# Patient Record
Sex: MALE | Race: WHITE | NOT HISPANIC OR LATINO | Employment: OTHER | URBAN - METROPOLITAN AREA
[De-identification: names, ages, dates, MRNs, and addresses within clinical notes are randomized per-mention and may not be internally consistent; named-entity substitution may affect disease eponyms.]

---

## 2017-01-19 ENCOUNTER — HOSPITAL ENCOUNTER (OUTPATIENT)
Dept: FAMILY MEDICINE CLINIC | Facility: CLINIC | Age: 52
Setting detail: SPECIMEN
Discharge: HOME | End: 2017-01-19
Attending: NURSE PRACTITIONER | Admitting: NURSE PRACTITIONER

## 2017-01-19 LAB
ALBUMIN SERPL-MCNC: 4.1 G/DL (ref 3.5–4.8)
ALBUMIN/GLOB SERPL: 1.5 {RATIO} (ref 1–1.7)
ALP SERPL-CCNC: 77 IU/L (ref 32–91)
ALT SERPL-CCNC: 40 IU/L (ref 17–63)
ANION GAP SERPL CALC-SCNC: 16 MMOL/L (ref 10–20)
AST SERPL-CCNC: 27 IU/L (ref 15–41)
BILIRUB SERPL-MCNC: 1.4 MG/DL (ref 0.3–1.2)
BUN SERPL-MCNC: 15 MG/DL (ref 8–20)
BUN/CREAT SERPL: 13.6 (ref 6.2–20.3)
CALCIUM SERPL-MCNC: 9.9 MG/DL (ref 8.9–10.3)
CHLORIDE SERPL-SCNC: 102 MMOL/L (ref 101–111)
CHOLEST SERPL-MCNC: 244 MG/DL
CHOLEST/HDLC SERPL: 6.2 {RATIO}
CONV CO2: 27 MMOL/L (ref 22–32)
CONV LDL CHOLESTEROL DIRECT: 145 MG/DL (ref 0–100)
CONV MICROALBUM.,U,RANDOM: 9 MG/L
CONV TOTAL PROTEIN: 6.8 G/DL (ref 6.1–7.9)
CREAT 24H UR-MCNC: 165.3 MG/DL
CREAT UR-MCNC: 1.1 MG/DL (ref 0.7–1.2)
GLOBULIN UR ELPH-MCNC: 2.7 G/DL (ref 2.5–3.8)
GLUCOSE SERPL-MCNC: 285 MG/DL (ref 65–99)
HDLC SERPL-MCNC: 39 MG/DL
LDLC/HDLC SERPL: 3.7 {RATIO}
LIPID INTERPRETATION: ABNORMAL
MICROALBUMIN/CREAT UR: 5.4 UG/MG
POTASSIUM SERPL-SCNC: 5 MMOL/L (ref 3.6–5.1)
PSA SERPL-MCNC: 0.54 NG/ML (ref 0–4)
SODIUM SERPL-SCNC: 140 MMOL/L (ref 136–144)
T4 FREE SERPL-MCNC: 1 NG/DL (ref 0.58–1.64)
TRIGL SERPL-MCNC: 281 MG/DL
TSH SERPL-ACNC: 10.55 UIU/ML (ref 0.34–5.6)
VLDLC SERPL CALC-MCNC: 59.9 MG/DL

## 2017-03-03 ENCOUNTER — ON CAMPUS - OUTPATIENT (AMBULATORY)
Dept: URBAN - METROPOLITAN AREA HOSPITAL 85 | Facility: HOSPITAL | Age: 52
End: 2017-03-03
Payer: COMMERCIAL

## 2017-03-03 ENCOUNTER — HOSPITAL ENCOUNTER (OUTPATIENT)
Dept: PREOP | Facility: HOSPITAL | Age: 52
Setting detail: HOSPITAL OUTPATIENT SURGERY
Discharge: HOME | End: 2017-03-03
Attending: INTERNAL MEDICINE | Admitting: INTERNAL MEDICINE

## 2017-03-03 DIAGNOSIS — Z12.11 ENCOUNTER FOR SCREENING FOR MALIGNANT NEOPLASM OF COLON: ICD-10-CM

## 2017-03-03 DIAGNOSIS — R13.10 DYSPHAGIA, UNSPECIFIED: ICD-10-CM

## 2017-03-03 DIAGNOSIS — K64.8 OTHER HEMORRHOIDS: ICD-10-CM

## 2017-03-03 DIAGNOSIS — K20.0 EOSINOPHILIC ESOPHAGITIS: ICD-10-CM

## 2017-03-03 DIAGNOSIS — K22.2 ESOPHAGEAL OBSTRUCTION: ICD-10-CM

## 2017-03-03 LAB — GLUCOSE BLD-MCNC: 128 MG/DL (ref 70–105)

## 2017-03-03 PROCEDURE — 43235 EGD DIAGNOSTIC BRUSH WASH: CPT | Performed by: INTERNAL MEDICINE

## 2017-03-03 PROCEDURE — 43450 DILATE ESOPHAGUS 1/MULT PASS: CPT | Performed by: INTERNAL MEDICINE

## 2017-03-03 PROCEDURE — 45378 DIAGNOSTIC COLONOSCOPY: CPT | Mod: 33 | Performed by: INTERNAL MEDICINE

## 2017-08-28 ENCOUNTER — HOSPITAL ENCOUNTER (OUTPATIENT)
Dept: FAMILY MEDICINE CLINIC | Facility: CLINIC | Age: 52
Setting detail: SPECIMEN
Discharge: HOME | End: 2017-08-28

## 2017-08-28 LAB
ALBUMIN SERPL-MCNC: 3.8 G/DL (ref 3.5–4.8)
ALBUMIN/GLOB SERPL: 1.5 {RATIO} (ref 1–1.7)
ALP SERPL-CCNC: 69 IU/L (ref 32–91)
ALT SERPL-CCNC: 27 IU/L (ref 17–63)
ANION GAP SERPL CALC-SCNC: 14.1 MMOL/L (ref 10–20)
AST SERPL-CCNC: 22 IU/L (ref 15–41)
BILIRUB SERPL-MCNC: 1 MG/DL (ref 0.3–1.2)
BUN SERPL-MCNC: 18 MG/DL (ref 8–20)
BUN/CREAT SERPL: 15 (ref 6.2–20.3)
CALCIUM SERPL-MCNC: 9.3 MG/DL (ref 8.9–10.3)
CHLORIDE SERPL-SCNC: 107 MMOL/L (ref 101–111)
CHOLEST SERPL-MCNC: 129 MG/DL
CHOLEST/HDLC SERPL: 3.7 {RATIO}
CONV CO2: 25 MMOL/L (ref 22–32)
CONV LDL CHOLESTEROL DIRECT: 73 MG/DL (ref 0–100)
CONV TOTAL PROTEIN: 6.4 G/DL (ref 6.1–7.9)
CREAT UR-MCNC: 1.2 MG/DL (ref 0.7–1.2)
GLOBULIN UR ELPH-MCNC: 2.6 G/DL (ref 2.5–3.8)
GLUCOSE SERPL-MCNC: 120 MG/DL (ref 65–99)
HDLC SERPL-MCNC: 35 MG/DL
LDLC/HDLC SERPL: 2.1 {RATIO}
LIPID INTERPRETATION: ABNORMAL
POTASSIUM SERPL-SCNC: 5.1 MMOL/L (ref 3.6–5.1)
SODIUM SERPL-SCNC: 141 MMOL/L (ref 136–144)
TRIGL SERPL-MCNC: 105 MG/DL
TSH SERPL-ACNC: 4.39 UIU/ML (ref 0.34–5.6)
VLDLC SERPL CALC-MCNC: 20.5 MG/DL

## 2018-03-02 ENCOUNTER — ON CAMPUS - OUTPATIENT (AMBULATORY)
Dept: URBAN - METROPOLITAN AREA HOSPITAL 85 | Facility: HOSPITAL | Age: 53
End: 2018-03-02
Payer: MEDICARE

## 2018-03-02 ENCOUNTER — HOSPITAL ENCOUNTER (OUTPATIENT)
Dept: PREOP | Facility: HOSPITAL | Age: 53
Setting detail: HOSPITAL OUTPATIENT SURGERY
Discharge: HOME | End: 2018-03-02
Attending: INTERNAL MEDICINE | Admitting: INTERNAL MEDICINE

## 2018-03-02 DIAGNOSIS — R13.10 DYSPHAGIA, UNSPECIFIED: ICD-10-CM

## 2018-03-02 DIAGNOSIS — K22.2 ESOPHAGEAL OBSTRUCTION: ICD-10-CM

## 2018-03-02 PROCEDURE — 43235 EGD DIAGNOSTIC BRUSH WASH: CPT | Performed by: INTERNAL MEDICINE

## 2018-03-02 PROCEDURE — 43450 DILATE ESOPHAGUS 1/MULT PASS: CPT | Performed by: INTERNAL MEDICINE

## 2018-03-06 ENCOUNTER — HOSPITAL ENCOUNTER (OUTPATIENT)
Dept: SLEEP MEDICINE | Facility: HOSPITAL | Age: 53
Discharge: HOME | End: 2018-03-06
Attending: PSYCHIATRY & NEUROLOGY | Admitting: PSYCHIATRY & NEUROLOGY

## 2018-03-30 ENCOUNTER — HOSPITAL ENCOUNTER (OUTPATIENT)
Dept: CARDIOLOGY | Facility: HOSPITAL | Age: 53
Discharge: HOME | End: 2018-03-30
Attending: INTERNAL MEDICINE | Admitting: INTERNAL MEDICINE

## 2018-04-17 ENCOUNTER — HOSPITAL ENCOUNTER (OUTPATIENT)
Dept: OTHER | Facility: HOSPITAL | Age: 53
Discharge: HOME | End: 2018-04-17
Attending: INTERNAL MEDICINE | Admitting: INTERNAL MEDICINE

## 2018-04-17 LAB
ANION GAP SERPL CALC-SCNC: 10.7 MMOL/L (ref 10–20)
BASOPHILS # BLD AUTO: 0.1 10*3/UL (ref 0–0.2)
BASOPHILS NFR BLD AUTO: 1 % (ref 0–2)
BUN SERPL-MCNC: 15 MG/DL (ref 8–20)
BUN/CREAT SERPL: 13.6 (ref 6.2–20.3)
CALCIUM SERPL-MCNC: 9.5 MG/DL (ref 8.9–10.3)
CHLORIDE SERPL-SCNC: 103 MMOL/L (ref 101–111)
CONV CO2: 28 MMOL/L (ref 22–32)
CREAT UR-MCNC: 1.1 MG/DL (ref 0.7–1.2)
DIFFERENTIAL METHOD BLD: (no result)
EOSINOPHIL # BLD AUTO: 0.3 10*3/UL (ref 0–0.3)
EOSINOPHIL # BLD AUTO: 4 % (ref 0–3)
ERYTHROCYTE [DISTWIDTH] IN BLOOD BY AUTOMATED COUNT: 13.9 % (ref 11.5–14.5)
GLUCOSE SERPL-MCNC: 289 MG/DL (ref 65–99)
HCT VFR BLD AUTO: 42.3 % (ref 40–54)
HGB BLD-MCNC: 14.2 G/DL (ref 14–18)
INR PPP: 1
LYMPHOCYTES # BLD AUTO: 1.3 10*3/UL (ref 0.8–4.8)
LYMPHOCYTES NFR BLD AUTO: 20 % (ref 18–42)
MCH RBC QN AUTO: 28.5 PG (ref 26–32)
MCHC RBC AUTO-ENTMCNC: 33.6 G/DL (ref 32–36)
MCV RBC AUTO: 84.8 FL (ref 80–94)
MONOCYTES # BLD AUTO: 0.5 10*3/UL (ref 0.1–1.3)
MONOCYTES NFR BLD AUTO: 7 % (ref 2–11)
NEUTROPHILS # BLD AUTO: 4.5 10*3/UL (ref 2.3–8.6)
NEUTROPHILS NFR BLD AUTO: 68 % (ref 50–75)
NRBC BLD AUTO-RTO: 0 /100{WBCS}
NRBC/RBC NFR BLD MANUAL: 0 10*3/UL
PLATELET # BLD AUTO: 223 10*3/UL (ref 150–450)
PMV BLD AUTO: 7.5 FL (ref 7.4–10.4)
POTASSIUM SERPL-SCNC: 4.7 MMOL/L (ref 3.6–5.1)
PROTHROMBIN TIME: 10.9 SEC (ref 9.6–11.7)
RBC # BLD AUTO: 4.98 10*6/UL (ref 4.6–6)
SODIUM SERPL-SCNC: 137 MMOL/L (ref 136–144)
WBC # BLD AUTO: 6.6 10*3/UL (ref 4.5–11.5)

## 2018-04-27 ENCOUNTER — HOSPITAL ENCOUNTER (OUTPATIENT)
Dept: LAB | Facility: HOSPITAL | Age: 53
Setting detail: SPECIMEN
Discharge: HOME | End: 2018-04-27
Attending: INTERNAL MEDICINE | Admitting: INTERNAL MEDICINE

## 2018-04-27 LAB
ALBUMIN SERPL-MCNC: 4.1 G/DL (ref 3.5–4.8)
ALBUMIN/GLOB SERPL: 1.4 {RATIO} (ref 1–1.7)
ALP SERPL-CCNC: 102 IU/L (ref 32–91)
ALT SERPL-CCNC: 29 IU/L (ref 17–63)
ANION GAP SERPL CALC-SCNC: 11.5 MMOL/L (ref 10–20)
AST SERPL-CCNC: 21 IU/L (ref 15–41)
BASOPHILS # BLD AUTO: 0.1 10*3/UL (ref 0–0.2)
BASOPHILS NFR BLD AUTO: 1 % (ref 0–2)
BILIRUB SERPL-MCNC: 1.9 MG/DL (ref 0.3–1.2)
BUN SERPL-MCNC: 18 MG/DL (ref 8–20)
BUN/CREAT SERPL: 13.8 (ref 6.2–20.3)
CALCIUM SERPL-MCNC: 9.6 MG/DL (ref 8.9–10.3)
CHLORIDE SERPL-SCNC: 101 MMOL/L (ref 101–111)
CHOLEST SERPL-MCNC: 148 MG/DL
CHOLEST/HDLC SERPL: 3.7 {RATIO}
CONV CO2: 28 MMOL/L (ref 22–32)
CONV LDL CHOLESTEROL DIRECT: 89 MG/DL (ref 0–100)
CONV TOTAL PROTEIN: 7 G/DL (ref 6.1–7.9)
CREAT UR-MCNC: 1.3 MG/DL (ref 0.7–1.2)
DIFFERENTIAL METHOD BLD: (no result)
EOSINOPHIL # BLD AUTO: 0.5 10*3/UL (ref 0–0.3)
EOSINOPHIL # BLD AUTO: 5 % (ref 0–3)
ERYTHROCYTE [DISTWIDTH] IN BLOOD BY AUTOMATED COUNT: 13.9 % (ref 11.5–14.5)
GLOBULIN UR ELPH-MCNC: 2.9 G/DL (ref 2.5–3.8)
GLUCOSE SERPL-MCNC: 150 MG/DL (ref 65–99)
HCT VFR BLD AUTO: 46.1 % (ref 40–54)
HDLC SERPL-MCNC: 40 MG/DL
HGB BLD-MCNC: 15.3 G/DL (ref 14–18)
LDLC/HDLC SERPL: 2.2 {RATIO}
LIPID INTERPRETATION: NORMAL
LYMPHOCYTES # BLD AUTO: 1.3 10*3/UL (ref 0.8–4.8)
LYMPHOCYTES NFR BLD AUTO: 14 % (ref 18–42)
MCH RBC QN AUTO: 28.2 PG (ref 26–32)
MCHC RBC AUTO-ENTMCNC: 33.2 G/DL (ref 32–36)
MCV RBC AUTO: 84.8 FL (ref 80–94)
MONOCYTES # BLD AUTO: 0.7 10*3/UL (ref 0.1–1.3)
MONOCYTES NFR BLD AUTO: 8 % (ref 2–11)
NEUTROPHILS # BLD AUTO: 7 10*3/UL (ref 2.3–8.6)
NEUTROPHILS NFR BLD AUTO: 72 % (ref 50–75)
NRBC BLD AUTO-RTO: 0 /100{WBCS}
NRBC/RBC NFR BLD MANUAL: 0 10*3/UL
PLATELET # BLD AUTO: 264 10*3/UL (ref 150–450)
PMV BLD AUTO: 7.9 FL (ref 7.4–10.4)
POTASSIUM SERPL-SCNC: 4.5 MMOL/L (ref 3.6–5.1)
RBC # BLD AUTO: 5.43 10*6/UL (ref 4.6–6)
SODIUM SERPL-SCNC: 136 MMOL/L (ref 136–144)
TRIGL SERPL-MCNC: 82 MG/DL
VLDLC SERPL CALC-MCNC: 19.5 MG/DL
WBC # BLD AUTO: 9.6 10*3/UL (ref 4.5–11.5)

## 2018-06-19 ENCOUNTER — HOSPITAL ENCOUNTER (OUTPATIENT)
Dept: LAB | Facility: HOSPITAL | Age: 53
Setting detail: SPECIMEN
Discharge: HOME | End: 2018-06-19
Attending: NURSE PRACTITIONER | Admitting: NURSE PRACTITIONER

## 2018-07-27 ENCOUNTER — HOSPITAL ENCOUNTER (OUTPATIENT)
Dept: INFUSION THERAPY | Facility: HOSPITAL | Age: 53
Setting detail: RECURRING SERIES
Discharge: HOME | End: 2018-08-05
Attending: INTERNAL MEDICINE | Admitting: INTERNAL MEDICINE

## 2018-07-27 LAB
ALBUMIN SERPL-MCNC: 3.1 G/DL (ref 3.5–4.8)
ALBUMIN/GLOB SERPL: 1 {RATIO} (ref 1–1.7)
ALP SERPL-CCNC: 92 IU/L (ref 32–91)
ALT SERPL-CCNC: 36 IU/L (ref 17–63)
ANION GAP SERPL CALC-SCNC: 12.8 MMOL/L (ref 10–20)
AST SERPL-CCNC: 33 IU/L (ref 15–41)
BASOPHILS # BLD AUTO: 0.1 10*3/UL (ref 0–0.2)
BASOPHILS NFR BLD AUTO: 1 % (ref 0–2)
BILIRUB SERPL-MCNC: 1.2 MG/DL (ref 0.3–1.2)
BUN SERPL-MCNC: 17 MG/DL (ref 8–20)
BUN/CREAT SERPL: 13.1 (ref 6.2–20.3)
CALCIUM SERPL-MCNC: 9 MG/DL (ref 8.9–10.3)
CHLORIDE SERPL-SCNC: 104 MMOL/L (ref 101–111)
CK SERPL-CCNC: 152 IU/L (ref 49–397)
CONV CO2: 25 MMOL/L (ref 22–32)
CONV TOTAL PROTEIN: 6.3 G/DL (ref 6.1–7.9)
CREAT UR-MCNC: 1.3 MG/DL (ref 0.7–1.2)
DIFFERENTIAL METHOD BLD: (no result)
EOSINOPHIL # BLD AUTO: 0.5 10*3/UL (ref 0–0.3)
EOSINOPHIL # BLD AUTO: 6 % (ref 0–3)
ERYTHROCYTE [DISTWIDTH] IN BLOOD BY AUTOMATED COUNT: 13.9 % (ref 11.5–14.5)
GLOBULIN UR ELPH-MCNC: 3.2 G/DL (ref 2.5–3.8)
GLUCOSE SERPL-MCNC: 182 MG/DL (ref 65–99)
HCT VFR BLD AUTO: 34.2 % (ref 40–54)
HGB BLD-MCNC: 11.3 G/DL (ref 14–18)
LYMPHOCYTES # BLD AUTO: 1.3 10*3/UL (ref 0.8–4.8)
LYMPHOCYTES NFR BLD AUTO: 17 % (ref 18–42)
MCH RBC QN AUTO: 28 PG (ref 26–32)
MCHC RBC AUTO-ENTMCNC: 33.1 G/DL (ref 32–36)
MCV RBC AUTO: 84.6 FL (ref 80–94)
MONOCYTES # BLD AUTO: 0.6 10*3/UL (ref 0.1–1.3)
MONOCYTES NFR BLD AUTO: 8 % (ref 2–11)
NEUTROPHILS # BLD AUTO: 5.1 10*3/UL (ref 2.3–8.6)
NEUTROPHILS NFR BLD AUTO: 68 % (ref 50–75)
NRBC BLD AUTO-RTO: 0 /100{WBCS}
NRBC/RBC NFR BLD MANUAL: 0 10*3/UL
PLATELET # BLD AUTO: 293 10*3/UL (ref 150–450)
PMV BLD AUTO: 7.5 FL (ref 7.4–10.4)
POTASSIUM SERPL-SCNC: 3.8 MMOL/L (ref 3.6–5.1)
RBC # BLD AUTO: 4.04 10*6/UL (ref 4.6–6)
SODIUM SERPL-SCNC: 138 MMOL/L (ref 136–144)
WBC # BLD AUTO: 7.6 10*3/UL (ref 4.5–11.5)

## 2018-08-01 ENCOUNTER — HOSPITAL ENCOUNTER (OUTPATIENT)
Dept: LAB | Facility: HOSPITAL | Age: 53
Setting detail: SPECIMEN
Discharge: HOME | End: 2018-08-01
Attending: NURSE PRACTITIONER | Admitting: NURSE PRACTITIONER

## 2018-08-01 ENCOUNTER — HOSPITAL ENCOUNTER (OUTPATIENT)
Dept: OTHER | Facility: HOSPITAL | Age: 53
Setting detail: RECURRING SERIES
Discharge: HOME | End: 2018-09-16
Attending: INTERNAL MEDICINE | Admitting: INTERNAL MEDICINE

## 2018-08-03 LAB
ALBUMIN SERPL-MCNC: 3.3 G/DL (ref 3.5–4.8)
ALBUMIN/GLOB SERPL: 1.1 {RATIO} (ref 1–1.7)
ALP SERPL-CCNC: 85 IU/L (ref 32–91)
ALT SERPL-CCNC: 29 IU/L (ref 17–63)
ANION GAP SERPL CALC-SCNC: 11 MMOL/L (ref 10–20)
AST SERPL-CCNC: 21 IU/L (ref 15–41)
BASOPHILS # BLD AUTO: 0.1 10*3/UL (ref 0–0.2)
BASOPHILS NFR BLD AUTO: 1 % (ref 0–2)
BILIRUB SERPL-MCNC: 1.3 MG/DL (ref 0.3–1.2)
BUN SERPL-MCNC: 13 MG/DL (ref 8–20)
BUN/CREAT SERPL: 10.8 (ref 6.2–20.3)
CALCIUM SERPL-MCNC: 9.1 MG/DL (ref 8.9–10.3)
CHLORIDE SERPL-SCNC: 104 MMOL/L (ref 101–111)
CK SERPL-CCNC: 40 IU/L (ref 49–397)
CONV CO2: 28 MMOL/L (ref 22–32)
CONV TOTAL PROTEIN: 6.3 G/DL (ref 6.1–7.9)
CREAT UR-MCNC: 1.2 MG/DL (ref 0.7–1.2)
DIFFERENTIAL METHOD BLD: (no result)
EOSINOPHIL # BLD AUTO: 0.4 10*3/UL (ref 0–0.3)
EOSINOPHIL # BLD AUTO: 6 % (ref 0–3)
ERYTHROCYTE [DISTWIDTH] IN BLOOD BY AUTOMATED COUNT: 13.6 % (ref 11.5–14.5)
GLOBULIN UR ELPH-MCNC: 3 G/DL (ref 2.5–3.8)
GLUCOSE SERPL-MCNC: 191 MG/DL (ref 65–99)
HCT VFR BLD AUTO: 35.3 % (ref 40–54)
HGB BLD-MCNC: 11.7 G/DL (ref 14–18)
LYMPHOCYTES # BLD AUTO: 1.3 10*3/UL (ref 0.8–4.8)
LYMPHOCYTES NFR BLD AUTO: 22 % (ref 18–42)
MCH RBC QN AUTO: 27.8 PG (ref 26–32)
MCHC RBC AUTO-ENTMCNC: 33.1 G/DL (ref 32–36)
MCV RBC AUTO: 84 FL (ref 80–94)
MONOCYTES # BLD AUTO: 0.5 10*3/UL (ref 0.1–1.3)
MONOCYTES NFR BLD AUTO: 8 % (ref 2–11)
NEUTROPHILS # BLD AUTO: 3.7 10*3/UL (ref 2.3–8.6)
NEUTROPHILS NFR BLD AUTO: 63 % (ref 50–75)
NRBC BLD AUTO-RTO: 0 /100{WBCS}
NRBC/RBC NFR BLD MANUAL: 0 10*3/UL
PLATELET # BLD AUTO: 308 10*3/UL (ref 150–450)
PMV BLD AUTO: 7.7 FL (ref 7.4–10.4)
POTASSIUM SERPL-SCNC: 4 MMOL/L (ref 3.6–5.1)
RBC # BLD AUTO: 4.21 10*6/UL (ref 4.6–6)
SODIUM SERPL-SCNC: 139 MMOL/L (ref 136–144)
WBC # BLD AUTO: 5.9 10*3/UL (ref 4.5–11.5)

## 2018-08-06 ENCOUNTER — HOSPITAL ENCOUNTER (OUTPATIENT)
Dept: OTHER | Facility: HOSPITAL | Age: 53
Setting detail: RECURRING SERIES
Discharge: HOME | End: 2018-08-12
Attending: INTERNAL MEDICINE | Admitting: INTERNAL MEDICINE

## 2018-08-10 LAB
ALBUMIN SERPL-MCNC: 3.7 G/DL (ref 3.5–4.8)
ALBUMIN/GLOB SERPL: 1.3 {RATIO} (ref 1–1.7)
ALP SERPL-CCNC: 99 IU/L (ref 32–91)
ALT SERPL-CCNC: 30 IU/L (ref 17–63)
ANION GAP SERPL CALC-SCNC: 12.3 MMOL/L (ref 10–20)
AST SERPL-CCNC: 23 IU/L (ref 15–41)
BASOPHILS # BLD AUTO: 0.1 10*3/UL (ref 0–0.2)
BASOPHILS NFR BLD AUTO: 1 % (ref 0–2)
BILIRUB SERPL-MCNC: 1.6 MG/DL (ref 0.3–1.2)
BUN SERPL-MCNC: 14 MG/DL (ref 8–20)
BUN/CREAT SERPL: 10.8 (ref 6.2–20.3)
CALCIUM SERPL-MCNC: 9.2 MG/DL (ref 8.9–10.3)
CHLORIDE SERPL-SCNC: 99 MMOL/L (ref 101–111)
CK SERPL-CCNC: 43 IU/L (ref 49–397)
CONV CO2: 29 MMOL/L (ref 22–32)
CONV TOTAL PROTEIN: 6.6 G/DL (ref 6.1–7.9)
CREAT UR-MCNC: 1.3 MG/DL (ref 0.7–1.2)
DIFFERENTIAL METHOD BLD: (no result)
EOSINOPHIL # BLD AUTO: 0.5 10*3/UL (ref 0–0.3)
EOSINOPHIL # BLD AUTO: 6 % (ref 0–3)
ERYTHROCYTE [DISTWIDTH] IN BLOOD BY AUTOMATED COUNT: 14.2 % (ref 11.5–14.5)
GLOBULIN UR ELPH-MCNC: 2.9 G/DL (ref 2.5–3.8)
GLUCOSE SERPL-MCNC: 187 MG/DL (ref 65–99)
HCT VFR BLD AUTO: 41.1 % (ref 40–54)
HGB BLD-MCNC: 13.5 G/DL (ref 14–18)
LYMPHOCYTES # BLD AUTO: 1.7 10*3/UL (ref 0.8–4.8)
LYMPHOCYTES NFR BLD AUTO: 24 % (ref 18–42)
MCH RBC QN AUTO: 27.4 PG (ref 26–32)
MCHC RBC AUTO-ENTMCNC: 32.8 G/DL (ref 32–36)
MCV RBC AUTO: 83.8 FL (ref 80–94)
MONOCYTES # BLD AUTO: 0.7 10*3/UL (ref 0.1–1.3)
MONOCYTES NFR BLD AUTO: 9 % (ref 2–11)
NEUTROPHILS # BLD AUTO: 4.2 10*3/UL (ref 2.3–8.6)
NEUTROPHILS NFR BLD AUTO: 60 % (ref 50–75)
NRBC BLD AUTO-RTO: 0 /100{WBCS}
NRBC/RBC NFR BLD MANUAL: 0 10*3/UL
PLATELET # BLD AUTO: 272 10*3/UL (ref 150–450)
PMV BLD AUTO: 7.8 FL (ref 7.4–10.4)
POTASSIUM SERPL-SCNC: 4.3 MMOL/L (ref 3.6–5.1)
RBC # BLD AUTO: 4.9 10*6/UL (ref 4.6–6)
SODIUM SERPL-SCNC: 136 MMOL/L (ref 136–144)
WBC # BLD AUTO: 7.1 10*3/UL (ref 4.5–11.5)

## 2018-08-13 ENCOUNTER — HOSPITAL ENCOUNTER (OUTPATIENT)
Dept: OTHER | Facility: HOSPITAL | Age: 53
Setting detail: RECURRING SERIES
Discharge: HOME | End: 2018-08-19
Attending: INTERNAL MEDICINE | Admitting: INTERNAL MEDICINE

## 2018-08-17 LAB
ALBUMIN SERPL-MCNC: 3.9 G/DL (ref 3.5–4.8)
ALBUMIN/GLOB SERPL: 1.4 {RATIO} (ref 1–1.7)
ALP SERPL-CCNC: 93 IU/L (ref 32–91)
ALT SERPL-CCNC: 40 IU/L (ref 17–63)
ANION GAP SERPL CALC-SCNC: 15.5 MMOL/L (ref 10–20)
AST SERPL-CCNC: 29 IU/L (ref 15–41)
BASOPHILS # BLD AUTO: 0.1 10*3/UL (ref 0–0.2)
BASOPHILS NFR BLD AUTO: 1 % (ref 0–2)
BILIRUB SERPL-MCNC: 1.6 MG/DL (ref 0.3–1.2)
BUN SERPL-MCNC: 20 MG/DL (ref 8–20)
BUN/CREAT SERPL: 12.5 (ref 6.2–20.3)
CALCIUM SERPL-MCNC: 9.1 MG/DL (ref 8.9–10.3)
CHLORIDE SERPL-SCNC: 98 MMOL/L (ref 101–111)
CK SERPL-CCNC: 46 IU/L (ref 49–397)
CONV CO2: 27 MMOL/L (ref 22–32)
CONV TOTAL PROTEIN: 6.7 G/DL (ref 6.1–7.9)
CREAT UR-MCNC: 1.6 MG/DL (ref 0.7–1.2)
DIFFERENTIAL METHOD BLD: (no result)
EOSINOPHIL # BLD AUTO: 0.6 10*3/UL (ref 0–0.3)
EOSINOPHIL # BLD AUTO: 8 % (ref 0–3)
ERYTHROCYTE [DISTWIDTH] IN BLOOD BY AUTOMATED COUNT: 14.1 % (ref 11.5–14.5)
GLOBULIN UR ELPH-MCNC: 2.8 G/DL (ref 2.5–3.8)
GLUCOSE SERPL-MCNC: 158 MG/DL (ref 65–99)
HCT VFR BLD AUTO: 43.7 % (ref 40–54)
HGB BLD-MCNC: 14.3 G/DL (ref 14–18)
LYMPHOCYTES # BLD AUTO: 1.4 10*3/UL (ref 0.8–4.8)
LYMPHOCYTES NFR BLD AUTO: 19 % (ref 18–42)
MCH RBC QN AUTO: 27.5 PG (ref 26–32)
MCHC RBC AUTO-ENTMCNC: 32.8 G/DL (ref 32–36)
MCV RBC AUTO: 83.6 FL (ref 80–94)
MONOCYTES # BLD AUTO: 0.6 10*3/UL (ref 0.1–1.3)
MONOCYTES NFR BLD AUTO: 8 % (ref 2–11)
NEUTROPHILS # BLD AUTO: 4.8 10*3/UL (ref 2.3–8.6)
NEUTROPHILS NFR BLD AUTO: 64 % (ref 50–75)
NRBC BLD AUTO-RTO: 0 /100{WBCS}
NRBC/RBC NFR BLD MANUAL: 0 10*3/UL
PLATELET # BLD AUTO: 207 10*3/UL (ref 150–450)
PMV BLD AUTO: 8 FL (ref 7.4–10.4)
POTASSIUM SERPL-SCNC: 4.5 MMOL/L (ref 3.6–5.1)
RBC # BLD AUTO: 5.22 10*6/UL (ref 4.6–6)
SODIUM SERPL-SCNC: 136 MMOL/L (ref 136–144)
WBC # BLD AUTO: 7.5 10*3/UL (ref 4.5–11.5)

## 2018-08-20 ENCOUNTER — HOSPITAL ENCOUNTER (OUTPATIENT)
Dept: OTHER | Facility: HOSPITAL | Age: 53
Setting detail: RECURRING SERIES
Discharge: HOME | End: 2018-08-21
Attending: INTERNAL MEDICINE | Admitting: INTERNAL MEDICINE

## 2018-12-13 ENCOUNTER — HOSPITAL ENCOUNTER (OUTPATIENT)
Dept: GASTROENTEROLOGY | Facility: HOSPITAL | Age: 53
Setting detail: HOSPITAL OUTPATIENT SURGERY
Discharge: HOME | End: 2018-12-13
Attending: INTERNAL MEDICINE | Admitting: INTERNAL MEDICINE

## 2018-12-13 ENCOUNTER — ON CAMPUS - OUTPATIENT (AMBULATORY)
Dept: URBAN - METROPOLITAN AREA HOSPITAL 85 | Facility: HOSPITAL | Age: 53
End: 2018-12-13
Payer: MEDICAID

## 2018-12-13 DIAGNOSIS — K22.2 ESOPHAGEAL OBSTRUCTION: ICD-10-CM

## 2018-12-13 DIAGNOSIS — R13.10 DYSPHAGIA, UNSPECIFIED: ICD-10-CM

## 2018-12-13 LAB — GLUCOSE BLD-MCNC: 202 MG/DL (ref 70–105)

## 2018-12-13 PROCEDURE — 43235 EGD DIAGNOSTIC BRUSH WASH: CPT | Performed by: INTERNAL MEDICINE

## 2018-12-13 PROCEDURE — 43450 DILATE ESOPHAGUS 1/MULT PASS: CPT | Performed by: INTERNAL MEDICINE

## 2019-04-22 ENCOUNTER — HOSPITAL ENCOUNTER (OUTPATIENT)
Dept: FAMILY MEDICINE CLINIC | Facility: CLINIC | Age: 54
Setting detail: SPECIMEN
Discharge: HOME | End: 2019-04-22
Attending: FAMILY MEDICINE | Admitting: FAMILY MEDICINE

## 2019-04-22 ENCOUNTER — HOSPITAL ENCOUNTER (OUTPATIENT)
Dept: LAB | Facility: HOSPITAL | Age: 54
Discharge: HOME | End: 2019-04-22
Attending: FAMILY MEDICINE | Admitting: FAMILY MEDICINE

## 2019-04-22 LAB
ALBUMIN SERPL-MCNC: 4.2 G/DL (ref 3.5–4.8)
ALBUMIN/GLOB SERPL: 1.3 {RATIO} (ref 1–1.7)
ALP SERPL-CCNC: 105 IU/L (ref 32–91)
ALT SERPL-CCNC: 24 IU/L (ref 17–63)
ANION GAP SERPL CALC-SCNC: 16.2 MMOL/L (ref 10–20)
AST SERPL-CCNC: 18 IU/L (ref 15–41)
BASOPHILS # BLD AUTO: 0.1 10*3/UL (ref 0–0.2)
BASOPHILS NFR BLD AUTO: 1 % (ref 0–2)
BILIRUB SERPL-MCNC: 1.9 MG/DL (ref 0.3–1.2)
BUN SERPL-MCNC: 18 MG/DL (ref 8–20)
BUN/CREAT SERPL: 15 (ref 6.2–20.3)
CALCIUM SERPL-MCNC: 9.8 MG/DL (ref 8.9–10.3)
CHLORIDE SERPL-SCNC: 100 MMOL/L (ref 101–111)
CHOLEST SERPL-MCNC: 158 MG/DL
CHOLEST/HDLC SERPL: 4 {RATIO}
CONV CO2: 26 MMOL/L (ref 22–32)
CONV LDL CHOLESTEROL DIRECT: 94 MG/DL (ref 0–100)
CONV TOTAL PROTEIN: 7.5 G/DL (ref 6.1–7.9)
CREAT UR-MCNC: 1.2 MG/DL (ref 0.7–1.2)
DIFFERENTIAL METHOD BLD: (no result)
EOSINOPHIL # BLD AUTO: 0.5 10*3/UL (ref 0–0.3)
EOSINOPHIL # BLD AUTO: 6 % (ref 0–3)
ERYTHROCYTE [DISTWIDTH] IN BLOOD BY AUTOMATED COUNT: 14.4 % (ref 11.5–14.5)
GLOBULIN UR ELPH-MCNC: 3.3 G/DL (ref 2.5–3.8)
GLUCOSE SERPL-MCNC: 199 MG/DL (ref 65–99)
HCT VFR BLD AUTO: 47 % (ref 40–54)
HDLC SERPL-MCNC: 40 MG/DL
HGB BLD-MCNC: 15.4 G/DL (ref 14–18)
LDLC/HDLC SERPL: 2.4 {RATIO}
LIPID INTERPRETATION: ABNORMAL
LYMPHOCYTES # BLD AUTO: 1.4 10*3/UL (ref 0.8–4.8)
LYMPHOCYTES NFR BLD AUTO: 17 % (ref 18–42)
MCH RBC QN AUTO: 27.8 PG (ref 26–32)
MCHC RBC AUTO-ENTMCNC: 32.7 G/DL (ref 32–36)
MCV RBC AUTO: 85.1 FL (ref 80–94)
MONOCYTES # BLD AUTO: 0.6 10*3/UL (ref 0.1–1.3)
MONOCYTES NFR BLD AUTO: 7 % (ref 2–11)
NEUTROPHILS # BLD AUTO: 5.5 10*3/UL (ref 2.3–8.6)
NEUTROPHILS NFR BLD AUTO: 69 % (ref 50–75)
NRBC BLD AUTO-RTO: 0 /100{WBCS}
NRBC/RBC NFR BLD MANUAL: 0 10*3/UL
PLATELET # BLD AUTO: 255 10*3/UL (ref 150–450)
PMV BLD AUTO: 8.1 FL (ref 7.4–10.4)
POTASSIUM SERPL-SCNC: 5.2 MMOL/L (ref 3.6–5.1)
RBC # BLD AUTO: 5.52 10*6/UL (ref 4.6–6)
SODIUM SERPL-SCNC: 137 MMOL/L (ref 136–144)
TRIGL SERPL-MCNC: 200 MG/DL
VLDLC SERPL CALC-MCNC: 25 MG/DL
WBC # BLD AUTO: 8 10*3/UL (ref 4.5–11.5)

## 2019-05-16 ENCOUNTER — HOSPITAL ENCOUNTER (OUTPATIENT)
Dept: LAB | Facility: HOSPITAL | Age: 54
Discharge: HOME | End: 2019-05-16
Attending: FAMILY MEDICINE | Admitting: FAMILY MEDICINE

## 2019-05-16 LAB
AMPHETAMINES UR QL SCN: NEGATIVE
BARBITURATES UR QL SCN: NEGATIVE
BENZODIAZ UR QL SCN: NEGATIVE
BZE UR QL SCN: NEGATIVE
CREAT 24H UR-MCNC: 261.1 MG/DL
Lab: NORMAL
METHADONE UR QL SCN: NEGATIVE
OPIATES TESTED UR SCN: NEGATIVE
OXYCODONE UR QL SCN: NEGATIVE
PCP UR QL: NEGATIVE
THC SERPLBLD CFM-MCNC: NEGATIVE NG/ML

## 2019-07-10 ENCOUNTER — OFFICE VISIT (OUTPATIENT)
Dept: FAMILY MEDICINE CLINIC | Facility: CLINIC | Age: 54
End: 2019-07-10

## 2019-07-10 ENCOUNTER — LAB (OUTPATIENT)
Dept: FAMILY MEDICINE CLINIC | Facility: CLINIC | Age: 54
End: 2019-07-10

## 2019-07-10 VITALS
BODY MASS INDEX: 30.49 KG/M2 | HEIGHT: 65 IN | SYSTOLIC BLOOD PRESSURE: 150 MMHG | WEIGHT: 183 LBS | OXYGEN SATURATION: 96 % | HEART RATE: 73 BPM | DIASTOLIC BLOOD PRESSURE: 88 MMHG

## 2019-07-10 DIAGNOSIS — E11.9 TYPE 2 DIABETES MELLITUS WITHOUT COMPLICATION, UNSPECIFIED WHETHER LONG TERM INSULIN USE (HCC): ICD-10-CM

## 2019-07-10 DIAGNOSIS — E03.9 HYPOTHYROIDISM, UNSPECIFIED TYPE: ICD-10-CM

## 2019-07-10 DIAGNOSIS — E11.9 TYPE 2 DIABETES MELLITUS WITHOUT COMPLICATION, UNSPECIFIED WHETHER LONG TERM INSULIN USE (HCC): Primary | ICD-10-CM

## 2019-07-10 PROBLEM — E11.8 DISORDER ASSOCIATED WITH TYPE 2 DIABETES MELLITUS (HCC): Status: ACTIVE | Noted: 2017-01-19

## 2019-07-10 PROBLEM — M19.90 DEGENERATIVE JOINT DISEASE: Status: ACTIVE | Noted: 2017-01-19

## 2019-07-10 PROBLEM — Z86.711 HISTORY OF PULMONARY EMBOLISM: Status: ACTIVE | Noted: 2017-01-19

## 2019-07-10 PROBLEM — E78.5 HYPERLIPIDEMIA: Status: ACTIVE | Noted: 2017-01-19

## 2019-07-10 PROBLEM — E11.42 DIABETIC PERIPHERAL NEUROPATHY: Status: ACTIVE | Noted: 2017-08-25

## 2019-07-10 PROBLEM — G47.33 OBSTRUCTIVE SLEEP APNEA SYNDROME: Status: ACTIVE | Noted: 2018-02-21

## 2019-07-10 PROBLEM — R00.2 PALPITATIONS: Status: ACTIVE | Noted: 2018-03-09

## 2019-07-10 PROBLEM — E55.9 VITAMIN D DEFICIENCY: Status: ACTIVE | Noted: 2017-01-19

## 2019-07-10 PROBLEM — M79.671 FOOT PAIN, RIGHT: Status: ACTIVE | Noted: 2019-04-22

## 2019-07-10 PROBLEM — L03.90 CELLULITIS: Status: ACTIVE | Noted: 2019-04-22

## 2019-07-10 PROBLEM — N18.30 CHRONIC RENAL INSUFFICIENCY, STAGE III (MODERATE) (HCC): Status: ACTIVE | Noted: 2017-01-19

## 2019-07-10 PROBLEM — I10 HYPERTENSION: Status: ACTIVE | Noted: 2017-01-19

## 2019-07-10 PROBLEM — I25.10 CHRONIC CORONARY ARTERY DISEASE: Status: ACTIVE | Noted: 2018-05-31

## 2019-07-10 PROBLEM — M75.51 SUBACROMIAL BURSITIS OF RIGHT SHOULDER JOINT: Status: ACTIVE | Noted: 2018-06-29

## 2019-07-10 PROBLEM — K21.9 GASTROESOPHAGEAL REFLUX DISEASE: Status: ACTIVE | Noted: 2017-01-19

## 2019-07-10 PROBLEM — I73.9 PERIPHERAL VASCULAR DISEASE: Status: ACTIVE | Noted: 2017-01-19

## 2019-07-10 PROBLEM — F32.A DEPRESSION: Status: ACTIVE | Noted: 2017-01-19

## 2019-07-10 PROBLEM — Z87.39 HX OF OSTEOMYELITIS: Status: ACTIVE | Noted: 2017-01-19

## 2019-07-10 PROBLEM — Z12.11 ENCOUNTER FOR SCREENING FOR MALIGNANT NEOPLASM OF COLON: Status: ACTIVE | Noted: 2017-01-19

## 2019-07-10 PROBLEM — R07.9 CHEST PAIN: Status: ACTIVE | Noted: 2018-03-09

## 2019-07-10 PROBLEM — I10 BENIGN ESSENTIAL HYPERTENSION: Status: ACTIVE | Noted: 2018-02-20

## 2019-07-10 PROBLEM — Z89.419 HISTORY OF AMPUTATION OF HALLUX: Status: ACTIVE | Noted: 2017-01-19

## 2019-07-10 PROBLEM — Z87.19 HISTORY OF GASTROINTESTINAL DISEASE: Status: ACTIVE | Noted: 2017-01-19

## 2019-07-10 PROBLEM — R53.83 FATIGUE: Status: ACTIVE | Noted: 2017-12-08

## 2019-07-10 PROBLEM — M67.40 GANGLION CYST: Status: ACTIVE | Noted: 2017-12-08

## 2019-07-10 LAB
ALBUMIN SERPL-MCNC: 4.1 G/DL (ref 3.5–4.8)
ALBUMIN/GLOB SERPL: 1.4 G/DL (ref 1–1.7)
ALP SERPL-CCNC: 93 U/L (ref 32–91)
ALT SERPL W P-5'-P-CCNC: 31 U/L (ref 17–63)
ANION GAP SERPL CALCULATED.3IONS-SCNC: 15.9 MMOL/L (ref 5–15)
ARTICHOKE IGE QN: 107 MG/DL (ref 0–100)
AST SERPL-CCNC: 23 U/L (ref 15–41)
BASOPHILS # BLD AUTO: 0.1 10*3/MM3 (ref 0–0.2)
BASOPHILS NFR BLD AUTO: 0.9 % (ref 0–1.5)
BILIRUB SERPL-MCNC: 1.8 MG/DL (ref 0.3–1.2)
BUN BLD-MCNC: 25 MG/DL (ref 8–20)
BUN/CREAT SERPL: 17.9 (ref 6.2–20.3)
CALCIUM SPEC-SCNC: 9.7 MG/DL (ref 8.9–10.3)
CHLORIDE SERPL-SCNC: 101 MMOL/L (ref 101–111)
CHOLEST SERPL-MCNC: 188 MG/DL
CO2 SERPL-SCNC: 25 MMOL/L (ref 22–32)
CREAT BLD-MCNC: 1.4 MG/DL (ref 0.7–1.2)
DEPRECATED RDW RBC AUTO: 41.6 FL (ref 37–54)
EOSINOPHIL # BLD AUTO: 0.5 10*3/MM3 (ref 0–0.4)
EOSINOPHIL NFR BLD AUTO: 7 % (ref 0.3–6.2)
ERYTHROCYTE [DISTWIDTH] IN BLOOD BY AUTOMATED COUNT: 13.8 % (ref 12.3–15.4)
GFR SERPL CREATININE-BSD FRML MDRD: 53 ML/MIN/1.73
GLOBULIN UR ELPH-MCNC: 2.9 GM/DL (ref 2.5–3.8)
GLUCOSE BLD-MCNC: 262 MG/DL (ref 65–99)
HBA1C MFR BLD: 8.8 % (ref 3.5–5.6)
HCT VFR BLD AUTO: 45.7 % (ref 37.5–51)
HDLC SERPL QL: 4.7
HDLC SERPL-MCNC: 40 MG/DL
HGB BLD-MCNC: 15.4 G/DL (ref 13–17.7)
LDLC/HDLC SERPL: 2.55 {RATIO}
LYMPHOCYTES # BLD AUTO: 1.4 10*3/MM3 (ref 0.7–3.1)
LYMPHOCYTES NFR BLD AUTO: 19.6 % (ref 19.6–45.3)
MCH RBC QN AUTO: 29.2 PG (ref 26.6–33)
MCHC RBC AUTO-ENTMCNC: 33.7 G/DL (ref 31.5–35.7)
MCV RBC AUTO: 86.6 FL (ref 79–97)
MONOCYTES # BLD AUTO: 0.6 10*3/MM3 (ref 0.1–0.9)
MONOCYTES NFR BLD AUTO: 8.2 % (ref 5–12)
NEUTROPHILS # BLD AUTO: 4.6 10*3/MM3 (ref 1.7–7)
NEUTROPHILS NFR BLD AUTO: 64.3 % (ref 42.7–76)
NRBC BLD AUTO-RTO: 0.3 /100 WBC (ref 0–0.2)
PLATELET # BLD AUTO: 227 10*3/MM3 (ref 140–450)
PMV BLD AUTO: 8.2 FL (ref 6–12)
POTASSIUM BLD-SCNC: 5.9 MMOL/L (ref 3.6–5.1)
PROT SERPL-MCNC: 7 G/DL (ref 6.1–7.9)
RBC # BLD AUTO: 5.28 10*6/MM3 (ref 4.14–5.8)
SODIUM BLD-SCNC: 136 MMOL/L (ref 136–144)
TRIGL SERPL-MCNC: 230 MG/DL
TSH SERPL DL<=0.05 MIU/L-ACNC: 5.01 MIU/ML (ref 0.34–5.6)
VLDLC SERPL-MCNC: 46 MG/DL
WBC NRBC COR # BLD: 7.2 10*3/MM3 (ref 3.4–10.8)

## 2019-07-10 PROCEDURE — 85025 COMPLETE CBC W/AUTO DIFF WBC: CPT | Performed by: FAMILY MEDICINE

## 2019-07-10 PROCEDURE — 99213 OFFICE O/P EST LOW 20 MIN: CPT | Performed by: FAMILY MEDICINE

## 2019-07-10 PROCEDURE — 80053 COMPREHEN METABOLIC PANEL: CPT | Performed by: FAMILY MEDICINE

## 2019-07-10 PROCEDURE — 83036 HEMOGLOBIN GLYCOSYLATED A1C: CPT | Performed by: FAMILY MEDICINE

## 2019-07-10 PROCEDURE — 36415 COLL VENOUS BLD VENIPUNCTURE: CPT

## 2019-07-10 PROCEDURE — 84443 ASSAY THYROID STIM HORMONE: CPT | Performed by: FAMILY MEDICINE

## 2019-07-10 PROCEDURE — 80061 LIPID PANEL: CPT | Performed by: FAMILY MEDICINE

## 2019-07-10 RX ORDER — GLIPIZIDE 10 MG/1
TABLET, FILM COATED, EXTENDED RELEASE ORAL
Qty: 60 TABLET | Refills: 3 | Status: SHIPPED | OUTPATIENT
Start: 2019-07-10 | End: 2019-08-10 | Stop reason: SDUPTHER

## 2019-07-10 RX ORDER — NITROGLYCERIN 0.4 MG/1
0.4 TABLET SUBLINGUAL
COMMUNITY
Start: 2019-02-01 | End: 2021-08-02

## 2019-07-10 RX ORDER — ETODOLAC 400 MG/1
400 TABLET, FILM COATED ORAL 2 TIMES DAILY
COMMUNITY
Start: 2018-06-29 | End: 2020-03-15

## 2019-07-10 RX ORDER — BLOOD-GLUCOSE METER
EACH MISCELLANEOUS
COMMUNITY
Start: 2017-01-19 | End: 2019-10-23 | Stop reason: ALTCHOICE

## 2019-07-10 RX ORDER — NICOTINE POLACRILEX 4 MG/1
GUM, CHEWING ORAL EVERY 24 HOURS
Status: ON HOLD | COMMUNITY
Start: 2017-01-19 | End: 2019-10-04 | Stop reason: SDUPTHER

## 2019-07-10 RX ORDER — FLUOXETINE HYDROCHLORIDE 40 MG/1
1 CAPSULE ORAL EVERY 24 HOURS
COMMUNITY
Start: 2018-03-23 | End: 2019-08-27 | Stop reason: SDUPTHER

## 2019-07-10 RX ORDER — LEVOTHYROXINE SODIUM 88 UG/1
TABLET ORAL
COMMUNITY
Start: 2018-10-26 | End: 2019-07-11

## 2019-07-10 RX ORDER — PEN NEEDLE, DIABETIC 30 GX5/16"
NEEDLE, DISPOSABLE MISCELLANEOUS
COMMUNITY
Start: 2019-04-22 | End: 2019-07-10 | Stop reason: SDUPTHER

## 2019-07-10 RX ORDER — PEN NEEDLE, DIABETIC 30 GX5/16"
NEEDLE, DISPOSABLE MISCELLANEOUS
Qty: 30 EACH | Refills: 3 | Status: SHIPPED | OUTPATIENT
Start: 2019-07-10 | End: 2019-08-10 | Stop reason: SDUPTHER

## 2019-07-10 RX ORDER — CETIRIZINE HYDROCHLORIDE 10 MG/1
10 TABLET ORAL DAILY
COMMUNITY
Start: 2019-04-22 | End: 2020-03-15

## 2019-07-10 RX ORDER — ASPIRIN 81 MG/1
81 TABLET ORAL DAILY
COMMUNITY
Start: 2018-05-31 | End: 2021-06-29

## 2019-07-10 RX ORDER — APIXABAN 5 MG/1
5 TABLET, FILM COATED ORAL 2 TIMES DAILY
Refills: 0 | COMMUNITY
Start: 2019-05-28 | End: 2019-08-27 | Stop reason: SDUPTHER

## 2019-07-10 RX ORDER — METOPROLOL SUCCINATE 50 MG/1
TABLET, EXTENDED RELEASE ORAL
COMMUNITY
Start: 2018-03-09 | End: 2019-08-27 | Stop reason: SDUPTHER

## 2019-07-10 RX ORDER — LOSARTAN POTASSIUM 50 MG/1
50 TABLET ORAL DAILY
COMMUNITY
Start: 2018-03-23 | End: 2020-08-07 | Stop reason: SDUPTHER

## 2019-07-10 RX ORDER — GABAPENTIN 300 MG/1
CAPSULE ORAL
COMMUNITY
Start: 2018-05-22 | End: 2019-10-02

## 2019-07-10 RX ORDER — GLIPIZIDE 10 MG/1
TABLET, FILM COATED, EXTENDED RELEASE ORAL EVERY 24 HOURS
COMMUNITY
Start: 2019-04-22 | End: 2019-07-10 | Stop reason: SDUPTHER

## 2019-07-10 RX ORDER — ATORVASTATIN CALCIUM 40 MG/1
40 TABLET, FILM COATED ORAL DAILY
COMMUNITY
Start: 2019-02-01 | End: 2020-08-07 | Stop reason: SDUPTHER

## 2019-07-10 NOTE — PROGRESS NOTES
"Subjective   Kuldeep Adhikari is a 54 y.o. male.     HPI     Here for 3mo f/u-dm/htn  Needs glipizide. He never got script from last visit. Said pharmacy didn't receive it or insulin    Thinks he is taking thyroid med    Going to [employer] for anger mnmgmt      Past Medical History:   Diagnosis Date   • CKD (chronic kidney disease), stage III (CMS/HCC)    • Depression    • DJD (degenerative joint disease)    • GERD (gastroesophageal reflux disease)    • History of echocardiogram 04/2016    2D ECHO   • History of esophageal stricture     s/p dialation 40-50 times last EGD 04/2016   • History of pulmonary embolus (PE)     on long term anticoagulation   • Hyperlipidemia    • Hypertension    • Hypothyroidism    • Type 2 diabetes mellitus with peripheral vascular disease (CMS/HCC)    • Vitamin D deficiency      Past Surgical History:   Procedure Laterality Date   • CARDIAC CATHETERIZATION  04/2018   • ENDOSCOPY     • HERNIA REPAIR     • TOTAL HIP ARTHROPLASTY Left    • TOTAL HIP ARTHROPLASTY  2018     Family History   Problem Relation Age of Onset   • Diabetes Mother    • Heart disease Mother    • Leukemia Father    • Sleep apnea Maternal Aunt         GENO   • Stroke Maternal Grandmother    • Hypertension Other    • Hyperlipidemia Other    • Cancer Other    • Colon cancer Other         uncle     Social History     Tobacco Use   • Smoking status: Never Smoker   Substance Use Topics   • Alcohol use: No     Frequency: Never       No current outpatient medications on file prior to visit.     No current facility-administered medications on file prior to visit.            Review of Systems   Respiratory: Negative for shortness of breath.    Cardiovascular: Negative for chest pain.   Gastrointestinal: Negative for constipation and diarrhea.   Genitourinary: Negative for dysuria.       Visit Vitals  /88 (BP Location: Left arm, Patient Position: Sitting, Cuff Size: Adult)   Pulse 73   Ht 165.1 cm (65\")   Wt 83 kg (183 lb) " "  SpO2 96%   BMI 30.45 kg/m²       Objective   Physical Exam   Constitutional: He is oriented to person, place, and time. He appears well-developed and well-nourished.   HENT:   Head: Normocephalic and atraumatic.   Cardiovascular: Normal rate, regular rhythm and normal heart sounds.   Pulmonary/Chest: Effort normal and breath sounds normal.   Neurological: He is alert and oriented to person, place, and time.   Psychiatric: He has a normal mood and affect. His behavior is normal. Judgment and thought content normal.   Vitals reviewed.        Diagnoses and all orders for this visit:    1. Type 2 diabetes mellitus without complication, unspecified whether long term insulin use (CMS/[facility]) (Primary)  Comments:  Resend prescription  Orders:  -     CBC & Differential  -     Comprehensive Metabolic Panel; Future  -     Hemoglobin A1c; Future  -     Lipid Panel; Future  -     TSH; Future  -     Cancel: Ambulatory Referral for Diabetic Eye Exam-Ophthalmology  -     CBC Auto Differential    2. Hypothyroidism, unspecified type  -     CBC & Differential  -     Comprehensive Metabolic Panel; Future  -     Hemoglobin A1c; Future  -     Lipid Panel; Future  -     TSH; Future  -     CBC Auto Differential    Other orders  -     glipiZIDE (GLIPIZIDE XL) 10 MG 24 hr tablet; 2 tabs daily  Dispense: 60 tablet; Refill: 3  -     Insulin Glargine (LANTUS SOLOSTAR) 100 UNIT/ML injection pen; Inject 10 Units under the skin into the appropriate area as directed Daily.  Dispense: 5 pen; Refill: 1  -     Insulin Pen Needle (PEN NEEDLES 3/16\") 31G X 5 MM misc; Use 1 needle daily  Dispense: 30 each; Refill: 3      "

## 2019-07-11 ENCOUNTER — TELEPHONE (OUTPATIENT)
Dept: FAMILY MEDICINE CLINIC | Facility: CLINIC | Age: 54
End: 2019-07-11

## 2019-07-11 ENCOUNTER — OFFICE VISIT (OUTPATIENT)
Dept: PODIATRY | Facility: CLINIC | Age: 54
End: 2019-07-11

## 2019-07-11 VITALS
BODY MASS INDEX: 30.82 KG/M2 | WEIGHT: 185 LBS | SYSTOLIC BLOOD PRESSURE: 135 MMHG | HEART RATE: 72 BPM | HEIGHT: 65 IN | DIASTOLIC BLOOD PRESSURE: 77 MMHG

## 2019-07-11 DIAGNOSIS — B35.1 ONYCHOMYCOSIS: ICD-10-CM

## 2019-07-11 DIAGNOSIS — E11.42 DM TYPE 2 WITH DIABETIC PERIPHERAL NEUROPATHY (HCC): Primary | ICD-10-CM

## 2019-07-11 PROCEDURE — 11721 DEBRIDE NAIL 6 OR MORE: CPT | Performed by: PODIATRIST

## 2019-07-11 PROCEDURE — 99213 OFFICE O/P EST LOW 20 MIN: CPT | Performed by: PODIATRIST

## 2019-07-11 RX ORDER — LEVOTHYROXINE SODIUM 0.1 MG/1
100 TABLET ORAL DAILY
Qty: 60 TABLET | Refills: 0 | Status: SHIPPED | OUTPATIENT
Start: 2019-07-11 | End: 2019-08-10 | Stop reason: SDUPTHER

## 2019-07-11 NOTE — PROGRESS NOTES
"07/11/2019  Foot and Ankle Surgery - Established Patient/Follow-up  Provider: Dr. Bong Baker DPM  Location: [facility]    Subjective:  Kuldeep Adhikari is a 54 y.o. male.     Chief Complaint   Patient presents with   • Annual Exam     DM check       HPI: Patient returns for routine diabetic foot check.  He denies any new issues since last exam.  He continues to have numbness affecting both lower extremities.  He states that his blood sugars have been quite high recently.  He is working with his primary care physician for improved control.  He has not had any open wounds or infections since last exam.  He would like to obtain new diabetic shoes and inserts    Allergies   Allergen Reactions   • Lisinopril Cough       Current Outpatient Medications on File Prior to Visit   Medication Sig Dispense Refill   • aspirin (ASPIR-LOW) 81 MG EC tablet Daily.     • atorvastatin (LIPITOR) 40 MG tablet Daily.     • Blood Glucose Monitoring Suppl (ACCU-CHEK ANTONIO PLUS) w/Device kit ACCU-CHEK ANTONIO PLUS w/Device KIT     • cetirizine (ZYRTEC ALLERGY) 10 MG tablet Daily.     • ELIQUIS 5 MG tablet tablet Take 5 mg by mouth 2 (Two) Times a Day.  0   • etodolac (LODINE) 400 MG tablet Every 12 (Twelve) Hours.     • FLUoxetine (PROzac) 40 MG capsule 1 capsule Daily.     • gabapentin (NEURONTIN) 300 MG capsule GABAPENTIN 300 MG CAPS     • glipiZIDE (GLIPIZIDE XL) 10 MG 24 hr tablet 2 tabs daily 60 tablet 3   • glucose blood (ACCU-CHEK ANTONIO PLUS) test strip ACCU-CHEK ANTONIO PLUS STRP     • Insulin Glargine (LANTUS SOLOSTAR) 100 UNIT/ML injection pen Inject 10 Units under the skin into the appropriate area as directed Daily. 5 pen 1   • Insulin Pen Needle (PEN NEEDLES 3/16\") 31G X 5 MM misc Use 1 needle daily 30 each 3   • losartan (COZAAR) 50 MG tablet Daily.     • metoprolol succinate XL (TOPROL-XL) 50 MG 24 hr tablet METOPROLOL SUCCINATE ER 50 MG XR24H-TAB     • nitroglycerin (NITROSTAT) 0.4 MG SL tablet NITROGLYCERIN 0.4 MG " "SUBL     • Omeprazole 20 MG tablet delayed-release Daily.     • SITagliptin-metFORMIN HCl ER (JANUMET XR)  MG tablet Every 12 (Twelve) Hours.     • [DISCONTINUED] levothyroxine (SYNTHROID, LEVOTHROID) 88 MCG tablet LEVOTHYROXINE SODIUM 88 MCG TABS       No current facility-administered medications on file prior to visit.        Objective   /77   Pulse 72   Ht 165.1 cm (65\")   Wt 83.9 kg (185 lb)   BMI 30.79 kg/m²     Podiatry Exam       General Appearance:  well nourished; well hydrated; no acute distress  Mental Status Exam        Judgement and Insight:  Intact       Orientation:  Oriented to time, place, and person       Memory:  Intact for recent and remote events       Mood and Affect:  No depression, anxiety, or agitation  Cardiovascular       Dorsalis Pedis Pulse:   2/4       Posterior Tibialis Pulse:   2/4       Capillary Filling Time:  1-3 Seconds       Edema :  No Edema  Dermatological Exam       Temperature:  warm to warm       Skin Elasticity:  normal skin elasticity  Skin:   No open wounds or signs of infection.  Neurological Exam        Paresthesia :  negative       Patella DTRs:  symmetric       Achilles DTRs :  symmetric       Tinel over Tarsal Tunnel:  negative  Monofilament Test :   not intact       Diabetic Risk:  Loss of protective sensation with no weakness deformity callus or pre-ulcer  Additional Neurological Findings   sensation is diminished with monofilament testing.  Motor function is intact  Nails are excessively thickened, elongated, and dystrophic x9     MusculoSkeletal Exam          Gait and Stance :   nonantalgic       ROM :   ankle and pedal joint range of motion is nontender crepitus free.       Muscle Strength :  symmetrical 5/5       Subluxed Digits:  no subluxation or laxity of joints  Additional Musculoskelatal Findings  Mild hammer digit deformities affecting the right second and third digits.  Stable hallux amputation to the right foot.  Assessment/Plan "     Kuldeep was seen today for annual exam.    Diagnoses and all orders for this visit:    DM type 2 with diabetic peripheral neuropathy (CMS/[facility])    Onychomycosis    Patient returns for routine diabetic foot check with no significant increase in symptoms.  He continues to have numbness which is secondary to his diabetes.  We did discuss the importance of glycemic control to improve symptoms.  He denies any significant pain.  He has been taking gabapentin without any known improvement.  Nails were debrided x9 today without complication.  We did discuss proper routine care.  Continue to feel that he is at moderate risk for pedal complications.  I have referred him for new diabetic shoes and inserts.Explained importance of diabetic foot care, daily foot checks, and glycemic control. Patient should check both feet on a daily basis, monitor and control blood sugars, make sure that both feet and in between toes are towel dried after baths or showers. Avoid barefoot walking at all times. Check shoes before putting them on.   Patient was given information on proper foot care. Call the office at the first signs of a wound or with signs of infection.  I would like to see him in 6 months for reevaluation.    No orders of the defined types were placed in this encounter.           Note is dictated utilizing voice recognition software. Unfortunately this leads to occasional typographical errors. I apologize in advance if the situation occurs. If questions occur please do not hesitate to call our office.

## 2019-07-11 NOTE — PATIENT INSTRUCTIONS
Diabetes Mellitus and Foot Care  Foot care is an important part of your health, especially when you have diabetes. Diabetes may cause you to have problems because of poor blood flow (circulation) to your feet and legs, which can cause your skin to:  · Become thinner and drier.  · Break more easily.  · Heal more slowly.  · Peel and crack.    You may also have nerve damage (neuropathy) in your legs and feet, causing decreased feeling in them. This means that you may not notice minor injuries to your feet that could lead to more serious problems. Noticing and addressing any potential problems early is the best way to prevent future foot problems.  How to care for your feet  Foot hygiene  · Wash your feet daily with warm water and mild soap. Do not use hot water. Then, pat your feet and the areas between your toes until they are completely dry. Do not soak your feet as this can dry your skin.  · Trim your toenails straight across. Do not dig under them or around the cuticle. File the edges of your nails with an emery board or nail file.  · Apply a moisturizing lotion or petroleum jelly to the skin on your feet and to dry, brittle toenails. Use lotion that does not contain alcohol and is unscented. Do not apply lotion between your toes.  Shoes and socks  · Wear clean socks or stockings every day. Make sure they are not too tight. Do not wear knee-high stockings since they may decrease blood flow to your legs.  · Wear shoes that fit properly and have enough cushioning. Always look in your shoes before you put them on to be sure there are no objects inside.  · To break in new shoes, wear them for just a few hours a day. This prevents injuries on your feet.  Wounds, scrapes, corns, and calluses  · Check your feet daily for blisters, cuts, bruises, sores, and redness. If you cannot see the bottom of your feet, use a mirror or ask someone for help.  · Do not cut corns or calluses or try to remove them with medicine.  · If you  find a minor scrape, cut, or break in the skin on your feet, keep it and the skin around it clean and dry. You may clean these areas with mild soap and water. Do not clean the area with peroxide, alcohol, or iodine.  · If you have a wound, scrape, corn, or callus on your foot, look at it several times a day to make sure it is healing and not infected. Check for:  ? Redness, swelling, or pain.  ? Fluid or blood.  ? Warmth.  ? Pus or a bad smell.  General instructions  · Do not cross your legs. This may decrease blood flow to your feet.  · Do not use heating pads or hot water bottles on your feet. They may burn your skin. If you have lost feeling in your feet or legs, you may not know this is happening until it is too late.  · Protect your feet from hot and cold by wearing shoes, such as at the beach or on hot pavement.  · Schedule a complete foot exam at least once a year (annually) or more often if you have foot problems. If you have foot problems, report any cuts, sores, or bruises to your health care provider immediately.  Contact a health care provider if:  · You have a medical condition that increases your risk of infection and you have any cuts, sores, or bruises on your feet.  · You have an injury that is not healing.  · You have redness on your legs or feet.  · You feel burning or tingling in your legs or feet.  · You have pain or cramps in your legs and feet.  · Your legs or feet are numb.  · Your feet always feel cold.  · You have pain around a toenail.  Get help right away if:  · You have a wound, scrape, corn, or callus on your foot and:  ? You have pain, swelling, or redness that gets worse.  ? You have fluid or blood coming from the wound, scrape, corn, or callus.  ? Your wound, scrape, corn, or callus feels warm to the touch.  ? You have pus or a bad smell coming from the wound, scrape, corn, or callus.  ? You have a fever.  ? You have a red line going up your leg.  Summary  · Check your feet every day  for cuts, sores, red spots, swelling, and blisters.  · Moisturize feet and legs daily.  · Wear shoes that fit properly and have enough cushioning.  · If you have foot problems, report any cuts, sores, or bruises to your health care provider immediately.  · Schedule a complete foot exam at least once a year (annually) or more often if you have foot problems.  This information is not intended to replace advice given to you by your health care provider. Make sure you discuss any questions you have with your health care provider.  Document Released: 12/15/2001 Document Revised: 01/19/2018 Document Reviewed: 01/19/2018  [employer] Interactive Patient Education © 2019 [employer]

## 2019-07-11 NOTE — TELEPHONE ENCOUNTER
1.  TSH improved but not yet to goal--increase Synthroid to 100 mcg daily--repeat labs 6 weeks    2.  Hemoglobin A1c 8.8, goal is under 7, call in 1 to 2 weeks with fasting blood sugars after starting daily insulin

## 2019-08-12 RX ORDER — GLIPIZIDE 10 MG/1
TABLET, FILM COATED, EXTENDED RELEASE ORAL
Qty: 60 TABLET | Refills: 3 | Status: SHIPPED | OUTPATIENT
Start: 2019-08-12 | End: 2019-10-02

## 2019-08-12 RX ORDER — PEN NEEDLE, DIABETIC 30 GX5/16"
NEEDLE, DISPOSABLE MISCELLANEOUS
Qty: 30 EACH | Refills: 3 | Status: SHIPPED | OUTPATIENT
Start: 2019-08-12 | End: 2020-03-15 | Stop reason: SDUPTHER

## 2019-08-12 RX ORDER — PEN NEEDLE, DIABETIC 30 GX5/16"
NEEDLE, DISPOSABLE MISCELLANEOUS
Qty: 30 EACH | Refills: 3 | Status: SHIPPED | OUTPATIENT
Start: 2019-08-12 | End: 2019-10-02

## 2019-08-12 RX ORDER — GLIPIZIDE 10 MG/1
TABLET, FILM COATED, EXTENDED RELEASE ORAL
Qty: 60 TABLET | Refills: 3 | Status: SHIPPED | OUTPATIENT
Start: 2019-08-12 | End: 2019-10-23

## 2019-08-12 RX ORDER — LEVOTHYROXINE SODIUM 0.1 MG/1
100 TABLET ORAL DAILY
Qty: 60 TABLET | Refills: 0 | Status: SHIPPED | OUTPATIENT
Start: 2019-08-12 | End: 2020-08-07 | Stop reason: SDUPTHER

## 2019-08-12 RX ORDER — LEVOTHYROXINE SODIUM 0.1 MG/1
100 TABLET ORAL DAILY
Qty: 60 TABLET | Refills: 0 | Status: SHIPPED | OUTPATIENT
Start: 2019-08-12 | End: 2019-10-02

## 2019-08-12 RX ORDER — LEVOTHYROXINE SODIUM 0.1 MG/1
100 TABLET ORAL DAILY
Qty: 60 TABLET | Refills: 0 | Status: SHIPPED | OUTPATIENT
Start: 2019-08-12 | End: 2019-08-13 | Stop reason: SDUPTHER

## 2019-08-12 RX ORDER — PEN NEEDLE, DIABETIC 30 GX5/16"
NEEDLE, DISPOSABLE MISCELLANEOUS
Qty: 30 EACH | Refills: 3 | Status: SHIPPED | OUTPATIENT
Start: 2019-08-12 | End: 2019-08-13 | Stop reason: SDUPTHER

## 2019-08-15 RX ORDER — LEVOTHYROXINE SODIUM 0.1 MG/1
100 TABLET ORAL DAILY
Qty: 60 TABLET | Refills: 0 | Status: SHIPPED | OUTPATIENT
Start: 2019-08-15 | End: 2019-10-02

## 2019-08-15 RX ORDER — PEN NEEDLE, DIABETIC 30 GX5/16"
NEEDLE, DISPOSABLE MISCELLANEOUS
Qty: 30 EACH | Refills: 3 | Status: SHIPPED | OUTPATIENT
Start: 2019-08-15 | End: 2019-10-02

## 2019-08-28 RX ORDER — FLUOXETINE HYDROCHLORIDE 40 MG/1
CAPSULE ORAL
Qty: 90 CAPSULE | Refills: 1 | Status: SHIPPED | OUTPATIENT
Start: 2019-08-28 | End: 2020-06-24

## 2019-08-28 RX ORDER — APIXABAN 5 MG/1
TABLET, FILM COATED ORAL
Qty: 180 TABLET | Refills: 0 | Status: SHIPPED | OUTPATIENT
Start: 2019-08-28 | End: 2019-10-01 | Stop reason: SDUPTHER

## 2019-08-28 RX ORDER — METOPROLOL SUCCINATE 50 MG/1
TABLET, EXTENDED RELEASE ORAL
Qty: 90 TABLET | Refills: 1 | Status: SHIPPED | OUTPATIENT
Start: 2019-08-28 | End: 2019-12-16 | Stop reason: SDUPTHER

## 2019-10-01 RX ORDER — SITAGLIPTIN AND METFORMIN HYDROCHLORIDE 1000; 50 MG/1; MG/1
TABLET, FILM COATED, EXTENDED RELEASE ORAL
Qty: 60 TABLET | Refills: 1 | OUTPATIENT
Start: 2019-10-01

## 2019-10-02 RX ORDER — APIXABAN 5 MG/1
TABLET, FILM COATED ORAL
Qty: 180 TABLET | Refills: 0 | Status: SHIPPED | OUTPATIENT
Start: 2019-10-02 | End: 2019-12-16 | Stop reason: SDUPTHER

## 2019-10-03 ENCOUNTER — ANESTHESIA EVENT (OUTPATIENT)
Dept: GASTROENTEROLOGY | Facility: HOSPITAL | Age: 54
End: 2019-10-03

## 2019-10-04 ENCOUNTER — ON CAMPUS - OUTPATIENT (AMBULATORY)
Dept: URBAN - METROPOLITAN AREA HOSPITAL 85 | Facility: HOSPITAL | Age: 54
End: 2019-10-04

## 2019-10-04 ENCOUNTER — ANESTHESIA (OUTPATIENT)
Dept: GASTROENTEROLOGY | Facility: HOSPITAL | Age: 54
End: 2019-10-04

## 2019-10-04 ENCOUNTER — HOSPITAL ENCOUNTER (OUTPATIENT)
Facility: HOSPITAL | Age: 54
Setting detail: HOSPITAL OUTPATIENT SURGERY
Discharge: HOME | End: 2019-10-04
Attending: INTERNAL MEDICINE | Admitting: INTERNAL MEDICINE

## 2019-10-04 VITALS
DIASTOLIC BLOOD PRESSURE: 78 MMHG | TEMPERATURE: 98.4 F | HEIGHT: 68 IN | HEART RATE: 64 BPM | BODY MASS INDEX: 24.25 KG/M2 | OXYGEN SATURATION: 96 % | RESPIRATION RATE: 14 BRPM | WEIGHT: 160 LBS | SYSTOLIC BLOOD PRESSURE: 122 MMHG

## 2019-10-04 VITALS — HEART RATE: 66 BPM | DIASTOLIC BLOOD PRESSURE: 71 MMHG | SYSTOLIC BLOOD PRESSURE: 117 MMHG | OXYGEN SATURATION: 99 %

## 2019-10-04 DIAGNOSIS — K20.0 EOSINOPHILIC ESOPHAGITIS: ICD-10-CM

## 2019-10-04 DIAGNOSIS — R47.02 DYSPHASIA: Primary | ICD-10-CM

## 2019-10-04 DIAGNOSIS — R13.10 DYSPHAGIA, UNSPECIFIED: ICD-10-CM

## 2019-10-04 DIAGNOSIS — K22.2 ESOPHAGEAL OBSTRUCTION: ICD-10-CM

## 2019-10-04 LAB — GLUCOSE BLDC GLUCOMTR-MCNC: 122 MG/DL (ref 70–105)

## 2019-10-04 PROCEDURE — 25010000002 PROPOFOL 10 MG/ML EMULSION: Performed by: ANESTHESIOLOGIST ASSISTANT

## 2019-10-04 PROCEDURE — 82962 GLUCOSE BLOOD TEST: CPT

## 2019-10-04 PROCEDURE — 88305 TISSUE EXAM BY PATHOLOGIST: CPT | Performed by: INTERNAL MEDICINE

## 2019-10-04 PROCEDURE — 43450 DILATE ESOPHAGUS 1/MULT PASS: CPT | Performed by: INTERNAL MEDICINE

## 2019-10-04 PROCEDURE — 43239 EGD BIOPSY SINGLE/MULTIPLE: CPT | Performed by: INTERNAL MEDICINE

## 2019-10-04 RX ORDER — SODIUM CHLORIDE 9 MG/ML
9 INJECTION, SOLUTION INTRAVENOUS CONTINUOUS PRN
Status: DISCONTINUED | OUTPATIENT
Start: 2019-10-04 | End: 2019-10-04 | Stop reason: HOSPADM

## 2019-10-04 RX ORDER — NICOTINE POLACRILEX 4 MG/1
40 GUM, CHEWING ORAL EVERY 24 HOURS
Qty: 180 EACH | Refills: 0 | Status: SHIPPED | OUTPATIENT
Start: 2019-10-04 | End: 2020-03-15

## 2019-10-04 RX ORDER — SODIUM CHLORIDE 0.9 % (FLUSH) 0.9 %
3 SYRINGE (ML) INJECTION EVERY 12 HOURS SCHEDULED
Status: DISCONTINUED | OUTPATIENT
Start: 2019-10-04 | End: 2019-10-04 | Stop reason: HOSPADM

## 2019-10-04 RX ORDER — SODIUM CHLORIDE 0.9 % (FLUSH) 0.9 %
10 SYRINGE (ML) INJECTION AS NEEDED
Status: DISCONTINUED | OUTPATIENT
Start: 2019-10-04 | End: 2019-10-04 | Stop reason: HOSPADM

## 2019-10-04 RX ORDER — PROPOFOL 10 MG/ML
VIAL (ML) INTRAVENOUS AS NEEDED
Status: DISCONTINUED | OUTPATIENT
Start: 2019-10-04 | End: 2019-10-04 | Stop reason: SURG

## 2019-10-04 RX ORDER — LIDOCAINE HYDROCHLORIDE 10 MG/ML
0.5 INJECTION, SOLUTION EPIDURAL; INFILTRATION; INTRACAUDAL; PERINEURAL ONCE AS NEEDED
Status: DISCONTINUED | OUTPATIENT
Start: 2019-10-04 | End: 2019-10-04 | Stop reason: HOSPADM

## 2019-10-04 RX ORDER — SODIUM CHLORIDE 0.9 % (FLUSH) 0.9 %
3-10 SYRINGE (ML) INJECTION AS NEEDED
Status: DISCONTINUED | OUTPATIENT
Start: 2019-10-04 | End: 2019-10-04 | Stop reason: HOSPADM

## 2019-10-04 RX ORDER — SODIUM CHLORIDE 9 MG/ML
30 INJECTION, SOLUTION INTRAVENOUS CONTINUOUS PRN
Status: DISCONTINUED | OUTPATIENT
Start: 2019-10-04 | End: 2019-10-04 | Stop reason: HOSPADM

## 2019-10-04 RX ORDER — LIDOCAINE HYDROCHLORIDE 10 MG/ML
INJECTION, SOLUTION EPIDURAL; INFILTRATION; INTRACAUDAL; PERINEURAL AS NEEDED
Status: DISCONTINUED | OUTPATIENT
Start: 2019-10-04 | End: 2019-10-04 | Stop reason: SURG

## 2019-10-04 RX ADMIN — SODIUM CHLORIDE 9 ML/HR: 900 INJECTION, SOLUTION INTRAVENOUS at 08:45

## 2019-10-04 RX ADMIN — LIDOCAINE HYDROCHLORIDE 80 MG: 10 INJECTION, SOLUTION EPIDURAL; INFILTRATION; INTRACAUDAL; PERINEURAL at 10:05

## 2019-10-04 RX ADMIN — SODIUM CHLORIDE 9 ML/HR: 900 INJECTION, SOLUTION INTRAVENOUS at 10:44

## 2019-10-04 RX ADMIN — PROPOFOL 300 MG: 10 INJECTION, EMULSION INTRAVENOUS at 10:05

## 2019-10-04 NOTE — H&P
" LOS: 0 days   Patient Care Team:  Laura Whitaker MD as PCP - General  [place], KEYLA Aldana as PCP - Claims Attributed  TatiStephanie crespo MD as PCP - Family Medicine      Subjective     Interval History:     Subjective: Dysphagia with history of distal esophageal stricture status post Enteryx injection 13 years ago      ROS:   No chest pain, shortness of breath, or cough.  No nausea, vomiting, or hematemesis.  No change since scanned H&P       Medication Review:   No current facility-administered medications for this encounter.     Current Outpatient Medications:   •  aspirin (ASPIR-LOW) 81 MG EC tablet, Daily., Disp: , Rfl:   •  atorvastatin (LIPITOR) 40 MG tablet, Daily., Disp: , Rfl:   •  Blood Glucose Monitoring Suppl (ACCU-CHEK ANTONIO PLUS) w/Device kit, ACCU-CHEK ANTONIO PLUS w/Device KIT, Disp: , Rfl:   •  cetirizine (ZYRTEC ALLERGY) 10 MG tablet, Daily., Disp: , Rfl:   •  etodolac (LODINE) 400 MG tablet, Every 12 (Twelve) Hours., Disp: , Rfl:   •  FLUoxetine (PROzac) 40 MG capsule, TAKE 1 CAPSULE BY MOUTH ONCE DAILY, Disp: 90 capsule, Rfl: 1  •  glipiZIDE (GLIPIZIDE XL) 10 MG 24 hr tablet, 2 tabs daily, Disp: 60 tablet, Rfl: 3  •  glucose blood (ACCU-CHEK ANTONIO PLUS) test strip, ACCU-CHEK ANTONIO PLUS STRP, Disp: , Rfl:   •  Insulin Glargine (LANTUS SOLOSTAR) 100 UNIT/ML injection pen, Inject 10 Units under the skin into the appropriate area as directed Daily., Disp: 5 pen, Rfl: 1  •  Insulin Pen Needle (PEN NEEDLES 3/16\") 31G X 5 MM misc, Use 1 needle daily, Disp: 30 each, Rfl: 3  •  levothyroxine (SYNTHROID) 100 MCG tablet, Take 1 tablet by mouth Daily., Disp: 60 tablet, Rfl: 0  •  losartan (COZAAR) 50 MG tablet, Daily., Disp: , Rfl:   •  metoprolol succinate XL (TOPROL-XL) 50 MG 24 hr tablet, TAKE 1 TABLET BY MOUTH ONCE DAILY, Disp: 90 tablet, Rfl: 1  •  nitroglycerin (NITROSTAT) 0.4 MG SL tablet, NITROGLYCERIN 0.4 MG SUBL, Disp: , Rfl:   •  Omeprazole 20 MG tablet delayed-release, " "Daily., Disp: , Rfl:   •  SITagliptin-metFORMIN HCl ER (JANUMET XR)  MG tablet, Every 12 (Twelve) Hours., Disp: , Rfl:   •  ELIQUIS 5 MG tablet tablet, TAKE 1 TABLET BY MOUTH TWICE DAILY, Disp: 180 tablet, Rfl: 0      Objective     Vital Signs  Vitals:    10/02/19 1457   Weight: 72.6 kg (160 lb)   Height: 172.7 cm (68\")       Physical Exam:    General Appearance:    Awake and alert, in no acute distress   Head:    Normocephalic, without obvious abnormality   Eyes:          Conjunctivae normal, anicteric sclera   Throat:   No oral lesions, no thrush, oral mucosa moist   Neck:   No adenopathy, supple, no JVD   CV/Lungs:     RRR, respirations regular, even and unlabored   Abdomen:     Soft, non-tender, no rebound or guarding, non-distended, no hepatosplenomegaly   Rectal:     Deferred   Extremities:   No edema, no cyanosis   Skin:   No bruising or rash, no jaundice        Results Review:    Lab Results (last 24 hours)     ** No results found for the last 24 hours. **          Imaging Results (last 24 hours)     ** No results found for the last 24 hours. **            Assessment/Plan   Proceed with scope and MAC anesthesia    Proceed with EGD and dilation    Declan Iqbal MD  10/04/19  7:04 AM  "

## 2019-10-04 NOTE — OP NOTE
ESOPHAGOGASTRODUODENOSCOPY  Procedure Report    Patient Name:  Kuldeep Adhikari  YOB: 1965    Date of Surgery:  10/4/2019     Indications: Dysphagia with history of esophageal stricture    Pre-op Diagnosis:   DYSPHAGIA W HX DILATION         Post-op Diagnosis:  1 distal esophageal stricture dilated to 50 [place] 2 suspect eosinophilic esophagitis with circumferential rings and linear referrals status post biopsy of the midesophagus      Procedure(s):  ESOPHAGOGASTRODUODENOSCOPY    Staff:  Surgeon(s):  Declan Iqbal MD         Anesthesia: Monitored Anesthesia Care    Estimated Blood Loss: Minimal  Implants:    Nothing was implanted during the procedure    Specimen:          Esophageal biopsy, middle third    Complications:  None immediate    Description of Procedure:  Informed consent was obtained from the patient.  They were placed in the left lateral decubitus position and IV conscious sedation was administered by anesthesia while being monitored continuously throughout the procedure.  The video Olympus endoscope was introduced into the esophagus and advanced under direct vision the second portion the duodenum which is normal as was the bulb.  The pylorus is widely patent and there is no ulcer disease the antrum body fundus and cardia were normal including retroflexion views without gastric mucosal lesions.  No hiatal hernia seen today the squamocolumnar line is at the GE junction there does appear to be a stricture of about 15 to 16 mm diameter.  There is no active erosive esophagitis.  In the middle esophagus are clear linear referrals and circumferential rings suggestive of eosinophilic esophagitis.  Photodocumentation was obtained.  The remaining esophagus was normal the scope was removed and he was sounded with a 50 [place] Blair dilator which entered the stomach with minor resistance.  Second look endoscopy showed effective mucosal dilation at the GE junction next multiple biopsies of  the middle one third of the esophagus were obtained to rule out eosinophilic esophagitis.  The scope was removed he tolerated the procedure well returned to recovery in good condition.    Impression:  1.  Esophageal stricture dilated 50 Nepali 2.  Possible eosinophilic esophagitis, biopsies pending    Recommendations:  1.  Call immediately for any post dilation chest pain fever shortness of air    2.  Repeat EGD and dilation in 8 weeks   3.  Call in 3 days for biopsies and if positive for eosinophilic esophagitis, we will add Flovent  4.  We appreciate the referral thank you.      Declan Iqbal MD     Date: 10/4/2019  Time: 10:18 AM

## 2019-10-04 NOTE — ANESTHESIA PREPROCEDURE EVALUATION
Anesthesia Evaluation     Patient summary reviewed and Nursing notes reviewed   NPO Solid Status: > 8 hours  NPO Liquid Status: > 8 hours           Airway   Mallampati: II  TM distance: >3 FB  Neck ROM: full  No difficulty expected  Dental          Pulmonary - normal exam   (+) sleep apnea,   Cardiovascular - normal exam    (+) hypertension, CAD, PVD, hyperlipidemia,       Neuro/Psych  (+) numbness, psychiatric history,     GI/Hepatic/Renal/Endo    (+)  GERD,  renal disease, diabetes mellitus, hypothyroidism,     Musculoskeletal     Abdominal    Substance History      OB/GYN          Other   (+) arthritis                     Anesthesia Plan    ASA 3     MAC     intravenous induction   Anesthetic plan, all risks, benefits, and alternatives have been provided, discussed and informed consent has been obtained with: patient.    Plan discussed with [facility].

## 2019-10-07 LAB
LAB AP CASE REPORT: NORMAL
PATH REPORT.FINAL DX SPEC: NORMAL
PATH REPORT.GROSS SPEC: NORMAL

## 2019-10-21 ENCOUNTER — HOSPITAL ENCOUNTER (EMERGENCY)
Facility: HOSPITAL | Age: 54
Discharge: HOME | End: 2019-10-21
Attending: EMERGENCY MEDICINE | Admitting: EMERGENCY MEDICINE

## 2019-10-21 ENCOUNTER — APPOINTMENT (OUTPATIENT)
Dept: GENERAL RADIOLOGY | Facility: HOSPITAL | Age: 54
End: 2019-10-21

## 2019-10-21 VITALS
HEIGHT: 68 IN | SYSTOLIC BLOOD PRESSURE: 117 MMHG | HEART RATE: 66 BPM | RESPIRATION RATE: 18 BRPM | DIASTOLIC BLOOD PRESSURE: 63 MMHG | TEMPERATURE: 98.6 F | WEIGHT: 185.85 LBS | BODY MASS INDEX: 28.17 KG/M2 | OXYGEN SATURATION: 97 %

## 2019-10-21 DIAGNOSIS — E11.65 UNCONTROLLED TYPE 2 DIABETES MELLITUS WITH HYPERGLYCEMIA (HCC): ICD-10-CM

## 2019-10-21 DIAGNOSIS — R53.1 WEAKNESS: Primary | ICD-10-CM

## 2019-10-21 DIAGNOSIS — R73.9 HYPERGLYCEMIA: ICD-10-CM

## 2019-10-21 LAB
ALBUMIN SERPL-MCNC: 4.3 G/DL (ref 3.5–5.2)
ALBUMIN/GLOB SERPL: 1.6 G/DL
ALP SERPL-CCNC: 106 U/L (ref 39–117)
ALT SERPL W P-5'-P-CCNC: 33 U/L (ref 1–41)
ANION GAP SERPL CALCULATED.3IONS-SCNC: 13 MMOL/L (ref 5–15)
AST SERPL-CCNC: 24 U/L (ref 1–40)
BASOPHILS # BLD AUTO: 0.1 10*3/MM3 (ref 0–0.2)
BASOPHILS NFR BLD AUTO: 1.2 % (ref 0–1.5)
BILIRUB SERPL-MCNC: 2 MG/DL (ref 0.2–1.2)
BILIRUB UR QL STRIP: NEGATIVE
BUN BLD-MCNC: 25 MG/DL (ref 6–20)
BUN/CREAT SERPL: 19.8 (ref 7–25)
CALCIUM SPEC-SCNC: 9.4 MG/DL (ref 8.6–10.5)
CHLORIDE SERPL-SCNC: 99 MMOL/L (ref 98–107)
CLARITY UR: CLEAR
CO2 SERPL-SCNC: 25 MMOL/L (ref 22–29)
COLOR UR: YELLOW
CREAT BLD-MCNC: 1.26 MG/DL (ref 0.76–1.27)
DEPRECATED RDW RBC AUTO: 42 FL (ref 37–54)
EOSINOPHIL # BLD AUTO: 0.3 10*3/MM3 (ref 0–0.4)
EOSINOPHIL NFR BLD AUTO: 4.9 % (ref 0.3–6.2)
ERYTHROCYTE [DISTWIDTH] IN BLOOD BY AUTOMATED COUNT: 13.9 % (ref 12.3–15.4)
GFR SERPL CREATININE-BSD FRML MDRD: 59 ML/MIN/1.73
GLOBULIN UR ELPH-MCNC: 2.7 GM/DL
GLUCOSE BLD-MCNC: 267 MG/DL (ref 65–99)
GLUCOSE BLDC GLUCOMTR-MCNC: 101 MG/DL (ref 70–105)
GLUCOSE BLDC GLUCOMTR-MCNC: 274 MG/DL (ref 70–105)
GLUCOSE UR STRIP-MCNC: ABNORMAL MG/DL
HCT VFR BLD AUTO: 46.1 % (ref 37.5–51)
HGB BLD-MCNC: 15.9 G/DL (ref 13–17.7)
HGB UR QL STRIP.AUTO: NEGATIVE
KETONES UR QL STRIP: NEGATIVE
LEUKOCYTE ESTERASE UR QL STRIP.AUTO: NEGATIVE
LYMPHOCYTES # BLD AUTO: 1 10*3/MM3 (ref 0.7–3.1)
LYMPHOCYTES NFR BLD AUTO: 14.4 % (ref 19.6–45.3)
MCH RBC QN AUTO: 29.6 PG (ref 26.6–33)
MCHC RBC AUTO-ENTMCNC: 34.6 G/DL (ref 31.5–35.7)
MCV RBC AUTO: 85.4 FL (ref 79–97)
MONOCYTES # BLD AUTO: 0.5 10*3/MM3 (ref 0.1–0.9)
MONOCYTES NFR BLD AUTO: 7.3 % (ref 5–12)
NEUTROPHILS # BLD AUTO: 5 10*3/MM3 (ref 1.7–7)
NEUTROPHILS NFR BLD AUTO: 72.2 % (ref 42.7–76)
NITRITE UR QL STRIP: NEGATIVE
NRBC BLD AUTO-RTO: 0.1 /100 WBC (ref 0–0.2)
PH UR STRIP.AUTO: 5.5 [PH] (ref 5–8)
PLATELET # BLD AUTO: 207 10*3/MM3 (ref 140–450)
PMV BLD AUTO: 8.3 FL (ref 6–12)
POTASSIUM BLD-SCNC: 4.6 MMOL/L (ref 3.5–5.2)
PROT SERPL-MCNC: 7 G/DL (ref 6–8.5)
PROT UR QL STRIP: NEGATIVE
RBC # BLD AUTO: 5.39 10*6/MM3 (ref 4.14–5.8)
SODIUM BLD-SCNC: 137 MMOL/L (ref 136–145)
SP GR UR STRIP: 1.02 (ref 1–1.03)
TROPONIN T SERPL-MCNC: <0.01 NG/ML (ref 0–0.03)
TSH SERPL DL<=0.05 MIU/L-ACNC: 1.8 UIU/ML (ref 0.27–4.2)
UROBILINOGEN UR QL STRIP: ABNORMAL
WBC NRBC COR # BLD: 6.9 10*3/MM3 (ref 3.4–10.8)

## 2019-10-21 PROCEDURE — 25010000002 KETOROLAC TROMETHAMINE PER 15 MG: Performed by: NURSE PRACTITIONER

## 2019-10-21 PROCEDURE — 25010000002 ONDANSETRON PER 1 MG: Performed by: NURSE PRACTITIONER

## 2019-10-21 PROCEDURE — 93005 ELECTROCARDIOGRAM TRACING: CPT | Performed by: EMERGENCY MEDICINE

## 2019-10-21 PROCEDURE — 63710000001 INSULIN REGULAR HUMAN PER 5 UNITS: Performed by: EMERGENCY MEDICINE

## 2019-10-21 PROCEDURE — 99284 EMERGENCY DEPT VISIT MOD MDM: CPT

## 2019-10-21 PROCEDURE — 84484 ASSAY OF TROPONIN QUANT: CPT | Performed by: EMERGENCY MEDICINE

## 2019-10-21 PROCEDURE — 96374 THER/PROPH/DIAG INJ IV PUSH: CPT

## 2019-10-21 PROCEDURE — 84443 ASSAY THYROID STIM HORMONE: CPT | Performed by: EMERGENCY MEDICINE

## 2019-10-21 PROCEDURE — 71045 X-RAY EXAM CHEST 1 VIEW: CPT

## 2019-10-21 PROCEDURE — 82962 GLUCOSE BLOOD TEST: CPT

## 2019-10-21 PROCEDURE — 80053 COMPREHEN METABOLIC PANEL: CPT | Performed by: NURSE PRACTITIONER

## 2019-10-21 PROCEDURE — 96375 TX/PRO/DX INJ NEW DRUG ADDON: CPT

## 2019-10-21 PROCEDURE — 85025 COMPLETE CBC W/AUTO DIFF WBC: CPT | Performed by: NURSE PRACTITIONER

## 2019-10-21 PROCEDURE — 81003 URINALYSIS AUTO W/O SCOPE: CPT | Performed by: NURSE PRACTITIONER

## 2019-10-21 RX ORDER — KETOROLAC TROMETHAMINE 30 MG/ML
30 INJECTION, SOLUTION INTRAMUSCULAR; INTRAVENOUS ONCE
Status: COMPLETED | OUTPATIENT
Start: 2019-10-21 | End: 2019-10-21

## 2019-10-21 RX ORDER — ONDANSETRON 2 MG/ML
4 INJECTION INTRAMUSCULAR; INTRAVENOUS ONCE
Status: COMPLETED | OUTPATIENT
Start: 2019-10-21 | End: 2019-10-21

## 2019-10-21 RX ADMIN — SODIUM CHLORIDE 1000 ML: 900 INJECTION, SOLUTION INTRAVENOUS at 13:29

## 2019-10-21 RX ADMIN — INSULIN HUMAN 2 UNITS: 100 INJECTION, SOLUTION PARENTERAL at 16:15

## 2019-10-21 RX ADMIN — KETOROLAC TROMETHAMINE 30 MG: 30 INJECTION, SOLUTION INTRAMUSCULAR at 13:29

## 2019-10-21 RX ADMIN — ONDANSETRON 4 MG: 2 INJECTION INTRAMUSCULAR; INTRAVENOUS at 13:28

## 2019-10-21 NOTE — ED NOTES
High blood sugar for the past few days. Fatigue, off balance, burning with urination and urinary frequency. Headache x 2 weeks.      Yoana Felder, RN  10/21/19 3626

## 2019-10-21 NOTE — DISCHARGE INSTRUCTIONS
Follow-up with your primary care doctor Dr. Watts [phone] this week for recheck.  May increase your morning insulin long-acting from 10 units to 12 units but monitor blood sugar closely checked before doing  Return for fever rash redness swelling chest pain shortness of breath visual changes speech difficulty paralysis unable to walk black bloody stool uncontrolled sugar passing out or any other new or worsening problems or concerns  Watch diet and follow it closely  Follow-up with your CPAP machine and try to find a mask which you can tolerate.  And use her machine as prescribed.

## 2019-10-21 NOTE — ED PROVIDER NOTES
Subjective   Chief complaint elevated blood sugar weakness tiredness    History of present illness 54-year-old male with at least a 2-week history of blood sugars have been elevated feeling weak and fatigue no energy and tired.  Is no pain no chest pain neck arm jaw pain or shortness of breath no fever chills.  No urinary or bowel problems.  No recent change in medications.  Patient states is been unable to get into his doctor.  States his sugars have been running over 500 recently.  Nothing really seems to make it better worse and again continuous for at least 2 weeks.  No headache visual changes speech difficulty or paralysis.  No visual disturbance other than some generalized blurring of his vision occasionally.  No syncope but some lightheadedness.  No tick bites or rashes.  Symptoms are moderate.            Review of Systems   Constitutional: Negative for chills and fever.   HENT: Negative for congestion and sinus pressure.    Eyes: Negative for photophobia and visual disturbance.   Respiratory: Negative for chest tightness and shortness of breath.    Cardiovascular: Negative for chest pain and leg swelling.   Gastrointestinal: Negative for abdominal pain and vomiting.   Endocrine: Negative for cold intolerance and heat intolerance.   Genitourinary: Negative for difficulty urinating and dysuria.   Musculoskeletal: Negative for arthralgias and back pain.   Skin: Negative for color change and pallor.   Neurological: Positive for weakness and light-headedness. Negative for dizziness, speech difficulty and numbness.   Psychiatric/Behavioral: Negative for agitation and behavioral problems.       Past Medical History:   Diagnosis Date   • CKD (chronic kidney disease), stage III (CMS/[facility])    • Depression    • DJD (degenerative joint disease)    • GERD (gastroesophageal reflux disease)    • History of echocardiogram 04/2016    2D ECHO   • History of esophageal stricture     s/p dialation 40-50 times last EGD 04/2016    • History of pulmonary embolus (PE)     on long term anticoagulation   • Hyperlipidemia    • Hypertension    • Hypothyroidism    • Type 2 diabetes mellitus with peripheral vascular disease (CMS/HCC)    • Vitamin D deficiency      Patient reports a history of sleep apnea but cannot tolerate his CPAP machine.  Patient reports a history of heart cath within the last year which showed some small vessel disease but no other abnormalities.  Allergies   Allergen Reactions   • Lisinopril Cough       Past Surgical History:   Procedure Laterality Date   • CARDIAC CATHETERIZATION  04/2018   • ENDOSCOPY     • ENDOSCOPY N/A 10/4/2019    Procedure: ESOPHAGOGASTRODUODENOSCOPY with dilitation and biopsy x 1 area;  Surgeon: Declan Iqbal MD;  Location: [facility] ENDOSCOPY;  Service: Gastroenterology   • HERNIA REPAIR     • TOTAL HIP ARTHROPLASTY Left    • TOTAL HIP ARTHROPLASTY  2018       Family History   Problem Relation Age of Onset   • Diabetes Mother    • Heart disease Mother    • Leukemia Father    • Sleep apnea Maternal Aunt         GENO   • Stroke Maternal Grandmother    • Hypertension Other    • Hyperlipidemia Other    • Cancer Other    • Colon cancer Other         uncle       Social History     Socioeconomic History   • Marital status:      Spouse name: Not on file   • Number of children: Not on file   • Years of education: Not on file   • Highest education level: Not on file   Tobacco Use   • Smoking status: Never Smoker   Substance and Sexual Activity   • Alcohol use: No     Frequency: Never   • Drug use: No   • Sexual activity: Defer     Prior to Admission medications    Medication Sig Start Date End Date Taking? Authorizing Provider   aspirin (ASPIR-LOW) 81 MG EC tablet Daily. 5/31/18   Padilla Henderson MD   atorvastatin (LIPITOR) 40 MG tablet Daily. 2/1/19   Padilla Henderson MD   Blood Glucose Monitoring Suppl (ACCU-CHEK ANTONIO PLUS) w/Device kit ACCU-CHEK ANTONIO PLUS w/Device KIT 1/19/17    "Padilla Henderson MD   cetirizine (ZYRTEC ALLERGY) 10 MG tablet Daily. 4/22/19   Padilla Henderson MD   ELIQUIS 5 MG tablet tablet TAKE 1 TABLET BY MOUTH TWICE DAILY 10/2/19   Laura Whitaker MD   etodolac (LODINE) 400 MG tablet Every 12 (Twelve) Hours. 6/29/18   Padilla Henderson MD   FLUoxetine (PROzac) 40 MG capsule TAKE 1 CAPSULE BY MOUTH ONCE DAILY 8/28/19   Stephanie Duffy MD   glipiZIDE (GLIPIZIDE XL) 10 MG 24 hr tablet 2 tabs daily 8/12/19   Stephanie Duffy MD   glucose blood (ACCU-CHEK ANTONIO PLUS) test strip ACCU-CHEK ANTONIO PLUS STRP 6/19/18   Padilla Henderson MD   Insulin Glargine (LANTUS SOLOSTAR) 100 UNIT/ML injection pen Inject 10 Units under the skin into the appropriate area as directed Daily. 8/12/19   Stephanie Duffy MD   Insulin Pen Needle (PEN NEEDLES 3/16\") 31G X 5 MM misc Use 1 needle daily 8/12/19   Stephanie uDffy MD   levothyroxine (SYNTHROID) 100 MCG tablet Take 1 tablet by mouth Daily. 8/12/19   Stephanie Duffy MD   losartan (COZAAR) 50 MG tablet Daily. 3/23/18   Padilla Henderson MD   metoprolol succinate XL (TOPROL-XL) 50 MG 24 hr tablet TAKE 1 TABLET BY MOUTH ONCE DAILY 8/28/19   Finesse Noel MD   nitroglycerin (NITROSTAT) 0.4 MG SL tablet NITROGLYCERIN 0.4 MG SUBL 2/1/19   Padilla Henderson MD   Omeprazole 20 MG tablet delayed-release Take 40 mg by mouth Daily for 90 days. 10/4/19 1/2/20  Declan Iqbal MD   SITagliptin-metFORMIN HCl ER (JANUMET XR)  MG tablet Every 12 (Twelve) Hours. 9/30/18   Padilla Henderson MD           Objective   Physical Exam  54-year-old awake alert no acute distress HEENT extraocular muscles intact pupils equal and reactive no nystagmus and disc are sharp mouth is clear neck is supple no adenopathy no meningeal signs no JVD no bruits lungs clear no retractions heart regular without murmur and was soft without " tenderness no masses extremities pulses are equal therapy lower extremities no edema cords or Homans sign ribs DVT.  Toes great toe amputated patient's awake alert orientated x4 no facial asymmetry normal speech no drift the arms or legs  without ataxia  Procedures           ED Course      Results for orders placed or performed during the hospital encounter of 10/21/19   Comprehensive Metabolic Panel   Result Value Ref Range    Glucose 267 (H) 65 - 99 mg/dL    BUN 25 (H) 6 - 20 mg/dL    Creatinine 1.26 0.76 - 1.27 mg/dL    Sodium 137 136 - 145 mmol/L    Potassium 4.6 3.5 - 5.2 mmol/L    Chloride 99 98 - 107 mmol/L    CO2 25.0 22.0 - 29.0 mmol/L    Calcium 9.4 8.6 - 10.5 mg/dL    Total Protein 7.0 6.0 - 8.5 g/dL    Albumin 4.30 3.50 - 5.20 g/dL    ALT (SGPT) 33 1 - 41 U/L    AST (SGOT) 24 1 - 40 U/L    Alkaline Phosphatase 106 39 - 117 U/L    Total Bilirubin 2.0 (H) 0.2 - 1.2 mg/dL    eGFR Non African Amer 59 (L) >60 mL/min/1.73    Globulin 2.7 gm/dL    A/G Ratio 1.6 g/dL    BUN/Creatinine Ratio 19.8 7.0 - 25.0    Anion Gap 13.0 5.0 - 15.0 mmol/L   Urinalysis With Microscopic If Indicated (No Culture) - Urine, Clean Catch   Result Value Ref Range    Color, UA Yellow Yellow, Straw    Appearance, UA Clear Clear    pH, UA 5.5 5.0 - 8.0    Specific Gravity, UA 1.020 1.005 - 1.030    Glucose,  mg/dL (2+) (A) Negative    Ketones, UA Negative Negative    Bilirubin, UA Negative Negative    Blood, UA Negative Negative    Protein, UA Negative Negative    Leuk Esterase, UA Negative Negative    Nitrite, UA Negative Negative    Urobilinogen, UA 0.2 E.U./dL 0.2 - 1.0 E.U./dL   CBC Auto Differential   Result Value Ref Range    WBC 6.90 3.40 - 10.80 10*3/mm3    RBC 5.39 4.14 - 5.80 10*6/mm3    Hemoglobin 15.9 13.0 - 17.7 g/dL    Hematocrit 46.1 37.5 - 51.0 %    MCV 85.4 79.0 - 97.0 fL    MCH 29.6 26.6 - 33.0 pg    MCHC 34.6 31.5 - 35.7 g/dL    RDW 13.9 12.3 - 15.4 %    RDW-SD 42.0 37.0 - 54.0 fl    MPV 8.3 6.0 - 12.0 fL     Platelets 207 140 - 450 10*3/mm3    Neutrophil % 72.2 42.7 - 76.0 %    Lymphocyte % 14.4 (L) 19.6 - 45.3 %    Monocyte % 7.3 5.0 - 12.0 %    Eosinophil % 4.9 0.3 - 6.2 %    Basophil % 1.2 0.0 - 1.5 %    Neutrophils, Absolute 5.00 1.70 - 7.00 10*3/mm3    Lymphocytes, Absolute 1.00 0.70 - 3.10 10*3/mm3    Monocytes, Absolute 0.50 0.10 - 0.90 10*3/mm3    Eosinophils, Absolute 0.30 0.00 - 0.40 10*3/mm3    Basophils, Absolute 0.10 0.00 - 0.20 10*3/mm3    nRBC 0.1 0.0 - 0.2 /100 WBC   Troponin   Result Value Ref Range    Troponin T <0.010 0.000 - 0.030 ng/mL   POC Glucose Once   Result Value Ref Range    Glucose 274 (H) 70 - 105 mg/dL     Xr Chest 1 View    Result Date: 10/21/2019  Mild atelectasis is suspected in right lung base. No acute infiltrate is appreciated.  Electronically Signed By-Dr. Dwight Vila MD On:10/21/2019 1:59 PM This report was finalized on [id number] by Dr. Dwight Vila MD.    Medications   sodium chloride 0.9 % bolus 1,000 mL (0 mL Intravenous Stopped 10/21/19 1456)   ondansetron (ZOFRAN) injection 4 mg (4 mg Intravenous Given 10/21/19 1328)   ketorolac (TORADOL) injection 30 mg (30 mg Intravenous Given 10/21/19 1329)   insulin regular (humuLIN R,novoLIN R) injection 2 Units (2 Units Intravenous Given 10/21/19 1615)     EKG my interpretation normal sinus rhythm rate of 66 normal axis no hypertrophy QTC of 4 and 17 normal EKG              MDM  Number of Diagnoses or Management Options  Hyperglycemia:   Uncontrolled type 2 diabetes mellitus with hyperglycemia (CMS/HCC):   Weakness:   Diagnosis management comments: Medical decision making.  Patient had IV normal saline x1 L he was given Zofran IV for nausea.  And he had the above exam and evaluation.  Patient's chest x-ray negative EKG normal CBC unremarkable troponin negative.  Electrolytes unremarkable other than a blood sugar of 267 urine was negative other than some glucose no ketones troponin was negative.  Repeat exam the patient was  resting comfortably no distress he and his dysrhythmia on the monitor.  Had been given regular insulin 2 units IV.  The patient's been having troubles with elevated blood sugars for 2 weeks.  Complains of fatigue and weakness.  He has no specific pain anywhere.  He said no fever chills no rashes I do not see any obvious source of infection there is no evidence of acute cardiac ischemia or acute stroke based on clinical exam no obvious reason for his blood sugar to be out-of-control based on his clinical findings.  We talked about the findings.  The patient has sleep apnea he does not use his CPAP machine because he is claustrophobic.  We stressed the importance of that that may have something to do his wife feels tired and fatigued all the time.  We explained how this can affect every aspect of his being.  He voices understanding he looked well see any evidence of sepsis there is no cellulitic changes.  We talked about follow-up with his primary care doctor and management of his medications he can increase his insulin by 2 units if he wishes it to Lantus long-acting insulin he can go from 10 to 12 units in the morning but he should adjust that according to what his blood sugars running.  His blood sugar was nowhere near 500 it was 267.  So it is better here than it was at home he will be discharged home for outpatient follow-up stable otherwise unremarkable ER course.      Final diagnoses:   Weakness   Hyperglycemia   Uncontrolled type 2 diabetes mellitus with hyperglycemia (CMS/[facility])              Eduardo Tam MD  10/21/19 2888

## 2019-10-23 ENCOUNTER — OFFICE VISIT (OUTPATIENT)
Dept: FAMILY MEDICINE CLINIC | Facility: CLINIC | Age: 54
End: 2019-10-23

## 2019-10-23 ENCOUNTER — TELEPHONE (OUTPATIENT)
Dept: FAMILY MEDICINE CLINIC | Facility: CLINIC | Age: 54
End: 2019-10-23

## 2019-10-23 VITALS
OXYGEN SATURATION: 97 % | HEART RATE: 70 BPM | BODY MASS INDEX: 28.34 KG/M2 | SYSTOLIC BLOOD PRESSURE: 144 MMHG | TEMPERATURE: 98.3 F | DIASTOLIC BLOOD PRESSURE: 84 MMHG | WEIGHT: 187 LBS | HEIGHT: 68 IN

## 2019-10-23 DIAGNOSIS — G47.33 OBSTRUCTIVE SLEEP APNEA SYNDROME: ICD-10-CM

## 2019-10-23 DIAGNOSIS — E11.51 TYPE 2 DIABETES MELLITUS WITH PERIPHERAL VASCULAR DISEASE (HCC): Primary | ICD-10-CM

## 2019-10-23 DIAGNOSIS — R53.83 FATIGUE, UNSPECIFIED TYPE: ICD-10-CM

## 2019-10-23 DIAGNOSIS — I10 BENIGN ESSENTIAL HYPERTENSION: ICD-10-CM

## 2019-10-23 PROCEDURE — 99214 OFFICE O/P EST MOD 30 MIN: CPT | Performed by: FAMILY MEDICINE

## 2019-10-23 RX ORDER — BLOOD-GLUCOSE METER
1 EACH MISCELLANEOUS 2 TIMES DAILY
Qty: 1 KIT | Refills: 0 | Status: SHIPPED | OUTPATIENT
Start: 2019-10-23 | End: 2019-10-25 | Stop reason: SDUPTHER

## 2019-10-23 NOTE — PATIENT INSTRUCTIONS
Stop the glipizide  Increase the insulin to 15 units daily  Call with the  Blood sugars in a week or send them through [employer]  Keep working to lose weight through healthy eating and exercise.  No fried foods and limit pasta, bread, and sweets.  Work  To get a new mask for the cpap

## 2019-10-23 NOTE — PROGRESS NOTES
Subjective   Kuldeep Adhikari is a 54 y.o. male.     Comes in today for follow-up after he was seen in the emergency room 2 days ago with elevated blood sugar  At that time he told the ER doctor that his blood sugars been running over 500  The time that he was seen his blood sugar was in the 260s  occ headache  Has been out of  Glipizide  bp running high at times  Not using his cpap as he is claustrophobic  Wife is with him  C/o being very tired all the time         The following portions of the patient's history were reviewed and updated as appropriate: allergies, current medications, past family history, past medical history, past social history, past surgical history and problem list.  Past Medical History:   Diagnosis Date   • CKD (chronic kidney disease), stage III (CMS/HCC)    • Depression    • DJD (degenerative joint disease)    • GERD (gastroesophageal reflux disease)    • History of echocardiogram 04/2016    2D ECHO   • History of esophageal stricture     s/p dialation 40-50 times last EGD 04/2016   • History of pulmonary embolus (PE)     on long term anticoagulation   • Hyperlipidemia    • Hypertension    • Hypothyroidism    • Type 2 diabetes mellitus with peripheral vascular disease (CMS/HCC)    • Vitamin D deficiency      Past Surgical History:   Procedure Laterality Date   • CARDIAC CATHETERIZATION  04/2018   • ENDOSCOPY     • ENDOSCOPY N/A 10/4/2019    Procedure: ESOPHAGOGASTRODUODENOSCOPY with dilitation and biopsy x 1 area;  Surgeon: Declan Iqbal MD;  Location: [facility] ENDOSCOPY;  Service: Gastroenterology   • HERNIA REPAIR     • TOTAL HIP ARTHROPLASTY Left    • TOTAL HIP ARTHROPLASTY  2018     Family History   Problem Relation Age of Onset   • Diabetes Mother    • Heart disease Mother    • Leukemia Father    • Sleep apnea Maternal Aunt         GENO   • Stroke Maternal Grandmother    • Hypertension Other    • Hyperlipidemia Other    • Cancer Other    • Colon cancer Other         uncle  "    Social History     Socioeconomic History   • Marital status:      Spouse name: Not on file   • Number of children: Not on file   • Years of education: Not on file   • Highest education level: Not on file   Tobacco Use   • Smoking status: Never Smoker   Substance and Sexual Activity   • Alcohol use: No     Frequency: Never   • Drug use: No   • Sexual activity: Defer         Current Outpatient Medications:   •  aspirin (ASPIR-LOW) 81 MG EC tablet, Daily., Disp: , Rfl:   •  atorvastatin (LIPITOR) 40 MG tablet, Daily., Disp: , Rfl:   •  Blood Glucose Monitoring Suppl (ACCU-CHEK ANTONIO PLUS) w/Device kit, ACCU-CHEK ANTONIO PLUS w/Device KIT, Disp: , Rfl:   •  cetirizine (ZYRTEC ALLERGY) 10 MG tablet, Daily., Disp: , Rfl:   •  ELIQUIS 5 MG tablet tablet, TAKE 1 TABLET BY MOUTH TWICE DAILY, Disp: 180 tablet, Rfl: 0  •  etodolac (LODINE) 400 MG tablet, Every 12 (Twelve) Hours., Disp: , Rfl:   •  FLUoxetine (PROzac) 40 MG capsule, TAKE 1 CAPSULE BY MOUTH ONCE DAILY, Disp: 90 capsule, Rfl: 1  •  glucose blood (ACCU-CHEK ANTONIO PLUS) test strip, ACCU-CHEK ANTONIO PLUS STRP, Disp: , Rfl:   •  Insulin Glargine (LANTUS SOLOSTAR) 100 UNIT/ML injection pen, Inject 15 Units under the skin into the appropriate area as directed Daily., Disp: 15 pen, Rfl: 3  •  Insulin Pen Needle (PEN NEEDLES 3/16\") 31G X 5 MM misc, Use 1 needle daily, Disp: 30 each, Rfl: 3  •  levothyroxine (SYNTHROID) 100 MCG tablet, Take 1 tablet by mouth Daily., Disp: 60 tablet, Rfl: 0  •  losartan (COZAAR) 50 MG tablet, Daily., Disp: , Rfl:   •  metoprolol succinate XL (TOPROL-XL) 50 MG 24 hr tablet, TAKE 1 TABLET BY MOUTH ONCE DAILY, Disp: 90 tablet, Rfl: 1  •  nitroglycerin (NITROSTAT) 0.4 MG SL tablet, NITROGLYCERIN 0.4 MG SUBL, Disp: , Rfl:   •  Omeprazole 20 MG tablet delayed-release, Take 40 mg by mouth Daily for 90 days., Disp: 180 each, Rfl: 0  •  SITagliptin-metFORMIN HCl ER (JANUMET XR)  MG tablet, Every 12 (Twelve) Hours., Disp: , Rfl: " "    Review of Systems   Constitutional: Positive for fatigue. Negative for diaphoresis, fever, unexpected weight gain and unexpected weight loss.   Respiratory: Negative for cough, chest tightness and shortness of breath.    Cardiovascular: Negative for chest pain, palpitations and leg swelling.   Gastrointestinal: Negative for nausea and vomiting.   Endocrine: Negative.    Neurological: Positive for headache. Negative for dizziness and syncope.   Hematological: Negative.      /84   Pulse 70   Temp 98.3 °F (36.8 °C) (Oral)   Ht 172.7 cm (68\")   Wt 84.8 kg (187 lb)   SpO2 97%   BMI 28.43 kg/m²       Objective   Physical Exam   Constitutional: He is oriented to person, place, and time. He appears well-developed and well-nourished. No distress. He appears overweight.   HENT:   Head: Normocephalic and atraumatic.   Right Ear: External ear normal.   Left Ear: External ear normal.   Nose: Nose normal.   Mouth/Throat: No oropharyngeal exudate.   Eyes: Conjunctivae and EOM are normal. Pupils are equal, round, and reactive to light.   Neck: Neck supple. No JVD present. No thyromegaly present.   Cardiovascular: Normal rate, regular rhythm, normal heart sounds and intact distal pulses. Exam reveals no gallop and no friction rub.   No murmur heard.  Pulmonary/Chest: Effort normal and breath sounds normal. No respiratory distress. He has no wheezes. He has no rales.   Musculoskeletal: He exhibits no edema.   Lymphadenopathy:     He has no cervical adenopathy.   Neurological: He is alert and oriented to person, place, and time. No cranial nerve deficit. Coordination normal.   Skin: Skin is warm and dry.   Psychiatric: His mood appears anxious.   Nursing note and vitals reviewed.        Assessment/Plan   Problems Addressed this Visit        Cardiovascular and Mediastinum    Benign essential hypertension    Type 2 diabetes mellitus with peripheral vascular disease (CMS/HCC) - Primary    Relevant Medications    Insulin " Glargine (LANTUS SOLOSTAR) 100 UNIT/ML injection pen       Respiratory    Obstructive sleep apnea syndrome       Other    Fatigue        Blood pressure is doing better and seems to be related more to his anxiety than true poor control; leave him on his current medications at this time  Diabetes-still poorly controlled; his insulin was increased to 12 units in the emergency room 2 days ago but we will go ahead and increase it to 15 units now; send a new prescription to his pharmacy; he will call with his blood sugars in a week and will adjust insulin accordingly; he was told to stop the glipizide completely; he was counseled on the importance of dietary changes and weight loss  Obstructive sleep apnea-I explained to him the importance of compliance with his CPAP; he will talk with his sleep doctor about a possible different mask-perhaps even one that is just nasal  Fatigue-suspect this is related to the obstructive sleep apnea; his CBC and TSH were normal on recent labs  Headaches-exam is normal; no other associated symptoms; suspect this is also related to his untreated sleep apnea

## 2019-10-25 ENCOUNTER — TELEPHONE (OUTPATIENT)
Dept: FAMILY MEDICINE CLINIC | Facility: CLINIC | Age: 54
End: 2019-10-25

## 2019-10-25 RX ORDER — BLOOD-GLUCOSE METER
1 EACH MISCELLANEOUS 2 TIMES DAILY
Qty: 1 KIT | Refills: 0 | Status: SHIPPED | OUTPATIENT
Start: 2019-10-25 | End: 2020-03-15 | Stop reason: SDUPTHER

## 2019-11-01 RX ORDER — SITAGLIPTIN AND METFORMIN HYDROCHLORIDE 1000; 50 MG/1; MG/1
TABLET, FILM COATED, EXTENDED RELEASE ORAL
Qty: 60 TABLET | Refills: 1 | OUTPATIENT
Start: 2019-11-01

## 2019-12-12 ENCOUNTER — ANESTHESIA EVENT (OUTPATIENT)
Dept: GASTROENTEROLOGY | Facility: HOSPITAL | Age: 54
End: 2019-12-12

## 2019-12-13 ENCOUNTER — ON CAMPUS - OUTPATIENT (AMBULATORY)
Dept: URBAN - METROPOLITAN AREA HOSPITAL 85 | Facility: HOSPITAL | Age: 54
End: 2019-12-13

## 2019-12-13 ENCOUNTER — ANESTHESIA (OUTPATIENT)
Dept: GASTROENTEROLOGY | Facility: HOSPITAL | Age: 54
End: 2019-12-13

## 2019-12-13 ENCOUNTER — HOSPITAL ENCOUNTER (OUTPATIENT)
Facility: HOSPITAL | Age: 54
Setting detail: HOSPITAL OUTPATIENT SURGERY
Discharge: HOME | End: 2019-12-13
Attending: INTERNAL MEDICINE | Admitting: INTERNAL MEDICINE

## 2019-12-13 VITALS
TEMPERATURE: 98.1 F | HEIGHT: 68 IN | OXYGEN SATURATION: 95 % | HEART RATE: 93 BPM | SYSTOLIC BLOOD PRESSURE: 125 MMHG | DIASTOLIC BLOOD PRESSURE: 75 MMHG | WEIGHT: 187.83 LBS | BODY MASS INDEX: 28.47 KG/M2 | RESPIRATION RATE: 16 BRPM

## 2019-12-13 DIAGNOSIS — K20.0 EOSINOPHILIC ESOPHAGITIS: ICD-10-CM

## 2019-12-13 DIAGNOSIS — R13.10 DYSPHAGIA, UNSPECIFIED: ICD-10-CM

## 2019-12-13 DIAGNOSIS — K21.9 GASTRO-ESOPHAGEAL REFLUX DISEASE WITHOUT ESOPHAGITIS: ICD-10-CM

## 2019-12-13 DIAGNOSIS — K22.2 ESOPHAGEAL OBSTRUCTION: ICD-10-CM

## 2019-12-13 LAB — GLUCOSE BLDC GLUCOMTR-MCNC: 167 MG/DL (ref 70–105)

## 2019-12-13 PROCEDURE — 43235 EGD DIAGNOSTIC BRUSH WASH: CPT | Performed by: INTERNAL MEDICINE

## 2019-12-13 PROCEDURE — 43450 DILATE ESOPHAGUS 1/MULT PASS: CPT | Performed by: INTERNAL MEDICINE

## 2019-12-13 PROCEDURE — 82962 GLUCOSE BLOOD TEST: CPT

## 2019-12-13 PROCEDURE — 25010000002 PROPOFOL 10 MG/ML EMULSION: Performed by: ANESTHESIOLOGY

## 2019-12-13 RX ORDER — SODIUM CHLORIDE 9 MG/ML
9 INJECTION, SOLUTION INTRAVENOUS CONTINUOUS PRN
Status: DISCONTINUED | OUTPATIENT
Start: 2019-12-13 | End: 2019-12-13 | Stop reason: HOSPADM

## 2019-12-13 RX ORDER — LIDOCAINE HYDROCHLORIDE 10 MG/ML
INJECTION, SOLUTION EPIDURAL; INFILTRATION; INTRACAUDAL; PERINEURAL AS NEEDED
Status: DISCONTINUED | OUTPATIENT
Start: 2019-12-13 | End: 2019-12-13 | Stop reason: SURG

## 2019-12-13 RX ORDER — SODIUM CHLORIDE 0.9 % (FLUSH) 0.9 %
10 SYRINGE (ML) INJECTION AS NEEDED
Status: DISCONTINUED | OUTPATIENT
Start: 2019-12-13 | End: 2019-12-13 | Stop reason: HOSPADM

## 2019-12-13 RX ORDER — SODIUM CHLORIDE 0.9 % (FLUSH) 0.9 %
3 SYRINGE (ML) INJECTION EVERY 12 HOURS SCHEDULED
Status: DISCONTINUED | OUTPATIENT
Start: 2019-12-13 | End: 2019-12-13 | Stop reason: HOSPADM

## 2019-12-13 RX ORDER — PROPOFOL 10 MG/ML
VIAL (ML) INTRAVENOUS AS NEEDED
Status: DISCONTINUED | OUTPATIENT
Start: 2019-12-13 | End: 2019-12-13 | Stop reason: SURG

## 2019-12-13 RX ORDER — SODIUM CHLORIDE 0.9 % (FLUSH) 0.9 %
10 SYRINGE (ML) INJECTION EVERY 12 HOURS SCHEDULED
Status: DISCONTINUED | OUTPATIENT
Start: 2019-12-13 | End: 2019-12-13 | Stop reason: HOSPADM

## 2019-12-13 RX ORDER — SODIUM CHLORIDE 9 MG/ML
30 INJECTION, SOLUTION INTRAVENOUS CONTINUOUS PRN
Status: DISCONTINUED | OUTPATIENT
Start: 2019-12-13 | End: 2019-12-13 | Stop reason: HOSPADM

## 2019-12-13 RX ADMIN — LIDOCAINE HYDROCHLORIDE 50 MG: 10 INJECTION, SOLUTION EPIDURAL; INFILTRATION; INTRACAUDAL; PERINEURAL at 08:58

## 2019-12-13 RX ADMIN — PROPOFOL 50 MG: 10 INJECTION, EMULSION INTRAVENOUS at 09:04

## 2019-12-13 RX ADMIN — PROPOFOL 150 MG: 10 INJECTION, EMULSION INTRAVENOUS at 08:58

## 2019-12-13 RX ADMIN — SODIUM CHLORIDE 9 ML/HR: 0.9 INJECTION, SOLUTION INTRAVENOUS at 08:07

## 2019-12-13 NOTE — DISCHARGE INSTRUCTIONS
A responsible adult should stay with you and you should rest quietly for the rest of the day.    Do not drink alcohol, drive, operate any heavy machinery or power tools or make any legal/important decisions for the next 24 hours.     Progress your diet as tolerated.  If you begin to experience severe pain, increased shortness of breath, racing heartbeat or a fever above 101 F, seek immediate medical attention.     Follow up with MD as instructed. Call office for results in 3 to 5 days if needed.    [phone]

## 2019-12-13 NOTE — OP NOTE
ESOPHAGOGASTRODUODENOSCOPY  Procedure Report    Patient Name:  Kuldeep Adhikari  YOB: 1965    Date of Surgery:  12/13/2019     Indications: Eosinophilic esophagitis  Persistent dysphasia  History of GERD    Pre-op Diagnosis:   EOE         Post-op Diagnosis:  Significant resolution of EOE  No erosive esophagitis  Distal esophageal stricture dilated to 52 Indonesian  Small amount of retained food consistent with diabetic gastroparesis      Procedure(s):  ESOPHAGOGASTRODUODENOSCOPY    Staff:  Surgeon(s):  Declan Iqbal MD         Anesthesia: Monitored Anesthesia Care    Estimated Blood Loss: None  Implants:    Nothing was implanted during the procedure    Specimen:          None    Complications:  None    Description of Procedure:  Informed consent was obtained from the patient.  They were placed in the left lateral decubitus position and IV conscious sedation was administered by anesthesia while being monitored continuously throughout the procedure.  The video Olympus endoscope was introduced into the esophagus advanced under direct vision to the second portion of the duodenum which is normal as was the bulb.  The pylorus is widely patent.  Antrum body fundus and cardia were inspected thoroughly including retroflexion views and revealed some retained food consistent with gastroparesis but there are no gastric ulcers or erosions varices or hiatal hernia on retroflexion.  The squamocolumnar line is at the GE junction there is no evidence of gross erosive esophagitis.  There is still some mild distal stricturing with linear furrows and concentric rings seen at the time of his last visit have a markedly decreased with therapy.  The remaining esophagus was normal.  The scope was removed and he was dilated with 50 and 52 Indonesian Blair dilators.  They entered the stomach with minimal resistance.  Second look endoscopy showed effective dilation after 52 Indonesian.    Impression:  EOE with distal esophageal  stricture dilated to 52 Frisian  Gastroparesis    Recommendations:  Continue PPI.  Complete 12 weeks of Flovent therapy and then discontinue  If dysphagia persists, repeat endoscopy with further dilation is warranted  Call for any postop chest pain fever shortness of air  We appreciate the referral thank you      Declan Iqbal MD     Date: 12/13/2019  Time: 9:07 AM

## 2019-12-13 NOTE — H&P
" LOS: 0 days   Patient Care Team:  Laura Whitaker MD as PCP - General  Linh Tineo APRN as PCP - Claims Attributed      Subjective     Interval History:     Subjective: Eosinophilic esophagitis with dysphagia dilated in October to 50 Egyptian, recurrent dysphasia    ROS:   No chest pain, shortness of breath, or cough.  No vomiting or hematemesis         Medication Review:   No current facility-administered medications for this encounter.       Objective     Vital Signs  Vitals:    12/09/19 1323   Weight: 72.6 kg (160 lb)   Height: 172.7 cm (68\")       Physical Exam:    General Appearance:    Awake and alert, in no acute distress   Head:    Normocephalic, without obvious abnormality   Eyes:          Conjunctivae normal, anicteric sclera   Throat:   No oral lesions, no thrush, oral mucosa moist   Neck:   No adenopathy, supple, no JVD   CV/Lungs:     RRR, respirations regular, even and unlabored   Abdomen:     Soft, non-tender, no rebound or guarding, non-distended, no hepatosplenomegaly   Rectal:     Deferred   Extremities:   No edema, no cyanosis   Skin:   No bruising or rash, no jaundice        Results Review:    Lab Results (last 24 hours)     ** No results found for the last 24 hours. **          Imaging Results (Last 24 Hours)     ** No results found for the last 24 hours. **            Assessment/Plan   Proceed with scope and MAC anesthesia    Proceed with EGD and dilation    Declan Iqabl MD  12/13/19  7:48 AM  "

## 2019-12-13 NOTE — ANESTHESIA POSTPROCEDURE EVALUATION
Patient: Kuldeep Adhikari    Procedure Summary     Date:  12/13/19 Room / Location:  [facility] ENDOSCOPY 4 / [facility] ENDOSCOPY    Anesthesia Start:  0853 Anesthesia Stop:  0909    Procedure:  ESOPHAGOGASTRODUODENOSCOPY WITH DILATATION (50, 52 BOUGIE) (N/A ) Diagnosis:  (EOE)    Surgeon:  Declan Iqbal MD Provider:  Jacoby Berg DO    Anesthesia Type:  MAC ASA Status:  3          Anesthesia Type: MAC    Vitals  No vitals data found for the desired time range.          Post Anesthesia Care and Evaluation    Patient location during evaluation: PHASE II  Patient participation: complete - patient participated  Level of consciousness: awake  Pain scale: See nurse's notes for pain score.  Pain management: adequate  Airway patency: patent  Anesthetic complications: No anesthetic complications  PONV Status: none  Cardiovascular status: acceptable  Respiratory status: acceptable  Hydration status: acceptable    Comments: Patient seen and examined postoperatively; vital signs stable; SpO2 greater than or equal to 90%; cardiopulmonary status stable; nausea/vomiting adequately controlled; pain adequately controlled; no apparent anesthesia complications; patient discharged from anesthesia care when discharge criteria were met

## 2019-12-13 NOTE — ANESTHESIA PREPROCEDURE EVALUATION
Anesthesia Evaluation     Patient summary reviewed and Nursing notes reviewed   NPO Solid Status: > 2 hours             Airway   Mallampati: I  TM distance: >3 FB  Neck ROM: full  No difficulty expected  Dental - normal exam     Pulmonary - normal exam   (+) sleep apnea,   Cardiovascular - normal exam    (+) hypertension well controlled less than 2 medications, CAD, PVD, DVT resolved, hyperlipidemia,       Neuro/Psych  (+) numbness, psychiatric history Anxiety and Depression,     GI/Hepatic/Renal/Endo    (+)  hiatal hernia, GERD,  renal disease CRI, diabetes mellitus type 2 poorly controlled, thyroid problem hypothyroidism    Musculoskeletal     Abdominal  - normal exam    Bowel sounds: normal.   Substance History - negative use     OB/GYN negative ob/gyn ROS         Other   arthritis,                      Anesthesia Plan    ASA 3     MAC     intravenous induction     Anesthetic plan, all risks, benefits, and alternatives have been provided, discussed and informed consent has been obtained with: patient.

## 2019-12-17 RX ORDER — METOPROLOL SUCCINATE 50 MG/1
50 TABLET, EXTENDED RELEASE ORAL DAILY
Qty: 30 TABLET | Refills: 0 | Status: SHIPPED | OUTPATIENT
Start: 2019-12-17 | End: 2020-05-05

## 2019-12-24 ENCOUNTER — TELEPHONE (OUTPATIENT)
Dept: FAMILY MEDICINE CLINIC | Facility: CLINIC | Age: 54
End: 2019-12-24

## 2020-02-14 RX ORDER — SITAGLIPTIN AND METFORMIN HYDROCHLORIDE 1000; 50 MG/1; MG/1
TABLET, FILM COATED, EXTENDED RELEASE ORAL
Qty: 180 TABLET | Refills: 1 | Status: SHIPPED | OUTPATIENT
Start: 2020-02-14 | End: 2020-07-10

## 2020-03-05 ENCOUNTER — HOSPITAL ENCOUNTER (EMERGENCY)
Facility: HOSPITAL | Age: 55
Discharge: HOME | End: 2020-03-05
Attending: EMERGENCY MEDICINE | Admitting: EMERGENCY MEDICINE

## 2020-03-05 VITALS
RESPIRATION RATE: 18 BRPM | BODY MASS INDEX: 28.33 KG/M2 | DIASTOLIC BLOOD PRESSURE: 63 MMHG | OXYGEN SATURATION: 96 % | HEART RATE: 74 BPM | TEMPERATURE: 97.9 F | HEIGHT: 68 IN | WEIGHT: 186.95 LBS | SYSTOLIC BLOOD PRESSURE: 141 MMHG

## 2020-03-05 DIAGNOSIS — IMO0001 UNCONTROLLED INSULIN DEPENDENT DIABETES MELLITUS: Primary | ICD-10-CM

## 2020-03-05 DIAGNOSIS — E86.0 DEHYDRATION: ICD-10-CM

## 2020-03-05 LAB
ALBUMIN SERPL-MCNC: 4.3 G/DL (ref 3.5–5.2)
ALBUMIN/GLOB SERPL: 1.7 G/DL
ALP SERPL-CCNC: 150 U/L (ref 39–117)
ALT SERPL W P-5'-P-CCNC: 18 U/L (ref 1–41)
ANION GAP SERPL CALCULATED.3IONS-SCNC: 12 MMOL/L (ref 5–15)
AST SERPL-CCNC: 12 U/L (ref 1–40)
BASOPHILS # BLD AUTO: 0.1 10*3/MM3 (ref 0–0.2)
BASOPHILS NFR BLD AUTO: 1.3 % (ref 0–1.5)
BILIRUB SERPL-MCNC: 0.8 MG/DL (ref 0.2–1.2)
BILIRUB UR QL STRIP: NEGATIVE
BUN BLD-MCNC: 17 MG/DL (ref 6–20)
BUN/CREAT SERPL: 12.4 (ref 7–25)
CALCIUM SPEC-SCNC: 9.8 MG/DL (ref 8.6–10.5)
CHLORIDE SERPL-SCNC: 96 MMOL/L (ref 98–107)
CLARITY UR: CLEAR
CO2 SERPL-SCNC: 28 MMOL/L (ref 22–29)
COLOR UR: YELLOW
CREAT BLD-MCNC: 1.37 MG/DL (ref 0.76–1.27)
DEPRECATED RDW RBC AUTO: 40.7 FL (ref 37–54)
EOSINOPHIL # BLD AUTO: 0.4 10*3/MM3 (ref 0–0.4)
EOSINOPHIL NFR BLD AUTO: 4.5 % (ref 0.3–6.2)
ERYTHROCYTE [DISTWIDTH] IN BLOOD BY AUTOMATED COUNT: 13.8 % (ref 12.3–15.4)
GFR SERPL CREATININE-BSD FRML MDRD: 54 ML/MIN/1.73
GLOBULIN UR ELPH-MCNC: 2.6 GM/DL
GLUCOSE BLD-MCNC: 429 MG/DL (ref 65–99)
GLUCOSE BLDC GLUCOMTR-MCNC: 302 MG/DL (ref 70–105)
GLUCOSE BLDC GLUCOMTR-MCNC: 463 MG/DL (ref 70–105)
GLUCOSE UR STRIP-MCNC: ABNORMAL MG/DL
HCT VFR BLD AUTO: 46 % (ref 37.5–51)
HGB BLD-MCNC: 15.9 G/DL (ref 13–17.7)
HGB UR QL STRIP.AUTO: NEGATIVE
KETONES UR QL STRIP: NEGATIVE
LEUKOCYTE ESTERASE UR QL STRIP.AUTO: NEGATIVE
LIPASE SERPL-CCNC: 45 U/L (ref 13–60)
LYMPHOCYTES # BLD AUTO: 1.8 10*3/MM3 (ref 0.7–3.1)
LYMPHOCYTES NFR BLD AUTO: 22.6 % (ref 19.6–45.3)
MCH RBC QN AUTO: 28.7 PG (ref 26.6–33)
MCHC RBC AUTO-ENTMCNC: 34.4 G/DL (ref 31.5–35.7)
MCV RBC AUTO: 83.3 FL (ref 79–97)
MONOCYTES # BLD AUTO: 0.6 10*3/MM3 (ref 0.1–0.9)
MONOCYTES NFR BLD AUTO: 7.9 % (ref 5–12)
NEUTROPHILS # BLD AUTO: 5 10*3/MM3 (ref 1.7–7)
NEUTROPHILS NFR BLD AUTO: 63.7 % (ref 42.7–76)
NITRITE UR QL STRIP: NEGATIVE
NRBC BLD AUTO-RTO: 0.1 /100 WBC (ref 0–0.2)
PH UR STRIP.AUTO: 5.5 [PH] (ref 5–8)
PLATELET # BLD AUTO: 240 10*3/MM3 (ref 140–450)
PMV BLD AUTO: 8 FL (ref 6–12)
POTASSIUM BLD-SCNC: 5 MMOL/L (ref 3.5–5.2)
PROT SERPL-MCNC: 6.9 G/DL (ref 6–8.5)
PROT UR QL STRIP: NEGATIVE
RBC # BLD AUTO: 5.53 10*6/MM3 (ref 4.14–5.8)
SODIUM BLD-SCNC: 136 MMOL/L (ref 136–145)
SP GR UR STRIP: 1.03 (ref 1–1.03)
UROBILINOGEN UR QL STRIP: ABNORMAL
WBC NRBC COR # BLD: 7.9 10*3/MM3 (ref 3.4–10.8)

## 2020-03-05 PROCEDURE — 80053 COMPREHEN METABOLIC PANEL: CPT | Performed by: EMERGENCY MEDICINE

## 2020-03-05 PROCEDURE — 82962 GLUCOSE BLOOD TEST: CPT

## 2020-03-05 PROCEDURE — 85025 COMPLETE CBC W/AUTO DIFF WBC: CPT | Performed by: EMERGENCY MEDICINE

## 2020-03-05 PROCEDURE — 99284 EMERGENCY DEPT VISIT MOD MDM: CPT

## 2020-03-05 PROCEDURE — 81003 URINALYSIS AUTO W/O SCOPE: CPT | Performed by: EMERGENCY MEDICINE

## 2020-03-05 PROCEDURE — 87086 URINE CULTURE/COLONY COUNT: CPT | Performed by: EMERGENCY MEDICINE

## 2020-03-05 PROCEDURE — 83690 ASSAY OF LIPASE: CPT | Performed by: EMERGENCY MEDICINE

## 2020-03-05 PROCEDURE — 63710000001 INSULIN LISPRO (HUMAN) PER 5 UNITS: Performed by: EMERGENCY MEDICINE

## 2020-03-05 RX ADMIN — INSULIN LISPRO 10 UNITS: 100 INJECTION, SOLUTION INTRAVENOUS; SUBCUTANEOUS at 21:17

## 2020-03-05 RX ADMIN — SODIUM CHLORIDE 1000 ML: 900 INJECTION, SOLUTION INTRAVENOUS at 21:22

## 2020-03-06 ENCOUNTER — EPISODE CHANGES (OUTPATIENT)
Dept: CASE MANAGEMENT | Facility: OTHER | Age: 55
End: 2020-03-06

## 2020-03-06 LAB — BACTERIA SPEC AEROBE CULT: NO GROWTH

## 2020-03-06 NOTE — ED NOTES
Pt reports high blood sugar for 3 days, pt reports he has been taking his insulin, pt also reports cough x 1 month with yellow mucus production, pt denies any fever or N/V/D     Shelli Kiran RN  03/05/20 2124

## 2020-03-06 NOTE — ED PROVIDER NOTES
Subjective   History of Present Illness  Elevated blood sugar  54-year-old male states his blood sugar has been elevated today.  He states he has been thirsty.  He denies fevers or any recent illness he has had no vomiting or diarrhea.  He states his blood sugar was 570 at home.  He states he usually is 250-270.  Review of Systems   Constitutional: Negative.    HENT: Negative.    Eyes: Negative.    Respiratory: Negative.    Cardiovascular: Negative.    Gastrointestinal: Negative.    Endocrine: Positive for polydipsia.   Genitourinary: Negative.    Musculoskeletal: Negative.    Skin: Negative.    Neurological: Negative.    Psychiatric/Behavioral: Negative.        Past Medical History:   Diagnosis Date   • CKD (chronic kidney disease), stage III (CMS/[facility])    • Depression    • DJD (degenerative joint disease)    • DVT (deep venous thrombosis) (CMS/[facility])    • Ganglion     rt wrist   • GERD (gastroesophageal reflux disease)    • Hiatal hernia    • History of echocardiogram 04/2016    2D ECHO   • History of esophageal stricture     s/p dialation 40-50 times last EGD 04/2016   • History of pulmonary embolus (PE)     on long term anticoagulation   • Hyperlipidemia    • Hypertension    • Hypothyroidism    • IBS (irritable bowel syndrome)    • Neuropathy    • Rash     rt lower hip   • Retinopathy    • Seasonal allergies    • Sleep apnea     cpap  bring dos   • Type 2 diabetes mellitus with peripheral vascular disease (CMS/[facility])    • Vitamin D deficiency        Allergies   Allergen Reactions   • Lisinopril Cough       Past Surgical History:   Procedure Laterality Date   • AMPUTATION FOOT / TOE Right     great toe   • CARDIAC CATHETERIZATION  04/2018    [facility]   • ENDOSCOPY     • ENDOSCOPY N/A 10/4/2019    Procedure: ESOPHAGOGASTRODUODENOSCOPY with dilitation and biopsy x 1 area;  Surgeon: Declan Iqbal MD;  Location: [facility] ENDOSCOPY;  Service: Gastroenterology   • ENDOSCOPY N/A 12/13/2019    Procedure:  ESOPHAGOGASTRODUODENOSCOPY WITH DILATATION (50, 52 BOUGIE);  Surgeon: Declan Iqbal MD;  Location: [facility] ENDOSCOPY;  Service: Gastroenterology   • GANGLION CYST EXCISION Left    • HERNIA REPAIR Bilateral    • RETINOPATHY SURGERY      laser   • TOTAL HIP ARTHROPLASTY Left    • TOTAL HIP ARTHROPLASTY Left 2018       Family History   Problem Relation Age of Onset   • Diabetes Mother    • Heart disease Mother    • Leukemia Father    • Sleep apnea Maternal Aunt         GENO   • Stroke Maternal Grandmother    • Hypertension Other    • Hyperlipidemia Other    • Cancer Other    • Colon cancer Other         uncle       Social History     Socioeconomic History   • Marital status:      Spouse name: Not on file   • Number of children: Not on file   • Years of education: Not on file   • Highest education level: Not on file   Tobacco Use   • Smoking status: Never Smoker   • Smokeless tobacco: Never Used   Substance and Sexual Activity   • Alcohol use: No     Frequency: Never   • Drug use: No   • Sexual activity: Defer     Prior to Admission medications    Medication Sig Start Date End Date Taking? Authorizing Provider   apixaban (ELIQUIS) 5 MG tablet tablet Take 1 tablet by mouth 2 (Two) Times a Day. 12/17/19   Laura Whitaker MD   aspirin (ASPIR-LOW) 81 MG EC tablet Take 81 mg by mouth Daily. dont take dos 5/31/18   Padilla Henderson MD   atorvastatin (LIPITOR) 40 MG tablet Take 40 mg by mouth Daily. Dont take preop 2/1/19   Padilla Henderson MD   Blood Glucose Monitoring Suppl (ONETOUCH VERIO) w/Device kit 1 strip 2 (Two) Times a Day. Use kit to test bs bid  E11.51 10/25/19   Laura Whitaker MD   cetirizine (ZYRTEC ALLERGY) 10 MG tablet Take 10 mg by mouth Daily. Ok to take preop 4/22/19   Padilla Henderson MD   etodolac (LODINE) 400 MG tablet Take 400 mg by mouth 2 (Two) Times a Day. dont take preop 6/29/18   Padlila Henderson MD   FLUoxetine (PROzac) 40 MG capsule TAKE 1  "CAPSULE BY MOUTH ONCE DAILY  Patient taking differently: Take 40 mg by mouth Daily. Ok to take dos 8/28/19   Stephanie Duffy MD   glucose blood (ONETOUCH VERIO) test strip Test bs twice a day E11.51 12/17/19   Laura Whitaker MD   Insulin Glargine (LANTUS SOLOSTAR) 100 UNIT/ML injection pen Inject 15 Units under the skin into the appropriate area as directed Daily. 12/17/19   Laura Whitaker MD   Insulin Pen Needle (PEN NEEDLES 3/16\") 31G X 5 MM misc Use 1 needle daily 8/12/19   Stephanie Duffy MD   JANUMET XR  MG tablet TAKE 1 TABLET BY MOUTH EVERY 12 HOURS 2/14/20   Laura Whitaker MD   levothyroxine (SYNTHROID) 100 MCG tablet Take 1 tablet by mouth Daily.  Patient taking differently: Take 100 mcg by mouth Daily. Ok to take preop 8/12/19   Stephanie Duffy MD   losartan (COZAAR) 50 MG tablet Take 50 mg by mouth Daily. Take preop 3/23/18   Padilla Henderson MD   metoprolol succinate XL (TOPROL-XL) 50 MG 24 hr tablet Take 1 tablet by mouth Daily. 12/17/19   Finesse Noel MD   nitroglycerin (NITROSTAT) 0.4 MG SL tablet Place 0.4 mg under the tongue Every 5 (Five) Minutes As Needed. 2/1/19   Padilla Henderson MD   Omeprazole 20 MG tablet delayed-release Take 40 mg by mouth Daily for 90 days.  Patient taking differently: Take 40 mg by mouth Daily. Ok to take preop 10/4/19 1/2/20  Declan Iqbal MD     BP (!) 188/96 (BP Location: Right arm, Patient Position: Sitting)   Pulse 78   Temp 98 °F (36.7 °C) (Oral)   Resp 17   Ht 172.7 cm (68\")   Wt 84.8 kg (186 lb 15.2 oz)   SpO2 98%   BMI 28.43 kg/m²         Objective   Physical Exam  General: Well-developed well-appearing, no acute distress, alert and appropriate  Eyes: Pupils round, sclera nonicteric  HEENT: Mucous membranes somewhat dry, no mucosal swelling  Neck: Supple, no nuchal rigidity, no lymphadenopathy  Respirations: Respirations nonlabored, " equal breath sounds bilaterally, clear lungs  Heart regular rate and rhythm, no murmurs rubs or gallops,   Abdomen soft nontender nondistended, no hepatosplenomegaly, no hernia, no mass, normal bowel sounds, no CVA tenderness  Extremities no clubbing cyanosis or edema, calves are symmetric and nontender  Neuro cranial nerves grossly intact, no focal limb deficits  Psych oriented, pleasant affect  Skin no rash, brisk cap refill  Procedures           ED Course      Results for orders placed or performed during the hospital encounter of 03/05/20   Comprehensive Metabolic Panel   Result Value Ref Range    Glucose 429 (C) 65 - 99 mg/dL    BUN 17 6 - 20 mg/dL    Creatinine 1.37 (H) 0.76 - 1.27 mg/dL    Sodium 136 136 - 145 mmol/L    Potassium 5.0 3.5 - 5.2 mmol/L    Chloride 96 (L) 98 - 107 mmol/L    CO2 28.0 22.0 - 29.0 mmol/L    Calcium 9.8 8.6 - 10.5 mg/dL    Total Protein 6.9 6.0 - 8.5 g/dL    Albumin 4.30 3.50 - 5.20 g/dL    ALT (SGPT) 18 1 - 41 U/L    AST (SGOT) 12 1 - 40 U/L    Alkaline Phosphatase 150 (H) 39 - 117 U/L    Total Bilirubin 0.8 0.2 - 1.2 mg/dL    eGFR Non African Amer 54 (L) >60 mL/min/1.73    Globulin 2.6 gm/dL    A/G Ratio 1.7 g/dL    BUN/Creatinine Ratio 12.4 7.0 - 25.0    Anion Gap 12.0 5.0 - 15.0 mmol/L   Lipase   Result Value Ref Range    Lipase 45 13 - 60 U/L   Urinalysis With Culture If Indicated - Urine, Clean Catch   Result Value Ref Range    Color, UA Yellow Yellow, Straw    Appearance, UA Clear Clear    pH, UA 5.5 5.0 - 8.0    Specific Gravity, UA 1.034 (H) 1.005 - 1.030    Glucose, UA >=1000 mg/dL (3+) (A) Negative    Ketones, UA Negative Negative    Bilirubin, UA Negative Negative    Blood, UA Negative Negative    Protein, UA Negative Negative    Leuk Esterase, UA Negative Negative    Nitrite, UA Negative Negative    Urobilinogen, UA 0.2 E.U./dL 0.2 - 1.0 E.U./dL   CBC Auto Differential   Result Value Ref Range    WBC 7.90 3.40 - 10.80 10*3/mm3    RBC 5.53 4.14 - 5.80 10*6/mm3     Hemoglobin 15.9 13.0 - 17.7 g/dL    Hematocrit 46.0 37.5 - 51.0 %    MCV 83.3 79.0 - 97.0 fL    MCH 28.7 26.6 - 33.0 pg    MCHC 34.4 31.5 - 35.7 g/dL    RDW 13.8 12.3 - 15.4 %    RDW-SD 40.7 37.0 - 54.0 fl    MPV 8.0 6.0 - 12.0 fL    Platelets 240 140 - 450 10*3/mm3    Neutrophil % 63.7 42.7 - 76.0 %    Lymphocyte % 22.6 19.6 - 45.3 %    Monocyte % 7.9 5.0 - 12.0 %    Eosinophil % 4.5 0.3 - 6.2 %    Basophil % 1.3 0.0 - 1.5 %    Neutrophils, Absolute 5.00 1.70 - 7.00 10*3/mm3    Lymphocytes, Absolute 1.80 0.70 - 3.10 10*3/mm3    Monocytes, Absolute 0.60 0.10 - 0.90 10*3/mm3    Eosinophils, Absolute 0.40 0.00 - 0.40 10*3/mm3    Basophils, Absolute 0.10 0.00 - 0.20 10*3/mm3    nRBC 0.1 0.0 - 0.2 /100 WBC   POC Glucose Once   Result Value Ref Range    Glucose 463 (C) 70 - 105 mg/dL   POC Glucose Once   Result Value Ref Range    Glucose 302 (H) 70 - 105 mg/dL                                          MDM  Patient describes chronically uncontrolled diabetes with increased hyperglycemia today.  He is not in DKA.  He was given insulin and IV fluids and his blood sugar was improved to around 300 which he states is not far from his baseline.  He has no other symptoms of illness.  He denies chest pain or shortness of breath.  He was given warning signs for return and discharged in good condition  Final diagnoses:   Uncontrolled insulin dependent diabetes mellitus (CMS/[facility])   Dehydration            Wolfgang Damon MD  03/05/20 7913

## 2020-03-06 NOTE — DISCHARGE INSTRUCTIONS
Follow-up with the [facility] tomorrow.  Rest, drink plenty of fluids, check your blood sugar regularly and adjust insulin accordingly.  Return for fever, shortness of breath, increased pain or any other concerns.

## 2020-03-09 ENCOUNTER — EPISODE CHANGES (OUTPATIENT)
Dept: CASE MANAGEMENT | Facility: OTHER | Age: 55
End: 2020-03-09

## 2020-03-10 ENCOUNTER — EPISODE CHANGES (OUTPATIENT)
Dept: CASE MANAGEMENT | Facility: OTHER | Age: 55
End: 2020-03-10

## 2020-03-15 ENCOUNTER — APPOINTMENT (OUTPATIENT)
Dept: MRI IMAGING | Facility: HOSPITAL | Age: 55
End: 2020-03-15

## 2020-03-15 ENCOUNTER — HOSPITAL ENCOUNTER (INPATIENT)
Facility: HOSPITAL | Age: 55
LOS: 4 days | Discharge: HOME HEALTH | End: 2020-03-19
Attending: EMERGENCY MEDICINE | Admitting: PODIATRIST

## 2020-03-15 DIAGNOSIS — Z89.419 HISTORY OF AMPUTATION OF HALLUX (HCC): ICD-10-CM

## 2020-03-15 DIAGNOSIS — L03.119 CELLULITIS IN DIABETIC FOOT (HCC): Primary | ICD-10-CM

## 2020-03-15 DIAGNOSIS — I96 GANGRENE OF FOOT (HCC): ICD-10-CM

## 2020-03-15 DIAGNOSIS — E11.42 DIABETIC PERIPHERAL NEUROPATHY (HCC): Chronic | ICD-10-CM

## 2020-03-15 DIAGNOSIS — E11.628 CELLULITIS IN DIABETIC FOOT (HCC): Primary | ICD-10-CM

## 2020-03-15 DIAGNOSIS — Z87.39 HX OF OSTEOMYELITIS: ICD-10-CM

## 2020-03-15 PROBLEM — E66.9 OBESITY (BMI 30-39.9): Chronic | Status: ACTIVE | Noted: 2020-03-15

## 2020-03-15 PROBLEM — N18.30 CHRONIC RENAL INSUFFICIENCY, STAGE III (MODERATE): Chronic | Status: ACTIVE | Noted: 2017-01-19

## 2020-03-15 PROBLEM — E03.9 HYPOTHYROIDISM: Chronic | Status: ACTIVE | Noted: 2017-01-19

## 2020-03-15 PROBLEM — F32.A DEPRESSION: Chronic | Status: ACTIVE | Noted: 2017-01-19

## 2020-03-15 PROBLEM — E78.5 HYPERLIPIDEMIA: Chronic | Status: ACTIVE | Noted: 2017-01-19

## 2020-03-15 PROBLEM — G47.33 OBSTRUCTIVE SLEEP APNEA SYNDROME: Chronic | Status: ACTIVE | Noted: 2018-02-21

## 2020-03-15 PROBLEM — I25.10 CHRONIC CORONARY ARTERY DISEASE: Chronic | Status: ACTIVE | Noted: 2018-05-31

## 2020-03-15 PROBLEM — K21.9 GASTROESOPHAGEAL REFLUX DISEASE: Chronic | Status: ACTIVE | Noted: 2017-01-19

## 2020-03-15 PROBLEM — I10 BENIGN ESSENTIAL HYPERTENSION: Chronic | Status: ACTIVE | Noted: 2018-02-20

## 2020-03-15 LAB
ALBUMIN SERPL-MCNC: 4 G/DL (ref 3.5–5.2)
ALBUMIN/GLOB SERPL: 1.1 G/DL
ALP SERPL-CCNC: 120 U/L (ref 39–117)
ALT SERPL W P-5'-P-CCNC: 12 U/L (ref 1–41)
ANION GAP SERPL CALCULATED.3IONS-SCNC: 15 MMOL/L (ref 5–15)
AST SERPL-CCNC: 11 U/L (ref 1–40)
BASOPHILS # BLD AUTO: 0.1 10*3/MM3 (ref 0–0.2)
BASOPHILS NFR BLD AUTO: 0.8 % (ref 0–1.5)
BILIRUB SERPL-MCNC: 2.7 MG/DL (ref 0.2–1.2)
BUN BLD-MCNC: 16 MG/DL (ref 6–20)
BUN/CREAT SERPL: 10.3 (ref 7–25)
CALCIUM SPEC-SCNC: 9 MG/DL (ref 8.6–10.5)
CHLORIDE SERPL-SCNC: 96 MMOL/L (ref 98–107)
CO2 SERPL-SCNC: 27 MMOL/L (ref 22–29)
CREAT BLD-MCNC: 1.56 MG/DL (ref 0.76–1.27)
D-LACTATE SERPL-SCNC: 1.5 MMOL/L (ref 0.5–2)
DEPRECATED RDW RBC AUTO: 41.1 FL (ref 37–54)
EOSINOPHIL # BLD AUTO: 0.1 10*3/MM3 (ref 0–0.4)
EOSINOPHIL NFR BLD AUTO: 0.9 % (ref 0.3–6.2)
ERYTHROCYTE [DISTWIDTH] IN BLOOD BY AUTOMATED COUNT: 14 % (ref 12.3–15.4)
ERYTHROCYTE [SEDIMENTATION RATE] IN BLOOD: 84 MM/HR (ref 0–20)
GFR SERPL CREATININE-BSD FRML MDRD: 46 ML/MIN/1.73
GLOBULIN UR ELPH-MCNC: 3.8 GM/DL
GLUCOSE BLD-MCNC: 236 MG/DL (ref 65–99)
GLUCOSE BLDC GLUCOMTR-MCNC: 137 MG/DL (ref 70–105)
GLUCOSE BLDC GLUCOMTR-MCNC: 195 MG/DL (ref 70–105)
HBA1C MFR BLD: 9.9 % (ref 3.5–5.6)
HCT VFR BLD AUTO: 44.5 % (ref 37.5–51)
HGB BLD-MCNC: 15.2 G/DL (ref 13–17.7)
HOLD SPECIMEN: NORMAL
HOLD SPECIMEN: NORMAL
LYMPHOCYTES # BLD AUTO: 0.9 10*3/MM3 (ref 0.7–3.1)
LYMPHOCYTES NFR BLD AUTO: 7.7 % (ref 19.6–45.3)
MCH RBC QN AUTO: 28.3 PG (ref 26.6–33)
MCHC RBC AUTO-ENTMCNC: 34.3 G/DL (ref 31.5–35.7)
MCV RBC AUTO: 82.5 FL (ref 79–97)
MONOCYTES # BLD AUTO: 1 10*3/MM3 (ref 0.1–0.9)
MONOCYTES NFR BLD AUTO: 8.2 % (ref 5–12)
NEUTROPHILS # BLD AUTO: 9.9 10*3/MM3 (ref 1.7–7)
NEUTROPHILS NFR BLD AUTO: 82.4 % (ref 42.7–76)
NRBC BLD AUTO-RTO: 0.3 /100 WBC (ref 0–0.2)
PLATELET # BLD AUTO: 261 10*3/MM3 (ref 140–450)
PMV BLD AUTO: 7.4 FL (ref 6–12)
POTASSIUM BLD-SCNC: 4.1 MMOL/L (ref 3.5–5.2)
PROT SERPL-MCNC: 7.8 G/DL (ref 6–8.5)
RBC # BLD AUTO: 5.39 10*6/MM3 (ref 4.14–5.8)
SODIUM BLD-SCNC: 138 MMOL/L (ref 136–145)
WBC NRBC COR # BLD: 12.1 10*3/MM3 (ref 3.4–10.8)
WHOLE BLOOD HOLD SPECIMEN: NORMAL
WHOLE BLOOD HOLD SPECIMEN: NORMAL

## 2020-03-15 PROCEDURE — 25010000003 AMPICILLIN-SULBACTAM PER 1.5 G: Performed by: EMERGENCY MEDICINE

## 2020-03-15 PROCEDURE — 82962 GLUCOSE BLOOD TEST: CPT

## 2020-03-15 PROCEDURE — 83605 ASSAY OF LACTIC ACID: CPT

## 2020-03-15 PROCEDURE — 63710000001 INSULIN GLARGINE PER 5 UNITS: Performed by: PODIATRIST

## 2020-03-15 PROCEDURE — 99284 EMERGENCY DEPT VISIT MOD MDM: CPT

## 2020-03-15 PROCEDURE — 25010000002 HEPARIN (PORCINE) PER 1000 UNITS: Performed by: NURSE PRACTITIONER

## 2020-03-15 PROCEDURE — 87040 BLOOD CULTURE FOR BACTERIA: CPT | Performed by: EMERGENCY MEDICINE

## 2020-03-15 PROCEDURE — 25010000002 ONDANSETRON PER 1 MG: Performed by: EMERGENCY MEDICINE

## 2020-03-15 PROCEDURE — 36415 COLL VENOUS BLD VENIPUNCTURE: CPT

## 2020-03-15 PROCEDURE — G0378 HOSPITAL OBSERVATION PER HR: HCPCS

## 2020-03-15 PROCEDURE — 73718 MRI LOWER EXTREMITY W/O DYE: CPT

## 2020-03-15 PROCEDURE — 63710000001 INSULIN GLARGINE PER 5 UNITS: Performed by: NURSE PRACTITIONER

## 2020-03-15 PROCEDURE — 25010000003 AMPICILLIN-SULBACTAM PER 1.5 G: Performed by: PODIATRIST

## 2020-03-15 PROCEDURE — 87147 CULTURE TYPE IMMUNOLOGIC: CPT | Performed by: EMERGENCY MEDICINE

## 2020-03-15 PROCEDURE — 83036 HEMOGLOBIN GLYCOSYLATED A1C: CPT | Performed by: NURSE PRACTITIONER

## 2020-03-15 PROCEDURE — 25010000002 MORPHINE PER 10 MG: Performed by: EMERGENCY MEDICINE

## 2020-03-15 PROCEDURE — 85652 RBC SED RATE AUTOMATED: CPT | Performed by: EMERGENCY MEDICINE

## 2020-03-15 PROCEDURE — 25010000002 HEPARIN (PORCINE) PER 1000 UNITS: Performed by: PODIATRIST

## 2020-03-15 PROCEDURE — 25010000002 VANCOMYCIN 10 G RECONSTITUTED SOLUTION: Performed by: EMERGENCY MEDICINE

## 2020-03-15 PROCEDURE — 85025 COMPLETE CBC W/AUTO DIFF WBC: CPT | Performed by: EMERGENCY MEDICINE

## 2020-03-15 PROCEDURE — 80053 COMPREHEN METABOLIC PANEL: CPT | Performed by: EMERGENCY MEDICINE

## 2020-03-15 PROCEDURE — 99223 1ST HOSP IP/OBS HIGH 75: CPT | Performed by: INTERNAL MEDICINE

## 2020-03-15 PROCEDURE — 63710000001 INSULIN LISPRO (HUMAN) PER 5 UNITS: Performed by: NURSE PRACTITIONER

## 2020-03-15 PROCEDURE — 87150 DNA/RNA AMPLIFIED PROBE: CPT | Performed by: EMERGENCY MEDICINE

## 2020-03-15 PROCEDURE — 87186 SC STD MICRODIL/AGAR DIL: CPT | Performed by: EMERGENCY MEDICINE

## 2020-03-15 RX ORDER — NICOTINE POLACRILEX 4 MG
15 LOZENGE BUCCAL
Status: DISCONTINUED | OUTPATIENT
Start: 2020-03-15 | End: 2020-03-19 | Stop reason: HOSPADM

## 2020-03-15 RX ORDER — ACETAMINOPHEN 650 MG/1
650 SUPPOSITORY RECTAL EVERY 4 HOURS PRN
Status: DISCONTINUED | OUTPATIENT
Start: 2020-03-15 | End: 2020-03-19 | Stop reason: HOSPADM

## 2020-03-15 RX ORDER — NITROGLYCERIN 0.4 MG/1
0.4 TABLET SUBLINGUAL
Status: DISCONTINUED | OUTPATIENT
Start: 2020-03-15 | End: 2020-03-19 | Stop reason: HOSPADM

## 2020-03-15 RX ORDER — SODIUM CHLORIDE 0.9 % (FLUSH) 0.9 %
10 SYRINGE (ML) INJECTION AS NEEDED
Status: DISCONTINUED | OUTPATIENT
Start: 2020-03-15 | End: 2020-03-19 | Stop reason: HOSPADM

## 2020-03-15 RX ORDER — LOSARTAN POTASSIUM 50 MG/1
50 TABLET ORAL DAILY
Status: DISCONTINUED | OUTPATIENT
Start: 2020-03-15 | End: 2020-03-19 | Stop reason: HOSPADM

## 2020-03-15 RX ORDER — ACETAMINOPHEN 160 MG/5ML
650 SOLUTION ORAL EVERY 4 HOURS PRN
Status: DISCONTINUED | OUTPATIENT
Start: 2020-03-15 | End: 2020-03-19 | Stop reason: HOSPADM

## 2020-03-15 RX ORDER — MORPHINE SULFATE 4 MG/ML
4 INJECTION, SOLUTION INTRAMUSCULAR; INTRAVENOUS ONCE
Status: COMPLETED | OUTPATIENT
Start: 2020-03-15 | End: 2020-03-15

## 2020-03-15 RX ORDER — METOPROLOL SUCCINATE 50 MG/1
50 TABLET, EXTENDED RELEASE ORAL DAILY
Status: DISCONTINUED | OUTPATIENT
Start: 2020-03-15 | End: 2020-03-19 | Stop reason: HOSPADM

## 2020-03-15 RX ORDER — MAGNESIUM SULFATE HEPTAHYDRATE 40 MG/ML
2 INJECTION, SOLUTION INTRAVENOUS AS NEEDED
Status: DISCONTINUED | OUTPATIENT
Start: 2020-03-15 | End: 2020-03-19 | Stop reason: HOSPADM

## 2020-03-15 RX ORDER — HYDROCODONE BITARTRATE AND ACETAMINOPHEN 5; 325 MG/1; MG/1
1 TABLET ORAL EVERY 4 HOURS PRN
Status: DISCONTINUED | OUTPATIENT
Start: 2020-03-15 | End: 2020-03-19 | Stop reason: HOSPADM

## 2020-03-15 RX ORDER — POTASSIUM CHLORIDE 20 MEQ/1
40 TABLET, EXTENDED RELEASE ORAL AS NEEDED
Status: DISCONTINUED | OUTPATIENT
Start: 2020-03-15 | End: 2020-03-19 | Stop reason: HOSPADM

## 2020-03-15 RX ORDER — CLINDAMYCIN PHOSPHATE 600 MG/50ML
600 INJECTION, SOLUTION INTRAVENOUS EVERY 8 HOURS
Status: DISCONTINUED | OUTPATIENT
Start: 2020-03-15 | End: 2020-03-18

## 2020-03-15 RX ORDER — HEPARIN SODIUM 5000 [USP'U]/ML
5000 INJECTION, SOLUTION INTRAVENOUS; SUBCUTANEOUS EVERY 12 HOURS SCHEDULED
Status: DISCONTINUED | OUTPATIENT
Start: 2020-03-15 | End: 2020-03-19

## 2020-03-15 RX ORDER — FLUOXETINE HYDROCHLORIDE 20 MG/1
40 CAPSULE ORAL DAILY
Status: DISCONTINUED | OUTPATIENT
Start: 2020-03-15 | End: 2020-03-19 | Stop reason: HOSPADM

## 2020-03-15 RX ORDER — VANCOMYCIN 1.75 GRAM/500 ML IN 0.9 % SODIUM CHLORIDE INTRAVENOUS
20 ONCE
Status: COMPLETED | OUTPATIENT
Start: 2020-03-15 | End: 2020-03-15

## 2020-03-15 RX ORDER — SODIUM CHLORIDE 0.9 % (FLUSH) 0.9 %
10 SYRINGE (ML) INJECTION EVERY 12 HOURS SCHEDULED
Status: DISCONTINUED | OUTPATIENT
Start: 2020-03-15 | End: 2020-03-19 | Stop reason: HOSPADM

## 2020-03-15 RX ORDER — INSULIN GLARGINE 100 [IU]/ML
15 INJECTION, SOLUTION SUBCUTANEOUS NIGHTLY
Status: DISCONTINUED | OUTPATIENT
Start: 2020-03-15 | End: 2020-03-19 | Stop reason: HOSPADM

## 2020-03-15 RX ORDER — HYDROCODONE BITARTRATE AND ACETAMINOPHEN 10; 325 MG/1; MG/1
1 TABLET ORAL EVERY 4 HOURS PRN
Status: DISCONTINUED | OUTPATIENT
Start: 2020-03-15 | End: 2020-03-19 | Stop reason: HOSPADM

## 2020-03-15 RX ORDER — DEXTROSE MONOHYDRATE 25 G/50ML
25 INJECTION, SOLUTION INTRAVENOUS
Status: DISCONTINUED | OUTPATIENT
Start: 2020-03-15 | End: 2020-03-19 | Stop reason: HOSPADM

## 2020-03-15 RX ORDER — ONDANSETRON 4 MG/1
4 TABLET, FILM COATED ORAL EVERY 6 HOURS PRN
Status: DISCONTINUED | OUTPATIENT
Start: 2020-03-15 | End: 2020-03-19 | Stop reason: HOSPADM

## 2020-03-15 RX ORDER — GLIPIZIDE 10 MG/1
20 TABLET, FILM COATED, EXTENDED RELEASE ORAL DAILY
COMMUNITY
Start: 2020-01-02 | End: 2020-07-10

## 2020-03-15 RX ORDER — ATORVASTATIN CALCIUM 40 MG/1
40 TABLET, FILM COATED ORAL NIGHTLY
Status: DISCONTINUED | OUTPATIENT
Start: 2020-03-15 | End: 2020-03-19 | Stop reason: HOSPADM

## 2020-03-15 RX ORDER — OMEPRAZOLE 40 MG/1
40 CAPSULE, DELAYED RELEASE ORAL DAILY
COMMUNITY
End: 2020-07-10

## 2020-03-15 RX ORDER — ONDANSETRON 2 MG/ML
4 INJECTION INTRAMUSCULAR; INTRAVENOUS EVERY 6 HOURS PRN
Status: DISCONTINUED | OUTPATIENT
Start: 2020-03-15 | End: 2020-03-19 | Stop reason: HOSPADM

## 2020-03-15 RX ORDER — LEVOTHYROXINE SODIUM 0.1 MG/1
100 TABLET ORAL
Status: DISCONTINUED | OUTPATIENT
Start: 2020-03-16 | End: 2020-03-19 | Stop reason: HOSPADM

## 2020-03-15 RX ORDER — ACETAMINOPHEN 325 MG/1
650 TABLET ORAL EVERY 4 HOURS PRN
Status: DISCONTINUED | OUTPATIENT
Start: 2020-03-15 | End: 2020-03-19 | Stop reason: HOSPADM

## 2020-03-15 RX ORDER — ONDANSETRON 2 MG/ML
4 INJECTION INTRAMUSCULAR; INTRAVENOUS ONCE
Status: COMPLETED | OUTPATIENT
Start: 2020-03-15 | End: 2020-03-15

## 2020-03-15 RX ORDER — PANTOPRAZOLE SODIUM 40 MG/1
40 TABLET, DELAYED RELEASE ORAL EVERY MORNING
Status: DISCONTINUED | OUTPATIENT
Start: 2020-03-16 | End: 2020-03-19 | Stop reason: HOSPADM

## 2020-03-15 RX ORDER — MAGNESIUM SULFATE 1 G/100ML
1 INJECTION INTRAVENOUS AS NEEDED
Status: DISCONTINUED | OUTPATIENT
Start: 2020-03-15 | End: 2020-03-19 | Stop reason: HOSPADM

## 2020-03-15 RX ADMIN — MORPHINE SULFATE 4 MG: 4 INJECTION INTRAVENOUS at 12:54

## 2020-03-15 RX ADMIN — CLINDAMYCIN PHOSPHATE 600 MG: 600 INJECTION, SOLUTION INTRAVENOUS at 20:50

## 2020-03-15 RX ADMIN — Medication 10 ML: at 12:08

## 2020-03-15 RX ADMIN — METOPROLOL SUCCINATE 50 MG: 50 TABLET, EXTENDED RELEASE ORAL at 20:49

## 2020-03-15 RX ADMIN — AMPICILLIN AND SULBACTAM 3 G: 1; 2 INJECTION, POWDER, FOR SOLUTION INTRAMUSCULAR; INTRAVENOUS at 20:48

## 2020-03-15 RX ADMIN — ONDANSETRON 4 MG: 2 INJECTION INTRAMUSCULAR; INTRAVENOUS at 12:54

## 2020-03-15 RX ADMIN — HYDROCODONE BITARTRATE AND ACETAMINOPHEN 1 TABLET: 10; 325 TABLET ORAL at 21:25

## 2020-03-15 RX ADMIN — SODIUM CHLORIDE 1000 ML: 900 INJECTION, SOLUTION INTRAVENOUS at 13:34

## 2020-03-15 RX ADMIN — HEPARIN SODIUM 5000 UNITS: 5000 INJECTION INTRAVENOUS; SUBCUTANEOUS at 20:49

## 2020-03-15 RX ADMIN — INSULIN LISPRO 3 UNITS: 100 INJECTION, SOLUTION INTRAVENOUS; SUBCUTANEOUS at 21:25

## 2020-03-15 RX ADMIN — Medication 10 ML: at 20:50

## 2020-03-15 RX ADMIN — VANCOMYCIN HYDROCHLORIDE 1750 MG: 10 INJECTION, POWDER, LYOPHILIZED, FOR SOLUTION INTRAVENOUS at 14:30

## 2020-03-15 RX ADMIN — INSULIN GLARGINE 15 UNITS: 100 INJECTION, SOLUTION SUBCUTANEOUS at 21:24

## 2020-03-15 RX ADMIN — LOSARTAN POTASSIUM 50 MG: 50 TABLET, FILM COATED ORAL at 20:50

## 2020-03-15 RX ADMIN — AMPICILLIN AND SULBACTAM 3 G: 1; 2 INJECTION, POWDER, FOR SOLUTION INTRAMUSCULAR; INTRAVENOUS at 12:55

## 2020-03-15 RX ADMIN — FLUOXETINE 40 MG: 20 CAPSULE ORAL at 20:49

## 2020-03-15 RX ADMIN — ATORVASTATIN CALCIUM 40 MG: 40 TABLET, FILM COATED ORAL at 20:57

## 2020-03-15 NOTE — ED NOTES
Patient is resting abed, call light within reach, no distress noted, VWNL, will continue to monitor     Clare Sanchez, RN  03/15/20 1414

## 2020-03-15 NOTE — H&P
[facility]      Patient Name: Kuldeep Adhikari  : 1965  MRN: [id number]  Primary Care Physician: Laura Whitaker MD  Date of admission: 3/15/2020    Patient Care Team:  Laura Whitaker MD as PCP - Clari Barton RN as Ambulatory  ([facility])          Subjective   History Present Illness     Chief Complaint:   Chief Complaint   Patient presents with   • Daibetic Ulcer       Mr. Adhikari is a 54 y.o.  male with a past medical history of CKD, depression, hypertension, hypothyroidism, diabetes mellitus type 2, and sleep apnea who presented [facility] on 3/15/2020 with complaints of infected right toe.  Patient states he was here at [facility] last Saturday due to hyperglycemia.  He reported then he had a small blister on his right lower extremity and within a week it has progressively gotten worse.  He states it is been draining some blood and has been foul-smelling.  He said his right lower extremity pain is a 5 out of 10 at this time.  Pain medication has helped.  He denies any aggravating factors.  He mentioned he has had osteomyelitis before in his right foot and that is why his big toe is amputated.  He denies any recent nausea, vomiting, diarrhea, or fever.    In the ED, all labs unremarkable except creatinine 1.5, white blood cells 12, and sed rate 84.  Urinalysis unremarkable.  Blood cultures pending.  All vital signs stable upon admission.  Patient received Unasyn, morphine, Zofran, normal saline 1 L bolus, and vancomycin in the ED.    Review of Systems   Constitution: Positive for chills.   HENT: Negative.    Cardiovascular: Negative.    Respiratory: Negative.    Skin: Positive for color change.        Right foot wound- foul smelling, red, blister, bloody drainage   Musculoskeletal: Negative.    Gastrointestinal: Negative.    Genitourinary: Negative.    Neurological: Negative.       Psychiatric/Behavioral: Negative.            Personal History     Past Medical History:   Past Medical History:   Diagnosis Date   • CKD (chronic kidney disease), stage III (CMS/HCC)    • Depression    • DJD (degenerative joint disease)    • DVT (deep venous thrombosis) (CMS/HCC)    • Ganglion     rt wrist   • GERD (gastroesophageal reflux disease)    • Hiatal hernia    • History of echocardiogram 04/2016    2D ECHO   • History of esophageal stricture     s/p dialation 40-50 times last EGD 04/2016   • History of pulmonary embolus (PE)     on long term anticoagulation   • Hyperlipidemia    • Hypertension    • Hypothyroidism    • IBS (irritable bowel syndrome)    • Neuropathy    • Rash     rt lower hip   • Retinopathy    • Seasonal allergies    • Sleep apnea     cpap  bring dos   • Type 2 diabetes mellitus with peripheral vascular disease (CMS/HCC)    • Vitamin D deficiency        Surgical History:      Past Surgical History:   Procedure Laterality Date   • AMPUTATION FOOT / TOE Right     great toe   • CARDIAC CATHETERIZATION  04/2018    [facility]   • ENDOSCOPY     • ENDOSCOPY N/A 10/4/2019    Procedure: ESOPHAGOGASTRODUODENOSCOPY with dilitation and biopsy x 1 area;  Surgeon: Declan Iqbal MD;  Location: [facility] ENDOSCOPY;  Service: Gastroenterology   • ENDOSCOPY N/A 12/13/2019    Procedure: ESOPHAGOGASTRODUODENOSCOPY WITH DILATATION (50, 52 BOUGIE);  Surgeon: Declan Iqbal MD;  Location: [facility] ENDOSCOPY;  Service: Gastroenterology   • GANGLION CYST EXCISION Left    • HERNIA REPAIR Bilateral    • RETINOPATHY SURGERY      laser   • TOTAL HIP ARTHROPLASTY Left    • TOTAL HIP ARTHROPLASTY Left 2018           Family History: family history includes Cancer in an other family member; Colon cancer in an other family member; Diabetes in his mother; Heart disease in his mother; Hyperlipidemia in an other family member; Hypertension in an other family member; Leukemia in his father; Sleep apnea in his maternal  aunt; Stroke in his maternal grandmother. Otherwise pertinent FHx was reviewed and unremarkable.     Social History:  reports that he has never smoked. He has never used smokeless tobacco. He reports that he does not drink alcohol or use drugs.      Medications:  Prior to Admission medications    Medication Sig Start Date End Date Taking? Authorizing Provider   apixaban (ELIQUIS) 5 MG tablet tablet Take 1 tablet by mouth 2 (Two) Times a Day. 12/17/19  Yes Laura Whitaker MD   aspirin (ASPIR-LOW) 81 MG EC tablet Take 81 mg by mouth Daily. 5/31/18  Yes Padilla Henderson MD   atorvastatin (LIPITOR) 40 MG tablet Take 40 mg by mouth Daily. 2/1/19  Yes Padilla Henderson MD   FLUoxetine (PROzac) 40 MG capsule TAKE 1 CAPSULE BY MOUTH ONCE DAILY 8/28/19  Yes Stephanie Duffy MD   glipizide (GLUCOTROL XL) 10 MG 24 hr tablet Take 20 mg by mouth Daily. 1/2/20  Yes Padilla Henderson MD   Insulin Glargine (LANTUS SOLOSTAR) 100 UNIT/ML injection pen Inject 15 Units under the skin into the appropriate area as directed Daily. 12/17/19  Yes Laura Whitaker MD   JANUMET XR  MG tablet TAKE 1 TABLET BY MOUTH EVERY 12 HOURS 2/14/20  Yes Laura Whitaker MD   levothyroxine (SYNTHROID) 100 MCG tablet Take 1 tablet by mouth Daily. 8/12/19  Yes Stephanie Duffy MD   losartan (COZAAR) 50 MG tablet Take 50 mg by mouth Daily. 3/23/18  Yes Padilla Henderson MD   metoprolol succinate XL (TOPROL-XL) 50 MG 24 hr tablet Take 1 tablet by mouth Daily. 12/17/19  Yes Finesse Noel MD   omeprazole (priLOSEC) 40 MG capsule Take 40 mg by mouth Daily.   Yes Padilla Henderson MD   cetirizine (ZYRTEC ALLERGY) 10 MG tablet Take 10 mg by mouth Daily. 4/22/19 3/15/20 Yes Padilla Henderson MD   nitroglycerin (NITROSTAT) 0.4 MG SL tablet Place 0.4 mg under the tongue Every 5 (Five) Minutes As Needed for Chest Pain. 2/1/19   Provider, MD Padilla   Blood  "Glucose Monitoring Suppl (ONETOUCH VERIO) w/Device kit 1 strip 2 (Two) Times a Day. Use kit to test bs bid  E11.51 10/25/19 3/15/20  Laura Whitaker MD   etodolac (LODINE) 400 MG tablet Take 400 mg by mouth 2 (Two) Times a Day. 6/29/18 3/15/20  Provider, MD Padilla   glucose blood (ONETOUCH VERIO) test strip Test bs twice a day E11.51 12/17/19 3/15/20  Laura Whitaker MD   Insulin Pen Needle (PEN NEEDLES 3/16\") 31G X 5 MM misc Use 1 needle daily 8/12/19 3/15/20  Stephanie Duffy MD   Omeprazole 20 MG tablet delayed-release Take 40 mg by mouth Daily for 90 days. 10/4/19 3/15/20  Declan Iqbal MD       Allergies:    Allergies   Allergen Reactions   • Lisinopril Cough       Objective   Objective     Vital Signs  Temp:  [98.9 °F (37.2 °C)] 98.9 °F (37.2 °C)  Heart Rate:  [80-91] 86  Resp:  [20] 20  BP: (124-167)/(48-80) 124/49  SpO2:  [97 %-100 %] 98 %  on   ;      Body mass index is 30.82 kg/m².    Physical Exam   Constitutional: He is oriented to person, place, and time. He appears well-developed and well-nourished.   HENT:   Head: Normocephalic and atraumatic.   Right Ear: External ear normal.   Left Ear: External ear normal.   Nose: Nose normal.   Mouth/Throat: Oropharynx is clear and moist.   Eyes: Pupils are equal, round, and reactive to light. Conjunctivae and EOM are normal.   Neck: Normal range of motion.   Cardiovascular: Normal rate, regular rhythm and normal heart sounds.   S1, S2 audible   Pulmonary/Chest: Effort normal and breath sounds normal.   On room air    Abdominal: Soft. Bowel sounds are normal.   Musculoskeletal: Normal range of motion.   Neurological: He is alert and oriented to person, place, and time.   Skin: Skin is warm and dry. There is erythema (RLE, right big toe amputation noted, blister noted by 4th and 5th digits of RLE).   Foul smelling wound noted RLE   Psychiatric: He has a normal mood and affect. His behavior is normal. Judgment and " thought content normal.   Vitals reviewed.      Results Review:  I have personally reviewed most recent cardiac tracings, lab results and radiology images and interpretations and agree with findings.    Results from last 7 days   Lab Units 03/15/20  1208   WBC 10*3/mm3 12.10*   HEMOGLOBIN g/dL 15.2   HEMATOCRIT % 44.5   PLATELETS 10*3/mm3 261     Results from last 7 days   Lab Units 03/15/20  1218 03/15/20  1208   SODIUM mmol/L  --  138   POTASSIUM mmol/L  --  4.1   CHLORIDE mmol/L  --  96*   CO2 mmol/L  --  27.0   BUN mg/dL  --  16   CREATININE mg/dL  --  1.56*   GLUCOSE mg/dL  --  236*   CALCIUM mg/dL  --  9.0   ALT (SGPT) U/L  --  12   AST (SGOT) U/L  --  11   LACTATE mmol/L 1.5  --      Estimated Creatinine Clearance: 53.4 mL/min (A) (by C-G formula based on SCr of 1.56 mg/dL (H)).  Brief Urine Lab Results  (Last result in the past 365 days)      Color   Clarity   Blood   Leuk Est   Nitrite   Protein   CREAT   Urine HCG        03/05/20 2119 Yellow Clear Negative Negative Negative Negative               Microbiology Results (last 10 days)     Procedure Component Value - Date/Time    Urine Culture - Urine, Urine, Clean Catch [[id number]]  (Normal) Collected:  03/05/20 2119    Lab Status:  Final result Specimen:  Urine, Clean Catch Updated:  03/06/20 2325     Urine Culture No growth          ECG/EMG Results (most recent)     None                    No radiology results for the last 7 days      Estimated Creatinine Clearance: 53.4 mL/min (A) (by C-G formula based on SCr of 1.56 mg/dL (H)).    Assessment/Plan   Assessment/Plan       Active Hospital Problems    Diagnosis  POA   • **Cellulitis in diabetic foot (CMS/HCC) [E11.628, L03.119]  Yes     Priority: High   • Obesity (BMI 30-39.9) [E66.9]  Yes   • Chronic coronary artery disease [I25.10]  Yes   • Obstructive sleep apnea syndrome [G47.33]  Yes   • Benign essential hypertension [I10]  Yes   • Diabetic peripheral neuropathy (CMS/HCC) [E11.42]  Yes   • Depression  [F32.9]  Yes   • Gastroesophageal reflux disease [K21.9]  Yes   • Hyperlipidemia [E78.5]  Yes   • Hypothyroidism [E03.9]  Yes   • Chronic renal insufficiency, stage III (moderate) (CMS/HCC) [N18.3]  Yes      Resolved Hospital Problems   No resolved problems to display.     Left diabetic foot cellulitis  -White blood cells 12, monitor  -Sed rate 84  -Blood cultures pending  -Patient received Unasyn and vancomycin in the ED  -Continue Unasyn and vancomycin  -MRI right foot ordered  -Podiatry consulted- Dr. Baker     Diabetes mellitus type 2 with hyperglycemia and peripheral neuropathy  -Start sliding scale, Accu-Cheks AC at bedtime  -Check hemoglobin A1c  -Holding home Janumet and glipizide  -Continue home Lantus    Essential hypertension  -Moderately controlled  -Monitor blood pressure  -Continue losartan and metoprolol    Hyperlipidemia  -Continue atorvastatin    Chronic coronary artery disease  -On aspirin at home, continue Nitrostat  - Managed by Dr. Noel outpatient     Chronic kidney disease stage III  -Creatinine 1.5, monitor  -Baseline creatinine 1.2-1.3  -Avoid nephrotoxic medications    Hypothyroidism  -Continue levothyroxine    Depression  -Stable  -Continue Prozac    History of pulmonary embolism  -Eliquis on hold due to possible surgery, Lovenox ordered    GERD  -Continue omeprazole    Obstructive sleep apnea  -Compliant on CPAP    Obesity  - BMI 30.8  - Encourage lifestyles modifications     VTE Prophylaxis -Lovenox         CODE STATUS:    Code Status and Medical Interventions:   Ordered at: 03/15/20 1608     Level Of Support Discussed With:    Patient     Code Status:    CPR     Medical Interventions (Level of Support Prior to Arrest):    Full         I discussed the patient's findings and my recommendations with patient.        Electronically signed by KEYLA Martinez, 03/15/20, 3:28 PM.  [facility] Hospitalist Team

## 2020-03-15 NOTE — PLAN OF CARE
Independent ADLs. Patient is expecting an amputation. Patient will see Dr. Fraser tomorrow. MRI to be completed before then. Patient to be NPO at midnight.

## 2020-03-15 NOTE — PROGRESS NOTES
"Pharmacy Antimicrobial Dosing Service    Subjective:  Kuldeep Adhikari is a 54 y.o.male admitted with right toe pain. Pharmacy has been consulted to dose Vancomycin for possible diabetic foot wound.        Assessment/Plan    1. Day #1 Vancomycin: Goal -600 mcg*h/mL. Start vancomycin 1750 mg (~25 mg/kg DBW) IV once, followed by 1250 mg (~17 mg/kg DBW) IV q24h. Obtain peak 3/18 at 1800 and trough 3/19 at 1300, or earlier if clinically warranted.    2. Day #1 Ampicillin/sulbactam: 3gm IV q6h for estCrCl > 30 mL/min.    Will continue to monitor drug levels, renal function, culture and sensitivities, and patient clinical status.       Objective:  Relevant clinical data and objective history reviewed:  165.1 cm (65\")   84 kg (185 lb 3 oz)   Ideal body weight: 61.5 kg (135 lb 9.3 oz)  Adjusted ideal body weight: 70.5 kg (155 lb 6.8 oz)  Body mass index is 30.82 kg/m².        Results from last 7 days   Lab Units 03/15/20  1208   CREATININE mg/dL 1.56*     Estimated Creatinine Clearance: 53.4 mL/min (A) (by C-G formula based on SCr of 1.56 mg/dL (H)).  No intake/output data recorded.    Results from last 7 days   Lab Units 03/15/20  1208   WBC 10*3/mm3 12.10*     Temperature    03/15/20 1133   Temp: 98.9 °F (37.2 °C)     Baseline culture/source/susceptibility:  Microbiology Results (last 10 days)       Procedure Component Value - Date/Time    Urine Culture - Urine, Urine, Clean Catch [[id number]]  (Normal) Collected:  03/05/20 2119    Lab Status:  Final result Specimen:  Urine, Clean Catch Updated:  03/06/20 2325     Urine Culture No growth             Anti-Infectives (From admission, onward)      Ordered     Dose/Rate Route Frequency Start Stop    03/15/20 1257  !Vancomycin Level Draw Needed     Ordering Provider:  Jose Farooq MD     Does not apply Once 03/19/20 1300      03/15/20 1257  !Vancomycin Level Draw Needed     Ordering Provider:  Jose Farooq MD     Does not apply Once 03/18/20 1800      03/15/20 " 1257  vancomycin 1250 mg/250 mL 0.9% NS IVPB ([facility])     Ordering Provider:  Jose Farooq MD    1,250 mg  over 90 Minutes Intravenous Every 24 Hours 03/16/20 1400 03/23/20 1359    03/15/20 1230  ampicillin-sulbactam (UNASYN) 3 g in sodium chloride 0.9 % 100 mL IVPB-MBP     Ordering Provider:  Jose Farooq MD    3 g  200 mL/hr over 30 Minutes Intravenous Every 6 Hours 03/15/20 1300 03/27/20 1259    03/15/20 1233  vancomycin IVPB 1750 mg in 0.9% Sodium Chloride (premix) 500 mL     Ordering Provider:  Jose Farooq MD    20 mg/kg × 84 kg Intravenous Once 03/15/20 1235      03/15/20 1230  Pharmacy to dose vancomycin     Ordering Provider:  Jose Farooq MD     Does not apply Continuous PRN 03/15/20 1222 03/20/20 1221            Evie Gold, PharmD  03/15/20 12:59

## 2020-03-15 NOTE — ED PROVIDER NOTES
Subjective   54-year-old male complaining of redness and swelling to his right foot increasing in the last 5 days.  The patient states he has had previous amputation of the great toe.  He states this episode started with a small area of erythema on his toe and spread proximally.  Reports pain extending up to his calf today.  He states he has had subjective fever as well as chills today.  He reports that he has increased pain when he bears weight.  He states that he has diabetic neuropathy.  He states that he occasionally runs a blood sugar over 200 but most have been below that          Review of Systems    Past Medical History:   Diagnosis Date   • CKD (chronic kidney disease), stage III (CMS/[facility])    • Depression    • DJD (degenerative joint disease)    • DVT (deep venous thrombosis) (CMS/[facility])    • Ganglion     rt wrist   • GERD (gastroesophageal reflux disease)    • Hiatal hernia    • History of echocardiogram 04/2016    2D ECHO   • History of esophageal stricture     s/p dialation 40-50 times last EGD 04/2016   • History of pulmonary embolus (PE)     on long term anticoagulation   • Hyperlipidemia    • Hypertension    • Hypothyroidism    • IBS (irritable bowel syndrome)    • Neuropathy    • Rash     rt lower hip   • Retinopathy    • Seasonal allergies    • Sleep apnea     cpap  bring dos   • Type 2 diabetes mellitus with peripheral vascular disease (CMS/[facility])    • Vitamin D deficiency        Allergies   Allergen Reactions   • Lisinopril Cough       Past Surgical History:   Procedure Laterality Date   • AMPUTATION FOOT / TOE Right     great toe   • CARDIAC CATHETERIZATION  04/2018    [facility]   • ENDOSCOPY     • ENDOSCOPY N/A 10/4/2019    Procedure: ESOPHAGOGASTRODUODENOSCOPY with dilitation and biopsy x 1 area;  Surgeon: Declan Iqbal MD;  Location: [facility] ENDOSCOPY;  Service: Gastroenterology   • ENDOSCOPY N/A 12/13/2019    Procedure: ESOPHAGOGASTRODUODENOSCOPY WITH DILATATION (50, 52 BOUGIE);  Surgeon:  Declan Iqbal MD;  Location: [facility] ENDOSCOPY;  Service: Gastroenterology   • GANGLION CYST EXCISION Left    • HERNIA REPAIR Bilateral    • RETINOPATHY SURGERY      laser   • TOTAL HIP ARTHROPLASTY Left    • TOTAL HIP ARTHROPLASTY Left 2018       Family History   Problem Relation Age of Onset   • Diabetes Mother    • Heart disease Mother    • Leukemia Father    • Sleep apnea Maternal Aunt         GENO   • Stroke Maternal Grandmother    • Hypertension Other    • Hyperlipidemia Other    • Cancer Other    • Colon cancer Other         uncle       Social History     Socioeconomic History   • Marital status:      Spouse name: Not on file   • Number of children: Not on file   • Years of education: Not on file   • Highest education level: Not on file   Tobacco Use   • Smoking status: Never Smoker   • Smokeless tobacco: Never Used   Substance and Sexual Activity   • Alcohol use: No     Frequency: Never   • Drug use: No   • Sexual activity: Defer           Objective   Physical Exam  Alert [place] Coma Scale 15   HEENT: Pupils equal and reactive to light. Conjunctivae are not injected. normal tympanic membranes. Oropharynx and nares are normal.   Neck: Supple. Midline trachea. No JVD. No goiter.   Chest: Clear and equal breath sounds bilaterally regular rate and rhythm without murmur or rub.   Abdomen: Positive bowel sounds nontender nondistended. No rebound or peritoneal signs. No CVA tenderness.   Extremities no clubbing cyanosis or edema motor sensory exam is normal the full range of motion is intact there is cellulitic change noted to the lateral aspect of the right foot with swelling noted to the fifth toe with desquamation.  There is a foul smell in the area.  The patient has lymphangitic changes extending onto the dorsum of the foot where there is also some swelling skin: Warm and dry, no rashes or petechia.   Lymphatic: No regional lymphadenopathy. No calf pain, swelling or Jaleesa's  sign    Procedures           ED Course  ED Course as of Mar 15 1506   Sun Mar 15, 2020   1445 Patient was not x-ray due to the onset of symptoms less than 7 days, he may need MR to fully evaluate for osteomyelitis    [TH]      ED Course User Index  [TH] Jose Farooq MD                                   .EDLABS  Medications   sodium chloride 0.9 % flush 10 mL (10 mL Intravenous Given 3/15/20 1208)   Pharmacy to dose vancomycin (has no administration in time range)   ampicillin-sulbactam (UNASYN) 3 g in sodium chloride 0.9 % 100 mL IVPB-MBP (0 g Intravenous Stopped 3/15/20 1325)   vancomycin 1250 mg/250 mL 0.9% NS IVPB ([facility]) (has no administration in time range)   !Vancomycin Level Draw Needed (has no administration in time range)   !Vancomycin Level Draw Needed (has no administration in time range)   Morphine sulfate (PF) injection 4 mg (4 mg Intravenous Given 3/15/20 1254)   ondansetron (ZOFRAN) injection 4 mg (4 mg Intravenous Given 3/15/20 1254)   sodium chloride 0.9 % bolus 1,000 mL (0 mL Intravenous Stopped 3/15/20 1404)   vancomycin IVPB 1750 mg in 0.9% Sodium Chloride (premix) 500 mL (1,750 mg Intravenous New Bag 3/15/20 1430)               MDM  Number of Diagnoses or Management Options     Amount and/or Complexity of Data Reviewed  Clinical lab tests: reviewed    Risk of Complications, Morbidity, and/or Mortality  Presenting problems: high  Diagnostic procedures: high  Management options: high  General comments: Stable ER course.  The patient was started on IV vancomycin and Unasyn.  The patient will be admitted he will need wound care evaluation he was agreeable to this plan of treatment        Final diagnoses:   Cellulitis in diabetic foot (CMS/[facility])            Jose Farooq MD  03/15/20 3203

## 2020-03-15 NOTE — ED NOTES
C/o right pinky toes pain and black, patient reports a small sore on it on last Saturday now is black with open sores     Clare Sanchez, RN  03/15/20 6504

## 2020-03-16 ENCOUNTER — APPOINTMENT (OUTPATIENT)
Dept: GENERAL RADIOLOGY | Facility: HOSPITAL | Age: 55
End: 2020-03-16

## 2020-03-16 ENCOUNTER — EPISODE CHANGES (OUTPATIENT)
Dept: CASE MANAGEMENT | Facility: OTHER | Age: 55
End: 2020-03-16

## 2020-03-16 LAB
ABO GROUP BLD: NORMAL
ANION GAP SERPL CALCULATED.3IONS-SCNC: 11 MMOL/L (ref 5–15)
APTT PPP: 28.4 SECONDS (ref 61–76.5)
BACTERIA BLD CULT: ABNORMAL
BASOPHILS # BLD AUTO: 0.1 10*3/MM3 (ref 0–0.2)
BASOPHILS NFR BLD AUTO: 0.8 % (ref 0–1.5)
BLD GP AB SCN SERPL QL: NEGATIVE
BOTTLE TYPE: ABNORMAL
BUN BLD-MCNC: 19 MG/DL (ref 6–20)
BUN/CREAT SERPL: 11.3 (ref 7–25)
CALCIUM SPEC-SCNC: 8.1 MG/DL (ref 8.6–10.5)
CHLORIDE SERPL-SCNC: 103 MMOL/L (ref 98–107)
CO2 SERPL-SCNC: 26 MMOL/L (ref 22–29)
CREAT BLD-MCNC: 1.68 MG/DL (ref 0.76–1.27)
DEPRECATED RDW RBC AUTO: 40.3 FL (ref 37–54)
EOSINOPHIL # BLD AUTO: 0.2 10*3/MM3 (ref 0–0.4)
EOSINOPHIL NFR BLD AUTO: 1.6 % (ref 0.3–6.2)
ERYTHROCYTE [DISTWIDTH] IN BLOOD BY AUTOMATED COUNT: 13.7 % (ref 12.3–15.4)
GFR SERPL CREATININE-BSD FRML MDRD: 42 ML/MIN/1.73
GLUCOSE BLD-MCNC: 107 MG/DL (ref 65–99)
GLUCOSE BLDC GLUCOMTR-MCNC: 132 MG/DL (ref 70–105)
GLUCOSE BLDC GLUCOMTR-MCNC: 141 MG/DL (ref 70–105)
GLUCOSE BLDC GLUCOMTR-MCNC: 189 MG/DL (ref 70–105)
GLUCOSE BLDC GLUCOMTR-MCNC: 81 MG/DL (ref 70–105)
HCT VFR BLD AUTO: 36.1 % (ref 37.5–51)
HGB BLD-MCNC: 12.8 G/DL (ref 13–17.7)
INR PPP: 1.24 (ref 0.9–1.1)
LYMPHOCYTES # BLD AUTO: 1 10*3/MM3 (ref 0.7–3.1)
LYMPHOCYTES NFR BLD AUTO: 9.4 % (ref 19.6–45.3)
MAGNESIUM SERPL-MCNC: 1.6 MG/DL (ref 1.6–2.6)
MCH RBC QN AUTO: 29.4 PG (ref 26.6–33)
MCHC RBC AUTO-ENTMCNC: 35.5 G/DL (ref 31.5–35.7)
MCV RBC AUTO: 82.9 FL (ref 79–97)
MONOCYTES # BLD AUTO: 1 10*3/MM3 (ref 0.1–0.9)
MONOCYTES NFR BLD AUTO: 8.7 % (ref 5–12)
MRSA DNA SPEC QL NAA+PROBE: NORMAL
NEUTROPHILS # BLD AUTO: 8.7 10*3/MM3 (ref 1.7–7)
NEUTROPHILS NFR BLD AUTO: 79.5 % (ref 42.7–76)
NRBC BLD AUTO-RTO: 0 /100 WBC (ref 0–0.2)
PLATELET # BLD AUTO: 229 10*3/MM3 (ref 140–450)
PMV BLD AUTO: 7.3 FL (ref 6–12)
POTASSIUM BLD-SCNC: 4.1 MMOL/L (ref 3.5–5.2)
PROTHROMBIN TIME: 12.6 SECONDS (ref 9.6–11.7)
RBC # BLD AUTO: 4.35 10*6/MM3 (ref 4.14–5.8)
RH BLD: POSITIVE
SODIUM BLD-SCNC: 140 MMOL/L (ref 136–145)
T&S EXPIRATION DATE: NORMAL
WBC NRBC COR # BLD: 11 10*3/MM3 (ref 3.4–10.8)

## 2020-03-16 PROCEDURE — 93010 ELECTROCARDIOGRAM REPORT: CPT | Performed by: INTERNAL MEDICINE

## 2020-03-16 PROCEDURE — 86850 RBC ANTIBODY SCREEN: CPT | Performed by: PODIATRIST

## 2020-03-16 PROCEDURE — 25010000002 HEPARIN (PORCINE) PER 1000 UNITS: Performed by: NURSE PRACTITIONER

## 2020-03-16 PROCEDURE — 80048 BASIC METABOLIC PNL TOTAL CA: CPT | Performed by: INTERNAL MEDICINE

## 2020-03-16 PROCEDURE — 86900 BLOOD TYPING SEROLOGIC ABO: CPT | Performed by: PODIATRIST

## 2020-03-16 PROCEDURE — 86901 BLOOD TYPING SEROLOGIC RH(D): CPT

## 2020-03-16 PROCEDURE — 99232 SBSQ HOSP IP/OBS MODERATE 35: CPT | Performed by: HOSPITALIST

## 2020-03-16 PROCEDURE — 82962 GLUCOSE BLOOD TEST: CPT

## 2020-03-16 PROCEDURE — 86900 BLOOD TYPING SEROLOGIC ABO: CPT

## 2020-03-16 PROCEDURE — 63710000001 INSULIN GLARGINE PER 5 UNITS: Performed by: NURSE PRACTITIONER

## 2020-03-16 PROCEDURE — 85610 PROTHROMBIN TIME: CPT | Performed by: PODIATRIST

## 2020-03-16 PROCEDURE — 99221 1ST HOSP IP/OBS SF/LOW 40: CPT | Performed by: PODIATRIST

## 2020-03-16 PROCEDURE — 85730 THROMBOPLASTIN TIME PARTIAL: CPT | Performed by: PODIATRIST

## 2020-03-16 PROCEDURE — 99222 1ST HOSP IP/OBS MODERATE 55: CPT | Performed by: INTERNAL MEDICINE

## 2020-03-16 PROCEDURE — 86901 BLOOD TYPING SEROLOGIC RH(D): CPT | Performed by: PODIATRIST

## 2020-03-16 PROCEDURE — 85025 COMPLETE CBC W/AUTO DIFF WBC: CPT | Performed by: INTERNAL MEDICINE

## 2020-03-16 PROCEDURE — 25010000003 AMPICILLIN-SULBACTAM PER 1.5 G: Performed by: EMERGENCY MEDICINE

## 2020-03-16 PROCEDURE — 71045 X-RAY EXAM CHEST 1 VIEW: CPT

## 2020-03-16 PROCEDURE — 63710000001 INSULIN LISPRO (HUMAN) PER 5 UNITS: Performed by: INTERNAL MEDICINE

## 2020-03-16 PROCEDURE — 83735 ASSAY OF MAGNESIUM: CPT | Performed by: NURSE PRACTITIONER

## 2020-03-16 PROCEDURE — 93005 ELECTROCARDIOGRAM TRACING: CPT | Performed by: PODIATRIST

## 2020-03-16 PROCEDURE — 25010000003 AMPICILLIN-SULBACTAM PER 1.5 G: Performed by: PODIATRIST

## 2020-03-16 PROCEDURE — 63710000001 INSULIN LISPRO (HUMAN) PER 5 UNITS: Performed by: NURSE PRACTITIONER

## 2020-03-16 PROCEDURE — 87641 MR-STAPH DNA AMP PROBE: CPT | Performed by: INTERNAL MEDICINE

## 2020-03-16 RX ADMIN — FLUOXETINE 40 MG: 20 CAPSULE ORAL at 08:48

## 2020-03-16 RX ADMIN — CLINDAMYCIN PHOSPHATE 600 MG: 600 INJECTION, SOLUTION INTRAVENOUS at 03:30

## 2020-03-16 RX ADMIN — HEPARIN SODIUM 5000 UNITS: 5000 INJECTION INTRAVENOUS; SUBCUTANEOUS at 20:37

## 2020-03-16 RX ADMIN — HEPARIN SODIUM 5000 UNITS: 5000 INJECTION INTRAVENOUS; SUBCUTANEOUS at 08:47

## 2020-03-16 RX ADMIN — INSULIN LISPRO 5 UNITS: 100 INJECTION, SOLUTION INTRAVENOUS; SUBCUTANEOUS at 18:29

## 2020-03-16 RX ADMIN — AMPICILLIN AND SULBACTAM 3 G: 1; 2 INJECTION, POWDER, FOR SOLUTION INTRAMUSCULAR; INTRAVENOUS at 14:57

## 2020-03-16 RX ADMIN — CLINDAMYCIN PHOSPHATE 600 MG: 600 INJECTION, SOLUTION INTRAVENOUS at 20:37

## 2020-03-16 RX ADMIN — ACETAMINOPHEN 650 MG: 325 TABLET, FILM COATED ORAL at 20:37

## 2020-03-16 RX ADMIN — INSULIN GLARGINE 15 UNITS: 100 INJECTION, SOLUTION SUBCUTANEOUS at 20:45

## 2020-03-16 RX ADMIN — PANTOPRAZOLE SODIUM 40 MG: 40 TABLET, DELAYED RELEASE ORAL at 08:48

## 2020-03-16 RX ADMIN — AMPICILLIN AND SULBACTAM 3 G: 1; 2 INJECTION, POWDER, FOR SOLUTION INTRAMUSCULAR; INTRAVENOUS at 08:48

## 2020-03-16 RX ADMIN — METOPROLOL SUCCINATE 50 MG: 50 TABLET, EXTENDED RELEASE ORAL at 08:48

## 2020-03-16 RX ADMIN — LOSARTAN POTASSIUM 50 MG: 50 TABLET, FILM COATED ORAL at 08:48

## 2020-03-16 RX ADMIN — ATORVASTATIN CALCIUM 40 MG: 40 TABLET, FILM COATED ORAL at 20:37

## 2020-03-16 RX ADMIN — AMPICILLIN AND SULBACTAM 3 G: 1; 2 INJECTION, POWDER, FOR SOLUTION INTRAMUSCULAR; INTRAVENOUS at 02:03

## 2020-03-16 RX ADMIN — HYDROCODONE BITARTRATE AND ACETAMINOPHEN 1 TABLET: 10; 325 TABLET ORAL at 19:52

## 2020-03-16 RX ADMIN — Medication 10 ML: at 20:49

## 2020-03-16 RX ADMIN — AMPICILLIN AND SULBACTAM 3 G: 1; 2 INJECTION, POWDER, FOR SOLUTION INTRAMUSCULAR; INTRAVENOUS at 19:36

## 2020-03-16 RX ADMIN — INSULIN LISPRO 3 UNITS: 100 INJECTION, SOLUTION INTRAVENOUS; SUBCUTANEOUS at 13:46

## 2020-03-16 RX ADMIN — HYDROCODONE BITARTRATE AND ACETAMINOPHEN 1 TABLET: 10; 325 TABLET ORAL at 08:57

## 2020-03-16 RX ADMIN — Medication 10 ML: at 08:48

## 2020-03-16 RX ADMIN — CLINDAMYCIN PHOSPHATE 600 MG: 600 INJECTION, SOLUTION INTRAVENOUS at 13:46

## 2020-03-16 RX ADMIN — LEVOTHYROXINE SODIUM 100 MCG: 100 TABLET ORAL at 05:02

## 2020-03-16 NOTE — PROGRESS NOTES
Discharge Planning Assessment   [place]     Patient Name: Kuldeep Adhikari  MRN: [id number]  Today's Date: 3/16/2020    Admit Date: 3/15/2020    Discharge Needs Assessment     Row Name 03/16/20 1453       Living Environment    Lives With  spouse    Current Living Arrangements  home/apartment/condo    Primary Care Provided by  self    Provides Primary Care For  no one    Family Caregiver if Needed  none    Able to Return to Prior Arrangements  yes       Resource/Environmental Concerns    Resource/Environmental Concerns  none    Transportation Concerns  car, none       Transition Planning    Patient/Family Anticipates Transition to  home    Patient/Family Anticipated Services at Transition  none       Discharge Needs Assessment    Readmission Within the Last 30 Days  no previous admission in last 30 days    Concerns to be Addressed  no discharge needs identified    Equipment Currently Used at Home  glucometer;bipap/cpap    Anticipated Changes Related to Illness  none    Equipment Needed After Discharge  none    Provided Post Acute Provider List?  N/A    Discharge Coordination/Progress  Routine discharge home with spouse. Patient denies any needs at this time.         Discharge Plan     Row Name 03/16/20 1454       Plan    Plan  Routine discharge home with spouse. Patient denies any needs at this time.     Patient/Family in Agreement with Plan  yes    Plan Comments  Barriers to discharge: Patient having surgery 3/15/20.        Demographic Summary     Row Name 03/16/20 1452       General Information    Admission Type  inpatient    Arrived From  emergency department;home    Required Notices Provided  Important Message from Medicare    Referral Source  other (see comments)    Reason for Consult  discharge planning    Preferred Language  English     Used During This Interaction  no       Contact Information    Permission Granted to Share Info With          Functional Status     Row Name 03/16/20 1456        Functional Status    Usual Activity Tolerance  good    Current Activity Tolerance  good       Functional Status, IADL    Medications  independent    Meal Preparation  independent    Housekeeping  independent    Laundry  independent    Shopping  independent       Mental Status    General Appearance WDL  WDL       Mental Status Summary    Recent Changes in Mental Status/Cognitive Functioning  no changes            Anna Naegele RN Case Manager  [address]  [address] [phone]  office  [phone]  fax  [email or web address]

## 2020-03-16 NOTE — CONSULTS
Inpatient Endocrine Consult  Consultation requested by Dr. Nieves for uncontrolled type 2 diabetes   Patient Care Team:  Laura Whitaker MD as PCP - General    Chief Complaint: Uncontrolled type 2 diabetes    HPI: 55-year-old male with history of type 2 diabetes since about 1999, hypertension, hyperlipidemia, hypothyroidism and diabetic neuropathy with amputation of the right big toe in the past is admitted with ulcer on the right foot.  Endocrine consultation is now requested for diabetes management.  He tells me at home he is on Janumet with Lantus 15 units daily and Amaryl.  At home his blood sugar runs about 200.  His A1c was more than 9% here.  He is eating well.  Denies any new complaints at this time.    Past Medical History:   Diagnosis Date   • CKD (chronic kidney disease), stage III (CMS/[facility])    • Depression    • DJD (degenerative joint disease)    • DVT (deep venous thrombosis) (CMS/[facility])    • Ganglion     rt wrist   • GERD (gastroesophageal reflux disease)    • Hiatal hernia    • History of echocardiogram 04/2016    2D ECHO   • History of esophageal stricture     s/p dialation 40-50 times last EGD 04/2016   • History of pulmonary embolus (PE)     on long term anticoagulation   • Hyperlipidemia    • Hypertension    • Hypothyroidism    • IBS (irritable bowel syndrome)    • Neuropathy    • Rash     rt lower hip   • Retinopathy    • Seasonal allergies    • Sleep apnea     cpap  bring dos   • Type 2 diabetes mellitus with peripheral vascular disease (CMS/[facility])    • Vitamin D deficiency        Social History     Socioeconomic History   • Marital status:      Spouse name: Not on file   • Number of children: Not on file   • Years of education: Not on file   • Highest education level: Not on file   Tobacco Use   • Smoking status: Never Smoker   • Smokeless tobacco: Never Used   Substance and Sexual Activity   • Alcohol use: No     Frequency: Never   • Drug use: No   • Sexual activity: Defer        Family History   Problem Relation Age of Onset   • Diabetes Mother    • Heart disease Mother    • Leukemia Father    • Sleep apnea Maternal Aunt         GENO   • Stroke Maternal Grandmother    • Hypertension Other    • Hyperlipidemia Other    • Cancer Other    • Colon cancer Other         uncle       Allergies   Allergen Reactions   • Lisinopril Cough       ROS:   Constitutional:  Denies fatigue, tiredness.    Eyes:  Denies change in visual acuity   HENT:  Denies nasal congestion or sore throat   Respiratory: denies cough, shortness of breath.   Cardiovascular:  denies chest pain, edema   GI:  Denies abdominal pain, nausea, vomiting.   :  Denies Polyuria and Polydipsia  Musculoskeletal:  Denies back pain or joint pain   Integument:  Denies dry skin, rash   Neurologic:  Denies headache, focal weakness or sensory changes   Endocrine:  Denies polyuria or polydipsia   Psychiatric:  Denies depression or anxiety      Vitals:    03/16/20 1229   BP: 111/67   Pulse: 73   Resp: 17   Temp: 98.4 °F (36.9 °C)   SpO2: 95%        General Appearance:    Alert, cooperative, in no acute distress   Head:    Normocephalic, without obvious abnormality, atraumatic           Eyes:    Lids and lashes normal, conjunctivae and sclerae normal,       Throat:   No oral lesions,  oral mucosa moist. Edentulous   Neck:   No adenopathy, supple,  no thyromegaly, no   carotid bruit   Lungs:    Clear     Heart:    Regular rhythm and normal rate   Chest Wall:    No abnormalities observed   Abdomen:     Normal bowel sounds                Extremities:   Has ulcer on right foot.               Pulses:   Pulses present.    Skin:   Dry   Neurologic:  Psych:    No focal deficit.     AAOx3, appropriate mood, affect normal         Results Review:     I reviewed the patient's new clinical results.    Lab Results   Component Value Date    GLUCOSE 107 (H) 03/16/2020    BUN 19 03/16/2020    CREATININE 1.68 (H) 03/16/2020    EGFRIFNONA 42 (L) 03/16/2020     BCR 11.3 03/16/2020    K 4.1 03/16/2020    CO2 26.0 03/16/2020    CALCIUM 8.1 (L) 03/16/2020    ALBUMIN 4.00 03/15/2020    LABIL2 1.3 04/22/2019    AST 11 03/15/2020    ALT 12 03/15/2020       Lab Results   Component Value Date    HGBA1C 9.9 (H) 03/15/2020    HGBA1C 8.8 (H) 07/10/2019     Lab Results   Component Value Date    CREATININE 1.68 (H) 03/16/2020     Results from last 7 days   Lab Units 03/16/20  1630 03/16/20  1120 03/16/20  0708 03/15/20  2112 03/15/20  1646   GLUCOSE mg/dL 141* 189* 81 195* 137*       Medication Review: Reviewed.       Current Facility-Administered Medications:   •  acetaminophen (TYLENOL) tablet 650 mg, 650 mg, Oral, Q4H PRN **OR** acetaminophen (TYLENOL) 160 MG/5ML solution 650 mg, 650 mg, Oral, Q4H PRN **OR** acetaminophen (TYLENOL) suppository 650 mg, 650 mg, Rectal, Q4H PRN, Jazzy Dill APRN  •  ampicillin-sulbactam (UNASYN) 3 g in sodium chloride 0.9 % 100 mL IVPB-MBP, 3 g, Intravenous, Q6H, Jose Farooq MD, Last Rate: 200 mL/hr at 03/16/20 1457, 3 g at 03/16/20 1457  •  atorvastatin (LIPITOR) tablet 40 mg, 40 mg, Oral, Nightly, Jazzy Dill APRN, 40 mg at 03/15/20 2057  •  clindamycin (CLEOCIN) 600 mg in sodium chloride 0.9% 50 mL IVPB (premix), 600 mg, Intravenous, Q8H, Adela Gotti MD, Last Rate: 0 mL/hr at 03/15/20 2030, 600 mg at 03/16/20 1346  •  dextrose (D50W) 25 g/ 50mL Intravenous Solution 25 g, 25 g, Intravenous, Q15 Min PRN, Jazzy Dill APRN  •  dextrose (GLUTOSE) oral gel 15 g, 15 g, Oral, Q15 Min PRN, Nuha Dillsea Z, APRN  •  FLUoxetine (PROzac) capsule 40 mg, 40 mg, Oral, Daily, Michell Dilla INDIGO, APRN, 40 mg at 03/16/20 0848  •  glucagon (human recombinant) (GLUCAGEN DIAGNOSTIC) injection 1 mg, 1 mg, Subcutaneous, PRN, Nuha Dillsea Z, APRN  •  heparin (porcine) 5000 UNIT/ML injection 5,000 Units, 5,000 Units, Subcutaneous, Q12H, Jazzy Dill, APRN, 5,000 Units at 03/16/20 0847  •  HYDROcodone-acetaminophen (NORCO)   MG per tablet 1 tablet, 1 tablet, Oral, Q4H PRN, Adela Gotti MD, 1 tablet at 03/16/20 0857  •  HYDROcodone-acetaminophen (NORCO) 5-325 MG per tablet 1 tablet, 1 tablet, Oral, Q4H PRN, Adela Gotti MD  •  insulin glargine (LANTUS) injection 15 Units, 15 Units, Subcutaneous, Nightly, Jazzy Dill APRN, 15 Units at 03/15/20 2124  •  insulin lispro (humaLOG) injection 0-14 Units, 0-14 Units, Subcutaneous, 4x Daily With Meals & Nightly, 3 Units at 03/16/20 1346 **AND** insulin lispro (humaLOG) injection 0-14 Units, 0-14 Units, Subcutaneous, PRN, Jazzy Dill APRN  •  levothyroxine (SYNTHROID, LEVOTHROID) tablet 100 mcg, 100 mcg, Oral, Q AM, Jazzy Dill APRN, 100 mcg at 03/16/20 0502  •  losartan (COZAAR) tablet 50 mg, 50 mg, Oral, Daily, Jazzy Dill APRN, 50 mg at 03/16/20 0848  •  Magnesium Sulfate 2 gram infusion - Mg less than or equal to 1.5 mg/dL, 2 g, Intravenous, PRN **OR** Magnesium Sulfate 1 gram infusion - Mg 1.6-1.9 mg/dL, 1 g, Intravenous, PRN, Jazzy Dill APRN  •  metoprolol succinate XL (TOPROL-XL) 24 hr tablet 50 mg, 50 mg, Oral, Daily, Jazzy Dill APRN, 50 mg at 03/16/20 0848  •  nitroglycerin (NITROSTAT) SL tablet 0.4 mg, 0.4 mg, Sublingual, Q5 Min PRN, Jazzy Dill APRN  •  ondansetron (ZOFRAN) tablet 4 mg, 4 mg, Oral, Q6H PRN **OR** ondansetron (ZOFRAN) injection 4 mg, 4 mg, Intravenous, Q6H PRN, Kellams, Jazzy Z, APRN  •  pantoprazole (PROTONIX) EC tablet 40 mg, 40 mg, Oral, QAM, Jazzy Dill, APRN, 40 mg at 03/16/20 0848  •  potassium chloride (K-DUR,KLOR-CON) CR tablet 40 mEq, 40 mEq, Oral, PRN, Jazzy Dill, APRN  •  sodium chloride 0.9 % flush 10 mL, 10 mL, Intravenous, PRN, Jose Farooq MD, 10 mL at 03/15/20 1208  •  sodium chloride 0.9 % flush 10 mL, 10 mL, Intravenous, Q12H, Jazzy Dill, APRN, 10 mL at 03/16/20 0848  •  sodium chloride 0.9 % flush 10 mL, 10 mL, Intravenous, PRN, Jazzy Dill,  APRN    Assessment/Plan   1.  Diabetes mellitus type 2: Uncontrolled  2.  Diabetic foot on the right side with ulcer  3.  Diabetic neuropathy  4.  Hypertension   5.  Hyperlipidemia  6.  Hypothyroidism    Plan:   At this time will continue Lantus 15 units subcu daily, add Humalog 5 units with each meal and Humalog sliding scale with meals.  We will avoid any oral agents for now.  Will follow blood sugars and make further adjustments as needed.    Thank you very much for the consultation.            Tamara Michaels MD FACE.

## 2020-03-16 NOTE — CONSULTS
"Diabetes Education  Assessment/Teaching    Patient Name:  Kuldeep Adhikari  YOB: 1965  MRN: [id number]  Admit Date:  3/15/2020      Assessment Date:  3/16/2020    Most Recent Value   General Information    Referral From:  A1c [Pt seen due to A1c result of 9.9% on 3/15/2020. Adm bs 236 mg/dl]   Height  172.7 cm (68\")   Height Method  Stated   Weight  56.5 kg (124 lb 9.6 oz) [nurse was notified of 32 wt difference]   Weight Method  Standing scale   Pregnancy Assessment   Diabetes History   What type of diabetes do you have?  Type 2   Length of Diabetes Diagnosis  10 + years   Current DM knowledge  fair   Do you test your blood sugar at home?  yes   Frequency of checks  Pt states he is checking bs bid daily but he is needing new bs meter.   Meter type  Pt not sure of name of current meter   Who performs the test?  self   Have you had high blood sugar? (>140mg/dl)  yes   How often do you have high blood sugar?  frequently   When was your last high blood sugar?  adm bs 236 mg/dl   How would you rate your diabetes control?  poor   Education Preferences   What areas of diabetes would you like to learn about?  avoiding high blood sugar, diabetes complications, medications for diabetes, testing my blood sugar at home   Nutrition Information   Assessment Topics   Taking Medication - Assessment  Needs education   Problem Solving - Assessment  Needs education   Reducing Risk - Assessment  Needs education   Monitoring - Assessment  Needs education   DM Goals   Taking Medication - Goal  Today   Problem Solving - Goal  Today   Reducing Risk - Goal  Today   Monitoring - Goal  Today            Most Recent Value   DM Education Needs   Meter  Meter provided [Gave pt the Accuchek Guide Me and instructed pt in use of meter. He performed bs check correctly after the instruction. Pt plans to check bs 2-3 times/day]   Meter Type  Accuchek   Frequency of Testing  3 times a day   Blood Glucose Target Range  Discussed pt's " current A1c result of 9.9%. Reviewed how this relates to bs levels. Discussed healthy bs range and healthy A1c target. Discussed importance of bs control.    Medication  Oral, Insulin, Pen [Pt takes glipizide 10 mg, 2 pills in am,  Janumet XR 50/500 mg, 1 pill bid,  and Lantus 15 units in am. ]   Problem Solving  Hyperglycemia, Signs, Symptoms, Treatment   Reducing Risks  A1C testing   Motivation  Strong   Teaching Method  Explanation, Discussion, Handouts, Teach back, Demonstration   Patient Response  Verbalized understanding, Demonstrates adequately            Other Comments:  Pt has seen MD at the [facility] in the past and has appt scheduled to see Dr. ARLEY Michaels at the [facility] on 7/10/2020. Pt states he is wanting to see endocrinologist again due to his diabetes not being in good control. He states his A1c is elevated due to not taking his glipizide and lantus for about 1 month. He states he ran out of refills on these medications. He started taking both medications again at the beginning of March 2020. Pt asking about what he should do when bs is running high. Explained that endocrinologist could give pt sliding scale to use and this would require pt having a rapid-acting insulin at home. Encouraged pt to discuss this with MD. Rxs for Accuchek Guide test strips and Accuchek Fastclix lancets started in Epic. Gave pt the A1c info sheet. Pt with no additional questions at this time.        Electronically signed by:  Karena Hagan RN  03/16/20 16:17

## 2020-03-16 NOTE — CONSULTS
03/15/2020  Foot and Ankle Surgery - Consult  Provider: Dr. Bong Baker DPM  Location: [facility]    Subjective:  Kuldeep Adhikari is a 55 y.o. male.     Chief Complaint   Patient presents with   • Daibetic Ulcer       Consulting Physician: Primary Service     Reason for Consult: right foot infection    HPI: Patient is a 56 y/o diabetic male that presented to the ED with progressive redness and skin changes to the right foot. Symptoms have been noticed approximately 2 weeks ago. I have seen him in the past for routine foot care and previous partial first ray resection. He complains of progressive toe deformities to lesser digits. Denies any constitutional signs of infection.     Allergies   Allergen Reactions   • Lisinopril Cough       Past Medical History:   Diagnosis Date   • CKD (chronic kidney disease), stage III (CMS/HCC)    • Depression    • DJD (degenerative joint disease)    • DVT (deep venous thrombosis) (CMS/HCC)    • Ganglion     rt wrist   • GERD (gastroesophageal reflux disease)    • Hiatal hernia    • History of echocardiogram 04/2016    2D ECHO   • History of esophageal stricture     s/p dialation 40-50 times last EGD 04/2016   • History of pulmonary embolus (PE)     on long term anticoagulation   • Hyperlipidemia    • Hypertension    • Hypothyroidism    • IBS (irritable bowel syndrome)    • Neuropathy    • Rash     rt lower hip   • Retinopathy    • Seasonal allergies    • Sleep apnea     cpap  bring dos   • Type 2 diabetes mellitus with peripheral vascular disease (CMS/HCC)    • Vitamin D deficiency        Past Surgical History:   Procedure Laterality Date   • AMPUTATION FOOT / TOE Right     great toe   • CARDIAC CATHETERIZATION  04/2018    [facility]   • ENDOSCOPY     • ENDOSCOPY N/A 10/4/2019    Procedure: ESOPHAGOGASTRODUODENOSCOPY with dilitation and biopsy x 1 area;  Surgeon: Declan Iqbal MD;  Location: [facility] ENDOSCOPY;  Service: Gastroenterology   • ENDOSCOPY N/A 12/13/2019     Procedure: ESOPHAGOGASTRODUODENOSCOPY WITH DILATATION (50, 52 BOUGIE);  Surgeon: Declan Iqbal MD;  Location: [facility] ENDOSCOPY;  Service: Gastroenterology   • GANGLION CYST EXCISION Left    • HERNIA REPAIR Bilateral    • RETINOPATHY SURGERY      laser   • TOTAL HIP ARTHROPLASTY Left    • TOTAL HIP ARTHROPLASTY Left 2018       Family History   Problem Relation Age of Onset   • Diabetes Mother    • Heart disease Mother    • Leukemia Father    • Sleep apnea Maternal Aunt         GENO   • Stroke Maternal Grandmother    • Hypertension Other    • Hyperlipidemia Other    • Cancer Other    • Colon cancer Other         uncle       Social History     Socioeconomic History   • Marital status:      Spouse name: Not on file   • Number of children: Not on file   • Years of education: Not on file   • Highest education level: Not on file   Tobacco Use   • Smoking status: Never Smoker   • Smokeless tobacco: Never Used   Substance and Sexual Activity   • Alcohol use: No     Frequency: Never   • Drug use: No   • Sexual activity: Defer          Current Facility-Administered Medications:   •  acetaminophen (TYLENOL) tablet 650 mg, 650 mg, Oral, Q4H PRN **OR** acetaminophen (TYLENOL) 160 MG/5ML solution 650 mg, 650 mg, Oral, Q4H PRN **OR** acetaminophen (TYLENOL) suppository 650 mg, 650 mg, Rectal, Q4H PRN, Jazzy Dill APRN  •  ampicillin-sulbactam (UNASYN) 3 g in sodium chloride 0.9 % 100 mL IVPB-MBP, 3 g, Intravenous, Q6H, Jose Farooq MD, Last Rate: 200 mL/hr at 03/16/20 0203, 3 g at 03/16/20 0203  •  atorvastatin (LIPITOR) tablet 40 mg, 40 mg, Oral, Nightly, Jazzy Dill APRN, 40 mg at 03/15/20 2057  •  clindamycin (CLEOCIN) 600 mg in sodium chloride 0.9% 50 mL IVPB (premix), 600 mg, Intravenous, Q8H, Adela Gotti MD, Last Rate: 0 mL/hr at 03/15/20 2030, 600 mg at 03/16/20 0330  •  dextrose (D50W) 25 g/ 50mL Intravenous Solution 25 g, 25 g, Intravenous, Q15 Min PRN, Jazzy Dill APRKIANA  •   dextrose (GLUTOSE) oral gel 15 g, 15 g, Oral, Q15 Min PRN, Jazzy Dill, APRN  •  FLUoxetine (PROzac) capsule 40 mg, 40 mg, Oral, Daily, Jazzy Dill, APRN, 40 mg at 03/15/20 2049  •  glucagon (human recombinant) (GLUCAGEN DIAGNOSTIC) injection 1 mg, 1 mg, Subcutaneous, PRN, Nuha Dillsea INDIGO, APRN  •  heparin (porcine) 5000 UNIT/ML injection 5,000 Units, 5,000 Units, Subcutaneous, Q12H, Jazzy Dill, APRN, 5,000 Units at 03/15/20 2049  •  HYDROcodone-acetaminophen (NORCO)  MG per tablet 1 tablet, 1 tablet, Oral, Q4H PRN, Adela Gotti MD, 1 tablet at 03/15/20 2125  •  HYDROcodone-acetaminophen (NORCO) 5-325 MG per tablet 1 tablet, 1 tablet, Oral, Q4H PRN, Adela Gotti MD  •  insulin glargine (LANTUS) injection 15 Units, 15 Units, Subcutaneous, Nightly, Michell Dilla INDIGO, APRN, 15 Units at 03/15/20 2124  •  insulin lispro (humaLOG) injection 0-14 Units, 0-14 Units, Subcutaneous, 4x Daily With Meals & Nightly, 3 Units at 03/15/20 2125 **AND** insulin lispro (humaLOG) injection 0-14 Units, 0-14 Units, Subcutaneous, PRN, Jazzy Dill, APRN  •  levothyroxine (SYNTHROID, LEVOTHROID) tablet 100 mcg, 100 mcg, Oral, Q AM, Nuha Dillsea INDIGO, APRN, 100 mcg at 03/16/20 0502  •  losartan (COZAAR) tablet 50 mg, 50 mg, Oral, Daily, Michell Dilla INDIGO, APRN, 50 mg at 03/15/20 2050  •  Magnesium Sulfate 2 gram infusion - Mg less than or equal to 1.5 mg/dL, 2 g, Intravenous, PRN **OR** Magnesium Sulfate 1 gram infusion - Mg 1.6-1.9 mg/dL, 1 g, Intravenous, PRN, Jazzy Dill APRN  •  metoprolol succinate XL (TOPROL-XL) 24 hr tablet 50 mg, 50 mg, Oral, Daily, Jazzy Dill APRN, 50 mg at 03/15/20 2049  •  nitroglycerin (NITROSTAT) SL tablet 0.4 mg, 0.4 mg, Sublingual, Q5 Min PRN, Jazzy Dill APRN  •  ondansetron (ZOFRAN) tablet 4 mg, 4 mg, Oral, Q6H PRN **OR** ondansetron (ZOFRAN) injection 4 mg, 4 mg, Intravenous, Q6H PRN, Jazzy Dill APRN  •  pantoprazole (PROTONIX) EC  "tablet 40 mg, 40 mg, Oral, QAM, Ramonelams, Jazzy Z, APRN  •  potassium chloride (K-DUR,KLOR-CON) CR tablet 40 mEq, 40 mEq, Oral, PRN, Kellams, Jazzy Z, APRN  •  sodium chloride 0.9 % flush 10 mL, 10 mL, Intravenous, PRN, Jose Farooq MD, 10 mL at 03/15/20 1208  •  sodium chloride 0.9 % flush 10 mL, 10 mL, Intravenous, Q12H, Kellams, Jazzy Z, APRN, 10 mL at 03/15/20 2050  •  sodium chloride 0.9 % flush 10 mL, 10 mL, Intravenous, PRN, Kellams, Jazzy Z, APRN    Review of Systems:  General: Denies fever, chills, fatigue, and weakness.  Eyes: Denies vision loss, blurry vision, and excessive redness.  ENT: Denies hearing issues and difficulty swallowing.  Cardiovascular: Denies palpitations, chest pain, or syncopal episodes.  Respiratory: Denies shortness of breath, wheezing, and coughing.  GI: Denies abdominal pain, nausea, and vomiting.   : Denies frequency, hematuria, and urgency.  Musculoskeletal: Denies muscle cramps, joint pains, and stiffness.  Derm: +right foot infection  Neuro: Denies headaches, numbness, loss of coordination, and tremors.  Psych: Denies anxiety and depression.  Endocrine: Denies temperature intolerance and changes in appetite.  Heme: Denies bleeding disorders or abnormal bruising.     Objective   /65 (BP Location: Right arm, Patient Position: Lying)   Pulse 82   Temp 100 °F (37.8 °C) (Oral)   Resp 16   Ht 172.7 cm (68\")   Wt 56.5 kg (124 lb 9.6 oz) Comment: nurse was notified of 32 wt difference  SpO2 97%   BMI 18.95 kg/m²     Foot/Ankle Exam:       General:   Appearance: appears stated age and healthy    Orientation: AAOx3    Affect: appropriate      VASCULAR      Right Foot Vascularity   Dorsalis pedis:  2+  Posterior tibial:  2+  Skin Temperature: warm    Edema Gradin+  CFT:  < 3 seconds  Pedal Hair Growth:  Absent      NEUROLOGIC     Right Foot Neurologic   Light touch sensation:  Diminished  Hot/Cold sensation: diminished    Protective Sensation using " [place] Monofilament:  Diminished  Achilles reflex:  1+     MUSCULOSKELETAL      Right Foot Musculoskeletal    Amputation   Toes amputated: first toe  Ecchymosis:  None  Tenderness: none    Hammertoe:  Second toe, third toe, fourth toe and fifth toe     MUSCLE STRENGTH     Right Foot Muscle Strength   Normal strength    Foot dorsiflexion:  5  Foot plantar flexion:  5  Foot inversion:  5  Foot eversion:  5     DERMATOLOGIC     Right Foot Dermatologic   Skin: cellulitis, color abnormal, drainage, maceration, skin changes and ulcer    Skin: right foot skin not intact       Image:             Results from last 7 days   Lab Units 03/16/20  0223   WBC 10*3/mm3 11.00*   HEMOGLOBIN g/dL 12.8*   HEMATOCRIT % 36.1*   PLATELETS 10*3/mm3 229       Assessment/Plan   Patient Active Problem List   Diagnosis   • Benign essential hypertension   • Cellulitis   • Chest pain   • Chronic coronary artery disease   • Chronic renal insufficiency, stage III (moderate) (CMS/[facility])   • Degenerative joint disease   • Depression   • Diabetic peripheral neuropathy (CMS/[facility])   • Diabetic ketoacidosis (CMS/[facility])   • Disorder associated with type 2 diabetes mellitus (CMS/[facility])   • Encounter for screening for malignant neoplasm of colon   • Fatigue   • Foot pain, right   • Ganglion cyst   • Gangrene of foot (CMS/HCC)   • Gastroesophageal reflux disease   • History of amputation of hallux (CMS/HCC)   • History of gastrointestinal disease   • History of pulmonary embolism   • Hx of osteomyelitis   • Hyperlipidemia   • Hypothyroidism   • Obstructive sleep apnea syndrome   • Palpitations   • Peripheral vascular disease (CMS/HCC)   • Subacromial bursitis of right shoulder joint   • Vitamin D deficiency   • Type 2 diabetes mellitus with peripheral vascular disease (CMS/HCC)   • Cellulitis in diabetic foot (CMS/[facility])   • Obesity (BMI 30-39.9)     Patient has fairly significant infection involving right foot. Antibiotics have stabilized the infection at  this time. MRI was reviewed showing significant soft tissue infection to the 5th digit region and marrow edema consistent with osteomyelitis. After clinical review and discussion with patient, I feel the best option is to proceed with TMA. He has had progressive toe deformities and previous partial first ray resection. This option should hopefully decrease chance of further complications. We reviewed risks and benefits. He understands he will likely require IV antibiotics despite surgery. Will plan for surgery tomorrow.     Thank you for the consultation and allowing me to participate in this patient's care. Please call with any additional questions or concerns.     Note is dictated utilizing voice recognition software. Unfortunately this leads to occasional typographical errors. I apologize in advance if the situation occurs. If questions occur please do not hesitate to call our office.

## 2020-03-16 NOTE — PROGRESS NOTES
"      [facility] Daily Progress Note      Hospitalist Team  LOS 0 days      Patient Care Team:  Laura Whitaker MD as PCP - General    Patient Location: [facility]      Subjective   Subjective     Chief Complaint / Subjective  Chief Complaint   Patient presents with   • Daibetic Ulcer         Brief Synopsis of Hospital Course/HPI  The patient is a 54 y.o. male with  history of CKDIII, depression, hypertension, hypothyroidism, diabetes mellitus type 2, and sleep apnea who presented [facility] on 3/15/2020 with complaints of infected right toe.        Date::    3/16/20: Afebrile.  Planned TMA 3/17/2020.  Endocrinologically consulted per patient request.      Review of Systems   All other systems reviewed and are negative.        Objective   Objective      Vital Signs  Temp:  [98.4 °F (36.9 °C)-100.3 °F (37.9 °C)] 99.9 °F (37.7 °C)  Heart Rate:  [73-93] 89  Resp:  [14-19] 14  BP: (105-138)/(65-74) 117/73  Oxygen Therapy  SpO2: 94 %  Pulse Oximetry Type: Intermittent  Device (Oxygen Therapy): room air  Flowsheet Rows      First Filed Value   Admission Height  165.1 cm (65\") Documented at 03/15/2020 1132   Admission Weight  84 kg (185 lb 3 oz) Documented at 03/15/2020 1132        Intake & Output (last 3 days)       03/14 0701 - 03/15 0700 03/15 0701 - 03/16 0700 03/16 0701 - 03/17 0700    P.O.   660    I.V. (mL/kg)  80 (1.4)     IV Piggyback  150 100    Total Intake(mL/kg)  230 (4.1) 760 (13.5)    Urine (mL/kg/hr)  250 150 (0.2)    Total Output  250 150    Net  -20 +610               Lines, Drains & Airways    Active LDAs     Name:   Placement date:   Placement time:   Site:   Days:    Peripheral IV 03/15/20 1207 Right Antecubital   03/15/20    1207    Antecubital   1                  Physical Exam:    Physical Exam   Constitutional: He is oriented to person, place, and time. He appears well-developed and well-nourished.   HENT:   Head: Normocephalic and atraumatic.   "   Eyes: Pupils are equal, round, and reactive to light. EOM are normal.   Neck: Normal range of motion. Neck supple.   Cardiovascular: Normal rate and normal heart sounds.   Pulmonary/Chest: Effort normal and breath sounds normal.   Abdominal: Bowel sounds are normal.   Musculoskeletal:   Moves all extremities.        Lymphadenopathy:     He has no cervical adenopathy.   Neurological: He is alert and oriented to person, place, and time.   Skin: Skin is warm and dry.   Psychiatric: He has a normal mood and affect. His speech is normal and behavior is normal.            Wounds (last 24 hours)      LDA Wound     Row Name 03/16/20 0715 03/15/20 2329          Wound Right anterior toe Diabetic Ulcer    Wound - Properties Group Side: Right  -JE Orientation: anterior  -JE Location: toe  -JE Primary Wound Type: Diabetic ulc  -JE    Dressing Appearance  --  --  -CM     Closure  --  --  -CM     Base  --  --  -CM     Drainage Characteristics/Odor  malodorous;serous  -LB  --     Drainage Amount  small  -LB  --     Care, Wound  cleansed with Betadine   -LB  --     Dressing Care, Wound  dressing changed;gauze  -LB  --       User Key  (r) = Recorded By, (t) = Taken By, (c) = Cosigned By    Initials Name Provider Type    LB Estelle Venegas RN Registered Nurse    Bong Lo RN Registered Nurse    Romy Escamilla LPN Licensed Nurse          Procedures:              Results Review:     I reviewed the patient's new clinical results.      Lab Results (last 24 hours)     Procedure Component Value Units Date/Time    POC Glucose Once [[id number]]  (Abnormal) Collected:  03/16/20 1630    Specimen:  Blood Updated:  03/16/20 1633     Glucose 141 mg/dL      Comment: Serial Number: [id number]:  [id number]       POC Glucose Once [[id number]]  (Abnormal) Collected:  03/16/20 1120    Specimen:  Blood Updated:  03/16/20 1125     Glucose 189 mg/dL      Comment: Serial Number: [id number]:  [id number]       POC Glucose  Once [[id number]]  (Normal) Collected:  03/16/20 0708    Specimen:  Blood Updated:  03/16/20 0712     Glucose 81 mg/dL      Comment: Serial Number: [id number]:  [id number]       MRSA Screen, PCR - Swab, Nares [[id number]]  (Normal) Collected:  03/16/20 0219    Specimen:  Swab from Nares Updated:  03/16/20 0602     MRSA PCR No MRSA Detected    Blood Culture - Blood, Arm, Left [[id number]]  (Abnormal) Collected:  03/15/20 1217    Specimen:  Blood from Arm, Left Updated:  03/16/20 0553     Gram Stain Aerobic Bottle Gram positive cocci in chains    Blood Culture - Blood, Arm, Right [[id number]]  (Abnormal) Collected:  03/15/20 1208    Specimen:  Blood from Arm, Right Updated:  03/16/20 0512     Gram Stain Aerobic Bottle Gram positive cocci in chains    Blood Culture ID, PCR - Blood, Arm, Right [[id number]]  (Abnormal) Collected:  03/15/20 1208    Specimen:  Blood from Arm, Right Updated:  03/16/20 0511     BCID, PCR Streptococcus agalactiae (Group B). Identification by BCID PCR.     BOTTLE TYPE Aerobic Bottle    Magnesium [[id number]]  (Normal) Collected:  03/16/20 0223    Specimen:  Blood Updated:  03/16/20 0258     Magnesium 1.6 mg/dL     Basic Metabolic Panel [[id number]]  (Abnormal) Collected:  03/16/20 0223    Specimen:  Blood Updated:  03/16/20 0258     Glucose 107 mg/dL      BUN 19 mg/dL      Creatinine 1.68 mg/dL      Sodium 140 mmol/L      Potassium 4.1 mmol/L      Chloride 103 mmol/L      CO2 26.0 mmol/L      Calcium 8.1 mg/dL      eGFR Non African Amer 42 mL/min/1.73      BUN/Creatinine Ratio 11.3     Anion Gap 11.0 mmol/L     Narrative:       GFR Normal >60  Chronic Kidney Disease <60  Kidney Failure <15      aPTT [[id number]]  (Abnormal) Collected:  03/16/20 0223    Specimen:  Blood Updated:  03/16/20 0246     PTT 28.4 seconds     Protime-INR [[id number]]  (Abnormal) Collected:  03/16/20 0223    Specimen:  Blood Updated:  03/16/20 0246     Protime 12.6 Seconds      INR 1.24    CBC & Differential [[id number]]  Collected:  03/16/20 0223    Specimen:  Blood Updated:  03/16/20 0240    Narrative:       The following orders were created for panel order CBC & Differential.  Procedure                               Abnormality         Status                     ---------                               -----------         ------                     CBC Auto Differential[[id number]]        Abnormal            Final result                 Please view results for these tests on the individual orders.    CBC Auto Differential [[id number]]  (Abnormal) Collected:  03/16/20 0223    Specimen:  Blood Updated:  03/16/20 0240     WBC 11.00 10*3/mm3      RBC 4.35 10*6/mm3      Hemoglobin 12.8 g/dL      Hematocrit 36.1 %      MCV 82.9 fL      MCH 29.4 pg      MCHC 35.5 g/dL      RDW 13.7 %      RDW-SD 40.3 fl      MPV 7.3 fL      Platelets 229 10*3/mm3      Neutrophil % 79.5 %      Lymphocyte % 9.4 %      Monocyte % 8.7 %      Eosinophil % 1.6 %      Basophil % 0.8 %      Neutrophils, Absolute 8.70 10*3/mm3      Lymphocytes, Absolute 1.00 10*3/mm3      Monocytes, Absolute 1.00 10*3/mm3      Eosinophils, Absolute 0.20 10*3/mm3      Basophils, Absolute 0.10 10*3/mm3      nRBC 0.0 /100 WBC     POC Glucose Once [[id number]]  (Abnormal) Collected:  03/15/20 2112    Specimen:  Blood Updated:  03/15/20 2113     Glucose 195 mg/dL      Comment: Serial Number: [id number]:  [id number]           Hemoglobin A1C   Date Value Ref Range Status   03/15/2020 9.9 (H) 3.5 - 5.6 % Final     Results from last 7 days   Lab Units 03/16/20 0223   INR  1.24*           Lab Results   Component Value Date    LIPASE 45 03/05/2020     Lab Results   Component Value Date    CHOL 188 07/10/2019    TRIG 230 (H) 07/10/2019    HDL 40 07/10/2019     (H) 07/10/2019       Lab Results   Lab Value Date/Time    FINALDX  10/04/2019 1012     Esophagus, mid, biopsy:    Eosinophilic esophagitis (up to 45 eosinophils per high power field)    Negative for intestinal metaplasia but  no glandular mucosa present in biopsy     JPR/tkd          Microbiology Results (last 10 days)     Procedure Component Value - Date/Time    MRSA Screen, PCR - Swab, Nares [[id number]]  (Normal) Collected:  03/16/20 0219    Lab Status:  Final result Specimen:  Swab from Nares Updated:  03/16/20 0602     MRSA PCR No MRSA Detected    Blood Culture - Blood, Arm, Left [[id number]]  (Abnormal) Collected:  03/15/20 1217    Lab Status:  Preliminary result Specimen:  Blood from Arm, Left Updated:  03/16/20 0553     Gram Stain Aerobic Bottle Gram positive cocci in chains    Blood Culture - Blood, Arm, Right [[id number]]  (Abnormal) Collected:  03/15/20 1208    Lab Status:  Preliminary result Specimen:  Blood from Arm, Right Updated:  03/16/20 0512     Gram Stain Aerobic Bottle Gram positive cocci in chains    Blood Culture ID, PCR - Blood, Arm, Right [[id number]]  (Abnormal) Collected:  03/15/20 1208    Lab Status:  Final result Specimen:  Blood from Arm, Right Updated:  03/16/20 0511     BCID, PCR Streptococcus agalactiae (Group B). Identification by BCID PCR.     BOTTLE TYPE Aerobic Bottle          ECG/EMG Results (most recent)     Procedure Component Value Units Date/Time    ECG 12 Lead [[id number]] Collected:  03/16/20 0601     Updated:  03/16/20 0602    Narrative:       HEART RATE= 86  bpm  RR Interval= 700  ms  ID Interval= 164  ms  P Horizontal Axis= -10  deg  P Front Axis= 59  deg  QRSD Interval= 77  ms  QT Interval=   ms  QRS Axis= 22  deg  T Wave Axis=   deg  - ABNORMAL ECG -  Sinus rhythm  Probable left atrial enlargement  Anteroseptal infarct, old  Nonspecific T abnrm, anterolateral leads  When compared with ECG of 21-Oct-2019 14:32:36,  Significant repolarization change  Significant axis, voltage or hypertrophy change  Electronically Signed By:   Date and Time of Study: 2020-03-16 06:01:11                    Xr Chest 1 View    Result Date: 3/16/2020   1. No acute cardiopulmonary disease.   Electronically Signed  By-Bear Astudillo On:3/16/2020 7:51 AM This report was finalized on [id number] by  Bear Astudillo, .    Mri Foot Right Without Contrast    Result Date: 3/15/2020  1. Study is limited given the lack of a comparison radiograph. 2. Wound/ulceration along the lateral aspect of the fifth proximal phalanx with geographic marrow signal alteration in the fifth proximal phalanx indicative of osteomyelitis. 3. Subtle soft tissue irregularities over the tips of the second through fifth digits with subtle geographic marrow signal alteration within the tips of the second through fifth digit phalanges that is likely osteomyelitis. 4. Postsurgical findings from prior partial amputation of the first digit as above. 5. Susceptibility artifact in the soft tissues around the fifth digit that is likely some soft tissue gas. No abscess. Electronically signed by:  Geovanny Pulido M.D.  3/15/2020 10:05 PM          Xrays, labs reviewed personally by physician.    Medication Review:   I have reviewed the patient's current medication list      Scheduled Meds    ampicillin-sulbactam 3 g Intravenous Q6H   atorvastatin 40 mg Oral Nightly   clindamycin 600 mg Intravenous Q8H   FLUoxetine 40 mg Oral Daily   heparin (porcine) 5,000 Units Subcutaneous Q12H   insulin glargine 15 Units Subcutaneous Nightly   insulin lispro 0-14 Units Subcutaneous TID With Meals   insulin lispro 5 Units Subcutaneous TID With Meals   levothyroxine 100 mcg Oral Q AM   losartan 50 mg Oral Daily   metoprolol succinate XL 50 mg Oral Daily   pantoprazole 40 mg Oral QAM   sodium chloride 10 mL Intravenous Q12H       Meds Infusions       Meds PRN  •  acetaminophen **OR** acetaminophen **OR** acetaminophen  •  dextrose  •  dextrose  •  glucagon (human recombinant)  •  HYDROcodone-acetaminophen  •  HYDROcodone-acetaminophen  •  magnesium sulfate **OR** magnesium sulfate in D5W 1g/100mL (PREMIX)  •  nitroglycerin  •  ondansetron **OR** ondansetron  •  potassium chloride  •   sodium chloride  •  sodium chloride    Assessment/Plan   Assessment/Plan     Active Hospital Problems    Diagnosis  POA   • **Cellulitis in diabetic foot (CMS/[facility]) [E11.628, L03.119]  Yes   • Obesity (BMI 30-39.9) [E66.9]  Yes   • Chronic coronary artery disease [I25.10]  Yes   • Obstructive sleep apnea syndrome [G47.33]  Yes   • Benign essential hypertension [I10]  Yes   • Diabetic peripheral neuropathy (CMS/[facility]) [E11.42]  Yes   • Depression [F32.9]  Yes   • Gastroesophageal reflux disease [K21.9]  Yes   • Hyperlipidemia [E78.5]  Yes   • Hypothyroidism [E03.9]  Yes   • Chronic renal insufficiency, stage III (moderate) (CMS/[facility]) [N18.3]  Yes   • Gangrene of foot (CMS/[facility]) [I96]  Unknown      Resolved Hospital Problems   No resolved problems to display.       MEDICAL DECISION MAKING COMPLEXITY BY PROBLEM:     Left diabetic foot cellulitis:  -Continue broad-spectrum antibiotics  -MRI right foot 3/15/2020--> osteomyelitis  -Planned TMA by podiatry 3/17/2020    Diabetes mellitus complicated with peripheral neuropathy  - hemoglobin A1c-->9  -Holding home Janumet and glipizide  -Continue ISS,  Lantus and Humalog  -Endocrinology consulted     Essential hypertension  -Continue losartan and metoprolol     Hyperlipidemia  -Continue atorvastatin     Coronary artery disease  -Denies chest pain  - Managed by Dr. Noel outpatient      Chronic kidney disease stage III  -Avoid nephrotoxic medications     Hypothyroidism  -Continue levothyroxine     Depression  -Stable  -Continue Prozac     History of pulmonary embolism  -Eliquis on hold due to possible surgery     GERD  -Continue PPI     Obstructive sleep apnea  -Compliant on CPAP       VTE Prophylaxis -Lovenox      VTE Prophylaxis -   Mechanical Order History:     None      Pharmalogical Order History:     Ordered     Dose Route Frequency Stop    03/15/20 5487  heparin (porcine) 5000 UNIT/ML injection 5,000 Units      5,000 Units SC Every 12 Hours Scheduled --            Code  Status -   Code Status and Medical Interventions:   Ordered at: 03/15/20 1608     Level Of Support Discussed With:    Patient     Code Status:    CPR     Medical Interventions (Level of Support Prior to Arrest):    Full       Discharge Planning          Destination      Coordination has not been started for this encounter.      Durable Medical Equipment      Coordination has not been started for this encounter.      Dialysis/Infusion      Coordination has not been started for this encounter.      Home Medical Care      Coordination has not been started for this encounter.      Therapy      Coordination has not been started for this encounter.      Community Resources      Coordination has not been started for this encounter.            Electronically signed by Nick Leblanc DO, 03/16/20, 19:37.  [facility] Hospitalist Team

## 2020-03-16 NOTE — PAT
Reviewing chart for in pt add on surgery with Dr. Baker on 3/17/2020.  BMP, T&S, CBC, MRSA, CXR are WNL.  EKG shows old infarct.  Spoke with nurse Estelle, advised that Dr. Baker be aware of abn PT/INR< PTT.

## 2020-03-16 NOTE — PLAN OF CARE
The patient is awaiting a decision for surgery and anticipating it will be tomorrow.  He is pleasant and cooperative and his pain is managed with medication.

## 2020-03-16 NOTE — PLAN OF CARE
Problem: Patient Care Overview  Goal: Plan of Care Review  Outcome: Ongoing (interventions implemented as appropriate)  Note:   Pt set to have surgery tomorrow

## 2020-03-17 ENCOUNTER — ANESTHESIA EVENT (OUTPATIENT)
Dept: PERIOP | Facility: HOSPITAL | Age: 55
End: 2020-03-17

## 2020-03-17 ENCOUNTER — ANESTHESIA (OUTPATIENT)
Dept: PERIOP | Facility: HOSPITAL | Age: 55
End: 2020-03-17

## 2020-03-17 LAB
ANION GAP SERPL CALCULATED.3IONS-SCNC: 10 MMOL/L (ref 5–15)
APTT PPP: 28 SECONDS (ref 61–76.5)
BACTERIA BLD CULT: ABNORMAL
BASOPHILS # BLD AUTO: 0.1 10*3/MM3 (ref 0–0.2)
BASOPHILS NFR BLD AUTO: 1.3 % (ref 0–1.5)
BOTTLE TYPE: ABNORMAL
BUN BLD-MCNC: 21 MG/DL (ref 6–20)
BUN/CREAT SERPL: 13.9 (ref 7–25)
CALCIUM SPEC-SCNC: 8.2 MG/DL (ref 8.6–10.5)
CHLORIDE SERPL-SCNC: 101 MMOL/L (ref 98–107)
CO2 SERPL-SCNC: 28 MMOL/L (ref 22–29)
CREAT BLD-MCNC: 1.51 MG/DL (ref 0.76–1.27)
CRP SERPL-MCNC: 17.2 MG/DL (ref 0–0.5)
DEPRECATED RDW RBC AUTO: 39.8 FL (ref 37–54)
EOSINOPHIL # BLD AUTO: 0.3 10*3/MM3 (ref 0–0.4)
EOSINOPHIL NFR BLD AUTO: 4.1 % (ref 0.3–6.2)
ERYTHROCYTE [DISTWIDTH] IN BLOOD BY AUTOMATED COUNT: 13.7 % (ref 12.3–15.4)
GFR SERPL CREATININE-BSD FRML MDRD: 48 ML/MIN/1.73
GLUCOSE BLD-MCNC: 137 MG/DL (ref 65–99)
GLUCOSE BLDC GLUCOMTR-MCNC: 106 MG/DL (ref 70–105)
GLUCOSE BLDC GLUCOMTR-MCNC: 111 MG/DL (ref 70–105)
GLUCOSE BLDC GLUCOMTR-MCNC: 120 MG/DL (ref 70–105)
GLUCOSE BLDC GLUCOMTR-MCNC: 195 MG/DL (ref 70–105)
GLUCOSE BLDC GLUCOMTR-MCNC: 239 MG/DL (ref 70–105)
HCT VFR BLD AUTO: 37.4 % (ref 37.5–51)
HGB BLD-MCNC: 12.8 G/DL (ref 13–17.7)
INR PPP: 1.13 (ref 0.9–1.1)
LYMPHOCYTES # BLD AUTO: 1.1 10*3/MM3 (ref 0.7–3.1)
LYMPHOCYTES NFR BLD AUTO: 16.1 % (ref 19.6–45.3)
MAGNESIUM SERPL-MCNC: 2 MG/DL (ref 1.6–2.6)
MCH RBC QN AUTO: 28.2 PG (ref 26.6–33)
MCHC RBC AUTO-ENTMCNC: 34.2 G/DL (ref 31.5–35.7)
MCV RBC AUTO: 82.4 FL (ref 79–97)
MONOCYTES # BLD AUTO: 0.7 10*3/MM3 (ref 0.1–0.9)
MONOCYTES NFR BLD AUTO: 10.6 % (ref 5–12)
NEUTROPHILS # BLD AUTO: 4.7 10*3/MM3 (ref 1.7–7)
NEUTROPHILS NFR BLD AUTO: 67.9 % (ref 42.7–76)
NRBC BLD AUTO-RTO: 0.1 /100 WBC (ref 0–0.2)
PLATELET # BLD AUTO: 222 10*3/MM3 (ref 140–450)
PMV BLD AUTO: 7.2 FL (ref 6–12)
POTASSIUM BLD-SCNC: 4.3 MMOL/L (ref 3.5–5.2)
PROTHROMBIN TIME: 11.6 SECONDS (ref 9.6–11.7)
RBC # BLD AUTO: 4.53 10*6/MM3 (ref 4.14–5.8)
SODIUM BLD-SCNC: 139 MMOL/L (ref 136–145)
WBC NRBC COR # BLD: 7 10*3/MM3 (ref 3.4–10.8)

## 2020-03-17 PROCEDURE — 86140 C-REACTIVE PROTEIN: CPT | Performed by: HOSPITALIST

## 2020-03-17 PROCEDURE — 25010000002 MIDAZOLAM PER 1 MG: Performed by: ANESTHESIOLOGY

## 2020-03-17 PROCEDURE — 82962 GLUCOSE BLOOD TEST: CPT

## 2020-03-17 PROCEDURE — 85610 PROTHROMBIN TIME: CPT | Performed by: INTERNAL MEDICINE

## 2020-03-17 PROCEDURE — 25010000003 AMPICILLIN-SULBACTAM PER 1.5 G: Performed by: EMERGENCY MEDICINE

## 2020-03-17 PROCEDURE — 25010000002 PROPOFOL 10 MG/ML EMULSION: Performed by: ANESTHESIOLOGY

## 2020-03-17 PROCEDURE — 88311 DECALCIFY TISSUE: CPT | Performed by: PODIATRIST

## 2020-03-17 PROCEDURE — 25010000002 FENTANYL CITRATE (PF) 100 MCG/2ML SOLUTION: Performed by: ANESTHESIOLOGY

## 2020-03-17 PROCEDURE — 88307 TISSUE EXAM BY PATHOLOGIST: CPT | Performed by: PODIATRIST

## 2020-03-17 PROCEDURE — 25010000003 AMPICILLIN-SULBACTAM PER 1.5 G: Performed by: PODIATRIST

## 2020-03-17 PROCEDURE — 83735 ASSAY OF MAGNESIUM: CPT | Performed by: NURSE PRACTITIONER

## 2020-03-17 PROCEDURE — 87176 TISSUE HOMOGENIZATION CULTR: CPT | Performed by: PODIATRIST

## 2020-03-17 PROCEDURE — 63710000001 INSULIN LISPRO (HUMAN) PER 5 UNITS: Performed by: PODIATRIST

## 2020-03-17 PROCEDURE — 80048 BASIC METABOLIC PNL TOTAL CA: CPT | Performed by: INTERNAL MEDICINE

## 2020-03-17 PROCEDURE — 25010000002 ONDANSETRON PER 1 MG: Performed by: ANESTHESIOLOGY

## 2020-03-17 PROCEDURE — 87070 CULTURE OTHR SPECIMN AEROBIC: CPT | Performed by: PODIATRIST

## 2020-03-17 PROCEDURE — 63710000001 INSULIN GLARGINE PER 5 UNITS: Performed by: PODIATRIST

## 2020-03-17 PROCEDURE — 25010000002 HEPARIN (PORCINE) PER 1000 UNITS: Performed by: PODIATRIST

## 2020-03-17 PROCEDURE — 28805 AMPUTATION THRU METATARSAL: CPT | Performed by: PODIATRIST

## 2020-03-17 PROCEDURE — 99232 SBSQ HOSP IP/OBS MODERATE 35: CPT | Performed by: HOSPITALIST

## 2020-03-17 PROCEDURE — 85025 COMPLETE CBC W/AUTO DIFF WBC: CPT | Performed by: INTERNAL MEDICINE

## 2020-03-17 PROCEDURE — 87075 CULTR BACTERIA EXCEPT BLOOD: CPT | Performed by: PODIATRIST

## 2020-03-17 PROCEDURE — 85730 THROMBOPLASTIN TIME PARTIAL: CPT | Performed by: INTERNAL MEDICINE

## 2020-03-17 PROCEDURE — 87205 SMEAR GRAM STAIN: CPT | Performed by: PODIATRIST

## 2020-03-17 RX ORDER — PHENYLEPHRINE HCL IN 0.9% NACL 0.5 MG/5ML
SYRINGE (ML) INTRAVENOUS AS NEEDED
Status: DISCONTINUED | OUTPATIENT
Start: 2020-03-17 | End: 2020-03-17 | Stop reason: SURG

## 2020-03-17 RX ORDER — HYDROMORPHONE HCL 110MG/55ML
0.5 PATIENT CONTROLLED ANALGESIA SYRINGE INTRAVENOUS
Status: DISCONTINUED | OUTPATIENT
Start: 2020-03-17 | End: 2020-03-17 | Stop reason: HOSPADM

## 2020-03-17 RX ORDER — OXYCODONE HYDROCHLORIDE 5 MG/1
10 TABLET ORAL ONCE AS NEEDED
Status: DISCONTINUED | OUTPATIENT
Start: 2020-03-17 | End: 2020-03-17 | Stop reason: HOSPADM

## 2020-03-17 RX ORDER — PROMETHAZINE HYDROCHLORIDE 25 MG/ML
12.5 INJECTION, SOLUTION INTRAMUSCULAR; INTRAVENOUS ONCE AS NEEDED
Status: DISCONTINUED | OUTPATIENT
Start: 2020-03-17 | End: 2020-03-17 | Stop reason: HOSPADM

## 2020-03-17 RX ORDER — FLUMAZENIL 0.1 MG/ML
0.1 INJECTION INTRAVENOUS AS NEEDED
Status: DISCONTINUED | OUTPATIENT
Start: 2020-03-17 | End: 2020-03-17 | Stop reason: HOSPADM

## 2020-03-17 RX ORDER — LIDOCAINE HYDROCHLORIDE 20 MG/ML
INJECTION, SOLUTION EPIDURAL; INFILTRATION; INTRACAUDAL; PERINEURAL AS NEEDED
Status: DISCONTINUED | OUTPATIENT
Start: 2020-03-17 | End: 2020-03-17 | Stop reason: SURG

## 2020-03-17 RX ORDER — PROMETHAZINE HYDROCHLORIDE 25 MG/1
25 TABLET ORAL ONCE AS NEEDED
Status: DISCONTINUED | OUTPATIENT
Start: 2020-03-17 | End: 2020-03-17 | Stop reason: HOSPADM

## 2020-03-17 RX ORDER — LABETALOL HYDROCHLORIDE 5 MG/ML
5 INJECTION, SOLUTION INTRAVENOUS
Status: DISCONTINUED | OUTPATIENT
Start: 2020-03-17 | End: 2020-03-17 | Stop reason: HOSPADM

## 2020-03-17 RX ORDER — HYDRALAZINE HYDROCHLORIDE 20 MG/ML
5 INJECTION INTRAMUSCULAR; INTRAVENOUS
Status: DISCONTINUED | OUTPATIENT
Start: 2020-03-17 | End: 2020-03-17 | Stop reason: HOSPADM

## 2020-03-17 RX ORDER — IPRATROPIUM BROMIDE AND ALBUTEROL SULFATE 2.5; .5 MG/3ML; MG/3ML
3 SOLUTION RESPIRATORY (INHALATION) ONCE AS NEEDED
Status: DISCONTINUED | OUTPATIENT
Start: 2020-03-17 | End: 2020-03-17 | Stop reason: HOSPADM

## 2020-03-17 RX ORDER — MIDAZOLAM HYDROCHLORIDE 1 MG/ML
INJECTION INTRAMUSCULAR; INTRAVENOUS AS NEEDED
Status: DISCONTINUED | OUTPATIENT
Start: 2020-03-17 | End: 2020-03-17 | Stop reason: SURG

## 2020-03-17 RX ORDER — ONDANSETRON 2 MG/ML
INJECTION INTRAMUSCULAR; INTRAVENOUS AS NEEDED
Status: DISCONTINUED | OUTPATIENT
Start: 2020-03-17 | End: 2020-03-17 | Stop reason: SURG

## 2020-03-17 RX ORDER — SODIUM CHLORIDE, SODIUM LACTATE, POTASSIUM CHLORIDE, CALCIUM CHLORIDE 600; 310; 30; 20 MG/100ML; MG/100ML; MG/100ML; MG/100ML
1000 INJECTION, SOLUTION INTRAVENOUS CONTINUOUS
Status: DISPENSED | OUTPATIENT
Start: 2020-03-17 | End: 2020-03-19

## 2020-03-17 RX ORDER — ACETAMINOPHEN 325 MG/1
650 TABLET ORAL ONCE AS NEEDED
Status: DISCONTINUED | OUTPATIENT
Start: 2020-03-17 | End: 2020-03-17 | Stop reason: HOSPADM

## 2020-03-17 RX ORDER — PROPOFOL 10 MG/ML
VIAL (ML) INTRAVENOUS AS NEEDED
Status: DISCONTINUED | OUTPATIENT
Start: 2020-03-17 | End: 2020-03-17 | Stop reason: SURG

## 2020-03-17 RX ORDER — ONDANSETRON 2 MG/ML
4 INJECTION INTRAMUSCULAR; INTRAVENOUS ONCE AS NEEDED
Status: DISCONTINUED | OUTPATIENT
Start: 2020-03-17 | End: 2020-03-17 | Stop reason: HOSPADM

## 2020-03-17 RX ORDER — HYDROMORPHONE HCL 110MG/55ML
1 PATIENT CONTROLLED ANALGESIA SYRINGE INTRAVENOUS
Status: DISCONTINUED | OUTPATIENT
Start: 2020-03-17 | End: 2020-03-17 | Stop reason: HOSPADM

## 2020-03-17 RX ORDER — NALOXONE HCL 0.4 MG/ML
0.4 VIAL (ML) INJECTION AS NEEDED
Status: DISCONTINUED | OUTPATIENT
Start: 2020-03-17 | End: 2020-03-17 | Stop reason: HOSPADM

## 2020-03-17 RX ORDER — MEPERIDINE HYDROCHLORIDE 25 MG/ML
12.5 INJECTION INTRAMUSCULAR; INTRAVENOUS; SUBCUTANEOUS
Status: DISCONTINUED | OUTPATIENT
Start: 2020-03-17 | End: 2020-03-17 | Stop reason: HOSPADM

## 2020-03-17 RX ORDER — FENTANYL CITRATE 50 UG/ML
INJECTION, SOLUTION INTRAMUSCULAR; INTRAVENOUS AS NEEDED
Status: DISCONTINUED | OUTPATIENT
Start: 2020-03-17 | End: 2020-03-17 | Stop reason: SURG

## 2020-03-17 RX ORDER — ACETAMINOPHEN 650 MG/1
650 SUPPOSITORY RECTAL ONCE AS NEEDED
Status: DISCONTINUED | OUTPATIENT
Start: 2020-03-17 | End: 2020-03-17 | Stop reason: HOSPADM

## 2020-03-17 RX ORDER — PROMETHAZINE HYDROCHLORIDE 25 MG/1
25 SUPPOSITORY RECTAL ONCE AS NEEDED
Status: DISCONTINUED | OUTPATIENT
Start: 2020-03-17 | End: 2020-03-17 | Stop reason: HOSPADM

## 2020-03-17 RX ADMIN — AMPICILLIN AND SULBACTAM 3 G: 1; 2 INJECTION, POWDER, FOR SOLUTION INTRAMUSCULAR; INTRAVENOUS at 08:53

## 2020-03-17 RX ADMIN — FENTANYL CITRATE 50 MCG: 50 INJECTION, SOLUTION INTRAMUSCULAR; INTRAVENOUS at 11:45

## 2020-03-17 RX ADMIN — AMPICILLIN AND SULBACTAM 3 G: 1; 2 INJECTION, POWDER, FOR SOLUTION INTRAMUSCULAR; INTRAVENOUS at 00:26

## 2020-03-17 RX ADMIN — Medication 10 ML: at 08:55

## 2020-03-17 RX ADMIN — INSULIN GLARGINE 15 UNITS: 100 INJECTION, SOLUTION SUBCUTANEOUS at 21:09

## 2020-03-17 RX ADMIN — PHENYLEPHRINE HYDROCHLORIDE 200 MCG: 10 INJECTION INTRAVENOUS at 12:21

## 2020-03-17 RX ADMIN — AMPICILLIN AND SULBACTAM 3 G: 1; 2 INJECTION, POWDER, FOR SOLUTION INTRAMUSCULAR; INTRAVENOUS at 14:45

## 2020-03-17 RX ADMIN — PHENYLEPHRINE HYDROCHLORIDE 200 MCG: 10 INJECTION INTRAVENOUS at 12:32

## 2020-03-17 RX ADMIN — PROPOFOL 100 MG: 10 INJECTION, EMULSION INTRAVENOUS at 11:47

## 2020-03-17 RX ADMIN — PHENYLEPHRINE HYDROCHLORIDE 200 MCG: 10 INJECTION INTRAVENOUS at 12:09

## 2020-03-17 RX ADMIN — LIDOCAINE HYDROCHLORIDE 100 MG: 20 INJECTION, SOLUTION EPIDURAL; INFILTRATION; INTRACAUDAL; PERINEURAL at 11:47

## 2020-03-17 RX ADMIN — ATORVASTATIN CALCIUM 40 MG: 40 TABLET, FILM COATED ORAL at 21:05

## 2020-03-17 RX ADMIN — Medication 10 ML: at 21:06

## 2020-03-17 RX ADMIN — ONDANSETRON 4 MG: 2 INJECTION INTRAMUSCULAR; INTRAVENOUS at 12:21

## 2020-03-17 RX ADMIN — CLINDAMYCIN PHOSPHATE 600 MG: 600 INJECTION, SOLUTION INTRAVENOUS at 21:04

## 2020-03-17 RX ADMIN — CLINDAMYCIN PHOSPHATE 600 MG: 600 INJECTION, SOLUTION INTRAVENOUS at 03:59

## 2020-03-17 RX ADMIN — SODIUM CHLORIDE, SODIUM LACTATE, POTASSIUM CHLORIDE, AND CALCIUM CHLORIDE: .6; .31; .03; .02 INJECTION, SOLUTION INTRAVENOUS at 11:38

## 2020-03-17 RX ADMIN — ACETAMINOPHEN 650 MG: 325 TABLET, FILM COATED ORAL at 08:52

## 2020-03-17 RX ADMIN — INSULIN LISPRO 5 UNITS: 100 INJECTION, SOLUTION INTRAVENOUS; SUBCUTANEOUS at 17:33

## 2020-03-17 RX ADMIN — HYDROCODONE BITARTRATE AND ACETAMINOPHEN 1 TABLET: 10; 325 TABLET ORAL at 17:36

## 2020-03-17 RX ADMIN — AMPICILLIN AND SULBACTAM 3 G: 1; 2 INJECTION, POWDER, FOR SOLUTION INTRAMUSCULAR; INTRAVENOUS at 22:21

## 2020-03-17 RX ADMIN — MIDAZOLAM 2 MG: 1 INJECTION INTRAMUSCULAR; INTRAVENOUS at 11:45

## 2020-03-17 RX ADMIN — INSULIN LISPRO 3 UNITS: 100 INJECTION, SOLUTION INTRAVENOUS; SUBCUTANEOUS at 17:32

## 2020-03-17 RX ADMIN — CLINDAMYCIN PHOSPHATE 600 MG: 600 INJECTION, SOLUTION INTRAVENOUS at 11:51

## 2020-03-17 RX ADMIN — HEPARIN SODIUM 5000 UNITS: 5000 INJECTION INTRAVENOUS; SUBCUTANEOUS at 21:04

## 2020-03-17 RX ADMIN — FENTANYL CITRATE 50 MCG: 50 INJECTION, SOLUTION INTRAMUSCULAR; INTRAVENOUS at 12:01

## 2020-03-17 NOTE — ANESTHESIA POSTPROCEDURE EVALUATION
Patient: Kuldeep Adhikari    Procedure Summary     Date:  03/17/20 Room / Location:  [facility] OR  / [facility] MAIN OR    Anesthesia Start:  1138 Anesthesia Stop:  1251    Procedure:  AMPUTATION TRANS METATARSAL right (Right Foot) Diagnosis:       Gangrene of foot (CMS/HCC)      (Gangrene of foot (CMS/HCC) [I96])    Surgeon:  ERWIN Baker DPM Provider:  Jose Tejada MD    Anesthesia Type:  general ASA Status:  4          Anesthesia Type: general    Vitals  Vitals Value Taken Time   /61 3/17/2020  1:23 PM   Temp 98.5 °F (36.9 °C) 3/17/2020 12:46 PM   Pulse 65 3/17/2020  1:25 PM   Resp 12 3/17/2020  1:16 PM   SpO2 96 % 3/17/2020  1:25 PM   Vitals shown include unvalidated device data.        Post Anesthesia Care and Evaluation    Patient location during evaluation: PACU  Patient participation: complete - patient participated  Level of consciousness: awake  Pain scale: See nurse's notes for pain score.  Pain management: adequate  Airway patency: patent  Anesthetic complications: No anesthetic complications  PONV Status: none  Cardiovascular status: acceptable  Respiratory status: acceptable  Hydration status: acceptable    Comments: Patient seen and examined postoperatively; vital signs stable; SpO2 greater than or equal to 90%; cardiopulmonary status stable; nausea/vomiting adequately controlled; pain adequately controlled; no apparent anesthesia complications; patient discharged from anesthesia care when discharge criteria were met

## 2020-03-17 NOTE — PROGRESS NOTES
Daily Progress Note    Patient Care Team:  Laura Whitaker MD as PCP - General    Chief Complaint: Follow-up type 2 diabetes    HPI: Patient off floor for procedure.  Blood sugars doing well.        Vitals:    03/17/20 1403   BP: 105/60   Pulse: 71   Resp: 12   Temp: 97.6 °F (36.4 °C)   SpO2: 96%       Physical Exam:  Patient off floor    Results Review:     I reviewed the patient's new clinical results.    Glucose   Date Value Ref Range Status   03/17/2020 137 (H) 65 - 99 mg/dL Final     Sodium   Date Value Ref Range Status   03/17/2020 139 136 - 145 mmol/L Final     Potassium   Date Value Ref Range Status   03/17/2020 4.3 3.5 - 5.2 mmol/L Final     CO2   Date Value Ref Range Status   03/17/2020 28.0 22.0 - 29.0 mmol/L Final     Chloride   Date Value Ref Range Status   03/17/2020 101 98 - 107 mmol/L Final     Anion Gap   Date Value Ref Range Status   03/17/2020 10.0 5.0 - 15.0 mmol/L Final     Creatinine   Date Value Ref Range Status   03/17/2020 1.51 (H) 0.76 - 1.27 mg/dL Final     BUN   Date Value Ref Range Status   03/17/2020 21 (H) 6 - 20 mg/dL Final     BUN/Creatinine Ratio   Date Value Ref Range Status   03/17/2020 13.9 7.0 - 25.0 Final     Calcium   Date Value Ref Range Status   03/17/2020 8.2 (L) 8.6 - 10.5 mg/dL Final     eGFR Non  Amer   Date Value Ref Range Status   03/17/2020 48 (L) >60 mL/min/1.73 Final     Alkaline Phosphatase   Date Value Ref Range Status   03/15/2020 120 (H) 39 - 117 U/L Final     Total Protein   Date Value Ref Range Status   03/15/2020 7.8 6.0 - 8.5 g/dL Final     ALT (SGPT)   Date Value Ref Range Status   03/15/2020 12 1 - 41 U/L Final     AST (SGOT)   Date Value Ref Range Status   03/15/2020 11 1 - 40 U/L Final     Total Bilirubin   Date Value Ref Range Status   03/15/2020 2.7 (H) 0.2 - 1.2 mg/dL Final     Albumin   Date Value Ref Range Status   03/15/2020 4.00 3.50 - 5.20 g/dL Final     Globulin   Date Value Ref Range Status   03/15/2020 3.8 gm/dL Final      Magnesium   Date Value Ref Range Status   03/17/2020 2.0 1.6 - 2.6 mg/dL Final     Lab Results   Component Value Date    HGBA1C 9.9 (H) 03/15/2020    HGBA1C 8.8 (H) 07/10/2019     No results found for: GLUF, MICROALBUR  Results from last 7 days   Lab Units 03/17/20  1250 03/17/20  1037 03/17/20  0713 03/16/20  2037 03/16/20  1630 03/16/20  1120   GLUCOSE mg/dL 106* 111* 120* 132* 141* 189*             Medication Review: Reviewed.       ampicillin-sulbactam 3 g Intravenous Q6H   atorvastatin 40 mg Oral Nightly   clindamycin 600 mg Intravenous Q8H   FLUoxetine 40 mg Oral Daily   heparin (porcine) 5,000 Units Subcutaneous Q12H   insulin glargine 15 Units Subcutaneous Nightly   insulin lispro 0-14 Units Subcutaneous TID With Meals   insulin lispro 5 Units Subcutaneous TID With Meals   levothyroxine 100 mcg Oral Q AM   losartan 50 mg Oral Daily   metoprolol succinate XL 50 mg Oral Daily   pantoprazole 40 mg Oral QAM   sodium chloride 10 mL Intravenous Q12H         Assessment/Plan   1.  Diabetes mellitus type 2: Uncontrolled  2.  Diabetic foot on the right side with ulcer  3.  Diabetic neuropathy  4.  Hypertension   5.  Hyperlipidemia  6.  Hypothyroidism    Plan:   Patient off floor, blood sugars doing well.  Continue current medications.             Tamara Michaels MD. FACE

## 2020-03-17 NOTE — PROGRESS NOTES
"      [facility] Medicine Services Daily Progress Note      Hospitalist Team  LOS 1 days      Patient Care Team:  Laura Whitaker MD as PCP - General    Patient Location: [facility]      Subjective   Subjective     Chief Complaint / Subjective  Chief Complaint   Patient presents with   • Daibetic Ulcer         Brief Synopsis of Hospital Course/HPI    The patient is a 54 y.o. male with  history of CKDIII, depression, hypertension, hypothyroidism, diabetes mellitus type 2, and sleep apnea who presented [facility] on 3/15/2020 with complaints of infected right toe.          Date::    3/16/20: Afebrile.  Planned TMA 3/17/2020.  Endocrinologically consulted per patient request.    3/17/20: afebrile. S/p TMA this am. Pain controlled.      Review of Systems   All other systems reviewed and are negative.        Objective   Objective      Vital Signs  Temp:  [97 °F (36.1 °C)-100.8 °F (38.2 °C)] 97.6 °F (36.4 °C)  Heart Rate:  [65-91] 71  Resp:  [8-19] 12  BP: (105-123)/(54-73) 105/60  Oxygen Therapy  SpO2: 96 %  Pulse Oximetry Type: Intermittent  Device (Oxygen Therapy): nasal cannula  Flow (L/min): 2  Flowsheet Rows      First Filed Value   Admission Height  165.1 cm (65\") Documented at 03/15/2020 1132   Admission Weight  84 kg (185 lb 3 oz) Documented at 03/15/2020 1132        Intake & Output (last 3 days)       03/14 0701 - 03/15 0700 03/15 0701 - 03/16 0700 03/16 0701 - 03/17 0700 03/17 0701 - 03/18 0700    P.O.   660 600    I.V. (mL/kg)  80 (1.4)  600 (7.2)    IV Piggyback  150 300 50    Total Intake(mL/kg)  230 (4.1) 960 (11.5) 1250 (15)    Urine (mL/kg/hr)  250 475 (0.2) 400 (0.5)    Total Output  250 475 400    Net  -20 +485 +850                Lines, Drains & Airways    Active LDAs     Name:   Placement date:   Placement time:   Site:   Days:    Peripheral IV 03/15/20 1207 Right Antecubital   03/15/20    1207    Antecubital   2                  Physical Exam:    Physical Exam   "   Constitutional: He appears well-developed.   HENT:   Head: Normocephalic and atraumatic.   Eyes: Pupils are equal, round, and reactive to light. EOM are normal.   Neck: Normal range of motion.   Cardiovascular: Normal rate and normal heart sounds.   Pulmonary/Chest: Effort normal and breath sounds normal.   Abdominal: Soft. Bowel sounds are normal.        Lymphadenopathy:     He has no cervical adenopathy.   Neurological: He is alert. No cranial nerve deficit or sensory deficit.   Skin: Skin is warm and dry. No rash noted.   Psychiatric: He has a normal mood and affect. His speech is normal and behavior is normal.            Wounds (last 24 hours)      LDA Wound     Row Name 03/17/20 1400 03/17/20 1335 03/17/20 1316       Wound Right anterior toe Diabetic Ulcer    Wound - Properties Group Side: Right  -JE Orientation: anterior  -JE Location: toe  -JE Primary Wound Type: Diabetic ulc  -JE       Wound 03/17/20 1211 Right anterior foot Incision    Wound - Properties Group Date first assessed: 03/17/20  - Time first assessed: 1211  -LH Present on Hospital Admission: N  -LH Side: Right  -LH Orientation: anterior  -LH Location: foot  -LH Primary Wound Type: Incision  -LH    Dressing Appearance  dry;intact;no drainage  -LB  dry;intact;no drainage  -AV  dry;intact;no drainage  -AV    Closure  WESLEY  -LB  WESLEY  -AV  WESLEY  -AV    Drainage Amount  none  -LB  --  --    Dressing Care, Wound  elastic bandage  -LB  --  --    Row Name 03/17/20 1301 03/17/20 1246 03/17/20 1240       Wound Right anterior toe Diabetic Ulcer    Wound - Properties Group Side: Right  -JE Orientation: anterior  -JE Location: toe  -JE Primary Wound Type: Diabetic ulc  -JE       Wound 03/17/20 1211 Right anterior foot Incision    Wound - Properties Group Date first assessed: 03/17/20  - Time first assessed: 1211  -LH Present on Hospital Admission: N  -LH Side: Right  -LH Orientation: anterior  -LH Location: foot  -LH Primary Wound Type: Incision  -LH     Dressing Appearance  dry;intact;no drainage  -AV  dry;intact;no drainage  -AV  dry;intact  -CM    Closure  WESLEY  -AV  WESLEY  -AV  -- STAPLES, XEROFORM, KERLIX, FLUFFS, ACE  -CM    Base  --  --  clean;dry  -CM    Row Name 03/17/20 0716 03/16/20 1930          Wound Right anterior toe Diabetic Ulcer    Wound - Properties Group Side: Right  -JE Orientation: anterior  -JE Location: toe  -JE Primary Wound Type: Diabetic ulc  -JE    Dressing Appearance  intact  -LB  no drainage  -CMA     Closure  WESLEY  -LB  None  -CMA     Base  --  red  -CMA        Wound 03/17/20 1211 Right anterior foot Incision    Wound - Properties Group Date first assessed: 03/17/20  - Time first assessed: 1211  - Present on Hospital Admission: N  -LH Side: Right  - Orientation: anterior  -LH Location: foot  -LH Primary Wound Type: Incision  -LH      User Key  (r) = Recorded By, (t) = Taken By, (c) = Cosigned By    Initials Name Provider Type    Kristine Lazo RN Registered Nurse    Zena Cortez, RN Registered Nurse    Estelle Perez RN Registered Nurse    Bong Lo RN Registered Nurse    Romy Lopes LPN Licensed Nurse    Stephanie Cee, RN Registered Nurse          Procedures:    Procedure(s):  AMPUTATION TRANS METATARSAL right          Results Review:     I reviewed the patient's new clinical results.      Lab Results (last 24 hours)     Procedure Component Value Units Date/Time    Blood Culture - Blood, Arm, Left [[id number]]  (Abnormal) Collected:  03/15/20 1217    Specimen:  Blood from Arm, Left Updated:  03/17/20 1417     Blood Culture Streptococcus agalactiae (Group B)     Gram Stain Aerobic Bottle Gram positive cocci in chains      Anaerobic Bottle Gram positive cocci in chains      Anaerobic Bottle Gram negative bacilli    Blood Culture ID, PCR - Blood, Arm, Left [[id number]]  (Abnormal) Collected:  03/15/20 1217    Specimen:  Blood from Arm, Left Updated:  03/17/20 1417     BCID, PCR  Streptococcus agalactiae (Group B). Identification by BCID PCR.     BOTTLE TYPE Anaerobic Bottle    Tissue Pathology Exam [[id number]] Collected:  03/17/20 1208    Specimen:  Tissue from Foot, Right Updated:  03/17/20 1350    Tissue / Bone Culture - Tissue, Foot, Right [[id number]] Collected:  03/17/20 1206    Specimen:  Tissue from Foot, Right Updated:  03/17/20 1312     Gram Stain Few (2+) WBCs per low power field      Occasional Gram positive cocci    POC Glucose Once [[id number]]  (Abnormal) Collected:  03/17/20 1250    Specimen:  Blood Updated:  03/17/20 1251     Glucose 106 mg/dL      Comment: Serial Number: [id number]:  [id number]       Anaerobic Culture - Tissue, Foot, Right [[id number]] Collected:  03/17/20 1206    Specimen:  Tissue from Foot, Right Updated:  03/17/20 1214    POC Glucose Once [[id number]]  (Abnormal) Collected:  03/17/20 1037    Specimen:  Blood Updated:  03/17/20 1038     Glucose 111 mg/dL      Comment: Serial Number: [id number]:  [id number]       POC Glucose Once [[id number]]  (Abnormal) Collected:  03/17/20 0713    Specimen:  Blood Updated:  03/17/20 0715     Glucose 120 mg/dL      Comment: Serial Number: [id number]:  [id number]       Blood Culture - Blood, Arm, Right [[id number]]  (Abnormal) Collected:  03/15/20 1208    Specimen:  Blood from Arm, Right Updated:  03/17/20 0619     Blood Culture Streptococcus agalactiae (Group B)     Gram Stain Aerobic Bottle Gram positive cocci in chains      Anaerobic Bottle Gram positive cocci in chains    Magnesium [[id number]]  (Normal) Collected:  03/17/20 0443    Specimen:  Blood Updated:  03/17/20 0550     Magnesium 2.0 mg/dL     Basic Metabolic Panel [[id number]]  (Abnormal) Collected:  03/17/20 0443    Specimen:  Blood Updated:  03/17/20 0536     Glucose 137 mg/dL      BUN 21 mg/dL      Creatinine 1.51 mg/dL      Sodium 139 mmol/L      Potassium 4.3 mmol/L      Chloride 101 mmol/L      CO2 28.0 mmol/L      Calcium 8.2 mg/dL      eGFR  Non  Amer 48 mL/min/1.73      BUN/Creatinine Ratio 13.9     Anion Gap 10.0 mmol/L     Narrative:       GFR Normal >60  Chronic Kidney Disease <60  Kidney Failure <15      C-reactive Protein [[id number]]  (Abnormal) Collected:  03/17/20 0443    Specimen:  Blood Updated:  03/17/20 0536     C-Reactive Protein 17.20 mg/dL     aPTT [[id number]]  (Abnormal) Collected:  03/17/20 0443    Specimen:  Blood Updated:  03/17/20 0505     PTT 28.0 seconds     Protime-INR [[id number]]  (Abnormal) Collected:  03/17/20 0443    Specimen:  Blood Updated:  03/17/20 0505     Protime 11.6 Seconds      INR 1.13    CBC & Differential [[id number]] Collected:  03/17/20 0443    Specimen:  Blood Updated:  03/17/20 0457    Narrative:       The following orders were created for panel order CBC & Differential.  Procedure                               Abnormality         Status                     ---------                               -----------         ------                     CBC Auto Differential[[id number]]        Abnormal            Final result                 Please view results for these tests on the individual orders.    CBC Auto Differential [[id number]]  (Abnormal) Collected:  03/17/20 0443    Specimen:  Blood Updated:  03/17/20 0457     WBC 7.00 10*3/mm3      RBC 4.53 10*6/mm3      Hemoglobin 12.8 g/dL      Hematocrit 37.4 %      MCV 82.4 fL      MCH 28.2 pg      MCHC 34.2 g/dL      RDW 13.7 %      RDW-SD 39.8 fl      MPV 7.2 fL      Platelets 222 10*3/mm3      Neutrophil % 67.9 %      Lymphocyte % 16.1 %      Monocyte % 10.6 %      Eosinophil % 4.1 %      Basophil % 1.3 %      Neutrophils, Absolute 4.70 10*3/mm3      Lymphocytes, Absolute 1.10 10*3/mm3      Monocytes, Absolute 0.70 10*3/mm3      Eosinophils, Absolute 0.30 10*3/mm3      Basophils, Absolute 0.10 10*3/mm3      nRBC 0.1 /100 WBC     POC Glucose Once [[id number]]  (Abnormal) Collected:  03/16/20 2037    Specimen:  Blood Updated:  03/16/20 2039     Glucose 132 mg/dL       Comment: Serial Number: [id number]:  [id number]       POC Glucose Once [[id number]]  (Abnormal) Collected:  03/16/20 1630    Specimen:  Blood Updated:  03/16/20 1633     Glucose 141 mg/dL      Comment: Serial Number: [id number]:  [id number]           Hemoglobin A1C   Date Value Ref Range Status   03/15/2020 9.9 (H) 3.5 - 5.6 % Final     Results from last 7 days   Lab Units 03/17/20  0443 03/16/20  0223   INR  1.13* 1.24*           Lab Results   Component Value Date    LIPASE 45 03/05/2020     Lab Results   Component Value Date    CHOL 188 07/10/2019    TRIG 230 (H) 07/10/2019    HDL 40 07/10/2019     (H) 07/10/2019       Lab Results   Lab Value Date/Time    FINALDX  10/04/2019 1012     Esophagus, mid, biopsy:    Eosinophilic esophagitis (up to 45 eosinophils per high power field)    Negative for intestinal metaplasia but no glandular mucosa present in biopsy     JPR/tkd          Microbiology Results (last 10 days)     Procedure Component Value - Date/Time    Tissue / Bone Culture - Tissue, Foot, Right [[id number]] Collected:  03/17/20 1206    Lab Status:  Preliminary result Specimen:  Tissue from Foot, Right Updated:  03/17/20 1312     Gram Stain Few (2+) WBCs per low power field      Occasional Gram positive cocci    MRSA Screen, PCR - Swab, Nares [[id number]]  (Normal) Collected:  03/16/20 0219    Lab Status:  Final result Specimen:  Swab from Nares Updated:  03/16/20 0602     MRSA PCR No MRSA Detected    Blood Culture - Blood, Arm, Left [[id number]]  (Abnormal) Collected:  03/15/20 1217    Lab Status:  Preliminary result Specimen:  Blood from Arm, Left Updated:  03/17/20 1417     Blood Culture Streptococcus agalactiae (Group B)     Gram Stain Aerobic Bottle Gram positive cocci in chains      Anaerobic Bottle Gram positive cocci in chains      Anaerobic Bottle Gram negative bacilli    Blood Culture ID, PCR - Blood, Arm, Left [[id number]]  (Abnormal) Collected:  03/15/20 1217    Lab Status:  Final  result Specimen:  Blood from Arm, Left Updated:  03/17/20 1417     BCID, PCR Streptococcus agalactiae (Group B). Identification by BCID PCR.     BOTTLE TYPE Anaerobic Bottle    Blood Culture - Blood, Arm, Right [[id number]]  (Abnormal) Collected:  03/15/20 1208    Lab Status:  Preliminary result Specimen:  Blood from Arm, Right Updated:  03/17/20 0619     Blood Culture Streptococcus agalactiae (Group B)     Gram Stain Aerobic Bottle Gram positive cocci in chains      Anaerobic Bottle Gram positive cocci in chains    Blood Culture ID, PCR - Blood, Arm, Right [[id number]]  (Abnormal) Collected:  03/15/20 1208    Lab Status:  Final result Specimen:  Blood from Arm, Right Updated:  03/16/20 0511     BCID, PCR Streptococcus agalactiae (Group B). Identification by BCID PCR.     BOTTLE TYPE Aerobic Bottle          ECG/EMG Results (most recent)     Procedure Component Value Units Date/Time    ECG 12 Lead [[id number]] Collected:  03/16/20 0601     Updated:  03/16/20 0602    Narrative:       HEART RATE= 86  bpm  RR Interval= 700  ms  UT Interval= 164  ms  P Horizontal Axis= -10  deg  P Front Axis= 59  deg  QRSD Interval= 77  ms  QT Interval=   ms  QRS Axis= 22  deg  T Wave Axis=   deg  - ABNORMAL ECG -  Sinus rhythm  Probable left atrial enlargement  Anteroseptal infarct, old  Nonspecific T abnrm, anterolateral leads  When compared with ECG of 21-Oct-2019 14:32:36,  Significant repolarization change  Significant axis, voltage or hypertrophy change  Electronically Signed By:   Date and Time of Study: 2020-03-16 06:01:11                    Xr Chest 1 View    Result Date: 3/16/2020   1. No acute cardiopulmonary disease.   Electronically Signed By-Bear Astudillo On:3/16/2020 7:51 AM This report was finalized on [id number] by  Bear Astudillo, .    Mri Foot Right Without Contrast    Result Date: 3/15/2020  1. Study is limited given the lack of a comparison radiograph. 2. Wound/ulceration along the lateral aspect of the fifth  proximal phalanx with geographic marrow signal alteration in the fifth proximal phalanx indicative of osteomyelitis. 3. Subtle soft tissue irregularities over the tips of the second through fifth digits with subtle geographic marrow signal alteration within the tips of the second through fifth digit phalanges that is likely osteomyelitis. 4. Postsurgical findings from prior partial amputation of the first digit as above. 5. Susceptibility artifact in the soft tissues around the fifth digit that is likely some soft tissue gas. No abscess. Electronically signed by:  Geovanny Pulido M.D.  3/15/2020 10:05 PM          Xrays, labs reviewed personally by physician.    Medication Review:   I have reviewed the patient's current medication list      Scheduled Meds    ampicillin-sulbactam 3 g Intravenous Q6H   atorvastatin 40 mg Oral Nightly   clindamycin 600 mg Intravenous Q8H   FLUoxetine 40 mg Oral Daily   heparin (porcine) 5,000 Units Subcutaneous Q12H   insulin glargine 15 Units Subcutaneous Nightly   insulin lispro 0-14 Units Subcutaneous TID With Meals   insulin lispro 5 Units Subcutaneous TID With Meals   levothyroxine 100 mcg Oral Q AM   losartan 50 mg Oral Daily   metoprolol succinate XL 50 mg Oral Daily   pantoprazole 40 mg Oral QAM   sodium chloride 10 mL Intravenous Q12H       Meds Infusions    lactated ringers 1,000 mL       Meds PRN  •  acetaminophen **OR** acetaminophen **OR** acetaminophen  •  dextrose  •  dextrose  •  glucagon (human recombinant)  •  HYDROcodone-acetaminophen  •  HYDROcodone-acetaminophen  •  magnesium sulfate **OR** magnesium sulfate in D5W 1g/100mL (PREMIX)  •  nitroglycerin  •  ondansetron **OR** ondansetron  •  potassium chloride  •  sodium chloride  •  sodium chloride      Assessment/Plan   Assessment/Plan     Active Hospital Problems    Diagnosis  POA   • **Cellulitis in diabetic foot (CMS/[facility]) [E11.628, L03.119]  Yes   • Obesity (BMI 30-39.9) [E66.9]  Yes   • Chronic coronary  artery disease [I25.10]  Yes   • Obstructive sleep apnea syndrome [G47.33]  Yes   • Benign essential hypertension [I10]  Yes   • Diabetic peripheral neuropathy (CMS/HCC) [E11.42]  Yes   • Depression [F32.9]  Yes   • Gastroesophageal reflux disease [K21.9]  Yes   • Hyperlipidemia [E78.5]  Yes   • Hypothyroidism [E03.9]  Yes   • Chronic renal insufficiency, stage III (moderate) (CMS/HCC) [N18.3]  Yes   • Gangrene of foot (CMS/HCC) [I96]  Unknown      Resolved Hospital Problems   No resolved problems to display.       MEDICAL DECISION MAKING COMPLEXITY BY PROBLEM:     Group B Streptococcus bacteremia:  -Right foot cellulitis/osteomyelitis as source  -Continue Unasyn and clindamycin  -ID consulted    Left diabetic foot cellulitis:  -Continue broad-spectrum antibiotics  -MRI right foot 3/15/2020--> osteomyelitis  -s/p TMA by podiatry 3/17/2020  - ID consulted      Diabetes mellitus complicated with peripheral neuropathy  - hemoglobin A1c-->9  -Holding home Janumet and glipizide  -Continue ISS,  Lantus and Humalog  -Endocrinology consulted     Essential hypertension  -Continue losartan and metoprolol     Hyperlipidemia  -Continue atorvastatin     Coronary artery disease  -Denies chest pain  - Managed by Dr. oNel outpatient      Chronic kidney disease stage III  -Avoid nephrotoxic medications     Hypothyroidism  -Continue levothyroxine     Depression  -Stable  -Continue Prozac     History of pulmonary embolism  -Eliquis on hold due to possible surgery     GERD  -Continue PPI     Obstructive sleep apnea  -Compliant on CPAP       VTE Prophylaxis -   Mechanical Order History:     None      Pharmalogical Order History:     Ordered     Dose Route Frequency Stop    03/15/20 1608  heparin (porcine) 5000 UNIT/ML injection 5,000 Units      5,000 Units SC Every 12 Hours Scheduled --            Code Status -   Code Status and Medical Interventions:   Ordered at: 03/15/20 1608     Level Of Support Discussed With:    Patient      Code Status:    CPR     Medical Interventions (Level of Support Prior to Arrest):    Full       Discharge Planning          Destination      Coordination has not been started for this encounter.      Durable Medical Equipment      Coordination has not been started for this encounter.      Dialysis/Infusion      Coordination has not been started for this encounter.      Home Medical Care      Coordination has not been started for this encounter.      Therapy      Coordination has not been started for this encounter.      Community Resources      Coordination has not been started for this encounter.            Electronically signed by Nick Leblanc DO, 03/17/20, 16:32.  [facility] Hospitalist Team

## 2020-03-17 NOTE — CONSULTS
Infectious Diseases Consult Note    Referring Provider: Dr. Baker    Reason for Consultation: Right foot infection    Patient Care Team:  Laura Whitaker MD as PCP - General    Chief complaint right foot wound    Subjective     The patient fever as high as 100.8 degrees in the 24 hours.  The patient is on room air, hemodynamically stable, and is tolerating antimicrobial therapy.    History of present illness:      This is a 55-year-old male presents to the hospital on 3/15/2020 progressively worsening redness and wound changes to the right foot.  Patient states that the symptoms started approximately 2 weeks ago.  The patient had a previous right great toe amputation in the past and states that he is also had progressive deformities of his other toes.  Patient is just back from having a right transmetatarsal amputation.  Patient denies fever, chills, diaphoresis, shortness of breath, cough, GI symptoms, or urinary symptoms.    Patient is currently on room air and does not appear to be in any acute distress.  Patient did had a fever as high as 100.8 degrees since admission.  Patient has a creatinine of 1.51, CRP of 17.2, white blood cell count of 7, hemoglobin 12.8, platelets are 222.  Blood cultures are growing group B streptococcus and 2 out of 2 cultures and wound culture and it is growing gram-negative bacilli.  Intraoperative cultures are pending.  Urine culture was negative.  Patient had an MRI that showed likely osteomyelitis of the fifth toe, and possible osteomyelitis within the tips of the second through fifth digit phalanges.  Chest x-ray showed no acute findings.  The patient is currently on IV Unasyn and IV clindamycin and has no known antibiotic allergies.    Our service last saw the patient in July 2018 for a left foot wound after he punctured his fifth toe with a mick nail.  Patient had an I&D during that admission and was discharged on 4 weeks of IV daptomycin and p.o. Cipro.  The  patient has a past medical history significant for type 2 diabetes with neuropathy.  Previous right great toe amputation.  Patient also has chronic kidney disease, depression, degenerative disc disease, history of DVT history of GERD, hiatal hernia, esophageal stricture, history of PE, hyperlipidemia, hypertension, hyperthyroidism, irritable bowel syndrome, sleep apnea  , Cardiac catheterizations, EGD with dilation, hernia repair, left hip arthroplasty in 2018.  Patient denies tobacco, alcohol, or illicit drug abuse.      Review of Systems   Review of Systems   Constitutional: Negative.    HENT: Negative.    Respiratory: Negative.    Cardiovascular: Negative.    Gastrointestinal: Negative.    Genitourinary: Negative.    Musculoskeletal: Negative.    Skin: Positive for wound.   Neurological: Negative.    Psychiatric/Behavioral: Negative.    All other systems reviewed and are negative.      Medications  Medications Prior to Admission   Medication Sig Dispense Refill Last Dose   • apixaban (ELIQUIS) 5 MG tablet tablet Take 1 tablet by mouth 2 (Two) Times a Day. 180 tablet 3 3/14/2020 at Unknown time   • aspirin (ASPIR-LOW) 81 MG EC tablet Take 81 mg by mouth Daily.   3/14/2020 at Unknown time   • atorvastatin (LIPITOR) 40 MG tablet Take 40 mg by mouth Daily.   3/14/2020 at Unknown time   • FLUoxetine (PROzac) 40 MG capsule TAKE 1 CAPSULE BY MOUTH ONCE DAILY 90 capsule 1 3/14/2020 at Unknown time   • glipizide (GLUCOTROL XL) 10 MG 24 hr tablet Take 20 mg by mouth Daily.   3/14/2020 at Unknown time   • Insulin Glargine (LANTUS SOLOSTAR) 100 UNIT/ML injection pen Inject 15 Units under the skin into the appropriate area as directed Daily. 15 pen 3 3/14/2020 at Unknown time   • JANUMET XR  MG tablet TAKE 1 TABLET BY MOUTH EVERY 12 HOURS 180 tablet 1 3/14/2020 at Unknown time   • levothyroxine (SYNTHROID) 100 MCG tablet Take 1 tablet by mouth Daily. 60 tablet 0 3/14/2020 at Unknown time   • losartan (COZAAR) 50 MG  tablet Take 50 mg by mouth Daily.   3/14/2020 at Unknown time   • metoprolol succinate XL (TOPROL-XL) 50 MG 24 hr tablet Take 1 tablet by mouth Daily. 30 tablet 0 3/14/2020 at Unknown time   • omeprazole (priLOSEC) 40 MG capsule Take 40 mg by mouth Daily.   3/14/2020 at Unknown time   • nitroglycerin (NITROSTAT) 0.4 MG SL tablet Place 0.4 mg under the tongue Every 5 (Five) Minutes As Needed for Chest Pain.   Unknown at Unknown time       History  Past Medical History:   Diagnosis Date   • CKD (chronic kidney disease), stage III (CMS/HCC)    • Depression    • DJD (degenerative joint disease)    • DVT (deep venous thrombosis) (CMS/HCC)    • Ganglion     rt wrist   • GERD (gastroesophageal reflux disease)    • Hiatal hernia    • History of echocardiogram 04/2016    2D ECHO   • History of esophageal stricture     s/p dialation 40-50 times last EGD 04/2016   • History of pulmonary embolus (PE)     on long term anticoagulation   • Hyperlipidemia    • Hypertension    • Hypothyroidism    • IBS (irritable bowel syndrome)    • Neuropathy    • Rash     rt lower hip   • Retinopathy    • Seasonal allergies    • Sleep apnea     cpap  bring dos   • Type 2 diabetes mellitus with peripheral vascular disease (CMS/HCC)    • Vitamin D deficiency      Past Surgical History:   Procedure Laterality Date   • AMPUTATION FOOT / TOE Right     great toe   • CARDIAC CATHETERIZATION  04/2018    [facility]   • ENDOSCOPY     • ENDOSCOPY N/A 10/4/2019    Procedure: ESOPHAGOGASTRODUODENOSCOPY with dilitation and biopsy x 1 area;  Surgeon: Declan Iqbal MD;  Location: [facility] ENDOSCOPY;  Service: Gastroenterology   • ENDOSCOPY N/A 12/13/2019    Procedure: ESOPHAGOGASTRODUODENOSCOPY WITH DILATATION (50, 52 BOUGIE);  Surgeon: Declan Iqbal MD;  Location: [facility] ENDOSCOPY;  Service: Gastroenterology   • GANGLION CYST EXCISION Left    • HERNIA REPAIR Bilateral    • RETINOPATHY SURGERY      laser   • TOTAL HIP ARTHROPLASTY Left    • TOTAL HIP  ARTHROPLASTY Left 2018       Family History  Family History   Problem Relation Age of Onset   • Diabetes Mother    • Heart disease Mother    • Leukemia Father    • Sleep apnea Maternal Aunt         GENO   • Stroke Maternal Grandmother    • Hypertension Other    • Hyperlipidemia Other    • Cancer Other    • Colon cancer Other         uncle       Social History   reports that he has never smoked. He has never used smokeless tobacco. He reports that he does not drink alcohol or use drugs.    Allergies  Lisinopril    Objective     Vital Signs   Vital Signs (last 24 hours)       03/16 0700  -  03/17 0659 03/17 0700  -  03/17 1601   Most Recent    Temp (°F) 97.7 -  100.8    97 -  100.7     97.6 (36.4)    Heart Rate 69 -  91    65 -  81     71    Resp 14 -  19    8 -  19     12    /64 -  119/72    105/60 -  123/67     105/60    SpO2 (%) 94 -  99    93 -  97     96          Physical Exam:  Physical Exam   Constitutional: He is oriented to person, place, and time. He appears well-developed and well-nourished.   HENT:   Head: Normocephalic and atraumatic.   Eyes: Pupils are equal, round, and reactive to light. Conjunctivae and EOM are normal.   Neck: Neck supple.   Cardiovascular: Normal rate, regular rhythm and normal heart sounds.   Pulmonary/Chest: Effort normal and breath sounds normal.   Abdominal: Soft. Bowel sounds are normal.   Musculoskeletal: Normal range of motion.   Surgical dressing on right foot   Neurological: He is alert and oriented to person, place, and time.   Skin: Skin is warm and dry.   Psychiatric: He has a normal mood and affect.   Vitals reviewed.      Microbiology  Microbiology Results (last 10 days)     Procedure Component Value - Date/Time    Tissue / Bone Culture - Tissue, Foot, Right [[id number]] Collected:  03/17/20 1206    Lab Status:  Preliminary result Specimen:  Tissue from Foot, Right Updated:  03/17/20 1312     Gram Stain Few (2+) WBCs per low power field      Occasional Gram positive  cocci    MRSA Screen, PCR - Swab, Nares [[id number]]  (Normal) Collected:  03/16/20 0219    Lab Status:  Final result Specimen:  Swab from Nares Updated:  03/16/20 0602     MRSA PCR No MRSA Detected    Blood Culture - Blood, Arm, Left [[id number]]  (Abnormal) Collected:  03/15/20 1217    Lab Status:  Preliminary result Specimen:  Blood from Arm, Left Updated:  03/17/20 1417     Blood Culture Streptococcus agalactiae (Group B)     Gram Stain Aerobic Bottle Gram positive cocci in chains      Anaerobic Bottle Gram positive cocci in chains      Anaerobic Bottle Gram negative bacilli    Blood Culture ID, PCR - Blood, Arm, Left [[id number]]  (Abnormal) Collected:  03/15/20 1217    Lab Status:  Final result Specimen:  Blood from Arm, Left Updated:  03/17/20 1417     BCID, PCR Streptococcus agalactiae (Group B). Identification by BCID PCR.     BOTTLE TYPE Anaerobic Bottle    Blood Culture - Blood, Arm, Right [[id number]]  (Abnormal) Collected:  03/15/20 1208    Lab Status:  Preliminary result Specimen:  Blood from Arm, Right Updated:  03/17/20 0619     Blood Culture Streptococcus agalactiae (Group B)     Gram Stain Aerobic Bottle Gram positive cocci in chains      Anaerobic Bottle Gram positive cocci in chains    Blood Culture ID, PCR - Blood, Arm, Right [[id number]]  (Abnormal) Collected:  03/15/20 1208    Lab Status:  Final result Specimen:  Blood from Arm, Right Updated:  03/16/20 0511     BCID, PCR Streptococcus agalactiae (Group B). Identification by BCID PCR.     BOTTLE TYPE Aerobic Bottle          Laboratory  Results from last 7 days   Lab Units 03/17/20  0443   WBC 10*3/mm3 7.00   HEMOGLOBIN g/dL 12.8*   HEMATOCRIT % 37.4*   PLATELETS 10*3/mm3 222     Results from last 7 days   Lab Units 03/17/20 0443   SODIUM mmol/L 139   POTASSIUM mmol/L 4.3   CHLORIDE mmol/L 101   CO2 mmol/L 28.0   BUN mg/dL 21*   CREATININE mg/dL 1.51*   GLUCOSE mg/dL 137*   CALCIUM mg/dL 8.2*     Results from last 7 days   Lab Units 03/17/20 0443     SODIUM mmol/L 139   POTASSIUM mmol/L 4.3   CHLORIDE mmol/L 101   CO2 mmol/L 28.0   BUN mg/dL 21*   CREATININE mg/dL 1.51*   GLUCOSE mg/dL 137*   CALCIUM mg/dL 8.2*         Results from last 7 days   Lab Units 03/15/20  1208   SED RATE mm/hr 84*           Radiology  Imaging Results (Last 72 Hours)     Procedure Component Value Units Date/Time    XR Chest 1 View [[id number]] Collected:  03/16/20 0750     Updated:  03/16/20 0753    Narrative:       XR CHEST 1 VW-     Date of Exam: 3/16/2020 4:55 AM     Indication: pre-op; E11.628-Type 2 diabetes mellitus with other skin  complications; L03.119-Cellulitis of unspecified part of limb.     Comparison: 10/21/2019     Technique: A single view of the chest was obtained.     FINDINGS:      Heart size and pulmonary vessels are within normal limits.  Lungs  are clear bilaterally.  There are no pleural effusions area bony thorax  appears within normal limits.             Impression:          1. No acute cardiopulmonary disease.        Electronically Signed By-Bear Astudillo On:3/16/2020 7:51 AM  This report was finalized on [id number] by  Bear Astudillo, .    MRI Foot Right Without Contrast [[id number]] Collected:  03/15/20 2200     Updated:  03/16/20 0006    Narrative:       MRI OF THE RIGHT FOOT WITHOUT INTRAVENOUS CONTRAST    HISTORY: Right forefoot pain.    TECHNIQUE: Multiplanar and multisequence MR imaging of the right forefoot was performed without the administration of intravenous gadolinium. Imaging sequences include axial and sagittal T1, axial and sagittal proton density, coronal and sagittal   fat-suppressed proton density, and axial fat-suppressed T2-weighted images.    COMPARISON: Radiographs from 10/15/2015    FINDINGS: The examination is limited by the lack of a recent comparison radiograph. There are postsurgical findings from prior partial amputation of the first digit through the level of the distal first metatarsal without complication. There is a wound    along the dorsal aspect and lateral aspect of the fifth digit with osteomyelitis of the fifth proximal phalanx. There is some edema within the middle and distal phalanges as well as the fifth digit that could be osteomyelitis but there is no T1-weighted   change to say for certain. Additionally, there are small wounds over tips of the second through fifth digits with subtle geographic marrow signal alteration within the tip of the phalanges of the second through fifth digits that is likely osteomyelitis.    There is diffuse soft tissue swelling with subcutaneous edema and skin thickening of the foot indicative of a cellulitis. There is no discrete abscess but there is some susceptibility artifact around the soft tissues of the fifth digit that is likely   some soft tissue gas. There is diffuse fatty muscular atrophy.      Impression:       1. Study is limited given the lack of a comparison radiograph.  2. Wound/ulceration along the lateral aspect of the fifth proximal phalanx with geographic marrow signal alteration in the fifth proximal phalanx indicative of osteomyelitis.  3. Subtle soft tissue irregularities over the tips of the second through fifth digits with subtle geographic marrow signal alteration within the tips of the second through fifth digit phalanges that is likely osteomyelitis.  4. Postsurgical findings from prior partial amputation of the first digit as above.  5. Susceptibility artifact in the soft tissues around the fifth digit that is likely some soft tissue gas. No abscess.    Electronically signed by:  Geovanny Pulido M.D.    3/15/2020 10:05 PM          Cardiology      Results Review:  I have reviewed all clinical data, test, lab, and imaging results.       Schedule Meds    ampicillin-sulbactam 3 g Intravenous Q6H   atorvastatin 40 mg Oral Nightly   clindamycin 600 mg Intravenous Q8H   FLUoxetine 40 mg Oral Daily   heparin (porcine) 5,000 Units Subcutaneous Q12H   insulin glargine 15  Units Subcutaneous Nightly   insulin lispro 0-14 Units Subcutaneous TID With Meals   insulin lispro 5 Units Subcutaneous TID With Meals   levothyroxine 100 mcg Oral Q AM   losartan 50 mg Oral Daily   metoprolol succinate XL 50 mg Oral Daily   pantoprazole 40 mg Oral QAM   sodium chloride 10 mL Intravenous Q12H       Infusion Meds    lactated ringers 1,000 mL       PRN Meds  •  acetaminophen **OR** acetaminophen **OR** acetaminophen  •  dextrose  •  dextrose  •  glucagon (human recombinant)  •  HYDROcodone-acetaminophen  •  HYDROcodone-acetaminophen  •  magnesium sulfate **OR** magnesium sulfate in D5W 1g/100mL (PREMIX)  •  nitroglycerin  •  ondansetron **OR** ondansetron  •  potassium chloride  •  sodium chloride  •  sodium chloride      Assessment/Plan       Assessment    Bacteremia-2 out of 2 cultures are growing group B streptococcus, one culture is also growing gram-negative bacilli  -Likely source is the right diabetic foot wound    Right diabetic foot wound been worsening over the last 2 weeks  -MRI showed osteomyelitis in the fifth toe along with osteomyelitis in the second through fifth distal phalanges  -Patient had a transmetatarsal amputation on 3/17/2020  -Wound cultures pending    Type 2 diabetes with neuropathy    History of left fourth toe puncture wound that was treated 2017    History of right great toe amputation    Chronic kidney disease    History of DVT/PE    Plan    Continue IV Unasyn  Continue IV clindamycin for now  Patient will need 6 weeks of IV antimicrobial therapy  Waiting on blood cultures to finalize  Waiting on wound culture  Continue supportive care  Toy Nielsen, APRN  03/17/20  16:01

## 2020-03-17 NOTE — ANESTHESIA PROCEDURE NOTES
Airway  Urgency: elective    Date/Time: 3/17/2020 11:48 AM  Airway not difficult    General Information and Staff    Patient location during procedure: OR  Anesthesiologist: Jose Tejada MD    Indications and Patient Condition  Indications for airway management: airway protection    Preoxygenated: yes  MILS not maintained throughout  Mask difficulty assessment: 0 - not attempted    Final Airway Details  Final airway type: supraglottic airway      Successful airway: LMA and unique  Size 4    Number of attempts at approach: 1  Assessment: lips, teeth, and gum same as pre-op and atraumatic intubation    Additional Comments  ASA monitors applied; preoxygenated with 100% FiO2 via anesthesia face mask; induction of general anesthesia; patient's position optimized; LMA lubricated and placed; atraumatic/dentition in preoperative condition; LMA secured in place; correct placement confirmed by bilateral chest rise, tube condensation, and return of EtCO2 > 30 mmHg x3

## 2020-03-17 NOTE — PROGRESS NOTES
Continued Stay Note  [facility]     Patient Name: Kuldeep Adhikari  MRN: [id number]  Today's Date: 3/17/2020    Admit Date: 3/15/2020    Discharge Plan     Row Name 03/17/20 1028       Plan    Plan   Routine discharge home with spouse. Patient denies any needs at this time. But will continue to follow, patient is having surgery for Osteomyelitis 3/17.    Plan Comments  Barriers to discharge: Surgery for osteomyelitisi 3/17.            Anna [place] [address]   [phone]  office  [phone]  fax  [email or web address]

## 2020-03-17 NOTE — ANESTHESIA PREPROCEDURE EVALUATION
Anesthesia Evaluation     Patient summary reviewed and Nursing notes reviewed   no history of anesthetic complications:  NPO Solid Status: > 8 hours  NPO Liquid Status: > 8 hours           Airway   Dental      Pulmonary    (+) pulmonary embolism, sleep apnea,   Cardiovascular     ECG reviewed  PT is on anticoagulation therapy  Patient on routine beta blocker    (+) hypertension, valvular problems/murmurs TI and MR, CAD, angina, PVD, DVT, hyperlipidemia,       Neuro/Psych  (+) numbness, psychiatric history Depression,     GI/Hepatic/Renal/Endo    (+)  hiatal hernia, GERD,  renal disease CRI, diabetes mellitus, thyroid problem hypothyroidism    Musculoskeletal     Abdominal    Substance History      OB/GYN          Other   arthritis,      ROS/Med Hx Other: Gangrene of foot, cellulitis, chest pain, h/o DKA, fatigue, neuropathy, foot pain, h/o osteomyelitis, low vit D, palpitations, allergies, retinopathy, IBS, esophageal stricture    Echo  Comments  Technically adequate study.  Mitral valve leaflets are calcified with mild mitral regurgitation.  Tricuspid valve is normal with mild to moderate tricuspid regurgitation.  Aortic valve is sclerosed with adequate opening motion.  Left atrium, aortic root are normal in size. Right ventricle is mildly  dilated.  LV size and contractility appears normal. EF is about 60-65%.  No pericardial effusion noted.        Interpretation  Technically adequate study.  Mitral valve leaflets are calcified with mild mitral regurgitation.  Tricuspid valve is normal with mild to moderate tricuspid regurgitation.  Aortic valve is sclerosed with adequate opening motion.  Left atrium, aortic root are normal in size. Right ventricle is mildly  dilated.  LV size and contractility appears normal. EF is about 60-65%.  No pericardial effusion noted                Anesthesia Plan    ASA 4     general   (Patient identified; pre-operative vital signs, all relevant labs/studies, complete  medical/surgical/anesthetic history, full medication list, full allergy list, and NPO status obtained/reviewed; physical assessment performed; anesthetic options, side effects, potential complications, risks, and benefits discussed; questions answered; written anesthesia consent obtained; patient cleared for procedure; anesthesia machine and equipment checked and functioning)  intravenous induction     Anesthetic plan, all risks, benefits, and alternatives have been provided, discussed and informed consent has been obtained with: patient.

## 2020-03-17 NOTE — PLAN OF CARE
Problem: Patient Care Overview  Goal: Plan of Care Review  Outcome: Ongoing (interventions implemented as appropriate)  Flowsheets  Taken 3/17/2020 1727 by Grace Nolen LPN  Progress: no change  Outcome Summary: Pt arrived to unit around 1700. No complaints of pain in the R foot. Will continue to monitor.  Taken 3/17/2020 1209 by Stephanie Benjamin, RN  Plan of Care Reviewed With: patient

## 2020-03-17 NOTE — OP NOTE
Operative Note   Foot and Ankle Surgery   Provider: Dr. Bong Baker   Location: [facility]      Procedure:  1.  Transmetatarsal amputation, right foot    Pre-operative Diagnosis:   1.  Cellulitis and abscess, right foot  2.  Wet gangrene, right fifth toe  3.  Diabetes mellitus with peripheral neuropathy    Post-operative Diagnosis: Same    Surgeon: Bong Baker    Assistant: None    Anesthesia: General    Implants: None    Findings: Significant purulence involving the fifth toe and the fifth metatarsal phalangeal joint with extension along the flexor tendon.  No proximal extension past amputation site.  No other abnormal findings    Specimen: Subcutaneous tissue from the fifth metatarsal phalangeal joint region sent to microbiology for culture and sensitivity.  Forefoot sent to pathology as permanent    Blood Loss: Less than 5cc    Complications: None    Post Op Plan: Return to floor.  No further operative plans.  Case was reviewed with infectious disease.  Anticipate 6 weeks of IV antibiotics at discharge.  Recommend consideration for rehab placement.  Nonweightbearing activity to the right lower extremity.  He will require follow-up with me in 1 to 2 weeks after discharge    Summary:    Patient is a 55-year-old diabetic male that has been admitted for right foot infection.  On evaluation, he has gangrenous changes involving the fifth digit fairly substantial erythema and edema affecting the forefoot.  He has had previous partial first ray resection and complains of progressive digital deformities.  MRI shows significant purulence to the lateral aspect of the foot at the fifth MTPJ along with marrow edema changes consistent with osteomyelitis.  Given the findings, we did discuss treatment options including transmetatarsal amputation for definitive management.  I do feel this is a good option given his prior amputation and risk of increased pedal complications with lesser procedures.  Patient  understands and agrees.    Procedure, risks, complications, and goals were discussed with the patient at bedside.  Risks include but are not limited to infection, complications from anesthesia (including death), chronic pain or numbness, hematoma/seroma, deep vein thrombosis, wound complications, and potential for additional surgical procedures.  Patient understands and elects to proceed with surgery at this time. Informed consent was obtained before proceeding to the operating suite.  All questions were answered to the patient's satisfaction. No guarantees or assurances were given or implied.    Procedure:    Patient brought to the operating room placed on the operative table in supine position.  Once adequate general anesthesia was administered, a pneumatic tourniquet was placed about the patient's operative calf.  The limb was scrubbed prepped and draped in usual sterile fashion.  The lower extremity was elevated and exsanguinated and the pneumatic tourniquet was inflated to 275 mm of mercury.  A formal time-out was conducted prior skin incision.    Attention was then directed to the forefoot.  An expansile fishmouth type incision was performed with an apex at the midfoot region.  Full-thickness incision was performed to the dorsal and plantar aspects of the foot.  Purulence was noted to the fifth metatarsal phalangeal joint region.  Subcutaneous tissue was obtained and sent to for biology.  The dorsal aspect of the metatarsal bones was identified.  Blunt dissection was performed with a Key elevator.  Osteotomies were performed from a dorsal distal to plantar proximal orientation across the metatarsals ensuring to leave an appropriate parabola.  The first and fifth osteotomies were performed in a modified fashion allowing for offloading.  The forefoot was removed in its entirety and placed on the back table to be sent to pathology as permanent.  The flexor and extensor tendons were retracted distally and cut  proximally.  The plantar flap was adequately debulked.  The wound was irrigated with copious amounts of normal saline and bacitracin.  Final evaluation was performed showing no purulence or other concerning features at the level of the amputation site.  The deep structures were closed with a 2-0 Vicryl in a simple interrupted manner.  The subcutaneous tissues were closed in a similar fashion.  The skin was reapproximated with skin staples.  The tourniquet was released and a prompt hyperemic response was noted to the plantar flap.  The incision was dressed with Xeroform and sterile compressive dressings. The limb was placed in a well padded posterior splint.  Patient tolerated the procedure and anesthesia well. Patient was transferred from the operating room to the recovery room with vital signs stable and neurovascular status unchanged to the operative extremity.      Dr. Bong Baker DPM  [facility]  [phone]    Note is dictated utilizing voice recognition software. Unfortunately this leads to occasional typographical errors. I apologize in advance if the situation occurs. If questions occur please do not hesitate to call our office.

## 2020-03-18 LAB
ANION GAP SERPL CALCULATED.3IONS-SCNC: 13 MMOL/L (ref 5–15)
BASOPHILS # BLD AUTO: 0.1 10*3/MM3 (ref 0–0.2)
BASOPHILS NFR BLD AUTO: 0.9 % (ref 0–1.5)
BUN BLD-MCNC: 14 MG/DL (ref 6–20)
BUN/CREAT SERPL: 11.2 (ref 7–25)
CALCIUM SPEC-SCNC: 8 MG/DL (ref 8.6–10.5)
CHLORIDE SERPL-SCNC: 101 MMOL/L (ref 98–107)
CO2 SERPL-SCNC: 22 MMOL/L (ref 22–29)
CREAT BLD-MCNC: 1.25 MG/DL (ref 0.76–1.27)
DEPRECATED RDW RBC AUTO: 40.7 FL (ref 37–54)
EOSINOPHIL # BLD AUTO: 0.2 10*3/MM3 (ref 0–0.4)
EOSINOPHIL NFR BLD AUTO: 2.5 % (ref 0.3–6.2)
ERYTHROCYTE [DISTWIDTH] IN BLOOD BY AUTOMATED COUNT: 13.6 % (ref 12.3–15.4)
GFR SERPL CREATININE-BSD FRML MDRD: 60 ML/MIN/1.73
GLUCOSE BLD-MCNC: 208 MG/DL (ref 65–99)
GLUCOSE BLDC GLUCOMTR-MCNC: 161 MG/DL (ref 70–105)
GLUCOSE BLDC GLUCOMTR-MCNC: 182 MG/DL (ref 70–105)
GLUCOSE BLDC GLUCOMTR-MCNC: 203 MG/DL (ref 70–105)
GLUCOSE BLDC GLUCOMTR-MCNC: 234 MG/DL (ref 70–105)
HCT VFR BLD AUTO: 33.9 % (ref 37.5–51)
HGB BLD-MCNC: 11.5 G/DL (ref 13–17.7)
LAB AP CASE REPORT: NORMAL
LYMPHOCYTES # BLD AUTO: 0.9 10*3/MM3 (ref 0.7–3.1)
LYMPHOCYTES NFR BLD AUTO: 12.9 % (ref 19.6–45.3)
MAGNESIUM SERPL-MCNC: 1.7 MG/DL (ref 1.6–2.6)
MCH RBC QN AUTO: 28.4 PG (ref 26.6–33)
MCHC RBC AUTO-ENTMCNC: 34 G/DL (ref 31.5–35.7)
MCV RBC AUTO: 83.5 FL (ref 79–97)
MONOCYTES # BLD AUTO: 0.8 10*3/MM3 (ref 0.1–0.9)
MONOCYTES NFR BLD AUTO: 10.5 % (ref 5–12)
NEUTROPHILS # BLD AUTO: 5.3 10*3/MM3 (ref 1.7–7)
NEUTROPHILS NFR BLD AUTO: 73.2 % (ref 42.7–76)
NRBC BLD AUTO-RTO: 0.1 /100 WBC (ref 0–0.2)
PATH REPORT.FINAL DX SPEC: NORMAL
PATH REPORT.GROSS SPEC: NORMAL
PLATELET # BLD AUTO: 202 10*3/MM3 (ref 140–450)
PMV BLD AUTO: 7.4 FL (ref 6–12)
POTASSIUM BLD-SCNC: 3.7 MMOL/L (ref 3.5–5.2)
RBC # BLD AUTO: 4.06 10*6/MM3 (ref 4.14–5.8)
SODIUM BLD-SCNC: 136 MMOL/L (ref 136–145)
VANCOMYCIN PEAK SERPL-MCNC: <4 MCG/ML (ref 20–40)
WBC NRBC COR # BLD: 7.2 10*3/MM3 (ref 3.4–10.8)

## 2020-03-18 PROCEDURE — 0Y6M0ZD DETACHMENT AT RIGHT FOOT, PARTIAL 4TH RAY, OPEN APPROACH: ICD-10-PCS | Performed by: PODIATRIST

## 2020-03-18 PROCEDURE — 97116 GAIT TRAINING THERAPY: CPT

## 2020-03-18 PROCEDURE — 0Y6M0Z9 DETACHMENT AT RIGHT FOOT, PARTIAL 1ST RAY, OPEN APPROACH: ICD-10-PCS | Performed by: PODIATRIST

## 2020-03-18 PROCEDURE — 25010000003 AMPICILLIN-SULBACTAM PER 1.5 G: Performed by: PODIATRIST

## 2020-03-18 PROCEDURE — 0Y6M0ZB DETACHMENT AT RIGHT FOOT, PARTIAL 2ND RAY, OPEN APPROACH: ICD-10-PCS | Performed by: PODIATRIST

## 2020-03-18 PROCEDURE — 99232 SBSQ HOSP IP/OBS MODERATE 35: CPT | Performed by: HOSPITALIST

## 2020-03-18 PROCEDURE — 83735 ASSAY OF MAGNESIUM: CPT | Performed by: PODIATRIST

## 2020-03-18 PROCEDURE — 25010000002 HEPARIN (PORCINE) PER 1000 UNITS: Performed by: PODIATRIST

## 2020-03-18 PROCEDURE — 99024 POSTOP FOLLOW-UP VISIT: CPT | Performed by: PODIATRIST

## 2020-03-18 PROCEDURE — 99232 SBSQ HOSP IP/OBS MODERATE 35: CPT | Performed by: INTERNAL MEDICINE

## 2020-03-18 PROCEDURE — 85025 COMPLETE CBC W/AUTO DIFF WBC: CPT | Performed by: PODIATRIST

## 2020-03-18 PROCEDURE — 0Y6M0ZC DETACHMENT AT RIGHT FOOT, PARTIAL 3RD RAY, OPEN APPROACH: ICD-10-PCS | Performed by: PODIATRIST

## 2020-03-18 PROCEDURE — 63710000001 INSULIN LISPRO (HUMAN) PER 5 UNITS: Performed by: PODIATRIST

## 2020-03-18 PROCEDURE — 82962 GLUCOSE BLOOD TEST: CPT

## 2020-03-18 PROCEDURE — 80048 BASIC METABOLIC PNL TOTAL CA: CPT | Performed by: PODIATRIST

## 2020-03-18 PROCEDURE — 97161 PT EVAL LOW COMPLEX 20 MIN: CPT

## 2020-03-18 PROCEDURE — 63710000001 INSULIN GLARGINE PER 5 UNITS: Performed by: PODIATRIST

## 2020-03-18 PROCEDURE — 80202 ASSAY OF VANCOMYCIN: CPT | Performed by: PODIATRIST

## 2020-03-18 PROCEDURE — 0Y6M0ZF DETACHMENT AT RIGHT FOOT, PARTIAL 5TH RAY, OPEN APPROACH: ICD-10-PCS | Performed by: PODIATRIST

## 2020-03-18 RX ADMIN — CLINDAMYCIN PHOSPHATE 600 MG: 600 INJECTION, SOLUTION INTRAVENOUS at 04:36

## 2020-03-18 RX ADMIN — HYDROCODONE BITARTRATE AND ACETAMINOPHEN 1 TABLET: 10; 325 TABLET ORAL at 22:25

## 2020-03-18 RX ADMIN — INSULIN LISPRO 5 UNITS: 100 INJECTION, SOLUTION INTRAVENOUS; SUBCUTANEOUS at 13:42

## 2020-03-18 RX ADMIN — INSULIN LISPRO 5 UNITS: 100 INJECTION, SOLUTION INTRAVENOUS; SUBCUTANEOUS at 10:32

## 2020-03-18 RX ADMIN — INSULIN LISPRO 5 UNITS: 100 INJECTION, SOLUTION INTRAVENOUS; SUBCUTANEOUS at 17:16

## 2020-03-18 RX ADMIN — HEPARIN SODIUM 5000 UNITS: 5000 INJECTION INTRAVENOUS; SUBCUTANEOUS at 22:08

## 2020-03-18 RX ADMIN — AMPICILLIN AND SULBACTAM 3 G: 1; 2 INJECTION, POWDER, FOR SOLUTION INTRAMUSCULAR; INTRAVENOUS at 01:17

## 2020-03-18 RX ADMIN — Medication 10 ML: at 22:09

## 2020-03-18 RX ADMIN — AMPICILLIN AND SULBACTAM 3 G: 1; 2 INJECTION, POWDER, FOR SOLUTION INTRAMUSCULAR; INTRAVENOUS at 13:39

## 2020-03-18 RX ADMIN — METOPROLOL SUCCINATE 50 MG: 50 TABLET, EXTENDED RELEASE ORAL at 10:33

## 2020-03-18 RX ADMIN — PANTOPRAZOLE SODIUM 40 MG: 40 TABLET, DELAYED RELEASE ORAL at 10:33

## 2020-03-18 RX ADMIN — LOSARTAN POTASSIUM 50 MG: 50 TABLET, FILM COATED ORAL at 10:33

## 2020-03-18 RX ADMIN — HYDROCODONE BITARTRATE AND ACETAMINOPHEN 1 TABLET: 10; 325 TABLET ORAL at 01:56

## 2020-03-18 RX ADMIN — INSULIN GLARGINE 15 UNITS: 100 INJECTION, SOLUTION SUBCUTANEOUS at 22:29

## 2020-03-18 RX ADMIN — ATORVASTATIN CALCIUM 40 MG: 40 TABLET, FILM COATED ORAL at 22:08

## 2020-03-18 RX ADMIN — INSULIN LISPRO 5 UNITS: 100 INJECTION, SOLUTION INTRAVENOUS; SUBCUTANEOUS at 13:40

## 2020-03-18 RX ADMIN — POLYETHYLENE GLYCOL 3350 17 G: 17 POWDER, FOR SOLUTION ORAL at 10:32

## 2020-03-18 RX ADMIN — FLUOXETINE 40 MG: 20 CAPSULE ORAL at 10:32

## 2020-03-18 RX ADMIN — HEPARIN SODIUM 5000 UNITS: 5000 INJECTION INTRAVENOUS; SUBCUTANEOUS at 10:33

## 2020-03-18 RX ADMIN — LEVOTHYROXINE SODIUM 100 MCG: 100 TABLET ORAL at 05:52

## 2020-03-18 RX ADMIN — INSULIN LISPRO 3 UNITS: 100 INJECTION, SOLUTION INTRAVENOUS; SUBCUTANEOUS at 10:37

## 2020-03-18 RX ADMIN — Medication 10 ML: at 10:33

## 2020-03-18 RX ADMIN — AMPICILLIN AND SULBACTAM 3 G: 1; 2 INJECTION, POWDER, FOR SOLUTION INTRAMUSCULAR; INTRAVENOUS at 10:58

## 2020-03-18 NOTE — THERAPY EVALUATION
Patient Name: Kuldeep Adhikari  : 1965    MRN: [id number]                              Today's Date: 3/18/2020       Admit Date: 3/15/2020    Visit Dx:     ICD-10-CM ICD-9-CM   1. Cellulitis in diabetic foot (CMS/Formerly [facility]) E11.628 250.80    L03.119 682.7   2. Gangrene of foot (CMS/Formerly [facility]) I96 785.4   3. Diabetic peripheral neuropathy (CMS/Formerly [facility]) E11.42 250.60     357.2   4. History of amputation of hallux (CMS/Formerly [facility]) Z89.419 V49.71   5. Hx of osteomyelitis Z87.39 V13.59     Patient Active Problem List   Diagnosis   • Benign essential hypertension   • Cellulitis   • Chest pain   • Chronic coronary artery disease   • Chronic renal insufficiency, stage III (moderate) (CMS/[facility]ly [facility])   • Degenerative joint disease   • Depression   • Diabetic peripheral neuropathy (CMS/[facility]ly [facility])   • Diabetic ketoacidosis (CMS/[facility]ly [facility])   • Disorder associated with type 2 diabetes mellitus (CMS/[facility]ly [facility])   • Encounter for screening for malignant neoplasm of colon   • Fatigue   • Foot pain, right   • Ganglion cyst   • Gangrene of foot (CMS/[facility]ly [facility])   • Gastroesophageal reflux disease   • History of amputation of hallux (CMS/[facility]ly [facility])   • History of gastrointestinal disease   • History of pulmonary embolism   • Hx of osteomyelitis   • Hyperlipidemia   • Hypothyroidism   • Obstructive sleep apnea syndrome   • Palpitations   • Peripheral vascular disease (CMS/[facility])   • Subacromial bursitis of right shoulder joint   • Vitamin D deficiency   • Type 2 diabetes mellitus with peripheral vascular disease (CMS/[facility])   • Cellulitis in diabetic foot (CMS/[facility]ly [facility])   • Obesity (BMI 30-39.9)     Past Medical History:   Diagnosis Date   • CKD (chronic kidney disease), stage III (CMS/[facility])    • Depression    • DJD (degenerative joint disease)    • DVT (deep venous thrombosis) (CMS/[facility])    • Ganglion     rt wrist   • GERD (gastroesophageal reflux disease)    • Hiatal hernia    • History of echocardiogram 2016    2D ECHO   • History of esophageal stricture     s/p dialation 40-50 times last EGD  04/2016   • History of pulmonary embolus (PE)     on long term anticoagulation   • Hyperlipidemia    • Hypertension    • Hypothyroidism    • IBS (irritable bowel syndrome)    • Neuropathy    • Rash     rt lower hip   • Retinopathy    • Seasonal allergies    • Sleep apnea     cpap  bring dos   • Type 2 diabetes mellitus with peripheral vascular disease (CMS/HCC)    • Vitamin D deficiency      Past Surgical History:   Procedure Laterality Date   • AMPUTATION FOOT / TOE Right     great toe   • CARDIAC CATHETERIZATION  04/2018    [facility]   • ENDOSCOPY     • ENDOSCOPY N/A 10/4/2019    Procedure: ESOPHAGOGASTRODUODENOSCOPY with dilitation and biopsy x 1 area;  Surgeon: Declan Iqbal MD;  Location: [facility] ENDOSCOPY;  Service: Gastroenterology   • ENDOSCOPY N/A 12/13/2019    Procedure: ESOPHAGOGASTRODUODENOSCOPY WITH DILATATION (50, 52 BOUGIE);  Surgeon: Declan Iqbal MD;  Location: [facility] ENDOSCOPY;  Service: Gastroenterology   • GANGLION CYST EXCISION Left    • HERNIA REPAIR Bilateral    • RETINOPATHY SURGERY      laser   • TOTAL HIP ARTHROPLASTY Left    • TOTAL HIP ARTHROPLASTY Left 2018   • TRANS METATARSAL AMPUTATION Right 3/17/2020    Procedure: AMPUTATION TRANS METATARSAL right;  Surgeon: ERWIN Baker DPM;  Location: [facility] MAIN OR;  Service: Podiatry;  Laterality: Right;  GANGRENOUS RIGHT FOOT     General Information     Row Name 03/18/20 1015          PT Evaluation Time/Intention    Document Type  evaluation  -CM     Mode of Treatment  physical therapy  -CM     Row Name 03/18/20 1015          General Information    Patient Profile Reviewed?  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  -CM     Row Name 03/18/20 1015          Resource/Environmental Concerns    Current Living Arrangements  home/apartment/condo  -CM     Row Name 03/18/20 1015          Home Main Entrance    Number of Stairs, Main Entrance  two  -CM     Stair Railings, Main Entrance  none  -CM     Row Name 03/18/20 1015          Stairs Within Home, Primary    Number of Stairs, Within Home, Primary  none  -CM     Row Name 03/18/20 1015          Cognitive Assessment/Intervention- PT/OT    Orientation Status (Cognition)  oriented x 4  -CM     Cognitive Assessment/Intervention Comment  pleasant, cooperative  -CM     Row Name 03/18/20 1015          Safety Issues, Functional Mobility    Impairments Affecting Function (Mobility)  endurance/activity tolerance  -CM     Comment, Safety Issues/Impairments (Mobility)  hx of lobectomy per pt, which causes him some soa at baseline  -CM       User Key  (r) = Recorded By, (t) = Taken By, (c) = Cosigned By    Initials Name Provider Type    CM Emmy Salas, PT Physical Therapist        Mobility     Row Name 03/18/20 1016          Bed Mobility Assessment/Treatment    Bed Mobility Assessment/Treatment  bed mobility (all) activities  -CM     [place] Level (Bed Mobility)  independent  -CM     Row Name 03/18/20 1016          Bed-Chair Transfer    Bed-Chair [place] (Transfers)  conditional independence  -CM     Assistive Device (Bed-Chair Transfers)  -- can do pivot transfer in/out of w/c independentlyl  -CM     Row Name 03/18/20 1016          Sit-Stand Transfer    Sit-Stand [place] (Transfers)  conditional independence  -CM     Assistive Device (Sit-Stand Transfers)  walker, front-wheeled  -CM     Row Name 03/18/20 1016          Gait/Stairs Assessment/Training    Gait/Stairs Assessment/Training  gait/ambulation independence  -CM     [place] Level (Gait)  supervision  -CM     Assistive Device (Gait)  walker,  front-wheeled  -CM     Distance in Feet (Gait)  80  -CM     Pattern (Gait)  swing-to  -CM     [place] Level (Stairs)  minimum assist (75% patient effort);2 person assist  -CM     Assistive Device (Stairs)  other (see comments) 2 hands held  -CM     Handrail Location (Stairs)  other (see comments) no handrail at home to enter home, so used 2 hands held  -CM     Number of Steps (Stairs)  2  -CM     Ascending Technique (Stairs)  step-to-step  -CM     Descending Technique (Stairs)  step-to-step  -CM     Negotiation (Ramp)  maintains weight-bearing status  -CM       User Key  (r) = Recorded By, (t) = Taken By, (c) = Cosigned By    Initials Name Provider Type    Emmy Cunha, PT Physical Therapist        Obj/Interventions     Row Name 03/18/20 1022          General ROM    GENERAL ROM COMMENTS  wnl  -CM     Row Name 03/18/20 1022          MMT (Manual Muscle Testing)    General MMT Comments  5/5 all mm groups  -CM     Row Name 03/18/20 1022          Static Sitting Balance    Level of [place] (Unsupported Sitting, Static Balance)  independent  -CM     Sitting Position (Unsupported Sitting, Static Balance)  sitting on edge of bed  -CM     Time Able to Maintain Position (Unsupported Sitting, Static Balance)  more than 5 minutes  -CM     Row Name 03/18/20 1022          Dynamic Sitting Balance    Level of [place], Reaches Outside Midline (Sitting, Dynamic Balance)  independent  -CM     Sitting Position, Reaches Outside Midline (Sitting, Dynamic Balance)  sitting on edge of bed  -CM     Row Name 03/18/20 1022          Static Standing Balance    Level of [place] (Supported Standing, Static Balance)  supervision  -CM     Assistive Device Utilized (Supported Standing, Static Balance)  walker, rolling  -CM     Row Name 03/18/20 1022          Dynamic Standing Balance    Level of [place], Reaches Outside Midline (Standing, Dynamic Balance)  supervision  -CM     Assistive Device Utilized (Supported  Standing, Dynamic Balance)  walker, rolling  -     Row Name 03/18/20 1022          Sensory Assessment/Intervention    Sensory General Assessment  -- has very poor sensation in B feet; has decreased sensation in B fingers  -       User Key  (r) = Recorded By, (t) = Taken By, (c) = Cosigned By    Initials Name Provider Type    CM Emmy Salas, PT Physical Therapist        Goals/Plan    No documentation.       Clinical Impression     Row Name 03/18/20 1023          Pain Assessment    Additional Documentation  Pain Scale: Numbers Pre/Post-Treatment (Group)  -     Row Name 03/18/20 1023          Pain Scale: Numbers Pre/Post-Treatment    Pain Scale: Numbers, Pretreatment  1/10  -CM     Pain Scale: Numbers, Post-Treatment  4/10  -CM     Pain Location - Side  Right  -CM     Pain Location - Orientation  lower  -CM     Pain Location  extremity  -CM     Pre/Post Treatment Pain Comment  initially c/o headache 1/10, but at end of rx c/o RLE pain from edema  -CM     Pain Intervention(s)  Medication (See MAR);Repositioned;Emotional support  -     Row Name 03/18/20 1023          Plan of Care Review    Plan of Care Reviewed With  patient  -CM     Progress  improving  -CM     Outcome Summary  54 yo male seen POD 1 s/p R TMA. Pt presents w/ good strength & needs only supervision w/ use of rw for amb. Was able to amb 80 ft today w/ rw. Also was able to ascend and descend 2 stairs w/ 2 hands held. No need for skilled PT at this time. Will need rw for home use. Recommend home w/ family when medically stable. Will sign off.   -     Row Name 03/18/20 1023          Physical Therapy Clinical Impression    Criteria for Skilled Interventions Met (PT Clinical Impression)  no;no problems identified which require skilled intervention  -     Row Name 03/18/20 1023          Positioning and Restraints    Pre-Treatment Position  in bed  -CM     Post Treatment Position  chair  -CM     In Chair  notified nsg;sitting;call light within  reach;encouraged to call for assist preparing to eat breakfast  -CM       User Key  (r) = Recorded By, (t) = Taken By, (c) = Cosigned By    Initials Name Provider Type    Emmy Cunha, PT Physical Therapist        Outcome Measures    No documentation.         PT Recommendation and Plan     Outcome Summary/Treatment Plan (PT)  Anticipated Equipment Needs at Discharge (PT): front wheeled walker  Anticipated Discharge Disposition (PT): home with assist  Plan of Care Reviewed With: patient  Progress: improving  Outcome Summary: 56 yo male seen POD 1 s/p R TMA. Pt presents w/ good strength & needs only supervision w/ use of rw for amb. Was able to amb 80 ft today w/ rw. Also was able to ascend and descend 2 stairs w/ 2 hands held. No need for skilled PT at this time. Will need rw for home use. Recommend home w/ family when medically stable. Will sign off.      Time Calculation:   PT Charges     Row Name 03/18/20 1026             Time Calculation    Start Time  0849  -CM      Stop Time  0920  -CM      Time Calculation (min)  31 min  -CM      PT Received On  03/18/20  -CM         Time Calculation- PT    Total Timed Code Minutes- PT  10 minute(s)  -CM        User Key  (r) = Recorded By, (t) = Taken By, (c) = Cosigned By    Initials Name Provider Type    Emmy Cunha, PT Physical Therapist        Therapy Charges for Today     Code Description Service Date Service Provider Modifiers Qty    44699491102 HC PT EVAL LOW COMPLEXITY 3 3/18/2020 Emmy Salas, PT GP 1    22823071277 HC GAIT TRAINING EA 15 MIN 3/18/2020 Emmy Salas, PT GP 1               Emmy Salas PT  3/18/2020

## 2020-03-18 NOTE — PLAN OF CARE
Problem: Patient Care Overview  Goal: Plan of Care Review  Outcome: Ongoing (interventions implemented as appropriate)  Flowsheets (Taken 3/18/2020 1023)  Plan of Care Reviewed With: patient  Outcome Summary: 54 yo male seen POD 1 s/p R TMA. Pt presents w/ good strength & needs only supervision w/ use of rw for amb. Was able to amb 80 ft today w/ rw. Also was able to ascend and descend 2 stairs w/ 2 hands held. No need for skilled PT at this time. Will need rw for home use. Recommend home w/ family when medically stable. Will sign off.

## 2020-03-18 NOTE — PLAN OF CARE
Problem: Patient Care Overview  Goal: Plan of Care Review  Outcome: Ongoing (interventions implemented as appropriate)  Flowsheets (Taken 3/18/2020 2725)  Plan of Care Reviewed With: patient  Note:   Pt to discharge home with home health upon discharge. IV antibiotic tx cont. Pt has no c/o at this time. Will cont to monitor.

## 2020-03-18 NOTE — PROGRESS NOTES
"03/15/2020  Foot and Ankle Surgery - Inpatient Follow-up  Provider: Dr. Bong Baker DPM  Location: [facility]    Chief Complaint: s/p TMA POD#1    Subjective:  Kuldeep Adhikari is a 55 y.o. male.     Patient states that he is doing well.  He denies any significant pain to the right lower extremity.  No other issues today.    Allergies   Allergen Reactions   • Lisinopril Cough       No current facility-administered medications on file prior to encounter.      Current Outpatient Medications on File Prior to Encounter   Medication Sig Dispense Refill   • apixaban (ELIQUIS) 5 MG tablet tablet Take 1 tablet by mouth 2 (Two) Times a Day. 180 tablet 3   • aspirin (ASPIR-LOW) 81 MG EC tablet Take 81 mg by mouth Daily.     • atorvastatin (LIPITOR) 40 MG tablet Take 40 mg by mouth Daily.     • FLUoxetine (PROzac) 40 MG capsule TAKE 1 CAPSULE BY MOUTH ONCE DAILY 90 capsule 1   • glipizide (GLUCOTROL XL) 10 MG 24 hr tablet Take 20 mg by mouth Daily.     • Insulin Glargine (LANTUS SOLOSTAR) 100 UNIT/ML injection pen Inject 15 Units under the skin into the appropriate area as directed Daily. 15 pen 3   • JANUMET XR  MG tablet TAKE 1 TABLET BY MOUTH EVERY 12 HOURS 180 tablet 1   • levothyroxine (SYNTHROID) 100 MCG tablet Take 1 tablet by mouth Daily. 60 tablet 0   • losartan (COZAAR) 50 MG tablet Take 50 mg by mouth Daily.     • metoprolol succinate XL (TOPROL-XL) 50 MG 24 hr tablet Take 1 tablet by mouth Daily. 30 tablet 0   • omeprazole (priLOSEC) 40 MG capsule Take 40 mg by mouth Daily.     • nitroglycerin (NITROSTAT) 0.4 MG SL tablet Place 0.4 mg under the tongue Every 5 (Five) Minutes As Needed for Chest Pain.         Objective   /68 (BP Location: Left arm, Patient Position: Lying)   Pulse 95   Temp 99.9 °F (37.7 °C)   Resp 17   Ht 172.7 cm (68\")   Wt 84.1 kg (185 lb 6.5 oz)   SpO2 95%   BMI 28.19 kg/m²       Results from last 7 days   Lab Units 03/18/20  0554   WBC 10*3/mm3 7.20   HEMOGLOBIN " g/dL 11.5*   HEMATOCRIT % 33.9*   PLATELETS 10*3/mm3 202       Assessment/Plan     Patient Active Problem List   Diagnosis   • Benign essential hypertension   • Cellulitis   • Chest pain   • Chronic coronary artery disease   • Chronic renal insufficiency, stage III (moderate) (CMS/HCC)   • Degenerative joint disease   • Depression   • Diabetic peripheral neuropathy (CMS/HCC)   • Diabetic ketoacidosis (CMS/HCC)   • Disorder associated with type 2 diabetes mellitus (CMS/HCC)   • Encounter for screening for malignant neoplasm of colon   • Fatigue   • Foot pain, right   • Ganglion cyst   • Gangrene of foot (CMS/HCC)   • Gastroesophageal reflux disease   • History of amputation of hallux (CMS/HCC)   • History of gastrointestinal disease   • History of pulmonary embolism   • Hx of osteomyelitis   • Hyperlipidemia   • Hypothyroidism   • Obstructive sleep apnea syndrome   • Palpitations   • Peripheral vascular disease (CMS/HCC)   • Subacromial bursitis of right shoulder joint   • Vitamin D deficiency   • Type 2 diabetes mellitus with peripheral vascular disease (CMS/HCC)   • Cellulitis in diabetic foot (CMS/HCC)   • Obesity (BMI 30-39.9)       Patient doing well.  No significant pain to the right foot.  Dressing is to remain clean, dry, intact until follow-up with me in 2 weeks.  Nonweightbearing activity to the right lower extremity.  Case has been reviewed with infectious disease.  Okay with discharge when home IV antibiotics have been determined.  Will sign off at this time.    Note is dictated utilizing voice recognition software. Unfortunately this leads to occasional typographical errors. I apologize in advance if the situation occurs. If questions occur please do not hesitate to call our office.

## 2020-03-18 NOTE — PLAN OF CARE
Patient is alert and oriented.  Patient has surgery yesterday.  Patient is NWB on Rt foot at this time.  Will continue to monitor

## 2020-03-18 NOTE — PROGRESS NOTES
Discharge Planning Assessment  [facility]     Patient Name: Kuldeep Adhikari  MRN: [id number]  Today's Date: 3/18/2020    Admit Date: 3/15/2020        Plan    Plan  home with referral to [facility] and infusion company for possible iv antibiotics at discharge    Patient/Family in Agreement with Plan  other (see comments) talked to patient regarding possible home with iv antibiotics at discharge; choices given and [facility] was chosen;  referral made; rolling walker ordered from [facility] as recommended per phy ther          Carol naegele rn  Case management  Office number [phone]  Cell phone [phone]

## 2020-03-18 NOTE — PROGRESS NOTES
"      [facility] Services Daily Progress Note      Hospitalist Team  LOS 3 days      Patient Care Team:  Laura Whitaker MD as PCP - General    Patient Location: 358/1      Subjective   Subjective     Chief Complaint / Subjective  Chief Complaint   Patient presents with   • Daibetic Ulcer         Brief Synopsis of Hospital Course/HPI    The patient is a 54 y.o. male with  history of CKDIII, depression, hypertension, hypothyroidism, diabetes mellitus type 2, and sleep apnea who presented [facility]2020 with complaints of infected right toe.          Date::    3/16/20: Afebrile.  Planned TMA 3/17/2020.  Endocrinologically consulted per patient request.    3/17/20: afebrile. S/p TMA this am. Pain controlled.  3/18/20:  C/O chills      Review of Systems   All other systems reviewed and are negative.        Objective   Objective      Vital Signs  Temp:  [97.2 °F (36.2 °C)-100.2 °F (37.9 °C)] 99.9 °F (37.7 °C)  Heart Rate:  [] 80  Resp:  [14-18] 14  BP: (116-174)/(68-82) 116/71  Oxygen Therapy  SpO2: 94 %  Pulse Oximetry Type: Intermittent  Device (Oxygen Therapy): room air  Flow (L/min): 2  Flowsheet Rows      First Filed Value   Admission Height  165.1 cm (65\") Documented at 03/15/2020 1132   Admission Weight  84 kg (185 lb 3 oz) Documented at 03/15/2020 1132        Intake & Output (last 3 days)       03/15 0701 - 03/16 0700 03/16 0701 - 03/17 0700 03/17 0701 - 03/18 0700 03/18 0701 - 03/19 0700    P.O.  660 840 360    I.V. (mL/kg) 80 (1.4)  970.2 (11.5)     IV Piggyback 150 300 50     Total Intake(mL/kg) 230 (4.1) 960 (11.5) 1860.2 (22.1) 360 (4.3)    Urine (mL/kg/hr) 250 475 (0.2) 400 (0.2) 650 (0.7)    Stool    0    Total Output 250 475 400 650    Net -20 +485 +1460.2 -290            Stool Unmeasured Occurrence    1 x        Lines, Drains & Airways    Active LDAs     Name:   Placement date:   Placement time:   Site:   Days:    Peripheral IV 03/15/20 1207 " Right Antecubital   03/15/20    1207    Antecubital   2                  Physical Exam:    Physical Exam   Constitutional: He appears well-developed.   HENT:   Head: Normocephalic and atraumatic.   Eyes: Pupils are equal, round, and reactive to light. EOM are normal.   Neck: Normal range of motion.   Cardiovascular: Normal rate and normal heart sounds.   Pulmonary/Chest: Effort normal and breath sounds normal.   Abdominal: Soft. Bowel sounds are normal.        Lymphadenopathy:     He has no cervical adenopathy.   Neurological: He is alert. No cranial nerve deficit or sensory deficit.   Skin: Skin is warm and dry. No rash noted.   Psychiatric: He has a normal mood and affect. His speech is normal and behavior is normal.            Wounds (last 24 hours)      LDA Wound     Row Name 03/18/20 0740 03/17/20 1901          Wound Right anterior toe Diabetic Ulcer    Wound - Properties Group Side: Right  - Orientation: anterior  - Location: toe  -JE Primary Wound Type: Diabetic ulc  -JE    Dressing Appearance  dry;intact  -JM  dry;intact  -LM     Closure  WESLEY  -JM  WESLEY  -LM     Base  red  -JM  --     Drainage Characteristics/Odor  malodorous;serous  -JM  --     Drainage Amount  small  -JM  --        Wound 03/17/20 1211 Right anterior foot Incision    Wound - Properties Group Date first assessed: 03/17/20  - Time first assessed: 1211  - Present on Hospital Admission: N  -LH Side: Right  - Orientation: anterior  - Location: foot  -LH Primary Wound Type: Incision  -LH    Dressing Appearance  dry;intact  -JM  dry;intact  -LM     Closure  WESLEY  -JM  WESLEY  -LM     Base  clean;dry  -JM  --     Drainage Amount  none  -JM  --       User Key  (r) = Recorded By, (t) = Taken By, (c) = Cosigned By    Initials Name Provider Type    Argentina Zhu RN Registered Nurse    Bong Lo RN Registered Nurse    Stephanie Cee, RN Registered Nurse    Padmini Mancera RN Registered Nurse     "      Procedures:    Procedure(s):  AMPUTATION TRANS METATARSAL right          Results Review:     I reviewed the patient's new clinical results.      Lab Results (last 24 hours)     Procedure Component Value Units Date/Time    POC Glucose Once [[id number]]  (Abnormal) Collected:  03/18/20 1638    Specimen:  Blood Updated:  03/18/20 1640     Glucose 182 mg/dL      Comment: Serial Number: [id number]:  [id number]       Tissue Pathology Exam [[id number]] Collected:  03/17/20 1208    Specimen:  Tissue from Foot, Right Updated:  03/18/20 1326     Case Report --     Surgical Pathology Report                         Case: [id number]                                  Authorizing Provider:  ERWIN Baker DPM      Collected:           03/17/2020 12:08 PM          [facility]  Received:            03/17/2020 01:50 PM                                 OR                                                                           Pathologist:           Declan Baltazar MD                                                            Specimen:    Foot, Right, right forefoot                                                                 Final Diagnosis --     Right forefoot, amputation:    Skin ulceration and soft tissue gangrenous necrosis    Acute osteomyelitis of nonmarginal bone    Skin and soft tissue resection margins viable    GIO/sms        Gross Description --     Received in formalin designated \"Right forefoot\" is an amputation specimen measuring 10.5 x 9.0 x 3.0 cm. The distal portion is covered with tan skin. Digits 2 through 5 are present. The great toe is missing and the fifth toe is visibly necrotic. The skin, soft tissue and bony resection margins appear viable. No softening of the bony tissue deep to the area of ulceration is identified. Sections are submitted as follows:  A       Ulceration  B       Skin and soft tissue at resection margin  C       Representative section of " nonmarginal bony tissue after brief decalcification    [facility]        POC Glucose Once [[id number]]  (Abnormal) Collected:  03/18/20 1203    Specimen:  Blood Updated:  03/18/20 1206     Glucose 203 mg/dL      Comment: Serial Number: [id number]:  [id number]       Magnesium [[id number]]  (Normal) Collected:  03/18/20 0554    Specimen:  Blood Updated:  03/18/20 0734     Magnesium 1.7 mg/dL     Basic Metabolic Panel [[id number]]  (Abnormal) Collected:  03/18/20 0554    Specimen:  Blood Updated:  03/18/20 0734     Glucose 208 mg/dL      BUN 14 mg/dL      Creatinine 1.25 mg/dL      Sodium 136 mmol/L      Potassium 3.7 mmol/L      Chloride 101 mmol/L      CO2 22.0 mmol/L      Calcium 8.0 mg/dL      eGFR Non African Amer 60 mL/min/1.73      BUN/Creatinine Ratio 11.2     Anion Gap 13.0 mmol/L     Narrative:       GFR Normal >60  Chronic Kidney Disease <60  Kidney Failure <15      POC Glucose Once [[id number]]  (Abnormal) Collected:  03/18/20 0726    Specimen:  Blood Updated:  03/18/20 0728     Glucose 161 mg/dL      Comment: Serial Number: [id number]:  [id number]       Tissue / Bone Culture - Tissue, Foot, Right [[id number]] Collected:  03/17/20 1206    Specimen:  Tissue from Foot, Right Updated:  03/18/20 0723     Tissue Culture No growth     Gram Stain Few (2+) WBCs per low power field      Occasional Gram positive cocci    CBC & Differential [[id number]] Collected:  03/18/20 0554    Specimen:  Blood Updated:  03/18/20 0709    Narrative:       The following orders were created for panel order CBC & Differential.  Procedure                               Abnormality         Status                     ---------                               -----------         ------                     CBC Auto Differential[[id number]]        Abnormal            Final result                 Please view results for these tests on the individual orders.    CBC Auto Differential [[id number]]  (Abnormal) Collected:  03/18/20 0554    Specimen:   Blood Updated:  03/18/20 0709     WBC 7.20 10*3/mm3      RBC 4.06 10*6/mm3      Hemoglobin 11.5 g/dL      Hematocrit 33.9 %      MCV 83.5 fL      MCH 28.4 pg      MCHC 34.0 g/dL      RDW 13.6 %      RDW-SD 40.7 fl      MPV 7.4 fL      Platelets 202 10*3/mm3      Neutrophil % 73.2 %      Lymphocyte % 12.9 %      Monocyte % 10.5 %      Eosinophil % 2.5 %      Basophil % 0.9 %      Neutrophils, Absolute 5.30 10*3/mm3      Lymphocytes, Absolute 0.90 10*3/mm3      Monocytes, Absolute 0.80 10*3/mm3      Eosinophils, Absolute 0.20 10*3/mm3      Basophils, Absolute 0.10 10*3/mm3      nRBC 0.1 /100 WBC     Blood Culture - Blood, Arm, Left [[id number]]  (Abnormal) Collected:  03/15/20 1217    Specimen:  Blood from Arm, Left Updated:  03/18/20 0629     Blood Culture Streptococcus agalactiae (Group B)     Comment: Refer to previous blood culture collected on 3/15/2020 at 1208 for MINDY's.          Gram Stain Aerobic Bottle Gram positive cocci in chains      Anaerobic Bottle Gram positive cocci in chains      Anaerobic Bottle Gram negative bacilli    Narrative:             Blood Culture - Blood, Arm, Right [[id number]]  (Abnormal)  (Susceptibility) Collected:  03/15/20 1208    Specimen:  Blood from Arm, Right Updated:  03/18/20 0628     Blood Culture Streptococcus agalactiae (Group B)     Gram Stain Aerobic Bottle Gram positive cocci in chains      Anaerobic Bottle Gram positive cocci in chains    Narrative:       Organism under investigation.      Susceptibility      Streptococcus agalactiae (Group B)     MINDY     Ceftriaxone Susceptible     Clindamycin Resistant     Levofloxacin Susceptible     Penicillin G Susceptible     Vancomycin Susceptible                    POC Glucose Once [[id number]]  (Abnormal) Collected:  03/17/20 1957    Specimen:  Blood Updated:  03/17/20 2000     Glucose 239 mg/dL      Comment: Serial Number: [id number]:  [id number]           No results found for: HGBA1C  Results from last 7 days   Lab Units  03/17/20  0443 03/16/20  0223   INR  1.13* 1.24*           Lab Results   Component Value Date    LIPASE 45 03/05/2020     Lab Results   Component Value Date    CHOL 188 07/10/2019    TRIG 230 (H) 07/10/2019    HDL 40 07/10/2019     (H) 07/10/2019       Lab Results   Lab Value Date/Time    FINALDX  03/17/2020 1208     Right forefoot, amputation:    Skin ulceration and soft tissue gangrenous necrosis    Acute osteomyelitis of nonmarginal bone    Skin and soft tissue resection margins viable    GIO/sms       FINALDX  10/04/2019 1012     Esophagus, mid, biopsy:    Eosinophilic esophagitis (up to 45 eosinophils per high power field)    Negative for intestinal metaplasia but no glandular mucosa present in biopsy     JPR/tkd          Microbiology Results (last 10 days)     Procedure Component Value - Date/Time    Tissue / Bone Culture - Tissue, Foot, Right [[id number]] Collected:  03/17/20 1206    Lab Status:  Preliminary result Specimen:  Tissue from Foot, Right Updated:  03/18/20 0723     Tissue Culture No growth     Gram Stain Few (2+) WBCs per low power field      Occasional Gram positive cocci    MRSA Screen, PCR - Swab, Nares [[id number]]  (Normal) Collected:  03/16/20 0219    Lab Status:  Final result Specimen:  Swab from Nares Updated:  03/16/20 0602     MRSA PCR No MRSA Detected    Blood Culture - Blood, Arm, Left [[id number]]  (Abnormal) Collected:  03/15/20 1217    Lab Status:  Preliminary result Specimen:  Blood from Arm, Left Updated:  03/18/20 0629     Blood Culture Streptococcus agalactiae (Group B)     Comment: Refer to previous blood culture collected on 3/15/2020 at 1208 for MINDY's.          Gram Stain Aerobic Bottle Gram positive cocci in chains      Anaerobic Bottle Gram positive cocci in chains      Anaerobic Bottle Gram negative bacilli    Narrative:             Blood Culture ID, PCR - Blood, Arm, Left [[id number]]  (Abnormal) Collected:  03/15/20 1217    Lab Status:  Final result Specimen:  Blood  from Arm, Left Updated:  03/17/20 1417     BCID, PCR Streptococcus agalactiae (Group B). Identification by BCID PCR.     BOTTLE TYPE Anaerobic Bottle    Blood Culture - Blood, Arm, Right [[id number]]  (Abnormal)  (Susceptibility) Collected:  03/15/20 1208    Lab Status:  Preliminary result Specimen:  Blood from Arm, Right Updated:  03/18/20 0628     Blood Culture Streptococcus agalactiae (Group B)     Gram Stain Aerobic Bottle Gram positive cocci in chains      Anaerobic Bottle Gram positive cocci in chains    Narrative:       Organism under investigation.      Susceptibility      Streptococcus agalactiae (Group B)     MINDY     Ceftriaxone Susceptible     Clindamycin Resistant     Levofloxacin Susceptible     Penicillin G Susceptible     Vancomycin Susceptible                    Blood Culture ID, PCR - Blood, Arm, Right [[id number]]  (Abnormal) Collected:  03/15/20 1208    Lab Status:  Final result Specimen:  Blood from Arm, Right Updated:  03/16/20 0511     BCID, PCR Streptococcus agalactiae (Group B). Identification by BCID PCR.     BOTTLE TYPE Aerobic Bottle          ECG/EMG Results (most recent)     Procedure Component Value Units Date/Time    ECG 12 Lead [[id number]] Collected:  03/16/20 0601     Updated:  03/18/20 1351    Narrative:       HEART RATE= 86  bpm  RR Interval= 700  ms  DC Interval= 164  ms  P Horizontal Axis= -10  deg  P Front Axis= 59  deg  QRSD Interval= 77  ms  QT Interval=   ms  QRS Axis= 22  deg  T Wave Axis=   deg  - ABNORMAL ECG -  Sinus rhythm  Probable left atrial enlargement  Anteroseptal infarct, old  Nonspecific T abnrm, anterolateral leads  When compared with ECG of 21-Oct-2019 14:32:36,  Significant repolarization change  Significant axis, voltage or hypertrophy change  Electronically Signed By: Tom London (FINA) 18-Mar-2020 13:49:40  Date and Time of Study: 2020-03-16 06:01:11                    Xr Chest 1 View    Result Date: 3/16/2020   1. No acute cardiopulmonary disease.    Electronically Signed By-Bear Astudillo On:3/16/2020 7:51 AM This report was finalized on [id number] by  Bear Astudillo, .    Mri Foot Right Without Contrast    Result Date: 3/15/2020  1. Study is limited given the lack of a comparison radiograph. 2. Wound/ulceration along the lateral aspect of the fifth proximal phalanx with geographic marrow signal alteration in the fifth proximal phalanx indicative of osteomyelitis. 3. Subtle soft tissue irregularities over the tips of the second through fifth digits with subtle geographic marrow signal alteration within the tips of the second through fifth digit phalanges that is likely osteomyelitis. 4. Postsurgical findings from prior partial amputation of the first digit as above. 5. Susceptibility artifact in the soft tissues around the fifth digit that is likely some soft tissue gas. No abscess. Electronically signed by:  Geovanny Pulido M.D.  3/15/2020 10:05 PM          Xrays, labs reviewed personally by physician.    Medication Review:   I have reviewed the patient's current medication list      Scheduled Meds    ampicillin-sulbactam 3 g Intravenous Q6H   atorvastatin 40 mg Oral Nightly   FLUoxetine 40 mg Oral Daily   heparin (porcine) 5,000 Units Subcutaneous Q12H   insulin glargine 15 Units Subcutaneous Nightly   insulin lispro 0-14 Units Subcutaneous TID With Meals   insulin lispro 5 Units Subcutaneous TID With Meals   levothyroxine 100 mcg Oral Q AM   losartan 50 mg Oral Daily   metoprolol succinate XL 50 mg Oral Daily   pantoprazole 40 mg Oral QAM   polyethylene glycol 17 g Oral Daily   sodium chloride 10 mL Intravenous Q12H       Meds Infusions    lactated ringers 1,000 mL       Meds PRN  •  acetaminophen **OR** acetaminophen **OR** acetaminophen  •  dextrose  •  dextrose  •  glucagon (human recombinant)  •  HYDROcodone-acetaminophen  •  HYDROcodone-acetaminophen  •  magnesium sulfate **OR** magnesium sulfate in D5W 1g/100mL (PREMIX)  •  nitroglycerin  •   ondansetron **OR** ondansetron  •  potassium chloride  •  sodium chloride  •  sodium chloride      Assessment/Plan   Assessment/Plan     Active Hospital Problems    Diagnosis  POA   • **Cellulitis in diabetic foot (CMS/[facility]) [E11.628, L03.119]  Yes   • Obesity (BMI 30-39.9) [E66.9]  Yes   • Chronic coronary artery disease [I25.10]  Yes   • Obstructive sleep apnea syndrome [G47.33]  Yes   • Benign essential hypertension [I10]  Yes   • Diabetic peripheral neuropathy (CMS/[facility]) [E11.42]  Yes   • Depression [F32.9]  Yes   • Gastroesophageal reflux disease [K21.9]  Yes   • Hyperlipidemia [E78.5]  Yes   • Hypothyroidism [E03.9]  Yes   • Chronic renal insufficiency, stage III (moderate) (CMS/[facility]) [N18.3]  Yes   • Gangrene of foot (CMS/[facility]) [I96]  Unknown      Resolved Hospital Problems   No resolved problems to display.       MEDICAL DECISION MAKING COMPLEXITY BY PROBLEM:     Group B Streptococcus bacteremia:  -Right foot cellulitis/osteomyelitis as source  -Continue Unasyn and clindamycin  -ID consulted    Left diabetic foot cellulitis:  -Continue broad-spectrum antibiotics  -MRI right foot 3/15/2020--> osteomyelitis  -s/p TMA by podiatry 3/17/2020  - ID consulted      Diabetes mellitus complicated with peripheral neuropathy  - hemoglobin A1c-->9  -Holding home Janumet and glipizide  -Continue ISS,  Lantus and Humalog  -Endocrinology consulted     Essential hypertension  -Continue losartan and metoprolol     Hyperlipidemia  -Continue atorvastatin     Coronary artery disease  -Denies chest pain  - Managed by Dr. Noel outpatient      Chronic kidney disease stage III  -Avoid nephrotoxic medications     Hypothyroidism  -Continue levothyroxine     Depression  -Stable  -Continue Prozac     History of pulmonary embolism  -Eliquis on hold due to possible surgery     GERD  -Continue PPI     Obstructive sleep apnea  -Compliant on CPAP       VTE Prophylaxis -   Mechanical Order History:     None      Pharmalogical Order History:      Ordered     Dose Route Frequency Stop    03/15/20 1608  heparin (porcine) 5000 UNIT/ML injection 5,000 Units      5,000 Units SC Every 12 Hours Scheduled --            Code Status -   Code Status and Medical Interventions:   Ordered at: 03/15/20 1608     Level Of Support Discussed With:    Patient     Code Status:    CPR     Medical Interventions (Level of Support Prior to Arrest):    Full       Discharge Planning          Destination      Coordination has not been started for this encounter.      Durable Medical Equipment      Coordination has not been started for this encounter.      Dialysis/Infusion      Coordination has not been started for this encounter.      Home Medical Care      Coordination has not been started for this encounter.      Therapy      Coordination has not been started for this encounter.      Community Resources      Coordination has not been started for this encounter.            Electronically signed by Nick Leblanc DO, 03/18/20, 17:34.  [facility] Hospitalist Team

## 2020-03-18 NOTE — NURSING NOTE
Pt to cont NPO through tomorrow morning. Per speech therapy, video swallow to be done tomorrow. Will cont to monitor.

## 2020-03-18 NOTE — PROGRESS NOTES
Daily Progress Note    Patient Care Team:  Laura Whitaker MD as PCP - General    Chief Complaint: Follow-up type 2 diabetes    HPI: Patient seen and examined today.  55-year-old male with history of type 2 diabetes, hypertension, hypothyroidism, CKD stage III disease initially admitted with infected right toe.  He is status post transmetatarsal amputation of the right foot on March 17, 2020.  He is eating well.  Currently on Lantus and Humalog.  Blood sugars are little bit high post meals.    ROS:   Constitutional:  Denies fatigue, tiredness.    Eyes:  Denies change in visual acuity   HENT:  Denies nasal congestion or sore throat   Respiratory: denies cough, shortness of breath.   Cardiovascular:  denies chest pain, edema   GI:  Denies abdominal pain, nausea, vomiting.   :  Denies polyuria and polydipsia  Musculoskeletal:  Denies back pain or joint pain   Integument:  Denies rash   Neurologic:  Denies headache, focal weakness or sensory changes   Endocrine:  Denies polyuria or polydipsia   Psychiatric:  Denies depression or anxiety       Vitals:    03/18/20 1453   BP: 116/71   Pulse: 80   Resp: 14   Temp:    SpO2: 94%       Physical Exam:  GEN: NAD, conversant  EYES: EOMI, PERRL, no conjunctival erythema  NECK: no thyromegaly, full ROM   CV: RRR, no murmurs/rubs/gallops, no peripheral edema  LUNG: CTAB, no wheezes/rales/ronchi  SKIN: no rashes, no acanthosis  MSK: Status post right transmetatarsal amputation with bandage  NEURO: no tremors, DTR normal  PSYCH: AOX3, appropriate mood, affect normal      Results Review:     I reviewed the patient's new clinical results.    Glucose   Date Value Ref Range Status   03/18/2020 208 (H) 65 - 99 mg/dL Final     Sodium   Date Value Ref Range Status   03/18/2020 136 136 - 145 mmol/L Final     Potassium   Date Value Ref Range Status   03/18/2020 3.7 3.5 - 5.2 mmol/L Final     CO2   Date Value Ref Range Status   03/18/2020 22.0 22.0 - 29.0 mmol/L Final      Chloride   Date Value Ref Range Status   03/18/2020 101 98 - 107 mmol/L Final     Anion Gap   Date Value Ref Range Status   03/18/2020 13.0 5.0 - 15.0 mmol/L Final     Creatinine   Date Value Ref Range Status   03/18/2020 1.25 0.76 - 1.27 mg/dL Final     BUN   Date Value Ref Range Status   03/18/2020 14 6 - 20 mg/dL Final     BUN/Creatinine Ratio   Date Value Ref Range Status   03/18/2020 11.2 7.0 - 25.0 Final     Calcium   Date Value Ref Range Status   03/18/2020 8.0 (L) 8.6 - 10.5 mg/dL Final     eGFR Non  Amer   Date Value Ref Range Status   03/18/2020 60 (L) >60 mL/min/1.73 Final     Magnesium   Date Value Ref Range Status   03/18/2020 1.7 1.6 - 2.6 mg/dL Final     Lab Results   Component Value Date    HGBA1C 9.9 (H) 03/15/2020    HGBA1C 8.8 (H) 07/10/2019     No results found for: GLUF, MICROALBUR  Results from last 7 days   Lab Units 03/18/20  1638 03/18/20  1203 03/18/20  0726 03/17/20  1957 03/17/20  1717 03/17/20  1250   GLUCOSE mg/dL 182* 203* 161* 239* 195* 106*             Medication Review: Reviewed.       ampicillin-sulbactam 3 g Intravenous Q6H   atorvastatin 40 mg Oral Nightly   FLUoxetine 40 mg Oral Daily   heparin (porcine) 5,000 Units Subcutaneous Q12H   insulin glargine 15 Units Subcutaneous Nightly   insulin lispro 0-14 Units Subcutaneous TID With Meals   insulin lispro 5 Units Subcutaneous TID With Meals   levothyroxine 100 mcg Oral Q AM   losartan 50 mg Oral Daily   metoprolol succinate XL 50 mg Oral Daily   pantoprazole 40 mg Oral QAM   polyethylene glycol 17 g Oral Daily   sodium chloride 10 mL Intravenous Q12H         Assessment/Plan   1.  Diabetes mellitus type 2: Uncontrolled, will increase Humalog to 6 units with each meal and continue glargine 15 at night and Humalog sliding scale with meals.  We will continue to follow blood sugars and make further adjustments as needed.  2.  Status post right transmetatarsal amputation on March 17, 2020  3.  Hyperlipidemia: On  atorvastatin  4.  Hypothyroidism: On levothyroxine supplementation.             Tamara Michaels MD. FACE

## 2020-03-18 NOTE — PROGRESS NOTES
Infectious Diseases Progress Note      LOS: 3 days   Patient Care Team:  Laura Whitaker MD as PCP - General    Chief Complaint: Fatigue, right foot pain    Subjective       The patient has had a temperature as high as 100.2 degrees in the last 24 hours.  The patient is on room air, hemodynamically stable, and is tolerating antimicrobial therapy.      Review of Systems:   Review of Systems   Constitutional: Positive for fatigue.   HENT: Negative.    Respiratory: Negative.    Cardiovascular: Negative.    Gastrointestinal: Negative.    Genitourinary: Negative.    Musculoskeletal:        Right foot pain   Skin: Positive for wound.   Neurological: Negative.    Psychiatric/Behavioral: Negative.         Objective     Vital Signs  Temp:  [97.2 °F (36.2 °C)-100.2 °F (37.9 °C)] 99.9 °F (37.7 °C)  Heart Rate:  [] 95  Resp:  [12-18] 17  BP: (105-174)/(60-82) 120/68    Physical Exam:  Physical Exam   Constitutional: He is oriented to person, place, and time. He appears well-developed and well-nourished.   HENT:   Head: Normocephalic and atraumatic.   Eyes: Pupils are equal, round, and reactive to light. Conjunctivae and EOM are normal.   Neck: Neck supple.   Cardiovascular: Normal rate, regular rhythm and normal heart sounds.   Pulmonary/Chest: Effort normal and breath sounds normal.   Abdominal: Soft. Bowel sounds are normal.   Musculoskeletal: Normal range of motion.   Neurological: He is alert and oriented to person, place, and time.   Skin: Skin is warm and dry.   dressing on right foot is clean and dry   Psychiatric: He has a normal mood and affect.   Vitals reviewed.       Results Review:    I have reviewed all clinical data, test, lab, and imaging results.     Radiology  No Radiology Exams Resulted Within Past 24 Hours    Cardiology    Laboratory    Results from last 7 days   Lab Units 03/18/20  0554 03/17/20  0443 03/16/20  0223 03/15/20  1208   WBC 10*3/mm3 7.20 7.00 11.00* 12.10*   HEMOGLOBIN g/dL  11.5* 12.8* 12.8* 15.2   HEMATOCRIT % 33.9* 37.4* 36.1* 44.5   PLATELETS 10*3/mm3 202 222 229 261     Results from last 7 days   Lab Units 03/18/20  0554 03/17/20  0443 03/16/20  0223 03/15/20  1208   SODIUM mmol/L 136 139 140 138   POTASSIUM mmol/L 3.7 4.3 4.1 4.1   CHLORIDE mmol/L 101 101 103 96*   CO2 mmol/L 22.0 28.0 26.0 27.0   BUN mg/dL 14 21* 19 16   CREATININE mg/dL 1.25 1.51* 1.68* 1.56*   GLUCOSE mg/dL 208* 137* 107* 236*   ALBUMIN g/dL  --   --   --  4.00   BILIRUBIN mg/dL  --   --   --  2.7*   ALK PHOS U/L  --   --   --  120*   AST (SGOT) U/L  --   --   --  11   ALT (SGPT) U/L  --   --   --  12   CALCIUM mg/dL 8.0* 8.2* 8.1* 9.0         Results from last 7 days   Lab Units 03/15/20  1208   SED RATE mm/hr 84*         Microbiology   Microbiology Results (last 10 days)     Procedure Component Value - Date/Time    Tissue / Bone Culture - Tissue, Foot, Right [[id number]] Collected:  03/17/20 1206    Lab Status:  Preliminary result Specimen:  Tissue from Foot, Right Updated:  03/18/20 0723     Tissue Culture No growth     Gram Stain Few (2+) WBCs per low power field      Occasional Gram positive cocci    MRSA Screen, PCR - Swab, Nares [[id number]]  (Normal) Collected:  03/16/20 0219    Lab Status:  Final result Specimen:  Swab from Nares Updated:  03/16/20 0602     MRSA PCR No MRSA Detected    Blood Culture - Blood, Arm, Left [[id number]]  (Abnormal) Collected:  03/15/20 1217    Lab Status:  Preliminary result Specimen:  Blood from Arm, Left Updated:  03/18/20 0629     Blood Culture Streptococcus agalactiae (Group B)     Comment: Refer to previous blood culture collected on 3/15/2020 at 1208 for MINDY's.          Gram Stain Aerobic Bottle Gram positive cocci in chains      Anaerobic Bottle Gram positive cocci in chains      Anaerobic Bottle Gram negative bacilli    Narrative:             Blood Culture ID, PCR - Blood, Arm, Left [[id number]]  (Abnormal) Collected:  03/15/20 1217    Lab Status:  Final result Specimen:   Blood from Arm, Left Updated:  03/17/20 1417     BCID, PCR Streptococcus agalactiae (Group B). Identification by BCID PCR.     BOTTLE TYPE Anaerobic Bottle    Blood Culture - Blood, Arm, Right [[id number]]  (Abnormal)  (Susceptibility) Collected:  03/15/20 1208    Lab Status:  Preliminary result Specimen:  Blood from Arm, Right Updated:  03/18/20 0628     Blood Culture Streptococcus agalactiae (Group B)     Gram Stain Aerobic Bottle Gram positive cocci in chains      Anaerobic Bottle Gram positive cocci in chains    Narrative:       Organism under investigation.      Susceptibility      Streptococcus agalactiae (Group B)     MINDY     Ceftriaxone Susceptible     Clindamycin Resistant     Levofloxacin Susceptible     Penicillin G Susceptible     Vancomycin Susceptible                    Blood Culture ID, PCR - Blood, Arm, Right [[id number]]  (Abnormal) Collected:  03/15/20 1208    Lab Status:  Final result Specimen:  Blood from Arm, Right Updated:  03/16/20 0511     BCID, PCR Streptococcus agalactiae (Group B). Identification by BCID PCR.     BOTTLE TYPE Aerobic Bottle          Medication Review:       Schedule Meds    ampicillin-sulbactam 3 g Intravenous Q6H   atorvastatin 40 mg Oral Nightly   FLUoxetine 40 mg Oral Daily   heparin (porcine) 5,000 Units Subcutaneous Q12H   insulin glargine 15 Units Subcutaneous Nightly   insulin lispro 0-14 Units Subcutaneous TID With Meals   insulin lispro 5 Units Subcutaneous TID With Meals   levothyroxine 100 mcg Oral Q AM   losartan 50 mg Oral Daily   metoprolol succinate XL 50 mg Oral Daily   pantoprazole 40 mg Oral QAM   polyethylene glycol 17 g Oral Daily   sodium chloride 10 mL Intravenous Q12H       Infusion Meds    lactated ringers 1,000 mL       PRN Meds  •  acetaminophen **OR** acetaminophen **OR** acetaminophen  •  dextrose  •  dextrose  •  glucagon (human recombinant)  •  HYDROcodone-acetaminophen  •  HYDROcodone-acetaminophen  •  magnesium sulfate **OR** magnesium sulfate  in D5W 1g/100mL (PREMIX)  •  nitroglycerin  •  ondansetron **OR** ondansetron  •  potassium chloride  •  sodium chloride  •  sodium chloride        Assessment/Plan       Antimicrobial Therapy   1.  IV Unasyn     Day 4  2.  IV clindamycin    Day 4  3.      Day  4.      Day  5.      Day      Assessment     Bacteremia-2 out of 2 cultures are growing group B streptococcus, one culture is also growing gram-negative bacilli  -Likely source is the right diabetic foot wound     Right diabetic foot wound been worsening over the last 2 weeks  -MRI showed osteomyelitis in the fifth toe along with osteomyelitis in the second through fifth distal phalanges  -Patient had a transmetatarsal amputation on 3/17/2020  -Wound cultures are negative so far     Type 2 diabetes with neuropathy     History of left fourth toe puncture wound that was treated 2017     History of right great toe amputation     Chronic kidney disease     History of DVT/PE     Plan     Continue IV Unasyn  Discontinue IV clindamycin  Patient will need 6 weeks of IV antimicrobial therapy  Waiting on blood cultures to finalize  Waiting on wound culture  Continue supportive care  Toy Nielsen, APRN  03/18/20  13:59

## 2020-03-18 NOTE — DISCHARGE PLACEMENT REQUEST
"Kuldeep Rangel (55 y.o. Male)     Date of Birth Social Security Number Address Home Phone MRN    [id number]  [id number] [facility] [address] SANCHEZ [place] [facility] [phone] [id number]    [employer] Marital Status          Yarsanism        Admission Date Admission Type Admitting Provider Attending Provider Department, Room/Bed    3/15/20 Emergency Nick Leblanc, Nick Arias,  [facility] 3C MEDICAL INPATIENT, 358/1    Discharge Date Discharge Disposition Discharge Destination                       Attending Provider:  Nick Leblanc DO    Allergies:  Lisinopril    Isolation:  None   Infection:  None   Code Status:  CPR    Ht:  172.7 cm (68\")   Wt:  84.1 kg (185 lb 6.5 oz)    Admission Cmt:  None   Principal Problem:  Cellulitis in diabetic foot (CMS/[facility]) [E11.628,L03.119]                 Active Insurance as of 3/15/2020     Primary Coverage     Payor Plan Insurance Group Employer/Plan Group    HUMANA MEDICARE REPLACEMENT HUMANA MEDICARE REPLACEMENT [id number]     Payor Plan Address Payor Plan Phone Number Payor Plan Fax Number Effective Dates    [address] [address] [phone]  1/1/2019 - None Entered    [facility] [id number]       Subscriber Name Subscriber Birth Date Member ID       KULDEEP RANGEL 1965 [id number]                 Emergency Contacts      (Rel.) Home Phone Work Phone Mobile Phone    DIGNA RANGEL (Spouse) [phone] -- --            Emergency Contact Information     Name Relation Home Work Mobile    DIGNA RANGEL Spouse [phone]            Insurance Information                HUMANA MEDICARE REPLACEMENT/HUMANA MEDICARE REPLACEMENT Phone: [phone]    Subscriber: Kuldeep Rangel Subscriber#: [id number]    Group#: [id number] Precert#:              History & Physical      Jazzy Dill, APRN at 03/15/20 1527     Attestation signed by Adela Gotti MD at 03/15/20 0376    Hospitalist Physician Assessment/Plan    History:  Patient is a type " II diabetic with a history of diabetic foot infection and great toe amputation who presents with a recurrence.  The patient states that it is been like this for about a week and that he was unable to get to the doctor because of other family members being unable to help him with transportation.  He is mostly limited at this time by pain.    Exam:  General: well-developed and well-nourished, NAD  Head: Normocephalic and atraumatic.   Eyes: EOM are normal. Pupils are equal, round, and reactive to light.   Heart: Normal rate and regular rhythm. No murmur   Chest: Effort normal and breath sounds normal. No wheezing, rales or rhonchi  Abdominal: Soft. NT/ND. Bowel sounds present  Musculoskeletal: Normal ROM.  No edema. No calf tenderness.  Neurological: AAOx3, no focal deficits  Skin: Skin is warm and dry. No rash.  The patient does have on his right foot he is missing the great toe.  The patient has a sore between his toes.  The patient's skin on his right leg is erythematous and edematous without any drainage.  Psychiatric: Normal mood and affect.    Medical Decision Making:  The patient has likely a polymicrobial diabetic foot infection.  The patient is concerned about the use of vancomycin as he has a brother with the same issue who is now on dialysis secondary to vancomycin.  We will alter his treatment plan to Unasyn and clindamycin.  This may need to be changed to Rocephin and Clinda.  Would restart the vancomycin or use Zyvox should patient's MRSA screen be positive.  The patient did not know of a positive screen in the past and was not in isolation.  The patient also has issues with pain which are currently not controlled.  We will give him 5 mg of hydrocodone for pain rated 3-6 and 10 mg for pain rated 7-10.  The patient has an podiatry consult in the morning.    I have reviewed and agree with the management of the patient's acute and chronic illnesses as outlined in the documentation above per the NP's  assessment and plan.    Attending Physician Attestation    For this patient encounter, I have reviewed the mid-level provider documentation, medical decision making and treatment plan, and I have personally spent time with the patient.  All procedures were done by me, and/or all procedures were performed by the mid-level under my supervision.                         [facility] Medicine Services      Patient Name: Kuldeep Adhikari  : 1965  MRN: [id number]  Primary Care Physician: Laura Whitaker MD  Date of admission: 3/15/2020    Patient Care Team:  Laura Whitaker MD as PCP - General  Clari Jaeger RN as Ambulatory  ([facility])          Subjective   History Present Illness     Chief Complaint:   Chief Complaint   Patient presents with   • Daibetic Ulcer       Mr. Adhikari is a 54 y.o.  male with a past medical history of CKD, depression, hypertension, hypothyroidism, diabetes mellitus type 2, and sleep apnea who presented [facility] on 3/15/2020 with complaints of infected right toe.  Patient states he was here at [facility] last Saturday due to hyperglycemia.  He reported then he had a small blister on his right lower extremity and within a week it has progressively gotten worse.  He states it is been draining some blood and has been foul-smelling.  He said his right lower extremity pain is a 5 out of 10 at this time.  Pain medication has helped.  He denies any aggravating factors.  He mentioned he has had osteomyelitis before in his right foot and that is why his big toe is amputated.  He denies any recent nausea, vomiting, diarrhea, or fever.    In the ED, all labs unremarkable except creatinine 1.5, white blood cells 12, and sed rate 84.  Urinalysis unremarkable.  Blood cultures pending.  All vital signs stable upon admission.  Patient received Unasyn, morphine, Zofran, normal saline 1 L bolus, and vancomycin in the  ED.    Review of Systems   Constitution: Positive for chills.   HENT: Negative.    Cardiovascular: Negative.    Respiratory: Negative.    Skin: Positive for color change.        Right foot wound- foul smelling, red, blister, bloody drainage   Musculoskeletal: Negative.    Gastrointestinal: Negative.    Genitourinary: Negative.    Neurological: Negative.    Psychiatric/Behavioral: Negative.            Personal History     Past Medical History:   Past Medical History:   Diagnosis Date   • CKD (chronic kidney disease), stage III (CMS/[facility])    • Depression    • DJD (degenerative joint disease)    • DVT (deep venous thrombosis) (CMS/HCC)    • Ganglion     rt wrist   • GERD (gastroesophageal reflux disease)    • Hiatal hernia    • History of echocardiogram 04/2016    2D ECHO   • History of esophageal stricture     s/p dialation 40-50 times last EGD 04/2016   • History of pulmonary embolus (PE)     on long term anticoagulation   • Hyperlipidemia    • Hypertension    • Hypothyroidism    • IBS (irritable bowel syndrome)    • Neuropathy    • Rash     rt lower hip   • Retinopathy    • Seasonal allergies    • Sleep apnea     cpap  bring dos   • Type 2 diabetes mellitus with peripheral vascular disease (CMS/[facility])    • Vitamin D deficiency        Surgical History:      Past Surgical History:   Procedure Laterality Date   • AMPUTATION FOOT / TOE Right     great toe   • CARDIAC CATHETERIZATION  04/2018    [facility]   • ENDOSCOPY     • ENDOSCOPY N/A 10/4/2019    Procedure: ESOPHAGOGASTRODUODENOSCOPY with dilitation and biopsy x 1 area;  Surgeon: Declan Iqbal MD;  Location: [facility] ENDOSCOPY;  Service: Gastroenterology   • ENDOSCOPY N/A 12/13/2019    Procedure: ESOPHAGOGASTRODUODENOSCOPY WITH DILATATION (50, 52 BOUGIE);  Surgeon: Declan Iqbal MD;  Location: [facility] ENDOSCOPY;  Service: Gastroenterology   • GANGLION CYST EXCISION Left    • HERNIA REPAIR Bilateral    • RETINOPATHY SURGERY      laser   • TOTAL HIP  ARTHROPLASTY Left    • TOTAL HIP ARTHROPLASTY Left 2018           Family History: family history includes Cancer in an other family member; Colon cancer in an other family member; Diabetes in his mother; Heart disease in his mother; Hyperlipidemia in an other family member; Hypertension in an other family member; Leukemia in his father; Sleep apnea in his maternal aunt; Stroke in his maternal grandmother. Otherwise pertinent FHx was reviewed and unremarkable.     Social History:  reports that he has never smoked. He has never used smokeless tobacco. He reports that he does not drink alcohol or use drugs.      Medications:  Prior to Admission medications    Medication Sig Start Date End Date Taking? Authorizing Provider   apixaban (ELIQUIS) 5 MG tablet tablet Take 1 tablet by mouth 2 (Two) Times a Day. 12/17/19  Yes Laura Whitaker MD   aspirin (ASPIR-LOW) 81 MG EC tablet Take 81 mg by mouth Daily. 5/31/18  Yes Padilla Henderson MD   atorvastatin (LIPITOR) 40 MG tablet Take 40 mg by mouth Daily. 2/1/19  Yes Padilla Henderson MD   FLUoxetine (PROzac) 40 MG capsule TAKE 1 CAPSULE BY MOUTH ONCE DAILY 8/28/19  Yes Stephanie Duffy MD   glipizide (GLUCOTROL XL) 10 MG 24 hr tablet Take 20 mg by mouth Daily. 1/2/20  Yes Padilla Henderson MD   Insulin Glargine (LANTUS SOLOSTAR) 100 UNIT/ML injection pen Inject 15 Units under the skin into the appropriate area as directed Daily. 12/17/19  Yes Laura Whitaker MD   JANUMET XR  MG tablet TAKE 1 TABLET BY MOUTH EVERY 12 HOURS 2/14/20  Yes Laura Whitaker MD   levothyroxine (SYNTHROID) 100 MCG tablet Take 1 tablet by mouth Daily. 8/12/19  Yes Stephanie Duffy MD   losartan (COZAAR) 50 MG tablet Take 50 mg by mouth Daily. 3/23/18  Yes Padilla Henderson MD   metoprolol succinate XL (TOPROL-XL) 50 MG 24 hr tablet Take 1 tablet by mouth Daily. 12/17/19  Yes Finesse Noel MD   omeprazole  "(priLOSEC) 40 MG capsule Take 40 mg by mouth Daily.   Yes Padilla Henderson MD   cetirizine (ZYRTEC ALLERGY) 10 MG tablet Take 10 mg by mouth Daily. 4/22/19 3/15/20 Yes Padilla Henderson MD   nitroglycerin (NITROSTAT) 0.4 MG SL tablet Place 0.4 mg under the tongue Every 5 (Five) Minutes As Needed for Chest Pain. 2/1/19   Padilla Henderson MD   Blood Glucose Monitoring Suppl (ONETOUCH VERIO) w/Device kit 1 strip 2 (Two) Times a Day. Use kit to test bs bid  E11.51 10/25/19 3/15/20  Laura Whitaker MD   etodolac (LODINE) 400 MG tablet Take 400 mg by mouth 2 (Two) Times a Day. 6/29/18 3/15/20  Padilla Henderson MD   glucose blood (ONETOUCH VERIO) test strip Test bs twice a day E11.51 12/17/19 3/15/20  Laura Whitaker MD   Insulin Pen Needle (PEN NEEDLES 3/16\") 31G X 5 MM misc Use 1 needle daily 8/12/19 3/15/20  Stephanie Duffy MD   Omeprazole 20 MG tablet delayed-release Take 40 mg by mouth Daily for 90 days. 10/4/19 3/15/20  Declan Iqbal MD       Allergies:    Allergies   Allergen Reactions   • Lisinopril Cough       Objective   Objective     Vital Signs  Temp:  [98.9 °F (37.2 °C)] 98.9 °F (37.2 °C)  Heart Rate:  [80-91] 86  Resp:  [20] 20  BP: (124-167)/(48-80) 124/49  SpO2:  [97 %-100 %] 98 %  on   ;      Body mass index is 30.82 kg/m².    Physical Exam   Constitutional: He is oriented to person, place, and time. He appears well-developed and well-nourished.   HENT:   Head: Normocephalic and atraumatic.   Right Ear: External ear normal.   Left Ear: External ear normal.   Nose: Nose normal.   Mouth/Throat: Oropharynx is clear and moist.   Eyes: Pupils are equal, round, and reactive to light. Conjunctivae and EOM are normal.   Neck: Normal range of motion.   Cardiovascular: Normal rate, regular rhythm and normal heart sounds.   S1, S2 audible   Pulmonary/Chest: Effort normal and breath sounds normal.   On room air    Abdominal: Soft. Bowel sounds are " normal.   Musculoskeletal: Normal range of motion.   Neurological: He is alert and oriented to person, place, and time.   Skin: Skin is warm and dry. There is erythema (RLE, right big toe amputation noted, blister noted by 4th and 5th digits of RLE).   Foul smelling wound noted RLE   Psychiatric: He has a normal mood and affect. His behavior is normal. Judgment and thought content normal.   Vitals reviewed.      Results Review:  I have personally reviewed most recent cardiac tracings, lab results and radiology images and interpretations and agree with findings.    Results from last 7 days   Lab Units 03/15/20  1208   WBC 10*3/mm3 12.10*   HEMOGLOBIN g/dL 15.2   HEMATOCRIT % 44.5   PLATELETS 10*3/mm3 261     Results from last 7 days   Lab Units 03/15/20  1218 03/15/20  1208   SODIUM mmol/L  --  138   POTASSIUM mmol/L  --  4.1   CHLORIDE mmol/L  --  96*   CO2 mmol/L  --  27.0   BUN mg/dL  --  16   CREATININE mg/dL  --  1.56*   GLUCOSE mg/dL  --  236*   CALCIUM mg/dL  --  9.0   ALT (SGPT) U/L  --  12   AST (SGOT) U/L  --  11   LACTATE mmol/L 1.5  --      Estimated Creatinine Clearance: 53.4 mL/min (A) (by C-G formula based on SCr of 1.56 mg/dL (H)).  Brief Urine Lab Results  (Last result in the past 365 days)      Color   Clarity   Blood   Leuk Est   Nitrite   Protein   CREAT   Urine HCG        03/05/20 2119 Yellow Clear Negative Negative Negative Negative               Microbiology Results (last 10 days)     Procedure Component Value - Date/Time    Urine Culture - Urine, Urine, Clean Catch [[id number]]  (Normal) Collected:  03/05/20 2119    Lab Status:  Final result Specimen:  Urine, Clean Catch Updated:  03/06/20 2325     Urine Culture No growth          ECG/EMG Results (most recent)     None                    No radiology results for the last 7 days      Estimated Creatinine Clearance: 53.4 mL/min (A) (by C-G formula based on SCr of 1.56 mg/dL (H)).    Assessment/Plan   Assessment/Plan       Active Hospital Problems     Diagnosis  POA   • **Cellulitis in diabetic foot (CMS/[facility]) [E11.628, L03.119]  Yes     Priority: High   • Obesity (BMI 30-39.9) [E66.9]  Yes   • Chronic coronary artery disease [I25.10]  Yes   • Obstructive sleep apnea syndrome [G47.33]  Yes   • Benign essential hypertension [I10]  Yes   • Diabetic peripheral neuropathy (CMS/[facility]) [E11.42]  Yes   • Depression [F32.9]  Yes   • Gastroesophageal reflux disease [K21.9]  Yes   • Hyperlipidemia [E78.5]  Yes   • Hypothyroidism [E03.9]  Yes   • Chronic renal insufficiency, stage III (moderate) (CMS/[facility]) [N18.3]  Yes      Resolved Hospital Problems   No resolved problems to display.     Left diabetic foot cellulitis  -White blood cells 12, monitor  -Sed rate 84  -Blood cultures pending  -Patient received Unasyn and vancomycin in the ED  -Continue Unasyn and vancomycin  -MRI right foot ordered  -Podiatry consulted- Dr. Baker     Diabetes mellitus type 2 with hyperglycemia and peripheral neuropathy  -Start sliding scale, Accu-Cheks AC at bedtime  -Check hemoglobin A1c  -Holding home Janumet and glipizide  -Continue home Lantus    Essential hypertension  -Moderately controlled  -Monitor blood pressure  -Continue losartan and metoprolol    Hyperlipidemia  -Continue atorvastatin    Chronic coronary artery disease  -On aspirin at home, continue Nitrostat  - Managed by Dr. Noel outpatient     Chronic kidney disease stage III  -Creatinine 1.5, monitor  -Baseline creatinine 1.2-1.3  -Avoid nephrotoxic medications    Hypothyroidism  -Continue levothyroxine    Depression  -Stable  -Continue Prozac    History of pulmonary embolism  -Eliquis on hold due to possible surgery, Lovenox ordered    GERD  -Continue omeprazole    Obstructive sleep apnea  -Compliant on CPAP    Obesity  - BMI 30.8  - Encourage lifestyles modifications     VTE Prophylaxis -Lovenox         CODE STATUS:    Code Status and Medical Interventions:   Ordered at: 03/15/20 1609     Level Of Support Discussed With:     Patient     Code Status:    CPR     Medical Interventions (Level of Support Prior to Arrest):    Full         I discussed the patient's findings and my recommendations with patient.        Electronically signed by KEYLA Martinez, 03/15/20, 3:28 PM.  [facility] Hospitalist Team          Electronically signed by Adela Gotti MD at 03/15/20 1856         Current Facility-Administered Medications   Medication Dose Route Frequency Provider Last Rate Last Dose   • acetaminophen (TYLENOL) tablet 650 mg  650 mg Oral Q4H PRN ERWIN Baker DPM   650 mg at 03/17/20 0852    Or   • acetaminophen (TYLENOL) 160 MG/5ML solution 650 mg  650 mg Oral Q4H PRN ERWIN Baker DPM        Or   • acetaminophen (TYLENOL) suppository 650 mg  650 mg Rectal Q4H PRN ERWIN Baker DPM       • ampicillin-sulbactam (UNASYN) 3 g in sodium chloride 0.9 % 100 mL IVPB-MBP  3 g Intravenous Q6H ERWIN Baker DPM   Stopped at 03/18/20 0200   • atorvastatin (LIPITOR) tablet 40 mg  40 mg Oral Nightly ERWIN Baker DPM   40 mg at 03/17/20 2105   • dextrose (D50W) 25 g/ 50mL Intravenous Solution 25 g  25 g Intravenous Q15 Min PRN ERWIN Baker DPM       • dextrose (GLUTOSE) oral gel 15 g  15 g Oral Q15 Min PRN ERWIN Baker DPM       • FLUoxetine (PROzac) capsule 40 mg  40 mg Oral Daily ERWIN Baker DPM   40 mg at 03/16/20 0848   • glucagon (human recombinant) (GLUCAGEN DIAGNOSTIC) injection 1 mg  1 mg Subcutaneous PRN ERWIN Baker DPM       • heparin (porcine) 5000 UNIT/ML injection 5,000 Units  5,000 Units Subcutaneous Q12H ERWIN Baker DPM   5,000 Units at 03/17/20 2104   • HYDROcodone-acetaminophen (NORCO)  MG per tablet 1 tablet  1 tablet Oral Q4H PRN ERWIN Baker DPM   1 tablet at 03/18/20 0156   • HYDROcodone-acetaminophen (NORCO) 5-325 MG per tablet 1 tablet  1 tablet Oral Q4H PRN ERWIN Baker DPM       • insulin glargine (LANTUS) injection 15 Units  15 Units Subcutaneous  Nightly ERWIN Baker DPM   15 Units at 03/17/20 2109   • insulin lispro (humaLOG) injection 0-14 Units  0-14 Units Subcutaneous TID With Meals ERWIN Baker DPM   3 Units at 03/17/20 1732   • insulin lispro (humaLOG) injection 5 Units  5 Units Subcutaneous TID With Meals ERWIN Baker DPM   5 Units at 03/17/20 1733   • lactated ringers infusion 1,000 mL  1,000 mL Intravenous Continuous ERWIN Baker DPM       • levothyroxine (SYNTHROID, LEVOTHROID) tablet 100 mcg  100 mcg Oral Q AM ERWIN Baker DPM   100 mcg at 03/18/20 0552   • losartan (COZAAR) tablet 50 mg  50 mg Oral Daily ERWIN Baker DPM   50 mg at 03/16/20 0848   • Magnesium Sulfate 2 gram infusion - Mg less than or equal to 1.5 mg/dL  2 g Intravenous PRN ERWIN Baker DPM        Or   • Magnesium Sulfate 1 gram infusion - Mg 1.6-1.9 mg/dL  1 g Intravenous PRN ERWIN Baker DPM       • metoprolol succinate XL (TOPROL-XL) 24 hr tablet 50 mg  50 mg Oral Daily ERWIN Baker DPM   50 mg at 03/16/20 0848   • nitroglycerin (NITROSTAT) SL tablet 0.4 mg  0.4 mg Sublingual Q5 Min PRN ERWIN Baker DPM       • ondansetron (ZOFRAN) tablet 4 mg  4 mg Oral Q6H PRN ERWIN Baker DPM        Or   • ondansetron (ZOFRAN) injection 4 mg  4 mg Intravenous Q6H PRN ERWIN Baker DPM       • pantoprazole (PROTONIX) EC tablet 40 mg  40 mg Oral QAM ERWIN Baker DPM   40 mg at 03/16/20 0848   • polyethylene glycol 3350 powder (packet)  17 g Oral Daily Lizbeth-Nick Kate I, DO       • potassium chloride (K-DUR,KLOR-CON) CR tablet 40 mEq  40 mEq Oral PRN ERWIN Baker DPM       • sodium chloride 0.9 % flush 10 mL  10 mL Intravenous PRN ERWIN Baker DPM   10 mL at 03/15/20 1208   • sodium chloride 0.9 % flush 10 mL  10 mL Intravenous Q12H ERWIN Baker DPM   10 mL at 03/17/20 2106   • sodium chloride 0.9 % flush 10 mL  10 mL Intravenous PRN ERWIN Baker DPM

## 2020-03-19 VITALS
TEMPERATURE: 97.7 F | WEIGHT: 185.19 LBS | BODY MASS INDEX: 28.07 KG/M2 | SYSTOLIC BLOOD PRESSURE: 120 MMHG | DIASTOLIC BLOOD PRESSURE: 69 MMHG | RESPIRATION RATE: 18 BRPM | HEIGHT: 68 IN | OXYGEN SATURATION: 94 % | HEART RATE: 64 BPM

## 2020-03-19 PROBLEM — M86.9 OSTEOMYELITIS OF RIGHT FOOT (HCC): Status: ACTIVE | Noted: 2020-03-19

## 2020-03-19 PROBLEM — A40.1 SEPSIS DUE TO GROUP B STREPTOCOCCUS: Status: ACTIVE | Noted: 2020-03-19

## 2020-03-19 LAB
ANION GAP SERPL CALCULATED.3IONS-SCNC: 9 MMOL/L (ref 5–15)
BASOPHILS # BLD AUTO: 0.1 10*3/MM3 (ref 0–0.2)
BASOPHILS NFR BLD AUTO: 0.8 % (ref 0–1.5)
BUN BLD-MCNC: 13 MG/DL (ref 6–20)
BUN/CREAT SERPL: 11 (ref 7–25)
CALCIUM SPEC-SCNC: 8.4 MG/DL (ref 8.6–10.5)
CHLORIDE SERPL-SCNC: 101 MMOL/L (ref 98–107)
CO2 SERPL-SCNC: 25 MMOL/L (ref 22–29)
CREAT BLD-MCNC: 1.18 MG/DL (ref 0.76–1.27)
DEPRECATED RDW RBC AUTO: 39.4 FL (ref 37–54)
EOSINOPHIL # BLD AUTO: 0.2 10*3/MM3 (ref 0–0.4)
EOSINOPHIL NFR BLD AUTO: 3.5 % (ref 0.3–6.2)
ERYTHROCYTE [DISTWIDTH] IN BLOOD BY AUTOMATED COUNT: 13.7 % (ref 12.3–15.4)
GFR SERPL CREATININE-BSD FRML MDRD: 64 ML/MIN/1.73
GLUCOSE BLD-MCNC: 262 MG/DL (ref 65–99)
GLUCOSE BLDC GLUCOMTR-MCNC: 132 MG/DL (ref 70–105)
GLUCOSE BLDC GLUCOMTR-MCNC: 145 MG/DL (ref 70–105)
HCT VFR BLD AUTO: 32.9 % (ref 37.5–51)
HGB BLD-MCNC: 11.2 G/DL (ref 13–17.7)
INR PPP: 1.14 (ref 2–3)
LYMPHOCYTES # BLD AUTO: 1.4 10*3/MM3 (ref 0.7–3.1)
LYMPHOCYTES NFR BLD AUTO: 21.1 % (ref 19.6–45.3)
MAGNESIUM SERPL-MCNC: 1.8 MG/DL (ref 1.6–2.6)
MCH RBC QN AUTO: 27.8 PG (ref 26.6–33)
MCHC RBC AUTO-ENTMCNC: 34.2 G/DL (ref 31.5–35.7)
MCV RBC AUTO: 81.1 FL (ref 79–97)
MONOCYTES # BLD AUTO: 0.7 10*3/MM3 (ref 0.1–0.9)
MONOCYTES NFR BLD AUTO: 10.2 % (ref 5–12)
NEUTROPHILS # BLD AUTO: 4.1 10*3/MM3 (ref 1.7–7)
NEUTROPHILS NFR BLD AUTO: 64.4 % (ref 42.7–76)
NRBC BLD AUTO-RTO: 0.1 /100 WBC (ref 0–0.2)
PLATELET # BLD AUTO: 210 10*3/MM3 (ref 140–450)
PMV BLD AUTO: 7.6 FL (ref 6–12)
POTASSIUM BLD-SCNC: 3.5 MMOL/L (ref 3.5–5.2)
PROTHROMBIN TIME: 11.7 SECONDS (ref 19.4–28.5)
RBC # BLD AUTO: 4.05 10*6/MM3 (ref 4.14–5.8)
SODIUM BLD-SCNC: 135 MMOL/L (ref 136–145)
VANCOMYCIN TROUGH SERPL-MCNC: <4 MCG/ML (ref 5–20)
WBC NRBC COR # BLD: 6.4 10*3/MM3 (ref 3.4–10.8)

## 2020-03-19 PROCEDURE — 25010000003 AMPICILLIN-SULBACTAM PER 1.5 G: Performed by: PODIATRIST

## 2020-03-19 PROCEDURE — 63710000001 INSULIN LISPRO (HUMAN) PER 5 UNITS: Performed by: INTERNAL MEDICINE

## 2020-03-19 PROCEDURE — C1751 CATH, INF, PER/CENT/MIDLINE: HCPCS

## 2020-03-19 PROCEDURE — 99238 HOSP IP/OBS DSCHRG MGMT 30/<: CPT | Performed by: HOSPITALIST

## 2020-03-19 PROCEDURE — 85025 COMPLETE CBC W/AUTO DIFF WBC: CPT | Performed by: PODIATRIST

## 2020-03-19 PROCEDURE — 85610 PROTHROMBIN TIME: CPT | Performed by: HOSPITALIST

## 2020-03-19 PROCEDURE — 99232 SBSQ HOSP IP/OBS MODERATE 35: CPT | Performed by: INTERNAL MEDICINE

## 2020-03-19 PROCEDURE — 82962 GLUCOSE BLOOD TEST: CPT

## 2020-03-19 PROCEDURE — 83735 ASSAY OF MAGNESIUM: CPT | Performed by: PODIATRIST

## 2020-03-19 PROCEDURE — 25010000002 HEPARIN (PORCINE) PER 1000 UNITS: Performed by: PODIATRIST

## 2020-03-19 PROCEDURE — 25010000002 CEFTRIAXONE PER 250 MG: Performed by: NURSE PRACTITIONER

## 2020-03-19 PROCEDURE — 80048 BASIC METABOLIC PNL TOTAL CA: CPT | Performed by: PODIATRIST

## 2020-03-19 PROCEDURE — 80202 ASSAY OF VANCOMYCIN: CPT | Performed by: PODIATRIST

## 2020-03-19 RX ORDER — LANCETS
1 EACH MISCELLANEOUS
Qty: 204 EACH | Refills: 2 | Status: SHIPPED | OUTPATIENT
Start: 2020-03-19 | End: 2020-08-26 | Stop reason: SDUPTHER

## 2020-03-19 RX ORDER — METRONIDAZOLE 500 MG/1
500 TABLET ORAL EVERY 8 HOURS SCHEDULED
Qty: 42 TABLET | Refills: 0 | Status: SHIPPED | OUTPATIENT
Start: 2020-03-19 | End: 2020-04-02

## 2020-03-19 RX ORDER — METRONIDAZOLE 500 MG/1
500 TABLET ORAL EVERY 8 HOURS SCHEDULED
Status: DISCONTINUED | OUTPATIENT
Start: 2020-03-19 | End: 2020-03-19 | Stop reason: HOSPADM

## 2020-03-19 RX ADMIN — INSULIN LISPRO 6 UNITS: 100 INJECTION, SOLUTION INTRAVENOUS; SUBCUTANEOUS at 12:50

## 2020-03-19 RX ADMIN — HEPARIN SODIUM 5000 UNITS: 5000 INJECTION INTRAVENOUS; SUBCUTANEOUS at 08:01

## 2020-03-19 RX ADMIN — FLUOXETINE 40 MG: 20 CAPSULE ORAL at 08:02

## 2020-03-19 RX ADMIN — LOSARTAN POTASSIUM 50 MG: 50 TABLET, FILM COATED ORAL at 08:01

## 2020-03-19 RX ADMIN — CEFTRIAXONE SODIUM 2 G: 2 INJECTION, POWDER, FOR SOLUTION INTRAMUSCULAR; INTRAVENOUS at 16:11

## 2020-03-19 RX ADMIN — AMPICILLIN AND SULBACTAM 3 G: 1; 2 INJECTION, POWDER, FOR SOLUTION INTRAMUSCULAR; INTRAVENOUS at 12:51

## 2020-03-19 RX ADMIN — LEVOTHYROXINE SODIUM 100 MCG: 100 TABLET ORAL at 06:31

## 2020-03-19 RX ADMIN — PANTOPRAZOLE SODIUM 40 MG: 40 TABLET, DELAYED RELEASE ORAL at 06:31

## 2020-03-19 RX ADMIN — INSULIN LISPRO 6 UNITS: 100 INJECTION, SOLUTION INTRAVENOUS; SUBCUTANEOUS at 08:02

## 2020-03-19 RX ADMIN — AMPICILLIN AND SULBACTAM 3 G: 1; 2 INJECTION, POWDER, FOR SOLUTION INTRAMUSCULAR; INTRAVENOUS at 06:33

## 2020-03-19 RX ADMIN — METRONIDAZOLE 500 MG: 500 TABLET ORAL at 15:51

## 2020-03-19 RX ADMIN — AMPICILLIN AND SULBACTAM 3 G: 1; 2 INJECTION, POWDER, FOR SOLUTION INTRAMUSCULAR; INTRAVENOUS at 01:29

## 2020-03-19 RX ADMIN — Medication 10 ML: at 08:03

## 2020-03-19 RX ADMIN — METOPROLOL SUCCINATE 50 MG: 50 TABLET, EXTENDED RELEASE ORAL at 08:01

## 2020-03-19 NOTE — PROGRESS NOTES
Discharge Planning Assessment  [facility]     Patient Name: Kuldeep Adhikari  MRN: [id number]  Today's Date: 3/19/2020    Admit Date: 3/15/2020          Plan    Plan  home with [facility]    Patient/Family in Agreement with Plan  other (see comments) per wolf with [facility] patient is accepted  to their care; she states that co-pay is verified with patient and he is agreeable with this; primary nurse notified that iv antibiotics will be delievered to room            Patient Forms    Important Message from Medicare (IMM)  Delivered    Delivered to  Patient    Method of delivery  In person            Carol naegele rn  Case management  Office number [phone]  Cell phone [phone]

## 2020-03-19 NOTE — PLAN OF CARE
Patient to have PICC placed for home antibiotic. Patient vitals stable. Will continue to monitor. Patient might discharge today.    Problem: Patient Care Overview  Goal: Plan of Care Review  Outcome: Ongoing (interventions implemented as appropriate)  Goal: Individualization and Mutuality  Outcome: Ongoing (interventions implemented as appropriate)  Goal: Discharge Needs Assessment  Outcome: Ongoing (interventions implemented as appropriate)  Goal: Interprofessional Rounds/Family Conf  Outcome: Ongoing (interventions implemented as appropriate)     Problem: Skin and Soft Tissue Infection (Adult)  Goal: Signs and Symptoms of Listed Potential Problems Will be Absent, Minimized or Managed (Skin and Soft Tissue Infection)  Outcome: Ongoing (interventions implemented as appropriate)     Problem: Skin Injury Risk (Adult)  Goal: Identify Related Risk Factors and Signs and Symptoms  Outcome: Ongoing (interventions implemented as appropriate)  Goal: Skin Health and Integrity  Outcome: Ongoing (interventions implemented as appropriate)

## 2020-03-19 NOTE — PROGRESS NOTES
Daily Progress Note    Patient Care Team:  Laura Whitaker MD as PCP - General    Chief Complaint: Follow-up type 2 diabetes    HPI: Patient seen and examined today.  55-year-old male with history of type 2 diabetes, hypertension, hypothyroidism, CKD stage III disease initially admitted with infected right toe.  He is status post transmetatarsal amputation of the right foot on March 17, 2020.  He is eating well.  Currently on Lantus and Humalog.  Blood sugars doing better.    ROS:   Constitutional:  Denies fatigue, tiredness.    Eyes:  Denies change in visual acuity   HENT:  Denies nasal congestion or sore throat   Respiratory: denies cough, shortness of breath.   Cardiovascular:  denies chest pain, edema   GI:  Denies abdominal pain, nausea, vomiting.   :  Denies polyuria and polydipsia  Musculoskeletal:  Denies back pain or joint pain   Integument:  Denies rash   Neurologic:  Denies headache, focal weakness or sensory changes   Endocrine:  Denies polyuria or polydipsia   Psychiatric:  Denies depression or anxiety       Vitals:    03/19/20 1115   BP: 120/69   Pulse: 64   Resp: 18   Temp: 97.7 °F (36.5 °C)   SpO2: 94%       Physical Exam:  GEN: NAD, conversant  EYES: EOMI, PERRL, no conjunctival erythema  NECK: no thyromegaly, full ROM   CV: RRR, no murmurs/rubs/gallops, no peripheral edema  LUNG: CTAB, no wheezes/rales/ronchi  SKIN: no rashes, no acanthosis  MSK: no deformities, full ROM of all extremities  NEURO: no tremors, DTR normal  PSYCH: AOX3, appropriate mood, affect normal      Results Review:     I reviewed the patient's new clinical results.    Glucose   Date Value Ref Range Status   03/19/2020 262 (H) 65 - 99 mg/dL Final     Sodium   Date Value Ref Range Status   03/19/2020 135 (L) 136 - 145 mmol/L Final     Potassium   Date Value Ref Range Status   03/19/2020 3.5 3.5 - 5.2 mmol/L Final     CO2   Date Value Ref Range Status   03/19/2020 25.0 22.0 - 29.0 mmol/L Final     Chloride    Date Value Ref Range Status   03/19/2020 101 98 - 107 mmol/L Final     Anion Gap   Date Value Ref Range Status   03/19/2020 9.0 5.0 - 15.0 mmol/L Final     Creatinine   Date Value Ref Range Status   03/19/2020 1.18 0.76 - 1.27 mg/dL Final     BUN   Date Value Ref Range Status   03/19/2020 13 6 - 20 mg/dL Final     BUN/Creatinine Ratio   Date Value Ref Range Status   03/19/2020 11.0 7.0 - 25.0 Final     Calcium   Date Value Ref Range Status   03/19/2020 8.4 (L) 8.6 - 10.5 mg/dL Final     eGFR Non  Amer   Date Value Ref Range Status   03/19/2020 64 >60 mL/min/1.73 Final     Magnesium   Date Value Ref Range Status   03/19/2020 1.8 1.6 - 2.6 mg/dL Final     Lab Results   Component Value Date    HGBA1C 9.9 (H) 03/15/2020    HGBA1C 8.8 (H) 07/10/2019     No results found for: GLUF, MICROALBUR  Results from last 7 days   Lab Units 03/19/20  1118 03/19/20  0700 03/18/20  1949 03/18/20  1638 03/18/20  1203 03/18/20  0726   GLUCOSE mg/dL 132* 145* 234* 182* 203* 161*             Medication Review: Reviewed.       apixaban 5 mg Oral Q12H   atorvastatin 40 mg Oral Nightly   cefTRIAXone 2 g Intravenous Q24H   FLUoxetine 40 mg Oral Daily   insulin glargine 15 Units Subcutaneous Nightly   insulin lispro 0-14 Units Subcutaneous TID With Meals   insulin lispro 6 Units Subcutaneous TID With Meals   levothyroxine 100 mcg Oral Q AM   losartan 50 mg Oral Daily   metoprolol succinate XL 50 mg Oral Daily   metroNIDAZOLE 500 mg Oral Q8H   pantoprazole 40 mg Oral QAM   polyethylene glycol 17 g Oral Daily   sodium chloride 10 mL Intravenous Q12H         Assessment/Plan   1.  Diabetes mellitus type 2: Uncontrolled, blood sugars better, will continue glargine 15 units at night and Humalog 6 units with each meal along with Humalog sliding scale.  We will continue to follow blood sugars and make further adjustments as needed.   2.  Status post right transmetatarsal amputation on March 17, 2020  3.  Hyperlipidemia: On atorvastatin  4.   Hypothyroidism: On levothyroxine supplementation.             Tamara Michaels MD. FACE

## 2020-03-19 NOTE — DISCHARGE SUMMARY
[facility] Medicine Services  DISCHARGE SUMMARY        Prepared For PCP:  Laura Whitaker MD    Patient Name: Kuldeep Adhikari  : 1965  MRN: [id number]      Date of Admission:   3/15/2020    Date of Discharge:  3/19/2020    Length of stay:  LOS: 4 days     Hospital Course     Presenting Problem:   Cellulitis in diabetic foot (CMS/HCC) [E11.628, L03.119]  Cellulitis in diabetic foot (CMS/HCC) [E11.628, L03.119]      Active Hospital Problems    Diagnosis  POA   • **Cellulitis in diabetic foot (CMS/HCC) [E11.628, L03.119]  Yes     Priority: High   • Sepsis due to group B Streptococcus (CMS/[facility]) [A40.1]  Yes     Priority: High   • Osteomyelitis of right foot (CMS/HCC) [M86.9]  Yes     Priority: High   • Diabetic peripheral neuropathy (CMS/[facility]) [E11.42]  Yes     Priority: High   • Obesity (BMI 30-39.9) [E66.9]  Yes     Priority: Medium   • Chronic coronary artery disease [I25.10]  Yes     Priority: Medium   • Obstructive sleep apnea syndrome [G47.33]  Yes     Priority: Medium   • Benign essential hypertension [I10]  Yes     Priority: Medium   • Depression [F32.9]  Yes     Priority: Medium   • Gastroesophageal reflux disease [K21.9]  Yes     Priority: Medium   • Hyperlipidemia [E78.5]  Yes     Priority: Medium   • Hypothyroidism [E03.9]  Yes     Priority: Medium   • Chronic renal insufficiency, stage III (moderate) (CMS/[facility]) [N18.3]  Yes     Priority: Medium   • Gangrene of foot (CMS/[facility]) [I96]  Unknown      Resolved Hospital Problems   No resolved problems to display.           Hospital Course:    The patient was admitted to the medical floor and was put on antibiotics. MRI of the right foot on 3/15/20 was significant for osteomyelitis. Blood cultures grew group B streptococcus and ID was consulted. The patient underwent TMA on 3/17/20. Endocrinologist was consulted for uncontrolled diabetes with hgA1c 9. The patient will need to be on Rocephin IV for 6 weeks and Flagy oral for 6  weeks.      List of problems during hospitalization:      Sepsis with Group B Streptococcus bacteremia:  -Right foot cellulitis/osteomyelitis as source  -s/p Unasyn and clindamycin  -ID consulted  -DC on Rocephin 6 weeks, Flagyl poral 6 weeks     Left diabetic foot cellulitis:  -Continue broad-spectrum antibiotics  -MRI right foot 3/15/2020--> osteomyelitis  -s/p TMA by podiatry 3/17/2020  - ID consulted      Diabetes mellitus complicated with peripheral neuropathy  - hemoglobin A1c-->9  -continue Janumet and glipizide  -Continue ISS,  Lantus and Humalog  -Endocrinology consulted     Essential hypertension  -Continue losartan and metoprolol     Hyperlipidemia  -Continue atorvastatin     Coronary artery disease  -Denies chest pain  - Managed by Dr. Noel outpatient      Chronic kidney disease stage III  -Avoid nephrotoxic medications     Hypothyroidism  -Continue levothyroxine     Depression  -Stable  -Continue Prozac     History of pulmonary embolism  -restarted Eliquis on 3/19/20     GERD  -Continue PPI     Obstructive sleep apnea  -Compliant on CPAP          Recommendation for Outpatient Providers:             Reasons For Change In Medications and Indications for New Medications:        Day of Discharge     HPI:     The patient is a 54 y.o. male with  history of CKDIII, depression, hypertension, hypothyroidism, diabetes mellitus type 2, and sleep apnea who presented [facility] on 3/15/2020 with complaints of infected right toe.         Vital Signs:   Temp:  [97.7 °F (36.5 °C)-99.1 °F (37.3 °C)] 97.7 °F (36.5 °C)  Heart Rate:  [62-73] 64  Resp:  [16-18] 18  BP: (120-131)/(65-71) 120/69     Physical Exam:  Physical Exam   Constitutional: He is oriented to person, place, and time. He appears well-developed and well-nourished.   HENT:   Head: Normocephalic and atraumatic.   Eyes: Pupils are equal, round, and reactive to light. EOM are normal.   Neck: Normal range of motion. Neck supple.   Cardiovascular:  Normal rate and normal heart sounds.   Pulmonary/Chest: Effort normal and breath sounds normal.   Abdominal: Soft. Bowel sounds are normal.   Musculoskeletal:   Moves all extremities        Lymphadenopathy:     He has no cervical adenopathy.   Neurological: He is alert and oriented to person, place, and time. No cranial nerve deficit or sensory deficit.   Skin: Skin is warm and dry. No rash noted.   Psychiatric: He has a normal mood and affect. His speech is normal and behavior is normal.       Pertinent  and/or Most Recent Results     Results from last 7 days   Lab Units 03/19/20  0238 03/18/20  0554 03/17/20  0443 03/16/20  0223 03/15/20  1208   WBC 10*3/mm3 6.40 7.20 7.00 11.00* 12.10*   HEMOGLOBIN g/dL 11.2* 11.5* 12.8* 12.8* 15.2   HEMATOCRIT % 32.9* 33.9* 37.4* 36.1* 44.5   PLATELETS 10*3/mm3 210 202 222 229 261   SODIUM mmol/L 135* 136 139 140 138   POTASSIUM mmol/L 3.5 3.7 4.3 4.1 4.1   CHLORIDE mmol/L 101 101 101 103 96*   CO2 mmol/L 25.0 22.0 28.0 26.0 27.0   BUN mg/dL 13 14 21* 19 16   CREATININE mg/dL 1.18 1.25 1.51* 1.68* 1.56*   GLUCOSE mg/dL 262* 208* 137* 107* 236*   CALCIUM mg/dL 8.4* 8.0* 8.2* 8.1* 9.0     Results from last 7 days   Lab Units 03/19/20  1037 03/17/20  0443 03/16/20  0223 03/15/20  1208   BILIRUBIN mg/dL  --   --   --  2.7*   ALK PHOS U/L  --   --   --  120*   ALT (SGPT) U/L  --   --   --  12   AST (SGOT) U/L  --   --   --  11   PROTIME Seconds 11.7* 11.6 12.6*  --    INR  1.14* 1.13* 1.24*  --    APTT seconds  --  28.0* 28.4*  --            Invalid input(s): TG, LDLCALC, LDLREALC  Results from last 7 days   Lab Units 03/15/20  1218 03/15/20  1208   HEMOGLOBIN A1C %  --  9.9*   LACTATE mmol/L 1.5  --        Brief Urine Lab Results  (Last result in the past 365 days)      Color   Clarity   Blood   Leuk Est   Nitrite   Protein   CREAT   Urine HCG        03/05/20 2119 Yellow Clear Negative Negative Negative Negative               Microbiology Results Abnormal     Procedure Component  Value - Date/Time    Blood Culture - Blood, Arm, Right [[id number]]  (Abnormal)  (Susceptibility) Collected:  03/15/20 1208    Lab Status:  Preliminary result Specimen:  Blood from Arm, Right Updated:  03/19/20 1300     Blood Culture Streptococcus agalactiae (Group B)     Gram Stain Aerobic Bottle Gram positive cocci in chains      Anaerobic Bottle Gram positive cocci in chains    Narrative:       Organism under investigation.      Susceptibility      Streptococcus agalactiae (Group B)     MINDY     Ceftriaxone Susceptible     Clindamycin Resistant     Levofloxacin Susceptible     Penicillin G Susceptible     Vancomycin Susceptible                    Blood Culture - Blood, Arm, Left [[id number]]  (Abnormal) Collected:  03/15/20 1217    Lab Status:  Preliminary result Specimen:  Blood from Arm, Left Updated:  03/19/20 1300     Blood Culture Streptococcus agalactiae (Group B)     Comment: Refer to previous blood culture collected on 3/15/2020 at 1208 for MINDY's.          Gram Stain Aerobic Bottle Gram positive cocci in chains      Anaerobic Bottle Gram positive cocci in chains      Anaerobic Bottle Gram negative bacilli    Narrative:             Tissue / Bone Culture - Tissue, Foot, Right [[id number]] Collected:  03/17/20 1206    Lab Status:  Preliminary result Specimen:  Tissue from Foot, Right Updated:  03/19/20 1024     Tissue Culture No growth at 2 days     Gram Stain Few (2+) WBCs per low power field      Occasional Gram positive cocci    Blood Culture ID, PCR - Blood, Arm, Left [[id number]]  (Abnormal) Collected:  03/15/20 1217    Lab Status:  Final result Specimen:  Blood from Arm, Left Updated:  03/17/20 1417     BCID, PCR Streptococcus agalactiae (Group B). Identification by BCID PCR.     BOTTLE TYPE Anaerobic Bottle    MRSA Screen, PCR - Swab, Nares [[id number]]  (Normal) Collected:  03/16/20 0219    Lab Status:  Final result Specimen:  Swab from Nares Updated:  03/16/20 0602     MRSA PCR No MRSA Detected    Blood  Culture ID, PCR - Blood, Arm, Right [[id number]]  (Abnormal) Collected:  03/15/20 1208    Lab Status:  Final result Specimen:  Blood from Arm, Right Updated:  03/16/20 0511     BCID, PCR Streptococcus agalactiae (Group B). Identification by BCID PCR.     BOTTLE TYPE Aerobic Bottle          Xr Chest 1 View    Result Date: 3/16/2020  Impression:  1. No acute cardiopulmonary disease.   Electronically Signed By-Bear Astudillo On:3/16/2020 7:51 AM This report was finalized on [id number] by  Bear Astudillo, .    Mri Foot Right Without Contrast    Result Date: 3/15/2020  Impression: 1. Study is limited given the lack of a comparison radiograph. 2. Wound/ulceration along the lateral aspect of the fifth proximal phalanx with geographic marrow signal alteration in the fifth proximal phalanx indicative of osteomyelitis. 3. Subtle soft tissue irregularities over the tips of the second through fifth digits with subtle geographic marrow signal alteration within the tips of the second through fifth digit phalanges that is likely osteomyelitis. 4. Postsurgical findings from prior partial amputation of the first digit as above. 5. Susceptibility artifact in the soft tissues around the fifth digit that is likely some soft tissue gas. No abscess. Electronically signed by:  Geovanny Pulido M.D.  3/15/2020 10:05 PM                             Test Results Pending at Discharge   Order Current Status    Anaerobic Culture - Tissue, Foot, Right In process    Blood Culture - Blood, Arm, Left Preliminary result    Blood Culture - Blood, Arm, Right Preliminary result    Tissue / Bone Culture - Tissue, Foot, Right Preliminary result            Procedures Performed  Procedure(s):  AMPUTATION TRANS METATARSAL right         Consults:   Consults     Date and Time Order Name Status Description    3/16/2020 2024 Inpatient Infectious Diseases Consult Completed     3/16/2020 1128 Inpatient Endocrinology Consult      3/15/2020 1608 Inpatient  Podiatry Consult Completed     3/15/2020 1507 Hospitalist (on-call MD unless specified)              Discharge Details        Discharge Medications      New Medications      Instructions Start Date   Accu-Chek FastClix Lancets misc   1 each, Does not apply, 4 Times Daily Before Meals & Nightly, Dx: E11.65      cefTRIAXone 2 g in sodium chloride 0.9 % 100 mL IVPB   2 g, Intravenous, Every 24 Hours      glucose blood test strip  Commonly known as:  Accu-Chek Guide   1 each, Other, 4 Times Daily Before Meals & Nightly, Dx: E11.65. Use as instructed      insulin lispro 100 UNIT/ML injection  Commonly known as:  humaLOG   6 Units, Subcutaneous, 3 Times Daily With Meals      metroNIDAZOLE 500 MG tablet  Commonly known as:  FLAGYL   500 mg, Oral, Every 8 Hours Scheduled         Continue These Medications      Instructions Start Date   apixaban 5 MG tablet tablet  Commonly known as:  Eliquis   5 mg, Oral, 2 Times Daily      Aspir-Low 81 MG EC tablet  Generic drug:  aspirin   81 mg, Oral, Daily      atorvastatin 40 MG tablet  Commonly known as:  LIPITOR   40 mg, Oral, Daily      FLUoxetine 40 MG capsule  Commonly known as:  PROzac   TAKE 1 CAPSULE BY MOUTH ONCE DAILY      glipizide 10 MG 24 hr tablet  Commonly known as:  GLUCOTROL XL   20 mg, Oral, Daily      Insulin Glargine 100 UNIT/ML injection pen  Commonly known as:  Lantus SoloStar   15 Units, Subcutaneous, Daily      Janumet XR  MG tablet  Generic drug:  SITagliptin-metFORMIN HCl ER   TAKE 1 TABLET BY MOUTH EVERY 12 HOURS      levothyroxine 100 MCG tablet  Commonly known as:  Synthroid   100 mcg, Oral, Daily      losartan 50 MG tablet  Commonly known as:  COZAAR   50 mg, Oral, Daily      metoprolol succinate XL 50 MG 24 hr tablet  Commonly known as:  TOPROL-XL   50 mg, Oral, Daily      nitroglycerin 0.4 MG SL tablet  Commonly known as:  NITROSTAT   0.4 mg, Sublingual, Every 5 Minutes PRN      omeprazole 40 MG capsule  Commonly known as:  priLOSEC   40 mg, Oral,  Daily             Allergies   Allergen Reactions   • Lisinopril Cough         Discharge Disposition:  Home or Self Care    Diet:  Hospital:  Diet Order   Procedures   • Diet Diabetic/Consistent Carbs; Diabetic - Consistent Carb         Discharge Activity: as tolerated        CODE STATUS:    Code Status and Medical Interventions:   Ordered at: 03/15/20 1608     Level Of Support Discussed With:    Patient     Code Status:    CPR     Medical Interventions (Level of Support Prior to Arrest):    Full         Follow-up Appointments  Future Appointments   Date Time Provider Department Center   3/31/2020 11:00 AM Livia Nicolas RD [facility] None   7/10/2020  2:10 PM Tamara Michaels MD [facility] None       Additional Instructions for the Follow-ups that You Need to Schedule     Ambulatory Referral to Home Health   As directed      Face to Face Visit Date:  3/18/2020    Follow-up provider for Plan of Care?:  I treated the patient in an acute care facility and will not continue treatment after discharge.    Follow-up provider:  HERNAN BEDOLLA [[id number]]    Reason/Clinical Findings:  iv antibiotics    Describe mobility limitations that make leaving home difficult:  surgery on right foot    Individual has a medical condition for which leaving home is medically contraindicated:  Yes    Describe condition:  amputation of digits on right foot    Patient uses assistive devices:  Yes    Devices used:  walker    Nursing/Therapeutic Services Requested:  Skilled Nursing Physical Therapy    Skilled nursing orders:  Medication education PICC line care/instruction Infusion therapy    PT orders:  Gait Training    Weight Bearing Status:  Non-Weight Bearing    Frequency:  1 Week 1             Condition on Discharge:      Stable    Electronically signed by Nick Leblanc DO, 03/19/20, 3:18 PM.    Time: I spent  20 minutes on this discharge activity which included face-to-face encounter with the patient/reviewing the data in  the system/coordination of the care with the nursing staff as well as consultants/documentation/entering orders.

## 2020-03-19 NOTE — PROGRESS NOTES
Infectious Diseases Progress Note      LOS: 4 days   Patient Care Team:  Laura Whitaker MD as PCP - General    Chief Complaint: Fatigue, right foot pain    Subjective       The patient has been afebrile in the last 24 hours.  The patient is on room air, hemodynamically stable, and is tolerating antimicrobial therapy.      Review of Systems:   Review of Systems   Constitutional: Positive for fatigue.   HENT: Negative.    Respiratory: Negative.    Cardiovascular: Negative.    Gastrointestinal: Negative.    Genitourinary: Negative.    Musculoskeletal:        Right foot pain   Skin: Positive for wound.   Neurological: Negative.    Psychiatric/Behavioral: Negative.         Objective     Vital Signs  Temp:  [97.7 °F (36.5 °C)-99.1 °F (37.3 °C)] 97.7 °F (36.5 °C)  Heart Rate:  [62-80] 64  Resp:  [14-18] 18  BP: (116-131)/(65-71) 120/69    Physical Exam:  Physical Exam   Constitutional: He is oriented to person, place, and time. He appears well-developed and well-nourished.   HENT:   Head: Normocephalic and atraumatic.   Eyes: Pupils are equal, round, and reactive to light. Conjunctivae and EOM are normal.   Neck: Neck supple.   Cardiovascular: Normal rate, regular rhythm and normal heart sounds.   Pulmonary/Chest: Effort normal and breath sounds normal.   Abdominal: Soft. Bowel sounds are normal.   Musculoskeletal: Normal range of motion.   Neurological: He is alert and oriented to person, place, and time.   Skin: Skin is warm and dry.   dressing on right foot is clean and dry   Psychiatric: He has a normal mood and affect.   Vitals reviewed.       Results Review:    I have reviewed all clinical data, test, lab, and imaging results.     Radiology  No Radiology Exams Resulted Within Past 24 Hours    Cardiology    Laboratory    Results from last 7 days   Lab Units 03/19/20  0238 03/18/20  0554 03/17/20  0443 03/16/20  0223 03/15/20  1208   WBC 10*3/mm3 6.40 7.20 7.00 11.00* 12.10*   HEMOGLOBIN g/dL 11.2* 11.5*  12.8* 12.8* 15.2   HEMATOCRIT % 32.9* 33.9* 37.4* 36.1* 44.5   PLATELETS 10*3/mm3 210 202 222 229 261     Results from last 7 days   Lab Units 03/19/20  0238 03/18/20  0554 03/17/20  0443 03/16/20  0223 03/15/20  1208   SODIUM mmol/L 135* 136 139 140 138   POTASSIUM mmol/L 3.5 3.7 4.3 4.1 4.1   CHLORIDE mmol/L 101 101 101 103 96*   CO2 mmol/L 25.0 22.0 28.0 26.0 27.0   BUN mg/dL 13 14 21* 19 16   CREATININE mg/dL 1.18 1.25 1.51* 1.68* 1.56*   GLUCOSE mg/dL 262* 208* 137* 107* 236*   ALBUMIN g/dL  --   --   --   --  4.00   BILIRUBIN mg/dL  --   --   --   --  2.7*   ALK PHOS U/L  --   --   --   --  120*   AST (SGOT) U/L  --   --   --   --  11   ALT (SGPT) U/L  --   --   --   --  12   CALCIUM mg/dL 8.4* 8.0* 8.2* 8.1* 9.0         Results from last 7 days   Lab Units 03/15/20  1208   SED RATE mm/hr 84*         Microbiology   Microbiology Results (last 10 days)     Procedure Component Value - Date/Time    Tissue / Bone Culture - Tissue, Foot, Right [[id number]] Collected:  03/17/20 1206    Lab Status:  Preliminary result Specimen:  Tissue from Foot, Right Updated:  03/19/20 1024     Tissue Culture No growth at 2 days     Gram Stain Few (2+) WBCs per low power field      Occasional Gram positive cocci    MRSA Screen, PCR - Swab, Nares [[id number]]  (Normal) Collected:  03/16/20 0219    Lab Status:  Final result Specimen:  Swab from Nares Updated:  03/16/20 0602     MRSA PCR No MRSA Detected    Blood Culture - Blood, Arm, Left [[id number]]  (Abnormal) Collected:  03/15/20 1217    Lab Status:  Preliminary result Specimen:  Blood from Arm, Left Updated:  03/19/20 1300     Blood Culture Streptococcus agalactiae (Group B)     Comment: Refer to previous blood culture collected on 3/15/2020 at 1208 for MINDY's.          Gram Stain Aerobic Bottle Gram positive cocci in chains      Anaerobic Bottle Gram positive cocci in chains      Anaerobic Bottle Gram negative bacilli    Narrative:             Blood Culture ID, PCR - Blood, Arm,  Left [[id number]]  (Abnormal) Collected:  03/15/20 1217    Lab Status:  Final result Specimen:  Blood from Arm, Left Updated:  03/17/20 1417     BCID, PCR Streptococcus agalactiae (Group B). Identification by BCID PCR.     BOTTLE TYPE Anaerobic Bottle    Blood Culture - Blood, Arm, Right [[id number]]  (Abnormal)  (Susceptibility) Collected:  03/15/20 1208    Lab Status:  Preliminary result Specimen:  Blood from Arm, Right Updated:  03/19/20 1300     Blood Culture Streptococcus agalactiae (Group B)     Gram Stain Aerobic Bottle Gram positive cocci in chains      Anaerobic Bottle Gram positive cocci in chains    Narrative:       Organism under investigation.      Susceptibility      Streptococcus agalactiae (Group B)     MINDY     Ceftriaxone Susceptible     Clindamycin Resistant     Levofloxacin Susceptible     Penicillin G Susceptible     Vancomycin Susceptible                    Blood Culture ID, PCR - Blood, Arm, Right [[id number]]  (Abnormal) Collected:  03/15/20 1208    Lab Status:  Final result Specimen:  Blood from Arm, Right Updated:  03/16/20 0511     BCID, PCR Streptococcus agalactiae (Group B). Identification by BCID PCR.     BOTTLE TYPE Aerobic Bottle          Medication Review:       Schedule Meds    ampicillin-sulbactam 3 g Intravenous Q6H   apixaban 5 mg Oral Q12H   atorvastatin 40 mg Oral Nightly   FLUoxetine 40 mg Oral Daily   insulin glargine 15 Units Subcutaneous Nightly   insulin lispro 0-14 Units Subcutaneous TID With Meals   insulin lispro 6 Units Subcutaneous TID With Meals   levothyroxine 100 mcg Oral Q AM   losartan 50 mg Oral Daily   metoprolol succinate XL 50 mg Oral Daily   pantoprazole 40 mg Oral QAM   polyethylene glycol 17 g Oral Daily   sodium chloride 10 mL Intravenous Q12H       Infusion Meds       PRN Meds  •  acetaminophen **OR** acetaminophen **OR** acetaminophen  •  dextrose  •  dextrose  •  glucagon (human recombinant)  •  HYDROcodone-acetaminophen  •  HYDROcodone-acetaminophen  •   magnesium sulfate **OR** magnesium sulfate in D5W 1g/100mL (PREMIX)  •  nitroglycerin  •  ondansetron **OR** ondansetron  •  potassium chloride  •  sodium chloride  •  sodium chloride        Assessment/Plan       Antimicrobial Therapy   1.  IV Unasyn     Day 5  2.  IV Rocpehin    Day 1  3. PO Flagyl     Day 1  4.      Day  5.      Day      Assessment     Bacteremia-2 out of 2 cultures are growing group B streptococcus, one culture is also growing gram-negative bacilli (spoke with micro and the cultures are anaerobic-likely bacterial nares are Prevotella)  -Likely source is the right diabetic foot wound     Right diabetic foot wound been worsening over the last 2 weeks  -MRI showed osteomyelitis in the fifth toe along with osteomyelitis in the second through fifth distal phalanges  -Patient had a transmetatarsal amputation on 3/17/2020  -Wound cultures are negative so far pathology shows osteomyelitis     Type 2 diabetes with neuropathy     History of left fourth toe puncture wound that was treated 2017     History of right great toe amputation     Chronic kidney disease     History of DVT/PE     Plan     Discontinue IV Unasyn  Start IV Rocephin 2 g every 24 hours for 6 weeks total treatment-last day on 4/26/20  Start p.o. Flagyl 500 mg 3 times daily for 6 weeks total treatment-last day on 4/26/2020  Patient will need a PICC line before discharge-please remove when IV antibiotics are completed.  Weekly labs CBC/creatinine/sed rate  Continue supportive care  Okay to discharge from Infectious Disease standpoint when antibiotics are arranged      Please fax all post discharge lab results, imaging studies and correspondence to this fax number [phone]  For any question or concern please contact our service number [phone]      Mary Nielsen, APRN  03/19/20  13:34

## 2020-03-19 NOTE — NURSING NOTE
Dr. Nieves asked if podiatry okay for patient to re-start coumadin. Dr. Baker stated that is okay. Dr. Nieves notified and placed orders.

## 2020-03-19 NOTE — PROGRESS NOTES
"      [facility] Medicine Services Daily Progress Note      Hospitalist Team  LOS 4 days      Patient Care Team:  Laura Whitaker MD as PCP - General    Patient Location: [facility]/1      Subjective   Subjective     Chief Complaint / Subjective  Chief Complaint   Patient presents with   • Daibetic Ulcer         Brief Synopsis of Hospital Course/HPI    The patient is a 54 y.o. male with  history of CKDIII, depression, hypertension, hypothyroidism, diabetes mellitus type 2, and sleep apnea who presented [facility] on 3/15/2020 with complaints of infected right toe.          Date::    3/16/20: Afebrile.  Planned TMA 3/17/2020.  Endocrinologically consulted per patient request.    3/17/20: afebrile. S/p TMA this am. Pain controlled.  3/18/20:  C/O chills  3/19/20: Still has chills.  Restarted Eliquis.      Review of Systems   All other systems reviewed and are negative.        Objective   Objective      Vital Signs  Temp:  [97.7 °F (36.5 °C)-99.1 °F (37.3 °C)] 97.7 °F (36.5 °C)  Heart Rate:  [62-80] 64  Resp:  [14-18] 18  BP: (116-131)/(65-71) 120/69  Oxygen Therapy  SpO2: 94 %  Pulse Oximetry Type: Intermittent  Device (Oxygen Therapy): room air  Flow (L/min): 2  Flowsheet Rows      First Filed Value   Admission Height  165.1 cm (65\") Documented at 03/15/2020 1132   Admission Weight  84 kg (185 lb 3 oz) Documented at 03/15/2020 1132        Intake & Output (last 3 days)       03/16 0701 - 03/17 0700 03/17 0701 - 03/18 0700 03/18 0701 - 03/19 0700 03/19 0701 - 03/20 0700    P.O. 660 840 360 360    I.V. (mL/kg)  970.2 (11.5)      IV Piggyback 300 50      Total Intake(mL/kg) 960 (11.5) 1860.2 (22.1) 360 (4.3) 360 (4.3)    Urine (mL/kg/hr) 475 (0.2) 400 (0.2) 650 (0.3)     Stool   0     Total Output 475 400 650     Net +485 +1460.2 -290 +360            Stool Unmeasured Occurrence   1 x         Lines, Drains & Airways    Active LDAs     Name:   Placement date:   Placement time:   Site:   " Days:    Peripheral IV 03/15/20 1207 Right Antecubital   03/15/20    1207    Antecubital   2                  Physical Exam:    Physical Exam   Constitutional: He appears well-developed.   HENT:   Head: Normocephalic and atraumatic.   Eyes: Pupils are equal, round, and reactive to light. EOM are normal.   Neck: Normal range of motion.   Cardiovascular: Normal rate and normal heart sounds.   Pulmonary/Chest: Effort normal and breath sounds normal.   Abdominal: Soft. Bowel sounds are normal.        Lymphadenopathy:     He has no cervical adenopathy.   Neurological: He is alert. No cranial nerve deficit or sensory deficit.   Skin: Skin is warm and dry. No rash noted.   Psychiatric: He has a normal mood and affect. His speech is normal and behavior is normal.            Wounds (last 24 hours)      LDA Wound     Row Name 03/19/20 0705 03/18/20 1901          Wound Right anterior toe Diabetic Ulcer    Wound - Properties Group Side: Right  -JE Orientation: anterior  -JE Location: toe  -JE Primary Wound Type: Diabetic ulc  -JE    Dressing Appearance  --  dry;intact;dried drainage  -LD     Closure  --  WESLEY  -LD     Drainage Amount  --  small  -LD        Wound 03/17/20 1211 Right anterior foot Incision    Wound - Properties Group Date first assessed: 03/17/20  - Time first assessed: 1211  -LH Present on Hospital Admission: N  -LH Side: Right  - Orientation: anterior  - Location: foot  -LH Primary Wound Type: Incision  -LH    Dressing Appearance  dry;intact  -BS  dry;intact  -LD     Closure  WESLEY  -BS  WESLEY  -LD     Base  dressing in place, unable to visualize  -BS  --     Dressing Care, Wound  --  elastic bandage  -LD       User Key  (r) = Recorded By, (t) = Taken By, (c) = Cosigned By    Initials Name Provider Type    BS Nolvia Copeland, RN Registered Nurse    Bong Lo, RN Registered Nurse    Estelle Sainz, RN Registered Nurse    Stephanie Cee, RN Registered Nurse           Procedures:    Procedure(s):  AMPUTATION TRANS METATARSAL right          Results Review:     I reviewed the patient's new clinical results.      Lab Results (last 24 hours)     Procedure Component Value Units Date/Time    POC Glucose Once [[id number]]  (Abnormal) Collected:  03/19/20 1118    Specimen:  Blood Updated:  03/19/20 1119     Glucose 132 mg/dL      Comment: Serial Number: [id number]:  [id number]       Protime-INR [[id number]]  (Abnormal) Collected:  03/19/20 1037    Specimen:  Blood Updated:  03/19/20 1055     Protime 11.7 Seconds      INR 1.14    Tissue / Bone Culture - Tissue, Foot, Right [[id number]] Collected:  03/17/20 1206    Specimen:  Tissue from Foot, Right Updated:  03/19/20 1024     Tissue Culture No growth at 2 days     Gram Stain Few (2+) WBCs per low power field      Occasional Gram positive cocci    POC Glucose Once [[id number]]  (Abnormal) Collected:  03/19/20 0700    Specimen:  Blood Updated:  03/19/20 0700     Glucose 145 mg/dL      Comment: Serial Number: [id number]:  [id number]       Magnesium [[id number]]  (Normal) Collected:  03/19/20 0238    Specimen:  Blood Updated:  03/19/20 0420     Magnesium 1.8 mg/dL     Basic Metabolic Panel [[id number]]  (Abnormal) Collected:  03/19/20 0238    Specimen:  Blood Updated:  03/19/20 0420     Glucose 262 mg/dL      BUN 13 mg/dL      Creatinine 1.18 mg/dL      Sodium 135 mmol/L      Potassium 3.5 mmol/L      Chloride 101 mmol/L      CO2 25.0 mmol/L      Calcium 8.4 mg/dL      eGFR Non African Amer 64 mL/min/1.73      BUN/Creatinine Ratio 11.0     Anion Gap 9.0 mmol/L     Narrative:       GFR Normal >60  Chronic Kidney Disease <60  Kidney Failure <15      CBC & Differential [[id number]] Collected:  03/19/20 0238    Specimen:  Blood Updated:  03/19/20 0401    Narrative:       The following orders were created for panel order CBC & Differential.  Procedure                               Abnormality         Status                     ---------                                -----------         ------                     CBC Auto Differential[[id number]]        Abnormal            Final result                 Please view results for these tests on the individual orders.    CBC Auto Differential [[id number]]  (Abnormal) Collected:  03/19/20 0238    Specimen:  Blood Updated:  03/19/20 0401     WBC 6.40 10*3/mm3      RBC 4.05 10*6/mm3      Hemoglobin 11.2 g/dL      Hematocrit 32.9 %      MCV 81.1 fL      MCH 27.8 pg      MCHC 34.2 g/dL      RDW 13.7 %      RDW-SD 39.4 fl      MPV 7.6 fL      Platelets 210 10*3/mm3      Neutrophil % 64.4 %      Lymphocyte % 21.1 %      Monocyte % 10.2 %      Eosinophil % 3.5 %      Basophil % 0.8 %      Neutrophils, Absolute 4.10 10*3/mm3      Lymphocytes, Absolute 1.40 10*3/mm3      Monocytes, Absolute 0.70 10*3/mm3      Eosinophils, Absolute 0.20 10*3/mm3      Basophils, Absolute 0.10 10*3/mm3      nRBC 0.1 /100 WBC     POC Glucose Once [[id number]]  (Abnormal) Collected:  03/18/20 1949    Specimen:  Blood Updated:  03/18/20 1950     Glucose 234 mg/dL      Comment: Serial Number: [id number]:  [id number]       Vancomycin, Peak [[id number]]  (Abnormal) Collected:  03/18/20 1801    Specimen:  Blood Updated:  03/18/20 1928     Vancomycin Peak <4.00 mcg/mL     POC Glucose Once [[id number]]  (Abnormal) Collected:  03/18/20 1638    Specimen:  Blood Updated:  03/18/20 1640     Glucose 182 mg/dL      Comment: Serial Number: [id number]:  [id number]       Tissue Pathology Exam [[id number]] Collected:  03/17/20 1208    Specimen:  Tissue from Foot, Right Updated:  03/18/20 1326     Case Report --     Surgical Pathology Report                         Case: [id number]                                  Authorizing Provider:  ERWIN Baker DPM      Collected:           03/17/2020 12:08 PM          Ordering Location:     [facility] MAIN  Received:            03/17/2020 01:50 PM                                 OR                "                                                            Pathologist:           Declan Baltazar MD                                                            Specimen:    Foot, Right, right forefoot                                                                 Final Diagnosis --     Right forefoot, amputation:    Skin ulceration and soft tissue gangrenous necrosis    Acute osteomyelitis of nonmarginal bone    Skin and soft tissue resection margins viable    GIO/sms        Gross Description --     Received in formalin designated \"Right forefoot\" is an amputation specimen measuring 10.5 x 9.0 x 3.0 cm. The distal portion is covered with tan skin. Digits 2 through 5 are present. The great toe is missing and the fifth toe is visibly necrotic. The skin, soft tissue and bony resection margins appear viable. No softening of the bony tissue deep to the area of ulceration is identified. Sections are submitted as follows:  A       Ulceration  B       Skin and soft tissue at resection margin  C       Representative section of nonmarginal bony tissue after brief decalcification    GIO/sms        POC Glucose Once [[id number]]  (Abnormal) Collected:  03/18/20 1203    Specimen:  Blood Updated:  03/18/20 1206     Glucose 203 mg/dL      Comment: Serial Number: [id number]:  [id number]           No results found for: HGBA1C  Results from last 7 days   Lab Units 03/19/20  1037 03/17/20  0443 03/16/20  0223   INR  1.14* 1.13* 1.24*           Lab Results   Component Value Date    LIPASE 45 03/05/2020     Lab Results   Component Value Date    CHOL 188 07/10/2019    TRIG 230 (H) 07/10/2019    HDL 40 07/10/2019     (H) 07/10/2019       Lab Results   Lab Value Date/Time    FINALDX  03/17/2020 1208     Right forefoot, amputation:    Skin ulceration and soft tissue gangrenous necrosis    Acute osteomyelitis of nonmarginal bone    Skin and soft tissue resection margins viable    GIO/sms       FINALDX  10/04/2019 1012     " Esophagus, mid, biopsy:    Eosinophilic esophagitis (up to 45 eosinophils per high power field)    Negative for intestinal metaplasia but no glandular mucosa present in biopsy     JPR/tkd          Microbiology Results (last 10 days)     Procedure Component Value - Date/Time    Tissue / Bone Culture - Tissue, Foot, Right [[id number]] Collected:  03/17/20 1206    Lab Status:  Preliminary result Specimen:  Tissue from Foot, Right Updated:  03/19/20 1024     Tissue Culture No growth at 2 days     Gram Stain Few (2+) WBCs per low power field      Occasional Gram positive cocci    MRSA Screen, PCR - Swab, Nares [[id number]]  (Normal) Collected:  03/16/20 0219    Lab Status:  Final result Specimen:  Swab from Nares Updated:  03/16/20 0602     MRSA PCR No MRSA Detected    Blood Culture - Blood, Arm, Left [[id number]]  (Abnormal) Collected:  03/15/20 1217    Lab Status:  Preliminary result Specimen:  Blood from Arm, Left Updated:  03/18/20 0629     Blood Culture Streptococcus agalactiae (Group B)     Comment: Refer to previous blood culture collected on 3/15/2020 at 1208 for MINDY's.          Gram Stain Aerobic Bottle Gram positive cocci in chains      Anaerobic Bottle Gram positive cocci in chains      Anaerobic Bottle Gram negative bacilli    Narrative:             Blood Culture ID, PCR - Blood, Arm, Left [[id number]]  (Abnormal) Collected:  03/15/20 1217    Lab Status:  Final result Specimen:  Blood from Arm, Left Updated:  03/17/20 1417     BCID, PCR Streptococcus agalactiae (Group B). Identification by BCID PCR.     BOTTLE TYPE Anaerobic Bottle    Blood Culture - Blood, Arm, Right [[id number]]  (Abnormal)  (Susceptibility) Collected:  03/15/20 1208    Lab Status:  Preliminary result Specimen:  Blood from Arm, Right Updated:  03/18/20 0628     Blood Culture Streptococcus agalactiae (Group B)     Gram Stain Aerobic Bottle Gram positive cocci in chains      Anaerobic Bottle Gram positive cocci in chains    Narrative:        Organism under investigation.      Susceptibility      Streptococcus agalactiae (Group B)     MINDY     Ceftriaxone Susceptible     Clindamycin Resistant     Levofloxacin Susceptible     Penicillin G Susceptible     Vancomycin Susceptible                    Blood Culture ID, PCR - Blood, Arm, Right [[id number]]  (Abnormal) Collected:  03/15/20 1208    Lab Status:  Final result Specimen:  Blood from Arm, Right Updated:  03/16/20 0511     BCID, PCR Streptococcus agalactiae (Group B). Identification by BCID PCR.     BOTTLE TYPE Aerobic Bottle          ECG/EMG Results (most recent)     Procedure Component Value Units Date/Time    ECG 12 Lead [[id number]] Collected:  03/16/20 0601     Updated:  03/18/20 1351    Narrative:       HEART RATE= 86  bpm  RR Interval= 700  ms  NC Interval= 164  ms  P Horizontal Axis= -10  deg  P Front Axis= 59  deg  QRSD Interval= 77  ms  QT Interval=   ms  QRS Axis= 22  deg  T Wave Axis=   deg  - ABNORMAL ECG -  Sinus rhythm  Probable left atrial enlargement  Anteroseptal infarct, old  Nonspecific T abnrm, anterolateral leads  When compared with ECG of 21-Oct-2019 14:32:36,  Significant repolarization change  Significant axis, voltage or hypertrophy change  Electronically Signed By: Tom London (FINA) 18-Mar-2020 13:49:40  Date and Time of Study: 2020-03-16 06:01:11                    Xr Chest 1 View    Result Date: 3/16/2020   1. No acute cardiopulmonary disease.   Electronically Signed By-Bear Astudillo On:3/16/2020 7:51 AM This report was finalized on [id number] by  Bear Astudillo, .    Mri Foot Right Without Contrast    Result Date: 3/15/2020  1. Study is limited given the lack of a comparison radiograph. 2. Wound/ulceration along the lateral aspect of the fifth proximal phalanx with geographic marrow signal alteration in the fifth proximal phalanx indicative of osteomyelitis. 3. Subtle soft tissue irregularities over the tips of the second through fifth digits with subtle geographic marrow  signal alteration within the tips of the second through fifth digit phalanges that is likely osteomyelitis. 4. Postsurgical findings from prior partial amputation of the first digit as above. 5. Susceptibility artifact in the soft tissues around the fifth digit that is likely some soft tissue gas. No abscess. Electronically signed by:  Geovanny Pulido M.D.  3/15/2020 10:05 PM          Xrays, labs reviewed personally by physician.    Medication Review:   I have reviewed the patient's current medication list      Scheduled Meds    ampicillin-sulbactam 3 g Intravenous Q6H   atorvastatin 40 mg Oral Nightly   FLUoxetine 40 mg Oral Daily   heparin (porcine) 5,000 Units Subcutaneous Q12H   insulin glargine 15 Units Subcutaneous Nightly   insulin lispro 0-14 Units Subcutaneous TID With Meals   insulin lispro 6 Units Subcutaneous TID With Meals   levothyroxine 100 mcg Oral Q AM   losartan 50 mg Oral Daily   metoprolol succinate XL 50 mg Oral Daily   pantoprazole 40 mg Oral QAM   polyethylene glycol 17 g Oral Daily   sodium chloride 10 mL Intravenous Q12H       Meds Infusions    Pharmacy to dose warfarin    Pharmacy to dose warfarin        Meds PRN  •  acetaminophen **OR** acetaminophen **OR** acetaminophen  •  dextrose  •  dextrose  •  glucagon (human recombinant)  •  HYDROcodone-acetaminophen  •  HYDROcodone-acetaminophen  •  magnesium sulfate **OR** magnesium sulfate in D5W 1g/100mL (PREMIX)  •  nitroglycerin  •  ondansetron **OR** ondansetron  •  Pharmacy to dose warfarin  •  Pharmacy to dose warfarin  •  potassium chloride  •  sodium chloride  •  sodium chloride      Assessment/Plan   Assessment/Plan     Active Hospital Problems    Diagnosis  POA   • **Cellulitis in diabetic foot (CMS/[facility]) [E11.628, L03.119]  Yes   • Obesity (BMI 30-39.9) [E66.9]  Yes   • Chronic coronary artery disease [I25.10]  Yes   • Obstructive sleep apnea syndrome [G47.33]  Yes   • Benign essential hypertension [I10]  Yes   • Diabetic  peripheral neuropathy (CMS/[facility]) [E11.42]  Yes   • Depression [F32.9]  Yes   • Gastroesophageal reflux disease [K21.9]  Yes   • Hyperlipidemia [E78.5]  Yes   • Hypothyroidism [E03.9]  Yes   • Chronic renal insufficiency, stage III (moderate) (CMS/HCC) [N18.3]  Yes   • Gangrene of foot (CMS/[facility]) [I96]  Unknown      Resolved Hospital Problems   No resolved problems to display.       MEDICAL DECISION MAKING COMPLEXITY BY PROBLEM:     Group B Streptococcus bacteremia:  -Right foot cellulitis/osteomyelitis as source  -Continue Unasyn and clindamycin  -ID consulted    Left diabetic foot cellulitis:  -Continue broad-spectrum antibiotics  -MRI right foot 3/15/2020--> osteomyelitis  -s/p TMA by podiatry 3/17/2020  - ID consulted      Diabetes mellitus complicated with peripheral neuropathy  - hemoglobin A1c-->9  -Holding home Janumet and glipizide  -Continue ISS,  Lantus and Humalog  -Endocrinology consulted     Essential hypertension  -Continue losartan and metoprolol     Hyperlipidemia  -Continue atorvastatin     Coronary artery disease  -Denies chest pain  - Managed by Dr. Noel outpatient      Chronic kidney disease stage III  -Avoid nephrotoxic medications     Hypothyroidism  -Continue levothyroxine     Depression  -Stable  -Continue Prozac     History of pulmonary embolism  -Eliquis on hold due to possible surgery     GERD  -Continue PPI     Obstructive sleep apnea  -Compliant on CPAP       VTE Prophylaxis -   Mechanical Order History:     None      Pharmalogical Order History:     Ordered     Dose Route Frequency Stop    03/15/20 1608  heparin (porcine) 5000 UNIT/ML injection 5,000 Units      5,000 Units SC Every 12 Hours Scheduled --            Code Status -   Code Status and Medical Interventions:   Ordered at: 03/15/20 1608     Level Of Support Discussed With:    Patient     Code Status:    CPR     Medical Interventions (Level of Support Prior to Arrest):    Full       Discharge Planning          Destination       Coordination has not been started for this encounter.      Durable Medical Equipment      Coordination has not been started for this encounter.      Dialysis/Infusion      Coordination has not been started for this encounter.      Home Medical Care      Coordination has not been started for this encounter.      Therapy      Coordination has not been started for this encounter.      Community Resources      Coordination has not been started for this encounter.            Electronically signed by Nick Leblanc DO, 03/19/20, 11:38.  [facility] Hospitalist Team

## 2020-03-20 ENCOUNTER — READMISSION MANAGEMENT (OUTPATIENT)
Dept: CALL CENTER | Facility: HOSPITAL | Age: 55
End: 2020-03-20

## 2020-03-20 LAB
BACTERIA SPEC AEROBE CULT: ABNORMAL
BACTERIA SPEC AEROBE CULT: NORMAL
GRAM STN SPEC: ABNORMAL
GRAM STN SPEC: NORMAL
GRAM STN SPEC: NORMAL

## 2020-03-20 NOTE — OUTREACH NOTE
Prep Survey      Responses   [facility] facility patient discharged from?  [place]   Is LACE score < 7 ?  No   Eligibility  Readm Mgmt   Discharge diagnosis  Cellulitis in diabetic foot TRANS METATARSAL AMPUTATION  Sepsis with Group B Streptococcus bacteremia:   Does the patient have one of the following disease processes/diagnoses(primary or secondary)?  General Surgery   Does the patient have Home health ordered?  Yes   What is the Home health agency?    [facility]   Is there a DME ordered?  No   Prep survey completed?  Yes          Ann Temple RN

## 2020-03-20 NOTE — PROGRESS NOTES
Discharge Planning Assessment   [facility]     Patient Name: Kuldeep Adhikari  MRN: [id number]  Today's Date: 3/20/2020    Admit Date: 3/15/2020          Plan    Final Discharge Disposition Code  06 - home with home health care    Final Note  home with [facility] home health          Carol naegele rn  Case management  Office number [phone]  Cell phone [phone]

## 2020-03-22 LAB — BACTERIA SPEC ANAEROBE CULT: NORMAL

## 2020-03-23 ENCOUNTER — READMISSION MANAGEMENT (OUTPATIENT)
Dept: CALL CENTER | Facility: HOSPITAL | Age: 55
End: 2020-03-23

## 2020-03-23 ENCOUNTER — LAB REQUISITION (OUTPATIENT)
Dept: LAB | Facility: HOSPITAL | Age: 55
End: 2020-03-23

## 2020-03-23 DIAGNOSIS — Z00.00 ENCOUNTER FOR GENERAL ADULT MEDICAL EXAMINATION WITHOUT ABNORMAL FINDINGS: ICD-10-CM

## 2020-03-23 LAB
CREAT BLD-MCNC: 1.09 MG/DL (ref 0.76–1.27)
DEPRECATED RDW RBC AUTO: 41.1 FL (ref 37–54)
EOSINOPHIL # BLD MANUAL: 0.18 10*3/MM3 (ref 0–0.4)
EOSINOPHIL NFR BLD MANUAL: 2 % (ref 0.3–6.2)
ERYTHROCYTE [DISTWIDTH] IN BLOOD BY AUTOMATED COUNT: 14.2 % (ref 12.3–15.4)
ERYTHROCYTE [SEDIMENTATION RATE] IN BLOOD: 73 MM/HR (ref 0–20)
GFR SERPL CREATININE-BSD FRML MDRD: 70 ML/MIN/1.73
HCT VFR BLD AUTO: 39.2 % (ref 37.5–51)
HGB BLD-MCNC: 13 G/DL (ref 13–17.7)
LARGE PLATELETS: ABNORMAL
LYMPHOCYTES # BLD MANUAL: 1.01 10*3/MM3 (ref 0.7–3.1)
LYMPHOCYTES NFR BLD MANUAL: 11 % (ref 19.6–45.3)
LYMPHOCYTES NFR BLD MANUAL: 3 % (ref 5–12)
MCH RBC QN AUTO: 27.3 PG (ref 26.6–33)
MCHC RBC AUTO-ENTMCNC: 33.2 G/DL (ref 31.5–35.7)
MCV RBC AUTO: 82.4 FL (ref 79–97)
MONOCYTES # BLD AUTO: 0.28 10*3/MM3 (ref 0.1–0.9)
MYELOCYTES NFR BLD MANUAL: 1 % (ref 0–0)
NEUTROPHILS # BLD AUTO: 7.54 10*3/MM3 (ref 1.7–7)
NEUTROPHILS NFR BLD MANUAL: 80 % (ref 42.7–76)
NEUTS BAND NFR BLD MANUAL: 2 % (ref 0–5)
PLATELET # BLD AUTO: 372 10*3/MM3 (ref 140–450)
PMV BLD AUTO: 7.2 FL (ref 6–12)
RBC # BLD AUTO: 4.75 10*6/MM3 (ref 4.14–5.8)
RBC MORPH BLD: NORMAL
SCAN SLIDE: NORMAL
VARIANT LYMPHS NFR BLD MANUAL: 1 % (ref 0–5)
WBC MORPH BLD: NORMAL
WBC NRBC COR # BLD: 9.2 10*3/MM3 (ref 3.4–10.8)

## 2020-03-23 PROCEDURE — 85025 COMPLETE CBC W/AUTO DIFF WBC: CPT | Performed by: INTERNAL MEDICINE

## 2020-03-23 PROCEDURE — 82565 ASSAY OF CREATININE: CPT | Performed by: INTERNAL MEDICINE

## 2020-03-23 PROCEDURE — 85652 RBC SED RATE AUTOMATED: CPT | Performed by: INTERNAL MEDICINE

## 2020-03-23 NOTE — OUTREACH NOTE
General Surgery Week 1 Survey      Responses   [facility] patient discharged from?  [facility]   Does the patient have one of the following disease processes/diagnoses(primary or secondary)?  General Surgery   Is there a successful TCM telephone encounter documented?  No   Week 1 attempt successful?  No   Unsuccessful attempts  Attempt 3 [NO ANSWER, NO VM AVAILABLE]          Saige Laureano LPN

## 2020-03-30 ENCOUNTER — LAB REQUISITION (OUTPATIENT)
Dept: LAB | Facility: HOSPITAL | Age: 55
End: 2020-03-30

## 2020-03-30 DIAGNOSIS — A40.1: ICD-10-CM

## 2020-03-30 LAB
BASOPHILS # BLD AUTO: 0.1 10*3/MM3 (ref 0–0.2)
BASOPHILS NFR BLD AUTO: 0.6 % (ref 0–1.5)
CREAT BLD-MCNC: 1.06 MG/DL (ref 0.76–1.27)
DEPRECATED RDW RBC AUTO: 42.4 FL (ref 37–54)
EOSINOPHIL # BLD AUTO: 0.3 10*3/MM3 (ref 0–0.4)
EOSINOPHIL NFR BLD AUTO: 3.4 % (ref 0.3–6.2)
ERYTHROCYTE [DISTWIDTH] IN BLOOD BY AUTOMATED COUNT: 14.5 % (ref 12.3–15.4)
ERYTHROCYTE [SEDIMENTATION RATE] IN BLOOD: 29 MM/HR (ref 0–20)
GFR SERPL CREATININE-BSD FRML MDRD: 72 ML/MIN/1.73
HCT VFR BLD AUTO: 39.9 % (ref 37.5–51)
HGB BLD-MCNC: 13.7 G/DL (ref 13–17.7)
LYMPHOCYTES # BLD AUTO: 1.1 10*3/MM3 (ref 0.7–3.1)
LYMPHOCYTES NFR BLD AUTO: 11.5 % (ref 19.6–45.3)
MCH RBC QN AUTO: 28.6 PG (ref 26.6–33)
MCHC RBC AUTO-ENTMCNC: 34.4 G/DL (ref 31.5–35.7)
MCV RBC AUTO: 83.1 FL (ref 79–97)
MONOCYTES # BLD AUTO: 0.6 10*3/MM3 (ref 0.1–0.9)
MONOCYTES NFR BLD AUTO: 6.3 % (ref 5–12)
NEUTROPHILS # BLD AUTO: 7.5 10*3/MM3 (ref 1.7–7)
NEUTROPHILS NFR BLD AUTO: 78.2 % (ref 42.7–76)
NRBC BLD AUTO-RTO: 0.1 /100 WBC (ref 0–0.2)
PLATELET # BLD AUTO: 352 10*3/MM3 (ref 140–450)
PMV BLD AUTO: 7.5 FL (ref 6–12)
RBC # BLD AUTO: 4.8 10*6/MM3 (ref 4.14–5.8)
WBC NRBC COR # BLD: 9.6 10*3/MM3 (ref 3.4–10.8)

## 2020-03-30 PROCEDURE — 85025 COMPLETE CBC W/AUTO DIFF WBC: CPT | Performed by: INTERNAL MEDICINE

## 2020-03-30 PROCEDURE — 82565 ASSAY OF CREATININE: CPT | Performed by: INTERNAL MEDICINE

## 2020-03-30 PROCEDURE — 85652 RBC SED RATE AUTOMATED: CPT | Performed by: INTERNAL MEDICINE

## 2020-03-31 ENCOUNTER — READMISSION MANAGEMENT (OUTPATIENT)
Dept: CALL CENTER | Facility: HOSPITAL | Age: 55
End: 2020-03-31

## 2020-03-31 NOTE — OUTREACH NOTE
General Surgery Week 2 Survey      Responses   [facility] patient discharged from?  [place]   Does the patient have one of the following disease processes/diagnoses(primary or secondary)?  General Surgery   Week 2 attempt successful?  Yes   Call start time  1809   Call end time  1811   Discharge diagnosis  Cellulitis in diabetic foot TRANS METATARSAL AMPUTATION  Sepsis with Group B Streptococcus bacteremia:   Meds reviewed with patient/caregiver?  Yes   Is the patient having any side effects they believe may be caused by any medication additions or changes?  No   Does the patient have all medications related to this admission filled (includes all antibiotics, pain medications, etc.)  Yes   Is the patient taking all medications as directed (includes completed medication regime)?  Yes   Does the patient have a follow up appointment scheduled with their surgeon?  Yes [4/8/20]   Has the patient kept scheduled appointments due by today?  N/A   What is the Home health agency?    [facility]   Has home health visited the patient within 72 hours of discharge?  Yes   Psychosocial issues?  No   Did the patient receive a copy of their discharge instructions?  Yes   Nursing interventions  Reviewed instructions with patient   What is the patient's perception of their health status since discharge?  Improving   Nursing interventions  Nurse provided patient education   Is the patient /caregiver able to teach back basic post-op care?  Take showers only when approved by MD-sponge bathe until then, Keep incision areas clean,dry and protected   Is the patient/caregiver able to teach back signs and symptoms of incisional infection?  Increased drainage or bleeding, Increased redness, swelling or pain at the incisonal site, Fever   Is the patient/caregiver able to teach back steps to recovery at home?  Rest and rebuild strength, gradually increase activity, Eat a well-balance diet   Is the patient/caregiver able  to teach back the hierarchy of who to call/visit for symptoms/problems? PCP, Specialist, Home health nurse, Urgent Care, ED, 911  Yes   Week 2 call completed?  Yes          Emmy Garduno RN

## 2020-04-06 ENCOUNTER — TELEPHONE (OUTPATIENT)
Dept: PODIATRY | Facility: CLINIC | Age: 55
End: 2020-04-06

## 2020-04-06 NOTE — TELEPHONE ENCOUNTER
Patient called back and spoke with Vernon and he said his daughter could bring him this Thursday at 8:45.

## 2020-04-06 NOTE — TELEPHONE ENCOUNTER
Just an FYI on patients appts. Patient had surgery on 3/17/2020. Patient was scheduled for a f/u on 3/31/2020 but we had to move patient because at that time you only wanted to see patients on Wednesdays and Thursdays. Patient then got moved to Wednesday 4/8/2020. Now due to Covid-19 you can only see patients on Thursdays. I called and spoke with patient and tried to get him to come in on Thursday 4/9/2020 but patient said he couldn't get a ride. He is now scheduled on the following Thursday 4/16/2020.

## 2020-04-07 ENCOUNTER — READMISSION MANAGEMENT (OUTPATIENT)
Dept: CALL CENTER | Facility: HOSPITAL | Age: 55
End: 2020-04-07

## 2020-04-07 ENCOUNTER — LAB REQUISITION (OUTPATIENT)
Dept: LAB | Facility: HOSPITAL | Age: 55
End: 2020-04-07

## 2020-04-07 DIAGNOSIS — A40.1: ICD-10-CM

## 2020-04-07 DIAGNOSIS — Z45.2 ENCOUNTER FOR ADJUSTMENT AND MANAGEMENT OF VASCULAR ACCESS DEVICE: ICD-10-CM

## 2020-04-07 LAB
ANION GAP SERPL CALCULATED.3IONS-SCNC: 13 MMOL/L (ref 5–15)
BASOPHILS # BLD AUTO: 0.1 10*3/MM3 (ref 0–0.2)
BASOPHILS NFR BLD AUTO: 1.3 % (ref 0–1.5)
BUN BLD-MCNC: 20 MG/DL (ref 6–20)
BUN/CREAT SERPL: 15.9 (ref 7–25)
CALCIUM SPEC-SCNC: 9.3 MG/DL (ref 8.6–10.5)
CHLORIDE SERPL-SCNC: 98 MMOL/L (ref 98–107)
CO2 SERPL-SCNC: 25 MMOL/L (ref 22–29)
CREAT BLD-MCNC: 1.26 MG/DL (ref 0.76–1.27)
CREAT BLD-MCNC: 1.26 MG/DL (ref 0.76–1.27)
DEPRECATED RDW RBC AUTO: 42.4 FL (ref 37–54)
EOSINOPHIL # BLD AUTO: 0.5 10*3/MM3 (ref 0–0.4)
EOSINOPHIL NFR BLD AUTO: 7.9 % (ref 0.3–6.2)
ERYTHROCYTE [DISTWIDTH] IN BLOOD BY AUTOMATED COUNT: 14.5 % (ref 12.3–15.4)
ERYTHROCYTE [SEDIMENTATION RATE] IN BLOOD: 21 MM/HR (ref 0–20)
GFR SERPL CREATININE-BSD FRML MDRD: 59 ML/MIN/1.73
GFR SERPL CREATININE-BSD FRML MDRD: 59 ML/MIN/1.73
GLUCOSE BLD-MCNC: 429 MG/DL (ref 65–99)
HCT VFR BLD AUTO: 41.8 % (ref 37.5–51)
HGB BLD-MCNC: 14.1 G/DL (ref 13–17.7)
LYMPHOCYTES # BLD AUTO: 1.1 10*3/MM3 (ref 0.7–3.1)
LYMPHOCYTES NFR BLD AUTO: 16.9 % (ref 19.6–45.3)
MCH RBC QN AUTO: 28 PG (ref 26.6–33)
MCHC RBC AUTO-ENTMCNC: 33.7 G/DL (ref 31.5–35.7)
MCV RBC AUTO: 83.2 FL (ref 79–97)
MONOCYTES # BLD AUTO: 0.5 10*3/MM3 (ref 0.1–0.9)
MONOCYTES NFR BLD AUTO: 8 % (ref 5–12)
NEUTROPHILS # BLD AUTO: 4.2 10*3/MM3 (ref 1.7–7)
NEUTROPHILS NFR BLD AUTO: 65.9 % (ref 42.7–76)
NRBC BLD AUTO-RTO: 0.1 /100 WBC (ref 0–0.2)
PLATELET # BLD AUTO: 208 10*3/MM3 (ref 140–450)
PMV BLD AUTO: 8.7 FL (ref 6–12)
POTASSIUM BLD-SCNC: 4.7 MMOL/L (ref 3.5–5.2)
RBC # BLD AUTO: 5.02 10*6/MM3 (ref 4.14–5.8)
SODIUM BLD-SCNC: 136 MMOL/L (ref 136–145)
WBC NRBC COR # BLD: 6.4 10*3/MM3 (ref 3.4–10.8)

## 2020-04-07 PROCEDURE — 85652 RBC SED RATE AUTOMATED: CPT | Performed by: INTERNAL MEDICINE

## 2020-04-07 PROCEDURE — 80048 BASIC METABOLIC PNL TOTAL CA: CPT | Performed by: INTERNAL MEDICINE

## 2020-04-07 PROCEDURE — 85025 COMPLETE CBC W/AUTO DIFF WBC: CPT | Performed by: INTERNAL MEDICINE

## 2020-04-07 PROCEDURE — 82565 ASSAY OF CREATININE: CPT | Performed by: INTERNAL MEDICINE

## 2020-04-07 NOTE — OUTREACH NOTE
General Surgery Week 3 Survey      Responses   [facility] patient discharged from?  [facility]   Does the patient have one of the following disease processes/diagnoses(primary or secondary)?  General Surgery   Week 3 attempt successful?  Yes   Call start time  1131   Call end time  1135   Meds reviewed with patient/caregiver?  Yes   Is the patient taking all medications as directed (includes completed medication regime)?  Yes   Has the patient kept scheduled appointments due by today?  No   Comments  HIs surgeon keeps cancelling his appointments.  Will leave him in for another week.   What is the patient's perception of their health status since discharge?  Improving   Week 3 call completed?  Yes   Wrap up additional comments  His surgeon will give him an appointment then cancel it and reschedule.  WIll leave him in one more week.          Tory Rolle RN

## 2020-04-09 ENCOUNTER — OFFICE VISIT (OUTPATIENT)
Dept: PODIATRY | Facility: CLINIC | Age: 55
End: 2020-04-09

## 2020-04-09 VITALS
SYSTOLIC BLOOD PRESSURE: 158 MMHG | HEIGHT: 68 IN | HEART RATE: 71 BPM | WEIGHT: 185 LBS | DIASTOLIC BLOOD PRESSURE: 75 MMHG | TEMPERATURE: 97 F | BODY MASS INDEX: 28.04 KG/M2

## 2020-04-09 DIAGNOSIS — E11.42 DM TYPE 2 WITH DIABETIC PERIPHERAL NEUROPATHY (HCC): ICD-10-CM

## 2020-04-09 DIAGNOSIS — Z89.431 STATUS POST AMPUTATION OF RIGHT FOOT THROUGH METATARSAL BONE (HCC): Primary | ICD-10-CM

## 2020-04-09 PROCEDURE — 99024 POSTOP FOLLOW-UP VISIT: CPT | Performed by: PODIATRIST

## 2020-04-09 RX ORDER — SODIUM CHLORIDE 9 MG/ML
INJECTION, SOLUTION INTRAVENOUS
COMMUNITY
Start: 2020-04-06 | End: 2020-06-24

## 2020-04-09 RX ORDER — EPINEPHRINE 0.15 MG/.3ML
INJECTION INTRAMUSCULAR
COMMUNITY
Start: 2020-03-19 | End: 2021-08-02

## 2020-04-09 NOTE — PROGRESS NOTES
04/09/2020  Foot and Ankle Surgery - Established Patient/Follow-up  Provider: Dr. Bong Baker DPM  Location: [facility]    Subjective:  Kuldeep Adhikari is a 55 y.o. male.     Chief Complaint   Patient presents with   • Right Foot - Post-op     Transmetatarsal amputation, right foot       HPI: Patient returns approximately 3 weeks after transmetatarsal amputation of the right foot.  He states that he has been quite active and walking in the cam boot.  He continues to have pain and intermittent throbbing particularly at night.  Dressing has remained intact.  He is receiving the IV antibiotics.    Allergies   Allergen Reactions   • Lisinopril Cough       Current Outpatient Medications on File Prior to Visit   Medication Sig Dispense Refill   • Accu-Chek FastClix Lancets misc 1 each 4 (Four) Times a Day Before Meals & at Bedtime. Dx: E11.65 204 each 2   • apixaban (ELIQUIS) 5 MG tablet tablet Take 1 tablet by mouth 2 (Two) Times a Day. 180 tablet 3   • aspirin (ASPIR-LOW) 81 MG EC tablet Take 81 mg by mouth Daily.     • atorvastatin (LIPITOR) 40 MG tablet Take 40 mg by mouth Daily.     • cefTRIAXone 2 g in sodium chloride 0.9 % 100 mL IVPB Infuse 2 g into a venous catheter Daily for 42 days. Indications: Bacteria in the Blood, Bone and/or Joint Infection     • EPINEPHrine (EPIPEN JR) 0.15 MG/0.3ML solution auto-injector injection      • FLUoxetine (PROzac) 40 MG capsule TAKE 1 CAPSULE BY MOUTH ONCE DAILY 90 capsule 1   • glipizide (GLUCOTROL XL) 10 MG 24 hr tablet Take 20 mg by mouth Daily.     • glucose blood (Accu-Chek Guide) test strip 1 each by Other route 4 (Four) Times a Day Before Meals & at Bedtime. Dx: E11.65. Use as instructed 150 each 2   • Insulin Glargine (LANTUS SOLOSTAR) 100 UNIT/ML injection pen Inject 15 Units under the skin into the appropriate area as directed Daily. 15 pen 3   • JANUMET XR  MG tablet TAKE 1 TABLET BY MOUTH EVERY 12 HOURS 180 tablet 1   • levothyroxine (SYNTHROID)  "100 MCG tablet Take 1 tablet by mouth Daily. 60 tablet 0   • losartan (COZAAR) 50 MG tablet Take 50 mg by mouth Daily.     • metoprolol succinate XL (TOPROL-XL) 50 MG 24 hr tablet Take 1 tablet by mouth Daily. 30 tablet 0   • nitroglycerin (NITROSTAT) 0.4 MG SL tablet Place 0.4 mg under the tongue Every 5 (Five) Minutes As Needed for Chest Pain.     • omeprazole (priLOSEC) 40 MG capsule Take 40 mg by mouth Daily.     • sodium chloride 0.9 % solution      • [DISCONTINUED] insulin lispro (humaLOG) 100 UNIT/ML injection Inject 6 Units under the skin into the appropriate area as directed 3 (Three) Times a Day With Meals. 10 mL 0     No current facility-administered medications on file prior to visit.        Objective   /75 (BP Location: Left arm, Patient Position: Sitting, Cuff Size: Adult)   Pulse 71   Temp 97 °F (36.1 °C) (Oral)   Ht 172.7 cm (68\")   Wt 83.9 kg (185 lb)   BMI 28.13 kg/m²     Foot/Ankle Exam:       General:   Appearance: appears stated age and healthy    Orientation: AAOx3    Affect: appropriate       VASCULAR       Right Foot Vascularity   Dorsalis pedis:  2+  Posterior tibial:  2+  Skin Temperature: warm    Edema Gradin+  CFT:  < 3 seconds  Pedal Hair Growth:  Absent      NEUROLOGIC      Right Foot Neurologic   Light touch sensation:  Diminished  Hot/Cold sensation: diminished    Protective Sensation using [place]-Debi Monofilament:  Diminished  Achilles reflex:  1+      MUSCULOSKELETAL       Right Foot Musculoskeletal    Amputation   Toes amputated: first toe  Ecchymosis:  None  Tenderness: none    Hammertoe:  Second toe, third toe, fourth toe and fifth toe      MUSCLE STRENGTH      Right Foot Muscle Strength   Normal strength    Foot dorsiflexion:  5  Foot plantar flexion:  5  Foot inversion:  5  Foot eversion:  5      DERMATOLOGIC      Right Foot Dermatologic   Skin:  Erythema noted to the amputation site.  Mild dehiscence noted to the lateral aspect of the incision with " staples pulling.  No cas drainage, fluctuance, or purulence.    Assessment/Plan   Kuldeep was seen today for post-op.    Diagnoses and all orders for this visit:    Status post amputation of right foot through metatarsal bone (CMS/HCC)    DM type 2 with diabetic peripheral neuropathy (CMS/[facility])      Patient returns approximately 3 weeks after hospitalization and transmetatarsal amputation to the right foot.  Staples were removed today.  He does have a small area of wound dehiscence noted to the lateral incision site.  He does have erythema but states that he has been quite active.  He is receiving the IV antibiotics and there is no gross signs of cas infection.  I have recommended that he start Betadine applications on a daily basis to the dehiscence site.  He is to remain strictly nonweightbearing to the right lower extremity.  He should continue to wear the cam boot for added protection.  I would like to see him in 1 to 2 weeks for reevaluation.  He is to call with any concerning features.    No orders of the defined types were placed in this encounter.         Note is dictated utilizing voice recognition software. Unfortunately this leads to occasional typographical errors. I apologize in advance if the situation occurs. If questions occur please do not hesitate to call our office.

## 2020-04-13 ENCOUNTER — LAB REQUISITION (OUTPATIENT)
Dept: LAB | Facility: HOSPITAL | Age: 55
End: 2020-04-13

## 2020-04-13 DIAGNOSIS — A40.1: ICD-10-CM

## 2020-04-13 DIAGNOSIS — Z45.2 ENCOUNTER FOR ADJUSTMENT AND MANAGEMENT OF VASCULAR ACCESS DEVICE: ICD-10-CM

## 2020-04-13 LAB
BASOPHILS # BLD AUTO: 0.1 10*3/MM3 (ref 0–0.2)
BASOPHILS NFR BLD AUTO: 1 % (ref 0–1.5)
CREAT BLD-MCNC: 1.06 MG/DL (ref 0.76–1.27)
DEPRECATED RDW RBC AUTO: 41.6 FL (ref 37–54)
EOSINOPHIL # BLD AUTO: 0.7 10*3/MM3 (ref 0–0.4)
EOSINOPHIL NFR BLD AUTO: 11.3 % (ref 0.3–6.2)
ERYTHROCYTE [DISTWIDTH] IN BLOOD BY AUTOMATED COUNT: 14.4 % (ref 12.3–15.4)
ERYTHROCYTE [SEDIMENTATION RATE] IN BLOOD: 19 MM/HR (ref 0–20)
GFR SERPL CREATININE-BSD FRML MDRD: 72 ML/MIN/1.73
HCT VFR BLD AUTO: 44.3 % (ref 37.5–51)
HGB BLD-MCNC: 14.8 G/DL (ref 13–17.7)
LYMPHOCYTES # BLD AUTO: 1.3 10*3/MM3 (ref 0.7–3.1)
LYMPHOCYTES NFR BLD AUTO: 20.5 % (ref 19.6–45.3)
MCH RBC QN AUTO: 27.3 PG (ref 26.6–33)
MCHC RBC AUTO-ENTMCNC: 33.4 G/DL (ref 31.5–35.7)
MCV RBC AUTO: 81.7 FL (ref 79–97)
MONOCYTES # BLD AUTO: 0.5 10*3/MM3 (ref 0.1–0.9)
MONOCYTES NFR BLD AUTO: 8.5 % (ref 5–12)
NEUTROPHILS # BLD AUTO: 3.6 10*3/MM3 (ref 1.7–7)
NEUTROPHILS NFR BLD AUTO: 58.7 % (ref 42.7–76)
NRBC BLD AUTO-RTO: 0.1 /100 WBC (ref 0–0.2)
PLATELET # BLD AUTO: 170 10*3/MM3 (ref 140–450)
PMV BLD AUTO: 8.4 FL (ref 6–12)
RBC # BLD AUTO: 5.42 10*6/MM3 (ref 4.14–5.8)
WBC NRBC COR # BLD: 6.2 10*3/MM3 (ref 3.4–10.8)

## 2020-04-13 PROCEDURE — 82565 ASSAY OF CREATININE: CPT | Performed by: INTERNAL MEDICINE

## 2020-04-13 PROCEDURE — 85025 COMPLETE CBC W/AUTO DIFF WBC: CPT | Performed by: INTERNAL MEDICINE

## 2020-04-13 PROCEDURE — 85652 RBC SED RATE AUTOMATED: CPT | Performed by: INTERNAL MEDICINE

## 2020-04-14 ENCOUNTER — READMISSION MANAGEMENT (OUTPATIENT)
Dept: CALL CENTER | Facility: HOSPITAL | Age: 55
End: 2020-04-14

## 2020-04-14 NOTE — OUTREACH NOTE
General Surgery Week 4 Survey      Responses   [facility] patient discharged from?  [facility]   Does the patient have one of the following disease processes/diagnoses(primary or secondary)?  General Surgery   Week 4 attempt successful?  Yes   Call start time  1613   Call end time  1616   Discharge diagnosis  Cellulitis in diabetic foot TRANS METATARSAL AMPUTATION  Sepsis with Group B Streptococcus bacteremia:   Is the patient taking all medications as directed (includes completed medication regime)?  Yes   Medication comments  Still on IV antibiotics til 4/26   Has the patient kept scheduled appointments due by today?  Yes   Comments  States he followed up with surgeon and has another appt set up   Is the patient still receiving Home Health Services?  Yes   Home health comments  They are coming every Monday   Psychosocial issues?  No   What is the patient's perception of their health status since discharge?  Improving   Is the patient/caregiver able to teach back the hierarchy of who to call/visit for symptoms/problems? PCP, Specialist, Home health nurse, Urgent Care, ED, 911  Yes   Additional teach back comments  Patient states he is doing well with no questions or needs at this time.   Week 4 call completed?  Yes   Would the patient like one additional call?  No   Graduated  Yes   Did the patient feel the follow up calls were helpful during their recovery period?  Yes   Was the number of calls appropriate?  Yes          Chanelle Adams LPN

## 2020-04-16 ENCOUNTER — OFFICE VISIT (OUTPATIENT)
Dept: PODIATRY | Facility: CLINIC | Age: 55
End: 2020-04-16

## 2020-04-16 VITALS
TEMPERATURE: 97 F | WEIGHT: 193 LBS | BODY MASS INDEX: 29.25 KG/M2 | HEIGHT: 68 IN | HEART RATE: 77 BPM | DIASTOLIC BLOOD PRESSURE: 83 MMHG | SYSTOLIC BLOOD PRESSURE: 140 MMHG

## 2020-04-16 DIAGNOSIS — E11.42 DM TYPE 2 WITH DIABETIC PERIPHERAL NEUROPATHY (HCC): ICD-10-CM

## 2020-04-16 DIAGNOSIS — Z89.431 STATUS POST AMPUTATION OF RIGHT FOOT THROUGH METATARSAL BONE (HCC): Primary | ICD-10-CM

## 2020-04-16 PROCEDURE — 99024 POSTOP FOLLOW-UP VISIT: CPT | Performed by: PODIATRIST

## 2020-04-17 NOTE — PROGRESS NOTES
04/16/2020  Foot and Ankle Surgery - Established Patient/Follow-up  Provider: Dr. Bong Baker DPM  Location: [facility]    Subjective:  Kuldeep Adhikari is a 55 y.o. male.     Chief Complaint   Patient presents with   • Right Foot - Follow-up, Post-op       HPI: Patient returns approximately 4 weeks after transmetatarsal amputation.  The foot does look much better at this time.  He has decreased his activity.  He denies any new issues.  He continues to receive the IV antibiotics.    Allergies   Allergen Reactions   • Lisinopril Cough       Current Outpatient Medications on File Prior to Visit   Medication Sig Dispense Refill   • Accu-Chek FastClix Lancets misc 1 each 4 (Four) Times a Day Before Meals & at Bedtime. Dx: E11.65 204 each 2   • apixaban (ELIQUIS) 5 MG tablet tablet Take 1 tablet by mouth 2 (Two) Times a Day. 180 tablet 3   • aspirin (ASPIR-LOW) 81 MG EC tablet Take 81 mg by mouth Daily.     • atorvastatin (LIPITOR) 40 MG tablet Take 40 mg by mouth Daily.     • cefTRIAXone 2 g in sodium chloride 0.9 % 100 mL IVPB Infuse 2 g into a venous catheter Daily for 42 days. Indications: Bacteria in the Blood, Bone and/or Joint Infection     • EPINEPHrine (EPIPEN JR) 0.15 MG/0.3ML solution auto-injector injection      • FLUoxetine (PROzac) 40 MG capsule TAKE 1 CAPSULE BY MOUTH ONCE DAILY 90 capsule 1   • glipizide (GLUCOTROL XL) 10 MG 24 hr tablet Take 20 mg by mouth Daily.     • glucose blood (Accu-Chek Guide) test strip 1 each by Other route 4 (Four) Times a Day Before Meals & at Bedtime. Dx: E11.65. Use as instructed 150 each 2   • Insulin Glargine (LANTUS SOLOSTAR) 100 UNIT/ML injection pen Inject 15 Units under the skin into the appropriate area as directed Daily. 15 pen 3   • JANUMET XR  MG tablet TAKE 1 TABLET BY MOUTH EVERY 12 HOURS 180 tablet 1   • levothyroxine (SYNTHROID) 100 MCG tablet Take 1 tablet by mouth Daily. 60 tablet 0   • losartan (COZAAR) 50 MG tablet Take 50 mg by mouth  "Daily.     • metoprolol succinate XL (TOPROL-XL) 50 MG 24 hr tablet Take 1 tablet by mouth Daily. 30 tablet 0   • nitroglycerin (NITROSTAT) 0.4 MG SL tablet Place 0.4 mg under the tongue Every 5 (Five) Minutes As Needed for Chest Pain.     • omeprazole (priLOSEC) 40 MG capsule Take 40 mg by mouth Daily.     • sodium chloride 0.9 % solution        No current facility-administered medications on file prior to visit.        Objective   /83   Pulse 77   Temp 97 °F (36.1 °C) (Oral)   Ht 172.7 cm (68\")   Wt 87.5 kg (193 lb)   BMI 29.35 kg/m²     General:   Appearance: appears stated age and healthy    Orientation: AAOx3    Affect: appropriate       VASCULAR       Right Foot Vascularity   Dorsalis pedis:  2+  Posterior tibial:  2+  Skin Temperature: warm    Edema Gradin+  CFT:  < 3 seconds  Pedal Hair Growth:  Absent      NEUROLOGIC      Right Foot Neurologic   Light touch sensation:  Diminished  Hot/Cold sensation: diminished    Protective Sensation using [place]-Debi Monofilament:  Diminished  Achilles reflex:  1+      MUSCULOSKELETAL       Right Foot Musculoskeletal    Amputation   Toes amputated: first toe  Ecchymosis:  None  Tenderness: none    Hammertoe:  Second toe, third toe, fourth toe and fifth toe      MUSCLE STRENGTH      Right Foot Muscle Strength   Normal strength    Foot dorsiflexion:  5  Foot plantar flexion:  5  Foot inversion:  5  Foot eversion:  5      DERMATOLOGIC      Right Foot Dermatologic   Skin: Erythema and edema have significantly improved.  No significant maceration or signs of infection.  No progressive deformity or instability.    Assessment/Plan   Kuldeep was seen today for follow-up and post-op.    Diagnoses and all orders for this visit:    Status post amputation of right foot through metatarsal bone (CMS/HCC)    DM type 2 with diabetic peripheral neuropathy (CMS/HCC)      Patient is doing substantially better with offloading.  The incision site appears to be dry with " overlying eschar.  No maceration or signs of infection today.  I would like him to remain in the cam boot with decreased activity.  He may discontinue the Betadine application at this time.  He may shower as needed.  He is to monitor closely and call with any additional issues or concerns.  I will see him in 2 weeks for reevaluation.    No orders of the defined types were placed in this encounter.         Note is dictated utilizing voice recognition software. Unfortunately this leads to occasional typographical errors. I apologize in advance if the situation occurs. If questions occur please do not hesitate to call our office.

## 2020-04-20 ENCOUNTER — LAB REQUISITION (OUTPATIENT)
Dept: LAB | Facility: HOSPITAL | Age: 55
End: 2020-04-20

## 2020-04-20 DIAGNOSIS — Z79.2 LONG TERM (CURRENT) USE OF ANTIBIOTICS: ICD-10-CM

## 2020-04-20 DIAGNOSIS — M86.171 OTHER ACUTE OSTEOMYELITIS, RIGHT ANKLE AND FOOT (HCC): ICD-10-CM

## 2020-04-20 DIAGNOSIS — A40.1: ICD-10-CM

## 2020-04-20 LAB
BASOPHILS # BLD AUTO: 0.1 10*3/MM3 (ref 0–0.2)
BASOPHILS NFR BLD AUTO: 1.4 % (ref 0–1.5)
CREAT BLD-MCNC: 1.21 MG/DL (ref 0.76–1.27)
DEPRECATED RDW RBC AUTO: 44.6 FL (ref 37–54)
EOSINOPHIL # BLD AUTO: 0.6 10*3/MM3 (ref 0–0.4)
EOSINOPHIL NFR BLD AUTO: 9.5 % (ref 0.3–6.2)
ERYTHROCYTE [DISTWIDTH] IN BLOOD BY AUTOMATED COUNT: 15.3 % (ref 12.3–15.4)
ERYTHROCYTE [SEDIMENTATION RATE] IN BLOOD: 18 MM/HR (ref 0–20)
GFR SERPL CREATININE-BSD FRML MDRD: 62 ML/MIN/1.73
HCT VFR BLD AUTO: 43.5 % (ref 37.5–51)
HGB BLD-MCNC: 15.1 G/DL (ref 13–17.7)
LYMPHOCYTES # BLD AUTO: 1.3 10*3/MM3 (ref 0.7–3.1)
LYMPHOCYTES NFR BLD AUTO: 19.6 % (ref 19.6–45.3)
MCH RBC QN AUTO: 28.7 PG (ref 26.6–33)
MCHC RBC AUTO-ENTMCNC: 34.6 G/DL (ref 31.5–35.7)
MCV RBC AUTO: 83 FL (ref 79–97)
MONOCYTES # BLD AUTO: 0.6 10*3/MM3 (ref 0.1–0.9)
MONOCYTES NFR BLD AUTO: 9.1 % (ref 5–12)
NEUTROPHILS # BLD AUTO: 4.1 10*3/MM3 (ref 1.7–7)
NEUTROPHILS NFR BLD AUTO: 60.4 % (ref 42.7–76)
NRBC BLD AUTO-RTO: 0 /100 WBC (ref 0–0.2)
PLATELET # BLD AUTO: 218 10*3/MM3 (ref 140–450)
PMV BLD AUTO: 7.9 FL (ref 6–12)
RBC # BLD AUTO: 5.24 10*6/MM3 (ref 4.14–5.8)
WBC NRBC COR # BLD: 6.7 10*3/MM3 (ref 3.4–10.8)

## 2020-04-20 PROCEDURE — 85652 RBC SED RATE AUTOMATED: CPT | Performed by: INTERNAL MEDICINE

## 2020-04-20 PROCEDURE — 82565 ASSAY OF CREATININE: CPT | Performed by: INTERNAL MEDICINE

## 2020-04-20 PROCEDURE — 85025 COMPLETE CBC W/AUTO DIFF WBC: CPT | Performed by: INTERNAL MEDICINE

## 2020-04-30 DIAGNOSIS — E11.65 UNCONTROLLED TYPE 2 DIABETES MELLITUS WITH HYPERGLYCEMIA (HCC): Primary | ICD-10-CM

## 2020-05-05 RX ORDER — METOPROLOL SUCCINATE 50 MG/1
TABLET, EXTENDED RELEASE ORAL
Qty: 30 TABLET | Refills: 0 | Status: SHIPPED | OUTPATIENT
Start: 2020-05-05 | End: 2020-06-02

## 2020-05-12 ENCOUNTER — OFFICE VISIT (OUTPATIENT)
Dept: PODIATRY | Facility: CLINIC | Age: 55
End: 2020-05-12

## 2020-05-12 VITALS
DIASTOLIC BLOOD PRESSURE: 87 MMHG | WEIGHT: 188 LBS | SYSTOLIC BLOOD PRESSURE: 157 MMHG | HEIGHT: 68 IN | TEMPERATURE: 98 F | HEART RATE: 83 BPM | BODY MASS INDEX: 28.49 KG/M2

## 2020-05-12 DIAGNOSIS — L97.512 CHRONIC ULCER OF RIGHT FOOT WITH FAT LAYER EXPOSED (HCC): Primary | ICD-10-CM

## 2020-05-12 DIAGNOSIS — Z89.431 STATUS POST AMPUTATION OF RIGHT FOOT THROUGH METATARSAL BONE (HCC): ICD-10-CM

## 2020-05-12 DIAGNOSIS — E11.42 DM TYPE 2 WITH DIABETIC PERIPHERAL NEUROPATHY (HCC): ICD-10-CM

## 2020-05-12 PROCEDURE — 99213 OFFICE O/P EST LOW 20 MIN: CPT | Performed by: PODIATRIST

## 2020-05-12 RX ORDER — FLUTICASONE PROPIONATE 220 UG/1
1 AEROSOL, METERED RESPIRATORY (INHALATION) AS NEEDED
COMMUNITY
Start: 2020-05-04 | End: 2021-06-29

## 2020-05-12 RX ORDER — DOXYCYCLINE HYCLATE 100 MG
100 TABLET ORAL 2 TIMES DAILY
Qty: 20 TABLET | Refills: 0 | Status: SHIPPED | OUTPATIENT
Start: 2020-05-12 | End: 2020-05-22

## 2020-05-12 RX ORDER — FLURBIPROFEN SODIUM 0.3 MG/ML
SOLUTION/ DROPS OPHTHALMIC
COMMUNITY
Start: 2020-05-04 | End: 2020-08-26 | Stop reason: SDUPTHER

## 2020-05-12 NOTE — PROGRESS NOTES
05/12/2020  Foot and Ankle Surgery - Established Patient/Follow-up  Provider: Dr. Bong Baker DPM  Location: [facility]    Subjective:  Kuldeep Adhikari is a 55 y.o. male.     Chief Complaint   Patient presents with   • Right Foot - Follow-up, Wound Check       HPI: Patient returns approximately 2 months after transmetatarsal amputation.  He did miss most recent appointment and states that he has been quite active in a regular shoe.  He has recently noticed a new issue involving the plantar aspect of the right foot.  He states that symptoms started as a blister which avulsed and had bloody appearing drainage.  He now has mild redness to the forefoot region.  Scab continues to the TMA site.  No constitutional signs of infection.    Allergies   Allergen Reactions   • Lisinopril Cough       Current Outpatient Medications on File Prior to Visit   Medication Sig Dispense Refill   • Accu-Chek FastClix Lancets misc 1 each 4 (Four) Times a Day Before Meals & at Bedtime. Dx: E11.65 204 each 2   • apixaban (ELIQUIS) 5 MG tablet tablet Take 1 tablet by mouth 2 (Two) Times a Day. 180 tablet 3   • aspirin (ASPIR-LOW) 81 MG EC tablet Take 81 mg by mouth Daily.     • atorvastatin (LIPITOR) 40 MG tablet Take 40 mg by mouth Daily.     • B-D UF III MINI PEN NEEDLES 31G X 5 MM misc      • EPINEPHrine (EPIPEN JR) 0.15 MG/0.3ML solution auto-injector injection      • FLOVENT  MCG/ACT inhaler      • FLUoxetine (PROzac) 40 MG capsule TAKE 1 CAPSULE BY MOUTH ONCE DAILY 90 capsule 1   • glipizide (GLUCOTROL XL) 10 MG 24 hr tablet Take 20 mg by mouth Daily.     • glucose blood (Accu-Chek Guide) test strip 1 each by Other route 4 (Four) Times a Day Before Meals & at Bedtime. Dx: E11.65. Use as instructed 150 each 2   • Insulin Glargine (LANTUS SOLOSTAR) 100 UNIT/ML injection pen Inject 15 Units under the skin into the appropriate area as directed Daily. 15 pen 3   • JANUMET XR  MG tablet TAKE 1 TABLET BY MOUTH EVERY  "12 HOURS 180 tablet 1   • levothyroxine (SYNTHROID) 100 MCG tablet Take 1 tablet by mouth Daily. 60 tablet 0   • losartan (COZAAR) 50 MG tablet Take 50 mg by mouth Daily.     • metoprolol succinate XL (TOPROL-XL) 50 MG 24 hr tablet Take 1 tablet by mouth once daily 30 tablet 0   • nitroglycerin (NITROSTAT) 0.4 MG SL tablet Place 0.4 mg under the tongue Every 5 (Five) Minutes As Needed for Chest Pain.     • omeprazole (priLOSEC) 40 MG capsule Take 40 mg by mouth Daily.     • sodium chloride 0.9 % solution        No current facility-administered medications on file prior to visit.        Objective   /87   Pulse 83   Temp 98 °F (36.7 °C) (Oral)   Ht 172.7 cm (68\")   Wt 85.3 kg (188 lb)   BMI 28.59 kg/m²     General:   Appearance: appears stated age and healthy    Orientation: AAOx3    Affect: appropriate       VASCULAR       Right Foot Vascularity   Dorsalis pedis:  2+  Posterior tibial:  2+  Skin Temperature: warm    Edema Gradin+  CFT:  < 3 seconds  Pedal Hair Growth:  Absent      NEUROLOGIC      Right Foot Neurologic   Light touch sensation:  Diminished  Hot/Cold sensation: diminished    Protective Sensation using [place]-Debi Monofilament:  Diminished  Achilles reflex:  1+      MUSCULOSKELETAL       Right Foot Musculoskeletal    Amputation   Toes amputated: first toe  Ecchymosis:  None  Tenderness: none    Hammertoe:  Second toe, third toe, fourth toe and fifth toe      MUSCLE STRENGTH      Right Foot Muscle Strength   Normal strength    Foot dorsiflexion:  5  Foot plantar flexion:  5  Foot inversion:  5  Foot eversion:  5      DERMATOLOGIC      Right Foot Dermatologic   Skin: Increased erythema, edema, and mild maceration to the amputation site.  The eschar does show subtle underlying wound dehiscence.  No cas signs of purulence or deep extension.    New ulceration to the plantar medial aspect of the TMA site with extension to subcutaneous tissue measuring approximately 1.5 cm in diameter.  " Granular base without any concerning features.    Assessment/Plan   Kuldeep was seen today for follow-up and wound check.    Diagnoses and all orders for this visit:    Chronic ulcer of right foot with fat layer exposed (CMS/HCC)    Status post amputation of right foot through metatarsal bone (CMS/HCC)    DM type 2 with diabetic peripheral neuropathy (CMS/HCC)    Other orders  -     doxycycline (VIBRAMYICN) 100 MG tablet; Take 1 tablet by mouth 2 (Two) Times a Day for 10 days.      Patient presents with new issue involving the plantar aspect of the right TMA site.  He does continue to have eschar formation and mild maceration to the amputation level.  I explained that findings could be concerning with mild cellulitis at this time, but I do feel that issues are likely due to his increased activity.  I have suggested that we start suppressive course of doxycycline.  I would like him to return to the cam boot and limit activity.  I do feel that he would do best with nonweightbearing to the right lower extremity.  He is to perform Betadine wet-to-dry dressing changes daily.  I discussed the importance of mild compression therapy with Ace bandage.  He is to monitor closely and call with any additional issues or concerns.  I will see him in 2 weeks for reevaluation.    No orders of the defined types were placed in this encounter.         Note is dictated utilizing voice recognition software. Unfortunately this leads to occasional typographical errors. I apologize in advance if the situation occurs. If questions occur please do not hesitate to call our office.

## 2020-05-28 ENCOUNTER — OFFICE VISIT (OUTPATIENT)
Dept: PODIATRY | Facility: CLINIC | Age: 55
End: 2020-05-28

## 2020-05-28 VITALS
SYSTOLIC BLOOD PRESSURE: 168 MMHG | TEMPERATURE: 98 F | HEART RATE: 84 BPM | HEIGHT: 68 IN | DIASTOLIC BLOOD PRESSURE: 80 MMHG | BODY MASS INDEX: 28.49 KG/M2 | WEIGHT: 188 LBS

## 2020-05-28 DIAGNOSIS — L97.512 CHRONIC ULCER OF RIGHT FOOT WITH FAT LAYER EXPOSED (HCC): Primary | ICD-10-CM

## 2020-05-28 DIAGNOSIS — E11.42 DM TYPE 2 WITH DIABETIC PERIPHERAL NEUROPATHY (HCC): ICD-10-CM

## 2020-05-28 PROCEDURE — 11042 DBRDMT SUBQ TIS 1ST 20SQCM/<: CPT | Performed by: PODIATRIST

## 2020-05-28 PROCEDURE — 99024 POSTOP FOLLOW-UP VISIT: CPT | Performed by: PODIATRIST

## 2020-05-28 RX ORDER — DOXYCYCLINE HYCLATE 100 MG
100 TABLET ORAL 2 TIMES DAILY
Qty: 20 TABLET | Refills: 0 | Status: SHIPPED | OUTPATIENT
Start: 2020-05-28 | End: 2020-06-07

## 2020-05-28 NOTE — PROGRESS NOTES
05/28/2020  Foot and Ankle Surgery - Established Patient/Follow-up  Provider: Dr. Bong Baker DPM  Location: [facility]    Subjective:  Kuldeep Adhikari is a 55 y.o. male.     Chief Complaint   Patient presents with   • Right Foot - Follow-up       HPI: Patient returns with issues to the right foot.  TMA was performed several weeks ago but he continues to return with issues of redness, discomfort, and new ulcerations.  Today, he does have new wounds to the plantar aspect of the foot.  He has been ambulating without the cam boot and in the cam boot AGAINST MEDICAL ADVICE.  He has not noticed any significant drainage.  He does state that sometimes he does not wear a dressing to let it air out.    Allergies   Allergen Reactions   • Lisinopril Cough       Current Outpatient Medications on File Prior to Visit   Medication Sig Dispense Refill   • Accu-Chek FastClix Lancets misc 1 each 4 (Four) Times a Day Before Meals & at Bedtime. Dx: E11.65 204 each 2   • apixaban (ELIQUIS) 5 MG tablet tablet Take 1 tablet by mouth 2 (Two) Times a Day. 180 tablet 3   • aspirin (ASPIR-LOW) 81 MG EC tablet Take 81 mg by mouth Daily.     • atorvastatin (LIPITOR) 40 MG tablet Take 40 mg by mouth Daily.     • B-D UF III MINI PEN NEEDLES 31G X 5 MM misc      • EPINEPHrine (EPIPEN JR) 0.15 MG/0.3ML solution auto-injector injection      • FLOVENT  MCG/ACT inhaler      • FLUoxetine (PROzac) 40 MG capsule TAKE 1 CAPSULE BY MOUTH ONCE DAILY 90 capsule 1   • glipizide (GLUCOTROL XL) 10 MG 24 hr tablet Take 20 mg by mouth Daily.     • glucose blood (Accu-Chek Guide) test strip 1 each by Other route 4 (Four) Times a Day Before Meals & at Bedtime. Dx: E11.65. Use as instructed 150 each 2   • Insulin Glargine (LANTUS SOLOSTAR) 100 UNIT/ML injection pen Inject 15 Units under the skin into the appropriate area as directed Daily. 15 pen 3   • JANUMET XR  MG tablet TAKE 1 TABLET BY MOUTH EVERY 12 HOURS 180 tablet 1   •  "levothyroxine (SYNTHROID) 100 MCG tablet Take 1 tablet by mouth Daily. 60 tablet 0   • losartan (COZAAR) 50 MG tablet Take 50 mg by mouth Daily.     • metoprolol succinate XL (TOPROL-XL) 50 MG 24 hr tablet Take 1 tablet by mouth once daily 30 tablet 0   • nitroglycerin (NITROSTAT) 0.4 MG SL tablet Place 0.4 mg under the tongue Every 5 (Five) Minutes As Needed for Chest Pain.     • omeprazole (priLOSEC) 40 MG capsule Take 40 mg by mouth Daily.     • sodium chloride 0.9 % solution        No current facility-administered medications on file prior to visit.        Objective   /80   Pulse 84   Temp 98 °F (36.7 °C) (Oral)   Ht 172.7 cm (68\")   Wt 85.3 kg (188 lb)   BMI 28.59 kg/m²     General:   Appearance: appears stated age and healthy    Orientation: AAOx3    Affect: appropriate       VASCULAR       Right Foot Vascularity   Dorsalis pedis:  2+  Posterior tibial:  2+  Skin Temperature: warm    Edema Gradin+  CFT:  < 3 seconds  Pedal Hair Growth:  Absent      NEUROLOGIC      Right Foot Neurologic   Light touch sensation:  Diminished  Hot/Cold sensation: diminished    Protective Sensation using [place]-Debi Monofilament:  Diminished  Achilles reflex:  1+      MUSCULOSKELETAL       Right Foot Musculoskeletal    Amputation   Toes amputated: first toe  Ecchymosis:  None  Tenderness: none    Hammertoe:  Second toe, third toe, fourth toe and fifth toe      MUSCLE STRENGTH      Right Foot Muscle Strength   Normal strength    Foot dorsiflexion:  5  Foot plantar flexion:  5  Foot inversion:  5  Foot eversion:  5      DERMATOLOGIC      Right Foot Dermatologic   Skin: Mild erythema and edema to the forefoot midfoot.  No fluctuance or soft tissue crepitus.  TMA site: Eschars noted to the medial and lateral aspects of the incision.  No significant drainage, maceration, or concerns of deep ulceration.  Plantar medial ulcer: Patient does have circular ulceration measuring 1.5 cm in diameter to the first metatarsal " region extending to subcutaneous tissue with moderate fibrotic tissue to the base.  No obvious deep extension.  Plantar lateral midfoot ulceration: New ulceration measuring 0.5 cm in diameter.  Extends to the subcutaneous tissue.  No grossly concerning features.    Assessment/Plan   Kuldeep was seen today for follow-up.    Diagnoses and all orders for this visit:    Chronic ulcer of right foot with fat layer exposed (CMS/HCC)    DM type 2 with diabetic peripheral neuropathy (CMS/HCC)    Other orders  -     doxycycline (VIBRAMYICN) 100 MG tablet; Take 1 tablet by mouth 2 (Two) Times a Day for 10 days.      Patient presents with new issues to the plantar aspect of the right foot.  He continues to have redness and discomfort.  It remains difficult to discern whether redness is secondary to inflammation or residual infection.  He does not have any overt signs of local or systemic infection.  Debridement was performed today to the plantar wounds.  I have discussed the situation with him at length.  We reviewed the importance of strict nonweightbearing activity with the use of the knee scooter.  He does have the knee scooter with him today but states that he has not been using it.  He is to proceed with daily Betadine wet-to-dry dressing changes and I will give him an additional course of doxycycline for suppression.  I want to see him in 2 weeks for reevaluation.  He is to call with any progressive issues or concerns.    Excisional Debridement to subcutaneous tissue: Right foot    Pre ulcer measurement:   1. 1.5 x 1.5 x 0.2cm  2. 0.5 x 0.5 x 0.2cm    Post ulcer measurement:   1. 1.8 x 2.0 x 0.4cm  2. 0.8 x 0.7 x 0.3cm    Anesthesia: None, as patient is neuropathic    Bleeding: <5cc    Pre-op pain: 0    Post-op pain: 0    Complications: None    Informed consent was obtained prior to procedure. Excisional debridement to the level of was performed with a 15 blade and curette without complication.  Viable and nonviable skin,  subcutaneous tissue was excised to a healthy bleeding base.  No purulence or proximal extension was identified. Hemostasis was achieved with pressure.  Dry sterile dressings were applied.  Patient tolerated the procedure and anesthesia well.      No orders of the defined types were placed in this encounter.         Note is dictated utilizing voice recognition software. Unfortunately this leads to occasional typographical errors. I apologize in advance if the situation occurs. If questions occur please do not hesitate to call our office.

## 2020-06-02 RX ORDER — METOPROLOL SUCCINATE 50 MG/1
TABLET, EXTENDED RELEASE ORAL
Qty: 30 TABLET | Refills: 0 | Status: SHIPPED | OUTPATIENT
Start: 2020-06-02 | End: 2020-08-03

## 2020-06-11 ENCOUNTER — OFFICE VISIT (OUTPATIENT)
Dept: PODIATRY | Facility: CLINIC | Age: 55
End: 2020-06-11

## 2020-06-11 VITALS
HEART RATE: 80 BPM | BODY MASS INDEX: 28.49 KG/M2 | WEIGHT: 188 LBS | TEMPERATURE: 97.7 F | SYSTOLIC BLOOD PRESSURE: 159 MMHG | HEIGHT: 68 IN | DIASTOLIC BLOOD PRESSURE: 89 MMHG

## 2020-06-11 DIAGNOSIS — E11.42 DM TYPE 2 WITH DIABETIC PERIPHERAL NEUROPATHY (HCC): ICD-10-CM

## 2020-06-11 DIAGNOSIS — L97.512 CHRONIC ULCER OF RIGHT FOOT WITH FAT LAYER EXPOSED (HCC): Primary | ICD-10-CM

## 2020-06-11 PROCEDURE — 11042 DBRDMT SUBQ TIS 1ST 20SQCM/<: CPT | Performed by: PODIATRIST

## 2020-06-11 PROCEDURE — 99024 POSTOP FOLLOW-UP VISIT: CPT | Performed by: PODIATRIST

## 2020-06-11 NOTE — PROGRESS NOTES
06/11/2020  Foot and Ankle Surgery - Established Patient/Follow-up  Provider: Dr. Bong Baker DPM  Location: [facility]    Subjective:  Kuldeep Adhikari is a 55 y.o. male.     Chief Complaint   Patient presents with   • Right Foot - Follow-up       HPI: Patient returns for wound check involving the right foot.  The amputation site is well-healed, but he continues to have wounds involving the plantar aspect of the foot from increased activity.  He states that he has been ambulating without the boot despite our previous discussions.  Denies any constitutional signs of infection.  He states that he has been cleaning the wound with peroxide    Allergies   Allergen Reactions   • Lisinopril Cough       Current Outpatient Medications on File Prior to Visit   Medication Sig Dispense Refill   • Accu-Chek FastClix Lancets misc 1 each 4 (Four) Times a Day Before Meals & at Bedtime. Dx: E11.65 204 each 2   • apixaban (ELIQUIS) 5 MG tablet tablet Take 1 tablet by mouth 2 (Two) Times a Day. 180 tablet 3   • aspirin (ASPIR-LOW) 81 MG EC tablet Take 81 mg by mouth Daily.     • atorvastatin (LIPITOR) 40 MG tablet Take 40 mg by mouth Daily.     • B-D UF III MINI PEN NEEDLES 31G X 5 MM misc      • EPINEPHrine (EPIPEN JR) 0.15 MG/0.3ML solution auto-injector injection      • FLOVENT  MCG/ACT inhaler      • FLUoxetine (PROzac) 40 MG capsule TAKE 1 CAPSULE BY MOUTH ONCE DAILY 90 capsule 1   • glipizide (GLUCOTROL XL) 10 MG 24 hr tablet Take 20 mg by mouth Daily.     • glucose blood (Accu-Chek Guide) test strip 1 each by Other route 4 (Four) Times a Day Before Meals & at Bedtime. Dx: E11.65. Use as instructed 150 each 2   • Insulin Glargine (LANTUS SOLOSTAR) 100 UNIT/ML injection pen Inject 15 Units under the skin into the appropriate area as directed Daily. 15 pen 3   • JANUMET XR  MG tablet TAKE 1 TABLET BY MOUTH EVERY 12 HOURS 180 tablet 1   • levothyroxine (SYNTHROID) 100 MCG tablet Take 1 tablet by mouth  "Daily. 60 tablet 0   • losartan (COZAAR) 50 MG tablet Take 50 mg by mouth Daily.     • metoprolol succinate XL (TOPROL-XL) 50 MG 24 hr tablet Take 1 tablet by mouth once daily 30 tablet 0   • nitroglycerin (NITROSTAT) 0.4 MG SL tablet Place 0.4 mg under the tongue Every 5 (Five) Minutes As Needed for Chest Pain.     • omeprazole (priLOSEC) 40 MG capsule Take 40 mg by mouth Daily.     • sodium chloride 0.9 % solution        No current facility-administered medications on file prior to visit.        Objective   /89   Pulse 80   Temp 97.7 °F (36.5 °C) (Oral)   Ht 172.7 cm (68\")   Wt 85.3 kg (188 lb)   BMI 28.59 kg/m²     General:   Appearance: appears stated age and healthy    Orientation: AAOx3    Affect: appropriate       VASCULAR       Right Foot Vascularity   Dorsalis pedis:  2+  Posterior tibial:  2+  Skin Temperature: warm    Edema Gradin+  CFT:  < 3 seconds  Pedal Hair Growth:  Absent      NEUROLOGIC      Right Foot Neurologic   Light touch sensation:  Diminished  Hot/Cold sensation: diminished    Protective Sensation using Seymour-Debi Monofilament:  Diminished  Achilles reflex:  1+      MUSCULOSKELETAL       Right Foot Musculoskeletal    Amputation   Toes amputated: first toe  Ecchymosis:  None  Tenderness: none    Hammertoe:  Second toe, third toe, fourth toe and fifth toe      MUSCLE STRENGTH      Right Foot Muscle Strength   Normal strength    Foot dorsiflexion:  5  Foot plantar flexion:  5  Foot inversion:  5  Foot eversion:  5      DERMATOLOGIC      Right Foot Dermatologic   Skin: Mild erythema and edema to the forefoot midfoot.  No fluctuance or soft tissue crepitus.  TMA site:  Incision site is well-healed.  Small lateral dehiscence with granular base and no deep extension.    Plantar medial ulcer:  Ulceration has increased in size with undermining and skin slough likely secondary to increased friction.  Base is mostly fibrotic with organic debris.  No evidence of deep extension or " cas infection  Plantar lateral midfoot ulceration:  Ulceration appears to be healed with subtle overlying eschar.    Assessment/Plan   Kuldeep was seen today for follow-up.    Diagnoses and all orders for this visit:    Chronic ulcer of right foot with fat layer exposed (CMS/HCC)    DM type 2 with diabetic peripheral neuropathy (CMS/HCC)      Patient returns for wound check involving the right foot.  He has continued to ambulate in and out of the cam boot.  The plantar medial wound has progressed.  Debridement was performed today.  I did discuss the situation with him at length.  He understands that he remains at high risk of major amputation if he does not comply with medical advice.  We again discussed the importance of strict nonweightbearing activity to the right lower extremity.  I have asked that he obtain a knee scooter.  I will also order collagen dressings for him to start and apply every other day.  We did discuss appropriate dressing care and management.  He understands to call me with any additional questions or concerns.  I will see him in 3 weeks for reevaluation.    Excisional Debridement to subcutaneous tissue: Right foot     Pre ulcer measurement:   1. 2.0 x 2.0 x 0.2cm     Post ulcer measurement:   1. 3.5 x 2.5 x 0.4cm     Anesthesia: None, as patient is neuropathic     Bleeding: <5cc     Pre-op pain: 0     Post-op pain: 0     Complications: None     Informed consent was obtained prior to procedure. Excisional debridement to the level of was performed with a 15 blade and curette without complication.  Viable and nonviable skin, subcutaneous tissue was excised to a healthy bleeding base.  No purulence or proximal extension was identified. Hemostasis was achieved with pressure.  Dry sterile dressings were applied.  Patient tolerated the procedure and anesthesia well.      No orders of the defined types were placed in this encounter.         Note is dictated utilizing voice recognition software.  Unfortunately this leads to occasional typographical errors. I apologize in advance if the situation occurs. If questions occur please do not hesitate to call our office.

## 2020-06-18 ENCOUNTER — TELEPHONE (OUTPATIENT)
Dept: PODIATRY | Facility: CLINIC | Age: 55
End: 2020-06-18

## 2020-06-24 ENCOUNTER — LAB (OUTPATIENT)
Dept: FAMILY MEDICINE CLINIC | Facility: CLINIC | Age: 55
End: 2020-06-24

## 2020-06-24 ENCOUNTER — OFFICE VISIT (OUTPATIENT)
Dept: FAMILY MEDICINE CLINIC | Facility: CLINIC | Age: 55
End: 2020-06-24

## 2020-06-24 VITALS
TEMPERATURE: 98.7 F | DIASTOLIC BLOOD PRESSURE: 79 MMHG | SYSTOLIC BLOOD PRESSURE: 129 MMHG | HEART RATE: 79 BPM | OXYGEN SATURATION: 99 % | HEIGHT: 68 IN | BODY MASS INDEX: 28.04 KG/M2 | WEIGHT: 185 LBS

## 2020-06-24 DIAGNOSIS — I10 ESSENTIAL HYPERTENSION: ICD-10-CM

## 2020-06-24 DIAGNOSIS — I10 ESSENTIAL HYPERTENSION: Primary | ICD-10-CM

## 2020-06-24 DIAGNOSIS — E78.2 MIXED HYPERLIPIDEMIA: ICD-10-CM

## 2020-06-24 DIAGNOSIS — E11.65 TYPE 2 DIABETES MELLITUS WITH HYPERGLYCEMIA, WITH LONG-TERM CURRENT USE OF INSULIN (HCC): ICD-10-CM

## 2020-06-24 DIAGNOSIS — Z79.4 TYPE 2 DIABETES MELLITUS WITH HYPERGLYCEMIA, WITH LONG-TERM CURRENT USE OF INSULIN (HCC): ICD-10-CM

## 2020-06-24 DIAGNOSIS — K21.9 GASTROESOPHAGEAL REFLUX DISEASE WITHOUT ESOPHAGITIS: ICD-10-CM

## 2020-06-24 DIAGNOSIS — F32.0 CURRENT MILD EPISODE OF MAJOR DEPRESSIVE DISORDER WITHOUT PRIOR EPISODE (HCC): ICD-10-CM

## 2020-06-24 DIAGNOSIS — E03.9 HYPOTHYROIDISM, UNSPECIFIED TYPE: ICD-10-CM

## 2020-06-24 LAB
ALBUMIN SERPL-MCNC: 4.5 G/DL (ref 3.5–5.2)
ALBUMIN/GLOB SERPL: 1.6 G/DL
ALP SERPL-CCNC: 138 U/L (ref 39–117)
ALT SERPL W P-5'-P-CCNC: 32 U/L (ref 1–41)
ANION GAP SERPL CALCULATED.3IONS-SCNC: 11.6 MMOL/L (ref 5–15)
AST SERPL-CCNC: 11 U/L (ref 1–40)
BILIRUB SERPL-MCNC: 0.4 MG/DL (ref 0.2–1.2)
BUN BLD-MCNC: 24 MG/DL (ref 6–20)
BUN/CREAT SERPL: 16.1 (ref 7–25)
CALCIUM SPEC-SCNC: 9.7 MG/DL (ref 8.6–10.5)
CHLORIDE SERPL-SCNC: 99 MMOL/L (ref 98–107)
CHOLEST SERPL-MCNC: 149 MG/DL (ref 0–200)
CO2 SERPL-SCNC: 25.4 MMOL/L (ref 22–29)
CREAT BLD-MCNC: 1.49 MG/DL (ref 0.76–1.27)
GFR SERPL CREATININE-BSD FRML MDRD: 49 ML/MIN/1.73
GLOBULIN UR ELPH-MCNC: 2.9 GM/DL
GLUCOSE BLD-MCNC: 292 MG/DL (ref 65–99)
HBA1C MFR BLD: 11 % (ref 3.5–5.6)
HDLC SERPL-MCNC: 35 MG/DL (ref 40–60)
LDLC SERPL CALC-MCNC: 78 MG/DL (ref 0–100)
LDLC/HDLC SERPL: 2.24 {RATIO}
POTASSIUM BLD-SCNC: 5.9 MMOL/L (ref 3.5–5.2)
PROT SERPL-MCNC: 7.4 G/DL (ref 6–8.5)
SODIUM BLD-SCNC: 136 MMOL/L (ref 136–145)
TRIGL SERPL-MCNC: 178 MG/DL (ref 0–150)
TSH SERPL DL<=0.05 MIU/L-ACNC: 2.96 UIU/ML (ref 0.27–4.2)
VLDLC SERPL-MCNC: 35.6 MG/DL (ref 5–40)

## 2020-06-24 PROCEDURE — 83036 HEMOGLOBIN GLYCOSYLATED A1C: CPT | Performed by: NURSE PRACTITIONER

## 2020-06-24 PROCEDURE — 80053 COMPREHEN METABOLIC PANEL: CPT | Performed by: NURSE PRACTITIONER

## 2020-06-24 PROCEDURE — 36415 COLL VENOUS BLD VENIPUNCTURE: CPT

## 2020-06-24 PROCEDURE — 80061 LIPID PANEL: CPT | Performed by: NURSE PRACTITIONER

## 2020-06-24 PROCEDURE — 84443 ASSAY THYROID STIM HORMONE: CPT | Performed by: NURSE PRACTITIONER

## 2020-06-24 PROCEDURE — 99214 OFFICE O/P EST MOD 30 MIN: CPT | Performed by: NURSE PRACTITIONER

## 2020-06-24 RX ORDER — DULOXETIN HYDROCHLORIDE 60 MG/1
60 CAPSULE, DELAYED RELEASE ORAL EVERY MORNING
COMMUNITY
Start: 2020-06-19 | End: 2020-07-16 | Stop reason: SDUPTHER

## 2020-06-24 NOTE — PROGRESS NOTES
Subjective   Kuldeep Adhikari is a 55 y.o. male.       HPI   Pt. Is here today for follow up.   1) HTN- currently on metoprolol XL 50 mg daily;  losartan 50 mg daily.  Denies any CP; SOA; dizziness; headache; trouble with vision.  Has had some palpitations.  He reports he was admitted to [facility] - (mental health concern) last week; he had mentioned the palpitations and reports an EKG and echo were done and were both normal.  Records have been requested.    2) T2DM-  currently on Janumet XR  mg bid; Lantus 15 units daily. Dr. Michaels is managing his diabetes. Has appt. with him in a couple of weeks. Sugars running very high; he has noted 300-500 at home; was 300's per pt. this morning.    3) Hyperlipidemia - currently on atorvastatin 40 mg daily. Needs lab updated.   4) Hypothyroidism - currently on levothyroxine 100 mcg daily.    5) GERD- currently on omeprazole 40 mg daily. Symptoms have been stable.   6) Depression - starts counseling tomorrow.  Currently on Cymbalta 60 mg daily. Admits to stress in life right now but denies any SI or HI.   FYI- PICC last Friday for abx regimen for infected foot.     The following portions of the patient's history were reviewed and updated as appropriate: allergies, current medications, past family history, past medical history, past social history, past surgical history and problem list.    Review of Systems   Constitutional: Positive for fatigue. Negative for activity change, appetite change, chills, diaphoresis, fever, unexpected weight gain and unexpected weight loss.   Eyes: Negative for blurred vision, double vision and visual disturbance.   Respiratory: Negative for cough, chest tightness, shortness of breath and wheezing.    Cardiovascular: Negative for chest pain, palpitations and leg swelling.   Gastrointestinal: Negative for abdominal distention, abdominal pain, blood in stool, constipation, diarrhea, nausea, vomiting and indigestion.   Endocrine: Negative for cold  intolerance, heat intolerance, polydipsia, polyphagia and polyuria.   Genitourinary: Negative for dysuria, flank pain, frequency, hematuria, nocturia and urgency.   Musculoskeletal: Positive for arthralgias (right foot pain). Negative for back pain and myalgias.   Skin: Negative for rash and skin lesions.   Neurological: Negative for dizziness, weakness, light-headedness, numbness, headache and confusion.   Hematological: Negative for adenopathy.   Psychiatric/Behavioral: Positive for sleep disturbance, depressed mood and stress. Negative for self-injury and suicidal ideas. The patient is not nervous/anxious.        Objective   Physical Exam   Constitutional: He is oriented to person, place, and time. He appears well-developed and well-nourished. No distress.   HENT:   Head: Normocephalic and atraumatic.   Right Ear: External ear normal.   Left Ear: External ear normal.   Nose: Nose normal.   Mouth/Throat: Oropharynx is clear and moist.   Eyes: Pupils are equal, round, and reactive to light. Conjunctivae are normal.   Neck: Normal range of motion. Neck supple. No JVD present. No thyromegaly present.   Cardiovascular: Normal rate, regular rhythm, normal heart sounds and intact distal pulses.   No murmur heard.  Pulmonary/Chest: Effort normal and breath sounds normal. No respiratory distress. He has no wheezes. He exhibits no tenderness.   Abdominal: Soft. Bowel sounds are normal. He exhibits no distension and no mass. There is no tenderness. There is no rebound and no guarding.   Musculoskeletal: He exhibits no edema.   Lymphadenopathy:     He has no cervical adenopathy.   Neurological: He is alert and oriented to person, place, and time.   Skin: Skin is warm and dry. Capillary refill takes less than 2 seconds. No rash noted. No erythema.   Psychiatric: His speech is normal and behavior is normal. Judgment and thought content normal. His mood appears not anxious. Cognition and memory are normal. He exhibits a  depressed mood.   Vitals reviewed.        Assessment/Plan   Kuldeep was seen today for annual exam.    Diagnoses and all orders for this visit:    Essential hypertension  Comments:  Stable.    Cont. current medications.   Labs today.   Orders:  -     Comprehensive metabolic panel; Future  -     Hemoglobin A1c; Future  -     Lipid panel; Future    Type 2 diabetes mellitus with hyperglycemia, with long-term current use of insulin (CMS/[facility])  Comments:  Labs today.   Increase Lantus to 18 units daily.    Monitor sugars and call in readings at end of week.   Keep follow up with Dr. Michaels.    Orders:  -     Comprehensive metabolic panel; Future  -     Hemoglobin A1c; Future  -     Lipid panel; Future    Mixed hyperlipidemia  Comments:  Cont. current medication.    Labs today.   Orders:  -     Comprehensive metabolic panel; Future  -     Lipid panel; Future    Hypothyroidism, unspecified type  Comments:  Labs today.   Orders:  -     TSH; Future    Gastroesophageal reflux disease without esophagitis  Comments:  Stable.   Cont. current medication.       Current mild episode of major depressive disorder without prior episode (CMS/[facility])  Comments:  Stable.    Seeing counselor today.   Cont. Cymbalta 60 mg daily.

## 2020-06-24 NOTE — PATIENT INSTRUCTIONS
Diabetes Mellitus and Standards of Medical Care  Managing diabetes (diabetes mellitus) can be complicated. Your diabetes treatment may be managed by a team of health care providers, including:  · A physician who specializes in diabetes (endocrinologist).  · A nurse practitioner or physician assistant.  · Nurses.  · A diet and nutrition specialist (registered dietitian).  · A certified diabetes educator (CDE).  · An exercise specialist.  · A pharmacist.  · An eye doctor.  · A foot specialist (podiatrist).  · A dentist.  · A primary care provider.  · A mental health provider.  Your health care providers follow guidelines to help you get the best quality of care. The following schedule is a general guideline for your diabetes management plan. Your health care providers may give you more specific instructions.  Physical exams  Upon being diagnosed with diabetes mellitus, and each year after that, your health care provider will ask about your medical and family history. He or she will also do a physical exam. Your exam may include:  · Measuring your height, weight, and body mass index (BMI).  · Checking your blood pressure. This will be done at every routine medical visit. Your target blood pressure may vary depending on your medical conditions, your age, and other factors.  · Thyroid gland exam.  · Skin exam.  · Screening for damage to your nerves (peripheral neuropathy). This may include checking the pulse in your legs and feet and checking the level of sensation in your hands and feet.  · A complete foot exam to inspect the structure and skin of your feet, including checking for cuts, bruises, redness, blisters, sores, or other problems.  · Screening for blood vessel (vascular) problems, which may include checking the pulse in your legs and feet and checking your temperature.  Blood tests  Depending on your treatment plan and your personal needs, you may have the following tests done:  · HbA1c (hemoglobin A1c). This  test provides information about blood sugar (glucose) control over the previous 2-3 months. It is used to adjust your treatment plan, if needed. This test will be done:  ? At least 2 times a year, if you are meeting your treatment goals.  ? 4 times a year, if you are not meeting your treatment goals or if treatment goals have changed.  · Lipid testing, including total, LDL, and HDL cholesterol and triglyceride levels.  ? The goal for LDL is less than 100 mg/dL (5.5 mmol/L). If you are at high risk for complications, the goal is less than 70 mg/dL (3.9 mmol/L).  ? The goal for HDL is 40 mg/dL (2.2 mmol/L) or higher for men and 50 mg/dL (2.8 mmol/L) or higher for women. An HDL cholesterol of 60 mg/dL (3.3 mmol/L) or higher gives some protection against heart disease.  ? The goal for triglycerides is less than 150 mg/dL (8.3 mmol/L).  · Liver function tests.  · Kidney function tests.  · Thyroid function tests.  Dental and eye exams  · Visit your dentist two times a year.  · If you have type 1 diabetes, your health care provider may recommend an eye exam 3-5 years after you are diagnosed, and then once a year after your first exam.  ? For children with type 1 diabetes, a health care provider may recommend an eye exam when your child is age 10 or older and has had diabetes for 3-5 years. After the first exam, your child should get an eye exam once a year.  · If you have type 2 diabetes, your health care provider may recommend an eye exam as soon as you are diagnosed, and then once a year after your first exam.  Immunizations    · The yearly flu (influenza) vaccine is recommended for everyone 6 months or older who has diabetes.  · The pneumonia (pneumococcal) vaccine is recommended for everyone 2 years or older who has diabetes. If you are 65 or older, you may get the pneumonia vaccine as a series of two separate shots.  · The hepatitis B vaccine is recommended for adults shortly after being diagnosed with  diabetes.  · Adults and children with diabetes should receive all other vaccines according to age-specific recommendations from the Centers for Disease Control and Prevention (CDC).  Mental and emotional health  Screening for symptoms of eating disorders, anxiety, and depression is recommended at the time of diagnosis and afterward as needed. If your screening shows that you have symptoms (positive screening result), you may need more evaluation and you may work with a mental health care provider.  Treatment plan  Your treatment plan will be reviewed at every medical visit. You and your health care provider will discuss:  · How you are taking your medicines, including insulin.  · Any side effects you are experiencing.  · Your blood glucose target goals.  · The frequency of your blood glucose monitoring.  · Lifestyle habits, such as activity level as well as tobacco, alcohol, and substance use.  Diabetes self-management education  Your health care provider will assess how well you are monitoring your blood glucose levels and whether you are taking your insulin correctly. He or she may refer you to:  · A certified diabetes educator to manage your diabetes throughout your life, starting at diagnosis.  · A registered dietitian who can create or review your personal nutrition plan.  · An exercise specialist who can discuss your activity level and exercise plan.  Summary  · Managing diabetes (diabetes mellitus) can be complicated. Your diabetes treatment may be managed by a team of health care providers.  · Your health care providers follow guidelines in order to help you get the best quality of care.  · Standards of care including having regular physical exams, blood tests, blood pressure monitoring, immunizations, screening tests, and education about how to manage your diabetes.  · Your health care providers may also give you more specific instructions based on your individual health.  This information is not intended  "to replace advice given to you by your health care provider. Make sure you discuss any questions you have with your health care provider.  Document Released: 10/15/2010 Document Revised: 09/06/2019 Document Reviewed: 09/15/2017  [employer] Patient Education © 2020 [employer]  DASH Eating Plan  DASH stands for \"Dietary Approaches to Stop Hypertension.\" The DASH eating plan is a healthy eating plan that has been shown to reduce high blood pressure (hypertension). It may also reduce your risk for type 2 diabetes, heart disease, and stroke. The DASH eating plan may also help with weight loss.  What are tips for following this plan?    General guidelines  · Avoid eating more than 2,300 mg (milligrams) of salt (sodium) a day. If you have hypertension, you may need to reduce your sodium intake to 1,500 mg a day.  · Limit alcohol intake to no more than 1 drink a day for nonpregnant women and 2 drinks a day for men. One drink equals 12 oz of beer, 5 oz of wine, or 1½ oz of hard liquor.  · Work with your health care provider to maintain a healthy body weight or to lose weight. Ask what an ideal weight is for you.  · Get at least 30 minutes of exercise that causes your heart to beat faster (aerobic exercise) most days of the week. Activities may include walking, swimming, or biking.  · Work with your health care provider or diet and nutrition specialist (dietitian) to adjust your eating plan to your individual calorie needs.  Reading food labels    · Check food labels for the amount of sodium per serving. Choose foods with less than 5 percent of the Daily Value of sodium. Generally, foods with less than 300 mg of sodium per serving fit into this eating plan.  · To find whole grains, look for the word \"whole\" as the first word in the ingredient list.  Shopping  · Buy products labeled as \"low-sodium\" or \"no salt added.\"  · Buy fresh foods. Avoid canned foods and premade or frozen meals.  Cooking  · Avoid adding salt when " cooking. Use salt-free seasonings or herbs instead of table salt or sea salt. Check with your health care provider or pharmacist before using salt substitutes.  · Do not cleaning foods. Cook foods using healthy methods such as baking, boiling, grilling, and broiling instead.  · Cook with heart-healthy oils, such as olive, canola, soybean, or sunflower oil.  Meal planning  · Eat a balanced diet that includes:  ? 5 or more servings of fruits and vegetables each day. At each meal, try to fill half of your plate with fruits and vegetables.  ? Up to 6-8 servings of whole grains each day.  ? Less than 6 oz of lean meat, poultry, or fish each day. A 3-oz serving of meat is about the same size as a deck of cards. One egg equals 1 oz.  ? 2 servings of low-fat dairy each day.  ? A serving of nuts, seeds, or beans 5 times each week.  ? Heart-healthy fats. Healthy fats called Omega-3 fatty acids are found in foods such as flaxseeds and coldwater fish, like sardines, salmon, and mackerel.  · Limit how much you eat of the following:  ? Canned or prepackaged foods.  ? Food that is high in trans fat, such as fried foods.  ? Food that is high in saturated fat, such as fatty meat.  ? Sweets, desserts, sugary drinks, and other foods with added sugar.  ? Full-fat dairy products.  · Do not salt foods before eating.  · Try to eat at least 2 vegetarian meals each week.  · Eat more home-cooked food and less restaurant, buffet, and fast food.  · When eating at a restaurant, ask that your food be prepared with less salt or no salt, if possible.  What foods are recommended?  The items listed may not be a complete list. Talk with your dietitian about what dietary choices are best for you.  Grains  Whole-grain or whole-wheat bread. Whole-grain or whole-wheat pasta. Brown rice. Oatmeal. Quinoa. Bulgur. Whole-grain and low-sodium cereals. Basia bread. Low-fat, low-sodium crackers. Whole-wheat flour tortillas.  Vegetables  Fresh or frozen vegetables  (raw, steamed, roasted, or grilled). Low-sodium or reduced-sodium tomato and vegetable juice. Low-sodium or reduced-sodium tomato sauce and tomato paste. Low-sodium or reduced-sodium canned vegetables.  Fruits  All fresh, dried, or frozen fruit. Canned fruit in natural juice (without added sugar).  Meat and other protein foods  Skinless chicken or turkey. Ground chicken or turkey. Pork with fat trimmed off. Fish and seafood. Egg whites. Dried beans, peas, or lentils. Unsalted nuts, nut butters, and seeds. Unsalted canned beans. Lean cuts of beef with fat trimmed off. Low-sodium, lean deli meat.  Dairy  Low-fat (1%) or fat-free (skim) milk. Fat-free, low-fat, or reduced-fat cheeses. Nonfat, low-sodium ricotta or cottage cheese. Low-fat or nonfat yogurt. Low-fat, low-sodium cheese.  Fats and oils  Soft margarine without trans fats. Vegetable oil. Low-fat, reduced-fat, or light mayonnaise and salad dressings (reduced-sodium). Canola, safflower, olive, soybean, and sunflower oils. Avocado.  Seasoning and other foods  Herbs. Spices. Seasoning mixes without salt. Unsalted popcorn and pretzels. Fat-free sweets.  What foods are not recommended?  The items listed may not be a complete list. Talk with your dietitian about what dietary choices are best for you.  Grains  Baked goods made with fat, such as croissants, muffins, or some breads. Dry pasta or rice meal packs.  Vegetables  Creamed or fried vegetables. Vegetables in a cheese sauce. Regular canned vegetables (not low-sodium or reduced-sodium). Regular canned tomato sauce and paste (not low-sodium or reduced-sodium). Regular tomato and vegetable juice (not low-sodium or reduced-sodium). Pickles. Olives.  Fruits  Canned fruit in a light or heavy syrup. Fried fruit. Fruit in cream or butter sauce.  Meat and other protein foods  Fatty cuts of meat. Ribs. Fried meat. Lester. Sausage. Bologna and other processed lunch meats. Salami. Fatback. Hotdogs. Bratwurst. Salted nuts  and seeds. Canned beans with added salt. Canned or smoked fish. Whole eggs or egg yolks. Chicken or turkey with skin.  Dairy  Whole or 2% milk, cream, and half-and-half. Whole or full-fat cream cheese. Whole-fat or sweetened yogurt. Full-fat cheese. Nondairy creamers. Whipped toppings. Processed cheese and cheese spreads.  Fats and oils  Butter. Stick margarine. Lard. Shortening. Ghee. Lester fat. Tropical oils, such as coconut, palm kernel, or palm oil.  Seasoning and other foods  Salted popcorn and pretzels. Onion salt, garlic salt, seasoned salt, table salt, and sea salt. Worcestershire sauce. Tartar sauce. Barbecue sauce. Teriyaki sauce. Soy sauce, including reduced-sodium. Steak sauce. Canned and packaged gravies. Fish sauce. Oyster sauce. Cocktail sauce. Horseradish that you find on the shelf. Ketchup. Mustard. Meat flavorings and tenderizers. Bouillon cubes. Hot sauce and Tabasco sauce. Premade or packaged marinades. Premade or packaged taco seasonings. Relishes. Regular salad dressings.  Where to find more information:  · National Heart, Lung, and Blood [place]: www.nhlbi.nih.gov  · American Heart Association: www.heart.org  Summary  · The DASH eating plan is a healthy eating plan that has been shown to reduce high blood pressure (hypertension). It may also reduce your risk for type 2 diabetes, heart disease, and stroke.  · With the DASH eating plan, you should limit salt (sodium) intake to 2,300 mg a day. If you have hypertension, you may need to reduce your sodium intake to 1,500 mg a day.  · When on the DASH eating plan, aim to eat more fresh fruits and vegetables, whole grains, lean proteins, low-fat dairy, and heart-healthy fats.  · Work with your health care provider or diet and nutrition specialist (dietitian) to adjust your eating plan to your individual calorie needs.  This information is not intended to replace advice given to you by your health care provider. Make sure you discuss any questions  you have with your health care provider.  Document Released: 12/06/2012 Document Revised: 11/30/2018 Document Reviewed: 12/11/2017  [employer] Patient Education © 2020 [employer]

## 2020-06-26 DIAGNOSIS — R79.89 ELEVATED SERUM CREATININE: ICD-10-CM

## 2020-06-26 DIAGNOSIS — E87.5 HYPERKALEMIA: Primary | ICD-10-CM

## 2020-06-29 ENCOUNTER — LAB REQUISITION (OUTPATIENT)
Dept: LAB | Facility: HOSPITAL | Age: 55
End: 2020-06-29

## 2020-06-29 DIAGNOSIS — M86.671 OTHER CHRONIC OSTEOMYELITIS, RIGHT ANKLE AND FOOT (HCC): ICD-10-CM

## 2020-06-29 PROCEDURE — 85025 COMPLETE CBC W/AUTO DIFF WBC: CPT | Performed by: INTERNAL MEDICINE

## 2020-06-29 PROCEDURE — 80048 BASIC METABOLIC PNL TOTAL CA: CPT | Performed by: INTERNAL MEDICINE

## 2020-06-29 PROCEDURE — 86140 C-REACTIVE PROTEIN: CPT | Performed by: INTERNAL MEDICINE

## 2020-06-29 PROCEDURE — 85652 RBC SED RATE AUTOMATED: CPT | Performed by: INTERNAL MEDICINE

## 2020-06-30 LAB
ANION GAP SERPL CALCULATED.3IONS-SCNC: 11 MMOL/L (ref 5–15)
BASOPHILS # BLD AUTO: 0.05 10*3/MM3 (ref 0–0.2)
BASOPHILS NFR BLD AUTO: 0.7 % (ref 0–1.5)
BUN SERPL-MCNC: 24 MG/DL (ref 6–20)
BUN/CREAT SERPL: 15.4 (ref 7–25)
CALCIUM SPEC-SCNC: 9 MG/DL (ref 8.6–10.5)
CHLORIDE SERPL-SCNC: 101 MMOL/L (ref 98–107)
CO2 SERPL-SCNC: 25 MMOL/L (ref 22–29)
CREAT SERPL-MCNC: 1.56 MG/DL (ref 0.76–1.27)
CRP SERPL-MCNC: 0.42 MG/DL (ref 0–0.5)
DEPRECATED RDW RBC AUTO: 39.9 FL (ref 37–54)
EOSINOPHIL # BLD AUTO: 0.44 10*3/MM3 (ref 0–0.4)
EOSINOPHIL NFR BLD AUTO: 6.1 % (ref 0.3–6.2)
ERYTHROCYTE [DISTWIDTH] IN BLOOD BY AUTOMATED COUNT: 13.3 % (ref 12.3–15.4)
ERYTHROCYTE [SEDIMENTATION RATE] IN BLOOD: 1 MM/HR (ref 0–20)
GFR SERPL CREATININE-BSD FRML MDRD: 46 ML/MIN/1.73
GLUCOSE SERPL-MCNC: 276 MG/DL (ref 65–99)
HCT VFR BLD AUTO: 39.3 % (ref 37.5–51)
HGB BLD-MCNC: 12.8 G/DL (ref 13–17.7)
IMM GRANULOCYTES # BLD AUTO: 0.02 10*3/MM3 (ref 0–0.05)
IMM GRANULOCYTES NFR BLD AUTO: 0.3 % (ref 0–0.5)
LYMPHOCYTES # BLD AUTO: 0.92 10*3/MM3 (ref 0.7–3.1)
LYMPHOCYTES NFR BLD AUTO: 12.8 % (ref 19.6–45.3)
MCH RBC QN AUTO: 26.9 PG (ref 26.6–33)
MCHC RBC AUTO-ENTMCNC: 32.6 G/DL (ref 31.5–35.7)
MCV RBC AUTO: 82.6 FL (ref 79–97)
MONOCYTES # BLD AUTO: 0.57 10*3/MM3 (ref 0.1–0.9)
MONOCYTES NFR BLD AUTO: 7.9 % (ref 5–12)
NEUTROPHILS NFR BLD AUTO: 5.19 10*3/MM3 (ref 1.7–7)
NEUTROPHILS NFR BLD AUTO: 72.2 % (ref 42.7–76)
NRBC BLD AUTO-RTO: 0 /100 WBC (ref 0–0.2)
PLATELET # BLD AUTO: 227 10*3/MM3 (ref 140–450)
PMV BLD AUTO: 10.2 FL (ref 6–12)
POTASSIUM SERPL-SCNC: 4.9 MMOL/L (ref 3.5–5.2)
RBC # BLD AUTO: 4.76 10*6/MM3 (ref 4.14–5.8)
SODIUM SERPL-SCNC: 137 MMOL/L (ref 136–145)
WBC # BLD AUTO: 7.19 10*3/MM3 (ref 3.4–10.8)

## 2020-06-30 RX ORDER — INSULIN GLARGINE 100 [IU]/ML
21 INJECTION, SOLUTION SUBCUTANEOUS DAILY
COMMUNITY
End: 2020-07-10

## 2020-07-02 ENCOUNTER — OFFICE VISIT (OUTPATIENT)
Dept: PODIATRY | Facility: CLINIC | Age: 55
End: 2020-07-02

## 2020-07-02 VITALS
HEIGHT: 68 IN | HEART RATE: 76 BPM | SYSTOLIC BLOOD PRESSURE: 137 MMHG | WEIGHT: 190 LBS | DIASTOLIC BLOOD PRESSURE: 82 MMHG | BODY MASS INDEX: 28.79 KG/M2

## 2020-07-02 DIAGNOSIS — Z89.431 STATUS POST AMPUTATION OF RIGHT FOOT THROUGH METATARSAL BONE (HCC): ICD-10-CM

## 2020-07-02 DIAGNOSIS — L97.512 CHRONIC ULCER OF RIGHT FOOT WITH FAT LAYER EXPOSED (HCC): Primary | ICD-10-CM

## 2020-07-02 DIAGNOSIS — E11.42 DM TYPE 2 WITH DIABETIC PERIPHERAL NEUROPATHY (HCC): ICD-10-CM

## 2020-07-02 PROCEDURE — 11042 DBRDMT SUBQ TIS 1ST 20SQCM/<: CPT | Performed by: PODIATRIST

## 2020-07-02 PROCEDURE — 99213 OFFICE O/P EST LOW 20 MIN: CPT | Performed by: PODIATRIST

## 2020-07-02 NOTE — PROGRESS NOTES
07/02/2020  Foot and Ankle Surgery - Established Patient/Follow-up  Provider: Dr. Bong Baker DPM  Location: [facility]    Subjective:  Kuldeep Adhikari is a 55 y.o. male.     Chief Complaint   Patient presents with   • Right Foot - Follow-up, Wound Check       HPI: Patient returns for evaluation of his right foot.  He states that he was recently admitted to [facility] in the psych unit regarding some issues he was having.  He did have an MRI which was concerning for active osteomyelitis.  He has been subsequently treated with 6 weeks of IV antibiotics and currently has PICC line placed.  He has decreased his activity and feels that the foot is doing somewhat better.  He continues to have an open wound to the plantar medial aspect of the foot.  He has been performing collagen and aqua cell alginate dressing.  No other issues today    Allergies   Allergen Reactions   • Lisinopril Cough       Current Outpatient Medications on File Prior to Visit   Medication Sig Dispense Refill   • Accu-Chek FastClix Lancets misc 1 each 4 (Four) Times a Day Before Meals & at Bedtime. Dx: E11.65 204 each 2   • apixaban (ELIQUIS) 5 MG tablet tablet Take 1 tablet by mouth 2 (Two) Times a Day. 180 tablet 3   • aspirin (ASPIR-LOW) 81 MG EC tablet Take 81 mg by mouth Daily.     • atorvastatin (LIPITOR) 40 MG tablet Take 40 mg by mouth Daily.     • B-D UF III MINI PEN NEEDLES 31G X 5 MM misc      • cefTRIAXone Sodium (ROCEPHIN IV) Infuse  into a venous catheter.     • DULoxetine (CYMBALTA) 60 MG capsule Take 60 mg by mouth Every Morning.     • EPINEPHrine (EPIPEN JR) 0.15 MG/0.3ML solution auto-injector injection      • FLOVENT  MCG/ACT inhaler      • glipizide (GLUCOTROL XL) 10 MG 24 hr tablet Take 20 mg by mouth Daily.     • glucose blood (Accu-Chek Guide) test strip 1 each by Other route 4 (Four) Times a Day Before Meals & at Bedtime. Dx: E11.65. Use as instructed 150 each 2   • insulin glargine (LANTUS) 100  "UNIT/ML injection Inject 21 Units under the skin into the appropriate area as directed Daily.     • JANUMET XR  MG tablet TAKE 1 TABLET BY MOUTH EVERY 12 HOURS 180 tablet 1   • levothyroxine (SYNTHROID) 100 MCG tablet Take 1 tablet by mouth Daily. 60 tablet 0   • losartan (COZAAR) 50 MG tablet Take 50 mg by mouth Daily.     • metoprolol succinate XL (TOPROL-XL) 50 MG 24 hr tablet Take 1 tablet by mouth once daily 30 tablet 0   • nitroglycerin (NITROSTAT) 0.4 MG SL tablet Place 0.4 mg under the tongue Every 5 (Five) Minutes As Needed for Chest Pain.     • omeprazole (priLOSEC) 40 MG capsule Take 40 mg by mouth Daily.       No current facility-administered medications on file prior to visit.        Objective   /82   Pulse 76   Ht 172.7 cm (68\")   Wt 86.2 kg (190 lb)   BMI 28.89 kg/m²     General:   Appearance: appears stated age and healthy    Orientation: AAOx3    Affect: appropriate       VASCULAR       Right Foot Vascularity   Dorsalis pedis:  2+  Posterior tibial:  2+  Skin Temperature: warm    Edema Gradin+  CFT:  < 3 seconds  Pedal Hair Growth:  Absent      NEUROLOGIC      Right Foot Neurologic   Light touch sensation:  Diminished  Hot/Cold sensation: diminished    Protective Sensation using [place] Monofilament:  Diminished  Achilles reflex:  1+      MUSCULOSKELETAL       Right Foot Musculoskeletal    Amputation   Toes amputated: first toe  Ecchymosis:  None  Tenderness: none    Hammertoe:  Second toe, third toe, fourth toe and fifth toe      MUSCLE STRENGTH      Right Foot Muscle Strength   Normal strength    Foot dorsiflexion:  5  Foot plantar flexion:  5  Foot inversion:  5  Foot eversion:  5      DERMATOLOGIC      Right Foot Dermatologic   Skin: Erythema has mostly resolved.  He has less swelling today as well.  The dorsal lateral wound is mostly healed.  The plantar medial ulceration remains and is circular at 1.5 cm in diameter with fibrotic base.  Wound extends to " subcutaneous tissue with no undermining or tunneling.  No gross signs of infection are present.  No other open wounds    Assessment/Plan   Kuldeep was seen today for follow-up and wound check.    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  He now has 1 primary ulceration which was debrided today.  I explained that it is difficult to discern if he truly does have active osteomyelitis given the proximity of his recent transmetatarsal amputation.  Regardless, I would like him to continue the course of the IV antibiotics.  He is to remain off weightbearing to the right lower extremity and allow the plantar wound to heal.  He may continue with the collagen and alginate dressings every other day.  We did review proper dressing care and I would like him to call with any additional issues or concerns.  I will see him in 2 weeks for reevaluation.    Excisional Debridement to subcutaneous tissue: Right foot    Pre ulcer measurement: 1.5 x 1.5 x 0.2cm    Post ulcer measurement: 1.8 x 1.7 x 0.4cm    Anesthesia: None, as patient is neuropathic    Bleeding: <5cc    Pre-op pain: 0    Post-op pain: 0    Complications: None    Informed consent was obtained prior to procedure. Excisional debridement to the level of was performed with a 15 blade and curette without complication.  Viable and nonviable skin, subcutaneous tissue was excised to a healthy bleeding base.  No purulence or proximal extension was identified. Hemostasis was achieved with pressure.  Dry sterile dressings were applied.  Patient tolerated the procedure and anesthesia well.      No orders of the defined types were placed in this encounter.         Note is dictated utilizing voice recognition software.  Unfortunately this leads to occasional typographical errors. I apologize in advance if the situation occurs. If questions occur please do not hesitate to call our office.

## 2020-07-06 ENCOUNTER — LAB REQUISITION (OUTPATIENT)
Dept: LAB | Facility: HOSPITAL | Age: 55
End: 2020-07-06

## 2020-07-06 DIAGNOSIS — M86.079 ACUTE HEMATOGENOUS OSTEOMYELITIS, UNSPECIFIED ANKLE AND FOOT (HCC): ICD-10-CM

## 2020-07-06 PROCEDURE — 80048 BASIC METABOLIC PNL TOTAL CA: CPT | Performed by: INTERNAL MEDICINE

## 2020-07-06 PROCEDURE — 85652 RBC SED RATE AUTOMATED: CPT | Performed by: INTERNAL MEDICINE

## 2020-07-06 PROCEDURE — 86140 C-REACTIVE PROTEIN: CPT | Performed by: INTERNAL MEDICINE

## 2020-07-06 PROCEDURE — 85025 COMPLETE CBC W/AUTO DIFF WBC: CPT | Performed by: INTERNAL MEDICINE

## 2020-07-07 LAB
ANION GAP SERPL CALCULATED.3IONS-SCNC: 14.3 MMOL/L (ref 5–15)
BASOPHILS # BLD AUTO: 0.08 10*3/MM3 (ref 0–0.2)
BASOPHILS NFR BLD AUTO: 0.9 % (ref 0–1.5)
BUN SERPL-MCNC: 22 MG/DL (ref 6–20)
BUN/CREAT SERPL: 15.5 (ref 7–25)
CALCIUM SPEC-SCNC: 9.7 MG/DL (ref 8.6–10.5)
CHLORIDE SERPL-SCNC: 100 MMOL/L (ref 98–107)
CO2 SERPL-SCNC: 22.7 MMOL/L (ref 22–29)
CREAT SERPL-MCNC: 1.42 MG/DL (ref 0.76–1.27)
CRP SERPL-MCNC: 0.31 MG/DL (ref 0–0.5)
DEPRECATED RDW RBC AUTO: 40.4 FL (ref 37–54)
EOSINOPHIL # BLD AUTO: 0.5 10*3/MM3 (ref 0–0.4)
EOSINOPHIL NFR BLD AUTO: 5.7 % (ref 0.3–6.2)
ERYTHROCYTE [DISTWIDTH] IN BLOOD BY AUTOMATED COUNT: 13.2 % (ref 12.3–15.4)
ERYTHROCYTE [SEDIMENTATION RATE] IN BLOOD: 2 MM/HR (ref 0–20)
GFR SERPL CREATININE-BSD FRML MDRD: 52 ML/MIN/1.73
GLUCOSE SERPL-MCNC: 385 MG/DL (ref 65–99)
HCT VFR BLD AUTO: 42.9 % (ref 37.5–51)
HGB BLD-MCNC: 13.9 G/DL (ref 13–17.7)
IMM GRANULOCYTES # BLD AUTO: 0.04 10*3/MM3 (ref 0–0.05)
IMM GRANULOCYTES NFR BLD AUTO: 0.5 % (ref 0–0.5)
LYMPHOCYTES # BLD AUTO: 1.21 10*3/MM3 (ref 0.7–3.1)
LYMPHOCYTES NFR BLD AUTO: 13.9 % (ref 19.6–45.3)
MCH RBC QN AUTO: 27.1 PG (ref 26.6–33)
MCHC RBC AUTO-ENTMCNC: 32.4 G/DL (ref 31.5–35.7)
MCV RBC AUTO: 83.6 FL (ref 79–97)
MONOCYTES # BLD AUTO: 0.67 10*3/MM3 (ref 0.1–0.9)
MONOCYTES NFR BLD AUTO: 7.7 % (ref 5–12)
NEUTROPHILS NFR BLD AUTO: 6.2 10*3/MM3 (ref 1.7–7)
NEUTROPHILS NFR BLD AUTO: 71.3 % (ref 42.7–76)
NRBC BLD AUTO-RTO: 0 /100 WBC (ref 0–0.2)
PLATELET # BLD AUTO: 265 10*3/MM3 (ref 140–450)
PMV BLD AUTO: 10.5 FL (ref 6–12)
POTASSIUM SERPL-SCNC: 5.2 MMOL/L (ref 3.5–5.2)
RBC # BLD AUTO: 5.13 10*6/MM3 (ref 4.14–5.8)
SODIUM SERPL-SCNC: 137 MMOL/L (ref 136–145)
WBC # BLD AUTO: 8.7 10*3/MM3 (ref 3.4–10.8)

## 2020-07-10 ENCOUNTER — TELEMEDICINE (OUTPATIENT)
Dept: ENDOCRINOLOGY | Facility: CLINIC | Age: 55
End: 2020-07-10

## 2020-07-10 VITALS — WEIGHT: 168 LBS | BODY MASS INDEX: 25.46 KG/M2 | HEIGHT: 68 IN

## 2020-07-10 DIAGNOSIS — E11.65 UNCONTROLLED TYPE 2 DIABETES MELLITUS WITH HYPERGLYCEMIA (HCC): Primary | ICD-10-CM

## 2020-07-10 DIAGNOSIS — I10 BENIGN ESSENTIAL HYPERTENSION: Chronic | ICD-10-CM

## 2020-07-10 DIAGNOSIS — E78.2 MIXED HYPERLIPIDEMIA: ICD-10-CM

## 2020-07-10 DIAGNOSIS — E03.9 ACQUIRED HYPOTHYROIDISM: ICD-10-CM

## 2020-07-10 PROCEDURE — 99214 OFFICE O/P EST MOD 30 MIN: CPT | Performed by: INTERNAL MEDICINE

## 2020-07-10 RX ORDER — INSULIN LISPRO 100 [IU]/ML
10 INJECTION, SOLUTION INTRAVENOUS; SUBCUTANEOUS 3 TIMES DAILY
Qty: 5 PEN | Refills: 5 | Status: SHIPPED | OUTPATIENT
Start: 2020-07-10 | End: 2020-07-13 | Stop reason: CLARIF

## 2020-07-10 RX ORDER — CEFTRIAXONE SODIUM 10 G/100ML
INJECTION, POWDER, FOR SOLUTION INTRAVENOUS
COMMUNITY
Start: 2020-07-06 | End: 2020-08-02 | Stop reason: HOSPADM

## 2020-07-10 RX ORDER — METOPROLOL SUCCINATE 50 MG/1
TABLET, EXTENDED RELEASE ORAL
Qty: 30 TABLET | Refills: 0 | OUTPATIENT
Start: 2020-07-10

## 2020-07-10 NOTE — PROGRESS NOTES
Endocrine Progress Note Outpatient     Patient Care Team:  Laura Whitaker MD as PCP - General  You have chosen to receive care through a telehealth visit.  Do you consent to use a video/audio connection for your medical care today? Yes.    Chief Complaint: Follow-up type 2 diabetes    HPI: 55-year-old male with history of type 2 diabetes, hypertension, hyperlipidemia, obesity and CKD stage III disease is followed through telehealth.  For type 2 diabetes: Is currently on Janumet XR 50/thousand 2 tablets daily, Lantus 21 units daily and glipizide 20 mg p.o. daily.  He tells me his blood sugars are running more than 200 pretty much all the time.  He does have ulcer on the foot and follows with a podiatrist.  Hypertension: No blood pressure readings are over this time.  Hyperlipidemia: On atorvastatin.    Past Medical History:   Diagnosis Date   • CKD (chronic kidney disease), stage III (CMS/Formerly [facility])    • Depression    • DJD (degenerative joint disease)    • DVT (deep venous thrombosis) (CMS/Formerly [facility])    • Ganglion     rt wrist   • GERD (gastroesophageal reflux disease)    • Hiatal hernia    • History of echocardiogram 04/2016    2D ECHO   • History of esophageal stricture     s/p dialation 40-50 times last EGD 04/2016   • History of pulmonary embolus (PE)     on long term anticoagulation   • Hyperlipidemia    • Hypertension    • Hypothyroidism    • IBS (irritable bowel syndrome)    • Neuropathy    • Rash     rt lower hip   • Retinopathy    • Seasonal allergies    • Sleep apnea     cpap  bring dos   • Type 2 diabetes mellitus with peripheral vascular disease (CMS/HCC)    • Vitamin D deficiency        Social History     Socioeconomic History   • Marital status:      Spouse name: Not on file   • Number of children: Not on file   • Years of education: Not on file   • Highest education level: Not on file   Tobacco Use   • Smoking status: Never Smoker   • Smokeless tobacco: Never Used   Substance and Sexual  Activity   • Alcohol use: No     Frequency: Never   • Drug use: No   • Sexual activity: Defer       Family History   Problem Relation Age of Onset   • Diabetes Mother    • Heart disease Mother    • Leukemia Father    • Sleep apnea Maternal Aunt         GENO   • Stroke Maternal Grandmother    • Hypertension Other    • Hyperlipidemia Other    • Cancer Other    • Colon cancer Other         uncle       Allergies   Allergen Reactions   • Lisinopril Cough       ROS:   Constitutional:  Denies fatigue, tiredness.    Eyes:  Denies change in visual acuity   HENT:  Denies nasal congestion or sore throat   Respiratory: denies cough, shortness of breath.   Cardiovascular:  denies chest pain, edema   GI:  Denies abdominal pain, nausea, vomiting.   Musculoskeletal:  Denies back pain or joint pain   Integument:  Denies dry skin and rash   Neurologic:  Denies headache, focal weakness or sensory changes   Endocrine:  Denies polyuria or polydipsia   Psychiatric:  Denies depression or anxiety      There were no vitals filed for this visit.    Physical Exam:  GEN: NAD, looks healthy on video.  Alert and oriented and able to carry on conversation.  Breathing effort was normal.  Mood and affect was normal.      Results Review:     I reviewed the patient's new clinical results.    Lab Results   Component Value Date    HGBA1C 11.0 (H) 06/24/2020    HGBA1C 9.9 (H) 03/15/2020    HGBA1C 8.8 (H) 07/10/2019      Lab Results   Component Value Date    GLUCOSE 385 (H) 07/06/2020    BUN 22 (H) 07/06/2020    CREATININE 1.42 (H) 07/06/2020    EGFRIFNONA 52 (L) 07/06/2020    BCR 15.5 07/06/2020    K 5.2 07/06/2020    CO2 22.7 07/06/2020    CALCIUM 9.7 07/06/2020    ALBUMIN 4.50 06/24/2020    LABIL2 1.3 04/22/2019    AST 11 06/24/2020    ALT 32 06/24/2020    CHOL 149 06/24/2020    TRIG 178 (H) 06/24/2020    LDL 78 06/24/2020    HDL 35 (L) 06/24/2020     Lab Results   Component Value Date    TSH 2.960 06/24/2020    FREET4 1.00 01/19/2017          Medication Review: Reviewed.       Current Outpatient Medications:   •  Accu-Chek FastClix Lancets misc, 1 each 4 (Four) Times a Day Before Meals & at Bedtime. Dx: E11.65, Disp: 204 each, Rfl: 2  •  apixaban (ELIQUIS) 5 MG tablet tablet, Take 1 tablet by mouth 2 (Two) Times a Day., Disp: 180 tablet, Rfl: 3  •  aspirin (ASPIR-LOW) 81 MG EC tablet, Take 81 mg by mouth Daily., Disp: , Rfl:   •  atorvastatin (LIPITOR) 40 MG tablet, Take 40 mg by mouth Daily., Disp: , Rfl:   •  B-D UF III MINI PEN NEEDLES 31G X 5 MM misc, , Disp: , Rfl:   •  cefTRIAXone (ROCEPHIN) 10 g injection, , Disp: , Rfl:   •  cefTRIAXone Sodium (ROCEPHIN IV), Infuse  into a venous catheter., Disp: , Rfl:   •  DULoxetine (CYMBALTA) 60 MG capsule, Take 60 mg by mouth Every Morning., Disp: , Rfl:   •  EPINEPHrine (EPIPEN JR) 0.15 MG/0.3ML solution auto-injector injection, , Disp: , Rfl:   •  FLOVENT  MCG/ACT inhaler, , Disp: , Rfl:   •  glucose blood (Accu-Chek Guide) test strip, 1 each by Other route 4 (Four) Times a Day Before Meals & at Bedtime. Dx: E11.65. Use as instructed, Disp: 150 each, Rfl: 2  •  JANUMET XR  MG tablet, TAKE 1 TABLET BY MOUTH EVERY 12 HOURS (Patient taking differently: Take 1 tablet by mouth 2 (two) times a day.), Disp: 180 tablet, Rfl: 1  •  levothyroxine (SYNTHROID) 100 MCG tablet, Take 1 tablet by mouth Daily., Disp: 60 tablet, Rfl: 0  •  losartan (COZAAR) 50 MG tablet, Take 50 mg by mouth Daily., Disp: , Rfl:   •  metoprolol succinate XL (TOPROL-XL) 50 MG 24 hr tablet, Take 1 tablet by mouth once daily, Disp: 30 tablet, Rfl: 0  •  nitroglycerin (NITROSTAT) 0.4 MG SL tablet, Place 0.4 mg under the tongue Every 5 (Five) Minutes As Needed for Chest Pain., Disp: , Rfl:   •  Insulin Glargine (Lantus SoloStar) 100 UNIT/ML injection pen, Inject 30 Units under the skin into the appropriate area as directed Every Night., Disp: 5 pen, Rfl: 5  •  Insulin Lispro, 1 Unit Dial, (HumaLOG KwikPen) 100 UNIT/ML  solution pen-injector, Inject 10 Units under the skin into the appropriate area as directed 3 (Three) Times a Day., Disp: 5 pen, Rfl: 5      Assessment/Plan   1.  Diabetes mellitus type 2: Uncontrolled with significant hyperglycemia.  At this time I have asked him to DC glipizide, increase Lantus to 30 units subcu nightly and add Humalog 10 with each meal.  We will decrease Janumet to X are 50/500, 1 tablet p.o. daily.  Will refer him for comprehensive dives education.  Advised to always give glucose source with him in case of low blood sugar and send blood sugar readings for review next few weeks for further adjustment on insulin dose.  He verbalized understanding.  Also advised to get regular eye exam.  2.  Hypertension: Check blood pressure at home and send readings for review  3.  Hyperlipidemia: On atorvastatin, LDL was 78 with triglycerides 178.  Will continue atorvastatin.            Tamara Michaels MD FACE.

## 2020-07-10 NOTE — PATIENT INSTRUCTIONS
Stop Glipizide  Increase Lantus to 30 units subcu nightly  Start Humalog 10 units before each meal  Check blood sugars at least 3 times a day before each meal and send me records for review in 1 to 2 weeks  Always give glucose dose in case of low blood sugar  Arrange for comprehensive dives education  Get regular eye exam and flu vaccine  Follow-up in 3 to 4 months with labs.  Decrease Janumet XR to 50/500, 2 tablets p.o. daily  Check blood pressure at home and send readings for review.

## 2020-07-16 RX ORDER — DULOXETIN HYDROCHLORIDE 60 MG/1
60 CAPSULE, DELAYED RELEASE ORAL EVERY MORNING
Qty: 90 CAPSULE | Refills: 3 | Status: SHIPPED | OUTPATIENT
Start: 2020-07-16 | End: 2020-08-26 | Stop reason: SDUPTHER

## 2020-07-20 ENCOUNTER — TELEPHONE (OUTPATIENT)
Dept: PODIATRY | Facility: CLINIC | Age: 55
End: 2020-07-20

## 2020-07-20 NOTE — TELEPHONE ENCOUNTER
[facility] has [facility] to let me know that the pt has been extremely non compliant last week the pt was walking around barefoot and he was having to pick dog hear out of the wound. This week he stated the wound has some sloth in it and does not look well at all he states he continues to tell the pt how to do the dressings but he only uses the betadine when he does the care himself.      7/21/2020  I have called the pt today an asked him to come in and be seen he told me it would probably be sometime next month before he came in. I told him he should be sooner and he needs to call and get an apt made. He stated he would try.

## 2020-07-23 ENCOUNTER — OFFICE VISIT (OUTPATIENT)
Dept: PODIATRY | Facility: CLINIC | Age: 55
End: 2020-07-23

## 2020-07-23 VITALS
DIASTOLIC BLOOD PRESSURE: 79 MMHG | WEIGHT: 186 LBS | HEIGHT: 68 IN | HEART RATE: 83 BPM | SYSTOLIC BLOOD PRESSURE: 139 MMHG | BODY MASS INDEX: 28.19 KG/M2

## 2020-07-23 DIAGNOSIS — E11.42 DM TYPE 2 WITH DIABETIC PERIPHERAL NEUROPATHY (HCC): ICD-10-CM

## 2020-07-23 DIAGNOSIS — L97.513 CHRONIC ULCER OF RIGHT FOOT WITH NECROSIS OF MUSCLE (HCC): Primary | ICD-10-CM

## 2020-07-23 PROCEDURE — 11043 DBRDMT MUSC&/FSCA 1ST 20/<: CPT | Performed by: PODIATRIST

## 2020-07-23 PROCEDURE — 99213 OFFICE O/P EST LOW 20 MIN: CPT | Performed by: PODIATRIST

## 2020-07-27 ENCOUNTER — HOSPITAL ENCOUNTER (INPATIENT)
Facility: HOSPITAL | Age: 55
LOS: 6 days | Discharge: HOME HEALTH | End: 2020-08-02
Attending: EMERGENCY MEDICINE | Admitting: INTERNAL MEDICINE

## 2020-07-27 ENCOUNTER — APPOINTMENT (OUTPATIENT)
Dept: GENERAL RADIOLOGY | Facility: HOSPITAL | Age: 55
End: 2020-07-27

## 2020-07-27 ENCOUNTER — APPOINTMENT (OUTPATIENT)
Dept: CARDIOLOGY | Facility: HOSPITAL | Age: 55
End: 2020-07-27

## 2020-07-27 ENCOUNTER — TELEPHONE (OUTPATIENT)
Dept: FAMILY MEDICINE CLINIC | Facility: CLINIC | Age: 55
End: 2020-07-27

## 2020-07-27 DIAGNOSIS — Z89.511 STATUS POST BELOW-KNEE AMPUTATION OF RIGHT LOWER EXTREMITY (HCC): ICD-10-CM

## 2020-07-27 DIAGNOSIS — A41.9 SEPSIS, DUE TO UNSPECIFIED ORGANISM, UNSPECIFIED WHETHER ACUTE ORGAN DYSFUNCTION PRESENT (HCC): ICD-10-CM

## 2020-07-27 DIAGNOSIS — L03.115 CELLULITIS OF RIGHT FOOT: Primary | ICD-10-CM

## 2020-07-27 DIAGNOSIS — M79.604 RIGHT LEG PAIN: ICD-10-CM

## 2020-07-27 LAB
ANION GAP SERPL CALCULATED.3IONS-SCNC: 17 MMOL/L (ref 5–15)
B PARAPERT DNA SPEC QL NAA+PROBE: NOT DETECTED
B PERT DNA SPEC QL NAA+PROBE: NOT DETECTED
BASOPHILS # BLD AUTO: 0.1 10*3/MM3 (ref 0–0.2)
BASOPHILS NFR BLD AUTO: 0.8 % (ref 0–1.5)
BH CV LOWER VASCULAR LEFT COMMON FEMORAL AUGMENT: NORMAL
BH CV LOWER VASCULAR LEFT COMMON FEMORAL COMPETENT: NORMAL
BH CV LOWER VASCULAR LEFT COMMON FEMORAL COMPRESS: NORMAL
BH CV LOWER VASCULAR LEFT COMMON FEMORAL PHASIC: NORMAL
BH CV LOWER VASCULAR LEFT COMMON FEMORAL SPONT: NORMAL
BH CV LOWER VASCULAR RIGHT COMMON FEMORAL AUGMENT: NORMAL
BH CV LOWER VASCULAR RIGHT COMMON FEMORAL COMPETENT: NORMAL
BH CV LOWER VASCULAR RIGHT COMMON FEMORAL COMPRESS: NORMAL
BH CV LOWER VASCULAR RIGHT COMMON FEMORAL PHASIC: NORMAL
BH CV LOWER VASCULAR RIGHT COMMON FEMORAL SPONT: NORMAL
BH CV LOWER VASCULAR RIGHT DISTAL FEMORAL COMPRESS: NORMAL
BH CV LOWER VASCULAR RIGHT GASTRONEMIUS COMPRESS: NORMAL
BH CV LOWER VASCULAR RIGHT GREATER SAPH AK COMPRESS: NORMAL
BH CV LOWER VASCULAR RIGHT GREATER SAPH BK COMPRESS: NORMAL
BH CV LOWER VASCULAR RIGHT LESSER SAPH COMPRESS: NORMAL
BH CV LOWER VASCULAR RIGHT MID FEMORAL AUGMENT: NORMAL
BH CV LOWER VASCULAR RIGHT MID FEMORAL COMPETENT: NORMAL
BH CV LOWER VASCULAR RIGHT MID FEMORAL COMPRESS: NORMAL
BH CV LOWER VASCULAR RIGHT MID FEMORAL PHASIC: NORMAL
BH CV LOWER VASCULAR RIGHT MID FEMORAL SPONT: NORMAL
BH CV LOWER VASCULAR RIGHT PERONEAL COMPRESS: NORMAL
BH CV LOWER VASCULAR RIGHT POPLITEAL AUGMENT: NORMAL
BH CV LOWER VASCULAR RIGHT POPLITEAL COMPETENT: NORMAL
BH CV LOWER VASCULAR RIGHT POPLITEAL COMPRESS: NORMAL
BH CV LOWER VASCULAR RIGHT POPLITEAL PHASIC: NORMAL
BH CV LOWER VASCULAR RIGHT POPLITEAL SPONT: NORMAL
BH CV LOWER VASCULAR RIGHT POSTERIOR TIBIAL COMPRESS: NORMAL
BH CV LOWER VASCULAR RIGHT PROXIMAL FEMORAL COMPRESS: NORMAL
BH CV LOWER VASCULAR RIGHT SAPHENOFEMORAL JUNCTION AUGMENT: NORMAL
BH CV LOWER VASCULAR RIGHT SAPHENOFEMORAL JUNCTION COMPETENT: NORMAL
BH CV LOWER VASCULAR RIGHT SAPHENOFEMORAL JUNCTION COMPRESS: NORMAL
BH CV LOWER VASCULAR RIGHT SAPHENOFEMORAL JUNCTION PHASIC: NORMAL
BH CV LOWER VASCULAR RIGHT SAPHENOFEMORAL JUNCTION SPONT: NORMAL
BUN SERPL-MCNC: 24 MG/DL (ref 6–20)
BUN SERPL-MCNC: ABNORMAL MG/DL
BUN/CREAT SERPL: ABNORMAL
C PNEUM DNA NPH QL NAA+NON-PROBE: NOT DETECTED
CALCIUM SPEC-SCNC: 9.1 MG/DL (ref 8.6–10.5)
CHLORIDE SERPL-SCNC: 95 MMOL/L (ref 98–107)
CO2 SERPL-SCNC: 23 MMOL/L (ref 22–29)
CREAT SERPL-MCNC: 1.54 MG/DL (ref 0.76–1.27)
D-LACTATE SERPL-SCNC: 1.1 MMOL/L (ref 0.5–2)
DEPRECATED RDW RBC AUTO: 40.7 FL (ref 37–54)
EOSINOPHIL # BLD AUTO: 0.2 10*3/MM3 (ref 0–0.4)
EOSINOPHIL NFR BLD AUTO: 1.3 % (ref 0.3–6.2)
ERYTHROCYTE [DISTWIDTH] IN BLOOD BY AUTOMATED COUNT: 14.2 % (ref 12.3–15.4)
FLUAV H1 2009 PAND RNA NPH QL NAA+PROBE: NOT DETECTED
FLUAV H1 HA GENE NPH QL NAA+PROBE: NOT DETECTED
FLUAV H3 RNA NPH QL NAA+PROBE: NOT DETECTED
FLUAV SUBTYP SPEC NAA+PROBE: NOT DETECTED
FLUBV RNA ISLT QL NAA+PROBE: NOT DETECTED
GFR SERPL CREATININE-BSD FRML MDRD: 47 ML/MIN/1.73
GLUCOSE SERPL-MCNC: 348 MG/DL (ref 65–99)
HADV DNA SPEC NAA+PROBE: NOT DETECTED
HCOV 229E RNA SPEC QL NAA+PROBE: NOT DETECTED
HCOV HKU1 RNA SPEC QL NAA+PROBE: NOT DETECTED
HCOV NL63 RNA SPEC QL NAA+PROBE: NOT DETECTED
HCOV OC43 RNA SPEC QL NAA+PROBE: NOT DETECTED
HCT VFR BLD AUTO: 38.4 % (ref 37.5–51)
HGB BLD-MCNC: 12.4 G/DL (ref 13–17.7)
HMPV RNA NPH QL NAA+NON-PROBE: NOT DETECTED
HPIV1 RNA SPEC QL NAA+PROBE: NOT DETECTED
HPIV2 RNA SPEC QL NAA+PROBE: NOT DETECTED
HPIV3 RNA NPH QL NAA+PROBE: NOT DETECTED
HPIV4 P GENE NPH QL NAA+PROBE: NOT DETECTED
LYMPHOCYTES # BLD AUTO: 1.2 10*3/MM3 (ref 0.7–3.1)
LYMPHOCYTES NFR BLD AUTO: 9 % (ref 19.6–45.3)
M PNEUMO IGG SER IA-ACNC: NOT DETECTED
MCH RBC QN AUTO: 26.2 PG (ref 26.6–33)
MCHC RBC AUTO-ENTMCNC: 32.3 G/DL (ref 31.5–35.7)
MCV RBC AUTO: 81 FL (ref 79–97)
MONOCYTES # BLD AUTO: 1.3 10*3/MM3 (ref 0.1–0.9)
MONOCYTES NFR BLD AUTO: 9.9 % (ref 5–12)
NEUTROPHILS NFR BLD AUTO: 10.8 10*3/MM3 (ref 1.7–7)
NEUTROPHILS NFR BLD AUTO: 79 % (ref 42.7–76)
NRBC BLD AUTO-RTO: 0.1 /100 WBC (ref 0–0.2)
PLATELET # BLD AUTO: 279 10*3/MM3 (ref 140–450)
PMV BLD AUTO: 7.3 FL (ref 6–12)
POTASSIUM SERPL-SCNC: 4.5 MMOL/L (ref 3.5–5.2)
PROCALCITONIN SERPL-MCNC: 0.43 NG/ML (ref 0–0.25)
RBC # BLD AUTO: 4.74 10*6/MM3 (ref 4.14–5.8)
RHINOVIRUS RNA SPEC NAA+PROBE: NOT DETECTED
RSV RNA NPH QL NAA+NON-PROBE: NOT DETECTED
SARS-COV-2 RNA PNL SPEC NAA+PROBE: NOT DETECTED
SODIUM SERPL-SCNC: 135 MMOL/L (ref 136–145)
WBC # BLD AUTO: 13.6 10*3/MM3 (ref 3.4–10.8)

## 2020-07-27 PROCEDURE — 93971 EXTREMITY STUDY: CPT

## 2020-07-27 PROCEDURE — 84145 PROCALCITONIN (PCT): CPT | Performed by: NURSE PRACTITIONER

## 2020-07-27 PROCEDURE — 99222 1ST HOSP IP/OBS MODERATE 55: CPT | Performed by: NURSE PRACTITIONER

## 2020-07-27 PROCEDURE — 25010000002 VANCOMYCIN 10 G RECONSTITUTED SOLUTION: Performed by: EMERGENCY MEDICINE

## 2020-07-27 PROCEDURE — 80048 BASIC METABOLIC PNL TOTAL CA: CPT | Performed by: EMERGENCY MEDICINE

## 2020-07-27 PROCEDURE — 25010000002 ENOXAPARIN PER 10 MG: Performed by: NURSE PRACTITIONER

## 2020-07-27 PROCEDURE — 83605 ASSAY OF LACTIC ACID: CPT

## 2020-07-27 PROCEDURE — 87635 SARS-COV-2 COVID-19 AMP PRB: CPT | Performed by: EMERGENCY MEDICINE

## 2020-07-27 PROCEDURE — 99284 EMERGENCY DEPT VISIT MOD MDM: CPT

## 2020-07-27 PROCEDURE — 71045 X-RAY EXAM CHEST 1 VIEW: CPT

## 2020-07-27 PROCEDURE — 85025 COMPLETE CBC W/AUTO DIFF WBC: CPT | Performed by: EMERGENCY MEDICINE

## 2020-07-27 PROCEDURE — 0100U HC BIOFIRE FILMARRAY RESP PANEL 2: CPT | Performed by: NURSE PRACTITIONER

## 2020-07-27 PROCEDURE — 25010000002 PIPERACILLIN SOD-TAZOBACTAM PER 1 G: Performed by: EMERGENCY MEDICINE

## 2020-07-27 RX ORDER — SODIUM CHLORIDE 9 MG/ML
125 INJECTION, SOLUTION INTRAVENOUS CONTINUOUS
Status: DISCONTINUED | OUTPATIENT
Start: 2020-07-28 | End: 2020-07-30 | Stop reason: HOSPADM

## 2020-07-27 RX ORDER — OMEPRAZOLE 40 MG/1
40 CAPSULE, DELAYED RELEASE ORAL DAILY
COMMUNITY
End: 2022-01-21 | Stop reason: SDUPTHER

## 2020-07-27 RX ORDER — ONDANSETRON 4 MG/1
4 TABLET, FILM COATED ORAL EVERY 6 HOURS PRN
Status: DISCONTINUED | OUTPATIENT
Start: 2020-07-27 | End: 2020-07-30 | Stop reason: HOSPADM

## 2020-07-27 RX ORDER — ONDANSETRON 2 MG/ML
4 INJECTION INTRAMUSCULAR; INTRAVENOUS EVERY 6 HOURS PRN
Status: DISCONTINUED | OUTPATIENT
Start: 2020-07-27 | End: 2020-07-30 | Stop reason: HOSPADM

## 2020-07-27 RX ORDER — ACETAMINOPHEN 650 MG/1
650 SUPPOSITORY RECTAL EVERY 4 HOURS PRN
Status: DISCONTINUED | OUTPATIENT
Start: 2020-07-27 | End: 2020-07-30 | Stop reason: HOSPADM

## 2020-07-27 RX ORDER — SODIUM CHLORIDE 0.9 % (FLUSH) 0.9 %
10 SYRINGE (ML) INJECTION AS NEEDED
Status: DISCONTINUED | OUTPATIENT
Start: 2020-07-27 | End: 2020-07-30 | Stop reason: HOSPADM

## 2020-07-27 RX ORDER — ACETAMINOPHEN 325 MG/1
650 TABLET ORAL EVERY 4 HOURS PRN
Status: DISCONTINUED | OUTPATIENT
Start: 2020-07-27 | End: 2020-07-30 | Stop reason: HOSPADM

## 2020-07-27 RX ORDER — ACETAMINOPHEN 160 MG/5ML
650 SOLUTION ORAL EVERY 4 HOURS PRN
Status: DISCONTINUED | OUTPATIENT
Start: 2020-07-27 | End: 2020-07-30 | Stop reason: HOSPADM

## 2020-07-27 RX ORDER — VANCOMYCIN 1.75 GRAM/500 ML IN 0.9 % SODIUM CHLORIDE INTRAVENOUS
20 ONCE
Status: COMPLETED | OUTPATIENT
Start: 2020-07-27 | End: 2020-07-27

## 2020-07-27 RX ORDER — SODIUM CHLORIDE 9 MG/ML
100 INJECTION, SOLUTION INTRAVENOUS CONTINUOUS
Status: DISCONTINUED | OUTPATIENT
Start: 2020-07-27 | End: 2020-07-28

## 2020-07-27 RX ORDER — SODIUM CHLORIDE 0.9 % (FLUSH) 0.9 %
10 SYRINGE (ML) INJECTION EVERY 12 HOURS SCHEDULED
Status: DISCONTINUED | OUTPATIENT
Start: 2020-07-27 | End: 2020-07-30 | Stop reason: HOSPADM

## 2020-07-27 RX ADMIN — PIPERACILLIN AND TAZOBACTAM 3.38 G: 3; .375 INJECTION, POWDER, FOR SOLUTION INTRAVENOUS at 19:41

## 2020-07-27 RX ADMIN — Medication 10 ML: at 23:27

## 2020-07-27 RX ADMIN — SODIUM CHLORIDE 1750 MG: 9 INJECTION, SOLUTION INTRAVENOUS at 20:17

## 2020-07-27 RX ADMIN — ENOXAPARIN SODIUM 40 MG: 40 INJECTION SUBCUTANEOUS at 23:27

## 2020-07-27 RX ADMIN — SODIUM CHLORIDE 100 ML/HR: 900 INJECTION, SOLUTION INTRAVENOUS at 23:27

## 2020-07-27 NOTE — TELEPHONE ENCOUNTER
Jayesh roca/ [facility] called ([phone])  Pt called him today with complaints of temp, nausea, chills, body aches, and dry cough.  Requesting verbal order for covid19 test and to take covid19 precautions until resulted.

## 2020-07-27 NOTE — TELEPHONE ENCOUNTER
Complaining of leg pain. He had surgery on his leg by Dr. Baker and he at [facility] ER due to possible blood clot so he will be tested there.

## 2020-07-28 ENCOUNTER — APPOINTMENT (OUTPATIENT)
Dept: MRI IMAGING | Facility: HOSPITAL | Age: 55
End: 2020-07-28

## 2020-07-28 ENCOUNTER — APPOINTMENT (OUTPATIENT)
Dept: CT IMAGING | Facility: HOSPITAL | Age: 55
End: 2020-07-28

## 2020-07-28 LAB
ABO GROUP BLD: NORMAL
ANION GAP SERPL CALCULATED.3IONS-SCNC: 12 MMOL/L (ref 5–15)
BASOPHILS # BLD AUTO: 0.1 10*3/MM3 (ref 0–0.2)
BASOPHILS NFR BLD AUTO: 0.6 % (ref 0–1.5)
BLD GP AB SCN SERPL QL: NEGATIVE
BUN SERPL-MCNC: 23 MG/DL (ref 6–20)
BUN SERPL-MCNC: ABNORMAL MG/DL
BUN/CREAT SERPL: ABNORMAL
CALCIUM SPEC-SCNC: 8.9 MG/DL (ref 8.6–10.5)
CHLORIDE SERPL-SCNC: 100 MMOL/L (ref 98–107)
CO2 SERPL-SCNC: 25 MMOL/L (ref 22–29)
CREAT SERPL-MCNC: 1.75 MG/DL (ref 0.76–1.27)
DEPRECATED RDW RBC AUTO: 40.7 FL (ref 37–54)
EOSINOPHIL # BLD AUTO: 0.1 10*3/MM3 (ref 0–0.4)
EOSINOPHIL NFR BLD AUTO: 0.9 % (ref 0.3–6.2)
ERYTHROCYTE [DISTWIDTH] IN BLOOD BY AUTOMATED COUNT: 14.2 % (ref 12.3–15.4)
GFR SERPL CREATININE-BSD FRML MDRD: 41 ML/MIN/1.73
GLUCOSE BLDC GLUCOMTR-MCNC: 140 MG/DL (ref 70–105)
GLUCOSE BLDC GLUCOMTR-MCNC: 152 MG/DL (ref 70–105)
GLUCOSE BLDC GLUCOMTR-MCNC: 185 MG/DL (ref 70–105)
GLUCOSE BLDC GLUCOMTR-MCNC: 253 MG/DL (ref 70–105)
GLUCOSE SERPL-MCNC: 245 MG/DL (ref 65–99)
HBA1C MFR BLD: 11.8 % (ref 3.5–5.6)
HCT VFR BLD AUTO: 37.6 % (ref 37.5–51)
HGB BLD-MCNC: 12 G/DL (ref 13–17.7)
LYMPHOCYTES # BLD AUTO: 0.9 10*3/MM3 (ref 0.7–3.1)
LYMPHOCYTES NFR BLD AUTO: 7.5 % (ref 19.6–45.3)
MCH RBC QN AUTO: 26.3 PG (ref 26.6–33)
MCHC RBC AUTO-ENTMCNC: 32 G/DL (ref 31.5–35.7)
MCV RBC AUTO: 82 FL (ref 79–97)
MONOCYTES # BLD AUTO: 0.9 10*3/MM3 (ref 0.1–0.9)
MONOCYTES NFR BLD AUTO: 6.8 % (ref 5–12)
NEUTROPHILS NFR BLD AUTO: 10.5 10*3/MM3 (ref 1.7–7)
NEUTROPHILS NFR BLD AUTO: 84.2 % (ref 42.7–76)
NRBC BLD AUTO-RTO: 0 /100 WBC (ref 0–0.2)
PLATELET # BLD AUTO: 239 10*3/MM3 (ref 140–450)
PMV BLD AUTO: 7.4 FL (ref 6–12)
POTASSIUM SERPL-SCNC: 4.4 MMOL/L (ref 3.5–5.2)
RBC # BLD AUTO: 4.58 10*6/MM3 (ref 4.14–5.8)
RH BLD: POSITIVE
SODIUM SERPL-SCNC: 137 MMOL/L (ref 136–145)
T&S EXPIRATION DATE: NORMAL
TSH SERPL DL<=0.05 MIU/L-ACNC: 2.56 UIU/ML (ref 0.27–4.2)
WBC # BLD AUTO: 12.5 10*3/MM3 (ref 3.4–10.8)

## 2020-07-28 PROCEDURE — 25010000002 GADOTERATE MEGLUMINE 10 MMOL/20ML SOLUTION: Performed by: INTERNAL MEDICINE

## 2020-07-28 PROCEDURE — 97161 PT EVAL LOW COMPLEX 20 MIN: CPT

## 2020-07-28 PROCEDURE — 63710000001 PROMETHAZINE PER 12.5 MG: Performed by: INTERNAL MEDICINE

## 2020-07-28 PROCEDURE — 86900 BLOOD TYPING SEROLOGIC ABO: CPT | Performed by: ORTHOPAEDIC SURGERY

## 2020-07-28 PROCEDURE — 63710000001 INSULIN GLARGINE PER 5 UNITS: Performed by: NURSE PRACTITIONER

## 2020-07-28 PROCEDURE — 94640 AIRWAY INHALATION TREATMENT: CPT

## 2020-07-28 PROCEDURE — 87147 CULTURE TYPE IMMUNOLOGIC: CPT | Performed by: NURSE PRACTITIONER

## 2020-07-28 PROCEDURE — 25010000002 ONDANSETRON PER 1 MG: Performed by: NURSE PRACTITIONER

## 2020-07-28 PROCEDURE — 87070 CULTURE OTHR SPECIMN AEROBIC: CPT | Performed by: NURSE PRACTITIONER

## 2020-07-28 PROCEDURE — 80048 BASIC METABOLIC PNL TOTAL CA: CPT | Performed by: NURSE PRACTITIONER

## 2020-07-28 PROCEDURE — 25010000002 VANCOMYCIN 10 G RECONSTITUTED SOLUTION: Performed by: NURSE PRACTITIONER

## 2020-07-28 PROCEDURE — 25010000002 PIPERACILLIN SOD-TAZOBACTAM PER 1 G: Performed by: NURSE PRACTITIONER

## 2020-07-28 PROCEDURE — 94799 UNLISTED PULMONARY SVC/PX: CPT

## 2020-07-28 PROCEDURE — 87205 SMEAR GRAM STAIN: CPT | Performed by: NURSE PRACTITIONER

## 2020-07-28 PROCEDURE — 86901 BLOOD TYPING SEROLOGIC RH(D): CPT | Performed by: ORTHOPAEDIC SURGERY

## 2020-07-28 PROCEDURE — 73700 CT LOWER EXTREMITY W/O DYE: CPT

## 2020-07-28 PROCEDURE — 85025 COMPLETE CBC W/AUTO DIFF WBC: CPT | Performed by: NURSE PRACTITIONER

## 2020-07-28 PROCEDURE — 25010000002 CEFEPIME PER 500 MG: Performed by: INTERNAL MEDICINE

## 2020-07-28 PROCEDURE — 86850 RBC ANTIBODY SCREEN: CPT | Performed by: ORTHOPAEDIC SURGERY

## 2020-07-28 PROCEDURE — 99221 1ST HOSP IP/OBS SF/LOW 40: CPT | Performed by: PODIATRIST

## 2020-07-28 PROCEDURE — 83036 HEMOGLOBIN GLYCOSYLATED A1C: CPT | Performed by: NURSE PRACTITIONER

## 2020-07-28 PROCEDURE — 82962 GLUCOSE BLOOD TEST: CPT

## 2020-07-28 PROCEDURE — 97116 GAIT TRAINING THERAPY: CPT

## 2020-07-28 PROCEDURE — 99232 SBSQ HOSP IP/OBS MODERATE 35: CPT | Performed by: INTERNAL MEDICINE

## 2020-07-28 PROCEDURE — 63710000001 INSULIN LISPRO (HUMAN) PER 5 UNITS: Performed by: NURSE PRACTITIONER

## 2020-07-28 PROCEDURE — 87077 CULTURE AEROBIC IDENTIFY: CPT | Performed by: NURSE PRACTITIONER

## 2020-07-28 PROCEDURE — 73720 MRI LWR EXTREMITY W/O&W/DYE: CPT

## 2020-07-28 PROCEDURE — 87186 SC STD MICRODIL/AGAR DIL: CPT | Performed by: NURSE PRACTITIONER

## 2020-07-28 PROCEDURE — 84443 ASSAY THYROID STIM HORMONE: CPT | Performed by: NURSE PRACTITIONER

## 2020-07-28 PROCEDURE — 87040 BLOOD CULTURE FOR BACTERIA: CPT | Performed by: EMERGENCY MEDICINE

## 2020-07-28 PROCEDURE — A9575 INJ GADOTERATE MEGLUMI 0.1ML: HCPCS | Performed by: INTERNAL MEDICINE

## 2020-07-28 RX ORDER — ASPIRIN 81 MG/1
81 TABLET ORAL DAILY
Status: DISCONTINUED | OUTPATIENT
Start: 2020-07-28 | End: 2020-07-30 | Stop reason: HOSPADM

## 2020-07-28 RX ORDER — NICOTINE POLACRILEX 4 MG
15 LOZENGE BUCCAL
Status: DISCONTINUED | OUTPATIENT
Start: 2020-07-28 | End: 2020-08-02 | Stop reason: HOSPADM

## 2020-07-28 RX ORDER — BUDESONIDE 0.5 MG/2ML
0.5 INHALANT ORAL
Status: DISCONTINUED | OUTPATIENT
Start: 2020-07-28 | End: 2020-07-30 | Stop reason: HOSPADM

## 2020-07-28 RX ORDER — PANTOPRAZOLE SODIUM 40 MG/1
40 TABLET, DELAYED RELEASE ORAL EVERY MORNING
Status: DISCONTINUED | OUTPATIENT
Start: 2020-07-28 | End: 2020-07-30 | Stop reason: HOSPADM

## 2020-07-28 RX ORDER — PROMETHAZINE HYDROCHLORIDE 12.5 MG/1
12.5 TABLET ORAL EVERY 6 HOURS PRN
Status: DISCONTINUED | OUTPATIENT
Start: 2020-07-28 | End: 2020-07-30 | Stop reason: HOSPADM

## 2020-07-28 RX ORDER — BENZONATATE 100 MG/1
200 CAPSULE ORAL 3 TIMES DAILY PRN
Status: DISCONTINUED | OUTPATIENT
Start: 2020-07-28 | End: 2020-07-30 | Stop reason: HOSPADM

## 2020-07-28 RX ORDER — GADOTERATE MEGLUMINE 376.9 MG/ML
20 INJECTION INTRAVENOUS
Status: COMPLETED | OUTPATIENT
Start: 2020-07-28 | End: 2020-07-28

## 2020-07-28 RX ORDER — LEVOTHYROXINE SODIUM 0.1 MG/1
100 TABLET ORAL DAILY
Status: DISCONTINUED | OUTPATIENT
Start: 2020-07-28 | End: 2020-08-02 | Stop reason: HOSPADM

## 2020-07-28 RX ORDER — LOSARTAN POTASSIUM 50 MG/1
50 TABLET ORAL DAILY
Status: DISCONTINUED | OUTPATIENT
Start: 2020-07-28 | End: 2020-08-02 | Stop reason: HOSPADM

## 2020-07-28 RX ORDER — HYDROCODONE BITARTRATE AND ACETAMINOPHEN 5; 325 MG/1; MG/1
1 TABLET ORAL EVERY 6 HOURS PRN
Status: DISCONTINUED | OUTPATIENT
Start: 2020-07-28 | End: 2020-07-30

## 2020-07-28 RX ORDER — DEXTROSE MONOHYDRATE 25 G/50ML
25 INJECTION, SOLUTION INTRAVENOUS
Status: DISCONTINUED | OUTPATIENT
Start: 2020-07-28 | End: 2020-08-02 | Stop reason: HOSPADM

## 2020-07-28 RX ORDER — METOPROLOL SUCCINATE 50 MG/1
50 TABLET, EXTENDED RELEASE ORAL DAILY
Status: DISCONTINUED | OUTPATIENT
Start: 2020-07-28 | End: 2020-08-02 | Stop reason: HOSPADM

## 2020-07-28 RX ORDER — NITROGLYCERIN 0.4 MG/1
0.4 TABLET SUBLINGUAL
Status: DISCONTINUED | OUTPATIENT
Start: 2020-07-28 | End: 2020-08-02 | Stop reason: HOSPADM

## 2020-07-28 RX ORDER — INSULIN GLARGINE 100 [IU]/ML
30 INJECTION, SOLUTION SUBCUTANEOUS NIGHTLY
Status: DISCONTINUED | OUTPATIENT
Start: 2020-07-28 | End: 2020-08-02 | Stop reason: HOSPADM

## 2020-07-28 RX ORDER — DULOXETIN HYDROCHLORIDE 30 MG/1
60 CAPSULE, DELAYED RELEASE ORAL EVERY MORNING
Status: DISCONTINUED | OUTPATIENT
Start: 2020-07-28 | End: 2020-08-02 | Stop reason: HOSPADM

## 2020-07-28 RX ADMIN — INSULIN LISPRO 10 UNITS: 100 INJECTION, SOLUTION INTRAVENOUS; SUBCUTANEOUS at 08:50

## 2020-07-28 RX ADMIN — GADOTERATE MEGLUMINE 20 ML: 376.9 INJECTION, SOLUTION INTRAVENOUS at 12:40

## 2020-07-28 RX ADMIN — CEFEPIME HYDROCHLORIDE 2 G: 2 INJECTION, POWDER, FOR SOLUTION INTRAVENOUS at 09:16

## 2020-07-28 RX ADMIN — HYDROCODONE BITARTRATE AND ACETAMINOPHEN 1 TABLET: 5; 325 TABLET ORAL at 13:35

## 2020-07-28 RX ADMIN — METOPROLOL SUCCINATE 50 MG: 50 TABLET, EXTENDED RELEASE ORAL at 08:50

## 2020-07-28 RX ADMIN — BUDESONIDE 0.5 MG: 0.5 INHALANT RESPIRATORY (INHALATION) at 19:02

## 2020-07-28 RX ADMIN — HYDROCODONE BITARTRATE AND ACETAMINOPHEN 1 TABLET: 5; 325 TABLET ORAL at 00:28

## 2020-07-28 RX ADMIN — LOSARTAN POTASSIUM 50 MG: 50 TABLET, FILM COATED ORAL at 08:50

## 2020-07-28 RX ADMIN — PANTOPRAZOLE SODIUM 40 MG: 40 TABLET, DELAYED RELEASE ORAL at 08:50

## 2020-07-28 RX ADMIN — BUDESONIDE 0.5 MG: 0.5 INHALANT RESPIRATORY (INHALATION) at 07:11

## 2020-07-28 RX ADMIN — ASPIRIN 81 MG: 81 TABLET, COATED ORAL at 08:50

## 2020-07-28 RX ADMIN — INSULIN GLARGINE 30 UNITS: 100 INJECTION, SOLUTION SUBCUTANEOUS at 20:37

## 2020-07-28 RX ADMIN — HYDROCODONE BITARTRATE AND ACETAMINOPHEN 1 TABLET: 5; 325 TABLET ORAL at 20:37

## 2020-07-28 RX ADMIN — PROMETHAZINE HYDROCHLORIDE 12.5 MG: 12.5 TABLET ORAL at 13:35

## 2020-07-28 RX ADMIN — DULOXETINE HYDROCHLORIDE 60 MG: 30 CAPSULE, DELAYED RELEASE ORAL at 08:50

## 2020-07-28 RX ADMIN — BENZONATATE 200 MG: 100 CAPSULE ORAL at 00:28

## 2020-07-28 RX ADMIN — LEVOTHYROXINE SODIUM 100 MCG: 100 TABLET ORAL at 08:50

## 2020-07-28 RX ADMIN — Medication 10 ML: at 08:50

## 2020-07-28 RX ADMIN — VANCOMYCIN HYDROCHLORIDE 1500 MG: 10 INJECTION, POWDER, LYOPHILIZED, FOR SOLUTION INTRAVENOUS at 20:37

## 2020-07-28 RX ADMIN — INSULIN LISPRO 6 UNITS: 100 INJECTION, SOLUTION INTRAVENOUS; SUBCUTANEOUS at 08:49

## 2020-07-28 RX ADMIN — ONDANSETRON 4 MG: 2 INJECTION INTRAMUSCULAR; INTRAVENOUS at 10:52

## 2020-07-28 RX ADMIN — ONDANSETRON 4 MG: 2 INJECTION INTRAMUSCULAR; INTRAVENOUS at 00:35

## 2020-07-28 RX ADMIN — PIPERACILLIN AND TAZOBACTAM 3.38 G: 3; .375 INJECTION, POWDER, FOR SOLUTION INTRAVENOUS at 00:29

## 2020-07-28 RX ADMIN — APIXABAN 5 MG: 5 TABLET, FILM COATED ORAL at 08:50

## 2020-07-29 ENCOUNTER — ANESTHESIA EVENT (OUTPATIENT)
Dept: PERIOP | Facility: HOSPITAL | Age: 55
End: 2020-07-29

## 2020-07-29 LAB
ANION GAP SERPL CALCULATED.3IONS-SCNC: 14 MMOL/L (ref 5–15)
APTT PPP: 26.5 SECONDS (ref 61–76.5)
APTT PPP: 27.4 SECONDS (ref 61–76.5)
BASOPHILS # BLD AUTO: 0 10*3/MM3 (ref 0–0.2)
BASOPHILS # BLD AUTO: 0.1 10*3/MM3 (ref 0–0.2)
BASOPHILS NFR BLD AUTO: 0.4 % (ref 0–1.5)
BASOPHILS NFR BLD AUTO: 0.6 % (ref 0–1.5)
BUN SERPL-MCNC: 27 MG/DL (ref 6–20)
BUN SERPL-MCNC: ABNORMAL MG/DL
BUN/CREAT SERPL: ABNORMAL
CALCIUM SPEC-SCNC: 8 MG/DL (ref 8.6–10.5)
CHLORIDE SERPL-SCNC: 107 MMOL/L (ref 98–107)
CO2 SERPL-SCNC: 21 MMOL/L (ref 22–29)
CREAT SERPL-MCNC: 1.82 MG/DL (ref 0.76–1.27)
DEPRECATED RDW RBC AUTO: 40.3 FL (ref 37–54)
DEPRECATED RDW RBC AUTO: 40.3 FL (ref 37–54)
EOSINOPHIL # BLD AUTO: 0.3 10*3/MM3 (ref 0–0.4)
EOSINOPHIL # BLD AUTO: 0.3 10*3/MM3 (ref 0–0.4)
EOSINOPHIL NFR BLD AUTO: 3.2 % (ref 0.3–6.2)
EOSINOPHIL NFR BLD AUTO: 3.2 % (ref 0.3–6.2)
ERYTHROCYTE [DISTWIDTH] IN BLOOD BY AUTOMATED COUNT: 14.1 % (ref 12.3–15.4)
ERYTHROCYTE [DISTWIDTH] IN BLOOD BY AUTOMATED COUNT: 14.1 % (ref 12.3–15.4)
GFR SERPL CREATININE-BSD FRML MDRD: 39 ML/MIN/1.73
GLUCOSE BLDC GLUCOMTR-MCNC: 120 MG/DL (ref 70–105)
GLUCOSE BLDC GLUCOMTR-MCNC: 186 MG/DL (ref 70–105)
GLUCOSE BLDC GLUCOMTR-MCNC: 85 MG/DL (ref 70–105)
GLUCOSE BLDC GLUCOMTR-MCNC: 93 MG/DL (ref 70–105)
GLUCOSE SERPL-MCNC: 99 MG/DL (ref 65–99)
HCT VFR BLD AUTO: 33.1 % (ref 37.5–51)
HCT VFR BLD AUTO: 33.6 % (ref 37.5–51)
HGB BLD-MCNC: 10.8 G/DL (ref 13–17.7)
HGB BLD-MCNC: 11 G/DL (ref 13–17.7)
INR PPP: 1.1 (ref 0.9–1.1)
LYMPHOCYTES # BLD AUTO: 0.3 10*3/MM3 (ref 0.7–3.1)
LYMPHOCYTES # BLD AUTO: 0.6 10*3/MM3 (ref 0.7–3.1)
LYMPHOCYTES NFR BLD AUTO: 3.3 % (ref 19.6–45.3)
LYMPHOCYTES NFR BLD AUTO: 5.6 % (ref 19.6–45.3)
MCH RBC QN AUTO: 26.3 PG (ref 26.6–33)
MCH RBC QN AUTO: 26.6 PG (ref 26.6–33)
MCHC RBC AUTO-ENTMCNC: 32.6 G/DL (ref 31.5–35.7)
MCHC RBC AUTO-ENTMCNC: 32.6 G/DL (ref 31.5–35.7)
MCV RBC AUTO: 80.7 FL (ref 79–97)
MCV RBC AUTO: 81.5 FL (ref 79–97)
MONOCYTES # BLD AUTO: 0.6 10*3/MM3 (ref 0.1–0.9)
MONOCYTES # BLD AUTO: 0.6 10*3/MM3 (ref 0.1–0.9)
MONOCYTES NFR BLD AUTO: 5.6 % (ref 5–12)
MONOCYTES NFR BLD AUTO: 6 % (ref 5–12)
MRSA DNA SPEC QL NAA+PROBE: NORMAL
NEUTROPHILS NFR BLD AUTO: 8.5 10*3/MM3 (ref 1.7–7)
NEUTROPHILS NFR BLD AUTO: 8.9 10*3/MM3 (ref 1.7–7)
NEUTROPHILS NFR BLD AUTO: 84.6 % (ref 42.7–76)
NEUTROPHILS NFR BLD AUTO: 87.5 % (ref 42.7–76)
NRBC BLD AUTO-RTO: 0 /100 WBC (ref 0–0.2)
NRBC BLD AUTO-RTO: 0 /100 WBC (ref 0–0.2)
PLATELET # BLD AUTO: 245 10*3/MM3 (ref 140–450)
PLATELET # BLD AUTO: 261 10*3/MM3 (ref 140–450)
PMV BLD AUTO: 7.1 FL (ref 6–12)
PMV BLD AUTO: 7.2 FL (ref 6–12)
POTASSIUM SERPL-SCNC: 4.2 MMOL/L (ref 3.5–5.2)
PROTHROMBIN TIME: 11.4 SECONDS (ref 9.6–11.7)
RBC # BLD AUTO: 4.06 10*6/MM3 (ref 4.14–5.8)
RBC # BLD AUTO: 4.17 10*6/MM3 (ref 4.14–5.8)
SODIUM SERPL-SCNC: 142 MMOL/L (ref 136–145)
WBC # BLD AUTO: 10 10*3/MM3 (ref 3.4–10.8)
WBC # BLD AUTO: 10.2 10*3/MM3 (ref 3.4–10.8)

## 2020-07-29 PROCEDURE — 94799 UNLISTED PULMONARY SVC/PX: CPT

## 2020-07-29 PROCEDURE — 25010000002 VANCOMYCIN 10 G RECONSTITUTED SOLUTION: Performed by: NURSE PRACTITIONER

## 2020-07-29 PROCEDURE — 25010000002 HEPARIN (PORCINE) PER 1000 UNITS: Performed by: ORTHOPAEDIC SURGERY

## 2020-07-29 PROCEDURE — 99232 SBSQ HOSP IP/OBS MODERATE 35: CPT | Performed by: PODIATRIST

## 2020-07-29 PROCEDURE — 85025 COMPLETE CBC W/AUTO DIFF WBC: CPT | Performed by: INTERNAL MEDICINE

## 2020-07-29 PROCEDURE — 85025 COMPLETE CBC W/AUTO DIFF WBC: CPT | Performed by: ORTHOPAEDIC SURGERY

## 2020-07-29 PROCEDURE — 85610 PROTHROMBIN TIME: CPT | Performed by: ORTHOPAEDIC SURGERY

## 2020-07-29 PROCEDURE — 85730 THROMBOPLASTIN TIME PARTIAL: CPT | Performed by: ORTHOPAEDIC SURGERY

## 2020-07-29 PROCEDURE — 63710000001 INSULIN GLARGINE PER 5 UNITS: Performed by: NURSE PRACTITIONER

## 2020-07-29 PROCEDURE — 80048 BASIC METABOLIC PNL TOTAL CA: CPT | Performed by: INTERNAL MEDICINE

## 2020-07-29 PROCEDURE — 93005 ELECTROCARDIOGRAM TRACING: CPT | Performed by: ORTHOPAEDIC SURGERY

## 2020-07-29 PROCEDURE — 87641 MR-STAPH DNA AMP PROBE: CPT | Performed by: ORTHOPAEDIC SURGERY

## 2020-07-29 PROCEDURE — 99232 SBSQ HOSP IP/OBS MODERATE 35: CPT | Performed by: INTERNAL MEDICINE

## 2020-07-29 PROCEDURE — 82962 GLUCOSE BLOOD TEST: CPT

## 2020-07-29 PROCEDURE — 25010000002 CEFEPIME PER 500 MG: Performed by: INTERNAL MEDICINE

## 2020-07-29 PROCEDURE — 63710000001 INSULIN LISPRO (HUMAN) PER 5 UNITS: Performed by: NURSE PRACTITIONER

## 2020-07-29 RX ORDER — SODIUM CHLORIDE 0.9 % (FLUSH) 0.9 %
10 SYRINGE (ML) INJECTION AS NEEDED
Status: CANCELLED | OUTPATIENT
Start: 2020-07-29

## 2020-07-29 RX ORDER — SODIUM CHLORIDE 9 MG/ML
9 INJECTION, SOLUTION INTRAVENOUS CONTINUOUS PRN
Status: CANCELLED | OUTPATIENT
Start: 2020-07-29

## 2020-07-29 RX ORDER — HEPARIN SODIUM 10000 [USP'U]/100ML
12 INJECTION, SOLUTION INTRAVENOUS
Status: DISCONTINUED | OUTPATIENT
Start: 2020-07-29 | End: 2020-07-29

## 2020-07-29 RX ORDER — HEPARIN SODIUM 10000 [USP'U]/100ML
12 INJECTION, SOLUTION INTRAVENOUS
Status: DISPENSED | OUTPATIENT
Start: 2020-07-29 | End: 2020-07-29

## 2020-07-29 RX ORDER — SODIUM CHLORIDE 0.9 % (FLUSH) 0.9 %
10 SYRINGE (ML) INJECTION EVERY 12 HOURS SCHEDULED
Status: CANCELLED | OUTPATIENT
Start: 2020-07-29

## 2020-07-29 RX ADMIN — INSULIN LISPRO 10 UNITS: 100 INJECTION, SOLUTION INTRAVENOUS; SUBCUTANEOUS at 12:46

## 2020-07-29 RX ADMIN — BUDESONIDE 0.5 MG: 0.5 INHALANT RESPIRATORY (INHALATION) at 07:52

## 2020-07-29 RX ADMIN — HEPARIN SODIUM AND DEXTROSE 12 UNITS/KG/HR: 10000; 5 INJECTION INTRAVENOUS at 14:54

## 2020-07-29 RX ADMIN — DULOXETINE HYDROCHLORIDE 60 MG: 30 CAPSULE, DELAYED RELEASE ORAL at 08:09

## 2020-07-29 RX ADMIN — HYDROCODONE BITARTRATE AND ACETAMINOPHEN 1 TABLET: 5; 325 TABLET ORAL at 22:24

## 2020-07-29 RX ADMIN — Medication 10 ML: at 08:12

## 2020-07-29 RX ADMIN — LOSARTAN POTASSIUM 50 MG: 50 TABLET, FILM COATED ORAL at 08:09

## 2020-07-29 RX ADMIN — PANTOPRAZOLE SODIUM 40 MG: 40 TABLET, DELAYED RELEASE ORAL at 08:10

## 2020-07-29 RX ADMIN — VANCOMYCIN HYDROCHLORIDE 1500 MG: 10 INJECTION, POWDER, LYOPHILIZED, FOR SOLUTION INTRAVENOUS at 22:21

## 2020-07-29 RX ADMIN — BUDESONIDE 0.5 MG: 0.5 INHALANT RESPIRATORY (INHALATION) at 19:02

## 2020-07-29 RX ADMIN — Medication 10 ML: at 22:20

## 2020-07-29 RX ADMIN — LEVOTHYROXINE SODIUM 100 MCG: 100 TABLET ORAL at 08:09

## 2020-07-29 RX ADMIN — METOPROLOL SUCCINATE 50 MG: 50 TABLET, EXTENDED RELEASE ORAL at 08:10

## 2020-07-29 RX ADMIN — INSULIN GLARGINE 30 UNITS: 100 INJECTION, SOLUTION SUBCUTANEOUS at 22:10

## 2020-07-29 RX ADMIN — CEFEPIME HYDROCHLORIDE 2 G: 2 INJECTION, POWDER, FOR SOLUTION INTRAVENOUS at 09:03

## 2020-07-29 RX ADMIN — INSULIN LISPRO 10 UNITS: 100 INJECTION, SOLUTION INTRAVENOUS; SUBCUTANEOUS at 17:53

## 2020-07-29 RX ADMIN — CEFEPIME HYDROCHLORIDE 2 G: 2 INJECTION, POWDER, FOR SOLUTION INTRAVENOUS at 00:19

## 2020-07-29 RX ADMIN — SODIUM CHLORIDE 125 ML/HR: 900 INJECTION, SOLUTION INTRAVENOUS at 04:10

## 2020-07-29 RX ADMIN — HYDROCODONE BITARTRATE AND ACETAMINOPHEN 1 TABLET: 5; 325 TABLET ORAL at 08:09

## 2020-07-29 RX ADMIN — ASPIRIN 81 MG: 81 TABLET, COATED ORAL at 08:09

## 2020-07-30 ENCOUNTER — ANESTHESIA (OUTPATIENT)
Dept: PERIOP | Facility: HOSPITAL | Age: 55
End: 2020-07-30

## 2020-07-30 ENCOUNTER — APPOINTMENT (OUTPATIENT)
Dept: GENERAL RADIOLOGY | Facility: HOSPITAL | Age: 55
End: 2020-07-30

## 2020-07-30 LAB
ALBUMIN SERPL-MCNC: 2.6 G/DL (ref 3.5–5.2)
ALBUMIN/GLOB SERPL: 0.9 G/DL
ALP SERPL-CCNC: 115 U/L (ref 39–117)
ALT SERPL W P-5'-P-CCNC: 28 U/L (ref 1–41)
ANION GAP SERPL CALCULATED.3IONS-SCNC: 12 MMOL/L (ref 5–15)
ANION GAP SERPL CALCULATED.3IONS-SCNC: 13 MMOL/L (ref 5–15)
APTT PPP: 26 SECONDS (ref 24–31)
AST SERPL-CCNC: 37 U/L (ref 1–40)
BASOPHILS # BLD AUTO: 0 10*3/MM3 (ref 0–0.2)
BASOPHILS NFR BLD AUTO: 0.7 % (ref 0–1.5)
BILIRUB SERPL-MCNC: 0.4 MG/DL (ref 0–1.2)
BUN SERPL-MCNC: 19 MG/DL (ref 6–20)
BUN SERPL-MCNC: 22 MG/DL (ref 6–20)
BUN SERPL-MCNC: ABNORMAL MG/DL
BUN SERPL-MCNC: ABNORMAL MG/DL
BUN/CREAT SERPL: ABNORMAL
BUN/CREAT SERPL: ABNORMAL
CALCIUM SPEC-SCNC: 7.9 MG/DL (ref 8.6–10.5)
CALCIUM SPEC-SCNC: 7.9 MG/DL (ref 8.6–10.5)
CHLORIDE SERPL-SCNC: 104 MMOL/L (ref 98–107)
CHLORIDE SERPL-SCNC: 108 MMOL/L (ref 98–107)
CO2 SERPL-SCNC: 20 MMOL/L (ref 22–29)
CO2 SERPL-SCNC: 21 MMOL/L (ref 22–29)
CREAT SERPL-MCNC: 1.31 MG/DL (ref 0.76–1.27)
CREAT SERPL-MCNC: 1.57 MG/DL (ref 0.76–1.27)
DEPRECATED RDW RBC AUTO: 40.7 FL (ref 37–54)
EOSINOPHIL # BLD AUTO: 0.4 10*3/MM3 (ref 0–0.4)
EOSINOPHIL NFR BLD AUTO: 5.3 % (ref 0.3–6.2)
ERYTHROCYTE [DISTWIDTH] IN BLOOD BY AUTOMATED COUNT: 14.1 % (ref 12.3–15.4)
GFR SERPL CREATININE-BSD FRML MDRD: 46 ML/MIN/1.73
GFR SERPL CREATININE-BSD FRML MDRD: 57 ML/MIN/1.73
GLOBULIN UR ELPH-MCNC: 3 GM/DL
GLUCOSE BLDC GLUCOMTR-MCNC: 119 MG/DL (ref 70–105)
GLUCOSE BLDC GLUCOMTR-MCNC: 129 MG/DL (ref 70–105)
GLUCOSE BLDC GLUCOMTR-MCNC: 152 MG/DL (ref 70–105)
GLUCOSE BLDC GLUCOMTR-MCNC: 297 MG/DL (ref 70–105)
GLUCOSE BLDC GLUCOMTR-MCNC: 71 MG/DL (ref 70–105)
GLUCOSE BLDC GLUCOMTR-MCNC: 79 MG/DL (ref 70–105)
GLUCOSE BLDC GLUCOMTR-MCNC: 80 MG/DL (ref 70–105)
GLUCOSE SERPL-MCNC: 113 MG/DL (ref 65–99)
GLUCOSE SERPL-MCNC: 166 MG/DL (ref 65–99)
HCT VFR BLD AUTO: 31.4 % (ref 37.5–51)
HGB BLD-MCNC: 10.2 G/DL (ref 13–17.7)
INR PPP: 1.06 (ref 0.9–1.1)
LYMPHOCYTES # BLD AUTO: 0.7 10*3/MM3 (ref 0.7–3.1)
LYMPHOCYTES NFR BLD AUTO: 9.6 % (ref 19.6–45.3)
MCH RBC QN AUTO: 26.1 PG (ref 26.6–33)
MCHC RBC AUTO-ENTMCNC: 32.5 G/DL (ref 31.5–35.7)
MCV RBC AUTO: 80.5 FL (ref 79–97)
MONOCYTES # BLD AUTO: 0.5 10*3/MM3 (ref 0.1–0.9)
MONOCYTES NFR BLD AUTO: 7 % (ref 5–12)
NEUTROPHILS NFR BLD AUTO: 5.7 10*3/MM3 (ref 1.7–7)
NEUTROPHILS NFR BLD AUTO: 77.4 % (ref 42.7–76)
NRBC BLD AUTO-RTO: 0.1 /100 WBC (ref 0–0.2)
PLATELET # BLD AUTO: 243 10*3/MM3 (ref 140–450)
PMV BLD AUTO: 7 FL (ref 6–12)
POTASSIUM SERPL-SCNC: 4 MMOL/L (ref 3.5–5.2)
POTASSIUM SERPL-SCNC: 4.4 MMOL/L (ref 3.5–5.2)
PROT SERPL-MCNC: 5.6 G/DL (ref 6–8.5)
PROTHROMBIN TIME: 11 SECONDS (ref 9.6–11.7)
RBC # BLD AUTO: 3.9 10*6/MM3 (ref 4.14–5.8)
SODIUM SERPL-SCNC: 137 MMOL/L (ref 136–145)
SODIUM SERPL-SCNC: 141 MMOL/L (ref 136–145)
VANCOMYCIN PEAK SERPL-MCNC: 22.6 MCG/ML (ref 20–40)
WBC # BLD AUTO: 7.4 10*3/MM3 (ref 3.4–10.8)

## 2020-07-30 PROCEDURE — 82962 GLUCOSE BLOOD TEST: CPT

## 2020-07-30 PROCEDURE — 85610 PROTHROMBIN TIME: CPT | Performed by: ORTHOPAEDIC SURGERY

## 2020-07-30 PROCEDURE — 99232 SBSQ HOSP IP/OBS MODERATE 35: CPT | Performed by: INTERNAL MEDICINE

## 2020-07-30 PROCEDURE — 25010000002 MIDAZOLAM PER 1 MG: Performed by: ANESTHESIOLOGY

## 2020-07-30 PROCEDURE — 25010000002 CEFEPIME PER 500 MG: Performed by: ORTHOPAEDIC SURGERY

## 2020-07-30 PROCEDURE — 25010000002 DEXAMETHASONE PER 1 MG: Performed by: NURSE ANESTHETIST, CERTIFIED REGISTERED

## 2020-07-30 PROCEDURE — 25010000002 ROPIVACAINE PER 1 MG: Performed by: ANESTHESIOLOGY

## 2020-07-30 PROCEDURE — 94799 UNLISTED PULMONARY SVC/PX: CPT

## 2020-07-30 PROCEDURE — 25010000002 DEXAMETHASONE PER 1 MG: Performed by: ANESTHESIOLOGY

## 2020-07-30 PROCEDURE — 25010000002 FENTANYL CITRATE (PF) 100 MCG/2ML SOLUTION: Performed by: ANESTHESIOLOGY

## 2020-07-30 PROCEDURE — 63710000001 INSULIN GLARGINE PER 5 UNITS: Performed by: ORTHOPAEDIC SURGERY

## 2020-07-30 PROCEDURE — 85025 COMPLETE CBC W/AUTO DIFF WBC: CPT | Performed by: INTERNAL MEDICINE

## 2020-07-30 PROCEDURE — 84520 ASSAY OF UREA NITROGEN: CPT | Performed by: ORTHOPAEDIC SURGERY

## 2020-07-30 PROCEDURE — 27880 AMPUTATION OF LOWER LEG: CPT | Performed by: NURSE PRACTITIONER

## 2020-07-30 PROCEDURE — 80202 ASSAY OF VANCOMYCIN: CPT | Performed by: NURSE PRACTITIONER

## 2020-07-30 PROCEDURE — 25010000002 ONDANSETRON PER 1 MG: Performed by: NURSE ANESTHETIST, CERTIFIED REGISTERED

## 2020-07-30 PROCEDURE — 73590 X-RAY EXAM OF LOWER LEG: CPT

## 2020-07-30 PROCEDURE — 0Y6H0Z1 DETACHMENT AT RIGHT LOWER LEG, HIGH, OPEN APPROACH: ICD-10-PCS | Performed by: ORTHOPAEDIC SURGERY

## 2020-07-30 PROCEDURE — 88307 TISSUE EXAM BY PATHOLOGIST: CPT | Performed by: ORTHOPAEDIC SURGERY

## 2020-07-30 PROCEDURE — 25010000002 PROPOFOL 10 MG/ML EMULSION: Performed by: NURSE ANESTHETIST, CERTIFIED REGISTERED

## 2020-07-30 PROCEDURE — 80053 COMPREHEN METABOLIC PANEL: CPT | Performed by: INTERNAL MEDICINE

## 2020-07-30 PROCEDURE — 25010000002 DEXTROSE 5 % SOLUTION: Performed by: ANESTHESIOLOGY

## 2020-07-30 PROCEDURE — 25010000002 VANCOMYCIN 10 G RECONSTITUTED SOLUTION: Performed by: ORTHOPAEDIC SURGERY

## 2020-07-30 PROCEDURE — 85730 THROMBOPLASTIN TIME PARTIAL: CPT | Performed by: ORTHOPAEDIC SURGERY

## 2020-07-30 PROCEDURE — 25010000002 CEFEPIME PER 500 MG: Performed by: INTERNAL MEDICINE

## 2020-07-30 PROCEDURE — 88311 DECALCIFY TISSUE: CPT | Performed by: ORTHOPAEDIC SURGERY

## 2020-07-30 PROCEDURE — 25010000002 HYDROMORPHONE PER 4 MG: Performed by: NURSE ANESTHETIST, CERTIFIED REGISTERED

## 2020-07-30 RX ORDER — DEXAMETHASONE SODIUM PHOSPHATE 4 MG/ML
INJECTION, SOLUTION INTRA-ARTICULAR; INTRALESIONAL; INTRAMUSCULAR; INTRAVENOUS; SOFT TISSUE AS NEEDED
Status: DISCONTINUED | OUTPATIENT
Start: 2020-07-30 | End: 2020-07-30 | Stop reason: SURG

## 2020-07-30 RX ORDER — ONDANSETRON 2 MG/ML
INJECTION INTRAMUSCULAR; INTRAVENOUS AS NEEDED
Status: DISCONTINUED | OUTPATIENT
Start: 2020-07-30 | End: 2020-07-30 | Stop reason: SURG

## 2020-07-30 RX ORDER — NEOSTIGMINE METHYLSULFATE 5 MG/5 ML
SYRINGE (ML) INTRAVENOUS AS NEEDED
Status: DISCONTINUED | OUTPATIENT
Start: 2020-07-30 | End: 2020-07-30 | Stop reason: SURG

## 2020-07-30 RX ORDER — ASPIRIN 325 MG
325 TABLET ORAL DAILY
Status: DISCONTINUED | OUTPATIENT
Start: 2020-07-31 | End: 2020-08-02 | Stop reason: HOSPADM

## 2020-07-30 RX ORDER — ATORVASTATIN CALCIUM 40 MG/1
40 TABLET, FILM COATED ORAL DAILY
Status: DISCONTINUED | OUTPATIENT
Start: 2020-07-31 | End: 2020-08-02 | Stop reason: HOSPADM

## 2020-07-30 RX ORDER — GLYCOPYRROLATE 1 MG/5 ML
SYRINGE (ML) INTRAVENOUS AS NEEDED
Status: DISCONTINUED | OUTPATIENT
Start: 2020-07-30 | End: 2020-07-30 | Stop reason: SURG

## 2020-07-30 RX ORDER — SODIUM CHLORIDE 0.9 % (FLUSH) 0.9 %
1-10 SYRINGE (ML) INJECTION AS NEEDED
Status: DISCONTINUED | OUTPATIENT
Start: 2020-07-30 | End: 2020-08-02 | Stop reason: HOSPADM

## 2020-07-30 RX ORDER — LIDOCAINE HYDROCHLORIDE 10 MG/ML
INJECTION, SOLUTION EPIDURAL; INFILTRATION; INTRACAUDAL; PERINEURAL AS NEEDED
Status: DISCONTINUED | OUTPATIENT
Start: 2020-07-30 | End: 2020-07-30 | Stop reason: SURG

## 2020-07-30 RX ORDER — ROPIVACAINE HYDROCHLORIDE 5 MG/ML
INJECTION, SOLUTION EPIDURAL; INFILTRATION; PERINEURAL
Status: COMPLETED | OUTPATIENT
Start: 2020-07-30 | End: 2020-07-30

## 2020-07-30 RX ORDER — OXYCODONE HYDROCHLORIDE 5 MG/1
10 TABLET ORAL EVERY 4 HOURS PRN
Status: DISCONTINUED | OUTPATIENT
Start: 2020-07-30 | End: 2020-08-02 | Stop reason: HOSPADM

## 2020-07-30 RX ORDER — HYDROMORPHONE HCL 110MG/55ML
PATIENT CONTROLLED ANALGESIA SYRINGE INTRAVENOUS AS NEEDED
Status: DISCONTINUED | OUTPATIENT
Start: 2020-07-30 | End: 2020-07-30 | Stop reason: SURG

## 2020-07-30 RX ORDER — ACETAMINOPHEN 325 MG/1
325 TABLET ORAL EVERY 4 HOURS PRN
Status: DISCONTINUED | OUTPATIENT
Start: 2020-07-30 | End: 2020-08-02 | Stop reason: HOSPADM

## 2020-07-30 RX ORDER — FENTANYL CITRATE 50 UG/ML
50 INJECTION, SOLUTION INTRAMUSCULAR; INTRAVENOUS
Status: DISCONTINUED | OUTPATIENT
Start: 2020-07-30 | End: 2020-07-30 | Stop reason: HOSPADM

## 2020-07-30 RX ORDER — MORPHINE SULFATE 4 MG/ML
4 INJECTION, SOLUTION INTRAMUSCULAR; INTRAVENOUS
Status: DISCONTINUED | OUTPATIENT
Start: 2020-07-30 | End: 2020-08-02

## 2020-07-30 RX ORDER — ONDANSETRON 2 MG/ML
4 INJECTION INTRAMUSCULAR; INTRAVENOUS EVERY 6 HOURS PRN
Status: DISCONTINUED | OUTPATIENT
Start: 2020-07-30 | End: 2020-08-02 | Stop reason: HOSPADM

## 2020-07-30 RX ORDER — ALBUTEROL SULFATE 2.5 MG/3ML
SOLUTION RESPIRATORY (INHALATION) AS NEEDED
Status: DISCONTINUED | OUTPATIENT
Start: 2020-07-30 | End: 2020-07-30 | Stop reason: SURG

## 2020-07-30 RX ORDER — PROPOFOL 10 MG/ML
VIAL (ML) INTRAVENOUS AS NEEDED
Status: DISCONTINUED | OUTPATIENT
Start: 2020-07-30 | End: 2020-07-30 | Stop reason: SURG

## 2020-07-30 RX ORDER — PROMETHAZINE HYDROCHLORIDE 25 MG/1
25 SUPPOSITORY RECTAL ONCE AS NEEDED
Status: DISCONTINUED | OUTPATIENT
Start: 2020-07-30 | End: 2020-07-30 | Stop reason: HOSPADM

## 2020-07-30 RX ORDER — ONDANSETRON 4 MG/1
4 TABLET, FILM COATED ORAL EVERY 6 HOURS PRN
Status: DISCONTINUED | OUTPATIENT
Start: 2020-07-30 | End: 2020-08-02 | Stop reason: HOSPADM

## 2020-07-30 RX ORDER — SODIUM CHLORIDE 9 MG/ML
125 INJECTION, SOLUTION INTRAVENOUS CONTINUOUS
Status: DISPENSED | OUTPATIENT
Start: 2020-07-30 | End: 2020-07-31

## 2020-07-30 RX ORDER — DEXAMETHASONE SODIUM PHOSPHATE 4 MG/ML
INJECTION, SOLUTION INTRA-ARTICULAR; INTRALESIONAL; INTRAMUSCULAR; INTRAVENOUS; SOFT TISSUE
Status: COMPLETED | OUTPATIENT
Start: 2020-07-30 | End: 2020-07-30

## 2020-07-30 RX ORDER — HYDROCODONE BITARTRATE AND ACETAMINOPHEN 7.5; 325 MG/1; MG/1
1 TABLET ORAL EVERY 4 HOURS PRN
Status: DISCONTINUED | OUTPATIENT
Start: 2020-07-30 | End: 2020-08-02 | Stop reason: HOSPADM

## 2020-07-30 RX ORDER — PROMETHAZINE HYDROCHLORIDE 25 MG/ML
6.25 INJECTION, SOLUTION INTRAMUSCULAR; INTRAVENOUS ONCE AS NEEDED
Status: DISCONTINUED | OUTPATIENT
Start: 2020-07-30 | End: 2020-07-30 | Stop reason: HOSPADM

## 2020-07-30 RX ORDER — PROMETHAZINE HYDROCHLORIDE 25 MG/1
25 TABLET ORAL ONCE AS NEEDED
Status: DISCONTINUED | OUTPATIENT
Start: 2020-07-30 | End: 2020-07-30 | Stop reason: HOSPADM

## 2020-07-30 RX ORDER — ROCURONIUM BROMIDE 10 MG/ML
INJECTION, SOLUTION INTRAVENOUS AS NEEDED
Status: DISCONTINUED | OUTPATIENT
Start: 2020-07-30 | End: 2020-07-30 | Stop reason: SURG

## 2020-07-30 RX ORDER — KETAMINE HYDROCHLORIDE 10 MG/ML
INJECTION INTRAMUSCULAR; INTRAVENOUS AS NEEDED
Status: DISCONTINUED | OUTPATIENT
Start: 2020-07-30 | End: 2020-07-30 | Stop reason: SURG

## 2020-07-30 RX ORDER — FENTANYL CITRATE 50 UG/ML
INJECTION, SOLUTION INTRAMUSCULAR; INTRAVENOUS
Status: COMPLETED | OUTPATIENT
Start: 2020-07-30 | End: 2020-07-30

## 2020-07-30 RX ORDER — HYDROMORPHONE HCL 110MG/55ML
0.25 PATIENT CONTROLLED ANALGESIA SYRINGE INTRAVENOUS
Status: DISCONTINUED | OUTPATIENT
Start: 2020-07-30 | End: 2020-07-30 | Stop reason: HOSPADM

## 2020-07-30 RX ORDER — ONDANSETRON 2 MG/ML
4 INJECTION INTRAMUSCULAR; INTRAVENOUS ONCE AS NEEDED
Status: DISCONTINUED | OUTPATIENT
Start: 2020-07-30 | End: 2020-07-30 | Stop reason: HOSPADM

## 2020-07-30 RX ORDER — NALOXONE HCL 0.4 MG/ML
0.4 VIAL (ML) INJECTION
Status: DISCONTINUED | OUTPATIENT
Start: 2020-07-30 | End: 2020-08-02

## 2020-07-30 RX ORDER — DEXTROSE 5 G/100ML
200 INJECTION, SOLUTION INTRAVENOUS CONTINUOUS
Status: DISCONTINUED | OUTPATIENT
Start: 2020-07-30 | End: 2020-07-30 | Stop reason: HOSPADM

## 2020-07-30 RX ORDER — MIDAZOLAM HYDROCHLORIDE 1 MG/ML
INJECTION INTRAMUSCULAR; INTRAVENOUS
Status: COMPLETED | OUTPATIENT
Start: 2020-07-30 | End: 2020-07-30

## 2020-07-30 RX ADMIN — KETAMINE HYDROCHLORIDE 25 MG: 10 INJECTION INTRAMUSCULAR; INTRAVENOUS at 13:56

## 2020-07-30 RX ADMIN — CEFEPIME HYDROCHLORIDE 2 G: 2 INJECTION, POWDER, FOR SOLUTION INTRAVENOUS at 11:20

## 2020-07-30 RX ADMIN — ROCURONIUM BROMIDE 50 MG: 10 INJECTION, SOLUTION INTRAVENOUS at 13:09

## 2020-07-30 RX ADMIN — DEXTROSE MONOHYDRATE 200 ML: 5 INJECTION, SOLUTION INTRAVENOUS at 12:32

## 2020-07-30 RX ADMIN — KETAMINE HYDROCHLORIDE 25 MG: 10 INJECTION INTRAMUSCULAR; INTRAVENOUS at 13:25

## 2020-07-30 RX ADMIN — DEXAMETHASONE SODIUM PHOSPHATE 4 MG: 4 INJECTION, SOLUTION INTRAMUSCULAR; INTRAVENOUS at 13:44

## 2020-07-30 RX ADMIN — Medication 0.4 MG: at 14:10

## 2020-07-30 RX ADMIN — SODIUM CHLORIDE 125 ML/HR: 900 INJECTION, SOLUTION INTRAVENOUS at 05:51

## 2020-07-30 RX ADMIN — Medication: at 19:00

## 2020-07-30 RX ADMIN — HYDROMORPHONE HYDROCHLORIDE 0.5 MG: 2 INJECTION INTRAMUSCULAR; INTRAVENOUS; SUBCUTANEOUS at 14:05

## 2020-07-30 RX ADMIN — PROPOFOL 150 MG: 10 INJECTION, EMULSION INTRAVENOUS at 13:09

## 2020-07-30 RX ADMIN — ROPIVACAINE HYDROCHLORIDE 60 ML: 5 INJECTION, SOLUTION EPIDURAL; INFILTRATION; PERINEURAL at 12:10

## 2020-07-30 RX ADMIN — CEFEPIME HYDROCHLORIDE 2 G: 2 INJECTION, POWDER, FOR SOLUTION INTRAVENOUS at 00:22

## 2020-07-30 RX ADMIN — LIDOCAINE HYDROCHLORIDE 100 MG: 10 INJECTION, SOLUTION EPIDURAL; INFILTRATION; INTRACAUDAL; PERINEURAL at 13:09

## 2020-07-30 RX ADMIN — SODIUM CHLORIDE 125 ML/HR: 900 INJECTION, SOLUTION INTRAVENOUS at 16:16

## 2020-07-30 RX ADMIN — Medication: at 02:32

## 2020-07-30 RX ADMIN — ALBUTEROL SULFATE 2.5 MG: 2.5 SOLUTION RESPIRATORY (INHALATION) at 14:17

## 2020-07-30 RX ADMIN — VANCOMYCIN HYDROCHLORIDE 1500 MG: 10 INJECTION, POWDER, LYOPHILIZED, FOR SOLUTION INTRAVENOUS at 21:19

## 2020-07-30 RX ADMIN — DEXAMETHASONE SODIUM PHOSPHATE 8 MG: 4 INJECTION, SOLUTION INTRAMUSCULAR; INTRAVENOUS at 12:10

## 2020-07-30 RX ADMIN — HYDROMORPHONE HYDROCHLORIDE 0.5 MG: 2 INJECTION INTRAMUSCULAR; INTRAVENOUS; SUBCUTANEOUS at 14:10

## 2020-07-30 RX ADMIN — BUDESONIDE 0.5 MG: 0.5 INHALANT RESPIRATORY (INHALATION) at 08:24

## 2020-07-30 RX ADMIN — Medication 4 MG: at 14:10

## 2020-07-30 RX ADMIN — Medication 10 ML: at 08:02

## 2020-07-30 RX ADMIN — MIDAZOLAM 2 MG: 1 INJECTION INTRAMUSCULAR; INTRAVENOUS at 12:10

## 2020-07-30 RX ADMIN — ONDANSETRON 4 MG: 2 INJECTION INTRAMUSCULAR; INTRAVENOUS at 14:10

## 2020-07-30 RX ADMIN — FENTANYL CITRATE 100 MCG: 50 INJECTION, SOLUTION INTRAMUSCULAR; INTRAVENOUS at 12:10

## 2020-07-30 RX ADMIN — INSULIN GLARGINE 30 UNITS: 100 INJECTION, SOLUTION SUBCUTANEOUS at 21:16

## 2020-07-30 RX ADMIN — CEFEPIME HYDROCHLORIDE 2 G: 2 INJECTION, POWDER, FOR SOLUTION INTRAVENOUS at 21:16

## 2020-07-30 NOTE — ANESTHESIA POSTPROCEDURE EVALUATION
Patient: Kuldeep Adhikari    Procedure Summary     Date:  07/30/20 Room / Location:  [facility] OR 11 / [facility] MAIN OR    Anesthesia Start:  1306 Anesthesia Stop:  1432    Procedure:  AMPUTATION BELOW KNEE (Right Thigh) Diagnosis:       Cellulitis of right foot      (Cellulitis of right foot [L03.115])    Surgeon:  Eduardo Reynolds MD Provider:  Barney Johnson MD    Anesthesia Type:  general with block ASA Status:  3          Anesthesia Type: general with block    Vitals  Vitals Value Taken Time   /57 7/30/2020  3:04 PM   Temp 97.8 °F (36.6 °C) 7/30/2020  2:28 PM   Pulse 95 7/30/2020  3:06 PM   Resp 22 7/30/2020  2:58 PM   SpO2 96 % 7/30/2020  3:06 PM   Vitals shown include unvalidated device data.        Post Anesthesia Care and Evaluation    Patient location during evaluation: PACU  Patient participation: complete - patient participated  Level of consciousness: awake  Pain scale: See nurse's notes for pain score.  Pain management: adequate  Airway patency: patent  Anesthetic complications: No anesthetic complications  PONV Status: none  Cardiovascular status: acceptable  Respiratory status: acceptable  Hydration status: acceptable    Comments: Patient seen and examined postoperatively; vital signs stable; SpO2 greater than or equal to 90%; cardiopulmonary status stable; nausea/vomiting adequately controlled; pain adequately controlled; no apparent anesthesia complications; patient discharged from anesthesia care when discharge criteria were met

## 2020-07-30 NOTE — ANESTHESIA PROCEDURE NOTES
Peripheral Block    Pre-sedation assessment completed: 7/30/2020 12:00 PM    Patient reassessed immediately prior to procedure    Patient location during procedure: pre-op  Start time: 7/30/2020 12:00 PM  Stop time: 7/30/2020 12:10 PM  Reason for block: at surgeon's request and post-op pain management  Performed by  Anesthesiologist: Barney Johnson MD  Preanesthetic Checklist  Completed: patient identified, site marked, surgical consent, pre-op evaluation, timeout performed, IV checked, risks and benefits discussed and monitors and equipment checked  Prep:  Pt Position: supine  Sterile barriers:cap, gloves and mask  Prep: ChloraPrep  Patient monitoring: blood pressure monitoring, continuous pulse oximetry and EKG  Procedure  Sedation:yes    Guidance:ultrasound guided  Images:still images obtained, printed/placed on chart    Laterality:right  Block Type:femoral and popliteal  Injection Technique:single-shot  Needle Type:echogenic  Needle Gauge:20 G    Sedation medications used: midazolam (VERSED) injection, 2 mg  fentaNYL citrate (PF) (SUBLIMAZE) injection, 100 mcg  Medications Used: dexamethasone (DECADRON) injection, 8 mg  ropivacaine (NAROPIN) 0.5 % injection, 60 mL  Med admintered at 7/30/2020 12:10 PM      Medications  Comment:Right Femoral 25 ml 0.5% ropi with decadron  Right Popliteal 35 ml 0.5% ropi with decadron    Post Assessment  Injection Assessment: negative aspiration for heme, no paresthesia on injection and incremental injection  Patient Tolerance:comfortable throughout block  Complications:no

## 2020-07-30 NOTE — ANESTHESIA PROCEDURE NOTES
Airway  Urgency: elective    Date/Time: 7/30/2020 1:12 PM  Airway not difficult    General Information and Staff    Patient location during procedure: OR    Indications and Patient Condition  Indications for airway management: airway protection    Preoxygenated: yes  MILS maintained throughout  Mask difficulty assessment: 1 - vent by mask    Final Airway Details  Final airway type: endotracheal airway      Successful airway: ETT  Cuffed: yes   Successful intubation technique: direct laryngoscopy  Facilitating devices/methods: intubating stylet and cricoid pressure  Endotracheal tube insertion site: oral  Blade: Brent  Blade size: 3  ETT size (mm): 7.0  Cormack-Lehane Classification: grade III - view of epiglottis only  Placement verified by: chest auscultation and capnometry   Cuff volume (mL): 8  Measured from: teeth  ETT/EBT  to teeth (cm): 20  Number of attempts at approach: 1  Assessment: lips, teeth, and gum same as pre-op and atraumatic intubation

## 2020-07-30 NOTE — ANESTHESIA PREPROCEDURE EVALUATION
Anesthesia Evaluation     Patient summary reviewed and Nursing notes reviewed   NPO Solid Status: > 8 hours  NPO Liquid Status: > 8 hours           Airway   Mallampati: II  TM distance: >3 FB  Neck ROM: full  No difficulty expected  Dental - normal exam     Pulmonary    (+) sleep apnea,   Cardiovascular     (+) hypertension, CAD, hyperlipidemia,       Neuro/Psych  GI/Hepatic/Renal/Endo    (+) obesity,  hiatal hernia, GERD,  renal disease, diabetes mellitus,     Musculoskeletal     Abdominal     Bowel sounds: normal.   Substance History - negative use     OB/GYN negative ob/gyn ROS         Other   arthritis,                      Anesthesia Plan    ASA 3     general with block     intravenous induction     Anesthetic plan, all risks, benefits, and alternatives have been provided, discussed and informed consent has been obtained with: patient.    Plan discussed with CRNA.

## 2020-07-31 LAB
ANION GAP SERPL CALCULATED.3IONS-SCNC: 13 MMOL/L (ref 5–15)
BUN SERPL-MCNC: 27 MG/DL (ref 6–20)
BUN SERPL-MCNC: ABNORMAL MG/DL
BUN/CREAT SERPL: ABNORMAL
CALCIUM SPEC-SCNC: 8.1 MG/DL (ref 8.6–10.5)
CHLORIDE SERPL-SCNC: 105 MMOL/L (ref 98–107)
CO2 SERPL-SCNC: 18 MMOL/L (ref 22–29)
CREAT SERPL-MCNC: 1.39 MG/DL (ref 0.76–1.27)
GFR SERPL CREATININE-BSD FRML MDRD: 53 ML/MIN/1.73
GLUCOSE BLDC GLUCOMTR-MCNC: 200 MG/DL (ref 70–105)
GLUCOSE BLDC GLUCOMTR-MCNC: 315 MG/DL (ref 70–105)
GLUCOSE BLDC GLUCOMTR-MCNC: 318 MG/DL (ref 70–105)
GLUCOSE BLDC GLUCOMTR-MCNC: 337 MG/DL (ref 70–105)
GLUCOSE SERPL-MCNC: 350 MG/DL (ref 65–99)
HCT VFR BLD AUTO: 32.7 % (ref 37.5–51)
HGB BLD-MCNC: 10.8 G/DL (ref 13–17.7)
LAB AP CASE REPORT: NORMAL
PATH REPORT.FINAL DX SPEC: NORMAL
PATH REPORT.GROSS SPEC: NORMAL
POTASSIUM SERPL-SCNC: 4.9 MMOL/L (ref 3.5–5.2)
SODIUM SERPL-SCNC: 136 MMOL/L (ref 136–145)
VANCOMYCIN TROUGH SERPL-MCNC: 12 MCG/ML (ref 5–20)
VANCOMYCIN TROUGH SERPL-MCNC: 21 MCG/ML (ref 5–20)

## 2020-07-31 PROCEDURE — 97116 GAIT TRAINING THERAPY: CPT

## 2020-07-31 PROCEDURE — 85014 HEMATOCRIT: CPT | Performed by: ORTHOPAEDIC SURGERY

## 2020-07-31 PROCEDURE — 99232 SBSQ HOSP IP/OBS MODERATE 35: CPT | Performed by: INTERNAL MEDICINE

## 2020-07-31 PROCEDURE — 85018 HEMOGLOBIN: CPT | Performed by: ORTHOPAEDIC SURGERY

## 2020-07-31 PROCEDURE — 25010000002 VANCOMYCIN 10 G RECONSTITUTED SOLUTION: Performed by: ORTHOPAEDIC SURGERY

## 2020-07-31 PROCEDURE — 97165 OT EVAL LOW COMPLEX 30 MIN: CPT

## 2020-07-31 PROCEDURE — 25010000002 MORPHINE PER 10 MG: Performed by: ORTHOPAEDIC SURGERY

## 2020-07-31 PROCEDURE — 97530 THERAPEUTIC ACTIVITIES: CPT

## 2020-07-31 PROCEDURE — 97164 PT RE-EVAL EST PLAN CARE: CPT

## 2020-07-31 PROCEDURE — 63710000001 INSULIN LISPRO (HUMAN) PER 5 UNITS: Performed by: ORTHOPAEDIC SURGERY

## 2020-07-31 PROCEDURE — 82962 GLUCOSE BLOOD TEST: CPT

## 2020-07-31 PROCEDURE — 97535 SELF CARE MNGMENT TRAINING: CPT

## 2020-07-31 PROCEDURE — 63710000001 INSULIN GLARGINE PER 5 UNITS: Performed by: ORTHOPAEDIC SURGERY

## 2020-07-31 PROCEDURE — 80202 ASSAY OF VANCOMYCIN: CPT | Performed by: ORTHOPAEDIC SURGERY

## 2020-07-31 PROCEDURE — 80202 ASSAY OF VANCOMYCIN: CPT | Performed by: INTERNAL MEDICINE

## 2020-07-31 PROCEDURE — 25010000002 CEFEPIME PER 500 MG: Performed by: ORTHOPAEDIC SURGERY

## 2020-07-31 PROCEDURE — 97110 THERAPEUTIC EXERCISES: CPT

## 2020-07-31 PROCEDURE — 80048 BASIC METABOLIC PNL TOTAL CA: CPT | Performed by: ORTHOPAEDIC SURGERY

## 2020-07-31 RX ADMIN — MORPHINE SULFATE 4 MG: 4 INJECTION INTRAVENOUS at 01:42

## 2020-07-31 RX ADMIN — APIXABAN 5 MG: 5 TABLET, FILM COATED ORAL at 09:21

## 2020-07-31 RX ADMIN — INSULIN LISPRO 7 UNITS: 100 INJECTION, SOLUTION INTRAVENOUS; SUBCUTANEOUS at 17:31

## 2020-07-31 RX ADMIN — METOPROLOL SUCCINATE 50 MG: 50 TABLET, EXTENDED RELEASE ORAL at 09:21

## 2020-07-31 RX ADMIN — POLYETHYLENE GLYCOL 3350 17 G: 17 POWDER, FOR SOLUTION ORAL at 09:20

## 2020-07-31 RX ADMIN — APIXABAN 5 MG: 5 TABLET, FILM COATED ORAL at 21:28

## 2020-07-31 RX ADMIN — DULOXETINE HYDROCHLORIDE 60 MG: 30 CAPSULE, DELAYED RELEASE ORAL at 09:20

## 2020-07-31 RX ADMIN — INSULIN LISPRO 10 UNITS: 100 INJECTION, SOLUTION INTRAVENOUS; SUBCUTANEOUS at 17:30

## 2020-07-31 RX ADMIN — ATORVASTATIN CALCIUM 40 MG: 40 TABLET, FILM COATED ORAL at 09:21

## 2020-07-31 RX ADMIN — MORPHINE SULFATE 4 MG: 4 INJECTION INTRAVENOUS at 23:38

## 2020-07-31 RX ADMIN — MORPHINE SULFATE 4 MG: 4 INJECTION INTRAVENOUS at 06:58

## 2020-07-31 RX ADMIN — MORPHINE SULFATE 4 MG: 4 INJECTION INTRAVENOUS at 09:29

## 2020-07-31 RX ADMIN — INSULIN LISPRO 10 UNITS: 100 INJECTION, SOLUTION INTRAVENOUS; SUBCUTANEOUS at 11:44

## 2020-07-31 RX ADMIN — INSULIN LISPRO 10 UNITS: 100 INJECTION, SOLUTION INTRAVENOUS; SUBCUTANEOUS at 09:20

## 2020-07-31 RX ADMIN — LEVOTHYROXINE SODIUM 100 MCG: 100 TABLET ORAL at 09:21

## 2020-07-31 RX ADMIN — Medication: at 20:45

## 2020-07-31 RX ADMIN — CEFEPIME HYDROCHLORIDE 2 G: 2 INJECTION, POWDER, FOR SOLUTION INTRAVENOUS at 09:20

## 2020-07-31 RX ADMIN — ASPIRIN 325 MG ORAL TABLET 325 MG: 325 PILL ORAL at 09:20

## 2020-07-31 RX ADMIN — INSULIN LISPRO 7 UNITS: 100 INJECTION, SOLUTION INTRAVENOUS; SUBCUTANEOUS at 11:44

## 2020-07-31 RX ADMIN — MORPHINE SULFATE 4 MG: 4 INJECTION INTRAVENOUS at 14:58

## 2020-07-31 RX ADMIN — LOSARTAN POTASSIUM 50 MG: 50 TABLET, FILM COATED ORAL at 09:21

## 2020-07-31 RX ADMIN — CEFEPIME HYDROCHLORIDE 2 G: 2 INJECTION, POWDER, FOR SOLUTION INTRAVENOUS at 23:27

## 2020-07-31 RX ADMIN — OXYCODONE HYDROCHLORIDE 10 MG: 5 TABLET ORAL at 21:31

## 2020-07-31 RX ADMIN — VANCOMYCIN HYDROCHLORIDE 1500 MG: 10 INJECTION, POWDER, LYOPHILIZED, FOR SOLUTION INTRAVENOUS at 21:33

## 2020-07-31 RX ADMIN — INSULIN GLARGINE 30 UNITS: 100 INJECTION, SOLUTION SUBCUTANEOUS at 21:28

## 2020-08-01 LAB
ANION GAP SERPL CALCULATED.3IONS-SCNC: 9 MMOL/L (ref 5–15)
BUN SERPL-MCNC: 24 MG/DL (ref 6–20)
BUN SERPL-MCNC: ABNORMAL MG/DL
BUN/CREAT SERPL: ABNORMAL
BURR CELLS BLD QL SMEAR: ABNORMAL
CALCIUM SPEC-SCNC: 8.4 MG/DL (ref 8.6–10.5)
CHLORIDE SERPL-SCNC: 111 MMOL/L (ref 98–107)
CO2 SERPL-SCNC: 23 MMOL/L (ref 22–29)
CREAT SERPL-MCNC: 1.21 MG/DL (ref 0.76–1.27)
DEPRECATED RDW RBC AUTO: 41.1 FL (ref 37–54)
EOSINOPHIL # BLD MANUAL: 0.09 10*3/MM3 (ref 0–0.4)
EOSINOPHIL NFR BLD MANUAL: 1 % (ref 0.3–6.2)
ERYTHROCYTE [DISTWIDTH] IN BLOOD BY AUTOMATED COUNT: 14.5 % (ref 12.3–15.4)
GFR SERPL CREATININE-BSD FRML MDRD: 62 ML/MIN/1.73
GLUCOSE BLDC GLUCOMTR-MCNC: 110 MG/DL (ref 70–105)
GLUCOSE BLDC GLUCOMTR-MCNC: 128 MG/DL (ref 70–105)
GLUCOSE BLDC GLUCOMTR-MCNC: 154 MG/DL (ref 70–105)
GLUCOSE BLDC GLUCOMTR-MCNC: 79 MG/DL (ref 70–105)
GLUCOSE SERPL-MCNC: 179 MG/DL (ref 65–99)
HCT VFR BLD AUTO: 31.1 % (ref 37.5–51)
HGB BLD-MCNC: 10.2 G/DL (ref 13–17.7)
LYMPHOCYTES # BLD MANUAL: 1.03 10*3/MM3 (ref 0.7–3.1)
LYMPHOCYTES NFR BLD MANUAL: 11 % (ref 19.6–45.3)
LYMPHOCYTES NFR BLD MANUAL: 4 % (ref 5–12)
MCH RBC QN AUTO: 26.4 PG (ref 26.6–33)
MCHC RBC AUTO-ENTMCNC: 32.8 G/DL (ref 31.5–35.7)
MCV RBC AUTO: 80.3 FL (ref 79–97)
MONOCYTES # BLD AUTO: 0.38 10*3/MM3 (ref 0.1–0.9)
NEUTROPHILS # BLD AUTO: 7.9 10*3/MM3 (ref 1.7–7)
NEUTROPHILS NFR BLD MANUAL: 81 % (ref 42.7–76)
NEUTS BAND NFR BLD MANUAL: 3 % (ref 0–5)
PLAT MORPH BLD: NORMAL
PLATELET # BLD AUTO: 337 10*3/MM3 (ref 140–450)
PMV BLD AUTO: 6.8 FL (ref 6–12)
POTASSIUM SERPL-SCNC: 4 MMOL/L (ref 3.5–5.2)
RBC # BLD AUTO: 3.88 10*6/MM3 (ref 4.14–5.8)
SCAN SLIDE: NORMAL
SODIUM SERPL-SCNC: 143 MMOL/L (ref 136–145)
WBC # BLD AUTO: 9.4 10*3/MM3 (ref 3.4–10.8)
WBC MORPH BLD: NORMAL

## 2020-08-01 PROCEDURE — 85007 BL SMEAR W/DIFF WBC COUNT: CPT | Performed by: NURSE PRACTITIONER

## 2020-08-01 PROCEDURE — 25010000002 MORPHINE PER 10 MG: Performed by: ORTHOPAEDIC SURGERY

## 2020-08-01 PROCEDURE — 63710000001 INSULIN GLARGINE PER 5 UNITS: Performed by: ORTHOPAEDIC SURGERY

## 2020-08-01 PROCEDURE — 36592 COLLECT BLOOD FROM PICC: CPT

## 2020-08-01 PROCEDURE — 82962 GLUCOSE BLOOD TEST: CPT

## 2020-08-01 PROCEDURE — 85025 COMPLETE CBC W/AUTO DIFF WBC: CPT | Performed by: NURSE PRACTITIONER

## 2020-08-01 PROCEDURE — 80048 BASIC METABOLIC PNL TOTAL CA: CPT | Performed by: ORTHOPAEDIC SURGERY

## 2020-08-01 PROCEDURE — 97110 THERAPEUTIC EXERCISES: CPT

## 2020-08-01 PROCEDURE — 99232 SBSQ HOSP IP/OBS MODERATE 35: CPT | Performed by: INTERNAL MEDICINE

## 2020-08-01 RX ORDER — DOXYCYCLINE 100 MG/1
100 TABLET ORAL EVERY 12 HOURS SCHEDULED
Status: DISCONTINUED | OUTPATIENT
Start: 2020-08-01 | End: 2020-08-02 | Stop reason: HOSPADM

## 2020-08-01 RX ADMIN — MORPHINE SULFATE 4 MG: 4 INJECTION INTRAVENOUS at 22:02

## 2020-08-01 RX ADMIN — DULOXETINE HYDROCHLORIDE 60 MG: 30 CAPSULE, DELAYED RELEASE ORAL at 07:45

## 2020-08-01 RX ADMIN — ASPIRIN 325 MG ORAL TABLET 325 MG: 325 PILL ORAL at 07:48

## 2020-08-01 RX ADMIN — MORPHINE SULFATE 4 MG: 4 INJECTION INTRAVENOUS at 19:22

## 2020-08-01 RX ADMIN — MORPHINE SULFATE 4 MG: 4 INJECTION INTRAVENOUS at 07:38

## 2020-08-01 RX ADMIN — Medication 10 ML: at 21:52

## 2020-08-01 RX ADMIN — METOPROLOL SUCCINATE 50 MG: 50 TABLET, EXTENDED RELEASE ORAL at 07:48

## 2020-08-01 RX ADMIN — MORPHINE SULFATE 4 MG: 4 INJECTION INTRAVENOUS at 10:36

## 2020-08-01 RX ADMIN — INSULIN GLARGINE 30 UNITS: 100 INJECTION, SOLUTION SUBCUTANEOUS at 21:52

## 2020-08-01 RX ADMIN — LEVOTHYROXINE SODIUM 100 MCG: 100 TABLET ORAL at 07:48

## 2020-08-01 RX ADMIN — DOXYCYCLINE 100 MG: 100 TABLET, FILM COATED ORAL at 21:52

## 2020-08-01 RX ADMIN — APIXABAN 5 MG: 5 TABLET, FILM COATED ORAL at 07:48

## 2020-08-01 RX ADMIN — DOXYCYCLINE 100 MG: 100 TABLET, FILM COATED ORAL at 11:00

## 2020-08-01 RX ADMIN — OXYCODONE HYDROCHLORIDE 10 MG: 5 TABLET ORAL at 05:33

## 2020-08-01 RX ADMIN — OXYCODONE HYDROCHLORIDE 10 MG: 5 TABLET ORAL at 15:15

## 2020-08-01 RX ADMIN — OXYCODONE HYDROCHLORIDE 10 MG: 5 TABLET ORAL at 18:52

## 2020-08-01 RX ADMIN — HYDROCODONE BITARTRATE AND ACETAMINOPHEN 1 TABLET: 7.5; 325 TABLET ORAL at 10:36

## 2020-08-01 RX ADMIN — ATORVASTATIN CALCIUM 40 MG: 40 TABLET, FILM COATED ORAL at 07:48

## 2020-08-01 RX ADMIN — APIXABAN 5 MG: 5 TABLET, FILM COATED ORAL at 21:52

## 2020-08-01 RX ADMIN — LOSARTAN POTASSIUM 50 MG: 50 TABLET, FILM COATED ORAL at 07:48

## 2020-08-01 RX ADMIN — POLYETHYLENE GLYCOL 3350 17 G: 17 POWDER, FOR SOLUTION ORAL at 07:49

## 2020-08-01 NOTE — PROGRESS NOTES
LOS: 5 days   Patient Care Team:  Laura Whitaker MD as PCP - General        Subjective     High amount of pain postop day 2 from right BKA      Objective     Vital Signs  Vitals:    07/31/20 1215 07/31/20 2104 08/01/20 0056 08/01/20 0529   BP: 145/75 131/75 116/68 138/76   BP Location:  Right arm Right arm Right arm   Patient Position:  Lying Lying Lying   Pulse: 79 74 76 79   Resp: 12 14 16 15   Temp: 98.1 °F (36.7 °C) 97.8 °F (36.6 °C) 97.7 °F (36.5 °C) 98.2 °F (36.8 °C)   TempSrc: Oral Oral Oral Oral   SpO2: 98% 95% 92% 94%   Weight:       Height:           Physical Exam:   Dressing clean dry intact.  Moving stump well.     Results Review:     I reviewed the patient's new clinical results.  I reviewed the patient's new imaging results    Lab Results (last 24 hours)     Procedure Component Value Units Date/Time    Wound Culture - Wound, Foot, Right [[id number]]  (Abnormal)  (Susceptibility) Collected:  07/28/20 0013    Specimen:  Wound from Foot, Right Updated:  08/01/20 0746     Wound Culture Moderate growth (3+) Mixed Gram Negative Georgette      Light growth (2+) Enterococcus faecalis      Light growth (2+) Staphylococcus aureus     Gram Stain No WBCs seen      Few (2+) Gram positive cocci      Rare (1+) Gram negative bacilli    Susceptibility      Enterococcus faecalis     MINDY     Ampicillin Susceptible     Vancomycin Susceptible                    POC Glucose Once [[id number]]  (Normal) Collected:  08/01/20 0736    Specimen:  Blood Updated:  08/01/20 0737     Glucose 79 mg/dL      Comment: Serial Number: [id number]:  [id number]       BUN [[id number]]  (Abnormal) Collected:  08/01/20 0306    Specimen:  Blood Updated:  08/01/20 0732     BUN 24 mg/dL     Scan Slide [[id number]] Collected:  08/01/20 0306    Specimen:  Blood Updated:  08/01/20 0344     Scan Slide --     Comment: See Manual Differential Results       Manual Differential [[id number]]  (Abnormal) Collected:  08/01/20 0306    Specimen:   Blood Updated:  08/01/20 0344     Neutrophil % 81.0 %      Lymphocyte % 11.0 %      Monocyte % 4.0 %      Eosinophil % 1.0 %      Bands %  3.0 %      Neutrophils Absolute 7.90 10*3/mm3      Lymphocytes Absolute 1.03 10*3/mm3      Monocytes Absolute 0.38 10*3/mm3      Eosinophils Absolute 0.09 10*3/mm3      [place] Cells Slight/1+     WBC Morphology Normal     Platelet Morphology Normal    CBC & Differential [[id number]] Collected:  08/01/20 0306    Specimen:  Blood Updated:  08/01/20 0344    Narrative:       The following orders were created for panel order CBC & Differential.  Procedure                               Abnormality         Status                     ---------                               -----------         ------                     CBC Auto Differential[[id number]]        Abnormal            Final result                 Please view results for these tests on the individual orders.    CBC Auto Differential [[id number]]  (Abnormal) Collected:  08/01/20 0306    Specimen:  Blood Updated:  08/01/20 0344     WBC 9.40 10*3/mm3      RBC 3.88 10*6/mm3      Hemoglobin 10.2 g/dL      Hematocrit 31.1 %      MCV 80.3 fL      MCH 26.4 pg      MCHC 32.8 g/dL      RDW 14.5 %      RDW-SD 41.1 fl      MPV 6.8 fL      Platelets 337 10*3/mm3     Basic Metabolic Panel [[id number]]  (Abnormal) Collected:  08/01/20 0306    Specimen:  Blood Updated:  08/01/20 0338     Glucose 179 mg/dL      BUN --     Comment: Testing performed by alternate method        Creatinine 1.21 mg/dL      Sodium 143 mmol/L      Potassium 4.0 mmol/L      Chloride 111 mmol/L      CO2 23.0 mmol/L      Calcium 8.4 mg/dL      eGFR Non African Amer 62 mL/min/1.73      BUN/Creatinine Ratio --     Comment: Testing not performed        Anion Gap 9.0 mmol/L     Narrative:       GFR Normal >60  Chronic Kidney Disease <60  Kidney Failure <15      Blood Culture - Blood, Arm, Right [[id number]] Collected:  07/28/20 0027    Specimen:  Blood from Arm, Right Updated:   "08/01/20 0045     Blood Culture No growth at 4 days    Blood Culture - Blood, Hand, Left [[id number]] Collected:  07/28/20 0027    Specimen:  Blood from Hand, Left Updated:  08/01/20 0045     Blood Culture No growth at 4 days    Vancomycin, Trough [[id number]]  (Normal) Collected:  07/31/20 2028    Specimen:  Blood Updated:  07/31/20 2114     Vancomycin Trough 12.00 mcg/mL     POC Glucose Once [[id number]]  (Abnormal) Collected:  07/31/20 2103    Specimen:  Blood Updated:  07/31/20 2104     Glucose 200 mg/dL      Comment: Serial Number: [id number]:  [id number]       POC Glucose Once [[id number]]  (Abnormal) Collected:  07/31/20 1657    Specimen:  Blood Updated:  07/31/20 1658     Glucose 315 mg/dL      Comment: Serial Number: [id number]:  [id number]       Tissue Pathology Exam [[id number]] Collected:  07/30/20 1337    Specimen:  Tissue from Leg, Right Updated:  07/31/20 1300     Case Report --     Surgical Pathology Report                         Case: [id number]                                  Authorizing Provider:  Eduardo Reynolds MD         Collected:           07/30/2020 01:37 PM          Ordering Location:     [facility]  Received:            07/30/2020 01:55 PM                                 OR                                                                           Pathologist:           Declan Minaya MD                                                           Specimen:    Leg, Right, right below knee amputation                                                     Final Diagnosis --     Right lower extremity, below the knee amputation:    Acute suppurative (gangrenous) necrosis    Acute osteomyelitis    Margins of resection grossly viable      SANTANA/cec       Gross Description --     Received in a red biohazard bag designated \"Right below knee amputation\" is a below knee amputation specimen measuring 33 cm from proximal bony resection margin to heel and 17 cm from heel to distal " margin. The specimen shows evidence of prior transmetatarsal amputation. The specimen is covered with tan unremarkable skin. An ulcer is present on the sole on the medial distal aspect. The skin, soft tissue, and bony resection margins appear viable. Some softening of the bony tissue deep to the area of ulcer are identified. Sections submitted as follows:    A   Skin and soft tissue at ulcer  B   Possibly softened bone at ulcer after decalcification  C   Proximal vessels     GIO/cec         POC Glucose Once [[id number]]  (Abnormal) Collected:  07/31/20 1123    Specimen:  Blood Updated:  07/31/20 1131     Glucose 337 mg/dL      Comment: Serial Number: [id number]:  [id number]       BUN [[id number]]  (Abnormal) Collected:  07/31/20 0617    Specimen:  Blood Updated:  07/31/20 1100     BUN 27 mg/dL         Imaging Results (Last 24 Hours)     ** No results found for the last 24 hours. **            Medication Review:   Scheduled Meds:  apixaban 5 mg Oral Q12H   aspirin 325 mg Oral Daily   atorvastatin 40 mg Oral Daily   cefepime 2 g Intravenous Q12H   DULoxetine 60 mg Oral QAM   insulin glargine 30 Units Subcutaneous Nightly   insulin lispro 0-9 Units Subcutaneous TID AC   insulin lispro 10 Units Subcutaneous TID With Meals   levothyroxine 100 mcg Oral Daily   losartan 50 mg Oral Daily   metoprolol succinate XL 50 mg Oral Daily   polyethylene glycol 17 g Oral Daily   vancomycin 17.9 mg/kg Intravenous Q24H     Continuous Infusions:  Pharmacy to Dose Cefepime    Pharmacy to dose vancomycin      PRN Meds:.•  acetaminophen  •  dextrose  •  dextrose  •  glucagon (human recombinant)  •  HYDROcodone-acetaminophen  •  Morphine **AND** naloxone  •  nitroglycerin  •  ondansetron **OR** ondansetron  •  oxyCODONE  •  Pharmacy to Dose Cefepime  •  Pharmacy to dose vancomycin  •  sodium chloride      Assessment/Plan     Stable from right BKA    Plan for disposition: Home tomorrow on p.o. antibiotics    Eduardo Reynolds  MD  08/01/20  08:40

## 2020-08-01 NOTE — PLAN OF CARE
Problem: Patient Care Overview  Goal: Plan of Care Review  Outcome: Ongoing (interventions implemented as appropriate)  Flowsheets (Taken 8/1/2020 2865)  Progress: no change  Plan of Care Reviewed With: patient  Outcome Summary: pt very motivated to get stronger and RLE flexible. Was limited today by pain. Is supposed to dc home with family and HH to f/u/ Gloves,mask/goggles worn for tx

## 2020-08-01 NOTE — PLAN OF CARE
Patient complains frequently about acuteness of pain. Patient refused PT today because of pain. Was better able to get it under control in the afternoon. Will continue to monitor.

## 2020-08-01 NOTE — PROGRESS NOTES
"      [facility] Daily Progress Note      Hospitalist Team  LOS 5 days      Patient Care Team:  Laura Whitaker MD as PCP - General    Patient Location: 4112/1      Subjective   Subjective     Chief Complaint / Subjective  Chief Complaint   Patient presents with   • Leg Pain     Reports some more neuropathic type pain on the inside of hid leg around BKA site. No fever.     Brief Synopsis of Hospital Course/HPI  55-year-old male presents the ER with a chief complaint of right lower extremity pain which is worsened over the last 24 hours; he also reports increased cough over the last 2 weeks which is dry and without sputum production; severe subjective fever and chills starting today.  The patient reports he has been told not to bear any weight on the foot he had a transmetatarsal amputation, however, he has to walk on his heel to get to the bathroom at home.     Review of records with summary: Transmetatarsal amputation 3/17/2020 on right sedocnary to osteomylitis with discharge on IV Rocephin x6 weeks and Flagyl oral for 6 weeks secondary to group B streptococcus agalactia.  However, with review of records the patient is still receiving IV Rocephin.  He has a PICC line which looks to have been inserted on last hospitalization.  The patient has been following up with podiatrist and infectious disease.          Review of Systems   Constitution: Negative for fever and malaise/fatigue.   Musculoskeletal: Positive for joint pain.         Objective   Objective      Vital Signs  Temp:  [97.7 °F (36.5 °C)-98.2 °F (36.8 °C)] 98.2 °F (36.8 °C)  Heart Rate:  [74-79] 79  Resp:  [12-16] 15  BP: (116-145)/(68-76) 138/76  Oxygen Therapy  SpO2: 94 %  Pulse Oximetry Type: Intermittent  Device (Oxygen Therapy): room air  Device (Oxygen Therapy): room air  Flow (L/min): 2  Flowsheet Rows      First Filed Value   Admission Height  172.7 cm (68\") Documented at 07/27/2020 1820   Admission " Weight  83.7 kg (184 lb 8.4 oz) Documented at 07/27/2020 1820        Intake & Output (last 3 days)       07/29 0701 - 07/30 0700 07/30 0701 - 07/31 0700 07/31 0701 - 08/01 0700 08/01 0701 - 08/02 0700    P.O. 4001 302 4685 360    I.V. (mL/kg) 2089 (25.1) 4656 (55.9) 618 (7.4)     Total Intake(mL/kg) 3329 (40) 4896 (58.8) 1998 (24) 360 (4.3)    Urine (mL/kg/hr) 1400 (0.7) 2100 (1.1) 1350 (0.7)     Total Output 1400 2100 1350     Net +1929 +2796 +648 +360            Urine Unmeasured Occurrence 1 x  1 x         Lines, Drains & Airways    Active LDAs     Name:   Placement date:   Placement time:   Site:   Days:    PICC Single Lumen 03/19/20 Right Brachial   03/19/20    1535    Brachial   130                  Physical Exam:    Physical Exam   Constitutional: He is oriented to person, place, and time. No distress.   HENT:   Head: Normocephalic.   Eyes: Pupils are equal, round, and reactive to light.   Cardiovascular: Normal rate and regular rhythm.   Pulmonary/Chest: Effort normal. No respiratory distress.   Abdominal: Soft.   Musculoskeletal:   Right BKA stump with dessing c/d/i   Neurological: He is alert and oriented to person, place, and time.   Skin: Skin is warm.   Psychiatric: He has a normal mood and affect.   Vitals reviewed.           Wounds (last 24 hours)      LDA Wound     Row Name 08/01/20 0300 07/31/20 2300 07/31/20 1905       Wound 07/30/20 1343 Right lower leg Incision    Wound - Properties Group Date first assessed: 07/30/20  -SE Time first assessed: 1343  -SE Side: Right  -SE Orientation: lower  -SE Location: leg  -SE Primary Wound Type: Incision  -SE    Dressing Appearance  intact;dry  -PB  intact;dry  -PB  dry;intact  -PB    Closure  WESLEY  -PB  WESLEY  -PB  WESLEY  -PB    Base  dressing in place, unable to visualize  -PB  dressing in place, unable to visualize  -PB  dressing in place, unable to visualize  -PB    Drainage Amount  none  -PB  none  -PB  none  -PB      User Key  (r) = Recorded By, (t) = Taken  By, (c) = Cosigned By    Initials Name Provider Type    Ruthy Jimenes, RN Registered Nurse    Tejal Franklin, RN Registered Nurse          Procedures:              Results Review:     I reviewed the patient's new clinical results.      Lab Results (last 24 hours)     Procedure Component Value Units Date/Time    POC Glucose Once [[id number]]  (Abnormal) Collected:  08/01/20 1145    Specimen:  Blood Updated:  08/01/20 1146     Glucose 128 mg/dL      Comment: Serial Number: [id number]:  [id number]       Wound Culture - Wound, Foot, Right [[id number]]  (Abnormal)  (Susceptibility) Collected:  07/28/20 0013    Specimen:  Wound from Foot, Right Updated:  08/01/20 0746     Wound Culture Moderate growth (3+) Mixed Gram Negative Georgette      Light growth (2+) Enterococcus faecalis      Light growth (2+) Staphylococcus aureus     Gram Stain No WBCs seen      Few (2+) Gram positive cocci      Rare (1+) Gram negative bacilli    Susceptibility      Enterococcus faecalis     MINDY     Ampicillin Susceptible     Vancomycin Susceptible                    POC Glucose Once [[id number]]  (Normal) Collected:  08/01/20 0736    Specimen:  Blood Updated:  08/01/20 0737     Glucose 79 mg/dL      Comment: Serial Number: [id number]:  [id number]       BUN [[id number]]  (Abnormal) Collected:  08/01/20 0306    Specimen:  Blood Updated:  08/01/20 0732     BUN 24 mg/dL     Scan Slide [[id number]] Collected:  08/01/20 0306    Specimen:  Blood Updated:  08/01/20 0344     Scan Slide --     Comment: See Manual Differential Results       Manual Differential [[id number]]  (Abnormal) Collected:  08/01/20 0306    Specimen:  Blood Updated:  08/01/20 0344     Neutrophil % 81.0 %      Lymphocyte % 11.0 %      Monocyte % 4.0 %      Eosinophil % 1.0 %      Bands %  3.0 %      Neutrophils Absolute 7.90 10*3/mm3      Lymphocytes Absolute 1.03 10*3/mm3      Monocytes Absolute 0.38 10*3/mm3      Eosinophils Absolute 0.09 10*3/mm3      [place] Cells Slight/1+      WBC Morphology Normal     Platelet Morphology Normal    CBC & Differential [[id number]] Collected:  08/01/20 0306    Specimen:  Blood Updated:  08/01/20 0344    Narrative:       The following orders were created for panel order CBC & Differential.  Procedure                               Abnormality         Status                     ---------                               -----------         ------                     CBC Auto Differential[[id number]]        Abnormal            Final result                 Please view results for these tests on the individual orders.    CBC Auto Differential [[id number]]  (Abnormal) Collected:  08/01/20 0306    Specimen:  Blood Updated:  08/01/20 0344     WBC 9.40 10*3/mm3      RBC 3.88 10*6/mm3      Hemoglobin 10.2 g/dL      Hematocrit 31.1 %      MCV 80.3 fL      MCH 26.4 pg      MCHC 32.8 g/dL      RDW 14.5 %      RDW-SD 41.1 fl      MPV 6.8 fL      Platelets 337 10*3/mm3     Basic Metabolic Panel [[id number]]  (Abnormal) Collected:  08/01/20 0306    Specimen:  Blood Updated:  08/01/20 0338     Glucose 179 mg/dL      BUN --     Comment: Testing performed by alternate method        Creatinine 1.21 mg/dL      Sodium 143 mmol/L      Potassium 4.0 mmol/L      Chloride 111 mmol/L      CO2 23.0 mmol/L      Calcium 8.4 mg/dL      eGFR Non African Amer 62 mL/min/1.73      BUN/Creatinine Ratio --     Comment: Testing not performed        Anion Gap 9.0 mmol/L     Narrative:       GFR Normal >60  Chronic Kidney Disease <60  Kidney Failure <15      Blood Culture - Blood, Arm, Right [[id number]] Collected:  07/28/20 0027    Specimen:  Blood from Arm, Right Updated:  08/01/20 0045     Blood Culture No growth at 4 days    Blood Culture - Blood, Hand, Left [[id number]] Collected:  07/28/20 0027    Specimen:  Blood from Hand, Left Updated:  08/01/20 0045     Blood Culture No growth at 4 days    Vancomycin, Trough [[id number]]  (Normal) Collected:  07/31/20 2028    Specimen:  Blood Updated:   "07/31/20 2114     Vancomycin Trough 12.00 mcg/mL     POC Glucose Once [[id number]]  (Abnormal) Collected:  07/31/20 2103    Specimen:  Blood Updated:  07/31/20 2104     Glucose 200 mg/dL      Comment: Serial Number: [id number]:  [id number]       POC Glucose Once [[id number]]  (Abnormal) Collected:  07/31/20 1657    Specimen:  Blood Updated:  07/31/20 1658     Glucose 315 mg/dL      Comment: Serial Number: [id number]:  [id number]       Tissue Pathology Exam [[id number]] Collected:  07/30/20 1337    Specimen:  Tissue from Leg, Right Updated:  07/31/20 1300     Case Report --     Surgical Pathology Report                         Case: [id number]                                  Authorizing Provider:  Eduardo Reynolds MD         Collected:           07/30/2020 01:37 PM          Ordering Location:     [facility]  Received:            07/30/2020 01:55 PM                                 OR                                                                           Pathologist:           Declan Minaya MD                                                           Specimen:    Leg, Right, right below knee amputation                                                     Final Diagnosis --     Right lower extremity, below the knee amputation:    Acute suppurative (gangrenous) necrosis    Acute osteomyelitis    Margins of resection grossly viable      SANTANA/cec       Gross Description --     Received in a red biohazard bag designated \"Right below knee amputation\" is a below knee amputation specimen measuring 33 cm from proximal bony resection margin to heel and 17 cm from heel to distal margin. The specimen shows evidence of prior transmetatarsal amputation. The specimen is covered with tan unremarkable skin. An ulcer is present on the sole on the medial distal aspect. The skin, soft tissue, and bony resection margins appear viable. Some softening of the bony tissue deep to the area of ulcer are identified. " Sections submitted as follows:    A   Skin and soft tissue at ulcer  B   Possibly softened bone at ulcer after decalcification  C   Proximal vessels     GIO/cec             No results found for: HGBA1C  Results from last 7 days   Lab Units 07/30/20  0232 07/29/20  1342   INR  1.06 1.10           Lab Results   Component Value Date    LIPASE 45 03/05/2020     Lab Results   Component Value Date    CHOL 149 06/24/2020    TRIG 178 (H) 06/24/2020    HDL 35 (L) 06/24/2020    LDL 78 06/24/2020       Lab Results   Lab Value Date/Time    FINALDX  07/30/2020 1337     Right lower extremity, below the knee amputation:    Acute suppurative (gangrenous) necrosis    Acute osteomyelitis    Margins of resection grossly viable      SANTANA/cec      FINALDX  03/17/2020 1208     Right forefoot, amputation:    Skin ulceration and soft tissue gangrenous necrosis    Acute osteomyelitis of nonmarginal bone    Skin and soft tissue resection margins viable    GIO/sms          Microbiology Results (last 10 days)     Procedure Component Value - Date/Time    MRSA Screen, PCR (Inpatient) - Swab, Nares [[id number]]  (Normal) Collected:  07/29/20 1347    Lab Status:  Final result Specimen:  Swab from Nares Updated:  07/29/20 1522     MRSA PCR No MRSA Detected    Blood Culture - Blood, Arm, Right [[id number]] Collected:  07/28/20 0027    Lab Status:  Preliminary result Specimen:  Blood from Arm, Right Updated:  08/01/20 0045     Blood Culture No growth at 4 days    Blood Culture - Blood, Hand, Left [[id number]] Collected:  07/28/20 0027    Lab Status:  Preliminary result Specimen:  Blood from Hand, Left Updated:  08/01/20 0045     Blood Culture No growth at 4 days    Wound Culture - Wound, Foot, Right [[id number]]  (Abnormal)  (Susceptibility) Collected:  07/28/20 0013    Lab Status:  Preliminary result Specimen:  Wound from Foot, Right Updated:  08/01/20 0746     Wound Culture Moderate growth (3+) Mixed Gram Negative Georgette      Light growth (2+)  Enterococcus faecalis      Light growth (2+) Staphylococcus aureus     Gram Stain No WBCs seen      Few (2+) Gram positive cocci      Rare (1+) Gram negative bacilli    Susceptibility      Enterococcus faecalis     MINDY     Ampicillin Susceptible     Vancomycin Susceptible                    Respiratory Panel, PCR - Swab, Nasopharynx [[id number]]  (Normal) Collected:  07/27/20 2144    Lab Status:  Final result Specimen:  Swab from Nasopharynx Updated:  07/27/20 2238     ADENOVIRUS, PCR Not Detected     Coronavirus 229E Not Detected     Coronavirus HKU1 Not Detected     Coronavirus NL63 Not Detected     Coronavirus OC43 Not Detected     Human Metapneumovirus Not Detected     Human Rhinovirus/Enterovirus Not Detected     Influenza B PCR Not Detected     Parainfluenza Virus 1 Not Detected     Parainfluenza Virus 2 Not Detected     Parainfluenza Virus 3 Not Detected     Parainfluenza Virus 4 Not Detected     Bordetella pertussis pcr Not Detected     Influenza A H1 2009 PCR Not Detected     Chlamydophila pneumoniae PCR Not Detected     Mycoplasma pneumo by PCR Not Detected     Influenza A PCR Not Detected     Influenza A H3 Not Detected     Influenza A H1 Not Detected     RSV, PCR Not Detected     Bordetella parapertussis PCR Not Detected    Narrative:       The coronavirus on the RVP is NOT COVID-19 and is NOT indicative of infection with COVID-19.     COVID-19 [facility] [employer] IN-HOUSE, Nasal Swab No Transport Media - Swab, Nasal Cavity [[id number]]  (Normal) Collected:  07/27/20 1946    Lab Status:  Final result Specimen:  Swab from Nasal Cavity Updated:  07/27/20 2023     COVID19 Not Detected    Narrative:       Fact sheet for providers: https://www.fda.gov/media/815695/download     Fact sheet for patients: https://www.fda.gov/media/418673/download          ECG/EMG Results (most recent)     Procedure Component Value Units Date/Time    ECG 12 Lead [[id number]] Collected:  07/29/20 1422     Updated:  07/29/20 1425    Narrative:        HEART RATE= 83  bpm  RR Interval= 720  ms  DC Interval= 184  ms  P Horizontal Axis= 7  deg  P Front Axis= 61  deg  QRSD Interval= 71  ms  QT Interval= 361  ms  QRS Axis= 19  deg  T Wave Axis=   deg  - ABNORMAL ECG -  Sinus rhythm  Nonspecific T abnrm, anterolateral leads  When compared with ECG of 16-Mar-2020 6:01:11,  Significant axis, voltage or hypertrophy change  Electronically Signed By:   Date and Time of Study: 2020-07-29 14:22:46          Results for orders placed during the hospital encounter of 07/27/20   Duplex Venous Lower Extremity - Right    Narrative · Normal right lower extremity venous duplex scan.               Xr Tibia Fibula 2 View Right    Result Date: 7/30/2020  Status post a below the knee amputation procedure with no radiographic complications.  Electronically Signed By-Bridger Cazares On:7/30/2020 4:30 PM This report was finalized on [id number] by  Bridger Cazares, .    Xr Chest 1 View    Result Date: 7/27/2020   1. No evidence of active cardiopulmonary disease. 2. PICC line tip is in the upper SVC.  Electronically Signed By-Samantha Diaz On:7/27/2020 8:02 PM This report was finalized on [id number] by  Samantha Diaz, .    Mri Foot Right With & Without Contrast    Result Date: 7/28/2020  1.Postoperative changes status post interval distal amputation at the level of the proximal metatarsals. 2.There is a wound which overlies the plantar aspect of the residual first metatarsal bone. There is extensive edema and enhancement throughout the soft tissues underlying this region and extending through the soft tissues of the surgical stump of the foot. The findings suggest soft tissue cellulitis. 3.Evidence for developing abscess within the plantar soft tissues at the lateral aspect of the residual midfoot overlying the region of the osseous stump of the fifth metatarsal bone. This finding measures up to 1.9 cm. 4.There is evidence for a second area of developing abscess within the central aspect of  the plantar soft tissues of the residual midfoot overlying the flexor tendons measuring up to 4.5 cm along the plantar aspect of the foot. 5.Evidence for changes of osteomyelitis superimposed on postoperative changes involving the osseous stump of the fourth and fifth metatarsal bones as well as the distal osseous stump of the first metatarsal bone. Electronically Signed: Gordo Pantoja MD 7/28/2020 15:09 EDT    Ct Lower Extremity Right Without Contrast    Result Date: 7/28/2020  1.  Very extensive edema consistent with cellulitis or venous insufficiency. 2.  Diffuse degenerative changes throughout the hind and midfoot with numerous degenerative cysts and diffuse degenerative joint disease. 3.  No sign of active osteomyelitis.  No air to suggest a necrotizing process. Electronically signed by:  Ricki Tong M.D.  7/28/2020 1:53 AM          Xrays, labs reviewed personally by physician.    Medication Review:   I have reviewed the patient's current medication list      Scheduled Meds    apixaban 5 mg Oral Q12H   aspirin 325 mg Oral Daily   atorvastatin 40 mg Oral Daily   doxycycline 100 mg Oral Q12H   DULoxetine 60 mg Oral QAM   insulin glargine 30 Units Subcutaneous Nightly   insulin lispro 0-9 Units Subcutaneous TID AC   insulin lispro 10 Units Subcutaneous TID With Meals   levothyroxine 100 mcg Oral Daily   losartan 50 mg Oral Daily   metoprolol succinate XL 50 mg Oral Daily   polyethylene glycol 17 g Oral Daily       Meds Infusions       Meds PRN  •  acetaminophen  •  dextrose  •  dextrose  •  glucagon (human recombinant)  •  HYDROcodone-acetaminophen  •  Morphine **AND** naloxone  •  nitroglycerin  •  ondansetron **OR** ondansetron  •  oxyCODONE  •  sodium chloride        Assessment/Plan   Assessment/Plan     Active Hospital Problems    Diagnosis  POA   • Cellulitis of right foot [L03.115]  Yes   • Sepsis due to group B Streptococcus (CMS/[facility]) [A40.1]  Yes   • Chronic coronary artery disease [I25.10]  Yes   •  Benign essential hypertension [I10]  Yes   • Diabetic peripheral neuropathy (CMS/HCC) [E11.42]  Yes   • Depression [F32.9]  Yes   • 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       MEDICAL DECISION MAKING COMPLEXITY BY PROBLEM:     Right lower extremity osteomyelitis/cellulitis realted to type II DM  -s/p BKA RLE 7/30/2020  -MRI foot 7/29/2020: abscesses and osteomyelitis  -podiatry signed off   -Ortho following  -now on doxycycline to finish 7 day course   -ID following  -BCx NGTF     Acute respiratory insufficiency    -negative COVID 19  -Tessalon Perles as needed  -CXR did not show infiltrate     DAISY  -resolved Cr normal   -c/w IVF  -BMP in am       Diabetes type 2, chronic with pseudohyponatremia  -sliding scale; continue mealtime insulin  -BG overall stable  -continue long acting insulin; hold metformin with Januvia     Anticoagulation therapy secondary to history of PE:   -Now back on Eliquis      CAD, chronic:   -Continue Aspirin, metformin     HLD, chronic:   -Continue Atorvastatin     Anxiety, chronic:   -Continue Cymbalta     Hypertension, chronic:   -Continue metoprolol, Cozaar     Hypothyroidism, chronic:   -Continue levothyroxine; check TSH     GERD, chronic:   -Continue Prilosec     Asthma, chronic intermittent:   -Continue Flovent as needed    VTE Prophylaxis -   Mechanical Order History:     None      Pharmalogical Order History:     Ordered     Dose Route Frequency Stop    07/28/20 0328  apixaban (ELIQUIS) tablet 5 mg      5 mg PO 2 Times Daily --    07/27/20 2148  enoxaparin (LOVENOX) syringe 40 mg  Status:  Discontinued      40 mg SC Every 24 Hours Scheduled 07/28/20 0322            Code Status -   Code Status and Medical Interventions:   Ordered at: 07/30/20 1538     Level Of Support Discussed With:     Patient     Code Status:    CPR     Medical Interventions (Level of Support Prior to Arrest):    Full       This patient has been examined wearing appropriate Personal Protective Equipment. 08/01/20        Discharge Planning    Home in 1 days per ortho   SLP OP Goals     Row Name 07/31/20 1612          Time Calculation    PT Goal Re-Cert Due Date  08/14/20  -HC       User Key  (r) = Recorded By, (t) = Taken By, (c) = Cosigned By    Initials Name Provider Type    HC Wendy Jauregui, PT Physical Therapist          Destination      Coordination has not been started for this encounter.      Durable Medical Equipment      Coordination has not been started for this encounter.      Dialysis/Infusion      Service Provider Request Status Selected Services Address Phone Number Fax Number    [facility] Pending - Request Sent N/A [address], [facility] [address] [phone]    [facility] Declined  Patient was actually current with [facility] N/A [address] , [facility] [place] [address] [phone]      Home Medical Care      Service Provider Request Status Selected Services Address Phone Number Fax Number    [facility] Pending - Request Sent N/A [address], [facility] [address] [phone]    [facility] Declined  Patient is actually current with [facility] N/A [address] [facility] [place] [address] [phone]       Anna L Naegele, RN 7/28/2020 0939    Patient is current with [facility]                  Therapy      Coordination has not been started for this encounter.      Community Resources      Coordination has not been started for this encounter.            Electronically signed by Alex Robles DO, 08/01/20, 12:00.  [facility] Hospitalist Team

## 2020-08-01 NOTE — PROGRESS NOTES
Infectious Diseases Progress Note      LOS: 5 days   Patient Care Team:  Laura Whitaker MD as PCP - General    Chief Complaint: Right leg stump pain    Subjective     The patient had no fever during the last 24 hours.  The patient remained hemodynamically stable.  The patient denied having any new complaints today.  He is having minimal pain with his right stump    Review of Systems:   Review of Systems   Constitutional: Negative.    HENT: Negative.    Eyes: Negative.    Respiratory: Negative.    Cardiovascular: Negative.    Gastrointestinal: Negative.    Genitourinary: Negative.    Musculoskeletal: Negative.    Skin: Negative.    Neurological: Negative.    Hematological: Negative.    Psychiatric/Behavioral: Negative.         Objective     Vital Signs  Temp:  [97.7 °F (36.5 °C)-98.2 °F (36.8 °C)] 98.2 °F (36.8 °C)  Heart Rate:  [74-79] 79  Resp:  [14-16] 15  BP: (116-138)/(68-76) 138/76    Physical Exam:  Physical Exam   Constitutional: He is oriented to person, place, and time. He appears well-developed and well-nourished.   HENT:   Head: Normocephalic and atraumatic.   Eyes: Pupils are equal, round, and reactive to light. EOM are normal.   Neck: Normal range of motion. Neck supple.   Cardiovascular: Normal rate, regular rhythm and normal heart sounds.   Pulmonary/Chest: Effort normal and breath sounds normal. No respiratory distress. He has no wheezes. He has no rales.   Abdominal: Soft. Bowel sounds are normal. He exhibits no distension and no mass. There is no tenderness. There is no rebound and no guarding.   Musculoskeletal: Normal range of motion. He exhibits edema. He exhibits no deformity.   Right stump with dressings in place   Neurological: He is alert and oriented to person, place, and time. No cranial nerve deficit.   Skin: Skin is warm. No rash noted. No erythema.   Psychiatric: He has a normal mood and affect.   Nursing note and vitals reviewed.       Results Review:    I have reviewed all  clinical data, test, lab, and imaging results.     Radiology  No Radiology Exams Resulted Within Past 24 Hours    Cardiology    Laboratory    Results from last 7 days   Lab Units 08/01/20  0306 07/31/20  0617 07/30/20  0232 07/29/20  1342 07/29/20  0404 07/28/20  0027 07/27/20  1903   WBC 10*3/mm3 9.40  --  7.40 10.00 10.20 12.50* 13.60*   HEMOGLOBIN g/dL 10.2* 10.8* 10.2* 10.8* 11.0* 12.0* 12.4*   HEMATOCRIT % 31.1* 32.7* 31.4* 33.1* 33.6* 37.6 38.4   PLATELETS 10*3/mm3 337  --  243 261 245 239 279     Results from last 7 days   Lab Units 08/01/20  0306 07/31/20  0617 07/30/20  1607 07/30/20  0232 07/29/20  0404 07/28/20  0027 07/27/20  1902   SODIUM mmol/L 143 136 137 141 142 137 135*   POTASSIUM mmol/L 4.0 4.9 4.4 4.0 4.2 4.4 4.5   CHLORIDE mmol/L 111* 105 104 108* 107 100 95*   CO2 mmol/L 23.0 18.0* 20.0* 21.0* 21.0* 25.0 23.0   BUN  24* 27* 19 22* 27* 23* 24*   CREATININE mg/dL 1.21 1.39* 1.31* 1.57* 1.82* 1.75* 1.54*   GLUCOSE mg/dL 179* 350* 166* 113* 99 245* 348*   ALBUMIN g/dL  --   --   --  2.60*  --   --   --    BILIRUBIN mg/dL  --   --   --  0.4  --   --   --    ALK PHOS U/L  --   --   --  115  --   --   --    AST (SGOT) U/L  --   --   --  37  --   --   --    ALT (SGPT) U/L  --   --   --  28  --   --   --    CALCIUM mg/dL 8.4* 8.1* 7.9* 7.9* 8.0* 8.9 9.1                 Microbiology   Microbiology Results (last 10 days)     Procedure Component Value - Date/Time    MRSA Screen, PCR (Inpatient) - Swab, Nares [[id number]]  (Normal) Collected:  07/29/20 1347    Lab Status:  Final result Specimen:  Swab from Nares Updated:  07/29/20 1522     MRSA PCR No MRSA Detected    Blood Culture - Blood, Arm, Right [[id number]] Collected:  07/28/20 0027    Lab Status:  Preliminary result Specimen:  Blood from Arm, Right Updated:  08/01/20 0045     Blood Culture No growth at 4 days    Blood Culture - Blood, Hand, Left [[id number]] Collected:  07/28/20 0027    Lab Status:  Preliminary result Specimen:  Blood from Hand, Left  Updated:  08/01/20 0045     Blood Culture No growth at 4 days    Wound Culture - Wound, Foot, Right [[id number]]  (Abnormal)  (Susceptibility) Collected:  07/28/20 0013    Lab Status:  Preliminary result Specimen:  Wound from Foot, Right Updated:  08/01/20 0746     Wound Culture Moderate growth (3+) Mixed Gram Negative Georgette      Light growth (2+) Enterococcus faecalis      Light growth (2+) Staphylococcus aureus     Gram Stain No WBCs seen      Few (2+) Gram positive cocci      Rare (1+) Gram negative bacilli    Susceptibility      Enterococcus faecalis     MINDY     Ampicillin Susceptible     Vancomycin Susceptible                    Respiratory Panel, PCR - Swab, Nasopharynx [[id number]]  (Normal) Collected:  07/27/20 2144    Lab Status:  Final result Specimen:  Swab from Nasopharynx Updated:  07/27/20 2238     ADENOVIRUS, PCR Not Detected     Coronavirus 229E Not Detected     Coronavirus HKU1 Not Detected     Coronavirus NL63 Not Detected     Coronavirus OC43 Not Detected     Human Metapneumovirus Not Detected     Human Rhinovirus/Enterovirus Not Detected     Influenza B PCR Not Detected     Parainfluenza Virus 1 Not Detected     Parainfluenza Virus 2 Not Detected     Parainfluenza Virus 3 Not Detected     Parainfluenza Virus 4 Not Detected     Bordetella pertussis pcr Not Detected     Influenza A H1 2009 PCR Not Detected     Chlamydophila pneumoniae PCR Not Detected     Mycoplasma pneumo by PCR Not Detected     Influenza A PCR Not Detected     Influenza A H3 Not Detected     Influenza A H1 Not Detected     RSV, PCR Not Detected     Bordetella parapertussis PCR Not Detected    Narrative:       The coronavirus on the RVP is NOT COVID-19 and is NOT indicative of infection with COVID-19.     COVID-19 [facility] IN-HOUSE, Nasal Swab No Transport Media - Swab, Nasal Cavity [[id number]]  (Normal) Collected:  07/27/20 1946    Lab Status:  Final result Specimen:  Swab from Nasal Cavity Updated:  07/27/20 2023     COVID19 Not  Detected    Narrative:       Fact sheet for providers: https://www.fda.gov/media/128629/download     Fact sheet for patients: https://www.fda.gov/media/680265/download          Medication Review:       Schedule Meds    apixaban 5 mg Oral Q12H   aspirin 325 mg Oral Daily   atorvastatin 40 mg Oral Daily   doxycycline 100 mg Oral Q12H   DULoxetine 60 mg Oral QAM   insulin glargine 30 Units Subcutaneous Nightly   insulin lispro 0-9 Units Subcutaneous TID AC   insulin lispro 10 Units Subcutaneous TID With Meals   levothyroxine 100 mcg Oral Daily   losartan 50 mg Oral Daily   metoprolol succinate XL 50 mg Oral Daily   polyethylene glycol 17 g Oral Daily       Infusion Meds       PRN Meds  •  acetaminophen  •  dextrose  •  dextrose  •  glucagon (human recombinant)  •  HYDROcodone-acetaminophen  •  Morphine **AND** naloxone  •  nitroglycerin  •  ondansetron **OR** ondansetron  •  oxyCODONE  •  sodium chloride        Assessment/Plan       Antimicrobial Therapy   1.       Day  2.      Day  3.      Day  4.      Day  5.      Day      Assessment     Right transmetatarsal amputation wound on the plantar aspect that is draining with if the erythema extending to his lower leg  -Cultures are growing gram-positive cocci and gram-negative bacilli in the Gram stain  -MRI shows 2 developing abscesses and osteomyelitis  -Doppler was negative  -History of right diabetic foot wound with trans-metatarsal amputation on 3/17/2020  I agree that the right foot is not salvageable  Wound cultures are growing mixed gram-negative grace and enterococcus.  -7/30/2020-right BKA    Fever T-max of 102.3 degrees-likely related to foot infection  -Covid 19 screen was negative  -Respiratory viral DNA panel negative  -Fever has resolved     History of bacteremia with group B streptococcus and bacteria voids species from March 2020  -Patient was treated with 6 weeks of IV Rocephin and p.o. Flagyl  -Apparently somebody at [facility] started him  on another 6 weeks of IV Rocephin which just finished  -Patient still has the same PICC line in place since March 2020     Type 2 diabetes with neuropathy     History of left fourth toe puncture wound that was treated 2017     History of right great toe amputation     Chronic kidney disease     History of DVT/PE     Plan     Discontinue IV vancomycin and IV cefepime  Doxycycline 100 mg p.o. twice daily for 7 days           Juan Carlos Macedo MD  08/01/20  14:34      Note is dictated utilizing voice recognition software/Dragon

## 2020-08-01 NOTE — THERAPY TREATMENT NOTE
Patient Name: Kuldeep Adhikari  : 1965    MRN: [id number]                              Today's Date: 2020       Admit Date: 2020    Visit Dx:     ICD-10-CM ICD-9-CM   1. Cellulitis of right foot L03.115 682.7   2. Right leg pain M79.604 729.5   3. Sepsis, due to unspecified organism, unspecified whether acute organ dysfunction present (CMS/[facility]) A41.9 038.9     995.91     Patient Active Problem List   Diagnosis   • Benign essential hypertension   • Cellulitis   • Chest pain   • Chronic coronary artery disease   • Chronic renal insufficiency, stage III (moderate) (CMS/[facility])   • Degenerative joint disease   • Depression   • Diabetic peripheral neuropathy (CMS/[facility])   • Diabetic ketoacidosis (CMS/[facility])   • Disorder associated with type 2 diabetes mellitus (CMS/[facility])   • Encounter for screening for malignant neoplasm of colon   • Fatigue   • Foot pain, right   • Ganglion cyst   • Gangrene of foot (CMS/[facility])   • Gastroesophageal reflux disease   • History of amputation of hallux (CMS/[facility])   • History of gastrointestinal disease   • History of pulmonary embolism   • Hx of osteomyelitis   • Mixed hyperlipidemia   • Acquired hypothyroidism   • Obstructive sleep apnea syndrome   • Palpitations   • Peripheral vascular disease (CMS/[facility])   • Subacromial bursitis of right shoulder joint   • Vitamin D deficiency   • Uncontrolled type 2 diabetes mellitus with hyperglycemia (CMS/[facility])   • Cellulitis in diabetic foot (CMS/[facility])   • Obesity (BMI 30-39.9)   • Sepsis due to group B Streptococcus (CMS/[facility])   • Osteomyelitis of right foot (CMS/[facility])   • Cellulitis of right foot     Past Medical History:   Diagnosis Date   • CKD (chronic kidney disease), stage III (CMS/[facility])    • Depression    • DJD (degenerative joint disease)    • DVT (deep venous thrombosis) (CMS/[facility])    • Ganglion     rt wrist   • GERD (gastroesophageal reflux disease)    • Hiatal hernia    • History of echocardiogram 2016    2D ECHO   • History of esophageal  stricture     s/p dialation 40-50 times last EGD 04/2016   • History of pulmonary embolus (PE)     on long term anticoagulation   • Hyperlipidemia    • Hypertension    • Hypothyroidism    • IBS (irritable bowel syndrome)    • Neuropathy    • Rash     rt lower hip   • Retinopathy    • Seasonal allergies    • Sleep apnea     cpap  bring dos   • Type 2 diabetes mellitus with peripheral vascular disease (CMS/HCC)    • Vitamin D deficiency      Past Surgical History:   Procedure Laterality Date   • AMPUTATION FOOT / TOE Right     great toe   • BELOW KNEE AMPUTATION Right 7/30/2020    Procedure: AMPUTATION BELOW KNEE;  Surgeon: Eduardo Reynolds MD;  Location: [facility] MAIN OR;  Service: Orthopedics;  Laterality: Right;   • CARDIAC CATHETERIZATION  04/2018    [facility]   • ENDOSCOPY     • ENDOSCOPY N/A 10/4/2019    Procedure: ESOPHAGOGASTRODUODENOSCOPY with dilitation and biopsy x 1 area;  Surgeon: Declan Iqbal MD;  Location: [facility] ENDOSCOPY;  Service: Gastroenterology   • ENDOSCOPY N/A 12/13/2019    Procedure: ESOPHAGOGASTRODUODENOSCOPY WITH DILATATION (50, 52 BOUGIE);  Surgeon: Declan Iqbal MD;  Location: [facility] ENDOSCOPY;  Service: Gastroenterology   • GANGLION CYST EXCISION Left    • HERNIA REPAIR Bilateral    • RETINOPATHY SURGERY      laser   • TOTAL HIP ARTHROPLASTY Left    • TOTAL HIP ARTHROPLASTY Left 2018   • TRANS METATARSAL AMPUTATION Right 3/17/2020    Procedure: AMPUTATION TRANS METATARSAL right;  Surgeon: ERWIN Baker DPM;  Location: [facility] MAIN OR;  Service: Podiatry;  Laterality: Right;  GANGRENOUS RIGHT FOOT     General Information     Row Name 08/01/20 1449          PT Evaluation Time/Intention    Document Type  therapy note (daily note)  -     Mode of Treatment  physical therapy  -     Row Name 08/01/20 1449          General Information    Patient Profile Reviewed?  yes  -     Existing Precautions/Restrictions  -- R BKA  -     Row Name 08/01/20 1449          Safety Issues,  Functional Mobility    Impairments Affecting Function (Mobility)  pain;balance;postural/trunk control;endurance/activity tolerance  -       User Key  (r) = Recorded By, (t) = Taken By, (c) = Cosigned By    Initials Name Provider Type    Natalya Marquez PTA Physical Therapy Assistant        Mobility     Row Name 08/01/20 1450          Bed Mobility Assessment/Treatment    Bed Mobility Assessment/Treatment  rolling left;rolling right  -     Rolling Left Robertson (Bed Mobility)  conditional independence  -     Rolling Right Robertson (Bed Mobility)  conditional independence  -     Assistive Device (Bed Mobility)  bed rails  -     Comment (Bed Mobility)  pt didn't wan to get oob due to severe RLE pain  -     Row Name 08/01/20 1450          Mobility Assessment/Intervention    Right Lower Extremity (Weight-bearing Status)  non weight-bearing (NWB)  -       User Key  (r) = Recorded By, (t) = Taken By, (c) = Cosigned By    Initials Name Provider Type    Natalya Marquez PTA Physical Therapy Assistant        Obj/Interventions     Row Name 08/01/20 1451          Therapeutic Exercise    Comment (Therapeutic Exercise)  pt agreed to bed exercises, supine and L sidelying to incr ROM and strength RLE  -       User Key  (r) = Recorded By, (t) = Taken By, (c) = Cosigned By    Initials Name Provider Type    Natalya Marquez PTA Physical Therapy Assistant        Goals/Plan     Row Name 08/01/20 1454          Transfer Goal 1 (PT)    Progress/Outcome (Transfer Goal 1, PT)  goal ongoing  -     Row Name 08/01/20 1454          Gait Training Goal 1 (PT)    Progress/Outcome (Gait Training Goal 1, PT)  goal ongoing  -     Row Name 08/01/20 1454          ROM Goal 1 (PT)    Progress/Outcome (ROM Goal 1, PT)  goal ongoing  -     Row Name 08/01/20 1454          Stairs Goal 1 (PT)    Progress/Outcome (Stairs Goal 1, PT)  goal ongoing  -       User Key  (r) = Recorded By, (t) = Taken By, (c) = Cosigned  By    Initials Name Provider Type    Natalya Marquez PTA Physical Therapy Assistant        Clinical Impression     Row Name 08/01/20 1452          Pain Scale: Numbers Pre/Post-Treatment    Pre/Post Treatment Pain Comment  10/10 RLE distally, had just recently got pain meds  -     Row Name 08/01/20 1452          Plan of Care Review    Plan of Care Reviewed With  patient  -     Progress  no change  -     Outcome Summary  pt very motivated to get stronger and RLE flexible. Was limited today by pain. Is supposed to dc home with family and HH to f/u/ Gloves,mask/goggles worn for tx  -     Row Name 08/01/20 1452          Physical Therapy Clinical Impression    Criteria for Skilled Interventions Met (PT Clinical Impression)  treatment indicated  -     Rehab Potential (PT Clinical Summary)  good, to achieve stated therapy goals  -     Row Name 08/01/20 1452          Vital Signs    O2 Delivery Pre Treatment  room air  -     Row Name 08/01/20 1452          Positioning and Restraints    Pre-Treatment Position  in bed  -     Post Treatment Position  bed  -     In Bed  supine;call light within reach;exit alarm on;RLE elevated  -       User Key  (r) = Recorded By, (t) = Taken By, (c) = Cosigned By    Initials Name Provider Type    Natalya Marquez PTA Physical Therapy Assistant        Outcome Measures    No documentation.       Physical Therapy Education                 Title: PT OT SLP Therapies (Done)     Topic: Physical Therapy (Done)     Point: Mobility training (Done)     Description:   Instruct learner(s) on safety and technique for assisting patient out of bed, chair or wheelchair.  Instruct in the proper use of assistive devices, such as walker, crutches, cane or brace.              Patient Friendly Description:   It's important to get you on your feet again, but we need to do so in a way that is safe for you. Falling has serious consequences, and your personal safety is the most important  thing of all.        When it's time to get out of bed, one of us or a family member will sit next to you on the bed to give you support.     If your doctor or nurse tells you to use a walker, crutches, a cane, or a brace, be sure you use it every time you get out of bed, even if you think you don't need it.    Learning Progress Summary           Patient Acceptance, E,TB, VU by  at 8/1/2020 1455    Acceptance, E,TB, VU by PB at 8/1/2020 0052    Acceptance, E, NR by  at 7/31/2020 1610    Acceptance, E, NR by  at 7/28/2020 1149                   Point: Precautions (Done)     Description:   Instruct learner(s) on prescribed precautions during mobility and gait tasks              Learning Progress Summary           Patient Acceptance, E,TB, VU by  at 8/1/2020 1455    Acceptance, E,TB, VU by PB at 8/1/2020 0052    Acceptance, E, NR by  at 7/31/2020 1610    Acceptance, E, NR by  at 7/28/2020 1149                               User Key     Initials Effective Dates Name Provider Type Discipline     04/17/20 -  Wendy Jauregui, PT Physical Therapist PT     03/01/19 -  Natalya Cormier PTA Physical Therapy Assistant PT     03/01/19 -  Tejal Gracia RN Registered Nurse Nurse              PT Recommendation and Plan  Planned Therapy Interventions (PT Eval): ROM (range of motion), strengthening, stretching, transfer training, gait training, patient/family education  Plan of Care Reviewed With: patient  Progress: no change  Outcome Summary: pt very motivated to get stronger and RLE flexible. Was limited today by pain. Is supposed to dc home with family and HH to f/u/ Gloves,mask/goggles worn for tx     Time Calculation:   PT Charges     Row Name 08/01/20 1456             Time Calculation    Start Time  0801  -      Stop Time  0825  -      Time Calculation (min)  24 min  -      PT Received On  08/01/20  -      PT - Next Appointment  08/03/20  -         Time Calculation- PT    Total Timed Code  Minutes- PT  24 minute(s)  -SHAILESH        User Key  (r) = Recorded By, (t) = Taken By, (c) = Cosigned By    Initials Name Provider Type    Natalya Marquez PTA Physical Therapy Assistant        Therapy Charges for Today     Code Description Service Date Service Provider Modifiers Qty    [id number]  PT THER PROC EA 15 MIN 8/1/2020 Natalya Cormier PTA GP 2          PT G-Codes  Outcome Measure Options: AM-PAC 6 Clicks Basic Mobility (PT)  AM-PAC 6 Clicks Score (PT): 19    Natalya Cormier PTA  8/1/2020

## 2020-08-01 NOTE — PLAN OF CARE
C/O mod amt of pain this shift. Stated that the numbing med has worn off. Dsg intact with no bleeding noted

## 2020-08-02 ENCOUNTER — READMISSION MANAGEMENT (OUTPATIENT)
Dept: CALL CENTER | Facility: HOSPITAL | Age: 55
End: 2020-08-02

## 2020-08-02 VITALS
HEART RATE: 73 BPM | DIASTOLIC BLOOD PRESSURE: 81 MMHG | WEIGHT: 183.64 LBS | HEIGHT: 68 IN | OXYGEN SATURATION: 97 % | RESPIRATION RATE: 17 BRPM | TEMPERATURE: 98.4 F | SYSTOLIC BLOOD PRESSURE: 148 MMHG | BODY MASS INDEX: 27.83 KG/M2

## 2020-08-02 PROBLEM — L03.115 CELLULITIS OF RIGHT FOOT: Status: RESOLVED | Noted: 2020-07-27 | Resolved: 2020-08-02

## 2020-08-02 LAB
BACTERIA SPEC AEROBE CULT: ABNORMAL
BACTERIA SPEC AEROBE CULT: NORMAL
BACTERIA SPEC AEROBE CULT: NORMAL
GLUCOSE BLDC GLUCOMTR-MCNC: 173 MG/DL (ref 70–105)
GLUCOSE BLDC GLUCOMTR-MCNC: 88 MG/DL (ref 70–105)
GRAM STN SPEC: ABNORMAL

## 2020-08-02 PROCEDURE — 82962 GLUCOSE BLOOD TEST: CPT

## 2020-08-02 PROCEDURE — 99239 HOSP IP/OBS DSCHRG MGMT >30: CPT | Performed by: INTERNAL MEDICINE

## 2020-08-02 PROCEDURE — 25010000002 MORPHINE PER 10 MG: Performed by: ORTHOPAEDIC SURGERY

## 2020-08-02 RX ORDER — GABAPENTIN 100 MG/1
200 CAPSULE ORAL 3 TIMES DAILY
Status: DISCONTINUED | OUTPATIENT
Start: 2020-08-02 | End: 2020-08-02

## 2020-08-02 RX ORDER — METHOCARBAMOL 750 MG/1
750 TABLET, FILM COATED ORAL EVERY 8 HOURS PRN
Status: DISCONTINUED | OUTPATIENT
Start: 2020-08-02 | End: 2020-08-02 | Stop reason: HOSPADM

## 2020-08-02 RX ORDER — BISACODYL 5 MG/1
5 TABLET, DELAYED RELEASE ORAL DAILY
Status: DISCONTINUED | OUTPATIENT
Start: 2020-08-02 | End: 2020-08-02 | Stop reason: HOSPADM

## 2020-08-02 RX ORDER — BISACODYL 10 MG
10 SUPPOSITORY, RECTAL RECTAL DAILY PRN
Status: DISCONTINUED | OUTPATIENT
Start: 2020-08-02 | End: 2020-08-02 | Stop reason: HOSPADM

## 2020-08-02 RX ORDER — ONDANSETRON 4 MG/1
4 TABLET, FILM COATED ORAL EVERY 6 HOURS PRN
Qty: 30 TABLET | Refills: 0 | Status: SHIPPED | OUTPATIENT
Start: 2020-08-02 | End: 2020-10-28 | Stop reason: SDUPTHER

## 2020-08-02 RX ORDER — METHOCARBAMOL 750 MG/1
750 TABLET, FILM COATED ORAL EVERY 8 HOURS PRN
Qty: 60 TABLET | Refills: 0 | Status: SHIPPED | OUTPATIENT
Start: 2020-08-02 | End: 2021-06-29

## 2020-08-02 RX ORDER — HYDROCODONE BITARTRATE AND ACETAMINOPHEN 7.5; 325 MG/1; MG/1
1 TABLET ORAL EVERY 4 HOURS PRN
Qty: 20 TABLET | Refills: 0 | Status: SHIPPED | OUTPATIENT
Start: 2020-08-02 | End: 2020-08-02 | Stop reason: HOSPADM

## 2020-08-02 RX ORDER — OXYCODONE HYDROCHLORIDE 10 MG/1
5 TABLET ORAL EVERY 4 HOURS PRN
Qty: 20 TABLET | Refills: 0 | Status: SHIPPED | OUTPATIENT
Start: 2020-08-02 | End: 2020-08-09

## 2020-08-02 RX ORDER — DOXYCYCLINE 100 MG/1
100 CAPSULE ORAL 2 TIMES DAILY
Qty: 10 CAPSULE | Refills: 0 | Status: SHIPPED | OUTPATIENT
Start: 2020-08-02 | End: 2020-08-07

## 2020-08-02 RX ORDER — POLYETHYLENE GLYCOL 3350 17 G/17G
17 POWDER, FOR SOLUTION ORAL DAILY
Qty: 7 EACH | Refills: 0 | Status: SHIPPED | OUTPATIENT
Start: 2020-08-03 | End: 2021-08-02

## 2020-08-02 RX ADMIN — METHOCARBAMOL TABLETS 750 MG: 750 TABLET, COATED ORAL at 12:10

## 2020-08-02 RX ADMIN — OXYCODONE HYDROCHLORIDE 10 MG: 5 TABLET ORAL at 08:54

## 2020-08-02 RX ADMIN — BISACODYL 5 MG: 5 TABLET, COATED ORAL at 12:09

## 2020-08-02 RX ADMIN — DOXYCYCLINE 100 MG: 100 TABLET, FILM COATED ORAL at 08:54

## 2020-08-02 RX ADMIN — MORPHINE SULFATE 4 MG: 4 INJECTION INTRAVENOUS at 03:43

## 2020-08-02 RX ADMIN — ATORVASTATIN CALCIUM 40 MG: 40 TABLET, FILM COATED ORAL at 08:55

## 2020-08-02 RX ADMIN — HYDROCODONE BITARTRATE AND ACETAMINOPHEN 1 TABLET: 7.5; 325 TABLET ORAL at 04:00

## 2020-08-02 RX ADMIN — POLYETHYLENE GLYCOL 3350 17 G: 17 POWDER, FOR SOLUTION ORAL at 08:54

## 2020-08-02 RX ADMIN — LEVOTHYROXINE SODIUM 100 MCG: 100 TABLET ORAL at 08:55

## 2020-08-02 RX ADMIN — DULOXETINE HYDROCHLORIDE 60 MG: 30 CAPSULE, DELAYED RELEASE ORAL at 08:54

## 2020-08-02 RX ADMIN — LOSARTAN POTASSIUM 50 MG: 50 TABLET, FILM COATED ORAL at 08:55

## 2020-08-02 RX ADMIN — Medication 10 ML: at 08:53

## 2020-08-02 RX ADMIN — ASPIRIN 325 MG ORAL TABLET 325 MG: 325 PILL ORAL at 08:54

## 2020-08-02 RX ADMIN — APIXABAN 5 MG: 5 TABLET, FILM COATED ORAL at 08:55

## 2020-08-02 RX ADMIN — METOPROLOL SUCCINATE 50 MG: 50 TABLET, EXTENDED RELEASE ORAL at 08:55

## 2020-08-02 NOTE — DISCHARGE SUMMARY
Date of Admission: 7/27/2020    Date of Discharge:  8/2/2020    Length of stay:  LOS: 6 days     Presenting Problem:   Right leg pain [M79.604]  Cellulitis of right foot [L03.115]  Sepsis, due to unspecified organism, unspecified whether acute organ dysfunction present (CMS/[facility]) [A41.9]      Principal and Active Diagnosis During Hospital Stay:     Active Hospital Problems    Diagnosis  POA   • Sepsis due to group B Streptococcus (CMS/[facility]) [A40.1]  Yes   • Chronic coronary artery disease [I25.10]  Yes   • Benign essential hypertension [I10]  Yes   • Diabetic peripheral neuropathy (CMS/[facility]) [E11.42]  Yes   • Depression [F32.9]  Yes   • Disorder associated with type 2 diabetes mellitus (CMS/[facility]) [E11.8]  Yes   • History of pulmonary embolism [Z86.711]  Yes   • Peripheral vascular disease (CMS/[facility]) [I73.9]  Yes      Resolved Hospital Problems    Diagnosis Date Resolved POA   • Cellulitis of right foot [L03.115] 08/02/2020 Yes     Right lower extremity osteomyelitis/cellulitis realted to type II DM  -s/p BKA RLE 7/30/2020  -MRI foot 7/29/2020: abscesses and osteomyelitis  -podiatry signed off   -Ortho cleared for d/c f/u outpt  -now on doxycycline to finish 7 day course   -ID followed  -BCx NGTF     Acute respiratory insufficiency    -resolved   -negative COVID 19  -Tessalon Perles as needed  -CXR did not show infiltrate     DAISY  -resolved Cr normal      Diabetes type 2, chronic with pseudohyponatremia  -continue mealtime insulin  -BG overall stable  -continue long acting insulin with metformin with Januvia     Anticoagulation therapy secondary to history of PE:   -Now back on Eliquis      CAD, chronic:   -Continue Aspirin, metformin     HLD, chronic:   -Continue Atorvastatin     Anxiety, chronic:   -Continue Cymbalta     Hypertension, chronic:   -Continue metoprolol, Cozaar     Hypothyroidism, chronic:   -Continue levothyroxine; check TSH     GERD, chronic:   -Continue Prilosec     Asthma, chronic intermittent:      -Continue Flovent as needed      Hospital Course  Patient is a 55 y.o. male presented with right foot cellulitis and ultimately the right foot was felt unsalvageable by podiatry and the patient underwent right-sided BKA.  He did relatively well post procedure except was complaining of pain.  He will be discharged home on pain control.  He was cleared by orthopedic surgery for discharge.  He will be placed on p.o. antibiotics for 7 days per infectious disease.  He already has home health at home and he will follow-up with Ortho as an outpatient.        Procedures Performed:none    Procedure(s):  AMPUTATION BELOW KNEE  -------------------       Consults:   Consults     Date and Time Order Name Status Description    7/28/2020 1217 Inpatient Orthopedic Surgery Consult Completed     7/28/2020 0829 Inpatient Podiatry Consult Completed     7/28/2020 0320 Inpatient Infectious Diseases Consult Completed     7/27/2020 2046 Hospitalist (on-call MD unless specified) Completed           Pertinent Test Results:     Lab Results (last 72 hours)     Procedure Component Value Units Date/Time    POC Glucose Once [[id number]]  (Abnormal) Collected:  08/02/20 1133    Specimen:  Blood Updated:  08/02/20 1136     Glucose 173 mg/dL      Comment: Serial Number: [id number]:  [id number]       POC Glucose Once [[id number]]  (Normal) Collected:  08/02/20 0737    Specimen:  Blood Updated:  08/02/20 0738     Glucose 88 mg/dL      Comment: Serial Number: [id number]:  [id number]       Wound Culture - Wound, Foot, Right [[id number]]  (Abnormal)  (Susceptibility) Collected:  07/28/20 0013    Specimen:  Wound from Foot, Right Updated:  08/02/20 0657     Wound Culture Moderate growth (3+) Mixed Gram Negative Georgette      Light growth (2+) Enterococcus faecalis      Light growth (2+) Staphylococcus aureus     Gram Stain No WBCs seen      Few (2+) Gram positive cocci      Rare (1+) Gram negative bacilli    Susceptibility      Enterococcus  faecalis     MINDY     Ampicillin Susceptible     Vancomycin Susceptible                Susceptibility      Staphylococcus aureus     MINDY     Clindamycin Susceptible     Erythromycin Susceptible     Inducible Clindamycin Resistance Negative     Oxacillin Susceptible     Penicillin G Resistant     Rifampin Susceptible     Tetracycline Susceptible     Trimethoprim + Sulfamethoxazole Susceptible     Vancomycin Susceptible                Susceptibility Comments     Staphylococcus aureus    This isolate does not demonstrate inducible clindamycin resistance in vitro.               Blood Culture - Blood, Arm, Right [[id number]] Collected:  07/28/20 0027    Specimen:  Blood from Arm, Right Updated:  08/02/20 0045     Blood Culture No growth at 5 days    Blood Culture - Blood, Hand, Left [[id number]] Collected:  07/28/20 0027    Specimen:  Blood from Hand, Left Updated:  08/02/20 0045     Blood Culture No growth at 5 days    POC Glucose Once [[id number]]  (Abnormal) Collected:  08/01/20 2129    Specimen:  Blood Updated:  08/01/20 2131     Glucose 154 mg/dL      Comment: Serial Number: [id number]:  [id number]       POC Glucose Once [[id number]]  (Abnormal) Collected:  08/01/20 1618    Specimen:  Blood Updated:  08/01/20 1619     Glucose 110 mg/dL      Comment: Serial Number: [id number]:  [id number]       POC Glucose Once [[id number]]  (Abnormal) Collected:  08/01/20 1145    Specimen:  Blood Updated:  08/01/20 1146     Glucose 128 mg/dL      Comment: Serial Number: [id number]:  [id number]       POC Glucose Once [[id number]]  (Normal) Collected:  08/01/20 0736    Specimen:  Blood Updated:  08/01/20 0737     Glucose 79 mg/dL      Comment: Serial Number: [id number]:  [id number]       BUN [[id number]]  (Abnormal) Collected:  08/01/20 0306    Specimen:  Blood Updated:  08/01/20 0732     BUN 24 mg/dL     Scan Slide [[id number]] Collected:  08/01/20 0306    Specimen:  Blood Updated:  08/01/20 0344     Scan Slide --      Comment: See Manual Differential Results       Manual Differential [[id number]]  (Abnormal) Collected:  08/01/20 0306    Specimen:  Blood Updated:  08/01/20 0344     Neutrophil % 81.0 %      Lymphocyte % 11.0 %      Monocyte % 4.0 %      Eosinophil % 1.0 %      Bands %  3.0 %      Neutrophils Absolute 7.90 10*3/mm3      Lymphocytes Absolute 1.03 10*3/mm3      Monocytes Absolute 0.38 10*3/mm3      Eosinophils Absolute 0.09 10*3/mm3      War Cells Slight/1+     WBC Morphology Normal     Platelet Morphology Normal    CBC & Differential [[id number]] Collected:  08/01/20 0306    Specimen:  Blood Updated:  08/01/20 0344    Narrative:       The following orders were created for panel order CBC & Differential.  Procedure                               Abnormality         Status                     ---------                               -----------         ------                     CBC Auto Differential[[id number]]        Abnormal            Final result                 Please view results for these tests on the individual orders.    CBC Auto Differential [[id number]]  (Abnormal) Collected:  08/01/20 0306    Specimen:  Blood Updated:  08/01/20 0344     WBC 9.40 10*3/mm3      RBC 3.88 10*6/mm3      Hemoglobin 10.2 g/dL      Hematocrit 31.1 %      MCV 80.3 fL      MCH 26.4 pg      MCHC 32.8 g/dL      RDW 14.5 %      RDW-SD 41.1 fl      MPV 6.8 fL      Platelets 337 10*3/mm3     Basic Metabolic Panel [[id number]]  (Abnormal) Collected:  08/01/20 0306    Specimen:  Blood Updated:  08/01/20 0338     Glucose 179 mg/dL      BUN --     Comment: Testing performed by alternate method        Creatinine 1.21 mg/dL      Sodium 143 mmol/L      Potassium 4.0 mmol/L      Chloride 111 mmol/L      CO2 23.0 mmol/L      Calcium 8.4 mg/dL      eGFR Non African Amer 62 mL/min/1.73      BUN/Creatinine Ratio --     Comment: Testing not performed        Anion Gap 9.0 mmol/L     Narrative:       GFR Normal >60  Chronic Kidney Disease <60  Kidney Failure  "<15      Vancomycin, Trough [[id number]]  (Normal) Collected:  07/31/20 2028    Specimen:  Blood Updated:  07/31/20 2114     Vancomycin Trough 12.00 mcg/mL     POC Glucose Once [[id number]]  (Abnormal) Collected:  07/31/20 2103    Specimen:  Blood Updated:  07/31/20 2104     Glucose 200 mg/dL      Comment: Serial Number: [id number]:  [id number]       POC Glucose Once [[id number]]  (Abnormal) Collected:  07/31/20 1657    Specimen:  Blood Updated:  07/31/20 1658     Glucose 315 mg/dL      Comment: Serial Number: [id number]:  [id number]       Tissue Pathology Exam [[id number]] Collected:  07/30/20 1337    Specimen:  Tissue from Leg, Right Updated:  07/31/20 1300     Case Report --     Surgical Pathology Report                         Case: [id number]                                  Authorizing Provider:  Eduardo Reynolds MD         Collected:           07/30/2020 01:37 PM          Ordering Location:     [facility] MAIN  Received:            07/30/2020 01:55 PM                                 OR                                                                           Pathologist:           Declan Minaya MD                                                           Specimen:    Leg, Right, right below knee amputation                                                     Final Diagnosis --     Right lower extremity, below the knee amputation:    Acute suppurative (gangrenous) necrosis    Acute osteomyelitis    Margins of resection grossly viable      SANTANA/cec       Gross Description --     Received in a red biohazard bag designated \"Right below knee amputation\" is a below knee amputation specimen measuring 33 cm from proximal bony resection margin to heel and 17 cm from heel to distal margin. The specimen shows evidence of prior transmetatarsal amputation. The specimen is covered with tan unremarkable skin. An ulcer is present on the sole on the medial distal aspect. The skin, soft tissue, and bony " resection margins appear viable. Some softening of the bony tissue deep to the area of ulcer are identified. Sections submitted as follows:    A   Skin and soft tissue at ulcer  B   Possibly softened bone at ulcer after decalcification  C   Proximal vessels     GIO/cec         POC Glucose Once [[id number]]  (Abnormal) Collected:  07/31/20 1123    Specimen:  Blood Updated:  07/31/20 1131     Glucose 337 mg/dL      Comment: Serial Number: [id number]:  [id number]       BUN [[id number]]  (Abnormal) Collected:  07/31/20 0617    Specimen:  Blood Updated:  07/31/20 1100     BUN 27 mg/dL     Vancomycin, Trough [[id number]]  (Abnormal) Collected:  07/31/20 0617    Specimen:  Blood Updated:  07/31/20 0643     Vancomycin Trough 21.00 mcg/mL     Basic Metabolic Panel [[id number]]  (Abnormal) Collected:  07/31/20 0617    Specimen:  Blood Updated:  07/31/20 0643     Glucose 350 mg/dL      BUN --     Comment: Testing performed by alternate method        Creatinine 1.39 mg/dL      Sodium 136 mmol/L      Potassium 4.9 mmol/L      Chloride 105 mmol/L      CO2 18.0 mmol/L      Calcium 8.1 mg/dL      eGFR Non African Amer 53 mL/min/1.73      BUN/Creatinine Ratio --     Comment: Testing not performed        Anion Gap 13.0 mmol/L     Narrative:       GFR Normal >60  Chronic Kidney Disease <60  Kidney Failure <15      POC Glucose Once [[id number]]  (Abnormal) Collected:  07/31/20 0640    Specimen:  Blood Updated:  07/31/20 0642     Glucose 318 mg/dL      Comment: Serial Number: [id number]:  [id number]       Hemoglobin & Hematocrit, Blood [[id number]]  (Abnormal) Collected:  07/31/20 0617    Specimen:  Blood Updated:  07/31/20 0624     Hemoglobin 10.8 g/dL      Hematocrit 32.7 %     POC Glucose Once [[id number]]  (Abnormal) Collected:  07/30/20 2028    Specimen:  Blood Updated:  07/30/20 2030     Glucose 297 mg/dL      Comment: Serial Number: [id number]:  [id number]       BUN [[id number]]  (Normal) Collected:  07/30/20 1607     Specimen:  Blood Updated:  07/30/20 2014     BUN 19 mg/dL     Basic Metabolic Panel [[id number]]  (Abnormal) Collected:  07/30/20 1607    Specimen:  Blood Updated:  07/30/20 1645     Glucose 166 mg/dL      BUN --     Comment: Testing performed by alternate method        Creatinine 1.31 mg/dL      Sodium 137 mmol/L      Potassium 4.4 mmol/L      Chloride 104 mmol/L      CO2 20.0 mmol/L      Calcium 7.9 mg/dL      eGFR Non African Amer 57 mL/min/1.73      BUN/Creatinine Ratio --     Comment: Testing not performed        Anion Gap 13.0 mmol/L     Narrative:       GFR Normal >60  Chronic Kidney Disease <60  Kidney Failure <15      POC Glucose Once [[id number]]  (Abnormal) Collected:  07/30/20 1612    Specimen:  Blood Updated:  07/30/20 1613     Glucose 152 mg/dL      Comment: Serial Number: [id number]:  [id number]       POC Glucose Once [[id number]]  (Abnormal) Collected:  07/30/20 1429    Specimen:  Blood Updated:  07/30/20 1431     Glucose 129 mg/dL      Comment: Serial Number: [id number]:  [id number]                  Microbiology Results (last 10 days)     Procedure Component Value - Date/Time    MRSA Screen, PCR (Inpatient) - Swab, Nares [[id number]]  (Normal) Collected:  07/29/20 1347    Lab Status:  Final result Specimen:  Swab from Nares Updated:  07/29/20 1522     MRSA PCR No MRSA Detected    Blood Culture - Blood, Arm, Right [[id number]] Collected:  07/28/20 0027    Lab Status:  Final result Specimen:  Blood from Arm, Right Updated:  08/02/20 0045     Blood Culture No growth at 5 days    Blood Culture - Blood, Hand, Left [[id number]] Collected:  07/28/20 0027    Lab Status:  Final result Specimen:  Blood from Hand, Left Updated:  08/02/20 0045     Blood Culture No growth at 5 days    Wound Culture - Wound, Foot, Right [[id number]]  (Abnormal)  (Susceptibility) Collected:  07/28/20 0013    Lab Status:  Final result Specimen:  Wound from Foot, Right Updated:  08/02/20 0657     Wound Culture Moderate growth  (3+) Mixed Gram Negative Georgette      Light growth (2+) Enterococcus faecalis      Light growth (2+) Staphylococcus aureus     Gram Stain No WBCs seen      Few (2+) Gram positive cocci      Rare (1+) Gram negative bacilli    Susceptibility      Enterococcus faecalis     MINDY     Ampicillin Susceptible     Vancomycin Susceptible                Susceptibility      Staphylococcus aureus     MINDY     Clindamycin Susceptible     Erythromycin Susceptible     Inducible Clindamycin Resistance Negative     Oxacillin Susceptible     Penicillin G Resistant     Rifampin Susceptible     Tetracycline Susceptible     Trimethoprim + Sulfamethoxazole Susceptible     Vancomycin Susceptible                Susceptibility Comments     Staphylococcus aureus    This isolate does not demonstrate inducible clindamycin resistance in vitro.               Respiratory Panel, PCR - Swab, Nasopharynx [[id number]]  (Normal) Collected:  07/27/20 2144    Lab Status:  Final result Specimen:  Swab from Nasopharynx Updated:  07/27/20 2238     ADENOVIRUS, PCR Not Detected     Coronavirus 229E Not Detected     Coronavirus HKU1 Not Detected     Coronavirus NL63 Not Detected     Coronavirus OC43 Not Detected     Human Metapneumovirus Not Detected     Human Rhinovirus/Enterovirus Not Detected     Influenza B PCR Not Detected     Parainfluenza Virus 1 Not Detected     Parainfluenza Virus 2 Not Detected     Parainfluenza Virus 3 Not Detected     Parainfluenza Virus 4 Not Detected     Bordetella pertussis pcr Not Detected     Influenza A H1 2009 PCR Not Detected     Chlamydophila pneumoniae PCR Not Detected     Mycoplasma pneumo by PCR Not Detected     Influenza A PCR Not Detected     Influenza A H3 Not Detected     Influenza A H1 Not Detected     RSV, PCR Not Detected     Bordetella parapertussis PCR Not Detected    Narrative:       The coronavirus on the RVP is NOT COVID-19 and is NOT indicative of infection with COVID-19.     COVID-19 [facility] IN-HOUSE, Nasal  Swab No Transport Media - Swab, Nasal Cavity [[id number]]  (Normal) Collected:  07/27/20 1946    Lab Status:  Final result Specimen:  Swab from Nasal Cavity Updated:  07/27/20 2023     COVID19 Not Detected    Narrative:       Fact sheet for providers: https://www.fda.gov/media/857966/download     Fact sheet for patients: https://www.fda.gov/media/956545/download                 Imaging Results (All)     Procedure Component Value Units Date/Time    XR Tibia Fibula 2 View Right [[id number]] Collected:  07/30/20 1628     Updated:  07/30/20 1632    Narrative:       DATE OF EXAM:  7/30/2020 3:35 PM     PROCEDURE:  XR TIBIA FIBULA 2 VW RIGHT-     INDICATIONS:  History of osteomyelitis and abscess within the right foot. The patient  is status post a below the knee amputation procedure.     COMPARISON:  No Comparisons Available     TECHNIQUE:   Two radiographic views of the right tibia and fibula were obtained.        FINDINGS:  The patient is status post a below-the-knee amputation procedure. There  is soft tissue gas and subcutaneous staples seen at the site of the  stump consistent with expected postsurgical changes. There is minor  spurring of the medial compartment and patellofemoral compartment  indicating arthritis. There is no knee joint effusion.        Impression:       Status post a below the knee amputation procedure with no radiographic  complications.     Electronically Signed By-Bridger Cazares On:7/30/2020 4:30 PM  This report was finalized on [id number] by  Bridger Cazares, .    MRI Foot Right With & Without Contrast [[id number]] Collected:  07/28/20 1433     Updated:  07/28/20 1511    Narrative:       MRI FOOT RIGHT W WO CONTRAST    Date of Exam: 7/28/2020 12:16 EDT    Indication: Osteomyelitis suspected, foot swelling, diabetic.  Status post transmetatarsal amputation. Redness and swelling. Drainage a distal foot.     Comparison Exams: 3/15/2020    Technique: Multi signal multi sequential MR images of the right  foot were obtained prior to and following the intravenous administration of 20 mL of clear scan gadolinium contrast.    FINDINGS:  The patient is status post prior amputation at the level of the proximal metatarsals. Postoperative changes are noted involving the overlying soft tissues. There is evidence for a wound at the plantar distal aspect of the residual stub of the right   midfoot. This finding overlies the residual osseous stump of the first metatarsal bone. There is associated edema and enhancement involving the underlying soft tissues in this region. This edema and enhancement extends into the soft tissues of the   surgical stump. There is also extension along the plantar aspect towards the lateral residual midfoot. Corresponding enhancement is seen throughout the soft tissues in these regions on the postcontrast images. The findings suggest changes of superimposed   soft tissue cellulitis.    There is evidence for complex fluid collection within the soft tissues at the lateral plantar aspect of the residual midfoot overlying osseous stump of of the fifth metatarsal bone. This fluid collection measures approximately 1.7 x 3.7 x 1.9 cm. There   is peripheral surrounding enhancement. The findings suggest developing abscess formation.    There is also fluid which tracks along the deep subcutaneous soft tissues at the mid aspect of the plantar aspect of the residual midfoot towards the forefoot. Again there is peripheral surrounding enhancement. This finding suggests an additional area of   developing abscess measuring approximately 1.0 x 0.6 x 4.5 cm in transverse by craniocaudal by AP dimensions. This additional fluid collection tracks along the plantar aspect of the flexor tendon compartment.    There is evidence for abnormal edema and enhancement involving the residual fourth and fifth metatarsal bones. The findings suggest changes of developing osteomyelitis superimposed on postoperative change. There are  also subtle changes seen at the distal   plantar aspect of the osseous stump of the first metatarsal bone suggesting superimposed osteomyelitis.      Impression:       1.Postoperative changes status post interval distal amputation at the level of the proximal metatarsals.  2.There is a wound which overlies the plantar aspect of the residual first metatarsal bone. There is extensive edema and enhancement throughout the soft tissues underlying this region and extending through the soft tissues of the surgical stump of the   foot. The findings suggest soft tissue cellulitis.  3.Evidence for developing abscess within the plantar soft tissues at the lateral aspect of the residual midfoot overlying the region of the osseous stump of the fifth metatarsal bone. This finding measures up to 1.9 cm.  4.There is evidence for a second area of developing abscess within the central aspect of the plantar soft tissues of the residual midfoot overlying the flexor tendons measuring up to 4.5 cm along the plantar aspect of the foot.  5.Evidence for changes of osteomyelitis superimposed on postoperative changes involving the osseous stump of the fourth and fifth metatarsal bones as well as the distal osseous stump of the first metatarsal bone.    Electronically Signed: Gordo Pantoja MD 7/28/2020 15:09 EDT    CT Lower Extremity Right Without Contrast [[id number]] Collected:  07/28/20 0145     Updated:  07/28/20 0354    Narrative:       Exam: CT right foot and ankle without contrast    INDICATION: Lower leg erythema and swelling.  Suspected cellulitis.    TECHNIQUE: Axial images with coronal and sagittal reconstructions.  CT dose lowering techniques were used, to include: automated exposure control, adjustment for patient size, and or use of iterative reconstruction.      COMPARISON: X-ray of the foot July 23, 2020    FINDINGS:  Skeletal: There are numerous degenerative cysts in the lower tibia and navicular bone.  Additional cysts are  present in the anterior calcaneus and cuboid bone.  There are degenerative changes throughout the tarsal joints.  There is no acute bone   destruction or periosteal reaction.  Reactive bone has formed around the amputation sites at the proximal metatarsals.    Soft tissues: Very extensive soft tissue edema.  No drainable collection.  No air within the soft tissues.      Impression:       1.  Very extensive edema consistent with cellulitis or venous insufficiency.  2.  Diffuse degenerative changes throughout the hind and midfoot with numerous degenerative cysts and diffuse degenerative joint disease.  3.  No sign of active osteomyelitis.  No air to suggest a necrotizing process.    Electronically signed by:  Ricki Tong M.D.    7/28/2020 1:53 AM    XR Chest 1 View [[id number]] Collected:  07/27/20 2002     Updated:  07/27/20 2005    Narrative:       Examination: XR CHEST 1 VW-     Date of Exam: 7/27/2020 7:00 PM     Indication: fever.       Comparison: 03/16/2020     Technique: 1 view of the chest      FINDINGS:  Lung inflation is suboptimal. The heart size is within normal.  Mediastinal and hilar contours are unremarkable. A PICC line tip is in  the upper SVC. There is no airspace opacity or pulmonary nodule. No  pleural effusion or pneumothorax. Bony structures are unremarkable.       Impression:          1. No evidence of active cardiopulmonary disease.  2. PICC line tip is in the upper SVC.     Electronically Signed By-Samantha Diaz On:7/27/2020 8:02 PM  This report was finalized on [id number] by  Samantha Diaz, .            Condition on Discharge:  Stable     Vital Signs  Temp:  [98.1 °F (36.7 °C)-98.4 °F (36.9 °C)] 98.4 °F (36.9 °C)  Heart Rate:  [68-73] 73  Resp:  [15-17] 17  BP: (148-161)/(81-84) 148/81    Physical Exam:  Physical Exam     Constitutional: He is oriented to person, place, and time. No distress.   HENT:   Head: Normocephalic.   Eyes: Pupils are equal, round, and reactive to light.      Cardiovascular: Normal rate and regular rhythm.   Pulmonary/Chest: Effort normal. No respiratory distress.   Abdominal: Soft.   Musculoskeletal:   Right BKA stump with dessing c/d/i   Neurological: He is alert and oriented to person, place, and time.   Skin: Skin is warm.   Psychiatric: He has a normal mood and affect.   Vitals reviewed.    [place] [facility] [place] Medications     Discharge Medications      New Medications      Instructions Start Date   doxycycline 100 MG capsule  Commonly known as:  MONODOX   100 mg, Oral, 2 Times Daily      methocarbamol 750 MG tablet  Commonly known as:  ROBAXIN   750 mg, Oral, Every 8 Hours PRN      ondansetron 4 MG tablet  Commonly known as:  ZOFRAN   4 mg, Oral, Every 6 Hours PRN      oxyCODONE 10 MG tablet  Commonly known as:  ROXICODONE   5 mg, Oral, Every 4 Hours PRN, Can take a whole tablet if pain is severe      polyethylene glycol 17 g packet  Commonly known as:  MIRALAX   17 g, Oral, Daily   Start Date:  August 3, 2020        Changes to Medications      Instructions Start Date   Insulin Glargine 100 UNIT/ML injection pen  Commonly known as:  Lansharrius SoloStar  What changed:  when to take this   30 Units, Subcutaneous, Nightly         Continue These Medications      Instructions Start Date   Accu-Chek FastClix Lancets misc   1 each, Does not apply, 4 Times Daily Before Meals & Nightly, Dx: E11.65      apixaban 5 MG tablet tablet  Commonly known as:  Eliquis   5 mg, Oral, 2 Times Daily      Aspir-Low 81 MG EC tablet  Generic drug:  aspirin   81 mg, Oral, Daily      atorvastatin 40 MG tablet  Commonly known as:  LIPITOR   40 mg, Oral, Daily      B-D UF III MINI PEN NEEDLES 31G X 5 MM misc  Generic drug:  Insulin Pen Needle   No dose, route, or frequency recorded.      DULoxetine 60 MG capsule  Commonly known as:  CYMBALTA   60 mg, Oral, Every Morning      EPINEPHrine 0.15 MG/0.3ML solution auto-injector injection  Commonly known as:  EPIPEN  JR   No dose, route, or frequency recorded.      Flovent  MCG/ACT inhaler  Generic drug:  fluticasone   As Needed      glucose blood test strip  Commonly known as:  Accu-Chek Guide   1 each, Other, 4 Times Daily Before Meals & Nightly, Dx: E11.65. Use as instructed      insulin aspart 100 UNIT/ML solution pen-injector sc pen  Commonly known as:  NovoLOG FlexPen   10 Units, Subcutaneous, 3 Times Daily With Meals      levothyroxine 100 MCG tablet  Commonly known as:  Synthroid   100 mcg, Oral, Daily      losartan 50 MG tablet  Commonly known as:  COZAAR   50 mg, Oral, Daily      metoprolol succinate XL 50 MG 24 hr tablet  Commonly known as:  TOPROL-XL   Take 1 tablet by mouth once daily      nitroglycerin 0.4 MG SL tablet  Commonly known as:  NITROSTAT   0.4 mg, Sublingual, Every 5 Minutes PRN      omeprazole 40 MG capsule  Commonly known as:  priLOSEC   40 mg, Oral, Daily      SITagliptin-metFORMIN HCl ER  MG tablet  Commonly known as:  JANUMET XR   1 tablet, Oral, 2 times daily         Stop These Medications    cefTRIAXone 10 g injection  Commonly known as:  ROCEPHIN     ROCEPHIN IV            Discharge Diet:   Diet Instructions     Diet: Consistent Carbohydrate      Discharge Diet:  Consistent Carbohydrate          Activity at Discharge:   Activity Instructions     Gradually Increase Activity Until at Pre-Hospitalization Level            Follow-up Appointments  Future Appointments   Date Time Provider Department Center   10/28/2020  3:00 PM Laura Whitaker MD [facility] None   11/3/2020  9:10 AM LAB  [facility] None   11/10/2020  2:40 PM Tamara Michaels MD [facility] None     Additional Instructions for the Follow-ups that You Need to Schedule     Discharge Follow-up with PCP   As directed       Currently Documented PCP:    Laura Whitaker MD    PCP Phone Number:    [phone]     Follow Up Details:  one week         Discharge Follow-up with Specified  Provider: ortho; 2 Weeks   As directed      To:  ortho    Follow Up:  2 Weeks               Test Results Pending at Discharge       Risk for Readmission (LACE) Score: 15 (8/2/2020  6:00 AM)      This patient has been examined wearing appropriate Personal Protective Equipment . 08/02/20      Time: Discharge 36 min with face-to-face history exam, writing of prescriptions, and documenting discharge data including care coordination with the nursing staff.      Alex Robles DO  08/02/20  14:23

## 2020-08-02 NOTE — OUTREACH NOTE
Prep Survey      Responses   [facility] patient discharged from?  [facility]   Is LACE score < 7 ?  No   Eligibility  [facility]   Hospital  [facility]   Date of Admission  07/27/20   Date of Discharge  08/02/20   Discharge Disposition  Home-Health Care Svc   Discharge diagnosis  sepsis d/t right foot cellulitis,  BKA   COVID-19 Test Status  Negative   Does the patient have one of the following disease processes/diagnoses(primary or secondary)?  Sepsis   Does the patient have Home health ordered?  Yes   What is the Home health agency?   [facility]   Is there a DME ordered?  No   Prep survey completed?  Yes          Gwen Nolen RN

## 2020-08-02 NOTE — PLAN OF CARE
Pt continues with persistent pain s/p bka, complaints of muscle spasms and tingling in RLE, utilizing IV pain medicine still with little relief reported from po pain meds

## 2020-08-02 NOTE — PROGRESS NOTES
Infectious Diseases Progress Note      LOS: 6 days   Patient Care Team:  Laura Whitaker MD as PCP - General    Chief Complaint: Right leg stump pain    Subjective     The patient had no fever during the last 24 hours.  The patient remained hemodynamically stable.  The patient denied having any new complaints today.      Review of Systems:   Review of Systems   Constitutional: Negative.    HENT: Negative.    Eyes: Negative.    Respiratory: Negative.    Cardiovascular: Negative.    Gastrointestinal: Negative.    Genitourinary: Negative.    Musculoskeletal: Negative.    Skin: Negative.    Neurological: Negative.    Hematological: Negative.    Psychiatric/Behavioral: Negative.         Objective     Vital Signs  Temp:  [98.1 °F (36.7 °C)-98.4 °F (36.9 °C)] 98.4 °F (36.9 °C)  Heart Rate:  [68-73] 73  Resp:  [15-17] 17  BP: (148-161)/(81-84) 148/81    Physical Exam:  Physical Exam   Constitutional: He is oriented to person, place, and time. He appears well-developed and well-nourished.   HENT:   Head: Normocephalic and atraumatic.   Eyes: Pupils are equal, round, and reactive to light. EOM are normal.   Neck: Normal range of motion. Neck supple.   Cardiovascular: Normal rate, regular rhythm and normal heart sounds.   Pulmonary/Chest: Effort normal and breath sounds normal. No respiratory distress. He has no wheezes. He has no rales.   Abdominal: Soft. Bowel sounds are normal. He exhibits no distension and no mass. There is no tenderness. There is no rebound and no guarding.   Musculoskeletal: Normal range of motion. He exhibits edema. He exhibits no deformity.   Right stump with dressings in place   Neurological: He is alert and oriented to person, place, and time. No cranial nerve deficit.   Skin: Skin is warm. No rash noted. No erythema.   Psychiatric: He has a normal mood and affect.   Nursing note and vitals reviewed.       Results Review:    I have reviewed all clinical data, test, lab, and imaging  results.     Radiology  No Radiology Exams Resulted Within Past 24 Hours    Cardiology    Laboratory    Results from last 7 days   Lab Units 08/01/20  0306 07/31/20  0617 07/30/20  0232 07/29/20  1342 07/29/20  0404 07/28/20  0027 07/27/20  1903   WBC 10*3/mm3 9.40  --  7.40 10.00 10.20 12.50* 13.60*   HEMOGLOBIN g/dL 10.2* 10.8* 10.2* 10.8* 11.0* 12.0* 12.4*   HEMATOCRIT % 31.1* 32.7* 31.4* 33.1* 33.6* 37.6 38.4   PLATELETS 10*3/mm3 337  --  243 261 245 239 279     Results from last 7 days   Lab Units 08/01/20 0306 07/31/20 0617 07/30/20  1607 07/30/20  0232 07/29/20  0404 07/28/20  0027 07/27/20  1902   SODIUM mmol/L 143 136 137 141 142 137 135*   POTASSIUM mmol/L 4.0 4.9 4.4 4.0 4.2 4.4 4.5   CHLORIDE mmol/L 111* 105 104 108* 107 100 95*   CO2 mmol/L 23.0 18.0* 20.0* 21.0* 21.0* 25.0 23.0   BUN  24* 27* 19 22* 27* 23* 24*   CREATININE mg/dL 1.21 1.39* 1.31* 1.57* 1.82* 1.75* 1.54*   GLUCOSE mg/dL 179* 350* 166* 113* 99 245* 348*   ALBUMIN g/dL  --   --   --  2.60*  --   --   --    BILIRUBIN mg/dL  --   --   --  0.4  --   --   --    ALK PHOS U/L  --   --   --  115  --   --   --    AST (SGOT) U/L  --   --   --  37  --   --   --    ALT (SGPT) U/L  --   --   --  28  --   --   --    CALCIUM mg/dL 8.4* 8.1* 7.9* 7.9* 8.0* 8.9 9.1                 Microbiology   Microbiology Results (last 10 days)     Procedure Component Value - Date/Time    MRSA Screen, PCR (Inpatient) - Swab, Nares [[id number]]  (Normal) Collected:  07/29/20 1347    Lab Status:  Final result Specimen:  Swab from Nares Updated:  07/29/20 1522     MRSA PCR No MRSA Detected    Blood Culture - Blood, Arm, Right [[id number]] Collected:  07/28/20 0027    Lab Status:  Final result Specimen:  Blood from Arm, Right Updated:  08/02/20 0045     Blood Culture No growth at 5 days    Blood Culture - Blood, Hand, Left [[id number]] Collected:  07/28/20 0027    Lab Status:  Final result Specimen:  Blood from Hand, Left Updated:  08/02/20 0045     Blood Culture No growth  at 5 days    Wound Culture - Wound, Foot, Right [[id number]]  (Abnormal)  (Susceptibility) Collected:  07/28/20 0013    Lab Status:  Final result Specimen:  Wound from Foot, Right Updated:  08/02/20 0657     Wound Culture Moderate growth (3+) Mixed Gram Negative Georgette      Light growth (2+) Enterococcus faecalis      Light growth (2+) Staphylococcus aureus     Gram Stain No WBCs seen      Few (2+) Gram positive cocci      Rare (1+) Gram negative bacilli    Susceptibility      Enterococcus faecalis     MINDY     Ampicillin Susceptible     Vancomycin Susceptible                Susceptibility      Staphylococcus aureus     MINDY     Clindamycin Susceptible     Erythromycin Susceptible     Inducible Clindamycin Resistance Negative     Oxacillin Susceptible     Penicillin G Resistant     Rifampin Susceptible     Tetracycline Susceptible     Trimethoprim + Sulfamethoxazole Susceptible     Vancomycin Susceptible                Susceptibility Comments     Staphylococcus aureus    This isolate does not demonstrate inducible clindamycin resistance in vitro.               Respiratory Panel, PCR - Swab, Nasopharynx [[id number]]  (Normal) Collected:  07/27/20 2144    Lab Status:  Final result Specimen:  Swab from Nasopharynx Updated:  07/27/20 2238     ADENOVIRUS, PCR Not Detected     Coronavirus 229E Not Detected     Coronavirus HKU1 Not Detected     Coronavirus NL63 Not Detected     Coronavirus OC43 Not Detected     Human Metapneumovirus Not Detected     Human Rhinovirus/Enterovirus Not Detected     Influenza B PCR Not Detected     Parainfluenza Virus 1 Not Detected     Parainfluenza Virus 2 Not Detected     Parainfluenza Virus 3 Not Detected     Parainfluenza Virus 4 Not Detected     Bordetella pertussis pcr Not Detected     Influenza A H1 2009 PCR Not Detected     Chlamydophila pneumoniae PCR Not Detected     Mycoplasma pneumo by PCR Not Detected     Influenza A PCR Not Detected     Influenza A H3 Not Detected     Influenza A H1  Not Detected     RSV, PCR Not Detected     Bordetella parapertussis PCR Not Detected    Narrative:       The coronavirus on the RVP is NOT COVID-19 and is NOT indicative of infection with COVID-19.     COVID-19 [employer] IN-HOUSE, Nasal Swab No Transport Media - Swab, Nasal Cavity [[id number]]  (Normal) Collected:  07/27/20 1946    Lab Status:  Final result Specimen:  Swab from Nasal Cavity Updated:  07/27/20 2023     COVID19 Not Detected    Narrative:       Fact sheet for providers: https://www.fda.gov/media/118589/download     Fact sheet for patients: https://www.fda.gov/media/061041/download          Medication Review:       Schedule Meds    apixaban 5 mg Oral Q12H   aspirin 325 mg Oral Daily   atorvastatin 40 mg Oral Daily   bisacodyl 5 mg Oral Daily   doxycycline 100 mg Oral Q12H   DULoxetine 60 mg Oral QAM   insulin glargine 30 Units Subcutaneous Nightly   insulin lispro 0-9 Units Subcutaneous TID AC   insulin lispro 10 Units Subcutaneous TID With Meals   levothyroxine 100 mcg Oral Daily   losartan 50 mg Oral Daily   metoprolol succinate XL 50 mg Oral Daily   polyethylene glycol 17 g Oral Daily       Infusion Meds       PRN Meds  •  acetaminophen  •  bisacodyl  •  dextrose  •  dextrose  •  glucagon (human recombinant)  •  HYDROcodone-acetaminophen  •  methocarbamol  •  nitroglycerin  •  ondansetron **OR** ondansetron  •  oxyCODONE  •  sodium chloride        Assessment/Plan       Antimicrobial Therapy   1.       Day  2.      Day  3.      Day  4.      Day  5.      Day      Assessment     Right transmetatarsal amputation wound on the plantar aspect that is draining with if the erythema extending to his lower leg  -Cultures are growing gram-positive cocci and gram-negative bacilli in the Gram stain  -MRI shows 2 developing abscesses and osteomyelitis  -Doppler was negative  -History of right diabetic foot wound with trans-metatarsal amputation on 3/17/2020  I agree that the right foot is not salvageable  Wound  cultures are growing mixed gram-negative grace and enterococcus.  -7/30/2020-right BKA    Fever T-max of 102.3 degrees-likely related to foot infection  -Covid 19 screen was negative  -Respiratory viral DNA panel negative  -Fever has resolved     History of bacteremia with group B streptococcus and bacteria voids species from March 2020  -Patient was treated with 6 weeks of IV Rocephin and p.o. Flagyl  -Apparently somebody at [facility] started him on another 6 weeks of IV Rocephin which just finished  -Patient still has the same PICC line in place since March 2020     Type 2 diabetes with neuropathy     History of left fourth toe puncture wound that was treated 2017     History of right great toe amputation     Chronic kidney disease     History of DVT/PE     Plan       Continue doxycycline 100 mg p.o. twice daily for 7 days           Juan Carlos Macedo MD  08/02/20  15:02      Note is dictated utilizing voice recognition software/Dragon

## 2020-08-03 ENCOUNTER — TRANSITIONAL CARE MANAGEMENT TELEPHONE ENCOUNTER (OUTPATIENT)
Dept: CALL CENTER | Facility: HOSPITAL | Age: 55
End: 2020-08-03

## 2020-08-03 ENCOUNTER — TELEPHONE (OUTPATIENT)
Dept: CARDIOLOGY | Facility: CLINIC | Age: 55
End: 2020-08-03

## 2020-08-03 RX ORDER — METOPROLOL SUCCINATE 50 MG/1
TABLET, EXTENDED RELEASE ORAL
Qty: 30 TABLET | Refills: 0 | Status: SHIPPED | OUTPATIENT
Start: 2020-08-03 | End: 2020-10-23 | Stop reason: SDUPTHER

## 2020-08-03 NOTE — TELEPHONE ENCOUNTER
Pt requesting refill for Metoprolol. Last OV 5/2018. Pt contacted Dec, 2019 and asked to make an appointment. Are you willing to refill medication?

## 2020-08-03 NOTE — OUTREACH NOTE
Call Center TCM Note      Responses   [facility] patient discharged from?  [facility]   COVID-19 Test Status  Negative   Does the patient have one of the following disease processes/diagnoses(primary or secondary)?  Sepsis   TCM attempt successful?  Yes   Call start time  1528   Call end time  1532   Discharge diagnosis  sepsis d/t right foot cellulitis,  BKA   Meds reviewed with patient/caregiver?  Yes   Is the patient having any side effects they believe may be caused by any medication additions or changes?  No   Does the patient have all medications related to this admission filled (includes all antibiotics, inhalers, nebulizers,steroids,etc.)  No   Prescription comments  Pt states pharmacy was out of several of the meds he needed and he is waiting til tomorrow to make one pharmacy trip. I urged him to call ortho surgeon to make him aware pt does not have his antibiotics. Pt states he will   Is the patient taking all medications as directed (includes completed medication regime)?  No   Does the patient have a primary care provider?   Yes   Comments regarding PCP  Kuldeep Whitaker   Does the patient have an appointment with their PCP within 7 days of discharge?  No   Comments  PCP has no openings, I will request PCP ofc review notes and reach out to pt. Pt is calling surgeon to sched fwp.    What is the Home health agency?   [facility]   Has home health visited the patient within 72 hours of discharge?  Yes   Did the patient receive a copy of their discharge instructions?  Yes   Nursing interventions  Reviewed instructions with patient   What is the patient's perception of their health status since discharge?  Improving   Is the patient/caregiver able to teach back Sepsis?  S - Shivering,fever or very cold, E - Extreme pain or generalized discomfort (worst ever,especially abdomen), P - Pale or discolored skin, S - Sleepy, difficult to arouse,confused, I -   I feel like I might die-a feeling of  hopelessness, S - Short of breath   Nursing interventions  Nurse provided reassurance to patient   Is patient/caregiver able to teach back steps to recovery at home?  Set small, achievable goals for return to baseline health, Learn about sepsis(sepsis.org), Eat a balanced diet, Rest and regain strength, Talk about feelings with family/friends, Exercise as tolerated, Make a list of questions for PCP appoinment, Record milestones and struggles in a journal   Is the patient/caregiver able to teach back signs and symptoms of worsening condition:  Fever, Edema, High blood glucose without diabetes, Hyperthermia, Rapid heart rate (>90), Shortness of breath/rapid respiratory rate, Altered mental status(confusion/coma)   Is the patient/caregiver able to teach back the hierarchy of who to call/visit for symptoms/problems? PCP, Specialist, Home health nurse, Urgent Care, ED, 911  Yes   TCM call completed?  Yes   Wrap up additional comments  Pt states he is doing ok s/p Rt BKA due to persistent cellulitis. Pt does not have home meds yet ( see above) Pt is calling ortho surgeon re: this issue. Pt states he is letting HH know he is back home to resume care. Will have PCP review for TCM APPT           Ann Riley MA    8/3/2020, 15:43

## 2020-08-03 NOTE — PROGRESS NOTES
Case Management Discharge Note      Final Note: [facility]     Provided Post Acute Provider List?: N/A          Home Medical Care      Service Provider Request Status Selected Services Address Phone Number Fax Number    [facility] Services [address] [place], [place] [id number] [phone]                Final Discharge Disposition Code: 06 - home with home health care

## 2020-08-07 ENCOUNTER — OFFICE VISIT (OUTPATIENT)
Dept: FAMILY MEDICINE CLINIC | Facility: CLINIC | Age: 55
End: 2020-08-07

## 2020-08-07 VITALS
WEIGHT: 185 LBS | DIASTOLIC BLOOD PRESSURE: 75 MMHG | BODY MASS INDEX: 28.04 KG/M2 | HEART RATE: 80 BPM | TEMPERATURE: 98 F | SYSTOLIC BLOOD PRESSURE: 128 MMHG | OXYGEN SATURATION: 99 % | HEIGHT: 68 IN

## 2020-08-07 DIAGNOSIS — E78.2 MIXED HYPERLIPIDEMIA: ICD-10-CM

## 2020-08-07 DIAGNOSIS — E11.65 TYPE 2 DIABETES MELLITUS WITH HYPERGLYCEMIA, WITH LONG-TERM CURRENT USE OF INSULIN (HCC): ICD-10-CM

## 2020-08-07 DIAGNOSIS — E03.9 HYPOTHYROIDISM, UNSPECIFIED TYPE: ICD-10-CM

## 2020-08-07 DIAGNOSIS — I10 ESSENTIAL HYPERTENSION: ICD-10-CM

## 2020-08-07 DIAGNOSIS — Z89.511 S/P BKA (BELOW KNEE AMPUTATION) UNILATERAL, RIGHT (HCC): Primary | ICD-10-CM

## 2020-08-07 DIAGNOSIS — Z79.4 TYPE 2 DIABETES MELLITUS WITH HYPERGLYCEMIA, WITH LONG-TERM CURRENT USE OF INSULIN (HCC): ICD-10-CM

## 2020-08-07 PROCEDURE — 99495 TRANSJ CARE MGMT MOD F2F 14D: CPT | Performed by: NURSE PRACTITIONER

## 2020-08-07 RX ORDER — LOSARTAN POTASSIUM 50 MG/1
50 TABLET ORAL DAILY
Qty: 90 TABLET | Refills: 3 | Status: SHIPPED | OUTPATIENT
Start: 2020-08-07 | End: 2020-08-26 | Stop reason: SDUPTHER

## 2020-08-07 RX ORDER — ATORVASTATIN CALCIUM 40 MG/1
40 TABLET, FILM COATED ORAL DAILY
Qty: 90 TABLET | Refills: 3 | Status: SHIPPED | OUTPATIENT
Start: 2020-08-07 | End: 2020-08-26 | Stop reason: SDUPTHER

## 2020-08-07 RX ORDER — LEVOTHYROXINE SODIUM 0.1 MG/1
100 TABLET ORAL DAILY
Qty: 90 TABLET | Refills: 3 | Status: SHIPPED | OUTPATIENT
Start: 2020-08-07 | End: 2021-03-15

## 2020-08-07 NOTE — PROGRESS NOTES
"Transitional Care Follow Up Visit  Subjective     Kuldeep Adhikari is a 55 y.o. male who presents for a transitional care management visit.    Within 48 business hours after discharge our office contacted him via telephone to coordinate his care and needs.      I reviewed and discussed the details of that call along with the discharge summary, hospital problems, inpatient lab results, inpatient diagnostic studies, and consultation reports with Kuldeep.     Current outpatient and discharge medications have been reconciled for the patient.  Reviewed by: KEYLA Calloway      Date of TCM Phone Call 8/2/2020   Hospital [facility]   Date of Admission 7/27/2020   Date of Discharge 8/2/2020   [place] Disposition [facility]     Risk for Readmission (LACE) Score: 15 (8/2/2020  6:00 AM)      History of Present Illness   Course During Hospital Stay:   Per hospital record: \"Patient is a 55 y.o. male presented with right foot cellulitis and ultimately the right foot was felt unsalvageable by podiatry and the patient underwent right-sided BKA.  He did relatively well post procedure except was complaining of pain.  He will be discharged home on pain control.  He was cleared by orthopedic surgery for discharge.  He will be placed on p.o. antibiotics for 7 days per infectious disease.  He already has home health at home and he will follow-up with Ortho as an outpatient.\"    Ortho appointment  is Aug. 12th.    Home health is coming out twice a week and they have been changing the dressing.     Sugars have been in the 200's but he wasn't able to get his Novolog until yesterday.  Dr. Michaels increased his Lantus to 30 units daily. He is putting a call into Dr. Michaels about this.     BP is stable.  Needs refill on losartan, levothyroxine and atorvastatin.  Denies any CP; palpitations; SOA; dizziness; headache; trouble with vision.  Appetite is normal.  Denies any N/V/D.      Needs order for wheelchair.     The following " portions of the patient's history were reviewed and updated as appropriate: allergies, current medications, past family history, past medical history, past social history, past surgical history and problem list.    Review of Systems   Constitutional: Negative for activity change, appetite change, chills, diaphoresis, fatigue, fever and unexpected weight change.   HENT: Negative for congestion, ear discharge, ear pain, postnasal drip, rhinorrhea, sinus pressure, sinus pain, sneezing, sore throat and trouble swallowing.    Eyes: Negative for visual disturbance.   Respiratory: Negative for cough, chest tightness, shortness of breath and wheezing.    Cardiovascular: Negative for chest pain, palpitations and leg swelling.   Gastrointestinal: Negative for abdominal distention, abdominal pain, blood in stool, constipation, diarrhea, nausea and vomiting.   Endocrine: Negative for polydipsia, polyphagia and polyuria.   Genitourinary: Negative for dysuria, flank pain, frequency, hematuria and urgency.   Musculoskeletal: Negative for arthralgias, back pain and myalgias.   Skin: Negative for rash.   Neurological: Negative for dizziness, weakness, light-headedness, numbness and headaches.   Hematological: Negative for adenopathy.   Psychiatric/Behavioral: Negative for dysphoric mood. The patient is not nervous/anxious.        Objective   Physical Exam   Constitutional: He is oriented to person, place, and time. He appears well-developed and well-nourished. No distress.   HENT:   Head: Normocephalic and atraumatic.   Right Ear: External ear normal.   Left Ear: External ear normal.   Nose: Nose normal.   Mouth/Throat: Oropharynx is clear and moist.   Eyes: Pupils are equal, round, and reactive to light. Conjunctivae are normal.   Neck: Normal range of motion. Neck supple. No JVD present. No thyromegaly present.   Cardiovascular: Normal rate, regular rhythm, normal heart sounds and intact distal pulses.   No murmur  heard.  Pulmonary/Chest: Effort normal and breath sounds normal. No respiratory distress. He has no wheezes. He exhibits no tenderness.   Abdominal: Soft. Bowel sounds are normal. He exhibits no distension and no mass. There is no tenderness. There is no rebound and no guarding.   Musculoskeletal:   Right BKA: stump shows stable intact; incision is clean and dry; edges well approximated.  Staples in place.  No drainage.    Lymphadenopathy:     He has no cervical adenopathy.   Neurological: He is alert and oriented to person, place, and time.   Skin: Skin is warm and dry. Capillary refill takes less than 2 seconds. No erythema.   Psychiatric: He has a normal mood and affect.   Vitals reviewed.      Assessment/Plan   Kuldeep was seen today for transitional care management.    Diagnoses and all orders for this visit:    S/P BKA (below knee amputation) unilateral, right (CMS/[facility])  Comments:  Stable.   Keep follow up with Ortho on 8/12  Order sent in for crutches and standard wheelchair to be used in the home.     Essential hypertension  Comments:  Stable.   Cont. current medication.   Refill sent.   Orders:  -     losartan (COZAAR) 50 MG tablet; Take 1 tablet by mouth Daily.    Type 2 diabetes mellitus with hyperglycemia, with long-term current use of insulin (CMS/[facility])  Comments:  Sugars running high.  Pt. just able to get insulin yesterday.    Advised to contact his endocrinologist today to report sugars.     Mixed hyperlipidemia  Comments:  Stable.   Cont. current medications.   Refills sent.   Orders:  -     atorvastatin (LIPITOR) 40 MG tablet; Take 1 tablet by mouth Daily.    Hypothyroidism, unspecified type  Comments:  Stable.   Cont. current medications.   Refills sent.   Orders:  -     levothyroxine (Synthroid) 100 MCG tablet; Take 1 tablet by mouth Daily.

## 2020-08-10 PROCEDURE — 93010 ELECTROCARDIOGRAM REPORT: CPT | Performed by: INTERNAL MEDICINE

## 2020-08-11 ENCOUNTER — READMISSION MANAGEMENT (OUTPATIENT)
Dept: CALL CENTER | Facility: HOSPITAL | Age: 55
End: 2020-08-11

## 2020-08-11 NOTE — OUTREACH NOTE
Sepsis Week 2 Survey      Responses   [facility] patient discharged from?  [facility]   COVID-19 Test Status  Negative   Does the patient have one of the following disease processes/diagnoses(primary or secondary)?  Sepsis   Week 2 attempt successful?  No   Unsuccessful attempts  Attempt 1          Cherry Edgar RN

## 2020-08-14 ENCOUNTER — READMISSION MANAGEMENT (OUTPATIENT)
Dept: CALL CENTER | Facility: HOSPITAL | Age: 55
End: 2020-08-14

## 2020-08-14 NOTE — OUTREACH NOTE
Sepsis Week 2 Survey      Responses   [facility] patient discharged from?  [facility]   COVID-19 Test Status  Negative   Does the patient have one of the following disease processes/diagnoses(primary or secondary)?  Sepsis   Week 2 attempt successful?  Yes   Call start time  1444   Call end time  1446   Discharge diagnosis  sepsis + BKA   Meds reviewed with patient/caregiver?  Yes   Is the patient having any side effects they believe may be caused by any medication additions or changes?  No   Does the patient have all medications related to this admission filled (includes all antibiotics, inhalers, nebulizers,steroids,etc.)  Yes   Is the patient taking all medications as directed (includes completed medication regime)?  Yes   Does the patient have a primary care provider?   Yes   Does the patient have an appointment with their PCP within 7 days of discharge?  Yes   Has the patient kept scheduled appointments due by today?  Yes   What is the Home health agency?   [facility]   Has home health visited the patient within 72 hours of discharge?  Yes   Pulse Ox monitoring  None   Psychosocial issues?  No   Did the patient receive a copy of their discharge instructions?  Yes   Nursing interventions  Reviewed instructions with patient   What is the patient's perception of their health status since discharge?  Improving   Nursing interventions  Nurse provided patient education   Is the patient/caregiver able to teach back Sepsis?  S - Shivering,fever or very cold, E - Extreme pain or generalized discomfort (worst ever,especially abdomen), P - Pale or discolored skin, S - Sleepy, difficult to arouse,confused, I -   I feel like I might die-a feeling of hopelessness, S - Short of breath   Nursing interventions  Nurse provided reassurance to patient   Is patient/caregiver able to teach back steps to recovery at home?  Set small, achievable goals for return to baseline health, Learn about sepsis(sepsis.org), Eat a  balanced diet, Rest and regain strength, Talk about feelings with family/friends, Exercise as tolerated, Make a list of questions for PCP appoinment, Record milestones and struggles in a journal   Is the patient/caregiver able to teach back signs and symptoms of worsening condition:  Fever, Edema, High blood glucose without diabetes, Hyperthermia, Rapid heart rate (>90), Shortness of breath/rapid respiratory rate, Altered mental status(confusion/coma)   Is the patient/caregiver able to teach back the hierarchy of who to call/visit for symptoms/problems? PCP, Specialist, Home health nurse, Urgent Care, ED, 911  Yes   Week 2 call completed?  Yes          Declan Pool RN

## 2020-08-17 ENCOUNTER — TELEPHONE (OUTPATIENT)
Dept: FAMILY MEDICINE CLINIC | Facility: CLINIC | Age: 55
End: 2020-08-17

## 2020-08-17 NOTE — TELEPHONE ENCOUNTER
Patient states he got a call from [facility] and they cannot do a wheelchair and crutches. He would like to just do the wheelchair for now but he said they need you to addend your previous note to remove crutches?

## 2020-08-17 NOTE — TELEPHONE ENCOUNTER
Can you check with [facility] to see exactly what they need.  It is ok to just do the wheelchair for now, if he's not allowed to have both.

## 2020-08-17 NOTE — TELEPHONE ENCOUNTER
Ok. I've addended his office note from 8/7 to reflect his need for a wheelchair.  We can print and fax to [facility].  Thanks.

## 2020-08-24 ENCOUNTER — READMISSION MANAGEMENT (OUTPATIENT)
Dept: CALL CENTER | Facility: HOSPITAL | Age: 55
End: 2020-08-24

## 2020-08-24 NOTE — OUTREACH NOTE
Sepsis Week 3 Survey      Responses   [facility] patient discharged from?  [facility]   COVID-19 Test Status  Negative   Does the patient have one of the following disease processes/diagnoses(primary or secondary)?  Sepsis   Week 3 attempt successful?  Yes   Call start time  1157   Call end time  1214   Discharge diagnosis  sepsis + BKA   Is patient permission given to speak with other caregiver?  Yes   List who call center can speak with  wife   What is the Home health agency?   [facility]   Pulse Ox monitoring  None   Psychosocial issues?  No   Comments  having bleeding from stump from blisters. He is concerned about adhesive allergy, skin is irritated and oozing per his report. He plans to call MD. We discussed cleaning off well with adehesive remover or alcohol so we can clear up the blisters to get the sleeve ordered. He is not resting well but appetite is picking up. He is having some phantom pains where he feels his foot. He is having some issues with spacial issues. No s/sx of inf per pt.    What is the patient's perception of their health status since discharge?  Improving   Is the patient/caregiver able to teach back Sepsis?  E - Extreme pain or generalized discomfort (worst ever,especially abdomen), S - Shivering,fever or very cold   Is patient/caregiver able to teach back steps to recovery at home?  Rest and regain strength, Set small, achievable goals for return to baseline health   Week 3 call completed?  Yes   Revoked  No further contact(revokes)-requires comment          Shiela Min RN

## 2020-08-26 DIAGNOSIS — I10 ESSENTIAL HYPERTENSION: ICD-10-CM

## 2020-08-26 DIAGNOSIS — E78.2 MIXED HYPERLIPIDEMIA: ICD-10-CM

## 2020-08-26 RX ORDER — FLURBIPROFEN SODIUM 0.3 MG/ML
SOLUTION/ DROPS OPHTHALMIC
Qty: 400 EACH | Refills: 3 | Status: SHIPPED | OUTPATIENT
Start: 2020-08-26 | End: 2021-08-02

## 2020-08-26 RX ORDER — LANCETS
1 EACH MISCELLANEOUS
Qty: 408 EACH | Refills: 3 | Status: SHIPPED | OUTPATIENT
Start: 2020-08-26 | End: 2021-08-02

## 2020-08-26 RX ORDER — DULOXETIN HYDROCHLORIDE 60 MG/1
60 CAPSULE, DELAYED RELEASE ORAL EVERY MORNING
Qty: 90 CAPSULE | Refills: 3 | Status: SHIPPED | OUTPATIENT
Start: 2020-08-26 | End: 2021-05-30

## 2020-08-26 RX ORDER — ATORVASTATIN CALCIUM 40 MG/1
40 TABLET, FILM COATED ORAL DAILY
Qty: 90 TABLET | Refills: 3 | Status: SHIPPED | OUTPATIENT
Start: 2020-08-26 | End: 2020-09-30 | Stop reason: SDUPTHER

## 2020-08-26 RX ORDER — LOSARTAN POTASSIUM 50 MG/1
50 TABLET ORAL DAILY
Qty: 90 TABLET | Refills: 3 | Status: SHIPPED | OUTPATIENT
Start: 2020-08-26 | End: 2021-05-30

## 2020-09-23 ENCOUNTER — EPISODE CHANGES (OUTPATIENT)
Dept: CASE MANAGEMENT | Facility: OTHER | Age: 55
End: 2020-09-23

## 2020-09-30 DIAGNOSIS — E78.2 MIXED HYPERLIPIDEMIA: ICD-10-CM

## 2020-09-30 RX ORDER — ATORVASTATIN CALCIUM 40 MG/1
40 TABLET, FILM COATED ORAL DAILY
Qty: 90 TABLET | Refills: 2 | Status: SHIPPED | OUTPATIENT
Start: 2020-09-30 | End: 2021-06-01 | Stop reason: SDUPTHER

## 2020-10-21 NOTE — TELEPHONE ENCOUNTER
Refill request from [facility] for Metoprolol Succ. 50mg.    Pt LOV 2018.   Pt has been told three times since then to make an appt for more refills.      Called pt LM to call us for appt for more refills.       Send in refill or deny ?

## 2020-10-23 RX ORDER — METOPROLOL SUCCINATE 50 MG/1
50 TABLET, EXTENDED RELEASE ORAL DAILY
Qty: 90 TABLET | Refills: 0 | Status: SHIPPED | OUTPATIENT
Start: 2020-10-23 | End: 2020-10-26 | Stop reason: SDUPTHER

## 2020-10-26 RX ORDER — METOPROLOL SUCCINATE 50 MG/1
50 TABLET, EXTENDED RELEASE ORAL DAILY
Qty: 90 TABLET | Refills: 0 | Status: SHIPPED | OUTPATIENT
Start: 2020-10-26 | End: 2021-02-26

## 2020-10-28 ENCOUNTER — OFFICE VISIT (OUTPATIENT)
Dept: FAMILY MEDICINE CLINIC | Facility: CLINIC | Age: 55
End: 2020-10-28

## 2020-10-28 ENCOUNTER — LAB (OUTPATIENT)
Dept: FAMILY MEDICINE CLINIC | Facility: CLINIC | Age: 55
End: 2020-10-28

## 2020-10-28 VITALS
HEIGHT: 68 IN | DIASTOLIC BLOOD PRESSURE: 86 MMHG | HEART RATE: 102 BPM | BODY MASS INDEX: 27.28 KG/M2 | SYSTOLIC BLOOD PRESSURE: 158 MMHG | OXYGEN SATURATION: 99 % | WEIGHT: 180 LBS | TEMPERATURE: 97 F

## 2020-10-28 DIAGNOSIS — Z79.4 TYPE 2 DIABETES MELLITUS WITH HYPERGLYCEMIA, WITH LONG-TERM CURRENT USE OF INSULIN (HCC): ICD-10-CM

## 2020-10-28 DIAGNOSIS — K21.9 GASTROESOPHAGEAL REFLUX DISEASE WITHOUT ESOPHAGITIS: Primary | ICD-10-CM

## 2020-10-28 DIAGNOSIS — E55.9 VITAMIN D DEFICIENCY: ICD-10-CM

## 2020-10-28 DIAGNOSIS — R53.83 FATIGUE, UNSPECIFIED TYPE: ICD-10-CM

## 2020-10-28 DIAGNOSIS — Z12.11 SCREENING FOR COLON CANCER: ICD-10-CM

## 2020-10-28 DIAGNOSIS — Z23 IMMUNIZATION DUE: ICD-10-CM

## 2020-10-28 DIAGNOSIS — E11.65 TYPE 2 DIABETES MELLITUS WITH HYPERGLYCEMIA, WITH LONG-TERM CURRENT USE OF INSULIN (HCC): ICD-10-CM

## 2020-10-28 DIAGNOSIS — Z00.00 MEDICARE ANNUAL WELLNESS VISIT, SUBSEQUENT: Primary | ICD-10-CM

## 2020-10-28 DIAGNOSIS — H91.92 DECREASED HEARING OF LEFT EAR: ICD-10-CM

## 2020-10-28 LAB
ALBUMIN SERPL-MCNC: 4.6 G/DL (ref 3.5–5.2)
ALBUMIN/GLOB SERPL: 1.7 G/DL
ALP SERPL-CCNC: 127 U/L (ref 39–117)
ALT SERPL W P-5'-P-CCNC: 20 U/L (ref 1–41)
ANION GAP SERPL CALCULATED.3IONS-SCNC: 8.1 MMOL/L (ref 5–15)
AST SERPL-CCNC: 18 U/L (ref 1–40)
BASOPHILS # BLD AUTO: 0.1 10*3/MM3 (ref 0–0.2)
BASOPHILS NFR BLD AUTO: 1.1 % (ref 0–1.5)
BILIRUB SERPL-MCNC: 0.8 MG/DL (ref 0–1.2)
BUN SERPL-MCNC: 16 MG/DL (ref 6–20)
BUN/CREAT SERPL: 12.8 (ref 7–25)
CALCIUM SPEC-SCNC: 10.1 MG/DL (ref 8.6–10.5)
CHLORIDE SERPL-SCNC: 101 MMOL/L (ref 98–107)
CO2 SERPL-SCNC: 30.9 MMOL/L (ref 22–29)
CREAT SERPL-MCNC: 1.25 MG/DL (ref 0.76–1.27)
DEPRECATED RDW RBC AUTO: 37.6 FL (ref 37–54)
EOSINOPHIL # BLD AUTO: 0.37 10*3/MM3 (ref 0–0.4)
EOSINOPHIL NFR BLD AUTO: 4.1 % (ref 0.3–6.2)
ERYTHROCYTE [DISTWIDTH] IN BLOOD BY AUTOMATED COUNT: 13.1 % (ref 12.3–15.4)
GFR SERPL CREATININE-BSD FRML MDRD: 60 ML/MIN/1.73
GLOBULIN UR ELPH-MCNC: 2.7 GM/DL
GLUCOSE SERPL-MCNC: 207 MG/DL (ref 65–99)
HBA1C MFR BLD: 10.9 % (ref 3.5–5.6)
HCT VFR BLD AUTO: 45.1 % (ref 37.5–51)
HGB BLD-MCNC: 14.8 G/DL (ref 13–17.7)
IMM GRANULOCYTES # BLD AUTO: 0.04 10*3/MM3 (ref 0–0.05)
IMM GRANULOCYTES NFR BLD AUTO: 0.4 % (ref 0–0.5)
LYMPHOCYTES # BLD AUTO: 2.28 10*3/MM3 (ref 0.7–3.1)
LYMPHOCYTES NFR BLD AUTO: 25.2 % (ref 19.6–45.3)
MCH RBC QN AUTO: 26.2 PG (ref 26.6–33)
MCHC RBC AUTO-ENTMCNC: 32.8 G/DL (ref 31.5–35.7)
MCV RBC AUTO: 79.8 FL (ref 79–97)
MONOCYTES # BLD AUTO: 0.79 10*3/MM3 (ref 0.1–0.9)
MONOCYTES NFR BLD AUTO: 8.7 % (ref 5–12)
NEUTROPHILS NFR BLD AUTO: 5.47 10*3/MM3 (ref 1.7–7)
NEUTROPHILS NFR BLD AUTO: 60.5 % (ref 42.7–76)
NRBC BLD AUTO-RTO: 0 /100 WBC (ref 0–0.2)
PLATELET # BLD AUTO: 281 10*3/MM3 (ref 140–450)
PMV BLD AUTO: 10.8 FL (ref 6–12)
POTASSIUM SERPL-SCNC: 4.7 MMOL/L (ref 3.5–5.2)
PROT SERPL-MCNC: 7.3 G/DL (ref 6–8.5)
RBC # BLD AUTO: 5.65 10*6/MM3 (ref 4.14–5.8)
SODIUM SERPL-SCNC: 140 MMOL/L (ref 136–145)
TSH SERPL DL<=0.05 MIU/L-ACNC: 3.43 UIU/ML (ref 0.27–4.2)
WBC # BLD AUTO: 9.05 10*3/MM3 (ref 3.4–10.8)

## 2020-10-28 PROCEDURE — G0439 PPPS, SUBSEQ VISIT: HCPCS | Performed by: NURSE PRACTITIONER

## 2020-10-28 PROCEDURE — G0008 ADMIN INFLUENZA VIRUS VAC: HCPCS | Performed by: NURSE PRACTITIONER

## 2020-10-28 PROCEDURE — 84443 ASSAY THYROID STIM HORMONE: CPT | Performed by: NURSE PRACTITIONER

## 2020-10-28 PROCEDURE — 83036 HEMOGLOBIN GLYCOSYLATED A1C: CPT | Performed by: NURSE PRACTITIONER

## 2020-10-28 PROCEDURE — 82652 VIT D 1 25-DIHYDROXY: CPT | Performed by: NURSE PRACTITIONER

## 2020-10-28 PROCEDURE — 90686 IIV4 VACC NO PRSV 0.5 ML IM: CPT | Performed by: NURSE PRACTITIONER

## 2020-10-28 PROCEDURE — 96160 PT-FOCUSED HLTH RISK ASSMT: CPT | Performed by: NURSE PRACTITIONER

## 2020-10-28 PROCEDURE — 80053 COMPREHEN METABOLIC PANEL: CPT | Performed by: NURSE PRACTITIONER

## 2020-10-28 PROCEDURE — 36415 COLL VENOUS BLD VENIPUNCTURE: CPT | Performed by: NURSE PRACTITIONER

## 2020-10-28 PROCEDURE — 99214 OFFICE O/P EST MOD 30 MIN: CPT | Performed by: NURSE PRACTITIONER

## 2020-10-28 PROCEDURE — 85025 COMPLETE CBC W/AUTO DIFF WBC: CPT | Performed by: NURSE PRACTITIONER

## 2020-10-28 RX ORDER — POLYETHYLENE GLYCOL 3350 17 G/17G
POWDER, FOR SOLUTION ORAL
COMMUNITY
Start: 2020-08-02 | End: 2021-04-06

## 2020-10-28 RX ORDER — ONDANSETRON 4 MG/1
4 TABLET, FILM COATED ORAL EVERY 8 HOURS PRN
Qty: 45 TABLET | Refills: 0 | Status: SHIPPED | OUTPATIENT
Start: 2020-10-28 | End: 2020-11-12

## 2020-10-28 NOTE — PATIENT INSTRUCTIONS
Medicare Wellness  Personal Prevention Plan of Service     Date of Office Visit:  10/28/2020  Encounter Provider:  KEYLA Calloway  Place of Service:  [facility]  Patient Name: Kuldeep Adhikari YOB: 1965    As part of the Medicare Wellness portion of your visit today, we are providing you with this personalized preventive plan of services (PPPS). This plan is based upon recommendations of the [place] Preventive Services Task Force (USPSTF) and the Advisory Committee on Immunization Practices (ACIP).    This lists the preventive care services that should be considered, and provides dates of when you are due. Items listed as completed are up-to-date and do not require any further intervention.    Health Maintenance   Topic Date Due   • COLONOSCOPY  1965   • Hepatitis B (1 of 3 - Risk 3-dose series) 1983   • TDAP/TD VACCINES (1 - Tdap) 1983   • URINE MICROALBUMIN  2018   • HEPATITIS C SCREENING  2019   • ANNUAL WELLNESS VISIT  2019   • DIABETIC FOOT EXAM  2019   • INFLUENZA VACCINE  2020   • DIABETIC EYE EXAM  2021   • HEMOGLOBIN A1C  2021   • LIPID PANEL  2021   • Pneumococcal Vaccine 0-64  Completed   • ZOSTER VACCINE  Completed       No orders of the defined types were placed in this encounter.      Return for Next scheduled follow up.

## 2020-10-28 NOTE — PROGRESS NOTES
The ABCs of the Annual Wellness Visit  Subsequent Medicare Wellness Visit    Chief Complaint   Patient presents with   • Medicare Wellness-subsequent       Subjective   History of Present Illness:  Kuldeep Adhikari is a 55 y.o. male who presents for a Subsequent Medicare Wellness Visit.  MMSE score is 30/30.   Would like flu shot today.   Hasn't had screening colonoscopy.   Follows up with Jon[facility] on 11/10 for T2DM.      Has several concerns.   1) Feels left ear is full and cannot hear as well for the past year.  No drainage.  No head congestion; sore throat; fevers.  No pain.    2) Fatigue - since July has been feeling run down and tired all the time.  Says BS running in 200's at home; due for follow up with Endo in 2 weeks.  Reports he does take his medication daily.  Denies any increase in thirst or urination.    3) Vitamin D deficient - has been low in the past; not on supplement now.  Would like to know if he needs to resume taking supplement or not.        HEALTH RISK ASSESSMENT    Recent Hospitalizations:  Recently treated at the following:  [facility]     Current Medical Providers:  Patient Care Team:  Laura Whitaker MD as PCP - Ioana Montero RN as Ambulatory  (Population Health)    Smoking Status:  Social History     Tobacco Use   Smoking Status Never Smoker   Smokeless Tobacco Never Used       Alcohol Consumption:  Social History     Substance and Sexual Activity   Alcohol Use No   • Frequency: Never       Depression Screen:   PHQ-2/PHQ-9 Depression Screening 10/28/2020   Little interest or pleasure in doing things 0   Feeling down, depressed, or hopeless 2   Trouble falling or staying asleep, or sleeping too much 3   Feeling tired or having little energy 3   Poor appetite or overeating 0   Feeling bad about yourself - or that you are a failure or have let yourself or your family down 3   Trouble concentrating on things, such as reading the newspaper or watching  television 0   Moving or speaking so slowly that other people could have noticed. Or the opposite - being so fidgety or restless that you have been moving around a lot more than usual 0   Thoughts that you would be better off dead, or of hurting yourself in some way 1   Total Score 12   If you checked off any problems, how difficult have these problems made it for you to do your work, take care of things at home, or get along with other people? Somewhat difficult       Fall Risk Screen:  JEN Fall Risk Assessment has not been completed.    Health Habits and Functional and Cognitive Screening:  Functional & Cognitive Status 10/28/2020   Do you have difficulty preparing food and eating? No   Do you have difficulty bathing yourself, getting dressed or grooming yourself? No   Do you have difficulty using the toilet? No   Do you have difficulty moving around from place to place? No   Do you have trouble with steps or getting out of a bed or a chair? Yes   Current Diet Unhealthy Diet   Dental Exam Not up to date   Eye Exam Up to date   Exercise (times per week) 0 times per week   Current Exercise Activities Include None   Do you need help using the phone?  No   Are you deaf or do you have serious difficulty hearing?  No   Do you need help with transportation? Yes   Do you need help shopping? No   Do you need help preparing meals?  No   Do you need help with housework?  No   Do you need help with laundry? No   Do you need help taking your medications? No   Do you need help managing money? Yes   Do you ever drive or ride in a car without wearing a seat belt? No   Have you felt unusual stress, anger or loneliness in the last month? No   Who do you live with? Spouse   If you need help, do you have trouble finding someone available to you? No   Have you been bothered in the last four weeks by sexual problems? No         Does the patient have evidence of cognitive impairment? No    Asprin use counseling:Taking ASA  appropriately as indicated    Age-appropriate Screening Schedule:  Refer to the list below for future screening recommendations based on patient's age, sex and/or medical conditions. Orders for these recommended tests are listed in the plan section. The patient has been provided with a written plan.    Health Maintenance   Topic Date Due   • COLONOSCOPY  1965   • TDAP/TD VACCINES (1 - Tdap) 03/16/1983   • URINE MICROALBUMIN  01/19/2018   • DIABETIC FOOT EXAM  06/25/2019   • INFLUENZA VACCINE  08/01/2020   • DIABETIC EYE EXAM  01/01/2021   • HEMOGLOBIN A1C  01/28/2021   • LIPID PANEL  06/24/2021   • ZOSTER VACCINE  Completed          The following portions of the patient's history were reviewed and updated as appropriate: allergies, current medications, past family history, past medical history, past social history, past surgical history and problem list.    Outpatient Medications Prior to Visit   Medication Sig Dispense Refill   • Accu-Chek FastClix Lancets misc 1 each 4 (Four) Times a Day Before Meals & at Bedtime. Dx: E11.65 408 each 3   • apixaban (ELIQUIS) 5 MG tablet tablet Take 1 tablet by mouth 2 (Two) Times a Day. 180 tablet 3   • aspirin (ASPIR-LOW) 81 MG EC tablet Take 81 mg by mouth Daily.     • atorvastatin (LIPITOR) 40 MG tablet Take 1 tablet by mouth Daily. 90 tablet 2   • B-D UF III MINI PEN NEEDLES 31G X 5 MM misc Use with insulin 4 times a day 400 each 3   • DULoxetine (CYMBALTA) 60 MG capsule Take 1 capsule by mouth Every Morning. 90 capsule 3   • EPINEPHrine (EPIPEN JR) 0.15 MG/0.3ML solution auto-injector injection      • FLOVENT  MCG/ACT inhaler As Needed.     • glucose blood (Accu-Chek Guide) test strip 1 each by Other route 4 (Four) Times a Day Before Meals & at Bedtime. Dx: E11.65. Use as instructed 400 each 3   • insulin aspart (NovoLOG FlexPen) 100 UNIT/ML solution pen-injector sc pen Inject 10 Units under the skin into the appropriate area as directed 3 (Three) Times a Day  With Meals. 15 mL 5   • Insulin Glargine (Lantus SoloStar) 100 UNIT/ML injection pen Inject 30 Units under the skin into the appropriate area as directed Every Night. 5 pen 5   • levothyroxine (Synthroid) 100 MCG tablet Take 1 tablet by mouth Daily. 90 tablet 3   • losartan (COZAAR) 50 MG tablet Take 1 tablet by mouth Daily. 90 tablet 3   • methocarbamol (ROBAXIN) 750 MG tablet Take 1 tablet by mouth Every 8 (Eight) Hours As Needed for Muscle Spasms. 60 tablet 0   • metoprolol succinate XL (TOPROL-XL) 50 MG 24 hr tablet Take 1 tablet by mouth Daily. 90 tablet 0   • nitroglycerin (NITROSTAT) 0.4 MG SL tablet Place 0.4 mg under the tongue Every 5 (Five) Minutes As Needed for Chest Pain.     • omeprazole (priLOSEC) 40 MG capsule Take 40 mg by mouth Daily.     • polyethylene glycol (MIRALAX) 17 g packet Take 17 g by mouth Daily. 7 each 0   • polyethylene glycol (MIRALAX) 17 GM/SCOOP powder MIX 17 GRAMS IN LIQUID AND DRINK ONCE DAILY     • SITagliptin-metFORMIN HCl ER (JANUMET XR)  MG tablet Take 1 tablet by mouth 2 (two) times a day. 180 tablet 3   • ondansetron (ZOFRAN) 4 MG tablet Take 1 tablet by mouth Every 6 (Six) Hours As Needed for Nausea or Vomiting. 30 tablet 0     No facility-administered medications prior to visit.        Patient Active Problem List   Diagnosis   • Benign essential hypertension   • Cellulitis   • Chest pain   • Chronic coronary artery disease   • Chronic renal insufficiency, stage III (moderate)   • Degenerative joint disease   • Depression   • Diabetic peripheral neuropathy (CMS/HCC)   • Diabetic ketoacidosis (CMS/HCC)   • Disorder associated with type 2 diabetes mellitus (CMS/HCC)   • Encounter for screening for malignant neoplasm of colon   • Fatigue   • Foot pain, right   • Ganglion cyst   • Gangrene of foot (CMS/HCC)   • Gastroesophageal reflux disease   • History of amputation of hallux (CMS/HCC)   • History of gastrointestinal disease   • History of pulmonary embolism   • Hx of  osteomyelitis   • Mixed hyperlipidemia   • Acquired hypothyroidism   • Obstructive sleep apnea syndrome   • Palpitations   • Peripheral vascular disease (CMS/[facility])   • Subacromial bursitis of right shoulder joint   • Vitamin D deficiency   • Uncontrolled type 2 diabetes mellitus with hyperglycemia (CMS/[facility])   • Cellulitis in diabetic foot (CMS/[facility])   • Obesity (BMI 30-39.9)   • Sepsis due to group B Streptococcus (CMS/[facility])   • Osteomyelitis of right foot (CMS/[facility])       Advanced Care Planning:  ACP discussion was held with the patient during this visit. Patient does not have an advance directive, declines further assistance.    Review of Systems   Constitutional: Positive for fatigue. Negative for activity change, appetite change, chills, diaphoresis, fever and unexpected weight change.   HENT: Positive for hearing loss. Negative for congestion, dental problem, ear discharge, ear pain, postnasal drip, rhinorrhea, sinus pressure, sinus pain, sneezing, sore throat, tinnitus and trouble swallowing.    Eyes: Negative for visual disturbance.   Respiratory: Negative for cough, chest tightness, shortness of breath and wheezing.    Cardiovascular: Negative for chest pain, palpitations and leg swelling.   Gastrointestinal: Negative for abdominal distention, abdominal pain, blood in stool, constipation, diarrhea, nausea and vomiting.   Endocrine: Negative for cold intolerance, heat intolerance, polydipsia, polyphagia and polyuria.   Genitourinary: Negative for dysuria, flank pain, frequency, hematuria and urgency.   Musculoskeletal: Negative for arthralgias, back pain and myalgias.   Skin: Negative for rash.   Neurological: Negative for dizziness, tremors, weakness, light-headedness, numbness and headaches.   Psychiatric/Behavioral: Negative for dysphoric mood, self-injury and suicidal ideas. The patient is not nervous/anxious.        Compared to one year ago, the patient feels his physical health is the same.  Compared to one  "year ago, the patient feels his mental health is the same.    Reviewed chart for potential of high risk medication in the elderly: yes  Reviewed chart for potential of harmful drug interactions in the elderly:yes    Objective         Vitals:    10/28/20 1329   BP: 158/86   BP Location: Left arm   Patient Position: Sitting   Cuff Size: Adult   Pulse: 102   Temp: 97 °F (36.1 °C)   TempSrc: Temporal   SpO2: 99%   Weight: 81.6 kg (180 lb)   Height: 172.7 cm (68\")       Body mass index is 27.37 kg/m².  Discussed the patient's BMI with him. The BMI is in the acceptable range.    Physical Exam  Vitals signs reviewed.   Constitutional:       General: He is not in acute distress.     Appearance: Normal appearance.   HENT:      Head: Normocephalic and atraumatic.      Right Ear: Tympanic membrane, ear canal and external ear normal. There is no impacted cerumen.      Left Ear: Tympanic membrane, ear canal and external ear normal. There is no impacted cerumen.      Nose: Nose normal.      Mouth/Throat:      Mouth: Mucous membranes are moist.      Pharynx: Oropharynx is clear.   Eyes:      Conjunctiva/sclera: Conjunctivae normal.   Neck:      Musculoskeletal: Normal range of motion and neck supple. No muscular tenderness.   Cardiovascular:      Rate and Rhythm: Normal rate and regular rhythm.      Pulses: Normal pulses.      Heart sounds: Normal heart sounds. No murmur.   Pulmonary:      Effort: Pulmonary effort is normal. No respiratory distress.      Breath sounds: Normal breath sounds. No wheezing.   Chest:      Chest wall: No tenderness.   Abdominal:      General: Abdomen is flat. Bowel sounds are normal. There is no distension.      Palpations: Abdomen is soft.      Tenderness: There is no abdominal tenderness. There is no right CVA tenderness or left CVA tenderness.   Lymphadenopathy:      Cervical: No cervical adenopathy.   Skin:     General: Skin is warm and dry.      Capillary Refill: Capillary refill takes less than 2 " seconds.      Findings: No erythema or rash.   Neurological:      General: No focal deficit present.      Mental Status: He is alert and oriented to person, place, and time.   Psychiatric:         Mood and Affect: Mood normal.               Assessment/Plan   Medicare Risks and Personalized Health Plan  CMS Preventative Services Quick Reference  Advance Directive Discussion  Colon Cancer Screening  Dementia/Memory   Depression/Dysphoria  Fall Risk  Hearing Problem  Immunizations Discussed/Encouraged (specific immunizations; Td, Influenza, Pneumococcal 23, Prevnar and Shingrix )    The above risks/problems have been discussed with the patient.  Pertinent information has been shared with the patient in the After Visit Summary.  Follow up plans and orders are seen below in the Assessment/Plan Section.    Diagnoses and all orders for this visit:    1. Medicare annual wellness visit, subsequent (Primary)  Comments:  Medical/social/family hx reviewed.   Order given for screening colonoscopy.   Flu shot today  MMSE score 30/30    2. Screening for colon cancer  Comments:  Order given for screening colonoscopy.  Orders:  -     Ambulatory Referral For Screening Colonoscopy    3. Fatigue, unspecified type  Comments:  Labs ordered.   Keep follow up with Endo on 11/10 to discuss DM and blood sugars.  Orders:  -     CBC w AUTO Differential; Future  -     TSH; Future    4. Type 2 diabetes mellitus with hyperglycemia, with long-term current use of insulin (CMS/[facility])  Comments:  Labs ordered.   Keep follow up with Endo on 11/10  Orders:  -     Hemoglobin A1c; Future  -     Comprehensive metabolic panel; Future    5. Vitamin D deficiency  Comments:  Labs ordered.   Orders:  -     Vitamin D 1,25 Dihydroxy    6. Decreased hearing of left ear  Comments:  Referral given for hearing screening  Orders:  -     Ambulatory Referral to ENT (Otolaryngology)    7. Immunization due  Comments:  Flu shot today.   Orders:  -      Fluarix/Fluzone/Afluria Quad>6 Months    Other orders  -     ondansetron (ZOFRAN) 4 MG tablet; Take 1 tablet by mouth Every 8 (Eight) Hours As Needed for Nausea or Vomiting.  Dispense: 45 tablet; Refill: 0      Follow Up:  Return for Next scheduled follow up.     An After Visit Summary and PPPS were given to the patient.     Overall well-being, sleep habits and weight goals, can be improved by maintaining healthy diet and regular physical activity. Recommended regular dental care, vision care, consistent seatbelt use, and skin protection.'

## 2020-10-30 LAB — 1,25(OH)2D3 SERPL-MCNC: 39.5 PG/ML (ref 19.9–79.3)

## 2020-11-12 RX ORDER — ONDANSETRON 4 MG/1
TABLET, FILM COATED ORAL
Qty: 45 TABLET | Refills: 0 | Status: SHIPPED | OUTPATIENT
Start: 2020-11-12 | End: 2021-11-03 | Stop reason: SDUPTHER

## 2020-11-13 ENCOUNTER — HOSPITAL ENCOUNTER (OUTPATIENT)
Facility: HOSPITAL | Age: 55
Setting detail: HOSPITAL OUTPATIENT SURGERY
End: 2020-11-13
Attending: INTERNAL MEDICINE | Admitting: INTERNAL MEDICINE

## 2020-11-23 RX ORDER — ONDANSETRON 4 MG/1
TABLET, FILM COATED ORAL
Qty: 45 TABLET | Refills: 0 | OUTPATIENT
Start: 2020-11-23

## 2020-12-01 ENCOUNTER — TELEPHONE (OUTPATIENT)
Dept: ENDOCRINOLOGY | Facility: CLINIC | Age: 55
End: 2020-12-01

## 2020-12-01 RX ORDER — INSULIN GLARGINE 100 [IU]/ML
INJECTION, SOLUTION SUBCUTANEOUS
Qty: 8 PEN | Refills: 1 | Status: SHIPPED | OUTPATIENT
Start: 2020-12-01 | End: 2021-01-29

## 2020-12-01 NOTE — TELEPHONE ENCOUNTER
Pt called c/o having low BG's in the middle of the night.  Early this morning his BG was 56.  Pt checks his BG's 3-4 times daily but does not write them down.  Pt c/o only having 2 Lantus pens left. Per MD s/o, instructed pt to lower his HS Lantus to 27 units and write down his BG's for the next 2-3 days and call them into [facility] for more possible adjustments. Lantus rx ready for signature

## 2020-12-10 ENCOUNTER — PATIENT OUTREACH (OUTPATIENT)
Dept: CASE MANAGEMENT | Facility: OTHER | Age: 55
End: 2020-12-10

## 2020-12-10 ENCOUNTER — TELEPHONE (OUTPATIENT)
Dept: FAMILY MEDICINE CLINIC | Facility: CLINIC | Age: 55
End: 2020-12-10

## 2020-12-10 NOTE — TELEPHONE ENCOUNTER
Ioana- nurse  with [facility] called and said Kuldeep is due for a microalbumin and that the patient is wanting to have that done here. Can you please put the order in for him?

## 2020-12-10 NOTE — OUTREACH NOTE
Care Coordination Note    Called pt's PCP office, left voicemail message on NP's MA's voicemail requesting order for urine microalbumin, pt requesting to come into the PCP office for outpatient lab. RN-NANCY requested call back to confirm pt was able to come in the office for lab collection or if he needs to go to [facility] outpatient lab.     Ioana Ellison RN  Ambulatory     12/10/2020, 15:53 EST

## 2020-12-10 NOTE — OUTREACH NOTE
Care Coordination Assessment    Documented/Reviewed By: Ioana Ellison RN Date/time: 12/10/2020  3:07 PM   Assessment completed with: patient  Enrolled in care management program: Yes  Living arrangement: spouse, children  Support system: children, spouse  Type of residence: private residence  Home care services: No  Equipment used at home:  (Comment: glucometer)  Communication device: Yes  Bed or wheelchair confined: No  Inadequate nutrition: No  Medication adherence problem: No (Comment: but needs BP medication refilled)  History of fall(s) in last 6 months: No  Difficulty keeping appointments: No  Family aware of the patient's advance care planning wishes: No  Chronic pain: No

## 2020-12-10 NOTE — OUTREACH NOTE
Care Plan Note      Responses   Lifestyle Goals  Routine follow-up with doctor(s), Self monitor blood pressure, Self monitor blood sugar, Medication management, Eat a healthy diet, Routine eye exam, Routine foot care, Other (See Comment) [lower A1C]   Barriers  Other (See Comment) [limited food choices per pt]   Self Management  Home BP Monitoring, Home Glucose Monitoring, Medication Adherence, Other (See Comment) [follow diabetic diet, exercise- walking]   Suggested Appointments  Other (See Comment), Make an Appointment with Nutrition Services [make next routine PCP appt]   Annual Wellness Visit:   Patient Has Completed   Care Gaps Addressed  Colon Cancer Screening, Diabetic A1C, Diabetic Eye Exam, Flu Shot, Pneumonia Vaccine, Other (See Comment) [urine microalbumin]   Colon Cancer Screening Type  Colonoscopy   Colonoscopy Status  Patient Will Complete [scheduled for 12/16/20 for pt]   HbA1c Status  Up to Date-outside defined limits   Diabetic Eye Exam Status  Up to Date [UTD per pt]   Diabetic Eye Exam Com[facility] at [facility] [place] or Other  Other   Other Location  [facility]   Flu Shot Status  Up to Date   Pneumonia Vaccine Status  Up to Date   Care Gap Comments  urine microalbumin- RN-ACM will coordinate   Specific Disease Process Teaching  Diabetes   Other Patient Education/Resources   24/7 [facility] [place] [facility] Nurse Call Line   24/7 Nurse Call Line Education Method  -- [pt has]   Does patient have depression diagnosis?  Yes   Advanced Directives:  Send Materials   Ed Visits past 12 months:  1   Hospitalizations past 12 months  2 or 3   Medication Adherence  Medications understood   Goal Progress  Making Progress Toward Goal(s)   Readiness Scale  8   Confidence Scale  8   Health Literacy  Good        The main concerns and/or symptoms the patient would like to address are: RN-ACM outreach call made to pt to discuss diabetes management, able to reach pt on 2nd attempt. Explained role of RN-ACM. Pt reports c/o acid  reflux, denies other symptoms or concerns.     Education/instruction provided by Care Coordinator: reviewed with pt: disease education regarding DM; BS monitoring- pt reports to be compliant, states readings running around ; diet recommendations; exercise; DM resources available through [facility]; medication adherence including lantus, novolog, janumet- pt reports to be compliant with meds except for BP med, pt states he has not been taking his BP medication because he's out and needs to contact his cardiologist about getting a refill, RN-ACM emphasized importance of taking BP medication, pt states he will call his cardiologist to obtain refill; BP monitoring- pt states he does not monitor his BP but he just bought a machine from [employer] and plans to start checking it when he receives it; statin use- pt on atorvastatin; acid reflux management; medical appointments and recommendations; RN-ACM contact information; [facility] [place] 24 hour nurse line number; [facility] Care Coordination Services number; health maintenance gaps; AD- materials provided; MyChart- active; SDOH- pt denies medication or transportation insecurities. Pt reports food insecurities, resources provided. Pt has good family support. Pt states he has utilized [employer] nurse and  services. Pt UTD on AWV, diabetic eye exam, flu, and PNA vaccines. Discussed need for urine microalbumin testing, RN-ACM will assist with coordinating. Pt asks RN-ACM to send him a [employer] message when ordered. Discussed need for scheduling next routine PCP appt, pt states he will schedule. No questions or concerns per pt. Pt agrees to follow up outreach in 3-4 weeks, scheduled. Advised pt to call with any needs in the meantime.     Follow Up Outreach Due: 3-4 weeks    Ioana Ellison RN  Ambulatory     12/10/2020, 15:35 EST

## 2020-12-11 DIAGNOSIS — E11.65 TYPE 2 DIABETES MELLITUS WITH HYPERGLYCEMIA, WITH LONG-TERM CURRENT USE OF INSULIN (HCC): Primary | ICD-10-CM

## 2020-12-11 DIAGNOSIS — Z79.4 TYPE 2 DIABETES MELLITUS WITH HYPERGLYCEMIA, WITH LONG-TERM CURRENT USE OF INSULIN (HCC): Primary | ICD-10-CM

## 2020-12-16 ENCOUNTER — OFFICE (AMBULATORY)
Dept: URBAN - METROPOLITAN AREA PATHOLOGY 4 | Facility: PATHOLOGY | Age: 55
End: 2020-12-16

## 2020-12-16 ENCOUNTER — OFFICE (AMBULATORY)
Dept: URBAN - METROPOLITAN AREA PATHOLOGY 4 | Facility: PATHOLOGY | Age: 55
End: 2020-12-16
Payer: COMMERCIAL

## 2020-12-16 ENCOUNTER — ON CAMPUS - OUTPATIENT (AMBULATORY)
Dept: URBAN - METROPOLITAN AREA HOSPITAL 2 | Facility: HOSPITAL | Age: 55
End: 2020-12-16
Payer: MEDICARE

## 2020-12-16 ENCOUNTER — HOSPITAL ENCOUNTER (OUTPATIENT)
Facility: HOSPITAL | Age: 55
Setting detail: HOSPITAL OUTPATIENT SURGERY
End: 2020-12-16
Attending: INTERNAL MEDICINE | Admitting: INTERNAL MEDICINE

## 2020-12-16 VITALS
SYSTOLIC BLOOD PRESSURE: 170 MMHG | HEART RATE: 84 BPM | DIASTOLIC BLOOD PRESSURE: 91 MMHG | DIASTOLIC BLOOD PRESSURE: 72 MMHG | OXYGEN SATURATION: 95 % | SYSTOLIC BLOOD PRESSURE: 95 MMHG | DIASTOLIC BLOOD PRESSURE: 89 MMHG | DIASTOLIC BLOOD PRESSURE: 70 MMHG | HEIGHT: 69 IN | SYSTOLIC BLOOD PRESSURE: 128 MMHG | RESPIRATION RATE: 17 BRPM | OXYGEN SATURATION: 98 % | SYSTOLIC BLOOD PRESSURE: 102 MMHG | SYSTOLIC BLOOD PRESSURE: 139 MMHG | DIASTOLIC BLOOD PRESSURE: 98 MMHG | SYSTOLIC BLOOD PRESSURE: 121 MMHG | OXYGEN SATURATION: 99 % | TEMPERATURE: 97.5 F | SYSTOLIC BLOOD PRESSURE: 101 MMHG | DIASTOLIC BLOOD PRESSURE: 62 MMHG | SYSTOLIC BLOOD PRESSURE: 138 MMHG | OXYGEN SATURATION: 100 % | HEART RATE: 102 BPM | SYSTOLIC BLOOD PRESSURE: 164 MMHG | SYSTOLIC BLOOD PRESSURE: 115 MMHG | WEIGHT: 158 LBS | HEART RATE: 94 BPM | HEART RATE: 89 BPM | HEART RATE: 90 BPM | RESPIRATION RATE: 18 BRPM | HEART RATE: 101 BPM | DIASTOLIC BLOOD PRESSURE: 30 MMHG | RESPIRATION RATE: 16 BRPM | HEART RATE: 92 BPM | DIASTOLIC BLOOD PRESSURE: 84 MMHG | HEART RATE: 88 BPM | OXYGEN SATURATION: 97 % | HEART RATE: 86 BPM

## 2020-12-16 DIAGNOSIS — K22.2 ESOPHAGEAL OBSTRUCTION: ICD-10-CM

## 2020-12-16 DIAGNOSIS — K22.8 OTHER SPECIFIED DISEASES OF ESOPHAGUS: ICD-10-CM

## 2020-12-16 DIAGNOSIS — D12.3 BENIGN NEOPLASM OF TRANSVERSE COLON: ICD-10-CM

## 2020-12-16 DIAGNOSIS — R13.10 DYSPHAGIA, UNSPECIFIED: ICD-10-CM

## 2020-12-16 DIAGNOSIS — Z12.11 ENCOUNTER FOR SCREENING FOR MALIGNANT NEOPLASM OF COLON: ICD-10-CM

## 2020-12-16 DIAGNOSIS — K44.9 DIAPHRAGMATIC HERNIA WITHOUT OBSTRUCTION OR GANGRENE: ICD-10-CM

## 2020-12-16 PROBLEM — K63.5 POLYP OF COLON: Status: ACTIVE | Noted: 2020-12-16

## 2020-12-16 LAB
GI HISTOLOGY: A. UNSPECIFIED: (no result)
GI HISTOLOGY: PDF REPORT: (no result)

## 2020-12-16 PROCEDURE — 43450 DILATE ESOPHAGUS 1/MULT PASS: CPT | Performed by: INTERNAL MEDICINE

## 2020-12-16 PROCEDURE — 88305 TISSUE EXAM BY PATHOLOGIST: CPT | Mod: 26 | Performed by: INTERNAL MEDICINE

## 2020-12-16 PROCEDURE — 43235 EGD DIAGNOSTIC BRUSH WASH: CPT | Performed by: INTERNAL MEDICINE

## 2020-12-16 PROCEDURE — 45385 COLONOSCOPY W/LESION REMOVAL: CPT | Mod: PT | Performed by: INTERNAL MEDICINE

## 2021-01-14 ENCOUNTER — PATIENT OUTREACH (OUTPATIENT)
Dept: CASE MANAGEMENT | Facility: OTHER | Age: 56
End: 2021-01-14

## 2021-01-14 NOTE — OUTREACH NOTE
Care Plan Note      Responses   Annual Wellness Visit:   Patient Has Completed   Care Gaps Addressed  Diabetic Eye Exam, Diabetic A1C, Flu Shot, Pneumonia Vaccine, Other (See Comment), Colon Cancer Screening [urine microalbumin]   Colon Cancer Screening Type  Colonoscopy   Colonoscopy Status  Up to Date (< 10 yrs) [done 12/16/20 per pt]   Colon Cancer Screening Completion at [facility] or Other  Other   Other Location  [facility]   HbA1c Status  Up to Date-outside defined limits   Diabetic Eye Exam Status  Up to Date [UTD per pt]   Diabetic Eye Exam Completion at [facility] or Other  Other   Other Location  [facility]   Flu Shot Status  Up to Date   Pneumonia Vaccine Status  Up to Date   Care Gap Comments  urine microalbumin- pt will complete   Specific Disease Process Teaching  Diabetes   Other Patient Education/Resources   24/7 [facility] Nurse Call Line   24/7 Nurse Call Line Education Method  -- [pt has]   Does patient have depression diagnosis?  Yes   Advanced Directives:  -- [pt has materials]        The main concerns and/or symptoms the patient would like to address are: Follow up RN-ACM outreach call made to pt regarding DM management. Pt states he's doing well. He denies any symptoms or concerns at this time.     Education/instruction provided by Care Coordinator: BS monitoring- pt reports compliant, states last BS was 120; disease education regarding DM; diet; exercise; med adherence- pt reports compliant; medical appointments; RN-ACM contact information; [facility] 24 hour nurse line number; health maintenance gaps; AD- pt has materials; MyChart- active; SDOH- pt denies food, medication, or transportation insecurities. Pt UTD on AWV, diabetic eye exam. Pt did not complete urine microalbumin testing, states he plans to complete. Discussed need to schedule next PCP follow up appt, pt reports he plans on calling to schedule.  No questions or concerns per pt. Advised pt to call with any needs. Follow up outreach  scheduled.     Follow Up Outreach Due: 4-6 weeks    Ioana Ellison RN  Ambulatory     1/14/2021, 15:36 EST

## 2021-01-26 DIAGNOSIS — E11.65 UNCONTROLLED TYPE 2 DIABETES MELLITUS WITH HYPERGLYCEMIA (HCC): Primary | ICD-10-CM

## 2021-01-26 RX ORDER — BLOOD-GLUCOSE METER
1 EACH MISCELLANEOUS DAILY
Qty: 1 KIT | Refills: 1 | Status: SHIPPED | OUTPATIENT
Start: 2021-01-26 | End: 2021-08-02

## 2021-01-29 RX ORDER — INSULIN GLARGINE 100 [IU]/ML
INJECTION, SOLUTION SUBCUTANEOUS
Qty: 30 ML | Refills: 6 | Status: ON HOLD | OUTPATIENT
Start: 2021-01-29 | End: 2021-08-08 | Stop reason: SDUPTHER

## 2021-02-11 ENCOUNTER — TELEPHONE (OUTPATIENT)
Dept: FAMILY MEDICINE CLINIC | Facility: CLINIC | Age: 56
End: 2021-02-11

## 2021-02-11 NOTE — TELEPHONE ENCOUNTER
Caller: Sangeetha    Relationship:disability    Best call back number: [phone]    What form or medical record are you requesting: Says that she had faxed over a questionaire in regards to patient for student loan discharge/disability.     Who is requesting this form or medical record from you: Sangeetha, paperwork has been faxed, she was calling to see if paperwork has been received and completed    How would you like to receive the form or medical records (pick-up, mail, fax):responding to fax    Timeframe paperwork needed: asap    Additional notes: n/a    bren sure the fax has been received and would like a call back at [phone]

## 2021-02-22 ENCOUNTER — PATIENT OUTREACH (OUTPATIENT)
Dept: CASE MANAGEMENT | Facility: OTHER | Age: 56
End: 2021-02-22

## 2021-02-22 NOTE — OUTREACH NOTE
Care Plan Note      Responses   Suggested Appointments  Other (See Comment) [make appt with PCP]   Annual Wellness Visit:   Patient Has Completed   Care Gaps Addressed  Colon Cancer Screening, Diabetic A1C, Diabetic Eye Exam, Flu Shot, Pneumonia Vaccine, Other (See Comment) [urine microalbumin]   Colon Cancer Screening Type  Colonoscopy   Colonoscopy Status  Up to Date (< 10 yrs)   HbA1c Status  Up to Date-outside defined limits   Diabetic Eye Exam Status  Patient will Complete   Diabetic Eye Exam Completion at [facility] or Other  Other   Other Location  [facility]   Flu Shot Status  Up to Date   Pneumonia Vaccine Status  Up to Date   Care Gap Comments  urine microalbumin- pt will schedule   Specific Disease Process Teaching  Diabetes   Other Patient Education/Resources   24/7 [facility] Nurse Call Line   24/7 Nurse Call Line Education Method  -- [pt has]   Does patient have depression diagnosis?  Yes   Advanced Directives:  -- [pt has materials]        The main concerns and/or symptoms the patient would like to address are: Follow up RN-ACM outreach call made to pt. Pt c/o soreness to right stump. He denies redness, swelling, warmth, drainage, fever, or other symptoms or concerns. Pt states he plans to call his ortho doctor, MD Reynolds, to schedule an appt.     Education/instruction provided by Care Coordinator: reviewed with pt: signs and symptoms of infection to monitor; safety/fall precautions, he denies falls; BS monitoring- pt reports compliant, states readings have been fluctuating; disease education regarding DM; diet; exercise; med adherence- pt reports complaint; medical appointments and recommendations; RN-ACM contact information; [facility] 24 hour nurse line; health maintenance gaps; AD- pt has materials; MyChart- active; SDOH- pt denies food, medication, or transportation insecurities. UTD on AWV. Discussed importance of routine PCP follow up, pt reports he will schedule next appt. Advised pt to  call with any needs. Follow up outreach scheduled.     Follow Up Outreach Due: 4-6 weeks    Ioana Ellison RN  Ambulatory     2/22/2021, 10:01 EST

## 2021-02-23 ENCOUNTER — TELEPHONE (OUTPATIENT)
Dept: FAMILY MEDICINE CLINIC | Facility: CLINIC | Age: 56
End: 2021-02-23

## 2021-02-23 NOTE — TELEPHONE ENCOUNTER
FORM WAS FILLED OUT BY DR BEDOLLA AND FAXED BUT WAS INCOMPLETE. THEY ARE REFAXING THE FORM TO HAVE THE FOLLOWING INFORMATION FILLED OUT.    PHYSICIAN CERTIFICATION WAS FAXED OVER AND QUESTIONS 9 THROUGH 11 NEED TO COMPLETED AND NEED THE PROFESSIONAL LICENSE NUMBER    WHEN COMPLETED PLEASE .[phone] ATTN: GI

## 2021-02-26 RX ORDER — METOPROLOL SUCCINATE 50 MG/1
TABLET, EXTENDED RELEASE ORAL
Qty: 30 TABLET | Refills: 0 | Status: SHIPPED | OUTPATIENT
Start: 2021-02-26 | End: 2022-01-21 | Stop reason: SDUPTHER

## 2021-03-13 DIAGNOSIS — E03.9 HYPOTHYROIDISM, UNSPECIFIED TYPE: ICD-10-CM

## 2021-03-15 ENCOUNTER — TELEPHONE (OUTPATIENT)
Dept: CARDIOLOGY | Facility: CLINIC | Age: 56
End: 2021-03-15

## 2021-03-15 RX ORDER — LEVOTHYROXINE SODIUM 0.1 MG/1
TABLET ORAL
Qty: 90 TABLET | Refills: 3 | Status: SHIPPED | OUTPATIENT
Start: 2021-03-15 | End: 2022-01-21 | Stop reason: SDUPTHER

## 2021-03-15 RX ORDER — METOPROLOL SUCCINATE 50 MG/1
TABLET, EXTENDED RELEASE ORAL
Qty: 30 TABLET | Refills: 0 | OUTPATIENT
Start: 2021-03-15

## 2021-03-15 NOTE — TELEPHONE ENCOUNTER
PT HAS NOT BEEN SEEN SINCE 2018   PT HAS BEEN TOLD TO MAKE APPT TO GET MORE REFILLS.     DENIED REFILL

## 2021-03-24 ENCOUNTER — PATIENT OUTREACH (OUTPATIENT)
Dept: CASE MANAGEMENT | Facility: OTHER | Age: 56
End: 2021-03-24

## 2021-03-24 NOTE — OUTREACH NOTE
Care Plan Note      Responses   Annual Wellness Visit:   Patient Has Completed   Care Gaps Addressed  Colon Cancer Screening, Diabetic A1C, Diabetic Eye Exam, Flu Shot, Pneumonia Vaccine, Other (See Comment) [urine microalbumin, covid vaccine]   Colon Cancer Screening Type  Colonoscopy   Colonoscopy Status  Up to Date (< 10 yrs)   HbA1c Status  Up to Date-outside defined limits   Diabetic Eye Exam Status  Patient will Complete   Diabetic Eye Exam Completion at [facility] or Other  Other   Other Location  [facility]   Flu Shot Status  Up to Date   Pneumonia Vaccine Status  Up to Date   Care Gap Comments  urine microalbumin- pt will complete, covid vaccine-declines   Specific Disease Process Teaching  Diabetes   Other Patient Education/Resources   24/7 [facility] Nurse Call Line   24/7 Nurse Call Line Education Method  -- [pt has]   Does patient have depression diagnosis?  Yes   Advanced Directives:  -- [pt has materials]        The main concerns and/or symptoms the patient would like to address are: Follow up RN-ACM outreach call made to pt. Pt c/o soreness to right stump. He denies redness, swelling, warmth, drainage, fever, or other symptoms or concerns. Pt states he still plans to call his ortho doctor, MD Reynolds, to schedule an appt.      Education/instruction provided by Care Coordinator: reviewed with pt: BS monitoring- pt reports compliant, states readings have been around 100, 200 in the am; disease education regarding DM; diet; exercise; med adherence- pt reports complaint; medical appointments and recommendations; RN-ACM contact information; [facility] 24 hour nurse line; health maintenance gaps; AD- pt has materials; MyChart- active; SDOH- pt denies food, medication, or transportation insecurities. UTD on AWV. Pt reports he will schedule next routine PCP appt, states he sees PCP once a year. He has endocrinology appt scheduled for 4/15/21. Advised pt to call with any needs. Follow up outreach  scheduled.      Follow Up Outreach Due: 4-6 weeks    Ioana Ellison RN  Ambulatory     3/24/2021, 15:50 EDT

## 2021-04-06 ENCOUNTER — LAB (OUTPATIENT)
Dept: LAB | Facility: HOSPITAL | Age: 56
End: 2021-04-06

## 2021-04-06 ENCOUNTER — HOSPITAL ENCOUNTER (OUTPATIENT)
Dept: CARDIOLOGY | Facility: HOSPITAL | Age: 56
Discharge: HOME | End: 2021-04-06

## 2021-04-06 DIAGNOSIS — E11.65 TYPE 2 DIABETES MELLITUS WITH HYPERGLYCEMIA, WITH LONG-TERM CURRENT USE OF INSULIN (HCC): ICD-10-CM

## 2021-04-06 DIAGNOSIS — Z79.4 TYPE 2 DIABETES MELLITUS WITH HYPERGLYCEMIA, WITH LONG-TERM CURRENT USE OF INSULIN (HCC): ICD-10-CM

## 2021-04-06 LAB
ANION GAP SERPL CALCULATED.3IONS-SCNC: 12 MMOL/L (ref 5–15)
BUN SERPL-MCNC: 22 MG/DL (ref 6–20)
BUN/CREAT SERPL: 13.9 (ref 7–25)
CALCIUM SPEC-SCNC: 9.3 MG/DL (ref 8.6–10.5)
CHLORIDE SERPL-SCNC: 97 MMOL/L (ref 98–107)
CO2 SERPL-SCNC: 27 MMOL/L (ref 22–29)
CREAT SERPL-MCNC: 1.58 MG/DL (ref 0.76–1.27)
DEPRECATED RDW RBC AUTO: 38.4 FL (ref 37–54)
ERYTHROCYTE [DISTWIDTH] IN BLOOD BY AUTOMATED COUNT: 13.4 % (ref 12.3–15.4)
GFR SERPL CREATININE-BSD FRML MDRD: 45 ML/MIN/1.73
GLUCOSE SERPL-MCNC: 351 MG/DL (ref 65–99)
HCT VFR BLD AUTO: 41.7 % (ref 37.5–51)
HGB BLD-MCNC: 13.6 G/DL (ref 13–17.7)
MCH RBC QN AUTO: 25.9 PG (ref 26.6–33)
MCHC RBC AUTO-ENTMCNC: 32.6 G/DL (ref 31.5–35.7)
MCV RBC AUTO: 79.4 FL (ref 79–97)
PLATELET # BLD AUTO: 297 10*3/MM3 (ref 140–450)
PMV BLD AUTO: 10.3 FL (ref 6–12)
POTASSIUM SERPL-SCNC: 5.1 MMOL/L (ref 3.5–5.2)
QT INTERVAL: 343 MS
RBC # BLD AUTO: 5.25 10*6/MM3 (ref 4.14–5.8)
SARS-COV-2 ORF1AB RESP QL NAA+PROBE: NOT DETECTED
SODIUM SERPL-SCNC: 136 MMOL/L (ref 136–145)
WBC # BLD AUTO: 9.35 10*3/MM3 (ref 3.4–10.8)

## 2021-04-06 PROCEDURE — 85027 COMPLETE CBC AUTOMATED: CPT

## 2021-04-06 PROCEDURE — U0004 COV-19 TEST NON-CDC HGH THRU: HCPCS

## 2021-04-06 PROCEDURE — 93005 ELECTROCARDIOGRAM TRACING: CPT | Performed by: ORTHOPAEDIC SURGERY

## 2021-04-06 PROCEDURE — 80048 BASIC METABOLIC PNL TOTAL CA: CPT

## 2021-04-06 PROCEDURE — C9803 HOPD COVID-19 SPEC COLLECT: HCPCS

## 2021-04-06 PROCEDURE — 82043 UR ALBUMIN QUANTITATIVE: CPT

## 2021-04-07 ENCOUNTER — ANESTHESIA EVENT (OUTPATIENT)
Dept: PERIOP | Facility: HOSPITAL | Age: 56
End: 2021-04-07

## 2021-04-07 LAB — ALBUMIN UR-MCNC: <1.2 MG/DL

## 2021-04-08 ENCOUNTER — TELEPHONE (OUTPATIENT)
Dept: ENDOCRINOLOGY | Facility: CLINIC | Age: 56
End: 2021-04-08

## 2021-04-08 ENCOUNTER — ANESTHESIA (OUTPATIENT)
Dept: PERIOP | Facility: HOSPITAL | Age: 56
End: 2021-04-08

## 2021-04-08 ENCOUNTER — HOSPITAL ENCOUNTER (OUTPATIENT)
Facility: HOSPITAL | Age: 56
Setting detail: HOSPITAL OUTPATIENT SURGERY
Discharge: HOME | End: 2021-04-08
Attending: ORTHOPAEDIC SURGERY | Admitting: ORTHOPAEDIC SURGERY

## 2021-04-08 VITALS
DIASTOLIC BLOOD PRESSURE: 78 MMHG | HEIGHT: 68 IN | HEART RATE: 78 BPM | SYSTOLIC BLOOD PRESSURE: 130 MMHG | RESPIRATION RATE: 16 BRPM | OXYGEN SATURATION: 94 % | TEMPERATURE: 97.2 F | BODY MASS INDEX: 28.79 KG/M2 | WEIGHT: 190 LBS

## 2021-04-08 DIAGNOSIS — M61.00 POSTOPERATIVE HETEROTOPIC OSSIFICATION OF MUSCLE: Primary | ICD-10-CM

## 2021-04-08 DIAGNOSIS — M61.50 MYOSITIS OSSIFICANS: ICD-10-CM

## 2021-04-08 LAB
GLUCOSE BLDC GLUCOMTR-MCNC: 257 MG/DL (ref 70–105)
GLUCOSE BLDC GLUCOMTR-MCNC: 300 MG/DL (ref 70–105)

## 2021-04-08 PROCEDURE — 25010000002 MIDAZOLAM PER 1 MG: Performed by: ANESTHESIOLOGY

## 2021-04-08 PROCEDURE — 25010000002 DEXAMETHASONE PER 1 MG: Performed by: ANESTHESIOLOGY

## 2021-04-08 PROCEDURE — 25010000002 FENTANYL CITRATE (PF) 100 MCG/2ML SOLUTION: Performed by: ANESTHESIOLOGY

## 2021-04-08 PROCEDURE — 76942 ECHO GUIDE FOR BIOPSY: CPT | Performed by: ORTHOPAEDIC SURGERY

## 2021-04-08 PROCEDURE — 25010000002 ROPIVACAINE PER 1 MG: Performed by: ANESTHESIOLOGY

## 2021-04-08 PROCEDURE — 88305 TISSUE EXAM BY PATHOLOGIST: CPT | Performed by: ORTHOPAEDIC SURGERY

## 2021-04-08 PROCEDURE — 63710000001 INSULIN REGULAR HUMAN PER 5 UNITS: Performed by: ANESTHESIOLOGY

## 2021-04-08 PROCEDURE — 88311 DECALCIFY TISSUE: CPT | Performed by: ORTHOPAEDIC SURGERY

## 2021-04-08 PROCEDURE — 82962 GLUCOSE BLOOD TEST: CPT

## 2021-04-08 PROCEDURE — 25010000002 PROPOFOL 10 MG/ML EMULSION: Performed by: ANESTHESIOLOGY

## 2021-04-08 PROCEDURE — 25010000002 ONDANSETRON PER 1 MG: Performed by: ANESTHESIOLOGY

## 2021-04-08 RX ORDER — HYDROMORPHONE HCL 110MG/55ML
0.25 PATIENT CONTROLLED ANALGESIA SYRINGE INTRAVENOUS
Status: DISCONTINUED | OUTPATIENT
Start: 2021-04-08 | End: 2021-04-08 | Stop reason: HOSPADM

## 2021-04-08 RX ORDER — DIPHENHYDRAMINE HYDROCHLORIDE 50 MG/ML
12.5 INJECTION INTRAMUSCULAR; INTRAVENOUS
Status: DISCONTINUED | OUTPATIENT
Start: 2021-04-08 | End: 2021-04-08 | Stop reason: HOSPADM

## 2021-04-08 RX ORDER — SODIUM CHLORIDE 9 MG/ML
INJECTION, SOLUTION INTRAVENOUS CONTINUOUS PRN
Status: DISCONTINUED | OUTPATIENT
Start: 2021-04-08 | End: 2021-04-08 | Stop reason: SURG

## 2021-04-08 RX ORDER — SODIUM CHLORIDE 0.9 % (FLUSH) 0.9 %
10 SYRINGE (ML) INJECTION AS NEEDED
Status: DISCONTINUED | OUTPATIENT
Start: 2021-04-08 | End: 2021-04-08 | Stop reason: HOSPADM

## 2021-04-08 RX ORDER — SODIUM CHLORIDE 9 MG/ML
9 INJECTION, SOLUTION INTRAVENOUS CONTINUOUS PRN
Status: DISCONTINUED | OUTPATIENT
Start: 2021-04-08 | End: 2021-04-08 | Stop reason: HOSPADM

## 2021-04-08 RX ORDER — ACETAMINOPHEN 500 MG
TABLET ORAL AS NEEDED
Status: DISCONTINUED | OUTPATIENT
Start: 2021-04-08 | End: 2021-04-08 | Stop reason: SURG

## 2021-04-08 RX ORDER — PROPOFOL 10 MG/ML
VIAL (ML) INTRAVENOUS AS NEEDED
Status: DISCONTINUED | OUTPATIENT
Start: 2021-04-08 | End: 2021-04-08 | Stop reason: SURG

## 2021-04-08 RX ORDER — DEXAMETHASONE SODIUM PHOSPHATE 4 MG/ML
INJECTION, SOLUTION INTRA-ARTICULAR; INTRALESIONAL; INTRAMUSCULAR; INTRAVENOUS; SOFT TISSUE
Status: COMPLETED | OUTPATIENT
Start: 2021-04-08 | End: 2021-04-08

## 2021-04-08 RX ORDER — ROPIVACAINE HYDROCHLORIDE 5 MG/ML
INJECTION, SOLUTION EPIDURAL; INFILTRATION; PERINEURAL
Status: COMPLETED | OUTPATIENT
Start: 2021-04-08 | End: 2021-04-08

## 2021-04-08 RX ORDER — ONDANSETRON 2 MG/ML
4 INJECTION INTRAMUSCULAR; INTRAVENOUS ONCE AS NEEDED
Status: DISCONTINUED | OUTPATIENT
Start: 2021-04-08 | End: 2021-04-08 | Stop reason: HOSPADM

## 2021-04-08 RX ORDER — FENTANYL CITRATE 50 UG/ML
INJECTION, SOLUTION INTRAMUSCULAR; INTRAVENOUS AS NEEDED
Status: DISCONTINUED | OUTPATIENT
Start: 2021-04-08 | End: 2021-04-08 | Stop reason: SURG

## 2021-04-08 RX ORDER — ONDANSETRON 2 MG/ML
INJECTION INTRAMUSCULAR; INTRAVENOUS AS NEEDED
Status: DISCONTINUED | OUTPATIENT
Start: 2021-04-08 | End: 2021-04-08 | Stop reason: SURG

## 2021-04-08 RX ORDER — OXYCODONE HYDROCHLORIDE AND ACETAMINOPHEN 5; 325 MG/1; MG/1
TABLET ORAL
Qty: 30 TABLET | Refills: 0 | Status: ON HOLD | OUTPATIENT
Start: 2021-04-08 | End: 2021-04-29 | Stop reason: SDUPTHER

## 2021-04-08 RX ORDER — MEPERIDINE HYDROCHLORIDE 25 MG/ML
12.5 INJECTION INTRAMUSCULAR; INTRAVENOUS; SUBCUTANEOUS
Status: DISCONTINUED | OUTPATIENT
Start: 2021-04-08 | End: 2021-04-08 | Stop reason: HOSPADM

## 2021-04-08 RX ORDER — PROMETHAZINE HYDROCHLORIDE 25 MG/1
25 SUPPOSITORY RECTAL ONCE AS NEEDED
Status: DISCONTINUED | OUTPATIENT
Start: 2021-04-08 | End: 2021-04-08 | Stop reason: HOSPADM

## 2021-04-08 RX ORDER — LIDOCAINE HYDROCHLORIDE 10 MG/ML
INJECTION, SOLUTION EPIDURAL; INFILTRATION; INTRACAUDAL; PERINEURAL AS NEEDED
Status: DISCONTINUED | OUTPATIENT
Start: 2021-04-08 | End: 2021-04-08 | Stop reason: SURG

## 2021-04-08 RX ORDER — SODIUM CHLORIDE 0.9 % (FLUSH) 0.9 %
10 SYRINGE (ML) INJECTION EVERY 12 HOURS SCHEDULED
Status: DISCONTINUED | OUTPATIENT
Start: 2021-04-08 | End: 2021-04-08 | Stop reason: HOSPADM

## 2021-04-08 RX ORDER — OXYCODONE HYDROCHLORIDE 5 MG/1
5 TABLET ORAL ONCE AS NEEDED
Status: DISCONTINUED | OUTPATIENT
Start: 2021-04-08 | End: 2021-04-08 | Stop reason: HOSPADM

## 2021-04-08 RX ORDER — PROMETHAZINE HYDROCHLORIDE 25 MG/1
25 TABLET ORAL ONCE AS NEEDED
Status: DISCONTINUED | OUTPATIENT
Start: 2021-04-08 | End: 2021-04-08 | Stop reason: HOSPADM

## 2021-04-08 RX ORDER — LORAZEPAM 2 MG/ML
1 INJECTION INTRAMUSCULAR
Status: DISCONTINUED | OUTPATIENT
Start: 2021-04-08 | End: 2021-04-08 | Stop reason: HOSPADM

## 2021-04-08 RX ORDER — MIDAZOLAM HYDROCHLORIDE 1 MG/ML
INJECTION INTRAMUSCULAR; INTRAVENOUS AS NEEDED
Status: DISCONTINUED | OUTPATIENT
Start: 2021-04-08 | End: 2021-04-08 | Stop reason: SURG

## 2021-04-08 RX ORDER — HYDROMORPHONE HCL 110MG/55ML
0.5 PATIENT CONTROLLED ANALGESIA SYRINGE INTRAVENOUS
Status: DISCONTINUED | OUTPATIENT
Start: 2021-04-08 | End: 2021-04-08 | Stop reason: HOSPADM

## 2021-04-08 RX ADMIN — ROPIVACAINE HYDROCHLORIDE 30 ML: 5 INJECTION, SOLUTION EPIDURAL; INFILTRATION; PERINEURAL at 07:25

## 2021-04-08 RX ADMIN — FENTANYL CITRATE 50 MCG: 50 INJECTION, SOLUTION INTRAMUSCULAR; INTRAVENOUS at 07:31

## 2021-04-08 RX ADMIN — MIDAZOLAM 2 MG: 1 INJECTION INTRAMUSCULAR; INTRAVENOUS at 07:15

## 2021-04-08 RX ADMIN — PROPOFOL 180 MG: 10 INJECTION, EMULSION INTRAVENOUS at 07:31

## 2021-04-08 RX ADMIN — GLYCOPYRROLATE 0.2 MG: 0.2 INJECTION, SOLUTION INTRAMUSCULAR; INTRAVITREAL at 07:53

## 2021-04-08 RX ADMIN — LIDOCAINE HYDROCHLORIDE 50 MG: 10 INJECTION, SOLUTION EPIDURAL; INFILTRATION; INTRACAUDAL; PERINEURAL at 07:31

## 2021-04-08 RX ADMIN — SODIUM CHLORIDE 9 ML/HR: 900 INJECTION, SOLUTION INTRAVENOUS at 06:07

## 2021-04-08 RX ADMIN — DEXAMETHASONE SODIUM PHOSPHATE 2 MG: 4 INJECTION, SOLUTION INTRAMUSCULAR; INTRAVENOUS at 07:25

## 2021-04-08 RX ADMIN — ONDANSETRON 4 MG: 2 INJECTION INTRAMUSCULAR; INTRAVENOUS at 07:58

## 2021-04-08 RX ADMIN — INSULIN HUMAN 25 UNITS: 100 INJECTION, SOLUTION PARENTERAL at 07:45

## 2021-04-08 RX ADMIN — FENTANYL CITRATE 50 MCG: 50 INJECTION, SOLUTION INTRAMUSCULAR; INTRAVENOUS at 07:59

## 2021-04-08 RX ADMIN — DEXAMETHASONE SODIUM PHOSPHATE 2 MG: 4 INJECTION, SOLUTION INTRAMUSCULAR; INTRAVENOUS at 07:20

## 2021-04-08 RX ADMIN — ACETAMINOPHEN 1000 MG: 500 TABLET, FILM COATED ORAL at 07:15

## 2021-04-08 RX ADMIN — ROPIVACAINE HYDROCHLORIDE 30 ML: 5 INJECTION, SOLUTION EPIDURAL; INFILTRATION; PERINEURAL at 07:20

## 2021-04-08 RX ADMIN — CEFAZOLIN SODIUM 2 G: 1 INJECTION, POWDER, FOR SOLUTION INTRAMUSCULAR; INTRAVENOUS at 07:38

## 2021-04-08 RX ADMIN — SODIUM CHLORIDE: 0.9 INJECTION, SOLUTION INTRAVENOUS at 07:31

## 2021-04-08 NOTE — ANESTHESIA PROCEDURE NOTES
Peripheral Block    Pre-sedation assessment completed: 4/8/2021 6:50 AM    Patient reassessed immediately prior to procedure    Patient location during procedure: pre-op  Reason for block: procedure for pain, at surgeon's request, post-op pain management and secondary anesthetic  Performed by  Anesthesiologist: Jose Tejada MD  Preanesthetic Checklist  Completed: patient identified, IV checked, site marked, risks and benefits discussed, surgical consent, monitors and equipment checked, pre-op evaluation and timeout performed  Prep:  Pt Position: sitting  Sterile barriers:cap, gloves, mask and washed/disinfected hands  Prep: ChloraPrep  Patient monitoring: blood pressure monitoring, continuous pulse oximetry and EKG  Procedure  Sedation:yes  Performed under: local infiltration  Guidance:ultrasound guided, nerve stimulator and landmark technique  ULTRASOUND INTERPRETATION.  Using ultrasound guidance a 20 G gauge needle was placed in close proximity to the femoral nerve, at which point, under ultrasound guidance anesthetic was injected in the area of the nerve and spread of the anesthesia was seen on ultrasound in close proximity thereto.  There were no abnormalities seen on ultrasound; a digital image was taken; and the patient tolerated the procedure with no complications. Images:still images obtained, printed/placed on chart  Loss of twitch: 0.5 mA  Laterality:right  Block Type:femoral  Injection Technique:single-shot  Needle Type:echogenic  Needle Gauge:20 G  Resistance on Injection: less than 15 psi    Medications Used: dexamethasone (DECADRON) injection, 2 mg  ropivacaine (NAROPIN) 0.5 % injection, 30 mL  Med admintered at 4/8/2021 7:20 AM      Post Assessment  Injection Assessment: negative aspiration for heme, no paresthesia on injection and incremental injection  Patient Tolerance:comfortable throughout block  Complications:no  Additional Notes  Pre-procedure:  Peripheral nerve block performed  preoperatively prior to the start of anesthesia time at the request of the patient and the surgeon for the management of postoperative acute surgical pain as well as for a secondary intraoperative anesthetic (general anesthesia is the primary intraoperative anesthetic); patient identified; pre-procedure vital signs, all relevant labs/studies, complete medical/surgical/anesthetic history, full medication list, full allergy list, and NPO status obtained/reviewed; physical assessment performed; anesthetic options, side effects, potential complications, risks, and benefits discussed; questions answered; patient wishes to proceed with the procedure; written anesthesia procedure consent obtained; patient cleared for procedure; time out performed; IV access in situ    Procedure:  ASA monitor placed; supplemental oxygen provided; patient positioned; hand hygiene performed; sterile technique maintained throughout the procedure; sterile prep applied; insertion site determined by anatomical landmarks, palpation, and ultrasound imaging; live ultrasound guidance throughout the procedure; target nerves/landmarks identified on live ultrasound; skin and subcutaneous tissues numbed by injection of 1% lidocaine; 4 inch 20G [employer] Ultra 360 Insulated Echogenic Needle used; realtime needle advancement and placement near the target nerves witnessed on live ultrasound; positive nerve stimulation resulting in motor response of the appropriate muscles at a current of less than or equal to 0.5 mA on the nerve stimulator; negative aspiration prior to injection; correct needle placement confirmed on live ultrasound by local anesthetic spread around the target nerves, as well as by nerve stimulation; local anesthetic mixture injected with negative aspiration prior to each injection and after each 1-5 mL injected; needle withdrawn; dressing applied; ultrasound image printed and placed in the patient's permanent medical  record    Post-procedure:  Peripheral nerve block placed successfully; good block; no apparent complications; minimal estimated blood loss; vital signs stable throughout; see nurse's notes for vitals; transported to the OR, general anesthesia induced, and surgery started

## 2021-04-08 NOTE — ANESTHESIA PREPROCEDURE EVALUATION
Anesthesia Evaluation     Patient summary reviewed and Nursing notes reviewed   no history of anesthetic complications:  NPO Solid Status: > 8 hours  NPO Liquid Status: > 8 hours           Airway   Dental      Pulmonary    (+) pulmonary embolism, sleep apnea on CPAP,   Cardiovascular     ECG reviewed  PT is on anticoagulation therapy  Patient on routine beta blocker    (+) hypertension, valvular problems/murmurs MR and TI, CAD, PVD, DVT, hyperlipidemia,       Neuro/Psych  (+) numbness, psychiatric history Depression,     GI/Hepatic/Renal/Endo    (+)  hiatal hernia, GERD,  renal disease CRI, diabetes mellitus, thyroid problem hypothyroidism    Musculoskeletal     Abdominal    Substance History      OB/GYN          Other   arthritis,      ROS/Med Hx Other: Gangrene of foot, cellulitis/osteomyelitis, chest pain, h/o DKA, fatigue, neuropathy, foot pain, low vit D, palpitations, allergies, retinopathy, IBS, esophageal stricture, h/o sepsis, bursitis, rash    Echo  Interpretation  Technically adequate study.  Mitral valve leaflets are calcified with mild mitral regurgitation.  Tricuspid valve is normal with mild to moderate tricuspid regurgitation.  Aortic valve is sclerosed with adequate opening motion.  Left atrium, aortic root are normal in size. Right ventricle is mildly  dilated.  LV size and contractility appears normal. EF is about 60-65%.  No pericardial effusion noted    PSH  TOTAL HIP ARTHROPLASTY TOTAL HIP ARTHROPLASTY  ENDOSCOPY ENDOSCOPY  HERNIA REPAIR CARDIAC CATHETERIZATION  AMPUTATION FOOT / TOE GANGLION CYST EXCISION  RETINOPATHY SURGERY ENDOSCOPY  TRANS METATARSAL AMPUTATION BELOW KNEE AMPUTATION                  Anesthesia Plan    ASA 4     general with block   (Patient identified; pre-operative vital signs, all relevant labs/studies, complete medical/surgical/anesthetic history, full medication list, full allergy list, and NPO status obtained/reviewed; physical assessment performed; anesthetic  options, side effects, potential complications, risks, and benefits discussed; questions answered; written anesthesia consent obtained; patient cleared for procedure; anesthesia machine and equipment checked and functioning)  intravenous induction     Anesthetic plan, all risks, benefits, and alternatives have been provided, discussed and informed consent has been obtained with: patient.    Plan discussed with CRNA and CAA.

## 2021-04-08 NOTE — OP NOTE
AMPUTATION REVISION KNEE STUMP  Procedure Report    Patient Name:  Kuldeep Adhikari  YOB: 1965    Date of Surgery:  4/8/2021         Pre-op Diagnosis: Right below-knee amputation bony overgrowth    Postop diagnosis: Same    Indication: 56-year-old white male with below-knee amputation last year.  Developed a bony overgrowth over the distal medial tibia which was irritating his stump.  Wish of this revised to help prosthesis wear      Procedure/CPT® Codes:      Procedure(s):  BELOW KNEE AMPUTATION REVISION, right    Staff:  Surgeon(s):  Eduardo Reynolds MD         Anesthesia: General plus distal block    Estimated Blood Loss: None    Implants:    Nothing was implanted during the procedure    Specimen:          Bone from distal tibia        Findings: 1 x 1 x 1 cm bony overgrowth fibrous union to the distal medial tibial stump    Complications: None    Description of Procedure: Patient seen holding room.  Identified the right leg is correct.  This initialed by me is taken the OR where he had 2 g Kefzol.  Had already had a nerve block.  Had anesthesia had timeout performed.  Had sterile prep and draping was right extremity in supine position.  Tourniquet was applied around the upper thigh to 250 mils mercury.  Total tourniquet time was less than 10 minutes.  [facility] is [facility] incision was opened on its medial half.  Skin flaps were developed and then the muscle layer was incised along with anterior edge.  Dissection was done down to bone and freeing was done of the fibrous tissue.  The bony mass was palpated and was not fused to the tibia but was within the muscle/fibrous layer.  This was dissected out.  The soft tissue around this was  resected and the end of the tibia was contoured with a rongeur.  The soft tissue was palpated with no further bony masses felt.  The wound was then thoroughly irrigated with saline.  Tourniquet released only mild bleeding.  The muscle layer was closed back with #1 Vicryl  interrupted suture line being anterior.  The subcu tissue was closed with 2-0 Vicryl interrupted and 3-0 nylon interrupted suture.  Sterile dressings were applied.    Plan: Patient with a suture removal in 2 weeks.        Eduardo Reynolds MD     Date: 4/8/2021  Time: 08:13 EDT

## 2021-04-08 NOTE — ANESTHESIA PROCEDURE NOTES
Peripheral Block    Pre-sedation assessment completed: 4/8/2021 6:50 AM    Patient reassessed immediately prior to procedure    Patient location during procedure: pre-op  Reason for block: procedure for pain, at surgeon's request, post-op pain management and secondary anesthetic  Performed by  Anesthesiologist: Jose Tejada MD  Preanesthetic Checklist  Completed: patient identified, IV checked, site marked, risks and benefits discussed, surgical consent, monitors and equipment checked, pre-op evaluation and timeout performed  Prep:  Pt Position: sitting  Sterile barriers:cap, gloves, mask and washed/disinfected hands  Prep: ChloraPrep  Patient monitoring: blood pressure monitoring, continuous pulse oximetry and EKG  Procedure  Sedation:yes  Performed under: local infiltration  Guidance:ultrasound guided and landmark technique  ULTRASOUND INTERPRETATION.  Using ultrasound guidance a 20 G gauge needle was placed in close proximity to the sciatic nerve, at which point, under ultrasound guidance anesthetic was injected in the area of the nerve and spread of the anesthesia was seen on ultrasound in close proximity thereto.  There were no abnormalities seen on ultrasound; a digital image was taken; and the patient tolerated the procedure with no complications. Images:still images obtained, printed/placed on chart    Laterality:right  Block Type:popliteal  Injection Technique:single-shot  Needle Type:echogenic  Needle Gauge:20 G  Resistance on Injection: less than 15 psi    Medications Used: dexamethasone (DECADRON) injection, 2 mg  ropivacaine (NAROPIN) 0.5 % injection, 30 mL  Med admintered at 4/8/2021 7:25 AM      Post Assessment  Injection Assessment: negative aspiration for heme, no paresthesia on injection and incremental injection  Patient Tolerance:comfortable throughout block  Complications:no  Additional Notes  Pre-procedure:  Peripheral nerve block performed preoperatively prior to the start of  anesthesia time at the request of the patient and the surgeon for the management of postoperative acute surgical pain as well as for a secondary intraoperative anesthetic (general anesthesia is the primary intraoperative anesthetic); patient identified; pre-procedure vital signs, all relevant labs/studies, complete medical/surgical/anesthetic history, full medication list, full allergy list, and NPO status obtained/reviewed; physical assessment performed; anesthetic options, side effects, potential complications, risks, and benefits discussed; questions answered; patient wishes to proceed with the procedure; written anesthesia procedure consent obtained; patient cleared for procedure; time out performed; IV access in situ    Procedure:  ASA monitor placed; supplemental oxygen provided; patient positioned; hand hygiene performed; sterile technique maintained throughout the procedure; sterile prep applied; insertion site determined by anatomical landmarks, palpation, and ultrasound imaging; live ultrasound guidance throughout the procedure; target nerves/landmarks identified on live ultrasound; skin and subcutaneous tissues numbed by injection of 1% lidocaine; 4 inch 20G StimupArt Circle Ultra 360 Insulated Echogenic Needle used; realtime needle advancement and placement near the target nerves witnessed on live ultrasound; negative aspiration prior to injection; correct needle placement confirmed on live ultrasound by local anesthetic spread around the target nerves; local anesthetic mixture injected with negative aspiration prior to each injection and after each 1-5 mL injected; needle withdrawn; dressing applied; ultrasound image printed and placed in the patient's permanent medical record    Post-procedure:  Peripheral nerve block placed successfully; good block; no apparent complications; minimal estimated blood loss; vital signs stable throughout; see nurse's notes for vitals; transported to the OR, general anesthesia  induced, and surgery started

## 2021-04-08 NOTE — ANESTHESIA PROCEDURE NOTES
Airway  Urgency: elective    Date/Time: 4/8/2021 7:38 AM  Airway not difficult    General Information and Staff    Patient location during procedure: OR  Anesthesiologist: Jose Tejada MD  CRNA: Dillan Cooley AA    Indications and Patient Condition  Indications for airway management: airway protection    Preoxygenated: yes  MILS maintained throughout  Mask difficulty assessment: 0 - not attempted    Final Airway Details  Final airway type: supraglottic airway      Successful airway: LMA  Size 4    Assessment: lips, teeth, and gum same as pre-op and atraumatic intubation

## 2021-04-08 NOTE — ANESTHESIA POSTPROCEDURE EVALUATION
Patient: Kuldeep Adhikari    Procedure Summary     Date: 04/08/21 Room / Location: [facility] OR 11 / [facility] MAIN OR    Anesthesia Start: 0730 Anesthesia Stop: 0817    Procedure: BELOW KNEE AMPUTATION REVISAION (Right Knee) Diagnosis:       Myositis ossificans      (Myositis ossificans [M61.50])    Surgeons: Eduardo Reynolds MD Provider: Jose Tejada MD    Anesthesia Type: general ASA Status: 4          Anesthesia Type: general    Vitals  Vitals Value Taken Time   /73 04/08/21 0851   Temp 97.8 °F (36.6 °C) 04/08/21 0815   Pulse 80 04/08/21 0852   Resp 12 04/08/21 0845   SpO2 94 % 04/08/21 0852   Vitals shown include unvalidated device data.        Post Anesthesia Care and Evaluation    Patient location during evaluation: PACU  Patient participation: complete - patient participated  Level of consciousness: awake  Pain scale: See nurse's notes for pain score.  Pain management: adequate  Airway patency: patent  Anesthetic complications: No anesthetic complications  PONV Status: none  Cardiovascular status: acceptable  Respiratory status: acceptable  Hydration status: acceptable    Comments: Patient seen and examined postoperatively; vital signs stable; SpO2 greater than or equal to 90%; cardiopulmonary status stable; nausea/vomiting adequately controlled; pain adequately controlled; no apparent anesthesia complications; patient discharged from anesthesia care when discharge criteria were met

## 2021-04-08 NOTE — DISCHARGE INSTRUCTIONS
Follow up 2 wks  Keep dressing on for 5 days then may shower.   Not wear prosthesis.   Keep wound covered until seen by me

## 2021-04-09 LAB
LAB AP CASE REPORT: NORMAL
PATH REPORT.FINAL DX SPEC: NORMAL
PATH REPORT.GROSS SPEC: NORMAL

## 2021-04-29 ENCOUNTER — HOSPITAL ENCOUNTER (OUTPATIENT)
Facility: HOSPITAL | Age: 56
Setting detail: HOSPITAL OUTPATIENT SURGERY
Discharge: HOME | End: 2021-04-29
Attending: ORTHOPAEDIC SURGERY | Admitting: ORTHOPAEDIC SURGERY

## 2021-04-29 ENCOUNTER — ANESTHESIA (OUTPATIENT)
Dept: PERIOP | Facility: HOSPITAL | Age: 56
End: 2021-04-29

## 2021-04-29 ENCOUNTER — ANESTHESIA EVENT (OUTPATIENT)
Dept: PERIOP | Facility: HOSPITAL | Age: 56
End: 2021-04-29

## 2021-04-29 ENCOUNTER — APPOINTMENT (OUTPATIENT)
Dept: GENERAL RADIOLOGY | Facility: HOSPITAL | Age: 56
End: 2021-04-29

## 2021-04-29 VITALS
DIASTOLIC BLOOD PRESSURE: 67 MMHG | TEMPERATURE: 97.6 F | HEART RATE: 100 BPM | WEIGHT: 187.17 LBS | HEIGHT: 68 IN | BODY MASS INDEX: 28.37 KG/M2 | SYSTOLIC BLOOD PRESSURE: 149 MMHG | OXYGEN SATURATION: 91 % | RESPIRATION RATE: 16 BRPM

## 2021-04-29 DIAGNOSIS — M61.061: Primary | ICD-10-CM

## 2021-04-29 LAB
ANION GAP SERPL CALCULATED.3IONS-SCNC: 9 MMOL/L (ref 5–15)
BASOPHILS # BLD AUTO: 0.1 10*3/MM3 (ref 0–0.2)
BASOPHILS NFR BLD AUTO: 1.7 % (ref 0–1.5)
BUN SERPL-MCNC: 16 MG/DL (ref 6–20)
BUN/CREAT SERPL: 13.7 (ref 7–25)
CALCIUM SPEC-SCNC: 9.1 MG/DL (ref 8.6–10.5)
CHLORIDE SERPL-SCNC: 104 MMOL/L (ref 98–107)
CO2 SERPL-SCNC: 25 MMOL/L (ref 22–29)
CREAT SERPL-MCNC: 1.17 MG/DL (ref 0.76–1.27)
DEPRECATED RDW RBC AUTO: 42.4 FL (ref 37–54)
EOSINOPHIL # BLD AUTO: 0.3 10*3/MM3 (ref 0–0.4)
EOSINOPHIL NFR BLD AUTO: 4.7 % (ref 0.3–6.2)
ERYTHROCYTE [DISTWIDTH] IN BLOOD BY AUTOMATED COUNT: 14.9 % (ref 12.3–15.4)
GFR SERPL CREATININE-BSD FRML MDRD: 64 ML/MIN/1.73
GLUCOSE SERPL-MCNC: 164 MG/DL (ref 65–99)
HCT VFR BLD AUTO: 41.4 % (ref 37.5–51)
HGB BLD-MCNC: 14 G/DL (ref 13–17.7)
LYMPHOCYTES # BLD AUTO: 1.5 10*3/MM3 (ref 0.7–3.1)
LYMPHOCYTES NFR BLD AUTO: 23.6 % (ref 19.6–45.3)
MCH RBC QN AUTO: 27 PG (ref 26.6–33)
MCHC RBC AUTO-ENTMCNC: 33.8 G/DL (ref 31.5–35.7)
MCV RBC AUTO: 79.9 FL (ref 79–97)
MONOCYTES # BLD AUTO: 0.5 10*3/MM3 (ref 0.1–0.9)
MONOCYTES NFR BLD AUTO: 8.6 % (ref 5–12)
NEUTROPHILS NFR BLD AUTO: 3.9 10*3/MM3 (ref 1.7–7)
NEUTROPHILS NFR BLD AUTO: 61.4 % (ref 42.7–76)
NRBC BLD AUTO-RTO: 0.2 /100 WBC (ref 0–0.2)
PLATELET # BLD AUTO: 297 10*3/MM3 (ref 140–450)
PMV BLD AUTO: 8.4 FL (ref 6–12)
POTASSIUM SERPL-SCNC: 4.6 MMOL/L (ref 3.5–5.2)
RBC # BLD AUTO: 5.17 10*6/MM3 (ref 4.14–5.8)
SARS-COV-2 RNA PNL SPEC NAA+PROBE: NOT DETECTED
SODIUM SERPL-SCNC: 138 MMOL/L (ref 136–145)
WBC # BLD AUTO: 6.3 10*3/MM3 (ref 3.4–10.8)

## 2021-04-29 PROCEDURE — 25010000002 ONDANSETRON PER 1 MG: Performed by: ANESTHESIOLOGY

## 2021-04-29 PROCEDURE — 25010000002 PROPOFOL 10 MG/ML EMULSION: Performed by: ANESTHESIOLOGY

## 2021-04-29 PROCEDURE — 76000 FLUOROSCOPY <1 HR PHYS/QHP: CPT

## 2021-04-29 PROCEDURE — 87176 TISSUE HOMOGENIZATION CULTR: CPT | Performed by: ORTHOPAEDIC SURGERY

## 2021-04-29 PROCEDURE — 25010000002 FENTANYL CITRATE (PF) 100 MCG/2ML SOLUTION: Performed by: ANESTHESIOLOGY

## 2021-04-29 PROCEDURE — 85025 COMPLETE CBC W/AUTO DIFF WBC: CPT | Performed by: ORTHOPAEDIC SURGERY

## 2021-04-29 PROCEDURE — 80048 BASIC METABOLIC PNL TOTAL CA: CPT | Performed by: ORTHOPAEDIC SURGERY

## 2021-04-29 PROCEDURE — 25010000002 MIDAZOLAM PER 1 MG: Performed by: ANESTHESIOLOGY

## 2021-04-29 PROCEDURE — 25010000002 HYDROMORPHONE PER 4 MG: Performed by: ANESTHESIOLOGY

## 2021-04-29 PROCEDURE — 76942 ECHO GUIDE FOR BIOPSY: CPT | Performed by: ORTHOPAEDIC SURGERY

## 2021-04-29 PROCEDURE — 87205 SMEAR GRAM STAIN: CPT | Performed by: ORTHOPAEDIC SURGERY

## 2021-04-29 PROCEDURE — 25010000002 DEXAMETHASONE PER 1 MG: Performed by: ANESTHESIOLOGY

## 2021-04-29 PROCEDURE — 87070 CULTURE OTHR SPECIMN AEROBIC: CPT | Performed by: ORTHOPAEDIC SURGERY

## 2021-04-29 PROCEDURE — 25010000002 ROPIVACAINE PER 1 MG: Performed by: ANESTHESIOLOGY

## 2021-04-29 PROCEDURE — C9803 HOPD COVID-19 SPEC COLLECT: HCPCS

## 2021-04-29 PROCEDURE — U0003 INFECTIOUS AGENT DETECTION BY NUCLEIC ACID (DNA OR RNA); SEVERE ACUTE RESPIRATORY SYNDROME CORONAVIRUS 2 (SARS-COV-2) (CORONAVIRUS DISEASE [COVID-19]), AMPLIFIED PROBE TECHNIQUE, MAKING USE OF HIGH THROUGHPUT TECHNOLOGIES AS DESCRIBED BY CMS-2020-01-R: HCPCS | Performed by: ORTHOPAEDIC SURGERY

## 2021-04-29 RX ORDER — LIDOCAINE HYDROCHLORIDE AND EPINEPHRINE 10; 10 MG/ML; UG/ML
INJECTION, SOLUTION INFILTRATION; PERINEURAL AS NEEDED
Status: DISCONTINUED | OUTPATIENT
Start: 2021-04-29 | End: 2021-04-29 | Stop reason: HOSPADM

## 2021-04-29 RX ORDER — FENTANYL CITRATE 50 UG/ML
50 INJECTION, SOLUTION INTRAMUSCULAR; INTRAVENOUS
Status: DISCONTINUED | OUTPATIENT
Start: 2021-04-29 | End: 2021-04-29 | Stop reason: HOSPADM

## 2021-04-29 RX ORDER — ONDANSETRON 2 MG/ML
INJECTION INTRAMUSCULAR; INTRAVENOUS AS NEEDED
Status: DISCONTINUED | OUTPATIENT
Start: 2021-04-29 | End: 2021-04-29 | Stop reason: SURG

## 2021-04-29 RX ORDER — LIDOCAINE HYDROCHLORIDE 10 MG/ML
0.5 INJECTION, SOLUTION INFILTRATION; PERINEURAL ONCE AS NEEDED
Status: DISCONTINUED | OUTPATIENT
Start: 2021-04-29 | End: 2021-04-29 | Stop reason: HOSPADM

## 2021-04-29 RX ORDER — SODIUM CHLORIDE 9 MG/ML
500 INJECTION, SOLUTION INTRAVENOUS CONTINUOUS
Status: DISCONTINUED | OUTPATIENT
Start: 2021-04-29 | End: 2021-04-29 | Stop reason: HOSPADM

## 2021-04-29 RX ORDER — SODIUM CHLORIDE, SODIUM LACTATE, POTASSIUM CHLORIDE, CALCIUM CHLORIDE 600; 310; 30; 20 MG/100ML; MG/100ML; MG/100ML; MG/100ML
INJECTION, SOLUTION INTRAVENOUS CONTINUOUS PRN
Status: DISCONTINUED | OUTPATIENT
Start: 2021-04-29 | End: 2021-04-29 | Stop reason: SURG

## 2021-04-29 RX ORDER — OXYCODONE HYDROCHLORIDE AND ACETAMINOPHEN 5; 325 MG/1; MG/1
TABLET ORAL
Qty: 30 TABLET | Refills: 0 | Status: SHIPPED | OUTPATIENT
Start: 2021-04-29 | End: 2021-06-29

## 2021-04-29 RX ORDER — ROPIVACAINE HYDROCHLORIDE 5 MG/ML
INJECTION, SOLUTION EPIDURAL; INFILTRATION; PERINEURAL
Status: COMPLETED | OUTPATIENT
Start: 2021-04-29 | End: 2021-04-29

## 2021-04-29 RX ORDER — PROPOFOL 10 MG/ML
VIAL (ML) INTRAVENOUS AS NEEDED
Status: DISCONTINUED | OUTPATIENT
Start: 2021-04-29 | End: 2021-04-29 | Stop reason: SURG

## 2021-04-29 RX ORDER — LIDOCAINE HYDROCHLORIDE 10 MG/ML
INJECTION, SOLUTION EPIDURAL; INFILTRATION; INTRACAUDAL; PERINEURAL AS NEEDED
Status: DISCONTINUED | OUTPATIENT
Start: 2021-04-29 | End: 2021-04-29 | Stop reason: SURG

## 2021-04-29 RX ORDER — FENTANYL CITRATE 50 UG/ML
INJECTION, SOLUTION INTRAMUSCULAR; INTRAVENOUS
Status: COMPLETED | OUTPATIENT
Start: 2021-04-29 | End: 2021-04-29

## 2021-04-29 RX ORDER — FENTANYL CITRATE 50 UG/ML
INJECTION, SOLUTION INTRAMUSCULAR; INTRAVENOUS AS NEEDED
Status: DISCONTINUED | OUTPATIENT
Start: 2021-04-29 | End: 2021-04-29 | Stop reason: SURG

## 2021-04-29 RX ORDER — MIDAZOLAM HYDROCHLORIDE 1 MG/ML
INJECTION INTRAMUSCULAR; INTRAVENOUS
Status: COMPLETED | OUTPATIENT
Start: 2021-04-29 | End: 2021-04-29

## 2021-04-29 RX ORDER — HYDROMORPHONE HCL 110MG/55ML
0.5 PATIENT CONTROLLED ANALGESIA SYRINGE INTRAVENOUS
Status: DISCONTINUED | OUTPATIENT
Start: 2021-04-29 | End: 2021-04-29 | Stop reason: HOSPADM

## 2021-04-29 RX ORDER — SODIUM CHLORIDE 0.9 % (FLUSH) 0.9 %
10 SYRINGE (ML) INJECTION AS NEEDED
Status: DISCONTINUED | OUTPATIENT
Start: 2021-04-29 | End: 2021-04-29 | Stop reason: HOSPADM

## 2021-04-29 RX ORDER — DEXAMETHASONE SODIUM PHOSPHATE 4 MG/ML
INJECTION, SOLUTION INTRA-ARTICULAR; INTRALESIONAL; INTRAMUSCULAR; INTRAVENOUS; SOFT TISSUE
Status: COMPLETED | OUTPATIENT
Start: 2021-04-29 | End: 2021-04-29

## 2021-04-29 RX ADMIN — MIDAZOLAM 2 MG: 1 INJECTION INTRAMUSCULAR; INTRAVENOUS at 11:50

## 2021-04-29 RX ADMIN — SODIUM CHLORIDE 1000 ML: 9 INJECTION, SOLUTION INTRAVENOUS at 09:57

## 2021-04-29 RX ADMIN — FENTANYL CITRATE 50 MCG: 50 INJECTION, SOLUTION INTRAMUSCULAR; INTRAVENOUS at 15:11

## 2021-04-29 RX ADMIN — LIDOCAINE HYDROCHLORIDE 100 MG: 10 INJECTION, SOLUTION EPIDURAL; INFILTRATION; INTRACAUDAL; PERINEURAL at 12:48

## 2021-04-29 RX ADMIN — HYDROMORPHONE HYDROCHLORIDE 0.5 MG: 2 INJECTION, SOLUTION INTRAMUSCULAR; INTRAVENOUS; SUBCUTANEOUS at 14:29

## 2021-04-29 RX ADMIN — SODIUM CHLORIDE, SODIUM LACTATE, POTASSIUM CHLORIDE, AND CALCIUM CHLORIDE: .6; .31; .03; .02 INJECTION, SOLUTION INTRAVENOUS at 12:47

## 2021-04-29 RX ADMIN — DEXAMETHASONE SODIUM PHOSPHATE 4 MG: 4 INJECTION, SOLUTION INTRAMUSCULAR; INTRAVENOUS at 11:50

## 2021-04-29 RX ADMIN — CEFAZOLIN SODIUM 2 G: 1 INJECTION, POWDER, FOR SOLUTION INTRAMUSCULAR; INTRAVENOUS at 12:48

## 2021-04-29 RX ADMIN — ONDANSETRON 4 MG: 2 INJECTION INTRAMUSCULAR; INTRAVENOUS at 12:48

## 2021-04-29 RX ADMIN — FENTANYL CITRATE 100 MCG: 50 INJECTION, SOLUTION INTRAMUSCULAR; INTRAVENOUS at 11:50

## 2021-04-29 RX ADMIN — FENTANYL CITRATE 50 MCG: 50 INJECTION, SOLUTION INTRAMUSCULAR; INTRAVENOUS at 15:00

## 2021-04-29 RX ADMIN — PROPOFOL 200 MG: 10 INJECTION, EMULSION INTRAVENOUS at 12:48

## 2021-04-29 RX ADMIN — HYDROMORPHONE HYDROCHLORIDE 0.5 MG: 2 INJECTION, SOLUTION INTRAMUSCULAR; INTRAVENOUS; SUBCUTANEOUS at 14:48

## 2021-04-29 RX ADMIN — HYDROMORPHONE HYDROCHLORIDE 0.5 MG: 2 INJECTION, SOLUTION INTRAMUSCULAR; INTRAVENOUS; SUBCUTANEOUS at 14:38

## 2021-04-29 RX ADMIN — ROPIVACAINE HYDROCHLORIDE 30 ML: 5 INJECTION, SOLUTION EPIDURAL; INFILTRATION; PERINEURAL at 11:50

## 2021-04-29 RX ADMIN — FENTANYL CITRATE 100 MCG: 50 INJECTION, SOLUTION INTRAMUSCULAR; INTRAVENOUS at 12:48

## 2021-04-29 NOTE — ANESTHESIA PREPROCEDURE EVALUATION
Anesthesia Evaluation                  Airway   Mallampati: II  TM distance: >3 FB  Neck ROM: full  No difficulty expected  Dental - normal exam     Pulmonary - normal exam    breath sounds clear to auscultation  (+) sleep apnea,   Cardiovascular - normal exam    ECG reviewed  Rhythm: regular  Rate: normal    (+) hypertension, CAD, PVD, DVT, hyperlipidemia,     ROS comment: Interpretation  Technically adequate study.  Mitral valve leaflets are calcified with mild mitral regurgitation.  Tricuspid valve is normal with mild to moderate tricuspid regurgitation.  Aortic valve is sclerosed with adequate opening motion.  Left atrium, aortic root are normal in size. Right ventricle is mildly  dilated.  LV size and contractility appears normal. EF is about 60-65%.  No pericardial effusion noted    Neuro/Psych  GI/Hepatic/Renal/Endo    (+) obesity,  hiatal hernia, GERD,  renal disease CRI, diabetes mellitus,     Musculoskeletal     Abdominal  - normal exam   Substance History      OB/GYN          Other                        Anesthesia Plan    ASA 4     general with block   (Fem N Block)

## 2021-04-29 NOTE — ANESTHESIA PROCEDURE NOTES
Airway  Urgency: elective    Date/Time: 4/29/2021 12:47 PM    General Information and Staff    Patient location during procedure: OR    Indications and Patient Condition  Indications for airway management: airway protection    Preoxygenated: yes  Mask difficulty assessment: 1 - vent by mask    Final Airway Details  Final airway type: supraglottic airway      Successful airway: LMA  Size 5    Number of attempts at approach: 1

## 2021-04-29 NOTE — ADDENDUM NOTE
Addendum  created 04/29/21 1429 by Teja Biswas MD    Clinical Note Signed, Diagnosis association updated, Intraprocedure Blocks edited

## 2021-04-29 NOTE — ANESTHESIA PROCEDURE NOTES
Peripheral Block      Patient reassessed immediately prior to procedure    Patient location during procedure: pre-op  Start time: 4/29/2021 11:40 AM  Stop time: 4/29/2021 11:45 AM  Reason for block: procedure for pain, at surgeon's request and post-op pain management  Performed by  Anesthesiologist: Teja Biswas MD  Preanesthetic Checklist  Completed: patient identified, IV checked, site marked, risks and benefits discussed, surgical consent, monitors and equipment checked, pre-op evaluation and timeout performed  Prep:  Pt Position: supine  Sterile barriers:gloves and sterile barriers  Prep: ChloraPrep  Patient monitoring: blood pressure monitoring, continuous pulse oximetry and EKG  Procedure  Sedation:yes  Performed under: PNB  Guidance:ultrasound guided  ULTRASOUND INTERPRETATION. Using ultrasound guidance a 22 G and 20 G gauge needle was placed in close proximity to the nerve, at which point, under ultrasound guidance anesthetic was injected in the area of the nerve and spread of the anesthesia was seen on ultrasound in close proximity thereto.  There were no abnormalities seen on ultrasound; a digital image was taken; and the patient tolerated the procedure with no complications. Images:still images obtained, printed/placed on chart    Laterality:right  Block Type:femoral  Injection Technique:single-shot  Needle Type:echogenic  Needle Gauge:20 G  Resistance on Injection: less than 15 psi  Sedation medications used: midazolam (VERSED) injection, 2 mg  fentaNYL citrate (PF) (SUBLIMAZE) injection, 100 mcg  Medications Used: ropivacaine (NAROPIN) 0.5 % injection, 30 mL  dexamethasone (DECADRON) injection, 4 mg      Post Assessment  Injection Assessment: negative aspiration for heme, no paresthesia on injection and incremental injection  Patient Tolerance:comfortable throughout block  Complications:no  Additional Notes  25 ml of 0.5% Ropivicaine plus Decadron 4 ml. No problem with block

## 2021-04-29 NOTE — ANESTHESIA POSTPROCEDURE EVALUATION
Patient: Kuldeep Adhikari    Procedure Summary     Date: 04/29/21 Room / Location: [facility] OR 12 / [facility] MAIN OR    Anesthesia Start: 1247 Anesthesia Stop: 1338    Procedure: AMPUTATION REVISION KNEE STUMP (Right Knee) Diagnosis:       Myositis ossificans traumatica of right lower leg      (Myositis ossificans traumatica of right lower leg [M61.061])    Surgeons: Eduardo Reynolds MD Provider: Teja Biswas MD    Anesthesia Type: general with block ASA Status: 4          Anesthesia Type: general with block    Vitals  Vitals Value Taken Time   /90 04/29/21 1403   Temp 96.9 °F (36.1 °C) 04/29/21 1336   Pulse 78 04/29/21 1405   Resp 16 04/29/21 1351   SpO2 96 % 04/29/21 1405   Vitals shown include unvalidated device data.        Post Anesthesia Care and Evaluation    Patient location during evaluation: PACU  Patient participation: complete - patient participated  Level of consciousness: awake  Pain scale: See nurse's notes for pain score.  Pain management: adequate  Airway patency: patent  Anesthetic complications: No anesthetic complications  PONV Status: none  Cardiovascular status: acceptable  Respiratory status: acceptable  Hydration status: acceptable    Comments: Patient seen and examined postoperatively; vital signs stable; SpO2 greater than or equal to 90%; cardiopulmonary status stable; nausea/vomiting adequately controlled; pain adequately controlled; no apparent anesthesia complications; patient discharged from anesthesia care when discharge criteria were met

## 2021-05-02 LAB
BACTERIA SPEC AEROBE CULT: NORMAL
GRAM STN SPEC: NORMAL
GRAM STN SPEC: NORMAL

## 2021-05-03 ENCOUNTER — PATIENT OUTREACH (OUTPATIENT)
Dept: CASE MANAGEMENT | Facility: OTHER | Age: 56
End: 2021-05-03

## 2021-05-03 NOTE — OUTREACH NOTE
Care Plan Note      Responses   Annual Wellness Visit:   Patient Has Completed   Care Gaps Addressed  Colon Cancer Screening, Diabetic A1C, Diabetic Eye Exam, Flu Shot, Pneumonia Vaccine, Other (See Comment) [covid vaccine, urine microalbumin]   Colon Cancer Screening Type  Colonoscopy   Colonoscopy Status  Up to Date (< 10 yrs)   HbA1c Status  Up to Date-outside defined limits   Diabetic Eye Exam Status  Patient will Complete   Diabetic Eye Exam Completion at [facility] or Other  Other   Other Location  [facility]    Flu Shot Status  Up to Date   Pneumonia Vaccine Status  Up to Date   Care Gap Comments  covid vaccine- declines, urine microalbumin- UTD   Specific Disease Process Teaching  Diabetes   Other Patient Education/Resources   24/7 [facility] Nurse Call Line   24/7 Nurse Call Line Education Method  -- [pt has]   Does patient have depression diagnosis?  Yes   Advanced Directives:  Patient Has [pt has materials]        The main concerns and/or symptoms the patient would like to address are: Follow up RN-ACM outreach call made to pt. Pt c/o pain to right stump, reports mild swelling. He denies redness, warmth, drainage, fever, or other symptoms or concerns. Pt states he has call into call his ortho doctor, MD Reynolds, to discuss pain medication.      Education/instruction provided by Care Coordinator: reviewed with pt: pain tx; signs and symptoms of infection to monitor for; BS monitoring- pt reports compliant, states readings have been up and down; disease education regarding DM; diet; med adherence- pt reports complaint; medical appointments; RN-ACM contact information; [facility] 24 hour nurse line; health maintenance gaps; AD- pt has materials; MyChart- active; SDOH- pt denies food, medication, or transportation insecurities. UTD on AWV. Pt reports he will schedule next routine PCP appt. He has endocrinology appt scheduled for 6/29/21. Reports he is seeing ortho doctor for follow up. Advised pt to call  with any needs. Follow up outreach scheduled.      Follow Up Outreach Due: 4-6 weeks    Ioana Ellison RN  Ambulatory     5/3/2021, 15:43 EDT

## 2021-05-12 RX ORDER — INSULIN ASPART 100 [IU]/ML
10 INJECTION, SOLUTION INTRAVENOUS; SUBCUTANEOUS
Qty: 5 PEN | Refills: 6 | Status: SHIPPED | OUTPATIENT
Start: 2021-05-12 | End: 2021-05-18 | Stop reason: SDUPTHER

## 2021-05-18 DIAGNOSIS — E11.65 UNCONTROLLED TYPE 2 DIABETES MELLITUS WITH HYPERGLYCEMIA (HCC): Primary | ICD-10-CM

## 2021-05-18 RX ORDER — INSULIN ASPART 100 [IU]/ML
10 INJECTION, SOLUTION INTRAVENOUS; SUBCUTANEOUS
Qty: 15 ML | Refills: 6 | Status: SHIPPED | OUTPATIENT
Start: 2021-05-18 | End: 2022-01-18 | Stop reason: SDUPTHER

## 2021-05-18 NOTE — TELEPHONE ENCOUNTER
[facility] pharmacy requesting new rx for pt novolog to say 15ml instead of 5 pens.

## 2021-05-28 DIAGNOSIS — I10 ESSENTIAL HYPERTENSION: ICD-10-CM

## 2021-05-30 RX ORDER — LOSARTAN POTASSIUM 50 MG/1
TABLET ORAL
Qty: 90 TABLET | Refills: 0 | Status: SHIPPED | OUTPATIENT
Start: 2021-05-30 | End: 2021-10-04

## 2021-05-30 RX ORDER — DULOXETIN HYDROCHLORIDE 60 MG/1
CAPSULE, DELAYED RELEASE ORAL
Qty: 90 CAPSULE | Refills: 0 | Status: SHIPPED | OUTPATIENT
Start: 2021-05-30 | End: 2022-01-21 | Stop reason: SDUPTHER

## 2021-05-30 RX ORDER — APIXABAN 5 MG/1
TABLET, FILM COATED ORAL
Qty: 180 TABLET | Refills: 0 | Status: SHIPPED | OUTPATIENT
Start: 2021-05-30 | End: 2021-07-02

## 2021-06-01 DIAGNOSIS — E78.2 MIXED HYPERLIPIDEMIA: ICD-10-CM

## 2021-06-01 RX ORDER — BLOOD SUGAR DIAGNOSTIC
1 STRIP MISCELLANEOUS
Qty: 400 EACH | Refills: 0 | Status: SHIPPED | OUTPATIENT
Start: 2021-06-01 | End: 2021-08-02

## 2021-06-01 RX ORDER — ATORVASTATIN CALCIUM 40 MG/1
40 TABLET, FILM COATED ORAL DAILY
Qty: 90 TABLET | Refills: 0 | Status: SHIPPED | OUTPATIENT
Start: 2021-06-01 | End: 2021-07-02

## 2021-06-04 ENCOUNTER — PATIENT OUTREACH (OUTPATIENT)
Dept: CASE MANAGEMENT | Facility: OTHER | Age: 56
End: 2021-06-04

## 2021-06-04 NOTE — OUTREACH NOTE
Care Plan Note      Responses   Annual Wellness Visit:   Patient Has Completed   Care Gaps Addressed  Colon Cancer Screening, Diabetic A1C, Diabetic Eye Exam, Flu Shot, Pneumonia Vaccine, Other (See Comment) [urine microalbumin, covid vaccine]   Colon Cancer Screening Type  Colonoscopy   Colonoscopy Status  Up to Date (< 10 yrs)   HbA1c Status  Patient Will Complete   HbA1c Completion at [facility] or Other  [facility]   Diabetic Eye Exam Status  Patient will Complete   Diabetic Eye Exam Completion at [facility] or Other  Other   Other Location  [facility] in [place]   Flu Shot Status  Up to Date   Pneumonia Vaccine Status  Up to Date   Care Gap Comments  urine microalbumin- UTD, covid vaccine- refused   Specific Disease Process Teaching  Diabetes   Other Patient Education/Resources   24/7 [facility] Nurse Call Line   24/7 Nurse Call Line Education Method  Verbal   Does patient have depression diagnosis?  Yes   Advanced Directives:  Patient Has [pt has materials]        The main concerns and/or symptoms the patient would like to address are: Follow up RN-ACM outreach call made to pt. He denies any symptoms or concerns at this time.    Education/instruction provided by Care Coordinator: reviewed with pt: BS monitoring- pt reports compliant; disease education regarding DM; diet; med adherence- pt reports complaint; medical appointments; RN-ACM contact information; [facility] 24 hour nurse line; health maintenance gaps; AD- pt has materials; MyChart- active; SDOH- pt denies food, medication, or transportation insecurities. UTD on AWV. Pt reports he will schedule next routine PCP appt. He has endocrinology appt scheduled for 6/29/21. Reports he is seeing ortho doctor for follow up. Patient exhibits strong sense of health self-management and adequate support system. Advised pt to call with any needs. Follow up outreach as needed.     Follow Up Outreach Due: as needed    Ioana Ellison RN  Ambulatory     6/4/2021,  15:26 EDT

## 2021-06-29 ENCOUNTER — TELEMEDICINE (OUTPATIENT)
Dept: ENDOCRINOLOGY | Facility: CLINIC | Age: 56
End: 2021-06-29

## 2021-06-29 VITALS — HEIGHT: 68 IN | WEIGHT: 185 LBS | BODY MASS INDEX: 28.04 KG/M2

## 2021-06-29 DIAGNOSIS — E11.65 UNCONTROLLED TYPE 2 DIABETES MELLITUS WITH HYPERGLYCEMIA (HCC): Primary | ICD-10-CM

## 2021-06-29 DIAGNOSIS — I10 BENIGN ESSENTIAL HYPERTENSION: Chronic | ICD-10-CM

## 2021-06-29 DIAGNOSIS — E78.2 MIXED HYPERLIPIDEMIA: ICD-10-CM

## 2021-06-29 DIAGNOSIS — E03.9 ACQUIRED HYPOTHYROIDISM: ICD-10-CM

## 2021-06-29 PROBLEM — Z47.89 ENCOUNTER FOR OTHER ORTHOPEDIC AFTERCARE: Status: ACTIVE | Noted: 2021-06-29

## 2021-06-29 PROBLEM — M61.50: Status: ACTIVE | Noted: 2021-06-29

## 2021-06-29 PROCEDURE — 99214 OFFICE O/P EST MOD 30 MIN: CPT | Performed by: INTERNAL MEDICINE

## 2021-06-29 RX ORDER — GABAPENTIN 400 MG/1
400 CAPSULE ORAL 3 TIMES DAILY
COMMUNITY
Start: 2021-06-08 | End: 2022-01-21 | Stop reason: SDUPTHER

## 2021-06-29 RX ORDER — HYDROCODONE BITARTRATE AND ACETAMINOPHEN 7.5; 325 MG/1; MG/1
TABLET ORAL
COMMUNITY
Start: 2021-05-03 | End: 2021-06-29

## 2021-06-29 NOTE — PATIENT INSTRUCTIONS
DC Janumet  Start Jardiance 10 mg p.o. daily  Drink plenty of fluids and if you develop any itching or rash in the genital region then call me  Continue rest of the medication  Labs in the next few days  Always keep glucose source in case of low blood sugar  Annual eye exam and flu vaccine.

## 2021-06-29 NOTE — PROGRESS NOTES
Endocrine Progress Note Outpatient     Patient Care Team:  Laura Whitaker MD as PCP - General  You have chosen to receive care through a telehealth visit.  Do you consent to use a video/audio connection for your medical care today? Yes.    Chief Complaint: Follow-up type 2 diabetes: Visit conducted through [facility]    HPI: 55-year-old male with history of type 2 diabetes, hypertension, hyperlipidemia, obesity and CKD stage III disease is followed through telehealth.    For type 2 diabetes: Is currently on Janumet XR 50/500, 1 tablet p.o. daily, Lantus 30 units subcu daily with Humalog 10 units with each meal.  He tells me blood sugars have improved running about 140 or below most of the time.  He denies any low blood sugars.  Complaining of diarrhea with Janumet.    Hypertension: No blood pressure readings are over this time.    Hyperlipidemia: On atorvastatin.    Hypothyroidism: On levothyroxine supplementation.    Past Medical History:   Diagnosis Date   • CKD (chronic kidney disease), stage III (CMS/HCC)    • Depression    • DJD (degenerative joint disease)    • DVT (deep venous thrombosis) (CMS/HCC)    • Ganglion     rt wrist   • GERD (gastroesophageal reflux disease)    • Hiatal hernia    • History of echocardiogram 04/2016    2D ECHO   • History of esophageal stricture     s/p dialation 40-50 times last EGD 04/2016   • History of pulmonary embolus (PE)     on long term anticoagulation   • Hyperlipidemia    • Hypertension    • Hypothyroidism    • IBS (irritable bowel syndrome)    • Neuropathy    • Rash     rt lower hip   • Retinopathy    • Seasonal allergies    • Sleep apnea     cpap  bring dos   • Type 2 diabetes mellitus with peripheral vascular disease (CMS/HCC)    • Vitamin D deficiency        Social History     Socioeconomic History   • Marital status:      Spouse name: Not on file   • Number of children: Not on file   • Years of education: Not on file   • Highest education level: Not on  file   Tobacco Use   • Smoking status: Never Smoker   • Smokeless tobacco: Never Used   Vaping Use   • Vaping Use: Never used   Substance and Sexual Activity   • Alcohol use: No   • Drug use: No   • Sexual activity: Defer       Family History   Problem Relation Age of Onset   • Diabetes Mother    • Heart disease Mother    • Leukemia Father    • Sleep apnea Maternal Aunt         GENO   • Stroke Maternal Grandmother    • Hypertension Other    • Hyperlipidemia Other    • Cancer Other    • Colon cancer Other         uncle       Allergies   Allergen Reactions   • Lisinopril Cough       ROS:   Constitutional:  Denies fatigue, tiredness.    Eyes:  Denies change in visual acuity   HENT:  Denies nasal congestion or sore throat   Respiratory: denies cough, shortness of breath.   Cardiovascular:  denies chest pain, edema   GI:  Denies abdominal pain, nausea, vomiting.   Musculoskeletal:  Denies back pain or joint pain   Integument:  Denies dry skin and rash   Neurologic:  Denies headache, focal weakness or sensory changes   Endocrine:  Denies polyuria or polydipsia   Psychiatric:  Denies depression or anxiety      There were no vitals filed for this visit.    Physical Exam:  GEN: NAD, looks healthy on video.  Alert and oriented and able to carry on conversation.  Breathing effort was normal.  Mood and affect was normal.      Results Review:     I reviewed the patient's new clinical results.    Lab Results   Component Value Date    HGBA1C 10.9 (H) 10/28/2020    HGBA1C 11.8 (H) 07/28/2020    HGBA1C 11.0 (H) 06/24/2020      Lab Results   Component Value Date    GLUCOSE 164 (H) 04/29/2021    BUN 16 04/29/2021    CREATININE 1.17 04/29/2021    EGFRIFNONA 64 04/29/2021    BCR 13.7 04/29/2021    K 4.6 04/29/2021    CO2 25.0 04/29/2021    CALCIUM 9.1 04/29/2021    ALBUMIN 4.60 10/28/2020    LABIL2 1.3 04/22/2019    AST 18 10/28/2020    ALT 20 10/28/2020    CHOL 149 06/24/2020    TRIG 178 (H) 06/24/2020    LDL 78 06/24/2020    HDL 35 (L)  06/24/2020     Lab Results   Component Value Date    TSH 3.430 10/28/2020    FREET4 1.00 01/19/2017         Medication Review: Reviewed.       Current Outpatient Medications:   •  Accu-Chek FastClix Lancets misc, 1 each 4 (Four) Times a Day Before Meals & at Bedtime. Dx: E11.65, Disp: 408 each, Rfl: 3  •  atorvastatin (LIPITOR) 40 MG tablet, Take 1 tablet by mouth Daily., Disp: 90 tablet, Rfl: 0  •  B-D UF III MINI PEN NEEDLES 31G X 5 MM misc, Use with insulin 4 times a day, Disp: 400 each, Rfl: 3  •  Blood Glucose Monitoring Suppl (Accu-Chek Guide Me) w/Device kit, 1 each Daily. Use to test blood sugars daily. E11.65, Disp: 1 kit, Rfl: 1  •  DULoxetine (CYMBALTA) 60 MG capsule, TAKE 1 CAPSULE EVERY MORNING, Disp: 90 capsule, Rfl: 0  •  Eliquis 5 MG tablet tablet, TAKE 1 TABLET TWICE DAILY, Disp: 180 tablet, Rfl: 0  •  EPINEPHrine (EPIPEN JR) 0.15 MG/0.3ML solution auto-injector injection, No longer taking, Disp: , Rfl:   •  gabapentin (NEURONTIN) 400 MG capsule, , Disp: , Rfl:   •  glucose blood (Accu-Chek Guide) test strip, 1 each by Other route 4 (Four) Times a Day Before Meals & at Bedtime. Dx: E11.65. Use as instructed, Disp: 400 each, Rfl: 0  •  insulin aspart (NovoLOG FlexPen) 100 UNIT/ML solution pen-injector sc pen, Inject 10 Units under the skin into the appropriate area as directed 3 (Three) Times a Day With Meals., Disp: 15 mL, Rfl: 6  •  Insulin Glargine (Lantus SoloStar) 100 UNIT/ML injection pen, INJECT SUBCUTANEOUSLY 27 UNITS EVERY BEDTIME (NEEDS APPOINTMENT) (Patient taking differently: Inject 30 Units under the skin into the appropriate area as directed Every Night. INJECT SUBCUTANEOUSLY 27 UNITS EVERY BEDTIME (NEEDS APPOINTMENT)), Disp: 30 mL, Rfl: 6  •  levothyroxine (SYNTHROID, LEVOTHROID) 100 MCG tablet, TAKE 1 TABLET EVERY DAY (Patient taking differently: Take 100 mcg by mouth Daily. Take DOS), Disp: 90 tablet, Rfl: 3  •  losartan (COZAAR) 50 MG tablet, TAKE 1 TABLET EVERY DAY, Disp: 90  tablet, Rfl: 0  •  metoprolol succinate XL (TOPROL-XL) 50 MG 24 hr tablet, TAKE 1 TABLET EVERY DAY (Patient taking differently: Take 50 mg by mouth Daily. Take DOS), Disp: 30 tablet, Rfl: 0  •  nitroglycerin (NITROSTAT) 0.4 MG SL tablet, Place 0.4 mg under the tongue Every 5 (Five) Minutes As Needed for Chest Pain., Disp: , Rfl:   •  omeprazole (priLOSEC) 40 MG capsule, Take 40 mg by mouth Daily. Take DOS, Disp: , Rfl:   •  ondansetron (ZOFRAN) 4 MG tablet, TAKE 1 TABLET EVERY 8 HOURS AS NEEDED FOR NAUSEA OR VOMITING. (Patient taking differently: Take 8 mg by mouth Every 8 (Eight) Hours As Needed for Nausea or Vomiting.), Disp: 45 tablet, Rfl: 0  •  polyethylene glycol (MIRALAX) 17 g packet, Take 17 g by mouth Daily. (Patient taking differently: Take 17 g by mouth Daily As Needed.), Disp: 7 each, Rfl: 0  •  SITagliptin-metFORMIN HCl ER (JANUMET XR)  MG tablet, Take 1 tablet by mouth 2 (two) times a day. (Patient taking differently: Take 1 tablet by mouth 2 (two) times a day. Stop now for surgery), Disp: 180 tablet, Rfl: 3      Assessment/Plan   1.  Diabetes mellitus type 2: Uncontrolled, will DC Janumet due to diarrhea and add Jardiance 10 mg p.o. daily.  We will continue Lantus and Humalog.  Advised to continue to work on diet and activity and always keep glucose source in case of low blood sugar.  Will check A1c.  He verbalized understanding.  Also advised to get regular eye exam.    2.  Hypertension: Check blood pressure at home and send readings for review    3.  Hyperlipidemia: On atorvastatin, will follow lipid panel.    4.  Hypothyroidism: On levothyroxine supplementation.  Will follow TSH and free T4.            Tamara Michaels MD FACE.

## 2021-07-02 DIAGNOSIS — E78.2 MIXED HYPERLIPIDEMIA: ICD-10-CM

## 2021-07-02 RX ORDER — ATORVASTATIN CALCIUM 40 MG/1
TABLET, FILM COATED ORAL
Qty: 90 TABLET | Refills: 0 | Status: SHIPPED | OUTPATIENT
Start: 2021-07-02 | End: 2021-08-10 | Stop reason: HOSPADM

## 2021-07-02 RX ORDER — BLOOD SUGAR DIAGNOSTIC
STRIP MISCELLANEOUS
OUTPATIENT
Start: 2021-07-02

## 2021-07-02 RX ORDER — APIXABAN 5 MG/1
TABLET, FILM COATED ORAL
Qty: 180 TABLET | Refills: 0 | Status: SHIPPED | OUTPATIENT
Start: 2021-07-02 | End: 2021-10-20

## 2021-07-15 ENCOUNTER — TELEPHONE (OUTPATIENT)
Dept: ENDOCRINOLOGY | Facility: CLINIC | Age: 56
End: 2021-07-15

## 2021-07-15 NOTE — TELEPHONE ENCOUNTER
DC Jardiance, increase Lantus to 35 units subcu nightly and Humalog to 12 with each meal and send blood sugar records for review.

## 2021-07-15 NOTE — TELEPHONE ENCOUNTER
Patient states he does not feel the Jardiance is working. He states his blood sugars are running in the 200 range and today it is 300. He states he is getting up a lot at night to urinate. I requested patient to send in blood sugars. He states he will do that.

## 2021-07-19 ENCOUNTER — TELEPHONE (OUTPATIENT)
Dept: ENDOCRINOLOGY | Facility: CLINIC | Age: 56
End: 2021-07-19

## 2021-07-23 ENCOUNTER — TELEPHONE (OUTPATIENT)
Dept: ENDOCRINOLOGY | Facility: CLINIC | Age: 56
End: 2021-07-23

## 2021-08-02 ENCOUNTER — APPOINTMENT (OUTPATIENT)
Dept: GENERAL RADIOLOGY | Facility: HOSPITAL | Age: 56
End: 2021-08-02

## 2021-08-02 ENCOUNTER — HOSPITAL ENCOUNTER (INPATIENT)
Facility: HOSPITAL | Age: 56
LOS: 8 days | Discharge: HOME | End: 2021-08-10
Attending: EMERGENCY MEDICINE | Admitting: PSYCHIATRY & NEUROLOGY

## 2021-08-02 ENCOUNTER — APPOINTMENT (OUTPATIENT)
Dept: MRI IMAGING | Facility: HOSPITAL | Age: 56
End: 2021-08-02

## 2021-08-02 ENCOUNTER — APPOINTMENT (OUTPATIENT)
Dept: CT IMAGING | Facility: HOSPITAL | Age: 56
End: 2021-08-02

## 2021-08-02 ENCOUNTER — APPOINTMENT (OUTPATIENT)
Dept: CARDIOLOGY | Facility: HOSPITAL | Age: 56
End: 2021-08-02

## 2021-08-02 DIAGNOSIS — E78.2 MIXED HYPERLIPIDEMIA: ICD-10-CM

## 2021-08-02 DIAGNOSIS — E13.9 DIABETES 1.5, MANAGED AS TYPE 1 (HCC): ICD-10-CM

## 2021-08-02 DIAGNOSIS — I63.9 CEREBROVASCULAR ACCIDENT (CVA), UNSPECIFIED MECHANISM (HCC): Primary | ICD-10-CM

## 2021-08-02 DIAGNOSIS — R13.10 DYSPHAGIA, UNSPECIFIED TYPE: ICD-10-CM

## 2021-08-02 LAB
AMPHET+METHAMPHET UR QL: NEGATIVE
ANION GAP SERPL CALCULATED.3IONS-SCNC: 12 MMOL/L (ref 5–15)
APTT PPP: 25.3 SECONDS (ref 24–31)
BARBITURATES UR QL SCN: NEGATIVE
BASOPHILS # BLD AUTO: 0.1 10*3/MM3 (ref 0–0.2)
BASOPHILS NFR BLD AUTO: 1 % (ref 0–1.5)
BENZODIAZ UR QL SCN: NEGATIVE
BH CV ECHO MEAS - ACS: 1.6 CM
BH CV ECHO MEAS - AO MAX PG (FULL): 0.59 MMHG
BH CV ECHO MEAS - AO MAX PG: 6.7 MMHG
BH CV ECHO MEAS - AO MEAN PG (FULL): 0.43 MMHG
BH CV ECHO MEAS - AO MEAN PG: 3.4 MMHG
BH CV ECHO MEAS - AO ROOT AREA (BSA CORRECTED): 1.6
BH CV ECHO MEAS - AO ROOT AREA: 7.8 CM^2
BH CV ECHO MEAS - AO ROOT DIAM: 3.2 CM
BH CV ECHO MEAS - AO V2 MAX: 129.4 CM/SEC
BH CV ECHO MEAS - AO V2 MEAN: 86.9 CM/SEC
BH CV ECHO MEAS - AO V2 VTI: 21.8 CM
BH CV ECHO MEAS - AORTIC HR: 77.3 BPM
BH CV ECHO MEAS - AORTIC R-R: 0.78 SEC
BH CV ECHO MEAS - ASC AORTA: 2.7 CM
BH CV ECHO MEAS - AVA(I,A): 3.6 CM^2
BH CV ECHO MEAS - AVA(I,D): 3.6 CM^2
BH CV ECHO MEAS - AVA(V,A): 2.9 CM^2
BH CV ECHO MEAS - AVA(V,D): 2.9 CM^2
BH CV ECHO MEAS - BSA(HAYCOCK): 2 M^2
BH CV ECHO MEAS - BSA: 2 M^2
BH CV ECHO MEAS - BZI_BMI: 28.1 KILOGRAMS/M^2
BH CV ECHO MEAS - BZI_METRIC_HEIGHT: 172.7 CM
BH CV ECHO MEAS - BZI_METRIC_WEIGHT: 83.9 KG
BH CV ECHO MEAS - CI(AO): 6.7 L/MIN/M^2
BH CV ECHO MEAS - CI(LVOT): 3.1 L/MIN/M^2
BH CV ECHO MEAS - CO(AO): 13.2 L/MIN
BH CV ECHO MEAS - CO(LVOT): 6.1 L/MIN
BH CV ECHO MEAS - EDV(CUBED): 88.6 ML
BH CV ECHO MEAS - EDV(TEICH): 90.4 ML
BH CV ECHO MEAS - EF(CUBED): 80.9 %
BH CV ECHO MEAS - EF(MOD-BP): 74 %
BH CV ECHO MEAS - EF(TEICH): 73.7 %
BH CV ECHO MEAS - ESV(CUBED): 16.9 ML
BH CV ECHO MEAS - ESV(TEICH): 23.8 ML
BH CV ECHO MEAS - FS: 42.5 %
BH CV ECHO MEAS - IVS/LVPW: 0.92
BH CV ECHO MEAS - IVSD: 1 CM
BH CV ECHO MEAS - LV MASS(C)D: 167.9 GRAMS
BH CV ECHO MEAS - LV MASS(C)DI: 84.9 GRAMS/M^2
BH CV ECHO MEAS - LV MAX PG: 6.1 MMHG
BH CV ECHO MEAS - LV MEAN PG: 3 MMHG
BH CV ECHO MEAS - LV V1 MAX: 123.5 CM/SEC
BH CV ECHO MEAS - LV V1 MEAN: 80.7 CM/SEC
BH CV ECHO MEAS - LV V1 VTI: 25.8 CM
BH CV ECHO MEAS - LVIDD: 4.5 CM
BH CV ECHO MEAS - LVIDS: 2.6 CM
BH CV ECHO MEAS - LVOT AREA: 3 CM^2
BH CV ECHO MEAS - LVOT DIAM: 2 CM
BH CV ECHO MEAS - LVPWD: 1.1 CM
BH CV ECHO MEAS - MV A MAX VEL: 105.6 CM/SEC
BH CV ECHO MEAS - MV DEC SLOPE: 678.9 CM/SEC^2
BH CV ECHO MEAS - MV DEC TIME: 0.16 SEC
BH CV ECHO MEAS - MV E MAX VEL: 106 CM/SEC
BH CV ECHO MEAS - MV E/A: 1
BH CV ECHO MEAS - MV MAX PG: 5 MMHG
BH CV ECHO MEAS - MV MEAN PG: 1.8 MMHG
BH CV ECHO MEAS - MV V2 MAX: 111.9 CM/SEC
BH CV ECHO MEAS - MV V2 MEAN: 62.3 CM/SEC
BH CV ECHO MEAS - MV V2 VTI: 26.5 CM
BH CV ECHO MEAS - MVA(VTI): 3 CM^2
BH CV ECHO MEAS - PA ACC TIME: 0.06 SEC
BH CV ECHO MEAS - PA MAX PG (FULL): 2.6 MMHG
BH CV ECHO MEAS - PA MAX PG: 4.1 MMHG
BH CV ECHO MEAS - PA MEAN PG (FULL): 1.5 MMHG
BH CV ECHO MEAS - PA MEAN PG: 2.4 MMHG
BH CV ECHO MEAS - PA PR(ACCEL): 49.9 MMHG
BH CV ECHO MEAS - PA V2 MAX: 101 CM/SEC
BH CV ECHO MEAS - PA V2 MEAN: 73.3 CM/SEC
BH CV ECHO MEAS - PA V2 VTI: 20.7 CM
BH CV ECHO MEAS - PVA(I,A): 3.3 CM^2
BH CV ECHO MEAS - PVA(I,D): 3.3 CM^2
BH CV ECHO MEAS - PVA(V,A): 3.1 CM^2
BH CV ECHO MEAS - PVA(V,D): 3.1 CM^2
BH CV ECHO MEAS - QP/QS: 0.88
BH CV ECHO MEAS - RAP SYSTOLE: 3 MMHG
BH CV ECHO MEAS - RV MAX PG: 1.5 MMHG
BH CV ECHO MEAS - RV MEAN PG: 0.88 MMHG
BH CV ECHO MEAS - RV V1 MAX: 61.3 CM/SEC
BH CV ECHO MEAS - RV V1 MEAN: 44.9 CM/SEC
BH CV ECHO MEAS - RV V1 VTI: 13.7 CM
BH CV ECHO MEAS - RVDD: 2 CM
BH CV ECHO MEAS - RVOT AREA: 5 CM^2
BH CV ECHO MEAS - RVOT DIAM: 2.5 CM
BH CV ECHO MEAS - RVSP: 20.1 MMHG
BH CV ECHO MEAS - SI(AO): 86.2 ML/M^2
BH CV ECHO MEAS - SI(CUBED): 36.3 ML/M^2
BH CV ECHO MEAS - SI(LVOT): 39.7 ML/M^2
BH CV ECHO MEAS - SI(TEICH): 33.7 ML/M^2
BH CV ECHO MEAS - SV(AO): 170.4 ML
BH CV ECHO MEAS - SV(CUBED): 71.7 ML
BH CV ECHO MEAS - SV(LVOT): 78.6 ML
BH CV ECHO MEAS - SV(RVOT): 69.1 ML
BH CV ECHO MEAS - SV(TEICH): 66.6 ML
BH CV ECHO MEAS - TR MAX VEL: 206.6 CM/SEC
BILIRUB UR QL STRIP: NEGATIVE
BUN SERPL-MCNC: 16 MG/DL (ref 6–20)
BUN/CREAT SERPL: 13.2 (ref 7–25)
CALCIUM SPEC-SCNC: 9.3 MG/DL (ref 8.6–10.5)
CANNABINOIDS SERPL QL: NEGATIVE
CHLORIDE SERPL-SCNC: 101 MMOL/L (ref 98–107)
CHOLEST SERPL-MCNC: 147 MG/DL (ref 0–200)
CLARITY UR: CLEAR
CO2 SERPL-SCNC: 28 MMOL/L (ref 22–29)
COCAINE UR QL: NEGATIVE
COLOR UR: YELLOW
CREAT SERPL-MCNC: 1.21 MG/DL (ref 0.76–1.27)
DEPRECATED RDW RBC AUTO: 40.3 FL (ref 37–54)
EOSINOPHIL # BLD AUTO: 0.3 10*3/MM3 (ref 0–0.4)
EOSINOPHIL NFR BLD AUTO: 3.4 % (ref 0.3–6.2)
ERYTHROCYTE [DISTWIDTH] IN BLOOD BY AUTOMATED COUNT: 14.3 % (ref 12.3–15.4)
ETHANOL UR QL: <0.01 %
GFR SERPL CREATININE-BSD FRML MDRD: 62 ML/MIN/1.73
GLUCOSE BLDC GLUCOMTR-MCNC: 113 MG/DL (ref 70–105)
GLUCOSE BLDC GLUCOMTR-MCNC: 145 MG/DL (ref 70–105)
GLUCOSE BLDC GLUCOMTR-MCNC: 155 MG/DL (ref 70–105)
GLUCOSE BLDC GLUCOMTR-MCNC: 67 MG/DL (ref 70–105)
GLUCOSE SERPL-MCNC: 152 MG/DL (ref 65–99)
GLUCOSE UR STRIP-MCNC: ABNORMAL MG/DL
HBA1C MFR BLD: 11.2 % (ref 3.5–5.6)
HCT VFR BLD AUTO: 42.1 % (ref 37.5–51)
HDLC SERPL-MCNC: 46 MG/DL (ref 40–60)
HGB BLD-MCNC: 13.9 G/DL (ref 13–17.7)
HGB UR QL STRIP.AUTO: NEGATIVE
HOLD SPECIMEN: NORMAL
INR PPP: 0.99 (ref 0.93–1.1)
KETONES UR QL STRIP: NEGATIVE
LDLC SERPL CALC-MCNC: 78 MG/DL (ref 0–100)
LDLC/HDLC SERPL: 1.62 {RATIO}
LEUKOCYTE ESTERASE UR QL STRIP.AUTO: NEGATIVE
LV EF 2D ECHO EST: 70 %
LYMPHOCYTES # BLD AUTO: 1.7 10*3/MM3 (ref 0.7–3.1)
LYMPHOCYTES NFR BLD AUTO: 21.5 % (ref 19.6–45.3)
MCH RBC QN AUTO: 26.6 PG (ref 26.6–33)
MCHC RBC AUTO-ENTMCNC: 33.1 G/DL (ref 31.5–35.7)
MCV RBC AUTO: 80.4 FL (ref 79–97)
METHADONE UR QL SCN: NEGATIVE
MONOCYTES # BLD AUTO: 0.7 10*3/MM3 (ref 0.1–0.9)
MONOCYTES NFR BLD AUTO: 8.6 % (ref 5–12)
NEUTROPHILS NFR BLD AUTO: 5.1 10*3/MM3 (ref 1.7–7)
NEUTROPHILS NFR BLD AUTO: 65.5 % (ref 42.7–76)
NITRITE UR QL STRIP: NEGATIVE
NRBC BLD AUTO-RTO: 0.1 /100 WBC (ref 0–0.2)
OPIATES UR QL: NEGATIVE
OXYCODONE UR QL SCN: NEGATIVE
PH UR STRIP.AUTO: 6 [PH] (ref 5–8)
PLATELET # BLD AUTO: 244 10*3/MM3 (ref 140–450)
PMV BLD AUTO: 8.2 FL (ref 6–12)
POTASSIUM SERPL-SCNC: 4.2 MMOL/L (ref 3.5–5.2)
PROT UR QL STRIP: NEGATIVE
PROTHROMBIN TIME: 11 SECONDS (ref 9.6–11.7)
RBC # BLD AUTO: 5.24 10*6/MM3 (ref 4.14–5.8)
SARS-COV-2 RNA PNL SPEC NAA+PROBE: NOT DETECTED
SODIUM SERPL-SCNC: 141 MMOL/L (ref 136–145)
SP GR UR STRIP: 1.04 (ref 1–1.03)
T4 FREE SERPL-MCNC: 1.59 NG/DL (ref 0.93–1.7)
TRIGL SERPL-MCNC: 133 MG/DL (ref 0–150)
TSH SERPL DL<=0.05 MIU/L-ACNC: 4.81 UIU/ML (ref 0.27–4.2)
UROBILINOGEN UR QL STRIP: ABNORMAL
VIT B12 BLD-MCNC: 315 PG/ML (ref 211–946)
VLDLC SERPL-MCNC: 23 MG/DL (ref 5–40)
WBC # BLD AUTO: 7.8 10*3/MM3 (ref 3.4–10.8)

## 2021-08-02 PROCEDURE — 84443 ASSAY THYROID STIM HORMONE: CPT | Performed by: NURSE PRACTITIONER

## 2021-08-02 PROCEDURE — 99223 1ST HOSP IP/OBS HIGH 75: CPT | Performed by: FAMILY MEDICINE

## 2021-08-02 PROCEDURE — 85610 PROTHROMBIN TIME: CPT | Performed by: EMERGENCY MEDICINE

## 2021-08-02 PROCEDURE — 25010000002 HYDROMORPHONE PER 4 MG: Performed by: EMERGENCY MEDICINE

## 2021-08-02 PROCEDURE — 70450 CT HEAD/BRAIN W/O DYE: CPT

## 2021-08-02 PROCEDURE — 80061 LIPID PANEL: CPT | Performed by: NURSE PRACTITIONER

## 2021-08-02 PROCEDURE — 83036 HEMOGLOBIN GLYCOSYLATED A1C: CPT | Performed by: NURSE PRACTITIONER

## 2021-08-02 PROCEDURE — 82077 ASSAY SPEC XCP UR&BREATH IA: CPT | Performed by: FAMILY MEDICINE

## 2021-08-02 PROCEDURE — 81003 URINALYSIS AUTO W/O SCOPE: CPT | Performed by: FAMILY MEDICINE

## 2021-08-02 PROCEDURE — 80307 DRUG TEST PRSMV CHEM ANLYZR: CPT | Performed by: FAMILY MEDICINE

## 2021-08-02 PROCEDURE — 70496 CT ANGIOGRAPHY HEAD: CPT

## 2021-08-02 PROCEDURE — 93005 ELECTROCARDIOGRAM TRACING: CPT | Performed by: EMERGENCY MEDICINE

## 2021-08-02 PROCEDURE — 0 IOPAMIDOL PER 1 ML: Performed by: EMERGENCY MEDICINE

## 2021-08-02 PROCEDURE — 63710000001 INSULIN GLARGINE PER 5 UNITS: Performed by: NURSE PRACTITIONER

## 2021-08-02 PROCEDURE — 63710000001 INSULIN LISPRO (HUMAN) PER 5 UNITS: Performed by: NURSE PRACTITIONER

## 2021-08-02 PROCEDURE — 82962 GLUCOSE BLOOD TEST: CPT

## 2021-08-02 PROCEDURE — 87635 SARS-COV-2 COVID-19 AMP PRB: CPT | Performed by: EMERGENCY MEDICINE

## 2021-08-02 PROCEDURE — 70498 CT ANGIOGRAPHY NECK: CPT

## 2021-08-02 PROCEDURE — 93306 TTE W/DOPPLER COMPLETE: CPT

## 2021-08-02 PROCEDURE — 93306 TTE W/DOPPLER COMPLETE: CPT | Performed by: INTERNAL MEDICINE

## 2021-08-02 PROCEDURE — 85025 COMPLETE CBC W/AUTO DIFF WBC: CPT | Performed by: EMERGENCY MEDICINE

## 2021-08-02 PROCEDURE — 82607 VITAMIN B-12: CPT | Performed by: NURSE PRACTITIONER

## 2021-08-02 PROCEDURE — 84439 ASSAY OF FREE THYROXINE: CPT | Performed by: FAMILY MEDICINE

## 2021-08-02 PROCEDURE — 71045 X-RAY EXAM CHEST 1 VIEW: CPT

## 2021-08-02 PROCEDURE — 25010000002 ONDANSETRON PER 1 MG: Performed by: EMERGENCY MEDICINE

## 2021-08-02 PROCEDURE — 99222 1ST HOSP IP/OBS MODERATE 55: CPT | Performed by: NURSE PRACTITIONER

## 2021-08-02 PROCEDURE — 99285 EMERGENCY DEPT VISIT HI MDM: CPT

## 2021-08-02 PROCEDURE — 80048 BASIC METABOLIC PNL TOTAL CA: CPT | Performed by: EMERGENCY MEDICINE

## 2021-08-02 PROCEDURE — 85730 THROMBOPLASTIN TIME PARTIAL: CPT | Performed by: EMERGENCY MEDICINE

## 2021-08-02 PROCEDURE — 70551 MRI BRAIN STEM W/O DYE: CPT

## 2021-08-02 RX ORDER — SODIUM CHLORIDE 0.9 % (FLUSH) 0.9 %
10 SYRINGE (ML) INJECTION AS NEEDED
Status: DISCONTINUED | OUTPATIENT
Start: 2021-08-02 | End: 2021-08-10 | Stop reason: HOSPADM

## 2021-08-02 RX ORDER — HYDROCODONE BITARTRATE AND ACETAMINOPHEN 5; 325 MG/1; MG/1
1 TABLET ORAL EVERY 4 HOURS PRN
Status: DISPENSED | OUTPATIENT
Start: 2021-08-02 | End: 2021-08-09

## 2021-08-02 RX ORDER — ONDANSETRON 2 MG/ML
4 INJECTION INTRAMUSCULAR; INTRAVENOUS EVERY 6 HOURS PRN
Status: DISCONTINUED | OUTPATIENT
Start: 2021-08-02 | End: 2021-08-06

## 2021-08-02 RX ORDER — ATORVASTATIN CALCIUM 40 MG/1
80 TABLET, FILM COATED ORAL NIGHTLY
Status: DISCONTINUED | OUTPATIENT
Start: 2021-08-02 | End: 2021-08-10 | Stop reason: HOSPADM

## 2021-08-02 RX ORDER — ACETAMINOPHEN 650 MG/1
650 SUPPOSITORY RECTAL EVERY 4 HOURS PRN
Status: DISCONTINUED | OUTPATIENT
Start: 2021-08-02 | End: 2021-08-10 | Stop reason: HOSPADM

## 2021-08-02 RX ORDER — METOPROLOL SUCCINATE 50 MG/1
50 TABLET, EXTENDED RELEASE ORAL DAILY
Status: DISCONTINUED | OUTPATIENT
Start: 2021-08-02 | End: 2021-08-10 | Stop reason: HOSPADM

## 2021-08-02 RX ORDER — LEVOTHYROXINE SODIUM 0.1 MG/1
100 TABLET ORAL
Status: DISCONTINUED | OUTPATIENT
Start: 2021-08-03 | End: 2021-08-10 | Stop reason: HOSPADM

## 2021-08-02 RX ORDER — BISACODYL 10 MG
10 SUPPOSITORY, RECTAL RECTAL DAILY PRN
Status: DISCONTINUED | OUTPATIENT
Start: 2021-08-02 | End: 2021-08-10 | Stop reason: HOSPADM

## 2021-08-02 RX ORDER — ONDANSETRON 2 MG/ML
8 INJECTION INTRAMUSCULAR; INTRAVENOUS ONCE
Status: COMPLETED | OUTPATIENT
Start: 2021-08-02 | End: 2021-08-02

## 2021-08-02 RX ORDER — ATORVASTATIN CALCIUM 40 MG/1
40 TABLET, FILM COATED ORAL NIGHTLY
Status: DISCONTINUED | OUTPATIENT
Start: 2021-08-02 | End: 2021-08-02

## 2021-08-02 RX ORDER — SODIUM CHLORIDE 0.9 % (FLUSH) 0.9 %
3 SYRINGE (ML) INJECTION EVERY 12 HOURS SCHEDULED
Status: DISCONTINUED | OUTPATIENT
Start: 2021-08-02 | End: 2021-08-10 | Stop reason: HOSPADM

## 2021-08-02 RX ORDER — HYDROMORPHONE HCL 110MG/55ML
1 PATIENT CONTROLLED ANALGESIA SYRINGE INTRAVENOUS ONCE
Status: COMPLETED | OUTPATIENT
Start: 2021-08-02 | End: 2021-08-02

## 2021-08-02 RX ORDER — IPRATROPIUM BROMIDE AND ALBUTEROL SULFATE 2.5; .5 MG/3ML; MG/3ML
3 SOLUTION RESPIRATORY (INHALATION) EVERY 6 HOURS PRN
Status: DISCONTINUED | OUTPATIENT
Start: 2021-08-02 | End: 2021-08-10 | Stop reason: HOSPADM

## 2021-08-02 RX ORDER — ACETAMINOPHEN 325 MG/1
650 TABLET ORAL EVERY 4 HOURS PRN
Status: DISCONTINUED | OUTPATIENT
Start: 2021-08-02 | End: 2021-08-10 | Stop reason: HOSPADM

## 2021-08-02 RX ORDER — DULOXETIN HYDROCHLORIDE 30 MG/1
60 CAPSULE, DELAYED RELEASE ORAL EVERY MORNING
Status: DISCONTINUED | OUTPATIENT
Start: 2021-08-02 | End: 2021-08-08

## 2021-08-02 RX ORDER — LOSARTAN POTASSIUM 50 MG/1
50 TABLET ORAL DAILY
Status: DISCONTINUED | OUTPATIENT
Start: 2021-08-02 | End: 2021-08-05

## 2021-08-02 RX ORDER — PANTOPRAZOLE SODIUM 40 MG/1
40 TABLET, DELAYED RELEASE ORAL EVERY MORNING
Status: DISCONTINUED | OUTPATIENT
Start: 2021-08-02 | End: 2021-08-10 | Stop reason: HOSPADM

## 2021-08-02 RX ORDER — INSULIN GLARGINE 100 [IU]/ML
34 INJECTION, SOLUTION SUBCUTANEOUS NIGHTLY
Status: DISCONTINUED | OUTPATIENT
Start: 2021-08-02 | End: 2021-08-05

## 2021-08-02 RX ORDER — SODIUM CHLORIDE 0.9 % (FLUSH) 0.9 %
3-10 SYRINGE (ML) INJECTION AS NEEDED
Status: DISCONTINUED | OUTPATIENT
Start: 2021-08-02 | End: 2021-08-10 | Stop reason: HOSPADM

## 2021-08-02 RX ORDER — ASPIRIN 81 MG/1
81 TABLET, CHEWABLE ORAL DAILY
Status: DISCONTINUED | OUTPATIENT
Start: 2021-08-02 | End: 2021-08-06

## 2021-08-02 RX ORDER — INSULIN LISPRO 100 [IU]/ML
14 INJECTION, SOLUTION INTRAVENOUS; SUBCUTANEOUS
Status: DISCONTINUED | OUTPATIENT
Start: 2021-08-02 | End: 2021-08-10 | Stop reason: HOSPADM

## 2021-08-02 RX ORDER — GABAPENTIN 400 MG/1
400 CAPSULE ORAL 3 TIMES DAILY
Status: DISCONTINUED | OUTPATIENT
Start: 2021-08-02 | End: 2021-08-10 | Stop reason: HOSPADM

## 2021-08-02 RX ADMIN — ASPIRIN 81 MG: 81 TABLET, CHEWABLE ORAL at 13:13

## 2021-08-02 RX ADMIN — METOPROLOL SUCCINATE 50 MG: 50 TABLET, EXTENDED RELEASE ORAL at 13:13

## 2021-08-02 RX ADMIN — GABAPENTIN 400 MG: 400 CAPSULE ORAL at 20:20

## 2021-08-02 RX ADMIN — INSULIN LISPRO 14 UNITS: 100 INJECTION, SOLUTION INTRAVENOUS; SUBCUTANEOUS at 17:07

## 2021-08-02 RX ADMIN — IOPAMIDOL 100 ML: 755 INJECTION, SOLUTION INTRAVENOUS at 08:02

## 2021-08-02 RX ADMIN — GABAPENTIN 400 MG: 400 CAPSULE ORAL at 16:33

## 2021-08-02 RX ADMIN — Medication 3 ML: at 20:31

## 2021-08-02 RX ADMIN — Medication 3 ML: at 13:13

## 2021-08-02 RX ADMIN — INSULIN GLARGINE 34 UNITS: 100 INJECTION, SOLUTION SUBCUTANEOUS at 21:03

## 2021-08-02 RX ADMIN — ATORVASTATIN CALCIUM 80 MG: 40 TABLET, FILM COATED ORAL at 20:20

## 2021-08-02 RX ADMIN — ACETAMINOPHEN 650 MG: 325 TABLET, FILM COATED ORAL at 16:33

## 2021-08-02 RX ADMIN — HYDROCODONE BITARTRATE AND ACETAMINOPHEN 1 TABLET: 5; 325 TABLET ORAL at 20:20

## 2021-08-02 RX ADMIN — LOSARTAN POTASSIUM 50 MG: 50 TABLET, FILM COATED ORAL at 13:13

## 2021-08-02 RX ADMIN — PANTOPRAZOLE SODIUM 40 MG: 40 TABLET, DELAYED RELEASE ORAL at 13:13

## 2021-08-02 RX ADMIN — APIXABAN 5 MG: 5 TABLET, FILM COATED ORAL at 13:13

## 2021-08-02 RX ADMIN — DULOXETINE HYDROCHLORIDE 60 MG: 30 CAPSULE, DELAYED RELEASE ORAL at 13:13

## 2021-08-02 RX ADMIN — ONDANSETRON 8 MG: 2 INJECTION INTRAMUSCULAR; INTRAVENOUS at 06:27

## 2021-08-02 RX ADMIN — HYDROMORPHONE HYDROCHLORIDE 1 MG: 2 INJECTION, SOLUTION INTRAMUSCULAR; INTRAVENOUS; SUBCUTANEOUS at 06:27

## 2021-08-02 RX ADMIN — APIXABAN 5 MG: 5 TABLET, FILM COATED ORAL at 20:20

## 2021-08-02 NOTE — PLAN OF CARE
Goal Outcome Evaluation:   Patient arrived from ER with no complications. Patient is alert and oriented, able to make needs known to staff. Patient denies any pain or discomfort at this time. Patient admission and assessment completed. Will continue to monitor.   Problem: Adult Inpatient Plan of Care  Goal: Absence of Hospital-Acquired Illness or Injury  Intervention: Identify and Manage Fall Risk  Recent Flowsheet Documentation  Taken 8/2/2021 1151 by Jess Gannon RN  Safety Promotion/Fall Prevention:   activity supervised   assistive device/personal items within reach   clutter free environment maintained   fall prevention program maintained   lighting adjusted   nonskid shoes/slippers when out of bed   room organization consistent   safety round/check completed  Intervention: Prevent Skin Injury  Recent Flowsheet Documentation  Taken 8/2/2021 1151 by Jess Gannon RN  Body Position: position changed independently  Skin Protection:   adhesive use limited   incontinence pads utilized   tubing/devices free from skin contact  Intervention: Prevent and Manage VTE (venous thromboembolism) Risk  Recent Flowsheet Documentation  Taken 8/2/2021 1151 by Jess Gannon RN  VTE Prevention/Management:   left   sequential compression devices on  Intervention: Prevent Infection  Recent Flowsheet Documentation  Taken 8/2/2021 1151 by Jess Gannon RN  Infection Prevention:   environmental surveillance performed   equipment surfaces disinfected   hand hygiene promoted   personal protective equipment utilized   rest/sleep promoted   single patient room provided   visitors restricted/screened

## 2021-08-02 NOTE — CONSULTS
"    Primary Care Provider: Laura Whitaker*     Consult requested by: Dr. Marie    Reason for Consultation: Neurological evaluation    History taken from: patient chart RN    Chief complaint: right sided numbness       SUBJECTIVE:    History of present illness: Background per H&P: Kuldeep Adhikari is a 56 y.o. male with PMH of PVD s/p R BKA, DM Type II, PE on eliquis, CAD, HTN, HLD, hypothyroidism, asthma, anxiety, and GERD who presented to [facility] on 8/2/2021 right sided numbness and weakness. He stated he went to bed around 11pm in his normal state of health. He woke up around 4:30am and fell. He stated, \"It was like my right leg and arm were gone.\" He was using his right leg prosthesis and fell landing on his right side. He had numbness from the right side of his face all the way down his right arm and right leg. He denied any headache or vision changes. He is concerned that a tick bite might have caused his symptoms. He denied any prior stroke history. He has been taking his home medications including his Eliquis. CT head showed no acute findings. CTA head/neck showed no large vessel occlusion.  He was determined not to be a tPA candidate since he was outside the treatment window and patient is on eliquis.     - Portions of the above HPI were copied from previous encounters and edited as appropriate.    Pt reports non-compliance with his BP medication because he needs to see his MD for a refill. He is compliant with his Eliquis. Pt also reports non-compliance with his CPAP and says he needs new tubing. Pt reports his weakness has resolved and the numbness is improving.     Review of Systems   Constitutional: Negative for chills, diaphoresis and fatigue.   Eyes: Negative for photophobia and visual disturbance.   Neurological: Positive for weakness and numbness. Negative for dizziness, tremors, seizures, syncope, facial asymmetry, speech difficulty, light-headedness and headaches.    "         PATIENT HISTORY:  Past Medical History:   Diagnosis Date   • CKD (chronic kidney disease), stage III (CMS/[facility])    • Depression    • DJD (degenerative joint disease)    • DVT (deep venous thrombosis) (CMS/HCC)    • Ganglion     rt wrist   • GERD (gastroesophageal reflux disease)    • Hiatal hernia    • History of echocardiogram 04/2016    2D ECHO   • History of esophageal stricture     s/p dialation 40-50 times last EGD 04/2016   • History of pulmonary embolus (PE)     on long term anticoagulation   • Hyperlipidemia    • Hypertension    • Hypothyroidism    • IBS (irritable bowel syndrome)    • Neuropathy    • Rash     rt lower hip   • Retinopathy    • Seasonal allergies    • Sleep apnea     cpap  bring dos   • Type 2 diabetes mellitus with peripheral vascular disease (CMS/[facility])    • Vitamin D deficiency    ,   Past Surgical History:   Procedure Laterality Date   • AMPUTATION FOOT / TOE Right     great toe   • AMPUTATION REVISION Right 4/8/2021    Procedure: BELOW KNEE AMPUTATION REVISAION;  Surgeon: Eduardo Reynolds MD;  Location: [facility] MAIN OR;  Service: Orthopedics;  Laterality: Right;   • AMPUTATION REVISION Right 4/29/2021    Procedure: AMPUTATION REVISION KNEE STUMP;  Surgeon: Eduardo Reynolds MD;  Location: [facility] MAIN OR;  Service: Orthopedics;  Laterality: Right;   • BELOW KNEE AMPUTATION Right 7/30/2020    Procedure: AMPUTATION BELOW KNEE;  Surgeon: Eduardo Reynolds MD;  Location: [facility] MAIN OR;  Service: Orthopedics;  Laterality: Right;   • CARDIAC CATHETERIZATION  04/2018    [facility]   • ENDOSCOPY     • ENDOSCOPY N/A 10/4/2019    Procedure: ESOPHAGOGASTRODUODENOSCOPY with dilitation and biopsy x 1 area;  Surgeon: Declan Iqbal MD;  Location: [facility] ENDOSCOPY;  Service: Gastroenterology   • ENDOSCOPY N/A 12/13/2019    Procedure: ESOPHAGOGASTRODUODENOSCOPY WITH DILATATION (50, 52 BOUGIE);  Surgeon: Declan Iqbal MD;  Location: [facility] ENDOSCOPY;  Service: Gastroenterology   • GANGLION  CYST EXCISION Left    • HERNIA REPAIR Bilateral    • RETINOPATHY SURGERY      laser   • TOTAL HIP ARTHROPLASTY Left    • TOTAL HIP ARTHROPLASTY Left 2018   • TRANS METATARSAL AMPUTATION Right 3/17/2020    Procedure: AMPUTATION TRANS METATARSAL right;  Surgeon: ERWIN Baker DPM;  Location: [facility] MAIN OR;  Service: Podiatry;  Laterality: Right;  GANGRENOUS RIGHT FOOT   ,   Family History   Problem Relation Age of Onset   • Diabetes Mother    • Heart disease Mother    • Leukemia Father    • Sleep apnea Maternal Aunt         GENO   • Stroke Maternal Grandmother    • Hypertension Other    • Hyperlipidemia Other    • Cancer Other    • Colon cancer Other         uncle   ,   Social History     Tobacco Use   • Smoking status: Never Smoker   • Smokeless tobacco: Never Used   Vaping Use   • Vaping Use: Never used   Substance Use Topics   • Alcohol use: No   • Drug use: No   ,   Medications Prior to Admission   Medication Sig Dispense Refill Last Dose   • atorvastatin (LIPITOR) 40 MG tablet TAKE 1 TABLET EVERY DAY 90 tablet 0 8/1/2021 at Unknown time   • DULoxetine (CYMBALTA) 60 MG capsule TAKE 1 CAPSULE EVERY MORNING 90 capsule 0 8/1/2021 at Unknown time   • Eliquis 5 MG tablet tablet TAKE 1 TABLET TWICE DAILY 180 tablet 0 8/1/2021 at Unknown time   • empagliflozin (Jardiance) 10 MG tablet tablet Take 1 tablet by mouth Daily. 90 tablet 4 8/1/2021 at Unknown time   • gabapentin (NEURONTIN) 400 MG capsule Take 400 mg by mouth 3 (Three) Times a Day.   8/1/2021 at Unknown time   • insulin aspart (NovoLOG FlexPen) 100 UNIT/ML solution pen-injector sc pen Inject 10 Units under the skin into the appropriate area as directed 3 (Three) Times a Day With Meals. (Patient taking differently: Inject 14 Units under the skin into the appropriate area as directed 3 (Three) Times a Day With Meals.) 15 mL 6 8/1/2021 at Unknown time   • Insulin Glargine (Lantus SoloStar) 100 UNIT/ML injection pen INJECT SUBCUTANEOUSLY 27 UNITS EVERY BEDTIME  (NEEDS APPOINTMENT) (Patient taking differently: Inject 34 Units under the skin into the appropriate area as directed Every Night.) 30 mL 6 8/1/2021 at Unknown time   • levothyroxine (SYNTHROID, LEVOTHROID) 100 MCG tablet TAKE 1 TABLET EVERY DAY (Patient taking differently: Take 100 mcg by mouth Daily.) 90 tablet 3 8/1/2021 at Unknown time   • losartan (COZAAR) 50 MG tablet TAKE 1 TABLET EVERY DAY 90 tablet 0 8/1/2021 at Unknown time   • omeprazole (priLOSEC) 40 MG capsule Take 40 mg by mouth Daily. Take DOS   8/1/2021 at Unknown time   • metoprolol succinate XL (TOPROL-XL) 50 MG 24 hr tablet TAKE 1 TABLET EVERY DAY (Patient taking differently: Take 50 mg by mouth Daily.) 30 tablet 0    • ondansetron (ZOFRAN) 4 MG tablet TAKE 1 TABLET EVERY 8 HOURS AS NEEDED FOR NAUSEA OR VOMITING. (Patient taking differently: Take 8 mg by mouth Every 8 (Eight) Hours As Needed for Nausea or Vomiting.) 45 tablet 0    , Scheduled Meds:  apixaban, 5 mg, Oral, BID  atorvastatin, 40 mg, Oral, Nightly  DULoxetine, 60 mg, Oral, QAM  gabapentin, 400 mg, Oral, TID  insulin glargine, 34 Units, Subcutaneous, Nightly  insulin lispro, 14 Units, Subcutaneous, TID With Meals  [START ON 8/3/2021] levothyroxine, 100 mcg, Oral, Q AM  losartan, 50 mg, Oral, Daily  metoprolol succinate XL, 50 mg, Oral, Daily  pantoprazole, 40 mg, Oral, QAM  sodium chloride, 3 mL, Intravenous, Q12H    , Continuous Infusions:   , PRN Meds:  •  acetaminophen **OR** acetaminophen  •  bisacodyl  •  ipratropium-albuterol  •  ondansetron  •  [COMPLETED] Insert peripheral IV **AND** sodium chloride  •  sodium chloride, Allergies:  Lisinopril    ________________________________________________________        OBJECTIVE:  Upon today's exam, pt is awake and alert.       Neurologic Exam  PHYSICAL EXAM:    CONSTITUTIONAL: The patient is in no apparent distress, bright awake and alert. There is no shortness of breath.     HEENT: There is no tenderness over the temporal arteries  bilaterally. Normocephalic, atraumatic. TMJ open symmetrically without tenderness.    NECK: ROM normal, supple    RESPIRATORY: Breathing unlabored, Breath sounds are normal    CARDIAC: Regular rate and rhythm.     EXTREMITIES:  No clubbing, cyanosis or edema. Right BKA    PSYCHIATRIC: Mood/affect normal, judgement normal, appropriate    NEUROLOGICAL:    Cognition:   Fully oriented.  Fund of knowledge excellent.  Concentration and attention normal.   Language normal with normal comprehension, fluent speech, intact repetition and naming.   Short and long term memory appears intact    Cranial nerves;    II - pupils bilaterally equal reacting to light,  No new Visual field deficits;  Fundoscopic exam- Not able to be done, non-dilated exam  III,IV,VI: EOMI with no diplopia  V: Slightly decreased sensation on the right  VII: No facial asymmetry,  VIII: No New hearing abnormality  IX, X, XI: normal gag and shoulder shrug;  XII: tongue is in the midline.    Sensory: Decreased on the right     Motor: Strength 5/5 bilaterally upper and lower extremities. Right pronator drift.  No involuntary movements present. Normal tone and bulk.  Deep tendon reflexes: 2/4 and symmetrical in biceps, brachioradialis, triceps. Left-- knee 0/4 knees and ankle. Left toes mute.     Cerebellar: Finger to nose and mirror movements normal bilaterally.    Gait and balance: deferred    ________________________________________________________   RESULTS REVIEW:    VITAL SIGNS:   Temp:  [98.1 °F (36.7 °C)-99.2 °F (37.3 °C)] 98.1 °F (36.7 °C)  Heart Rate:  [] 85  Resp:  [15-18] 18  BP: (133-202)/(67-96) 156/78     LABS:  WBC   Date Value Ref Range Status   08/02/2021 7.80 3.40 - 10.80 10*3/mm3 Final     RBC   Date Value Ref Range Status   08/02/2021 5.24 4.14 - 5.80 10*6/mm3 Final     Hemoglobin   Date Value Ref Range Status   08/02/2021 13.9 13.0 - 17.7 g/dL Final     Hematocrit   Date Value Ref Range Status   08/02/2021 42.1 37.5 - 51.0 % Final      MCV   Date Value Ref Range Status   08/02/2021 80.4 79.0 - 97.0 fL Final     MCH   Date Value Ref Range Status   08/02/2021 26.6 26.6 - 33.0 pg Final     MCHC   Date Value Ref Range Status   08/02/2021 33.1 31.5 - 35.7 g/dL Final     RDW   Date Value Ref Range Status   08/02/2021 14.3 12.3 - 15.4 % Final     RDW-SD   Date Value Ref Range Status   08/02/2021 40.3 37.0 - 54.0 fl Final     MPV   Date Value Ref Range Status   08/02/2021 8.2 6.0 - 12.0 fL Final     Platelets   Date Value Ref Range Status   08/02/2021 244 140 - 450 10*3/mm3 Final     Neutrophil %   Date Value Ref Range Status   08/02/2021 65.5 42.7 - 76.0 % Final     Lymphocyte %   Date Value Ref Range Status   08/02/2021 21.5 19.6 - 45.3 % Final     Monocyte %   Date Value Ref Range Status   08/02/2021 8.6 5.0 - 12.0 % Final     Eosinophil %   Date Value Ref Range Status   08/02/2021 3.4 0.3 - 6.2 % Final     Basophil %   Date Value Ref Range Status   08/02/2021 1.0 0.0 - 1.5 % Final     Neutrophils, Absolute   Date Value Ref Range Status   08/02/2021 5.10 1.70 - 7.00 10*3/mm3 Final     Lymphocytes, Absolute   Date Value Ref Range Status   08/02/2021 1.70 0.70 - 3.10 10*3/mm3 Final     Monocytes, Absolute   Date Value Ref Range Status   08/02/2021 0.70 0.10 - 0.90 10*3/mm3 Final     Eosinophils, Absolute   Date Value Ref Range Status   08/02/2021 0.30 0.00 - 0.40 10*3/mm3 Final     Basophils, Absolute   Date Value Ref Range Status   08/02/2021 0.10 0.00 - 0.20 10*3/mm3 Final     nRBC   Date Value Ref Range Status   08/02/2021 0.1 0.0 - 0.2 /100 WBC Final     Glucose   Date Value Ref Range Status   08/02/2021 152 (H) 65 - 99 mg/dL Final     BUN   Date Value Ref Range Status   08/02/2021 16 6 - 20 mg/dL Final     Creatinine   Date Value Ref Range Status   08/02/2021 1.21 0.76 - 1.27 mg/dL Final     Sodium   Date Value Ref Range Status   08/02/2021 141 136 - 145 mmol/L Final     Potassium   Date Value Ref Range Status   08/02/2021 4.2 3.5 - 5.2 mmol/L  Final     Chloride   Date Value Ref Range Status   08/02/2021 101 98 - 107 mmol/L Final     CO2   Date Value Ref Range Status   08/02/2021 28.0 22.0 - 29.0 mmol/L Final     Calcium   Date Value Ref Range Status   08/02/2021 9.3 8.6 - 10.5 mg/dL Final     eGFR Non  Amer   Date Value Ref Range Status   08/02/2021 62 >60 mL/min/1.73 Final     BUN/Creatinine Ratio   Date Value Ref Range Status   08/02/2021 13.2 7.0 - 25.0 Final     Anion Gap   Date Value Ref Range Status   08/02/2021 12.0 5.0 - 15.0 mmol/L Final       Lab Results   Component Value Date    TSH 4.810 (H) 08/02/2021    LDL 78 08/02/2021    HGBA1C 10.9 (H) 10/28/2020         IMAGING STUDIES:  CT Head Without Contrast    Result Date: 8/2/2021  No acute findings.  Electronically Signed By-Bridger Cazares MD On:8/2/2021 8:04 AM This report was finalized on [id number] by  Bridger Cazares MD.    CT Angiogram Neck    Result Date: 8/2/2021   1. No significant carotid stenosis identified on either side. Widely patent dominant right vertebral artery. 2. The left vertebral artery is diminutive, and appears to terminate after the origin of the PICA branch. This may be due to normal variant anatomy. However, there are no prior studies available for comparison to confirm this is chronic/congenital finding. Correlation with patient exam and/or MRI brain may be helpful to determine significance of this finding. Additional vascular and nonvascular findings as given above.  Electronically Signed By-Ian Mauricio MD On:8/2/2021 9:25 AM This report was finalized on [id number] by  Ian Mauricio MD.    MRI Brain Without Contrast    Result Date: 8/2/2021  12 mm acute lacunar infarct within the posterior limb of the left internal capsule with possible involvement of the posterior left lentiform nucleus.   Electronically Signed By-Bridger Caazres MD On:8/2/2021 10:48 AM This report was finalized on [id number] by  Bridger Cazares MD.    XR Chest 1 View    Result Date: 8/2/2021  No  active disease.  Electronically Signed By-Bridger Cazares MD On:8/2/2021 7:22 AM This report was finalized on [id number] by  Bridger Cazares MD.    CT Angiogram Head    Result Date: 8/2/2021   1. No significant carotid stenosis identified on either side. Widely patent dominant right vertebral artery. 2. The left vertebral artery is diminutive, and appears to terminate after the origin of the PICA branch. This may be due to normal variant anatomy. However, there are no prior studies available for comparison to confirm this is chronic/congenital finding. Correlation with patient exam and/or MRI brain may be helpful to determine significance of this finding. Additional vascular and nonvascular findings as given above.  Electronically Signed By-Ian Mauricio MD On:8/2/2021 9:25 AM This report was finalized on [id number] by  Ian Mauricio MD.      I reviewed the patient's new clinical results.      ________________________________________________________     PROBLEM LIST:    Cerebrovascular accident (CVA) (CMS/HCC)    Benign essential hypertension    Chronic coronary artery disease    Chronic renal insufficiency, stage III (moderate) (CMS/HCC)    Depression    Diabetic peripheral neuropathy (CMS/HCC)    Gastroesophageal reflux disease    History of pulmonary embolism    Mixed hyperlipidemia    Acquired hypothyroidism    Obstructive sleep apnea syndrome    Peripheral vascular disease (CMS/HCC)    Type 2 diabetes mellitus with peripheral vascular disease (CMS/HCC)          Assessment/Plan   ASSESSMENT/PLAN:  1. Acute small vessel ischemic stroke involving the left internal capsule.  Pt has numerous stroke risk factors including uncontrolled hypertension, hyperlipidemia, hypothyroidism, obstructive sleep apnea, and type 2 diabetes.  - CT head: No acute findings.  - CTA head and neck: No significant carotid stenosis identified on either side. Widely  patent dominant right vertebral artery. Left vertebral artery is diminutive,  and appears to terminate after the origin of the PICA branch, possible anatomic variant.   - MRI brain: 12 mm acute lacunar infarct within the posterior limb of the left internal capsule with possible involvement of the posterior left lentiform nucleus. Images reviewed: subacute ischemic stroke in the left basal ganglia.   - EKG: Sinus rhythm. Nonspecific T abnormalities, lateral leads  - Labs: A1C: P, B12: P, LDL: 78, TSH: 4.81  - Echo: EF = 70% Calcified nodular density that is attached to the anterior leaflet of the mitral valve that was reported even on the prior echocardiogram unlikely to be a vegetation most likely just a calcified nodule.   - Antithrombotics: Continue Eliquis 5mg BID. Start ASA 81mg daily. Discussed risks/benefits of anticoagulation and antiplatelet therapy including the increased risk of bleeding. Benefits are felt to outweigh the risks. Pt reports understanding and agrees.  - Statin: Lipitor 80mg qhs  - PT/OT/ST as appropriate, Neuro checks per protocol, DVT prophylaxis, Stroke education    2. Calcified nodular density attached to the anterior leaflet of the mitral valve  - Per echo, if there is a clinical concern for embolic or infectious pathology consider further evaluation work-up including a BARON  - From neurology standpoint, this stroke is small vessel with no evidence of embolic disease. No signs of infection. Will defer further workup to cardiology/primary.   - Pt currently on Eliquis for hx of DVT    3. Hypertension, hypertensive crisis on admission (202/96)  - Strict BP control, monitor per protocol    4. HLD  - Statin, recommend diet and lifestyle modifications    5. Hx PE  - On chronic anticoagulation with Eliquis    6. Hypothyroidism   - TSH 4.81, continue home meds    7. GENO  - Encourage compliance with CPAP, okay to use home device or contact RT to use hospital device while inpatient.     8. Type 2 diabetes mellitus  - Strict glycemic control, SSI per primary    9. CKD stage  III  - Monitor closely, per primary    10. Modification of stroke risk factors:   - Blood pressure should be less than 130/80 outpatient, HbA1c less than 6.5, LDL less than 70; b12>500 and smoking cessation if applicable. We would be grateful if the primary team / primary care physician would keep a close watch on the above targets.  - Stroke education  - Follow up with neurologist of choice    Will sign off. Please call with questions or concerns.       I discussed the patient's findings and my recommendations with patient, nursing staff and consulting provider    KEYLA Mercado  08/02/21  12:55 EDT

## 2021-08-02 NOTE — ED PROVIDER NOTES
Subjective   History of Present Illness  56-year-old male awoke this morning feeling numb on the right side of his body and had weakness compared to the left.  He is right side dominant.  The patient did not have any type of headache or change in vision.  He denies any previous history of stroke.  The patient has type 2 diabetes with severe peripheral vascular disease.  The patient has neuropathy as well as hypertension and has had a prior previous DVT.  The patient is on Eliquis.  Patient has had a previous right BKA  Review of Systems   Constitutional: Positive for activity change.   HENT: Negative.    Eyes: Negative.    Respiratory: Negative.    Cardiovascular: Negative.    Gastrointestinal: Negative.    Endocrine: Negative.    Genitourinary: Negative.    Musculoskeletal: Positive for arthralgias and myalgias.   Skin: Negative.    Allergic/Immunologic: Negative.    Neurological: Positive for weakness and numbness.   Hematological: Negative.    Psychiatric/Behavioral: Negative.        Past Medical History:   Diagnosis Date   • CKD (chronic kidney disease), stage III (CMS/[facility])    • Depression    • DJD (degenerative joint disease)    • DVT (deep venous thrombosis) (CMS/[facility])    • Ganglion     rt wrist   • GERD (gastroesophageal reflux disease)    • Hiatal hernia    • History of echocardiogram 04/2016    2D ECHO   • History of esophageal stricture     s/p dialation 40-50 times last EGD 04/2016   • History of pulmonary embolus (PE)     on long term anticoagulation   • Hyperlipidemia    • Hypertension    • Hypothyroidism    • IBS (irritable bowel syndrome)    • Neuropathy    • Rash     rt lower hip   • Retinopathy    • Seasonal allergies    • Sleep apnea     cpap  bring dos   • Type 2 diabetes mellitus with peripheral vascular disease (CMS/[facility])    • Vitamin D deficiency        Allergies   Allergen Reactions   • Lisinopril Cough       Past Surgical History:   Procedure Laterality Date   • AMPUTATION FOOT / TOE Right      great toe   • AMPUTATION REVISION Right 4/8/2021    Procedure: BELOW KNEE AMPUTATION REVISAION;  Surgeon: Eduardo Reynolds MD;  Location: [facility] MAIN OR;  Service: Orthopedics;  Laterality: Right;   • AMPUTATION REVISION Right 4/29/2021    Procedure: AMPUTATION REVISION KNEE STUMP;  Surgeon: Eduardo Reynolds MD;  Location: [facility] MAIN OR;  Service: Orthopedics;  Laterality: Right;   • BELOW KNEE AMPUTATION Right 7/30/2020    Procedure: AMPUTATION BELOW KNEE;  Surgeon: Eduardo Reynolds MD;  Location: [facility] MAIN OR;  Service: Orthopedics;  Laterality: Right;   • CARDIAC CATHETERIZATION  04/2018    [facility]   • ENDOSCOPY     • ENDOSCOPY N/A 10/4/2019    Procedure: ESOPHAGOGASTRODUODENOSCOPY with dilitation and biopsy x 1 area;  Surgeon: Declan Iqbal MD;  Location: [facility] ENDOSCOPY;  Service: Gastroenterology   • ENDOSCOPY N/A 12/13/2019    Procedure: ESOPHAGOGASTRODUODENOSCOPY WITH DILATATION (50, 52 BOUGIE);  Surgeon: Declan Iqbal MD;  Location: [facility] ENDOSCOPY;  Service: Gastroenterology   • GANGLION CYST EXCISION Left    • HERNIA REPAIR Bilateral    • RETINOPATHY SURGERY      laser   • TOTAL HIP ARTHROPLASTY Left    • TOTAL HIP ARTHROPLASTY Left 2018   • TRANS METATARSAL AMPUTATION Right 3/17/2020    Procedure: AMPUTATION TRANS METATARSAL right;  Surgeon: ERWIN Baker DPM;  Location: [facility] MAIN OR;  Service: Podiatry;  Laterality: Right;  GANGRENOUS RIGHT FOOT       Family History   Problem Relation Age of Onset   • Diabetes Mother    • Heart disease Mother    • Leukemia Father    • Sleep apnea Maternal Aunt         GENO   • Stroke Maternal Grandmother    • Hypertension Other    • Hyperlipidemia Other    • Cancer Other    • Colon cancer Other         uncle       Social History     Socioeconomic History   • Marital status:      Spouse name: Not on file   • Number of children: Not on file   • Years of education: Not on file   • Highest education level: Not on file   Tobacco Use   • Smoking  status: Never Smoker   • Smokeless tobacco: Never Used   Vaping Use   • Vaping Use: Never used   Substance and Sexual Activity   • Alcohol use: No   • Drug use: No   • Sexual activity: Defer           Objective   Physical Exam  Patient is awake and alert he is afebrile his vital signs are stable with blood pressure 155/69.  The HEENT exam pupils equal round reactive to light EOMs are full ENT is unremarkable his neck is supple no bruits are heard his chest is clear cardiovascular exam reveals a regular rhythm his abdomen was soft nontender the patient has a right lower leg prosthesis in place.  The patient has some weakness on the right compared to the left.  He does not have any difficulty with his speech or with his vision.  Procedures           ED Course      Results for orders placed or performed during the hospital encounter of 08/02/21   COVID-19,CEPHEID,COR/FINA/PAD/KULDIP IN-HOUSE(OR EMERGENT/ADD-ON),NP SWAB IN TRANSPORT MEDIA 3-4 HR TAT, RT-PCR - Swab, Nasopharynx    Specimen: Nasopharynx; Swab   Result Value Ref Range    COVID19 Not Detected Not Detected - Ref. Range   Basic Metabolic Panel    Specimen: Blood   Result Value Ref Range    Glucose 152 (H) 65 - 99 mg/dL    BUN 16 6 - 20 mg/dL    Creatinine 1.21 0.76 - 1.27 mg/dL    Sodium 141 136 - 145 mmol/L    Potassium 4.2 3.5 - 5.2 mmol/L    Chloride 101 98 - 107 mmol/L    CO2 28.0 22.0 - 29.0 mmol/L    Calcium 9.3 8.6 - 10.5 mg/dL    eGFR Non African Amer 62 >60 mL/min/1.73    BUN/Creatinine Ratio 13.2 7.0 - 25.0    Anion Gap 12.0 5.0 - 15.0 mmol/L   Protime-INR    Specimen: Blood   Result Value Ref Range    Protime 11.0 9.6 - 11.7 Seconds    INR 0.99 0.93 - 1.10   aPTT    Specimen: Blood   Result Value Ref Range    PTT 25.3 24.0 - 31.0 seconds   CBC Auto Differential    Specimen: Blood   Result Value Ref Range    WBC 7.80 3.40 - 10.80 10*3/mm3    RBC 5.24 4.14 - 5.80 10*6/mm3    Hemoglobin 13.9 13.0 - 17.7 g/dL    Hematocrit 42.1 37.5 - 51.0 %    MCV 80.4  79.0 - 97.0 fL    MCH 26.6 26.6 - 33.0 pg    MCHC 33.1 31.5 - 35.7 g/dL    RDW 14.3 12.3 - 15.4 %    RDW-SD 40.3 37.0 - 54.0 fl    MPV 8.2 6.0 - 12.0 fL    Platelets 244 140 - 450 10*3/mm3    Neutrophil % 65.5 42.7 - 76.0 %    Lymphocyte % 21.5 19.6 - 45.3 %    Monocyte % 8.6 5.0 - 12.0 %    Eosinophil % 3.4 0.3 - 6.2 %    Basophil % 1.0 0.0 - 1.5 %    Neutrophils, Absolute 5.10 1.70 - 7.00 10*3/mm3    Lymphocytes, Absolute 1.70 0.70 - 3.10 10*3/mm3    Monocytes, Absolute 0.70 0.10 - 0.90 10*3/mm3    Eosinophils, Absolute 0.30 0.00 - 0.40 10*3/mm3    Basophils, Absolute 0.10 0.00 - 0.20 10*3/mm3    nRBC 0.1 0.0 - 0.2 /100 WBC   POC Glucose Once    Specimen: Blood   Result Value Ref Range    Glucose 145 (H) 70 - 105 mg/dL   ECG 12 Lead   Result Value Ref Range    QT Interval 315 ms   Gold Top - SST   Result Value Ref Range    Extra Tube Hold for add-ons.      Medications   sodium chloride 0.9 % flush 10 mL (has no administration in time range)   HYDROmorphone (DILAUDID) injection 1 mg (1 mg Intravenous Given 8/2/21 0627)   ondansetron (ZOFRAN) injection 8 mg (8 mg Intravenous Given 8/2/21 0627)   iopamidol (ISOVUE-370) 76 % injection 100 mL (100 mL Intravenous Given 8/2/21 0802)     CT Head Without Contrast    Result Date: 8/2/2021  No acute findings.  Electronically Signed By-Bridger Cazares MD On:8/2/2021 8:04 AM This report was finalized on [id number] by  Bridger Cazares MD.    XR Chest 1 View    Result Date: 8/2/2021  No active disease.  Electronically Signed By-Bridger Cazares MD On:8/2/2021 7:22 AM This report was finalized on [id number] by  Bridger Cazares MD.            His EKG was normal at a rate of 91                             MDM  Patient status did not change in the emergency department.  The patient continued to complain of paresthesias in the right side of the body as well as some weakness with his right arm and leg.  The CT scan was unremarkable.  The patient is on Eliquis and is not a candidate for TPA.   The patient will be admitted for further work-up and an MRI will be obtained  Final diagnoses:   Cerebrovascular accident (CVA), unspecified mechanism (CMS/HCC)   Diabetes 1.5, managed as type 1 (CMS/[facility])       ED Disposition  ED Disposition     None          No follow-up provider specified.       Medication List      No changes were made to your prescriptions during this visit.          Killian Marie MD  08/02/21 0862

## 2021-08-02 NOTE — CONSULTS
"Diabetes Education  Assessment/Teaching    Patient Name:  Kuldeep Adhikari  YOB: 1965  MRN: [id number]  Admit Date:  8/2/2021      Assessment Date:  8/2/2021    Most Recent Value   General Information    Referral From:  A1c [Pt seen due to A1c of 11.2%.]   Height  172.7 cm (68\")   Weight  83.9 kg (185 lb)   Pregnancy Assessment   Diabetes History   What type of diabetes do you have?  Type 2   Have you had diabetes education/teaching in the past?  yes   When and where was your diabetes education?  Pt was seen by inpatient diabetes educator at [facility] on 7/29/2020.   Do you test your blood sugar at home?  yes   Frequency of checks  6-7 times/day   Meter type  CGMS (Freestyle Mark)   Who performs the test?  self   Education Preferences   What areas of diabetes would you like to learn about?  avoiding high blood sugar, avoiding low blood sugar, diabetes complications   Nutrition Information   Assessment Topics   Problem Solving - Assessment  Needs education   Reducing Risk - Assessment  Needs education   DM Goals   Problem Solving - Goal  Today   Reducing Risk - Goal  Today            Most Recent Value   DM Education Needs   Blood Glucose Target Range  Discussed A1c result of 11.2%. Reviewed healthy bs range and healthy A1c target. Discussed importance of bs control   Medication  Insulin, Oral [Pt taking Lantus 34 units hs and Novolog 14 units ac. He states he restarted Janumet and it is helping with bs control. Discussed importance of notifying MD office that he has restarted this medication]   Problem Solving  Hypoglycemia, Hyperglycemia, Signs, Symptoms, Treatment [Discussed always carrying low bs tx]   Reducing Risks  A1C testing [Gave A1c info sheet]   Healthy Eating  -- [Pt eating 2 meals/day and hs snack. He states has been giving novolog before meals and hs snack]   Motivation  Engaged   Teaching Method  Explanation, Discussion, Handouts   Patient Response  Verbalized understanding            Other " Comments:  Pt sees Dr. ARLEY Michaels at the [facility]. He had telehealth appt on 6/29/2021 and next visit scheduled for 1/6/2022. Encouraged pt to contact [facility] with bs readings. Discussed he needs to let MD know he is taking Novolog for hs. Explained this is putting him at risk for nighttime hypoglycemia. Reviewed mealtime insulin should be given before his main meals, not snacks. Pt states has the necessary insulin and bs monitoring supplies. He agrees to contacting [facility] regarding his elevated bs. Pt with no additional questions.       Electronically signed by:  Karena Hagan RN  08/02/21 19:53 EDT

## 2021-08-02 NOTE — H&P
"    [facility]      Patient Name: Kuldeep Adhikari  : 1965  MRN: [id number]  Primary Care Physician:  Laura Whitaker MD  Date of admission: 2021      Subjective      Chief Complaint: right sided numbness and weakness     History of Present Illness: Kuldeep Adhikari is a 56 y.o. male with PMH of PVD s/p R BKA, DM Type II, PE on eliquis, CAD, HTN, HLD, hypothyroidism, asthma, anxiety, and GERD who presented to [facility] on 2021 right sided numbness and weakness. He stated he went to bed around 11pm in his normal state of health. He woke up around 4:30am and fell. He stated, \"It was like my right leg and arm were gone.\" He was using his right leg prosthesis and fell landing on his right side. He had numbness from the right side of his face all the way down his right arm and right leg. He denied any headache or vision changes. He is concerned that a tick bite might have caused his symptoms. He denied any prior stroke history. He has been taking his home medications including his eliquis     In the ED the patient had right sided weakness and paresthesia.  CT head showed no acute findings.  CTA head/neck showed no significant carotid stenosis, left vertebral artery is diminutive and appears to terminate after the origin of the PICA branch.  This may be due to normal variant anatomy however no prior studies to confirm this is chronic/congenital finding.  Neurology was consulted and patient was admitted to the [facility] for suspected CVA. He was determined not to be a tPA candidate due to unknown onset of symptoms and patient is on eliquis. MRI brain ordered.         Review of Systems   Constitutional: Negative.   HENT: Negative.    Eyes: Negative.    Cardiovascular: Negative.    Respiratory: Negative.    Endocrine: Negative.    Hematologic/Lymphatic: Negative.    Skin: Negative.    Musculoskeletal: Negative.    Gastrointestinal: Negative.    Genitourinary: " Negative.    Neurological: Positive for focal weakness, numbness and paresthesias.        Right side of body numbness and weakness   Psychiatric/Behavioral: Negative.    Allergic/Immunologic: Negative.    All other systems reviewed and are negative.      Personal History     Past Medical History:   Diagnosis Date   • CKD (chronic kidney disease), stage III (CMS/HCC)    • Depression    • DJD (degenerative joint disease)    • DVT (deep venous thrombosis) (CMS/HCC)    • Ganglion     rt wrist   • GERD (gastroesophageal reflux disease)    • Hiatal hernia    • History of echocardiogram 04/2016    2D ECHO   • History of esophageal stricture     s/p dialation 40-50 times last EGD 04/2016   • History of pulmonary embolus (PE)     on long term anticoagulation   • Hyperlipidemia    • Hypertension    • Hypothyroidism    • IBS (irritable bowel syndrome)    • Neuropathy    • Rash     rt lower hip   • Retinopathy    • Seasonal allergies    • Sleep apnea     cpap  bring dos   • Type 2 diabetes mellitus with peripheral vascular disease (CMS/[facility])    • Vitamin D deficiency        Past Surgical History:   Procedure Laterality Date   • AMPUTATION FOOT / TOE Right     great toe   • AMPUTATION REVISION Right 4/8/2021    Procedure: BELOW KNEE AMPUTATION REVISAION;  Surgeon: Eduardo Reynolds MD;  Location: [facility] MAIN OR;  Service: Orthopedics;  Laterality: Right;   • AMPUTATION REVISION Right 4/29/2021    Procedure: AMPUTATION REVISION KNEE STUMP;  Surgeon: Eduardo Reynolds MD;  Location: [facility] MAIN OR;  Service: Orthopedics;  Laterality: Right;   • BELOW KNEE AMPUTATION Right 7/30/2020    Procedure: AMPUTATION BELOW KNEE;  Surgeon: Eduardo Reynolds MD;  Location: [facility] MAIN OR;  Service: Orthopedics;  Laterality: Right;   • CARDIAC CATHETERIZATION  04/2018    [facility]   • ENDOSCOPY     • ENDOSCOPY N/A 10/4/2019    Procedure: ESOPHAGOGASTRODUODENOSCOPY with dilitation and biopsy x 1 area;  Surgeon: Declan Iqbal MD;  Location: [facility]  ENDOSCOPY;  Service: Gastroenterology   • ENDOSCOPY N/A 12/13/2019    Procedure: ESOPHAGOGASTRODUODENOSCOPY WITH DILATATION (50, 52 BOUGIE);  Surgeon: Declan Iqbal MD;  Location: [facility] ENDOSCOPY;  Service: Gastroenterology   • GANGLION CYST EXCISION Left    • HERNIA REPAIR Bilateral    • RETINOPATHY SURGERY      laser   • TOTAL HIP ARTHROPLASTY Left    • TOTAL HIP ARTHROPLASTY Left 2018   • TRANS METATARSAL AMPUTATION Right 3/17/2020    Procedure: AMPUTATION TRANS METATARSAL right;  Surgeon: ERWIN Baker DPM;  Location: [facility] MAIN OR;  Service: Podiatry;  Laterality: Right;  GANGRENOUS RIGHT FOOT       Family History: family history includes Cancer in an other family member; Colon cancer in an other family member; Diabetes in his mother; Heart disease in his mother; Hyperlipidemia in an other family member; Hypertension in an other family member; Leukemia in his father; Sleep apnea in his maternal aunt; Stroke in his maternal grandmother. Otherwise pertinent FHx was reviewed and not pertinent to current issue.    Social History:  reports that he has never smoked. He has never used smokeless tobacco. He reports that he does not drink alcohol and does not use drugs.    Home Medications:  Prior to Admission Medications     Prescriptions Last Dose Informant Patient Reported? Taking?    atorvastatin (LIPITOR) 40 MG tablet 8/1/2021 Self No Yes    TAKE 1 TABLET EVERY DAY    DULoxetine (CYMBALTA) 60 MG capsule 8/1/2021 Self No Yes    TAKE 1 CAPSULE EVERY MORNING    Eliquis 5 MG tablet tablet 8/1/2021 Self No Yes    TAKE 1 TABLET TWICE DAILY    empagliflozin (Jardiance) 10 MG tablet tablet 8/1/2021 Self No Yes    Take 1 tablet by mouth Daily.    gabapentin (NEURONTIN) 400 MG capsule 8/1/2021 ZOLTAN Yes Yes    Take 400 mg by mouth 3 (Three) Times a Day.    insulin aspart (NovoLOG FlexPen) 100 UNIT/ML solution pen-injector sc pen 8/1/2021 Self No Yes    Inject 10 Units under the skin into the appropriate area  as directed 3 (Three) Times a Day With Meals.    Patient taking differently:  Inject 14 Units under the skin into the appropriate area as directed 3 (Three) Times a Day With Meals.    Insulin Glargine (Lantus SoloStar) 100 UNIT/ML injection pen 8/1/2021 Self No Yes    INJECT SUBCUTANEOUSLY 27 UNITS EVERY BEDTIME (NEEDS APPOINTMENT)    Patient taking differently:  Inject 34 Units under the skin into the appropriate area as directed Every Night.    levothyroxine (SYNTHROID, LEVOTHROID) 100 MCG tablet 8/1/2021 Self No Yes    TAKE 1 TABLET EVERY DAY    Patient taking differently:  Take 100 mcg by mouth Daily.    losartan (COZAAR) 50 MG tablet 8/1/2021 Self No Yes    TAKE 1 TABLET EVERY DAY    omeprazole (priLOSEC) 40 MG capsule 8/1/2021 Self Yes Yes    Take 40 mg by mouth Daily. Take DOS    metoprolol succinate XL (TOPROL-XL) 50 MG 24 hr tablet  Self No No    TAKE 1 TABLET EVERY DAY    Patient taking differently:  Take 50 mg by mouth Daily.    ondansetron (ZOFRAN) 4 MG tablet  Self No No    TAKE 1 TABLET EVERY 8 HOURS AS NEEDED FOR NAUSEA OR VOMITING.    Patient taking differently:  Take 8 mg by mouth Every 8 (Eight) Hours As Needed for Nausea or Vomiting.            Allergies:  Allergies   Allergen Reactions   • Lisinopril Cough       Objective      Vitals:   Temp:  [98.1 °F (36.7 °C)-99.2 °F (37.3 °C)] 98.1 °F (36.7 °C)  Heart Rate:  [] 85  Resp:  [15-18] 18  BP: (133-202)/(67-96) 156/78    Physical Exam  Vitals and nursing note reviewed.   HENT:      Head: Normocephalic and atraumatic.   Eyes:      Extraocular Movements: Extraocular movements intact.      Pupils: Pupils are equal, round, and reactive to light.   Cardiovascular:      Rate and Rhythm: Normal rate and regular rhythm.      Pulses: Normal pulses.      Heart sounds: Normal heart sounds.   Pulmonary:      Effort: Pulmonary effort is normal.      Breath sounds: Normal breath sounds.   Abdominal:      General: Bowel sounds are normal.      Palpations:  Abdomen is soft.      Tenderness: There is no abdominal tenderness.   Musculoskeletal:      Cervical back: Normal range of motion.      Comments: Right arm and right stump with weakness compared to left    Skin:     General: Skin is warm and dry.   Neurological:      Mental Status: He is alert.      Sensory: Sensory deficit present.      Motor: Weakness present.      Comments: Right arm and right leg drift present, right hand weak   Paresthesia of right face, right arm, and right leg   No facial droop or slurred speech   Psychiatric:         Mood and Affect: Mood normal.         Behavior: Behavior normal.         Result Review    Result Review:  I have personally reviewed the results from the time of this admission to 8/2/2021 12:18 EDT and agree with these findings:  [x]  Laboratory  [x]  Microbiology  [x]  Radiology  [x]  EKG/Telemetry   []  Cardiology/Vascular   []  Pathology  [x]  Old records  []  Other:        Assessment/Plan        Active Hospital Problems:  Active Hospital Problems    Diagnosis    • **Cerebrovascular accident (CVA) (CMS/HCC)    • Type 2 diabetes mellitus with peripheral vascular disease (CMS/HCC)    • Chronic coronary artery disease    • Obstructive sleep apnea syndrome    • Benign essential hypertension    • Diabetic peripheral neuropathy (CMS/HCC)    • Chronic renal insufficiency, stage III (moderate) (CMS/HCC)    • Depression    • Gastroesophageal reflux disease    • History of pulmonary embolism    • Mixed hyperlipidemia    • Acquired hypothyroidism    • Peripheral vascular disease (CMS/HCC)      Plan:     CVA  -MRI brain: 12 mm acute lacunar infarct within the posterior limb of the left internal capsule with possible involvement of the posterior left lentiform nucleus  -CT head in ED showed no acute findings  -CTA head/neck: no significant carotid stenosis, left vertebral artery is diminutive and appears to terminate after the origin of the PICA branch.  This may be due to normal  variant anatomy however no prior studies to confirm this is chronic/congenital finding.  -pt with right sided weakness and paresthesia on exam   -neurology consulted  -check stroke labs, 2D echo, PT/OT consulted, neuro checks   -cont home eliquis, statin    PVD s/p R BKA  -has right leg prosthesis     Anticoagulation therapy secondary to history of PE:   -on Eliquis     Diabetes type 2, chronic with peripheral neuropathy  -continue mealtime insulin and lantus, Neurontin  -glucose 150s     CAD  -Continue beta-blocker, statin    Hypertension  -Continue metoprolol, Cozaar     HLD  -Continue Atorvastatin     Anxiety  -Continue Cymbalta     Hypothyroidism  -Continue levothyroxine     GERD  -Continue PPI     Asthma  -Not in exacerbation  -As needed duo nebs    CKD stage III  -creatinine stable at 1.21    GENO  -cpap qhs         DVT prophylaxis:  Medical and mechanical DVT prophylaxis orders are present.   On  eliquis     CODE STATUS:       Admission Status:  I believe this patient meets inpatient status.    I discussed the patient's findings and my recommendations with patient.    This patient has been examined wearing appropriate Personal Protective Equipment  08/02/21      Signature: Electronically signed by KEYLA Lopez, 08/02/21, 12:18 PM EDT.    Attending attestation:    I performed a history and physical examination of the patient. I reviewed the nurse practitioner's note and agree with the documented findings and plan of care.    S:    Patient is a 56-year-old male with history of type 2 diabetes, hypertension and peripheral vascular disease with right BKA presenting for evaluation of right-sided weakness.  Patient had stroke work-up in the emergency department ultimately had MRI showing signs of left-sided ischemic infarct.  Admitted for further work-up of ischemic stroke.    O:    Vital signs reviewed    General: Middle-age male lying in bed breathing comfortably on room air no acute distress  HEENT:  NC/AT, EOMI, mucosa moist  Heart: Regular, rate controlled  Chest: Normal work of breathing, moving air well no wheezing  Abdominal: Soft. NT/ND.   Musculoskeletal: Right BKA, normal ROM.  No edema. No calf tenderness.  Neurological: AAOx3, cranial nerves II through XII appear grossly intact, right upper extremity and right lower extremity weakness noted  Skin: Skin is warm and dry. No rash  Psychiatric: Normal mood and affect.    A/P    Right-sided weakness-secondary to underlying left-sided ischemic stroke.  Patient with multiple significant comorbidities including type 2 diabetes, hypertension and peripheral vascular disease likely contributing, patient with hypertensive crisis on admission now controlled.  Patient reports he has been off of his blood pressure medication  -Blood pressure control  -Neurology consulted  -MRI showing 12 mm acute lacunar infarct within the posterior limb of the left internal capsule  -Statin and antiplatelets  -Patient far beyond window for TPA  -Speech/PT/OT  -Risk stratification  -CTA of head and neck  -Echo with bubble  -CT head showed no bleed    Hypertensive crisis-likely uncontrolled chronic hypertension patient also has GENO which he reports his CPAP is not been working  -Case management consult for assistance with getting working CPAP  -Blood pressure more controlled at this time  -Patient will be on new medications  -Monitor renal function  -Urine drug screen/UA    Elevated TSH-very mildly so  -Check free T4    Pulmonary embolism-patient anticoagulated  -Find out if this first event or any other risk factors of how long he needs to be anticoagulated  -Patient is high risk    Coronary artery disease-patient denies any chest pain at this time  -Cardiac monitoring  -EKG normal sinus rhythm normal axis  -Continue antiplatelets    Type 2 diabetes-patient reports history of relatively poor control  -A1c  -Sliding scale    Peripheral vascular disease-likely due to uncontrolled  hypertension and diabetes  -Patient with right BKA  -Continue antiplatelets    Hyperlipidemia/anxiety/GERD/asthma-chronic in nature  -Resume home medication as clinically appropriate    GENO-patient reports he is not using his CPAP due to malfunction  -Patient needs assistance last sleep study 1 year prior    Electronically signed by Jacob Bentley MD, 08/02/21, 3:26 PM EDT.

## 2021-08-03 ENCOUNTER — APPOINTMENT (OUTPATIENT)
Dept: CARDIOLOGY | Facility: HOSPITAL | Age: 56
End: 2021-08-03

## 2021-08-03 LAB
ANION GAP SERPL CALCULATED.3IONS-SCNC: 10 MMOL/L (ref 5–15)
BH CV XLRA MEAS LEFT BULB PSV: -90.4 CM/SEC
BH CV XLRA MEAS LEFT CCA RATIO VEL: 119 CM/SEC
BH CV XLRA MEAS LEFT DIST CCA EDV: 27.3 CM/SEC
BH CV XLRA MEAS LEFT DIST CCA PSV: 113 CM/SEC
BH CV XLRA MEAS LEFT DIST ICA EDV: -23.6 CM/SEC
BH CV XLRA MEAS LEFT DIST ICA PSV: -66.3 CM/SEC
BH CV XLRA MEAS LEFT ICA RATIO VEL: -91.9 CM/SEC
BH CV XLRA MEAS LEFT ICA/CCA RATIO: -0.77
BH CV XLRA MEAS LEFT PROX CCA EDV: 27.7 CM/SEC
BH CV XLRA MEAS LEFT PROX CCA PSV: 119 CM/SEC
BH CV XLRA MEAS LEFT PROX ECA PSV: -204 CM/SEC
BH CV XLRA MEAS LEFT PROX ICA EDV: -26.7 CM/SEC
BH CV XLRA MEAS LEFT PROX ICA PSV: -91.9 CM/SEC
BH CV XLRA MEAS LEFT PROX SCLA PSV: 176 CM/SEC
BH CV XLRA MEAS LEFT VERTEBRAL A PSV: -41.7 CM/SEC
BH CV XLRA MEAS RIGHT BULB PSV: -68.3 CM/SEC
BH CV XLRA MEAS RIGHT CCA RATIO VEL: 99.4 CM/SEC
BH CV XLRA MEAS RIGHT DIST CCA EDV: 19.9 CM/SEC
BH CV XLRA MEAS RIGHT DIST CCA PSV: 81.4 CM/SEC
BH CV XLRA MEAS RIGHT DIST ICA EDV: -32.9 CM/SEC
BH CV XLRA MEAS RIGHT DIST ICA PSV: -91.9 CM/SEC
BH CV XLRA MEAS RIGHT ICA RATIO VEL: -91.9 CM/SEC
BH CV XLRA MEAS RIGHT ICA/CCA RATIO: -0.92
BH CV XLRA MEAS RIGHT PROX CCA EDV: 21.1 CM/SEC
BH CV XLRA MEAS RIGHT PROX CCA PSV: 99.4 CM/SEC
BH CV XLRA MEAS RIGHT PROX ECA PSV: -291 CM/SEC
BH CV XLRA MEAS RIGHT PROX ICA EDV: -21.7 CM/SEC
BH CV XLRA MEAS RIGHT PROX ICA PSV: -83.2 CM/SEC
BH CV XLRA MEAS RIGHT PROX SCLA PSV: 165 CM/SEC
BH CV XLRA MEAS RIGHT VERTEBRAL A PSV: 60.4 CM/SEC
BUN SERPL-MCNC: 18 MG/DL (ref 6–20)
BUN/CREAT SERPL: 13.1 (ref 7–25)
CALCIUM SPEC-SCNC: 8.9 MG/DL (ref 8.6–10.5)
CHLORIDE SERPL-SCNC: 100 MMOL/L (ref 98–107)
CO2 SERPL-SCNC: 27 MMOL/L (ref 22–29)
CREAT SERPL-MCNC: 1.37 MG/DL (ref 0.76–1.27)
GFR SERPL CREATININE-BSD FRML MDRD: 54 ML/MIN/1.73
GLUCOSE BLDC GLUCOMTR-MCNC: 134 MG/DL (ref 70–105)
GLUCOSE BLDC GLUCOMTR-MCNC: 144 MG/DL (ref 70–105)
GLUCOSE BLDC GLUCOMTR-MCNC: 196 MG/DL (ref 70–105)
GLUCOSE BLDC GLUCOMTR-MCNC: 196 MG/DL (ref 70–105)
GLUCOSE BLDC GLUCOMTR-MCNC: 210 MG/DL (ref 70–105)
GLUCOSE SERPL-MCNC: 227 MG/DL (ref 65–99)
MAGNESIUM SERPL-MCNC: 1.8 MG/DL (ref 1.6–2.6)
MAXIMAL PREDICTED HEART RATE: 163 BPM
POTASSIUM SERPL-SCNC: 4.8 MMOL/L (ref 3.5–5.2)
QT INTERVAL: 315 MS
RIGHT ARM BP: NORMAL MMHG
SODIUM SERPL-SCNC: 137 MMOL/L (ref 136–145)
STRESS TARGET HR: 139 BPM
WHOLE BLOOD HOLD SPECIMEN: NORMAL

## 2021-08-03 PROCEDURE — 93880 EXTRACRANIAL BILAT STUDY: CPT

## 2021-08-03 PROCEDURE — 63710000001 INSULIN GLARGINE PER 5 UNITS: Performed by: NURSE PRACTITIONER

## 2021-08-03 PROCEDURE — 97162 PT EVAL MOD COMPLEX 30 MIN: CPT

## 2021-08-03 PROCEDURE — 83735 ASSAY OF MAGNESIUM: CPT | Performed by: FAMILY MEDICINE

## 2021-08-03 PROCEDURE — 97167 OT EVAL HIGH COMPLEX 60 MIN: CPT

## 2021-08-03 PROCEDURE — 82962 GLUCOSE BLOOD TEST: CPT

## 2021-08-03 PROCEDURE — 80048 BASIC METABOLIC PNL TOTAL CA: CPT | Performed by: FAMILY MEDICINE

## 2021-08-03 PROCEDURE — 97535 SELF CARE MNGMENT TRAINING: CPT

## 2021-08-03 PROCEDURE — 99223 1ST HOSP IP/OBS HIGH 75: CPT | Performed by: INTERNAL MEDICINE

## 2021-08-03 PROCEDURE — 92523 SPEECH SOUND LANG COMPREHEN: CPT

## 2021-08-03 PROCEDURE — 63710000001 INSULIN LISPRO (HUMAN) PER 5 UNITS: Performed by: NURSE PRACTITIONER

## 2021-08-03 PROCEDURE — 25010000002 CYANOCOBALAMIN PER 1000 MCG: Performed by: FAMILY MEDICINE

## 2021-08-03 PROCEDURE — 99233 SBSQ HOSP IP/OBS HIGH 50: CPT | Performed by: FAMILY MEDICINE

## 2021-08-03 RX ORDER — SODIUM CHLORIDE, SODIUM LACTATE, POTASSIUM CHLORIDE, CALCIUM CHLORIDE 600; 310; 30; 20 MG/100ML; MG/100ML; MG/100ML; MG/100ML
75 INJECTION, SOLUTION INTRAVENOUS CONTINUOUS
Status: DISPENSED | OUTPATIENT
Start: 2021-08-03 | End: 2021-08-04

## 2021-08-03 RX ORDER — CYANOCOBALAMIN 1000 UG/ML
1000 INJECTION, SOLUTION INTRAMUSCULAR; SUBCUTANEOUS
Status: DISCONTINUED | OUTPATIENT
Start: 2021-08-03 | End: 2021-08-10 | Stop reason: HOSPADM

## 2021-08-03 RX ADMIN — MICONAZOLE NITRATE: 20 POWDER TOPICAL at 12:14

## 2021-08-03 RX ADMIN — MICONAZOLE NITRATE: 20 POWDER TOPICAL at 20:57

## 2021-08-03 RX ADMIN — PANTOPRAZOLE SODIUM 40 MG: 40 TABLET, DELAYED RELEASE ORAL at 06:17

## 2021-08-03 RX ADMIN — SODIUM CHLORIDE, POTASSIUM CHLORIDE, SODIUM LACTATE AND CALCIUM CHLORIDE 75 ML/HR: 600; 310; 30; 20 INJECTION, SOLUTION INTRAVENOUS at 13:02

## 2021-08-03 RX ADMIN — HYDROCODONE BITARTRATE AND ACETAMINOPHEN 1 TABLET: 5; 325 TABLET ORAL at 09:43

## 2021-08-03 RX ADMIN — INSULIN LISPRO 14 UNITS: 100 INJECTION, SOLUTION INTRAVENOUS; SUBCUTANEOUS at 17:00

## 2021-08-03 RX ADMIN — Medication 3 ML: at 20:56

## 2021-08-03 RX ADMIN — GABAPENTIN 400 MG: 400 CAPSULE ORAL at 16:46

## 2021-08-03 RX ADMIN — GABAPENTIN 400 MG: 400 CAPSULE ORAL at 20:56

## 2021-08-03 RX ADMIN — INSULIN LISPRO 14 UNITS: 100 INJECTION, SOLUTION INTRAVENOUS; SUBCUTANEOUS at 12:14

## 2021-08-03 RX ADMIN — INSULIN LISPRO 14 UNITS: 100 INJECTION, SOLUTION INTRAVENOUS; SUBCUTANEOUS at 09:44

## 2021-08-03 RX ADMIN — APIXABAN 5 MG: 5 TABLET, FILM COATED ORAL at 09:43

## 2021-08-03 RX ADMIN — ATORVASTATIN CALCIUM 80 MG: 40 TABLET, FILM COATED ORAL at 20:56

## 2021-08-03 RX ADMIN — INSULIN GLARGINE 34 UNITS: 100 INJECTION, SOLUTION SUBCUTANEOUS at 20:56

## 2021-08-03 RX ADMIN — ASPIRIN 81 MG: 81 TABLET, CHEWABLE ORAL at 09:43

## 2021-08-03 RX ADMIN — GABAPENTIN 400 MG: 400 CAPSULE ORAL at 09:43

## 2021-08-03 RX ADMIN — Medication 3 ML: at 09:44

## 2021-08-03 RX ADMIN — CYANOCOBALAMIN 1000 MCG: 1000 INJECTION, SOLUTION INTRAMUSCULAR at 12:14

## 2021-08-03 RX ADMIN — LOSARTAN POTASSIUM 50 MG: 50 TABLET, FILM COATED ORAL at 09:43

## 2021-08-03 RX ADMIN — DULOXETINE HYDROCHLORIDE 60 MG: 30 CAPSULE, DELAYED RELEASE ORAL at 06:17

## 2021-08-03 RX ADMIN — LEVOTHYROXINE SODIUM 100 MCG: 0.1 TABLET ORAL at 06:17

## 2021-08-03 RX ADMIN — INSULIN LISPRO 14 UNITS: 100 INJECTION, SOLUTION INTRAVENOUS; SUBCUTANEOUS at 16:46

## 2021-08-03 RX ADMIN — METOPROLOL SUCCINATE 50 MG: 50 TABLET, EXTENDED RELEASE ORAL at 09:43

## 2021-08-03 NOTE — PLAN OF CARE
Goal Outcome Evaluation:  Plan of Care Reviewed With: patient      Pt had an NIH of 0 this shift, vitals stable.     Problem: Adult Inpatient Plan of Care  Goal: Plan of Care Review  Outcome: Ongoing, Progressing  Flowsheets (Taken 8/3/2021 0430)  Plan of Care Reviewed With: patient  Goal: Patient-Specific Goal (Individualized)  Outcome: Ongoing, Progressing  Goal: Absence of Hospital-Acquired Illness or Injury  Outcome: Ongoing, Progressing  Intervention: Identify and Manage Fall Risk  Recent Flowsheet Documentation  Taken 8/3/2021 0415 by Radha Sanchez RN  Safety Promotion/Fall Prevention:   activity supervised   assistive device/personal items within reach   clutter free environment maintained   fall prevention program maintained   gait belt   lighting adjusted   nonskid shoes/slippers when out of bed   room organization consistent   safety round/check completed  Taken 8/3/2021 0204 by Radha Sanchez RN  Safety Promotion/Fall Prevention:   activity supervised   assistive device/personal items within reach   clutter free environment maintained   fall prevention program maintained   gait belt   lighting adjusted   nonskid shoes/slippers when out of bed   room organization consistent   safety round/check completed  Taken 8/3/2021 0042 by Radha Sanchez RN  Safety Promotion/Fall Prevention:   activity supervised   assistive device/personal items within reach   clutter free environment maintained   fall prevention program maintained   gait belt   lighting adjusted   nonskid shoes/slippers when out of bed   room organization consistent   safety round/check completed   mobility aid in reach  Taken 8/2/2021 2200 by Radha Sanchez RN  Safety Promotion/Fall Prevention:   activity supervised   assistive device/personal items within reach   clutter free environment maintained   fall prevention program maintained   gait belt   lighting adjusted   nonskid shoes/slippers when out of bed   room organization consistent    safety round/check completed  Taken 8/2/2021 2028 by Radha Sanchez RN  Safety Promotion/Fall Prevention:   activity supervised   assistive device/personal items within reach   clutter free environment maintained   fall prevention program maintained   gait belt   lighting adjusted   mobility aid in reach   nonskid shoes/slippers when out of bed   room organization consistent   safety round/check completed  Intervention: Prevent Skin Injury  Recent Flowsheet Documentation  Taken 8/3/2021 0415 by Radha Sanchez RN  Skin Protection:   adhesive use limited   tubing/devices free from skin contact  Taken 8/3/2021 0042 by Radha Sanchez RN  Body Position: position changed independently  Skin Protection:   adhesive use limited   tubing/devices free from skin contact  Taken 8/2/2021 2028 by Radha Sanchez RN  Body Position: position changed independently  Skin Protection:   adhesive use limited   tubing/devices free from skin contact  Intervention: Prevent and Manage VTE (venous thromboembolism) Risk  Recent Flowsheet Documentation  Taken 8/2/2021 2028 by Radha Sanchez RN  VTE Prevention/Management: (RBKA)   left   sequential compression devices on   other (see comments)  Intervention: Prevent Infection  Recent Flowsheet Documentation  Taken 8/3/2021 0415 by Radha Sanchez RN  Infection Prevention:   visitors restricted/screened   single patient room provided   rest/sleep promoted   personal protective equipment utilized   hand hygiene promoted   equipment surfaces disinfected  Taken 8/3/2021 0042 by Radha Sanchez RN  Infection Prevention:   visitors restricted/screened   single patient room provided   rest/sleep promoted   personal protective equipment utilized   hand hygiene promoted   equipment surfaces disinfected  Taken 8/2/2021 2028 by Radha Sanchez RN  Infection Prevention:   visitors restricted/screened   single patient room provided   rest/sleep promoted   personal protective equipment utilized    hand hygiene promoted   equipment surfaces disinfected  Goal: Optimal Comfort and Wellbeing  Outcome: Ongoing, Progressing  Intervention: Provide Person-Centered Care  Recent Flowsheet Documentation  Taken 8/2/2021 2028 by Radha Sanchez RN  Trust Relationship/Rapport:   care explained   choices provided   emotional support provided   empathic listening provided   questions answered   questions encouraged   reassurance provided   thoughts/feelings acknowledged  Goal: Readiness for Transition of Care  Outcome: Ongoing, Progressing

## 2021-08-03 NOTE — CASE MANAGEMENT/SOCIAL WORK
Continued Stay Note  [facility]     Patient Name: Kuldeep Adhikari  MRN: [id number]  Today's Date: 8/3/2021    Admit Date: 8/2/2021    Discharge Plan     Row Name 08/03/21 1524       Plan    Plan  Declined by [employer]-insurance not in network. Needs other rehab choices    Row Name 08/03/21 1196       Plan    Plan  Referral to [facility] pending acceptance and will need precert. No pasrr needed.    Plan Comments  see assessment notes          Zena Saldana RN   Phone communication or documentation only - no physical contact with patient or family.

## 2021-08-03 NOTE — CASE MANAGEMENT/SOCIAL WORK
Discharge Planning Assessment   [facility]     Patient Name: Kuldeep Adhikari  MRN: [id number]  Today's Date: 8/3/2021    Admit Date: 8/2/2021    Discharge Needs Assessment     Row Name 08/03/21 5371       Living Environment    Lives With  spouse;child(bryant), dependent    Current Living Arrangements  home/apartment/condo    Primary Care Provided by  self    Provides Primary Care For  no one    Family Caregiver if Needed  spouse    Quality of Family Relationships  supportive    Able to Return to Prior Arrangements  yes       Resource/Environmental Concerns    Resource/Environmental Concerns  none    Transportation Concerns  car, none       Transition Planning    Patient/Family Anticipates Transition to  home    Patient/Family Anticipated Services at Transition  none    Transportation Anticipated  family or friend will provide       Discharge Needs Assessment    Readmission Within the Last 30 Days  no previous admission in last 30 days    Equipment Currently Used at Home  cane, straight;prosthesis;shower chair;walker, rolling;wheelchair;cpap;glucometer    Concerns to be Addressed  discharge planning    Anticipated Changes Related to Illness  inability to care for self    Equipment Needed After Discharge  none    Outpatient/Agency/Support Group Needs  inpatient rehabilitation facility    Discharge Facility/Level of Care Needs  rehabilitation facility    Provided Post Acute Provider List?  Yes    Post Acute Provider List  Nursing Home;Inpatient Rehab    Provided Post Acute Provider Quality & Resource List?  Yes    Post Acute Provider Quality and Resource List  Inpatient Rehab;Nursing Home    Delivered To  Patient    Method of Delivery  In person    Patient's Choice of Community Agency(s)  [facility]    Discharge Coordination/Progress  Spoke to patient at bedside. Asks for referral to [facility]. Referral made to [facility] and nas sent.        Discharge Plan     Row Name 08/03/21 1451       Plan    Plan  Referral to [facility] pending  acceptance and will need precert. No pasrr needed.    Plan Comments  see assessment notes        Continued Care and Services - Admitted Since 8/2/2021     Destination     Service Provider Request Status Selected Services Address Phone Fax Patient Preferred    [facility]  Pending - Request Sent N/A [address] [facility] [place] [address] [phone] --                Demographic Summary     Row Name 08/03/21 1450       General Information    Admission Type  inpatient    Arrived From  emergency department    Required Notices Provided  Important Message from Medicare    Referral Source  admission list    Preferred Language  English        Functional Status     Row Name 08/03/21 1450       Functional Status    Usual Activity Tolerance  good    Current Activity Tolerance  moderate       Functional Status, IADL    Medications  independent    Meal Preparation  independent    Housekeeping  independent    Laundry  independent    Shopping  independent    IADL Comments  Patient has right bka with prosthesis       Mental Status    General Appearance WDL  WDL       Mental Status Summary    Recent Changes in Mental Status/Cognitive Functioning  no changes        Met with patient in room wearing PPE: mask, face shield/goggles, gloves, gown.      Maintained distance greater than six feet and spent less than 15 minutes in the room.      Sanjuana Saldana RN  Complex Case Manager  [facility] Care Coordination  [phone]-cell  [phone]-office  [phone]-fax  [email or web address]    Zena Saldana RN

## 2021-08-03 NOTE — NURSING NOTE
Patient consult received to assess rash to right stump.  Per patient he is complaining of chronic itching and does have a fine papular rash to the skin he does use a silicone sleeve over it and likely getting some moisture related to that causing the rash.  Will recommend treating with a 2% miconazole

## 2021-08-03 NOTE — THERAPY EVALUATION
Patient Name: Kuldeep Adhikari  : 1965    MRN: [id number]                              Today's Date: 8/3/2021       Admit Date: 2021    Visit Dx:     ICD-10-CM ICD-9-CM   1. Cerebrovascular accident (CVA), unspecified mechanism (CMS/[facility])  I63.9 434.91   2. Diabetes 1.5, managed as type 1 (CMS/[facility])  E13.9 250.00     Patient Active Problem List   Diagnosis   • Benign essential hypertension   • Cellulitis   • Chest pain   • Chronic coronary artery disease   • Chronic renal insufficiency, stage III (moderate) (CMS/HCC)   • Degenerative joint disease   • Depression   • Diabetic peripheral neuropathy (CMS/HCC)   • Diabetic ketoacidosis (CMS/[facility])   • Disorder associated with type 2 diabetes mellitus (CMS/[facility])   • Encounter for screening for malignant neoplasm of colon   • Fatigue   • Foot pain, right   • Ganglion cyst   • Gangrene of foot (CMS/[facility])   • Gastroesophageal reflux disease   • History of amputation of hallux (CMS/[facility])   • History of gastrointestinal disease   • History of pulmonary embolism   • Hx of osteomyelitis   • Mixed hyperlipidemia   • Acquired hypothyroidism   • Obstructive sleep apnea syndrome   • Palpitations   • Peripheral vascular disease (CMS/[facility])   • Subacromial bursitis of right shoulder joint   • Vitamin D deficiency   • Cellulitis in diabetic foot (CMS/[facility])   • Obesity (BMI 30-39.9)   • Sepsis due to group B Streptococcus (CMS/[facility])   • Osteomyelitis of right foot (CMS/[facility])   • Encounter for other orthopedic aftercare   • Other ossification of muscle, unspecified site   • Cerebrovascular accident (CVA) (CMS/[facility])   • Type 2 diabetes mellitus with peripheral vascular disease (CMS/[facility])     Past Medical History:   Diagnosis Date   • CKD (chronic kidney disease), stage III (CMS/HCC)    • Depression    • DJD (degenerative joint disease)    • DVT (deep venous thrombosis) (CMS/HCC)    • Ganglion     rt wrist   • GERD (gastroesophageal reflux disease)    • Hiatal hernia    • History of  echocardiogram 04/2016    2D ECHO   • History of esophageal stricture     s/p dialation 40-50 times last EGD 04/2016   • History of pulmonary embolus (PE)     on long term anticoagulation   • Hyperlipidemia    • Hypertension    • Hypothyroidism    • IBS (irritable bowel syndrome)    • Neuropathy    • Rash     rt lower hip   • Retinopathy    • Seasonal allergies    • Sleep apnea     cpap  bring dos   • Type 2 diabetes mellitus with peripheral vascular disease (CMS/HCC)    • Vitamin D deficiency      Past Surgical History:   Procedure Laterality Date   • AMPUTATION FOOT / TOE Right     great toe   • AMPUTATION REVISION Right 4/8/2021    Procedure: BELOW KNEE AMPUTATION REVISAION;  Surgeon: Eduardo Reynolds MD;  Location: [facility] MAIN OR;  Service: Orthopedics;  Laterality: Right;   • AMPUTATION REVISION Right 4/29/2021    Procedure: AMPUTATION REVISION KNEE STUMP;  Surgeon: Eduardo Reynolds MD;  Location: [facility] MAIN OR;  Service: Orthopedics;  Laterality: Right;   • BELOW KNEE AMPUTATION Right 7/30/2020    Procedure: AMPUTATION BELOW KNEE;  Surgeon: Eduardo Reynolds MD;  Location: [facility] MAIN OR;  Service: Orthopedics;  Laterality: Right;   • CARDIAC CATHETERIZATION  04/2018    [facility]   • ENDOSCOPY     • ENDOSCOPY N/A 10/4/2019    Procedure: ESOPHAGOGASTRODUODENOSCOPY with dilitation and biopsy x 1 area;  Surgeon: Declan Iqbal MD;  Location: [facility] ENDOSCOPY;  Service: Gastroenterology   • ENDOSCOPY N/A 12/13/2019    Procedure: ESOPHAGOGASTRODUODENOSCOPY WITH DILATATION (50, 52 BOUGIE);  Surgeon: Declan Iqbal MD;  Location: [facility] ENDOSCOPY;  Service: Gastroenterology   • GANGLION CYST EXCISION Left    • HERNIA REPAIR Bilateral    • RETINOPATHY SURGERY      laser   • TOTAL HIP ARTHROPLASTY Left    • TOTAL HIP ARTHROPLASTY Left 2018   • TRANS METATARSAL AMPUTATION Right 3/17/2020    Procedure: AMPUTATION TRANS METATARSAL right;  Surgeon: ERWIN Baker DPM;  Location: [facility] MAIN OR;  Service:  Podiatry;  Laterality: Right;  GANGRENOUS RIGHT FOOT     General Information     Row Name 08/03/21 1458          Physical Therapy Time and Intention    Document Type  evaluation  -CR     Mode of Treatment  physical therapy  -CR     Row Name 08/03/21 1458          General Information    Patient Profile Reviewed  yes  -CR     Prior Level of Function  independent:;all household mobility;ADL's  -CR     Existing Precautions/Restrictions  -- R BKA  -CR     Barriers to Rehab  medically complex  -CR     Row Name 08/03/21 1458          Home Main Entrance    Number of Stairs, Main Entrance  four  -CR     Stair Railings, Main Entrance  railings safe and in good condition  -CR     Row Name 08/03/21 1458          Stairs Within Home, Primary    Number of Stairs, Within Home, Primary  none  -CR     Row Name 08/03/21 1458          Cognition    Orientation Status (Cognition)  oriented x 3  -CR     Row Name 08/03/21 1458          Safety Issues, Functional Mobility    Safety Issues Affecting Function (Mobility)  impulsivity;insight into deficits/self-awareness;judgment  -CR     Impairments Affecting Function (Mobility)  sensation/sensory awareness;balance;strength  -CR       User Key  (r) = Recorded By, (t) = Taken By, (c) = Cosigned By    Initials Name Provider Type    CR Reyes, Carmela, PT Physical Therapist        Mobility     Row Name 08/03/21 1459          Bed Mobility    Bed Mobility  supine-sit  -CR     Supine-Sit [place] (Bed Mobility)  modified independence  -CR     Assistive Device (Bed Mobility)  bed rails;head of bed elevated  -CR     Row Name 08/03/21 1453          Sit-Stand Transfer    Sit-Stand [place] (Transfers)  contact guard prosthesis  -CR     Row Name 08/03/21 1454          Gait/Stairs (Locomotion)    [place] Level (Gait)  minimum assist (75% patient effort);moderate assist (50% patient effort)  -CR     Assistive Device (Gait)  -- HHA,rails  -CR     Distance in Feet (Gait)  50 x 2  -CR      Deviations/Abnormal Patterns (Gait)  gait speed decreased;base of support, wide;ataxic  -CR       User Key  (r) = Recorded By, (t) = Taken By, (c) = Cosigned By    Initials Name Provider Type    CR Reyes, Carmela, PT Physical Therapist        Obj/Interventions     Row Name 08/03/21 1500          Range of Motion Comprehensive    Comment, General Range of Motion  AROM LLE min limit; AROm Rhip wfl  -CR     Row Name 08/03/21 1500          Strength Comprehensive (MMT)    Comment, General Manual Muscle Testing (MMT) Assessment  Rresidual limb wfl; LLE grossly 3/5  -CR     Row Name 08/03/21 1500          Balance    Balance Assessment  sitting static balance;sitting dynamic balance;standing static balance;standing dynamic balance  -CR     Static Sitting Balance  WFL;sitting, edge of bed  -CR     Dynamic Sitting Balance  WFL;sitting, edge of bed  -CR     Static Standing Balance  moderate impairment;supported  -CR     Dynamic Standing Balance  moderate impairment;severe impairment;supported  -CR     Row Name 08/03/21 1500          Sensory Assessment (Somatosensory)    Sensory Assessment (Somatosensory)  -- dec sensation LLE  -CR       User Key  (r) = Recorded By, (t) = Taken By, (c) = Cosigned By    Initials Name Provider Type    CR Reyes, Carmela, PT Physical Therapist        Goals/Plan     Row Name 08/03/21 1507          Transfer Goal 1 (PT)    Activity/Assistive Device (Transfer Goal 1, PT)  transfers, all;walker, rolling  -CR     [place] Level/Cues Needed (Transfer Goal 1, PT)  modified independence  -CR     Time Frame (Transfer Goal 1, PT)  1 week  -CR     Row Name 08/03/21 1507          Gait Training Goal 1 (PT)    Activity/Assistive Device (Gait Training Goal 1, PT)  gait (walking locomotion);assistive device use;improve balance and speed;increase endurance/gait distance;walker, rolling;decrease fall risk  -CR     [place] Level (Gait Training Goal 1, PT)  contact guard assist  -CR     Distance (Gait Training  Goal 1, PT)  70 x 2  -CR     Time Frame (Gait Training Goal 1, PT)  1 week  -CR     Row Name 08/03/21 1509          Stairs Goal 1 (PT)    Activity/Assistive Device (Stairs Goal 1, PT)  stairs, all skills  -CR     [place] Level/Cues Needed (Stairs Goal 1, PT)  contact guard assist  -CR     Number of Stairs (Stairs Goal 1, PT)  5  -CR     Time Frame (Stairs Goal 1, PT)  1 week  -CR       User Key  (r) = Recorded By, (t) = Taken By, (c) = Cosigned By    Initials Name Provider Type    CR Reyes, Carmela, PT Physical Therapist        Clinical Impression     Row Name 08/03/21 1506          Pain    Additional Documentation  Pain Scale: Numbers Pre/Post-Treatment (Group)  -CR     Row Name 08/03/21 1505          Pain Scale: Numbers Pre/Post-Treatment    Pretreatment Pain Rating  0/10 - no pain  -CR     Posttreatment Pain Rating  0/10 - no pain  -CR     Row Name 08/03/21 1504          Plan of Care Review    Plan of Care Reviewed With  patient  -CR     Outcome Summary  55 y/o male admitted on 8/2 due to sudden onset of R side numbness, balance deficits. MRI reveal infarct to L internal capsule. PMH Includes R BKA , DM with PVD, p. neuropathy, HTN. At time of eval, pt reports improvement of R side numbness. Pt able to don/doff prosthesis without assistance. Noted staggering upon standing and required min/mod A to ambulate 50 ft x 2 with marked gait deficits. Pt normally ambulates wtihout a.d. but now needing rw for stability. Pt will benefit from short IP rehab for balance and gait training.  -CR     Row Name 08/03/21 1505          Therapy Assessment/Plan (PT)    Patient/Family Therapy Goals Statement (PT)  home  -CR     Rehab Potential (PT)  good, to achieve stated therapy goals  -CR     Criteria for Skilled Interventions Met (PT)  skilled treatment is necessary;yes  -CR     Predicted Duration of Therapy Intervention (PT)  dc  -CR     Row Name 08/03/21 1508          Positioning and Restraints    Pre-Treatment Position  in  bed  -CR     Post Treatment Position  bed  -CR     In Bed  with OT;notified nsg;sitting EOB;call light within reach  -CR       User Key  (r) = Recorded By, (t) = Taken By, (c) = Cosigned By    Initials Name Provider Type    CR Reyes, Carmela, PT Physical Therapist        Outcome Measures     Row Name 08/03/21 1508          Modified [place] Scale    Modified [place] Scale  4 - Moderately severe disability.  Unable to walk without assistance, and unable to attend to own bodily needs without assistance.  -CR     Row Name 08/03/21 1508          Functional Assessment    Outcome Measure Options  Modified Gi[place]anny  -CR       User Key  (r) = Recorded By, (t) = Taken By, (c) = Cosigned By    Initials Name Provider Type    CR Reyes, Carmela, PT Physical Therapist                       Physical Therapy Education                 Title: PT OT SLP Therapies (In Progress)     Topic: Physical Therapy (In Progress)     Point: Mobility training (In Progress)     Learning Progress Summary           Patient Acceptance, E, NR by CR at 8/3/2021 1508                   Point: Home exercise program (Not Started)     Learner Progress:  Not documented in this visit.          Point: Precautions (Not Started)     Learner Progress:  Not documented in this visit.                      User Key     Initials Effective Dates Name Provider Type Discipline    CR 06/16/21 -  Reyes, Carmela, PT Physical Therapist PT              PT Recommendation and Plan  Planned Therapy Interventions (PT): balance training, gait training, neuromuscular re-education, patient/family education, strengthening, transfer training, stair training  Plan of Care Reviewed With: patient  Outcome Summary: 57 y/o male admitted on 8/2 due to sudden onset of R side numbness, balance deficits. MRI reveal infarct to L internal capsule. PMH Includes R BKA , DM with PVD, p. neuropathy, HTN. At time of eval, pt reports improvement of R side numbness. Pt able to don/doff prosthesis without  assistance. Noted staggering upon standing and required min/mod A to ambulate 50 ft x 2 with marked gait deficits. Pt normally ambulates wtihout a.d. but now needing rw for stability. Pt will benefit from short IP rehab for balance and gait training.     Time Calculation:   PT Charges     Row Name 08/03/21 1508             Time Calculation    Start Time  0840  -CR      Stop Time  0915  -CR      Time Calculation (min)  35 min  -CR      PT Received On  08/03/21  -CR      PT - Next Appointment  08/04/21  -CR      PT Goal Re-Cert Due Date  08/17/21  -CR         Time Calculation- PT    Total Timed Code Minutes- PT  0 minute(s)  -CR        User Key  (r) = Recorded By, (t) = Taken By, (c) = Cosigned By    Initials Name Provider Type    CR Reyes, Carmela, PT Physical Therapist        Therapy Charges for Today     Code Description Service Date Service Provider Modifiers Qty    21031333518 HC PT EVAL MOD COMPLEXITY 4 8/3/2021 Reyes, Carmela, YENY GP 1          PT G-Codes  Outcome Measure Options: Modified [place]  Modified [place] Scale: 4 - Moderately severe disability.  Unable to walk without assistance, and unable to attend to own bodily needs without assistance.    Carmela Reyes, PT  8/3/2021

## 2021-08-03 NOTE — DISCHARGE PLACEMENT REQUEST
"Kuldeep Adhikari (56 y.o. Male)     Date of Birth Social Security Number Address Home Phone MRN    1965  [id number] [place] [address] [phone] [id number]    Jehovah's witness Marital Status          Restoration        Admission Date Admission Type Admitting Provider Attending Provider Department, Room/Bed    8/2/21 Emergency Jacob Bentley MD Farley, Timothy Michael, MD [facility] NEURO HEART, 263/1    Discharge Date Discharge Disposition Discharge Destination                       Attending Provider: Jacob Bentley MD    Allergies: Lisinopril    Isolation: None   Infection: None   Code Status: CPR    Ht: 172.7 cm (68\")   Wt: 87.4 kg (192 lb 10.9 oz)    Admission Cmt: None   Principal Problem: Cerebrovascular accident (CVA) (CMS/[facility]) [I63.9]                 Active Insurance as of 8/2/2021     Primary Coverage     Payor Plan Insurance Group Employer/Plan Group    HUMANA MEDICARE REPLACEMENT HUMANA MEDICARE REPLACEMENT [id number]     Payor Plan Address Payor Plan Phone Number Payor Plan Fax Number Effective Dates    [address] [address] [phone]  1/1/2019 - None Entered    [facility] [address]       Subscriber Name Subscriber Birth Date Member ID       KULDEEP ADHIKARI 1965 [id number]           Secondary Coverage     Payor Plan Insurance Group Employer/Plan Group    [place]      Payor Plan Address Payor Plan Phone Number Payor Plan Fax Number Effective Dates    [address]   1/1/2019 - None Entered    [place] [address]       Subscriber Name Subscriber Birth Date Member ID       KULDEEP ADHIKARI 1965 [id number]                 Emergency Contacts      (Rel.) Home Phone Work Phone Mobile Phone    DIGNA ADHIKARI (Spouse) [phone] -- --    POOJA ADHIKARI (Brother) -- -- [phone]    SHANE ADHIKARI (Brother) -- [phone]              "

## 2021-08-03 NOTE — PLAN OF CARE
Goal Outcome Evaluation:     Problem: Adult Inpatient Plan of Care  Goal: Absence of Hospital-Acquired Illness or Injury  Intervention: Identify and Manage Fall Risk  Recent Flowsheet Documentation  Taken 8/3/2021 1400 by [place], Nika, RN  Safety Promotion/Fall Prevention:   safety round/check completed   room organization consistent   nonskid shoes/slippers when out of bed   fall prevention program maintained   clutter free environment maintained   activity supervised   assistive device/personal items within reach  Taken 8/3/2021 1200 by [place]iss[place], Nika, RN  Safety Promotion/Fall Prevention:   safety round/check completed   room organization consistent   nonskid shoes/slippers when out of bed   fall prevention program maintained   clutter free environment maintained   assistive device/personal items within reach   activity supervised  Taken 8/3/2021 1000 by Nika Rey, RN  Safety Promotion/Fall Prevention:   room organization consistent   safety round/check completed   nonskid shoes/slippers when out of bed   fall prevention program maintained   clutter free environment maintained   activity supervised   assistive device/personal items within reach  Taken 8/3/2021 0800 by Nika Rey, RN  Safety Promotion/Fall Prevention:   safety round/check completed   room organization consistent   nonskid shoes/slippers when out of bed   fall prevention program maintained   clutter free environment maintained   assistive device/personal items within reach   activity supervised     Problem: Adult Inpatient Plan of Care  Goal: Absence of Hospital-Acquired Illness or Injury  Intervention: Prevent Skin Injury  Recent Flowsheet Documentation  Taken 8/3/2021 0800 by Nika Rey, RN  Skin Protection: adhesive use limited

## 2021-08-03 NOTE — THERAPY EVALUATION
Patient Name: Kuldeep Adhikari  : 1965    MRN: [id number]                              Today's Date: 8/3/2021       Admit Date: 2021    Visit Dx:     ICD-10-CM ICD-9-CM   1. Cerebrovascular accident (CVA), unspecified mechanism (CMS/[facility])  I63.9 434.91   2. Diabetes 1.5, managed as type 1 (CMS/[facility])  E13.9 250.00     Patient Active Problem List   Diagnosis   • Benign essential hypertension   • Cellulitis   • Chest pain   • Chronic coronary artery disease   • Chronic renal insufficiency, stage III (moderate) (CMS/HCC)   • Degenerative joint disease   • Depression   • Diabetic peripheral neuropathy (CMS/HCC)   • Diabetic ketoacidosis (CMS/[facility])   • Disorder associated with type 2 diabetes mellitus (CMS/[facility])   • Encounter for screening for malignant neoplasm of colon   • Fatigue   • Foot pain, right   • Ganglion cyst   • Gangrene of foot (CMS/[facility])   • Gastroesophageal reflux disease   • History of amputation of hallux (CMS/[facility])   • History of gastrointestinal disease   • History of pulmonary embolism   • Hx of osteomyelitis   • Mixed hyperlipidemia   • Acquired hypothyroidism   • Obstructive sleep apnea syndrome   • Palpitations   • Peripheral vascular disease (CMS/[facility])   • Subacromial bursitis of right shoulder joint   • Vitamin D deficiency   • Cellulitis in diabetic foot (CMS/[facility])   • Obesity (BMI 30-39.9)   • Sepsis due to group B Streptococcus (CMS/[facility])   • Osteomyelitis of right foot (CMS/[facility])   • Encounter for other orthopedic aftercare   • Other ossification of muscle, unspecified site   • Cerebrovascular accident (CVA) (CMS/[facility])   • Type 2 diabetes mellitus with peripheral vascular disease (CMS/[facility])     Past Medical History:   Diagnosis Date   • CKD (chronic kidney disease), stage III (CMS/HCC)    • Depression    • DJD (degenerative joint disease)    • DVT (deep venous thrombosis) (CMS/HCC)    • Ganglion     rt wrist   • GERD (gastroesophageal reflux disease)    • Hiatal hernia    • History of  echocardiogram 04/2016    2D ECHO   • History of esophageal stricture     s/p dialation 40-50 times last EGD 04/2016   • History of pulmonary embolus (PE)     on long term anticoagulation   • Hyperlipidemia    • Hypertension    • Hypothyroidism    • IBS (irritable bowel syndrome)    • Neuropathy    • Rash     rt lower hip   • Retinopathy    • Seasonal allergies    • Sleep apnea     cpap  bring dos   • Type 2 diabetes mellitus with peripheral vascular disease (CMS/HCC)    • Vitamin D deficiency      Past Surgical History:   Procedure Laterality Date   • AMPUTATION FOOT / TOE Right     great toe   • AMPUTATION REVISION Right 4/8/2021    Procedure: BELOW KNEE AMPUTATION REVISAION;  Surgeon: Eduardo Reynolds MD;  Location: [facility] MAIN OR;  Service: Orthopedics;  Laterality: Right;   • AMPUTATION REVISION Right 4/29/2021    Procedure: AMPUTATION REVISION KNEE STUMP;  Surgeon: Eduardo Reynolds MD;  Location: [facility] MAIN OR;  Service: Orthopedics;  Laterality: Right;   • BELOW KNEE AMPUTATION Right 7/30/2020    Procedure: AMPUTATION BELOW KNEE;  Surgeon: Eduardo Reynolds MD;  Location: [facility] MAIN OR;  Service: Orthopedics;  Laterality: Right;   • CARDIAC CATHETERIZATION  04/2018    [facility]   • ENDOSCOPY     • ENDOSCOPY N/A 10/4/2019    Procedure: ESOPHAGOGASTRODUODENOSCOPY with dilitation and biopsy x 1 area;  Surgeon: Declan Iqbal MD;  Location: [facility] ENDOSCOPY;  Service: Gastroenterology   • ENDOSCOPY N/A 12/13/2019    Procedure: ESOPHAGOGASTRODUODENOSCOPY WITH DILATATION (50, 52 BOUGIE);  Surgeon: Declan Iqbal MD;  Location: [facility] ENDOSCOPY;  Service: Gastroenterology   • GANGLION CYST EXCISION Left    • HERNIA REPAIR Bilateral    • RETINOPATHY SURGERY      laser   • TOTAL HIP ARTHROPLASTY Left    • TOTAL HIP ARTHROPLASTY Left 2018   • TRANS METATARSAL AMPUTATION Right 3/17/2020    Procedure: AMPUTATION TRANS METATARSAL right;  Surgeon: ERWIN Baker DPM;  Location: [facility] MAIN OR;  Service:  Podiatry;  Laterality: Right;  GANGRENOUS RIGHT FOOT     General Information     Row Name 08/03/21 1600          OT Time and Intention    Document Type  evaluation  -ES     Mode of Treatment  occupational therapy  -ES     Row Name 08/03/21 1600          General Information    Patient Profile Reviewed  yes  -ES     Prior Level of Function  independent:;ADL's;driving  -ES     Existing Precautions/Restrictions  fall R BKA  -ES     Barriers to Rehab  medically complex;previous functional deficit  -ES     Row Name 08/03/21 1600          Occupational Profile    Reason for Services/Referral (Occupational Profile)  55 y/o male admitted on 8/2 due to sudden onset of R side numbness, balance deficits. MRI reveal infarct to L internal capsule. PMH Includes R BKA , DM with PVD, significant peripheral neuropathy, HTN. At baseline, pt lives with spouse and teenage children, with four steps to enter.  -ES     Row Name 08/03/21 1600          Living Environment    Lives With  spouse;child(bryant), dependent  -ES     Row Name 08/03/21 1600          Home Main Entrance    Number of Stairs, Main Entrance  four  -ES     Row Name 08/03/21 1600          Stairs Within Home, Primary    Number of Stairs, Within Home, Primary  none  -ES     Row Name 08/03/21 1600          Cognition    Orientation Status (Cognition)  oriented x 3 States it is October  -ES     Row Name 08/03/21 1600          Safety Issues, Functional Mobility    Safety Issues Affecting Function (Mobility)  awareness of need for assistance;impulsivity;insight into deficits/self-awareness;safety precautions follow-through/compliance;safety precaution awareness;problem-solving  -ES     Impairments Affecting Function (Mobility)  sensation/sensory awareness;balance;strength  -ES       User Key  (r) = Recorded By, (t) = Taken By, (c) = Cosigned By    Initials Name Provider Type    ES Licha Hester OT Occupational Therapist          Mobility/ADL's     Row Name 08/03/21 1606           Bed Mobility    Bed Mobility  supine-sit  -ES     Supine-Sit Clune (Bed Mobility)  modified independence  -ES     Assistive Device (Bed Mobility)  bed rails;head of bed elevated  -ES     Row Name 08/03/21 1601          Transfers    Sit-Stand Clune (Transfers)  contact guard prosthesis  -ES     Row Name 08/03/21 1601          Functional Mobility    Functional Mobility- Ind. Level  minimum assist (75% patient effort)  -ES     Functional Mobility- Device  rolling walker  -ES     Row Name 08/03/21 1601          Activities of Daily Living    BADL Assessment/Intervention  upper body dressing;lower body dressing;toileting  -ES     Row Name 08/03/21 1601          Upper Body Dressing Assessment/Training    Clune Level (Upper Body Dressing)  set up  -     Row Name 08/03/21 1601          Lower Body Dressing Assessment/Training    Clune Level (Lower Body Dressing)  moderate assist (50% patient effort)  -     Row Name 08/03/21 1601          Toileting Assessment/Training    Comment (Toileting)  Min A for bowel hygiene.  -ES       User Key  (r) = Recorded By, (t) = Taken By, (c) = Cosigned By    Initials Name Provider Type     Licha Hester, OT Occupational Therapist        Obj/Interventions     Row Name 08/03/21 1602          Sensory Assessment (Somatosensory)    Sensory Assessment (Somatosensory)  bilateral UE;bilateral LE  -ES     Bilateral UE Sensory Assessment  other (see comments) neuropathy  -ES     Bilateral LE Sensory Assessment  -- neuropathy, severe. Reports shooting nail into LLE with nail gun and not noticing until he was unable to remove shoe  -     Row Name 08/03/21 1602          Vision Assessment/Intervention    Visual Impairment/Limitations  corrective lenses full-time;blurry vision  -     Row Name 08/03/21 1602          Range of Motion Comprehensive    General Range of Motion  bilateral upper extremity ROM WNL  -ES     Row Name 08/03/21 1602          Balance    Balance  Assessment  sitting static balance;sitting dynamic balance;standing static balance;standing dynamic balance  -ES     Static Sitting Balance  WNL  -ES     Dynamic Sitting Balance  WNL  -ES     Static Standing Balance  moderate impairment  -ES     Dynamic Standing Balance  moderate impairment  -ES       User Key  (r) = Recorded By, (t) = Taken By, (c) = Cosigned By    Initials Name Provider Type    Licha Cruz OT Occupational Therapist        Goals/Plan     Row Name 08/03/21 1610          Bathing Goal 1 (OT)    Activity/Device (Bathing Goal 1, OT)  bathing skills, all  -ES     [place]astings Level/Cues Needed (Bathing Goal 1, OT)  supervision required  -ES     Time Frame (Bathing Goal 1, OT)  2 weeks  -ES     Row Name 08/03/21 1610          Dressing Goal 1 (OT)    Activity/Device (Dressing Goal 1, OT)  dressing skills, all  -ES     [place]/Cues Needed (Dressing Goal 1, OT)  modified independence  -ES     Time Frame (Dressing Goal 1, OT)  2 weeks  -ES     Row Name 08/03/21 1610          Toileting Goal 1 (OT)    Activity/Device (Toileting Goal 1, OT)  toileting skills, all  -ES     [place] Level/Cues Needed (Toileting Goal 1, OT)  modified independence  -ES     Time Frame (Toileting Goal 1, OT)  2 weeks  -ES     Row Name 08/03/21 1610          Therapy Assessment/Plan (OT)    Planned Therapy Interventions (OT)  activity tolerance training;BADL retraining;cognitive/visual perception retraining;functional balance retraining;IADL retraining;occupation/activity based interventions;passive ROM/stretching;ROM/therapeutic exercise;strengthening exercise  -ES       User Key  (r) = Recorded By, (t) = Taken By, (c) = Cosigned By    Initials Name Provider Type    Licha Cruz OT Occupational Therapist        Clinical Impression     Row Name 08/03/21 1603          Pain Scale: Numbers Pre/Post-Treatment    Pretreatment Pain Rating  0/10 - no pain  -ES     Posttreatment Pain Rating  0/10 - no pain  -ES      Row Name 08/03/21 1603          Plan of Care Review    Plan of Care Reviewed With  patient  -ES     Outcome Summary  57 y/o male admitted on 8/2 due to sudden onset of R side numbness, balance deficits. MRI reveal infarct to L internal capsule. PMH Includes R BKA , DM with PVD, significant peripheral neuropathy, HTN. At baseline, pt lives with spouse and teenage children, with four steps to enter. Patient EOB s/p PT session, and declines putting RLE prosthetic on due to skin irritant from sleeve. Patient c/o dizziness with all transfers, but is able to complete Min A and RW. Pt demos poor safety awareness, is impulsive with all movement, and demos poor understanding of importance of protecting LE's secondary to diabetes. Patient states questionable care at home, and is unsafe to return home at this time. Will require IP rehab at discharge.  -ES     Row Name 08/03/21 1603          Therapy Assessment/Plan (OT)    Rehab Potential (OT)  good, to achieve stated therapy goals  -ES     Criteria for Skilled Therapeutic Interventions Met (OT)  yes  -ES     Therapy Frequency (OT)  5 times/wk  -ES     Row Name 08/03/21 1603          Therapy Plan Review/Discharge Plan (OT)    Anticipated Discharge Disposition (OT)  inpatient rehabilitation facility  -ES     Row Name 08/03/21 1603          Vital Signs    O2 Delivery Pre Treatment  supplemental O2  -ES     O2 Delivery Intra Treatment  supplemental O2  -ES     O2 Delivery Post Treatment  supplemental O2  -ES     Pre Patient Position  Sitting  -ES     Intra Patient Position  Standing  -ES     Row Name 08/03/21 1603          Positioning and Restraints    Pre-Treatment Position  in bed  -ES     Post Treatment Position  chair  -ES     In Chair  notified nsg;call light within reach;encouraged to call for assist;exit alarm on  -ES       User Key  (r) = Recorded By, (t) = Taken By, (c) = Cosigned By    Initials Name Provider Type    Licha Cruz OT Occupational Therapist         Outcome Measures     Row Name 08/03/21 1611          How much help from another is currently needed...    Putting on and taking off regular lower body clothing?  2  -ES     Bathing (including washing, rinsing, and drying)  2  -ES     Toileting (which includes using toilet bed pan or urinal)  2  -ES     Putting on and taking off regular upper body clothing  2  -ES     Taking care of personal grooming (such as brushing teeth)  3  -ES     Eating meals  4  -ES     AM-PAC 6 Clicks Score (OT)  15  -ES     Row Name 08/03/21 1508          Modified Giovanny Scale    Modified Giovanny Scale  4 - Moderately severe disability.  Unable to walk without assistance, and unable to attend to own bodily needs without assistance.  -CR     Row Name 08/03/21 1611 08/03/21 1508       Functional Assessment    Outcome Measure Options  AM-PAC 6 Clicks Daily Activity (OT)  -ES  Modified Howard  -CR      User Key  (r) = Recorded By, (t) = Taken By, (c) = Cosigned By    Initials Name Provider Type    Licha Cruz OT Occupational Therapist    CR Reyes, Carmela, PT Physical Therapist          Occupational Therapy Education                 Title: PT OT SLP Therapies (In Progress)     Topic: Occupational Therapy (In Progress)     Point: ADL training (Done)     Description:   Instruct learner(s) on proper safety adaptation and remediation techniques during self care or transfers.   Instruct in proper use of assistive devices.              Learning Progress Summary           Patient Acceptance, E,TB, VU by  at 8/3/2021 1612                   Point: Home exercise program (Not Started)     Description:   Instruct learner(s) on appropriate technique for monitoring, assisting and/or progressing therapeutic exercises/activities.              Learner Progress:  Not documented in this visit.          Point: Precautions (Done)     Description:   Instruct learner(s) on prescribed precautions during self-care and functional transfers.               Learning Progress Summary           Patient Acceptance, E,TB, VU by  at 8/3/2021 1612                   Point: Body mechanics (Done)     Description:   Instruct learner(s) on proper positioning and spine alignment during self-care, functional mobility activities and/or exercises.              Learning Progress Summary           Patient Acceptance, E,TB, VU by  at 8/3/2021 1612                               User Key     Initials Effective Dates Name Provider Type Discipline     06/16/21 -  Licha Hester OT Occupational Therapist OT              OT Recommendation and Plan  Planned Therapy Interventions (OT): activity tolerance training, BADL retraining, cognitive/visual perception retraining, functional balance retraining, IADL retraining, occupation/activity based interventions, passive ROM/stretching, ROM/therapeutic exercise, strengthening exercise  Therapy Frequency (OT): 5 times/wk  Plan of Care Review  Plan of Care Reviewed With: patient  Outcome Summary: 55 y/o male admitted on 8/2 due to sudden onset of R side numbness, balance deficits. MRI reveal infarct to L internal capsule. PMH Includes R BKA , DM with PVD, significant peripheral neuropathy, HTN. At baseline, pt lives with spouse and teenage children, with four steps to enter. Patient EOB s/p PT session, and declines putting RLE prosthetic on due to skin irritant from sleeve. Patient c/o dizziness with all transfers, but is able to complete Min A and RW. Pt demos poor safety awareness, is impulsive with all movement, and demos poor understanding of importance of protecting LE's secondary to diabetes. Patient states questionable care at home, and is unsafe to return home at this time. Will require IP rehab at discharge.     Time Calculation:   Time Calculation- OT     Row Name 08/03/21 1612             Time Calculation- OT    OT Start Time  0900  -ES      OT Stop Time  0930  -ES      OT Time Calculation (min)  30 min  -ES      Total Timed Code  Minutes- OT  15 minute(s)  -ES      OT Received On  08/03/21  -ES      OT - Next Appointment  08/04/21  -ES      OT Goal Re-Cert Due Date  08/17/21  -ES        User Key  (r) = Recorded By, (t) = Taken By, (c) = Cosigned By    Initials Name Provider Type    Licha Cruz OT Occupational Therapist        Therapy Charges for Today     Code Description Service Date Service Provider Modifiers Qty    [id number]  OT SELF CARE/MGMT/TRAIN EA 15 MIN 8/3/2021 Licha Hester OT GO 1    [id number]  OT EVAL HIGH COMPLEXITY 3 8/3/2021 Licha Hester OT GO 1               Licha Hester OT  8/3/2021

## 2021-08-03 NOTE — CONSULTS
Referring Provider: Jacob Bentley,*  Reason for Consultation:  Recent stroke.  Hypertension  Dyslipidemia  Right carotid bruit    Patient Care Team:  Laura Whitaker MD as PCP - General    Chief complaint right-sided numbness and weakness    Subjective .     History of present illness:  Kuldeep Adhikari is a 56 y.o. male who presents with multiple cardiac and noncardiac problems was admitted to the hospital with history of right-sided weakness and numbness.  Patient apparently was healthy and went to bed around 11 PM and woke up around 430 and fell down.  Patient has a right leg prosthesis and apparently fell on his right side.  Patient had numbness of his face on the right side right leg and right arm.  Patient did not have any headache vision changes.  No fever cough chills.  Denies having any chest discomfort heaviness or tightness in the chest.  Patient has been on Eliquis.  CT scan of the head showed no acute changes.  CTA showed no large vessel occlusion.  Patient was not given TPA.  Patient has a history of hypertension and diabetes.  No other associated aggravating or elevating factors.  Patient was admitted to the hospital with following history and cardiology consultation was requested for possible BARON.    ROS      Since I have last seen, the patient has been without any chest discomfort ,shortness of breath, palpitations, dizziness or syncope.  Denies having any headache ,abdominal pain ,nausea, vomiting , diarrhea constipation, loss of weight or loss of appetite.  Denies having any excessive bruising ,hematuria or blood in the stool.    Review of all systems negative except as indicated      History  Past Medical History:   Diagnosis Date   • CKD (chronic kidney disease), stage III (CMS/[facility])    • Depression    • DJD (degenerative joint disease)    • DVT (deep venous thrombosis) (CMS/[facility])    • Ganglion     rt wrist   • GERD (gastroesophageal reflux disease)    • Hiatal hernia    •  History of echocardiogram 04/2016    2D ECHO   • History of esophageal stricture     s/p dialation 40-50 times last EGD 04/2016   • History of pulmonary embolus (PE)     on long term anticoagulation   • Hyperlipidemia    • Hypertension    • Hypothyroidism    • IBS (irritable bowel syndrome)    • Neuropathy    • Rash     rt lower hip   • Retinopathy    • Seasonal allergies    • Sleep apnea     cpap  bring dos   • Type 2 diabetes mellitus with peripheral vascular disease (CMS/HCC)    • Vitamin D deficiency        Past Surgical History:   Procedure Laterality Date   • AMPUTATION FOOT / TOE Right     great toe   • AMPUTATION REVISION Right 4/8/2021    Procedure: BELOW KNEE AMPUTATION REVISAION;  Surgeon: Eduardo Reynolds MD;  Location: [facility] MAIN OR;  Service: Orthopedics;  Laterality: Right;   • AMPUTATION REVISION Right 4/29/2021    Procedure: AMPUTATION REVISION KNEE STUMP;  Surgeon: Eduardo Reynolds MD;  Location: [facility] MAIN OR;  Service: Orthopedics;  Laterality: Right;   • BELOW KNEE AMPUTATION Right 7/30/2020    Procedure: AMPUTATION BELOW KNEE;  Surgeon: Eduardo Reynolds MD;  Location: [facility] MAIN OR;  Service: Orthopedics;  Laterality: Right;   • CARDIAC CATHETERIZATION  04/2018    [facility]   • ENDOSCOPY     • ENDOSCOPY N/A 10/4/2019    Procedure: ESOPHAGOGASTRODUODENOSCOPY with dilitation and biopsy x 1 area;  Surgeon: Declan Iqbal MD;  Location: [facility] ENDOSCOPY;  Service: Gastroenterology   • ENDOSCOPY N/A 12/13/2019    Procedure: ESOPHAGOGASTRODUODENOSCOPY WITH DILATATION (50, 52 BOUGIE);  Surgeon: Declan Iqbal MD;  Location: [facility] ENDOSCOPY;  Service: Gastroenterology   • GANGLION CYST EXCISION Left    • HERNIA REPAIR Bilateral    • RETINOPATHY SURGERY      laser   • TOTAL HIP ARTHROPLASTY Left    • TOTAL HIP ARTHROPLASTY Left 2018   • TRANS METATARSAL AMPUTATION Right 3/17/2020    Procedure: AMPUTATION TRANS METATARSAL right;  Surgeon: ERWIN Baker DPM;  Location: [facility] MAIN OR;   Service: Podiatry;  Laterality: Right;  GANGRENOUS RIGHT FOOT       Family History   Problem Relation Age of Onset   • Diabetes Mother    • Heart disease Mother    • Leukemia Father    • Sleep apnea Maternal Aunt         GENO   • Stroke Maternal Grandmother    • Hypertension Other    • Hyperlipidemia Other    • Cancer Other    • Colon cancer Other         uncle       Social History     Tobacco Use   • Smoking status: Never Smoker   • Smokeless tobacco: Never Used   Vaping Use   • Vaping Use: Never used   Substance Use Topics   • Alcohol use: No   • Drug use: No        Medications Prior to Admission   Medication Sig Dispense Refill Last Dose   • atorvastatin (LIPITOR) 40 MG tablet TAKE 1 TABLET EVERY DAY 90 tablet 0 8/1/2021 at Unknown time   • DULoxetine (CYMBALTA) 60 MG capsule TAKE 1 CAPSULE EVERY MORNING 90 capsule 0 8/1/2021 at Unknown time   • Eliquis 5 MG tablet tablet TAKE 1 TABLET TWICE DAILY 180 tablet 0 8/1/2021 at Unknown time   • empagliflozin (Jardiance) 10 MG tablet tablet Take 1 tablet by mouth Daily. 90 tablet 4 8/1/2021 at Unknown time   • gabapentin (NEURONTIN) 400 MG capsule Take 400 mg by mouth 3 (Three) Times a Day.   8/1/2021 at Unknown time   • insulin aspart (NovoLOG FlexPen) 100 UNIT/ML solution pen-injector sc pen Inject 10 Units under the skin into the appropriate area as directed 3 (Three) Times a Day With Meals. (Patient taking differently: Inject 14 Units under the skin into the appropriate area as directed 3 (Three) Times a Day With Meals.) 15 mL 6 8/1/2021 at Unknown time   • Insulin Glargine (Lantus SoloStar) 100 UNIT/ML injection pen INJECT SUBCUTANEOUSLY 27 UNITS EVERY BEDTIME (NEEDS APPOINTMENT) (Patient taking differently: Inject 34 Units under the skin into the appropriate area as directed Every Night.) 30 mL 6 8/1/2021 at Unknown time   • levothyroxine (SYNTHROID, LEVOTHROID) 100 MCG tablet TAKE 1 TABLET EVERY DAY (Patient taking differently: Take 100 mcg by mouth Daily.) 90  tablet 3 8/1/2021 at Unknown time   • losartan (COZAAR) 50 MG tablet TAKE 1 TABLET EVERY DAY 90 tablet 0 8/1/2021 at Unknown time   • omeprazole (priLOSEC) 40 MG capsule Take 40 mg by mouth Daily. Take DOS   8/1/2021 at Unknown time   • metoprolol succinate XL (TOPROL-XL) 50 MG 24 hr tablet TAKE 1 TABLET EVERY DAY (Patient taking differently: Take 50 mg by mouth Daily.) 30 tablet 0    • ondansetron (ZOFRAN) 4 MG tablet TAKE 1 TABLET EVERY 8 HOURS AS NEEDED FOR NAUSEA OR VOMITING. (Patient taking differently: Take 8 mg by mouth Every 8 (Eight) Hours As Needed for Nausea or Vomiting.) 45 tablet 0          Lisinopril    Scheduled Meds:apixaban, 5 mg, Oral, BID  aspirin, 81 mg, Oral, Daily  atorvastatin, 80 mg, Oral, Nightly  cyanocobalamin, 1,000 mcg, Intramuscular, Q28 Days  DULoxetine, 60 mg, Oral, QAM  gabapentin, 400 mg, Oral, TID  insulin glargine, 34 Units, Subcutaneous, Nightly  insulin lispro, 14 Units, Subcutaneous, TID With Meals  levothyroxine, 100 mcg, Oral, Q AM  losartan, 50 mg, Oral, Daily  metoprolol succinate XL, 50 mg, Oral, Daily  miconazole, , Topical, Q12H  pantoprazole, 40 mg, Oral, QAM  sodium chloride, 3 mL, Intravenous, Q12H      Continuous Infusions:lactated ringers, 75 mL/hr, Last Rate: 75 mL/hr (08/03/21 1302)      PRN Meds:.•  acetaminophen **OR** acetaminophen  •  bisacodyl  •  HYDROcodone-acetaminophen  •  ipratropium-albuterol  •  ondansetron  •  [COMPLETED] Insert peripheral IV **AND** sodium chloride  •  sodium chloride    Objective     VITAL SIGNS  Vitals:    08/02/21 2233 08/03/21 0212 08/03/21 0534 08/03/21 1038   BP: 140/72 130/71 124/69 126/71   BP Location: Right arm Right arm Right arm Left arm   Patient Position: Lying Lying Lying Sitting   Pulse: 82 74 68 80   Resp: 20 19 20 12   Temp: 98.2 °F (36.8 °C) 97.9 °F (36.6 °C) 97.9 °F (36.6 °C) 98.5 °F (36.9 °C)   TempSrc: Oral Oral Oral Oral   SpO2: 93% 98% 99% 98%   Weight: 89.3 kg (196 lb 13.9 oz)  87.4 kg (192 lb 10.9 oz)   "  Height:           Flowsheet Rows      First Filed Value   Admission Height  172.7 cm (68\") Documented at 08/02/2021 0540   Admission Weight  83.9 kg (185 lb) Documented at 08/02/2021 0540            Intake/Output Summary (Last 24 hours) at 8/3/2021 1305  Last data filed at 8/3/2021 1038  Gross per 24 hour   Intake 662 ml   Output 1150 ml   Net -488 ml        TELEMETRY: Sinus rhythm    Physical Exam:  The patient is alert, oriented and in no distress.  Vital signs as noted above.  Head and neck revealed bilateral carotid bruits right more than left.  No jugular venous distention.  No thyromegaly or lymph adenopathy is present  Lungs clear.  No wheezing.  Breath sounds are normal bilaterally.  Heart normal first and second heart sounds.No murmur.  No precordial rub is present.  No gallop is present.  Abdomen soft and nontender.  No organomegaly is present.  Extremities with good peripheral pulses without any pedal edema.  Right BKA  Skin warm and dry.  Musculoskeletal system is grossly normal  CNS grossly normal      Results Review:   I reviewed the patient's new clinical results.  Lab Results (last 24 hours)     Procedure Component Value Units Date/Time    POC Glucose Once [[id number]]  (Abnormal) Collected: 08/03/21 1106    Specimen: Blood Updated: 08/03/21 1107     Glucose 196 mg/dL      Comment: Serial Number: [id number]:  [id number]       POC Glucose Once [[id number]]  (Abnormal) Collected: 08/03/21 0752    Specimen: Blood Updated: 08/03/21 0753     Glucose 134 mg/dL      Comment: Serial Number: [id number]:  [id number]       Basic Metabolic Panel [[id number]]  (Abnormal) Collected: 08/03/21 0434    Specimen: Blood Updated: 08/03/21 0608     Glucose 227 mg/dL      BUN 18 mg/dL      Creatinine 1.37 mg/dL      Sodium 137 mmol/L      Potassium 4.8 mmol/L      Comment: Slight hemolysis detected by analyzer. Results may be affected.        Chloride 100 mmol/L      CO2 27.0 mmol/L      Calcium 8.9 mg/dL      " eGFR Non African Amer 54 mL/min/1.73      BUN/Creatinine Ratio 13.1     Anion Gap 10.0 mmol/L     Narrative:      GFR Normal >60  Chronic Kidney Disease <60  Kidney Failure <15      Magnesium [[id number]]  (Normal) Collected: 08/03/21 0434    Specimen: Blood Updated: 08/03/21 0608     Magnesium 1.8 mg/dL     Extra Tubes [[id number]] Collected: 08/03/21 0434    Specimen: Blood, Venous Line Updated: 08/03/21 0547    Narrative:      The following orders were created for panel order Extra Tubes.  Procedure                               Abnormality         Status                     ---------                               -----------         ------                     Lavender Top[[id number]]                                     Final result                 Please view results for these tests on the individual orders.    Lavender Top [[id number]] Collected: 08/03/21 0434    Specimen: Blood Updated: 08/03/21 0547     Extra Tube hold for add-on     Comment: Auto resulted       POC Glucose Once [[id number]]  (Abnormal) Collected: 08/03/21 0037    Specimen: Blood Updated: 08/03/21 0038     Glucose 196 mg/dL      Comment: Serial Number: [id number]:  [id number]       Ethanol [[id number]] Collected: 08/02/21 1823    Specimen: Blood Updated: 08/02/21 1918     Ethanol % <0.010 %     Narrative:      Plasma Ethanol Clinical Symptoms:    ETOH (%)               Clinical Symptom  .01-.05              No apparent influence  .03-.12              Euphoria, Diminished judgment and attention   .09-.25              Impaired comprehension, Muscle incoordination  .18-.30              Confusion, Staggered gait, Slurred speech  .25-.40              Markedly decreased response to stimuli, unable to stand or                        walk, vomitting, sleep or stupor  .35-.50              Comatose, Anesthesia, Subnormal body temperature        Vitamin B12 [[id number]]  (Normal) Collected: 08/02/21 0620    Specimen: Blood Updated: 08/02/21 1999      Vitamin B-12 315 pg/mL     Narrative:      Results may be falsely increased if patient taking Biotin.            Imaging Results (Last 24 Hours)     ** No results found for the last 24 hours. **      LAB RESULTS (LAST 7 DAYS)    CBC  Results from last 7 days   Lab Units 08/02/21  0620   WBC 10*3/mm3 7.80   RBC 10*6/mm3 5.24   HEMOGLOBIN g/dL 13.9   HEMATOCRIT % 42.1   MCV fL 80.4   PLATELETS 10*3/mm3 244       BMP  Results from last 7 days   Lab Units 08/03/21  0434 08/02/21  0620   SODIUM mmol/L 137 141   POTASSIUM mmol/L 4.8 4.2   CHLORIDE mmol/L 100 101   CO2 mmol/L 27.0 28.0   BUN mg/dL 18 16   CREATININE mg/dL 1.37* 1.21   GLUCOSE mg/dL 227* 152*   MAGNESIUM mg/dL 1.8  --        CMP   Results from last 7 days   Lab Units 08/03/21  0434 08/02/21  0620   SODIUM mmol/L 137 141   POTASSIUM mmol/L 4.8 4.2   CHLORIDE mmol/L 100 101   CO2 mmol/L 27.0 28.0   BUN mg/dL 18 16   CREATININE mg/dL 1.37* 1.21   GLUCOSE mg/dL 227* 152*         BNP        TROPONIN        CoAg  Results from last 7 days   Lab Units 08/02/21  0620   INR  0.99   APTT seconds 25.3       Creatinine Clearance  Estimated Creatinine Clearance: 63.9 mL/min (A) (by C-G formula based on SCr of 1.37 mg/dL (H)).    ABG        Radiology  CT Head Without Contrast    Result Date: 8/2/2021  No acute findings.  Electronically Signed By-Bridger Cazares MD On:8/2/2021 8:04 AM This report was finalized on [id number] by  Bridger Cazares MD.    CT Angiogram Neck    Result Date: 8/2/2021   1. No significant carotid stenosis identified on either side. Widely patent dominant right vertebral artery. 2. The left vertebral artery is diminutive, and appears to terminate after the origin of the PICA branch. This may be due to normal variant anatomy. However, there are no prior studies available for comparison to confirm this is chronic/congenital finding. Correlation with patient exam and/or MRI brain may be helpful to determine significance of this finding. Additional vascular  and nonvascular findings as given above.  Electronically Signed By-Ian Mauricio MD On:8/2/2021 9:25 AM This report was finalized on [id number] by  Ian Mauricio MD.    MRI Brain Without Contrast    Result Date: 8/2/2021  12 mm acute lacunar infarct within the posterior limb of the left internal capsule with possible involvement of the posterior left lentiform nucleus.   Electronically Signed By-Bridger Cazares MD On:8/2/2021 10:48 AM This report was finalized on [id number] by  Bridger Cazares MD.    XR Chest 1 View    Result Date: 8/2/2021  No active disease.  Electronically Signed By-Bridger Cazares MD On:8/2/2021 7:22 AM This report was finalized on [id number] by  Bridger Cazares MD.    CT Angiogram Head    Result Date: 8/2/2021   1. No significant carotid stenosis identified on either side. Widely patent dominant right vertebral artery. 2. The left vertebral artery is diminutive, and appears to terminate after the origin of the PICA branch. This may be due to normal variant anatomy. However, there are no prior studies available for comparison to confirm this is chronic/congenital finding. Correlation with patient exam and/or MRI brain may be helpful to determine significance of this finding. Additional vascular and nonvascular findings as given above.  Electronically Signed By-Ian Mauricio MD On:8/2/2021 9:25 AM This report was finalized on [id number] by  Ina Mauricio MD.              EKG          I personally viewed and interpreted the patient's EKG/Telemetry data: Normal sinus rhythm nonspecific ST-T wave changes    ECHOCARDIOGRAM:    Results for orders placed during the hospital encounter of 08/02/21    Adult Transthoracic Echo Complete W/ Cont if Necessary Per Protocol (With Agitated Saline)    Interpretation Summary  · Left ventricular wall thickness is consistent with mild concentric hypertrophy.  · Estimated left ventricular EF = 70% Estimated left ventricular EF was in agreement with the calculated left  ventricular EF. Left ventricular systolic function is normal.  · Left ventricular diastolic function is consistent with (grade I) impaired relaxation.  · Estimated right ventricular systolic pressure from tricuspid regurgitation is normal (<35 mmHg).  · Calcified nodular density that is attached to the anterior leaflet of the mitral valve that was reported even on the prior echocardiogram unlikely to be a vegetation most likely just a calcified nodule. If there is a clinical concern for embolic or infectious pathology consider further evaluation work-up including a BARON  · There is mainly affecting the left coronary cusp(s).              Cardiolite (Tc-99m Sestamibi) stress test      OTHER:     Assessment/Plan     Principal Problem:    Cerebrovascular accident (CVA) (CMS/HCC)  Active Problems:    Benign essential hypertension    Chronic coronary artery disease    Chronic renal insufficiency, stage III (moderate) (CMS/HCC)    Depression    Diabetic peripheral neuropathy (CMS/HCC)    Gastroesophageal reflux disease    History of pulmonary embolism    Mixed hyperlipidemia    Acquired hypothyroidism    Obstructive sleep apnea syndrome    Peripheral vascular disease (CMS/HCC)    Type 2 diabetes mellitus with peripheral vascular disease (CMS/HCC)    ]]]]]]]]]]]]]]]]]]]]]]  Impression  ===========  -TIA/stroke.  Abnormal CTA with acute lacunar infarct within the posterior limb of the left internal capsule.  EKG showed sinus rhythm.  Echocardiogram showed anterior mitral leaflet nodular density.    -Does not have any cardiac symptoms.  Question cardiac cath 2018.  No records are available through [place].  Could not access [employer] system.    -Bilateral carotid bruits.  Right louder than left.    -Hypertension diabetes dyslipidemia sleep apnea CKD 3 (BUN 18 creatinine 1.37.)  Hypothyroidism vitamin D deficiency    -History of DVT.    -Long-term anticoagulation with Eliquis due to previous pulmonary embolus    -Peripheral  vascular disease    -Status post right BKA.  Total left arthroplasty hernia repair    -Non-smoker  ============  Plan  ============  Patient has carotid bruits right louder than left.  CTA did not show obstructive carotid artery disease.  We will obtain carotid duplex scan.  Abnormal echocardiogram.  Rule out embolic episodes.  Patient would benefit from transesophageal echocardiogram.  We will hold off on Eliquis for at least 1 day and BARON will be performed probably on Thursday, 8/5/2021.  Eliquis can be restarted soon after BARON.  Risks and benefits pros and cons of the procedure were discussed with patient including esophageal trauma anesthetic risk arrhythmias etc.  Further plan will depend on patient's progress.  ]]]]]]]]]]]]]]]]]]]]]      Nelli Vides MD  08/03/21  13:05 EDT

## 2021-08-03 NOTE — CONSULTS
Diabetes Education    Patient Name:  Kuldeep Adhikari  YOB: 1965  MRN: [id number]  Admit Date:  8/2/2021    Follow up note: Received consult to see pt due to having CVA and A1c >7%. Pt was seen by inpatient diabetes educator during this adm on 8/2/2021 for having CVA and A1c of 11.2%.  Education completed at that time. During that visit, pt did not have any additional questions. No further education necessary.       Electronically signed by:  Karena Hagan RN  08/03/21 10:49 EDT

## 2021-08-03 NOTE — PLAN OF CARE
Goal Outcome Evaluation:  Plan of Care Reviewed With: patient           Outcome Summary: 57 y/o male admitted on 8/2 due to sudden onset of R side numbness, balance deficits. MRI reveal infarct to L internal capsule. PMH Includes R BKA , DM with PVD, significant peripheral neuropathy, HTN. At baseline, pt lives with spouse and teenage children, with four steps to enter. Patient EOB s/p PT session, and declines putting RLE prosthetic on due to skin irritant from sleeve. Patient c/o dizziness with all transfers, but is able to complete Min A and RW. Pt demos poor safety awareness, is impulsive with all movement, and demos poor understanding of importance of protecting LE's secondary to diabetes. Patient states questionable care at home, and is unsafe to return home at this time. Will require IP rehab at discharge.

## 2021-08-03 NOTE — PROGRESS NOTES
[facility] Medicine Services Daily Progress Note    Patient Name: Kuldeep Adhikari  : 1965  MRN: [id number]  Primary Care Physician:  Laura Whitaker MD  Date of admission: 2021      Subjective      Chief Complaint: Right-sided weakness      Patient Reports he feels his right arm strength is mildly improving.  Still working on blood pressure control.  Patient's echocardiogram showing abnormal calcified nodular density on mitral valve, cardiology consulted for further evaluation.  Patient cleared by neurology for discharge.  Patient needs repeat sleep study on discharge.  Patient reports he had some episodes of dizziness while walking with physical therapy.  MRI brain confirming 12 mm acute lacunar infarct in posterior limb of the left internal capsule, likely secondary to patient's uncontrolled blood pressure.    Review of Systems   Constitutional: Positive for malaise/fatigue. Negative for chills.   HENT: Negative for hoarse voice and stridor.    Eyes: Negative for double vision and photophobia.   Respiratory: Negative for cough and shortness of breath.    Musculoskeletal: Negative for falls and stiffness.   Gastrointestinal: Negative for nausea and vomiting.   Genitourinary: Negative for dysuria and flank pain.   Neurological: Positive for dizziness and focal weakness.   Psychiatric/Behavioral: Negative for altered mental status. The patient is not nervous/anxious.           Objective      Vitals:   Temp:  [97.9 °F (36.6 °C)-98.5 °F (36.9 °C)] 98.5 °F (36.9 °C)  Heart Rate:  [68-82] 80  Resp:  [12-22] 12  BP: (124-156)/(69-78) 126/71  Flow (L/min):  [2] 2    Physical Exam      General: Middle-age male sitting up in a chair breathing comfortably on room air no acute distress  HEENT: NC/AT, EOMI, mucosa moist  Heart: Regular, rate controlled  Chest: Normal work of breathing, moving air well no wheezing  Abdominal: Soft. NT/ND.   Musculoskeletal: Right BKA, normal ROM.  No  "edema. No calf tenderness.  Neurological: AAOx3, cranial nerves II through XII appear grossly intact, minimal weakness in right upper extremity compared to left  Skin: Skin is warm and dry. No rash  Psychiatric: Normal mood and affect.    Result Review    Result Review:  I have personally reviewed the results from the time of this admission to 8/3/2021 12:38 EDT and agree with these findings:  [x]  Laboratory  [x]  Microbiology  [x]  Radiology  [x]  EKG/Telemetry   [x]  Cardiology/Vascular   []  Pathology  []  Old records  []  Other:  Most notable findings include: Abnormal leaflet of mitral valve on TTE, renal insufficiency, hyperglycemia, low vitamin B12          Assessment/Plan      Brief Patient Summary:    Kuldeep Adhikari is a 56 y.o. male with PMH of PVD s/p R BKA, DM Type II, PE on eliquis, CAD, HTN, HLD, hypothyroidism, asthma, anxiety, and GERD who presented to [facility] on 8/2/2021 right sided numbness and weakness. He stated he went to bed around 11pm in his normal state of health. He woke up around 4:30am and fell. He stated, \"It was like my right leg and arm were gone.\" He was using his right leg prosthesis and fell landing on his right side. He had numbness from the right side of his face all the way down his right arm and right leg. He denied any headache or vision changes. He is concerned that a tick bite might have caused his symptoms. He denied any prior stroke history. He has been taking his home medications including his eliquis      In the ED the patient had right sided weakness and paresthesia.  CT head showed no acute findings.  CTA head/neck showed no significant carotid stenosis, left vertebral artery is diminutive and appears to terminate after the origin of the PICA branch.  This may be due to normal variant anatomy however no prior studies to confirm this is chronic/congenital finding.  Neurology was consulted and patient was admitted to the [facility] for suspected CVA. He was " determined not to be a tPA candidate due to unknown onset of symptoms and patient is on eliquis. MRI brain ordered.        apixaban, 5 mg, Oral, BID  aspirin, 81 mg, Oral, Daily  atorvastatin, 80 mg, Oral, Nightly  cyanocobalamin, 1,000 mcg, Intramuscular, Q28 Days  DULoxetine, 60 mg, Oral, QAM  gabapentin, 400 mg, Oral, TID  insulin glargine, 34 Units, Subcutaneous, Nightly  insulin lispro, 14 Units, Subcutaneous, TID With Meals  levothyroxine, 100 mcg, Oral, Q AM  losartan, 50 mg, Oral, Daily  metoprolol succinate XL, 50 mg, Oral, Daily  miconazole, , Topical, Q12H  pantoprazole, 40 mg, Oral, QAM  sodium chloride, 3 mL, Intravenous, Q12H             Active Hospital Problems:  Active Hospital Problems    Diagnosis    • **Cerebrovascular accident (CVA) (CMS/[facility])    • Type 2 diabetes mellitus with peripheral vascular disease (CMS/[facility])    • Chronic coronary artery disease    • Obstructive sleep apnea syndrome    • Benign essential hypertension    • Diabetic peripheral neuropathy (CMS/[facility])    • Chronic renal insufficiency, stage III (moderate) (CMS/[facility])    • Depression    • Gastroesophageal reflux disease    • History of pulmonary embolism    • Mixed hyperlipidemia    • Acquired hypothyroidism    • Peripheral vascular disease (CMS/[facility])      Plan:   Right-sided weakness-secondary to underlying left-sided ischemic stroke.  Patient with multiple significant comorbidities including type 2 diabetes, hypertension and peripheral vascular disease likely contributing, patient with hypertensive crisis on admission now controlled.  Patient reports he has been off of his blood pressure medication  -Blood pressure control  -Neurology consulted  -MRI showing 12 mm acute lacunar infarct within the posterior limb of the left internal capsule  -Statin and antiplatelets  -Patient far beyond window for TPA  -Speech/PT/OT  -Risk stratification  -CTA of head and neck showing no significant vessel pathology  -Echo with bubble  -CT head  showed no bleed     Hypertensive crisis-likely uncontrolled chronic hypertension patient also has GENO which he reports his CPAP is not been working, blood pressure much more controlled  -Case management consult for assistance with getting working CPAP  -Blood pressure more controlled at this time  -Patient will be on new medications  -Monitor renal function  -Urine drug screen appropriate, UA with no proteinuria     Dizziness-patient reported more prominent with standing, patient has had better blood pressure control in the hospital than he likely has in some time which may be a component  -Check orthostatics  -Less likely correlated with stroke  -Gentle IV hydration x24 hours    Abnormal mitral valve-on TTE patient shown to have calcified nodule on mitral valve uncertain significance  -Cardiology consulted appreciate recommendations    Heart failure-patient's echo showing grade 1 diastolic dysfunction with essentially normal systolic function may be appearing mildly hypovolemic at this time  -Daily weights  -Monitor I's and O's    Renal insufficiency-patient reporting some dizziness today which may be secondary to mild intravascular depletion  -Avoid nephrotoxic medication  -IV hydration x24 hours  -Repeat tomorrow    Elevated TSH-very mildly so  -Free T4 appropriate     Pulmonary embolism-patient anticoagulated  -Find out if this first event or any other risk factors of how long he needs to be anticoagulated  -Patient is high risk     Coronary artery disease-patient denies any chest pain at this time  -Cardiac monitoring  -EKG normal sinus rhythm normal axis  -Continue antiplatelets     Type 2 diabetes-patient reports history of relatively poor control  -A1c of 11.2 very poorly controlled  -Sliding scale and short acting basal  -Long-acting insulin  -Given significant comorbidities may benefit from endocrinology evaluation on outpatient basis     Peripheral vascular disease-likely due to uncontrolled hypertension  and diabetes  -Patient with right BKA  -Continue antiplatelets     Hyperlipidemia/anxiety/GERD/asthma-chronic in nature  -Resume home medication as clinically appropriate, atorvastatin increased to 80 mg     GENO-patient reports he is not using his CPAP due to malfunction  -Repeat sleep study will be ordered on discharge    DVT prophylaxis:  Medical and mechanical DVT prophylaxis orders are present.    CODE STATUS:    Code Status: CPR  Medical Interventions (Level of Support Prior to Arrest): Full      Disposition:  I expect patient to be discharged in 24 to 48 hours.    This patient has been examined wearing appropriate Personal Protective Equipment and discussed with hospital infection control department. 08/03/21      Electronically signed by Jacob Bentley MD, 08/03/21, 12:38 EDT.  [facility] Hospitalist Team

## 2021-08-03 NOTE — THERAPY EVALUATION
Acute Care - Speech Language Pathology Initial Evaluation   [place]     Patient Name: Kuldeep Adhikari  : 1965  MRN: [id number]  Today's Date: 8/3/2021               Admit Date: 2021     Visit Dx:    ICD-10-CM ICD-9-CM   1. Cerebrovascular accident (CVA), unspecified mechanism (CMS/[facility])  I63.9 434.91   2. Diabetes 1.5, managed as type 1 (CMS/[facility])  E13.9 250.00     Patient Active Problem List   Diagnosis   • Benign essential hypertension   • Cellulitis   • Chest pain   • Chronic coronary artery disease   • Chronic renal insufficiency, stage III (moderate) (CMS/HCC)   • Degenerative joint disease   • Depression   • Diabetic peripheral neuropathy (CMS/HCC)   • Diabetic ketoacidosis (CMS/[facility])   • Disorder associated with type 2 diabetes mellitus (CMS/[facility])   • Encounter for screening for malignant neoplasm of colon   • Fatigue   • Foot pain, right   • Ganglion cyst   • Gangrene of foot (CMS/[facility])   • Gastroesophageal reflux disease   • History of amputation of hallux (CMS/[facility])   • History of gastrointestinal disease   • History of pulmonary embolism   • Hx of osteomyelitis   • Mixed hyperlipidemia   • Acquired hypothyroidism   • Obstructive sleep apnea syndrome   • Palpitations   • Peripheral vascular disease (CMS/[facility])   • Subacromial bursitis of right shoulder joint   • Vitamin D deficiency   • Cellulitis in diabetic foot (CMS/[facility])   • Obesity (BMI 30-39.9)   • Sepsis due to group B Streptococcus (CMS/[facility])   • Osteomyelitis of right foot (CMS/[facility])   • Encounter for other orthopedic aftercare   • Other ossification of muscle, unspecified site   • Cerebrovascular accident (CVA) (CMS/[facility])   • Type 2 diabetes mellitus with peripheral vascular disease (CMS/[facility])     Past Medical History:   Diagnosis Date   • CKD (chronic kidney disease), stage III (CMS/HCC)    • Depression    • DJD (degenerative joint disease)    • DVT (deep venous thrombosis) (CMS/HCC)    • Ganglion     rt wrist   • GERD (gastroesophageal reflux  disease)    • Hiatal hernia    • History of echocardiogram 04/2016    2D ECHO   • History of esophageal stricture     s/p dialation 40-50 times last EGD 04/2016   • History of pulmonary embolus (PE)     on long term anticoagulation   • Hyperlipidemia    • Hypertension    • Hypothyroidism    • IBS (irritable bowel syndrome)    • Neuropathy    • Rash     rt lower hip   • Retinopathy    • Seasonal allergies    • Sleep apnea     cpap  bring dos   • Type 2 diabetes mellitus with peripheral vascular disease (CMS/HCC)    • Vitamin D deficiency      Past Surgical History:   Procedure Laterality Date   • AMPUTATION FOOT / TOE Right     great toe   • AMPUTATION REVISION Right 4/8/2021    Procedure: BELOW KNEE AMPUTATION REVISAION;  Surgeon: Eduardo Reynolds MD;  Location: [facility] MAIN OR;  Service: Orthopedics;  Laterality: Right;   • AMPUTATION REVISION Right 4/29/2021    Procedure: AMPUTATION REVISION KNEE STUMP;  Surgeon: Eduardo Reynolds MD;  Location: [facility] MAIN OR;  Service: Orthopedics;  Laterality: Right;   • BELOW KNEE AMPUTATION Right 7/30/2020    Procedure: AMPUTATION BELOW KNEE;  Surgeon: Eduardo Reynolds MD;  Location: [facility] MAIN OR;  Service: Orthopedics;  Laterality: Right;   • CARDIAC CATHETERIZATION  04/2018    [facility]   • ENDOSCOPY     • ENDOSCOPY N/A 10/4/2019    Procedure: ESOPHAGOGASTRODUODENOSCOPY with dilitation and biopsy x 1 area;  Surgeon: Declan Iqbal MD;  Location: [facility] ENDOSCOPY;  Service: Gastroenterology   • ENDOSCOPY N/A 12/13/2019    Procedure: ESOPHAGOGASTRODUODENOSCOPY WITH DILATATION (50, 52 BOUGIE);  Surgeon: Declan Iqbal MD;  Location: [facility] ENDOSCOPY;  Service: Gastroenterology   • GANGLION CYST EXCISION Left    • HERNIA REPAIR Bilateral    • RETINOPATHY SURGERY      laser   • TOTAL HIP ARTHROPLASTY Left    • TOTAL HIP ARTHROPLASTY Left 2018   • TRANS METATARSAL AMPUTATION Right 3/17/2020    Procedure: AMPUTATION TRANS METATARSAL right;  Surgeon: ERWIN Baker DPM;   Location: [facility] MAIN OR;  Service: Podiatry;  Laterality: Right;  GANGRENOUS RIGHT FOOT       SLP Recommendation and Plan           [facility] Criteria for Skilled Therapy Interventions Met: baseline status                                   SLP EVALUATION (last 72 hours)      SLP [facility] Evaluation     Row Name 08/03/21 6682       Communication Assessment/Intervention    Document Type  evaluation  -EC    Subjective Information  no complaints  -EC    Patient Observations  alert;cooperative;agree to therapy  -EC    Care Plan Review  evaluation/treatment results reviewed;care plan/treatment goals reviewed;patient/other agree to care plan  -EC    Care Plan Review, Other Participant(s)  friend  -EC    Patient Effort  excellent  -EC    Symptoms Noted During/After Treatment  none  -EC       General Information    Patient Profile Reviewed  yes  -EC    Pertinent History Of Current Problem  55 y/o male admitted on 8/2 due to sudden onset of R side numbness, balance deficits. MRI reveal infarct to L internal capsule. PMH Includes R BKA , DM with PVD, p. neuropathy, HTN.   -EC    Precautions/Limitations, Vision  WFL with corrective lenses  -EC    Patient Level of Education  12th grade  -EC    Prior Level of Function-Communication  WFL  -EC    Plans/Goals Discussed with  patient  -EC    Standardized Assessment Used  SLUMS  -EC       Standardized Tests    Cognitive/Memory Tests  SLUMS: [facility] Mental Status Examination  -EC       SLUMS: [facility] Mental Status Examination    SLUMS Score  23  -EC    SLUMS Range  21-26: Mild Neurocognitive Disorder (High school education or higher)  -EC    SLUMS Comments  Pt seen for [facility] eval this date. No aphasia, apraxia or dysarthria demonstrated at evaluation. Pt is 100% intelligible at the conversational level. Pt completes confrontational and responsive naming tasks w/100% accuracy. Automatic speech intact. Repetition is intact at the sentence level. Pt able to provide  appropriate solutions to simple safety problems. Pt completed the SLUMS w/mild deficits noted. Pt oriented to person, place and time. Pt recalls 4/5 objects w/a delay. Categorical naming intact. Mental manipulation intact. Pt completes a clock drawing w/time depicted incorrect though he then self corrects later. Pt does not accurately solve math problem involving money though pt and friend state this is baseline for him as he typically uses a calculator or pencil and paper. Cog deficits noted to be mild w/pt aware, stating they are premorbid and stating strategies he uses at home to assist w/memory and problem solving. No ST is recommended at this time as pt appears to be at baseline. Discussed outpatient ST if pt gets home and feels he is having difficulty w/speech/lang/cog.  ST to sign off. Please re-consult if needed. Thank you.  -EC       SLP Clinical Impressions    [facility] Criteria for Skilled Therapy Interventions Met  baseline status  -EC    Functional Impact  no impact on function  -EC       Recommendations    Therapy Frequency (SLP SLC)  evaluation only  -EC      User Key  (r) = Recorded By, (t) = Taken By, (c) = Cosigned By    Initials Name Effective Dates    EC Licha Bo 06/16/21 -              EDUCATION  The patient has been educated in the following areas:     Cognitive Impairment.                      Time Calculation:                        Licha Bo  8/3/2021

## 2021-08-03 NOTE — PLAN OF CARE
Goal Outcome Evaluation:  Plan of Care Reviewed With: patient           Outcome Summary: 55 y/o male admitted on 8/2 due to sudden onset of R side numbness, balance deficits. MRI reveal infarct to L internal capsule. PMH Includes R BKA , DM with PVD, p. neuropathy, HTN. At time of eval, pt reports improvement of R side numbness. Pt able to don/doff prosthesis without assistance. Noted staggering upon standing and required min/mod A to ambulate 50 ft x 2 with marked gait deficits. Pt normally ambulates wtihout a.d. but now needing rw for stability. Pt will benefit from short IP rehab for balance and gait training.

## 2021-08-04 LAB
GLUCOSE BLDC GLUCOMTR-MCNC: 118 MG/DL (ref 70–105)
GLUCOSE BLDC GLUCOMTR-MCNC: 180 MG/DL (ref 70–105)
GLUCOSE BLDC GLUCOMTR-MCNC: 199 MG/DL (ref 70–105)
GLUCOSE BLDC GLUCOMTR-MCNC: 290 MG/DL (ref 70–105)
GLUCOSE BLDC GLUCOMTR-MCNC: 43 MG/DL (ref 70–105)
GLUCOSE BLDC GLUCOMTR-MCNC: 66 MG/DL (ref 70–105)
GLUCOSE BLDC GLUCOMTR-MCNC: 93 MG/DL (ref 70–105)

## 2021-08-04 PROCEDURE — 99232 SBSQ HOSP IP/OBS MODERATE 35: CPT | Performed by: FAMILY MEDICINE

## 2021-08-04 PROCEDURE — 25010000002 ENOXAPARIN PER 10 MG: Performed by: FAMILY MEDICINE

## 2021-08-04 PROCEDURE — 63710000001 INSULIN GLARGINE PER 5 UNITS: Performed by: NURSE PRACTITIONER

## 2021-08-04 PROCEDURE — 82962 GLUCOSE BLOOD TEST: CPT

## 2021-08-04 PROCEDURE — 63710000001 INSULIN LISPRO (HUMAN) PER 5 UNITS: Performed by: NURSE PRACTITIONER

## 2021-08-04 PROCEDURE — 99232 SBSQ HOSP IP/OBS MODERATE 35: CPT | Performed by: INTERNAL MEDICINE

## 2021-08-04 PROCEDURE — 97116 GAIT TRAINING THERAPY: CPT

## 2021-08-04 PROCEDURE — 97112 NEUROMUSCULAR REEDUCATION: CPT

## 2021-08-04 RX ADMIN — MICONAZOLE NITRATE: 20 POWDER TOPICAL at 09:15

## 2021-08-04 RX ADMIN — GABAPENTIN 400 MG: 400 CAPSULE ORAL at 16:39

## 2021-08-04 RX ADMIN — DULOXETINE HYDROCHLORIDE 60 MG: 30 CAPSULE, DELAYED RELEASE ORAL at 06:01

## 2021-08-04 RX ADMIN — INSULIN LISPRO 14 UNITS: 100 INJECTION, SOLUTION INTRAVENOUS; SUBCUTANEOUS at 17:50

## 2021-08-04 RX ADMIN — GABAPENTIN 400 MG: 400 CAPSULE ORAL at 09:14

## 2021-08-04 RX ADMIN — Medication 3 ML: at 21:00

## 2021-08-04 RX ADMIN — ASPIRIN 81 MG: 81 TABLET, CHEWABLE ORAL at 09:14

## 2021-08-04 RX ADMIN — Medication 3 ML: at 09:14

## 2021-08-04 RX ADMIN — LOSARTAN POTASSIUM 50 MG: 50 TABLET, FILM COATED ORAL at 09:14

## 2021-08-04 RX ADMIN — MICONAZOLE NITRATE 1 APPLICATION: 20 POWDER TOPICAL at 21:00

## 2021-08-04 RX ADMIN — SODIUM CHLORIDE, POTASSIUM CHLORIDE, SODIUM LACTATE AND CALCIUM CHLORIDE 75 ML/HR: 600; 310; 30; 20 INJECTION, SOLUTION INTRAVENOUS at 04:38

## 2021-08-04 RX ADMIN — METOPROLOL SUCCINATE 50 MG: 50 TABLET, EXTENDED RELEASE ORAL at 09:14

## 2021-08-04 RX ADMIN — INSULIN LISPRO 14 UNITS: 100 INJECTION, SOLUTION INTRAVENOUS; SUBCUTANEOUS at 11:47

## 2021-08-04 RX ADMIN — HYDROCODONE BITARTRATE AND ACETAMINOPHEN 1 TABLET: 5; 325 TABLET ORAL at 21:06

## 2021-08-04 RX ADMIN — LEVOTHYROXINE SODIUM 100 MCG: 0.1 TABLET ORAL at 06:01

## 2021-08-04 RX ADMIN — INSULIN GLARGINE 34 UNITS: 100 INJECTION, SOLUTION SUBCUTANEOUS at 20:58

## 2021-08-04 RX ADMIN — ATORVASTATIN CALCIUM 80 MG: 40 TABLET, FILM COATED ORAL at 21:00

## 2021-08-04 RX ADMIN — INSULIN LISPRO 14 UNITS: 100 INJECTION, SOLUTION INTRAVENOUS; SUBCUTANEOUS at 09:14

## 2021-08-04 RX ADMIN — ENOXAPARIN SODIUM 90 MG: 100 INJECTION SUBCUTANEOUS at 21:00

## 2021-08-04 RX ADMIN — PANTOPRAZOLE SODIUM 40 MG: 40 TABLET, DELAYED RELEASE ORAL at 06:01

## 2021-08-04 NOTE — CASE MANAGEMENT/SOCIAL WORK
Continued Stay Note  KATY Manrique     Patient Name: Kuldeep Adhikari  MRN: [id number]  Today's Date: 8/4/2021    Admit Date: 8/2/2021    Discharge Plan     Row Name 08/04/21 1213       Plan    Plan  Declined by [facility]. Not in network and no out of network benefits    Plan Comments  patient informed not in network for [facility]. He is looking at list and will give choice later today        Expected Discharge Date and Time     Expected Discharge Date Expected Discharge Time    Aug 5, 2021             Zena Saldana RN   Met with patient in room wearing PPE: mask, face shield/goggles, gloves, gown.      Maintained distance greater than six feet and spent less than 15 minutes in the room.

## 2021-08-04 NOTE — PLAN OF CARE
Goal Outcome Evaluation:              Problem: Adult Inpatient Plan of Care  Goal: Absence of Hospital-Acquired Illness or Injury  Intervention: Identify and Manage Fall Risk  Recent Flowsheet Documentation  Taken 8/4/2021 1604 by Nika Rey RN  Safety Promotion/Fall Prevention:   safety round/check completed   room organization consistent   nonskid shoes/slippers when out of bed   fall prevention program maintained   assistive device/personal items within reach   clutter free environment maintained   activity supervised  Taken 8/4/2021 1400 by Krissy, Nika, RN  Safety Promotion/Fall Prevention:   safety round/check completed   room organization consistent   nonskid shoes/slippers when out of bed   fall prevention program maintained   assistive device/personal items within reach   clutter free environment maintained   activity supervised  Taken 8/4/2021 1200 by Phil Campbell, Nika, RN  Safety Promotion/Fall Prevention:   room organization consistent   safety round/check completed   nonskid shoes/slippers when out of bed   fall prevention program maintained   clutter free environment maintained   assistive device/personal items within reach   activity supervised  Taken 8/4/2021 1000 by Nika Rey, RN  Safety Promotion/Fall Prevention:   safety round/check completed   room organization consistent   nonskid shoes/slippers when out of bed   fall prevention program maintained   clutter free environment maintained   assistive device/personal items within reach   activity supervised  Taken 8/4/2021 0800 by Nika Rey, RN  Safety Promotion/Fall Prevention:   safety round/check completed   room organization consistent   nonskid shoes/slippers when out of bed   fall prevention program maintained   clutter free environment maintained   assistive device/personal items within reach   activity supervised     Problem: Adult Inpatient Plan of Care  Goal: Absence of Hospital-Acquired Illness or  Injury  Intervention: Prevent Skin Injury  Recent Flowsheet Documentation  Taken 8/4/2021 0800 by Nika Rey, RN  Skin Protection: adhesive use limited

## 2021-08-04 NOTE — THERAPY TREATMENT NOTE
Subjective: Pt agreeable to therapeutic plan of care.    Objective:     Bed mobility - N/A or Not attempted.  Transfers - CGA, extra time  Ambulation - 60 ft x2, 40 ft x 1 feet CGA and with rolling walker    Pain: 0 VAS  Education: Provided education on importance of mobility and skilled verbal / tactile cueing throughout intervention.     Assessment: Kuldeep Adhikari presents with functional mobility impairments which indicate the need for skilled intervention. Tolerating session today without incident. Will continue to follow and progress as tolerated. Much improved gait this session with use of rw , no staggering noted and no loss of balance. Pt able to perform static standing for 1-2 minutes x 2 reps without sway noted. Gait remains slow with short step length.    Plan/Recommendations:   Pt would benefit from Inpatient Rehabilitation placement at discharge from facility and requires no DME at discharge.   Pt desires Inpatient Rehabilitation placement at discharge. Pt cooperative; agreeable to therapeutic recommendations and plan of care.     Basic Mobility 6-click:  Rollin = Total, A lot = 2, A little = 3; 4 = None  Supine>Sit:   1 = Total, A lot = 2, A little = 3; 4 = None   Sit>Stand with arms:  1 = Total, A lot = 2, A little = 3; 4 = None  Bed>Chair:   1 = Total, A lot = 2, A little = 3; 4 = None  Ambulate in room:  1 = Total, A lot = 2, A little = 3; 4 = None  3-5 Steps with railin = Total, A lot = 2, A little = 3; 4 = None  Score: 18    Modified [place]: 4 = Moderately severe disability (Unable to attend to own bodily needs without assistance, and unable to walk unassisted)     Post-Tx Position: Call light and personal items within reach. Sitting EOB.  PPE: gloves, surgical mask, eyewear protection

## 2021-08-04 NOTE — PROGRESS NOTES
[facility] Medicine Services Daily Progress Note    Patient Name: Kuldeep Adhikari  : 1965  MRN: [id number]  Primary Care Physician:  Laura Whitaker MD  Date of admission: 2021      Subjective      Chief Complaint: Right-sided weakness    Patient reports feeling generalized fatigue but dizziness has improved.  Orthostatics were negative.  Patient's anticoagulation being held for BARON on 2021.  No chest pain or shortness of breath.      Review of Systems   Constitutional: Positive for malaise/fatigue. Negative for chills.   HENT: Negative for hoarse voice and stridor.    Eyes: Negative for double vision and photophobia.   Respiratory: Negative for cough and shortness of breath.    Musculoskeletal: Negative for falls and stiffness.   Gastrointestinal: Negative for nausea and vomiting.   Genitourinary: Negative for dysuria and flank pain.   Neurological: Positive for focal weakness. Negative for seizures.   Psychiatric/Behavioral: Negative for altered mental status. The patient is not nervous/anxious.           Objective      Vitals:   Temp:  [97.7 °F (36.5 °C)-98.9 °F (37.2 °C)] 97.7 °F (36.5 °C)  Heart Rate:  [65-93] 93  Resp:  [14-25] 25  BP: (111-138)/(65-76) 111/69    Physical Exam      General: Middle-age male sitting up in a chair breathing comfortably on room air no acute distress  HEENT: NC/AT, EOMI, mucosa moist  Heart: Regular, rate controlled  Chest: Normal work of breathing, moving air well no wheezing  Abdominal: Soft. NT/ND.   Musculoskeletal: Right BKA, normal ROM.  No edema. No calf tenderness.  Neurological: AAOx3, cranial nerves II through XII appear grossly intact, minimal weakness in right upper extremity compared to left  Skin: Skin is warm and dry. No rash  Psychiatric: Normal mood and affect.    Result Review    Result Review:  I have personally reviewed the results from the time of this admission to 2021 12:32 EDT and agree with these  "findings:  [x]  Laboratory  [x]  Microbiology  [x]  Radiology  [x]  EKG/Telemetry   [x]  Cardiology/Vascular   []  Pathology  []  Old records  []  Other:  Most notable findings include: Abnormal leaflet of mitral valve on TTE, renal insufficiency          Assessment/Plan      Brief Patient Summary:    Kuldeep Adhikari is a 56 y.o. male with PMH of PVD s/p R BKA, DM Type II, PE on eliquis, CAD, HTN, HLD, hypothyroidism, asthma, anxiety, and GERD who presented to [facility] on 8/2/2021 right sided numbness and weakness. He stated he went to bed around 11pm in his normal state of health. He woke up around 4:30am and fell. He stated, \"It was like my right leg and arm were gone.\" He was using his right leg prosthesis and fell landing on his right side. He had numbness from the right side of his face all the way down his right arm and right leg. He denied any headache or vision changes. He is concerned that a tick bite might have caused his symptoms. He denied any prior stroke history. He has been taking his home medications including his eliquis      In the ED the patient had right sided weakness and paresthesia.  CT head showed no acute findings.  CTA head/neck showed no significant carotid stenosis, left vertebral artery is diminutive and appears to terminate after the origin of the PICA branch.  This may be due to normal variant anatomy however no prior studies to confirm this is chronic/congenital finding.  Neurology was consulted and patient was admitted to the [facility] for suspected CVA. He was determined not to be a tPA candidate due to unknown onset of symptoms and patient is on eliquis. MRI brain ordered.      8/3/2021: Patient Reports he feels his right arm strength is mildly improving.  Still working on blood pressure control.  Patient's echocardiogram showing abnormal calcified nodular density on mitral valve, cardiology consulted for further evaluation.  Patient cleared by neurology for discharge.  " Patient needs repeat sleep study on discharge.  Patient reports he had some episodes of dizziness while walking with physical therapy.  MRI brain confirming 12 mm acute lacunar infarct in posterior limb of the left internal capsule, likely secondary to patient's uncontrolled blood pressure.      aspirin, 81 mg, Oral, Daily  atorvastatin, 80 mg, Oral, Nightly  cyanocobalamin, 1,000 mcg, Intramuscular, Q28 Days  DULoxetine, 60 mg, Oral, QAM  gabapentin, 400 mg, Oral, TID  insulin glargine, 34 Units, Subcutaneous, Nightly  insulin lispro, 14 Units, Subcutaneous, TID With Meals  levothyroxine, 100 mcg, Oral, Q AM  losartan, 50 mg, Oral, Daily  metoprolol succinate XL, 50 mg, Oral, Daily  miconazole, , Topical, Q12H  pantoprazole, 40 mg, Oral, QAM  sodium chloride, 3 mL, Intravenous, Q12H       lactated ringers, 75 mL/hr, Last Rate: 75 mL/hr (08/04/21 1448)         Active Hospital Problems:  Active Hospital Problems    Diagnosis    • **Cerebrovascular accident (CVA) (CMS/HCC)    • Type 2 diabetes mellitus with peripheral vascular disease (CMS/HCC)    • Chronic coronary artery disease    • Obstructive sleep apnea syndrome    • Benign essential hypertension    • Diabetic peripheral neuropathy (CMS/HCC)    • Chronic renal insufficiency, stage III (moderate) (CMS/HCC)    • Depression    • Gastroesophageal reflux disease    • History of pulmonary embolism    • Mixed hyperlipidemia    • Acquired hypothyroidism    • Peripheral vascular disease (CMS/HCC)      Plan:   Right-sided weakness-secondary to underlying left-sided ischemic stroke.  Patient with multiple significant comorbidities including type 2 diabetes, hypertension and peripheral vascular disease likely contributing, patient with hypertensive crisis on admission now controlled.  Patient reports he has been off of his blood pressure medication  -Blood pressure control  -Neurology consulted, have cleared patient for discharge  -MRI showing 12 mm acute lacunar infarct  within the posterior limb of the left internal capsule  -Statin and antiplatelets  -Patient far beyond window for TPA  -Speech/PT/OT patient being recommended for inpatient rehab  -Risk stratification  -CTA of head and neck showing no significant vessel pathology  -Echo with bubble showing abnormal mitral leaflet  -CT head showed no bleed     Hypertensive crisis-likely uncontrolled chronic hypertension patient also has GENO which he reports his CPAP is not been working, blood pressure much more controlled  -Case management consult for assistance with getting working CPAP  -Blood pressure more controlled at this time  -Patient will be on new medications  -Monitor renal function  -Urine drug screen appropriate, UA with no proteinuria     Dizziness-patient reported more prominent with standing, patient has had better blood pressure control in the hospital than he likely has in some time which may be a component has resolved overnight  -Orthostatic hypotension negative  -Less likely correlated with stroke  -Finished 24 hours of IV hydration    Abnormal mitral valve-on TTE patient shown to have calcified nodule on mitral valve uncertain significance  -Cardiology consulted appreciate recommendations  -Plan for BARON on 8/5/2021    Heart failure-patient's echo showing grade 1 diastolic dysfunction with essentially normal systolic function may be appearing mildly hypovolemic initially now appearing more euvolemic  -Daily weights  -Monitor I's and O's    Renal insufficiency-patient reporting some dizziness today which may be secondary to mild intravascular depletion  -Avoid nephrotoxic medication  -IV hydration x24 hours, finished  -Repeat tomorrow    Elevated TSH-very mildly so  -Free T4 appropriate     Pulmonary embolism-patient anticoagulated  -Find out if this first event or any other risk factors of how long he needs to be anticoagulated  -Patient is high risk  -DOAC on hold for BARON  -Can continue Lovenox tonight and hold  a.m. dose     Coronary artery disease-patient denies any chest pain at this time  -Cardiac monitoring  -EKG normal sinus rhythm normal axis  -Continue antiplatelets     Type 2 diabetes-patient reports history of relatively poor control  -A1c of 11.2 very poorly controlled  -Sliding scale and short acting basal  -Long-acting insulin  -Given significant comorbidities may benefit from endocrinology evaluation on outpatient basis     Peripheral vascular disease-likely due to uncontrolled hypertension and diabetes  -Patient with right BKA  -Continue antiplatelets     Hyperlipidemia/anxiety/GERD/asthma-chronic in nature  -Resume home medication as clinically appropriate, atorvastatin increased to 80 mg     GENO-patient reports he is not using his CPAP due to malfunction  -Repeat sleep study will be ordered on discharge    DVT prophylaxis:  Mechanical DVT prophylaxis orders are present.    CODE STATUS:    Code Status: CPR  Medical Interventions (Level of Support Prior to Arrest): Full      Disposition:  I expect patient to be discharged in 24 to 48 hours.    This patient has been examined wearing appropriate Personal Protective Equipment and discussed with hospital infection control department. 08/04/21      Electronically signed by Jacob Bentley MD, 08/04/21, 12:32 EDT.  [facility] Hospitalist Team

## 2021-08-04 NOTE — PLAN OF CARE
Goal Outcome Evaluation:  Plan of Care Reviewed With: patient     Assessment: Kuldeep Adhikari presents with functional mobility impairments which indicate the need for skilled intervention. Tolerating session today without incident. Will continue to follow and progress as tolerated. Much improved gait this session with use of rw , no staggering noted and no loss of balance. Pt able to perform static standing for 1-2 minutes x 2 reps without sway noted. Gait remains slow with short step length.

## 2021-08-04 NOTE — PLAN OF CARE
Goal Outcome Evaluation:  Plan of Care Reviewed With: patient  Progress: no change   Pt has been a/o x4, no complaints of pain or discomfort, NIH-0, and pt remains free of falls with call light in reach. Nurse will continue to monitor.  Problem: Adult Inpatient Plan of Care  Goal: Plan of Care Review  Outcome: Ongoing, Progressing  Flowsheets (Taken 8/4/2021 0430)  Progress: no change  Plan of Care Reviewed With: patient  Goal: Patient-Specific Goal (Individualized)  Outcome: Ongoing, Progressing  Goal: Absence of Hospital-Acquired Illness or Injury  Outcome: Ongoing, Progressing  Intervention: Identify and Manage Fall Risk  Recent Flowsheet Documentation  Taken 8/4/2021 0332 by Billy Meyers LPN  Safety Promotion/Fall Prevention:   safety round/check completed   nonskid shoes/slippers when out of bed   fall prevention program maintained   clutter free environment maintained   assistive device/personal items within reach  Taken 8/4/2021 0200 by Billy Meyers LPN  Safety Promotion/Fall Prevention:   safety round/check completed   nonskid shoes/slippers when out of bed   fall prevention program maintained   clutter free environment maintained   assistive device/personal items within reach   activity supervised  Taken 8/4/2021 0017 by Billy Meyers LPN  Safety Promotion/Fall Prevention:   safety round/check completed   nonskid shoes/slippers when out of bed   fall prevention program maintained   clutter free environment maintained   assistive device/personal items within reach  Taken 8/3/2021 2200 by Billy Meyers LPN  Safety Promotion/Fall Prevention:   safety round/check completed   nonskid shoes/slippers when out of bed   fall prevention program maintained   assistive device/personal items within reach   clutter free environment maintained  Taken 8/3/2021 1952 by Billy Meyers LPN  Safety Promotion/Fall Prevention:   safety round/check completed   room organization consistent   nonskid  shoes/slippers when out of bed   fall prevention program maintained   clutter free environment maintained   assistive device/personal items within reach   activity supervised  Intervention: Prevent Skin Injury  Recent Flowsheet Documentation  Taken 8/3/2021 1952 by Billy Meyers LPN  Skin Protection:   adhesive use limited   transparent dressing maintained   tubing/devices free from skin contact  Intervention: Prevent and Manage VTE (venous thromboembolism) Risk  Recent Flowsheet Documentation  Taken 8/3/2021 1952 by Billy Meyers LPN  VTE Prevention/Management: (R BKA)   left   sequential compression devices on   other (see comments)  Intervention: Prevent Infection  Recent Flowsheet Documentation  Taken 8/3/2021 1952 by Billy Meyers LPN  Infection Prevention:   single patient room provided   rest/sleep promoted   personal protective equipment utilized   hand hygiene promoted   equipment surfaces disinfected  Goal: Optimal Comfort and Wellbeing  Outcome: Ongoing, Progressing  Intervention: Provide Person-Centered Care  Recent Flowsheet Documentation  Taken 8/4/2021 0332 by Billy Meyers LPN  Trust Relationship/Rapport: care explained  Taken 8/4/2021 0017 by Billy Meyers LPN  Trust Relationship/Rapport: care explained  Taken 8/3/2021 1952 by Billy Meyers LPN  Trust Relationship/Rapport:   care explained   choices provided   questions encouraged   empathic listening provided   reassurance provided   thoughts/feelings acknowledged  Goal: Readiness for Transition of Care  Outcome: Ongoing, Progressing     Problem: Fall Injury Risk  Goal: Absence of Fall and Fall-Related Injury  Outcome: Ongoing, Progressing  Intervention: Identify and Manage Contributors to Fall Injury Risk  Recent Flowsheet Documentation  Taken 8/3/2021 1952 by Billy Meyers LPN  Medication Review/Management: medications reviewed  Intervention: Promote Injury-Free Environment  Recent Flowsheet Documentation  Taken  8/4/2021 0332 by Billy Meyers LPN  Safety Promotion/Fall Prevention:   safety round/check completed   nonskid shoes/slippers when out of bed   fall prevention program maintained   clutter free environment maintained   assistive device/personal items within reach  Taken 8/4/2021 0200 by Billy Meyers LPN  Safety Promotion/Fall Prevention:   safety round/check completed   nonskid shoes/slippers when out of bed   fall prevention program maintained   clutter free environment maintained   assistive device/personal items within reach   activity supervised  Taken 8/4/2021 0017 by Billy Meyers LPN  Safety Promotion/Fall Prevention:   safety round/check completed   nonskid shoes/slippers when out of bed   fall prevention program maintained   clutter free environment maintained   assistive device/personal items within reach  Taken 8/3/2021 2200 by Billy Meyers LPN  Safety Promotion/Fall Prevention:   safety round/check completed   nonskid shoes/slippers when out of bed   fall prevention program maintained   assistive device/personal items within reach   clutter free environment maintained  Taken 8/3/2021 1952 by Billy Meyers LPN  Safety Promotion/Fall Prevention:   safety round/check completed   room organization consistent   nonskid shoes/slippers when out of bed   fall prevention program maintained   clutter free environment maintained   assistive device/personal items within reach   activity supervised     Problem: Adjustment to Illness (Stroke, Ischemic/Transient Ischemic Attack)  Goal: Optimal Coping  Outcome: Ongoing, Progressing  Intervention: Support Patient/Family Psychosocial Response to Stroke  Recent Flowsheet Documentation  Taken 8/3/2021 1952 by Billy Meyers LPN  Supportive Measures:   active listening utilized   relaxation techniques promoted   problem-solving facilitated   self-care encouraged     Problem: Bowel Elimination Impaired (Stroke, Ischemic/Transient Ischemic  Attack)  Goal: Effective Bowel Elimination  Outcome: Ongoing, Progressing     Problem: Cerebral Tissue Perfusion Risk (Stroke, Ischemic/Transient Ischemic Attack)  Goal: Optimal Cerebral Tissue Perfusion  Outcome: Ongoing, Progressing  Intervention: Protect and Optimize Cerebral Perfusion  Recent Flowsheet Documentation  Taken 8/3/2021 1952 by Billy Meyers LPN  Sensory Stimulation Regulation:   care clustered   lighting decreased   television on  Cerebral Perfusion Promotion: blood pressure monitored     Problem: Communication Impairment (Stroke, Ischemic/Transient Ischemic Attack)  Goal: Improved Communication Skills  Outcome: Ongoing, Progressing  Intervention: Optimize Cognitive and Communication Skills  Recent Flowsheet Documentation  Taken 8/3/2021 1952 by Billy Meyers LPN  Reorientation Measures: clock in view  Communication Enhancement Strategies:   call light answered in person   communication board used     Problem: Eating/Swallowing Impairment (Stroke, Ischemic/Transient Ischemic Attack)  Goal: Oral Intake without Aspiration  Outcome: Ongoing, Progressing  Intervention: Optimize Eating and Swallowing  Recent Flowsheet Documentation  Taken 8/3/2021 1952 by Billy Meyers LPN  Aspiration Precautions: awake/alert before oral intake     Problem: Functional Ability Impaired (Stroke, Ischemic/Transient Ischemic Attack)  Goal: Optimal Functional Ability  Outcome: Ongoing, Progressing     Problem: Hemodynamic Instability (Stroke, Ischemic/Transient Ischemic Attack)  Goal: Vital Signs Remain in Desired Range  Outcome: Ongoing, Progressing  Intervention: Optimize Blood Flow  Recent Flowsheet Documentation  Taken 8/3/2021 1952 by Billy Meyers LPN  Fluid/Electrolyte Management: fluids provided     Problem: Pain (Stroke, Ischemic/Transient Ischemic Attack)  Goal: Acceptable Pain Control  Outcome: Ongoing, Progressing     Problem: Sensorimotor Impairment (Stroke, Ischemic/Transient Ischemic  Attack)  Goal: Improved Sensorimotor Function  Outcome: Ongoing, Progressing  Intervention: Optimize Sensory and Perceptual Abilities  Recent Flowsheet Documentation  Taken 8/4/2021 0017 by Billy Meyers LPN  Pressure Reduction Techniques: frequent weight shift encouraged  Pressure Reduction Devices:   pressure-redistributing mattress utilized   positioning supports utilized  Taken 8/3/2021 1952 by Billy Meyers LPN  Pressure Reduction Techniques: frequent weight shift encouraged  Pressure Reduction Devices:   pressure-redistributing mattress utilized   positioning supports utilized     Problem: Urinary Elimination Impaired (Stroke, Ischemic/Transient Ischemic Attack)  Goal: Effective Urinary Elimination  Outcome: Ongoing, Progressing

## 2021-08-04 NOTE — PROGRESS NOTES
Referring Provider: Jacob Bentley,*    Reason for follow-up:  Recent stroke  Peripheral vascular disease  Rule out cardioembolic episodes     Patient Care Team:  Laura Whitaker MD as PCP - General    Subjective .  Feeling okay     ROS    Since I have last seen, the patient has been without any chest discomfort ,shortness of breath, palpitations, dizziness or syncope.  Denies having any headache ,abdominal pain ,nausea, vomiting , diarrhea constipation, loss of weight or loss of appetite.  Denies having any excessive bruising ,hematuria or blood in the stool.    Review of all systems negative except as indicated    History  Past Medical History:   Diagnosis Date   • CKD (chronic kidney disease), stage III (CMS/HCC)    • Depression    • DJD (degenerative joint disease)    • DVT (deep venous thrombosis) (CMS/HCC)    • Ganglion     rt wrist   • GERD (gastroesophageal reflux disease)    • Hiatal hernia    • History of echocardiogram 04/2016    2D ECHO   • History of esophageal stricture     s/p dialation 40-50 times last EGD 04/2016   • History of pulmonary embolus (PE)     on long term anticoagulation   • Hyperlipidemia    • Hypertension    • Hypothyroidism    • IBS (irritable bowel syndrome)    • Neuropathy    • Rash     rt lower hip   • Retinopathy    • Seasonal allergies    • Sleep apnea     cpap  bring dos   • Type 2 diabetes mellitus with peripheral vascular disease (CMS/[facility])    • Vitamin D deficiency        Past Surgical History:   Procedure Laterality Date   • AMPUTATION FOOT / TOE Right     great toe   • AMPUTATION REVISION Right 4/8/2021    Procedure: BELOW KNEE AMPUTATION REVISAION;  Surgeon: Eduardo Reynolds MD;  Location: [facility] MAIN OR;  Service: Orthopedics;  Laterality: Right;   • AMPUTATION REVISION Right 4/29/2021    Procedure: AMPUTATION REVISION KNEE STUMP;  Surgeon: Eduardo Reynolds MD;  Location: [facility] MAIN OR;  Service: Orthopedics;  Laterality: Right;   • BELOW KNEE AMPUTATION  Right 7/30/2020    Procedure: AMPUTATION BELOW KNEE;  Surgeon: Eduardo Reynolds MD;  Location: [facility] MAIN OR;  Service: Orthopedics;  Laterality: Right;   • CARDIAC CATHETERIZATION  04/2018    [facility]   • ENDOSCOPY     • ENDOSCOPY N/A 10/4/2019    Procedure: ESOPHAGOGASTRODUODENOSCOPY with dilitation and biopsy x 1 area;  Surgeon: Declan Iqbal MD;  Location: [facility] ENDOSCOPY;  Service: Gastroenterology   • ENDOSCOPY N/A 12/13/2019    Procedure: ESOPHAGOGASTRODUODENOSCOPY WITH DILATATION (50, 52 BOUGIE);  Surgeon: Declan Iqbal MD;  Location: [facility] ENDOSCOPY;  Service: Gastroenterology   • GANGLION CYST EXCISION Left    • HERNIA REPAIR Bilateral    • RETINOPATHY SURGERY      laser   • TOTAL HIP ARTHROPLASTY Left    • TOTAL HIP ARTHROPLASTY Left 2018   • TRANS METATARSAL AMPUTATION Right 3/17/2020    Procedure: AMPUTATION TRANS METATARSAL right;  Surgeon: ERWIN Baker DPM;  Location: [facility] MAIN OR;  Service: Podiatry;  Laterality: Right;  GANGRENOUS RIGHT FOOT       Family History   Problem Relation Age of Onset   • Diabetes Mother    • Heart disease Mother    • Leukemia Father    • Sleep apnea Maternal Aunt         GENO   • Stroke Maternal Grandmother    • Hypertension Other    • Hyperlipidemia Other    • Cancer Other    • Colon cancer Other         uncle       Social History     Tobacco Use   • Smoking status: Never Smoker   • Smokeless tobacco: Never Used   Vaping Use   • Vaping Use: Never used   Substance Use Topics   • Alcohol use: No   • Drug use: No        Medications Prior to Admission   Medication Sig Dispense Refill Last Dose   • atorvastatin (LIPITOR) 40 MG tablet TAKE 1 TABLET EVERY DAY 90 tablet 0 8/1/2021 at Unknown time   • DULoxetine (CYMBALTA) 60 MG capsule TAKE 1 CAPSULE EVERY MORNING 90 capsule 0 8/1/2021 at Unknown time   • Eliquis 5 MG tablet tablet TAKE 1 TABLET TWICE DAILY 180 tablet 0 8/1/2021 at Unknown time   • empagliflozin (Jardiance) 10 MG tablet tablet Take 1 tablet  by mouth Daily. 90 tablet 4 8/1/2021 at Unknown time   • gabapentin (NEURONTIN) 400 MG capsule Take 400 mg by mouth 3 (Three) Times a Day.   8/1/2021 at Unknown time   • insulin aspart (NovoLOG FlexPen) 100 UNIT/ML solution pen-injector sc pen Inject 10 Units under the skin into the appropriate area as directed 3 (Three) Times a Day With Meals. (Patient taking differently: Inject 14 Units under the skin into the appropriate area as directed 3 (Three) Times a Day With Meals.) 15 mL 6 8/1/2021 at Unknown time   • Insulin Glargine (Lantus SoloStar) 100 UNIT/ML injection pen INJECT SUBCUTANEOUSLY 27 UNITS EVERY BEDTIME (NEEDS APPOINTMENT) (Patient taking differently: Inject 34 Units under the skin into the appropriate area as directed Every Night.) 30 mL 6 8/1/2021 at Unknown time   • levothyroxine (SYNTHROID, LEVOTHROID) 100 MCG tablet TAKE 1 TABLET EVERY DAY (Patient taking differently: Take 100 mcg by mouth Daily.) 90 tablet 3 8/1/2021 at Unknown time   • losartan (COZAAR) 50 MG tablet TAKE 1 TABLET EVERY DAY 90 tablet 0 8/1/2021 at Unknown time   • omeprazole (priLOSEC) 40 MG capsule Take 40 mg by mouth Daily. Take DOS   8/1/2021 at Unknown time   • metoprolol succinate XL (TOPROL-XL) 50 MG 24 hr tablet TAKE 1 TABLET EVERY DAY (Patient taking differently: Take 50 mg by mouth Daily.) 30 tablet 0    • ondansetron (ZOFRAN) 4 MG tablet TAKE 1 TABLET EVERY 8 HOURS AS NEEDED FOR NAUSEA OR VOMITING. (Patient taking differently: Take 8 mg by mouth Every 8 (Eight) Hours As Needed for Nausea or Vomiting.) 45 tablet 0        Allergies  Lisinopril    Scheduled Meds:aspirin, 81 mg, Oral, Daily  atorvastatin, 80 mg, Oral, Nightly  cyanocobalamin, 1,000 mcg, Intramuscular, Q28 Days  DULoxetine, 60 mg, Oral, QAM  gabapentin, 400 mg, Oral, TID  insulin glargine, 34 Units, Subcutaneous, Nightly  insulin lispro, 14 Units, Subcutaneous, TID With Meals  levothyroxine, 100 mcg, Oral, Q AM  losartan, 50 mg, Oral, Daily  metoprolol  "succinate XL, 50 mg, Oral, Daily  miconazole, , Topical, Q12H  pantoprazole, 40 mg, Oral, QAM  sodium chloride, 3 mL, Intravenous, Q12H      Continuous Infusions:lactated ringers, 75 mL/hr, Last Rate: 75 mL/hr (08/04/21 0438)      PRN Meds:.•  acetaminophen **OR** acetaminophen  •  bisacodyl  •  HYDROcodone-acetaminophen  •  ipratropium-albuterol  •  ondansetron  •  [COMPLETED] Insert peripheral IV **AND** sodium chloride  •  sodium chloride    Objective     VITAL SIGNS  Vitals:    08/03/21 1429 08/03/21 1822 08/03/21 2259 08/04/21 0225   BP: 124/67 115/70 125/65 134/76   BP Location: Right arm Right arm Right arm Right arm   Patient Position: Standing Lying Lying Lying   Pulse: 82 79 80 78   Resp: 18 14 14 16   Temp:  98.4 °F (36.9 °C) 98.4 °F (36.9 °C) 98.9 °F (37.2 °C)   TempSrc:  Oral Oral Oral   SpO2:  96%     Weight:       Height:           Flowsheet Rows      First Filed Value   Admission Height  172.7 cm (68\") Documented at 08/02/2021 0540   Admission Weight  83.9 kg (185 lb) Documented at 08/02/2021 0540            Intake/Output Summary (Last 24 hours) at 8/4/2021 0626  Last data filed at 8/4/2021 0603  Gross per 24 hour   Intake 742 ml   Output 2100 ml   Net -1358 ml        TELEMETRY: Sinus rhythm    Physical Exam:  The patient is alert, oriented and in no distress.  Vital signs as noted above.  Head and neck revealed no carotid bruits or jugular venous distention.  No thyromegaly or lymphadenopathy is present  Lungs clear.  No wheezing.  Breath sounds are normal bilaterally.  Heart normal first and second heart sounds.  No murmur. No precordial rub is present.  No gallop is present.  Abdomen soft and nontender.  No organomegaly is present.  Extremities with good peripheral pulses without any pedal edema.  Right BKA  Skin warm and dry.  Musculoskeletal system is grossly normal  CNS grossly normal      Results Review:   I reviewed the patient's new clinical results.  Lab Results (last 24 hours)     " Procedure Component Value Units Date/Time    POC Glucose Once [[id number]]  (Abnormal) Collected: 08/03/21 2154    Specimen: Blood Updated: 08/03/21 2157     Glucose 210 mg/dL      Comment: Serial Number: [id number]:  [id number]       POC Glucose Once [[id number]]  (Abnormal) Collected: 08/03/21 1625    Specimen: Blood Updated: 08/03/21 1626     Glucose 144 mg/dL      Comment: Serial Number: [id number]:  [id number]       POC Glucose Once [[id number]]  (Abnormal) Collected: 08/03/21 1106    Specimen: Blood Updated: 08/03/21 1107     Glucose 196 mg/dL      Comment: Serial Number: [id number]:  [id number]             Imaging Results (Last 24 Hours)     ** No results found for the last 24 hours. **      LAB RESULTS (LAST 7 DAYS)    CBC  Results from last 7 days   Lab Units 08/02/21  0620   WBC 10*3/mm3 7.80   RBC 10*6/mm3 5.24   HEMOGLOBIN g/dL 13.9   HEMATOCRIT % 42.1   MCV fL 80.4   PLATELETS 10*3/mm3 244       BMP  Results from last 7 days   Lab Units 08/03/21  0434 08/02/21  0620   SODIUM mmol/L 137 141   POTASSIUM mmol/L 4.8 4.2   CHLORIDE mmol/L 100 101   CO2 mmol/L 27.0 28.0   BUN mg/dL 18 16   CREATININE mg/dL 1.37* 1.21   GLUCOSE mg/dL 227* 152*   MAGNESIUM mg/dL 1.8  --        CMP   Results from last 7 days   Lab Units 08/03/21  0434 08/02/21  0620   SODIUM mmol/L 137 141   POTASSIUM mmol/L 4.8 4.2   CHLORIDE mmol/L 100 101   CO2 mmol/L 27.0 28.0   BUN mg/dL 18 16   CREATININE mg/dL 1.37* 1.21   GLUCOSE mg/dL 227* 152*         BNP        TROPONIN        CoAg  Results from last 7 days   Lab Units 08/02/21  0620   INR  0.99   APTT seconds 25.3       Creatinine Clearance  Estimated Creatinine Clearance: 63.9 mL/min (A) (by C-G formula based on SCr of 1.37 mg/dL (H)).    ABG        Radiology  CT Head Without Contrast    Result Date: 8/2/2021  No acute findings.  Electronically Signed By-Bridger Worm, MD On:8/2/2021 8:04 AM This report was finalized on [id number] by  Bridger Cazares MD.    CT Angiogram  Neck    Result Date: 8/2/2021   1. No significant carotid stenosis identified on either side. Widely patent dominant right vertebral artery. 2. The left vertebral artery is diminutive, and appears to terminate after the origin of the PICA branch. This may be due to normal variant anatomy. However, there are no prior studies available for comparison to confirm this is chronic/congenital finding. Correlation with patient exam and/or MRI brain may be helpful to determine significance of this finding. Additional vascular and nonvascular findings as given above.  Electronically Signed By-Ian Mauricio MD On:8/2/2021 9:25 AM This report was finalized on [id number] by  Ian Mauricio MD.    MRI Brain Without Contrast    Result Date: 8/2/2021  12 mm acute lacunar infarct within the posterior limb of the left internal capsule with possible involvement of the posterior left lentiform nucleus.   Electronically Signed By-Bridger Cazares MD On:8/2/2021 10:48 AM This report was finalized on [id number] by  Bridger Cazares MD.    XR Chest 1 View    Result Date: 8/2/2021  No active disease.  Electronically Signed By-Bridger Cazares MD On:8/2/2021 7:22 AM This report was finalized on [id number] by  Bridger Cazares MD.    CT Angiogram Head    Result Date: 8/2/2021   1. No significant carotid stenosis identified on either side. Widely patent dominant right vertebral artery. 2. The left vertebral artery is diminutive, and appears to terminate after the origin of the PICA branch. This may be due to normal variant anatomy. However, there are no prior studies available for comparison to confirm this is chronic/congenital finding. Correlation with patient exam and/or MRI brain may be helpful to determine significance of this finding. Additional vascular and nonvascular findings as given above.  Electronically Signed By-Ian Mauricio MD On:8/2/2021 9:25 AM This report was finalized on [id number] by  Ian Mauricio MD.              EKG          I  personally viewed and interpreted the patient's EKG/Telemetry data:    ECHOCARDIOGRAM:    Results for orders placed during the hospital encounter of 08/02/21    Adult Transthoracic Echo Complete W/ Cont if Necessary Per Protocol (With Agitated Saline)    Interpretation Summary  · Left ventricular wall thickness is consistent with mild concentric hypertrophy.  · Estimated left ventricular EF = 70% Estimated left ventricular EF was in agreement with the calculated left ventricular EF. Left ventricular systolic function is normal.  · Left ventricular diastolic function is consistent with (grade I) impaired relaxation.  · Estimated right ventricular systolic pressure from tricuspid regurgitation is normal (<35 mmHg).  · Calcified nodular density that is attached to the anterior leaflet of the mitral valve that was reported even on the prior echocardiogram unlikely to be a vegetation most likely just a calcified nodule. If there is a clinical concern for embolic or infectious pathology consider further evaluation work-up including a BARON  · There is mainly affecting the left coronary cusp(s).          STRESS MYOVIEW:    Cardiolite (Tc-99m Sestamibi) stress test    CARDIAC CATHETERIZATION:            OTHER:         Assessment/Plan     Principal Problem:    Cerebrovascular accident (CVA) (CMS/HCC)  Active Problems:    Benign essential hypertension    Chronic coronary artery disease    Chronic renal insufficiency, stage III (moderate) (CMS/HCC)    Depression    Diabetic peripheral neuropathy (CMS/HCC)    Gastroesophageal reflux disease    History of pulmonary embolism    Mixed hyperlipidemia    Acquired hypothyroidism    Obstructive sleep apnea syndrome    Peripheral vascular disease (CMS/HCC)    Type 2 diabetes mellitus with peripheral vascular disease (CMS/HCC)        ]]]]]]]]]]]]]]]]]]]]]]  Impression  ===========  -TIA/stroke.  Abnormal CTA with acute lacunar infarct within the posterior limb of the left internal  capsule.  EKG showed sinus rhythm.  Echocardiogram showed anterior mitral leaflet nodular density.     -Does not have any cardiac symptoms.  Question cardiac cath 2018.  No records are available through [facility].  Could not access [employer] system.     -Bilateral carotid bruits.  Right louder than left.     -Hypertension diabetes dyslipidemia sleep apnea CKD 3 (BUN 18 creatinine 1.37.)  Hypothyroidism vitamin D deficiency     -History of DVT.     -Long-term anticoagulation with Eliquis due to previous pulmonary embolus     -Peripheral vascular disease     -Status post right BKA.  Total left arthroplasty hernia repair     -Non-smoker  ============  Plan  ============  Patient has carotid bruits right louder than left.  CTA did not show obstructive carotid artery disease.  Carotid duplex scan was normal.    Abnormal echocardiogram.  Rule out embolic episodes.  Patient would benefit from transesophageal echocardiogram.  Eliquis is on hold.  Patient to have BARON tomorrow.  Eliquis can be restarted soon after BARON.    Renal dysfunction  BUN/18/1.37  Observe closely.    Risks and benefits pros and cons of the procedure were discussed with patient including esophageal trauma anesthetic risk arrhythmias etc.  Further plan will depend on patient's progress.  ]]]]]]]]]]]]]]]]]]]]]         Nelli Vides MD  08/04/21  06:26 EDT

## 2021-08-05 ENCOUNTER — ANESTHESIA EVENT (OUTPATIENT)
Dept: CARDIOLOGY | Facility: HOSPITAL | Age: 56
End: 2021-08-05

## 2021-08-05 ENCOUNTER — ANESTHESIA (OUTPATIENT)
Dept: CARDIOLOGY | Facility: HOSPITAL | Age: 56
End: 2021-08-05

## 2021-08-05 ENCOUNTER — APPOINTMENT (OUTPATIENT)
Dept: CARDIOLOGY | Facility: HOSPITAL | Age: 56
End: 2021-08-05

## 2021-08-05 VITALS — DIASTOLIC BLOOD PRESSURE: 70 MMHG | SYSTOLIC BLOOD PRESSURE: 136 MMHG | OXYGEN SATURATION: 100 %

## 2021-08-05 LAB
ANION GAP SERPL CALCULATED.3IONS-SCNC: 7 MMOL/L (ref 5–15)
BASOPHILS # BLD AUTO: 0.1 10*3/MM3 (ref 0–0.2)
BASOPHILS NFR BLD AUTO: 1 % (ref 0–1.5)
BUN SERPL-MCNC: 18 MG/DL (ref 6–20)
BUN/CREAT SERPL: 12.3 (ref 7–25)
CALCIUM SPEC-SCNC: 9.2 MG/DL (ref 8.6–10.5)
CHLORIDE SERPL-SCNC: 105 MMOL/L (ref 98–107)
CO2 SERPL-SCNC: 29 MMOL/L (ref 22–29)
CREAT SERPL-MCNC: 1.46 MG/DL (ref 0.76–1.27)
DEPRECATED RDW RBC AUTO: 39.4 FL (ref 37–54)
EOSINOPHIL # BLD AUTO: 0.5 10*3/MM3 (ref 0–0.4)
EOSINOPHIL NFR BLD AUTO: 5 % (ref 0.3–6.2)
ERYTHROCYTE [DISTWIDTH] IN BLOOD BY AUTOMATED COUNT: 14.2 % (ref 12.3–15.4)
GFR SERPL CREATININE-BSD FRML MDRD: 50 ML/MIN/1.73
GLUCOSE BLDC GLUCOMTR-MCNC: 118 MG/DL (ref 70–105)
GLUCOSE BLDC GLUCOMTR-MCNC: 225 MG/DL (ref 70–105)
GLUCOSE BLDC GLUCOMTR-MCNC: 247 MG/DL (ref 70–105)
GLUCOSE BLDC GLUCOMTR-MCNC: 253 MG/DL (ref 70–105)
GLUCOSE SERPL-MCNC: 217 MG/DL (ref 65–99)
HCT VFR BLD AUTO: 39 % (ref 37.5–51)
HGB BLD-MCNC: 12.7 G/DL (ref 13–17.7)
LYMPHOCYTES # BLD AUTO: 2.8 10*3/MM3 (ref 0.7–3.1)
LYMPHOCYTES NFR BLD AUTO: 30.8 % (ref 19.6–45.3)
MAGNESIUM SERPL-MCNC: 1.7 MG/DL (ref 1.6–2.6)
MCH RBC QN AUTO: 25.9 PG (ref 26.6–33)
MCHC RBC AUTO-ENTMCNC: 32.5 G/DL (ref 31.5–35.7)
MCV RBC AUTO: 79.7 FL (ref 79–97)
MONOCYTES # BLD AUTO: 0.9 10*3/MM3 (ref 0.1–0.9)
MONOCYTES NFR BLD AUTO: 9.8 % (ref 5–12)
NEUTROPHILS NFR BLD AUTO: 4.8 10*3/MM3 (ref 1.7–7)
NEUTROPHILS NFR BLD AUTO: 53.4 % (ref 42.7–76)
NRBC BLD AUTO-RTO: 0.1 /100 WBC (ref 0–0.2)
PLATELET # BLD AUTO: 224 10*3/MM3 (ref 140–450)
PMV BLD AUTO: 8.3 FL (ref 6–12)
POTASSIUM SERPL-SCNC: 4.4 MMOL/L (ref 3.5–5.2)
RBC # BLD AUTO: 4.89 10*6/MM3 (ref 4.14–5.8)
SODIUM SERPL-SCNC: 141 MMOL/L (ref 136–145)
WBC # BLD AUTO: 9 10*3/MM3 (ref 3.4–10.8)

## 2021-08-05 PROCEDURE — 99232 SBSQ HOSP IP/OBS MODERATE 35: CPT | Performed by: INTERNAL MEDICINE

## 2021-08-05 PROCEDURE — 93312 ECHO TRANSESOPHAGEAL: CPT | Performed by: INTERNAL MEDICINE

## 2021-08-05 PROCEDURE — 63710000001 INSULIN LISPRO (HUMAN) PER 5 UNITS: Performed by: NURSE PRACTITIONER

## 2021-08-05 PROCEDURE — 80048 BASIC METABOLIC PNL TOTAL CA: CPT | Performed by: FAMILY MEDICINE

## 2021-08-05 PROCEDURE — 85025 COMPLETE CBC W/AUTO DIFF WBC: CPT | Performed by: FAMILY MEDICINE

## 2021-08-05 PROCEDURE — 93325 DOPPLER ECHO COLOR FLOW MAPG: CPT | Performed by: INTERNAL MEDICINE

## 2021-08-05 PROCEDURE — 93320 DOPPLER ECHO COMPLETE: CPT

## 2021-08-05 PROCEDURE — 82962 GLUCOSE BLOOD TEST: CPT

## 2021-08-05 PROCEDURE — 97530 THERAPEUTIC ACTIVITIES: CPT

## 2021-08-05 PROCEDURE — 63710000001 INSULIN GLARGINE PER 5 UNITS: Performed by: INTERNAL MEDICINE

## 2021-08-05 PROCEDURE — 97535 SELF CARE MNGMENT TRAINING: CPT

## 2021-08-05 PROCEDURE — 93312 ECHO TRANSESOPHAGEAL: CPT

## 2021-08-05 PROCEDURE — 83735 ASSAY OF MAGNESIUM: CPT | Performed by: FAMILY MEDICINE

## 2021-08-05 PROCEDURE — B246ZZ4 ULTRASONOGRAPHY OF RIGHT AND LEFT HEART, TRANSESOPHAGEAL: ICD-10-PCS | Performed by: INTERNAL MEDICINE

## 2021-08-05 PROCEDURE — 25010000002 PROPOFOL 500 MG/50ML EMULSION: Performed by: ANESTHESIOLOGY

## 2021-08-05 PROCEDURE — 93325 DOPPLER ECHO COLOR FLOW MAPG: CPT

## 2021-08-05 RX ORDER — SODIUM CHLORIDE 9 MG/ML
50 INJECTION, SOLUTION INTRAVENOUS CONTINUOUS
Status: DISCONTINUED | OUTPATIENT
Start: 2021-08-05 | End: 2021-08-07

## 2021-08-05 RX ORDER — INSULIN GLARGINE 100 [IU]/ML
40 INJECTION, SOLUTION SUBCUTANEOUS NIGHTLY
Status: DISCONTINUED | OUTPATIENT
Start: 2021-08-05 | End: 2021-08-10 | Stop reason: HOSPADM

## 2021-08-05 RX ORDER — PROPOFOL 10 MG/ML
INJECTION, EMULSION INTRAVENOUS AS NEEDED
Status: DISCONTINUED | OUTPATIENT
Start: 2021-08-05 | End: 2021-08-05 | Stop reason: SURG

## 2021-08-05 RX ADMIN — PROPOFOL 250 MG: 10 INJECTION, EMULSION INTRAVENOUS at 08:31

## 2021-08-05 RX ADMIN — MICONAZOLE NITRATE: 20 POWDER TOPICAL at 11:19

## 2021-08-05 RX ADMIN — ASPIRIN 81 MG: 81 TABLET, CHEWABLE ORAL at 11:16

## 2021-08-05 RX ADMIN — INSULIN LISPRO 14 UNITS: 100 INJECTION, SOLUTION INTRAVENOUS; SUBCUTANEOUS at 11:35

## 2021-08-05 RX ADMIN — MICONAZOLE NITRATE: 20 POWDER TOPICAL at 20:35

## 2021-08-05 RX ADMIN — GABAPENTIN 400 MG: 400 CAPSULE ORAL at 11:17

## 2021-08-05 RX ADMIN — PANTOPRAZOLE SODIUM 40 MG: 40 TABLET, DELAYED RELEASE ORAL at 11:18

## 2021-08-05 RX ADMIN — GABAPENTIN 400 MG: 400 CAPSULE ORAL at 20:30

## 2021-08-05 RX ADMIN — METOPROLOL SUCCINATE 50 MG: 50 TABLET, EXTENDED RELEASE ORAL at 11:17

## 2021-08-05 RX ADMIN — Medication 3 ML: at 11:19

## 2021-08-05 RX ADMIN — ATORVASTATIN CALCIUM 80 MG: 40 TABLET, FILM COATED ORAL at 20:31

## 2021-08-05 RX ADMIN — Medication 3 ML: at 20:34

## 2021-08-05 RX ADMIN — HYDROCODONE BITARTRATE AND ACETAMINOPHEN 1 TABLET: 5; 325 TABLET ORAL at 20:31

## 2021-08-05 RX ADMIN — INSULIN LISPRO 14 UNITS: 100 INJECTION, SOLUTION INTRAVENOUS; SUBCUTANEOUS at 17:25

## 2021-08-05 RX ADMIN — DULOXETINE HYDROCHLORIDE 60 MG: 30 CAPSULE, DELAYED RELEASE ORAL at 11:17

## 2021-08-05 RX ADMIN — INSULIN GLARGINE 40 UNITS: 100 INJECTION, SOLUTION SUBCUTANEOUS at 20:31

## 2021-08-05 RX ADMIN — SODIUM CHLORIDE 50 ML/HR: 9 INJECTION, SOLUTION INTRAVENOUS at 11:35

## 2021-08-05 NOTE — PLAN OF CARE
Goal Outcome Evaluation:      Kuldeep Adhikari presents with ADL impairments below baseline abilities which indicate the need for continued skilled intervention while inpatient. Tolerating session today without incident. Pt has ST memory & safety awareness deficits, limited activity tolerance, Rt hip weakness, & impaired functional mobility. Recommend ST IP rehab at d/c. Will continue to follow and progress as tolerated.

## 2021-08-05 NOTE — THERAPY TREATMENT NOTE
Subjective: Pt agreeable to therapeutic plan of care.  Cognition: memory: Impaired short term, safety/judgement: fair and awareness of deficits: good awareness of safety precautions and fair awareness of deficits    Objective: HR 77 BPM before & after Tx. /66 before Tx & 121/69 afterwards.    Bed Mobility: SBA sup>sit  Functional Transfers: CGA  Functional Ambulation: Min-A, with adaptive equipment and utilizing compensatory strategy. Pt stated he had not been using RW though PT note reports this. Pt requests to walk using IV pole & demonstrates adequate balance standing EOB for this. Pt is noted to have LOB & require standing rest breaks often w/o ue of RW. Education is provided & encouragement to utilize RW in the future for safety & increased functional endurance. Pt verbalizes agreement.     Lower Body Dressing: SBA, setup.   ADL Position: edge of bed sitting  ADL Comments: Dons prosthetic with setup.    Grooming: SBA  ADL Comments: pt states he has completed oral care this date though no evidence is visible of this. He reports he is able to groom himself w/ setup & appears capable of this.    Pain: 0 VAS  Education: Provided education on importance of mobility and skilled verbal / tactile cueing throughout intervention.     Assessment: Kuldeep Adhikari presents with ADL impairments below baseline abilities which indicate the need for continued skilled intervention while inpatient. Tolerating session today without incident. Pt has ST memory & safety awareness deficits, limited activity tolerance, Rt hip weakness, & impaired functional mobility. Recommend ST IP rehab at d/c. Will continue to follow and progress as tolerated.     Plan/Recommendations:   Pt would benefit from Inpatient Rehabilitation placement at discharge from facility.   Pt desires Inpatient Rehabilitation placement at discharge. Pt cooperative; agreeable to therapeutic recommendations and plan of care.     Modified Giovanny: 4 = Moderately  severe disability (Unable to attend to own bodily needs without assistance, and unable to walk unassisted)     Post-Tx Position: Up in Chair and Call light and personal items within reach. Pt reports compliance w/ fall precautions & RN agrees. No batteries available for chair annalee.  PPE: gloves, surgical mask, eyewear protection

## 2021-08-05 NOTE — CASE MANAGEMENT/SOCIAL WORK
Continued Stay Note  [facility] Man[facility]     Patient Name: Kuldeep Adhikari  MRN: [id number]  Today's Date: 8/5/2021    Admit Date: 8/2/2021    Discharge Plan     Row Name 08/05/21 1103       Plan    Plan  Declined by [facility]ha Becker. Not in network and no out of network benefits. Will require pre-cert for rehab. PASRR is queued for review.        Phone communication or documentation only - no physical contact with patient or family.    YAHAIRA Grijalva, W    Office: [phone]  Cell: [phone]  Fax: [phone]  E-mail: [email or web address]

## 2021-08-05 NOTE — CASE MANAGEMENT/SOCIAL WORK
Continued Stay Note  [facility]     Patient Name: Kuldeep Adhikari  MRN: [id number]  Today's Date: 8/5/2021    Admit Date: 8/2/2021    Discharge Plan     Row Name 08/05/21 1332       Plan    Plan  [facility] referral pending acceptance and needs precert.    Plan Comments  Referral to [facility] rep lanny and dcp sent. Pending acceptance and precert    Row Name 08/05/21 1103       Plan    Plan  Declined by Radha Becker. Not in network and no out of network benefits. Will require pre-cert for rehab. PASRR is queued for review.        Expected Discharge Date and Time     Expected Discharge Date Expected Discharge Time    Aug 6, 2021             Zena Saldana RN   Met with patient in room wearing PPE: mask, face shield/goggles, gloves, gown.      Maintained distance greater than six feet and spent less than 15 minutes in the room.      Sanjuana Saldana RN  Complex Case Manager  [facility] Care Coordination  [phone]  [phone]-office  [phone]-fax  [email or web address]

## 2021-08-05 NOTE — ANESTHESIA POSTPROCEDURE EVALUATION
Patient: Kuldeep Adhikari    Procedure Summary     Date: 08/05/21 Room / Location: [facility]    Anesthesia Start: 0830 Anesthesia Stop: 0842    Procedure: ADULT TRANSESOPHAGEAL ECHO (BARON) W/ CONT IF NECESSARY PER PROTOCOL Diagnosis: (Cardiac Source of Emboli)    Scheduled Providers: Nelli Vides MD Provider: Lucien Grewal MD    Anesthesia Type: MAC ASA Status: 3          Anesthesia Type: MAC    Vitals  Vitals Value Taken Time   /57 08/05/21 0850   Temp     Pulse 67 08/05/21 0854   Resp 30 08/05/21 0845   SpO2 100 % 08/05/21 0854   Vitals shown include unvalidated device data.        Post Anesthesia Care and Evaluation    Patient location during evaluation: bedside  Patient participation: complete - patient participated  Level of consciousness: sleepy but conscious and responsive to verbal stimuli  Pain score: 0  Pain management: adequate  Airway patency: patent  Anesthetic complications: No anesthetic complications  PONV Status: none  Cardiovascular status: acceptable  Respiratory status: acceptable  Hydration status: acceptable

## 2021-08-05 NOTE — PLAN OF CARE
Goal Outcome Evaluation:  Plan of Care Reviewed With: patient        Progress: improving     Patient resting abed, BARON completed today, awaiting rehab placement, ambulates with prosthetic and rolling walker, will continue to monitor.

## 2021-08-05 NOTE — PROGRESS NOTES
Referring Provider: Jacob Bentley,*    Reason for follow-up:  Recent stroke  Peripheral vascular disease  Rule out cardioembolic episodes     Patient Care Team:  Laura Whitaker MD as PCP - General    Subjective .  Feeling okay     ROS    Since I have last seen, the patient has been without any chest discomfort ,shortness of breath, palpitations, dizziness or syncope.  Denies having any headache ,abdominal pain ,nausea, vomiting , diarrhea constipation, loss of weight or loss of appetite.  Denies having any excessive bruising ,hematuria or blood in the stool.    Review of all systems negative except as indicated    History  Past Medical History:   Diagnosis Date   • CKD (chronic kidney disease), stage III (CMS/HCC)    • Depression    • DJD (degenerative joint disease)    • DVT (deep venous thrombosis) (CMS/HCC)    • Ganglion     rt wrist   • GERD (gastroesophageal reflux disease)    • Hiatal hernia    • History of echocardiogram 04/2016    2D ECHO   • History of esophageal stricture     s/p dialation 40-50 times last EGD 04/2016   • History of pulmonary embolus (PE)     on long term anticoagulation   • Hyperlipidemia    • Hypertension    • Hypothyroidism    • IBS (irritable bowel syndrome)    • Neuropathy    • Rash     rt lower hip   • Retinopathy    • Seasonal allergies    • Sleep apnea     cpap  bring dos   • Type 2 diabetes mellitus with peripheral vascular disease (CMS/[facility])    • Vitamin D deficiency        Past Surgical History:   Procedure Laterality Date   • AMPUTATION FOOT / TOE Right     great toe   • AMPUTATION REVISION Right 4/8/2021    Procedure: BELOW KNEE AMPUTATION REVISAION;  Surgeon: Eduardo Reynolds MD;  Location: [facility] MAIN OR;  Service: Orthopedics;  Laterality: Right;   • AMPUTATION REVISION Right 4/29/2021    Procedure: AMPUTATION REVISION KNEE STUMP;  Surgeon: Eduardo Reynolds MD;  Location: [facility] MAIN OR;  Service: Orthopedics;  Laterality: Right;   • BELOW KNEE AMPUTATION  Right 7/30/2020    Procedure: AMPUTATION BELOW KNEE;  Surgeon: Eduardo Reynolds MD;  Location: [facility] MAIN OR;  Service: Orthopedics;  Laterality: Right;   • CARDIAC CATHETERIZATION  04/2018    Merged with [facility]   • ENDOSCOPY     • ENDOSCOPY N/A 10/4/2019    Procedure: ESOPHAGOGASTRODUODENOSCOPY with dilitation and biopsy x 1 area;  Surgeon: Declan Iqbal MD;  Location: [facility] ENDOSCOPY;  Service: Gastroenterology   • ENDOSCOPY N/A 12/13/2019    Procedure: ESOPHAGOGASTRODUODENOSCOPY WITH DILATATION (50, 52 BOUGIE);  Surgeon: Declan Iqbal MD;  Location: [facility] ENDOSCOPY;  Service: Gastroenterology   • GANGLION CYST EXCISION Left    • HERNIA REPAIR Bilateral    • RETINOPATHY SURGERY      laser   • TOTAL HIP ARTHROPLASTY Left    • TOTAL HIP ARTHROPLASTY Left 2018   • TRANS METATARSAL AMPUTATION Right 3/17/2020    Procedure: AMPUTATION TRANS METATARSAL right;  Surgeon: ERWIN Baker DPM;  Location: [facility] MAIN OR;  Service: Podiatry;  Laterality: Right;  GANGRENOUS RIGHT FOOT       Family History   Problem Relation Age of Onset   • Diabetes Mother    • Heart disease Mother    • Leukemia Father    • Sleep apnea Maternal Aunt         GENO   • Stroke Maternal Grandmother    • Hypertension Other    • Hyperlipidemia Other    • Cancer Other    • Colon cancer Other         uncle       Social History     Tobacco Use   • Smoking status: Never Smoker   • Smokeless tobacco: Never Used   Vaping Use   • Vaping Use: Never used   Substance Use Topics   • Alcohol use: No   • Drug use: No        Medications Prior to Admission   Medication Sig Dispense Refill Last Dose   • atorvastatin (LIPITOR) 40 MG tablet TAKE 1 TABLET EVERY DAY 90 tablet 0 8/1/2021 at Unknown time   • DULoxetine (CYMBALTA) 60 MG capsule TAKE 1 CAPSULE EVERY MORNING 90 capsule 0 8/1/2021 at Unknown time   • Eliquis 5 MG tablet tablet TAKE 1 TABLET TWICE DAILY 180 tablet 0 8/1/2021 at Unknown time   • empagliflozin (Jardiance) 10 MG tablet tablet Take 1 tablet  by mouth Daily. 90 tablet 4 8/1/2021 at Unknown time   • gabapentin (NEURONTIN) 400 MG capsule Take 400 mg by mouth 3 (Three) Times a Day.   8/1/2021 at Unknown time   • insulin aspart (NovoLOG FlexPen) 100 UNIT/ML solution pen-injector sc pen Inject 10 Units under the skin into the appropriate area as directed 3 (Three) Times a Day With Meals. (Patient taking differently: Inject 14 Units under the skin into the appropriate area as directed 3 (Three) Times a Day With Meals.) 15 mL 6 8/1/2021 at Unknown time   • Insulin Glargine (Lantus SoloStar) 100 UNIT/ML injection pen INJECT SUBCUTANEOUSLY 27 UNITS EVERY BEDTIME (NEEDS APPOINTMENT) (Patient taking differently: Inject 34 Units under the skin into the appropriate area as directed Every Night.) 30 mL 6 8/1/2021 at Unknown time   • levothyroxine (SYNTHROID, LEVOTHROID) 100 MCG tablet TAKE 1 TABLET EVERY DAY (Patient taking differently: Take 100 mcg by mouth Daily.) 90 tablet 3 8/1/2021 at Unknown time   • losartan (COZAAR) 50 MG tablet TAKE 1 TABLET EVERY DAY 90 tablet 0 8/1/2021 at Unknown time   • omeprazole (priLOSEC) 40 MG capsule Take 40 mg by mouth Daily. Take DOS   8/1/2021 at Unknown time   • metoprolol succinate XL (TOPROL-XL) 50 MG 24 hr tablet TAKE 1 TABLET EVERY DAY (Patient taking differently: Take 50 mg by mouth Daily.) 30 tablet 0    • ondansetron (ZOFRAN) 4 MG tablet TAKE 1 TABLET EVERY 8 HOURS AS NEEDED FOR NAUSEA OR VOMITING. (Patient taking differently: Take 8 mg by mouth Every 8 (Eight) Hours As Needed for Nausea or Vomiting.) 45 tablet 0        Allergies  Lisinopril    Scheduled Meds:aspirin, 81 mg, Oral, Daily  atorvastatin, 80 mg, Oral, Nightly  cyanocobalamin, 1,000 mcg, Intramuscular, Q28 Days  DULoxetine, 60 mg, Oral, QAM  gabapentin, 400 mg, Oral, TID  insulin glargine, 34 Units, Subcutaneous, Nightly  insulin lispro, 14 Units, Subcutaneous, TID With Meals  levothyroxine, 100 mcg, Oral, Q AM  losartan, 50 mg, Oral, Daily  metoprolol  "succinate XL, 50 mg, Oral, Daily  miconazole, , Topical, Q12H  pantoprazole, 40 mg, Oral, QAM  sodium chloride, 3 mL, Intravenous, Q12H      Continuous Infusions:Pharmacy to Dose enoxaparin (LOVENOX),       PRN Meds:.•  acetaminophen **OR** acetaminophen  •  bisacodyl  •  HYDROcodone-acetaminophen  •  ipratropium-albuterol  •  ondansetron  •  Pharmacy to Dose enoxaparin (LOVENOX)  •  [COMPLETED] Insert peripheral IV **AND** sodium chloride  •  sodium chloride    Objective     VITAL SIGNS  Vitals:    08/04/21 1400 08/04/21 1803 08/04/21 2227 08/05/21 0258   BP: 141/70 149/68 143/82 131/63   BP Location: Right arm Right arm Right arm Right arm   Patient Position: Lying Lying Lying Lying   Pulse:  72 75 72   Resp: 12 17 16 16   Temp: 97.6 °F (36.4 °C) 97.9 °F (36.6 °C)  98.5 °F (36.9 °C)   TempSrc: Oral Axillary Oral Oral   SpO2: 96% 97% 96% 98%   Weight:       Height:           Flowsheet Rows      First Filed Value   Admission Height  172.7 cm (68\") Documented at 08/02/2021 0540   Admission Weight  83.9 kg (185 lb) Documented at 08/02/2021 0540            Intake/Output Summary (Last 24 hours) at 8/5/2021 0614  Last data filed at 8/5/2021 0258  Gross per 24 hour   Intake 720 ml   Output 1000 ml   Net -280 ml        TELEMETRY: Sinus rhythm    Physical Exam:  The patient is alert, oriented and in no distress.  Vital signs as noted above.  Head and neck revealed no carotid bruits or jugular venous distention.  No thyromegaly or lymphadenopathy is present  Lungs clear.  No wheezing.  Breath sounds are normal bilaterally.  Heart normal first and second heart sounds.  No murmur. No precordial rub is present.  No gallop is present.  Abdomen soft and nontender.  No organomegaly is present.  Extremities with good peripheral pulses without any pedal edema.  Right BKA  Skin warm and dry.  Musculoskeletal system is grossly normal  CNS grossly normal      Results Review:   I reviewed the patient's new clinical results.  Lab Results " (last 24 hours)     Procedure Component Value Units Date/Time    Basic Metabolic Panel [[id number]]  (Abnormal) Collected: 08/05/21 0414    Specimen: Blood Updated: 08/05/21 0520     Glucose 217 mg/dL      BUN 18 mg/dL      Creatinine 1.46 mg/dL      Sodium 141 mmol/L      Potassium 4.4 mmol/L      Comment: Slight hemolysis detected by analyzer. Results may be affected.        Chloride 105 mmol/L      CO2 29.0 mmol/L      Calcium 9.2 mg/dL      eGFR Non African Amer 50 mL/min/1.73      BUN/Creatinine Ratio 12.3     Anion Gap 7.0 mmol/L     Narrative:      GFR Normal >60  Chronic Kidney Disease <60  Kidney Failure <15      Magnesium [[id number]]  (Normal) Collected: 08/05/21 0414    Specimen: Blood Updated: 08/05/21 0520     Magnesium 1.7 mg/dL     CBC & Differential [[id number]]  (Abnormal) Collected: 08/05/21 0414    Specimen: Blood Updated: 08/05/21 0455    Narrative:      The following orders were created for panel order CBC & Differential.  Procedure                               Abnormality         Status                     ---------                               -----------         ------                     CBC Auto Differential[[id number]]        Abnormal            Final result                 Please view results for these tests on the individual orders.    CBC Auto Differential [[id number]]  (Abnormal) Collected: 08/05/21 0414    Specimen: Blood Updated: 08/05/21 0455     WBC 9.00 10*3/mm3      RBC 4.89 10*6/mm3      Hemoglobin 12.7 g/dL      Hematocrit 39.0 %      MCV 79.7 fL      MCH 25.9 pg      MCHC 32.5 g/dL      RDW 14.2 %      RDW-SD 39.4 fl      MPV 8.3 fL      Platelets 224 10*3/mm3      Neutrophil % 53.4 %      Lymphocyte % 30.8 %      Monocyte % 9.8 %      Eosinophil % 5.0 %      Basophil % 1.0 %      Neutrophils, Absolute 4.80 10*3/mm3      Lymphocytes, Absolute 2.80 10*3/mm3      Monocytes, Absolute 0.90 10*3/mm3      Eosinophils, Absolute 0.50 10*3/mm3      Basophils, Absolute 0.10 10*3/mm3       nRBC 0.1 /100 WBC     POC Glucose Once [[id number]]  (Abnormal) Collected: 08/04/21 1947    Specimen: Blood Updated: 08/04/21 1948     Glucose 290 mg/dL      Comment: Serial Number: [id number]:  [id number]       POC Glucose Once [[id number]]  (Abnormal) Collected: 08/04/21 1621    Specimen: Blood Updated: 08/04/21 1624     Glucose 180 mg/dL      Comment: Serial Number: [id number]:  [id number]       POC Glucose Once [[id number]]  (Abnormal) Collected: 08/04/21 1417    Specimen: Blood Updated: 08/04/21 1419     Glucose 199 mg/dL      Comment: Serial Number: [id number]:  [id number]       POC Glucose Once [[id number]]  (Abnormal) Collected: 08/04/21 1328    Specimen: Blood Updated: 08/04/21 1330     Glucose 66 mg/dL      Comment: Serial Number: [id number]:  [id number]       POC Glucose Once [[id number]]  (Abnormal) Collected: 08/04/21 1307    Specimen: Blood Updated: 08/04/21 1309     Glucose 43 mg/dL      Comment: Serial Number: [id number]:  [id number]       POC Glucose Once [[id number]]  (Normal) Collected: 08/04/21 1110    Specimen: Blood Updated: 08/04/21 1112     Glucose 93 mg/dL      Comment: Serial Number: [id number]:  [id number]             Imaging Results (Last 24 Hours)     ** No results found for the last 24 hours. **      LAB RESULTS (LAST 7 DAYS)    CBC  Results from last 7 days   Lab Units 08/05/21  0414 08/02/21  0620   WBC 10*3/mm3 9.00 7.80   RBC 10*6/mm3 4.89 5.24   HEMOGLOBIN g/dL 12.7* 13.9   HEMATOCRIT % 39.0 42.1   MCV fL 79.7 80.4   PLATELETS 10*3/mm3 224 244       BMP  Results from last 7 days   Lab Units 08/05/21  0414 08/03/21  0434 08/02/21  0620   SODIUM mmol/L 141 137 141   POTASSIUM mmol/L 4.4 4.8 4.2   CHLORIDE mmol/L 105 100 101   CO2 mmol/L 29.0 27.0 28.0   BUN mg/dL 18 18 16   CREATININE mg/dL 1.46* 1.37* 1.21   GLUCOSE mg/dL 217* 227* 152*   MAGNESIUM mg/dL 1.7 1.8  --        CMP   Results from last 7 days   Lab Units 08/05/21  0414 08/03/21  0430  08/02/21  0620   SODIUM mmol/L 141 137 141   POTASSIUM mmol/L 4.4 4.8 4.2   CHLORIDE mmol/L 105 100 101   CO2 mmol/L 29.0 27.0 28.0   BUN mg/dL 18 18 16   CREATININE mg/dL 1.46* 1.37* 1.21   GLUCOSE mg/dL 217* 227* 152*         BNP        TROPONIN        CoAg  Results from last 7 days   Lab Units 08/02/21  0620   INR  0.99   APTT seconds 25.3       Creatinine Clearance  Estimated Creatinine Clearance: 60 mL/min (A) (by C-G formula based on SCr of 1.46 mg/dL (H)).    ABG        Radiology  No radiology results for the last day            EKG              I personally viewed and interpreted the patient's EKG/Telemetry data:    ECHOCARDIOGRAM:    Results for orders placed during the hospital encounter of 08/02/21    Adult Transthoracic Echo Complete W/ Cont if Necessary Per Protocol (With Agitated Saline)    Interpretation Summary  · Left ventricular wall thickness is consistent with mild concentric hypertrophy.  · Estimated left ventricular EF = 70% Estimated left ventricular EF was in agreement with the calculated left ventricular EF. Left ventricular systolic function is normal.  · Left ventricular diastolic function is consistent with (grade I) impaired relaxation.  · Estimated right ventricular systolic pressure from tricuspid regurgitation is normal (<35 mmHg).  · Calcified nodular density that is attached to the anterior leaflet of the mitral valve that was reported even on the prior echocardiogram unlikely to be a vegetation most likely just a calcified nodule. If there is a clinical concern for embolic or infectious pathology consider further evaluation work-up including a BARON  · There is mainly affecting the left coronary cusp(s).          STRESS MYOVIEW:    Cardiolite (Tc-99m Sestamibi) stress test    CARDIAC CATHETERIZATION:            OTHER:         Assessment/Plan     Principal Problem:    Cerebrovascular accident (CVA) (CMS/[facility])  Active Problems:    Benign essential hypertension    Chronic coronary artery  disease    Chronic renal insufficiency, stage III (moderate) (CMS/HCC)    Depression    Diabetic peripheral neuropathy (CMS/HCC)    Gastroesophageal reflux disease    History of pulmonary embolism    Mixed hyperlipidemia    Acquired hypothyroidism    Obstructive sleep apnea syndrome    Peripheral vascular disease (CMS/HCC)    Type 2 diabetes mellitus with peripheral vascular disease (CMS/HCC)        ]]]]]]]]]]]]]]]]]]]]]]  Impression  ===========  -TIA/stroke.  Abnormal CTA with acute lacunar infarct within the posterior limb of the left internal capsule.  EKG showed sinus rhythm.  Echocardiogram showed anterior mitral leaflet nodular density.     -Does not have any cardiac symptoms.  Cardiac cath films from 4/18/2018 were reviewed and interpreted independently.  Normal left ventricular function.  50% diagonal branch disease.  RCA is a nondominant vessel that has mid segment 90% disease (small artery)     -Bilateral carotid bruits.  Right louder than left.     -Hypertension diabetes dyslipidemia sleep apnea CKD 3 (BUN 18 creatinine 1.37.)  Hypothyroidism vitamin D deficiency     -History of DVT.     -Long-term anticoagulation with Eliquis due to previous pulmonary embolus     -Peripheral vascular disease     -Status post right BKA.  Total left arthroplasty hernia repair     -Non-smoker  ============  Plan  ============  Patient has carotid bruits right louder than left.  CTA did not show obstructive carotid artery disease.  Carotid duplex scan was normal.    Abnormal echocardiogram.  Rule out embolic episodes.  Patient would benefit from transesophageal echocardiogram.  Eliquis is on hold.  Patient to have BARON today.  Eliquis can be restarted soon after BARON.    Renal dysfunction  BUN/18/1.37  18/1.46-8/5/2021  Observe closely.    Risks and benefits pros and cons of the procedure were discussed with patient including esophageal trauma anesthetic risk arrhythmias etc.  Further plan will depend on patient's  progress.  ]]]]]]]]]]]]]]]]]]]]]    Procedure performed  Transesophageal echocardiogram Doppler study (color Continuous Wave and pulse wave)    Date of study  8/5/2021    Indications  Rule out cardioembolic episodes  Recent stroke    Procedure  Anesthesia was provided by anesthesiologist with intravenous Diprivan  BARON probe could be passed without difficulty.  Patient tolerated the procedure well.  No complications were noted.    Results  Technically satisfactory study.  Mitral valve is thickened with adequate opening motion.  Moderate mitral regurgitation is present.  Tricuspid valve is normal.  Aortic valve is tricuspid and is normal.  Left atrium is normal in size.  Left atrial appendage is enlarged without any clot.  Left ventricle is normal in size and contractility with ejection fraction of 60%.  Right atrium is normal in size.  No pericardial effusion or intracardiac thrombus is seen.  Atrial septum is intact without PFO.  Aorta is normal.    Impression  Moderate mitral regurgitation  Normal left ventricle size and contractility with ejection fraction of 60%.  No evidence for intracardiac thrombus is present.  Nelli Vides MD  08/05/21  06:14 EDT

## 2021-08-05 NOTE — PROGRESS NOTES
[facility] Daily Progress Note    Patient Name: Kuldeep Adhikari  : 1965  MRN: [id number]  Primary Care Physician:  Laura Whitaker MD  Date of admission: 2021      Subjective      Chief Complaint: Right-sided weakness    Patient reports feeling generalized fatigue but dizziness has improved.  Orthostatics were negative.  Patient's anticoagulation being held for BARON on 2021.  No chest pain or shortness of breath.    2021: Patient seen and examined this morning.  Seen after the BARON, BARON negative for any PFO.  Right-sided weakness slightly improved, no numbness noted.  Denies any other complaints.  Awaiting rehab placement now.      Denies any associated fever, chills, chest pain, shortness of breath, abdominal pain, nausea, vomiting, diarrhea, dysuria, or dizziness.      Objective      Vitals:   Temp:  [97.6 °F (36.4 °C)-98.5 °F (36.9 °C)] 98.3 °F (36.8 °C)  Heart Rate:  [66-81] 74  Resp:  [12-30] 12  BP: (107-151)/(63-83) 129/66    Physical Exam      General: Awake, alert, NAD  Eyes: PERRL, EOMI, conjunctive are clear  Cardiovascular: Regular rate and rhythm, no murmurs  Respiratory: Clear to auscultation bilaterally, no wheezing or rales, unlabored breathing  Abdomen: Soft, nontender, positive bowel sounds, no guarding  Neurologic: A&O, CN grossly intact, moves all extremities spontaneously  Musculoskeletal: Right BKA noted, right lower extremity weakness greater than left noted, no other gross deformities  Skin: Warm, dry, intact      Result Review    Result Review:  I have personally reviewed the results from the time of this admission to 2021 12:51 EDT and agree with these findings:  [x]  Laboratory  [x]  Microbiology  [x]  Radiology  [x]  EKG/Telemetry   [x]  Cardiology/Vascular   []  Pathology  []  Old records  []  Other:            Assessment/Plan      Brief Patient Summary:    Kuldeep Adhikari is a 56 y.o. male with PMH of PVD s/p R BKA,  "DM Type II, PE on eliquis, CAD, HTN, HLD, hypothyroidism, asthma, anxiety, and GERD who presented to [facility] on 8/2/2021 right sided numbness and weakness. He stated he went to bed around 11pm in his normal state of health. He woke up around 4:30am and fell. He stated, \"It was like my right leg and arm were gone.\" He was using his right leg prosthesis and fell landing on his right side. He had numbness from the right side of his face all the way down his right arm and right leg. He denied any headache or vision changes. He is concerned that a tick bite might have caused his symptoms. He denied any prior stroke history. He has been taking his home medications including his eliquis      In the ED the patient had right sided weakness and paresthesia.  CT head showed no acute findings.  CTA head/neck showed no significant carotid stenosis, left vertebral artery is diminutive and appears to terminate after the origin of the PICA branch.  This may be due to normal variant anatomy however no prior studies to confirm this is chronic/congenital finding.  Neurology was consulted and patient was admitted to the [facility] for suspected CVA. He was determined not to be a tPA candidate due to unknown onset of symptoms and patient is on eliquis. MRI brain ordered.      8/3/2021: Patient Reports he feels his right arm strength is mildly improving.  Still working on blood pressure control.  Patient's echocardiogram showing abnormal calcified nodular density on mitral valve, cardiology consulted for further evaluation.  Patient cleared by neurology for discharge.  Patient needs repeat sleep study on discharge.  Patient reports he had some episodes of dizziness while walking with physical therapy.  MRI brain confirming 12 mm acute lacunar infarct in posterior limb of the left internal capsule, likely secondary to patient's uncontrolled blood pressure.      apixaban, 5 mg, Oral, Q12H  aspirin, 81 mg, Oral, Daily  atorvastatin, " 80 mg, Oral, Nightly  cyanocobalamin, 1,000 mcg, Intramuscular, Q28 Days  DULoxetine, 60 mg, Oral, QAM  gabapentin, 400 mg, Oral, TID  insulin glargine, 34 Units, Subcutaneous, Nightly  insulin lispro, 14 Units, Subcutaneous, TID With Meals  levothyroxine, 100 mcg, Oral, Q AM  metoprolol succinate XL, 50 mg, Oral, Daily  miconazole, , Topical, Q12H  pantoprazole, 40 mg, Oral, QAM  sodium chloride, 3 mL, Intravenous, Q12H       sodium chloride, 50 mL/hr, Last Rate: 50 mL/hr (08/05/21 1135)         Active Hospital Problems:  Active Hospital Problems    Diagnosis    • **Cerebrovascular accident (CVA) (CMS/[facility])    • Type 2 diabetes mellitus with peripheral vascular disease (CMS/[facility])    • Chronic coronary artery disease    • Obstructive sleep apnea syndrome    • Benign essential hypertension    • Diabetic peripheral neuropathy (CMS/[facility])    • Chronic renal insufficiency, stage III (moderate) (CMS/[facility])    • Depression    • Gastroesophageal reflux disease    • History of pulmonary embolism    • Mixed hyperlipidemia    • Acquired hypothyroidism    • Peripheral vascular disease (CMS/[facility])      Plan:   Acute CVA  -MRI showing 12 mm acute lacunar infarct within the posterior limb of the left internal capsule  -s/p BARON on 8/5 with no PFO noted  -Continue on aspirin, statin, Eliquis  -2D echo report reviewed  -Neurology signed off, okay with discharge  -PT/OT recommend rehab, awaiting placement    Uncontrolled hypertension  -Improved, blood pressure better now  -Case management consult for assistance with getting working CPAP  -Continue current BP regimen,  -Monitor renal function  -Adjust as needed     Dizziness  -Improved  -Orthostatic hypotension negative  -Less likely correlated with stroke  -Continue low-dose IVF    Abnormal mitral valve  -on TTE patient shown to have calcified nodule on mitral valve uncertain significance  -Cardiology following, status post BARON on 8/5 showing moderate MR and thickened leaflets  -Continue to  monitor    Chronic HFpEF  -patient's echo showing grade 1 diastolic dysfunction with essentially normal systolic function may be appearing mildly hypovolemic initially now appearing more euvolemic  -Daily weights  -Monitor I's and O's    DAISY versus CKD 3A  -Possible baseline creatinine around 1.2-1.5, monitor creatinine  -Avoid nephrotoxic medication  -Continue low-dose IV fluids for now    Elevated TSH-very mildly so  -Free T4 appropriate     Pulmonary embolism-patient anticoagulated  -Eliquis resumed after BARON     Coronary artery disease-patient denies any chest pain at this time  -Cardiac monitoring  -EKG normal sinus rhythm normal axis  -Continue antiplatelets     Type 2 diabetes-patient reports history of relatively poor control  -A1c of 11.2 very poorly controlled  -Sliding scale and short acting basal  -Long-acting insulin  -Given significant comorbidities may benefit from endocrinology evaluation on outpatient basis     Peripheral vascular disease-likely due to uncontrolled hypertension and diabetes  -Patient with right BKA  -Continue antiplatelets     Hyperlipidemia/anxiety/GERD/asthma-chronic in nature  -Resume home medication as clinically appropriate, atorvastatin increased to 80 mg     GENO-patient reports he is not using his CPAP due to malfunction  -Repeat sleep study on discharge    DVT prophylaxis:  Medical and mechanical DVT prophylaxis orders are present.    CODE STATUS:    Code Status: CPR  Medical Interventions (Level of Support Prior to Arrest): Full      Disposition:  I expect patient to be discharged to rehab    This patient has been examined wearing appropriate Personal Protective Equipment on 08/05/21      Electronically signed by Endy Lazaro DO, 08/05/21, 12:51 EDT.  [facility] [place] [facility]

## 2021-08-05 NOTE — ANESTHESIA PREPROCEDURE EVALUATION
Anesthesia Evaluation     Patient summary reviewed and Nursing notes reviewed   NPO Solid Status: > 8 hours  NPO Liquid Status: > 8 hours           Airway   Mallampati: II  TM distance: >3 FB  Neck ROM: full  No difficulty expected  Dental - normal exam   (+) poor dentition    Pulmonary - normal exam    breath sounds clear to auscultation  (+) sleep apnea,   Cardiovascular - normal exam    ECG reviewed  Rhythm: regular  Rate: normal    (+) hypertension, CAD, PVD, DVT resolved, hyperlipidemia,     ROS comment: TTE 8/2/21:  Interpretation Summary  Left ventricular wall thickness is consistent with mild concentric hypertrophy.  ·Estimated left ventricular EF = 70% Estimated left ventricular EF was in agreement with the calculated left ventricular EF. Left ventricular systolic function is normal.  ·Left ventricular diastolic function is consistent with (grade I) impaired relaxation.  ·Estimated right ventricular systolic pressure from tricuspid regurgitation is normal (<35 mmHg).  ·Calcified nodular density that is attached to the anterior leaflet of the mitral valve that was reported even on the prior echocardiogram unlikely to be a vegetation most likely just a calcified nodule. If there is a clinical concern for embolic or infectious pathology consider further evaluation work-up including a BARON  ·There is mainly affecting the left coronary cusp(s).        Neuro/Psych  (+) TIA, CVA,       ROS Comment: Admitted with possible R sided TIA. Normal head CT 8/2/21 but MRI showed:  12 mm acute lacunar infarct within the posterior limb of the left internal capsule with possible involvement of the posterior left lentiform nucleus.   GI/Hepatic/Renal/Endo    (+) obesity,  hiatal hernia, GERD well controlled,  renal disease CRI, diabetes mellitus well controlled using insulin, thyroid problem hypothyroidism    Musculoskeletal     Abdominal  - normal exam   Substance History      OB/GYN          Other   arthritis,                       Anesthesia Plan    ASA 3     MAC   (Fem N Block)  intravenous induction     Anesthetic plan, all risks, benefits, and alternatives have been provided, discussed and informed consent has been obtained with: patient.    Plan discussed with CRNA and CAA.

## 2021-08-05 NOTE — PLAN OF CARE
Goal Outcome Evaluation:  Plan of Care Reviewed With: patient      Pt scored an NIH of 0, scheduled to have a BARON today. Vitals stable, will continue to monitor.     Problem: Adult Inpatient Plan of Care  Goal: Plan of Care Review  Outcome: Ongoing, Progressing  Flowsheets (Taken 8/5/2021 0528)  Plan of Care Reviewed With: patient  Goal: Patient-Specific Goal (Individualized)  Outcome: Ongoing, Progressing  Goal: Absence of Hospital-Acquired Illness or Injury  Outcome: Ongoing, Progressing  Intervention: Identify and Manage Fall Risk  Recent Flowsheet Documentation  Taken 8/5/2021 0402 by Radha Sanchez RN  Safety Promotion/Fall Prevention:   activity supervised   assistive device/personal items within reach   clutter free environment maintained   fall prevention program maintained   gait belt   lighting adjusted   nonskid shoes/slippers when out of bed   room organization consistent   safety round/check completed   mobility aid in reach  Taken 8/5/2021 0200 by Radha Sanchez RN  Safety Promotion/Fall Prevention:   activity supervised   assistive device/personal items within reach   clutter free environment maintained   fall prevention program maintained   gait belt   lighting adjusted   nonskid shoes/slippers when out of bed   room organization consistent   safety round/check completed  Taken 8/5/2021 0019 by Radha Sanchez RN  Safety Promotion/Fall Prevention:   activity supervised   assistive device/personal items within reach   clutter free environment maintained   fall prevention program maintained   gait belt   lighting adjusted   nonskid shoes/slippers when out of bed   room organization consistent   safety round/check completed  Taken 8/4/2021 2200 by Radha Sanchez RN  Safety Promotion/Fall Prevention:   activity supervised   assistive device/personal items within reach   clutter free environment maintained   fall prevention program maintained   gait belt   lighting adjusted   mobility aid in  reach   nonskid shoes/slippers when out of bed   room organization consistent   safety round/check completed  Taken 8/4/2021 2045 by Radha Sanchez RN  Safety Promotion/Fall Prevention:   activity supervised   assistive device/personal items within reach   clutter free environment maintained   fall prevention program maintained   gait belt   lighting adjusted   mobility aid in reach   nonskid shoes/slippers when out of bed   room organization consistent   safety round/check completed  Intervention: Prevent Skin Injury  Recent Flowsheet Documentation  Taken 8/5/2021 0402 by Radha Sanchez RN  Skin Protection:   adhesive use limited   tubing/devices free from skin contact  Taken 8/5/2021 0019 by Radha Sanchez RN  Body Position: position changed independently  Skin Protection:   adhesive use limited   tubing/devices free from skin contact  Taken 8/4/2021 2045 by Radha Sanchez RN  Body Position: position changed independently  Skin Protection:   adhesive use limited   tubing/devices free from skin contact  Intervention: Prevent and Manage VTE (venous thromboembolism) Risk  Recent Flowsheet Documentation  Taken 8/4/2021 2045 by Radha Sanchez RN  VTE Prevention/Management: (RBKA)   left   sequential compression devices on   other (see comments)  Intervention: Prevent Infection  Recent Flowsheet Documentation  Taken 8/5/2021 0402 by Radha Sanchez RN  Infection Prevention:   rest/sleep promoted   personal protective equipment utilized   hand hygiene promoted   equipment surfaces disinfected   single patient room provided   visitors restricted/screened  Taken 8/5/2021 0019 by Radha Sanchez RN  Infection Prevention:   visitors restricted/screened   single patient room provided   rest/sleep promoted   personal protective equipment utilized   hand hygiene promoted   equipment surfaces disinfected  Taken 8/4/2021 2045 by Radha Sanchez RN  Infection Prevention:   visitors restricted/screened   single patient  room provided   rest/sleep promoted   personal protective equipment utilized   hand hygiene promoted   equipment surfaces disinfected  Goal: Optimal Comfort and Wellbeing  Outcome: Ongoing, Progressing  Intervention: Provide Person-Centered Care  Recent Flowsheet Documentation  Taken 8/5/2021 0019 by Radha Sanchez, RN  Trust Relationship/Rapport:   care explained   choices provided   emotional support provided   empathic listening provided   questions answered   questions encouraged   reassurance provided   thoughts/feelings acknowledged  Taken 8/4/2021 2045 by Radha Sanchez RN  Trust Relationship/Rapport:   care explained   choices provided   emotional support provided   empathic listening provided   questions answered   questions encouraged   reassurance provided   thoughts/feelings acknowledged  Goal: Readiness for Transition of Care  Outcome: Ongoing, Progressing

## 2021-08-06 ENCOUNTER — ON CAMPUS - OUTPATIENT (AMBULATORY)
Dept: URBAN - METROPOLITAN AREA HOSPITAL 85 | Facility: HOSPITAL | Age: 56
End: 2021-08-06

## 2021-08-06 ENCOUNTER — ANESTHESIA EVENT (OUTPATIENT)
Dept: GASTROENTEROLOGY | Facility: HOSPITAL | Age: 56
End: 2021-08-06

## 2021-08-06 ENCOUNTER — ANESTHESIA (OUTPATIENT)
Dept: GASTROENTEROLOGY | Facility: HOSPITAL | Age: 56
End: 2021-08-06

## 2021-08-06 DIAGNOSIS — K22.8 OTHER SPECIFIED DISEASES OF ESOPHAGUS: ICD-10-CM

## 2021-08-06 DIAGNOSIS — K44.9 DIAPHRAGMATIC HERNIA WITHOUT OBSTRUCTION OR GANGRENE: ICD-10-CM

## 2021-08-06 DIAGNOSIS — R13.10 DYSPHAGIA, UNSPECIFIED: ICD-10-CM

## 2021-08-06 DIAGNOSIS — K29.00 ACUTE GASTRITIS WITHOUT BLEEDING: ICD-10-CM

## 2021-08-06 LAB
ANION GAP SERPL CALCULATED.3IONS-SCNC: 6 MMOL/L (ref 5–15)
BUN SERPL-MCNC: 16 MG/DL (ref 6–20)
BUN/CREAT SERPL: 13 (ref 7–25)
CALCIUM SPEC-SCNC: 8.6 MG/DL (ref 8.6–10.5)
CHLORIDE SERPL-SCNC: 107 MMOL/L (ref 98–107)
CO2 SERPL-SCNC: 28 MMOL/L (ref 22–29)
CREAT SERPL-MCNC: 1.23 MG/DL (ref 0.76–1.27)
GFR SERPL CREATININE-BSD FRML MDRD: 61 ML/MIN/1.73
GLUCOSE BLDC GLUCOMTR-MCNC: 111 MG/DL (ref 70–105)
GLUCOSE BLDC GLUCOMTR-MCNC: 149 MG/DL (ref 70–105)
GLUCOSE BLDC GLUCOMTR-MCNC: 172 MG/DL (ref 70–105)
GLUCOSE BLDC GLUCOMTR-MCNC: 201 MG/DL (ref 70–105)
GLUCOSE BLDC GLUCOMTR-MCNC: 66 MG/DL (ref 70–105)
GLUCOSE BLDC GLUCOMTR-MCNC: 74 MG/DL (ref 70–105)
GLUCOSE SERPL-MCNC: 189 MG/DL (ref 65–99)
POTASSIUM SERPL-SCNC: 4.3 MMOL/L (ref 3.5–5.2)
SARS-COV-2 RNA PNL SPEC NAA+PROBE: NOT DETECTED
SODIUM SERPL-SCNC: 141 MMOL/L (ref 136–145)

## 2021-08-06 PROCEDURE — 99232 SBSQ HOSP IP/OBS MODERATE 35: CPT | Performed by: INTERNAL MEDICINE

## 2021-08-06 PROCEDURE — 82962 GLUCOSE BLOOD TEST: CPT

## 2021-08-06 PROCEDURE — 80048 BASIC METABOLIC PNL TOTAL CA: CPT | Performed by: INTERNAL MEDICINE

## 2021-08-06 PROCEDURE — 87635 SARS-COV-2 COVID-19 AMP PRB: CPT | Performed by: NURSE PRACTITIONER

## 2021-08-06 PROCEDURE — 88305 TISSUE EXAM BY PATHOLOGIST: CPT | Performed by: INTERNAL MEDICINE

## 2021-08-06 PROCEDURE — 0D758ZZ DILATION OF ESOPHAGUS, VIA NATURAL OR ARTIFICIAL OPENING ENDOSCOPIC: ICD-10-PCS | Performed by: INTERNAL MEDICINE

## 2021-08-06 PROCEDURE — 63710000001 INSULIN GLARGINE PER 5 UNITS: Performed by: INTERNAL MEDICINE

## 2021-08-06 PROCEDURE — 43450 DILATE ESOPHAGUS 1/MULT PASS: CPT | Performed by: INTERNAL MEDICINE

## 2021-08-06 PROCEDURE — 63710000001 INSULIN LISPRO (HUMAN) PER 5 UNITS: Performed by: INTERNAL MEDICINE

## 2021-08-06 PROCEDURE — 25010000002 PROPOFOL 10 MG/ML EMULSION: Performed by: NURSE ANESTHETIST, CERTIFIED REGISTERED

## 2021-08-06 PROCEDURE — 25010000002 ONDANSETRON PER 1 MG: Performed by: NURSE PRACTITIONER

## 2021-08-06 PROCEDURE — 43239 EGD BIOPSY SINGLE/MULTIPLE: CPT | Performed by: INTERNAL MEDICINE

## 2021-08-06 PROCEDURE — 0DB68ZX EXCISION OF STOMACH, VIA NATURAL OR ARTIFICIAL OPENING ENDOSCOPIC, DIAGNOSTIC: ICD-10-PCS | Performed by: INTERNAL MEDICINE

## 2021-08-06 RX ORDER — SODIUM CHLORIDE 0.9 % (FLUSH) 0.9 %
3-10 SYRINGE (ML) INJECTION AS NEEDED
Status: CANCELLED | OUTPATIENT
Start: 2021-08-06

## 2021-08-06 RX ORDER — SODIUM CHLORIDE 0.9 % (FLUSH) 0.9 %
3 SYRINGE (ML) INJECTION EVERY 12 HOURS SCHEDULED
Status: CANCELLED | OUTPATIENT
Start: 2021-08-06

## 2021-08-06 RX ORDER — LIDOCAINE HYDROCHLORIDE 10 MG/ML
INJECTION, SOLUTION EPIDURAL; INFILTRATION; INTRACAUDAL; PERINEURAL AS NEEDED
Status: DISCONTINUED | OUTPATIENT
Start: 2021-08-06 | End: 2021-08-06 | Stop reason: SURG

## 2021-08-06 RX ORDER — ONDANSETRON 2 MG/ML
4 INJECTION INTRAMUSCULAR; INTRAVENOUS ONCE AS NEEDED
Status: DISCONTINUED | OUTPATIENT
Start: 2021-08-06 | End: 2021-08-06 | Stop reason: HOSPADM

## 2021-08-06 RX ORDER — DEXTROSE MONOHYDRATE 25 G/50ML
25 INJECTION, SOLUTION INTRAVENOUS
Status: DISCONTINUED | OUTPATIENT
Start: 2021-08-06 | End: 2021-08-10 | Stop reason: HOSPADM

## 2021-08-06 RX ORDER — ONDANSETRON 4 MG/1
4 TABLET, FILM COATED ORAL EVERY 6 HOURS PRN
Status: DISCONTINUED | OUTPATIENT
Start: 2021-08-06 | End: 2021-08-10 | Stop reason: HOSPADM

## 2021-08-06 RX ORDER — NICOTINE POLACRILEX 4 MG
15 LOZENGE BUCCAL
Status: DISCONTINUED | OUTPATIENT
Start: 2021-08-06 | End: 2021-08-10 | Stop reason: HOSPADM

## 2021-08-06 RX ORDER — ASPIRIN 81 MG/1
81 TABLET ORAL DAILY
Status: DISCONTINUED | OUTPATIENT
Start: 2021-08-07 | End: 2021-08-10 | Stop reason: HOSPADM

## 2021-08-06 RX ORDER — PROPOFOL 10 MG/ML
VIAL (ML) INTRAVENOUS AS NEEDED
Status: DISCONTINUED | OUTPATIENT
Start: 2021-08-06 | End: 2021-08-06 | Stop reason: SURG

## 2021-08-06 RX ORDER — ONDANSETRON 2 MG/ML
4 INJECTION INTRAMUSCULAR; INTRAVENOUS EVERY 6 HOURS PRN
Status: DISCONTINUED | OUTPATIENT
Start: 2021-08-06 | End: 2021-08-10 | Stop reason: HOSPADM

## 2021-08-06 RX ADMIN — GABAPENTIN 400 MG: 400 CAPSULE ORAL at 18:28

## 2021-08-06 RX ADMIN — Medication 3 ML: at 20:53

## 2021-08-06 RX ADMIN — INSULIN GLARGINE 40 UNITS: 100 INJECTION, SOLUTION SUBCUTANEOUS at 20:52

## 2021-08-06 RX ADMIN — DULOXETINE HYDROCHLORIDE 60 MG: 30 CAPSULE, DELAYED RELEASE ORAL at 06:37

## 2021-08-06 RX ADMIN — PROPOFOL 20 MG: 10 INJECTION, EMULSION INTRAVENOUS at 13:33

## 2021-08-06 RX ADMIN — GABAPENTIN 400 MG: 400 CAPSULE ORAL at 20:52

## 2021-08-06 RX ADMIN — PANTOPRAZOLE SODIUM 40 MG: 40 TABLET, DELAYED RELEASE ORAL at 06:37

## 2021-08-06 RX ADMIN — MICONAZOLE NITRATE: 20 POWDER TOPICAL at 09:23

## 2021-08-06 RX ADMIN — INSULIN LISPRO 14 UNITS: 100 INJECTION, SOLUTION INTRAVENOUS; SUBCUTANEOUS at 18:28

## 2021-08-06 RX ADMIN — ATORVASTATIN CALCIUM 80 MG: 40 TABLET, FILM COATED ORAL at 20:52

## 2021-08-06 RX ADMIN — MICONAZOLE NITRATE: 20 POWDER TOPICAL at 20:53

## 2021-08-06 RX ADMIN — LEVOTHYROXINE SODIUM 100 MCG: 0.1 TABLET ORAL at 06:37

## 2021-08-06 RX ADMIN — PROPOFOL 30 MG: 10 INJECTION, EMULSION INTRAVENOUS at 13:35

## 2021-08-06 RX ADMIN — HYDROCODONE BITARTRATE AND ACETAMINOPHEN 1 TABLET: 5; 325 TABLET ORAL at 01:52

## 2021-08-06 RX ADMIN — ONDANSETRON 4 MG: 2 INJECTION INTRAMUSCULAR; INTRAVENOUS at 11:37

## 2021-08-06 RX ADMIN — DEXTROSE MONOHYDRATE 25 G: 500 INJECTION PARENTERAL at 11:28

## 2021-08-06 RX ADMIN — PROPOFOL 100 MG: 10 INJECTION, EMULSION INTRAVENOUS at 13:31

## 2021-08-06 RX ADMIN — SODIUM CHLORIDE 50 ML/HR: 9 INJECTION, SOLUTION INTRAVENOUS at 09:05

## 2021-08-06 RX ADMIN — GABAPENTIN 400 MG: 400 CAPSULE ORAL at 09:04

## 2021-08-06 RX ADMIN — HYDROCODONE BITARTRATE AND ACETAMINOPHEN 1 TABLET: 5; 325 TABLET ORAL at 09:51

## 2021-08-06 RX ADMIN — ASPIRIN 81 MG: 81 TABLET, CHEWABLE ORAL at 09:04

## 2021-08-06 RX ADMIN — SODIUM CHLORIDE: 9 INJECTION, SOLUTION INTRAVENOUS at 13:24

## 2021-08-06 RX ADMIN — APIXABAN 5 MG: 5 TABLET, FILM COATED ORAL at 20:52

## 2021-08-06 RX ADMIN — LIDOCAINE HYDROCHLORIDE 100 MG: 10 INJECTION, SOLUTION EPIDURAL; INFILTRATION; INTRACAUDAL; PERINEURAL at 13:31

## 2021-08-06 RX ADMIN — METOPROLOL SUCCINATE 50 MG: 50 TABLET, EXTENDED RELEASE ORAL at 09:05

## 2021-08-06 NOTE — PLAN OF CARE
Goal Outcome Evaluation:  Plan of Care Reviewed With: patient     Patient scheduled for EGD today; pt requesting to hold off treatment.

## 2021-08-06 NOTE — PROGRESS NOTES
Nutrition Services    Patient Name: Kuldeep Adhikari  YOB: 1965  MRN: [id number]  Admission date: 8/2/2021      PPE Documentation        PPE Worn By Provider mask and eye protection   PPE Worn By Patient  N/A, pt out of room     Nutrition Counseling & Education       Reason for Visit: 8/6: Elevated Hgb A1c     Session topic: Diet rationale and Key food habit change      Session comments: Pt out of room in Endo. Information packet including RD contact info left at bedside.     Provided print material via: [employer]-Nutrition Care Manual      Goals: Nutrition related lab values to trend towards desirable value     Monitoring/Follow Up: RD to follow up as able to for questions.  RD contact info provided.       Electronically signed by:  Yolanda Blum RD  08/06/21 14:14 EDT

## 2021-08-06 NOTE — CASE MANAGEMENT/SOCIAL WORK
Continued Stay Note  KATY Burton     Patient Name: Kuldeep Adhikari  MRN: [id number]  Today's Date: 8/6/2021    Admit Date: 8/2/2021    Discharge Plan     Row Name 08/06/21 1527       Plan    Plan  DC Plan: [facility] referral, pending acceptance.  Pt denied subacute, peer to peer arranged 8/6.  Pending approval - follow-up with [facility].    Plan Comments  Received message from danilo stating patient denied for subacute, requesting peer to peer with numbers provided.  Forwarded to Dr. Rodney to review.  [facility] requesting OT notes for approval to Dr. Orlando.  OT note faxed through [employer] to [phone] 8/6 @ 1528.  Danilo Blevins at [facility] text to please contact when approval goes through, and to please call today if possible to see if OT notes were received.  She said she is calling today, and that she is also working this weekend and will follow-up with approval.               Expected Discharge Date and Time     Expected Discharge Date Expected Discharge Time    Aug 5, 2021             Whitney Jaquez RN

## 2021-08-06 NOTE — PLAN OF CARE
Goal Outcome Evaluation:                 Pt is 55 y/o M admitted for CVA. Full code. NIH- 0 this shift. Pt has R BKA w/ prosthetic. Pt is A/O x 4 calm cooperative. Pt had BARON yesterday- results pend. Pt has C/C diabetic diet. CM working to place Pt IPR -referral @ [facility] pend. Pt looking to find a place his insurance will cover. VSS this shift. Pt has been c/o pain in back/head- Tx w/ Norco 5 mg Q 4 hrs. Will cont to monitor Pt throughout shift.         Problem: Adult Inpatient Plan of Care  Goal: Plan of Care Review  Outcome: Ongoing, Progressing  Goal: Patient-Specific Goal (Individualized)  Outcome: Ongoing, Progressing  Goal: Absence of Hospital-Acquired Illness or Injury  Outcome: Ongoing, Progressing  Intervention: Identify and Manage Fall Risk  Recent Flowsheet Documentation  Taken 8/6/2021 0208 by Kuldeep Meléndez, RN  Safety Promotion/Fall Prevention:  • safety round/check completed  • room organization consistent  • nonskid shoes/slippers when out of bed  • fall prevention program maintained  • clutter free environment maintained  • assistive device/personal items within reach  Taken 8/6/2021 0006 by Kuldeep Meléndez, RN  Safety Promotion/Fall Prevention:  • safety round/check completed  • room organization consistent  • nonskid shoes/slippers when out of bed  • lighting adjusted  • fall prevention program maintained  • clutter free environment maintained  • assistive device/personal items within reach  • activity supervised  Taken 8/5/2021 2211 by Kuldeep Meléndez, RN  Safety Promotion/Fall Prevention:  • safety round/check completed  • room organization consistent  • nonskid shoes/slippers when out of bed  • fall prevention program maintained  • clutter free environment maintained  • assistive device/personal items within reach  Taken 8/5/2021 2000 by Kuldeep Meléndez, RN  Safety Promotion/Fall Prevention:  • safety round/check completed  • room organization consistent  • nonskid shoes/slippers when out of  bed  • lighting adjusted  • fall prevention program maintained  • clutter free environment maintained  • assistive device/personal items within reach  • activity supervised  Intervention: Prevent Skin Injury  Recent Flowsheet Documentation  Taken 8/6/2021 0006 by Kuldeep Meléndez RN  Skin Protection:  • adhesive use limited  • incontinence pads utilized  • skin-to-device areas padded  • tubing/devices free from skin contact  Taken 8/5/2021 2000 by Kuldeep Meléndez RN  Skin Protection:  • adhesive use limited  • incontinence pads utilized  • skin-to-device areas padded  • tubing/devices free from skin contact  • pectin skin barriers applied  Intervention: Prevent and Manage VTE (venous thromboembolism) Risk  Recent Flowsheet Documentation  Taken 8/5/2021 2000 by Kuldeep Meléndez RN  VTE Prevention/Management: (Eliquis- Pt refused tonight- wants EGD) other (see comments)  Intervention: Prevent Infection  Recent Flowsheet Documentation  Taken 8/6/2021 0208 by Kuldeep Meléndez RN  Infection Prevention:  • hand hygiene promoted  • personal protective equipment utilized  • rest/sleep promoted  Taken 8/6/2021 0006 by Kuldeep Meléndez RN  Infection Prevention:  • hand hygiene promoted  • personal protective equipment utilized  • rest/sleep promoted  Taken 8/5/2021 2211 by Kuldeep Meléndez RN  Infection Prevention:  • hand hygiene promoted  • personal protective equipment utilized  • rest/sleep promoted  Taken 8/5/2021 2000 by Kuldeep Meléndez RN  Infection Prevention:  • hand hygiene promoted  • personal protective equipment utilized  • rest/sleep promoted  Goal: Optimal Comfort and Wellbeing  Outcome: Ongoing, Progressing  Intervention: Provide Person-Centered Care  Recent Flowsheet Documentation  Taken 8/5/2021 2000 by Kuldeep Meléndez RN  Trust Relationship/Rapport:  • care explained  • choices provided  • emotional support provided  • empathic listening provided  • questions answered  • questions encouraged  •  reassurance provided  • thoughts/feelings acknowledged  Goal: Readiness for Transition of Care  Outcome: Ongoing, Progressing     Problem: Fall Injury Risk  Goal: Absence of Fall and Fall-Related Injury  Outcome: Ongoing, Progressing  Intervention: Identify and Manage Contributors to Fall Injury Risk  Recent Flowsheet Documentation  Taken 8/6/2021 0006 by Kuldeep Meléndez RN  Medication Review/Management: medications reviewed  Taken 8/5/2021 2000 by Kuldeep Meléndez RN  Medication Review/Management: medications reviewed  Intervention: Promote Injury-Free Environment  Recent Flowsheet Documentation  Taken 8/6/2021 0208 by Kuldeep Meléndez RN  Safety Promotion/Fall Prevention:  • safety round/check completed  • room organization consistent  • nonskid shoes/slippers when out of bed  • fall prevention program maintained  • clutter free environment maintained  • assistive device/personal items within reach  Taken 8/6/2021 0006 by Kuldeep Meléndez RN  Safety Promotion/Fall Prevention:  • safety round/check completed  • room organization consistent  • nonskid shoes/slippers when out of bed  • lighting adjusted  • fall prevention program maintained  • clutter free environment maintained  • assistive device/personal items within reach  • activity supervised  Taken 8/5/2021 2211 by Kuldeep Meléndez RN  Safety Promotion/Fall Prevention:  • safety round/check completed  • room organization consistent  • nonskid shoes/slippers when out of bed  • fall prevention program maintained  • clutter free environment maintained  • assistive device/personal items within reach  Taken 8/5/2021 2000 by Kuldeep Meléndez RN  Safety Promotion/Fall Prevention:  • safety round/check completed  • room organization consistent  • nonskid shoes/slippers when out of bed  • lighting adjusted  • fall prevention program maintained  • clutter free environment maintained  • assistive device/personal items within reach  • activity supervised      Problem: Adjustment to Illness (Stroke, Ischemic/Transient Ischemic Attack)  Goal: Optimal Coping  Outcome: Ongoing, Progressing  Intervention: Support Patient/Family Psychosocial Response to Stroke  Recent Flowsheet Documentation  Taken 8/6/2021 0006 by Kuldeep Meléndez RN  Supportive Measures: active listening utilized  Taken 8/5/2021 2000 by Kuldeep Meléndez RN  Supportive Measures: active listening utilized  Family/Support System Care: support provided     Problem: Bowel Elimination Impaired (Stroke, Ischemic/Transient Ischemic Attack)  Goal: Effective Bowel Elimination  Outcome: Ongoing, Progressing     Problem: Cerebral Tissue Perfusion Risk (Stroke, Ischemic/Transient Ischemic Attack)  Goal: Optimal Cerebral Tissue Perfusion  Outcome: Ongoing, Progressing  Intervention: Protect and Optimize Cerebral Perfusion  Recent Flowsheet Documentation  Taken 8/6/2021 0006 by Kuldeep Meléndez RN  Sensory Stimulation Regulation:  • auditory stimulation minimized  • care clustered  • lighting decreased  Cerebral Perfusion Promotion: blood pressure monitored  Taken 8/5/2021 2000 by Kuldeep Meléndez RN  Sensory Stimulation Regulation:  • auditory stimulation minimized  • care clustered  • lighting decreased  Cerebral Perfusion Promotion: blood pressure monitored     Problem: Communication Impairment (Stroke, Ischemic/Transient Ischemic Attack)  Goal: Improved Communication Skills  Outcome: Ongoing, Progressing  Intervention: Optimize Cognitive and Communication Skills  Recent Flowsheet Documentation  Taken 8/6/2021 0006 by Kuldeep Meléndez RN  Reorientation Measures: clock in view  Environment Familiarity/Consistency: daily routine followed  Communication Enhancement Strategies: call light answered in person  Taken 8/5/2021 2000 by Kuldeep Meléndez RN  Reorientation Measures: clock in view  Environment Familiarity/Consistency: daily routine followed  Communication Enhancement Strategies:  • call light answered in  person  • device use encouraged     Problem: Eating/Swallowing Impairment (Stroke, Ischemic/Transient Ischemic Attack)  Goal: Oral Intake without Aspiration  Outcome: Ongoing, Progressing     Problem: Functional Ability Impaired (Stroke, Ischemic/Transient Ischemic Attack)  Goal: Optimal Functional Ability  Outcome: Ongoing, Progressing     Problem: Hemodynamic Instability (Stroke, Ischemic/Transient Ischemic Attack)  Goal: Vital Signs Remain in Desired Range  Outcome: Ongoing, Progressing  Intervention: Optimize Blood Flow  Recent Flowsheet Documentation  Taken 8/6/2021 0006 by Kuldeep Meléndez RN  Fluid/Electrolyte Management: fluids provided  Taken 8/5/2021 2000 by Kuldeep Meléndez RN  Fluid/Electrolyte Management: fluids provided     Problem: Pain (Stroke, Ischemic/Transient Ischemic Attack)  Goal: Acceptable Pain Control  Outcome: Ongoing, Progressing  Intervention: Monitor and Manage Pain  Recent Flowsheet Documentation  Taken 8/5/2021 2000 by Kuldeep Meléndez RN  Pain Management Interventions: see MAR     Problem: Sensorimotor Impairment (Stroke, Ischemic/Transient Ischemic Attack)  Goal: Improved Sensorimotor Function  Outcome: Ongoing, Progressing  Intervention: Optimize Sensory and Perceptual Abilities  Recent Flowsheet Documentation  Taken 8/6/2021 0006 by Kuldeep Meléndez RN  Pressure Reduction Techniques:  • frequent weight shift encouraged  • positioned off wounds  • pressure points protected  Pressure Reduction Devices:  • positioning supports utilized  • pressure-redistributing mattress utilized  Taken 8/5/2021 2000 by Kuldeep Meléndez RN  Pressure Reduction Techniques:  • frequent weight shift encouraged  • positioned off wounds  • pressure points protected  Pressure Reduction Devices:  • positioning supports utilized  • pressure-redistributing mattress utilized     Problem: Urinary Elimination Impaired (Stroke, Ischemic/Transient Ischemic Attack)  Goal: Effective Urinary Elimination  Outcome:  Ongoing, Progressing  Intervention: Promote Effective Bladder Elimination  Recent Flowsheet Documentation  Taken 8/6/2021 0006 by Kuldeep Meléndez, RN  Urinary Elimination Promotion:  • absorbent pad/diaper use encouraged  • toileting offered  • voiding relaxation promoted  Taken 8/5/2021 2000 by Kuldeep Meléndez, RN  Urinary Elimination Promotion:  • absorbent pad/diaper use encouraged  • toileting offered  • voiding relaxation promoted

## 2021-08-06 NOTE — CASE MANAGEMENT/SOCIAL WORK
Continued Stay Note   [place]     Patient Name: Kuldeep Adhikari  MRN: [id number]  Today's Date: 8/6/2021    Admit Date: 8/2/2021    Discharge Plan     Row Name 08/06/21 1527       Plan    Plan  DC Plan: [facility] referral, pending acceptance.  Pt denied subacute, peer to peer arranged 8/6.  Pending approval.    Plan Comments  Received message from liason stating patient denied for subacute, requesting peer to peer with numbers provided.  Forwarded to Dr. Rodney to review.  [employer] requesting OT notes for approval.  OT note faxed through [employer] to [phone] 8/6 @ 1528.            Expected Discharge Date and Time     Expected Discharge Date Expected Discharge Time    Aug 5, 2021             Whitney Jaquez RN

## 2021-08-06 NOTE — PROGRESS NOTES
Referring Provider: Endy Lazaro DO    Reason for follow-up:  Recent stroke  Peripheral vascular disease    Patient Care Team:  Laura Whitaker MD as PCP - General    Subjective .  Feeling okay     ROS    Since I have last seen, the patient has been without any chest discomfort ,shortness of breath, palpitations, dizziness or syncope.  Denies having any headache ,abdominal pain ,nausea, vomiting , diarrhea constipation, loss of weight or loss of appetite.  Denies having any excessive bruising ,hematuria or blood in the stool.    Review of all systems negative except as indicated    History  Past Medical History:   Diagnosis Date   • CKD (chronic kidney disease), stage III (CMS/HCC)    • Depression    • DJD (degenerative joint disease)    • DVT (deep venous thrombosis) (CMS/HCC)    • Ganglion     rt wrist   • GERD (gastroesophageal reflux disease)    • Hiatal hernia    • History of echocardiogram 04/2016    2D ECHO   • History of esophageal stricture     s/p dialation 40-50 times last EGD 04/2016   • History of pulmonary embolus (PE)     on long term anticoagulation   • Hyperlipidemia    • Hypertension    • Hypothyroidism    • IBS (irritable bowel syndrome)    • Neuropathy    • Rash     rt lower hip   • Retinopathy    • Seasonal allergies    • Sleep apnea     cpap  bring dos   • Type 2 diabetes mellitus with peripheral vascular disease (CMS/[facility])    • Vitamin D deficiency        Past Surgical History:   Procedure Laterality Date   • AMPUTATION FOOT / TOE Right     great toe   • AMPUTATION REVISION Right 4/8/2021    Procedure: BELOW KNEE AMPUTATION REVISAION;  Surgeon: Eduardo Reynolds MD;  Location: [facility] MAIN OR;  Service: Orthopedics;  Laterality: Right;   • AMPUTATION REVISION Right 4/29/2021    Procedure: AMPUTATION REVISION KNEE STUMP;  Surgeon: Eduardo Reynolds MD;  Location: [facility] MAIN OR;  Service: Orthopedics;  Laterality: Right;   • BELOW KNEE AMPUTATION Right 7/30/2020    Procedure: AMPUTATION  BELOW KNEE;  Surgeon: Eduardo Reynolds MD;  Location: [facility] MAIN OR;  Service: Orthopedics;  Laterality: Right;   • CARDIAC CATHETERIZATION  04/2018    bhf   • ENDOSCOPY     • ENDOSCOPY N/A 10/4/2019    Procedure: ESOPHAGOGASTRODUODENOSCOPY with dilitation and biopsy x 1 area;  Surgeon: Declan Iqbal MD;  Location: [facility] ENDOSCOPY;  Service: Gastroenterology   • ENDOSCOPY N/A 12/13/2019    Procedure: ESOPHAGOGASTRODUODENOSCOPY WITH DILATATION (50, 52 BOUGIE);  Surgeon: Declan Iqbal MD;  Location: [facility] ENDOSCOPY;  Service: Gastroenterology   • GANGLION CYST EXCISION Left    • HERNIA REPAIR Bilateral    • RETINOPATHY SURGERY      laser   • TOTAL HIP ARTHROPLASTY Left    • TOTAL HIP ARTHROPLASTY Left 2018   • TRANS METATARSAL AMPUTATION Right 3/17/2020    Procedure: AMPUTATION TRANS METATARSAL right;  Surgeon: ERWIN Baker DPM;  Location: [facility] MAIN OR;  Service: Podiatry;  Laterality: Right;  GANGRENOUS RIGHT FOOT       Family History   Problem Relation Age of Onset   • Diabetes Mother    • Heart disease Mother    • Leukemia Father    • Sleep apnea Maternal Aunt         GENO   • Stroke Maternal Grandmother    • Hypertension Other    • Hyperlipidemia Other    • Cancer Other    • Colon cancer Other         uncle       Social History     Tobacco Use   • Smoking status: Never Smoker   • Smokeless tobacco: Never Used   Vaping Use   • Vaping Use: Never used   Substance Use Topics   • Alcohol use: No   • Drug use: No        Medications Prior to Admission   Medication Sig Dispense Refill Last Dose   • atorvastatin (LIPITOR) 40 MG tablet TAKE 1 TABLET EVERY DAY 90 tablet 0 8/1/2021 at Unknown time   • DULoxetine (CYMBALTA) 60 MG capsule TAKE 1 CAPSULE EVERY MORNING 90 capsule 0 8/1/2021 at Unknown time   • Eliquis 5 MG tablet tablet TAKE 1 TABLET TWICE DAILY 180 tablet 0 8/1/2021 at Unknown time   • empagliflozin (Jardiance) 10 MG tablet tablet Take 1 tablet by mouth Daily. 90 tablet 4 8/1/2021 at  Unknown time   • gabapentin (NEURONTIN) 400 MG capsule Take 400 mg by mouth 3 (Three) Times a Day.   8/1/2021 at Unknown time   • insulin aspart (NovoLOG FlexPen) 100 UNIT/ML solution pen-injector sc pen Inject 10 Units under the skin into the appropriate area as directed 3 (Three) Times a Day With Meals. (Patient taking differently: Inject 14 Units under the skin into the appropriate area as directed 3 (Three) Times a Day With Meals.) 15 mL 6 8/1/2021 at Unknown time   • Insulin Glargine (Lantus SoloStar) 100 UNIT/ML injection pen INJECT SUBCUTANEOUSLY 27 UNITS EVERY BEDTIME (NEEDS APPOINTMENT) (Patient taking differently: Inject 34 Units under the skin into the appropriate area as directed Every Night.) 30 mL 6 8/1/2021 at Unknown time   • levothyroxine (SYNTHROID, LEVOTHROID) 100 MCG tablet TAKE 1 TABLET EVERY DAY (Patient taking differently: Take 100 mcg by mouth Daily.) 90 tablet 3 8/1/2021 at Unknown time   • losartan (COZAAR) 50 MG tablet TAKE 1 TABLET EVERY DAY 90 tablet 0 8/1/2021 at Unknown time   • omeprazole (priLOSEC) 40 MG capsule Take 40 mg by mouth Daily. Take DOS   8/1/2021 at Unknown time   • metoprolol succinate XL (TOPROL-XL) 50 MG 24 hr tablet TAKE 1 TABLET EVERY DAY (Patient taking differently: Take 50 mg by mouth Daily.) 30 tablet 0    • ondansetron (ZOFRAN) 4 MG tablet TAKE 1 TABLET EVERY 8 HOURS AS NEEDED FOR NAUSEA OR VOMITING. (Patient taking differently: Take 8 mg by mouth Every 8 (Eight) Hours As Needed for Nausea or Vomiting.) 45 tablet 0        Allergies  Lisinopril    Scheduled Meds:apixaban, 5 mg, Oral, Q12H  aspirin, 81 mg, Oral, Daily  atorvastatin, 80 mg, Oral, Nightly  cyanocobalamin, 1,000 mcg, Intramuscular, Q28 Days  DULoxetine, 60 mg, Oral, QAM  gabapentin, 400 mg, Oral, TID  insulin glargine, 40 Units, Subcutaneous, Nightly  insulin lispro, 14 Units, Subcutaneous, TID With Meals  levothyroxine, 100 mcg, Oral, Q AM  metoprolol succinate XL, 50 mg, Oral, Daily  miconazole, ,  "Topical, Q12H  pantoprazole, 40 mg, Oral, QAM  sodium chloride, 3 mL, Intravenous, Q12H      Continuous Infusions:sodium chloride, 50 mL/hr, Last Rate: 50 mL/hr (08/05/21 1135)      PRN Meds:.•  acetaminophen **OR** acetaminophen  •  bisacodyl  •  HYDROcodone-acetaminophen  •  ipratropium-albuterol  •  ondansetron  •  [COMPLETED] Insert peripheral IV **AND** sodium chloride  •  sodium chloride    Objective     VITAL SIGNS  Vitals:    08/05/21 1827 08/05/21 2211 08/06/21 0232 08/06/21 0623   BP: 136/73 150/79 140/69 154/73   BP Location: Right arm Right arm Right arm Right arm   Patient Position: Sitting Lying Lying Lying   Pulse: 88 72 66 63   Resp: 20 14 15 20   Temp: 98.6 °F (37 °C) 98.8 °F (37.1 °C) 98 °F (36.7 °C) 98 °F (36.7 °C)   TempSrc: Oral Oral Oral Oral   SpO2: 94% 99% 99% 100%   Weight:       Height:           Flowsheet Rows      First Filed Value   Admission Height  172.7 cm (68\") Documented at 08/02/2021 0540   Admission Weight  83.9 kg (185 lb) Documented at 08/02/2021 0540            Intake/Output Summary (Last 24 hours) at 8/6/2021 0643  Last data filed at 8/5/2021 2000  Gross per 24 hour   Intake 600 ml   Output 1425 ml   Net -825 ml        TELEMETRY: Sinus rhythm    Physical Exam:  The patient is alert, oriented and in no distress.  Vital signs as noted above.  Head and neck revealed no carotid bruits or jugular venous distention.  No thyromegaly or lymphadenopathy is present  Lungs clear.  No wheezing.  Breath sounds are normal bilaterally.  Heart normal first and second heart sounds.  No murmur. No precordial rub is present.  No gallop is present.  Abdomen soft and nontender.  No organomegaly is present.  Extremities with good peripheral pulses without any pedal edema.  Right BKA  Skin warm and dry.  Musculoskeletal system is grossly normal  CNS grossly normal      Results Review:   I reviewed the patient's new clinical results.  Lab Results (last 24 hours)     Procedure Component Value Units " Date/Time    Basic Metabolic Panel [[id number]]  (Abnormal) Collected: 08/06/21 0400    Specimen: Blood Updated: 08/06/21 0520     Glucose 189 mg/dL      BUN 16 mg/dL      Creatinine 1.23 mg/dL      Sodium 141 mmol/L      Potassium 4.3 mmol/L      Chloride 107 mmol/L      CO2 28.0 mmol/L      Calcium 8.6 mg/dL      eGFR Non African Amer 61 mL/min/1.73      BUN/Creatinine Ratio 13.0     Anion Gap 6.0 mmol/L     Narrative:      GFR Normal >60  Chronic Kidney Disease <60  Kidney Failure <15      POC Glucose Once [[id number]]  (Abnormal) Collected: 08/05/21 1957    Specimen: Blood Updated: 08/05/21 1958     Glucose 225 mg/dL      Comment: Serial Number: [id number]:  [id number]       POC Glucose Once [[id number]]  (Abnormal) Collected: 08/05/21 1601    Specimen: Blood Updated: 08/05/21 1602     Glucose 253 mg/dL      Comment: Serial Number: [id number]:  [id number]       POC Glucose Once [[id number]]  (Abnormal) Collected: 08/05/21 1115    Specimen: Blood Updated: 08/05/21 1118     Glucose 247 mg/dL      Comment: Serial Number: [id number]:  [id number]             Imaging Results (Last 24 Hours)     ** No results found for the last 24 hours. **      LAB RESULTS (LAST 7 DAYS)    CBC  Results from last 7 days   Lab Units 08/05/21  0414 08/02/21  0620   WBC 10*3/mm3 9.00 7.80   RBC 10*6/mm3 4.89 5.24   HEMOGLOBIN g/dL 12.7* 13.9   HEMATOCRIT % 39.0 42.1   MCV fL 79.7 80.4   PLATELETS 10*3/mm3 224 244       BMP  Results from last 7 days   Lab Units 08/06/21  0400 08/05/21  0414 08/03/21  0434 08/02/21  0620   SODIUM mmol/L 141 141 137 141   POTASSIUM mmol/L 4.3 4.4 4.8 4.2   CHLORIDE mmol/L 107 105 100 101   CO2 mmol/L 28.0 29.0 27.0 28.0   BUN mg/dL 16 18 18 16   CREATININE mg/dL 1.23 1.46* 1.37* 1.21   GLUCOSE mg/dL 189* 217* 227* 152*   MAGNESIUM mg/dL  --  1.7 1.8  --        CMP   Results from last 7 days   Lab Units 08/06/21  0400 08/05/21  0414 08/03/21  0434 08/02/21  0620   SODIUM mmol/L 141 141 137 141    POTASSIUM mmol/L 4.3 4.4 4.8 4.2   CHLORIDE mmol/L 107 105 100 101   CO2 mmol/L 28.0 29.0 27.0 28.0   BUN mg/dL 16 18 18 16   CREATININE mg/dL 1.23 1.46* 1.37* 1.21   GLUCOSE mg/dL 189* 217* 227* 152*         BNP        TROPONIN        CoAg  Results from last 7 days   Lab Units 08/02/21  0620   INR  0.99   APTT seconds 25.3       Creatinine Clearance  Estimated Creatinine Clearance: 70.5 mL/min (by C-G formula based on SCr of 1.23 mg/dL).    ABG        Radiology  No radiology results for the last day            EKG              I personally viewed and interpreted the patient's EKG/Telemetry data:    ECHOCARDIOGRAM:    Results for orders placed during the hospital encounter of 08/02/21    Adult Transesophageal Echo (BARON) W/ Cont if Necessary Per Protocol    Interpretation Summary  Procedure performed  Transesophageal echocardiogram Doppler study (color Continuous Wave and pulse wave)    Date of study  8/5/2021    Indications  Rule out cardioembolic episodes  Recent stroke    Procedure  Anesthesia was provided by anesthesiologist with intravenous Diprivan  BARON probe could be passed without difficulty.  Patient tolerated the procedure well.  No complications were noted.    Results  Technically satisfactory study.  Mitral valve is thickened with adequate opening motion.  Moderate mitral regurgitation is present.  Tricuspid valve is normal.  Aortic valve is tricuspid and is normal.  Left atrium is normal in size.  Left atrial appendage is enlarged without any clot.  Left ventricle is normal in size and contractility with ejection fraction of 60%.  Right atrium is normal in size.  No pericardial effusion or intracardiac thrombus is seen.  Atrial septum is intact without PFO.  Aorta is normal.    Impression  Moderate mitral regurgitation  Normal left ventricle size and contractility with ejection fraction of 60%.  No evidence for intracardiac thrombus is present.          STRESS MYOVIEW:    Cardiolite (Tc-99m Sestamibi)  stress test    CARDIAC CATHETERIZATION:            OTHER:         Assessment/Plan     Principal Problem:    Cerebrovascular accident (CVA) (CMS/HCC)  Active Problems:    Benign essential hypertension    Chronic coronary artery disease    Chronic renal insufficiency, stage III (moderate) (CMS/HCC)    Depression    Diabetic peripheral neuropathy (CMS/HCC)    Gastroesophageal reflux disease    History of pulmonary embolism    Mixed hyperlipidemia    Acquired hypothyroidism    Obstructive sleep apnea syndrome    Peripheral vascular disease (CMS/HCC)    Type 2 diabetes mellitus with peripheral vascular disease (CMS/HCC)        ]]]]]]]]]]]]]]]]]]]]]]  Impression  ===========  -TIA/stroke.  Abnormal CTA with acute lacunar infarct within the posterior limb of the left internal capsule.  EKG showed sinus rhythm.  Echocardiogram showed anterior mitral leaflet nodular density.     -History of coronary artery disease.  Patient is not having any angina pectoris or congestive heart failure.  Cardiac cath films from 4/18/2018 were reviewed and interpreted independently.  Normal left ventricular function.  50% diagonal branch disease.  RCA is a nondominant vessel that has mid segment 90% disease (small artery)     BARON 2021-08-05 revealed  Moderate mitral regurgitation  Normal left ventricle size and contractility with ejection fraction of 60%.  No evidence for intracardiac thrombus is present.    -Bilateral carotid bruits.  Right louder than left.  CT angiogram and carotid duplex scan did not reveal any obstructive carotid artery disease.     -Hypertension diabetes dyslipidemia sleep apnea CKD 3 (BUN 18 creatinine 1.37.)  Hypothyroidism vitamin D deficiency     -History of DVT.     -Long-term anticoagulation with Eliquis due to previous pulmonary embolus     -Peripheral vascular disease     -Status post right BKA.  Total left arthroplasty hernia repair     -Non-smoker  ============  Plan  ============  Recent TIA/stroke  BARON  2021-08-05-negative for intracardiac thrombus.    Anticoagulation  Patient was restarted on Eliquis    Renal dysfunction  BUN/18/1.37  18/1.46-8/5/2021  Observe closely.    No need for further cardiac work-up at this time.    Follow-up in the office in 2 weeks when discharged.    Further plan will depend on patient's progress.  ]]]]]]]]]]]]]]]]]]]]]    Nelli Vides MD  08/06/21  06:43 EDT

## 2021-08-06 NOTE — PROGRESS NOTES
[facility] Medicine Services Daily Progress Note    Patient Name: Kuldeep Adhikari  : 1965  MRN: [id number]  Primary Care Physician:  Laura Whitaker MD  Date of admission: 2021      Subjective      Chief Complaint: Right-sided weakness    Patient reports feeling generalized fatigue but dizziness has improved.  Orthostatics were negative.  Patient's anticoagulation being held for BARON on 2021.  No chest pain or shortness of breath.    2021: Patient seen and examined this morning.  Seen after the BARON, BARON negative for any PFO.  Right-sided weakness slightly improved, no numbness noted.  Denies any other complaints.  Awaiting rehab placement now.    2021: Patient seen and examined this morning.  Complaining of some food getting stuck just below his throat, has history of esophageal dilation by GI in the past.  Last one was more than a year ago.  Wants to see GI here, GI consulted.  Keep n.p.o. for now for possible EGD with dilation.  Discussed with nursing.    Denies any associated fever, chills, chest pain, cough, sore throat, shortness of breath, abdominal pain, nausea, vomiting, diarrhea, dysuria, or dizziness.      Objective      Vitals:   Temp:  [98 °F (36.7 °C)-98.9 °F (37.2 °C)] 98 °F (36.7 °C)  Heart Rate:  [63-88] 63  Resp:  [12-20] 20  BP: (114-154)/(66-79) 152/79    Physical Exam      General: Awake, alert, sitting up in bed, NAD  Eyes: PERRL, EOMI, conjunctive are clear  Cardiovascular: Regular rate and rhythm, no murmurs  Respiratory: Clear to auscultation bilaterally, no wheezing or rales, unlabored breathing  Abdomen: Soft, nontender, positive bowel sounds, no guarding  Neurologic: A&O, CN grossly intact, moves all extremities spontaneously  Musculoskeletal: Right BKA noted, right lower extremity weakness greater than left noted, no other gross deformities  Skin: Warm, dry, intact      Result Review    Result Review:  I have personally reviewed the  "results from the time of this admission to 8/6/2021 09:20 EDT and agree with these findings:  [x]  Laboratory  []  Microbiology  []  Radiology  []  EKG/Telemetry   []  Cardiology/Vascular   []  Pathology  []  Old records  []  Other:            Assessment/Plan      Brief Patient Summary:    Kuldeep Adhikari is a 56 y.o. male with PMH of PVD s/p R BKA, DM Type II, PE on eliquis, CAD, HTN, HLD, hypothyroidism, asthma, anxiety, and GERD who presented to [facility] on 8/2/2021 right sided numbness and weakness. He stated he went to bed around 11pm in his normal state of health. He woke up around 4:30am and fell. He stated, \"It was like my right leg and arm were gone.\" He was using his right leg prosthesis and fell landing on his right side. He had numbness from the right side of his face all the way down his right arm and right leg. He denied any headache or vision changes. He is concerned that a tick bite might have caused his symptoms. He denied any prior stroke history. He has been taking his home medications including his eliquis      In the ED the patient had right sided weakness and paresthesia.  CT head showed no acute findings.  CTA head/neck showed no significant carotid stenosis, left vertebral artery is diminutive and appears to terminate after the origin of the PICA branch.  This may be due to normal variant anatomy however no prior studies to confirm this is chronic/congenital finding.  Neurology was consulted and patient was admitted to the [facility] for suspected CVA. He was determined not to be a tPA candidate due to unknown onset of symptoms and patient is on eliquis. MRI brain ordered.      8/3/2021: Patient Reports he feels his right arm strength is mildly improving.  Still working on blood pressure control.  Patient's echocardiogram showing abnormal calcified nodular density on mitral valve, cardiology consulted for further evaluation.  Patient cleared by neurology for discharge.  Patient needs " repeat sleep study on discharge.  Patient reports he had some episodes of dizziness while walking with physical therapy.  MRI brain confirming 12 mm acute lacunar infarct in posterior limb of the left internal capsule, likely secondary to patient's uncontrolled blood pressure.      apixaban, 5 mg, Oral, Q12H  aspirin, 81 mg, Oral, Daily  atorvastatin, 80 mg, Oral, Nightly  cyanocobalamin, 1,000 mcg, Intramuscular, Q28 Days  DULoxetine, 60 mg, Oral, QAM  gabapentin, 400 mg, Oral, TID  insulin glargine, 40 Units, Subcutaneous, Nightly  insulin lispro, 14 Units, Subcutaneous, TID With Meals  levothyroxine, 100 mcg, Oral, Q AM  metoprolol succinate XL, 50 mg, Oral, Daily  miconazole, , Topical, Q12H  pantoprazole, 40 mg, Oral, QAM  sodium chloride, 3 mL, Intravenous, Q12H       sodium chloride, 50 mL/hr, Last Rate: 50 mL/hr (08/06/21 0905)         Active Hospital Problems:  Active Hospital Problems    Diagnosis    • **Cerebrovascular accident (CVA) (CMS/[facility])    • Type 2 diabetes mellitus with peripheral vascular disease (CMS/[facility])    • Chronic coronary artery disease    • Obstructive sleep apnea syndrome    • Benign essential hypertension    • Diabetic peripheral neuropathy (CMS/HCC)    • Chronic renal insufficiency, stage III (moderate) (CMS/HCC)    • Depression    • Gastroesophageal reflux disease    • History of pulmonary embolism    • Mixed hyperlipidemia    • Acquired hypothyroidism    • Peripheral vascular disease (CMS/HCC)      Plan:   Acute CVA  -MRI showing 12 mm acute lacunar infarct within the posterior limb of the left internal capsule  -s/p BARON on 8/5 with no PFO noted  -Continue on aspirin, statin, Eliquis  -2D echo report reviewed  -Neurology signed off, okay with discharge  -PT/OT recommend rehab, awaiting placement    Dysphagia  -History of dilation in the past multiple times, last one was greater than 1 year ago  -Keep n.p.o., GI consulted for possible EGD with dilation  -Monitor    Uncontrolled  hypertension  -Improved, blood pressure better now  -Case management consult for assistance with getting working CPAP  -Continue current BP regimen,  -Monitor renal function  -Adjust as needed     Dizziness  -Improved  -Orthostatic hypotension negative  -Less likely correlated with stroke  -Continue low-dose IVF    Abnormal mitral valve  -on TTE patient shown to have calcified nodule on mitral valve uncertain significance  -Cardiology following, status post BARON on 8/5 showing moderate MR and thickened leaflets  -Continue to monitor    Chronic HFpEF  -patient's echo showing grade 1 diastolic dysfunction with essentially normal systolic function may be appearing mildly hypovolemic initially now appearing more euvolemic  -Daily weights  -Monitor I's and O's    DAISY  -Creatinine improved, appears to be at baseline now  -Avoid nephrotoxic medication  -Continue low-dose IV fluids for now    Elevated TSH-very mildly so  -Free T4 appropriate     Pulmonary embolism-patient anticoagulated  -Eliquis resumed after BARON     Coronary artery disease-patient denies any chest pain at this time  -Cardiac monitoring  -EKG normal sinus rhythm normal axis  -Continue antiplatelets     Type 2 diabetes-patient reports history of relatively poor control  -A1c of 11.2 very poorly controlled  -Sliding scale and short acting basal  -Long-acting insulin  -Given significant comorbidities may benefit from endocrinology evaluation on outpatient basis     Peripheral vascular disease-likely due to uncontrolled hypertension and diabetes  -Patient with right BKA  -Continue antiplatelets     Hyperlipidemia/anxiety/GERD/asthma-chronic in nature  -Resume home medication as clinically appropriate, atorvastatin increased to 80 mg     GENO-patient reports he is not using his CPAP due to malfunction  -Repeat sleep study on discharge    DVT prophylaxis:  Medical and mechanical DVT prophylaxis orders are present.    CODE STATUS:    Code Status: [facility]  Medical  Interventions (Level of Support Prior to Arrest): Full      Disposition:  I expect patient to be discharged to rehab    This patient has been examined wearing appropriate Personal Protective Equipment on 08/06/21      Electronically signed by Endy Lazaro DO, 08/06/21, 09:20 EDT.  [facility] Hospitalist Team

## 2021-08-06 NOTE — ANESTHESIA POSTPROCEDURE EVALUATION
Patient: Kuldeep Adhikari    Procedure Summary     Date: 08/06/21 Room / Location: [facility] ENDOSCOPY 1 / [facility] ENDOSCOPY    Anesthesia Start: 1324 Anesthesia Stop: 1342    Procedure: ESOPHAGOGASTRODUODENOSCOPY with biopsy x1 area and esophageal dilation (56FR Bougie) (N/A ) Diagnosis:       Dysphagia, unspecified type      (Dysphagia, unspecified type [R13.10])    Surgeons: Hussein Talavera MD Provider: Jose Tejada MD    Anesthesia Type: MAC ASA Status: 4          Anesthesia Type: MAC    Vitals  Vitals Value Taken Time   /55 08/06/21 1358   Temp     Pulse 62 08/06/21 1403   Resp 14 08/06/21 1342   SpO2 93 % 08/06/21 1403   Vitals shown include unvalidated device data.        Post Anesthesia Care and Evaluation    Patient location during evaluation: PACU  Patient participation: complete - patient participated  Level of consciousness: awake  Pain scale: See nurse's notes for pain score.  Pain management: adequate  Airway patency: patent  Anesthetic complications: No anesthetic complications  PONV Status: none  Cardiovascular status: acceptable  Respiratory status: acceptable  Hydration status: acceptable    Comments: Patient seen and examined postoperatively; vital signs stable; SpO2 greater than or equal to 90%; cardiopulmonary status stable; nausea/vomiting adequately controlled; pain adequately controlled; no apparent anesthesia complications; patient discharged from anesthesia care when discharge criteria were met

## 2021-08-06 NOTE — PLAN OF CARE
Goal Outcome Evaluation:  Plan of Care Reviewed With: patient        Progress: no change     Patient resting abed, returned from Endo after EGD with dilation, diet restarted, eliquis to be restarted tonight, no pain noted at this time, will continue to monitor.

## 2021-08-06 NOTE — CONSULTS
GI CONSULT  NOTE:    Referring Provider:  Dr. Lazaro    Chief complaint: Dysphagia    Subjective .     History of present illness: Kuldeep Adhikari is a 56 y.o. male with history of EOE, PVD s/p right BKA, DMII, PE on Eliquis, CAD, hypertension, hyperlipidemia, hypothyroidism, asthma, and anxiety who presented on 8/2 with complaints of right sided weakness.  The patient has been diagnosed with an acute CVA on admission.  Neurology is following.  GI has been asked to consult due to dysphagia.  The patient does have a history of a GE junction stricture with last dilation in 12/2020.  He reports that in the past dysphagia has completely resolved following esophageal dilation.  He reports difficulty with both solids and liquids for the past month.  Also reports some mild nausea today secondary to headache, but denies any vomiting.  Heartburn is typically controlled on PPI daily.  He states that he has been having some epigastric pain for the past month as well.  The pain is stabbing in nature and is constant.  He reports that it will radiate throughout his abdomen.  He is unable to identify any exacerbating or alleviating factors.  He does report regular bowel movements with urgency after eating recently.  Denies any bright red blood per rectum or melena.  No recent fever.      Endo History:  12/2020 EGD/colonoscopy (Dr. Talavera) -EOE, GE junction stricture s/p dilation to 54 Korean, hiatal hernia, internal hemorrhoids, polyp (TA)  12/2019 EGD (Dr. Iqbal) -EOE, distal esophageal stricture s/p dilation to 52 Korean, gastroparesis    Past Medical History:  Past Medical History:   Diagnosis Date   • CKD (chronic kidney disease), stage III (CMS/[facility], located within [facility])    • Depression    • DJD (degenerative joint disease)    • DVT (deep venous thrombosis) (CMS/[facility]AC, located within [facility])    • Ganglion     rt wrist   • GERD (gastroesophageal reflux disease)    • Hiatal hernia    • History of echocardiogram 04/2016    2D ECHO   • History of esophageal stricture     s/p  dialation 40-50 times last EGD 04/2016   • History of pulmonary embolus (PE)     on long term anticoagulation   • Hyperlipidemia    • Hypertension    • Hypothyroidism    • IBS (irritable bowel syndrome)    • Neuropathy    • Rash     rt lower hip   • Retinopathy    • Seasonal allergies    • Sleep apnea     cpap  bring dos   • Type 2 diabetes mellitus with peripheral vascular disease (CMS/HCC)    • Vitamin D deficiency        Past Surgical History:  Past Surgical History:   Procedure Laterality Date   • AMPUTATION FOOT / TOE Right     great toe   • AMPUTATION REVISION Right 4/8/2021    Procedure: BELOW KNEE AMPUTATION REVISAION;  Surgeon: Eduardo Reynolds MD;  Location: [facility] MAIN OR;  Service: Orthopedics;  Laterality: Right;   • AMPUTATION REVISION Right 4/29/2021    Procedure: AMPUTATION REVISION KNEE STUMP;  Surgeon: Eduardo Reynolds MD;  Location: [facility] MAIN OR;  Service: Orthopedics;  Laterality: Right;   • BELOW KNEE AMPUTATION Right 7/30/2020    Procedure: AMPUTATION BELOW KNEE;  Surgeon: Eduardo Reynolds MD;  Location: [facility] MAIN OR;  Service: Orthopedics;  Laterality: Right;   • CARDIAC CATHETERIZATION  04/2018    [facility]   • ENDOSCOPY     • ENDOSCOPY N/A 10/4/2019    Procedure: ESOPHAGOGASTRODUODENOSCOPY with dilitation and biopsy x 1 area;  Surgeon: Declan Iqbal MD;  Location: [facility] ENDOSCOPY;  Service: Gastroenterology   • ENDOSCOPY N/A 12/13/2019    Procedure: ESOPHAGOGASTRODUODENOSCOPY WITH DILATATION (50, 52 BOUGIE);  Surgeon: Declan Iqbal MD;  Location: [facility] ENDOSCOPY;  Service: Gastroenterology   • GANGLION CYST EXCISION Left    • HERNIA REPAIR Bilateral    • RETINOPATHY SURGERY      laser   • TOTAL HIP ARTHROPLASTY Left    • TOTAL HIP ARTHROPLASTY Left 2018   • TRANS METATARSAL AMPUTATION Right 3/17/2020    Procedure: AMPUTATION TRANS METATARSAL right;  Surgeon: ERWIN Baker DPM;  Location: [facility] MAIN OR;  Service: Podiatry;  Laterality: Right;  GANGRENOUS RIGHT FOOT        Social History:  Social History     Tobacco Use   • Smoking status: Never Smoker   • Smokeless tobacco: Never Used   Vaping Use   • Vaping Use: Never used   Substance Use Topics   • Alcohol use: No   • Drug use: No       Family History:  Family History   Problem Relation Age of Onset   • Diabetes Mother    • Heart disease Mother    • Leukemia Father    • Sleep apnea Maternal Aunt         GENO   • Stroke Maternal Grandmother    • Hypertension Other    • Hyperlipidemia Other    • Cancer Other    • Colon cancer Other         uncle       Medications:  Medications Prior to Admission   Medication Sig Dispense Refill Last Dose   • atorvastatin (LIPITOR) 40 MG tablet TAKE 1 TABLET EVERY DAY 90 tablet 0 8/1/2021 at Unknown time   • DULoxetine (CYMBALTA) 60 MG capsule TAKE 1 CAPSULE EVERY MORNING 90 capsule 0 8/1/2021 at Unknown time   • Eliquis 5 MG tablet tablet TAKE 1 TABLET TWICE DAILY 180 tablet 0 8/1/2021 at Unknown time   • empagliflozin (Jardiance) 10 MG tablet tablet Take 1 tablet by mouth Daily. 90 tablet 4 8/1/2021 at Unknown time   • gabapentin (NEURONTIN) 400 MG capsule Take 400 mg by mouth 3 (Three) Times a Day.   8/1/2021 at Unknown time   • insulin aspart (NovoLOG FlexPen) 100 UNIT/ML solution pen-injector sc pen Inject 10 Units under the skin into the appropriate area as directed 3 (Three) Times a Day With Meals. (Patient taking differently: Inject 14 Units under the skin into the appropriate area as directed 3 (Three) Times a Day With Meals.) 15 mL 6 8/1/2021 at Unknown time   • Insulin Glargine (Lantus SoloStar) 100 UNIT/ML injection pen INJECT SUBCUTANEOUSLY 27 UNITS EVERY BEDTIME (NEEDS APPOINTMENT) (Patient taking differently: Inject 34 Units under the skin into the appropriate area as directed Every Night.) 30 mL 6 8/1/2021 at Unknown time   • levothyroxine (SYNTHROID, LEVOTHROID) 100 MCG tablet TAKE 1 TABLET EVERY DAY (Patient taking differently: Take 100 mcg by mouth Daily.) 90 tablet 3 8/1/2021  at Unknown time   • losartan (COZAAR) 50 MG tablet TAKE 1 TABLET EVERY DAY 90 tablet 0 8/1/2021 at Unknown time   • omeprazole (priLOSEC) 40 MG capsule Take 40 mg by mouth Daily. Take DOS   8/1/2021 at Unknown time   • metoprolol succinate XL (TOPROL-XL) 50 MG 24 hr tablet TAKE 1 TABLET EVERY DAY (Patient taking differently: Take 50 mg by mouth Daily.) 30 tablet 0    • ondansetron (ZOFRAN) 4 MG tablet TAKE 1 TABLET EVERY 8 HOURS AS NEEDED FOR NAUSEA OR VOMITING. (Patient taking differently: Take 8 mg by mouth Every 8 (Eight) Hours As Needed for Nausea or Vomiting.) 45 tablet 0        Scheduled Meds:aspirin, 81 mg, Oral, Daily  atorvastatin, 80 mg, Oral, Nightly  cyanocobalamin, 1,000 mcg, Intramuscular, Q28 Days  DULoxetine, 60 mg, Oral, QAM  gabapentin, 400 mg, Oral, TID  insulin glargine, 40 Units, Subcutaneous, Nightly  insulin lispro, 14 Units, Subcutaneous, TID With Meals  levothyroxine, 100 mcg, Oral, Q AM  metoprolol succinate XL, 50 mg, Oral, Daily  miconazole, , Topical, Q12H  pantoprazole, 40 mg, Oral, QAM  sodium chloride, 3 mL, Intravenous, Q12H      Continuous Infusions:sodium chloride, 50 mL/hr, Last Rate: 50 mL/hr (08/06/21 0905)      PRN Meds:.•  acetaminophen **OR** acetaminophen  •  bisacodyl  •  HYDROcodone-acetaminophen  •  ipratropium-albuterol  •  ondansetron  •  [COMPLETED] Insert peripheral IV **AND** sodium chloride  •  sodium chloride    ALLERGIES:  Lisinopril    ROS:  The following systems were reviewed and negative;   Constitution:  No fevers, chills, no unintentional weight loss  Skin: no rash, no jaundice  Eyes:  No blurry vision, no eye pain  HENT:  No change in hearing or smell  Resp:  No dyspnea or cough  CV:  No chest pain or palpitations  :  No dysuria, hematuria  Musculoskeletal:  No leg cramps or arthralgias  Neuro:  No tremor, no numbness  Psych:  No depression or confusion    Objective     Vital Signs:   Vitals:    08/06/21 0232 08/06/21 0623 08/06/21 0904 08/06/21 1003   BP:  140/69 154/73 152/79 131/77   BP Location: Right arm Right arm  Right arm   Patient Position: Lying Lying  Lying   Pulse: 66 63 63 61   Resp: 15 20  9   Temp: 98 °F (36.7 °C) 98 °F (36.7 °C)  98.7 °F (37.1 °C)   TempSrc: Oral Oral  Oral   SpO2: 99% 100%  99%   Weight:       Height:           Physical Exam:       General Appearance:    Awake and alert, in no acute distress   Head:    Normocephalic, without obvious abnormality, atraumatic   Throat:   No oral lesions, no thrush, oral mucosa moist   Lungs:     Respirations regular, even and unlabored   Chest Wall:    No abnormalities observed   Abdomen:     Soft, non-tender, no rebound or guarding, non-distended   Rectal:     Deferred   Extremities:   Moves all extremities, no edema, no cyanosis   Pulses:   Pulses palpable and equal bilaterally   Skin:   No rash, no jaundice, normal palpation   Lymph nodes:   No cervical, supraclavicular or submandibular palpable adenopathy   Neurologic:   Cranial nerves 2 - 12 grossly intact, no asterixis       Results Review:   I reviewed the patient's labs and imaging.  CBC    Results from last 7 days   Lab Units 08/05/21  0414 08/02/21  0620   WBC 10*3/mm3 9.00 7.80   HEMOGLOBIN g/dL 12.7* 13.9   PLATELETS 10*3/mm3 224 244     CMP   Results from last 7 days   Lab Units 08/06/21  0400 08/05/21  0414 08/03/21  0434 08/02/21  0620   SODIUM mmol/L 141 141 137 141   POTASSIUM mmol/L 4.3 4.4 4.8 4.2   CHLORIDE mmol/L 107 105 100 101   CO2 mmol/L 28.0 29.0 27.0 28.0   BUN mg/dL 16 18 18 16   CREATININE mg/dL 1.23 1.46* 1.37* 1.21   GLUCOSE mg/dL 189* 217* 227* 152*   MAGNESIUM mg/dL  --  1.7 1.8  --      Cr Clearance Estimated Creatinine Clearance: 70.5 mL/min (by C-G formula based on SCr of 1.23 mg/dL).  Coag   Results from last 7 days   Lab Units 08/02/21  0620   INR  0.99   APTT seconds 25.3     HbA1C   Lab Results   Component Value Date    HGBA1C 11.2 (H) 08/02/2021    HGBA1C 10.9 (H) 10/28/2020    HGBA1C 11.8 (H) 07/28/2020     Blood  Glucose   Glucose   Date/Time Value Ref Range Status   08/06/2021 1108 66 (L) 70 - 105 mg/dL Final     Comment:     Serial Number: [id number]:  [id number]   08/06/2021 0738 111 (H) 70 - 105 mg/dL Final     Comment:     Serial Number: [id number]:  [id number]   08/05/2021 1957 225 (H) 70 - 105 mg/dL Final     Comment:     Serial Number: [id number]:  [id number]   08/05/2021 1601 253 (H) 70 - 105 mg/dL Final     Comment:     Serial Number: [id number]:  [id number]   08/05/2021 1115 247 (H) 70 - 105 mg/dL Final     Comment:     Serial Number: [id number]:  [id number]   08/05/2021 0735 118 (H) 70 - 105 mg/dL Final     Comment:     Serial Number: [id number]:  [id number]   08/04/2021 1947 290 (H) 70 - 105 mg/dL Final     Comment:     Serial Number: [id number]:  [id number]   08/04/2021 1621 180 (H) 70 - 105 mg/dL Final     Comment:     Serial Number: [id number]:  [id number]     Infection     UA    Results from last 7 days   Lab Units 08/02/21  1611   NITRITE UA  Negative     Radiology(recent) No radiology results for the last day       ASSESSMENT:  -Dysphagia -consider due to esophageal stricture vs recent CVA  -Nausea  -Dyspepsia  -Epigastric pain  -Eosinophilic esophagitis  -History of esophageal stricture  -Acute CVA  -PVD s/p right BKA  -DMII  -History of PE on Eliquis  -CAD  -Hypertension  -Hyperlipidemia  -Hypothyroidism  -Asthma  -Anxiety      PLAN:  Patient presented on 8/2 with complaints of right-sided weakness.  He has been diagnosed with acute CVA.  Neurology following.  GI was asked to consult secondary to dysphagia x1 month.  The patient does have a history of esophageal stricture and eosinophilic esophagitis.  Reports that dysphagia has resolved following esophageal dilation in the past.  He reports that he has held his Eliquis for at least a couple days.  Continue to hold Eliquis.  We will also hold baby aspirin today.  We will proceed with a EGD with possible  dilation today.  Maintain n.p.o.  Continue pantoprazole daily.  Supportive care.       I discussed the patients findings and my recommendations with the patient.  I will discussed the case with Dr. Talavera and change the plan accordingly.    We appreciate the referral.    Electronically signed by KEYLA Denise, 08/06/21, 11:12 AM EDT.

## 2021-08-06 NOTE — OP NOTE
ESOPHAGOGASTRODUODENOSCOPY Procedure Report    Patient Name:  Kuldeep Adhikari  YOB: 1965    Date of Surgery:  8/6/2021     Pre-Op Diagnosis:  Dysphagia, unspecified type [R13.10]       Post-Op Diagnosis Codes:     * Dysphagia, unspecified type [R13.10]  Subtle concentric rings consistent with known eosinophilic esophagitis dilated with 56 [place] without resistance or trauma  Small hiatal hernia  Erosive gastritis biopsied    Procedure/CPT® Codes:      Procedure(s):  ESOPHAGOGASTRODUODENOSCOPY    Staff:  Surgeon(s):  Hussein Talavera MD      Anesthesia: Monitored Anesthesia Care    Description of Procedure:  A description of the procedure as well as risks, benefits and alternative methods were explained to the patient who voiced understanding and signed the corresponding consent form. A physical exam was performed and vital signs were monitored throughout the procedure.    An upper GI endoscope was placed into the mouth and proceeded through the esophagus, stomach and second portion of the duodenum without difficulty. The scope was then retroflexed and the fundus was visualized. The procedure was not difficult and there were no immediate complications.    Specimen:        See Below    Estimated blood loss: none    Complications:  None    Findings:  Subtle concentric rings consistent with known eosinophilic esophagitis dilated with 56 French Blair without resistance or trauma  Small hiatal hernia  Erosive gastritis biopsied    Impression:  Dysphagia status post dilation  Erosive gastritis biopsied    Recommendations:  Continue Protonix  We will see as needed in the hospital      Hussein Talavera MD     Date: 8/6/2021    Time: 13:41 EDT

## 2021-08-07 LAB
GLUCOSE BLDC GLUCOMTR-MCNC: 185 MG/DL (ref 70–105)
GLUCOSE BLDC GLUCOMTR-MCNC: 193 MG/DL (ref 70–105)
GLUCOSE BLDC GLUCOMTR-MCNC: 193 MG/DL (ref 70–105)

## 2021-08-07 PROCEDURE — 63710000001 INSULIN GLARGINE PER 5 UNITS: Performed by: INTERNAL MEDICINE

## 2021-08-07 PROCEDURE — 63710000001 INSULIN LISPRO (HUMAN) PER 5 UNITS: Performed by: INTERNAL MEDICINE

## 2021-08-07 PROCEDURE — 97116 GAIT TRAINING THERAPY: CPT

## 2021-08-07 PROCEDURE — 97112 NEUROMUSCULAR REEDUCATION: CPT

## 2021-08-07 PROCEDURE — 99232 SBSQ HOSP IP/OBS MODERATE 35: CPT | Performed by: INTERNAL MEDICINE

## 2021-08-07 PROCEDURE — 82962 GLUCOSE BLOOD TEST: CPT

## 2021-08-07 RX ORDER — CHOLECALCIFEROL (VITAMIN D3) 125 MCG
5 CAPSULE ORAL NIGHTLY PRN
Status: DISCONTINUED | OUTPATIENT
Start: 2021-08-07 | End: 2021-08-10 | Stop reason: HOSPADM

## 2021-08-07 RX ADMIN — METOPROLOL SUCCINATE 50 MG: 50 TABLET, EXTENDED RELEASE ORAL at 08:58

## 2021-08-07 RX ADMIN — MICONAZOLE NITRATE 1 APPLICATION: 20 POWDER TOPICAL at 08:59

## 2021-08-07 RX ADMIN — APIXABAN 5 MG: 5 TABLET, FILM COATED ORAL at 08:58

## 2021-08-07 RX ADMIN — APIXABAN 5 MG: 5 TABLET, FILM COATED ORAL at 20:15

## 2021-08-07 RX ADMIN — GABAPENTIN 400 MG: 400 CAPSULE ORAL at 20:15

## 2021-08-07 RX ADMIN — INSULIN LISPRO 14 UNITS: 100 INJECTION, SOLUTION INTRAVENOUS; SUBCUTANEOUS at 18:05

## 2021-08-07 RX ADMIN — INSULIN GLARGINE 40 UNITS: 100 INJECTION, SOLUTION SUBCUTANEOUS at 20:15

## 2021-08-07 RX ADMIN — GABAPENTIN 400 MG: 400 CAPSULE ORAL at 08:58

## 2021-08-07 RX ADMIN — GABAPENTIN 400 MG: 400 CAPSULE ORAL at 18:05

## 2021-08-07 RX ADMIN — Medication 3 ML: at 08:58

## 2021-08-07 RX ADMIN — LEVOTHYROXINE SODIUM 100 MCG: 0.1 TABLET ORAL at 06:50

## 2021-08-07 RX ADMIN — PANTOPRAZOLE SODIUM 40 MG: 40 TABLET, DELAYED RELEASE ORAL at 06:50

## 2021-08-07 RX ADMIN — ATORVASTATIN CALCIUM 80 MG: 40 TABLET, FILM COATED ORAL at 20:15

## 2021-08-07 RX ADMIN — DULOXETINE HYDROCHLORIDE 60 MG: 30 CAPSULE, DELAYED RELEASE ORAL at 06:50

## 2021-08-07 RX ADMIN — ASPIRIN 81 MG: 81 TABLET, COATED ORAL at 08:58

## 2021-08-07 RX ADMIN — MICONAZOLE NITRATE: 20 POWDER TOPICAL at 21:19

## 2021-08-07 RX ADMIN — INSULIN LISPRO 14 UNITS: 100 INJECTION, SOLUTION INTRAVENOUS; SUBCUTANEOUS at 08:58

## 2021-08-07 RX ADMIN — Medication 3 ML: at 20:15

## 2021-08-07 RX ADMIN — INSULIN LISPRO 14 UNITS: 100 INJECTION, SOLUTION INTRAVENOUS; SUBCUTANEOUS at 13:40

## 2021-08-07 RX ADMIN — Medication 5 MG: at 21:19

## 2021-08-07 NOTE — THERAPY TREATMENT NOTE
Subjective: Pt agreeable to therapeutic plan of care.Still c/o sl dizziness when at EOB and first stance but once up no c/o    Objective:     Bed mobility - CGA  Transfers - Supervision and with rolling walker  Ambulation - 75x3 feet CGA and with rolling walker    Pain: chronic neuropathy  Education: Provided education on importance of mobility and skilled verbal / tactile cueing throughout intervention.     Assessment: Kuldeep Adhikari presents with functional mobility impairments which indicate the need for skilled intervention. Tolerating session today without incident. Ind with donning prosthesis, no one every issued him any ply socks to go with gel insert. Req 1 standing rest and 1 seated rest with amb due to sl dizziness/fatigue. Nsg stated it was ok to leave him in bathroom and he promised to use call light when done.  Will continue to follow and progress as tolerated. Should do well at rehab when ready.    Plan/Recommendations:   Pt would benefit from Inpatient Rehabilitation placement at discharge from facility and requires no DME at discharge.   Pt desires Inpatient Rehabilitation placement at discharge. Pt cooperative; agreeable to therapeutic recommendations and plan of care.     Basic Mobility 6-click:  Rollin = Total, A lot = 2, A little = 3; 4 = None  Supine>Sit:   1 = Total, A lot = 2, A little = 3; 4 = None   Sit>Stand with arms:  1 = Total, A lot = 2, A little = 3; 4 = None  Bed>Chair:   1 = Total, A lot = 2, A little = 3; 4 = None  Ambulate in room:  1 = Total, A lot = 2, A little = 3; 4 = None  3-5 Steps with railin = Total, A lot = 2, A little = 3; 4 = None  Score: 18    Modified [place]: 4 = Moderately severe disability (Unable to attend to own bodily needs without assistance, and unable to walk unassisted)     Post-Tx Position: In bathroom and Call light and personal items within reach  PPE: gloves, surgical mask, eyewear protection

## 2021-08-07 NOTE — PLAN OF CARE
Assessment: Kuldeep Adhikari presents with functional mobility impairments which indicate the need for skilled intervention. Tolerating session today without incident. Ind with donning prosthesis, no one every issued him any ply socks to go with gel insert. Req 1 standing rest and 1 seated rest with amb due to sl dizziness/fatigue. Nsg stated it was ok to leave him in bathroom and he promised to use call light when done.  Will continue to follow and progress as tolerated. Should do well at rehab when ready.

## 2021-08-07 NOTE — PLAN OF CARE
Goal Outcome Evaluation:            Patient remains a falls risk. Patient encouraged to turn Q2. Patient vital stable, will continue to monitor.       Problem: Adult Inpatient Plan of Care  Goal: Plan of Care Review  Outcome: Ongoing, Progressing  Flowsheets (Taken 8/6/2021 1559 by Padmini Winter RN)  Plan of Care Reviewed With: patient  Goal: Patient-Specific Goal (Individualized)  Outcome: Ongoing, Progressing  Goal: Absence of Hospital-Acquired Illness or Injury  Outcome: Ongoing, Progressing  Intervention: Identify and Manage Fall Risk  Recent Flowsheet Documentation  Taken 8/7/2021 1500 by Venus Sandy, RN  Safety Promotion/Fall Prevention:   activity supervised   assistive device/personal items within reach   clutter free environment maintained   fall prevention program maintained   lighting adjusted   nonskid shoes/slippers when out of bed   room organization consistent   safety round/check completed  Taken 8/7/2021 1400 by Venus Sandy, RN  Safety Promotion/Fall Prevention:   activity supervised   assistive device/personal items within reach   clutter free environment maintained   fall prevention program maintained   lighting adjusted   nonskid shoes/slippers when out of bed   safety round/check completed   room organization consistent  Taken 8/7/2021 1200 by Venus Sandy, RN  Safety Promotion/Fall Prevention:   assistive device/personal items within reach   activity supervised   clutter free environment maintained   fall prevention program maintained   lighting adjusted   nonskid shoes/slippers when out of bed   room organization consistent   safety round/check completed  Taken 8/7/2021 1000 by Venus Sandy, RN  Safety Promotion/Fall Prevention:   activity supervised   assistive device/personal items within reach   clutter free environment maintained   fall prevention program maintained   lighting adjusted   nonskid shoes/slippers when out of bed   room organization consistent   safety  round/check completed  Taken 8/7/2021 0800 by Venus Sandy RN  Safety Promotion/Fall Prevention:   activity supervised   assistive device/personal items within reach   clutter free environment maintained   fall prevention program maintained   lighting adjusted   nonskid shoes/slippers when out of bed   room organization consistent   safety round/check completed  Intervention: Prevent Skin Injury  Recent Flowsheet Documentation  Taken 8/7/2021 1500 by Venus aSndy RN  Body Position: position changed independently  Skin Protection:   adhesive use limited   incontinence pads utilized   transparent dressing maintained   tubing/devices free from skin contact  Taken 8/7/2021 1100 by Venus Sandy RN  Body Position: position changed independently  Skin Protection:   adhesive use limited   incontinence pads utilized   transparent dressing maintained   tubing/devices free from skin contact  Taken 8/7/2021 0800 by Venus Sandy RN  Body Position: position changed independently  Skin Protection:   adhesive use limited   incontinence pads utilized   tubing/devices free from skin contact   transparent dressing maintained  Intervention: Prevent and Manage VTE (venous thromboembolism) Risk  Recent Flowsheet Documentation  Taken 8/7/2021 0800 by Venus Sandy RN  VTE Prevention/Management: (eloquis) other (see comments)  Intervention: Prevent Infection  Recent Flowsheet Documentation  Taken 8/7/2021 1500 by Venus Sandy RN  Infection Prevention:   personal protective equipment utilized   hand hygiene promoted  Taken 8/7/2021 1400 by Venus Sandy RN  Infection Prevention:   personal protective equipment utilized   hand hygiene promoted  Taken 8/7/2021 1200 by Venus Sandy RN  Infection Prevention:   personal protective equipment utilized   hand hygiene promoted  Taken 8/7/2021 1000 by Venus Sandy RN  Infection Prevention:   personal protective equipment utilized   hand hygiene promoted  Taken  8/7/2021 0800 by Venus Sandy RN  Infection Prevention:   personal protective equipment utilized   hand hygiene promoted  Goal: Optimal Comfort and Wellbeing  Outcome: Ongoing, Progressing  Intervention: Provide Person-Centered Care  Recent Flowsheet Documentation  Taken 8/7/2021 1500 by Venus Sandy RN  Trust Relationship/Rapport:   care explained   choices provided   emotional support provided   questions answered   questions encouraged   thoughts/feelings acknowledged  Taken 8/7/2021 1100 by Venus Sandy RN  Trust Relationship/Rapport:   care explained   choices provided   emotional support provided   questions answered   questions encouraged   thoughts/feelings acknowledged  Taken 8/7/2021 0800 by Venus Sandy RN  Trust Relationship/Rapport:   choices provided   care explained   emotional support provided   questions answered   questions encouraged   thoughts/feelings acknowledged  Goal: Readiness for Transition of Care  Outcome: Ongoing, Progressing     Problem: Fall Injury Risk  Goal: Absence of Fall and Fall-Related Injury  Outcome: Ongoing, Progressing  Intervention: Identify and Manage Contributors to Fall Injury Risk  Recent Flowsheet Documentation  Taken 8/7/2021 1500 by Venus Sandy RN  Medication Review/Management:   medications reviewed   high-risk medications identified  Self-Care Promotion: independence encouraged  Taken 8/7/2021 1400 by Venus Sandy RN  Medication Review/Management:   medications reviewed   high-risk medications identified  Taken 8/7/2021 1200 by Venus Sandy RN  Medication Review/Management:   high-risk medications identified   medications reviewed  Taken 8/7/2021 1100 by Venus Sandy RN  Medication Review/Management:   medications reviewed   high-risk medications identified  Self-Care Promotion: independence encouraged  Taken 8/7/2021 1000 by Venus Sandy RN  Medication Review/Management:   medications reviewed   high-risk medications  identified  Taken 8/7/2021 0800 by Venus Sandy RN  Medication Review/Management:   medications reviewed   high-risk medications identified  Self-Care Promotion: independence encouraged  Intervention: Promote Injury-Free Environment  Recent Flowsheet Documentation  Taken 8/7/2021 1500 by Venus Sandy, RN  Safety Promotion/Fall Prevention:   activity supervised   assistive device/personal items within reach   clutter free environment maintained   fall prevention program maintained   lighting adjusted   nonskid shoes/slippers when out of bed   room organization consistent   safety round/check completed  Taken 8/7/2021 1400 by Venus Sandy, RN  Safety Promotion/Fall Prevention:   activity supervised   assistive device/personal items within reach   clutter free environment maintained   fall prevention program maintained   lighting adjusted   nonskid shoes/slippers when out of bed   safety round/check completed   room organization consistent  Taken 8/7/2021 1200 by Venus Sandy RN  Safety Promotion/Fall Prevention:   assistive device/personal items within reach   activity supervised   clutter free environment maintained   fall prevention program maintained   lighting adjusted   nonskid shoes/slippers when out of bed   room organization consistent   safety round/check completed  Taken 8/7/2021 1000 by Venus Sandy RN  Safety Promotion/Fall Prevention:   activity supervised   assistive device/personal items within reach   clutter free environment maintained   fall prevention program maintained   lighting adjusted   nonskid shoes/slippers when out of bed   room organization consistent   safety round/check completed  Taken 8/7/2021 0800 by Venus Sandy, RN  Safety Promotion/Fall Prevention:   activity supervised   assistive device/personal items within reach   clutter free environment maintained   fall prevention program maintained   lighting adjusted   nonskid shoes/slippers when out of bed   room  organization consistent   safety round/check completed     Problem: Adjustment to Illness (Stroke, Ischemic/Transient Ischemic Attack)  Goal: Optimal Coping  Outcome: Ongoing, Progressing  Intervention: Support Patient/Family Psychosocial Response to Stroke  Recent Flowsheet Documentation  Taken 8/7/2021 1500 by Venus Sandy RN  Supportive Measures:   active listening utilized   self-care encouraged  Family/Support System Care:   self-care encouraged   support provided  Taken 8/7/2021 1100 by Venus Sandy RN  Supportive Measures:   active listening utilized   self-care encouraged  Family/Support System Care:   self-care encouraged   support provided  Taken 8/7/2021 0800 by Venus Sandy RN  Supportive Measures:   active listening utilized   self-care encouraged  Family/Support System Care:   self-care encouraged   support provided     Problem: Bowel Elimination Impaired (Stroke, Ischemic/Transient Ischemic Attack)  Goal: Effective Bowel Elimination  Outcome: Ongoing, Progressing     Problem: Cerebral Tissue Perfusion Risk (Stroke, Ischemic/Transient Ischemic Attack)  Goal: Optimal Cerebral Tissue Perfusion  Outcome: Ongoing, Progressing  Intervention: Protect and Optimize Cerebral Perfusion  Recent Flowsheet Documentation  Taken 8/7/2021 1500 by Venus Sandy RN  Sensory Stimulation Regulation: care clustered  Cerebral Perfusion Promotion: blood pressure monitored  Taken 8/7/2021 1100 by Venus Sandy RN  Sensory Stimulation Regulation: care clustered  Cerebral Perfusion Promotion: blood pressure monitored  Taken 8/7/2021 0800 by Venus Sandy RN  Cerebral Perfusion Promotion: blood pressure monitored  Intervention: Optimize Oxygenation and Ventilation  Recent Flowsheet Documentation  Taken 8/7/2021 1500 by Venus Sandy RN  Head of Bed (HOB): HOB elevated  Taken 8/7/2021 1100 by Venus Sandy RN  Head of Bed (HOB): HOB elevated  Taken 8/7/2021 0800 by Venus Sandy RN  Head of Bed  (HOB): HOB elevated     Problem: Communication Impairment (Stroke, Ischemic/Transient Ischemic Attack)  Goal: Improved Communication Skills  Outcome: Ongoing, Progressing  Intervention: Optimize Cognitive and Communication Skills  Recent Flowsheet Documentation  Taken 8/7/2021 1500 by Venus Sandy RN  Reorientation Measures: clock in view  Environment Familiarity/Consistency: daily routine followed  Communication Enhancement Strategies: call light answered in person  Taken 8/7/2021 1100 by Venus Sandy RN  Reorientation Measures: clock in view  Environment Familiarity/Consistency: daily routine followed  Communication Enhancement Strategies: call light answered in person  Taken 8/7/2021 0800 by Venus Sandy RN  Reorientation Measures: clock in view  Environment Familiarity/Consistency: daily routine followed  Communication Enhancement Strategies: call light answered in person     Problem: Eating/Swallowing Impairment (Stroke, Ischemic/Transient Ischemic Attack)  Goal: Oral Intake without Aspiration  Outcome: Ongoing, Progressing     Problem: Functional Ability Impaired (Stroke, Ischemic/Transient Ischemic Attack)  Goal: Optimal Functional Ability  Outcome: Ongoing, Progressing  Intervention: Optimize Functional Ability  Recent Flowsheet Documentation  Taken 8/7/2021 1500 by Venus Sandy RN  Activity Management:   activity adjusted per tolerance   activity encouraged   ambulated in room  Self-Care Promotion: independence encouraged  Taken 8/7/2021 1100 by Venus Sandy RN  Activity Management:   activity adjusted per tolerance   activity encouraged   ambulated in room  Self-Care Promotion: independence encouraged  Taken 8/7/2021 0800 by Venus Sandy RN  Activity Management:   activity adjusted per tolerance   activity encouraged   ambulated in room  Self-Care Promotion: independence encouraged     Problem: Hemodynamic Instability (Stroke, Ischemic/Transient Ischemic Attack)  Goal: Vital Signs  Remain in Desired Range  Outcome: Ongoing, Progressing  Intervention: Optimize Blood Flow  Recent Flowsheet Documentation  Taken 8/7/2021 1100 by Venus Sandy RN  Fluid/Electrolyte Management: fluids provided  Taken 8/7/2021 0800 by Venus Sandy RN  Fluid/Electrolyte Management: fluids provided     Problem: Pain (Stroke, Ischemic/Transient Ischemic Attack)  Goal: Acceptable Pain Control  Outcome: Ongoing, Progressing  Intervention: Monitor and Manage Pain  Recent Flowsheet Documentation  Taken 8/7/2021 0800 by Venus Sandy RN  Pain Management Interventions:   position adjusted   pillow support provided   see MAR     Problem: Sensorimotor Impairment (Stroke, Ischemic/Transient Ischemic Attack)  Goal: Improved Sensorimotor Function  Outcome: Ongoing, Progressing  Intervention: Optimize Range of Motion, Motor Control and Function  Recent Flowsheet Documentation  Taken 8/7/2021 1500 by Venus Sandy RN  Positioning/Transfer Devices:   pillows   in use  Taken 8/7/2021 1100 by Venus Sandy RN  Positioning/Transfer Devices:   pillows   in use  Taken 8/7/2021 0800 by Venus Sandy RN  Positioning/Transfer Devices:   pillows   in use  Intervention: Optimize Sensory and Perceptual Abilities  Recent Flowsheet Documentation  Taken 8/7/2021 1500 by Venus Sandy RN  Pressure Reduction Techniques:   frequent weight shift encouraged   weight shift assistance provided  Pressure Reduction Devices:   positioning supports utilized   pressure-redistributing mattress utilized  Taken 8/7/2021 1100 by Venus Sandy RN  Pressure Reduction Techniques:   frequent weight shift encouraged   weight shift assistance provided  Pressure Reduction Devices: positioning supports utilized  Taken 8/7/2021 0800 by Venus Sandy RN  Pressure Reduction Techniques:   frequent weight shift encouraged   weight shift assistance provided  Pressure Reduction Devices:   positioning supports utilized   pressure-redistributing  mattress utilized     Problem: Urinary Elimination Impaired (Stroke, Ischemic/Transient Ischemic Attack)  Goal: Effective Urinary Elimination  Outcome: Ongoing, Progressing  Intervention: Promote Effective Bladder Elimination  Recent Flowsheet Documentation  Taken 8/7/2021 0800 by Venus Sandy, RN  Urinary Elimination Promotion: absorbent pad/diaper use encouraged

## 2021-08-07 NOTE — PROGRESS NOTES
[facility] Medicine Services Daily Progress Note    Patient Name: Kuldeep Adhikari  : 1965  MRN: [id number]  Primary Care Physician:  Laura Whitaker MD  Date of admission: 2021      Subjective      Chief Complaint: Right-sided weakness    Patient reports feeling generalized fatigue but dizziness has improved.  Orthostatics were negative.  Patient's anticoagulation being held for BARON on 2021.  No chest pain or shortness of breath.    2021: Patient seen and examined this morning.  Seen after the BARON, BARON negative for any PFO.  Right-sided weakness slightly improved, no numbness noted.  Denies any other complaints.  Awaiting rehab placement now.    2021: Patient seen and examined this morning.  Complaining of some food getting stuck just below his throat, has history of esophageal dilation by GI in the past.  Last one was more than a year ago.  Wants to see GI here, GI consulted.  Keep n.p.o. for now for possible EGD with dilation.  Discussed with nursing.    2021: Patient seen and examined this morning.  Doing well, swallowing is better.  Still has some issue with throat but better than yesterday.  Awaiting placement.    Denies any associated headache, vision change, fever, chills, chest pain, cough, sore throat, shortness of breath, abdominal pain, nausea, vomiting, diarrhea, dysuria, or dizziness.      Objective      Vitals:   Temp:  [97.6 °F (36.4 °C)-98.7 °F (37.1 °C)] 97.8 °F (36.6 °C)  Heart Rate:  [61-74] 66  Resp:  [9-17] 16  BP: (117-147)/(55-78) 147/78  Flow (L/min):  [8] 8    Physical Exam      General: Awake, alert, sitting up in bed, NAD  Eyes: PERRL, EOMI, conjunctive are clear  Cardiovascular: Regular rate and rhythm, no murmurs  Respiratory: Clear to auscultation bilaterally, no wheezing or rales, unlabored breathing  Abdomen: Soft, nontender, positive bowel sounds, no guarding  Neurologic: A&O, CN grossly intact, moves all extremities  "spontaneously  Musculoskeletal: Right BKA noted, right lower extremity weakness greater than left noted, no other gross deformities  Skin: Warm, dry, intact  Essentially unchanged from 8/6.    Result Review    Result Review:  I have personally reviewed the results from the time of this admission to 8/7/2021 09:46 EDT and agree with these findings:  [x]  Laboratory  []  Microbiology  []  Radiology  []  EKG/Telemetry   []  Cardiology/Vascular   []  Pathology  []  Old records  []  Other:            Assessment/Plan      Brief Patient Summary:    Kuldeep Adhikari is a 56 y.o. male with PMH of PVD s/p R BKA, DM Type II, PE on eliquis, CAD, HTN, HLD, hypothyroidism, asthma, anxiety, and GERD who presented to [facility] on 8/2/2021 right sided numbness and weakness. He stated he went to bed around 11pm in his normal state of health. He woke up around 4:30am and fell. He stated, \"It was like my right leg and arm were gone.\" He was using his right leg prosthesis and fell landing on his right side. He had numbness from the right side of his face all the way down his right arm and right leg. He denied any headache or vision changes. He is concerned that a tick bite might have caused his symptoms. He denied any prior stroke history. He has been taking his home medications including his eliquis      In the ED the patient had right sided weakness and paresthesia.  CT head showed no acute findings.  CTA head/neck showed no significant carotid stenosis, left vertebral artery is diminutive and appears to terminate after the origin of the PICA branch.  This may be due to normal variant anatomy however no prior studies to confirm this is chronic/congenital finding.  Neurology was consulted and patient was admitted to the [facility] for suspected CVA. He was determined not to be a tPA candidate due to unknown onset of symptoms and patient is on eliquis. MRI brain ordered.      8/3/2021: Patient Reports he feels his right arm strength " is mildly improving.  Still working on blood pressure control.  Patient's echocardiogram showing abnormal calcified nodular density on mitral valve, cardiology consulted for further evaluation.  Patient cleared by neurology for discharge.  Patient needs repeat sleep study on discharge.  Patient reports he had some episodes of dizziness while walking with physical therapy.  MRI brain confirming 12 mm acute lacunar infarct in posterior limb of the left internal capsule, likely secondary to patient's uncontrolled blood pressure.      apixaban, 5 mg, Oral, Q12H  aspirin, 81 mg, Oral, Daily  atorvastatin, 80 mg, Oral, Nightly  cyanocobalamin, 1,000 mcg, Intramuscular, Q28 Days  DULoxetine, 60 mg, Oral, QAM  gabapentin, 400 mg, Oral, TID  insulin glargine, 40 Units, Subcutaneous, Nightly  insulin lispro, 14 Units, Subcutaneous, TID With Meals  levothyroxine, 100 mcg, Oral, Q AM  metoprolol succinate XL, 50 mg, Oral, Daily  miconazole, , Topical, Q12H  pantoprazole, 40 mg, Oral, QAM  sodium chloride, 3 mL, Intravenous, Q12H       sodium chloride, 50 mL/hr, Last Rate: Stopped (08/06/21 1340)         Active Hospital Problems:  Active Hospital Problems    Diagnosis    • **Cerebrovascular accident (CVA) (CMS/HCC)    • Dysphagia      Added automatically from request for surgery [id number]     • Type 2 diabetes mellitus with peripheral vascular disease (CMS/HCC)    • Chronic coronary artery disease    • Obstructive sleep apnea syndrome    • Benign essential hypertension    • Diabetic peripheral neuropathy (CMS/HCC)    • Chronic renal insufficiency, stage III (moderate) (CMS/HCC)    • Depression    • Gastroesophageal reflux disease    • History of pulmonary embolism    • Mixed hyperlipidemia    • Acquired hypothyroidism    • Peripheral vascular disease (CMS/HCC)      Plan:   Acute CVA  -MRI showing 12 mm acute lacunar infarct within the posterior limb of the left internal capsule  -s/p BARON on 8/5 with no PFO noted  -Continue on  aspirin, statin, Eliquis  -2D echo report reviewed  -Neurology signed off, okay with discharge  -PT/OT recommend rehab, awaiting placement, peer to peer pending    Dysphagia, improved  -History of dilation in the past multiple times, last one was greater than 1 year ago  -Status post EGD with dilation on 8/6 by GI  -Tolerating diet    Uncontrolled hypertension  -Improved, blood pressure better now  -Case management consult for assistance with getting working CPAP  -Continue current BP regimen,  -Monitor renal function  -Adjust as needed     Dizziness  -Improved  -Orthostatic hypotension negative  -Less likely correlated with stroke  -Continue low-dose IVF    Abnormal mitral valve  -on TTE patient shown to have calcified nodule on mitral valve uncertain significance  -Cardiology following, status post BARON on 8/5 showing moderate MR and thickened leaflets  -Continue to monitor    Chronic HFpEF  -patient's echo showing grade 1 diastolic dysfunction with essentially normal systolic function may be appearing mildly hypovolemic initially now appearing more euvolemic  -Daily weights  -Monitor I's and O's    DAISY, resolved  -appears to be at baseline now  -Avoid nephrotoxic medication  -Discontinued IV fluids    Elevated TSH-very mildly so  -Free T4 appropriate     Pulmonary embolism-patient anticoagulated  -Eliquis resumed after BARON and EGD     Coronary artery disease-patient denies any chest pain at this time  -Cardiac monitoring  -EKG normal sinus rhythm normal axis  -Continue antiplatelets     Type 2 diabetes-patient reports history of relatively poor control  -A1c of 11.2 very poorly controlled  -Sliding scale and short acting basal  -Long-acting insulin  -Given significant comorbidities may benefit from endocrinology evaluation on outpatient basis     Peripheral vascular disease-likely due to uncontrolled hypertension and diabetes  -Patient with right BKA  -Continue  antiplatelets     Hyperlipidemia/anxiety/GERD/asthma-chronic in nature  -Resume home medication as clinically appropriate, atorvastatin increased to 80 mg     GENO-patient reports he is not using his CPAP due to malfunction  -Repeat sleep study on discharge    DVT prophylaxis:  Medical and mechanical DVT prophylaxis orders are present.    CODE STATUS:    Code Status: CPR  Medical Interventions (Level of Support Prior to Arrest): Full      Disposition:  I expect patient to be discharged to rehab    This patient has been examined wearing appropriate Personal Protective Equipment on 08/07/21      Electronically signed by Endy Lazaro DO, 08/07/21, 09:46 EDT.  [facility] Hospitalist Team

## 2021-08-07 NOTE — PLAN OF CARE
Goal Outcome Evaluation:                 Pt is 56 marcelino M admitted for CVA. NIH-0 this shift. Pt seems to be doing well. Pt has R BKA. Pt is high falls risk d/t this. Pt does good w/ SBA. Pt had EGD w/ esophageal stretch this AM. Pt tolerated procedure well. Pt started back on PO Eliquis tonight. VSS, Pt is awaiting referral to [facility]- initially [facility] did not accept Pt but  is working to set up a peer to peer to help Pt get accepted. Pt was not accepted for subacute. Pt's weak/affected side is the R side. No deficits seen on NIH this shift. Pt's blood sugar dropped into the 70s this last am- given OJ- upon retake B/S magdi into the 200s. Pt is diabetic-2. VSS- will cont to monitor Pt throughout the shift.             Problem: Adult Inpatient Plan of Care  Goal: Plan of Care Review  Outcome: Ongoing, Progressing  Goal: Patient-Specific Goal (Individualized)  Outcome: Ongoing, Progressing  Goal: Absence of Hospital-Acquired Illness or Injury  Outcome: Ongoing, Progressing  Intervention: Identify and Manage Fall Risk  Recent Flowsheet Documentation  Taken 8/7/2021 0154 by Kuldeep Meléndez, RN  Safety Promotion/Fall Prevention:   safety round/check completed   room organization consistent   nonskid shoes/slippers when out of bed   fall prevention program maintained   clutter free environment maintained   assistive device/personal items within reach  Taken 8/7/2021 0000 by Kuldeep Meléndez, RN  Safety Promotion/Fall Prevention:   safety round/check completed   room organization consistent   nonskid shoes/slippers when out of bed   lighting adjusted   fall prevention program maintained   clutter free environment maintained   assistive device/personal items within reach   activity supervised  Taken 8/6/2021 2200 by Kuldeep Meléndez, RN  Safety Promotion/Fall Prevention:   safety round/check completed   room organization consistent   nonskid shoes/slippers when out of bed   fall prevention program maintained   clutter free  environment maintained   assistive device/personal items within reach  Intervention: Prevent Skin Injury  Recent Flowsheet Documentation  Taken 8/7/2021 0000 by Kuldeep Meléndez RN  Skin Protection:   adhesive use limited   hydrocolloids used   pectin skin barriers applied   skin-to-device areas padded   tubing/devices free from skin contact  Taken 8/6/2021 2000 by Kuldeep Meléndez RN  Skin Protection:   adhesive use limited   incontinence pads utilized   pectin skin barriers applied   skin-to-device areas padded   tubing/devices free from skin contact  Intervention: Prevent and Manage VTE (venous thromboembolism) Risk  Recent Flowsheet Documentation  Taken 8/6/2021 2000 by Kuldeep Meléndez RN  VTE Prevention/Management: (Eliquis) other (see comments)  Intervention: Prevent Infection  Recent Flowsheet Documentation  Taken 8/7/2021 0154 by Kuldeep Meléndez RN  Infection Prevention:   hand hygiene promoted   personal protective equipment utilized   rest/sleep promoted  Taken 8/7/2021 0000 by Kuldeep Meléndez RN  Infection Prevention:   hand hygiene promoted   personal protective equipment utilized   rest/sleep promoted  Taken 8/6/2021 2200 by Kuldeep Meléndez RN  Infection Prevention:   hand hygiene promoted   personal protective equipment utilized   rest/sleep promoted  Goal: Optimal Comfort and Wellbeing  Outcome: Ongoing, Progressing  Intervention: Provide Person-Centered Care  Recent Flowsheet Documentation  Taken 8/7/2021 0000 by Kuldeep Meléndez RN  Trust Relationship/Rapport: thoughts/feelings acknowledged  Taken 8/6/2021 2000 by Kuldeep Meléndez RN  Trust Relationship/Rapport:   reassurance provided   thoughts/feelings acknowledged  Goal: Readiness for Transition of Care  Outcome: Ongoing, Progressing     Problem: Fall Injury Risk  Goal: Absence of Fall and Fall-Related Injury  Outcome: Ongoing, Progressing  Intervention: Identify and Manage Contributors to Fall Injury Risk  Recent Flowsheet  Documentation  Taken 8/7/2021 0000 by Kuldeep Meléndez RN  Medication Review/Management: medications reviewed  Intervention: Promote Injury-Free Environment  Recent Flowsheet Documentation  Taken 8/7/2021 0154 by Kuldeep Meléndez RN  Safety Promotion/Fall Prevention:   safety round/check completed   room organization consistent   nonskid shoes/slippers when out of bed   fall prevention program maintained   clutter free environment maintained   assistive device/personal items within reach  Taken 8/7/2021 0000 by Kuldeep Meléndez RN  Safety Promotion/Fall Prevention:   safety round/check completed   room organization consistent   nonskid shoes/slippers when out of bed   lighting adjusted   fall prevention program maintained   clutter free environment maintained   assistive device/personal items within reach   activity supervised  Taken 8/6/2021 2200 by Kuldeep Meléndez RN  Safety Promotion/Fall Prevention:   safety round/check completed   room organization consistent   nonskid shoes/slippers when out of bed   fall prevention program maintained   clutter free environment maintained   assistive device/personal items within reach     Problem: Adjustment to Illness (Stroke, Ischemic/Transient Ischemic Attack)  Goal: Optimal Coping  Outcome: Ongoing, Progressing  Intervention: Support Patient/Family Psychosocial Response to Stroke  Recent Flowsheet Documentation  Taken 8/7/2021 0000 by Kuldeep Meléndez RN  Supportive Measures: active listening utilized  Family/Support System Care: support provided  Taken 8/6/2021 2000 by Kuldeep Meléndez RN  Supportive Measures: active listening utilized  Family/Support System Care: support provided     Problem: Bowel Elimination Impaired (Stroke, Ischemic/Transient Ischemic Attack)  Goal: Effective Bowel Elimination  Outcome: Ongoing, Progressing     Problem: Cerebral Tissue Perfusion Risk (Stroke, Ischemic/Transient Ischemic Attack)  Goal: Optimal Cerebral Tissue  Perfusion  Outcome: Ongoing, Progressing  Intervention: Protect and Optimize Cerebral Perfusion  Recent Flowsheet Documentation  Taken 8/7/2021 0000 by Kuldeep Meléndez RN  Sensory Stimulation Regulation:   auditory stimulation minimized   care clustered   lighting decreased  Cerebral Perfusion Promotion: blood pressure monitored  Taken 8/6/2021 2000 by Kuldeep Meléndez RN  Sensory Stimulation Regulation:   auditory stimulation minimized   care clustered   lighting decreased  Cerebral Perfusion Promotion: blood pressure monitored     Problem: Communication Impairment (Stroke, Ischemic/Transient Ischemic Attack)  Goal: Improved Communication Skills  Outcome: Ongoing, Progressing  Intervention: Optimize Cognitive and Communication Skills  Recent Flowsheet Documentation  Taken 8/7/2021 0000 by Kuldeep Meléndez RN  Reorientation Measures: clock in view  Environment Familiarity/Consistency: daily routine followed  Communication Enhancement Strategies: call light answered in person  Taken 8/6/2021 2000 by Kuldeep Meléndez RN  Reorientation Measures: clock in view  Environment Familiarity/Consistency: daily routine followed  Communication Enhancement Strategies: call light answered in person     Problem: Eating/Swallowing Impairment (Stroke, Ischemic/Transient Ischemic Attack)  Goal: Oral Intake without Aspiration  Outcome: Ongoing, Progressing     Problem: Functional Ability Impaired (Stroke, Ischemic/Transient Ischemic Attack)  Goal: Optimal Functional Ability  Outcome: Ongoing, Progressing     Problem: Hemodynamic Instability (Stroke, Ischemic/Transient Ischemic Attack)  Goal: Vital Signs Remain in Desired Range  Outcome: Ongoing, Progressing     Problem: Pain (Stroke, Ischemic/Transient Ischemic Attack)  Goal: Acceptable Pain Control  Outcome: Ongoing, Progressing     Problem: Sensorimotor Impairment (Stroke, Ischemic/Transient Ischemic Attack)  Goal: Improved Sensorimotor Function  Outcome: Ongoing,  Progressing  Intervention: Optimize Sensory and Perceptual Abilities  Recent Flowsheet Documentation  Taken 8/7/2021 0000 by Kuldeep Meléndez RN  Pressure Reduction Techniques:   frequent weight shift encouraged   positioned off wounds   pressure points protected  Pressure Reduction Devices:   positioning supports utilized   pressure-redistributing mattress utilized  Taken 8/6/2021 2000 by Kuldeep Meléndez RN  Pressure Reduction Techniques:   frequent weight shift encouraged   positioned off wounds   pressure points protected  Pressure Reduction Devices:   positioning supports utilized   pressure-redistributing mattress utilized     Problem: Urinary Elimination Impaired (Stroke, Ischemic/Transient Ischemic Attack)  Goal: Effective Urinary Elimination  Outcome: Ongoing, Progressing  Intervention: Promote Effective Bladder Elimination  Recent Flowsheet Documentation  Taken 8/7/2021 0000 by Kuldepe Meléndez RN  Urinary Elimination Promotion:   absorbent pad/diaper use encouraged   voiding relaxation promoted   positioned for ease of voiding  Taken 8/6/2021 2000 by Kuldeep Meléndez RN  Urinary Elimination Promotion:   absorbent pad/diaper use encouraged   positioned for ease of voiding   voiding relaxation promoted

## 2021-08-08 ENCOUNTER — HOME HEALTH ADMISSION (OUTPATIENT)
Dept: HOME HEALTH SERVICES | Facility: HOME HEALTHCARE | Age: 56
End: 2021-08-08

## 2021-08-08 PROBLEM — R45.851 DEPRESSION WITH SUICIDAL IDEATION: Status: ACTIVE | Noted: 2021-08-08

## 2021-08-08 PROBLEM — F32.A DEPRESSION WITH SUICIDAL IDEATION: Status: ACTIVE | Noted: 2021-08-08

## 2021-08-08 PROBLEM — F33.2 MAJOR DEPRESSIVE DISORDER, RECURRENT EPISODE, SEVERE: Chronic | Status: ACTIVE | Noted: 2017-01-19

## 2021-08-08 LAB
GLUCOSE BLDC GLUCOMTR-MCNC: 126 MG/DL (ref 70–105)
GLUCOSE BLDC GLUCOMTR-MCNC: 159 MG/DL (ref 70–105)
GLUCOSE BLDC GLUCOMTR-MCNC: 160 MG/DL (ref 70–105)

## 2021-08-08 PROCEDURE — 63710000001 INSULIN LISPRO (HUMAN) PER 5 UNITS: Performed by: INTERNAL MEDICINE

## 2021-08-08 PROCEDURE — 63710000001 INSULIN GLARGINE PER 5 UNITS: Performed by: INTERNAL MEDICINE

## 2021-08-08 PROCEDURE — 99222 1ST HOSP IP/OBS MODERATE 55: CPT

## 2021-08-08 PROCEDURE — 99232 SBSQ HOSP IP/OBS MODERATE 35: CPT | Performed by: INTERNAL MEDICINE

## 2021-08-08 PROCEDURE — 82962 GLUCOSE BLOOD TEST: CPT

## 2021-08-08 RX ORDER — INSULIN GLARGINE 100 [IU]/ML
40 INJECTION, SOLUTION SUBCUTANEOUS NIGHTLY
Start: 2021-08-08 | End: 2022-01-18 | Stop reason: SDUPTHER

## 2021-08-08 RX ORDER — ATORVASTATIN CALCIUM 80 MG/1
80 TABLET, FILM COATED ORAL NIGHTLY
Qty: 30 TABLET | Refills: 0 | Status: SHIPPED | OUTPATIENT
Start: 2021-08-08 | End: 2021-11-03 | Stop reason: SDUPTHER

## 2021-08-08 RX ORDER — ASPIRIN 81 MG/1
81 TABLET ORAL DAILY
Start: 2021-08-09

## 2021-08-08 RX ORDER — CYANOCOBALAMIN 1000 UG/ML
1000 INJECTION, SOLUTION INTRAMUSCULAR; SUBCUTANEOUS
Qty: 1 ML | Refills: 0 | Status: SHIPPED | OUTPATIENT
Start: 2021-08-31 | End: 2021-08-16

## 2021-08-08 RX ORDER — DULOXETIN HYDROCHLORIDE 20 MG/1
40 CAPSULE, DELAYED RELEASE ORAL EVERY MORNING
Status: DISCONTINUED | OUTPATIENT
Start: 2021-08-09 | End: 2021-08-10 | Stop reason: HOSPADM

## 2021-08-08 RX ADMIN — HYDROCODONE BITARTRATE AND ACETAMINOPHEN 1 TABLET: 5; 325 TABLET ORAL at 19:20

## 2021-08-08 RX ADMIN — GABAPENTIN 400 MG: 400 CAPSULE ORAL at 18:03

## 2021-08-08 RX ADMIN — PANTOPRAZOLE SODIUM 40 MG: 40 TABLET, DELAYED RELEASE ORAL at 08:33

## 2021-08-08 RX ADMIN — DULOXETINE HYDROCHLORIDE 60 MG: 30 CAPSULE, DELAYED RELEASE ORAL at 08:33

## 2021-08-08 RX ADMIN — Medication 3 ML: at 08:33

## 2021-08-08 RX ADMIN — HYDROCODONE BITARTRATE AND ACETAMINOPHEN 1 TABLET: 5; 325 TABLET ORAL at 23:38

## 2021-08-08 RX ADMIN — MICONAZOLE NITRATE 1 APPLICATION: 20 POWDER TOPICAL at 08:34

## 2021-08-08 RX ADMIN — GABAPENTIN 400 MG: 400 CAPSULE ORAL at 08:33

## 2021-08-08 RX ADMIN — METOPROLOL SUCCINATE 50 MG: 50 TABLET, EXTENDED RELEASE ORAL at 08:33

## 2021-08-08 RX ADMIN — HYDROCODONE BITARTRATE AND ACETAMINOPHEN 1 TABLET: 5; 325 TABLET ORAL at 13:50

## 2021-08-08 RX ADMIN — Medication 3 ML: at 22:07

## 2021-08-08 RX ADMIN — HYDROCODONE BITARTRATE AND ACETAMINOPHEN 1 TABLET: 5; 325 TABLET ORAL at 08:36

## 2021-08-08 RX ADMIN — ASPIRIN 81 MG: 81 TABLET, COATED ORAL at 08:33

## 2021-08-08 RX ADMIN — INSULIN LISPRO 14 UNITS: 100 INJECTION, SOLUTION INTRAVENOUS; SUBCUTANEOUS at 18:03

## 2021-08-08 RX ADMIN — ATORVASTATIN CALCIUM 80 MG: 40 TABLET, FILM COATED ORAL at 22:06

## 2021-08-08 RX ADMIN — Medication 5 MG: at 23:38

## 2021-08-08 RX ADMIN — INSULIN GLARGINE 40 UNITS: 100 INJECTION, SOLUTION SUBCUTANEOUS at 22:06

## 2021-08-08 RX ADMIN — LEVOTHYROXINE SODIUM 100 MCG: 0.1 TABLET ORAL at 08:33

## 2021-08-08 RX ADMIN — APIXABAN 5 MG: 5 TABLET, FILM COATED ORAL at 08:33

## 2021-08-08 RX ADMIN — MICONAZOLE NITRATE: 20 POWDER TOPICAL at 22:32

## 2021-08-08 RX ADMIN — APIXABAN 5 MG: 5 TABLET, FILM COATED ORAL at 22:06

## 2021-08-08 RX ADMIN — INSULIN LISPRO 14 UNITS: 100 INJECTION, SOLUTION INTRAVENOUS; SUBCUTANEOUS at 08:32

## 2021-08-08 RX ADMIN — GABAPENTIN 400 MG: 400 CAPSULE ORAL at 22:06

## 2021-08-08 NOTE — DISCHARGE SUMMARY
"        Addendum: Informed by nursing the patient having suicidal thoughts, discharged on hold.  Psych consulted and 24-hour hold placed.             [facility] Medicine Services  DISCHARGE SUMMARY    Patient Name: Kuldeep Adhikari  : 1965  MRN: [id number]    Date of Admission: 2021  Date of Discharge: 2021  Primary Care Physician: Laura Whitaker MD      Presenting Problem:   Diabetes 1.5, managed as type 1 (CMS/HCC) [E13.9]  Cerebrovascular accident (CVA), unspecified mechanism (CMS/HCC) [I63.9]    Active and Resolved Hospital Problems:  Active Hospital Problems    Diagnosis POA   • **Cerebrovascular accident (CVA) (CMS/HCC) [I63.9] Yes   • Dysphagia [R13.10] Yes   • Type 2 diabetes mellitus with peripheral vascular disease (CMS/HCC) [E11.51] Yes   • Chronic coronary artery disease [I25.10] Yes   • Obstructive sleep apnea syndrome [G47.33] Yes   • Benign essential hypertension [I10] Yes   • Diabetic peripheral neuropathy (CMS/HCC) [E11.42] Yes   • Chronic renal insufficiency, stage III (moderate) (CMS/HCC) [N18.30] Yes   • Depression [F32.9] Yes   • Gastroesophageal reflux disease [K21.9] Yes   • History of pulmonary embolism [Z86.711] Yes   • Mixed hyperlipidemia [E78.2] Yes   • Acquired hypothyroidism [E03.9] Yes   • Peripheral vascular disease (CMS/HCC) [I73.9] Yes      Resolved Hospital Problems   No resolved problems to display.         Hospital Course     Hospital Course:  Kuldeep Adhikari is a 56 y.o. male with PMH of PVD s/p R BKA, DM Type II, PE on eliquis, CAD, HTN, HLD, hypothyroidism, asthma, anxiety, and GERD who presented to [facility] on 2021 right sided numbness and weakness. He stated he went to bed around 11pm in his normal state of health. He woke up around 4:30am and fell. He stated, \"It was like my right leg and arm were gone.\" He was using his right leg prosthesis and fell landing on his right side. He had numbness from the right side of " his face all the way down his right arm and right leg. In the ED the patient had right sided weakness and paresthesia.  CT head showed no acute findings.  CTA head/neck showed no significant carotid stenosis, left vertebral artery is diminutive and appears to terminate after the origin of the PICA branch.  This may be due to normal variant anatomy however no prior studies to confirm this is chronic/congenital finding.  Neurology was consulted and patient was admitted to the [facility] for suspected CVA. He was determined not to be a tPA candidate due to unknown onset of symptoms and patient is on eliquis.  MRI brain was ordered and confirmed acute stroke.  Neurology was consulted.  Aspirin was added on top of Eliquis and his Lipitor was increased.  Echo was done which showed stable normalities, cardiology was consulted for BARON.  Patient underwent BARON on 8/5 which did not show any PFO.  He also complained of some dysphagia during this admission with his history of esophageal dilation in the past.  GI saw the patient and patient underwent EGD with dilation on 8/6.  Patient is doing well and tolerating diet.  He did have elevated blood pressure but improved after resuming home meds.  PT/OT recommended rehab, however insurance denied inpatient rehab.  P2P was also denied.  Patient now wants to return home with home health.  Patient is clinically improved and stable to discharge home today with follow-up with PCP, cardiology, and neurology as an outpatient.    A/P:    Acute CVA  -MRI showing 12 mm acute lacunar infarct within the posterior limb of the left internal capsule  -s/p BARON on 8/5 with no PFO noted  -Continue on aspirin, statin, Eliquis  -2D echo report reviewed  -Neurology signed off, okay with discharge  -PT/OT recommend rehab, P2P denied, patient wanting to go home with home health now     Dysphagia, improved  -History of dilation in the past multiple times, last one was greater than 1 year ago  -Status post EGD  with dilation on 8/6 by GI  -Tolerating diet     Uncontrolled hypertension  -Improved, blood pressure better now  -Case management consult for assistance with getting working CPAP  -Continue current BP regimen,  -Monitor renal function  -Adjust as needed     Dizziness, resolved  -Orthostatic hypotension negative  -Less likely correlated with stroke  -Off IVF     Abnormal mitral valve  -on TTE patient shown to have calcified nodule on mitral valve uncertain significance  -Cardiology following, status post BARON on 8/5 showing moderate MR and thickened leaflets  -Continue to monitor     Chronic HFpEF  -patient's echo showing grade 1 diastolic dysfunction with essentially normal systolic function may be appearing mildly hypovolemic initially now appearing more euvolemic  -Daily weights  -Monitor I's and O's     DAISY, resolved  -appears to be at baseline now  -Avoid nephrotoxic medication  -Discontinued IV fluids     Elevated TSH-very mildly so  -Free T4 appropriate     Pulmonary embolism-patient anticoagulated  -Eliquis resumed after BARON and EGD     Coronary artery disease-patient denies any chest pain at this time  -Cardiac monitoring  -EKG normal sinus rhythm normal axis  -Continue antiplatelets     Type 2 diabetes-patient reports history of relatively poor control  -A1c of 11.2 very poorly controlled  -Sliding scale and short acting basal  -Long-acting insulin  -Given significant comorbidities may benefit from endocrinology evaluation on outpatient basis     Peripheral vascular disease-likely due to uncontrolled hypertension and diabetes  -Patient with right BKA  -Continue antiplatelets     Hyperlipidemia/anxiety/GERD/asthma-chronic in nature  -Resume home medication as clinically appropriate, atorvastatin increased to 80 mg     GENO-patient reports he is not using his CPAP due to malfunction  -Repeat sleep study on discharge      DISCHARGE Follow Up Recommendations for labs and diagnostics: Follow-up with PCP and  cardiology      Reasons For Change In Medications and Indications for New Medications:  Lantus increased, Lipitor increased.    Day of Discharge     Vital Signs:  Temp:  [97.6 °F (36.4 °C)-98.8 °F (37.1 °C)] 97.6 °F (36.4 °C)  Heart Rate:  [66-70] 66  Resp:  [14-19] 16  BP: (132-146)/(47-70) 132/70    Physical Exam:                General: Awake, alert, sitting up in bed, NAD  Eyes: PERRL, EOMI, conjunctive are clear  Cardiovascular: Regular rate and rhythm, no murmurs  Respiratory: Clear to auscultation bilaterally, no wheezing or rales, unlabored breathing  Abdomen: Soft, nontender, positive bowel sounds, no guarding  Neurologic: A&O, CN grossly intact, moves all extremities spontaneously  Musculoskeletal: Right BKA noted, right lower extremity weakness greater than left noted, no other gross deformities  Skin: Warm, dry, intact      Pertinent  and/or Most Recent Results     LAB RESULTS:      Lab 08/05/21  0414 08/02/21  0620   WBC 9.00 7.80   HEMOGLOBIN 12.7* 13.9   HEMATOCRIT 39.0 42.1   PLATELETS 224 244   NEUTROS ABS 4.80 5.10   LYMPHS ABS 2.80 1.70   MONOS ABS 0.90 0.70   EOS ABS 0.50* 0.30   MCV 79.7 80.4   PROTIME  --  11.0   APTT  --  25.3         Lab 08/06/21  0400 08/05/21  0414 08/03/21  0434 08/02/21  0620   SODIUM 141 141 137 141   POTASSIUM 4.3 4.4 4.8 4.2   CHLORIDE 107 105 100 101   CO2 28.0 29.0 27.0 28.0   ANION GAP 6.0 7.0 10.0 12.0   BUN 16 18 18 16   CREATININE 1.23 1.46* 1.37* 1.21   GLUCOSE 189* 217* 227* 152*   CALCIUM 8.6 9.2 8.9 9.3   MAGNESIUM  --  1.7 1.8  --    HEMOGLOBIN A1C  --   --   --  11.2*   TSH  --   --   --  4.810*             Lab 08/02/21  0620   PROTIME 11.0   INR 0.99         Lab 08/02/21  0620   CHOLESTEROL 147   LDL CHOL 78   HDL CHOL 46   TRIGLYCERIDES 133         Lab 08/02/21  0620   VITAMIN B 12 315         Brief Urine Lab Results  (Last result in the past 365 days)      Color   Clarity   Blood   Leuk Est   Nitrite   Protein   CREAT   Urine HCG        08/02/21 1611  Yellow Clear Negative Negative Negative Negative             Microbiology Results (last 10 days)     Procedure Component Value - Date/Time    COVID PRE-OP / PRE-PROCEDURE SCREENING ORDER (NO ISOLATION) - Swab, Nasopharynx [[id number]]  (Normal) Collected: 08/06/21 1127    Lab Status: Final result Specimen: Swab from Nasopharynx Updated: 08/06/21 1207    Narrative:      The following orders were created for panel order COVID PRE-OP / PRE-PROCEDURE SCREENING ORDER (NO ISOLATION) - Swab, Nasopharynx.  Procedure                               Abnormality         Status                     ---------                               -----------         ------                     COVID-19,CEPHEID/MURALI/BD...[[id number]]  Normal              Final result                 Please view results for these tests on the individual orders.    COVID-19,CEPHEID/MURALI/BDMAX,COR/FINA/PAD/KULDIP IN-HOUSE(OR EMERGENT/ADD-ON),NP SWAB IN TRANSPORT MEDIA 3-4 HR TAT, RT-PCR - Swab, Nasopharynx [[id number]]  (Normal) Collected: 08/06/21 1127    Lab Status: Final result Specimen: Swab from Nasopharynx Updated: 08/06/21 1207     COVID19 Not Detected    Narrative:      Fact sheet for providers: https://www.fda.gov/media/689476/download     Fact sheet for patients: https://www.fda.gov/media/525792/download  Fact sheet for providers: https://www.fda.gov/media/740243/download    Fact sheet for patients: https://www.fda.gov/media/553469/download    Test performed by PCR.    COVID-19,CEPHEID,COR/FINA/PAD/KULDIP IN-HOUSE(OR EMERGENT/ADD-ON),NP SWAB IN TRANSPORT MEDIA 3-4 HR TAT, RT-PCR - Swab, Nasopharynx [[id number]]  (Normal) Collected: 08/02/21 0625    Lab Status: Final result Specimen: Swab from Nasopharynx Updated: 08/02/21 0733     COVID19 Not Detected    Narrative:      Fact sheet for providers: https://www.fda.gov/media/928799/download     Fact sheet for patients: https://www.fda.gov/media/043502/download  Fact sheet for providers:  https://www.fda.gov/media/442506/download    Fact sheet for patients: https://www.fda.gov/media/283613/download    Test performed by PCR.          CT Head Without Contrast    Result Date: 8/2/2021  Impression: No acute findings.  Electronically Signed By-Bridger Cazares MD On:8/2/2021 8:04 AM This report was finalized on [id number] by  Bridger Cazares MD.    CT Angiogram Neck    Result Date: 8/2/2021  Impression:  1. No significant carotid stenosis identified on either side. Widely patent dominant right vertebral artery. 2. The left vertebral artery is diminutive, and appears to terminate after the origin of the PICA branch. This may be due to normal variant anatomy. However, there are no prior studies available for comparison to confirm this is chronic/congenital finding. Correlation with patient exam and/or MRI brain may be helpful to determine significance of this finding. Additional vascular and nonvascular findings as given above.  Electronically Signed By-Ian Mauricio MD On:8/2/2021 9:25 AM This report was finalized on [id number] by  Ian Mauricio MD.    MRI Brain Without Contrast    Result Date: 8/2/2021  Impression: 12 mm acute lacunar infarct within the posterior limb of the left internal capsule with possible involvement of the posterior left lentiform nucleus.   Electronically Signed By-Bridger Cazares MD On:8/2/2021 10:48 AM This report was finalized on [id number] by  Bridger Cazares MD.    XR Chest 1 View    Result Date: 8/2/2021  Impression: No active disease.  Electronically Signed By-Bridger Cazares MD On:8/2/2021 7:22 AM This report was finalized on [id number] by  Bridger Cazares MD.    CT Angiogram Head    Result Date: 8/2/2021  Impression:  1. No significant carotid stenosis identified on either side. Widely patent dominant right vertebral artery. 2. The left vertebral artery is diminutive, and appears to terminate after the origin of the PICA branch. This may be due to normal variant anatomy. However,  there are no prior studies available for comparison to confirm this is chronic/congenital finding. Correlation with patient exam and/or MRI brain may be helpful to determine significance of this finding. Additional vascular and nonvascular findings as given above.  Electronically Signed By-Ian Mauricio MD On:8/2/2021 9:25 AM This report was finalized on [id number] by  Ian Mauricio MD.      Results for orders placed during the hospital encounter of 08/02/21    Duplex Carotid Ultrasound CAR    Interpretation Summary  · Right internal carotid artery is normal.  · Left internal carotid artery is normal.      Results for orders placed during the hospital encounter of 08/02/21    Duplex Carotid Ultrasound CAR    Interpretation Summary  · Right internal carotid artery is normal.  · Left internal carotid artery is normal.      Results for orders placed during the hospital encounter of 08/02/21    Adult Transesophageal Echo (BARON) W/ Cont if Necessary Per Protocol    Interpretation Summary  Procedure performed  Transesophageal echocardiogram Doppler study (color Continuous Wave and pulse wave)    Date of study  8/5/2021    Indications  Rule out cardioembolic episodes  Recent stroke    Procedure  Anesthesia was provided by anesthesiologist with intravenous Diprivan  BARON probe could be passed without difficulty.  Patient tolerated the procedure well.  No complications were noted.    Results  Technically satisfactory study.  Mitral valve is thickened with adequate opening motion.  Moderate mitral regurgitation is present.  Tricuspid valve is normal.  Aortic valve is tricuspid and is normal.  Left atrium is normal in size.  Left atrial appendage is enlarged without any clot.  Left ventricle is normal in size and contractility with ejection fraction of 60%.  Right atrium is normal in size.  No pericardial effusion or intracardiac thrombus is seen.  Atrial septum is intact without PFO.  Aorta is normal.    Impression  Moderate  mitral regurgitation  Normal left ventricle size and contractility with ejection fraction of 60%.  No evidence for intracardiac thrombus is present.      Labs Pending at Discharge:  Pending Labs     Order Current Status    Tissue Pathology Exam In process          Procedures Performed  Procedure(s):  ESOPHAGOGASTRODUODENOSCOPY with biopsy x1 area and esophageal dilation (56FR Bougie)     Findings:  Subtle concentric rings consistent with known eosinophilic esophagitis dilated with 56 French Blair without resistance or trauma  Small hiatal hernia  Erosive gastritis biopsied     Impression:  Dysphagia status post dilation  Erosive gastritis biopsied     Recommendations:  Continue Protonix    Consults:   Consults     Date and Time Order Name Status Description    8/6/2021  8:22 AM Inpatient Gastroenterology Consult Completed     8/3/2021 10:14 AM Inpatient Cardiology Consult Completed     8/2/2021  8:07 AM Hospitalist (on-call MD unless specified) Completed     8/2/2021  8:05 AM Inpatient Neurology Consult Stroke Completed             Discharge Details        Discharge Medications      New Medications      Instructions Start Date   aspirin 81 MG EC tablet   81 mg, Oral, Daily   Start Date: August 9, 2021     cyanocobalamin 1000 MCG/ML injection   1,000 mcg, Intramuscular, Every 28 Days   Start Date: August 31, 2021        Changes to Medications      Instructions Start Date   atorvastatin 80 MG tablet  Commonly known as: LIPITOR  What changed:   · medication strength  · how much to take  · when to take this   80 mg, Oral, Nightly      Lantus SoloStar 100 UNIT/ML injection pen  Generic drug: Insulin Glargine  What changed:   · how much to take  · how to take this  · when to take this  · additional instructions   40 Units, Subcutaneous, Nightly      NovoLOG FlexPen 100 UNIT/ML solution pen-injector sc pen  Generic drug: insulin aspart  What changed: how much to take   10 Units, Subcutaneous, 3 Times Daily With Meals       ondansetron 4 MG tablet  Commonly known as: ZOFRAN  What changed: how much to take   TAKE 1 TABLET EVERY 8 HOURS AS NEEDED FOR NAUSEA OR VOMITING.         Continue These Medications      Instructions Start Date   DULoxetine 60 MG capsule  Commonly known as: CYMBALTA   TAKE 1 CAPSULE EVERY MORNING      Eliquis 5 MG tablet tablet  Generic drug: apixaban   TAKE 1 TABLET TWICE DAILY      empagliflozin 10 MG tablet tablet  Commonly known as: Jardiance   10 mg, Oral, Daily      gabapentin 400 MG capsule  Commonly known as: NEURONTIN   400 mg, Oral, 3 Times Daily      levothyroxine 100 MCG tablet  Commonly known as: SYNTHROID, LEVOTHROID   TAKE 1 TABLET EVERY DAY      losartan 50 MG tablet  Commonly known as: COZAAR   TAKE 1 TABLET EVERY DAY      metoprolol succinate XL 50 MG 24 hr tablet  Commonly known as: TOPROL-XL   TAKE 1 TABLET EVERY DAY      omeprazole 40 MG capsule  Commonly known as: priLOSEC   40 mg, Oral, Daily, Take DOS             Allergies   Allergen Reactions   • Lisinopril Cough         Discharge Disposition:  [facility] [place] [facility]    Diet:  Hospital:  Diet Order   Procedures   • Diet Cardiac, Diabetic/Consistent Carbs; Healthy Heart; Diabetic - Consistent Carb         Discharge Activity:         CODE STATUS:  Code Status and Medical Interventions:   Ordered at: 08/02/21 1433     Code Status:    CPR     Medical Interventions (Level of Support Prior to Arrest):    Full         Future Appointments   Date Time Provider Department Center   1/6/2022  2:15 PM Tamara Michaels MD [facility]       Additional Instructions for the Follow-ups that You Need to Schedule     Ambulatory Referral to Home Health   As directed      Face to Face Visit Date: 8/8/2021    Follow-up provider for Plan of Care?: I treated the patient in an acute care facility and will not continue treatment after discharge.    Follow-up provider: HERNAN BEDOLLA [[id number]]    Reason/Clinical Findings: post hospital eval    Describe  mobility limitations that make leaving home difficult: impaired mobility    Nursing/Therapeutic Services Requested: Other (hhc to eval)    Frequency: 1 Week 1               Time spent on Discharge including face to face service:  35 minutes    This patient has been examined wearing appropriate Personal Protective Equipment on 08/08/21      Signature:   Electronically signed by Endy Lazaro DO, 08/08/21, 10:48 AM EDT.

## 2021-08-08 NOTE — PLAN OF CARE
Goal Outcome Evaluation:              Outcome Summary: Patient tearful and reports depression this shift. Tearful r/t current marital and family issues. Support and reassurance provided.

## 2021-08-08 NOTE — PROGRESS NOTES
[facility] Daily Progress Note    Patient Name: Kuldeep Adhikari  : 1965  MRN: [id number]  Primary Care Physician:  Laura Whitaker MD  Date of admission: 2021      Subjective      Chief Complaint: Right-sided weakness    Patient reports feeling generalized fatigue but dizziness has improved.  Orthostatics were negative.  Patient's anticoagulation being held for BARON on 2021.  No chest pain or shortness of breath.    2021: Patient seen and examined this morning.  Seen after the BARON, BARON negative for any PFO.  Right-sided weakness slightly improved, no numbness noted.  Denies any other complaints.  Awaiting rehab placement now.    2021: Patient seen and examined this morning.  Complaining of some food getting stuck just below his throat, has history of esophageal dilation by GI in the past.  Last one was more than a year ago.  Wants to see GI here, GI consulted.  Keep n.p.o. for now for possible EGD with dilation.  Discussed with nursing.    2021: Patient seen and examined this morning.  Doing well, swallowing is better.  Still has some issue with throat but better than yesterday.  Awaiting placement.    2021: Patient seen examined this morning.  Continues to do well, no complaints.  Tolerating diet well.  Awaiting placement.  P2P denied per case management.    Addendum: Plan was for discharge today after insurance denied inpatient rehab.  Patient wanted to go home with home health.  However informed by nursing that patient now having suicidal thoughts, psych consulted and 24-hour hold placed.    Essentially unchanged from : Denies any associated headache, vision change, fever, chills, chest pain, cough, sore throat, shortness of breath, abdominal pain, nausea, vomiting, diarrhea, dysuria, or dizziness.      Objective      Vitals:   Temp:  [97.6 °F (36.4 °C)-98.9 °F (37.2 °C)] 97.6 °F (36.4 °C)  Heart Rate:  [66-71] 66  Resp:  [14-19]  "16  BP: (132-146)/(47-70) 132/70    Physical Exam      General: Awake, alert, sitting up in bed, NAD  Eyes: PERRL, EOMI, conjunctive are clear  Cardiovascular: Regular rate and rhythm, no murmurs  Respiratory: Clear to auscultation bilaterally, no wheezing or rales, unlabored breathing  Abdomen: Soft, nontender, positive bowel sounds, no guarding  Neurologic: A&O, CN grossly intact, moves all extremities spontaneously  Musculoskeletal: Right BKA noted, right lower extremity weakness greater than left noted, no other gross deformities  Skin: Warm, dry, intact  Essentially unchanged from 8/7.    Result Review    Result Review:  I have personally reviewed the results from the time of this admission to 8/8/2021 09:58 EDT and agree with these findings:  [x]  Laboratory  []  Microbiology  []  Radiology  []  EKG/Telemetry   []  Cardiology/Vascular   []  Pathology  []  Old records  []  Other:            Assessment/Plan      Brief Patient Summary:    Kuldeep Adhikari is a 56 y.o. male with PMH of PVD s/p R BKA, DM Type II, PE on eliquis, CAD, HTN, HLD, hypothyroidism, asthma, anxiety, and GERD who [facility] to [facility] on 8/2/2021 right sided numbness and weakness. He stated he went to bed around 11pm in his normal state of health. He woke up around 4:30am and fell. He stated, \"It was like my right leg and arm were gone.\" He was using his right leg prosthesis and fell landing on his right side. He had numbness from the right side of his face all the way down his right arm and right leg. He denied any headache or vision changes. He is concerned that a tick bite might have caused his symptoms. He denied any prior stroke history. He has been taking his home medications including his eliquis      In the ED the patient had right sided weakness and paresthesia.  CT head showed no acute findings.  CTA head/neck showed no significant carotid stenosis, left vertebral artery is diminutive and appears to terminate after the " origin of the PICA branch.  This may be due to normal variant anatomy however no prior studies to confirm this is chronic/congenital finding.  Neurology was consulted and patient was admitted to the [facility] for suspected CVA. He was determined not to be a tPA candidate due to unknown onset of symptoms and patient is on eliquis. MRI brain ordered.      8/3/2021: Patient Reports he feels his right arm strength is mildly improving.  Still working on blood pressure control.  Patient's echocardiogram showing abnormal calcified nodular density on mitral valve, cardiology consulted for further evaluation.  Patient cleared by neurology for discharge.  Patient needs repeat sleep study on discharge.  Patient reports he had some episodes of dizziness while walking with physical therapy.  MRI brain confirming 12 mm acute lacunar infarct in posterior limb of the left internal capsule, likely secondary to patient's uncontrolled blood pressure.      apixaban, 5 mg, Oral, Q12H  aspirin, 81 mg, Oral, Daily  atorvastatin, 80 mg, Oral, Nightly  cyanocobalamin, 1,000 mcg, Intramuscular, Q28 Days  DULoxetine, 60 mg, Oral, QAM  gabapentin, 400 mg, Oral, TID  insulin glargine, 40 Units, Subcutaneous, Nightly  insulin lispro, 14 Units, Subcutaneous, TID With Meals  levothyroxine, 100 mcg, Oral, Q AM  metoprolol succinate XL, 50 mg, Oral, Daily  miconazole, , Topical, Q12H  pantoprazole, 40 mg, Oral, QAM  sodium chloride, 3 mL, Intravenous, Q12H             Active Hospital Problems:  Active Hospital Problems    Diagnosis    • **Cerebrovascular accident (CVA) (CMS/formerly [facility])    • Dysphagia      Added automatically from request for surgery [id number]     • Type 2 diabetes mellitus with peripheral vascular disease (CMS/formerly [facility])    • Chronic coronary artery disease    • Obstructive sleep apnea syndrome    • Benign essential hypertension    • Diabetic peripheral neuropathy (CMS/HCC)    • Chronic renal insufficiency, stage III (moderate) (CMS/HCC)    • Depression     • Gastroesophageal reflux disease    • History of pulmonary embolism    • Mixed hyperlipidemia    • Acquired hypothyroidism    • Peripheral vascular disease (CMS/HCC)      Plan:   Acute CVA  -MRI showing 12 mm acute lacunar infarct within the posterior limb of the left internal capsule  -s/p BARON on 8/5 with no PFO noted  -Continue on aspirin, statin, Eliquis  -2D echo report reviewed  -Neurology signed off, okay with discharge  -PT/OT recommend rehab, awaiting placement, P2P denied, appeal pending    Suicidal ideations  -Plan was for discharge to home with home health today however informed by nursing that patient having suicidal thoughts now  -Psych consulted and 24-hour hold placed    Dysphagia, improved  -History of dilation in the past multiple times, last one was greater than 1 year ago  -Status post EGD with dilation on 8/6 by GI  -Tolerating diet    Uncontrolled hypertension  -Improved, blood pressure better now  -Case management consult for assistance with getting working CPAP  -Continue current BP regimen,  -Monitor renal function  -Adjust as needed     Dizziness, resolved  -Orthostatic hypotension negative  -Less likely correlated with stroke  -Off IVF    Abnormal mitral valve  -on TTE patient shown to have calcified nodule on mitral valve uncertain significance  -Cardiology following, status post BARON on 8/5 showing moderate MR and thickened leaflets  -Continue to monitor    Chronic HFpEF  -patient's echo showing grade 1 diastolic dysfunction with essentially normal systolic function may be appearing mildly hypovolemic initially now appearing more euvolemic  -Daily weights  -Monitor I's and O's    DAISY, resolved  -appears to be at baseline now  -Avoid nephrotoxic medication  -Discontinued IV fluids    Elevated TSH-very mildly so  -Free T4 appropriate     Pulmonary embolism-patient anticoagulated  -Eliquis resumed after BARON and EGD     Coronary artery disease-patient denies any chest pain at this  time  -Cardiac monitoring  -EKG normal sinus rhythm normal axis  -Continue antiplatelets     Type 2 diabetes-patient reports history of relatively poor control  -A1c of 11.2 very poorly controlled  -Sliding scale and short acting basal  -Long-acting insulin  -Given significant comorbidities may benefit from endocrinology evaluation on outpatient basis     Peripheral vascular disease-likely due to uncontrolled hypertension and diabetes  -Patient with right BKA  -Continue antiplatelets     Hyperlipidemia/anxiety/GERD/asthma-chronic in nature  -Resume home medication as clinically appropriate, atorvastatin increased to 80 mg     GENO-patient reports he is not using his CPAP due to malfunction  -Repeat sleep study on discharge    DVT prophylaxis:  Medical and mechanical DVT prophylaxis orders are present.    CODE STATUS:    Code Status: CPR  Medical Interventions (Level of Support Prior to Arrest): Full      Disposition:  I expect patient to be discharged to rehab    This patient has been examined wearing appropriate Personal Protective Equipment on 08/08/21      Electronically signed by Endy Lazaro DO, 08/08/21, 09:58 EDT.  [facility] Hospitalist Team

## 2021-08-08 NOTE — DISCHARGE PLACEMENT REQUEST
"Kuldeep Rangel (56 y.o. Male)     Date of Birth Social Security Number Address Home Phone MRN    1965  [address] [place] [address] [phone] [id number]    Zoroastrianism Marital Status          Holiness        Admission Date Admission Type Admitting Provider Attending Provider Department, Room/Bed    8/2/21 Emergency Endy Lazaro DO Patel, Jalaram, DO Psychiatric [facility], 263/1    Discharge Date Discharge Disposition Discharge Destination                       Attending Provider: Endy Lazaro DO    Allergies: Lisinopril    Isolation: None   Infection: None   Code Status: CPR    Ht: 172.7 cm (68\")   Wt: 90 kg (198 lb 6.6 oz)    Admission Cmt: None   Principal Problem: Cerebrovascular accident (CVA) (CMS/Formerly [facility]) [I63.9]                 Active Insurance as of 8/2/2021     Primary Coverage     Payor Plan Insurance Group Employer/Plan Group    HUMANA MEDICARE REPLACEMENT HUMANA MEDICARE REPLACEMENT [id number]     Payor Plan Address Payor Plan Phone Number Payor Plan Fax Number Effective Dates    [address] [address] [phone]  1/1/2019 - None Entered    [facility] [phone]       Subscriber Name Subscriber Birth Date Member ID       KULDEEP RANGEL 1965 [id number]           Secondary Coverage     Payor Plan Insurance Group Employer/Plan Group    [place]      Payor Plan Address Payor Plan Phone Number Payor Plan Fax Number Effective Dates    [address]   1/1/2019 - None Entered    [facility] [id number]       Subscriber Name Subscriber Birth Date Member ID       KULDEEP RANGEL 1965 [id number]                 Emergency Contacts      (Rel.) Home Phone Work Phone Mobile Phone    DIGNA RANGEL (Spouse) [phone] -- --    POOJA RANGEL (Brother) -- -- [phone]    SHANE RANGEL (Brother) -- [phone]               History & Physical      Jacob Bentley MD at 08/02/21 0939              [facility] " "Hospital Medicine Services      Patient Name: Kuldeep Adhikari  : 1965  MRN: [id number]  Primary Care Physician:  Laura Whitaker MD  Date of admission: 2021      Subjective      Chief Complaint: right sided numbness and weakness     History of Present Illness: Kuldeep Adhikari is a 56 y.o. male with PMH of PVD s/p R BKA, DM Type II, PE on eliquis, CAD, HTN, HLD, hypothyroidism, asthma, anxiety, and GERD who presented to [facility] on 2021 right sided numbness and weakness. He stated he went to bed around 11pm in his normal state of health. He woke up around 4:30am and fell. He stated, \"It was like my right leg and arm were gone.\" He was using his right leg prosthesis and fell landing on his right side. He had numbness from the right side of his face all the way down his right arm and right leg. He denied any headache or vision changes. He is concerned that a tick bite might have caused his symptoms. He denied any prior stroke history. He has been taking his home medications including his eliquis     In the ED the patient had right sided weakness and paresthesia.  CT head showed no acute findings.  CTA head/neck showed no significant carotid stenosis, left vertebral artery is diminutive and appears to terminate after the origin of the PICA branch.  This may be due to normal variant anatomy however no prior studies to confirm this is chronic/congenital finding.  Neurology was consulted and patient was admitted to the [facility] for suspected CVA. He was determined not to be a tPA candidate due to unknown onset of symptoms and patient is on eliquis. MRI brain ordered.         Review of Systems   Constitutional: Negative.   HENT: Negative.    Eyes: Negative.    Cardiovascular: Negative.    Respiratory: Negative.    Endocrine: Negative.    Hematologic/Lymphatic: Negative.    Skin: Negative.    Musculoskeletal: Negative.    Gastrointestinal: Negative.    Genitourinary: Negative.    Neurological: " Positive for focal weakness, numbness and paresthesias.        Right side of body numbness and weakness   Psychiatric/Behavioral: Negative.    Allergic/Immunologic: Negative.    All other systems reviewed and are negative.      Personal History     Past Medical History:   Diagnosis Date   • CKD (chronic kidney disease), stage III (CMS/Formerly [facility])    • Depression    • DJD (degenerative joint disease)    • DVT (deep venous thrombosis) (CMS/Formerly [facility])    • Ganglion     rt wrist   • GERD (gastroesophageal reflux disease)    • Hiatal hernia    • History of echocardiogram 04/2016    2D ECHO   • History of esophageal stricture     s/p dialation 40-50 times last EGD 04/2016   • History of pulmonary embolus (PE)     on long term anticoagulation   • Hyperlipidemia    • Hypertension    • Hypothyroidism    • IBS (irritable bowel syndrome)    • Neuropathy    • Rash     rt lower hip   • Retinopathy    • Seasonal allergies    • Sleep apnea     cpap  bring dos   • Type 2 diabetes mellitus with peripheral vascular disease (CMS/Formerly [facility])    • Vitamin D deficiency        Past Surgical History:   Procedure Laterality Date   • AMPUTATION FOOT / TOE Right     great toe   • AMPUTATION REVISION Right 4/8/2021    Procedure: BELOW KNEE AMPUTATION REVISAION;  Surgeon: Eduardo Reynolds MD;  Location: [facility] MAIN OR;  Service: Orthopedics;  Laterality: Right;   • AMPUTATION REVISION Right 4/29/2021    Procedure: AMPUTATION REVISION KNEE STUMP;  Surgeon: Eduardo Reynolds MD;  Location: [facility] MAIN OR;  Service: Orthopedics;  Laterality: Right;   • BELOW KNEE AMPUTATION Right 7/30/2020    Procedure: AMPUTATION BELOW KNEE;  Surgeon: Eduardo Reynolds MD;  Location: [facility] MAIN OR;  Service: Orthopedics;  Laterality: Right;   • CARDIAC CATHETERIZATION  04/2018    [facility]   • ENDOSCOPY     • ENDOSCOPY N/A 10/4/2019    Procedure: ESOPHAGOGASTRODUODENOSCOPY with dilitation and biopsy x 1 area;  Surgeon: Declan Iqbal MD;  Location: [facility] ENDOSCOPY;  Service:  Gastroenterology   • ENDOSCOPY N/A 12/13/2019    Procedure: ESOPHAGOGASTRODUODENOSCOPY WITH DILATATION (50, 52 BOUGIE);  Surgeon: Declan Iqbal MD;  Location: [facility] ENDOSCOPY;  Service: Gastroenterology   • GANGLION CYST EXCISION Left    • HERNIA REPAIR Bilateral    • RETINOPATHY SURGERY      laser   • TOTAL HIP ARTHROPLASTY Left    • TOTAL HIP ARTHROPLASTY Left 2018   • TRANS METATARSAL AMPUTATION Right 3/17/2020    Procedure: AMPUTATION TRANS METATARSAL right;  Surgeon: ERWIN Baker DPM;  Location: [facility] MAIN OR;  Service: Podiatry;  Laterality: Right;  GANGRENOUS RIGHT FOOT       Family History: family history includes Cancer in an other family member; Colon cancer in an other family member; Diabetes in his mother; Heart disease in his mother; Hyperlipidemia in an other family member; Hypertension in an other family member; Leukemia in his father; Sleep apnea in his maternal aunt; Stroke in his maternal grandmother. Otherwise pertinent FHx was reviewed and not pertinent to current issue.    Social History:  reports that he has never smoked. He has never used smokeless tobacco. He reports that he does not drink alcohol and does not use drugs.    Home Medications:  Prior to Admission Medications     Prescriptions Last Dose Informant Patient Reported? Taking?    atorvastatin (LIPITOR) 40 MG tablet 8/1/2021 Self No Yes    TAKE 1 TABLET EVERY DAY    DULoxetine (CYMBALTA) 60 MG capsule 8/1/2021 Self No Yes    TAKE 1 CAPSULE EVERY MORNING    Eliquis 5 MG tablet tablet 8/1/2021 Self No Yes    TAKE 1 TABLET TWICE DAILY    empagliflozin (Jardiance) 10 MG tablet tablet 8/1/2021 Self No Yes    Take 1 tablet by mouth Daily.    gabapentin (NEURONTIN) 400 MG capsule 8/1/2021 ZOLTAN Yes Yes    Take 400 mg by mouth 3 (Three) Times a Day.    insulin aspart (NovoLOG FlexPen) 100 UNIT/ML solution pen-injector sc pen 8/1/2021 Self No Yes    Inject 10 Units under the skin into the appropriate area as directed 3  (Three) Times a Day With Meals.    Patient taking differently:  Inject 14 Units under the skin into the appropriate area as directed 3 (Three) Times a Day With Meals.    Insulin Glargine (Lantus SoloStar) 100 UNIT/ML injection pen 8/1/2021 Self No Yes    INJECT SUBCUTANEOUSLY 27 UNITS EVERY BEDTIME (NEEDS APPOINTMENT)    Patient taking differently:  Inject 34 Units under the skin into the appropriate area as directed Every Night.    levothyroxine (SYNTHROID, LEVOTHROID) 100 MCG tablet 8/1/2021 Self No Yes    TAKE 1 TABLET EVERY DAY    Patient taking differently:  Take 100 mcg by mouth Daily.    losartan (COZAAR) 50 MG tablet 8/1/2021 Self No Yes    TAKE 1 TABLET EVERY DAY    omeprazole (priLOSEC) 40 MG capsule 8/1/2021 Self Yes Yes    Take 40 mg by mouth Daily. Take DOS    metoprolol succinate XL (TOPROL-XL) 50 MG 24 hr tablet  Self No No    TAKE 1 TABLET EVERY DAY    Patient taking differently:  Take 50 mg by mouth Daily.    ondansetron (ZOFRAN) 4 MG tablet  Self No No    TAKE 1 TABLET EVERY 8 HOURS AS NEEDED FOR NAUSEA OR VOMITING.    Patient taking differently:  Take 8 mg by mouth Every 8 (Eight) Hours As Needed for Nausea or Vomiting.            Allergies:  Allergies   Allergen Reactions   • Lisinopril Cough       Objective      Vitals:   Temp:  [98.1 °F (36.7 °C)-99.2 °F (37.3 °C)] 98.1 °F (36.7 °C)  Heart Rate:  [] 85  Resp:  [15-18] 18  BP: (133-202)/(67-96) 156/78    Physical Exam  Vitals and nursing note reviewed.   HENT:      Head: Normocephalic and atraumatic.   Eyes:      Extraocular Movements: Extraocular movements intact.      Pupils: Pupils are equal, round, and reactive to light.   Cardiovascular:      Rate and Rhythm: Normal rate and regular rhythm.      Pulses: Normal pulses.      Heart sounds: Normal heart sounds.   Pulmonary:      Effort: Pulmonary effort is normal.      Breath sounds: Normal breath sounds.   Abdominal:      General: Bowel sounds are normal.      Palpations: Abdomen is  soft.      Tenderness: There is no abdominal tenderness.   Musculoskeletal:      Cervical back: Normal range of motion.      Comments: Right arm and right stump with weakness compared to left    Skin:     General: Skin is warm and dry.   Neurological:      Mental Status: He is alert.      Sensory: Sensory deficit present.      Motor: Weakness present.      Comments: Right arm and right leg drift present, right hand weak   Paresthesia of right face, right arm, and right leg   No facial droop or slurred speech   Psychiatric:         Mood and Affect: Mood normal.         Behavior: Behavior normal.         Result Review    Result Review:  I have personally reviewed the results from the time of this admission to 8/2/2021 12:18 EDT and agree with these findings:  [x]  Laboratory  [x]  Microbiology  [x]  Radiology  [x]  EKG/Telemetry   []  Cardiology/Vascular   []  Pathology  [x]  Old records  []  Other:        Assessment/Plan        Active Hospital Problems:  Active Hospital Problems    Diagnosis    • **Cerebrovascular accident (CVA) (CMS/HCC)    • Type 2 diabetes mellitus with peripheral vascular disease (CMS/HCC)    • Chronic coronary artery disease    • Obstructive sleep apnea syndrome    • Benign essential hypertension    • Diabetic peripheral neuropathy (CMS/HCC)    • Chronic renal insufficiency, stage III (moderate) (CMS/HCC)    • Depression    • Gastroesophageal reflux disease    • History of pulmonary embolism    • Mixed hyperlipidemia    • Acquired hypothyroidism    • Peripheral vascular disease (CMS/HCC)      Plan:     CVA  -MRI brain: 12 mm acute lacunar infarct within the posterior limb of the left internal capsule with possible involvement of the posterior left lentiform nucleus  -CT head in ED showed no acute findings  -CTA head/neck: no significant carotid stenosis, left vertebral artery is diminutive and appears to terminate after the origin of the PICA branch.  This may be due to normal variant  anatomy however no prior studies to confirm this is chronic/congenital finding.  -pt with right sided weakness and paresthesia on exam   -neurology consulted  -check stroke labs, 2D echo, PT/OT consulted, neuro checks   -cont home eliquis, statin    PVD s/p R BKA  -has right leg prosthesis     Anticoagulation therapy secondary to history of PE:   -on Eliquis     Diabetes type 2, chronic with peripheral neuropathy  -continue mealtime insulin and lantus, Neurontin  -glucose 150s     CAD  -Continue beta-blocker, statin    Hypertension  -Continue metoprolol, Cozaar     HLD  -Continue Atorvastatin     Anxiety  -Continue Cymbalta     Hypothyroidism  -Continue levothyroxine     GERD  -Continue PPI     Asthma  -Not in exacerbation  -As needed duo nebs    CKD stage III  -creatinine stable at 1.21    GENO  -cpap qhs         DVT prophylaxis:  Medical and mechanical DVT prophylaxis orders are present.   On  eliquis     CODE STATUS:       Admission Status:  I believe this patient meets inpatient status.    I discussed the patient's findings and my recommendations with patient.    This patient has been examined wearing appropriate Personal Protective Equipment  08/02/21      Signature: Electronically signed by KEYLA Lopez, 08/02/21, 12:18 PM EDT.    Attending attestation:    I performed a history and physical examination of the patient. I reviewed the nurse practitioner's note and agree with the documented findings and plan of care.    S:    Patient is a 56-year-old male with history of type 2 diabetes, hypertension and peripheral vascular disease with right BKA presenting for evaluation of right-sided weakness.  Patient had stroke work-up in the emergency department ultimately had MRI showing signs of left-sided ischemic infarct.  Admitted for further work-up of ischemic stroke.    O:    Vital signs reviewed    General: Middle-age male lying in bed breathing comfortably on room air no acute distress  HEENT: NC/AT,  EOMI, mucosa moist  Heart: Regular, rate controlled  Chest: Normal work of breathing, moving air well no wheezing  Abdominal: Soft. NT/ND.   Musculoskeletal: Right BKA, normal ROM.  No edema. No calf tenderness.  Neurological: AAOx3, cranial nerves II through XII appear grossly intact, right upper extremity and right lower extremity weakness noted  Skin: Skin is warm and dry. No rash  Psychiatric: Normal mood and affect.    A/P    Right-sided weakness-secondary to underlying left-sided ischemic stroke.  Patient with multiple significant comorbidities including type 2 diabetes, hypertension and peripheral vascular disease likely contributing, patient with hypertensive crisis on admission now controlled.  Patient reports he has been off of his blood pressure medication  -Blood pressure control  -Neurology consulted  -MRI showing 12 mm acute lacunar infarct within the posterior limb of the left internal capsule  -Statin and antiplatelets  -Patient far beyond window for TPA  -Speech/PT/OT  -Risk stratification  -CTA of head and neck  -Echo with bubble  -CT head showed no bleed    Hypertensive crisis-likely uncontrolled chronic hypertension patient also has GENO which he reports his CPAP is not been working  -Case management consult for assistance with getting working CPAP  -Blood pressure more controlled at this time  -Patient will be on new medications  -Monitor renal function  -Urine drug screen/UA    Elevated TSH-very mildly so  -Check free T4    Pulmonary embolism-patient anticoagulated  -Find out if this first event or any other risk factors of how long he needs to be anticoagulated  -Patient is high risk    Coronary artery disease-patient denies any chest pain at this time  -Cardiac monitoring  -EKG normal sinus rhythm normal axis  -Continue antiplatelets    Type 2 diabetes-patient reports history of relatively poor control  -A1c  -Sliding scale    Peripheral vascular disease-likely due to uncontrolled hypertension  and diabetes  -Patient with right BKA  -Continue antiplatelets    Hyperlipidemia/anxiety/GERD/asthma-chronic in nature  -Resume home medication as clinically appropriate    GENO-patient reports he is not using his CPAP due to malfunction  -Patient needs assistance last sleep study 1 year prior    Electronically signed by Jacob Bentley MD, 08/02/21, 3:26 PM EDT.      Electronically signed by Jacob Bentley MD at 08/02/21 1526          Physical Therapy Notes (last 48 hours) (Notes from 08/06/21 1024 through 08/08/21 1024)      Reyes, Carmela, PT at 08/06/21 1328  Version 1 of 1       Goal Outcome Evaluation:  Plan of Care Reviewed With: patient     Patient scheduled for EGD today; pt requesting to hold off treatment.     Electronically signed by Reyes, Carmela, PT at 08/06/21 1328     Natalya Cormier PTA at 08/07/21 0847  Version 1 of 1       Subjective: Pt agreeable to therapeutic plan of care.Still c/o sl dizziness when at EOB and first stance but once up no c/o    Objective:     Bed mobility - CGA  Transfers - Supervision and with rolling walker  Ambulation - 75x3 feet CGA and with rolling walker    Pain: chronic neuropathy  Education: Provided education on importance of mobility and skilled verbal / tactile cueing throughout intervention.     Assessment: Kuldeep Adhikari presents with functional mobility impairments which indicate the need for skilled intervention. Tolerating session today without incident. Ind with donning prosthesis, no one every issued him any ply socks to go with gel insert. Req 1 standing rest and 1 seated rest with amb due to sl dizziness/fatigue. Nsg stated it was ok to leave him in bathroom and he promised to use call light when done.  Will continue to follow and progress as tolerated. Should do well at rehab when ready.    Plan/Recommendations:   Pt would benefit from Inpatient Rehabilitation placement at discharge from facility and requires no DME at discharge.   Pt  desires Inpatient Rehabilitation placement at discharge. Pt cooperative; agreeable to therapeutic recommendations and plan of care.     Basic Mobility 6-click:  Rollin = Total, A lot = 2, A little = 3; 4 = None  Supine>Sit:   1 = Total, A lot = 2, A little = 3; 4 = None   Sit>Stand with arms:  1 = Total, A lot = 2, A little = 3; 4 = None  Bed>Chair:   1 = Total, A lot = 2, A little = 3; 4 = None  Ambulate in room:  1 = Total, A lot = 2, A little = 3; 4 = None  3-5 Steps with railin = Total, A lot = 2, A little = 3; 4 = None  Score: 18    Modified [place]: 4 = Moderately severe disability (Unable to attend to own bodily needs without assistance, and unable to walk unassisted)     Post-Tx Position: In bathroom and Call light and personal items within reach  PPE: gloves, surgical mask, eyewear protection    Electronically signed by Natalya Cormier PTA at 21 [phone]     Natalya Cormier PTA at 21 0888  Version 1 of 1        Assessment: Kuldeep Adhikari presents with functional mobility impairments which indicate the need for skilled intervention. Tolerating session today without incident. Ind with donning prosthesis, no one every issued him any ply socks to go with gel insert. Req 1 standing rest and 1 seated rest with amb due to sl dizziness/fatigue. Nsg stated it was ok to leave him in bathroom and he promised to use call light when done.  Will continue to follow and progress as tolerated. Should do well at rehab when ready.       Electronically signed by Natalya Cormier PTA at 21 [phone]          Occupational Therapy Notes (last 72 hours) (Notes from 21 1024 through 21 1024)      Naina Whittaker, OT at 21 1883        Goal Outcome Evaluation:      Kuldeep Adhikari presents with ADL impairments below baseline abilities which indicate the need for continued skilled intervention while inpatient. Tolerating session today without incident. Pt has ST memory & safety awareness  deficits, limited activity tolerance, Rt hip weakness, & impaired functional mobility. Recommend ST IP rehab at d/c. Will continue to follow and progress as tolerated.              Electronically signed by Naina Whittaker, OT at 08/05/21 0185     Naina Whittaker OT at 08/05/21 0332        Subjective: Pt agreeable to therapeutic plan of care.  Cognition: memory: Impaired short term, safety/judgement: fair and awareness of deficits: good awareness of safety precautions and fair awareness of deficits    Objective: HR 77 BPM before & after Tx. /66 before Tx & 121/69 afterwards.    Bed Mobility: SBA sup>sit  Functional Transfers: CGA  Functional Ambulation: Min-A, with adaptive equipment and utilizing compensatory strategy. Pt stated he had not been using RW though PT note reports this. Pt requests to walk using IV pole & demonstrates adequate balance standing EOB for this. Pt is noted to have LOB & require standing rest breaks often w/o ue of RW. Education is provided & encouragement to utilize RW in the future for safety & increased functional endurance. Pt verbalizes agreement.     Lower Body Dressing: SBA, setup.   ADL Position: edge of bed sitting  ADL Comments: Dons prosthetic with setup.    Grooming: SBA  ADL Comments: pt states he has completed oral care this date though no evidence is visible of this. He reports he is able to groom himself w/ setup & appears capable of this.    Pain: 0 VAS  Education: Provided education on importance of mobility and skilled verbal / tactile cueing throughout intervention.     Assessment: Kuldeep Adhikari presents with ADL impairments below baseline abilities which indicate the need for continued skilled intervention while inpatient. Tolerating session today without incident. Pt has ST memory & safety awareness deficits, limited activity tolerance, Rt hip weakness, & impaired functional mobility. Recommend ST IP rehab at d/c. Will continue to follow and progress as tolerated.      Plan/Recommendations:   Pt would benefit from Inpatient Rehabilitation placement at discharge from facility.   Pt desires Inpatient Rehabilitation placement at discharge. Pt cooperative; agreeable to therapeutic recommendations and plan of care.     Modified [place]: 4 = Moderately severe disability (Unable to attend to own bodily needs without assistance, and unable to walk unassisted)     Post-Tx Position: Up in Chair and Call light and personal items within reach. Pt reports compliance w/ fall precautions & RN agrees. No batteries available for chair annalee.  PPE: gloves, surgical mask, eyewear protection      Electronically signed by Naina Whittaker OT at 21 1543       [facility]  [address] [place] [address]  Phone:  [phone]  Fax:  [phone] Date: Aug 8, 2021      Ambulatory Referral to Home Health     Patient:  Kuldeep Adhikari MRN:  [id number]   [address]  [place] [address] :  1965  SSN:    Phone: [phone] Sex:  M      INSURANCE PAYOR PLAN GROUP # SUBSCRIBER ID   Primary:  Secondary:    HUMANA MEDICARE REPLACEMENT  [place] [id number]  [id number]    [id number]  [id number]      Referring Provider Information:  RICCARDO CORONA Phone: [phone] Fax: [phone]      Referral Information:   # Visits:  999 Referral Type: Home Health [42]   Urgency:  Routine Referral Reason: Specialty Services Required   Start Date: Aug 8, 2021 End Date:  To be determined by Insurer   Diagnosis: Cerebrovascular accident (CVA), unspecified mechanism (CMS/HCC) (I63.9 [ICD-10-CM] 434.91 [ICD-9-CM])      Refer to Dept:   Refer to Provider:   Refer to Facility:       Face to Face Visit Date: 2021  Follow-up provider for Plan of Care? I treated the patient in an acute care facility and will not continue treatment after discharge.  Follow-up provider: HERNAN BEDOLLA [[id number]]  Reason/Clinical Findings: post hospital eval  Describe mobility  limitations that make leaving home difficult: impaired mobility  Nursing/Therapeutic Services Requested: Other (hhc to eval)  Frequency: 1 Week 1     This document serves as a request of services and does not constitute Insurance authorization or approval of services.  To determine eligibility, please contact the members Insurance carrier to verify and review coverage.     If you have medical questions regarding this request for services. Please contact [facility] at [phone] during normal business hours.       Verbal Order Mode: Telephone with readback   Authorizing Provider: Endy Lazaro DO  Authorizing Provider's NPI: [id number]     Order Entered By: Cydney Hernandez RN 8/8/2021 10:23 AM

## 2021-08-08 NOTE — PROGRESS NOTES
Discharge Planning Assessment  [facility]     Patient Name: Kuldeep Adhikari  MRN: [id number]  Today's Date: 8/8/2021    Admit Date: 8/2/2021      UPDATE:   Accepted by [facility].    Discharge Plan     Row Name 08/08/21 1026       Plan    Plan  DC plan: P2P denied for [facility]. Referral sent to [facility]. Pending acceptance.    Plan Comments  Pt now desires [facility] d/t p2p denial. Preferred provider [facility]. Referral sent via [facility] and called to central intake for notification of referral. S/W [facility]. Pending acceptance. Orders verified.    Row Name 08/08/21 0951       Plan    Plan Comments  MSW informed Pt that insurance has denied coverage for inpatient rehab    Row Name 08/08/21 0854       Plan    Plan Comments  Received message from Felicity De Guzman, stating that P2P was denied. Expedited appeal number provided by pj to MSW 8/7/21.        Continued Care and Services - Admitted Since 8/2/2021     Destination     Service Provider Request Status Selected Services Address Phone Fax Patient Preferred    [facility]  Pending - Request Sent N/A [address] [facility] [place] [address] [phone] --    [facility]  Declined  Patient Insurance Not Accepted N/A [address] [facility] [place] [id number] [phone] --    [facility]  Declined  Patient Insurance Not Accepted N/A [address] [facility] [place] [address] [phone] --          Home Medical Care     Service Provider Request Status Selected Services Address Phone Fax Patient Preferred    [facility]  Pending - Request Sent N/A [address] [facility] [place] [address] [phone] --              Expected Discharge Date and Time     Expected Discharge Date Expected Discharge Time    Aug 5, 2021           Cydney Hernandez RN  Case Management/Social Work    Patient Name:  Kuldeep Adhikari  YOB: 1965  MRN: [id number]  Admit Date:   8/2/2021        Received message from Felicity De Guzman, stating that P2P was denied. Expedited appeal number provided by pj to MSW 8/7/21.      Electronically signed by:  Cydney Hernandez RN  08/08/21 08:57 EDT

## 2021-08-08 NOTE — CASE MANAGEMENT/SOCIAL WORK
Case Management/Social Work    Patient Name:  Kuldeep Adhikari  YOB: 1965  MRN: [id number]  Admit Date:  8/2/2021       Row Name 08/08/21 0951       Plan    Plan  Home    Plan Comments  MSW informed Pt that insurance has denied coverage for inpatient rehab. MSW offered to perform an expedited appeal. Pt stated he will go home with home health. Pt gave MSW permission to review chart and send referral to previous home health provider([facility]). Pt stated if needed he will go to outpatient rehab with transportation provided by Medicaid.  MSW informed CM of Pt's preference for [facility] services.         Electronically signed by:  Allyson Pike  08/08/21 12:23 EDT

## 2021-08-08 NOTE — CONSULTS
"  Referring Provider: Dr. Endy Lazaro MD  Reason for Consultation: Suicidal thoughts      Chief complaint: Wife not picking him up from the hospital.    Subjective     History of present illness:  The patient is a 56 y.o. male who was admitted secondary to a small CVA, for which he has not yet noticed any deficits. Psychiatry was consulted due to suicidal thoughts that occurred today, along with plans to use the syringe in this room to put an air embolus into his IV. The patient admits to suicidal thoughts prior to hospitalization, and states he knows the drill for removing everything that could be dangerous from his room because he has \"been through this before.\" His wife has not been coming to see him, would not bring him the candy he requested, and didn't want to pick him up from the hospital, which is what made his SI occur today. He reports marriage difficulty for at least 2 years, and is upset that she wants to get him his own place so he can move out of their house, and she frequently brings up divorce to him in arguments. 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 The patient is unsure of which psychiatric medications he has tried in the past, but believes they were antidepressants. He knows the duloxetine he has now been on for over 2 months without noticeable improvement. The patient denies HI or AVH now or in the past.    Review of Systems   Pertinent items are noted in HPI, all other systems reviewed and negative    History    Past psychiatric history: Patient denies, but he admits to seeing [facility] when he was there for a \"few hours\" before being admitted elsewhere in the hospital for complications arising from his amputated leg secondary to DM2. He admits to a " diagnosis of ADHD, for which he was treated with Ritalin in childhood, but has not used the medication in recent years. The patient also knows he has tried other medications in the past for his depression, but he cannot remember their names. The medical record shows Fluoxetine was most recently tried prior to duloxetine therapy initiation.  -Psychiatric Hospitalizations: None  -Suicide Attempts: None    Past Medical History:   Diagnosis Date   • CKD (chronic kidney disease), stage III (CMS/[facility])    • Depression    • DJD (degenerative joint disease)    • DVT (deep venous thrombosis) (CMS/[facility])    • Ganglion     rt wrist   • GERD (gastroesophageal reflux disease)    • Hiatal hernia    • History of echocardiogram 04/2016    2D ECHO   • History of esophageal stricture     s/p dialation 40-50 times last EGD 04/2016   • History of pulmonary embolus (PE)     on long term anticoagulation   • Hyperlipidemia    • Hypertension    • Hypothyroidism    • IBS (irritable bowel syndrome)    • Neuropathy    • Rash     rt lower hip   • Retinopathy    • Seasonal allergies    • Sleep apnea     cpap  bring dos   • Type 2 diabetes mellitus with peripheral vascular disease (CMS/[facility])    • Vitamin D deficiency           Family History   Problem Relation Age of Onset   • Diabetes Mother    • Heart disease Mother    • Leukemia Father    • Sleep apnea Maternal Aunt         GENO   • Stroke Maternal Grandmother    • Hypertension Other    • Hyperlipidemia Other    • Cancer Other    • Colon cancer Other         uncle        Social History     Tobacco Use   • Smoking status: Never Smoker   • Smokeless tobacco: Never Used   Vaping Use   • Vaping Use: Never used   Substance Use Topics   • Alcohol use: No   • Drug use: No          Medications Prior to Admission   Medication Sig Dispense Refill Last Dose   • atorvastatin (LIPITOR) 40 MG tablet TAKE 1 TABLET EVERY DAY 90 tablet 0 8/1/2021 at Unknown time   • DULoxetine (CYMBALTA) 60 MG capsule TAKE 1  CAPSULE EVERY MORNING 90 capsule 0 8/1/2021 at Unknown time   • Eliquis 5 MG tablet tablet TAKE 1 TABLET TWICE DAILY 180 tablet 0 8/1/2021 at Unknown time   • empagliflozin (Jardiance) 10 MG tablet tablet Take 1 tablet by mouth Daily. 90 tablet 4 8/1/2021 at Unknown time   • gabapentin (NEURONTIN) 400 MG capsule Take 400 mg by mouth 3 (Three) Times a Day.   8/1/2021 at Unknown time   • insulin aspart (NovoLOG FlexPen) 100 UNIT/ML solution pen-injector sc pen Inject 10 Units under the skin into the appropriate area as directed 3 (Three) Times a Day With Meals. (Patient taking differently: Inject 14 Units under the skin into the appropriate area as directed 3 (Three) Times a Day With Meals.) 15 mL 6 8/1/2021 at Unknown time   • levothyroxine (SYNTHROID, LEVOTHROID) 100 MCG tablet TAKE 1 TABLET EVERY DAY (Patient taking differently: Take 100 mcg by mouth Daily.) 90 tablet 3 8/1/2021 at Unknown time   • losartan (COZAAR) 50 MG tablet TAKE 1 TABLET EVERY DAY 90 tablet 0 8/1/2021 at Unknown time   • omeprazole (priLOSEC) 40 MG capsule Take 40 mg by mouth Daily. Take DOS   8/1/2021 at Unknown time   • [DISCONTINUED] Insulin Glargine (Lantus SoloStar) 100 UNIT/ML injection pen INJECT SUBCUTANEOUSLY 27 UNITS EVERY BEDTIME (NEEDS APPOINTMENT) (Patient taking differently: Inject 34 Units under the skin into the appropriate area as directed Every Night.) 30 mL 6 8/1/2021 at Unknown time   • metoprolol succinate XL (TOPROL-XL) 50 MG 24 hr tablet TAKE 1 TABLET EVERY DAY (Patient taking differently: Take 50 mg by mouth Daily.) 30 tablet 0    • ondansetron (ZOFRAN) 4 MG tablet TAKE 1 TABLET EVERY 8 HOURS AS NEEDED FOR NAUSEA OR VOMITING. (Patient taking differently: Take 8 mg by mouth Every 8 (Eight) Hours As Needed for Nausea or Vomiting.) 45 tablet 0         Scheduled Meds:  apixaban, 5 mg, Oral, Q12H  aspirin, 81 mg, Oral, Daily  atorvastatin, 80 mg, Oral, Nightly  cyanocobalamin, 1,000 mcg, Intramuscular, Q28 Days  DULoxetine,  "60 mg, Oral, QAM  gabapentin, 400 mg, Oral, TID  insulin glargine, 40 Units, Subcutaneous, Nightly  insulin lispro, 14 Units, Subcutaneous, TID With Meals  levothyroxine, 100 mcg, Oral, Q AM  metoprolol succinate XL, 50 mg, Oral, Daily  miconazole, , Topical, Q12H  pantoprazole, 40 mg, Oral, QAM  sodium chloride, 3 mL, Intravenous, Q12H         Continuous Infusions:       PRN Meds:  •  acetaminophen **OR** acetaminophen  •  bisacodyl  •  dextrose  •  dextrose  •  glucagon (human recombinant)  •  HYDROcodone-acetaminophen  •  ipratropium-albuterol  •  melatonin  •  ondansetron **OR** ondansetron  •  [COMPLETED] Insert peripheral IV **AND** sodium chloride  •  sodium chloride      Allergies:  Lisinopril    Objective       Physical Exam:    Vital Signs:   /86 (BP Location: Right arm, Patient Position: Lying)   Pulse 65   Temp 97.6 °F (36.4 °C) (Axillary)   Resp 20   Ht 172.7 cm (68\")   Wt 90 kg (198 lb 6.6 oz)   SpO2 99%   BMI 30.17 kg/m²     General Appearance:    Well-developed, well-nourished, and depressed.   Head:    Normocephalic, without obvious deformity, atraumatic.   Eyes:            Lids and lashes normal, conjunctivae and sclerae normal, no   icterus, no pallor, corneas clear.   Skin:  Neurologic:  Musculoskeletal:   No bleeding, bruising or rash.  Cranial nerves 2 - 12 grossly intact, sensation intact.  Muscle strength: grade 5  Muscle tone: Normal  Abnormal Movements: No tremors or abnormal involuntary movements  Gait: Deferred, in bed     Mental Status Exam:   Orientation:  To person, Place, Time and Situation  Memory: Recent and remote memory Intact but patient notes occasional difficulty.  Mood/Affect: Depressed, Labile and Agitated  Suicidal Ideations: Suicidal Ideation, Suicidal plan and Death wish  Homicidal Ideations:  None  Hallucinations: None  Delusions:  None  Obsessions: None  Behavior and Psychomotor Activity: Appropriate  Speech:  Rambling  Thought Process: Disorganized, Flight " of ideas and Rapid  Associations: Intact  Thought Content: Normal  Language: name objects and repeat phrases  Concentration and computation: Poor  Attention Span: Fair  Fund of Knowledge: Fair  Reliability: fair  Insight into mental health: Fair  Judgement: Impaired  Impulse Control:  Impaired  Hygiene: fair  Cooperation:  Cooperative  Eye Contact:  Good    Medications and allergies reviewed.    Lab Results   Component Value Date    GLUCOSE 189 (H) 08/06/2021    CALCIUM 8.6 08/06/2021     08/06/2021    K 4.3 08/06/2021    CO2 28.0 08/06/2021     08/06/2021    BUN 16 08/06/2021    CREATININE 1.23 08/06/2021    EGFRIFNONA 61 08/06/2021    BCR 13.0 08/06/2021    ANIONGAP 6.0 08/06/2021       Last Urine Toxicity     LAST URINE TOXICITY RESULTS Latest Ref Rng & Units 8/2/2021 5/16/2019    CREATININE UR mg/dL - 261.1    AMPHETAMINES SCREEN, URINE NEGATIVE - NEGATIVE    BARBITURATES SCREEN Negative Negative NEGATIVE    BENZODIAZEPINE SCREEN, URINE Negative Negative NEGATIVE    COCAINE SCREEN, URINE Negative Negative NEGATIVE    METHADONE SCREEN, URINE Negative Negative NEGATIVE          No results found for: PHENYTOIN, PHENOBARB, VALPROATE, CBMZ    Lab Results   Component Value Date     08/06/2021    BUN 16 08/06/2021    CREATININE 1.23 08/06/2021    TSH 4.810 (H) 08/02/2021    WBC 9.00 08/05/2021       Brief Urine Lab Results  (Last result in the past 365 days)      Color   Clarity   Blood   Leuk Est   Nitrite   Protein   CREAT   Urine HCG        08/02/21 1611 Yellow Clear Negative Negative Negative Negative               Assessment/Plan       Cerebrovascular accident (CVA) (CMS/[facility])    Benign essential hypertension    Chronic coronary artery disease    Chronic renal insufficiency, stage III (moderate) (CMS/[facility])    Depression    Diabetic peripheral neuropathy (CMS/HCC)    Gastroesophageal reflux disease    History of pulmonary embolism    Mixed hyperlipidemia    Acquired hypothyroidism    Obstructive  "sleep apnea syndrome    Peripheral vascular disease (CMS/HCC)    Type 2 diabetes mellitus with peripheral vascular disease (CMS/HCC)    Dysphagia       LABS: Reviewed.    Assessment:  Major Depression Disorder; Severe  ADHD    Medical Decision Making: The patient has a history of \"anger\" but does not meet criteria for bipolar disorder, and bipolar disorder was not indicated with use of the MDQ (Mood Disorder Questionnaire). PHQ-9=20 is indicative of severe major depression, and while the patient admits to being on medication in the past, he also admits to stopping the medication because he no longer thought he needed it. Due to multiple metabolic comorbidities, including severe uncontrolled diabetes, antipsychotics are too great of a risk until other therapy options have been eliminated as possibilities.    Treatment Plan:  -Initiate cross taper to switch antidepressants since the patient has been on Cymbalta for over 4 months without efficacy and there is remaining severe depression:  -Initiate 50mg Sertraline for treatment of severe MDD.  -Decrease Duloxetine to 40mg po daily, but PCP may possibly keep at an even lower dose for diabetic neuropathy if desired. Careful consideration of serotonin syndrome should be considered in this case. However, the current dosage of 60mg duloxetine is not providing benefit for his depression, and should no longer be used for depression.  -Placement in a psychiatric inpatient facility for management of his MDD and acute suicidal ideations with stated sporadic plans is recommended at this time.  -Social work was asked to help find him inpatient placement and outpatient therapy resources for after his inpatient management.  -Genesight testing in the outpatient setting was discussed with the patient as a next step if Sertraline therapy is found to be ineffective.    Treatment Plan discussed with: Patient    I discussed the patients findings and my recommendations with patient and " nursing staff    75 minutes was spent evaluating and counseling the patient.    I have reviewed and approved the behavioral health treatment plans and problem list. Yes  Thank you for the consult  Referring MD has access to consult report and progress notes in EMR  Patient was examined wearing appropriate PPE.    Porfirio Knowles PA-C  08/08/21  18:00 EDT    EMR Dragon transcription disclaimer:  Some of this encounter note is an electronic transcription translation of spoken language to printed text. The electronic translation of spoken language may permit erroneous, or at times, nonsensical words or phrases to be inadvertently transcribed; Although I have reviewed the note for such errors some may still exist.

## 2021-08-08 NOTE — PLAN OF CARE
Goal Outcome Evaluation:            Patient remains a falls risk. Patient encouraged to turn Q2. Patient verbalized suicidal thoughts to Charge CATA Singh during rounds, SI precautions were initiated. Dr. Lazaro was notified. Psych consult ordered. 24 hour hold initiated and sitter at bedside. Vital stable, will continue to monitor.     Problem: Adult Inpatient Plan of Care  Goal: Plan of Care Review  Outcome: Ongoing, Progressing  Flowsheets (Taken 8/6/2021 1559 by Padmini Winter, RN)  Plan of Care Reviewed With: patient  Goal: Patient-Specific Goal (Individualized)  Outcome: Ongoing, Progressing  Goal: Absence of Hospital-Acquired Illness or Injury  Outcome: Ongoing, Progressing  Intervention: Identify and Manage Fall Risk  Recent Flowsheet Documentation  Taken 8/8/2021 1400 by Venus Sandy, RN  Safety Promotion/Fall Prevention:   activity supervised   assistive device/personal items within reach   clutter free environment maintained   fall prevention program maintained   lighting adjusted   nonskid shoes/slippers when out of bed   room organization consistent   safety round/check completed  Taken 8/8/2021 1200 by Venus Sandy, RN  Safety Promotion/Fall Prevention:   activity supervised   assistive device/personal items within reach   clutter free environment maintained   fall prevention program maintained   lighting adjusted   nonskid shoes/slippers when out of bed   room organization consistent   safety round/check completed  Taken 8/8/2021 1100 by Venus Sandy, RN  Safety Promotion/Fall Prevention:   activity supervised   assistive device/personal items within reach   clutter free environment maintained   fall prevention program maintained   lighting adjusted   nonskid shoes/slippers when out of bed   room organization consistent   safety round/check completed  Taken 8/8/2021 1000 by Venus Sandy, RN  Safety Promotion/Fall Prevention:   activity supervised   assistive device/personal items within  reach   clutter free environment maintained   fall prevention program maintained   lighting adjusted   nonskid shoes/slippers when out of bed   room organization consistent   safety round/check completed  Taken 8/8/2021 0800 by Venus Sandy RN  Safety Promotion/Fall Prevention:   activity supervised   assistive device/personal items within reach   clutter free environment maintained   fall prevention program maintained   lighting adjusted   nonskid shoes/slippers when out of bed   room organization consistent   safety round/check completed  Intervention: Prevent Skin Injury  Recent Flowsheet Documentation  Taken 8/8/2021 1100 by Venus Sandy RN  Body Position: position changed independently  Skin Protection:   adhesive use limited   incontinence pads utilized   transparent dressing maintained   tubing/devices free from skin contact  Taken 8/8/2021 0800 by Venus Sandy RN  Body Position: position changed independently  Skin Protection:   adhesive use limited   incontinence pads utilized   transparent dressing maintained   tubing/devices free from skin contact  Intervention: Prevent and Manage VTE (venous thromboembolism) Risk  Recent Flowsheet Documentation  Taken 8/8/2021 0800 by Venus Sandy RN  VTE Prevention/Management: (eliquis) other (see comments)  Intervention: Prevent Infection  Recent Flowsheet Documentation  Taken 8/8/2021 1400 by Venus Sandy RN  Infection Prevention:   personal protective equipment utilized   hand hygiene promoted  Taken 8/8/2021 1200 by Venus Sandy RN  Infection Prevention:   hand hygiene promoted   personal protective equipment utilized  Taken 8/8/2021 1100 by Venus Sandy RN  Infection Prevention:   personal protective equipment utilized   hand hygiene promoted  Taken 8/8/2021 1000 by Venus Sandy RN  Infection Prevention:   personal protective equipment utilized   hand hygiene promoted  Taken 8/8/2021 0800 by Venus Sandy RN  Infection  Prevention:   personal protective equipment utilized   hand hygiene promoted  Goal: Optimal Comfort and Wellbeing  Outcome: Ongoing, Progressing  Intervention: Provide Person-Centered Care  Recent Flowsheet Documentation  Taken 8/8/2021 1100 by Venus Sandy RN  Trust Relationship/Rapport:   care explained   choices provided   emotional support provided   questions answered   questions encouraged   thoughts/feelings acknowledged  Taken 8/8/2021 0800 by Venus Sandy RN  Trust Relationship/Rapport:   care explained   choices provided   emotional support provided   empathic listening provided   questions answered   questions encouraged   thoughts/feelings acknowledged  Goal: Readiness for Transition of Care  Outcome: Ongoing, Progressing     Problem: Fall Injury Risk  Goal: Absence of Fall and Fall-Related Injury  Outcome: Ongoing, Progressing  Intervention: Identify and Manage Contributors to Fall Injury Risk  Recent Flowsheet Documentation  Taken 8/8/2021 1400 by Venus Sandy RN  Medication Review/Management:   medications reviewed   high-risk medications identified  Taken 8/8/2021 1200 by Venus Sandy RN  Medication Review/Management:   medications reviewed   high-risk medications identified  Taken 8/8/2021 1100 by Venus Sandy RN  Medication Review/Management:   medications reviewed   high-risk medications identified  Self-Care Promotion: independence encouraged  Taken 8/8/2021 1000 by Venus Sandy RN  Medication Review/Management:   medications reviewed   high-risk medications identified  Taken 8/8/2021 0800 by Venus Sandy RN  Medication Review/Management:   medications reviewed   high-risk medications identified  Self-Care Promotion: independence encouraged  Intervention: Promote Injury-Free Environment  Recent Flowsheet Documentation  Taken 8/8/2021 1400 by Venus Sandy RN  Safety Promotion/Fall Prevention:   activity supervised   assistive device/personal items within reach    clutter free environment maintained   fall prevention program maintained   lighting adjusted   nonskid shoes/slippers when out of bed   room organization consistent   safety round/check completed  Taken 8/8/2021 1200 by Venus Sandy RN  Safety Promotion/Fall Prevention:   activity supervised   assistive device/personal items within reach   clutter free environment maintained   fall prevention program maintained   lighting adjusted   nonskid shoes/slippers when out of bed   room organization consistent   safety round/check completed  Taken 8/8/2021 1100 by Venus Sandy RN  Safety Promotion/Fall Prevention:   activity supervised   assistive device/personal items within reach   clutter free environment maintained   fall prevention program maintained   lighting adjusted   nonskid shoes/slippers when out of bed   room organization consistent   safety round/check completed  Taken 8/8/2021 1000 by Venus Sandy RN  Safety Promotion/Fall Prevention:   activity supervised   assistive device/personal items within reach   clutter free environment maintained   fall prevention program maintained   lighting adjusted   nonskid shoes/slippers when out of bed   room organization consistent   safety round/check completed  Taken 8/8/2021 0800 by Venus Sandy RN  Safety Promotion/Fall Prevention:   activity supervised   assistive device/personal items within reach   clutter free environment maintained   fall prevention program maintained   lighting adjusted   nonskid shoes/slippers when out of bed   room organization consistent   safety round/check completed     Problem: Adjustment to Illness (Stroke, Ischemic/Transient Ischemic Attack)  Goal: Optimal Coping  Outcome: Ongoing, Progressing  Intervention: Support Patient/Family Psychosocial Response to Stroke  Recent Flowsheet Documentation  Taken 8/8/2021 1100 by Venus Sandy RN  Supportive Measures:   active listening utilized   self-care encouraged  Family/Support  System Care:   self-care encouraged   support provided  Taken 8/8/2021 0800 by Venus Sandy RN  Supportive Measures:   active listening utilized   self-care encouraged  Family/Support System Care:   self-care encouraged   support provided     Problem: Bowel Elimination Impaired (Stroke, Ischemic/Transient Ischemic Attack)  Goal: Effective Bowel Elimination  Outcome: Ongoing, Progressing     Problem: Cerebral Tissue Perfusion Risk (Stroke, Ischemic/Transient Ischemic Attack)  Goal: Optimal Cerebral Tissue Perfusion  Outcome: Ongoing, Progressing  Intervention: Protect and Optimize Cerebral Perfusion  Recent Flowsheet Documentation  Taken 8/8/2021 1100 by Venus Sandy RN  Sensory Stimulation Regulation: care clustered  Cerebral Perfusion Promotion: blood pressure monitored  Taken 8/8/2021 0800 by Venus Sandy RN  Sensory Stimulation Regulation: care clustered  Cerebral Perfusion Promotion: blood pressure monitored  Intervention: Optimize Oxygenation and Ventilation  Recent Flowsheet Documentation  Taken 8/8/2021 1100 by Venus Sandy RN  Head of Bed (HOB): HOB elevated  Taken 8/8/2021 0800 by Venus Sandy RN  Head of Bed (HOB): HOB elevated     Problem: Communication Impairment (Stroke, Ischemic/Transient Ischemic Attack)  Goal: Improved Communication Skills  Outcome: Ongoing, Progressing  Intervention: Optimize Cognitive and Communication Skills  Recent Flowsheet Documentation  Taken 8/8/2021 1100 by Venus Sandy RN  Reorientation Measures: clock in view  Communication Enhancement Strategies: call light answered in person  Taken 8/8/2021 0800 by Venus Sandy RN  Reorientation Measures: clock in view  Environment Familiarity/Consistency: daily routine followed  Communication Enhancement Strategies: call light answered in person     Problem: Eating/Swallowing Impairment (Stroke, Ischemic/Transient Ischemic Attack)  Goal: Oral Intake without Aspiration  Outcome: Ongoing,  Progressing  Intervention: Optimize Eating and Swallowing  Recent Flowsheet Documentation  Taken 8/8/2021 1100 by Venus Sandy RN  Aspiration Precautions: awake/alert before oral intake  Taken 8/8/2021 0800 by Venus Sandy RN  Aspiration Precautions: awake/alert before oral intake     Problem: Functional Ability Impaired (Stroke, Ischemic/Transient Ischemic Attack)  Goal: Optimal Functional Ability  Outcome: Ongoing, Progressing  Intervention: Optimize Functional Ability  Recent Flowsheet Documentation  Taken 8/8/2021 1100 by Venus Sandy RN  Activity Management:   activity adjusted per tolerance   activity encouraged   ambulated in room  Self-Care Promotion: independence encouraged  Taken 8/8/2021 0800 by Venus Sandy RN  Activity Management:   activity adjusted per tolerance   activity encouraged   ambulated in room  Self-Care Promotion: independence encouraged     Problem: Hemodynamic Instability (Stroke, Ischemic/Transient Ischemic Attack)  Goal: Vital Signs Remain in Desired Range  Outcome: Ongoing, Progressing  Intervention: Optimize Blood Flow  Recent Flowsheet Documentation  Taken 8/8/2021 1100 by Venus Sandy RN  Fluid/Electrolyte Management: fluids provided  Taken 8/8/2021 0800 by Venus Sandy RN  Fluid/Electrolyte Management: fluids provided     Problem: Pain (Stroke, Ischemic/Transient Ischemic Attack)  Goal: Acceptable Pain Control  Outcome: Ongoing, Progressing     Problem: Sensorimotor Impairment (Stroke, Ischemic/Transient Ischemic Attack)  Goal: Improved Sensorimotor Function  Outcome: Ongoing, Progressing  Intervention: Optimize Range of Motion, Motor Control and Function  Recent Flowsheet Documentation  Taken 8/8/2021 1100 by Venus Sandy RN  Positioning/Transfer Devices:   pillows   in use  Taken 8/8/2021 0800 by Venus Sandy RN  Positioning/Transfer Devices:   pillows   in use  Intervention: Optimize Sensory and Perceptual Abilities  Recent Flowsheet  Documentation  Taken 8/8/2021 1100 by Venus Sandy RN  Pressure Reduction Techniques:   frequent weight shift encouraged   weight shift assistance provided  Pressure Reduction Devices:   pressure-redistributing mattress utilized   positioning supports utilized  Taken 8/8/2021 0800 by Venus Sandy RN  Pressure Reduction Techniques:   frequent weight shift encouraged   weight shift assistance provided  Pressure Reduction Devices:   positioning supports utilized   pressure-redistributing mattress utilized     Problem: Urinary Elimination Impaired (Stroke, Ischemic/Transient Ischemic Attack)  Goal: Effective Urinary Elimination  Outcome: Ongoing, Progressing  Intervention: Promote Effective Bladder Elimination  Recent Flowsheet Documentation  Taken 8/8/2021 1100 by Venus Sandy RN  Urinary Elimination Promotion: absorbent pad/diaper use encouraged  Taken 8/8/2021 0800 by Venus Sandy, RN  Urinary Elimination Promotion: absorbent pad/diaper use encouraged

## 2021-08-09 ENCOUNTER — HOME CARE VISIT (OUTPATIENT)
Dept: HOME HEALTH SERVICES | Facility: HOME HEALTHCARE | Age: 56
End: 2021-08-09

## 2021-08-09 LAB
GLUCOSE BLDC GLUCOMTR-MCNC: 130 MG/DL (ref 70–105)
GLUCOSE BLDC GLUCOMTR-MCNC: 150 MG/DL (ref 70–105)
GLUCOSE BLDC GLUCOMTR-MCNC: 162 MG/DL (ref 70–105)
GLUCOSE BLDC GLUCOMTR-MCNC: 190 MG/DL (ref 70–105)
LAB AP CASE REPORT: NORMAL
PATH REPORT.FINAL DX SPEC: NORMAL
PATH REPORT.GROSS SPEC: NORMAL

## 2021-08-09 PROCEDURE — 63710000001 INSULIN LISPRO (HUMAN) PER 5 UNITS: Performed by: INTERNAL MEDICINE

## 2021-08-09 PROCEDURE — 97535 SELF CARE MNGMENT TRAINING: CPT

## 2021-08-09 PROCEDURE — 99232 SBSQ HOSP IP/OBS MODERATE 35: CPT | Performed by: PHYSICIAN ASSISTANT

## 2021-08-09 PROCEDURE — 63710000001 INSULIN GLARGINE PER 5 UNITS: Performed by: INTERNAL MEDICINE

## 2021-08-09 PROCEDURE — 82962 GLUCOSE BLOOD TEST: CPT

## 2021-08-09 PROCEDURE — 99233 SBSQ HOSP IP/OBS HIGH 50: CPT | Performed by: INTERNAL MEDICINE

## 2021-08-09 PROCEDURE — 99232 SBSQ HOSP IP/OBS MODERATE 35: CPT | Performed by: INTERNAL MEDICINE

## 2021-08-09 PROCEDURE — 97116 GAIT TRAINING THERAPY: CPT

## 2021-08-09 RX ADMIN — INSULIN LISPRO 14 UNITS: 100 INJECTION, SOLUTION INTRAVENOUS; SUBCUTANEOUS at 17:37

## 2021-08-09 RX ADMIN — MICONAZOLE NITRATE: 20 POWDER TOPICAL at 21:09

## 2021-08-09 RX ADMIN — DULOXETINE HYDROCHLORIDE 40 MG: 20 CAPSULE, DELAYED RELEASE ORAL at 06:22

## 2021-08-09 RX ADMIN — INSULIN GLARGINE 40 UNITS: 100 INJECTION, SOLUTION SUBCUTANEOUS at 21:03

## 2021-08-09 RX ADMIN — PANTOPRAZOLE SODIUM 40 MG: 40 TABLET, DELAYED RELEASE ORAL at 08:57

## 2021-08-09 RX ADMIN — APIXABAN 5 MG: 5 TABLET, FILM COATED ORAL at 21:03

## 2021-08-09 RX ADMIN — INSULIN LISPRO 14 UNITS: 100 INJECTION, SOLUTION INTRAVENOUS; SUBCUTANEOUS at 08:58

## 2021-08-09 RX ADMIN — INSULIN LISPRO 14 UNITS: 100 INJECTION, SOLUTION INTRAVENOUS; SUBCUTANEOUS at 13:28

## 2021-08-09 RX ADMIN — Medication 3 ML: at 08:58

## 2021-08-09 RX ADMIN — ATORVASTATIN CALCIUM 80 MG: 40 TABLET, FILM COATED ORAL at 21:03

## 2021-08-09 RX ADMIN — GABAPENTIN 400 MG: 400 CAPSULE ORAL at 15:50

## 2021-08-09 RX ADMIN — ASPIRIN 81 MG: 81 TABLET, COATED ORAL at 09:44

## 2021-08-09 RX ADMIN — PANTOPRAZOLE SODIUM 40 MG: 40 TABLET, DELAYED RELEASE ORAL at 06:22

## 2021-08-09 RX ADMIN — SERTRALINE 50 MG: 50 TABLET, FILM COATED ORAL at 08:58

## 2021-08-09 RX ADMIN — LEVOTHYROXINE SODIUM 100 MCG: 0.1 TABLET ORAL at 05:33

## 2021-08-09 RX ADMIN — METOPROLOL SUCCINATE 50 MG: 50 TABLET, EXTENDED RELEASE ORAL at 08:58

## 2021-08-09 RX ADMIN — APIXABAN 5 MG: 5 TABLET, FILM COATED ORAL at 08:58

## 2021-08-09 RX ADMIN — Medication 5 MG: at 21:09

## 2021-08-09 RX ADMIN — MICONAZOLE NITRATE: 20 POWDER TOPICAL at 09:44

## 2021-08-09 RX ADMIN — HYDROCODONE BITARTRATE AND ACETAMINOPHEN 1 TABLET: 5; 325 TABLET ORAL at 15:50

## 2021-08-09 RX ADMIN — GABAPENTIN 400 MG: 400 CAPSULE ORAL at 08:58

## 2021-08-09 RX ADMIN — GABAPENTIN 400 MG: 400 CAPSULE ORAL at 21:03

## 2021-08-09 RX ADMIN — Medication 3 ML: at 21:03

## 2021-08-09 RX ADMIN — HYDROCODONE BITARTRATE AND ACETAMINOPHEN 1 TABLET: 5; 325 TABLET ORAL at 09:04

## 2021-08-09 NOTE — THERAPY TREATMENT NOTE
Subjective: Pt agreeable to therapeutic plan of care.  Cognition: oriented to Person, Place, Time and Situation    Objective:     Bed Mobility: Modified-Independent  Functional Transfers: SBA  Functional Ambulation: SBA and with rolling walker    Grooming: SBA  ADL Position: supported standing  ADL Comments: oral hygiene       Pain: 0 VAS  Education: Provided education on importance of mobility and skilled verbal / tactile cueing throughout intervention.     Assessment: Kuldeep Adhikari presents with ADL impairments below baseline abilities which indicate the need for continued skilled intervention while inpatient. Pt required SBA for oral hygiene standing at sink. Pt very loquacious and required reminders to stay on task. Tolerating session today without incident. Will continue to follow and progress as tolerated.     Plan/Recommendations:   Pt would benefit from Home with family assist at discharge from facility.   Pt desires Home with family assist at discharge. Pt cooperative; agreeable to therapeutic recommendations and plan of care.     Modified [place]: 2 = Slight disability (Able to look after own affairs without assistance, but unable to carry out all previous activities )2    Post-Tx Position: Supine with HOB Elevated, Staff Present and Call light and personal items within reach  PPE: gloves, surgical mask, eyewear protection

## 2021-08-09 NOTE — SIGNIFICANT NOTE
08/09/21 1400   Coping/Psychosocial   Observed Emotional State anxious;calm;cooperative   Verbalized Emotional State anxiety;frustration;loneliness;powerlessness   Trust Relationship/Rapport emotional support provided;empathic listening provided;thoughts/feelings acknowledged   Family/Support Persons spouse   Involvement in Care not present at bedside   Diversional Activities television   Additional Documentation Spiritual Care (Group)   Spiritual Care   Spiritual Care Visit Type initial   Spiritual Care Source patient request (describe)  (consult)   Receptivity to Spiritual Care visit welcomed   Spiritual Care Request coping/stress of illness support;mood/anxiety support;prayer support;spiritual/moral support   Spiritual Care Interventions prayer support provided;supportive conversation provided;theological discussion provided;other (see comments)  (called family member to pass on room information)   Response to Spiritual Care emotion expressed;engaged in conversation;receptive of support;thanks expressed;visit helpful   Use of Spiritual Resources prayer;spiritual issues, indicated struggles;spiritual support, lacking support network   Spiritual Care Follow-Up follow-up, none required as presently assessed   Coping/Psychosocial Interventions   Supportive Measures active listening utilized;positive reinforcement provided;verbalization of feelings encouraged     Pt consulted  because he was feeling lonely and disconnected. He has no [place] community to offer support, yet he believes in God and prays. Pt moved from [facility] to [facility] after a sudden outburst of anger and his ability to do self harm (which he says he would not do). His outburst stemmed from his spouse's desire to not visit him in the hospital. He feels alone, rejected, and angry. He does not drive and is dependant on others to get around. Because of the situation, he will go to [facility] from here and hopes to find some healing.  asked if  believed that God is present with him, even in his struggles, and he says yes. He says that God is even using the  to listen to him and let him talk.  reiterated that God sees and hears him and so does the . After prayer, he fist-bumped the , thanking him for his time and presence. There were no other needs and no further plan of care.

## 2021-08-09 NOTE — DISCHARGE PLACEMENT REQUEST
"**Re: Kuldeep Rangel. Please review psych note dated 8/8.    Ching Townsend, [facility], LSW    Office: [phone]  Cell: [phone]  Fax: [phone]  E-mail: [email or web address]      Kuldeep Rangel (56 y.o. Male)     Date of Birth Social Security Number Address Home Phone MRN    [id number]  [id number]  [place] [address] [phone] [id number]    Confucianist Marital Status          Samaritan        Admission Date Admission Type Admitting Provider Attending Provider Department, Room/Bed    8/2/21 Emergency Kathy He MD Olisa, Charles O, MD [facility] 2B MEDICAL INPATIENT, 228/1    Discharge Date Discharge Disposition Discharge Destination                       Attending Provider: Behzad Plaza MD    Allergies: Lisinopril    Isolation: None   Infection: None   Code Status: CPR    Ht: 172.7 cm (68\")   Wt: 88 kg (194 lb 0.1 oz)    Admission Cmt: None   Principal Problem: Cerebrovascular accident (CVA) (CMS/[facility]) [I63.9]                 Active Insurance as of 8/2/2021     Primary Coverage     Payor Plan Insurance Group Employer/Plan Group    HUMANA MEDICARE REPLACEMENT HUMANA MEDICARE REPLACEMENT [id number]     Payor Plan Address Payor Plan Phone Number Payor Plan Fax Number Effective Dates    [address] [address] [phone]  1/1/2019 - None Entered    [facility] [address]       Subscriber Name Subscriber Birth Date Member ID       KULDEEP RANGEL 1965 [id number]           Secondary Coverage     Payor Plan Insurance Group Employer/Plan Group    [place]      Payor Plan Address Payor Plan Phone Number Payor Plan Fax Number Effective Dates    [address]   1/1/2019 - None Entered    [place] [address]       Subscriber Name Subscriber Birth Date Member ID       KULDEEP RANGEL 1965 [id number]                 Emergency Contacts      (Rel.) Home Phone Work Phone Mobile Phone    DIGNA RANGEL (Spouse) [phone] " "-- --    POOJA RANGEL (Brother) -- -- [phone]    SHANE RANGEL (Brother) -- [phone]               Consult Notes (last 24 hours) (Notes from 08/08/21 1010 through 08/09/21 1010)      Porfirio Knowles PA-C at 08/08/21 1800      Consult Orders    1. Inpatient Psychiatrist Consult [[id number]] ordered by Endy Lazaro DO at 08/08/21 1115                 Referring Provider: Dr. Endy Lazaro MD  Reason for Consultation: Suicidal thoughts      Chief complaint: Wife not picking him up from the hospital.    Subjective     History of present illness:  The patient is a 56 y.o. male who was admitted secondary to a small CVA, for which he has not yet noticed any deficits. Psychiatry was consulted due to suicidal thoughts that occurred today, along with plans to use the syringe in this room to put an air embolus into his IV. The patient admits to suicidal thoughts prior to hospitalization, and states he knows the drill for removing everything that could be dangerous from his room because he has \"been through this before.\" His wife has not been coming to see him, would not bring him the candy he requested, and didn't want to pick him up from the hospital, which is what made his SI occur today. He reports marriage difficulty for at least 2 years, and is upset that she wants to get him his own place so he can move out of their house, and she frequently brings up divorce to him in arguments. 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 The patient is unsure of which psychiatric medications he has tried in the past, but believes they were antidepressants. He knows the duloxetine he has now been on for over 2 months without noticeable improvement. The patient denies HI or AVH now or in the " "past.    Review of Systems   Pertinent items are noted in HPI, all other systems reviewed and negative    History    Past psychiatric history: Patient denies, but he admits to seeing [facility] when he was there for a \"few hours\" before being admitted elsewhere in the hospital for complications arising from his amputated leg secondary to DM2. He admits to a diagnosis of ADHD, for which he was treated with Ritalin in childhood, but has not used the medication in recent years. The patient also knows he has tried other medications in the past for his depression, but he cannot remember their names. The medical record shows Fluoxetine was most recently tried prior to duloxetine therapy initiation.  -Psychiatric Hospitalizations: None  -Suicide Attempts: None    Past Medical History:   Diagnosis Date   • CKD (chronic kidney disease), stage III (CMS/[facility])    • Depression    • DJD (degenerative joint disease)    • DVT (deep venous thrombosis) (CMS/[facility])    • Ganglion     rt wrist   • GERD (gastroesophageal reflux disease)    • Hiatal hernia    • History of echocardiogram 04/2016    2D ECHO   • History of esophageal stricture     s/p dialation 40-50 times last EGD 04/2016   • History of pulmonary embolus (PE)     on long term anticoagulation   • Hyperlipidemia    • Hypertension    • Hypothyroidism    • IBS (irritable bowel syndrome)    • Neuropathy    • Rash     rt lower hip   • Retinopathy    • Seasonal allergies    • Sleep apnea     cpap  bring dos   • Type 2 diabetes mellitus with peripheral vascular disease (CMS/[facility])    • Vitamin D deficiency           Family History   Problem Relation Age of Onset   • Diabetes Mother    • Heart disease Mother    • Leukemia Father    • Sleep apnea Maternal Aunt         GENO   • Stroke Maternal Grandmother    • Hypertension Other    • Hyperlipidemia Other    • Cancer Other    • Colon cancer Other         uncle        Social History     Tobacco Use   • Smoking status: Never " Smoker   • Smokeless tobacco: Never Used   Vaping Use   • Vaping Use: Never used   Substance Use Topics   • Alcohol use: No   • Drug use: No          Medications Prior to Admission   Medication Sig Dispense Refill Last Dose   • atorvastatin (LIPITOR) 40 MG tablet TAKE 1 TABLET EVERY DAY 90 tablet 0 8/1/2021 at Unknown time   • DULoxetine (CYMBALTA) 60 MG capsule TAKE 1 CAPSULE EVERY MORNING 90 capsule 0 8/1/2021 at Unknown time   • Eliquis 5 MG tablet tablet TAKE 1 TABLET TWICE DAILY 180 tablet 0 8/1/2021 at Unknown time   • empagliflozin (Jardiance) 10 MG tablet tablet Take 1 tablet by mouth Daily. 90 tablet 4 8/1/2021 at Unknown time   • gabapentin (NEURONTIN) 400 MG capsule Take 400 mg by mouth 3 (Three) Times a Day.   8/1/2021 at Unknown time   • insulin aspart (NovoLOG FlexPen) 100 UNIT/ML solution pen-injector sc pen Inject 10 Units under the skin into the appropriate area as directed 3 (Three) Times a Day With Meals. (Patient taking differently: Inject 14 Units under the skin into the appropriate area as directed 3 (Three) Times a Day With Meals.) 15 mL 6 8/1/2021 at Unknown time   • levothyroxine (SYNTHROID, LEVOTHROID) 100 MCG tablet TAKE 1 TABLET EVERY DAY (Patient taking differently: Take 100 mcg by mouth Daily.) 90 tablet 3 8/1/2021 at Unknown time   • losartan (COZAAR) 50 MG tablet TAKE 1 TABLET EVERY DAY 90 tablet 0 8/1/2021 at Unknown time   • omeprazole (priLOSEC) 40 MG capsule Take 40 mg by mouth Daily. Take DOS   8/1/2021 at Unknown time   • [DISCONTINUED] Insulin Glargine (Lantus SoloStar) 100 UNIT/ML injection pen INJECT SUBCUTANEOUSLY 27 UNITS EVERY BEDTIME (NEEDS APPOINTMENT) (Patient taking differently: Inject 34 Units under the skin into the appropriate area as directed Every Night.) 30 mL 6 8/1/2021 at Unknown time   • metoprolol succinate XL (TOPROL-XL) 50 MG 24 hr tablet TAKE 1 TABLET EVERY DAY (Patient taking differently: Take 50 mg by mouth Daily.) 30 tablet 0    • ondansetron (ZOFRAN) 4  "MG tablet TAKE 1 TABLET EVERY 8 HOURS AS NEEDED FOR NAUSEA OR VOMITING. (Patient taking differently: Take 8 mg by mouth Every 8 (Eight) Hours As Needed for Nausea or Vomiting.) 45 tablet 0         Scheduled Meds:  apixaban, 5 mg, Oral, Q12H  aspirin, 81 mg, Oral, Daily  atorvastatin, 80 mg, Oral, Nightly  cyanocobalamin, 1,000 mcg, Intramuscular, Q28 Days  DULoxetine, 60 mg, Oral, QAM  gabapentin, 400 mg, Oral, TID  insulin glargine, 40 Units, Subcutaneous, Nightly  insulin lispro, 14 Units, Subcutaneous, TID With Meals  levothyroxine, 100 mcg, Oral, Q AM  metoprolol succinate XL, 50 mg, Oral, Daily  miconazole, , Topical, Q12H  pantoprazole, 40 mg, Oral, QAM  sodium chloride, 3 mL, Intravenous, Q12H         Continuous Infusions:       PRN Meds:  •  acetaminophen **OR** acetaminophen  •  bisacodyl  •  dextrose  •  dextrose  •  glucagon (human recombinant)  •  HYDROcodone-acetaminophen  •  ipratropium-albuterol  •  melatonin  •  ondansetron **OR** ondansetron  •  [COMPLETED] Insert peripheral IV **AND** sodium chloride  •  sodium chloride      Allergies:  Lisinopril    Objective       Physical Exam:    Vital Signs:   /86 (BP Location: Right arm, Patient Position: Lying)   Pulse 65   Temp 97.6 °F (36.4 °C) (Axillary)   Resp 20   Ht 172.7 cm (68\")   Wt 90 kg (198 lb 6.6 oz)   SpO2 99%   BMI 30.17 kg/m²     General Appearance:    Well-developed, well-nourished, and depressed.   Head:    Normocephalic, without obvious deformity, atraumatic.   Eyes:            Lids and lashes normal, conjunctivae and sclerae normal, no   icterus, no pallor, corneas clear.   Skin:  Neurologic:  Musculoskeletal:   No bleeding, bruising or rash.  Cranial nerves 2 - 12 grossly intact, sensation intact.  Muscle strength: grade 5  Muscle tone: Normal  Abnormal Movements: No tremors or abnormal involuntary movements  Gait: Deferred, in bed     Mental Status Exam:   Orientation:  To person, Place, Time and Situation  Memory: Recent " and remote memory Intact but patient notes occasional difficulty.  Mood/Affect: Depressed, Labile and Agitated  Suicidal Ideations: Suicidal Ideation, Suicidal plan and Death wish  Homicidal Ideations:  None  Hallucinations: None  Delusions:  None  Obsessions: None  Behavior and Psychomotor Activity: Appropriate  Speech:  Rambling  Thought Process: Disorganized, Flight of ideas and Rapid  Associations: Intact  Thought Content: Normal  Language: name objects and repeat phrases  Concentration and computation: Poor  Attention Span: Fair  Fund of Knowledge: Fair  Reliability: fair  Insight into mental health: Fair  Judgement: Impaired  Impulse Control:  Impaired  Hygiene: fair  Cooperation:  Cooperative  Eye Contact:  Good    Medications and allergies reviewed.    Lab Results   Component Value Date    GLUCOSE 189 (H) 08/06/2021    CALCIUM 8.6 08/06/2021     08/06/2021    K 4.3 08/06/2021    CO2 28.0 08/06/2021     08/06/2021    BUN 16 08/06/2021    CREATININE 1.23 08/06/2021    EGFRIFNONA 61 08/06/2021    BCR 13.0 08/06/2021    ANIONGAP 6.0 08/06/2021       Last Urine Toxicity     LAST URINE TOXICITY RESULTS Latest Ref Rng & Units 8/2/2021 5/16/2019    CREATININE UR mg/dL - 261.1    AMPHETAMINES SCREEN, URINE NEGATIVE - NEGATIVE    BARBITURATES SCREEN Negative Negative NEGATIVE    BENZODIAZEPINE SCREEN, URINE Negative Negative NEGATIVE    COCAINE SCREEN, URINE Negative Negative NEGATIVE    METHADONE SCREEN, URINE Negative Negative NEGATIVE          No results found for: PHENYTOIN, PHENOBARB, VALPROATE, CBMZ    Lab Results   Component Value Date     08/06/2021    BUN 16 08/06/2021    CREATININE 1.23 08/06/2021    TSH 4.810 (H) 08/02/2021    WBC 9.00 08/05/2021       Brief Urine Lab Results  (Last result in the past 365 days)      Color   Clarity   Blood   Leuk Est   Nitrite   Protein   CREAT   Urine HCG        08/02/21 1611 Yellow Clear Negative Negative Negative Negative               Assessment/Plan  "      Cerebrovascular accident (CVA) (CMS/[facility])    Benign essential hypertension    Chronic coronary artery disease    Chronic renal insufficiency, stage III (moderate) (CMS/[facility])    Depression    Diabetic peripheral neuropathy (CMS/[facility])    Gastroesophageal reflux disease    History of pulmonary embolism    Mixed hyperlipidemia    Acquired hypothyroidism    Obstructive sleep apnea syndrome    Peripheral vascular disease (CMS/[facility])    Type 2 diabetes mellitus with peripheral vascular disease (CMS/[facility])    Dysphagia       LABS: Reviewed.    Assessment:  Major Depression Disorder; Severe  ADHD    Medical Decision Making: The patient has a history of \"anger\" but does not meet criteria for bipolar disorder, and bipolar disorder was not indicated with use of the MDQ (Mood Disorder Questionnaire). PHQ-9=20 is indicative of severe major depression, and while the patient admits to being on medication in the past, he also admits to stopping the medication because he no longer thought he needed it. Due to multiple metabolic comorbidities, including severe uncontrolled diabetes, antipsychotics are too great of a risk until other therapy options have been eliminated as possibilities.    Treatment Plan:  -Initiate cross taper to switch antidepressants since the patient has been on Cymbalta for over 4 months without efficacy and there is remaining severe depression:  -Initiate 50mg Sertraline for treatment of severe MDD.  -Decrease Duloxetine to 40mg po daily, but PCP may possibly keep at an even lower dose for diabetic neuropathy if desired. Careful consideration of serotonin syndrome should be considered in this case. However, the current dosage of 60mg duloxetine is not providing benefit for his depression, and should no longer be used for depression.  -Placement in a psychiatric inpatient facility for management of his MDD and acute suicidal ideations with stated sporadic plans is recommended at this time.  -Social work was asked " to help find him inpatient placement and outpatient therapy resources for after his inpatient management.  -Genesight testing in the outpatient setting was discussed with the patient as a next step if Sertraline therapy is found to be ineffective.    Treatment Plan discussed with: Patient    I discussed the patients findings and my recommendations with patient and nursing staff    75 minutes was spent evaluating and counseling the patient.    I have reviewed and approved the behavioral health treatment plans and problem list. Yes  Thank you for the consult  Referring MD has access to consult report and progress notes in EMR  Patient was examined wearing appropriate PPE.    Porfirio Knowles PA-C  08/08/21  18:00 EDT    EMR Dragon transcription disclaimer:  Some of this encounter note is an electronic transcription translation of spoken language to printed text. The electronic translation of spoken language may permit erroneous, or at times, nonsensical words or phrases to be inadvertently transcribed; Although I have reviewed the note for such errors some may still exist.       Electronically signed by Porfirio Knowles PA-C at 08/09/21 0883

## 2021-08-09 NOTE — PLAN OF CARE
Problem: Adult Inpatient Plan of Care  Goal: Absence of Hospital-Acquired Illness or Injury  Intervention: Identify and Manage Fall Risk  Recent Flowsheet Documentation  Taken 8/9/2021 0110 by Rosalinda Pearce RN  Safety Promotion/Fall Prevention:   assistive device/personal items within reach   clutter free environment maintained   fall prevention program maintained   lighting adjusted   nonskid shoes/slippers when out of bed   room organization consistent   safety round/check completed  Taken 8/8/2021 2338 by Rosalinda Pearce RN  Safety Promotion/Fall Prevention:   assistive device/personal items within reach   clutter free environment maintained   fall prevention program maintained   lighting adjusted   nonskid shoes/slippers when out of bed   room organization consistent   safety round/check completed  Intervention: Prevent Skin Injury  Recent Flowsheet Documentation  Taken 8/9/2021 0110 by Rosalinda Pearce RN  Body Position: position changed independently  Taken 8/8/2021 2338 by Rosalinda Pearce RN  Body Position: position changed independently  Intervention: Prevent Infection  Recent Flowsheet Documentation  Taken 8/9/2021 0110 by Rosalinda Pearce RN  Infection Prevention:   hand hygiene promoted   personal protective equipment utilized  Taken 8/8/2021 2338 by Rosalinda Pearce RN  Infection Prevention:   hand hygiene promoted   personal protective equipment utilized     Problem: Fall Injury Risk  Goal: Absence of Fall and Fall-Related Injury  Intervention: Identify and Manage Contributors to Fall Injury Risk  Recent Flowsheet Documentation  Taken 8/8/2021 2338 by Rosalinda Pearce RN  Medication Review/Management: medications reviewed  Intervention: Promote Injury-Free Environment  Recent Flowsheet Documentation  Taken 8/9/2021 0110 by Rosalinda Pearce RN  Safety Promotion/Fall Prevention:   assistive device/personal items within reach   clutter free environment maintained   fall prevention  program maintained   lighting adjusted   nonskid shoes/slippers when out of bed   room organization consistent   safety round/check completed  Taken 8/8/2021 2338 by Rosalinda Pearce RN  Safety Promotion/Fall Prevention:   assistive device/personal items within reach   clutter free environment maintained   fall prevention program maintained   lighting adjusted   nonskid shoes/slippers when out of bed   room organization consistent   safety round/check completed     Problem: Cerebral Tissue Perfusion Risk (Stroke, Ischemic/Transient Ischemic Attack)  Goal: Optimal Cerebral Tissue Perfusion  Intervention: Optimize Oxygenation and Ventilation  Recent Flowsheet Documentation  Taken 8/9/2021 0110 by Rosalinda Pearce RN  Head of Bed (HOB): HOB elevated     Problem: Functional Ability Impaired (Stroke, Ischemic/Transient Ischemic Attack)  Goal: Optimal Functional Ability  Intervention: Optimize Functional Ability  Recent Flowsheet Documentation  Taken 8/9/2021 0110 by Rosalinda Pearce RN  Activity Management:   activity adjusted per tolerance   activity encouraged  Taken 8/8/2021 2338 by Rosalinda Pearce RN  Activity Management:   activity adjusted per tolerance   activity encouraged     Problem: Sensorimotor Impairment (Stroke, Ischemic/Transient Ischemic Attack)  Goal: Improved Sensorimotor Function  Intervention: Optimize Range of Motion, Motor Control and Function  Recent Flowsheet Documentation  Taken 8/9/2021 0110 by Rosalinda Pearce RN  Positioning/Transfer Devices:   pillows   in use  Taken 8/8/2021 2338 by Rosalinda Pearce RN  Positioning/Transfer Devices:   pillows   in use   Goal Outcome Evaluation:      Pt is a transfer [facility]. Pt was to be Dc'd 8/8. All MD have S/O for pt to D/C. Pt on 24 SI hold at this time. Sitter at bedside. No complaints at this time. Pt is resting in bed with eyes closed. Will continue to monitor.

## 2021-08-09 NOTE — DISCHARGE PLACEMENT REQUEST
"**Please evaluate for inpatient psych stabilization.    DAMARI GrijalvaW, LSW    Office: [phone]  Cell: [phone]  Fax: [phone]  E-mail: [email or web address]**    Kuldeep Rangel (56 y.o. Male)     Date of Birth Social Security Number Address Home Phone MRN    1965  [id number]  [place] [address] [phone] [id number]    Samaritan Marital Status          Samaritan        Admission Date Admission Type Admitting Provider Attending Provider Department, Room/Bed    8/2/21 Emergency Kathy He MD Olisa, Charles O, MD [facility] 2B MEDICAL INPATIENT, 228/1    Discharge Date Discharge Disposition Discharge Destination                       Attending Provider: Behzad Plaza MD    Allergies: Lisinopril    Isolation: None   Infection: None   Code Status: CPR    Ht: 172.7 cm (68\")   Wt: 88 kg (194 lb 0.1 oz)    Admission Cmt: None   Principal Problem: Cerebrovascular accident (CVA) (CMS/[facility]) [I63.9]                 Active Insurance as of 8/2/2021     Primary Coverage     Payor Plan Insurance Group Employer/Plan Group    HUMANA MEDICARE REPLACEMENT HUMANA MEDICARE REPLACEMENT [id number]     Payor Plan Address Payor Plan Phone Number Payor Plan Fax Number Effective Dates    [address] [address] [phone]  1/1/2019 - None Entered    [facility] [phone]       Subscriber Name Subscriber Birth Date Member ID       KULDEEP RANGEL 1965 [id number]           Secondary Coverage     Payor Plan Insurance Group Employer/Plan Group    [place]      Payor Plan Address Payor Plan Phone Number Payor Plan Fax Number Effective Dates    [address]   1/1/2019 - None Entered    [place] [address]       Subscriber Name Subscriber Birth Date Member ID       KULDEEP RANGEL 1965 [id number]                 Emergency Contacts      (Rel.) Home Phone Work Phone Mobile Phone    DIGNA RANGEL (Spouse) [phone] -- -- " "   POOJA ADHIKARI (Brother) -- -- [phone]    SHANE ADHIKARI (Brother) -- [phone]               History & Physical      Jacob Bentley MD at 21 0939              [facility] Medicine Services      Patient Name: Kuldeep Adhikari  : 1965  MRN: [id number]  Primary Care Physician:  Laura Whitaker MD  Date of admission: 2021      Subjective      Chief Complaint: right sided numbness and weakness     History of Present Illness: Kuldeep Adhikari is a 56 y.o. male with PMH of PVD s/p R BKA, DM Type II, PE on eliquis, CAD, HTN, HLD, hypothyroidism, asthma, anxiety, and GERD who presented to [facility] on 2021 right sided numbness and weakness. He stated he went to bed around 11pm in his normal state of health. He woke up around 4:30am and fell. He stated, \"It was like my right leg and arm were gone.\" He was using his right leg prosthesis and fell landing on his right side. He had numbness from the right side of his face all the way down his right arm and right leg. He denied any headache or vision changes. He is concerned that a tick bite might have caused his symptoms. He denied any prior stroke history. He has been taking his home medications including his eliquis     In the ED the patient had right sided weakness and paresthesia.  CT head showed no acute findings.  CTA head/neck showed no significant carotid stenosis, left vertebral artery is diminutive and appears to terminate after the origin of the PICA branch.  This may be due to normal variant anatomy however no prior studies to confirm this is chronic/congenital finding.  Neurology was consulted and patient was admitted to the [facility] for suspected CVA. He was determined not to be a tPA candidate due to unknown onset of symptoms and patient is on eliquis. MRI brain ordered.         Review of Systems   Constitutional: Negative.   HENT: Negative.    Eyes: Negative.    Cardiovascular: " Negative.    Respiratory: Negative.    Endocrine: Negative.    Hematologic/Lymphatic: Negative.    Skin: Negative.    Musculoskeletal: Negative.    Gastrointestinal: Negative.    Genitourinary: Negative.    Neurological: Positive for focal weakness, numbness and paresthesias.        Right side of body numbness and weakness   Psychiatric/Behavioral: Negative.    Allergic/Immunologic: Negative.    All other systems reviewed and are negative.      Personal History     Past Medical History:   Diagnosis Date   • CKD (chronic kidney disease), stage III (CMS/[facility])    • Depression    • DJD (degenerative joint disease)    • DVT (deep venous thrombosis) (CMS/[facility])    • Ganglion     rt wrist   • GERD (gastroesophageal reflux disease)    • Hiatal hernia    • History of echocardiogram 04/2016    2D ECHO   • History of esophageal stricture     s/p dialation 40-50 times last EGD 04/2016   • History of pulmonary embolus (PE)     on long term anticoagulation   • Hyperlipidemia    • Hypertension    • Hypothyroidism    • IBS (irritable bowel syndrome)    • Neuropathy    • Rash     rt lower hip   • Retinopathy    • Seasonal allergies    • Sleep apnea     cpap  bring dos   • Type 2 diabetes mellitus with peripheral vascular disease (CMS/[facility])    • Vitamin D deficiency        Past Surgical History:   Procedure Laterality Date   • AMPUTATION FOOT / TOE Right     great toe   • AMPUTATION REVISION Right 4/8/2021    Procedure: BELOW KNEE AMPUTATION REVISAION;  Surgeon: Eduardo Reynolds MD;  Location: [facility] MAIN OR;  Service: Orthopedics;  Laterality: Right;   • AMPUTATION REVISION Right 4/29/2021    Procedure: AMPUTATION REVISION KNEE STUMP;  Surgeon: Eduardo eRynolds MD;  Location: [facility] MAIN OR;  Service: Orthopedics;  Laterality: Right;   • BELOW KNEE AMPUTATION Right 7/30/2020    Procedure: AMPUTATION BELOW KNEE;  Surgeon: Eduardo Reynolds MD;  Location: [facility] MAIN OR;  Service: Orthopedics;  Laterality: Right;   • CARDIAC CATHETERIZATION   04/2018    [facility]   • ENDOSCOPY     • ENDOSCOPY N/A 10/4/2019    Procedure: ESOPHAGOGASTRODUODENOSCOPY with dilitation and biopsy x 1 area;  Surgeon: Declan Iqbal MD;  Location: [facility] ENDOSCOPY;  Service: Gastroenterology   • ENDOSCOPY N/A 12/13/2019    Procedure: ESOPHAGOGASTRODUODENOSCOPY WITH DILATATION (50, 52 BOUGIE);  Surgeon: Declan Iqbal MD;  Location: [facility] ENDOSCOPY;  Service: Gastroenterology   • GANGLION CYST EXCISION Left    • HERNIA REPAIR Bilateral    • RETINOPATHY SURGERY      laser   • TOTAL HIP ARTHROPLASTY Left    • TOTAL HIP ARTHROPLASTY Left 2018   • TRANS METATARSAL AMPUTATION Right 3/17/2020    Procedure: AMPUTATION TRANS METATARSAL right;  Surgeon: ERWIN Baker DPM;  Location: [facility] MAIN OR;  Service: Podiatry;  Laterality: Right;  GANGRENOUS RIGHT FOOT       Family History: family history includes Cancer in an other family member; Colon cancer in an other family member; Diabetes in his mother; Heart disease in his mother; Hyperlipidemia in an other family member; Hypertension in an other family member; Leukemia in his father; Sleep apnea in his maternal aunt; Stroke in his maternal grandmother. Otherwise pertinent FHx was reviewed and not pertinent to current issue.    Social History:  reports that he has never smoked. He has never used smokeless tobacco. He reports that he does not drink alcohol and does not use drugs.    Home Medications:  Prior to Admission Medications     Prescriptions Last Dose Informant Patient Reported? Taking?    atorvastatin (LIPITOR) 40 MG tablet 8/1/2021 Self No Yes    TAKE 1 TABLET EVERY DAY    DULoxetine (CYMBALTA) 60 MG capsule 8/1/2021 Self No Yes    TAKE 1 CAPSULE EVERY MORNING    Eliquis 5 MG tablet tablet 8/1/2021 Self No Yes    TAKE 1 TABLET TWICE DAILY    empagliflozin (Jardiance) 10 MG tablet tablet 8/1/2021 Self No Yes    Take 1 tablet by mouth Daily.    gabapentin (NEURONTIN) 400 MG capsule 8/1/2021 ZOLTAN Yes Yes    Take 400  mg by mouth 3 (Three) Times a Day.    insulin aspart (NovoLOG FlexPen) 100 UNIT/ML solution pen-injector sc pen 8/1/2021 Self No Yes    Inject 10 Units under the skin into the appropriate area as directed 3 (Three) Times a Day With Meals.    Patient taking differently:  Inject 14 Units under the skin into the appropriate area as directed 3 (Three) Times a Day With Meals.    Insulin Glargine (Lantus SoloStar) 100 UNIT/ML injection pen 8/1/2021 Self No Yes    INJECT SUBCUTANEOUSLY 27 UNITS EVERY BEDTIME (NEEDS APPOINTMENT)    Patient taking differently:  Inject 34 Units under the skin into the appropriate area as directed Every Night.    levothyroxine (SYNTHROID, LEVOTHROID) 100 MCG tablet 8/1/2021 Self No Yes    TAKE 1 TABLET EVERY DAY    Patient taking differently:  Take 100 mcg by mouth Daily.    losartan (COZAAR) 50 MG tablet 8/1/2021 Self No Yes    TAKE 1 TABLET EVERY DAY    omeprazole (priLOSEC) 40 MG capsule 8/1/2021 Self Yes Yes    Take 40 mg by mouth Daily. Take DOS    metoprolol succinate XL (TOPROL-XL) 50 MG 24 hr tablet  Self No No    TAKE 1 TABLET EVERY DAY    Patient taking differently:  Take 50 mg by mouth Daily.    ondansetron (ZOFRAN) 4 MG tablet  Self No No    TAKE 1 TABLET EVERY 8 HOURS AS NEEDED FOR NAUSEA OR VOMITING.    Patient taking differently:  Take 8 mg by mouth Every 8 (Eight) Hours As Needed for Nausea or Vomiting.            Allergies:  Allergies   Allergen Reactions   • Lisinopril Cough       Objective      Vitals:   Temp:  [98.1 °F (36.7 °C)-99.2 °F (37.3 °C)] 98.1 °F (36.7 °C)  Heart Rate:  [] 85  Resp:  [15-18] 18  BP: (133-202)/(67-96) 156/78    Physical Exam  Vitals and nursing note reviewed.   HENT:      Head: Normocephalic and atraumatic.   Eyes:      Extraocular Movements: Extraocular movements intact.      Pupils: Pupils are equal, round, and reactive to light.   Cardiovascular:      Rate and Rhythm: Normal rate and regular rhythm.      Pulses: Normal pulses.      Heart  sounds: Normal heart sounds.   Pulmonary:      Effort: Pulmonary effort is normal.      Breath sounds: Normal breath sounds.   Abdominal:      General: Bowel sounds are normal.      Palpations: Abdomen is soft.      Tenderness: There is no abdominal tenderness.   Musculoskeletal:      Cervical back: Normal range of motion.      Comments: Right arm and right stump with weakness compared to left    Skin:     General: Skin is warm and dry.   Neurological:      Mental Status: He is alert.      Sensory: Sensory deficit present.      Motor: Weakness present.      Comments: Right arm and right leg drift present, right hand weak   Paresthesia of right face, right arm, and right leg   No facial droop or slurred speech   Psychiatric:         Mood and Affect: Mood normal.         Behavior: Behavior normal.         Result Review    Result Review:  I have personally reviewed the results from the time of this admission to 8/2/2021 12:18 EDT and agree with these findings:  [x]  Laboratory  [x]  Microbiology  [x]  Radiology  [x]  EKG/Telemetry   []  Cardiology/Vascular   []  Pathology  [x]  Old records  []  Other:        Assessment/Plan        Active Hospital Problems:  Active Hospital Problems    Diagnosis    • **Cerebrovascular accident (CVA) (CMS/HCC)    • Type 2 diabetes mellitus with peripheral vascular disease (CMS/HCC)    • Chronic coronary artery disease    • Obstructive sleep apnea syndrome    • Benign essential hypertension    • Diabetic peripheral neuropathy (CMS/HCC)    • Chronic renal insufficiency, stage III (moderate) (CMS/HCC)    • Depression    • Gastroesophageal reflux disease    • History of pulmonary embolism    • Mixed hyperlipidemia    • Acquired hypothyroidism    • Peripheral vascular disease (CMS/HCC)      Plan:     CVA  -MRI brain: 12 mm acute lacunar infarct within the posterior limb of the left internal capsule with possible involvement of the posterior left lentiform nucleus  -CT head in ED showed no  acute findings  -CTA head/neck: no significant carotid stenosis, left vertebral artery is diminutive and appears to terminate after the origin of the PICA branch.  This may be due to normal variant anatomy however no prior studies to confirm this is chronic/congenital finding.  -pt with right sided weakness and paresthesia on exam   -neurology consulted  -check stroke labs, 2D echo, PT/OT consulted, neuro checks   -cont home eliquis, statin    PVD s/p R BKA  -has right leg prosthesis     Anticoagulation therapy secondary to history of PE:   -on Eliquis     Diabetes type 2, chronic with peripheral neuropathy  -continue mealtime insulin and lantus, Neurontin  -glucose 150s     CAD  -Continue beta-blocker, statin    Hypertension  -Continue metoprolol, Cozaar     HLD  -Continue Atorvastatin     Anxiety  -Continue Cymbalta     Hypothyroidism  -Continue levothyroxine     GERD  -Continue PPI     Asthma  -Not in exacerbation  -As needed duo nebs    CKD stage III  -creatinine stable at 1.21    GENO  -cpap qhs         DVT prophylaxis:  Medical and mechanical DVT prophylaxis orders are present.   On  eliquis     CODE STATUS:       Admission Status:  I believe this patient meets inpatient status.    I discussed the patient's findings and my recommendations with patient.    This patient has been examined wearing appropriate Personal Protective Equipment  08/02/21      Signature: Electronically signed by KEYLA Lopez, 08/02/21, 12:18 PM EDT.    Attending attestation:    I performed a history and physical examination of the patient. I reviewed the nurse practitioner's note and agree with the documented findings and plan of care.    S:    Patient is a 56-year-old male with history of type 2 diabetes, hypertension and peripheral vascular disease with right BKA presenting for evaluation of right-sided weakness.  Patient had stroke work-up in the emergency department ultimately had MRI showing signs of left-sided ischemic  infarct.  Admitted for further work-up of ischemic stroke.    O:    Vital signs reviewed    General: Middle-age male lying in bed breathing comfortably on room air no acute distress  HEENT: NC/AT, EOMI, mucosa moist  Heart: Regular, rate controlled  Chest: Normal work of breathing, moving air well no wheezing  Abdominal: Soft. NT/ND.   Musculoskeletal: Right BKA, normal ROM.  No edema. No calf tenderness.  Neurological: AAOx3, cranial nerves II through XII appear grossly intact, right upper extremity and right lower extremity weakness noted  Skin: Skin is warm and dry. No rash  Psychiatric: Normal mood and affect.    A/P    Right-sided weakness-secondary to underlying left-sided ischemic stroke.  Patient with multiple significant comorbidities including type 2 diabetes, hypertension and peripheral vascular disease likely contributing, patient with hypertensive crisis on admission now controlled.  Patient reports he has been off of his blood pressure medication  -Blood pressure control  -Neurology consulted  -MRI showing 12 mm acute lacunar infarct within the posterior limb of the left internal capsule  -Statin and antiplatelets  -Patient far beyond window for TPA  -Speech/PT/OT  -Risk stratification  -CTA of head and neck  -Echo with bubble  -CT head showed no bleed    Hypertensive crisis-likely uncontrolled chronic hypertension patient also has GENO which he reports his CPAP is not been working  -Case management consult for assistance with getting working CPAP  -Blood pressure more controlled at this time  -Patient will be on new medications  -Monitor renal function  -Urine drug screen/UA    Elevated TSH-very mildly so  -Check free T4    Pulmonary embolism-patient anticoagulated  -Find out if this first event or any other risk factors of how long he needs to be anticoagulated  -Patient is high risk    Coronary artery disease-patient denies any chest pain at this time  -Cardiac monitoring  -EKG normal sinus rhythm  normal axis  -Continue antiplatelets    Type 2 diabetes-patient reports history of relatively poor control  -A1c  -Sliding scale    Peripheral vascular disease-likely due to uncontrolled hypertension and diabetes  -Patient with right BKA  -Continue antiplatelets    Hyperlipidemia/anxiety/GERD/asthma-chronic in nature  -Resume home medication as clinically appropriate    GENO-patient reports he is not using his CPAP due to malfunction  -Patient needs assistance last sleep study 1 year prior    Electronically signed by Jacob Bentley MD, 08/02/21, 3:26 PM EDT.      Electronically signed by Jacob Bentley MD at 08/02/21 1526       {Outbreak/Travel/Exposure Documentation......;  Question Available Choices Patient Response   COVID-19 Outbreak Screen:  Do you currently have a new onset of the following symptoms?        Fever/Chills, Cough, Shortness of air, Loss of taste or smell, No, Unknown  No (08/02/21 0625)   COVID-19 Outbreak Screen: In the last 14 days, have you had contact with anyone who is ill, has show any of the symptoms listed above and/or has been diagnosis with the 2019 Novel Coronavirus? This includes any immediate household members but excludes any patients with whom you have been in contact within your normal work duties wearing proper PPE, if you are a healthcare worker.  Yes, No, Unknown              No (08/02/21 0625)   COVID-19 Outbreak Screen: Who was notified? Free text (not recorded)   Ebola Screening Outbreak Screen: Have you traveled to the [place] or [place] within the past 21 days?  Yes, No, Unknown (not recorded)   Ebola Screening Outbreak Screen: Do you have ANY of the following symptoms: Fever/Chills, Vomiting, Diarrhea, Fatigue, Headache, Muscle pain, Unexplained bleeding, Abdominal (stomach) pain, No, Unknown (not recorded)   Ebola Screening Outbreak Screen: Name of Person notified Free text (not recorded)   Travel Screen: Have you traveled in the  last month? If so, to what country have you traveled? If [place] what state? Yes, No, Unknown  List of all countries  List of all States No (08/02/21 0540)  (not recorded)  (not recorded)   Infection Risk: Do you currently have the following symptoms?  (If cough is selected, the Tuberculosis Screen is performed.) Cough, Fever, Rash, No No (08/02/21 0540)   Tuberculosis Screen: Do you have any of the following Tuberculosis Risks?  · Have you lived or spent time with anyone who had or may have TB?  · Have you lived in or visited any of the following areas for more than one month: [place], [place], [place], [place] or [place], the [place] or [place]?  · Do you have HIV/AIDS?  · Have you lived in or worked in a nursing home, homeless shelter, correctional facility, or substance abuse treatment facility?   · No    If Yes do you have any of the following symptoms? Yes responses display to the right    If Yes, symptoms listed are:  Cough greater than or equal to 3 weeks, Loss of appetite, Unexplained weight loss, Night sweats, Bloody sputum or hemoptysis, Hoarseness, Fever, Fatigue, Chest pain, No (not recorded)  (not recorded)   Exposure Screen: Have you been exposed to any of these contagious diseases in the last month? Measles, Chickenpox, Meningitis, Pertussis, Whooping Cough, No No (08/02/21 0540)         Current Facility-Administered Medications   Medication Dose Route Frequency Provider Last Rate Last Admin   • acetaminophen (TYLENOL) tablet 650 mg  650 mg Oral Q4H PRN Hussein Talavera MD   650 mg at 08/02/21 1633    Or   • acetaminophen (TYLENOL) suppository 650 mg  650 mg Rectal Q4H PRN Hussein Talavera MD       • apixaban (ELIQUIS) tablet 5 mg  5 mg Oral Q12H Endy Lazaro DO   5 mg at 08/09/21 0858   • aspirin EC tablet 81 mg  81 mg Oral Daily Endy Lazaro DO   81 mg at 08/08/21 0833   • atorvastatin (LIPITOR) tablet 80 mg  80 mg Oral Nightly Hussein Talavera MD   80 mg at 08/08/21 2206   • bisacodyl  (DULCOLAX) suppository 10 mg  10 mg Rectal Daily PRN Hussein Talavera MD       • cyanocobalamin injection 1,000 mcg  1,000 mcg Intramuscular Q28 Days Hussein Talavera MD   1,000 mcg at 08/03/21 1214   • dextrose (D50W) 25 g/ 50mL Intravenous Solution 25 g  25 g Intravenous Q15 Min PRN Hussein Talavera MD   25 g at 08/06/21 1128   • dextrose (GLUTOSE) oral gel 15 g  15 g Oral Q15 Min PRN Hussein Talavera MD       • DULoxetine (CYMBALTA) DR capsule 40 mg  40 mg Oral Porfirio Roper PA-C   40 mg at 08/09/21 0622   • gabapentin (NEURONTIN) capsule 400 mg  400 mg Oral TID Hussein Talavera MD   400 mg at 08/09/21 0858   • glucagon (human recombinant) (GLUCAGEN DIAGNOSTIC) injection 1 mg  1 mg Subcutaneous Q15 Min PRN Hussein Talavera MD       • HYDROcodone-acetaminophen (NORCO) 5-325 MG per tablet 1 tablet  1 tablet Oral Q4H PRN Hussein Talavera MD   1 tablet at 08/08/21 2338   • insulin glargine (LANTUS, SEMGLEE) injection 40 Units  40 Units Subcutaneous Nightly Hussein Talavera MD   40 Units at 08/08/21 2206   • insulin lispro (ADMELOG) injection 14 Units  14 Units Subcutaneous TID With Meals Hussein Talavera MD   14 Units at 08/09/21 0858   • ipratropium-albuterol (DUO-NEB) nebulizer solution 3 mL  3 mL Nebulization Q6H PRN Hussein Talavera MD       • levothyroxine (SYNTHROID, LEVOTHROID) tablet 100 mcg  100 mcg Oral Q AM Hussein Talavera MD   100 mcg at 08/09/21 0533   • melatonin tablet 5 mg  5 mg Oral Nightly PRN Yazan Perez MD   5 mg at 08/08/21 2338   • metoprolol succinate XL (TOPROL-XL) 24 hr tablet 50 mg  50 mg Oral Daily Hussein Talavera MD   50 mg at 08/09/21 0858   • miconazole (MICOTIN) 2 % powder   Topical Q12H Hussein Talavera MD   Given at 08/08/21 2232   • ondansetron (ZOFRAN) tablet 4 mg  4 mg Oral Q6H PRN Hussein Talavera MD        Or   • ondansetron (ZOFRAN) injection 4 mg  4 mg Intravenous Q6H PRN Hussein Talavera MD       • pantoprazole (PROTONIX) EC tablet 40 mg  40 mg Oral QAM  Hussein Talavera MD   40 mg at 08/09/21 0857   • sertraline (ZOLOFT) tablet 50 mg  50 mg Oral Daily Porfirio Knowles PA-C   50 mg at 08/09/21 0858   • sodium chloride 0.9 % flush 10 mL  10 mL Intravenous PRHussein Amaral MD       • sodium chloride 0.9 % flush 3 mL  3 mL Intravenous Q12H Hussein Talavera MD   3 mL at 08/09/21 0858   • sodium chloride 0.9 % flush 3-10 mL  3-10 mL Intravenous PRN Hussein Talavera MD         Lab Results (all)     Procedure Component Value Units Date/Time    POC Glucose Once [[id number]]  (Abnormal) Collected: 08/09/21 0727    Specimen: Blood Updated: 08/09/21 0729     Glucose 130 mg/dL      Comment: Serial Number: [id number]:  [id number]       POC Glucose Once [[id number]]  (Abnormal) Collected: 08/08/21 2205    Specimen: Blood Updated: 08/08/21 2206     Glucose 160 mg/dL      Comment: Serial Number: [id number]:  [id number]       POC Glucose Once [[id number]]  (Abnormal) Collected: 08/08/21 1124    Specimen: Blood Updated: 08/08/21 1129     Glucose 159 mg/dL      Comment: Serial Number: [id number]:  [id number]       POC Glucose Once [[id number]]  (Abnormal) Collected: 08/08/21 0733    Specimen: Blood Updated: 08/08/21 0736     Glucose 126 mg/dL      Comment: Serial Number: [id number]:  [id number]       POC Glucose Once [[id number]]  (Abnormal) Collected: 08/07/21 1605    Specimen: Blood Updated: 08/07/21 1606     Glucose 185 mg/dL      Comment: Serial Number: [id number]:  [id number]       POC Glucose Once [[id number]]  (Abnormal) Collected: 08/07/21 1111    Specimen: Blood Updated: 08/07/21 1112     Glucose 193 mg/dL      Comment: Serial Number: [id number]:  [id number]       POC Glucose Once [[id number]]  (Abnormal) Collected: 08/07/21 0717    Specimen: Blood Updated: 08/07/21 0718     Glucose 193 mg/dL      Comment: Serial Number: [id number]:  [id number]       POC Glucose Once [[id number]]  (Abnormal) Collected: 08/06/21 2036    Specimen:  Blood Updated: 08/06/21 2037     Glucose 149 mg/dL      Comment: Serial Number: [id number]:  [id number]       POC Glucose Once [[id number]]  (Abnormal) Collected: 08/06/21 1814    Specimen: Blood Updated: 08/06/21 1816     Glucose 201 mg/dL      Comment: Serial Number: [id number]:  [id number]       POC Glucose Once [[id number]]  (Normal) Collected: 08/06/21 1606    Specimen: Blood Updated: 08/06/21 1607     Glucose 74 mg/dL      Comment: Serial Number: [id number]:  [id number]       Tissue Pathology Exam [[id number]] Collected: 08/06/21 1337    Specimen: Tissue from Stomach Updated: 08/06/21 1357    COVID PRE-OP / PRE-PROCEDURE SCREENING ORDER (NO ISOLATION) - Swab, Nasopharynx [[id number]]  (Normal) Collected: 08/06/21 1127    Specimen: Swab from Nasopharynx Updated: 08/06/21 1207    Narrative:      The following orders were created for panel order COVID PRE-OP / PRE-PROCEDURE SCREENING ORDER (NO ISOLATION) - Swab, Nasopharynx.  Procedure                               Abnormality         Status                     ---------                               -----------         ------                     COVID-19,CEPHEID/MURALI/BD...[[id number]]  Normal              Final result                 Please view results for these tests on the individual orders.    COVID-19,CEPHEID/MURALI/BDMAX,COR/FINA/PAD/KULDIP IN-HOUSE(OR EMERGENT/ADD-ON),NP SWAB IN TRANSPORT MEDIA 3-4 HR TAT, RT-PCR - Swab, Nasopharynx [[id number]]  (Normal) Collected: 08/06/21 1127    Specimen: Swab from Nasopharynx Updated: 08/06/21 1207     COVID19 Not Detected    Narrative:      Fact sheet for providers: https://www.fda.gov/media/339523/download     Fact sheet for patients: https://www.fda.gov/media/346138/download  Fact sheet for providers: https://www.fda.gov/media/714092/download    Fact sheet for patients: https://www.fda.gov/media/896917/download    Test performed by PCR.    POC Glucose Once [[id number]]  (Abnormal) Collected: 08/06/21 115     Specimen: Blood Updated: 08/06/21 1156     Glucose 172 mg/dL      Comment: Serial Number: [id number]:  [id number]       POC Glucose Once [[id number]]  (Abnormal) Collected: 08/06/21 1108    Specimen: Blood Updated: 08/06/21 1111     Glucose 66 mg/dL      Comment: Serial Number: [id number]:  [id number]       POC Glucose Once [[id number]]  (Abnormal) Collected: 08/06/21 0738    Specimen: Blood Updated: 08/06/21 0739     Glucose 111 mg/dL      Comment: Serial Number: [id number]:  [id number]       Basic Metabolic Panel [[id number]]  (Abnormal) Collected: 08/06/21 0400    Specimen: Blood Updated: 08/06/21 0520     Glucose 189 mg/dL      BUN 16 mg/dL      Creatinine 1.23 mg/dL      Sodium 141 mmol/L      Potassium 4.3 mmol/L      Chloride 107 mmol/L      CO2 28.0 mmol/L      Calcium 8.6 mg/dL      eGFR Non African Amer 61 mL/min/1.73      BUN/Creatinine Ratio 13.0     Anion Gap 6.0 mmol/L     Narrative:      GFR Normal >60  Chronic Kidney Disease <60  Kidney Failure <15      POC Glucose Once [[id number]]  (Abnormal) Collected: 08/05/21 1957    Specimen: Blood Updated: 08/05/21 1958     Glucose 225 mg/dL      Comment: Serial Number: [id number]:  [id number]       POC Glucose Once [[id number]]  (Abnormal) Collected: 08/05/21 1601    Specimen: Blood Updated: 08/05/21 1602     Glucose 253 mg/dL      Comment: Serial Number: [id number]:  [id number]       POC Glucose Once [[id number]]  (Abnormal) Collected: 08/05/21 1115    Specimen: Blood Updated: 08/05/21 1118     Glucose 247 mg/dL      Comment: Serial Number: [id number]:  [id number]       POC Glucose Once [[id number]]  (Abnormal) Collected: 08/05/21 0735    Specimen: Blood Updated: 08/05/21 0736     Glucose 118 mg/dL      Comment: Serial Number: [id number]:  [id number]       Basic Metabolic Panel [[id number]]  (Abnormal) Collected: 08/05/21 0414    Specimen: Blood Updated: 08/05/21 0520     Glucose 217 mg/dL      BUN 18 mg/dL      Creatinine  1.46 mg/dL      Sodium 141 mmol/L      Potassium 4.4 mmol/L      Comment: Slight hemolysis detected by analyzer. Results may be affected.        Chloride 105 mmol/L      CO2 29.0 mmol/L      Calcium 9.2 mg/dL      eGFR Non African Amer 50 mL/min/1.73      BUN/Creatinine Ratio 12.3     Anion Gap 7.0 mmol/L     Narrative:      GFR Normal >60  Chronic Kidney Disease <60  Kidney Failure <15      Magnesium [[id number]]  (Normal) Collected: 08/05/21 0414    Specimen: Blood Updated: 08/05/21 0520     Magnesium 1.7 mg/dL     CBC & Differential [[id number]]  (Abnormal) Collected: 08/05/21 0414    Specimen: Blood Updated: 08/05/21 0455    Narrative:      The following orders were created for panel order CBC & Differential.  Procedure                               Abnormality         Status                     ---------                               -----------         ------                     CBC Auto Differential[[id number]]        Abnormal            Final result                 Please view results for these tests on the individual orders.    CBC Auto Differential [[id number]]  (Abnormal) Collected: 08/05/21 0414    Specimen: Blood Updated: 08/05/21 0455     WBC 9.00 10*3/mm3      RBC 4.89 10*6/mm3      Hemoglobin 12.7 g/dL      Hematocrit 39.0 %      MCV 79.7 fL      MCH 25.9 pg      MCHC 32.5 g/dL      RDW 14.2 %      RDW-SD 39.4 fl      MPV 8.3 fL      Platelets 224 10*3/mm3      Neutrophil % 53.4 %      Lymphocyte % 30.8 %      Monocyte % 9.8 %      Eosinophil % 5.0 %      Basophil % 1.0 %      Neutrophils, Absolute 4.80 10*3/mm3      Lymphocytes, Absolute 2.80 10*3/mm3      Monocytes, Absolute 0.90 10*3/mm3      Eosinophils, Absolute 0.50 10*3/mm3      Basophils, Absolute 0.10 10*3/mm3      nRBC 0.1 /100 WBC     POC Glucose Once [[id number]]  (Abnormal) Collected: 08/04/21 1947    Specimen: Blood Updated: 08/04/21 1948     Glucose 290 mg/dL      Comment: Serial Number: [id number]:  [id number]       POC Glucose Once  [[id number]]  (Abnormal) Collected: 08/04/21 1621    Specimen: Blood Updated: 08/04/21 1624     Glucose 180 mg/dL      Comment: Serial Number: [id number]:  [id number]       POC Glucose Once [[id number]]  (Abnormal) Collected: 08/04/21 1417    Specimen: Blood Updated: 08/04/21 1419     Glucose 199 mg/dL      Comment: Serial Number: [id number]:  [id number]       POC Glucose Once [[id number]]  (Abnormal) Collected: 08/04/21 1328    Specimen: Blood Updated: 08/04/21 1330     Glucose 66 mg/dL      Comment: Serial Number: [id number]:  [id number]       POC Glucose Once [[id number]]  (Abnormal) Collected: 08/04/21 1307    Specimen: Blood Updated: 08/04/21 1309     Glucose 43 mg/dL      Comment: Serial Number: [id number]:  [id number]       POC Glucose Once [[id number]]  (Normal) Collected: 08/04/21 1110    Specimen: Blood Updated: 08/04/21 1112     Glucose 93 mg/dL      Comment: Serial Number: [id number]:  [id number]       POC Glucose Once [[id number]]  (Abnormal) Collected: 08/04/21 0734    Specimen: Blood Updated: 08/04/21 0736     Glucose 118 mg/dL      Comment: Serial Number: [id number]:  [id number]       POC Glucose Once [[id number]]  (Abnormal) Collected: 08/03/21 2154    Specimen: Blood Updated: 08/03/21 2157     Glucose 210 mg/dL      Comment: Serial Number: [id number]:  [id number]       POC Glucose Once [[id number]]  (Abnormal) Collected: 08/03/21 1625    Specimen: Blood Updated: 08/03/21 1626     Glucose 144 mg/dL      Comment: Serial Number: [id number]:  [id number]       POC Glucose Once [[id number]]  (Abnormal) Collected: 08/03/21 1106    Specimen: Blood Updated: 08/03/21 1107     Glucose 196 mg/dL      Comment: Serial Number: [id number]:  [id number]       POC Glucose Once [[id number]]  (Abnormal) Collected: 08/03/21 0752    Specimen: Blood Updated: 08/03/21 0753     Glucose 134 mg/dL      Comment: Serial Number: [id number]:  [id number]       Basic Metabolic Panel  [[id number]]  (Abnormal) Collected: 08/03/21 0434    Specimen: Blood Updated: 08/03/21 0608     Glucose 227 mg/dL      BUN 18 mg/dL      Creatinine 1.37 mg/dL      Sodium 137 mmol/L      Potassium 4.8 mmol/L      Comment: Slight hemolysis detected by analyzer. Results may be affected.        Chloride 100 mmol/L      CO2 27.0 mmol/L      Calcium 8.9 mg/dL      eGFR Non African Amer 54 mL/min/1.73      BUN/Creatinine Ratio 13.1     Anion Gap 10.0 mmol/L     Narrative:      GFR Normal >60  Chronic Kidney Disease <60  Kidney Failure <15      Magnesium [[id number]]  (Normal) Collected: 08/03/21 0434    Specimen: Blood Updated: 08/03/21 0608     Magnesium 1.8 mg/dL     Extra Tubes [[id number]] Collected: 08/03/21 0434    Specimen: Blood, Venous Line Updated: 08/03/21 0547    Narrative:      The following orders were created for panel order Extra Tubes.  Procedure                               Abnormality         Status                     ---------                               -----------         ------                     Lavender Top[[id number]]                                     Final result                 Please view results for these tests on the individual orders.    Lavender Top [[id number]] Collected: 08/03/21 0434    Specimen: Blood Updated: 08/03/21 0547     Extra Tube hold for add-on     Comment: Auto resulted       POC Glucose Once [[id number]]  (Abnormal) Collected: 08/03/21 0037    Specimen: Blood Updated: 08/03/21 0038     Glucose 196 mg/dL      Comment: Serial Number: [id number]:  [id number]       Ethanol [[id number]] Collected: 08/02/21 1823    Specimen: Blood Updated: 08/02/21 1918     Ethanol % <0.010 %     Narrative:      Plasma Ethanol Clinical Symptoms:    ETOH (%)               Clinical Symptom  .01-.05              No apparent influence  .03-.12              Euphoria, Diminished judgment and attention   .09-.25              Impaired comprehension, Muscle incoordination  .18-.30               Confusion, Staggered gait, Slurred speech  .25-.40              Markedly decreased response to stimuli, unable to stand or                        walk, vomitting, sleep or stupor  .35-.50              Comatose, Anesthesia, Subnormal body temperature        Vitamin B12 [[id number]]  (Normal) Collected: 08/02/21 0620    Specimen: Blood Updated: 08/02/21 1709     Vitamin B-12 315 pg/mL     Narrative:      Results may be falsely increased if patient taking Biotin.      Urine Drug Screen - Urine, Clean Catch [[id number]]  (Normal) Collected: 08/02/21 1611    Specimen: Urine, Clean Catch Updated: 08/02/21 1656     Amphet/Methamphet, Screen Negative     Barbiturates Screen, Urine Negative     Benzodiazepine Screen, Urine Negative     Cocaine Screen, Urine Negative     Opiate Screen Negative     THC, Screen, Urine Negative     Methadone Screen, Urine Negative     Oxycodone Screen, Urine Negative    Narrative:      Negative Thresholds Per Drugs Screened:    Amphetamines                 500 ng/ml  Barbiturates                 200 ng/ml  Benzodiazepines              100 ng/ml  Cocaine                      300 ng/ml  Methadone                    300 ng/ml  Opiates                      300 ng/ml  Oxycodone                    100 ng/ml  THC                           50 ng/ml    The Normal Value for all drugs tested is negative. This report includes final unconfirmed screening results to be used for medical treatment purposes only. Unconfirmed results must not be used for non-medical purposes such as employment or legal testing. Clinical consideration should be applied to any drug of abuse test, particularly when unconfirmed results are used.          All urine drugs of abuse requests without chain of custody are for medical screening purposes only.  False positives are possible.      T4, Free [[id number]]  (Normal) Collected: 08/02/21 0620    Specimen: Blood Updated: 08/02/21 1647     Free T4 1.59 ng/dL     Narrative:      Results  may be falsely increased if patient taking Biotin.      Hemoglobin A1c [[id number]]  (Abnormal) Collected: 08/02/21 0620    Specimen: Blood Updated: 08/02/21 1641     Hemoglobin A1C 11.2 %     Narrative:      Hemoglobin A1C Reference Range:    <5.7 %        Normal  5.7-6.4 %     Increased risk for diabetes  > 6.4 %        Diabetes       These guidelines have been recommended by the American Diabetic Association for Hgb A1c.      The following 2010 guidelines have been recommended by the American Diabetes Association for Hemoglobin A1c.    HBA1c 5.7-6.4% Increased risk for future diabetes (pre-diabetes)  HBA1c     >6.4% Diabetes      Urinalysis With Microscopic If Indicated (No Culture) - Urine, Clean Catch [[id number]]  (Abnormal) Collected: 08/02/21 1611    Specimen: Urine, Clean Catch Updated: 08/02/21 1636     Color, UA Yellow     Appearance, UA Clear     pH, UA 6.0     Specific Gravity, UA 1.044     Glucose, UA >=1000 mg/dL (3+)     Ketones, UA Negative     Bilirubin, UA Negative     Blood, UA Negative     Protein, UA Negative     Leuk Esterase, UA Negative     Nitrite, UA Negative     Urobilinogen, UA 0.2 E.U./dL    Narrative:      Urine microscopic not indicated.    POC Glucose Once [[id number]]  (Abnormal) Collected: 08/02/21 1603    Specimen: Blood Updated: 08/02/21 1604     Glucose 155 mg/dL      Comment: Serial Number: [id number]:  [id number]       POC Glucose Once [[id number]]  (Abnormal) Collected: 08/02/21 1359    Specimen: Blood Updated: 08/02/21 1400     Glucose 113 mg/dL      Comment: Serial Number: [id number]:  [id number]       POC Glucose Once [[id number]]  (Abnormal) Collected: 08/02/21 1310    Specimen: Blood Updated: 08/02/21 1311     Glucose 67 mg/dL      Comment: Serial Number: [id number]:  [id number]       Lipid Panel [[id number]] Collected: 08/02/21 0620    Specimen: Blood Updated: 08/02/21 1253     Total Cholesterol 147 mg/dL      Triglycerides 133 mg/dL      HDL Cholesterol 46  mg/dL      LDL Cholesterol  78 mg/dL      VLDL Cholesterol 23 mg/dL      LDL/HDL Ratio 1.62    Narrative:      Cholesterol Reference Ranges  (U.S. Department of Health and Human Services ATP III Classifications)    Desirable          <200 mg/dL  Borderline High    200-239 mg/dL  High Risk          >240 mg/dL      Triglyceride Reference Ranges  (U.S. Department of Health and Human Services ATP III Classifications)    Normal           <150 mg/dL  Borderline High  150-199 mg/dL  High             200-499 mg/dL  Very High        >500 mg/dL    HDL Reference Ranges  (U.S. Department of Health and Human Services ATP III Classifcations)    Low     <40 mg/dl (major risk factor for CHD)  High    >60 mg/dl ('negative' risk factor for CHD)        LDL Reference Ranges  (U.S. Department of Health and Human Services ATP III Classifcations)    Optimal          <100 mg/dL  Near Optimal     100-129 mg/dL  Borderline High  130-159 mg/dL  High             160-189 mg/dL  Very High        >189 mg/dL    TSH [[id number]]  (Abnormal) Collected: 08/02/21 0620    Specimen: Blood Updated: 08/02/21 1238     TSH 4.810 uIU/mL     COVID-19,CEPHEID,COR/FINA/PAD/KULDIP IN-HOUSE(OR EMERGENT/ADD-ON),NP SWAB IN TRANSPORT MEDIA 3-4 HR TAT, RT-PCR - Swab, Nasopharynx [[id number]]  (Normal) Collected: 08/02/21 0625    Specimen: Swab from Nasopharynx Updated: 08/02/21 0733     COVID19 Not Detected    Narrative:      Fact sheet for providers: https://www.fda.gov/media/687753/download     Fact sheet for patients: https://www.fda.gov/media/172667/download  Fact sheet for providers: https://www.fda.gov/media/093340/download    Fact sheet for patients: https://www.fda.gov/media/398934/download    Test performed by PCR.    Extra Tubes [[id number]] Collected: 08/02/21 0620    Specimen: Blood Updated: 08/02/21 0731    Narrative:      The following orders were created for panel order Extra Tubes.  Procedure                               Abnormality         Status                      ---------                               -----------         ------                     Gold Top - SST[[id number]]                                   Final result                 Please view results for these tests on the individual orders.    [facility] - SST [[id number]] Collected: 08/02/21 0620    Specimen: Blood Updated: 08/02/21 0731     Extra Tube Hold for add-ons.     Comment: Auto resulted.       Basic Metabolic Panel [[id number]]  (Abnormal) Collected: 08/02/21 0620    Specimen: Blood Updated: 08/02/21 0701     Glucose 152 mg/dL      BUN 16 mg/dL      Creatinine 1.21 mg/dL      Sodium 141 mmol/L      Potassium 4.2 mmol/L      Chloride 101 mmol/L      CO2 28.0 mmol/L      Calcium 9.3 mg/dL      eGFR Non African Amer 62 mL/min/1.73      BUN/Creatinine Ratio 13.2     Anion Gap 12.0 mmol/L     Narrative:      GFR Normal >60  Chronic Kidney Disease <60  Kidney Failure <15      aPTT [[id number]]  (Normal) Collected: 08/02/21 0620    Specimen: Blood Updated: 08/02/21 0641     PTT 25.3 seconds     Protime-INR [[id number]]  (Normal) Collected: 08/02/21 0620    Specimen: Blood Updated: 08/02/21 0641     Protime 11.0 Seconds      INR 0.99    CBC & Differential [[id number]]  (Normal) Collected: 08/02/21 0620    Specimen: Blood Updated: 08/02/21 0633    Narrative:      The following orders were created for panel order CBC & Differential.  Procedure                               Abnormality         Status                     ---------                               -----------         ------                     CBC Auto Differential[[id number]]        Normal              Final result                 Please view results for these tests on the individual orders.    CBC Auto Differential [[id number]]  (Normal) Collected: 08/02/21 0620    Specimen: Blood Updated: 08/02/21 0633     WBC 7.80 10*3/mm3      RBC 5.24 10*6/mm3      Hemoglobin 13.9 g/dL      Hematocrit 42.1 %      MCV 80.4 fL      MCH 26.6 pg      MCHC 33.1 g/dL       RDW 14.3 %      RDW-SD 40.3 fl      MPV 8.2 fL      Platelets 244 10*3/mm3      Neutrophil % 65.5 %      Lymphocyte % 21.5 %      Monocyte % 8.6 %      Eosinophil % 3.4 %      Basophil % 1.0 %      Neutrophils, Absolute 5.10 10*3/mm3      Lymphocytes, Absolute 1.70 10*3/mm3      Monocytes, Absolute 0.70 10*3/mm3      Eosinophils, Absolute 0.30 10*3/mm3      Basophils, Absolute 0.10 10*3/mm3      nRBC 0.1 /100 WBC     POC Glucose Once [[id number]]  (Abnormal) Collected: 08/02/21 0546    Specimen: Blood Updated: 08/02/21 0547     Glucose 145 mg/dL      Comment: Serial Number: [id number]:  [id number]                Physician Progress Notes (most recent note)      Nelli Vides MD at 08/09/21 0616          Referring Provider: Endy Lazaro DO    Reason for follow-up:  Recent stroke  Peripheral vascular disease    Patient Care Team:  Laura Whitaker MD as PCP - General    Subjective .  Feeling okay     ROS  Patient apparently had suicidal ideation although patient claims it was more situational anger in response done suicidal ideation.    Since I have last seen, the patient has been without any chest discomfort ,shortness of breath, palpitations, dizziness or syncope.  Denies having any headache ,abdominal pain ,nausea, vomiting , diarrhea constipation, loss of weight or loss of appetite.  Denies having any excessive bruising ,hematuria or blood in the stool.    Review of all systems negative except as indicated    History  Past Medical History:   Diagnosis Date   • CKD (chronic kidney disease), stage III (CMS/HCC)    • Depression    • DJD (degenerative joint disease)    • DVT (deep venous thrombosis) (CMS/[facility])    • Ganglion     rt wrist   • GERD (gastroesophageal reflux disease)    • Hiatal hernia    • History of echocardiogram 04/2016    2D ECHO   • History of esophageal stricture     s/p dialation 40-50 times last EGD 04/2016   • History of pulmonary embolus (PE)     on long term anticoagulation    • Hyperlipidemia    • Hypertension    • Hypothyroidism    • IBS (irritable bowel syndrome)    • Neuropathy    • Rash     rt lower hip   • Retinopathy    • Seasonal allergies    • Sleep apnea     cpap  bring dos   • Type 2 diabetes mellitus with peripheral vascular disease (CMS/HCC)    • Vitamin D deficiency        Past Surgical History:   Procedure Laterality Date   • AMPUTATION FOOT / TOE Right     great toe   • AMPUTATION REVISION Right 4/8/2021    Procedure: BELOW KNEE AMPUTATION REVISAION;  Surgeon: Eduardo Reynolds MD;  Location: [facility] MAIN OR;  Service: Orthopedics;  Laterality: Right;   • AMPUTATION REVISION Right 4/29/2021    Procedure: AMPUTATION REVISION KNEE STUMP;  Surgeon: Eduardo Reynolds MD;  Location: [facility] MAIN OR;  Service: Orthopedics;  Laterality: Right;   • BELOW KNEE AMPUTATION Right 7/30/2020    Procedure: AMPUTATION BELOW KNEE;  Surgeon: Eduardo Reynolds MD;  Location: [facility] MAIN OR;  Service: Orthopedics;  Laterality: Right;   • CARDIAC CATHETERIZATION  04/2018    [facility]   • ENDOSCOPY     • ENDOSCOPY N/A 10/4/2019    Procedure: ESOPHAGOGASTRODUODENOSCOPY with dilitation and biopsy x 1 area;  Surgeon: Declan Iqbal MD;  Location: [facility] ENDOSCOPY;  Service: Gastroenterology   • ENDOSCOPY N/A 12/13/2019    Procedure: ESOPHAGOGASTRODUODENOSCOPY WITH DILATATION (50, 52 BOUGIE);  Surgeon: Declan Iqbal MD;  Location: [facility] ENDOSCOPY;  Service: Gastroenterology   • GANGLION CYST EXCISION Left    • HERNIA REPAIR Bilateral    • RETINOPATHY SURGERY      laser   • TOTAL HIP ARTHROPLASTY Left    • TOTAL HIP ARTHROPLASTY Left 2018   • TRANS METATARSAL AMPUTATION Right 3/17/2020    Procedure: AMPUTATION TRANS METATARSAL right;  Surgeon: ERWIN Baker DPM;  Location: [facility] MAIN OR;  Service: Podiatry;  Laterality: Right;  GANGRENOUS RIGHT FOOT       Family History   Problem Relation Age of Onset   • Diabetes Mother    • Heart disease Mother    • Leukemia Father    • Sleep apnea  Maternal Aunt         GENO   • Stroke Maternal Grandmother    • Hypertension Other    • Hyperlipidemia Other    • Cancer Other    • Colon cancer Other         uncle       Social History     Tobacco Use   • Smoking status: Never Smoker   • Smokeless tobacco: Never Used   Vaping Use   • Vaping Use: Never used   Substance Use Topics   • Alcohol use: No   • Drug use: No        Medications Prior to Admission   Medication Sig Dispense Refill Last Dose   • atorvastatin (LIPITOR) 40 MG tablet TAKE 1 TABLET EVERY DAY 90 tablet 0 8/1/2021 at Unknown time   • DULoxetine (CYMBALTA) 60 MG capsule TAKE 1 CAPSULE EVERY MORNING 90 capsule 0 8/1/2021 at Unknown time   • Eliquis 5 MG tablet tablet TAKE 1 TABLET TWICE DAILY 180 tablet 0 8/1/2021 at Unknown time   • empagliflozin (Jardiance) 10 MG tablet tablet Take 1 tablet by mouth Daily. 90 tablet 4 8/1/2021 at Unknown time   • gabapentin (NEURONTIN) 400 MG capsule Take 400 mg by mouth 3 (Three) Times a Day.   8/1/2021 at Unknown time   • insulin aspart (NovoLOG FlexPen) 100 UNIT/ML solution pen-injector sc pen Inject 10 Units under the skin into the appropriate area as directed 3 (Three) Times a Day With Meals. (Patient taking differently: Inject 14 Units under the skin into the appropriate area as directed 3 (Three) Times a Day With Meals.) 15 mL 6 8/1/2021 at Unknown time   • levothyroxine (SYNTHROID, LEVOTHROID) 100 MCG tablet TAKE 1 TABLET EVERY DAY (Patient taking differently: Take 100 mcg by mouth Daily.) 90 tablet 3 8/1/2021 at Unknown time   • losartan (COZAAR) 50 MG tablet TAKE 1 TABLET EVERY DAY 90 tablet 0 8/1/2021 at Unknown time   • omeprazole (priLOSEC) 40 MG capsule Take 40 mg by mouth Daily. Take DOS   8/1/2021 at Unknown time   • [DISCONTINUED] Insulin Glargine (Lantus SoloStar) 100 UNIT/ML injection pen INJECT SUBCUTANEOUSLY 27 UNITS EVERY BEDTIME (NEEDS APPOINTMENT) (Patient taking differently: Inject 34 Units under the skin into the appropriate area as directed  "Every Night.) 30 mL 6 8/1/2021 at Unknown time   • metoprolol succinate XL (TOPROL-XL) 50 MG 24 hr tablet TAKE 1 TABLET EVERY DAY (Patient taking differently: Take 50 mg by mouth Daily.) 30 tablet 0    • ondansetron (ZOFRAN) 4 MG tablet TAKE 1 TABLET EVERY 8 HOURS AS NEEDED FOR NAUSEA OR VOMITING. (Patient taking differently: Take 8 mg by mouth Every 8 (Eight) Hours As Needed for Nausea or Vomiting.) 45 tablet 0        Allergies  Lisinopril    Scheduled Meds:apixaban, 5 mg, Oral, Q12H  aspirin, 81 mg, Oral, Daily  atorvastatin, 80 mg, Oral, Nightly  cyanocobalamin, 1,000 mcg, Intramuscular, Q28 Days  DULoxetine, 40 mg, Oral, QAM  gabapentin, 400 mg, Oral, TID  insulin glargine, 40 Units, Subcutaneous, Nightly  insulin lispro, 14 Units, Subcutaneous, TID With Meals  levothyroxine, 100 mcg, Oral, Q AM  metoprolol succinate XL, 50 mg, Oral, Daily  miconazole, , Topical, Q12H  pantoprazole, 40 mg, Oral, QAM  sertraline, 50 mg, Oral, Daily  sodium chloride, 3 mL, Intravenous, Q12H      Continuous Infusions:   PRN Meds:.•  acetaminophen **OR** acetaminophen  •  bisacodyl  •  dextrose  •  dextrose  •  glucagon (human recombinant)  •  HYDROcodone-acetaminophen  •  ipratropium-albuterol  •  melatonin  •  ondansetron **OR** ondansetron  •  [COMPLETED] Insert peripheral IV **AND** sodium chloride  •  sodium chloride    Objective     VITAL SIGNS  Vitals:    08/08/21 1427 08/08/21 1734 08/08/21 2220 08/09/21 0414   BP: 155/76 171/86 168/77 142/78   BP Location: Right arm Right arm Left arm Left arm   Patient Position: Lying Lying Sitting Lying   Pulse: 68 65 71 69   Resp: 13 20 18 17   Temp: 98.6 °F (37 °C) 97.6 °F (36.4 °C) 97.9 °F (36.6 °C) 97.8 °F (36.6 °C)   TempSrc: Oral Axillary Oral Oral   SpO2:   96% 96%   Weight:    88 kg (194 lb 0.1 oz)   Height:           Flowsheet Rows      First Filed Value   Admission Height  172.7 cm (68\") Documented at 08/02/2021 0540   Admission Weight  83.9 kg (185 lb) Documented at 08/02/2021 " 0540            Intake/Output Summary (Last 24 hours) at 8/9/2021 0616  Last data filed at 8/8/2021 1125  Gross per 24 hour   Intake 500 ml   Output 1260 ml   Net -760 ml        TELEMETRY: Sinus rhythm    Physical Exam:  The patient is alert, oriented and in no distress.  Vital signs as noted above.  Head and neck revealed no carotid bruits or jugular venous distention.  No thyromegaly or lymphadenopathy is present  Lungs clear.  No wheezing.  Breath sounds are normal bilaterally.  Heart normal first and second heart sounds.  No murmur. No precordial rub is present.  No gallop is present.  Abdomen soft and nontender.  No organomegaly is present.  Extremities with good peripheral pulses without any pedal edema.  Right BKA  Skin warm and dry.  Musculoskeletal system is grossly normal  CNS grossly normal      Results Review:   I reviewed the patient's new clinical results.  Lab Results (last 24 hours)     Procedure Component Value Units Date/Time    POC Glucose Once [[id number]]  (Abnormal) Collected: 08/08/21 2205    Specimen: Blood Updated: 08/08/21 2206     Glucose 160 mg/dL      Comment: Serial Number: [id number]:  [id number]       POC Glucose Once [[id number]]  (Abnormal) Collected: 08/08/21 1124    Specimen: Blood Updated: 08/08/21 1129     Glucose 159 mg/dL      Comment: Serial Number: [id number]:  [id number]             Imaging Results (Last 24 Hours)     ** No results found for the last 24 hours. **      LAB RESULTS (LAST 7 DAYS)    CBC  Results from last 7 days   Lab Units 08/05/21  0414 08/02/21  0620   WBC 10*3/mm3 9.00 7.80   RBC 10*6/mm3 4.89 5.24   HEMOGLOBIN g/dL 12.7* 13.9   HEMATOCRIT % 39.0 42.1   MCV fL 79.7 80.4   PLATELETS 10*3/mm3 224 244       BMP  Results from last 7 days   Lab Units 08/06/21  0400 08/05/21  0414 08/03/21  0434 08/02/21  0620   SODIUM mmol/L 141 141 137 141   POTASSIUM mmol/L 4.3 4.4 4.8 4.2   CHLORIDE mmol/L 107 105 100 101   CO2 mmol/L 28.0 29.0 27.0 28.0   BUN  mg/dL 16 18 18 16   CREATININE mg/dL 1.23 1.46* 1.37* 1.21   GLUCOSE mg/dL 189* 217* 227* 152*   MAGNESIUM mg/dL  --  1.7 1.8  --        CMP   Results from last 7 days   Lab Units 08/06/21  0400 08/05/21  0414 08/03/21  0434 08/02/21  0620   SODIUM mmol/L 141 141 137 141   POTASSIUM mmol/L 4.3 4.4 4.8 4.2   CHLORIDE mmol/L 107 105 100 101   CO2 mmol/L 28.0 29.0 27.0 28.0   BUN mg/dL 16 18 18 16   CREATININE mg/dL 1.23 1.46* 1.37* 1.21   GLUCOSE mg/dL 189* 217* 227* 152*         BNP        TROPONIN        CoAg  Results from last 7 days   Lab Units 08/02/21  0620   INR  0.99   APTT seconds 25.3       Creatinine Clearance  Estimated Creatinine Clearance: 71.4 mL/min (by C-G formula based on SCr of 1.23 mg/dL).    ABG        Radiology  No radiology results for the last day            EKG              I personally viewed and interpreted the patient's EKG/Telemetry data: Sinus rhythm    ECHOCARDIOGRAM:    Results for orders placed during the hospital encounter of 08/02/21    Adult Transesophageal Echo (BARON) W/ Cont if Necessary Per Protocol    Interpretation Summary  Procedure performed  Transesophageal echocardiogram Doppler study (color Continuous Wave and pulse wave)    Date of study  8/5/2021    Indications  Rule out cardioembolic episodes  Recent stroke    Procedure  Anesthesia was provided by anesthesiologist with intravenous Diprivan  BARON probe could be passed without difficulty.  Patient tolerated the procedure well.  No complications were noted.    Results  Technically satisfactory study.  Mitral valve is thickened with adequate opening motion.  Moderate mitral regurgitation is present.  Tricuspid valve is normal.  Aortic valve is tricuspid and is normal.  Left atrium is normal in size.  Left atrial appendage is enlarged without any clot.  Left ventricle is normal in size and contractility with ejection fraction of 60%.  Right atrium is normal in size.  No pericardial effusion or intracardiac thrombus is  seen.  Atrial septum is intact without PFO.  Aorta is normal.    Impression  Moderate mitral regurgitation  Normal left ventricle size and contractility with ejection fraction of 60%.  No evidence for intracardiac thrombus is present.          STRESS MYOVIEW:    Cardiolite (Tc-99m Sestamibi) stress test    CARDIAC CATHETERIZATION:            OTHER:         Assessment/Plan     Principal Problem:    Cerebrovascular accident (CVA) (CMS/HCC)  Active Problems:    Benign essential hypertension    Chronic coronary artery disease    Chronic renal insufficiency, stage III (moderate) (CMS/HCC)    Major depressive disorder, recurrent episode, severe (CMS/HCC)    Diabetic peripheral neuropathy (CMS/HCC)    Gastroesophageal reflux disease    History of pulmonary embolism    Mixed hyperlipidemia    Acquired hypothyroidism    Obstructive sleep apnea syndrome    Peripheral vascular disease (CMS/HCC)    Type 2 diabetes mellitus with peripheral vascular disease (CMS/HCC)    Dysphagia    Depression with suicidal ideation        ]]]]]]]]]]]]]]]]]]]]]]  Impression  ===========  -TIA/stroke.  Abnormal CTA with acute lacunar infarct within the posterior limb of the left internal capsule.  EKG showed sinus rhythm.  Echocardiogram showed anterior mitral leaflet nodular density.     -History of coronary artery disease.  Patient is not having any angina pectoris or congestive heart failure.  Cardiac cath films from 4/18/2018 were reviewed and interpreted independently.  Normal left ventricular function.  50% diagonal branch disease.  RCA is a nondominant vessel that has mid segment 90% disease (small artery)     BARON 2021-08-05 revealed  Moderate mitral regurgitation  Normal left ventricle size and contractility with ejection fraction of 60%.  No evidence for intracardiac thrombus is present.    -Bilateral carotid bruits.  Right louder than left.  CT angiogram and carotid duplex scan did not reveal any obstructive carotid artery  disease.     -Hypertension diabetes dyslipidemia sleep apnea CKD 3 (BUN 18 creatinine 1.37.)  Hypothyroidism vitamin D deficiency     -History of DVT.     -Long-term anticoagulation with Eliquis due to previous pulmonary embolus     -Peripheral vascular disease     -Status post right BKA.  Total left arthroplasty hernia repair     -Non-smoker  ============  Plan  ============  Recent TIA/stroke  BARON 2021-08-05-negative for intracardiac thrombus.    Anticoagulation  Patient is on Eliquis    Renal dysfunction  BUN/18/1.37  18/1.46-8/5/2021  Observe closely.    Patient apparently had suicidal ideation although patient claims it was more situational anger in response done suicidal ideation.  Patient is very rational about the situation at this time.    No need for further cardiac work-up at this time.    Follow-up in the office in 2 weeks when discharged.    Medications were reviewed and updated.  Continue aspirin Eliquis atorvastatin gabapentin levothyroxine metoprolol pantoprazole sertraline    Further plan will depend on patient's progress.  ]]]]]]]]]]]]]]]]]]]]]    Nelli Vides MD  08/09/21  06:16 EDT                Electronically signed by Nelli Vides MD at 08/09/21 0834          Consult Notes (most recent note)      Porfirio nKowles PA-C at 08/08/21 1800      Consult Orders    1. Inpatient Psychiatrist Consult [[id number]] ordered by Endy Lazaro DO at 08/08/21 1115                 Referring Provider: Dr. Endy Lazaro MD  Reason for Consultation: Suicidal thoughts      Chief complaint: Wife not picking him up from the hospital.    Subjective     History of present illness:  The patient is a 56 y.o. male who was admitted secondary to a small CVA, for which he has not yet noticed any deficits. Psychiatry was consulted due to suicidal thoughts that occurred today, along with plans to use the syringe in this room to put an air embolus into his IV. The patient admits to suicidal thoughts prior to  "hospitalization, and states he knows the drill for removing everything that could be dangerous from his room because he has \"been through this before.\" His wife has not been coming to see him, would not bring him the candy he requested, and didn't want to pick him up from the hospital, which is what made his SI occur today. He reports marriage difficulty for at least 2 years, and is upset that she wants to get him his own place so he can move out of their house, and she frequently brings up divorce to him in arguments. 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 The patient is unsure of which psychiatric medications he has tried in the past, but believes they were antidepressants. He knows the duloxetine he has now been on for over 2 months without noticeable improvement. The patient denies HI or AVH now or in the past.    Review of Systems   Pertinent items are noted in HPI, all other systems reviewed and negative    History    Past psychiatric history: Patient denies, but he admits to seeing [facility] when he was there for a \"few hours\" before being admitted elsewhere in the hospital for complications arising from his amputated leg secondary to DM2. He admits to a diagnosis of ADHD, for which he was treated with Ritalin in childhood, but has not used the medication in recent years. The patient also knows he has tried other medications in the past for his depression, but he cannot remember their names. The medical record shows Fluoxetine was most recently tried prior to duloxetine therapy initiation.  -Psychiatric Hospitalizations: None  -Suicide Attempts: None    Past Medical History:   Diagnosis Date   • CKD (chronic kidney disease), stage III (CMS/[facility])    • Depression    • DJD " (degenerative joint disease)    • DVT (deep venous thrombosis) (CMS/HCC)    • Ganglion     rt wrist   • GERD (gastroesophageal reflux disease)    • Hiatal hernia    • History of echocardiogram 04/2016    2D ECHO   • History of esophageal stricture     s/p dialation 40-50 times last EGD 04/2016   • History of pulmonary embolus (PE)     on long term anticoagulation   • Hyperlipidemia    • Hypertension    • Hypothyroidism    • IBS (irritable bowel syndrome)    • Neuropathy    • Rash     rt lower hip   • Retinopathy    • Seasonal allergies    • Sleep apnea     cpap  bring dos   • Type 2 diabetes mellitus with peripheral vascular disease (CMS/HCC)    • Vitamin D deficiency           Family History   Problem Relation Age of Onset   • Diabetes Mother    • Heart disease Mother    • Leukemia Father    • Sleep apnea Maternal Aunt         GENO   • Stroke Maternal Grandmother    • Hypertension Other    • Hyperlipidemia Other    • Cancer Other    • Colon cancer Other         uncle        Social History     Tobacco Use   • Smoking status: Never Smoker   • Smokeless tobacco: Never Used   Vaping Use   • Vaping Use: Never used   Substance Use Topics   • Alcohol use: No   • Drug use: No          Medications Prior to Admission   Medication Sig Dispense Refill Last Dose   • atorvastatin (LIPITOR) 40 MG tablet TAKE 1 TABLET EVERY DAY 90 tablet 0 8/1/2021 at Unknown time   • DULoxetine (CYMBALTA) 60 MG capsule TAKE 1 CAPSULE EVERY MORNING 90 capsule 0 8/1/2021 at Unknown time   • Eliquis 5 MG tablet tablet TAKE 1 TABLET TWICE DAILY 180 tablet 0 8/1/2021 at Unknown time   • empagliflozin (Jardiance) 10 MG tablet tablet Take 1 tablet by mouth Daily. 90 tablet 4 8/1/2021 at Unknown time   • gabapentin (NEURONTIN) 400 MG capsule Take 400 mg by mouth 3 (Three) Times a Day.   8/1/2021 at Unknown time   • insulin aspart (NovoLOG FlexPen) 100 UNIT/ML solution pen-injector sc pen Inject 10 Units under the skin into the appropriate area as directed  3 (Three) Times a Day With Meals. (Patient taking differently: Inject 14 Units under the skin into the appropriate area as directed 3 (Three) Times a Day With Meals.) 15 mL 6 8/1/2021 at Unknown time   • levothyroxine (SYNTHROID, LEVOTHROID) 100 MCG tablet TAKE 1 TABLET EVERY DAY (Patient taking differently: Take 100 mcg by mouth Daily.) 90 tablet 3 8/1/2021 at Unknown time   • losartan (COZAAR) 50 MG tablet TAKE 1 TABLET EVERY DAY 90 tablet 0 8/1/2021 at Unknown time   • omeprazole (priLOSEC) 40 MG capsule Take 40 mg by mouth Daily. Take DOS   8/1/2021 at Unknown time   • [DISCONTINUED] Insulin Glargine (Lantus SoloStar) 100 UNIT/ML injection pen INJECT SUBCUTANEOUSLY 27 UNITS EVERY BEDTIME (NEEDS APPOINTMENT) (Patient taking differently: Inject 34 Units under the skin into the appropriate area as directed Every Night.) 30 mL 6 8/1/2021 at Unknown time   • metoprolol succinate XL (TOPROL-XL) 50 MG 24 hr tablet TAKE 1 TABLET EVERY DAY (Patient taking differently: Take 50 mg by mouth Daily.) 30 tablet 0    • ondansetron (ZOFRAN) 4 MG tablet TAKE 1 TABLET EVERY 8 HOURS AS NEEDED FOR NAUSEA OR VOMITING. (Patient taking differently: Take 8 mg by mouth Every 8 (Eight) Hours As Needed for Nausea or Vomiting.) 45 tablet 0         Scheduled Meds:  apixaban, 5 mg, Oral, Q12H  aspirin, 81 mg, Oral, Daily  atorvastatin, 80 mg, Oral, Nightly  cyanocobalamin, 1,000 mcg, Intramuscular, Q28 Days  DULoxetine, 60 mg, Oral, QAM  gabapentin, 400 mg, Oral, TID  insulin glargine, 40 Units, Subcutaneous, Nightly  insulin lispro, 14 Units, Subcutaneous, TID With Meals  levothyroxine, 100 mcg, Oral, Q AM  metoprolol succinate XL, 50 mg, Oral, Daily  miconazole, , Topical, Q12H  pantoprazole, 40 mg, Oral, QAM  sodium chloride, 3 mL, Intravenous, Q12H         Continuous Infusions:       PRN Meds:  •  acetaminophen **OR** acetaminophen  •  bisacodyl  •  dextrose  •  dextrose  •  glucagon (human recombinant)  •  HYDROcodone-acetaminophen  •   "ipratropium-albuterol  •  melatonin  •  ondansetron **OR** ondansetron  •  [COMPLETED] Insert peripheral IV **AND** sodium chloride  •  sodium chloride      Allergies:  Lisinopril    Objective       Physical Exam:    Vital Signs:   /86 (BP Location: Right arm, Patient Position: Lying)   Pulse 65   Temp 97.6 °F (36.4 °C) (Axillary)   Resp 20   Ht 172.7 cm (68\")   Wt 90 kg (198 lb 6.6 oz)   SpO2 99%   BMI 30.17 kg/m²     General Appearance:    Well-developed, well-nourished, and depressed.   Head:    Normocephalic, without obvious deformity, atraumatic.   Eyes:            Lids and lashes normal, conjunctivae and sclerae normal, no   icterus, no pallor, corneas clear.   Skin:  Neurologic:  Musculoskeletal:   No bleeding, bruising or rash.  Cranial nerves 2 - 12 grossly intact, sensation intact.  Muscle strength: grade 5  Muscle tone: Normal  Abnormal Movements: No tremors or abnormal involuntary movements  Gait: Deferred, in bed     Mental Status Exam:   Orientation:  To person, Place, Time and Situation  Memory: Recent and remote memory Intact but patient notes occasional difficulty.  Mood/Affect: Depressed, Labile and Agitated  Suicidal Ideations: Suicidal Ideation, Suicidal plan and Death wish  Homicidal Ideations:  None  Hallucinations: None  Delusions:  None  Obsessions: None  Behavior and Psychomotor Activity: Appropriate  Speech:  Rambling  Thought Process: Disorganized, Flight of ideas and Rapid  Associations: Intact  Thought Content: Normal  Language: name objects and repeat phrases  Concentration and computation: Poor  Attention Span: Fair  Fund of Knowledge: Fair  Reliability: fair  Insight into mental health: Fair  Judgement: Impaired  Impulse Control:  Impaired  Hygiene: fair  Cooperation:  Cooperative  Eye Contact:  Good    Medications and allergies reviewed.    Lab Results   Component Value Date    GLUCOSE 189 (H) 08/06/2021    CALCIUM 8.6 08/06/2021     08/06/2021    K 4.3 08/06/2021 " "   CO2 28.0 08/06/2021     08/06/2021    BUN 16 08/06/2021    CREATININE 1.23 08/06/2021    EGFRIFNONA 61 08/06/2021    BCR 13.0 08/06/2021    ANIONGAP 6.0 08/06/2021       Last Urine Toxicity     LAST URINE TOXICITY RESULTS Latest Ref Rng & Units 8/2/2021 5/16/2019    CREATININE UR mg/dL - 261.1    AMPHETAMINES SCREEN, URINE NEGATIVE - NEGATIVE    BARBITURATES SCREEN Negative Negative NEGATIVE    BENZODIAZEPINE SCREEN, URINE Negative Negative NEGATIVE    COCAINE SCREEN, URINE Negative Negative NEGATIVE    METHADONE SCREEN, URINE Negative Negative NEGATIVE          No results found for: PHENYTOIN, PHENOBARB, VALPROATE, CBMZ    Lab Results   Component Value Date     08/06/2021    BUN 16 08/06/2021    CREATININE 1.23 08/06/2021    TSH 4.810 (H) 08/02/2021    WBC 9.00 08/05/2021       Brief Urine Lab Results  (Last result in the past 365 days)      Color   Clarity   Blood   Leuk Est   Nitrite   Protein   CREAT   Urine HCG        08/02/21 1611 Yellow Clear Negative Negative Negative Negative               Assessment/Plan       Cerebrovascular accident (CVA) (CMS/[facility])    Benign essential hypertension    Chronic coronary artery disease    Chronic renal insufficiency, stage III (moderate) (CMS/[facility])    Depression    Diabetic peripheral neuropathy (CMS/[facility])    Gastroesophageal reflux disease    History of pulmonary embolism    Mixed hyperlipidemia    Acquired hypothyroidism    Obstructive sleep apnea syndrome    Peripheral vascular disease (CMS/[facility])    Type 2 diabetes mellitus with peripheral vascular disease (CMS/[facility])    Dysphagia       LABS: Reviewed.    Assessment:  Major Depression Disorder; Severe  ADHD    Medical Decision Making: The patient has a history of \"anger\" but does not meet criteria for bipolar disorder, and bipolar disorder was not indicated with use of the MDQ (Mood Disorder Questionnaire). PHQ-9=20 is indicative of severe major depression, and while the patient admits to being on medication in " the past, he also admits to stopping the medication because he no longer thought he needed it. Due to multiple metabolic comorbidities, including severe uncontrolled diabetes, antipsychotics are too great of a risk until other therapy options have been eliminated as possibilities.    Treatment Plan:  -Initiate cross taper to switch antidepressants since the patient has been on Cymbalta for over 4 months without efficacy and there is remaining severe depression:  -Initiate 50mg Sertraline for treatment of severe MDD.  -Decrease Duloxetine to 40mg po daily, but PCP may possibly keep at an even lower dose for diabetic neuropathy if desired. Careful consideration of serotonin syndrome should be considered in this case. However, the current dosage of 60mg duloxetine is not providing benefit for his depression, and should no longer be used for depression.  -Placement in a psychiatric inpatient facility for management of his MDD and acute suicidal ideations with stated sporadic plans is recommended at this time.  -Social work was asked to help find him inpatient placement and outpatient therapy resources for after his inpatient management.  -Genesight testing in the outpatient setting was discussed with the patient as a next step if Sertraline therapy is found to be ineffective.    Treatment Plan discussed with: Patient    I discussed the patients findings and my recommendations with patient and nursing staff    75 minutes was spent evaluating and counseling the patient.    I have reviewed and approved the behavioral health treatment plans and problem list. Yes  Thank you for the consult  Referring MD has access to consult report and progress notes in EMR  Patient was examined wearing appropriate PPE.    Porfirio Knowles PA-C  08/08/21  18:00 EDT    EMR Dragon transcription disclaimer:  Some of this encounter note is an electronic transcription translation of spoken language to printed text. The electronic  translation of spoken language may permit erroneous, or at times, nonsensical words or phrases to be inadvertently transcribed; Although I have reviewed the note for such errors some may still exist.       Electronically signed by Porfirio Knowles PA-C at 08/09/21 3548

## 2021-08-09 NOTE — PROGRESS NOTES
Referring Provider: Endy Lazaro DO    Reason for follow-up:  Recent stroke  Peripheral vascular disease    Patient Care Team:  Laura Whitaker MD as PCP - General    Subjective .  Feeling okay     ROS  Patient apparently had suicidal ideation although patient claims it was more situational anger in response done suicidal ideation.    Since I have last seen, the patient has been without any chest discomfort ,shortness of breath, palpitations, dizziness or syncope.  Denies having any headache ,abdominal pain ,nausea, vomiting , diarrhea constipation, loss of weight or loss of appetite.  Denies having any excessive bruising ,hematuria or blood in the stool.    Review of all systems negative except as indicated    History  Past Medical History:   Diagnosis Date   • CKD (chronic kidney disease), stage III (CMS/[facility])    • Depression    • DJD (degenerative joint disease)    • DVT (deep venous thrombosis) (CMS/HCC)    • Ganglion     rt wrist   • GERD (gastroesophageal reflux disease)    • Hiatal hernia    • History of echocardiogram 04/2016    2D ECHO   • History of esophageal stricture     s/p dialation 40-50 times last EGD 04/2016   • History of pulmonary embolus (PE)     on long term anticoagulation   • Hyperlipidemia    • Hypertension    • Hypothyroidism    • IBS (irritable bowel syndrome)    • Neuropathy    • Rash     rt lower hip   • Retinopathy    • Seasonal allergies    • Sleep apnea     cpap  bring dos   • Type 2 diabetes mellitus with peripheral vascular disease (CMS/[facility])    • Vitamin D deficiency        Past Surgical History:   Procedure Laterality Date   • AMPUTATION FOOT / TOE Right     great toe   • AMPUTATION REVISION Right 4/8/2021    Procedure: BELOW KNEE AMPUTATION REVISAION;  Surgeon: Eduardo Reynolds MD;  Location: [facility];  Service: Orthopedics;  Laterality: Right;   • AMPUTATION REVISION Right 4/29/2021    Procedure: AMPUTATION REVISION KNEE STUMP;  Surgeon: Eduardo Reynolds MD;   Location: [facility] MAIN OR;  Service: Orthopedics;  Laterality: Right;   • BELOW KNEE AMPUTATION Right 7/30/2020    Procedure: AMPUTATION BELOW KNEE;  Surgeon: Eduardo Reynolds MD;  Location: [facility] MAIN OR;  Service: Orthopedics;  Laterality: Right;   • CARDIAC CATHETERIZATION  04/2018    [facility]   • ENDOSCOPY     • ENDOSCOPY N/A 10/4/2019    Procedure: ESOPHAGOGASTRODUODENOSCOPY with dilitation and biopsy x 1 area;  Surgeon: Declan Iqbal MD;  Location: [facility] ENDOSCOPY;  Service: Gastroenterology   • ENDOSCOPY N/A 12/13/2019    Procedure: ESOPHAGOGASTRODUODENOSCOPY WITH DILATATION (50, 52 BOUGIE);  Surgeon: Declan Iqbal MD;  Location: [facility] ENDOSCOPY;  Service: Gastroenterology   • GANGLION CYST EXCISION Left    • HERNIA REPAIR Bilateral    • RETINOPATHY SURGERY      laser   • TOTAL HIP ARTHROPLASTY Left    • TOTAL HIP ARTHROPLASTY Left 2018   • TRANS METATARSAL AMPUTATION Right 3/17/2020    Procedure: AMPUTATION TRANS METATARSAL right;  Surgeon: ERWIN Baekr DPM;  Location: [facility] MAIN OR;  Service: Podiatry;  Laterality: Right;  GANGRENOUS RIGHT FOOT       Family History   Problem Relation Age of Onset   • Diabetes Mother    • Heart disease Mother    • Leukemia Father    • Sleep apnea Maternal Aunt         GENO   • Stroke Maternal Grandmother    • Hypertension Other    • Hyperlipidemia Other    • Cancer Other    • Colon cancer Other         uncle       Social History     Tobacco Use   • Smoking status: Never Smoker   • Smokeless tobacco: Never Used   Vaping Use   • Vaping Use: Never used   Substance Use Topics   • Alcohol use: No   • Drug use: No        Medications Prior to Admission   Medication Sig Dispense Refill Last Dose   • atorvastatin (LIPITOR) 40 MG tablet TAKE 1 TABLET EVERY DAY 90 tablet 0 8/1/2021 at Unknown time   • DULoxetine (CYMBALTA) 60 MG capsule TAKE 1 CAPSULE EVERY MORNING 90 capsule 0 8/1/2021 at Unknown time   • Eliquis 5 MG tablet tablet TAKE 1 TABLET TWICE DAILY 180  tablet 0 8/1/2021 at Unknown time   • empagliflozin (Jardiance) 10 MG tablet tablet Take 1 tablet by mouth Daily. 90 tablet 4 8/1/2021 at Unknown time   • gabapentin (NEURONTIN) 400 MG capsule Take 400 mg by mouth 3 (Three) Times a Day.   8/1/2021 at Unknown time   • insulin aspart (NovoLOG FlexPen) 100 UNIT/ML solution pen-injector sc pen Inject 10 Units under the skin into the appropriate area as directed 3 (Three) Times a Day With Meals. (Patient taking differently: Inject 14 Units under the skin into the appropriate area as directed 3 (Three) Times a Day With Meals.) 15 mL 6 8/1/2021 at Unknown time   • levothyroxine (SYNTHROID, LEVOTHROID) 100 MCG tablet TAKE 1 TABLET EVERY DAY (Patient taking differently: Take 100 mcg by mouth Daily.) 90 tablet 3 8/1/2021 at Unknown time   • losartan (COZAAR) 50 MG tablet TAKE 1 TABLET EVERY DAY 90 tablet 0 8/1/2021 at Unknown time   • omeprazole (priLOSEC) 40 MG capsule Take 40 mg by mouth Daily. Take DOS   8/1/2021 at Unknown time   • [DISCONTINUED] Insulin Glargine (Lantus SoloStar) 100 UNIT/ML injection pen INJECT SUBCUTANEOUSLY 27 UNITS EVERY BEDTIME (NEEDS APPOINTMENT) (Patient taking differently: Inject 34 Units under the skin into the appropriate area as directed Every Night.) 30 mL 6 8/1/2021 at Unknown time   • metoprolol succinate XL (TOPROL-XL) 50 MG 24 hr tablet TAKE 1 TABLET EVERY DAY (Patient taking differently: Take 50 mg by mouth Daily.) 30 tablet 0    • ondansetron (ZOFRAN) 4 MG tablet TAKE 1 TABLET EVERY 8 HOURS AS NEEDED FOR NAUSEA OR VOMITING. (Patient taking differently: Take 8 mg by mouth Every 8 (Eight) Hours As Needed for Nausea or Vomiting.) 45 tablet 0        Allergies  Lisinopril    Scheduled Meds:apixaban, 5 mg, Oral, Q12H  aspirin, 81 mg, Oral, Daily  atorvastatin, 80 mg, Oral, Nightly  cyanocobalamin, 1,000 mcg, Intramuscular, Q28 Days  DULoxetine, 40 mg, Oral, QAM  gabapentin, 400 mg, Oral, TID  insulin glargine, 40 Units, Subcutaneous,  "Nightly  insulin lispro, 14 Units, Subcutaneous, TID With Meals  levothyroxine, 100 mcg, Oral, Q AM  metoprolol succinate XL, 50 mg, Oral, Daily  miconazole, , Topical, Q12H  pantoprazole, 40 mg, Oral, QAM  sertraline, 50 mg, Oral, Daily  sodium chloride, 3 mL, Intravenous, Q12H      Continuous Infusions:   PRN Meds:.•  acetaminophen **OR** acetaminophen  •  bisacodyl  •  dextrose  •  dextrose  •  glucagon (human recombinant)  •  HYDROcodone-acetaminophen  •  ipratropium-albuterol  •  melatonin  •  ondansetron **OR** ondansetron  •  [COMPLETED] Insert peripheral IV **AND** sodium chloride  •  sodium chloride    Objective     VITAL SIGNS  Vitals:    08/08/21 1427 08/08/21 1734 08/08/21 2220 08/09/21 0414   BP: 155/76 171/86 168/77 142/78   BP Location: Right arm Right arm Left arm Left arm   Patient Position: Lying Lying Sitting Lying   Pulse: 68 65 71 69   Resp: 13 20 18 17   Temp: 98.6 °F (37 °C) 97.6 °F (36.4 °C) 97.9 °F (36.6 °C) 97.8 °F (36.6 °C)   TempSrc: Oral Axillary Oral Oral   SpO2:   96% 96%   Weight:    88 kg (194 lb 0.1 oz)   Height:           Flowsheet Rows      First Filed Value   Admission Height  172.7 cm (68\") Documented at 08/02/2021 0540   Admission Weight  83.9 kg (185 lb) Documented at 08/02/2021 0540            Intake/Output Summary (Last 24 hours) at 8/9/2021 0616  Last data filed at 8/8/2021 1125  Gross per 24 hour   Intake 500 ml   Output 1260 ml   Net -760 ml        TELEMETRY: Sinus rhythm    Physical Exam:  The patient is alert, oriented and in no distress.  Vital signs as noted above.  Head and neck revealed no carotid bruits or jugular venous distention.  No thyromegaly or lymphadenopathy is present  Lungs clear.  No wheezing.  Breath sounds are normal bilaterally.  Heart normal first and second heart sounds.  No murmur. No precordial rub is present.  No gallop is present.  Abdomen soft and nontender.  No organomegaly is present.  Extremities with good peripheral pulses without any " pedal edema.  Right BKA  Skin warm and dry.  Musculoskeletal system is grossly normal  CNS grossly normal      Results Review:   I reviewed the patient's new clinical results.  Lab Results (last 24 hours)     Procedure Component Value Units Date/Time    POC Glucose Once [[id number]]  (Abnormal) Collected: 08/08/21 2205    Specimen: Blood Updated: 08/08/21 2206     Glucose 160 mg/dL      Comment: Serial Number: [id number]:  [id number]       POC Glucose Once [[id number]]  (Abnormal) Collected: 08/08/21 1124    Specimen: Blood Updated: 08/08/21 1129     Glucose 159 mg/dL      Comment: Serial Number: [id number]:  [id number]             Imaging Results (Last 24 Hours)     ** No results found for the last 24 hours. **      LAB RESULTS (LAST 7 DAYS)    CBC  Results from last 7 days   Lab Units 08/05/21  0414 08/02/21  0620   WBC 10*3/mm3 9.00 7.80   RBC 10*6/mm3 4.89 5.24   HEMOGLOBIN g/dL 12.7* 13.9   HEMATOCRIT % 39.0 42.1   MCV fL 79.7 80.4   PLATELETS 10*3/mm3 224 244       BMP  Results from last 7 days   Lab Units 08/06/21  0400 08/05/21  0414 08/03/21  0434 08/02/21  0620   SODIUM mmol/L 141 141 137 141   POTASSIUM mmol/L 4.3 4.4 4.8 4.2   CHLORIDE mmol/L 107 105 100 101   CO2 mmol/L 28.0 29.0 27.0 28.0   BUN mg/dL 16 18 18 16   CREATININE mg/dL 1.23 1.46* 1.37* 1.21   GLUCOSE mg/dL 189* 217* 227* 152*   MAGNESIUM mg/dL  --  1.7 1.8  --        CMP   Results from last 7 days   Lab Units 08/06/21  0400 08/05/21  0414 08/03/21  0434 08/02/21  0620   SODIUM mmol/L 141 141 137 141   POTASSIUM mmol/L 4.3 4.4 4.8 4.2   CHLORIDE mmol/L 107 105 100 101   CO2 mmol/L 28.0 29.0 27.0 28.0   BUN mg/dL 16 18 18 16   CREATININE mg/dL 1.23 1.46* 1.37* 1.21   GLUCOSE mg/dL 189* 217* 227* 152*         BNP        TROPONIN        CoAg  Results from last 7 days   Lab Units 08/02/21  0620   INR  0.99   APTT seconds 25.3       Creatinine Clearance  Estimated Creatinine Clearance: 71.4 mL/min (by C-G formula based on SCr of 1.23  mg/dL).    ABG        Radiology  No radiology results for the last day            EKG              I personally viewed and interpreted the patient's EKG/Telemetry data: Sinus rhythm    ECHOCARDIOGRAM:    Results for orders placed during the hospital encounter of 08/02/21    Adult Transesophageal Echo (BARON) W/ Cont if Necessary Per Protocol    Interpretation Summary  Procedure performed  Transesophageal echocardiogram Doppler study (color Continuous Wave and pulse wave)    Date of study  8/5/2021    Indications  Rule out cardioembolic episodes  Recent stroke    Procedure  Anesthesia was provided by anesthesiologist with intravenous Diprivan  BARON probe could be passed without difficulty.  Patient tolerated the procedure well.  No complications were noted.    Results  Technically satisfactory study.  Mitral valve is thickened with adequate opening motion.  Moderate mitral regurgitation is present.  Tricuspid valve is normal.  Aortic valve is tricuspid and is normal.  Left atrium is normal in size.  Left atrial appendage is enlarged without any clot.  Left ventricle is normal in size and contractility with ejection fraction of 60%.  Right atrium is normal in size.  No pericardial effusion or intracardiac thrombus is seen.  Atrial septum is intact without PFO.  Aorta is normal.    Impression  Moderate mitral regurgitation  Normal left ventricle size and contractility with ejection fraction of 60%.  No evidence for intracardiac thrombus is present.          STRESS MYOVIEW:    Cardiolite (Tc-99m Sestamibi) stress test    CARDIAC CATHETERIZATION:            OTHER:         Assessment/Plan     Principal Problem:    Cerebrovascular accident (CVA) (CMS/HCC)  Active Problems:    Benign essential hypertension    Chronic coronary artery disease    Chronic renal insufficiency, stage III (moderate) (CMS/HCC)    Major depressive disorder, recurrent episode, severe (CMS/HCC)    Diabetic peripheral neuropathy (CMS/HCC)     Gastroesophageal reflux disease    History of pulmonary embolism    Mixed hyperlipidemia    Acquired hypothyroidism    Obstructive sleep apnea syndrome    Peripheral vascular disease (CMS/HCC)    Type 2 diabetes mellitus with peripheral vascular disease (CMS/HCC)    Dysphagia    Depression with suicidal ideation        ]]]]]]]]]]]]]]]]]]]]]]  Impression  ===========  -TIA/stroke.  Abnormal CTA with acute lacunar infarct within the posterior limb of the left internal capsule.  EKG showed sinus rhythm.  Echocardiogram showed anterior mitral leaflet nodular density.     -History of coronary artery disease.  Patient is not having any angina pectoris or congestive heart failure.  Cardiac cath films from 4/18/2018 were reviewed and interpreted independently.  Normal left ventricular function.  50% diagonal branch disease.  RCA is a nondominant vessel that has mid segment 90% disease (small artery)     BARON 2021-08-05 revealed  Moderate mitral regurgitation  Normal left ventricle size and contractility with ejection fraction of 60%.  No evidence for intracardiac thrombus is present.    -Bilateral carotid bruits.  Right louder than left.  CT angiogram and carotid duplex scan did not reveal any obstructive carotid artery disease.     -Hypertension diabetes dyslipidemia sleep apnea CKD 3 (BUN 18 creatinine 1.37.)  Hypothyroidism vitamin D deficiency     -History of DVT.     -Long-term anticoagulation with Eliquis due to previous pulmonary embolus     -Peripheral vascular disease     -Status post right BKA.  Total left arthroplasty hernia repair     -Non-smoker  ============  Plan  ============  Recent TIA/stroke  BARON 2021-08-05-negative for intracardiac thrombus.    Anticoagulation  Patient is on Eliquis    Renal dysfunction  BUN/18/1.37  18/1.46-8/5/2021  Observe closely.    Patient apparently had suicidal ideation although patient claims it was more situational anger in response done suicidal ideation.  Patient is very  rational about the situation at this time.    No need for further cardiac work-up at this time.    Follow-up in the office in 2 weeks when discharged.    Medications were reviewed and updated.  Continue aspirin Eliquis atorvastatin gabapentin levothyroxine metoprolol pantoprazole sertraline    Further plan will depend on patient's progress.  ]]]]]]]]]]]]]]]]]]]]]    Nelli Vides MD  08/09/21  06:16 EDT

## 2021-08-09 NOTE — THERAPY TREATMENT NOTE
Subjective: Pt agreeable to therapeutic plan of care.  Pt crying and repeated he 'was done' and wanted to go home with his 'mom and day'.  Expressed concerns with nursing of  pt's suicidal ideations.    Objective:     Bed mobility - Independent  Transfers - Modified-Independent  Pt donned his his own rle prosthetic.  Ambulation - 300 feet and 900 feet Supervision and with rolling walker    Pain: 7 VAS  Base of head and back of head with headache.  Nursing giving pt medication for pain  Education: Provided education on importance of mobility and skilled verbal / tactile cueing throughout intervention.     Assessment: Kuldeep Adhikari presents with functional mobility impairments which indicate the need for skilled intervention. Pt has improved to independent with his mobility. B/P after gait training was 196/126 mm/hg, sats 99% on room air, pulse 91 bpm.  Nursing present to give meds.  No more need for IP rehab. Recommend home at d/c.  possbile IP psych admit from the hospital.  Will continue to follow and progress as tolerated.     Plan/Recommendations:   Pt would benefit from Home with family assist at discharge from facility and requires no DME at discharge.   Pt desires Home with family assist at discharge. Pt cooperative; agreeable to therapeutic recommendations and plan of care.     Basic Mobility 6-click:  Rollin = Total, A lot = 2, A little = 3; 4 = None  Supine>Sit:   1 = Total, A lot = 2, A little = 3; 4 = None   Sit>Stand with arms:  1 = Total, A lot = 2, A little = 3; 4 = None  Bed>Chair:   1 = Total, A lot = 2, A little = 3; 4 = None  Ambulate in room:  1 = Total, A lot = 2, A little = 3; 4 = None  3-5 Steps with railin = Total, A lot = 2, A little = 3; 4 = None  Score: 22    Modified [place]: 2 = Slight disability (Able to look after own affairs without assistance, but unable to carry out all previous activities )2    Post-Tx Position: Up in Chair, Staff Present and Call light and personal  items within reach, sitter present  PPE: gloves, surgical mask, eyewear protection

## 2021-08-09 NOTE — NURSING NOTE
"Patient in room crying and stating he \"wants to go home\"  Not to his home where he lives, but \"home with my mom and dad\".  Patient is complaining that his wife and daughter will not visit him and are accusing him of molestation.  Patient has stated they have taken things out of his room so he does not hang himself, but that he will not do that, but also continues to express suicidal ideations.  PT also expressing concern over suicidal ideations.  Spoke with Dr. Plaza; psych consult placed.  "

## 2021-08-09 NOTE — PLAN OF CARE
Assessment: Kuldeep Adhikari presents with ADL impairments below baseline abilities which indicate the need for continued skilled intervention while inpatient. Pt required SBA for oral hygiene standing at sink. Pt very loquacious and required reminders to stay on task. Tolerating session today without incident. Will continue to follow and progress as tolerated.     Plan/Recommendations:   Pt would benefit from Home with family assist at discharge from facility.   Pt desires Home with family assist at discharge. Pt cooperative; agreeable to therapeutic recommendations and plan of care.

## 2021-08-09 NOTE — PLAN OF CARE
Goal Outcome Evaluation:     Bed mobility - Independent  Transfers - Modified-Independent  Pt donned his his own rle prosthetic.  Ambulation - 300 feet Supervision and with rolling walker       Pt would benefit from Home with family assist at discharge from facility and requires no DME at discharge.   Pt desires Home with family assist at discharge. Pt cooperative; agreeable to therapeutic recommendations and plan of care.

## 2021-08-09 NOTE — CASE MANAGEMENT/SOCIAL WORK
"Continued Stay Note   [facility]     Patient Name: Kuldeep Adhikari  MRN: [id number]  Today's Date: 8/9/2021    Admit Date: 8/2/2021    Discharge Plan     Row Name 08/09/21 1218       Plan    Plan  Multiple inpt psych referrals pending.    Plan Comments  SW sent multiple inpt psychiatric referrals. See below. SW also met with pt at the bedside and had lengthy conversation in which pt admitted having SI d/t feelings of anger toward family members. Pt stated that he will go to an inpt psychiatric facility for treatment of his own accord. 1. [facility] - denied d/t no beds, s/w Aleja. 2. [facility] - denied d/t 1 week post-CVA, s/w Eve. 3. [facility] - referral sent by fax to [phone] after s/w Zena, who states facility \"maybe\" has beds - awaiting response. 4. [facility] - denied d/t no beds, s/w Dr. Felder. 5. [facility] - sent referral via fax to [phone] after s/w Madeline, who stated that facility maybe has bed - awaiting response. 6. [facility] - sent referral via fax to [phone] after s/w Bradley, who stated that facility anticipates beds becoming available today following planned d/c's. 7. [facility] - denied d/t no beds, s/w Yuliya. 8. [facility] - sent referral via fax to [phone] after s/w Shantell, who states facility does have beds available - awaiting response. 9. [facility] - sent referral via fax to [phone] after s/w Niya, who states that facility does have beds available today - awaiting response. 10. [facility] - sent referral via fax to [phone] after s/w Xavi, who stated that beds are available today - awaiting response.        Met with patient in room wearing PPE: mask, face shield/goggles, gloves.      Did not maintain distance greater than six feet and spent more than 15 minutes in the room.      ChingYAHAIRA Wood, LSW    Office: [phone]  Cell: " [phone]  Fax: [phone]  E-mail: [email or web address]

## 2021-08-09 NOTE — CASE MANAGEMENT/SOCIAL WORK
Continued Stay Note  KATY Manrique     Patient Name: Kuldeep Adhikari  MRN: [id number]  Today's Date: 8/9/2021    Admit Date: 8/2/2021    Discharge Plan     Row Name 08/09/21 0907       Plan    Plan  Referral pending for [facility].    Plan Comments  SW received secure chat from psych YOSSI ([email or web address]), requesting SW to pursue inpt psychiatric stabilization d/t SI and obtain list of local outpatient psych resources that can accommodate pt's payor. JULIANNA sent referrals to [facility] and [facility] via ad hoc fax and phone call. JUILANNA s/w Aleja at [facility], who stated that no adult male beds are available at this time. JULIANNA s/w Eve at [facility], who stated that adult male beds are available today and stated that [facility] would review.        Phone communication or documentation only - no physical contact with patient or family.    Ching Townsend [facility], W    Office: [phone]  Cell: [phone]  Fax: [phone]  E-mail: [email or web address]

## 2021-08-09 NOTE — PROGRESS NOTES
[facility] Medicine Services Daily Progress Note    Patient Name: Kuldeep Adhikari  : 1965  MRN: [id number]  Primary Care Physician:  Laura Whitaker MD  Date of admission: 2021      Subjective      Chief Complaint: Right-sided numbness and weakness.      Patient Reports no overnight events.    Review of Systems   Constitutional: Negative.   HENT: Negative.    Eyes: Negative.    Cardiovascular: Negative.    Respiratory: Negative.    Endocrine: Negative.    Hematologic/Lymphatic: Negative.    Skin: Negative.    Musculoskeletal: Negative.    Gastrointestinal: Negative.    Genitourinary: Negative.    Neurological: Negative.    Psychiatric/Behavioral: Negative.    Allergic/Immunologic: Negative.             Objective      Vitals:   Temp:  [97.8 °F (36.6 °C)-98.1 °F (36.7 °C)] 98.1 °F (36.7 °C)  Heart Rate:  [69-78] 78  Resp:  [17-18] 17  BP: (142-196)/() 196/126    Physical Exam  Vitals and nursing note reviewed.   Constitutional:       General: He is not in acute distress.  HENT:      Head: Normocephalic.      Nose: Nose normal.      Mouth/Throat:      Mouth: Mucous membranes are dry.      Pharynx: Oropharynx is clear.   Eyes:      Extraocular Movements: Extraocular movements intact.      Conjunctiva/sclera: Conjunctivae normal.      Pupils: Pupils are equal, round, and reactive to light.   Cardiovascular:      Pulses: Normal pulses.      Heart sounds: No murmur heard.   No friction rub. No gallop.       Comments: S1 and S2 present.  No tachycardia.  Pulmonary:      Effort: Pulmonary effort is normal.      Breath sounds: No stridor. No wheezing or rales.   Chest:      Chest wall: No tenderness.   Abdominal:      General: Bowel sounds are normal. There is no distension.      Palpations: Abdomen is soft.      Tenderness: There is no abdominal tenderness. There is no right CVA tenderness or guarding.   Musculoskeletal:         General: No swelling, tenderness, deformity or  signs of injury.      Cervical back: Normal range of motion. No rigidity.      Right lower leg: No edema.      Left lower leg: No edema.   Skin:     General: Skin is warm and dry.      Capillary Refill: Capillary refill takes less than 2 seconds.      Coloration: Skin is not jaundiced.      Findings: No bruising, erythema, lesion or rash.   Neurological:      Comments: No facial asymmetry noted.  Gait and station not tested.   Psychiatric:      Comments: No agitation.               Result Review    Result Review:  I have personally reviewed the results from the time of this admission to 8/9/2021 17:42 EDT and agree with these findings:  [x]  Laboratory  [x]  Microbiology  [x]  Radiology  []  EKG/Telemetry   []  Cardiology/Vascular   []  Pathology  []  Old records  []  Other:  Most notable findings include: Glucose 150.          Assessment/Plan      Brief Patient Summary:  Patient is a 56-year-old male with past medical history of peripheral vascular disease status post right BKA, diabetes mellitus type 2, coronary artery disease, pulmonary embolism on chronic anticoagulation with Eliquis, hypothyroidism asthma, anxiety, depression, GERD, hypertension and hyperlipidemia who presented to the emergency room because of right-sided weakness.  Patient was seen in the emergency room and was placed on a stroke protocol.  Patient was diagnosed with acute CVA and a neurology consult was completed.  Cardiology consult was also completed.      apixaban, 5 mg, Oral, Q12H  aspirin, 81 mg, Oral, Daily  atorvastatin, 80 mg, Oral, Nightly  cyanocobalamin, 1,000 mcg, Intramuscular, Q28 Days  DULoxetine, 40 mg, Oral, QAM  gabapentin, 400 mg, Oral, TID  insulin glargine, 40 Units, Subcutaneous, Nightly  insulin lispro, 14 Units, Subcutaneous, TID With Meals  levothyroxine, 100 mcg, Oral, Q AM  metoprolol succinate XL, 50 mg, Oral, Daily  miconazole, , Topical, Q12H  pantoprazole, 40 mg, Oral, QAM  sertraline, 50 mg, Oral, Daily  sodium  chloride, 3 mL, Intravenous, Q12H             Active Hospital Problems:  Active Hospital Problems    Diagnosis    • **Cerebrovascular accident (CVA) (CMS/[facility])  Follow neurology recommendations.      • Depression with suicidal ideation  Follow psych recommendations.      • Dysphagia      Added automatically from request for surgery [id number]     • Type 2 diabetes mellitus with peripheral vascular disease (CMS/[facility])  Treat with insulin therapy.      • Chronic coronary artery disease  Follow cardiology recommendations.  Treat with aspirin therapy.      • Obstructive sleep apnea syndrome      • Benign essential hypertension  Treat with metoprolol.      • Diabetic peripheral neuropathy (CMS/[facility])  Treat with gabapentin.      • Chronic renal insufficiency, stage III (moderate) (CMS/[facility])  Continue to monitor.      • Major depressive disorder, recurrent episode, severe (CMS/[facility])  Treat with sertraline.      • Gastroesophageal reflux disease  Treat with Protonix.      • History of pulmonary embolism  Patient is on Eliquis.      • Mixed hyperlipidemia  Treat with Lipitor.      • Acquired hypothyroidism  Treat with Synthroid.      • Peripheral vascular disease (CMS/[facility])  Treat with aspirin.           Continue appropriate patient's home medications for other chronic medical conditions.  Continue the present level of care.  Patient and family agreed with the plan of care.      DVT prophylaxis:  Medical and mechanical DVT prophylaxis orders are present.    CODE STATUS:    Code Status: CPR  Medical Interventions (Level of Support Prior to Arrest): Full      Disposition: Pending patient's clinical improvement.    This patient has been examined wearing appropriate Personal Protective Equipment and discussed with hospital infection control department, [facility] department, infectious disease specialist and pulmonologist. 08/09/21      Electronically signed by Behzad Plaza MD, 08/09/21, 17:42 EDT.  [facility]

## 2021-08-09 NOTE — CASE MANAGEMENT/SOCIAL WORK
"Continued Stay Note   [place]     Patient Name: Kuldeep Adhikari  MRN: [id number]  Today's Date: 8/9/2021    Admit Date: 8/2/2021    Discharge Plan     Row Name 08/09/21 1628       Plan    Plan  Multiple inpt psych referrals pending.    Plan Comments  SW sent multiple inpt psychiatric referrals. See below.     1. [facility] - denied d/t no beds, s/w Aleja.   2. [facility] - denied d/t 1 week post-CVA, s/w Eve.   3. [facility] - denied d/t “medically fragile.”   4. [facility] - denied d/t no beds, s/w Dr. Felder.   5. [facility] - declined d/t medical needs.   6. [facility] - denied d/t multiple medical needs.  7. [facility] - denied d/t no beds, s/w Yuliya.   8. [facility] - denied per bedside RN report.  9. [facility] - denied d/t medical needs.   10. [facility] - denied d/t multiple medical needs.  11. [facility] - referral sent via fax to [phone], s/w Merced, who stated that pt qualifies financially - however, “may be tomorrow before a bed would be ready.\"     SW called psych PA-C for further recommendation - per psych PA-C, pt will not d/c this evening. On 8/10, SW will obtain answer from [facility] regarding admission decision and work on safety plan if facility denies. SW inquired to psych PA-C if Emergency senior living Order is needed - per psych PA-C, VERNON not needed at this time.        Phone communication or documentation only - no physical contact with patient or family.    Ching Townsend, [facility], LSW    Office: [phone]  Cell: [phone]  Fax: [phone]  E-mail: [email or web address]        "

## 2021-08-09 NOTE — PROGRESS NOTES
"  Chief complaint \"I talked to the  and feel some better today\"    Subjective .     History of present illness:  The patient is a 56 y.o. male who was admitted secondary to a small CVA, for which he has not yet noticed any deficits.     Psychiatry was consulted due to suicidal thoughts that occurred today, along with plans to use the syringe in this room to put an air embolus into his IV. The patient admits to suicidal thoughts prior to hospitalization, and states he knows the drill for removing everything that could be dangerous from his room because he has \"been through this before.\" His wife has not been coming to see him, would not bring him the candy he requested, and didn't want to pick him up from the hospital, which is what made his SI occur today. He reports marriage difficulty for at least 2 years, and is upset that she wants to get him his own place so he can move out of their house, and she frequently brings up divorce to him in arguments. 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     8/9/21, patient has a sitter at bedside with suicide precautions, which he is happy about because he has \"someone to talk to\", still focused on his unhappiness at home and lack of support from his wife, doesn't want to keep living like that.    Past Psychiatric History: Depression, ADHD: denied any psychiatric admissions; denied any suicide attempts    Review of Systems   Constitutional: Negative.    HENT: Negative.    Eyes: Negative.    Respiratory: Negative.    Cardiovascular: Negative.    Gastrointestinal: Negative.    Endocrine: Negative.    Musculoskeletal: Positive for arthralgias.   Skin: Negative.    Neurological: Negative.    Psychiatric/Behavioral: Positive for dysphoric mood and suicidal " ideas. Negative for agitation, behavioral problems, confusion, decreased concentration, hallucinations, self-injury and sleep disturbance. The patient is not nervous/anxious and is not hyperactive.        History       Medications Prior to Admission   Medication Sig Dispense Refill Last Dose   • atorvastatin (LIPITOR) 40 MG tablet TAKE 1 TABLET EVERY DAY 90 tablet 0 8/1/2021 at Unknown time   • DULoxetine (CYMBALTA) 60 MG capsule TAKE 1 CAPSULE EVERY MORNING 90 capsule 0 8/1/2021 at Unknown time   • Eliquis 5 MG tablet tablet TAKE 1 TABLET TWICE DAILY 180 tablet 0 8/1/2021 at Unknown time   • empagliflozin (Jardiance) 10 MG tablet tablet Take 1 tablet by mouth Daily. 90 tablet 4 8/1/2021 at Unknown time   • gabapentin (NEURONTIN) 400 MG capsule Take 400 mg by mouth 3 (Three) Times a Day.   8/1/2021 at Unknown time   • insulin aspart (NovoLOG FlexPen) 100 UNIT/ML solution pen-injector sc pen Inject 10 Units under the skin into the appropriate area as directed 3 (Three) Times a Day With Meals. (Patient taking differently: Inject 14 Units under the skin into the appropriate area as directed 3 (Three) Times a Day With Meals.) 15 mL 6 8/1/2021 at Unknown time   • levothyroxine (SYNTHROID, LEVOTHROID) 100 MCG tablet TAKE 1 TABLET EVERY DAY (Patient taking differently: Take 100 mcg by mouth Daily.) 90 tablet 3 8/1/2021 at Unknown time   • losartan (COZAAR) 50 MG tablet TAKE 1 TABLET EVERY DAY 90 tablet 0 8/1/2021 at Unknown time   • omeprazole (priLOSEC) 40 MG capsule Take 40 mg by mouth Daily. Take DOS   8/1/2021 at Unknown time   • [DISCONTINUED] Insulin Glargine (Lantus SoloStar) 100 UNIT/ML injection pen INJECT SUBCUTANEOUSLY 27 UNITS EVERY BEDTIME (NEEDS APPOINTMENT) (Patient taking differently: Inject 34 Units under the skin into the appropriate area as directed Every Night.) 30 mL 6 8/1/2021 at Unknown time   • metoprolol succinate XL (TOPROL-XL) 50 MG 24 hr tablet TAKE 1 TABLET EVERY DAY (Patient taking  "differently: Take 50 mg by mouth Daily.) 30 tablet 0    • ondansetron (ZOFRAN) 4 MG tablet TAKE 1 TABLET EVERY 8 HOURS AS NEEDED FOR NAUSEA OR VOMITING. (Patient taking differently: Take 8 mg by mouth Every 8 (Eight) Hours As Needed for Nausea or Vomiting.) 45 tablet 0        Scheduled Meds:apixaban, 5 mg, Oral, Q12H  aspirin, 81 mg, Oral, Daily  atorvastatin, 80 mg, Oral, Nightly  cyanocobalamin, 1,000 mcg, Intramuscular, Q28 Days  DULoxetine, 40 mg, Oral, QAM  gabapentin, 400 mg, Oral, TID  insulin glargine, 40 Units, Subcutaneous, Nightly  insulin lispro, 14 Units, Subcutaneous, TID With Meals  levothyroxine, 100 mcg, Oral, Q AM  metoprolol succinate XL, 50 mg, Oral, Daily  miconazole, , Topical, Q12H  pantoprazole, 40 mg, Oral, QAM  sertraline, 50 mg, Oral, Daily  sodium chloride, 3 mL, Intravenous, Q12H      Continuous Infusions:   PRN Meds:.•  acetaminophen **OR** acetaminophen  •  bisacodyl  •  dextrose  •  dextrose  •  glucagon (human recombinant)  •  HYDROcodone-acetaminophen  •  ipratropium-albuterol  •  melatonin  •  ondansetron **OR** ondansetron  •  [COMPLETED] Insert peripheral IV **AND** sodium chloride  •  sodium chloride     Allergies:  Lisinopril      Objective     Vital Signs   BP (!) 196/126 (BP Location: Left arm, Patient Position: Sitting)   Pulse 78   Temp 98.1 °F (36.7 °C) (Oral)   Resp 17   Ht 172.7 cm (68\")   Wt 88 kg (194 lb 0.1 oz)   SpO2 99%   BMI 29.50 kg/m²     Physical Exam:     General Appearance:    Well-developed, well-nourished, and depressed.   Head:    Normocephalic, without obvious deformity, atraumatic.   Eyes:            Lids and lashes normal, conjunctivae and sclerae normal, no   icterus, no pallor, corneas clear.   Skin:  Neurologic:  Musculoskeletal:   No bleeding, bruising or rash.  Cranial nerves 2 - 12 grossly intact, sensation intact.  Muscle strength: grade 5  Muscle tone: Normal  Abnormal Movements: No tremors or abnormal involuntary movements  Gait: fairly " steady      Mental Status Exam:   Orientation:  To person, Place, Time and Situation  Memory: Recent and remote memory Intact but patient notes occasional difficulty.  Mood/Affect: Depressed, Labile   Suicidal Ideations: Suicidal Ideation, Suicidal plan and Death wish  Homicidal Ideations:  None  Hallucinations: None  Delusions:  None  Obsessions: None  Behavior and Psychomotor Activity: Appropriate  Speech:  Normal rate and volume  Thought Process: Goal directed  Associations: Intact  Thought Content: Normal  Language: name objects and repeat phrases  Concentration and computation: Poor  Attention Span: Fair  Fund of Knowledge: Fair  Reliability: fair  Insight into mental health: Fair  Judgement: Impaired  Impulse Control:  Impaired  Hygiene: fair  Cooperation:  Cooperative  Eye Contact:  Good      Medications and allergies were reviewed by this provider.    Lab Results   Component Value Date    GLUCOSE 189 (H) 08/06/2021    CALCIUM 8.6 08/06/2021     08/06/2021    K 4.3 08/06/2021    CO2 28.0 08/06/2021     08/06/2021    BUN 16 08/06/2021    CREATININE 1.23 08/06/2021    EGFRIFNONA 61 08/06/2021    BCR 13.0 08/06/2021    ANIONGAP 6.0 08/06/2021       Last Urine Toxicity     LAST URINE TOXICITY RESULTS Latest Ref Rng & Units 8/2/2021 5/16/2019    CREATININE UR mg/dL - 261.1    AMPHETAMINES SCREEN, URINE NEGATIVE - NEGATIVE    BARBITURATES SCREEN Negative Negative NEGATIVE    BENZODIAZEPINE SCREEN, URINE Negative Negative NEGATIVE    COCAINE SCREEN, URINE Negative Negative NEGATIVE    METHADONE SCREEN, URINE Negative Negative NEGATIVE          No results found for: PHENYTOIN, PHENOBARB, VALPROATE, CBMZ    Lab Results   Component Value Date     08/06/2021    BUN 16 08/06/2021    CREATININE 1.23 08/06/2021    TSH 4.810 (H) 08/02/2021    WBC 9.00 08/05/2021       Brief Urine Lab Results  (Last result in the past 365 days)      Color   Clarity   Blood   Leuk Est   Nitrite   Protein   CREAT   Urine HCG         08/02/21 1611 Yellow Clear Negative Negative Negative Negative               Assessment/Plan       Cerebrovascular accident (CVA) (CMS/[facility])    Benign essential hypertension    Chronic coronary artery disease    Chronic renal insufficiency, stage III (moderate) (CMS/[facility])    Major depressive disorder, recurrent episode, severe (CMS/[facility])    Diabetic peripheral neuropathy (CMS/[facility])    Gastroesophageal reflux disease    History of pulmonary embolism    Mixed hyperlipidemia    Acquired hypothyroidism    Obstructive sleep apnea syndrome    Peripheral vascular disease (CMS/[facility])    Type 2 diabetes mellitus with peripheral vascular disease (CMS/[facility])    Dysphagia    Depression with suicidal ideation         Assessment:   Major Depressive Disorder, recurrent, severe    Treatment Plan:   Patient is still depressed, but mood improved some today, continue suicide precautions.  Continue Sertraline 50 mg daily while cross tapering off Cymbalta   Social work is still working on acceptance at an inpatient psych facility for stabilization his depression, numerous rejections due to his health issues or no beds available  [employer]es[employer] testing in the outpatient setting was discussed with the patient as a next step if Sertraline therapy is found to be ineffective  Will follow     Treatment Plan discussed with: Patient and social work    I discussed the patients findings and my recommendations with patient and nursing staff    I have reviewed and approved the behavioral health treatment plans and problem list. Yes     Referring MD has access to consult report and progress notes in EMR     Karen Reza PA-C  08/09/21  17:26 EDT    Patient was seen wearing appropriate PPE.    EMR Dragon transcription disclaimer:  Some of this encounter note is an electronic transcription translation of spoken language to printed text. The electronic translation of spoken language may permit erroneous, or at times, nonsensical words or phrases to be  inadvertently transcribed; Although I have reviewed the note for such errors some may still exist.

## 2021-08-09 NOTE — DISCHARGE PLACEMENT REQUEST
"**Attn: [facility]. Please evaluate for inpt psych stabilization.    Ching Townsend, [facility], LSW    Office: [phone]  Cell: [phone]  Fax: [phone]  E-mail: [email or web address]**    Kuldeep Rangel (56 y.o. Male)     Date of Birth Social Security Number Address Home Phone MRN    1965  [id number]  [place] [address] [phone] [id number]    Religious Marital Status          Jain        Admission Date Admission Type Admitting Provider Attending Provider Department, Room/Bed    8/2/21 Emergency Kathy He MD Olisa, Charles O, MD [facility] 2B MEDICAL INPATIENT, 228/1    Discharge Date Discharge Disposition Discharge Destination                       Attending Provider: Behzad Plaza MD    Allergies: Lisinopril    Isolation: None   Infection: None   Code Status: CPR    Ht: 172.7 cm (68\")   Wt: 88 kg (194 lb 0.1 oz)    Admission Cmt: None   Principal Problem: Cerebrovascular accident (CVA) (CMS/Formerly [facility]) [I63.9]                 Active Insurance as of 8/2/2021     Primary Coverage     Payor Plan Insurance Group Employer/Plan Group    HUMANA MEDICARE REPLACEMENT HUMANA MEDICARE REPLACEMENT [id number]     Payor Plan Address Payor Plan Phone Number Payor Plan Fax Number Effective Dates    [address] [address] [phone]  1/1/2019 - None Entered    [facility] [id number]       Subscriber Name Subscriber Birth Date Member ID       KULDEEP RANGEL 1965 [id number]           Secondary Coverage     Payor Plan Insurance Group Employer/Plan Group    [place]      Payor Plan Address Payor Plan Phone Number Payor Plan Fax Number Effective Dates    [address]   1/1/2019 - None Entered    [place] [address]       Subscriber Name Subscriber Birth Date Member ID       KULDEEP RANGEL 1965 [id number]                 Emergency Contacts      (Rel.) Home Phone Work Phone Mobile Phone    DIGNA RANGEL" "(Spouse) [phone] -- --    POOJA ADHIKARI (Brother) -- -- [phone]    SHANE ADHIKARI (Brother) -- [phone]               History & Physical      Jacob Bentley MD at 21 0939              [facility]      Patient Name: Kuldeep Adhikari  : 1965  MRN: [id number]  Primary Care Physician:  Laura Whitaker MD  Date of admission: 2021      Subjective      Chief Complaint: right sided numbness and weakness     History of Present Illness: Kuldeep Adhikari is a 56 y.o. male with PMH of PVD s/p R BKA, DM Type II, PE on eliquis, CAD, HTN, HLD, hypothyroidism, asthma, anxiety, and GERD who presented to [facility] on 2021 right sided numbness and weakness. He stated he went to bed around 11pm in his normal state of health. He woke up around 4:30am and fell. He stated, \"It was like my right leg and arm were gone.\" He was using his right leg prosthesis and fell landing on his right side. He had numbness from the right side of his face all the way down his right arm and right leg. He denied any headache or vision changes. He is concerned that a tick bite might have caused his symptoms. He denied any prior stroke history. He has been taking his home medications including his eliquis     In the ED the patient had right sided weakness and paresthesia.  CT head showed no acute findings.  CTA head/neck showed no significant carotid stenosis, left vertebral artery is diminutive and appears to terminate after the origin of the PICA branch.  This may be due to normal variant anatomy however no prior studies to confirm this is chronic/congenital finding.  Neurology was consulted and patient was admitted to the [facility] for suspected CVA. He was determined not to be a tPA candidate due to unknown onset of symptoms and patient is on eliquis. MRI brain ordered.         Review of Systems   Constitutional: Negative.   HENT: Negative.    Eyes: " Negative.    Cardiovascular: Negative.    Respiratory: Negative.    Endocrine: Negative.    Hematologic/Lymphatic: Negative.    Skin: Negative.    Musculoskeletal: Negative.    Gastrointestinal: Negative.    Genitourinary: Negative.    Neurological: Positive for focal weakness, numbness and paresthesias.        Right side of body numbness and weakness   Psychiatric/Behavioral: Negative.    Allergic/Immunologic: Negative.    All other systems reviewed and are negative.      Personal History     Past Medical History:   Diagnosis Date   • CKD (chronic kidney disease), stage III (CMS/Formerly [facility])    • Depression    • DJD (degenerative joint disease)    • DVT (deep venous thrombosis) (CMS/Formerly [facility])    • Ganglion     rt wrist   • GERD (gastroesophageal reflux disease)    • Hiatal hernia    • History of echocardiogram 04/2016    2D ECHO   • History of esophageal stricture     s/p dialation 40-50 times last EGD 04/2016   • History of pulmonary embolus (PE)     on long term anticoagulation   • Hyperlipidemia    • Hypertension    • Hypothyroidism    • IBS (irritable bowel syndrome)    • Neuropathy    • Rash     rt lower hip   • Retinopathy    • Seasonal allergies    • Sleep apnea     cpap  bring dos   • Type 2 diabetes mellitus with peripheral vascular disease (CMS/Formerly [facility])    • Vitamin D deficiency        Past Surgical History:   Procedure Laterality Date   • AMPUTATION FOOT / TOE Right     great toe   • AMPUTATION REVISION Right 4/8/2021    Procedure: BELOW KNEE AMPUTATION REVISAION;  Surgeon: Eduardo Reynolds MD;  Location: [facility] MAIN OR;  Service: Orthopedics;  Laterality: Right;   • AMPUTATION REVISION Right 4/29/2021    Procedure: AMPUTATION REVISION KNEE STUMP;  Surgeon: Eduardo Reynolds MD;  Location: [facility] MAIN OR;  Service: Orthopedics;  Laterality: Right;   • BELOW KNEE AMPUTATION Right 7/30/2020    Procedure: AMPUTATION BELOW KNEE;  Surgeon: Eduardo Reynolds MD;  Location: [facility] MAIN OR;  Service: Orthopedics;  Laterality: Right;    • CARDIAC CATHETERIZATION  04/2018    [facility]   • ENDOSCOPY     • ENDOSCOPY N/A 10/4/2019    Procedure: ESOPHAGOGASTRODUODENOSCOPY with dilitation and biopsy x 1 area;  Surgeon: Declan Iqbal MD;  Location: [facility] ENDOSCOPY;  Service: Gastroenterology   • ENDOSCOPY N/A 12/13/2019    Procedure: ESOPHAGOGASTRODUODENOSCOPY WITH DILATATION (50, 52 BOUGIE);  Surgeon: Declan Iqbal MD;  Location: [facility] ENDOSCOPY;  Service: Gastroenterology   • GANGLION CYST EXCISION Left    • HERNIA REPAIR Bilateral    • RETINOPATHY SURGERY      laser   • TOTAL HIP ARTHROPLASTY Left    • TOTAL HIP ARTHROPLASTY Left 2018   • TRANS METATARSAL AMPUTATION Right 3/17/2020    Procedure: AMPUTATION TRANS METATARSAL right;  Surgeon: ERWIN Baker DPM;  Location: [facility] MAIN OR;  Service: Podiatry;  Laterality: Right;  GANGRENOUS RIGHT FOOT       Family History: family history includes Cancer in an other family member; Colon cancer in an other family member; Diabetes in his mother; Heart disease in his mother; Hyperlipidemia in an other family member; Hypertension in an other family member; Leukemia in his father; Sleep apnea in his maternal aunt; Stroke in his maternal grandmother. Otherwise pertinent FHx was reviewed and not pertinent to current issue.    Social History:  reports that he has never smoked. He has never used smokeless tobacco. He reports that he does not drink alcohol and does not use drugs.    Home Medications:  Prior to Admission Medications     Prescriptions Last Dose Informant Patient Reported? Taking?    atorvastatin (LIPITOR) 40 MG tablet 8/1/2021 Self No Yes    TAKE 1 TABLET EVERY DAY    DULoxetine (CYMBALTA) 60 MG capsule 8/1/2021 Self No Yes    TAKE 1 CAPSULE EVERY MORNING    Eliquis 5 MG tablet tablet 8/1/2021 Self No Yes    TAKE 1 TABLET TWICE DAILY    empagliflozin (Jardiance) 10 MG tablet tablet 8/1/2021 Self No Yes    Take 1 tablet by mouth Daily.    gabapentin (NEURONTIN) 400 MG capsule  8/1/2021 ZOLTAN Yes Yes    Take 400 mg by mouth 3 (Three) Times a Day.    insulin aspart (NovoLOG FlexPen) 100 UNIT/ML solution pen-injector sc pen 8/1/2021 Self No Yes    Inject 10 Units under the skin into the appropriate area as directed 3 (Three) Times a Day With Meals.    Patient taking differently:  Inject 14 Units under the skin into the appropriate area as directed 3 (Three) Times a Day With Meals.    Insulin Glargine (Lantus SoloStar) 100 UNIT/ML injection pen 8/1/2021 Self No Yes    INJECT SUBCUTANEOUSLY 27 UNITS EVERY BEDTIME (NEEDS APPOINTMENT)    Patient taking differently:  Inject 34 Units under the skin into the appropriate area as directed Every Night.    levothyroxine (SYNTHROID, LEVOTHROID) 100 MCG tablet 8/1/2021 Self No Yes    TAKE 1 TABLET EVERY DAY    Patient taking differently:  Take 100 mcg by mouth Daily.    losartan (COZAAR) 50 MG tablet 8/1/2021 Self No Yes    TAKE 1 TABLET EVERY DAY    omeprazole (priLOSEC) 40 MG capsule 8/1/2021 Self Yes Yes    Take 40 mg by mouth Daily. Take DOS    metoprolol succinate XL (TOPROL-XL) 50 MG 24 hr tablet  Self No No    TAKE 1 TABLET EVERY DAY    Patient taking differently:  Take 50 mg by mouth Daily.    ondansetron (ZOFRAN) 4 MG tablet  Self No No    TAKE 1 TABLET EVERY 8 HOURS AS NEEDED FOR NAUSEA OR VOMITING.    Patient taking differently:  Take 8 mg by mouth Every 8 (Eight) Hours As Needed for Nausea or Vomiting.            Allergies:  Allergies   Allergen Reactions   • Lisinopril Cough       Objective      Vitals:   Temp:  [98.1 °F (36.7 °C)-99.2 °F (37.3 °C)] 98.1 °F (36.7 °C)  Heart Rate:  [] 85  Resp:  [15-18] 18  BP: (133-202)/(67-96) 156/78    Physical Exam  Vitals and nursing note reviewed.   HENT:      Head: Normocephalic and atraumatic.   Eyes:      Extraocular Movements: Extraocular movements intact.      Pupils: Pupils are equal, round, and reactive to light.   Cardiovascular:      Rate and Rhythm: Normal rate and regular rhythm.       Pulses: Normal pulses.      Heart sounds: Normal heart sounds.   Pulmonary:      Effort: Pulmonary effort is normal.      Breath sounds: Normal breath sounds.   Abdominal:      General: Bowel sounds are normal.      Palpations: Abdomen is soft.      Tenderness: There is no abdominal tenderness.   Musculoskeletal:      Cervical back: Normal range of motion.      Comments: Right arm and right stump with weakness compared to left    Skin:     General: Skin is warm and dry.   Neurological:      Mental Status: He is alert.      Sensory: Sensory deficit present.      Motor: Weakness present.      Comments: Right arm and right leg drift present, right hand weak   Paresthesia of right face, right arm, and right leg   No facial droop or slurred speech   Psychiatric:         Mood and Affect: Mood normal.         Behavior: Behavior normal.         Result Review    Result Review:  I have personally reviewed the results from the time of this admission to 8/2/2021 12:18 EDT and agree with these findings:  [x]  Laboratory  [x]  Microbiology  [x]  Radiology  [x]  EKG/Telemetry   []  Cardiology/Vascular   []  Pathology  [x]  Old records  []  Other:        Assessment/Plan        Active Hospital Problems:  Active Hospital Problems    Diagnosis    • **Cerebrovascular accident (CVA) (CMS/HCC)    • Type 2 diabetes mellitus with peripheral vascular disease (CMS/HCC)    • Chronic coronary artery disease    • Obstructive sleep apnea syndrome    • Benign essential hypertension    • Diabetic peripheral neuropathy (CMS/HCC)    • Chronic renal insufficiency, stage III (moderate) (CMS/HCC)    • Depression    • Gastroesophageal reflux disease    • History of pulmonary embolism    • Mixed hyperlipidemia    • Acquired hypothyroidism    • Peripheral vascular disease (CMS/HCC)      Plan:     CVA  -MRI brain: 12 mm acute lacunar infarct within the posterior limb of the left internal capsule with possible involvement of the posterior left lentiform  nucleus  -CT head in ED showed no acute findings  -CTA head/neck: no significant carotid stenosis, left vertebral artery is diminutive and appears to terminate after the origin of the PICA branch.  This may be due to normal variant anatomy however no prior studies to confirm this is chronic/congenital finding.  -pt with right sided weakness and paresthesia on exam   -neurology consulted  -check stroke labs, 2D echo, PT/OT consulted, neuro checks   -cont home eliquis, statin    PVD s/p R BKA  -has right leg prosthesis     Anticoagulation therapy secondary to history of PE:   -on Eliquis     Diabetes type 2, chronic with peripheral neuropathy  -continue mealtime insulin and lantus, Neurontin  -glucose 150s     CAD  -Continue beta-blocker, statin    Hypertension  -Continue metoprolol, Cozaar     HLD  -Continue Atorvastatin     Anxiety  -Continue Cymbalta     Hypothyroidism  -Continue levothyroxine     GERD  -Continue PPI     Asthma  -Not in exacerbation  -As needed duo nebs    CKD stage III  -creatinine stable at 1.21    GENO  -cpap qhs         DVT prophylaxis:  Medical and mechanical DVT prophylaxis orders are present.   On  eliquis     CODE STATUS:       Admission Status:  I believe this patient meets inpatient status.    I discussed the patient's findings and my recommendations with patient.    This patient has been examined wearing appropriate Personal Protective Equipment  08/02/21      Signature: Electronically signed by KEYLA Lopez, 08/02/21, 12:18 PM EDT.    Attending attestation:    I performed a history and physical examination of the patient. I reviewed the nurse practitioner's note and agree with the documented findings and plan of care.    S:    Patient is a 56-year-old male with history of type 2 diabetes, hypertension and peripheral vascular disease with right BKA presenting for evaluation of right-sided weakness.  Patient had stroke work-up in the emergency department ultimately had MRI  showing signs of left-sided ischemic infarct.  Admitted for further work-up of ischemic stroke.    O:    Vital signs reviewed    General: Middle-age male lying in bed breathing comfortably on room air no acute distress  HEENT: NC/AT, EOMI, mucosa moist  Heart: Regular, rate controlled  Chest: Normal work of breathing, moving air well no wheezing  Abdominal: Soft. NT/ND.   Musculoskeletal: Right BKA, normal ROM.  No edema. No calf tenderness.  Neurological: AAOx3, cranial nerves II through XII appear grossly intact, right upper extremity and right lower extremity weakness noted  Skin: Skin is warm and dry. No rash  Psychiatric: Normal mood and affect.    A/P    Right-sided weakness-secondary to underlying left-sided ischemic stroke.  Patient with multiple significant comorbidities including type 2 diabetes, hypertension and peripheral vascular disease likely contributing, patient with hypertensive crisis on admission now controlled.  Patient reports he has been off of his blood pressure medication  -Blood pressure control  -Neurology consulted  -MRI showing 12 mm acute lacunar infarct within the posterior limb of the left internal capsule  -Statin and antiplatelets  -Patient far beyond window for TPA  -Speech/PT/OT  -Risk stratification  -CTA of head and neck  -Echo with bubble  -CT head showed no bleed    Hypertensive crisis-likely uncontrolled chronic hypertension patient also has GENO which he reports his CPAP is not been working  -Case management consult for assistance with getting working CPAP  -Blood pressure more controlled at this time  -Patient will be on new medications  -Monitor renal function  -Urine drug screen/UA    Elevated TSH-very mildly so  -Check free T4    Pulmonary embolism-patient anticoagulated  -Find out if this first event or any other risk factors of how long he needs to be anticoagulated  -Patient is high risk    Coronary artery disease-patient denies any chest pain at this time  -Cardiac  monitoring  -EKG normal sinus rhythm normal axis  -Continue antiplatelets    Type 2 diabetes-patient reports history of relatively poor control  -A1c  -Sliding scale    Peripheral vascular disease-likely due to uncontrolled hypertension and diabetes  -Patient with right BKA  -Continue antiplatelets    Hyperlipidemia/anxiety/GERD/asthma-chronic in nature  -Resume home medication as clinically appropriate    GENO-patient reports he is not using his CPAP due to malfunction  -Patient needs assistance last sleep study 1 year prior    Electronically signed by Jacob Bentley MD, 08/02/21, 3:26 PM EDT.      Electronically signed by Jacob Bentley MD at 08/02/21 1526       {Outbreak/Travel/Exposure Documentation......;  Question Available Choices Patient Response   COVID-19 Outbreak Screen:  Do you currently have a new onset of the following symptoms?        Fever/Chills, Cough, Shortness of air, Loss of taste or smell, No, Unknown  No (08/02/21 0625)   COVID-19 Outbreak Screen: In the last 14 days, have you had contact with anyone who is ill, has show any of the symptoms listed above and/or has been diagnosis with the 2019 Novel Coronavirus? This includes any immediate household members but excludes any patients with whom you have been in contact within your normal work duties wearing proper PPE, if you are a healthcare worker.  Yes, No, Unknown              No (08/02/21 0625)   COVID-19 Outbreak Screen: Who was notified? Free text (not recorded)   Ebola Screening Outbreak Screen: Have you traveled to the [place] or [place] within the past 21 days?  Yes, No, Unknown (not recorded)   Ebola Screening Outbreak Screen: Do you have ANY of the following symptoms: Fever/Chills, Vomiting, Diarrhea, Fatigue, Headache, Muscle pain, Unexplained bleeding, Abdominal (stomach) pain, No, Unknown (not recorded)   Ebola Screening Outbreak Screen: Name of Person notified Free text (not recorded)    Travel Screen: Have you traveled in the last month? If so, to what country have you traveled? If [place] what state? Yes, No, Unknown  List of all countries  List of all States No (08/02/21 0540)  (not recorded)  (not recorded)   Infection Risk: Do you currently have the following symptoms?  (If cough is selected, the Tuberculosis Screen is performed.) Cough, Fever, Rash, No No (08/02/21 0540)   Tuberculosis Screen: Do you have any of the following Tuberculosis Risks?  · Have you lived or spent time with anyone who had or may have TB?  · Have you lived in or visited any of the following areas for more than one month: [place], [place], [place], [place] or [place], the [place] or [place]?  · Do you have HIV/AIDS?  · Have you lived in or worked in a nursing home, homeless shelter, correctional facility, or substance abuse treatment facility?   · No    If Yes do you have any of the following symptoms? Yes responses display to the right    If Yes, symptoms listed are:  Cough greater than or equal to 3 weeks, Loss of appetite, Unexplained weight loss, Night sweats, Bloody sputum or hemoptysis, Hoarseness, Fever, Fatigue, Chest pain, No (not recorded)  (not recorded)   Exposure Screen: Have you been exposed to any of these contagious diseases in the last month? Measles, Chickenpox, Meningitis, Pertussis, Whooping Cough, No No (08/02/21 0540)         Current Facility-Administered Medications   Medication Dose Route Frequency Provider Last Rate Last Admin   • acetaminophen (TYLENOL) tablet 650 mg  650 mg Oral Q4H PRN Hussein Talavera MD   650 mg at 08/02/21 1633    Or   • acetaminophen (TYLENOL) suppository 650 mg  650 mg Rectal Q4H PRN Hussein Talavera MD       • apixaban (ELIQUIS) tablet 5 mg  5 mg Oral Q12H Endy Lazaro DO   5 mg at 08/09/21 0858   • aspirin EC tablet 81 mg  81 mg Oral Daily Endy Lazaro DO   81 mg at 08/09/21 0944   • atorvastatin (LIPITOR) tablet 80 mg  80 mg Oral Nightly Hussein Talavera  MD   80 mg at 08/08/21 2206   • bisacodyl (DULCOLAX) suppository 10 mg  10 mg Rectal Daily PRN Hussein Talavera MD       • cyanocobalamin injection 1,000 mcg  1,000 mcg Intramuscular Q28 Days Hussein Talavera MD   1,000 mcg at 08/03/21 1214   • dextrose (D50W) 25 g/ 50mL Intravenous Solution 25 g  25 g Intravenous Q15 Min PRN Hussein Talavera MD   25 g at 08/06/21 1128   • dextrose (GLUTOSE) oral gel 15 g  15 g Oral Q15 Min PRN Hussein Talavera MD       • DULoxetine (CYMBALTA) DR capsule 40 mg  40 mg Oral Porfirio Roper PA-C   40 mg at 08/09/21 0622   • gabapentin (NEURONTIN) capsule 400 mg  400 mg Oral TID Hussein Talavera MD   400 mg at 08/09/21 0858   • glucagon (human recombinant) (GLUCAGEN DIAGNOSTIC) injection 1 mg  1 mg Subcutaneous Q15 Min PRN Hussein Talavera MD       • HYDROcodone-acetaminophen (NORCO) 5-325 MG per tablet 1 tablet  1 tablet Oral Q4H PRN Hussein Talavera MD   1 tablet at 08/09/21 0904   • insulin glargine (LANTUS, SEMGLEE) injection 40 Units  40 Units Subcutaneous Nightly Hussein Taalvera MD   40 Units at 08/08/21 2206   • insulin lispro (ADMELOG) injection 14 Units  14 Units Subcutaneous TID With Meals Hussein Talavera MD   14 Units at 08/09/21 0858   • ipratropium-albuterol (DUO-NEB) nebulizer solution 3 mL  3 mL Nebulization Q6H PRN Hussein Talavera MD       • levothyroxine (SYNTHROID, LEVOTHROID) tablet 100 mcg  100 mcg Oral Q AM Hussein Talavera MD   100 mcg at 08/09/21 0533   • melatonin tablet 5 mg  5 mg Oral Nightly PRN Yazan Perez MD   5 mg at 08/08/21 2338   • metoprolol succinate XL (TOPROL-XL) 24 hr tablet 50 mg  50 mg Oral Daily Hussein Talavera MD   50 mg at 08/09/21 0858   • miconazole (MICOTIN) 2 % powder   Topical Q12H Hussein Talavera MD   Given at 08/09/21 0944   • ondansetron (ZOFRAN) tablet 4 mg  4 mg Oral Q6H PRN Hussein Talavera MD        Or   • ondansetron (ZOFRAN) injection 4 mg  4 mg Intravenous Q6H PRN Hussein Talavera MD       • pantoprazole  (PROTONIX) EC tablet 40 mg  40 mg Oral QAM Hussein Talavera MD   40 mg at 08/09/21 0857   • sertraline (ZOLOFT) tablet 50 mg  50 mg Oral Daily Porfirio Knowles PA-C   50 mg at 08/09/21 0858   • sodium chloride 0.9 % flush 10 mL  10 mL Intravenous PRN Hussein Talavera MD       • sodium chloride 0.9 % flush 3 mL  3 mL Intravenous Q12H Hussein Talavera MD   3 mL at 08/09/21 0858   • sodium chloride 0.9 % flush 3-10 mL  3-10 mL Intravenous PRN Hussein Talavera MD            Physician Progress Notes (most recent note)      Nelli Vides MD at 08/09/21 0616          Referring Provider: Endy Lazaro DO    Reason for follow-up:  Recent stroke  Peripheral vascular disease    Patient Care Team:  Laura Whitaker MD as PCP - General    Subjective .  Feeling okay     ROS  Patient apparently had suicidal ideation although patient claims it was more situational anger in response done suicidal ideation.    Since I have last seen, the patient has been without any chest discomfort ,shortness of breath, palpitations, dizziness or syncope.  Denies having any headache ,abdominal pain ,nausea, vomiting , diarrhea constipation, loss of weight or loss of appetite.  Denies having any excessive bruising ,hematuria or blood in the stool.    Review of all systems negative except as indicated    History  Past Medical History:   Diagnosis Date   • CKD (chronic kidney disease), stage III (CMS/HCC)    • Depression    • DJD (degenerative joint disease)    • DVT (deep venous thrombosis) (CMS/HCC)    • Ganglion     rt wrist   • GERD (gastroesophageal reflux disease)    • Hiatal hernia    • History of echocardiogram 04/2016    2D ECHO   • History of esophageal stricture     s/p dialation 40-50 times last EGD 04/2016   • History of pulmonary embolus (PE)     on long term anticoagulation   • Hyperlipidemia    • Hypertension    • Hypothyroidism    • IBS (irritable bowel syndrome)    • Neuropathy    • Rash     rt lower hip   •  Retinopathy    • Seasonal allergies    • Sleep apnea     cpap  bring dos   • Type 2 diabetes mellitus with peripheral vascular disease (CMS/HCC)    • Vitamin D deficiency        Past Surgical History:   Procedure Laterality Date   • AMPUTATION FOOT / TOE Right     great toe   • AMPUTATION REVISION Right 4/8/2021    Procedure: BELOW KNEE AMPUTATION REVISAION;  Surgeon: Eduardo Reynolds MD;  Location: [facility] MAIN OR;  Service: Orthopedics;  Laterality: Right;   • AMPUTATION REVISION Right 4/29/2021    Procedure: AMPUTATION REVISION KNEE STUMP;  Surgeon: Eduardo Reynolds MD;  Location: [facility] MAIN OR;  Service: Orthopedics;  Laterality: Right;   • BELOW KNEE AMPUTATION Right 7/30/2020    Procedure: AMPUTATION BELOW KNEE;  Surgeon: Eduardo Reynolds MD;  Location: [facility] MAIN OR;  Service: Orthopedics;  Laterality: Right;   • CARDIAC CATHETERIZATION  04/2018    [facility]   • ENDOSCOPY     • ENDOSCOPY N/A 10/4/2019    Procedure: ESOPHAGOGASTRODUODENOSCOPY with dilitation and biopsy x 1 area;  Surgeon: Declan Iqbal MD;  Location: [facility] ENDOSCOPY;  Service: Gastroenterology   • ENDOSCOPY N/A 12/13/2019    Procedure: ESOPHAGOGASTRODUODENOSCOPY WITH DILATATION (50, 52 BOUGIE);  Surgeon: Declan Iqbal MD;  Location: [facility] ENDOSCOPY;  Service: Gastroenterology   • GANGLION CYST EXCISION Left    • HERNIA REPAIR Bilateral    • RETINOPATHY SURGERY      laser   • TOTAL HIP ARTHROPLASTY Left    • TOTAL HIP ARTHROPLASTY Left 2018   • TRANS METATARSAL AMPUTATION Right 3/17/2020    Procedure: AMPUTATION TRANS METATARSAL right;  Surgeon: ERWIN Baker DPM;  Location: [facility] MAIN OR;  Service: Podiatry;  Laterality: Right;  GANGRENOUS RIGHT FOOT       Family History   Problem Relation Age of Onset   • Diabetes Mother    • Heart disease Mother    • Leukemia Father    • Sleep apnea Maternal Aunt         GENO   • Stroke Maternal Grandmother    • Hypertension Other    • Hyperlipidemia Other    • Cancer Other    • Colon  cancer Other         uncle       Social History     Tobacco Use   • Smoking status: Never Smoker   • Smokeless tobacco: Never Used   Vaping Use   • Vaping Use: Never used   Substance Use Topics   • Alcohol use: No   • Drug use: No        Medications Prior to Admission   Medication Sig Dispense Refill Last Dose   • atorvastatin (LIPITOR) 40 MG tablet TAKE 1 TABLET EVERY DAY 90 tablet 0 8/1/2021 at Unknown time   • DULoxetine (CYMBALTA) 60 MG capsule TAKE 1 CAPSULE EVERY MORNING 90 capsule 0 8/1/2021 at Unknown time   • Eliquis 5 MG tablet tablet TAKE 1 TABLET TWICE DAILY 180 tablet 0 8/1/2021 at Unknown time   • empagliflozin (Jardiance) 10 MG tablet tablet Take 1 tablet by mouth Daily. 90 tablet 4 8/1/2021 at Unknown time   • gabapentin (NEURONTIN) 400 MG capsule Take 400 mg by mouth 3 (Three) Times a Day.   8/1/2021 at Unknown time   • insulin aspart (NovoLOG FlexPen) 100 UNIT/ML solution pen-injector sc pen Inject 10 Units under the skin into the appropriate area as directed 3 (Three) Times a Day With Meals. (Patient taking differently: Inject 14 Units under the skin into the appropriate area as directed 3 (Three) Times a Day With Meals.) 15 mL 6 8/1/2021 at Unknown time   • levothyroxine (SYNTHROID, LEVOTHROID) 100 MCG tablet TAKE 1 TABLET EVERY DAY (Patient taking differently: Take 100 mcg by mouth Daily.) 90 tablet 3 8/1/2021 at Unknown time   • losartan (COZAAR) 50 MG tablet TAKE 1 TABLET EVERY DAY 90 tablet 0 8/1/2021 at Unknown time   • omeprazole (priLOSEC) 40 MG capsule Take 40 mg by mouth Daily. Take DOS   8/1/2021 at Unknown time   • [DISCONTINUED] Insulin Glargine (Lantus SoloStar) 100 UNIT/ML injection pen INJECT SUBCUTANEOUSLY 27 UNITS EVERY BEDTIME (NEEDS APPOINTMENT) (Patient taking differently: Inject 34 Units under the skin into the appropriate area as directed Every Night.) 30 mL 6 8/1/2021 at Unknown time   • metoprolol succinate XL (TOPROL-XL) 50 MG 24 hr tablet TAKE 1 TABLET EVERY DAY (Patient  "taking differently: Take 50 mg by mouth Daily.) 30 tablet 0    • ondansetron (ZOFRAN) 4 MG tablet TAKE 1 TABLET EVERY 8 HOURS AS NEEDED FOR NAUSEA OR VOMITING. (Patient taking differently: Take 8 mg by mouth Every 8 (Eight) Hours As Needed for Nausea or Vomiting.) 45 tablet 0        Allergies  Lisinopril    Scheduled Meds:apixaban, 5 mg, Oral, Q12H  aspirin, 81 mg, Oral, Daily  atorvastatin, 80 mg, Oral, Nightly  cyanocobalamin, 1,000 mcg, Intramuscular, Q28 Days  DULoxetine, 40 mg, Oral, QAM  gabapentin, 400 mg, Oral, TID  insulin glargine, 40 Units, Subcutaneous, Nightly  insulin lispro, 14 Units, Subcutaneous, TID With Meals  levothyroxine, 100 mcg, Oral, Q AM  metoprolol succinate XL, 50 mg, Oral, Daily  miconazole, , Topical, Q12H  pantoprazole, 40 mg, Oral, QAM  sertraline, 50 mg, Oral, Daily  sodium chloride, 3 mL, Intravenous, Q12H      Continuous Infusions:   PRN Meds:.•  acetaminophen **OR** acetaminophen  •  bisacodyl  •  dextrose  •  dextrose  •  glucagon (human recombinant)  •  HYDROcodone-acetaminophen  •  ipratropium-albuterol  •  melatonin  •  ondansetron **OR** ondansetron  •  [COMPLETED] Insert peripheral IV **AND** sodium chloride  •  sodium chloride    Objective     VITAL SIGNS  Vitals:    08/08/21 1427 08/08/21 1734 08/08/21 2220 08/09/21 0414   BP: 155/76 171/86 168/77 142/78   BP Location: Right arm Right arm Left arm Left arm   Patient Position: Lying Lying Sitting Lying   Pulse: 68 65 71 69   Resp: 13 20 18 17   Temp: 98.6 °F (37 °C) 97.6 °F (36.4 °C) 97.9 °F (36.6 °C) 97.8 °F (36.6 °C)   TempSrc: Oral Axillary Oral Oral   SpO2:   96% 96%   Weight:    88 kg (194 lb 0.1 oz)   Height:           Flowsheet Rows      First Filed Value   Admission Height  172.7 cm (68\") Documented at 08/02/2021 0540   Admission Weight  83.9 kg (185 lb) Documented at 08/02/2021 0540            Intake/Output Summary (Last 24 hours) at 8/9/2021 0616  Last data filed at 8/8/2021 1125  Gross per 24 hour   Intake 500 ml "   Output 1260 ml   Net -760 ml        TELEMETRY: Sinus rhythm    Physical Exam:  The patient is alert, oriented and in no distress.  Vital signs as noted above.  Head and neck revealed no carotid bruits or jugular venous distention.  No thyromegaly or lymphadenopathy is present  Lungs clear.  No wheezing.  Breath sounds are normal bilaterally.  Heart normal first and second heart sounds.  No murmur. No precordial rub is present.  No gallop is present.  Abdomen soft and nontender.  No organomegaly is present.  Extremities with good peripheral pulses without any pedal edema.  Right BKA  Skin warm and dry.  Musculoskeletal system is grossly normal  CNS grossly normal      Results Review:   I reviewed the patient's new clinical results.  Lab Results (last 24 hours)     Procedure Component Value Units Date/Time    POC Glucose Once [[id number]]  (Abnormal) Collected: 08/08/21 2205    Specimen: Blood Updated: 08/08/21 2206     Glucose 160 mg/dL      Comment: Serial Number: [id number]:  [id number]       POC Glucose Once [[id number]]  (Abnormal) Collected: 08/08/21 1124    Specimen: Blood Updated: 08/08/21 1129     Glucose 159 mg/dL      Comment: Serial Number: [id number]:  [id number]             Imaging Results (Last 24 Hours)     ** No results found for the last 24 hours. **      LAB RESULTS (LAST 7 DAYS)    CBC  Results from last 7 days   Lab Units 08/05/21  0414 08/02/21  0620   WBC 10*3/mm3 9.00 7.80   RBC 10*6/mm3 4.89 5.24   HEMOGLOBIN g/dL 12.7* 13.9   HEMATOCRIT % 39.0 42.1   MCV fL 79.7 80.4   PLATELETS 10*3/mm3 224 244       BMP  Results from last 7 days   Lab Units 08/06/21  0400 08/05/21  0414 08/03/21  0434 08/02/21  0620   SODIUM mmol/L 141 141 137 141   POTASSIUM mmol/L 4.3 4.4 4.8 4.2   CHLORIDE mmol/L 107 105 100 101   CO2 mmol/L 28.0 29.0 27.0 28.0   BUN mg/dL 16 18 18 16   CREATININE mg/dL 1.23 1.46* 1.37* 1.21   GLUCOSE mg/dL 189* 217* 227* 152*   MAGNESIUM mg/dL  --  1.7 1.8  --        CMP    Results from last 7 days   Lab Units 08/06/21  0400 08/05/21  0414 08/03/21  0434 08/02/21  0620   SODIUM mmol/L 141 141 137 141   POTASSIUM mmol/L 4.3 4.4 4.8 4.2   CHLORIDE mmol/L 107 105 100 101   CO2 mmol/L 28.0 29.0 27.0 28.0   BUN mg/dL 16 18 18 16   CREATININE mg/dL 1.23 1.46* 1.37* 1.21   GLUCOSE mg/dL 189* 217* 227* 152*         BNP        TROPONIN        CoAg  Results from last 7 days   Lab Units 08/02/21  0620   INR  0.99   APTT seconds 25.3       Creatinine Clearance  Estimated Creatinine Clearance: 71.4 mL/min (by C-G formula based on SCr of 1.23 mg/dL).    ABG        Radiology  No radiology results for the last day            EKG              I personally viewed and interpreted the patient's EKG/Telemetry data: Sinus rhythm    ECHOCARDIOGRAM:    Results for orders placed during the hospital encounter of 08/02/21    Adult Transesophageal Echo (BARON) W/ Cont if Necessary Per Protocol    Interpretation Summary  Procedure performed  Transesophageal echocardiogram Doppler study (color Continuous Wave and pulse wave)    Date of study  8/5/2021    Indications  Rule out cardioembolic episodes  Recent stroke    Procedure  Anesthesia was provided by anesthesiologist with intravenous Diprivan  BARON probe could be passed without difficulty.  Patient tolerated the procedure well.  No complications were noted.    Results  Technically satisfactory study.  Mitral valve is thickened with adequate opening motion.  Moderate mitral regurgitation is present.  Tricuspid valve is normal.  Aortic valve is tricuspid and is normal.  Left atrium is normal in size.  Left atrial appendage is enlarged without any clot.  Left ventricle is normal in size and contractility with ejection fraction of 60%.  Right atrium is normal in size.  No pericardial effusion or intracardiac thrombus is seen.  Atrial septum is intact without PFO.  Aorta is normal.    Impression  Moderate mitral regurgitation  Normal left ventricle size and  contractility with ejection fraction of 60%.  No evidence for intracardiac thrombus is present.          STRESS MYOVIEW:    Cardiolite (Tc-99m Sestamibi) stress test    CARDIAC CATHETERIZATION:            OTHER:         Assessment/Plan     Principal Problem:    Cerebrovascular accident (CVA) (CMS/HCC)  Active Problems:    Benign essential hypertension    Chronic coronary artery disease    Chronic renal insufficiency, stage III (moderate) (CMS/HCC)    Major depressive disorder, recurrent episode, severe (CMS/HCC)    Diabetic peripheral neuropathy (CMS/HCC)    Gastroesophageal reflux disease    History of pulmonary embolism    Mixed hyperlipidemia    Acquired hypothyroidism    Obstructive sleep apnea syndrome    Peripheral vascular disease (CMS/HCC)    Type 2 diabetes mellitus with peripheral vascular disease (CMS/HCC)    Dysphagia    Depression with suicidal ideation        ]]]]]]]]]]]]]]]]]]]]]]  Impression  ===========  -TIA/stroke.  Abnormal CTA with acute lacunar infarct within the posterior limb of the left internal capsule.  EKG showed sinus rhythm.  Echocardiogram showed anterior mitral leaflet nodular density.     -History of coronary artery disease.  Patient is not having any angina pectoris or congestive heart failure.  Cardiac cath films from 4/18/2018 were reviewed and interpreted independently.  Normal left ventricular function.  50% diagonal branch disease.  RCA is a nondominant vessel that has mid segment 90% disease (small artery)     BARON 2021-08-05 revealed  Moderate mitral regurgitation  Normal left ventricle size and contractility with ejection fraction of 60%.  No evidence for intracardiac thrombus is present.    -Bilateral carotid bruits.  Right louder than left.  CT angiogram and carotid duplex scan did not reveal any obstructive carotid artery disease.     -Hypertension diabetes dyslipidemia sleep apnea CKD 3 (BUN 18 creatinine 1.37.)  Hypothyroidism vitamin D deficiency     -History of  "DVT.     -Long-term anticoagulation with Eliquis due to previous pulmonary embolus     -Peripheral vascular disease     -Status post right BKA.  Total left arthroplasty hernia repair     -Non-smoker  ============  Plan  ============  Recent TIA/stroke  BARON 2021-08-05-negative for intracardiac thrombus.    Anticoagulation  Patient is on Eliquis    Renal dysfunction  BUN/18/1.37  18/1.46-8/5/2021  Observe closely.    Patient apparently had suicidal ideation although patient claims it was more situational anger in response done suicidal ideation.  Patient is very rational about the situation at this time.    No need for further cardiac work-up at this time.    Follow-up in the office in 2 weeks when discharged.    Medications were reviewed and updated.  Continue aspirin Eliquis atorvastatin gabapentin levothyroxine metoprolol pantoprazole sertraline    Further plan will depend on patient's progress.  ]]]]]]]]]]]]]]]]]]]]]    Nelli Vides MD  08/09/21  06:16 EDT                Electronically signed by Nelli Vides MD at 08/09/21 0834          Consult Notes (most recent note)      Porfirio Knowles PA-C at 08/08/21 1800      Consult Orders    1. Inpatient Psychiatrist Consult [[id number]] ordered by Endy Lazaro DO at 08/08/21 1115                 Referring Provider: Dr. Endy Lazaro MD  Reason for Consultation: Suicidal thoughts      Chief complaint: Wife not picking him up from the hospital.    Subjective     History of present illness:  The patient is a 56 y.o. male who was admitted secondary to a small CVA, for which he has not yet noticed any deficits. Psychiatry was consulted due to suicidal thoughts that occurred today, along with plans to use the syringe in this room to put an air embolus into his IV. The patient admits to suicidal thoughts prior to hospitalization, and states he knows the drill for removing everything that could be dangerous from his room because he has \"been through this " "before.\" His wife has not been coming to see him, would not bring him the candy he requested, and didn't want to pick him up from the hospital, which is what made his SI occur today. He reports marriage difficulty for at least 2 years, and is upset that she wants to get him his own place so he can move out of their house, and she frequently brings up divorce to him in arguments. 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 The patient is unsure of which psychiatric medications he has tried in the past, but believes they were antidepressants. He knows the duloxetine he has now been on for over 2 months without noticeable improvement. The patient denies HI or AVH now or in the past.    Review of Systems   Pertinent items are noted in HPI, all other systems reviewed and negative    History    Past psychiatric history: Patient denies, but he admits to seeing [facility] when he was there for a \"few hours\" before being admitted elsewhere in the hospital for complications arising from his amputated leg secondary to DM2. He admits to a diagnosis of ADHD, for which he was treated with Ritalin in childhood, but has not used the medication in recent years. The patient also knows he has tried other medications in the past for his depression, but he cannot remember their names. The medical record shows Fluoxetine was most recently tried prior to duloxetine therapy initiation.  -Psychiatric Hospitalizations: None  -Suicide Attempts: None    Past Medical History:   Diagnosis Date   • CKD (chronic kidney disease), stage III (CMS/[facility])    • Depression    • DJD (degenerative joint disease)    • DVT (deep venous thrombosis) (CMS/[facility])    • Ganglion     rt wrist   • GERD (gastroesophageal reflux disease)    • " Hiatal hernia    • History of echocardiogram 04/2016    2D ECHO   • History of esophageal stricture     s/p dialation 40-50 times last EGD 04/2016   • History of pulmonary embolus (PE)     on long term anticoagulation   • Hyperlipidemia    • Hypertension    • Hypothyroidism    • IBS (irritable bowel syndrome)    • Neuropathy    • Rash     rt lower hip   • Retinopathy    • Seasonal allergies    • Sleep apnea     cpap  bring dos   • Type 2 diabetes mellitus with peripheral vascular disease (CMS/HCC)    • Vitamin D deficiency           Family History   Problem Relation Age of Onset   • Diabetes Mother    • Heart disease Mother    • Leukemia Father    • Sleep apnea Maternal Aunt         GENO   • Stroke Maternal Grandmother    • Hypertension Other    • Hyperlipidemia Other    • Cancer Other    • Colon cancer Other         uncle        Social History     Tobacco Use   • Smoking status: Never Smoker   • Smokeless tobacco: Never Used   Vaping Use   • Vaping Use: Never used   Substance Use Topics   • Alcohol use: No   • Drug use: No          Medications Prior to Admission   Medication Sig Dispense Refill Last Dose   • atorvastatin (LIPITOR) 40 MG tablet TAKE 1 TABLET EVERY DAY 90 tablet 0 8/1/2021 at Unknown time   • DULoxetine (CYMBALTA) 60 MG capsule TAKE 1 CAPSULE EVERY MORNING 90 capsule 0 8/1/2021 at Unknown time   • Eliquis 5 MG tablet tablet TAKE 1 TABLET TWICE DAILY 180 tablet 0 8/1/2021 at Unknown time   • empagliflozin (Jardiance) 10 MG tablet tablet Take 1 tablet by mouth Daily. 90 tablet 4 8/1/2021 at Unknown time   • gabapentin (NEURONTIN) 400 MG capsule Take 400 mg by mouth 3 (Three) Times a Day.   8/1/2021 at Unknown time   • insulin aspart (NovoLOG FlexPen) 100 UNIT/ML solution pen-injector sc pen Inject 10 Units under the skin into the appropriate area as directed 3 (Three) Times a Day With Meals. (Patient taking differently: Inject 14 Units under the skin into the appropriate area as directed 3 (Three) Times  a Day With Meals.) 15 mL 6 8/1/2021 at Unknown time   • levothyroxine (SYNTHROID, LEVOTHROID) 100 MCG tablet TAKE 1 TABLET EVERY DAY (Patient taking differently: Take 100 mcg by mouth Daily.) 90 tablet 3 8/1/2021 at Unknown time   • losartan (COZAAR) 50 MG tablet TAKE 1 TABLET EVERY DAY 90 tablet 0 8/1/2021 at Unknown time   • omeprazole (priLOSEC) 40 MG capsule Take 40 mg by mouth Daily. Take DOS   8/1/2021 at Unknown time   • [DISCONTINUED] Insulin Glargine (Lantus SoloStar) 100 UNIT/ML injection pen INJECT SUBCUTANEOUSLY 27 UNITS EVERY BEDTIME (NEEDS APPOINTMENT) (Patient taking differently: Inject 34 Units under the skin into the appropriate area as directed Every Night.) 30 mL 6 8/1/2021 at Unknown time   • metoprolol succinate XL (TOPROL-XL) 50 MG 24 hr tablet TAKE 1 TABLET EVERY DAY (Patient taking differently: Take 50 mg by mouth Daily.) 30 tablet 0    • ondansetron (ZOFRAN) 4 MG tablet TAKE 1 TABLET EVERY 8 HOURS AS NEEDED FOR NAUSEA OR VOMITING. (Patient taking differently: Take 8 mg by mouth Every 8 (Eight) Hours As Needed for Nausea or Vomiting.) 45 tablet 0         Scheduled Meds:  apixaban, 5 mg, Oral, Q12H  aspirin, 81 mg, Oral, Daily  atorvastatin, 80 mg, Oral, Nightly  cyanocobalamin, 1,000 mcg, Intramuscular, Q28 Days  DULoxetine, 60 mg, Oral, QAM  gabapentin, 400 mg, Oral, TID  insulin glargine, 40 Units, Subcutaneous, Nightly  insulin lispro, 14 Units, Subcutaneous, TID With Meals  levothyroxine, 100 mcg, Oral, Q AM  metoprolol succinate XL, 50 mg, Oral, Daily  miconazole, , Topical, Q12H  pantoprazole, 40 mg, Oral, QAM  sodium chloride, 3 mL, Intravenous, Q12H         Continuous Infusions:       PRN Meds:  •  acetaminophen **OR** acetaminophen  •  bisacodyl  •  dextrose  •  dextrose  •  glucagon (human recombinant)  •  HYDROcodone-acetaminophen  •  ipratropium-albuterol  •  melatonin  •  ondansetron **OR** ondansetron  •  [COMPLETED] Insert peripheral IV **AND** sodium chloride  •  sodium  "chloride      Allergies:  Lisinopril    Objective       Physical Exam:    Vital Signs:   /86 (BP Location: Right arm, Patient Position: Lying)   Pulse 65   Temp 97.6 °F (36.4 °C) (Axillary)   Resp 20   Ht 172.7 cm (68\")   Wt 90 kg (198 lb 6.6 oz)   SpO2 99%   BMI 30.17 kg/m²     General Appearance:    Well-developed, well-nourished, and depressed.   Head:    Normocephalic, without obvious deformity, atraumatic.   Eyes:            Lids and lashes normal, conjunctivae and sclerae normal, no   icterus, no pallor, corneas clear.   Skin:  Neurologic:  Musculoskeletal:   No bleeding, bruising or rash.  Cranial nerves 2 - 12 grossly intact, sensation intact.  Muscle strength: grade 5  Muscle tone: Normal  Abnormal Movements: No tremors or abnormal involuntary movements  Gait: Deferred, in bed     Mental Status Exam:   Orientation:  To person, Place, Time and Situation  Memory: Recent and remote memory Intact but patient notes occasional difficulty.  Mood/Affect: Depressed, Labile and Agitated  Suicidal Ideations: Suicidal Ideation, Suicidal plan and Death wish  Homicidal Ideations:  None  Hallucinations: None  Delusions:  None  Obsessions: None  Behavior and Psychomotor Activity: Appropriate  Speech:  Rambling  Thought Process: Disorganized, Flight of ideas and Rapid  Associations: Intact  Thought Content: Normal  Language: name objects and repeat phrases  Concentration and computation: Poor  Attention Span: Fair  Fund of Knowledge: Fair  Reliability: fair  Insight into mental health: Fair  Judgement: Impaired  Impulse Control:  Impaired  Hygiene: fair  Cooperation:  Cooperative  Eye Contact:  Good    Medications and allergies reviewed.    Lab Results   Component Value Date    GLUCOSE 189 (H) 08/06/2021    CALCIUM 8.6 08/06/2021     08/06/2021    K 4.3 08/06/2021    CO2 28.0 08/06/2021     08/06/2021    BUN 16 08/06/2021    CREATININE 1.23 08/06/2021    EGFRIFNONA 61 08/06/2021    BCR 13.0 " "08/06/2021    ANIONGAP 6.0 08/06/2021       Last Urine Toxicity     LAST URINE TOXICITY RESULTS Latest Ref Rng & Units 8/2/2021 5/16/2019    CREATININE UR mg/dL - 261.1    AMPHETAMINES SCREEN, URINE NEGATIVE - NEGATIVE    BARBITURATES SCREEN Negative Negative NEGATIVE    BENZODIAZEPINE SCREEN, URINE Negative Negative NEGATIVE    COCAINE SCREEN, URINE Negative Negative NEGATIVE    METHADONE SCREEN, URINE Negative Negative NEGATIVE          No results found for: PHENYTOIN, PHENOBARB, VALPROATE, CBMZ    Lab Results   Component Value Date     08/06/2021    BUN 16 08/06/2021    CREATININE 1.23 08/06/2021    TSH 4.810 (H) 08/02/2021    WBC 9.00 08/05/2021       Brief Urine Lab Results  (Last result in the past 365 days)      Color   Clarity   Blood   Leuk Est   Nitrite   Protein   CREAT   Urine HCG        08/02/21 1611 Yellow Clear Negative Negative Negative Negative               Assessment/Plan       Cerebrovascular accident (CVA) (CMS/[facility])    Benign essential hypertension    Chronic coronary artery disease    Chronic renal insufficiency, stage III (moderate) (CMS/[facility])    Depression    Diabetic peripheral neuropathy (CMS/[facility])    Gastroesophageal reflux disease    History of pulmonary embolism    Mixed hyperlipidemia    Acquired hypothyroidism    Obstructive sleep apnea syndrome    Peripheral vascular disease (CMS/[facility])    Type 2 diabetes mellitus with peripheral vascular disease (CMS/[facility])    Dysphagia       LABS: Reviewed.    Assessment:  Major Depression Disorder; Severe  ADHD    Medical Decision Making: The patient has a history of \"anger\" but does not meet criteria for bipolar disorder, and bipolar disorder was not indicated with use of the MDQ (Mood Disorder Questionnaire). PHQ-9=20 is indicative of severe major depression, and while the patient admits to being on medication in the past, he also admits to stopping the medication because he no longer thought he needed it. Due to multiple metabolic comorbidities, " including severe uncontrolled diabetes, antipsychotics are too great of a risk until other therapy options have been eliminated as possibilities.    Treatment Plan:  -Initiate cross taper to switch antidepressants since the patient has been on Cymbalta for over 4 months without efficacy and there is remaining severe depression:  -Initiate 50mg Sertraline for treatment of severe MDD.  -Decrease Duloxetine to 40mg po daily, but PCP may possibly keep at an even lower dose for diabetic neuropathy if desired. Careful consideration of serotonin syndrome should be considered in this case. However, the current dosage of 60mg duloxetine is not providing benefit for his depression, and should no longer be used for depression.  -Placement in a psychiatric inpatient facility for management of his MDD and acute suicidal ideations with stated sporadic plans is recommended at this time.  -Social work was asked to help find him inpatient placement and outpatient therapy resources for after his inpatient management.  -Genesight testing in the outpatient setting was discussed with the patient as a next step if Sertraline therapy is found to be ineffective.    Treatment Plan discussed with: Patient    I discussed the patients findings and my recommendations with patient and nursing staff    75 minutes was spent evaluating and counseling the patient.    I have reviewed and approved the behavioral health treatment plans and problem list. Yes  Thank you for the consult  Referring MD has access to consult report and progress notes in EMR  Patient was examined wearing appropriate PPE.    Porfirio Knowles PA-C  08/08/21  18:00 EDT    EMR Dragon transcription disclaimer:  Some of this encounter note is an electronic transcription translation of spoken language to printed text. The electronic translation of spoken language may permit erroneous, or at times, nonsensical words or phrases to be inadvertently transcribed; Although I have  reviewed the note for such errors some may still exist.       Electronically signed by Porfirio Knowles PA-C at 08/09/21 0839          Physical Therapy Notes (most recent note)      Ruthy Farias PTA at 08/09/21 0906  Version 1 of 1       Goal Outcome Evaluation:     Bed mobility - Independent  Transfers - Modified-Independent  Pt donned his his own rle prosthetic.  Ambulation - 300 feet Supervision and with rolling walker       Pt would benefit from Home with family assist at discharge from facility and requires no DME at discharge.   Pt desires Home with family assist at discharge. Pt cooperative; agreeable to therapeutic recommendations and plan of care.            Electronically signed by Ruthy Farias PTA at 08/09/21 0913

## 2021-08-10 ENCOUNTER — READMISSION MANAGEMENT (OUTPATIENT)
Dept: CALL CENTER | Facility: HOSPITAL | Age: 56
End: 2021-08-10

## 2021-08-10 VITALS
RESPIRATION RATE: 16 BRPM | WEIGHT: 197.31 LBS | BODY MASS INDEX: 29.9 KG/M2 | OXYGEN SATURATION: 97 % | DIASTOLIC BLOOD PRESSURE: 81 MMHG | TEMPERATURE: 98.4 F | SYSTOLIC BLOOD PRESSURE: 132 MMHG | HEART RATE: 69 BPM | HEIGHT: 68 IN

## 2021-08-10 LAB
ANION GAP SERPL CALCULATED.3IONS-SCNC: 11 MMOL/L (ref 5–15)
BASOPHILS # BLD AUTO: 0.1 10*3/MM3 (ref 0–0.2)
BASOPHILS NFR BLD AUTO: 1 % (ref 0–1.5)
BUN SERPL-MCNC: 21 MG/DL (ref 6–20)
BUN/CREAT SERPL: 14.4 (ref 7–25)
CALCIUM SPEC-SCNC: 9.4 MG/DL (ref 8.6–10.5)
CHLORIDE SERPL-SCNC: 103 MMOL/L (ref 98–107)
CO2 SERPL-SCNC: 26 MMOL/L (ref 22–29)
CREAT SERPL-MCNC: 1.46 MG/DL (ref 0.76–1.27)
DEPRECATED RDW RBC AUTO: 41.1 FL (ref 37–54)
EOSINOPHIL # BLD AUTO: 0.5 10*3/MM3 (ref 0–0.4)
EOSINOPHIL NFR BLD AUTO: 5.2 % (ref 0.3–6.2)
ERYTHROCYTE [DISTWIDTH] IN BLOOD BY AUTOMATED COUNT: 14.6 % (ref 12.3–15.4)
GFR SERPL CREATININE-BSD FRML MDRD: 50 ML/MIN/1.73
GLUCOSE BLDC GLUCOMTR-MCNC: 110 MG/DL (ref 70–105)
GLUCOSE BLDC GLUCOMTR-MCNC: 140 MG/DL (ref 70–105)
GLUCOSE BLDC GLUCOMTR-MCNC: 181 MG/DL (ref 70–105)
GLUCOSE SERPL-MCNC: 158 MG/DL (ref 65–99)
HCT VFR BLD AUTO: 42.6 % (ref 37.5–51)
HGB BLD-MCNC: 13.8 G/DL (ref 13–17.7)
LYMPHOCYTES # BLD AUTO: 2.3 10*3/MM3 (ref 0.7–3.1)
LYMPHOCYTES NFR BLD AUTO: 24.1 % (ref 19.6–45.3)
MAGNESIUM SERPL-MCNC: 1.8 MG/DL (ref 1.6–2.6)
MCH RBC QN AUTO: 25.6 PG (ref 26.6–33)
MCHC RBC AUTO-ENTMCNC: 32.3 G/DL (ref 31.5–35.7)
MCV RBC AUTO: 79.3 FL (ref 79–97)
MONOCYTES # BLD AUTO: 0.8 10*3/MM3 (ref 0.1–0.9)
MONOCYTES NFR BLD AUTO: 8.8 % (ref 5–12)
NEUTROPHILS NFR BLD AUTO: 5.8 10*3/MM3 (ref 1.7–7)
NEUTROPHILS NFR BLD AUTO: 60.9 % (ref 42.7–76)
NRBC BLD AUTO-RTO: 0.1 /100 WBC (ref 0–0.2)
PLATELET # BLD AUTO: 258 10*3/MM3 (ref 140–450)
PMV BLD AUTO: 8.2 FL (ref 6–12)
POTASSIUM SERPL-SCNC: 4.9 MMOL/L (ref 3.5–5.2)
RBC # BLD AUTO: 5.38 10*6/MM3 (ref 4.14–5.8)
SODIUM SERPL-SCNC: 140 MMOL/L (ref 136–145)
WBC # BLD AUTO: 9.6 10*3/MM3 (ref 3.4–10.8)

## 2021-08-10 PROCEDURE — 99239 HOSP IP/OBS DSCHRG MGMT >30: CPT | Performed by: INTERNAL MEDICINE

## 2021-08-10 PROCEDURE — 99231 SBSQ HOSP IP/OBS SF/LOW 25: CPT | Performed by: PHYSICIAN ASSISTANT

## 2021-08-10 PROCEDURE — 63710000001 INSULIN LISPRO (HUMAN) PER 5 UNITS: Performed by: INTERNAL MEDICINE

## 2021-08-10 PROCEDURE — 82962 GLUCOSE BLOOD TEST: CPT

## 2021-08-10 PROCEDURE — 85025 COMPLETE CBC W/AUTO DIFF WBC: CPT | Performed by: INTERNAL MEDICINE

## 2021-08-10 PROCEDURE — 80048 BASIC METABOLIC PNL TOTAL CA: CPT | Performed by: INTERNAL MEDICINE

## 2021-08-10 PROCEDURE — 83735 ASSAY OF MAGNESIUM: CPT | Performed by: INTERNAL MEDICINE

## 2021-08-10 PROCEDURE — 99232 SBSQ HOSP IP/OBS MODERATE 35: CPT | Performed by: INTERNAL MEDICINE

## 2021-08-10 RX ORDER — LACTULOSE 10 G/15ML
20 SOLUTION ORAL EVERY 4 HOURS PRN
Status: DISCONTINUED | OUTPATIENT
Start: 2021-08-10 | End: 2021-08-10 | Stop reason: HOSPADM

## 2021-08-10 RX ADMIN — APIXABAN 5 MG: 5 TABLET, FILM COATED ORAL at 09:23

## 2021-08-10 RX ADMIN — DULOXETINE HYDROCHLORIDE 40 MG: 20 CAPSULE, DELAYED RELEASE ORAL at 06:03

## 2021-08-10 RX ADMIN — PANTOPRAZOLE SODIUM 40 MG: 40 TABLET, DELAYED RELEASE ORAL at 09:23

## 2021-08-10 RX ADMIN — MICONAZOLE NITRATE: 20 POWDER TOPICAL at 09:45

## 2021-08-10 RX ADMIN — METOPROLOL SUCCINATE 50 MG: 50 TABLET, EXTENDED RELEASE ORAL at 09:23

## 2021-08-10 RX ADMIN — DULOXETINE HYDROCHLORIDE 40 MG: 20 CAPSULE, DELAYED RELEASE ORAL at 13:33

## 2021-08-10 RX ADMIN — GABAPENTIN 400 MG: 400 CAPSULE ORAL at 09:23

## 2021-08-10 RX ADMIN — Medication 3 ML: at 09:24

## 2021-08-10 RX ADMIN — INSULIN LISPRO 14 UNITS: 100 INJECTION, SOLUTION INTRAVENOUS; SUBCUTANEOUS at 09:23

## 2021-08-10 RX ADMIN — INSULIN LISPRO 14 UNITS: 100 INJECTION, SOLUTION INTRAVENOUS; SUBCUTANEOUS at 13:33

## 2021-08-10 RX ADMIN — LEVOTHYROXINE SODIUM 100 MCG: 0.1 TABLET ORAL at 06:03

## 2021-08-10 RX ADMIN — SERTRALINE 50 MG: 50 TABLET, FILM COATED ORAL at 09:23

## 2021-08-10 RX ADMIN — ASPIRIN 81 MG: 81 TABLET, COATED ORAL at 09:23

## 2021-08-10 RX ADMIN — PANTOPRAZOLE SODIUM 40 MG: 40 TABLET, DELAYED RELEASE ORAL at 06:03

## 2021-08-10 NOTE — OUTREACH NOTE
Prep Survey      Responses   [facility] patient discharged from?  Index   Is LACE score < 7 ?  No   Emergency Room discharge w/ pulse ox?  No   Eligibility  [facility]   Date of Admission  08/02/21   Date of Discharge  08/10/21   Discharge Disposition  Home or Self Care   Discharge diagnosis  Cerebrovascular accident    Does the patient have one of the following disease processes/diagnoses(primary or secondary)?  Stroke (TIA)   Does the patient have Home health ordered?  No   Is there a DME ordered?  No   Comments regarding appointments  Outpatient therapy/counseling at [facility]    Prep survey completed?  Yes          Jess Mackay RN

## 2021-08-10 NOTE — PROGRESS NOTES
"  Chief complaint \"It hit me wrong on Sunday because she didn't come and I was mad, but I am not going to hurt myself\"    Subjective .     History of present illness:  The patient is a 56 y.o. male who was admitted secondary to a small CVA, for which he has not yet noticed any deficits.     Psychiatry was consulted due to suicidal thoughts that occurred on Sunday, along with plans to use the syringe in this room to put an air embolus into his IV. The patient admits to suicidal thoughts prior to hospitalization, and states he knows the drill for removing everything that could be dangerous from his room because he has \"been through this before.\" His wife has not been coming to see him, would not bring him the candy he requested, and didn't want to pick him up from the hospital, which is what made his SI occur today. He reports marriage difficulty for at least 2 years, and is upset that she wants to get him his own place so he can move out of their house, and she frequently brings up divorce to him in arguments. 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     8/9/21, patient has a sitter at bedside with suicide precautions, which he is happy about because he has \"someone to talk to\", still focused on his unhappiness at home and lack of support from his wife, doesn't want to keep living like that.    8/10/21, patient is upbeat today, admits that he has enjoyed having the sitters to talk to while on suicide precautions but denies any true SI.  He has never had a suicide attempt and has no plans to hurt himself.  All inpatient options have been attempted and denied, either b/c of his medical issues or no male beds available.      Past Psychiatric History: Depression, ADHD: denied any psychiatric admissions; " denied any suicide attempts    Review of Systems   Constitutional: Negative.    HENT: Negative.    Eyes: Negative.    Respiratory: Negative.    Cardiovascular: Negative.    Gastrointestinal: Negative.    Endocrine: Negative.    Musculoskeletal: Positive for arthralgias.   Skin: Negative.    Neurological: Negative.    Psychiatric/Behavioral: Positive for dysphoric mood. Negative for agitation, behavioral problems, confusion, decreased concentration, hallucinations, self-injury, sleep disturbance and suicidal ideas. The patient is not nervous/anxious and is not hyperactive.        History       Medications Prior to Admission   Medication Sig Dispense Refill Last Dose   • atorvastatin (LIPITOR) 40 MG tablet TAKE 1 TABLET EVERY DAY 90 tablet 0 8/1/2021 at Unknown time   • DULoxetine (CYMBALTA) 60 MG capsule TAKE 1 CAPSULE EVERY MORNING 90 capsule 0 8/1/2021 at Unknown time   • Eliquis 5 MG tablet tablet TAKE 1 TABLET TWICE DAILY 180 tablet 0 8/1/2021 at Unknown time   • empagliflozin (Jardiance) 10 MG tablet tablet Take 1 tablet by mouth Daily. 90 tablet 4 8/1/2021 at Unknown time   • gabapentin (NEURONTIN) 400 MG capsule Take 400 mg by mouth 3 (Three) Times a Day.   8/1/2021 at Unknown time   • insulin aspart (NovoLOG FlexPen) 100 UNIT/ML solution pen-injector sc pen Inject 10 Units under the skin into the appropriate area as directed 3 (Three) Times a Day With Meals. (Patient taking differently: Inject 14 Units under the skin into the appropriate area as directed 3 (Three) Times a Day With Meals.) 15 mL 6 8/1/2021 at Unknown time   • levothyroxine (SYNTHROID, LEVOTHROID) 100 MCG tablet TAKE 1 TABLET EVERY DAY (Patient taking differently: Take 100 mcg by mouth Daily.) 90 tablet 3 8/1/2021 at Unknown time   • losartan (COZAAR) 50 MG tablet TAKE 1 TABLET EVERY DAY 90 tablet 0 8/1/2021 at Unknown time   • omeprazole (priLOSEC) 40 MG capsule Take 40 mg by mouth Daily. Take DOS   8/1/2021 at Unknown time   • [DISCONTINUED]  "Insulin Glargine (Lantus SoloStar) 100 UNIT/ML injection pen INJECT SUBCUTANEOUSLY 27 UNITS EVERY BEDTIME (NEEDS APPOINTMENT) (Patient taking differently: Inject 34 Units under the skin into the appropriate area as directed Every Night.) 30 mL 6 8/1/2021 at Unknown time   • metoprolol succinate XL (TOPROL-XL) 50 MG 24 hr tablet TAKE 1 TABLET EVERY DAY (Patient taking differently: Take 50 mg by mouth Daily.) 30 tablet 0    • ondansetron (ZOFRAN) 4 MG tablet TAKE 1 TABLET EVERY 8 HOURS AS NEEDED FOR NAUSEA OR VOMITING. (Patient taking differently: Take 8 mg by mouth Every 8 (Eight) Hours As Needed for Nausea or Vomiting.) 45 tablet 0        Scheduled Meds:apixaban, 5 mg, Oral, Q12H  aspirin, 81 mg, Oral, Daily  atorvastatin, 80 mg, Oral, Nightly  cyanocobalamin, 1,000 mcg, Intramuscular, Q28 Days  DULoxetine, 40 mg, Oral, QAM  gabapentin, 400 mg, Oral, TID  insulin glargine, 40 Units, Subcutaneous, Nightly  insulin lispro, 14 Units, Subcutaneous, TID With Meals  levothyroxine, 100 mcg, Oral, Q AM  metoprolol succinate XL, 50 mg, Oral, Daily  miconazole, , Topical, Q12H  pantoprazole, 40 mg, Oral, QAM  sertraline, 50 mg, Oral, Daily  sodium chloride, 3 mL, Intravenous, Q12H      Continuous Infusions:   PRN Meds:.•  acetaminophen **OR** acetaminophen  •  bisacodyl  •  dextrose  •  dextrose  •  glucagon (human recombinant)  •  ipratropium-albuterol  •  lactulose  •  melatonin  •  ondansetron **OR** ondansetron  •  [COMPLETED] Insert peripheral IV **AND** sodium chloride  •  sodium chloride     Allergies:  Lisinopril      Objective     Vital Signs   /81 (BP Location: Left arm, Patient Position: Sitting)   Pulse 69   Temp 98.4 °F (36.9 °C) (Oral)   Resp 16   Ht 172.7 cm (68\")   Wt 89.5 kg (197 lb 5 oz)   SpO2 97%   BMI 30.00 kg/m²     Physical Exam:     General Appearance:    Well-developed, well-nourished, and depressed.   Head:    Normocephalic, without obvious deformity, atraumatic.   Eyes:            Lids " and lashes normal, conjunctivae and sclerae normal, no   icterus, no pallor, corneas clear.   Skin:  Neurologic:  Musculoskeletal:   No bleeding, bruising or rash.  Cranial nerves 2 - 12 grossly intact, sensation intact.  Muscle strength: grade 5  Muscle tone: Normal  Abnormal Movements: No tremors or abnormal involuntary movements  Gait: fairly steady      Mental Status Exam:   Orientation:  To person, Place, Time and Situation  Memory: Recent and remote memory Intact but patient notes occasional difficulty.  Mood/Affect: Upbeat  Suicidal Ideations: Nonr  Homicidal Ideations:  None  Hallucinations: None  Delusions:  None  Obsessions: None  Behavior and Psychomotor Activity: Appropriate  Speech:  Normal rate and volume  Thought Process: Goal directed  Associations: Intact  Thought Content: Normal  Language: name objects and repeat phrases  Concentration and computation: Fair  Attention Span: Fair  Fund of Knowledge: Fair  Reliability: fair  Insight into mental health: Fair  Judgement: Fair  Impulse Control:  Good  Hygiene: fair  Cooperation:  Cooperative  Eye Contact:  Good      Medications and allergies were reviewed by this provider.    Lab Results   Component Value Date    GLUCOSE 158 (H) 08/10/2021    CALCIUM 9.4 08/10/2021     08/10/2021    K 4.9 08/10/2021    CO2 26.0 08/10/2021     08/10/2021    BUN 21 (H) 08/10/2021    CREATININE 1.46 (H) 08/10/2021    EGFRIFNONA 50 (L) 08/10/2021    BCR 14.4 08/10/2021    ANIONGAP 11.0 08/10/2021       Last Urine Toxicity     LAST URINE TOXICITY RESULTS Latest Ref Rng & Units 8/2/2021 5/16/2019    CREATININE UR mg/dL - 261.1    AMPHETAMINES SCREEN, URINE NEGATIVE - NEGATIVE    BARBITURATES SCREEN Negative Negative NEGATIVE    BENZODIAZEPINE SCREEN, URINE Negative Negative NEGATIVE    COCAINE SCREEN, URINE Negative Negative NEGATIVE    METHADONE SCREEN, URINE Negative Negative NEGATIVE          No results found for: PHENYTOIN, PHENOBARB, VALPROATE, CBMZ    Lab  "Results   Component Value Date     08/10/2021    BUN 21 (H) 08/10/2021    CREATININE 1.46 (H) 08/10/2021    TSH 4.810 (H) 08/02/2021    WBC 9.60 08/10/2021       Brief Urine Lab Results  (Last result in the past 365 days)      Color   Clarity   Blood   Leuk Est   Nitrite   Protein   CREAT   Urine HCG        08/02/21 1611 Yellow Clear Negative Negative Negative Negative               Assessment/Plan       Cerebrovascular accident (CVA) (CMS/[facility])    Benign essential hypertension    Chronic coronary artery disease    Chronic renal insufficiency, stage III (moderate) (CMS/[facility])    Major depressive disorder, recurrent episode, severe (CMS/[facility])    Diabetic peripheral neuropathy (CMS/[facility])    Gastroesophageal reflux disease    History of pulmonary embolism    Mixed hyperlipidemia    Acquired hypothyroidism    Obstructive sleep apnea syndrome    Peripheral vascular disease (CMS/[facility])    Type 2 diabetes mellitus with peripheral vascular disease (CMS/[facility])    Dysphagia    Depression with suicidal ideation         Assessment:   Major Depressive Disorder, recurrent, improved    Treatment Plan:   Patient is still depressed, but mood improved again today, has enjoyed \"therapy talk\" with the sitters, but enies any SI.  .  Continue Sertraline 50 mg daily while cross tapering off Cymbalta   Social work has not been able to find inpatient psych placement, denied by all due to his health issues or no male beds available.  Patient is feeling better with no SI and agreeable to outpatient therapy, so Social work will help to arrange this and he can discharge home.  Genesight testing in the outpatient setting was discussed with the patient as a next step if Sertraline therapy is found to be ineffective    Treatment Plan discussed with: Patient and social work    I discussed the patients findings and my recommendations with patient and nursing staff    I have reviewed and approved the behavioral health treatment plans and problem list. " Yes     Referring MD has access to consult report and progress notes in EMR     Karen Reza PA-C  08/10/21  13:54 EDT    Patient was seen wearing appropriate PPE.    EMR Dragon transcription disclaimer:  Some of this encounter note is an electronic transcription translation of spoken language to printed text. The electronic translation of spoken language may permit erroneous, or at times, nonsensical words or phrases to be inadvertently transcribed; Although I have reviewed the note for such errors some may still exist.

## 2021-08-10 NOTE — DISCHARGE SUMMARY
[facility] Medicine Services  DISCHARGE SUMMARY    Patient Name: Kuldeep Adhikari  : 1965  MRN: [id number]    Date of Admission: 2021  Date of Discharge: 8/10/2021  Primary Care Physician: Laura Whitaker MD      Presenting Problem:   Diabetes 1.5, managed as type 1 (CMS/HCC) [E13.9]  Cerebrovascular accident (CVA), unspecified mechanism (CMS/HCC) [I63.9]    Active and Resolved Hospital Problems:  Active Hospital Problems    Diagnosis POA   • **Cerebrovascular accident (CVA) (CMS/HCC) [I63.9] Yes     Priority: High   • Depression with suicidal ideation [F32.9, R45.851] Not Applicable     Priority: Medium   • Dysphagia [R13.10] Yes   • Type 2 diabetes mellitus with peripheral vascular disease (CMS/HCC) [E11.51] Yes   • Chronic coronary artery disease [I25.10] Yes   • Obstructive sleep apnea syndrome [G47.33] Yes   • Benign essential hypertension [I10] Yes   • Diabetic peripheral neuropathy (CMS/HCC) [E11.42] Yes   • Chronic renal insufficiency, stage III (moderate) (CMS/HCC) [N18.30] Yes   • Major depressive disorder, recurrent episode, severe (CMS/HCC) [F33.2] Yes   • Gastroesophageal reflux disease [K21.9] Yes   • History of pulmonary embolism [Z86.711] Yes   • Mixed hyperlipidemia [E78.2] Yes   • Acquired hypothyroidism [E03.9] Yes   • Peripheral vascular disease (CMS/HCC) [I73.9] Yes      Resolved Hospital Problems   No resolved problems to display.         Hospital Course     Hospital Course:  Patient is a 56-year-old male with past medical history of coronary artery disease, peripheral vascular disease status post right BKA, diabetes mellitus type 2, pulmonary embolism on chronic anticoagulation with Eliquis, hypothyroidism asthma, anxiety, depression, GERD, hypertension and hyperlipidemia who presented to the emergency room because of right-sided weakness.  Patient was seen in the emergency room and was placed on a stroke protocol.  Patient was diagnosed  with acute CVA and a neurology consult was completed.  Cardiology consult was also completed.  Acute CVA was treated with aspirin.  Hyperlipidemia was treated with Lipitor.  Psych consult was completed because of her suicidal ideation.  Diabetes mellitus type 2 was treated with insulin therapy.  Coronary artery disease was treated with aspirin.  Hypertension was treated with metoprolol.  Diabetic neuropathy was treated with gabapentin.  Renal function was monitored because of her chronic kidney disease.  GERD was treated with Protonix.  Patient is on chronic anticoagulation because of a history of pulmonary embolism.  Hypothyroidism was treated with Synthroid.  Peripheral vascular disease was treated with antithrombotic therapy, aspirin.  Physical debility was treated with physical therapy and Occupational Therapy.  Patient will follow up with his psych as outpatient because of depression and suicidal ideation.  Patient reported significant improvement in his symptoms after over 8 days in the hospital and requested to be discharged home.  Patient was advised to take his medications as prescribed.  Discharge medications are as per medication reconciliation list.  Patient was advised to follow-up with his primary care physician within 3-5 days of discharge.  Patient was advised to follow-up with neurology within 14 days of discharge.  Patient was advised to follow-up with the psych within 14 days of discharge.  Patient was advised to return to the emergency department if he experiences any recurrence of his symptoms.  Patient and family agreed with the plan and patient was discharged in a stable condition.        DISCHARGE Follow Up Recommendations for labs and diagnostics:     Patient was advised to follow-up with his primary care physician who will review his current medications.    Patient was advised to follow-up with neurology who will review his neuro status.      Reasons For Change In Medications and  Indications for New Medications:      Day of Discharge     Vital Signs:  Temp:  [97.7 °F (36.5 °C)-98.7 °F (37.1 °C)] 98.4 °F (36.9 °C)  Heart Rate:  [64-74] 69  Resp:  [16-17] 16  BP: (110-144)/(72-87) 132/81    Physical Exam:  Physical Exam  Vitals and nursing note reviewed.   Constitutional:       General: He is not in acute distress.  HENT:      Head: Normocephalic.      Nose: Nose normal.      Mouth/Throat:      Mouth: Mucous membranes are dry.      Pharynx: Oropharynx is clear.   Eyes:      Extraocular Movements: Extraocular movements intact.      Conjunctiva/sclera: Conjunctivae normal.      Pupils: Pupils are equal, round, and reactive to light.   Cardiovascular:      Pulses: Normal pulses.      Heart sounds: No murmur heard.   No friction rub. No gallop.       Comments: S1 and S2 present.  No tachycardia.  Pulmonary:      Effort: Pulmonary effort is normal.      Breath sounds: No stridor. No wheezing or rales.   Chest:      Chest wall: No tenderness.   Abdominal:      General: Bowel sounds are normal. There is no distension.      Palpations: Abdomen is soft.      Tenderness: There is no abdominal tenderness. There is no right CVA tenderness or guarding.   Musculoskeletal:         General: No swelling, tenderness, deformity or signs of injury.      Cervical back: Normal range of motion. No rigidity.      Right lower leg: No edema.      Left lower leg: No edema.   Skin:     General: Skin is warm and dry.      Capillary Refill: Capillary refill takes less than 2 seconds.      Coloration: Skin is not jaundiced.      Findings: No bruising, erythema, lesion or rash.   Neurological:      Comments: No facial asymmetry noted.  Gait and station not tested.   Psychiatric:      Comments: No agitation.              Pertinent  and/or Most Recent Results     LAB RESULTS:      Lab 08/10/21  0750 08/05/21  0414   WBC 9.60 9.00   HEMOGLOBIN 13.8 12.7*   HEMATOCRIT 42.6 39.0   PLATELETS 258 224   NEUTROS ABS 5.80 4.80    LYMPHS ABS 2.30 2.80   MONOS ABS 0.80 0.90   EOS ABS 0.50* 0.50*   MCV 79.3 79.7         Lab 08/10/21  0750 08/06/21  0400 08/05/21  0414   SODIUM 140 141 141   POTASSIUM 4.9 4.3 4.4   CHLORIDE 103 107 105   CO2 26.0 28.0 29.0   ANION GAP 11.0 6.0 7.0   BUN 21* 16 18   CREATININE 1.46* 1.23 1.46*   GLUCOSE 158* 189* 217*   CALCIUM 9.4 8.6 9.2   MAGNESIUM 1.8  --  1.7                         Brief Urine Lab Results  (Last result in the past 365 days)      Color   Clarity   Blood   Leuk Est   Nitrite   Protein   CREAT   Urine HCG        08/02/21 1611 Yellow Clear Negative Negative Negative Negative             Microbiology Results (last 10 days)     Procedure Component Value - Date/Time    COVID PRE-OP / PRE-PROCEDURE SCREENING ORDER (NO ISOLATION) - Swab, Nasopharynx [[id number]]  (Normal) Collected: 08/06/21 1127    Lab Status: Final result Specimen: Swab from Nasopharynx Updated: 08/06/21 1207    Narrative:      The following orders were created for panel order COVID PRE-OP / PRE-PROCEDURE SCREENING ORDER (NO ISOLATION) - Swab, Nasopharynx.  Procedure                               Abnormality         Status                     ---------                               -----------         ------                     COVID-19,CEPHEID/MURALI/BD...[[id number]]  Normal              Final result                 Please view results for these tests on the individual orders.    COVID-19,CEPHEID/MURALI/BDMAX,COR/FINA/PAD/KULDIP IN-HOUSE(OR EMERGENT/ADD-ON),NP SWAB IN TRANSPORT MEDIA 3-4 HR TAT, RT-PCR - Swab, Nasopharynx [[id number]]  (Normal) Collected: 08/06/21 1127    Lab Status: Final result Specimen: Swab from Nasopharynx Updated: 08/06/21 1207     COVID19 Not Detected    Narrative:      Fact sheet for providers: https://www.fda.gov/media/717066/download     Fact sheet for patients: https://www.fda.gov/media/178217/download  Fact sheet for providers: https://www.fda.gov/media/815488/download    Fact sheet for patients:  https://www.fda.gov/media/207331/download    Test performed by PCR.    COVID-19,CEPHEID,COR/FINA/PAD/KULDIP IN-HOUSE(OR EMERGENT/ADD-ON),NP SWAB IN TRANSPORT MEDIA 3-4 HR TAT, RT-PCR - Swab, Nasopharynx [[id number]]  (Normal) Collected: 08/02/21 0625    Lab Status: Final result Specimen: Swab from Nasopharynx Updated: 08/02/21 0733     COVID19 Not Detected    Narrative:      Fact sheet for providers: https://www.fda.gov/media/190403/download     Fact sheet for patients: https://www.fda.gov/media/300569/download  Fact sheet for providers: https://www.fda.gov/media/441747/download    Fact sheet for patients: https://www.fda.gov/media/291627/download    Test performed by PCR.          CT Head Without Contrast    Result Date: 8/2/2021  Impression: No acute findings.  Electronically Signed By-Bridger Cazares MD On:8/2/2021 8:04 AM This report was finalized on [id number] by  Bridger Cazares MD.    CT Angiogram Neck    Result Date: 8/2/2021  Impression:  1. No significant carotid stenosis identified on either side. Widely patent dominant right vertebral artery. 2. The left vertebral artery is diminutive, and appears to terminate after the origin of the PICA branch. This may be due to normal variant anatomy. However, there are no prior studies available for comparison to confirm this is chronic/congenital finding. Correlation with patient exam and/or MRI brain may be helpful to determine significance of this finding. Additional vascular and nonvascular findings as given above.  Electronically Signed By-Ian Mauricio MD On:8/2/2021 9:25 AM This report was finalized on [id number] by  Ian Mauricio MD.    MRI Brain Without Contrast    Result Date: 8/2/2021  Impression: 12 mm acute lacunar infarct within the posterior limb of the left internal capsule with possible involvement of the posterior left lentiform nucleus.   Electronically Signed By-Bridger Cazares MD On:8/2/2021 10:48 AM This report was finalized on [id number] by  Bridger  MD Debora.    XR Chest 1 View    Result Date: 8/2/2021  Impression: No active disease.  Electronically Signed By-Bridger Cazares MD On:8/2/2021 7:22 AM This report was finalized on [id number] by  Bridger Cazares MD.    CT Angiogram Head    Result Date: 8/2/2021  Impression:  1. No significant carotid stenosis identified on either side. Widely patent dominant right vertebral artery. 2. The left vertebral artery is diminutive, and appears to terminate after the origin of the PICA branch. This may be due to normal variant anatomy. However, there are no prior studies available for comparison to confirm this is chronic/congenital finding. Correlation with patient exam and/or MRI brain may be helpful to determine significance of this finding. Additional vascular and nonvascular findings as given above.  Electronically Signed By-Ian Mauricio MD On:8/2/2021 9:25 AM This report was finalized on [id number] by  Ian Mauricio MD.      Results for orders placed during the hospital encounter of 08/02/21    Duplex Carotid Ultrasound CAR    Interpretation Summary  · Right internal carotid artery is normal.  · Left internal carotid artery is normal.      Results for orders placed during the hospital encounter of 08/02/21    Duplex Carotid Ultrasound CAR    Interpretation Summary  · Right internal carotid artery is normal.  · Left internal carotid artery is normal.      Results for orders placed during the hospital encounter of 08/02/21    Adult Transesophageal Echo (BARON) W/ Cont if Necessary Per Protocol    Interpretation Summary  Procedure performed  Transesophageal echocardiogram Doppler study (color Continuous Wave and pulse wave)    Date of study  8/5/2021    Indications  Rule out cardioembolic episodes  Recent stroke    Procedure  Anesthesia was provided by anesthesiologist with intravenous Diprivan  BARON probe could be passed without difficulty.  Patient tolerated the procedure well.  No complications were  noted.    Results  Technically satisfactory study.  Mitral valve is thickened with adequate opening motion.  Moderate mitral regurgitation is present.  Tricuspid valve is normal.  Aortic valve is tricuspid and is normal.  Left atrium is normal in size.  Left atrial appendage is enlarged without any clot.  Left ventricle is normal in size and contractility with ejection fraction of 60%.  Right atrium is normal in size.  No pericardial effusion or intracardiac thrombus is seen.  Atrial septum is intact without PFO.  Aorta is normal.    Impression  Moderate mitral regurgitation  Normal left ventricle size and contractility with ejection fraction of 60%.  No evidence for intracardiac thrombus is present.      Labs Pending at Discharge:      Procedures Performed  Procedure(s):  ESOPHAGOGASTRODUODENOSCOPY with biopsy x1 area and esophageal dilation (56FR Bougie)         Consults:   Consults     Date and Time Order Name Status Description    8/9/2021  9:11 AM Inpatient Psychiatrist Consult      8/8/2021 11:15 AM Inpatient Psychiatrist Consult Completed     8/6/2021  8:22 AM Inpatient Gastroenterology Consult Completed     8/3/2021 10:14 AM Inpatient Cardiology Consult Completed     8/2/2021  8:07 AM Hospitalist (on-call MD unless specified) Completed     8/2/2021  8:05 AM Inpatient Neurology Consult Stroke Completed             Discharge Details        Discharge Medications      New Medications      Instructions Start Date   aspirin 81 MG EC tablet   81 mg, Oral, Daily      cyanocobalamin 1000 MCG/ML injection   1,000 mcg, Intramuscular, Every 28 Days   Start Date: August 31, 2021        Changes to Medications      Instructions Start Date   atorvastatin 80 MG tablet  Commonly known as: LIPITOR  What changed:   · medication strength  · how much to take  · when to take this   80 mg, Oral, Nightly      Lantus SoloStar 100 UNIT/ML injection pen  Generic drug: Insulin Glargine  What changed:   · how much to take  · how to take  this  · when to take this  · additional instructions   40 Units, Subcutaneous, Nightly      NovoLOG FlexPen 100 UNIT/ML solution pen-injector sc pen  Generic drug: insulin aspart  What changed: how much to take   10 Units, Subcutaneous, 3 Times Daily With Meals      ondansetron 4 MG tablet  Commonly known as: ZOFRAN  What changed: how much to take   TAKE 1 TABLET EVERY 8 HOURS AS NEEDED FOR NAUSEA OR VOMITING.         Continue These Medications      Instructions Start Date   DULoxetine 60 MG capsule  Commonly known as: CYMBALTA   TAKE 1 CAPSULE EVERY MORNING      Eliquis 5 MG tablet tablet  Generic drug: apixaban   TAKE 1 TABLET TWICE DAILY      empagliflozin 10 MG tablet tablet  Commonly known as: Jardiance   10 mg, Oral, Daily      gabapentin 400 MG capsule  Commonly known as: NEURONTIN   400 mg, Oral, 3 Times Daily      levothyroxine 100 MCG tablet  Commonly known as: SYNTHROID, LEVOTHROID   TAKE 1 TABLET EVERY DAY      losartan 50 MG tablet  Commonly known as: COZAAR   TAKE 1 TABLET EVERY DAY      metoprolol succinate XL 50 MG 24 hr tablet  Commonly known as: TOPROL-XL   TAKE 1 TABLET EVERY DAY      omeprazole 40 MG capsule  Commonly known as: priLOSEC   40 mg, Oral, Daily, Take DOS             Allergies   Allergen Reactions   • Lisinopril Cough         Discharge Disposition:  Home-Health Care c    Diet:  Hospital:  Diet Order   Procedures   • Diet Cardiac, Diabetic/Consistent Carbs; Healthy Heart; Diabetic - Consistent Carb         Discharge Activity: As tolerated.        CODE STATUS:  Code Status and Medical Interventions:   Ordered at: 08/02/21 1433     Code Status:    CPR     Medical Interventions (Level of Support Prior to Arrest):    Full         Future Appointments   Date Time Provider Department Center   1/6/2022  2:15 PM Tamara Michaels MD [facility]       Additional Instructions for the Follow-ups that You Need to Schedule     Ambulatory Referral to Home Health   As directed      Face to Face  Visit Date: 8/8/2021    Follow-up provider for Plan of Care?: I treated the patient in an acute care facility and will not continue treatment after discharge.    Follow-up provider: HERNAN BEDOLLA [[id number]]    Reason/Clinical Findings: post hospital eval    Describe mobility limitations that make leaving home difficult: impaired mobility    Nursing/Therapeutic Services Requested: Other (hhc to eval)    Frequency: 1 Week 1               Time spent on Discharge including face to face service:  40 minutes    This patient has been examined wearing appropriate Personal Protective Equipment and discussed with hospital infection control department, [facility] department, infectious disease specialist and pulmonologist. 08/10/21      Signature: Electronically signed by Behzad Plaza MD, FACP, 08/10/21, 4:15 PM EDT.

## 2021-08-10 NOTE — PROGRESS NOTES
[facility] Medicine Services Daily Progress Note    Patient Name: Kuldeep Adhikari  : 1965  MRN: [id number]  Primary Care Physician:  Laura Whitaker MD  Date of admission: 2021      Subjective      Chief Complaint: Right-sided numbness and weakness.      Patient Reports significant improvement in his symptoms.    Review of Systems   Constitutional: Negative.   HENT: Negative.    Eyes: Negative.    Cardiovascular: Negative.    Respiratory: Negative.    Endocrine: Negative.    Hematologic/Lymphatic: Negative.    Skin: Negative.    Musculoskeletal: Negative.    Gastrointestinal: Negative.    Genitourinary: Negative.    Neurological: Negative.    Psychiatric/Behavioral: Negative.    Allergic/Immunologic: Negative.             Objective      Vitals:   Temp:  [97.7 °F (36.5 °C)-98.7 °F (37.1 °C)] 98.4 °F (36.9 °C)  Heart Rate:  [64-74] 69  Resp:  [16-17] 16  BP: (110-144)/(72-87) 132/81    Physical Exam  Vitals and nursing note reviewed.   Constitutional:       General: He is not in acute distress.  HENT:      Head: Normocephalic.      Nose: Nose normal.      Mouth/Throat:      Mouth: Mucous membranes are dry.      Pharynx: Oropharynx is clear.   Eyes:      Extraocular Movements: Extraocular movements intact.      Conjunctiva/sclera: Conjunctivae normal.      Pupils: Pupils are equal, round, and reactive to light.   Cardiovascular:      Pulses: Normal pulses.      Heart sounds: No murmur heard.   No friction rub. No gallop.       Comments: S1 and S2 present.  No tachycardia.  Pulmonary:      Effort: Pulmonary effort is normal.      Breath sounds: No stridor. No wheezing or rales.   Chest:      Chest wall: No tenderness.   Abdominal:      General: Bowel sounds are normal. There is no distension.      Palpations: Abdomen is soft.      Tenderness: There is no abdominal tenderness. There is no right CVA tenderness or guarding.   Musculoskeletal:         General: No swelling,  tenderness, deformity or signs of injury.      Cervical back: Normal range of motion. No rigidity.      Right lower leg: No edema.      Left lower leg: No edema.   Skin:     General: Skin is warm and dry.      Capillary Refill: Capillary refill takes less than 2 seconds.      Coloration: Skin is not jaundiced.      Findings: No bruising, erythema, lesion or rash.   Neurological:      Comments: No facial asymmetry noted.  Gait and station not tested.   Psychiatric:      Comments: No agitation.               Result Review    Result Review:  I have personally reviewed the results from the time of this admission to 8/10/2021 15:54 EDT and agree with these findings:  [x]  Laboratory  [x]  Microbiology  [x]  Radiology  []  EKG/Telemetry   []  Cardiology/Vascular   []  Pathology  []  Old records  []  Other:  Most notable findings include:  MRI of the brain without contrast on 8/2/2021 showed:  12 mm acute lacunar infarct within the posterior limb of the left internal capsule with possible involvement of the posterior left lentiform nucleus.          Assessment/Plan      Brief Patient Summary:  Patient is a 56-year-old male with past medical history of coronary artery disease, peripheral vascular disease status post right BKA, diabetes mellitus type 2, pulmonary embolism on chronic anticoagulation with Eliquis, hypothyroidism asthma, anxiety, depression, GERD, hypertension and hyperlipidemia who presented to the emergency room because of right-sided weakness.  Patient was seen in the emergency room and was placed on a stroke protocol.  Patient was diagnosed with acute CVA and a neurology consult was completed.  Cardiology consult was also completed.      apixaban, 5 mg, Oral, Q12H  aspirin, 81 mg, Oral, Daily  atorvastatin, 80 mg, Oral, Nightly  cyanocobalamin, 1,000 mcg, Intramuscular, Q28 Days  DULoxetine, 40 mg, Oral, QAM  gabapentin, 400 mg, Oral, TID  insulin glargine, 40 Units, Subcutaneous, Nightly  insulin lispro,  14 Units, Subcutaneous, TID With Meals  levothyroxine, 100 mcg, Oral, Q AM  metoprolol succinate XL, 50 mg, Oral, Daily  miconazole, , Topical, Q12H  pantoprazole, 40 mg, Oral, QAM  sertraline, 50 mg, Oral, Daily  sodium chloride, 3 mL, Intravenous, Q12H             Active Hospital Problems:  Active Hospital Problems    Diagnosis    • **Cerebrovascular accident (CVA) (CMS/[facility])  Follow neurology recommendations.      • Depression with suicidal ideation  Follow psych recommendations.      • Dysphagia      Added automatically from request for surgery [id number]     • Type 2 diabetes mellitus with peripheral vascular disease (CMS/[facility])  Treat with insulin therapy.      • Chronic coronary artery disease  Follow cardiology recommendations.  Treat with aspirin therapy.      • Obstructive sleep apnea syndrome      • Benign essential hypertension  Treat with metoprolol.      • Diabetic peripheral neuropathy (CMS/[facility])  Treat with gabapentin.      • Chronic renal insufficiency, stage III (moderate) (CMS/[facility])  Continue to monitor.      • Major depressive disorder, recurrent episode, severe (CMS/[facility])  Treat with sertraline.      • Gastroesophageal reflux disease  Treat with Protonix.      • History of pulmonary embolism  Patient is on Eliquis.      • Mixed hyperlipidemia  Treat with Lipitor.      • Acquired hypothyroidism  Treat with Synthroid.      • Peripheral vascular disease (CMS/[facility])  Treat with aspirin.           Continue appropriate patient's home medications for other chronic medical conditions.  Continue the present level of care.  Patient and family agreed with the plan of care.      DVT prophylaxis:  Medical and mechanical DVT prophylaxis orders are present.    CODE STATUS:    Code Status: CPR  Medical Interventions (Level of Support Prior to Arrest): Full      Disposition: Pending patient's clinical improvement.    This patient has been examined wearing appropriate Personal Protective Equipment and discussed with  [facility] infection control department, [facility], infectious disease specialist and pulmonologist. 08/10/21      Electronically signed by Behzad Plaza MD, 08/10/21, 15:54 EDT.  [facility] Hospitalist Team

## 2021-08-10 NOTE — PLAN OF CARE
Problem: Adult Inpatient Plan of Care  Goal: Absence of Hospital-Acquired Illness or Injury  Intervention: Identify and Manage Fall Risk  Recent Flowsheet Documentation  Taken 8/10/2021 0144 by Rosalinda Pearce RN  Safety Promotion/Fall Prevention:   assistive device/personal items within reach   clutter free environment maintained   fall prevention program maintained   activity supervised   lighting adjusted   nonskid shoes/slippers when out of bed   room organization consistent   safety round/check completed  Taken 8/9/2021 2316 by Rosalinda Pearce RN  Safety Promotion/Fall Prevention:   assistive device/personal items within reach   clutter free environment maintained   fall prevention program maintained   lighting adjusted   nonskid shoes/slippers when out of bed   room organization consistent   safety round/check completed  Taken 8/9/2021 2102 by Rosalinda Pearce RN  Safety Promotion/Fall Prevention:   assistive device/personal items within reach   clutter free environment maintained   fall prevention program maintained   lighting adjusted   nonskid shoes/slippers when out of bed   room organization consistent   safety round/check completed  Taken 8/9/2021 1915 by Rosalinda Pearce RN  Safety Promotion/Fall Prevention:   assistive device/personal items within reach   clutter free environment maintained   fall prevention program maintained   lighting adjusted   nonskid shoes/slippers when out of bed   room organization consistent   safety round/check completed  Intervention: Prevent Skin Injury  Recent Flowsheet Documentation  Taken 8/10/2021 0144 by Rosalinda Pearce RN  Body Position: position changed independently  Taken 8/9/2021 2316 by Rosalinda Pearce RN  Body Position: position changed independently  Taken 8/9/2021 2102 by Rosalinda Pearce RN  Body Position: position changed independently  Taken 8/9/2021 1915 by Rosalinda Pearce RN  Body Position: position changed  independently  Intervention: Prevent Infection  Recent Flowsheet Documentation  Taken 8/10/2021 0144 by Rosalinda Pearce RN  Infection Prevention:   hand hygiene promoted   personal protective equipment utilized  Taken 8/9/2021 2316 by Rosalinda Pearce RN  Infection Prevention:   hand hygiene promoted   personal protective equipment utilized  Taken 8/9/2021 2102 by Rosalinda Pearce RN  Infection Prevention:   hand hygiene promoted   personal protective equipment utilized  Taken 8/9/2021 1915 by Rosalinda Pearce RN  Infection Prevention:   hand hygiene promoted   personal protective equipment utilized  Goal: Optimal Comfort and Wellbeing  Intervention: Provide Person-Centered Care  Recent Flowsheet Documentation  Taken 8/9/2021 1915 by Rosalinda Pearce RN  Trust Relationship/Rapport: care explained     Problem: Fall Injury Risk  Goal: Absence of Fall and Fall-Related Injury  Intervention: Identify and Manage Contributors to Fall Injury Risk  Recent Flowsheet Documentation  Taken 8/9/2021 2102 by Rosalinda Pearce RN  Medication Review/Management: medications reviewed  Taken 8/9/2021 1915 by Rosalinda Pearce RN  Medication Review/Management: medications reviewed  Intervention: Promote Injury-Free Environment  Recent Flowsheet Documentation  Taken 8/10/2021 0144 by Rosalinda Pearce RN  Safety Promotion/Fall Prevention:   assistive device/personal items within reach   clutter free environment maintained   fall prevention program maintained   activity supervised   lighting adjusted   nonskid shoes/slippers when out of bed   room organization consistent   safety round/check completed  Taken 8/9/2021 2316 by Rosalinda Pearce RN  Safety Promotion/Fall Prevention:   assistive device/personal items within reach   clutter free environment maintained   fall prevention program maintained   lighting adjusted   nonskid shoes/slippers when out of bed   room organization consistent   safety round/check  completed  Taken 8/9/2021 2102 by Rosalinda Pearce RN  Safety Promotion/Fall Prevention:   assistive device/personal items within reach   clutter free environment maintained   fall prevention program maintained   lighting adjusted   nonskid shoes/slippers when out of bed   room organization consistent   safety round/check completed  Taken 8/9/2021 1915 by Rosalinda Pearce RN  Safety Promotion/Fall Prevention:   assistive device/personal items within reach   clutter free environment maintained   fall prevention program maintained   lighting adjusted   nonskid shoes/slippers when out of bed   room organization consistent   safety round/check completed     Problem: Adjustment to Illness (Stroke, Ischemic/Transient Ischemic Attack)  Goal: Optimal Coping  Intervention: Support Patient/Family Psychosocial Response to Stroke  Recent Flowsheet Documentation  Taken 8/9/2021 1915 by Rosalinda Pearce RN  Family/Support System Care: support provided     Problem: Cerebral Tissue Perfusion Risk (Stroke, Ischemic/Transient Ischemic Attack)  Goal: Optimal Cerebral Tissue Perfusion  Intervention: Optimize Oxygenation and Ventilation  Recent Flowsheet Documentation  Taken 8/10/2021 0144 by Rosalinda Pearce RN  Head of Bed (HOB): HOB elevated  Taken 8/9/2021 2316 by Rosalinda Pearce RN  Head of Bed (HOB): HOB elevated  Taken 8/9/2021 2102 by Rosalinda Pearce RN  Head of Bed (HOB): HOB elevated  Taken 8/9/2021 1915 by Rosalinda Pearce RN  Head of Bed (HOB): HOB at 15 degrees     Problem: Functional Ability Impaired (Stroke, Ischemic/Transient Ischemic Attack)  Goal: Optimal Functional Ability  Intervention: Optimize Functional Ability  Recent Flowsheet Documentation  Taken 8/10/2021 0144 by Rosalinda Pearce RN  Activity Management:   activity adjusted per tolerance   activity encouraged  Taken 8/9/2021 2316 by Rosalinda Pearce RN  Activity Management:   activity adjusted per tolerance   activity encouraged  Taken  8/9/2021 2102 by Rosalinda Pearce RN  Activity Management:   activity adjusted per tolerance   activity encouraged  Taken 8/9/2021 1915 by Rosalinda Pearce RN  Activity Management:   activity adjusted per tolerance   activity encouraged     Problem: Sensorimotor Impairment (Stroke, Ischemic/Transient Ischemic Attack)  Goal: Improved Sensorimotor Function  Intervention: Optimize Range of Motion, Motor Control and Function  Recent Flowsheet Documentation  Taken 8/10/2021 0144 by Rosalinda Pearce RN  Positioning/Transfer Devices:   pillows   in use  Taken 8/9/2021 2316 by Rosalinda Pearce RN  Positioning/Transfer Devices:   pillows   in use  Taken 8/9/2021 2102 by Rosalinda Pearce RN  Positioning/Transfer Devices:   pillows   in use  Taken 8/9/2021 1915 by Rosalinda Pearce RN  Positioning/Transfer Devices:   pillows   in use  Intervention: Optimize Sensory and Perceptual Abilities  Recent Flowsheet Documentation  Taken 8/9/2021 1915 by Rosalinda Pearce RN  Pressure Reduction Techniques: frequent weight shift encouraged  Pressure Reduction Devices: pressure-redistributing mattress utilized   Goal Outcome Evaluation:         Pt resting in bed with eyes closed at this time. Sitter at bedside. Pt with no complaints at this time. Will continue to monitor.

## 2021-08-10 NOTE — CASE MANAGEMENT/SOCIAL WORK
Continued Stay Note   [place]     Patient Name: Kuldeep Adhikari  MRN: [id number]  Today's Date: 8/10/2021    Admit Date: 8/2/2021    Discharge Plan     Row Name 08/10/21 4080       Plan    Plan  Denied for inpt psych. Now cleared by [facility] psych. Outpatient therapy/counseling appointment scheduled at [facility] 8/16 at 2pm - transportation arranged for appointment.    Plan Comments  JULIANNA called [facility] x2 to inquire about status of referral. On first call, JULIANNA was told that [facility] clinicians were reviewing pt and on second call, JULIANNA was told that [facility] do not have male beds available on this date. JULIANNA updated psych YOSSI (JAIME), who states that pt is in much better spirits today, could benefit from counseling oupatient. JULINANA met with pt at the bedside to discuss options - pt wants traditional outpatient appointment. JULIANNA called local providers for availability and ability to accommodate pt's primary insurance. Only facility that responded with availability as soon as next week and ability to accept payor was [facility] outpatient - JULIANNA facilitated conversation with pt and [facility] outpatient clinician (Jose) via speakerphone, appointment scheduled for 8/16 at 2pm. JULIANNA assisted with transportation by contacting [employer] transportation, which pt states he has and was able to schedule transportation (reservation #: [id number]). JULIANNA added appointment and transportation resources to S.        Met with patient in room wearing PPE: mask, face shield/goggles.      Maintained distance greater than six feet and spent more than 15 minutes in the room.      [facility] [place]    Office: [phone]  Cell: [phone]  Fax: [phone]  E-mail: [email or web address]

## 2021-08-10 NOTE — PLAN OF CARE
Goal Outcome Evaluation:  Plan of Care Reviewed With: patient        Progress: improving  Outcome Summary: Patient in better spirits today.  SI precautions removed.  Patient cleared to discharge with safety plan in place.  Discharging home.

## 2021-08-10 NOTE — DISCHARGE INSTR - APPOINTMENTS
Transportation resources:    [employer] ph: [phone]    [employer] (Medicaid transportation) ph: [phone]

## 2021-08-10 NOTE — PROGRESS NOTES
Referring Provider: Behzad Plaza MD    Reason for follow-up:  Recent stroke  Peripheral vascular disease    Patient Care Team:  Laura Whitaker MD as PCP - General    Subjective .  Feeling okay     ROS  Patient apparently had suicidal ideation although patient claims it was more situational anger in response done suicidal ideation.    Since I have last seen, the patient has been without any chest discomfort ,shortness of breath, palpitations, dizziness or syncope.  Denies having any headache ,abdominal pain ,nausea, vomiting , diarrhea constipation, loss of weight or loss of appetite.  Denies having any excessive bruising ,hematuria or blood in the stool.    Review of all systems negative except as indicated    History  Past Medical History:   Diagnosis Date   • CKD (chronic kidney disease), stage III (CMS/HCC)    • Depression    • DJD (degenerative joint disease)    • DVT (deep venous thrombosis) (CMS/HCC)    • Ganglion     rt wrist   • GERD (gastroesophageal reflux disease)    • Hiatal hernia    • History of echocardiogram 04/2016    2D ECHO   • History of esophageal stricture     s/p dialation 40-50 times last EGD 04/2016   • History of pulmonary embolus (PE)     on long term anticoagulation   • Hyperlipidemia    • Hypertension    • Hypothyroidism    • IBS (irritable bowel syndrome)    • Neuropathy    • Rash     rt lower hip   • Retinopathy    • Seasonal allergies    • Sleep apnea     cpap  bring dos   • Type 2 diabetes mellitus with peripheral vascular disease (CMS/HCC)    • Vitamin D deficiency        Past Surgical History:   Procedure Laterality Date   • AMPUTATION FOOT / TOE Right     great toe   • AMPUTATION REVISION Right 4/8/2021    Procedure: BELOW KNEE AMPUTATION REVISAION;  Surgeon: Eduardo Reynolds MD;  Location: [facility];  Service: Orthopedics;  Laterality: Right;   • AMPUTATION REVISION Right 4/29/2021    Procedure: AMPUTATION REVISION KNEE STUMP;  Surgeon: Eduardo Reynolds MD;   Location: [facility] MAIN OR;  Service: Orthopedics;  Laterality: Right;   • BELOW KNEE AMPUTATION Right 7/30/2020    Procedure: AMPUTATION BELOW KNEE;  Surgeon: Eduardo Reynolds MD;  Location: [facility] MAIN OR;  Service: Orthopedics;  Laterality: Right;   • CARDIAC CATHETERIZATION  04/2018    [facility]   • ENDOSCOPY     • ENDOSCOPY N/A 10/4/2019    Procedure: ESOPHAGOGASTRODUODENOSCOPY with dilitation and biopsy x 1 area;  Surgeon: Declan Iqbal MD;  Location: [facility] ENDOSCOPY;  Service: Gastroenterology   • ENDOSCOPY N/A 12/13/2019    Procedure: ESOPHAGOGASTRODUODENOSCOPY WITH DILATATION (50, 52 BOUGIE);  Surgeon: Declan Iqbal MD;  Location: [facility] ENDOSCOPY;  Service: Gastroenterology   • ENDOSCOPY N/A 8/6/2021    Procedure: ESOPHAGOGASTRODUODENOSCOPY with biopsy x1 area and esophageal dilation (56FR Bougie);  Surgeon: Hussein Talavera MD;  Location: [facility] ENDOSCOPY;  Service: Gastroenterology;  Laterality: N/A;  post: hiatal hernia, erosive gastritis   • GANGLION CYST EXCISION Left    • HERNIA REPAIR Bilateral    • RETINOPATHY SURGERY      laser   • TOTAL HIP ARTHROPLASTY Left    • TOTAL HIP ARTHROPLASTY Left 2018   • TRANS METATARSAL AMPUTATION Right 3/17/2020    Procedure: AMPUTATION TRANS METATARSAL right;  Surgeon: ERWIN Baker DPM;  Location: [facility] MAIN OR;  Service: Podiatry;  Laterality: Right;  GANGRENOUS RIGHT FOOT       Family History   Problem Relation Age of Onset   • Diabetes Mother    • Heart disease Mother    • Leukemia Father    • Sleep apnea Maternal Aunt         GENO   • Stroke Maternal Grandmother    • Hypertension Other    • Hyperlipidemia Other    • Cancer Other    • Colon cancer Other         uncle       Social History     Tobacco Use   • Smoking status: Never Smoker   • Smokeless tobacco: Never Used   Vaping Use   • Vaping Use: Never used   Substance Use Topics   • Alcohol use: No   • Drug use: No        Medications Prior to Admission   Medication Sig Dispense Refill Last  Dose   • atorvastatin (LIPITOR) 40 MG tablet TAKE 1 TABLET EVERY DAY 90 tablet 0 8/1/2021 at Unknown time   • DULoxetine (CYMBALTA) 60 MG capsule TAKE 1 CAPSULE EVERY MORNING 90 capsule 0 8/1/2021 at Unknown time   • Eliquis 5 MG tablet tablet TAKE 1 TABLET TWICE DAILY 180 tablet 0 8/1/2021 at Unknown time   • empagliflozin (Jardiance) 10 MG tablet tablet Take 1 tablet by mouth Daily. 90 tablet 4 8/1/2021 at Unknown time   • gabapentin (NEURONTIN) 400 MG capsule Take 400 mg by mouth 3 (Three) Times a Day.   8/1/2021 at Unknown time   • insulin aspart (NovoLOG FlexPen) 100 UNIT/ML solution pen-injector sc pen Inject 10 Units under the skin into the appropriate area as directed 3 (Three) Times a Day With Meals. (Patient taking differently: Inject 14 Units under the skin into the appropriate area as directed 3 (Three) Times a Day With Meals.) 15 mL 6 8/1/2021 at Unknown time   • levothyroxine (SYNTHROID, LEVOTHROID) 100 MCG tablet TAKE 1 TABLET EVERY DAY (Patient taking differently: Take 100 mcg by mouth Daily.) 90 tablet 3 8/1/2021 at Unknown time   • losartan (COZAAR) 50 MG tablet TAKE 1 TABLET EVERY DAY 90 tablet 0 8/1/2021 at Unknown time   • omeprazole (priLOSEC) 40 MG capsule Take 40 mg by mouth Daily. Take DOS   8/1/2021 at Unknown time   • [DISCONTINUED] Insulin Glargine (Lantus SoloStar) 100 UNIT/ML injection pen INJECT SUBCUTANEOUSLY 27 UNITS EVERY BEDTIME (NEEDS APPOINTMENT) (Patient taking differently: Inject 34 Units under the skin into the appropriate area as directed Every Night.) 30 mL 6 8/1/2021 at Unknown time   • metoprolol succinate XL (TOPROL-XL) 50 MG 24 hr tablet TAKE 1 TABLET EVERY DAY (Patient taking differently: Take 50 mg by mouth Daily.) 30 tablet 0    • ondansetron (ZOFRAN) 4 MG tablet TAKE 1 TABLET EVERY 8 HOURS AS NEEDED FOR NAUSEA OR VOMITING. (Patient taking differently: Take 8 mg by mouth Every 8 (Eight) Hours As Needed for Nausea or Vomiting.) 45 tablet 0   "      Allergies  Lisinopril    Scheduled Meds:apixaban, 5 mg, Oral, Q12H  aspirin, 81 mg, Oral, Daily  atorvastatin, 80 mg, Oral, Nightly  cyanocobalamin, 1,000 mcg, Intramuscular, Q28 Days  DULoxetine, 40 mg, Oral, QAM  gabapentin, 400 mg, Oral, TID  insulin glargine, 40 Units, Subcutaneous, Nightly  insulin lispro, 14 Units, Subcutaneous, TID With Meals  levothyroxine, 100 mcg, Oral, Q AM  metoprolol succinate XL, 50 mg, Oral, Daily  miconazole, , Topical, Q12H  pantoprazole, 40 mg, Oral, QAM  sertraline, 50 mg, Oral, Daily  sodium chloride, 3 mL, Intravenous, Q12H      Continuous Infusions:   PRN Meds:.•  acetaminophen **OR** acetaminophen  •  bisacodyl  •  dextrose  •  dextrose  •  glucagon (human recombinant)  •  ipratropium-albuterol  •  melatonin  •  ondansetron **OR** ondansetron  •  [COMPLETED] Insert peripheral IV **AND** sodium chloride  •  sodium chloride    Objective     VITAL SIGNS  Vitals:    08/09/21 0414 08/09/21 0858 08/09/21 1939 08/10/21 0300   BP: 142/78 (!) 196/126 110/72 115/73   BP Location: Left arm Left arm Left arm Right arm   Patient Position: Lying Sitting Lying Lying   Pulse: 69 78 74 64   Resp: 17 17 17 16   Temp: 97.8 °F (36.6 °C) 98.1 °F (36.7 °C) 98.2 °F (36.8 °C) 97.7 °F (36.5 °C)   TempSrc: Oral Oral Oral Oral   SpO2: 96% 99% 97% 96%   Weight: 88 kg (194 lb 0.1 oz)   89.5 kg (197 lb 5 oz)   Height:           Flowsheet Rows      First Filed Value   Admission Height  172.7 cm (68\") Documented at 08/02/2021 0540   Admission Weight  83.9 kg (185 lb) Documented at 08/02/2021 0540            Intake/Output Summary (Last 24 hours) at 8/10/2021 0628  Last data filed at 8/9/2021 0900  Gross per 24 hour   Intake 360 ml   Output --   Net 360 ml        TELEMETRY: Sinus rhythm    Physical Exam:  The patient is alert, oriented and in no distress.  Vital signs as noted above.  Head and neck revealed no carotid bruits or jugular venous distention.  No thyromegaly or lymphadenopathy is " present  Lungs clear.  No wheezing.  Breath sounds are normal bilaterally.  Heart normal first and second heart sounds.  No murmur. No precordial rub is present.  No gallop is present.  Abdomen soft and nontender.  No organomegaly is present.  Extremities with good peripheral pulses without any pedal edema.  Right BKA  Skin warm and dry.  Musculoskeletal system is grossly normal  CNS grossly normal      Results Review:   I reviewed the patient's new clinical results.  Lab Results (last 24 hours)     Procedure Component Value Units Date/Time    POC Glucose Once [[id number]]  (Abnormal) Collected: 08/09/21 1940    Specimen: Blood Updated: 08/09/21 1941     Glucose 190 mg/dL      Comment: Serial Number: [id number]:  [id number]       POC Glucose Once [[id number]]  (Abnormal) Collected: 08/09/21 1642    Specimen: Blood Updated: 08/09/21 1643     Glucose 150 mg/dL      Comment: Serial Number: [id number]:  [id number]       Tissue Pathology Exam [[id number]] Collected: 08/06/21 1337    Specimen: Tissue from Stomach Updated: 08/09/21 1514     Case Report --     Surgical Pathology Report                         Case: [id number]                                  Authorizing Provider:  Hussein Talavera MD        Collected:           08/06/2021 01:37 PM          Ordering Location:     [facility]  Received:            08/06/2021 01:57 PM                                 SUITES                                                                       Pathologist:           Dwight Faustin MD                                                             Specimen:    Stomach, gastric biopsy                                                                     Final Diagnosis --     Stomach, biopsy:    Reactive gastropathy with lamina propria congestion and rare scattered acute inflammatory cells    Negative for active inflammation and H. pylori organisms     JPR/travon        Gross Description --     A single specimen is  "received labeled \"Stomach.\" Received in formalin is a single fragment of pink-tan soft tissue which measures 0.4 cm. The specimen is filtered and submitted in its entirety in a single screen cassette.    [employer]       POC Glucose Once [[id number]]  (Abnormal) Collected: 08/09/21 1128    Specimen: Blood Updated: 08/09/21 1129     Glucose 162 mg/dL      Comment: Serial Number: [id number]:  [id number]             Imaging Results (Last 24 Hours)     ** No results found for the last 24 hours. **      LAB RESULTS (LAST 7 DAYS)    CBC  Results from last 7 days   Lab Units 08/05/21  0414   WBC 10*3/mm3 9.00   RBC 10*6/mm3 4.89   HEMOGLOBIN g/dL 12.7*   HEMATOCRIT % 39.0   MCV fL 79.7   PLATELETS 10*3/mm3 224       BMP  Results from last 7 days   Lab Units 08/06/21  0400 08/05/21  0414   SODIUM mmol/L 141 141   POTASSIUM mmol/L 4.3 4.4   CHLORIDE mmol/L 107 105   CO2 mmol/L 28.0 29.0   BUN mg/dL 16 18   CREATININE mg/dL 1.23 1.46*   GLUCOSE mg/dL 189* 217*   MAGNESIUM mg/dL  --  1.7       CMP   Results from last 7 days   Lab Units 08/06/21  0400 08/05/21  0414   SODIUM mmol/L 141 141   POTASSIUM mmol/L 4.3 4.4   CHLORIDE mmol/L 107 105   CO2 mmol/L 28.0 29.0   BUN mg/dL 16 18   CREATININE mg/dL 1.23 1.46*   GLUCOSE mg/dL 189* 217*         BNP        TROPONIN        CoAg        Creatinine Clearance  Estimated Creatinine Clearance: 72 mL/min (by C-G formula based on SCr of 1.23 mg/dL).    ABG        Radiology  No radiology results for the last day            EKG              I personally viewed and interpreted the patient's EKG/Telemetry data: Sinus rhythm    ECHOCARDIOGRAM:    Results for orders placed during the hospital encounter of 08/02/21    Adult Transesophageal Echo (BARON) W/ Cont if Necessary Per Protocol    Interpretation Summary  Procedure performed  Transesophageal echocardiogram Doppler study (color Continuous Wave and pulse wave)    Date of study  8/5/2021    Indications  Rule out cardioembolic " episodes  Recent stroke    Procedure  Anesthesia was provided by anesthesiologist with intravenous Diprivan  BARON probe could be passed without difficulty.  Patient tolerated the procedure well.  No complications were noted.    Results  Technically satisfactory study.  Mitral valve is thickened with adequate opening motion.  Moderate mitral regurgitation is present.  Tricuspid valve is normal.  Aortic valve is tricuspid and is normal.  Left atrium is normal in size.  Left atrial appendage is enlarged without any clot.  Left ventricle is normal in size and contractility with ejection fraction of 60%.  Right atrium is normal in size.  No pericardial effusion or intracardiac thrombus is seen.  Atrial septum is intact without PFO.  Aorta is normal.    Impression  Moderate mitral regurgitation  Normal left ventricle size and contractility with ejection fraction of 60%.  No evidence for intracardiac thrombus is present.          STRESS MYOVIEW:    Cardiolite (Tc-99m Sestamibi) stress test    CARDIAC CATHETERIZATION:            OTHER:         Assessment/Plan     Principal Problem:    Cerebrovascular accident (CVA) (CMS/HCC)  Active Problems:    Benign essential hypertension    Chronic coronary artery disease    Chronic renal insufficiency, stage III (moderate) (CMS/HCC)    Major depressive disorder, recurrent episode, severe (CMS/HCC)    Diabetic peripheral neuropathy (CMS/HCC)    Gastroesophageal reflux disease    History of pulmonary embolism    Mixed hyperlipidemia    Acquired hypothyroidism    Obstructive sleep apnea syndrome    Peripheral vascular disease (CMS/HCC)    Type 2 diabetes mellitus with peripheral vascular disease (CMS/HCC)    Dysphagia    Depression with suicidal ideation        ]]]]]]]]]]]]]]]]]]]]]]  Impression  ===========  -TIA/stroke.  Abnormal CTA with acute lacunar infarct within the posterior limb of the left internal capsule.  EKG showed sinus rhythm.  Echocardiogram showed anterior mitral leaflet  nodular density.     -History of coronary artery disease.  Patient is not having any angina pectoris or congestive heart failure.  Cardiac cath films from 4/18/2018 were reviewed and interpreted independently.  Normal left ventricular function.  50% diagonal branch disease.  RCA is a nondominant vessel that has mid segment 90% disease (small artery)     BARON 2021-08-05 revealed  Moderate mitral regurgitation  Normal left ventricle size and contractility with ejection fraction of 60%.  No evidence for intracardiac thrombus is present.    -Bilateral carotid bruits.  Right louder than left.  CT angiogram and carotid duplex scan did not reveal any obstructive carotid artery disease.     -Hypertension diabetes dyslipidemia sleep apnea CKD 3 (BUN 18 creatinine 1.37.)  Hypothyroidism vitamin D deficiency     -History of DVT.     -Long-term anticoagulation with Eliquis due to previous pulmonary embolus     -Peripheral vascular disease     -Status post right BKA.  Total left arthroplasty hernia repair     -Non-smoker  ============  Plan  ============  Recent TIA/stroke  BARON 2021-08-05-negative for intracardiac thrombus.    Anticoagulation  Patient is on Eliquis    Renal dysfunction  BUN/18/1.37  18/1.46-8/5/2021  Observe closely.    Patient apparently had suicidal ideation although patient claims it was more situational anger in response done suicidal ideation.  Patient is very rational about the situation at this time.  Doing better.  Feeling better with having somebody to talk to.    No need for further cardiac work-up at this time.    Follow-up in the office in 2 weeks when discharged.    Medications were reviewed and updated.  Continue aspirin Eliquis atorvastatin gabapentin levothyroxine metoprolol pantoprazole sertraline    Further plan will depend on patient's progress.  ]]]]]]]]]]]]]]]]]]]]]    Nelli Vides MD  08/10/21  06:28 EDT

## 2021-08-11 ENCOUNTER — TRANSITIONAL CARE MANAGEMENT TELEPHONE ENCOUNTER (OUTPATIENT)
Dept: CALL CENTER | Facility: HOSPITAL | Age: 56
End: 2021-08-11

## 2021-08-11 NOTE — OUTREACH NOTE
Call Center TCM Note      Responses   [facility] patient discharged from?  [facility]   Does the patient have one of the following disease processes/diagnoses(primary or secondary)?  Stroke (TIA)   TCM attempt successful?  No [Verbal release out of date ]   Unsuccessful attempts  Attempt 1          Luisa Hamilton RN    8/11/2021, 15:41 EDT

## 2021-08-11 NOTE — CASE MANAGEMENT/SOCIAL WORK
Case Management Discharge Note      Final Note: Outpt appt [facility] 8/16/21.    Provided Post Acute Provider List?: Yes  Post Acute Provider List: Nursing Home, Inpatient Rehab  Provided Post Acute Provider Quality & Resource List?: Yes  Post Acute Provider Quality and Resource List: Inpatient Rehab, Nursing Home  Delivered To: Patient  Method of Delivery: In person    Selected Continued Care - Discharged on 8/10/2021 Admission date: 8/2/2021 - Discharge disposition: Home-Health Care [facility]                      Home Medical Care Coordination complete.    Service Provider Selected Services Address Phone Fax Patient Preferred    [facility]  Home Health [facility] [address] [place] [address] [phone] --                       Final Discharge Disposition Code: 01 - home or self-care

## 2021-08-11 NOTE — OUTREACH NOTE
Call Center TCM Note      Responses   [facility] patient discharged from?  [place]   Does the patient have one of the following disease processes/diagnoses(primary or secondary)?  Stroke (TIA)   TCM attempt successful?  No   Unsuccessful attempts  Attempt 2          Luisa Hamilton RN    8/11/2021, 16:00 EDT

## 2021-08-12 ENCOUNTER — TRANSITIONAL CARE MANAGEMENT TELEPHONE ENCOUNTER (OUTPATIENT)
Dept: CALL CENTER | Facility: HOSPITAL | Age: 56
End: 2021-08-12

## 2021-08-12 NOTE — OUTREACH NOTE
Call Center TCM Note      Responses   [facility] patient discharged from?  [facility]   Does the patient have one of the following disease processes/diagnoses(primary or secondary)?  Stroke (TIA)   TCM attempt successful?  Yes   Call start time  1006   Call end time  1019   Discharge diagnosis  Cerebrovascular accident    Person spoke with today (if not patient) and relationship  Patient   Meds reviewed with patient/caregiver?  Yes   Is the patient having any side effects they believe may be caused by any medication additions or changes?  No   Does the patient have all medications ordered at discharge?  No   Nursing Interventions  Nurse provided patient education   Is the patient taking all medications as directed (includes completed medication regime)?  N/A   Does the patient have a primary care provider?   Yes   Does the patient have an appointment with their PCP within 7 days of discharge?  Yes   Comments regarding PCP   [facility] [facility] on 8/16/21 at 11:00 AM   Has the patient kept scheduled appointments due by today?  N/A   What is the Home health agency?   No    Psychosocial issues?  No   Does the patient require any assistance with activities of daily living such as eating, bathing, dressing, walking, etc.?  No   Does the patient have any residual symptoms from stroke/TIA?  Yes   Residual symptoms comments  memory recall   Does the patient understand the diet ordered at discharge?  Yes   Did the patient receive a copy of their discharge instructions?  Yes   Nursing interventions  Reviewed instructions with patient   What is the patient's perception of their health status since discharge?  Improving   Nursing interventions  Nurse provided patient education   Is the patient able to teach back FAST for Stroke?  Yes   Is the patient/caregiver able to teach back the risk factors for a stroke?  High Cholesterol, High blood pressure-goal below 120/80, Smoking, Diabetes, Physical inactivity and obesity, History  of TIAs, Sleep apnea, History of Afib, Excessive alcohol intake   Is the patient/caregiver able to teach back signs and symptoms related to disease process for when to call PCP?  Yes   Is the patient/caregiver able to teach back signs and symptoms related to disease process for when to call 911?  Yes   If the patient is a current smoker, are they able to teach back resources for cessation?  Not a smoker   Is the patient/caregiver able to teach back the hierarchy of who to call/visit for symptoms/problems? PCP, Specialist, Home health nurse, Urgent Care, ED, 911  Yes   TCM call completed?  Yes   Wrap up additional comments  Pt states he is doing ok. Pt reports the only residual s/s of stroke is his memory, trying to recall past information. Pt verified PCP [facility] appt on 8/16/21. Pt shares Johann, daughter is staying with him. No questions/concerns.          Yuliya Pham RN    8/12/2021, 10:22 EDT

## 2021-08-15 NOTE — PROGRESS NOTES
"Transitional Care Follow Up Visit  Subjective     Kuldeep Adhikari is a 56 y.o. male who presents for a transitional care management visit.    Within 48 business hours after discharge our office contacted him via telephone to coordinate his care and needs.      I reviewed and discussed the details of that call along with the discharge summary, hospital problems, inpatient lab results, inpatient diagnostic studies, and consultation reports with Kuldeep.     Current outpatient and discharge medications have been reconciled for the patient.  Reviewed by: KEYLA Calloway      Date of TCM Phone Call 8/10/2021   [facility]   Date of Admission 8/2/2021   Date of Discharge 8/10/2021   Discharge Disposition Home or Self Care     Risk for Readmission (LACE) No data recorded      History of Present Illness   Course During Hospital Stay:  Hospital course per hospital record: \"Patient is a 56-year-old male with past medical history of coronary artery disease, peripheral vascular disease status post right BKA, diabetes mellitus type 2, pulmonary embolism on chronic anticoagulation with Eliquis, hypothyroidism asthma, anxiety, depression, GERD, hypertension and hyperlipidemia who presented to the emergency room because of right-sided weakness.  Patient was seen in the emergency room and was placed on a stroke protocol.  Patient was diagnosed with acute CVA and a neurology consult was completed.  Cardiology consult was also completed.  Acute CVA was treated with aspirin.  Hyperlipidemia was treated with Lipitor.  Psych consult was completed because of her suicidal ideation.  Diabetes mellitus type 2 was treated with insulin therapy.  Coronary artery disease was treated with aspirin.  Hypertension was treated with metoprolol.  Diabetic neuropathy was treated with gabapentin.  Renal function was monitored because of her chronic kidney disease.  GERD was treated with Protonix.  Patient is on chronic " "anticoagulation because of a history of pulmonary embolism.  Hypothyroidism was treated with Synthroid.  Peripheral vascular disease was treated with antithrombotic therapy, aspirin.  Physical debility was treated with physical therapy and Occupational Therapy.  Patient will follow up with his psych as outpatient because of depression and suicidal ideation.  Patient reported significant improvement in his symptoms after over 8 days in the hospital and requested to be discharged home.  Patient was advised to take his medications as prescribed.  Discharge medications are as per medication reconciliation list.  Patient was advised to follow-up with his primary care physician within 3-5 days of discharge.  Patient was advised to follow-up with neurology within 14 days of discharge.  Patient was advised to follow-up with the psych within 14 days of discharge.  Patient was advised to return to the emergency department if he experiences any recurrence of his symptoms.  Patient and family agreed with the plan and patient was discharged in a stable condition.\"     Follow up with neurology has not been scheduled.   Follow up with psychiatry is later today with "[facility]" at 2:00; medical transportation is lined up.    Says Wednesday after discharge was feeling down about his wife wanting to file for divorce.  Contacted the nurse at the hospital wanting to talk; she called 911 and the [facility]'s dept came to do a welfare check.  They told him he had to go be seen somewhere so he went via ambulance to [facility].  Reports he talked with the specialist there and then was sent home. Continues Cymbalta 60 mg daily.    Says he has had suicidal thoughts but has been able to get them out of his head and move past them.  No current SI or HI.      Pt. reports insurance will not cover the B12 injections.   Taking otc melatonin.      The following portions of the patient's history were reviewed and updated as appropriate: allergies, " current medications, past family history, past medical history, past social history, past surgical history and problem list.    Review of Systems   Constitutional: Negative for activity change, appetite change, chills, diaphoresis, fatigue, fever and unexpected weight change.   Eyes: Negative for visual disturbance.   Respiratory: Negative for cough, chest tightness, shortness of breath and wheezing.    Cardiovascular: Negative for chest pain, palpitations and leg swelling.   Gastrointestinal: Negative for abdominal distention, abdominal pain, blood in stool, constipation, diarrhea, nausea and vomiting.   Genitourinary: Negative for difficulty urinating, dysuria, flank pain, frequency and urgency.   Skin: Negative for rash.   Neurological: Positive for headaches. Negative for dizziness, tremors, seizures, syncope, facial asymmetry, speech difficulty, weakness, light-headedness and numbness.   Psychiatric/Behavioral: Positive for dysphoric mood, sleep disturbance and suicidal ideas (not currently). The patient is nervous/anxious.        Objective   Physical Exam  Vitals reviewed.   Constitutional:       General: He is not in acute distress.     Appearance: Normal appearance.   HENT:      Head: Normocephalic and atraumatic.   Cardiovascular:      Rate and Rhythm: Normal rate and regular rhythm.      Pulses: Normal pulses.      Heart sounds: Normal heart sounds. No murmur heard.     Pulmonary:      Effort: Pulmonary effort is normal. No respiratory distress.      Breath sounds: Normal breath sounds. No wheezing.   Chest:      Chest wall: No tenderness.   Abdominal:      Tenderness: There is no right CVA tenderness or left CVA tenderness.   Musculoskeletal:      Cervical back: Normal range of motion and neck supple. No tenderness.   Skin:     General: Skin is warm and dry.      Findings: No erythema.   Neurological:      General: No focal deficit present.      Mental Status: He is alert and oriented to person, place,  and time.      Cranial Nerves: No cranial nerve deficit.   Psychiatric:         Mood and Affect: Mood is depressed. Affect is tearful.         Speech: Speech normal.         Behavior: Behavior is cooperative.         Thought Content: Thought content does not include homicidal or suicidal ideation. Thought content does not include homicidal or suicidal plan.         Assessment/Plan   Diagnoses and all orders for this visit:    1. Stroke, recent, without late effect (Primary)  Comments:  Stable.   Cont. Eliquis and ASA.   Will get scheduled for follow up with neuro.   Orders:  -     Ambulatory Referral to Neurology    2. Vitamin D deficiency  Comments:  Stable.   Cont. current medicaiton.     3. Vitamin B12 deficiency  Comments:  Injection not covered with insurance.    Will send in 1000 mcg oral tab to take daily.   Repeat labs in 3 mo.   Orders:  -     vitamin B-12 (CYANOCOBALAMIN) 1000 MCG tablet; Take 1 tablet by mouth Daily.  Dispense: 90 tablet; Refill: 3    4. Major depressive disorder, recurrent, moderate (CMS/HCC)  Comments:  Pt. scheduled with [facility] today at 2PM; has medical transportation arranged.   Cont. current medication.   Verbally contracts for safety today

## 2021-08-16 ENCOUNTER — OFFICE VISIT (OUTPATIENT)
Dept: FAMILY MEDICINE CLINIC | Facility: CLINIC | Age: 56
End: 2021-08-16

## 2021-08-16 VITALS
BODY MASS INDEX: 29.55 KG/M2 | WEIGHT: 195 LBS | TEMPERATURE: 98.2 F | DIASTOLIC BLOOD PRESSURE: 83 MMHG | HEART RATE: 98 BPM | OXYGEN SATURATION: 99 % | SYSTOLIC BLOOD PRESSURE: 153 MMHG | HEIGHT: 68 IN

## 2021-08-16 DIAGNOSIS — E53.8 VITAMIN B12 DEFICIENCY: ICD-10-CM

## 2021-08-16 DIAGNOSIS — Z86.73 STROKE, RECENT, WITHOUT LATE EFFECT: Primary | ICD-10-CM

## 2021-08-16 DIAGNOSIS — E55.9 VITAMIN D DEFICIENCY: ICD-10-CM

## 2021-08-16 DIAGNOSIS — F33.1 MAJOR DEPRESSIVE DISORDER, RECURRENT, MODERATE (HCC): ICD-10-CM

## 2021-08-16 PROCEDURE — 1111F DSCHRG MED/CURRENT MED MERGE: CPT | Performed by: NURSE PRACTITIONER

## 2021-08-16 PROCEDURE — 99495 TRANSJ CARE MGMT MOD F2F 14D: CPT | Performed by: NURSE PRACTITIONER

## 2021-08-16 RX ORDER — LANOLIN ALCOHOL/MO/W.PET/CERES
1000 CREAM (GRAM) TOPICAL DAILY
Qty: 90 TABLET | Refills: 3 | Status: SHIPPED | OUTPATIENT
Start: 2021-08-16 | End: 2022-01-06

## 2021-08-24 RX ORDER — SITAGLIPTIN AND METFORMIN HYDROCHLORIDE 1000; 50 MG/1; MG/1
TABLET, FILM COATED, EXTENDED RELEASE ORAL
Qty: 180 TABLET | Refills: 4 | Status: SHIPPED | OUTPATIENT
Start: 2021-08-24 | End: 2022-01-21 | Stop reason: SDUPTHER

## 2021-08-29 LAB
MAXIMAL PREDICTED HEART RATE: 163 BPM
STRESS TARGET HR: 139 BPM

## 2021-09-02 ENCOUNTER — TELEPHONE (OUTPATIENT)
Dept: FAMILY MEDICINE CLINIC | Facility: CLINIC | Age: 56
End: 2021-09-02

## 2021-09-02 NOTE — TELEPHONE ENCOUNTER
Caller: Kuldeep Adhikari    Relationship: Self    Best call back number: [phone]*    What form or medical record are you requesting: JURY DUTY EXEMPTION LETTER    Who is requesting this form or medical record from you: [facility]S OFFICE    How would you like to receive the form or medical records (pick-up, mail, fax): MAIL    If mail, what is the address:     [facility] 'S OFFICE  [address] [place] [address],  ROOM [address] [place] [address] [place] [id number]    Timeframe paperwork needed: ASAP    Additional notes: PATIENT NEEDS LETTER EXEMPTING HIM FROM JURY DUTY DUE TO NOT BEING ABLE TO DRIVE DUE TO BEING AN AMPUTEE AND ALSO DUE TO HAVING A STROKE RECENTLY.

## 2021-09-13 RX ORDER — FLURBIPROFEN SODIUM 0.3 MG/ML
SOLUTION/ DROPS OPHTHALMIC
Qty: 400 EACH | OUTPATIENT
Start: 2021-09-13

## 2021-10-04 DIAGNOSIS — I10 ESSENTIAL HYPERTENSION: ICD-10-CM

## 2021-10-04 RX ORDER — LOSARTAN POTASSIUM 50 MG/1
TABLET ORAL
Qty: 90 TABLET | Refills: 0 | Status: SHIPPED | OUTPATIENT
Start: 2021-10-04 | End: 2021-12-18

## 2021-10-20 RX ORDER — APIXABAN 5 MG/1
TABLET, FILM COATED ORAL
Qty: 180 TABLET | Refills: 0 | Status: SHIPPED | OUTPATIENT
Start: 2021-10-20 | End: 2022-01-21 | Stop reason: SDUPTHER

## 2021-11-03 NOTE — TELEPHONE ENCOUNTER
Caller: [facility] - [place], [place] - [address] - [phone] [facility] [phone] FX    Relationship: Pharmacy      Medication requested (name and dosage):    Requested Prescriptions:   Requested Prescriptions     Pending Prescriptions Disp Refills   • atorvastatin (LIPITOR) 80 MG tablet 30 tablet 0     Sig: Take 1 tablet by mouth Every Night.   • ondansetron (ZOFRAN) 4 MG tablet 45 tablet 0     Sig: Take 1 tablet by mouth Every 8 (Eight) Hours As Needed for Nausea or Vomiting.        Pharmacy where request should be sent: [facility] - [place], [place] - [address] - [phone] [facility] [phone] FX    Best call back number: [phone]    Does the patient have less than a 3 day supply:  [] Yes  [] No    [facility], [facility] Rep   11/03/21 12:06 EDT

## 2021-11-04 RX ORDER — ONDANSETRON 4 MG/1
4 TABLET, FILM COATED ORAL EVERY 8 HOURS PRN
Qty: 45 TABLET | Refills: 0 | Status: SHIPPED | OUTPATIENT
Start: 2021-11-04 | End: 2022-01-21 | Stop reason: SDUPTHER

## 2021-11-04 RX ORDER — ATORVASTATIN CALCIUM 80 MG/1
80 TABLET, FILM COATED ORAL NIGHTLY
Qty: 90 TABLET | Refills: 1 | Status: SHIPPED | OUTPATIENT
Start: 2021-11-04 | End: 2022-01-21 | Stop reason: SDUPTHER

## 2021-11-04 NOTE — PROGRESS NOTES
Subjective: cva    Patient ID: Kuldeep Adhikari is a 56 y.o. male.    CHIEF COMPLAINT:H/O CVA and Headache     History of Present Illness Mr. Adhikari is a 56-year-old  male who was referred by Nelli Manrique for follow-up after stroke.  He presented today alone ambulating with a right below the knee artificial leg and foot.  The patient reports on 8/2/2021 he awoke with right arm weakness and right thigh weakness.  He states his family called 911 and took him to [facility].  Code stroke was called.  The patient prior to stroke was on Eliquis due to history of pulmonary embolism.  He was in [facility] from 8/2/2021 to 8/10/2021.  During that time he did become suicidal and ideation and was also seen by psychiatry.  Today the patient states he is not suicidal.     The patient has the history of a small vessel ischemic stroke involving the left internal capsule.  He also has several stroke risk factors including nontreated sleep apnea, hypertension, hyperlipidemia, hypothyroid and type 2 diabetes.  He reports he is trying to control his diabetes better.  He states he is seeing cardiology in regard to heart abnormalities and hypertension.  He reports he snores and does have apneic spells and states that he would like to repeat his sleep study and start using a CPAP or BiPAP if needed.    He has a past medical history of:  coronary artery disease, peripheral vascular disease status post right BKA, diabetes mellitus type 2, pulmonary embolism on chronic anticoagulation with Eliquis, hypothyroidism asthma, anxiety, depression, GERD, hypertension and hyperlipidemia, CKD, GENO without treatment and CVA.     Testing:  - CT head: No acute findings.  - CTA head and neck: No significant carotid stenosis identified on either side. Widely  patent dominant right vertebral artery. Left vertebral artery is diminutive, and appears to terminate after the origin of the PICA branch, possible anatomic variant.   - MRI brain: 12  mm acute lacunar infarct within the posterior limb of the left internal capsule with possible involvement of the posterior left lentiform nucleus. Images reviewed: subacute ischemic stroke in the left basal ganglia.   - EKG: Sinus rhythm. Nonspecific T abnormalities, lateral leads  - Labs: A1C: P, B12: P, LDL: 78, TSH: 4.81  - Echo: EF = 70% Calcified nodular density that is attached to the anterior leaflet of the mitral valve that was reported even on the prior echocardiogram unlikely to be a vegetation most likely just a calcified nodule.   Medication orders:  - Antithrombotics: Continue Eliquis 5mg BID. Start ASA 81mg daily. Discussed again risks/benefits of anticoagulation and antiplatelet therapy including the increased risk of bleeding. Benefits are felt to outweigh the risks. Pt reports understanding and agrees.  - Statin: Lipitor 80mg qhs    Headaches:   Today the patient reports that all weakness has resolved.  He does report a chronic band type headache and reports that he has very significant stress in his life right now.  He was notified that this band type headache is typically a stress headache and can be treated very easily with Flexeril.  He was notified that Flexeril can make him drowsy.  He states he will take it first in the evening and see how he tolerates it.  He reports his wife is trying to divorce him and keep him from seeing all his children and that this has been very upsetting to him.  He states this is what has made him suicidal in the past.      He reports in the past he did overdose his insulin thinking that it would cause death.  He states he had no bad effects from the increase in insulin and now is currently followed by psychiatry and has no suicidal ideation or plan.      The following portions of the patient's history were reviewed and updated as appropriate: allergies, current medications, past family history, past medical history, past social history, past surgical history and  problem list.      Family History   Problem Relation Age of Onset   • Diabetes Mother         Patient  mom   • Heart disease Mother    • Arthritis Mother    • Hyperlipidemia Mother    • Leukemia Father    • Sleep apnea Maternal Aunt         GENO   • Stroke Maternal Grandmother    • Hypertension Other    • Hyperlipidemia Other    • Cancer Other    • Colon cancer Other         uncle       Past Medical History:   Diagnosis Date   • CKD (chronic kidney disease), stage III (HCC)    • Depression    • DJD (degenerative joint disease)    • DVT (deep venous thrombosis) (HCC)    • Ganglion     rt wrist   • GERD (gastroesophageal reflux disease)    • Headache    • Heart murmur    • Hiatal hernia    • History of echocardiogram 04/2016    2D ECHO   • History of esophageal stricture     s/p dialation 40-50 times last EGD 04/2016   • History of pulmonary embolus (PE)     on long term anticoagulation   • Hyperlipidemia    • Hypertension    • Hypothyroidism    • IBS (irritable bowel syndrome)    • Neuropathy    • Pulmonary embolism (HCC)    • Rash     rt lower hip   • Retinopathy    • Seasonal allergies    • Sleep apnea     cpap  bring dos   • Type 2 diabetes mellitus with peripheral vascular disease (HCC)    • Vitamin D deficiency        Social History     Socioeconomic History   • Marital status:    Tobacco Use   • Smoking status: Never Smoker   • Smokeless tobacco: Never Used   Vaping Use   • Vaping Use: Never used   Substance and Sexual Activity   • Alcohol use: No   • Drug use: No   • Sexual activity: Not Currently     Partners: Male     Birth control/protection: None     Comment: I have Ed problem at this time         Current Outpatient Medications:   •  aspirin 81 MG EC tablet, Take 1 tablet by mouth Daily., Disp: , Rfl:   •  atorvastatin (LIPITOR) 80 MG tablet, Take 1 tablet by mouth Every Night., Disp: 90 tablet, Rfl: 1  •  DULoxetine (CYMBALTA) 60 MG capsule, TAKE 1 CAPSULE EVERY MORNING, Disp: 90 capsule, Rfl: 0  •   Eliquis 5 MG tablet tablet, TAKE 1 TABLET TWICE DAILY, Disp: 180 tablet, Rfl: 0  •  empagliflozin (Jardiance) 10 MG tablet tablet, Take 1 tablet by mouth Daily., Disp: 90 tablet, Rfl: 4  •  gabapentin (NEURONTIN) 100 MG capsule, , Disp: , Rfl:   •  gabapentin (NEURONTIN) 400 MG capsule, Take 400 mg by mouth 3 (Three) Times a Day., Disp: , Rfl:   •  insulin aspart (NovoLOG FlexPen) 100 UNIT/ML solution pen-injector sc pen, Inject 10 Units under the skin into the appropriate area as directed 3 (Three) Times a Day With Meals. (Patient taking differently: Inject 14 Units under the skin into the appropriate area as directed 3 (Three) Times a Day With Meals.), Disp: 15 mL, Rfl: 6  •  Insulin Glargine (Lantus SoloStar) 100 UNIT/ML injection pen, Inject 40 Units under the skin into the appropriate area as directed Every Night., Disp: , Rfl:   •  Janumet XR  MG tablet, TAKE 1 TABLET TWICE DAILY, Disp: 180 tablet, Rfl: 4  •  levothyroxine (SYNTHROID, LEVOTHROID) 100 MCG tablet, TAKE 1 TABLET EVERY DAY (Patient taking differently: Take 100 mcg by mouth Daily.), Disp: 90 tablet, Rfl: 3  •  losartan (COZAAR) 50 MG tablet, TAKE 1 TABLET EVERY DAY, Disp: 90 tablet, Rfl: 0  •  metoprolol succinate XL (TOPROL-XL) 50 MG 24 hr tablet, TAKE 1 TABLET EVERY DAY (Patient taking differently: Take 50 mg by mouth Daily.), Disp: 30 tablet, Rfl: 0  •  omeprazole (priLOSEC) 40 MG capsule, Take 40 mg by mouth Daily. Take DOS, Disp: , Rfl:   •  ondansetron (ZOFRAN) 4 MG tablet, Take 1 tablet by mouth Every 8 (Eight) Hours As Needed for Nausea or Vomiting., Disp: 45 tablet, Rfl: 0  •  vitamin B-12 (CYANOCOBALAMIN) 1000 MCG tablet, Take 1 tablet by mouth Daily., Disp: 90 tablet, Rfl: 3  •  cyclobenzaprine (FLEXERIL) 10 MG tablet, Take I tab q 8 hrs as needed for tension headaches, Disp: 30 tablet, Rfl: 1    Review of Systems   Constitutional: Negative for fatigue and fever.   HENT: Positive for dental problem and trouble swallowing.    Eyes:  Positive for itching.   Respiratory: Positive for shortness of breath.    Gastrointestinal: Positive for diarrhea.   Endocrine: Positive for heat intolerance.   Genitourinary: Negative for urgency.   Musculoskeletal: Positive for back pain, joint swelling, neck pain and neck stiffness.   Allergic/Immunologic: Positive for environmental allergies.   Neurological: Positive for numbness and headaches.   Psychiatric/Behavioral: Positive for suicidal ideas.        I have reviewed ROS completed by medical assistant.     Objective:    Neurologic Exam     Mental Status   Oriented to person, place, and time.   Speech: speech is normal     Cranial Nerves     CN III, IV, VI   Pupils are equal, round, and reactive to light.    Gait, Coordination, and Reflexes     Coordination   Finger to nose coordination: normal    Reflexes   Right brachioradialis: 1+  Left brachioradialis: 1+  Right biceps: 1+  Left biceps: 1+  Right triceps: 1+  Left triceps: 1+  Left patellar: 1+  Left achilles: 1+      Physical Exam  Vitals and nursing note reviewed.   Constitutional:       Appearance: Normal appearance. He is well-developed and well-groomed.   HENT:      Head: Normocephalic.      Nose: Nose normal.      Mouth/Throat:      Mouth: Mucous membranes are moist.   Eyes:      General: Lids are normal. No visual field deficit.     Extraocular Movements: Extraocular movements intact.      Pupils: Pupils are equal, round, and reactive to light.   Neck:      Trachea: Trachea normal.   Cardiovascular:      Rate and Rhythm: Normal rate and regular rhythm.      Pulses: Normal pulses.      Heart sounds: Normal heart sounds, S1 normal and S2 normal.   Pulmonary:      Effort: Pulmonary effort is normal.      Breath sounds: Normal breath sounds.   Musculoskeletal:         General: Normal range of motion.      Cervical back: Full passive range of motion without pain, normal range of motion and neck supple.   Skin:     General: Skin is warm.   Neurological:  "     General: No focal deficit present.      Mental Status: He is alert and oriented to person, place, and time.      Cranial Nerves: Cranial nerves are intact. No cranial nerve deficit, dysarthria or facial asymmetry.      Sensory: Sensation is intact. No sensory deficit.      Motor: Motor function is intact. No weakness, tremor, atrophy, abnormal muscle tone, seizure activity or pronator drift.      Coordination: Coordination is intact. Romberg sign negative. Coordination normal. Finger-Nose-Finger Test and Heel to Shin Test normal. Rapid alternating movements normal.      Deep Tendon Reflexes: Babinski sign absent on the left side.      Reflex Scores:       Tricep reflexes are 1+ on the right side and 1+ on the left side.       Bicep reflexes are 1+ on the right side and 1+ on the left side.       Brachioradialis reflexes are 1+ on the right side and 1+ on the left side.       Patellar reflexes are 1+ on the left side.       Achilles reflexes are 1+ on the left side.  Psychiatric:         Attention and Perception: Attention and perception normal.         Mood and Affect: Mood normal.         Speech: Speech normal.         Behavior: Behavior normal. Behavior is cooperative.         Thought Content: Thought content normal.       Discussion: The patient was notified to decrease his stroke risk he would need to control his blood pressure, his diabetes, healthy heart diet, exercise, continue statin and aspirin and treat sleep apnea.  Patient voiced understanding.  He also denied any suicidal ideation or plan.  He was advised of  Stroke Acronym \"BE FAST\"  B=Balance issues, E=Vision changes, F=Face weakness or numbness, A=Arm or Leg weakness or numbness, S=Speech problems and T=Time to call 911 he voiced understanding.    Assessment/Plan:    Diagnoses and all orders for this visit:    1. H/O: CVA (cerebrovascular accident) (Primary)  Comments:  PCP to control: BP<130/90, A1c<6.5, LDL<70, B12>500, control weight, " exercise 20 min TIW, continue ASA, continue statin, healthy heart diet, treat sleep apnea    2. Chronic tension-type headache, intractable  Comments:  The patient was ordered Flexeril 10 mg 3 times daily as needed.  He was notified that this medication can cause sleepiness and to be careful to not drive drowsy  Orders:  -     cyclobenzaprine (FLEXERIL) 10 MG tablet; Take I tab q 8 hrs as needed for tension headaches  Dispense: 30 tablet; Refill: 1    3. Obstructive sleep apnea syndrome  Comments:  The patient will be scheduled for home sleep test and followed up with Dr. Seiple post.  Orders:  -     Home Sleep Study; Future    4. History of attempted suicide  Comments:  The patient is encouraged to continue all follow-ups with psychiatry.        Return in about 3 months (around 2/8/2022) for Aleshia Hernandez for Headache .    I spent 81  minutes caring for Kuldeep on this date of service. This time includes time spent by me in the following activities: reviewing tests, obtaining and/or reviewing a separately obtained history, performing a medically appropriate examination and/or evaluation, counseling and educating the patient/family/caregiver, ordering medications, tests, or procedures, referring and communicating with other health care professionals, documenting information in the medical record, independently interpreting results and communicating that information with the patient/family/caregiver, care coordination and Stroke Prevention Education.      This document has been electronically signed by Aleshia WILL on November 8, 2021 15:41 EST

## 2021-11-08 ENCOUNTER — OFFICE VISIT (OUTPATIENT)
Dept: NEUROLOGY | Facility: CLINIC | Age: 56
End: 2021-11-08

## 2021-11-08 VITALS
HEART RATE: 116 BPM | BODY MASS INDEX: 30.77 KG/M2 | TEMPERATURE: 98.2 F | DIASTOLIC BLOOD PRESSURE: 66 MMHG | SYSTOLIC BLOOD PRESSURE: 163 MMHG | HEIGHT: 68 IN | WEIGHT: 203 LBS

## 2021-11-08 DIAGNOSIS — Z91.51 HISTORY OF ATTEMPTED SUICIDE: ICD-10-CM

## 2021-11-08 DIAGNOSIS — G47.33 OBSTRUCTIVE SLEEP APNEA SYNDROME: ICD-10-CM

## 2021-11-08 DIAGNOSIS — Z86.73 H/O: CVA (CEREBROVASCULAR ACCIDENT): Primary | ICD-10-CM

## 2021-11-08 DIAGNOSIS — G44.221 CHRONIC TENSION-TYPE HEADACHE, INTRACTABLE: ICD-10-CM

## 2021-11-08 PROCEDURE — G2212 PROLONG OUTPT/OFFICE VIS: HCPCS | Performed by: NURSE PRACTITIONER

## 2021-11-08 PROCEDURE — 99215 OFFICE O/P EST HI 40 MIN: CPT | Performed by: NURSE PRACTITIONER

## 2021-11-08 RX ORDER — GABAPENTIN 100 MG/1
CAPSULE ORAL
COMMUNITY
Start: 2021-10-04 | End: 2022-01-06

## 2021-11-08 RX ORDER — CYCLOBENZAPRINE HCL 10 MG
TABLET ORAL
Qty: 30 TABLET | Refills: 1 | Status: SHIPPED | OUTPATIENT
Start: 2021-11-08 | End: 2021-11-30 | Stop reason: SDUPTHER

## 2021-11-19 ENCOUNTER — APPOINTMENT (OUTPATIENT)
Dept: SLEEP MEDICINE | Facility: HOSPITAL | Age: 56
End: 2021-11-19

## 2021-11-30 ENCOUNTER — TELEPHONE (OUTPATIENT)
Dept: NEUROLOGY | Facility: CLINIC | Age: 56
End: 2021-11-30

## 2021-11-30 DIAGNOSIS — G44.221 CHRONIC TENSION-TYPE HEADACHE, INTRACTABLE: ICD-10-CM

## 2021-11-30 DIAGNOSIS — G44.221 CHRONIC TENSION-TYPE HEADACHE, INTRACTABLE: Primary | ICD-10-CM

## 2021-11-30 RX ORDER — CYCLOBENZAPRINE HCL 10 MG
TABLET ORAL
Qty: 30 TABLET | Refills: 0 | Status: SHIPPED | OUTPATIENT
Start: 2021-11-30 | End: 2022-01-21 | Stop reason: SDUPTHER

## 2021-11-30 NOTE — TELEPHONE ENCOUNTER
Caller: LANDEN     Relationship: SELF     Best call back number: [phone]  Requested Prescriptions:   Requested Prescriptions     Pending Prescriptions Disp Refills   • cyclobenzaprine (FLEXERIL) 10 MG tablet 30 tablet 1     Sig: Take I tab q 8 hrs as needed for tension headaches        Pharmacy where request should be sent:  [facility]     Additional details provided by patient: PT CALLED IN STATING THAT HE EITHER LOST THE RX OR THAT IT FELL OFF HIS NIGHT STAND AND INTO THE TRASH CAN. HE CANNOT FIND OUT. PT STATES NEEDING IT REFILLED     Does the patient have less than a 3 day supply:  [x] Yes  [] No    Yobani Mckinney Rep   11/30/21 08:53 EST

## 2021-12-07 ENCOUNTER — HOSPITAL ENCOUNTER (OUTPATIENT)
Dept: SLEEP MEDICINE | Facility: HOSPITAL | Age: 56
Discharge: HOME | End: 2021-12-07
Admitting: NURSE PRACTITIONER

## 2021-12-07 DIAGNOSIS — G47.33 OBSTRUCTIVE SLEEP APNEA SYNDROME: ICD-10-CM

## 2021-12-07 PROCEDURE — 95806 SLEEP STUDY UNATT&RESP EFFT: CPT

## 2021-12-07 PROCEDURE — 95806 SLEEP STUDY UNATT&RESP EFFT: CPT | Performed by: PSYCHIATRY & NEUROLOGY

## 2021-12-14 ENCOUNTER — TELEPHONE (OUTPATIENT)
Dept: NEUROLOGY | Facility: CLINIC | Age: 56
End: 2021-12-14

## 2021-12-14 NOTE — TELEPHONE ENCOUNTER
Caller: MARIA ANTONIA    Relationship:     Best call back number: [phone]/    What is the best time to reach you: ANYTIME    Who are you requesting to speak with (clinical staff, provider,  specific staff member): CLINICAL    Do you know the name of the person who called: HANNAH RAMIRES - HE STATES A VM WAS LEFT BUT HE CALLED HER DAX AND COULDN'T REMEMBER WHAT IT SAID OTHER THEN CALL BACK    What was the call regarding: SLEEP STUDY AND CPAP SUPPLIES    Do you require a callback: YES    PT STATES IF A CPAP IS BEING ORDERED HE DOES NOT WANT TO GO THRU Hospitals in [place], HE DOESN'T CARE WHERE BUT [facility] NOT LIKE THE [facility]

## 2021-12-17 RX ORDER — DULOXETIN HYDROCHLORIDE 30 MG/1
CAPSULE, DELAYED RELEASE ORAL
COMMUNITY
Start: 2021-12-14 | End: 2022-01-06

## 2021-12-17 RX ORDER — BUSPIRONE HYDROCHLORIDE 5 MG/1
TABLET ORAL
Status: ON HOLD | COMMUNITY
Start: 2021-11-20 | End: 2022-04-24

## 2021-12-18 DIAGNOSIS — I10 ESSENTIAL HYPERTENSION: ICD-10-CM

## 2021-12-18 RX ORDER — LOSARTAN POTASSIUM 50 MG/1
TABLET ORAL
Qty: 90 TABLET | Refills: 0 | Status: SHIPPED | OUTPATIENT
Start: 2021-12-18 | End: 2022-01-21 | Stop reason: SDUPTHER

## 2021-12-20 ENCOUNTER — TELEPHONE (OUTPATIENT)
Dept: NEUROLOGY | Facility: CLINIC | Age: 56
End: 2021-12-20

## 2021-12-20 DIAGNOSIS — G47.33 OBSTRUCTIVE SLEEP APNEA: Primary | ICD-10-CM

## 2021-12-20 NOTE — TELEPHONE ENCOUNTER
Pt had left message concerning sleep study. I spoke with him, Equipment ordered from [employer].     Aleshia,     Pt ask me to tell you his headaches have not improved on the med you put him on.

## 2022-01-04 ENCOUNTER — HOSPITAL ENCOUNTER (EMERGENCY)
Facility: HOSPITAL | Age: 57
Discharge: HOME | End: 2022-01-05
Attending: EMERGENCY MEDICINE | Admitting: EMERGENCY MEDICINE

## 2022-01-04 ENCOUNTER — APPOINTMENT (OUTPATIENT)
Dept: GENERAL RADIOLOGY | Facility: HOSPITAL | Age: 57
End: 2022-01-04

## 2022-01-04 VITALS
RESPIRATION RATE: 18 BRPM | DIASTOLIC BLOOD PRESSURE: 65 MMHG | BODY MASS INDEX: 30.04 KG/M2 | WEIGHT: 198.2 LBS | SYSTOLIC BLOOD PRESSURE: 127 MMHG | HEIGHT: 68 IN | OXYGEN SATURATION: 96 % | TEMPERATURE: 99 F | HEART RATE: 99 BPM

## 2022-01-04 DIAGNOSIS — R60.0 EDEMA OF LEFT LOWER LEG: ICD-10-CM

## 2022-01-04 DIAGNOSIS — L03.116 CELLULITIS OF LEG, LEFT: ICD-10-CM

## 2022-01-04 DIAGNOSIS — R50.9 FEVER, UNSPECIFIED FEVER CAUSE: Primary | ICD-10-CM

## 2022-01-04 LAB
ANION GAP SERPL CALCULATED.3IONS-SCNC: 16 MMOL/L (ref 5–15)
BASOPHILS # BLD AUTO: 0.1 10*3/MM3 (ref 0–0.2)
BASOPHILS NFR BLD AUTO: 0.5 % (ref 0–1.5)
BUN SERPL-MCNC: 21 MG/DL (ref 6–20)
BUN/CREAT SERPL: 13.9 (ref 7–25)
CALCIUM SPEC-SCNC: 9.5 MG/DL (ref 8.6–10.5)
CHLORIDE SERPL-SCNC: 96 MMOL/L (ref 98–107)
CO2 SERPL-SCNC: 24 MMOL/L (ref 22–29)
CREAT SERPL-MCNC: 1.51 MG/DL (ref 0.76–1.27)
DEPRECATED RDW RBC AUTO: 42.4 FL (ref 37–54)
EOSINOPHIL # BLD AUTO: 0.2 10*3/MM3 (ref 0–0.4)
EOSINOPHIL NFR BLD AUTO: 1.2 % (ref 0.3–6.2)
ERYTHROCYTE [DISTWIDTH] IN BLOOD BY AUTOMATED COUNT: 16.7 % (ref 12.3–15.4)
GFR SERPL CREATININE-BSD FRML MDRD: 48 ML/MIN/1.73
GLUCOSE SERPL-MCNC: 152 MG/DL (ref 65–99)
HCT VFR BLD AUTO: 35.3 % (ref 37.5–51)
HGB BLD-MCNC: 11.5 G/DL (ref 13–17.7)
LYMPHOCYTES # BLD AUTO: 1 10*3/MM3 (ref 0.7–3.1)
LYMPHOCYTES NFR BLD AUTO: 7.2 % (ref 19.6–45.3)
MCH RBC QN AUTO: 22.9 PG (ref 26.6–33)
MCHC RBC AUTO-ENTMCNC: 32.5 G/DL (ref 31.5–35.7)
MCV RBC AUTO: 70.5 FL (ref 79–97)
MONOCYTES # BLD AUTO: 1.1 10*3/MM3 (ref 0.1–0.9)
MONOCYTES NFR BLD AUTO: 7.9 % (ref 5–12)
NEUTROPHILS NFR BLD AUTO: 11.1 10*3/MM3 (ref 1.7–7)
NEUTROPHILS NFR BLD AUTO: 83.2 % (ref 42.7–76)
NRBC BLD AUTO-RTO: 0 /100 WBC (ref 0–0.2)
PLATELET # BLD AUTO: 353 10*3/MM3 (ref 140–450)
PMV BLD AUTO: 7.4 FL (ref 6–12)
POTASSIUM SERPL-SCNC: 4.2 MMOL/L (ref 3.5–5.2)
RBC # BLD AUTO: 5.01 10*6/MM3 (ref 4.14–5.8)
SARS-COV-2 RNA PNL SPEC NAA+PROBE: NOT DETECTED
SODIUM SERPL-SCNC: 136 MMOL/L (ref 136–145)
WBC NRBC COR # BLD: 13.4 10*3/MM3 (ref 3.4–10.8)

## 2022-01-04 PROCEDURE — 99283 EMERGENCY DEPT VISIT LOW MDM: CPT

## 2022-01-04 PROCEDURE — 71045 X-RAY EXAM CHEST 1 VIEW: CPT

## 2022-01-04 PROCEDURE — 85025 COMPLETE CBC W/AUTO DIFF WBC: CPT | Performed by: EMERGENCY MEDICINE

## 2022-01-04 PROCEDURE — U0003 INFECTIOUS AGENT DETECTION BY NUCLEIC ACID (DNA OR RNA); SEVERE ACUTE RESPIRATORY SYNDROME CORONAVIRUS 2 (SARS-COV-2) (CORONAVIRUS DISEASE [COVID-19]), AMPLIFIED PROBE TECHNIQUE, MAKING USE OF HIGH THROUGHPUT TECHNOLOGIES AS DESCRIBED BY CMS-2020-01-R: HCPCS

## 2022-01-04 PROCEDURE — 80048 BASIC METABOLIC PNL TOTAL CA: CPT | Performed by: EMERGENCY MEDICINE

## 2022-01-04 RX ORDER — SODIUM CHLORIDE 0.9 % (FLUSH) 0.9 %
10 SYRINGE (ML) INJECTION AS NEEDED
Status: DISCONTINUED | OUTPATIENT
Start: 2022-01-04 | End: 2022-01-05 | Stop reason: HOSPADM

## 2022-01-04 RX ORDER — ACETAMINOPHEN 500 MG
1000 TABLET ORAL ONCE
Status: COMPLETED | OUTPATIENT
Start: 2022-01-04 | End: 2022-01-04

## 2022-01-04 RX ORDER — CEPHALEXIN 500 MG/1
500 CAPSULE ORAL 3 TIMES DAILY
Qty: 21 CAPSULE | Refills: 0 | Status: SHIPPED | OUTPATIENT
Start: 2022-01-04 | End: 2022-03-29

## 2022-01-04 RX ADMIN — ACETAMINOPHEN 1000 MG: 500 TABLET, FILM COATED ORAL at 18:59

## 2022-01-05 ENCOUNTER — TELEPHONE (OUTPATIENT)
Dept: SOCIAL WORK | Facility: HOSPITAL | Age: 57
End: 2022-01-05

## 2022-01-05 NOTE — ED PROVIDER NOTES
Subjective   Patient is a 56-year-old male complaint left leg swelling for the past 2 weeks.  He also states he developed fever over the past few days.  Nuys cough chest pain shortness of breath vomiting diarrhea or other complaint.          Review of Systems  Negative for headache earache sore throat cough chest pain shortness of breath vomit diarrhea dysuria or other complaint  Past Medical History:   Diagnosis Date   • CKD (chronic kidney disease), stage III ([facility])    • Depression    • DJD (degenerative joint disease)    • DVT (deep venous thrombosis) ([facility])    • Ganglion     rt wrist   • GERD (gastroesophageal reflux disease)    • Headache    • Heart murmur    • Hiatal hernia    • History of echocardiogram 04/2016    2D ECHO   • History of esophageal stricture     s/p dialation 40-50 times last EGD 04/2016   • History of pulmonary embolus (PE)     on long term anticoagulation   • Hyperlipidemia    • Hypertension    • Hypothyroidism    • IBS (irritable bowel syndrome)    • Neuropathy    • Pulmonary embolism ([facility])    • Rash     rt lower hip   • Retinopathy    • Seasonal allergies    • Sleep apnea     cpap  bring dos   • Type 2 diabetes mellitus with peripheral vascular disease ([facility])    • Vitamin D deficiency        Allergies   Allergen Reactions   • Lisinopril Cough       Past Surgical History:   Procedure Laterality Date   • AMPUTATION FOOT / TOE Right     great toe   • AMPUTATION REVISION Right 4/8/2021    Procedure: BELOW KNEE AMPUTATION REVISAION;  Surgeon: Eduardo Reynolds MD;  Location: [facility] MAIN OR;  Service: Orthopedics;  Laterality: Right;   • AMPUTATION REVISION Right 4/29/2021    Procedure: AMPUTATION REVISION KNEE STUMP;  Surgeon: Eduardo Reynolds MD;  Location: [facility] MAIN OR;  Service: Orthopedics;  Laterality: Right;   • BELOW KNEE AMPUTATION Right 7/30/2020    Procedure: AMPUTATION BELOW KNEE;  Surgeon: Eduardo Reynolds MD;  Location: [facility] MAIN OR;  Service: Orthopedics;  Laterality: Right;   •  CARDIAC CATHETERIZATION  04/2018    [facility]   • COLONOSCOPY     • ENDOSCOPY     • ENDOSCOPY N/A 10/4/2019    Procedure: ESOPHAGOGASTRODUODENOSCOPY with dilitation and biopsy x 1 area;  Surgeon: Declan Iqbal MD;  Location: [facility] ENDOSCOPY;  Service: Gastroenterology   • ENDOSCOPY N/A 12/13/2019    Procedure: ESOPHAGOGASTRODUODENOSCOPY WITH DILATATION (50, 52 BOUGIE);  Surgeon: Declan Iqbal MD;  Location: [facility] ENDOSCOPY;  Service: Gastroenterology   • ENDOSCOPY N/A 8/6/2021    Procedure: ESOPHAGOGASTRODUODENOSCOPY with biopsy x1 area and esophageal dilation (56FR Bougie);  Surgeon: Hussein Talavera MD;  Location: [facility] ENDOSCOPY;  Service: Gastroenterology;  Laterality: N/A;  post: hiatal hernia, erosive gastritis   • EYE SURGERY     • GANGLION CYST EXCISION Left    • HERNIA REPAIR Bilateral    • JOINT REPLACEMENT      Left total hip   • RETINOPATHY SURGERY      laser   • TOTAL HIP ARTHROPLASTY Left    • TOTAL HIP ARTHROPLASTY Left 2018   • TRANS METATARSAL AMPUTATION Right 3/17/2020    Procedure: AMPUTATION TRANS METATARSAL right;  Surgeon: ERWIN Baker DPM;  Location: [facility] MAIN OR;  Service: Podiatry;  Laterality: Right;  GANGRENOUS RIGHT FOOT   • VASECTOMY         Family History   Problem Relation Age of Onset   • Diabetes Mother         Patient  mom   • Heart disease Mother    • Arthritis Mother    • Hyperlipidemia Mother    • Leukemia Father    • Sleep apnea Maternal Aunt         GENO   • Stroke Maternal Grandmother    • Hypertension Other    • Hyperlipidemia Other    • Cancer Other    • Colon cancer Other         uncle       Social History     Socioeconomic History   • Marital status:    Tobacco Use   • Smoking status: Never Smoker   • Smokeless tobacco: Never Used   Vaping Use   • Vaping Use: Never used   Substance and Sexual Activity   • Alcohol use: No   • Drug use: No   • Sexual activity: Not Currently     Partners: Male     Birth control/protection: None     Comment: I  have Ed problem at this time           Objective   Physical Exam  HEENT exam shows TMs to be clear.  Oropharynx clear moist.  Sclera nonicteric.  Neck has no adenopathy JVD or bruits.  Lungs are clear.  Heart has regular rhythm.  Chest is nontender.  Abdomen soft nontender.  Extremities M shows the patient had a right BKA.  His left lower leg is mildly edematous and mildly erythematous.  He has 2+ pulses and is neurovascular tact.  Procedures           ED Course            Results for orders placed or performed during the hospital encounter of 01/04/22   COVID-19,CEPHEID/MURALI,COR/FINA/PAD/KULDIP IN-HOUSE(OR EMERGENT/ADD-ON),NP SWAB IN TRANSPORT MEDIA 3-4 HR TAT, RT-PCR - Swab, Nasopharynx    Specimen: Nasopharynx; Swab   Result Value Ref Range    COVID19 Not Detected Not Detected - Ref. Range   Basic Metabolic Panel    Specimen: Blood   Result Value Ref Range    Glucose 152 (H) 65 - 99 mg/dL    BUN 21 (H) 6 - 20 mg/dL    Creatinine 1.51 (H) 0.76 - 1.27 mg/dL    Sodium 136 136 - 145 mmol/L    Potassium 4.2 3.5 - 5.2 mmol/L    Chloride 96 (L) 98 - 107 mmol/L    CO2 24.0 22.0 - 29.0 mmol/L    Calcium 9.5 8.6 - 10.5 mg/dL    eGFR Non African Amer 48 (L) >60 mL/min/1.73    BUN/Creatinine Ratio 13.9 7.0 - 25.0    Anion Gap 16.0 (H) 5.0 - 15.0 mmol/L   CBC Auto Differential    Specimen: Blood   Result Value Ref Range    WBC 13.40 (H) 3.40 - 10.80 10*3/mm3    RBC 5.01 4.14 - 5.80 10*6/mm3    Hemoglobin 11.5 (L) 13.0 - 17.7 g/dL    Hematocrit 35.3 (L) 37.5 - 51.0 %    MCV 70.5 (L) 79.0 - 97.0 fL    MCH 22.9 (L) 26.6 - 33.0 pg    MCHC 32.5 31.5 - 35.7 g/dL    RDW 16.7 (H) 12.3 - 15.4 %    RDW-SD 42.4 37.0 - 54.0 fl    MPV 7.4 6.0 - 12.0 fL    Platelets 353 140 - 450 10*3/mm3    Neutrophil % 83.2 (H) 42.7 - 76.0 %    Lymphocyte % 7.2 (L) 19.6 - 45.3 %    Monocyte % 7.9 5.0 - 12.0 %    Eosinophil % 1.2 0.3 - 6.2 %    Basophil % 0.5 0.0 - 1.5 %    Neutrophils, Absolute 11.10 (H) 1.70 - 7.00 10*3/mm3    Lymphocytes, Absolute 1.00  0.70 - 3.10 10*3/mm3    Monocytes, Absolute 1.10 (H) 0.10 - 0.90 10*3/mm3    Eosinophils, Absolute 0.20 0.00 - 0.40 10*3/mm3    Basophils, Absolute 0.10 0.00 - 0.20 10*3/mm3    nRBC 0.0 0.0 - 0.2 /100 WBC     XR Chest 1 View    Result Date: 1/4/2022  No radiographic findings of pneumonia  Electronically Signed By-Dillan Harman On:1/4/2022 8:45 PM This report was finalized on [id number] by  Dillan Harman, .                                            MDM  Number of Diagnoses or Management Options  Diagnosis management comments: Patient has findings consistent with cellulitis to his left leg.  There is no evidence of pneumonia.  Covid is negative.  Patient is currently on Eliquis.  Will be discharged and will be scheduled for an outpatient duplex scan of his left leg in the morning.  Patient will also be placed on Keflex for mild cellulitis.       Amount and/or Complexity of Data Reviewed  Clinical lab tests: reviewed  Tests in the radiology section of CPT®: reviewed    Risk of Complications, Morbidity, and/or Mortality  Presenting problems: high  Diagnostic procedures: high  Management options: high    Patient Progress  Patient progress: stable      Final diagnoses:   Fever, unspecified fever cause   Cellulitis of leg, left   Edema of left lower leg       ED Disposition  ED Disposition     ED Disposition Condition Comment    Discharge Stable           No follow-up provider specified.       Medication List      New Prescriptions    cephalexin 500 MG capsule  Commonly known as: KEFLEX  Take 1 capsule by mouth 3 (Three) Times a Day.        Changed    NovoLOG FlexPen 100 UNIT/ML solution pen-injector sc pen  Generic drug: insulin aspart  Inject 10 Units under the skin into the appropriate area as directed 3 (Three) Times a Day With Meals.  What changed: how much to take           Where to Get Your Medications      These medications were sent to [facility] [address] [place] [address] - [address] [facility] -  [phone]  - [phone] FX  [address] [facility] [place] [address]    Phone: [phone]   · cephalexin 500 MG capsule          Ian Palacios MD  01/04/22 4858

## 2022-01-06 ENCOUNTER — TELEMEDICINE (OUTPATIENT)
Dept: ENDOCRINOLOGY | Facility: CLINIC | Age: 57
End: 2022-01-06

## 2022-01-06 ENCOUNTER — TELEPHONE (OUTPATIENT)
Dept: ENDOCRINOLOGY | Facility: CLINIC | Age: 57
End: 2022-01-06

## 2022-01-06 ENCOUNTER — TELEPHONE (OUTPATIENT)
Dept: PODIATRY | Facility: CLINIC | Age: 57
End: 2022-01-06

## 2022-01-06 ENCOUNTER — TELEPHONE (OUTPATIENT)
Dept: FAMILY MEDICINE CLINIC | Facility: CLINIC | Age: 57
End: 2022-01-06

## 2022-01-06 VITALS
WEIGHT: 198 LBS | DIASTOLIC BLOOD PRESSURE: 60 MMHG | HEIGHT: 68 IN | BODY MASS INDEX: 30.01 KG/M2 | HEART RATE: 100 BPM | SYSTOLIC BLOOD PRESSURE: 111 MMHG

## 2022-01-06 DIAGNOSIS — E78.2 MIXED HYPERLIPIDEMIA: ICD-10-CM

## 2022-01-06 DIAGNOSIS — Z79.4 TYPE 2 DIABETES MELLITUS WITH HYPERGLYCEMIA, WITH LONG-TERM CURRENT USE OF INSULIN: Primary | ICD-10-CM

## 2022-01-06 DIAGNOSIS — E11.42 DIABETIC PERIPHERAL NEUROPATHY: Chronic | ICD-10-CM

## 2022-01-06 DIAGNOSIS — I10 BENIGN ESSENTIAL HYPERTENSION: Chronic | ICD-10-CM

## 2022-01-06 DIAGNOSIS — E11.65 TYPE 2 DIABETES MELLITUS WITH HYPERGLYCEMIA, WITH LONG-TERM CURRENT USE OF INSULIN: Primary | ICD-10-CM

## 2022-01-06 DIAGNOSIS — E03.9 ACQUIRED HYPOTHYROIDISM: ICD-10-CM

## 2022-01-06 PROCEDURE — 99214 OFFICE O/P EST MOD 30 MIN: CPT | Performed by: INTERNAL MEDICINE

## 2022-01-06 NOTE — PROGRESS NOTES
Endocrine Progress Note Outpatient     Patient Care Team:  Laura Whitaker MD as PCP - General  You have chosen to receive care through a telehealth visit.  Do you consent to use a video/audio connection for your medical care today? Yes.    Chief Complaint: Follow-up type 2 diabetes: Visit conducted through [facility]    HPI: 56-year-old male with history of type 2 diabetes, hypertension, hyperlipidemia, obesity and CKD stage III disease is followed through telehealth.    For type 2 diabetes: Is currently on Jardiance 10 mg po daily, Lantus 34 units subcu daily with Humalog 20 units with each meal.  He tells me blood sugars have improved running about 140 or below most of the time.  He denies any low blood sugars.      Hypertension: No blood pressure readings are over this time.    Hyperlipidemia: On atorvastatin.    Hypothyroidism: On levothyroxine supplementation.    Diabetic peripheral neuropathy: He is currently undergoing management for diabetic foot ulcer.    Past Medical History:   Diagnosis Date   • CKD (chronic kidney disease), stage III (HCC)    • Depression    • DJD (degenerative joint disease)    • DVT (deep venous thrombosis) (HCC)    • Ganglion     rt wrist   • GERD (gastroesophageal reflux disease)    • Headache    • Heart murmur    • Hiatal hernia    • History of echocardiogram 04/2016    2D ECHO   • History of esophageal stricture     s/p dialation 40-50 times last EGD 04/2016   • History of pulmonary embolus (PE)     on long term anticoagulation   • Hyperlipidemia    • Hypertension    • Hypothyroidism    • IBS (irritable bowel syndrome)    • Neuropathy    • Pulmonary embolism (HCC)    • Rash     rt lower hip   • Retinopathy    • Seasonal allergies    • Sleep apnea     cpap  bring dos   • Type 2 diabetes mellitus with peripheral vascular disease (HCC)    • Vitamin D deficiency        Social History     Socioeconomic History   • Marital status:    Tobacco Use   • Smoking status: Never  Smoker   • Smokeless tobacco: Never Used   Vaping Use   • Vaping Use: Never used   Substance and Sexual Activity   • Alcohol use: No   • Drug use: No   • Sexual activity: Not Currently     Partners: Male     Birth control/protection: None     Comment: I have Ed problem at this time       Family History   Problem Relation Age of Onset   • Diabetes Mother         Patient  mom   • Heart disease Mother    • Arthritis Mother    • Hyperlipidemia Mother    • Leukemia Father    • Sleep apnea Maternal Aunt         GENO   • Stroke Maternal Grandmother    • Hypertension Other    • Hyperlipidemia Other    • Cancer Other    • Colon cancer Other         uncle       Allergies   Allergen Reactions   • Lisinopril Cough       ROS:   Constitutional:  Denies fatigue, tiredness.    Eyes:  Denies change in visual acuity   HENT:  Denies nasal congestion or sore throat   Respiratory: denies cough, shortness of breath.   Cardiovascular:  denies chest pain, edema   GI:  Denies abdominal pain, nausea, vomiting.   Musculoskeletal:  Denies back pain or joint pain   Integument:  Denies dry skin and rash   Neurologic:  Denies headache, focal weakness or sensory changes   Endocrine:  Denies polyuria or polydipsia   Psychiatric:  Denies depression or anxiety      Vitals:    01/06/22 1340   BP: 111/60   Pulse: 100       Physical Exam:  GEN: NAD, looks healthy on video.  Alert and oriented and able to carry on conversation.  Breathing effort was normal.  Mood and affect was normal.      Results Review:     I reviewed the patient's new clinical results.    Lab Results   Component Value Date    HGBA1C 11.2 (H) 08/02/2021    HGBA1C 10.9 (H) 10/28/2020    HGBA1C 11.8 (H) 07/28/2020      Lab Results   Component Value Date    GLUCOSE 152 (H) 01/04/2022    BUN 21 (H) 01/04/2022    CREATININE 1.51 (H) 01/04/2022    EGFRIFNONA 48 (L) 01/04/2022    BCR 13.9 01/04/2022    K 4.2 01/04/2022    CO2 24.0 01/04/2022    CALCIUM 9.5 01/04/2022    ALBUMIN 4.60  10/28/2020    LABIL2 1.3 04/22/2019    AST 18 10/28/2020    ALT 20 10/28/2020    CHOL 147 08/02/2021    TRIG 133 08/02/2021    LDL 78 08/02/2021    HDL 46 08/02/2021     Lab Results   Component Value Date    TSH 4.810 (H) 08/02/2021    FREET4 1.59 08/02/2021         Medication Review: Reviewed.       Current Outpatient Medications:   •  aspirin 81 MG EC tablet, Take 1 tablet by mouth Daily., Disp: , Rfl:   •  atorvastatin (LIPITOR) 80 MG tablet, Take 1 tablet by mouth Every Night., Disp: 90 tablet, Rfl: 1  •  busPIRone (BUSPAR) 5 MG tablet, , Disp: , Rfl:   •  cephalexin (KEFLEX) 500 MG capsule, Take 1 capsule by mouth 3 (Three) Times a Day., Disp: 21 capsule, Rfl: 0  •  cyclobenzaprine (FLEXERIL) 10 MG tablet, Take I tab q 8 hrs as needed for tension headaches, Disp: 30 tablet, Rfl: 0  •  DULoxetine (CYMBALTA) 30 MG capsule, , Disp: , Rfl:   •  DULoxetine (CYMBALTA) 60 MG capsule, TAKE 1 CAPSULE EVERY MORNING, Disp: 90 capsule, Rfl: 0  •  Eliquis 5 MG tablet tablet, TAKE 1 TABLET TWICE DAILY, Disp: 180 tablet, Rfl: 0  •  empagliflozin (Jardiance) 10 MG tablet tablet, Take 1 tablet by mouth Daily., Disp: 90 tablet, Rfl: 4  •  gabapentin (NEURONTIN) 100 MG capsule, , Disp: , Rfl:   •  gabapentin (NEURONTIN) 400 MG capsule, Take 400 mg by mouth 3 (Three) Times a Day., Disp: , Rfl:   •  insulin aspart (NovoLOG FlexPen) 100 UNIT/ML solution pen-injector sc pen, Inject 10 Units under the skin into the appropriate area as directed 3 (Three) Times a Day With Meals. (Patient taking differently: Inject 20 Units under the skin into the appropriate area as directed 3 (Three) Times a Day With Meals.), Disp: 15 mL, Rfl: 6  •  Insulin Glargine (Lantus SoloStar) 100 UNIT/ML injection pen, Inject 40 Units under the skin into the appropriate area as directed Every Night. (Patient taking differently: Inject 34 Units under the skin into the appropriate area as directed Every Night.), Disp: , Rfl:   •  Janumet XR  MG tablet,  TAKE 1 TABLET TWICE DAILY, Disp: 180 tablet, Rfl: 4  •  levothyroxine (SYNTHROID, LEVOTHROID) 100 MCG tablet, TAKE 1 TABLET EVERY DAY (Patient taking differently: Take 100 mcg by mouth Daily.), Disp: 90 tablet, Rfl: 3  •  losartan (COZAAR) 50 MG tablet, TAKE 1 TABLET EVERY DAY, Disp: 90 tablet, Rfl: 0  •  metoprolol succinate XL (TOPROL-XL) 50 MG 24 hr tablet, TAKE 1 TABLET EVERY DAY (Patient taking differently: Take 50 mg by mouth Daily.), Disp: 30 tablet, Rfl: 0  •  omeprazole (priLOSEC) 40 MG capsule, Take 40 mg by mouth Daily. Take DOS, Disp: , Rfl:   •  ondansetron (ZOFRAN) 4 MG tablet, Take 1 tablet by mouth Every 8 (Eight) Hours As Needed for Nausea or Vomiting., Disp: 45 tablet, Rfl: 0  •  vitamin B-12 (CYANOCOBALAMIN) 1000 MCG tablet, Take 1 tablet by mouth Daily., Disp: 90 tablet, Rfl: 3      Assessment/Plan   1.  Diabetes mellitus type 2: He tells me his blood sugars at home are running below 100 most of the time and denies low blood sugars of less than 70.  I do not have any blood sugar records on A1c at this time.  Will check A1c.  Continue current management including Lantus, Humalog and Jardiance for now.  Advised to continue to work on diet and activity and always keep glucose source in case of low blood sugar.  Will check A1c.  He verbalized understanding.  Also advised to get regular eye exam.    2.  Hypertension: Check blood pressure at home and send readings for review    3.  Hyperlipidemia: On atorvastatin, will follow lipid panel.    4.  Hypothyroidism: On levothyroxine supplementation.  Will follow TSH and free T4.    5.  Diabetic peripheral neuropathy with foot ulcer: Follows with podiatry.            Tamara Michaels MD FACE.

## 2022-01-06 NOTE — TELEPHONE ENCOUNTER
Caller: LANDEN RANGEL    Relationship to patient: SELF    Best call back number: [phone]    Chief complaint: LEFT FOOT TOE - MUSHY FEELING-SEEPING ON BOTTOM- ODOR- RUNNING A SLIGHT FEVER-PATIENT WENT TO [facility] ON 01/04/2022.    Type of visit: NEW PROBLEM    Requested date: ASAP    If rescheduling, when is the original appointment:     Additional notes:

## 2022-01-06 NOTE — TELEPHONE ENCOUNTER
He was supposed to go back to hospital and have an ultrasound of his leg to see if there was a clot  I do not see where he had it done  I cannot dx anything without seeing his foot / leg  He will have to be seen - here, [facility] or ER

## 2022-01-06 NOTE — TELEPHONE ENCOUNTER
LSW received secure chat from interim director (Larissa Mullen) reporting that a call was received in the middle of the night regarding patient needing transportation to an unknown appointment. No documentation available in nursing notes. Contacted patient via room phone at 1600 and patient informed LSW that he is supposed to come in for an  outpatient procedure as a walk-in. Reviewed payors and advised patient LSW would see about getting something scheduled with [facility]. Contacted [facility] ([phone], spoke with Renae). Arranged transport for 1/7 @ 0900 arrival to [facility] at patient request. TripID#: [id number]. Returned call to patient listed cell number at [phone], no answer, left voicemail with details including time to be ready for pick-up (0800) and round trip being scheduled, along with transportation company number in case patient needs to cancel.

## 2022-01-06 NOTE — TELEPHONE ENCOUNTER
PATIENT CALLED TO UPDATE DR. HERNAN BEDOLLA THAT ON HIS LEFT FOOT, THE TOE NEXT TO HIS BIG TOE IS SWOLLEN TO THE POINT THAT IT IS NOT ABLE TO BEND.    PATIENT HAD WENT TO THE EMERGENCY ROOM REGARDING HIS FOOT SWELLING AND THEY SAID IT WAS A BLOOD CLOT AND WOULD GO AWAY ON ITS OWN, WITHOUT EVEN TAKING HIS SOCK OFF TO LOOK AT THE FOOT.    PATIENT NOW BELIEVES THAT IT COULD BE AN INFECTION IN THAT TOE CAUSING THE WHOLE FOOT TO SWELL.    PLEASE ADVISE  [phone]

## 2022-01-06 NOTE — TELEPHONE ENCOUNTER
TRIED TO CALL PATIENT TO CHECK OUT AFTER VIRTUAL VISIT AND SCHEDULE 6 MO F/U. NO ANSWER, LEFT VM TO CONTACT OFFICE TO GET NEXT FU.

## 2022-01-06 NOTE — PATIENT INSTRUCTIONS
Continue current medications  Always keep glucose source in case of low blood sugar  Annual eye exam and flu vaccine  Labs before follow-up

## 2022-01-12 ENCOUNTER — OFFICE VISIT (OUTPATIENT)
Dept: PODIATRY | Facility: CLINIC | Age: 57
End: 2022-01-12

## 2022-01-12 VITALS
WEIGHT: 198 LBS | SYSTOLIC BLOOD PRESSURE: 199 MMHG | DIASTOLIC BLOOD PRESSURE: 82 MMHG | HEART RATE: 100 BPM | HEIGHT: 68 IN | BODY MASS INDEX: 30.01 KG/M2

## 2022-01-12 DIAGNOSIS — L97.521 CHRONIC ULCER OF LEFT FOOT LIMITED TO BREAKDOWN OF SKIN: ICD-10-CM

## 2022-01-12 DIAGNOSIS — E11.42 DM TYPE 2 WITH DIABETIC PERIPHERAL NEUROPATHY: ICD-10-CM

## 2022-01-12 DIAGNOSIS — Z89.511 HX OF BKA, RIGHT: ICD-10-CM

## 2022-01-12 DIAGNOSIS — M79.672 LEFT FOOT PAIN: Primary | ICD-10-CM

## 2022-01-12 PROCEDURE — 99213 OFFICE O/P EST LOW 20 MIN: CPT | Performed by: PODIATRIST

## 2022-01-12 NOTE — PROGRESS NOTES
01/12/2022  Foot and Ankle Surgery - Established Patient/Follow-up  Provider: Dr. Bong Baker DPM  Location: [facility]    Subjective:  Kuldeep Adhikari is a 56 y.o. male.     Chief Complaint   Patient presents with   • Right Foot - Pain, Skin Ulcer   • Follow-up     Last pcp appt with CAROLINA Tineo KEYLA 8/16/2021       HPI: Patient returns with new issue involving the plantar aspect of his left foot.  He states that he has recently been evaluated at the ED for redness and swelling involving his foot.  He was placed on an oral antibiotic but has continued to notice issues.  He feels that the swelling and redness have improved.  He was mostly noticing symptoms at the second metatarsal phalangeal joint region.  He does not recall any injury to the area or open wound that he is aware of.  Denies any constitutional signs of infection.    Allergies   Allergen Reactions   • Lisinopril Cough       Current Outpatient Medications on File Prior to Visit   Medication Sig Dispense Refill   • aspirin 81 MG EC tablet Take 1 tablet by mouth Daily.     • atorvastatin (LIPITOR) 80 MG tablet Take 1 tablet by mouth Every Night. 90 tablet 1   • busPIRone (BUSPAR) 5 MG tablet      • cephalexin (KEFLEX) 500 MG capsule Take 1 capsule by mouth 3 (Three) Times a Day. 21 capsule 0   • cyclobenzaprine (FLEXERIL) 10 MG tablet Take I tab q 8 hrs as needed for tension headaches 30 tablet 0   • DULoxetine (CYMBALTA) 60 MG capsule TAKE 1 CAPSULE EVERY MORNING 90 capsule 0   • Eliquis 5 MG tablet tablet TAKE 1 TABLET TWICE DAILY 180 tablet 0   • empagliflozin (Jardiance) 10 MG tablet tablet Take 1 tablet by mouth Daily. 90 tablet 4   • gabapentin (NEURONTIN) 400 MG capsule Take 400 mg by mouth 3 (Three) Times a Day.     • insulin aspart (NovoLOG FlexPen) 100 UNIT/ML solution pen-injector sc pen Inject 10 Units under the skin into the appropriate area as directed 3 (Three) Times a Day With Meals. (Patient taking differently: Inject 20 Units  "under the skin into the appropriate area as directed 3 (Three) Times a Day With Meals.) 15 mL 6   • Insulin Glargine (Lantus SoloStar) 100 UNIT/ML injection pen Inject 40 Units under the skin into the appropriate area as directed Every Night. (Patient taking differently: Inject 34 Units under the skin into the appropriate area as directed Every Night.)     • Janumet XR  MG tablet TAKE 1 TABLET TWICE DAILY 180 tablet 4   • levothyroxine (SYNTHROID, LEVOTHROID) 100 MCG tablet TAKE 1 TABLET EVERY DAY (Patient taking differently: Take 100 mcg by mouth Daily.) 90 tablet 3   • losartan (COZAAR) 50 MG tablet TAKE 1 TABLET EVERY DAY 90 tablet 0   • metoprolol succinate XL (TOPROL-XL) 50 MG 24 hr tablet TAKE 1 TABLET EVERY DAY (Patient taking differently: Take 50 mg by mouth Daily.) 30 tablet 0   • omeprazole (priLOSEC) 40 MG capsule Take 40 mg by mouth Daily. Take DOS     • ondansetron (ZOFRAN) 4 MG tablet Take 1 tablet by mouth Every 8 (Eight) Hours As Needed for Nausea or Vomiting. 45 tablet 0     No current facility-administered medications on file prior to visit.       Objective   BP (!) 199/82   Pulse 100   Ht 172.7 cm (68\")   Wt 89.8 kg (198 lb)   BMI 30.11 kg/m²     Foot/Ankle Exam:       General:   Appearance: appears stated age and healthy    Orientation: AAOx3    Affect: appropriate      VASCULAR      Left Foot Vascularity   Normal vascular exam    Dorsalis pedis:  2+  Posterior tibial:  2+  Skin Temperature: warm    Edema Gradin+  CFT:  < 3 seconds  Pedal Hair Growth:  Absent      NEUROLOGIC     Left Foot Neurologic   Light touch sensation:  Diminished  Hot/cold sensation: diminished    Protective Sensation using [place] Monofilament:  Diminished  Achilles reflex:  2+     MUSCULOSKELETAL      Right Foot Musculoskeletal    Amputation   Below right knee: Yes       Left Foot Musculoskeletal    Amputation   Left toes amputated: No    Ecchymosis:  None  Tenderness: none    Arch:  " Normal  Hammertoe:  Second toe     MUSCLE STRENGTH     Left Foot Muscle Strength   Normal strength    Foot dorsiflexion:  5  Foot plantar flexion:  5  Foot inversion:  5  Foot eversion:  5     DERMATOLOGIC     Left Foot Dermatologic   Skin: erythema    Skin: no left foot drainage and no left foot maceration        Left Foot Additional Comments: Mild sinus to the plantar aspect of the second metatarsophalangeal joint.  No active drainage.  No underlying fluctuance.  Minimal erythema to the forefoot.  Skin slough consistent with previous blister.      Assessment/Plan   Diagnoses and all orders for this visit:    1. Left foot pain (Primary)  -     XR Foot 3+ View Left    2. Chronic ulcer of left foot limited to breakdown of skin (HCC)    3. DM type 2 with diabetic peripheral neuropathy (HCC)    4. Hx of BKA, right (HCC)      Patient presents for issues involving his left foot.  He complains of recent redness and swelling.  He feels that the symptoms have mostly improved with antibiotics.  He continues to have mild redness to the second tarsal phalangeal joint region.  After evaluation, he does have skin slough consistent with previous bulla formation.  No active signs of infection or abscess are identified.  Imaging was reviewed showing no underlying osseous abnormalities to the area in question.  He does have a small open wound to the plantar aspect of the second metatarsal head but no underlying drainage.  I have recommended that he continue to take the oral antibiotic.  I would like him to apply Betadine on a daily basis and I have dispensed a cam boot that he is to wear for offloading.  He is to monitor closely and call with any progressive issues or concerns.  Patient is to return in 2 weeks for reevaluation.  Greater than 20 minutes was spent before, during, and after evaluation for patient care.    Orders Placed This Encounter   Procedures   • XR Foot 3+ View Left     Has open wound that is covered      Scheduling Instructions:      Room 13 wb     Order Specific Question:   Reason for Exam:     Answer:   left foot pain     Order Specific Question:   Does this patient have a diabetic monitoring/medication delivering device on?     Answer:   No     Order Specific Question:   Release to patient     Answer:   Immediate          Note is dictated utilizing voice recognition software. Unfortunately this leads to occasional typographical errors. I apologize in advance if the situation occurs. If questions occur please do not hesitate to call our office.

## 2022-01-18 DIAGNOSIS — E11.65 UNCONTROLLED TYPE 2 DIABETES MELLITUS WITH HYPERGLYCEMIA: ICD-10-CM

## 2022-01-18 RX ORDER — INSULIN ASPART 100 [IU]/ML
20 INJECTION, SOLUTION INTRAVENOUS; SUBCUTANEOUS
Qty: 54 ML | Refills: 4 | Status: ON HOLD | OUTPATIENT
Start: 2022-01-18 | End: 2022-04-28 | Stop reason: SDUPTHER

## 2022-01-18 RX ORDER — INSULIN GLARGINE 100 [IU]/ML
40 INJECTION, SOLUTION SUBCUTANEOUS NIGHTLY
Qty: 36 ML | Refills: 4 | Status: ON HOLD | OUTPATIENT
Start: 2022-01-18 | End: 2022-04-24

## 2022-01-21 ENCOUNTER — TELEPHONE (OUTPATIENT)
Dept: FAMILY MEDICINE CLINIC | Facility: CLINIC | Age: 57
End: 2022-01-21

## 2022-01-21 DIAGNOSIS — G44.221 CHRONIC TENSION-TYPE HEADACHE, INTRACTABLE: ICD-10-CM

## 2022-01-21 DIAGNOSIS — E03.9 HYPOTHYROIDISM, UNSPECIFIED TYPE: ICD-10-CM

## 2022-01-21 DIAGNOSIS — I10 ESSENTIAL HYPERTENSION: ICD-10-CM

## 2022-01-21 RX ORDER — METOPROLOL SUCCINATE 50 MG/1
50 TABLET, EXTENDED RELEASE ORAL DAILY
Qty: 90 TABLET | Refills: 0 | Status: SHIPPED | OUTPATIENT
Start: 2022-01-21 | End: 2022-06-01

## 2022-01-21 RX ORDER — CYCLOBENZAPRINE HCL 10 MG
TABLET ORAL
Qty: 30 TABLET | Refills: 0 | Status: SHIPPED | OUTPATIENT
Start: 2022-01-21 | End: 2022-09-12 | Stop reason: SDUPTHER

## 2022-01-21 RX ORDER — LEVOTHYROXINE SODIUM 0.1 MG/1
100 TABLET ORAL DAILY
Qty: 90 TABLET | Refills: 0 | Status: SHIPPED | OUTPATIENT
Start: 2022-01-21 | End: 2022-06-01

## 2022-01-21 RX ORDER — CEPHALEXIN 500 MG/1
500 CAPSULE ORAL 3 TIMES DAILY
Qty: 21 CAPSULE | Refills: 0 | Status: CANCELLED | OUTPATIENT
Start: 2022-01-21

## 2022-01-21 RX ORDER — GABAPENTIN 400 MG/1
400 CAPSULE ORAL 3 TIMES DAILY
Qty: 270 CAPSULE | Refills: 0 | Status: ON HOLD | OUTPATIENT
Start: 2022-01-21 | End: 2022-04-24

## 2022-01-21 RX ORDER — OMEPRAZOLE 40 MG/1
40 CAPSULE, DELAYED RELEASE ORAL DAILY
Qty: 90 CAPSULE | Refills: 0 | Status: SHIPPED | OUTPATIENT
Start: 2022-01-21 | End: 2022-01-24 | Stop reason: SDUPTHER

## 2022-01-21 RX ORDER — ONDANSETRON 4 MG/1
4 TABLET, FILM COATED ORAL EVERY 8 HOURS PRN
Qty: 45 TABLET | Refills: 0 | Status: SHIPPED | OUTPATIENT
Start: 2022-01-21 | End: 2022-04-28 | Stop reason: HOSPADM

## 2022-01-21 RX ORDER — DULOXETIN HYDROCHLORIDE 60 MG/1
60 CAPSULE, DELAYED RELEASE ORAL EVERY MORNING
Qty: 90 CAPSULE | Refills: 0 | Status: SHIPPED | OUTPATIENT
Start: 2022-01-21 | End: 2022-06-01

## 2022-01-21 RX ORDER — SITAGLIPTIN AND METFORMIN HYDROCHLORIDE 1000; 50 MG/1; MG/1
1 TABLET, FILM COATED, EXTENDED RELEASE ORAL 2 TIMES DAILY
Qty: 180 TABLET | Refills: 0 | Status: SHIPPED | OUTPATIENT
Start: 2022-01-21 | End: 2022-06-01

## 2022-01-21 RX ORDER — BUSPIRONE HYDROCHLORIDE 5 MG/1
TABLET ORAL
OUTPATIENT
Start: 2022-01-21

## 2022-01-21 RX ORDER — LOSARTAN POTASSIUM 50 MG/1
50 TABLET ORAL DAILY
Qty: 90 TABLET | Refills: 0 | Status: SHIPPED | OUTPATIENT
Start: 2022-01-21 | End: 2022-03-29 | Stop reason: SDUPTHER

## 2022-01-21 RX ORDER — BENZONATATE 200 MG/1
200 CAPSULE ORAL 3 TIMES DAILY PRN
Qty: 30 CAPSULE | Refills: 0 | Status: SHIPPED | OUTPATIENT
Start: 2022-01-21 | End: 2022-03-29

## 2022-01-21 RX ORDER — ATORVASTATIN CALCIUM 80 MG/1
80 TABLET, FILM COATED ORAL NIGHTLY
Qty: 90 TABLET | Refills: 0 | Status: SHIPPED | OUTPATIENT
Start: 2022-01-21 | End: 2022-05-10 | Stop reason: SDUPTHER

## 2022-01-21 NOTE — TELEPHONE ENCOUNTER
Caller: Kuldeep Adhikari    Relationship: Self    Best call back number:[phone]    What medication are you requesting: COUGH SYRUP OR TABLETS    What are your current symptoms: COUGHING    How long have you been experiencing symptoms: A COUPLE WEEKS    Have you had these symptoms before:    [x] Yes  [] No    Have you been treated for these symptoms before:   [x] Yes  [] No    If a prescription is needed, what is your preferred pharmacy and phone number: [facility] [address] [facility]  Additional notes:

## 2022-01-21 NOTE — TELEPHONE ENCOUNTER
Caller: Kuldeep Adhikari    Relationship: Self    Best call back number: [phone]    Requested Prescriptions:   Requested Prescriptions     Pending Prescriptions Disp Refills   • atorvastatin (LIPITOR) 80 MG tablet 90 tablet 1     Sig: Take 1 tablet by mouth Every Night.   • busPIRone (BUSPAR) 5 MG tablet     • cephalexin (KEFLEX) 500 MG capsule 21 capsule 0     Sig: Take 1 capsule by mouth 3 (Three) Times a Day.   • cyclobenzaprine (FLEXERIL) 10 MG tablet 30 tablet 0     Sig: Take I tab q 8 hrs as needed for tension headaches   • DULoxetine (CYMBALTA) 60 MG capsule 90 capsule 0     Sig: Take 1 capsule by mouth Every Morning.   • apixaban (Eliquis) 5 MG tablet tablet 180 tablet 0     Sig: Take 1 tablet by mouth 2 (Two) Times a Day.   • empagliflozin (Jardiance) 10 MG tablet tablet 90 tablet 4     Sig: Take 1 tablet by mouth Daily.   • gabapentin (NEURONTIN) 400 MG capsule       Sig: Take 1 capsule by mouth 3 (Three) Times a Day.   • SITagliptin-metFORMIN HCl ER (Janumet XR)  MG tablet 180 tablet 4     Sig: Take 1 tablet by mouth 2 (Two) Times a Day.   • levothyroxine (SYNTHROID, LEVOTHROID) 100 MCG tablet 90 tablet 3     Sig: Take 1 tablet by mouth Daily.   • losartan (COZAAR) 50 MG tablet 90 tablet 0     Sig: Take 1 tablet by mouth Daily.   • metoprolol succinate XL (TOPROL-XL) 50 MG 24 hr tablet 30 tablet 0     Sig: Take 1 tablet by mouth Daily.   • omeprazole (priLOSEC) 40 MG capsule       Sig: Take 1 capsule by mouth Daily. Take DOS   • ondansetron (ZOFRAN) 4 MG tablet 45 tablet 0     Sig: Take 1 tablet by mouth Every 8 (Eight) Hours As Needed for Nausea or Vomiting.        Pharmacy where request should be sent: [facility] - [place], [place] - [facility] [address] - [phone]  - [phone] FX     Additional details provided by patient: PATIENT HAS TO HAVE ALL HIS MEDICATIONS SENT TO [facility] SO HIS INSURANCE WILL COVER IT.    Does the patient have less than a 3 day  supply:  [x] Yes  [] No    Tania Acuna, RuslanSched Rep   01/21/22 12:23 EST

## 2022-01-21 NOTE — TELEPHONE ENCOUNTER
I sent a cough med to [facility] and sent the rest of his prescriptions to [facility] except for the BuSpar because I do not see a dosing for it and do not see where it was started as just listed as 5 mg

## 2022-01-24 RX ORDER — OMEPRAZOLE 40 MG/1
40 CAPSULE, DELAYED RELEASE ORAL DAILY
Qty: 90 CAPSULE | Refills: 0 | Status: SHIPPED | OUTPATIENT
Start: 2022-01-24 | End: 2022-01-25

## 2022-01-25 ENCOUNTER — OFFICE VISIT (OUTPATIENT)
Dept: PODIATRY | Facility: CLINIC | Age: 57
End: 2022-01-25

## 2022-01-25 VITALS
HEIGHT: 68 IN | WEIGHT: 198 LBS | SYSTOLIC BLOOD PRESSURE: 147 MMHG | HEART RATE: 101 BPM | DIASTOLIC BLOOD PRESSURE: 73 MMHG | BODY MASS INDEX: 30.01 KG/M2

## 2022-01-25 DIAGNOSIS — M79.672 LEFT FOOT PAIN: Primary | ICD-10-CM

## 2022-01-25 DIAGNOSIS — L84 PRE-ULCERATIVE CALLUSES: ICD-10-CM

## 2022-01-25 DIAGNOSIS — E11.42 DM TYPE 2 WITH DIABETIC PERIPHERAL NEUROPATHY: ICD-10-CM

## 2022-01-25 DIAGNOSIS — M20.42 HAMMER TOE OF LEFT FOOT: ICD-10-CM

## 2022-01-25 PROCEDURE — 99213 OFFICE O/P EST LOW 20 MIN: CPT | Performed by: PODIATRIST

## 2022-01-25 RX ORDER — OMEPRAZOLE 40 MG/1
40 CAPSULE, DELAYED RELEASE ORAL DAILY
Qty: 90 CAPSULE | Refills: 0 | Status: SHIPPED | OUTPATIENT
Start: 2022-01-25 | End: 2022-06-01 | Stop reason: SDUPTHER

## 2022-01-25 NOTE — PROGRESS NOTES
01/25/2022  Foot and Ankle Surgery - Established Patient/Follow-up  Provider: Dr. Bong Baker DPM  Location: [facility]    Subjective:  Kuldeep Adhikari is a 56 y.o. male.     Chief Complaint   Patient presents with   • Left Foot - Pain, Nail Problem   • Right Foot - Nail Problem   • Follow-up     last pcp appt with Julianne GRANADOS  1/1/2021       HPI: Patient returns for evaluation of his left foot.  He has noticed significant improvement with decreased redness and discomfort.  He continues to have cocked up deformity involving the second toe at the metatarsophalangeal joint region.  No other issues or concerns.    Allergies   Allergen Reactions   • Lisinopril Cough       Current Outpatient Medications on File Prior to Visit   Medication Sig Dispense Refill   • apixaban (Eliquis) 5 MG tablet tablet Take 1 tablet by mouth 2 (Two) Times a Day. 180 tablet 0   • aspirin 81 MG EC tablet Take 1 tablet by mouth Daily.     • atorvastatin (LIPITOR) 80 MG tablet Take 1 tablet by mouth Every Night. 90 tablet 0   • benzonatate (TESSALON) 200 MG capsule Take 1 capsule by mouth 3 (Three) Times a Day As Needed for Cough. 30 capsule 0   • busPIRone (BUSPAR) 5 MG tablet      • cephalexin (KEFLEX) 500 MG capsule Take 1 capsule by mouth 3 (Three) Times a Day. 21 capsule 0   • cyclobenzaprine (FLEXERIL) 10 MG tablet Take I tab q 8 hrs as needed for tension headaches 30 tablet 0   • DULoxetine (CYMBALTA) 60 MG capsule Take 1 capsule by mouth Every Morning. 90 capsule 0   • empagliflozin (Jardiance) 10 MG tablet tablet Take 1 tablet by mouth Daily. 90 tablet 0   • gabapentin (NEURONTIN) 400 MG capsule Take 1 capsule by mouth 3 (Three) Times a Day. 270 capsule 0   • insulin aspart (NovoLOG FlexPen) 100 UNIT/ML solution pen-injector sc pen Inject 20 Units under the skin into the appropriate area as directed 3 (Three) Times a Day With Meals. 54 mL 4   • Insulin Glargine (Lantus SoloStar) 100 UNIT/ML injection pen Inject 40  "Units under the skin into the appropriate area as directed Every Night. 36 mL 4   • levothyroxine (SYNTHROID, LEVOTHROID) 100 MCG tablet Take 1 tablet by mouth Daily. 90 tablet 0   • losartan (COZAAR) 50 MG tablet Take 1 tablet by mouth Daily. 90 tablet 0   • metoprolol succinate XL (TOPROL-XL) 50 MG 24 hr tablet Take 1 tablet by mouth Daily. 90 tablet 0   • ondansetron (ZOFRAN) 4 MG tablet Take 1 tablet by mouth Every 8 (Eight) Hours As Needed for Nausea or Vomiting. 45 tablet 0   • SITagliptin-metFORMIN HCl ER (Janumet XR)  MG tablet Take 1 tablet by mouth 2 (Two) Times a Day. 180 tablet 0   • [DISCONTINUED] omeprazole (priLOSEC) 40 MG capsule Take 1 capsule by mouth Daily. Take DOS 90 capsule 0   • omeprazole (priLOSEC) 40 MG capsule Take 1 capsule by mouth Daily. 90 capsule 0     No current facility-administered medications on file prior to visit.       Objective   /73   Pulse 101   Ht 172.7 cm (68\")   Wt 89.8 kg (198 lb)   BMI 30.11 kg/m²      General:   Appearance: appears stated age and healthy    Orientation: AAOx3    Affect: appropriate       VASCULAR       Left Foot Vascularity   Normal vascular exam    Dorsalis pedis:  2+  Posterior tibial:  2+  Skin Temperature: warm    Edema Gradin+  CFT:  < 3 seconds  Pedal Hair Growth:  Absent      NEUROLOGIC      Left Foot Neurologic   Light touch sensation:  Diminished  Hot/cold sensation: diminished    Protective Sensation using [place]-Debi Monofilament:  Diminished  Achilles reflex:  2+      MUSCULOSKELETAL       Right Foot Musculoskeletal    Amputation   Below right knee: Yes        Left Foot Musculoskeletal    Amputation   Left toes amputated: No    Ecchymosis:  None  Tenderness: none    Arch:  Normal  Hammertoe:  Second toe      MUSCLE STRENGTH      Left Foot Muscle Strength   Normal strength    Foot dorsiflexion:  5  Foot plantar flexion:  5  Foot inversion:  5  Foot eversion:  5      DERMATOLOGIC      Left Foot Dermatologic   Skin: " No cas open wounds, erythema, or gross signs of infection at this time.    Mild forefoot swelling.  Patient continues to have dorsal instability at the second metatarsophalangeal joint region which could be consistent with underlying plantar plate disruption.  Progressive plantar contracture of the lesser digits    Assessment/Plan   Diagnoses and all orders for this visit:    1. Left foot pain (Primary)    2. Pre-ulcerative calluses    3. Hammer toe of left foot    4. DM type 2 with diabetic peripheral neuropathy (HCC)      Patient returns for evaluation of his left foot.  He does not have any acute findings at this time.  No open wounds or signs of infection are noted.  He does have changes involving the second metatarsal phalangeal joint which could be consistent with plantar plate disruption due to his neuropathy.  I did review this with him at length.  He does have progressive contracture involving the third, fourth, and fifth toes.  Given that he does not have any wounds and only mild callus formation, I do feel that we should continue to observe.  I have recommended patient to proceed with a regular shoe and normal activity.  I would like him to proceed with routine diabetic foot care as instructed.  He is to call with any progressive issues or concerns.  Patient states that he will be returning to work.  Patient is to return in 3 weeks for reevaluation.  If he has any concerning features, may need to proceed with flexor tenotomy's of the lesser digits.  Greater than 20 minutes was spent before, during, and after evaluation for patient care.    No orders of the defined types were placed in this encounter.         Note is dictated utilizing voice recognition software. Unfortunately this leads to occasional typographical errors. I apologize in advance if the situation occurs. If questions occur please do not hesitate to call our office.

## 2022-02-02 DIAGNOSIS — E11.65 UNCONTROLLED TYPE 2 DIABETES MELLITUS WITH HYPERGLYCEMIA: Primary | ICD-10-CM

## 2022-02-22 ENCOUNTER — OFFICE VISIT (OUTPATIENT)
Dept: PODIATRY | Facility: CLINIC | Age: 57
End: 2022-02-22

## 2022-02-22 VITALS
SYSTOLIC BLOOD PRESSURE: 164 MMHG | HEART RATE: 78 BPM | BODY MASS INDEX: 30.01 KG/M2 | WEIGHT: 198 LBS | DIASTOLIC BLOOD PRESSURE: 72 MMHG | HEIGHT: 68 IN

## 2022-02-22 DIAGNOSIS — M20.42 HAMMER TOE OF LEFT FOOT: ICD-10-CM

## 2022-02-22 DIAGNOSIS — M79.672 LEFT FOOT PAIN: Primary | ICD-10-CM

## 2022-02-22 DIAGNOSIS — M72.2 PLANTAR FASCIITIS OF LEFT FOOT: ICD-10-CM

## 2022-02-22 DIAGNOSIS — Z89.511 HX OF BKA, RIGHT: ICD-10-CM

## 2022-02-22 DIAGNOSIS — E11.42 DM TYPE 2 WITH DIABETIC PERIPHERAL NEUROPATHY: ICD-10-CM

## 2022-02-22 DIAGNOSIS — L84 PRE-ULCERATIVE CALLUSES: ICD-10-CM

## 2022-02-22 PROCEDURE — 99213 OFFICE O/P EST LOW 20 MIN: CPT | Performed by: PODIATRIST

## 2022-02-22 NOTE — PROGRESS NOTES
02/22/2022  Foot and Ankle Surgery - Established Patient/Follow-up  Provider: Dr. Bong Baker DPM  Location: [facility]    Subjective:  Kuldeep Adhikari is a 56 y.o. male.     Chief Complaint   Patient presents with   • Left Foot - Pain, Callouses   • Follow-up     Last pcp appt with MALICK Whitaker MD 8/16/2021       HPI: Patient returns for follow-up regarding his left foot.  He has noticed some improvement to the left second toe and does not have any significant callus formation today.  He has no open wounds or signs of infection.  No erythema or redness.  He does however continue to have discomfort involving his plantar forefoot midfoot region.  No other issues today    Allergies   Allergen Reactions   • Lisinopril Cough       Current Outpatient Medications on File Prior to Visit   Medication Sig Dispense Refill   • apixaban (Eliquis) 5 MG tablet tablet Take 1 tablet by mouth 2 (Two) Times a Day. 180 tablet 0   • aspirin 81 MG EC tablet Take 1 tablet by mouth Daily.     • atorvastatin (LIPITOR) 80 MG tablet Take 1 tablet by mouth Every Night. 90 tablet 0   • benzonatate (TESSALON) 200 MG capsule Take 1 capsule by mouth 3 (Three) Times a Day As Needed for Cough. 30 capsule 0   • busPIRone (BUSPAR) 5 MG tablet      • cephalexin (KEFLEX) 500 MG capsule Take 1 capsule by mouth 3 (Three) Times a Day. 21 capsule 0   • Continuous Blood Gluc Sensor (FreeStyle Mark 2 Sensor) misc 1 each Every 14 (Fourteen) Days. 6 each 4   • cyclobenzaprine (FLEXERIL) 10 MG tablet Take I tab q 8 hrs as needed for tension headaches 30 tablet 0   • DULoxetine (CYMBALTA) 60 MG capsule Take 1 capsule by mouth Every Morning. 90 capsule 0   • empagliflozin (Jardiance) 10 MG tablet tablet Take 1 tablet by mouth Daily. 90 tablet 0   • gabapentin (NEURONTIN) 400 MG capsule Take 1 capsule by mouth 3 (Three) Times a Day. 270 capsule 0   • insulin aspart (NovoLOG FlexPen) 100 UNIT/ML solution pen-injector sc pen Inject 20 Units under  "the skin into the appropriate area as directed 3 (Three) Times a Day With Meals. 54 mL 4   • Insulin Glargine (Lantus SoloStar) 100 UNIT/ML injection pen Inject 40 Units under the skin into the appropriate area as directed Every Night. 36 mL 4   • levothyroxine (SYNTHROID, LEVOTHROID) 100 MCG tablet Take 1 tablet by mouth Daily. 90 tablet 0   • losartan (COZAAR) 50 MG tablet Take 1 tablet by mouth Daily. 90 tablet 0   • metoprolol succinate XL (TOPROL-XL) 50 MG 24 hr tablet Take 1 tablet by mouth Daily. 90 tablet 0   • omeprazole (priLOSEC) 40 MG capsule Take 1 capsule by mouth Daily. 90 capsule 0   • ondansetron (ZOFRAN) 4 MG tablet Take 1 tablet by mouth Every 8 (Eight) Hours As Needed for Nausea or Vomiting. 45 tablet 0   • SITagliptin-metFORMIN HCl ER (Janumet XR)  MG tablet Take 1 tablet by mouth 2 (Two) Times a Day. 180 tablet 0     No current facility-administered medications on file prior to visit.       Objective   /72   Pulse 78   Ht 172.7 cm (68\")   Wt 89.8 kg (198 lb)   BMI 30.11 kg/m²      General:   Appearance: appears stated age and healthy    Orientation: AAOx3    Affect: appropriate       VASCULAR       Left Foot Vascularity   Normal vascular exam    Dorsalis pedis:  2+  Posterior tibial:  2+  Skin Temperature: warm    Edema Gradin+  CFT:  < 3 seconds  Pedal Hair Growth:  Absent      NEUROLOGIC      Left Foot Neurologic   Light touch sensation:  Diminished  Hot/cold sensation: diminished    Protective Sensation using [place]-Debi Monofilament:  Diminished  Achilles reflex:  2+      MUSCULOSKELETAL       Right Foot Musculoskeletal    Amputation   Below right knee: Yes        Left Foot Musculoskeletal    Amputation   Left toes amputated: No    Ecchymosis:  None  Tenderness: Plantar fascia  Arch:  Normal  Hammertoe:  Second toe      MUSCLE STRENGTH      Left Foot Muscle Strength   Normal strength    Foot dorsiflexion:  5  Foot plantar flexion:  5  Foot inversion:  5  Foot " eversion:  5      DERMATOLOGIC      Left Foot Dermatologic   Skin: No cas open wounds, erythema, or gross signs of infection at this time.     Discomfort with palpation to the plantar fascia at the level of the midfoot and forefoot.  No progressive deformity or instability    Assessment/Plan   Diagnoses and all orders for this visit:    1. Left foot pain (Primary)  -     XR Foot 3+ View Left    2. Plantar fasciitis of left foot    3. Pre-ulcerative calluses    4. Hammer toe of left foot    5. DM type 2 with diabetic peripheral neuropathy (HCC)    6. Hx of BKA, right (HCC)      Patient returns for follow-up regarding his left foot.  He no longer has any erythema or concerning features to the plantar foot.  He does however have discomfort with palpation and walking.  Imaging was obtained and independently reviewed showing no osseous abnormalities to this region.  He does have changes involving the fourth and fifth metatarsophalangeal joint regions which is likely secondary to neuropathic arthropathy.  I would like him to acquire a pair of over-the-counter arch supports and wear on a routine basis.  Patient may resume baseline activity and continue to monitor his feet and call with any new issues or concerns.  Patient is to return in 2 months for reevaluation.  Greater than 20 minutes was spent before, during, and after evaluation for patient care.    Orders Placed This Encounter   Procedures   • XR Foot 3+ View Left     Scheduling Instructions:      Room 13 wb     Order Specific Question:   Reason for Exam:     Answer:   left foot pain     Order Specific Question:   Does this patient have a diabetic monitoring/medication delivering device on?     Answer:   No     Order Specific Question:   Release to patient     Answer:   Immediate          Note is dictated utilizing voice recognition software. Unfortunately this leads to occasional typographical errors. I apologize in advance if the situation occurs. If questions  occur please do not hesitate to call our office.

## 2022-03-18 ENCOUNTER — APPOINTMENT (OUTPATIENT)
Dept: CT IMAGING | Facility: HOSPITAL | Age: 57
End: 2022-03-18

## 2022-03-18 ENCOUNTER — HOSPITAL ENCOUNTER (OUTPATIENT)
Facility: HOSPITAL | Age: 57
Setting detail: OBSERVATION
Discharge: HOME | End: 2022-03-20
Attending: EMERGENCY MEDICINE | Admitting: INTERNAL MEDICINE

## 2022-03-18 ENCOUNTER — APPOINTMENT (OUTPATIENT)
Dept: GENERAL RADIOLOGY | Facility: HOSPITAL | Age: 57
End: 2022-03-18

## 2022-03-18 DIAGNOSIS — R41.82 ALTERED MENTAL STATUS, UNSPECIFIED ALTERED MENTAL STATUS TYPE: Primary | ICD-10-CM

## 2022-03-18 PROBLEM — G93.40 ACUTE ENCEPHALOPATHY: Status: ACTIVE | Noted: 2022-03-18

## 2022-03-18 PROBLEM — N18.31 CHRONIC RENAL IMPAIRMENT, STAGE 3A: Chronic | Status: ACTIVE | Noted: 2017-01-19

## 2022-03-18 PROBLEM — E78.2 MIXED HYPERLIPIDEMIA: Chronic | Status: ACTIVE | Noted: 2017-01-19

## 2022-03-18 PROBLEM — G44.209 ACUTE TENSION-TYPE HEADACHE: Status: ACTIVE | Noted: 2022-03-18

## 2022-03-18 PROBLEM — Z89.511 S/P BKA (BELOW KNEE AMPUTATION), RIGHT: Chronic | Status: ACTIVE | Noted: 2022-03-18

## 2022-03-18 PROBLEM — Z86.73 HISTORY OF CVA (CEREBROVASCULAR ACCIDENT): Status: ACTIVE | Noted: 2021-08-02

## 2022-03-18 PROBLEM — A40.1 SEPSIS DUE TO GROUP B STREPTOCOCCUS (HCC): Status: RESOLVED | Noted: 2020-03-19 | Resolved: 2022-03-18

## 2022-03-18 PROBLEM — I73.9 PERIPHERAL VASCULAR DISEASE: Chronic | Status: ACTIVE | Noted: 2017-01-19

## 2022-03-18 PROBLEM — R07.9 CHEST PAIN: Status: RESOLVED | Noted: 2018-03-09 | Resolved: 2022-03-18

## 2022-03-18 PROBLEM — R13.10 DYSPHAGIA: Status: RESOLVED | Noted: 2021-08-02 | Resolved: 2022-03-18

## 2022-03-18 PROBLEM — M86.9 OSTEOMYELITIS OF RIGHT FOOT: Status: RESOLVED | Noted: 2020-03-19 | Resolved: 2022-03-18

## 2022-03-18 LAB
ALBUMIN SERPL-MCNC: 4.3 G/DL (ref 3.5–5.2)
ALBUMIN/GLOB SERPL: 1.5 G/DL
ALP SERPL-CCNC: 108 U/L (ref 39–117)
ALT SERPL W P-5'-P-CCNC: 22 U/L (ref 1–41)
AMPHET+METHAMPHET UR QL: NEGATIVE
ANION GAP SERPL CALCULATED.3IONS-SCNC: 13 MMOL/L (ref 5–15)
AST SERPL-CCNC: 24 U/L (ref 1–40)
BARBITURATES UR QL SCN: NEGATIVE
BASOPHILS # BLD AUTO: 0.2 10*3/MM3 (ref 0–0.2)
BASOPHILS NFR BLD AUTO: 1.5 % (ref 0–1.5)
BENZODIAZ UR QL SCN: NEGATIVE
BILIRUB SERPL-MCNC: 0.9 MG/DL (ref 0–1.2)
BILIRUB UR QL STRIP: NEGATIVE
BUN SERPL-MCNC: 25 MG/DL (ref 6–20)
BUN/CREAT SERPL: 16.1 (ref 7–25)
CALCIUM SPEC-SCNC: 9.2 MG/DL (ref 8.6–10.5)
CANNABINOIDS SERPL QL: NEGATIVE
CHLORIDE SERPL-SCNC: 100 MMOL/L (ref 98–107)
CHOLEST SERPL-MCNC: 158 MG/DL (ref 0–200)
CLARITY UR: CLEAR
CO2 SERPL-SCNC: 24 MMOL/L (ref 22–29)
COCAINE UR QL: NEGATIVE
COLOR UR: YELLOW
CREAT SERPL-MCNC: 1.55 MG/DL (ref 0.76–1.27)
DEPRECATED RDW RBC AUTO: 48.6 FL (ref 37–54)
EGFRCR SERPLBLD CKD-EPI 2021: 51.6 ML/MIN/1.73
EOSINOPHIL # BLD AUTO: 0.2 10*3/MM3 (ref 0–0.4)
EOSINOPHIL NFR BLD AUTO: 1.8 % (ref 0.3–6.2)
ERYTHROCYTE [DISTWIDTH] IN BLOOD BY AUTOMATED COUNT: 18.8 % (ref 12.3–15.4)
GLOBULIN UR ELPH-MCNC: 2.9 GM/DL
GLUCOSE BLDC GLUCOMTR-MCNC: 60 MG/DL (ref 70–105)
GLUCOSE SERPL-MCNC: 94 MG/DL (ref 65–99)
GLUCOSE UR STRIP-MCNC: ABNORMAL MG/DL
HCT VFR BLD AUTO: 39.3 % (ref 37.5–51)
HDLC SERPL-MCNC: 43 MG/DL (ref 40–60)
HGB BLD-MCNC: 12.3 G/DL (ref 13–17.7)
HGB UR QL STRIP.AUTO: NEGATIVE
KETONES UR QL STRIP: NEGATIVE
LDLC SERPL CALC-MCNC: 81 MG/DL (ref 0–100)
LDLC/HDLC SERPL: 1.72 {RATIO}
LEUKOCYTE ESTERASE UR QL STRIP.AUTO: NEGATIVE
LYMPHOCYTES # BLD AUTO: 1.2 10*3/MM3 (ref 0.7–3.1)
LYMPHOCYTES NFR BLD AUTO: 11.3 % (ref 19.6–45.3)
MCH RBC QN AUTO: 22.7 PG (ref 26.6–33)
MCHC RBC AUTO-ENTMCNC: 31.3 G/DL (ref 31.5–35.7)
MCV RBC AUTO: 72.7 FL (ref 79–97)
METHADONE UR QL SCN: NEGATIVE
MONOCYTES # BLD AUTO: 0.7 10*3/MM3 (ref 0.1–0.9)
MONOCYTES NFR BLD AUTO: 6.2 % (ref 5–12)
NEUTROPHILS NFR BLD AUTO: 79.2 % (ref 42.7–76)
NEUTROPHILS NFR BLD AUTO: 8.6 10*3/MM3 (ref 1.7–7)
NITRITE UR QL STRIP: NEGATIVE
NRBC BLD AUTO-RTO: 0 /100 WBC (ref 0–0.2)
NT-PROBNP SERPL-MCNC: 52.5 PG/ML (ref 0–900)
OPIATES UR QL: NEGATIVE
OXYCODONE UR QL SCN: NEGATIVE
PH UR STRIP.AUTO: 6.5 [PH] (ref 5–8)
PLATELET # BLD AUTO: 264 10*3/MM3 (ref 140–450)
PMV BLD AUTO: 7.6 FL (ref 6–12)
POTASSIUM SERPL-SCNC: 5.2 MMOL/L (ref 3.5–5.2)
PROT SERPL-MCNC: 7.2 G/DL (ref 6–8.5)
PROT UR QL STRIP: NEGATIVE
RBC # BLD AUTO: 5.4 10*6/MM3 (ref 4.14–5.8)
SARS-COV-2 RNA PNL SPEC NAA+PROBE: NOT DETECTED
SODIUM SERPL-SCNC: 137 MMOL/L (ref 136–145)
SP GR UR STRIP: 1.02 (ref 1–1.03)
TRIGL SERPL-MCNC: 205 MG/DL (ref 0–150)
TROPONIN T SERPL-MCNC: <0.01 NG/ML (ref 0–0.03)
TSH SERPL DL<=0.05 MIU/L-ACNC: 3.72 UIU/ML (ref 0.27–4.2)
UROBILINOGEN UR QL STRIP: ABNORMAL
VLDLC SERPL-MCNC: 34 MG/DL (ref 5–40)
WBC NRBC COR # BLD: 10.8 10*3/MM3 (ref 3.4–10.8)

## 2022-03-18 PROCEDURE — G0378 HOSPITAL OBSERVATION PER HR: HCPCS

## 2022-03-18 PROCEDURE — 70450 CT HEAD/BRAIN W/O DYE: CPT

## 2022-03-18 PROCEDURE — 83036 HEMOGLOBIN GLYCOSYLATED A1C: CPT | Performed by: NURSE PRACTITIONER

## 2022-03-18 PROCEDURE — 80307 DRUG TEST PRSMV CHEM ANLYZR: CPT | Performed by: NURSE PRACTITIONER

## 2022-03-18 PROCEDURE — C9803 HOPD COVID-19 SPEC COLLECT: HCPCS

## 2022-03-18 PROCEDURE — 84484 ASSAY OF TROPONIN QUANT: CPT | Performed by: NURSE PRACTITIONER

## 2022-03-18 PROCEDURE — 83880 ASSAY OF NATRIURETIC PEPTIDE: CPT | Performed by: NURSE PRACTITIONER

## 2022-03-18 PROCEDURE — 85025 COMPLETE CBC W/AUTO DIFF WBC: CPT | Performed by: NURSE PRACTITIONER

## 2022-03-18 PROCEDURE — 80053 COMPREHEN METABOLIC PANEL: CPT | Performed by: NURSE PRACTITIONER

## 2022-03-18 PROCEDURE — 71045 X-RAY EXAM CHEST 1 VIEW: CPT

## 2022-03-18 PROCEDURE — 82962 GLUCOSE BLOOD TEST: CPT

## 2022-03-18 PROCEDURE — 84443 ASSAY THYROID STIM HORMONE: CPT | Performed by: NURSE PRACTITIONER

## 2022-03-18 PROCEDURE — 80061 LIPID PANEL: CPT | Performed by: NURSE PRACTITIONER

## 2022-03-18 PROCEDURE — 93005 ELECTROCARDIOGRAM TRACING: CPT | Performed by: NURSE PRACTITIONER

## 2022-03-18 PROCEDURE — 99219 PR INITIAL OBSERVATION CARE/DAY 50 MINUTES: CPT | Performed by: NURSE PRACTITIONER

## 2022-03-18 PROCEDURE — 81003 URINALYSIS AUTO W/O SCOPE: CPT | Performed by: NURSE PRACTITIONER

## 2022-03-18 PROCEDURE — U0003 INFECTIOUS AGENT DETECTION BY NUCLEIC ACID (DNA OR RNA); SEVERE ACUTE RESPIRATORY SYNDROME CORONAVIRUS 2 (SARS-COV-2) (CORONAVIRUS DISEASE [COVID-19]), AMPLIFIED PROBE TECHNIQUE, MAKING USE OF HIGH THROUGHPUT TECHNOLOGIES AS DESCRIBED BY CMS-2020-01-R: HCPCS | Performed by: INTERNAL MEDICINE

## 2022-03-18 PROCEDURE — 99285 EMERGENCY DEPT VISIT HI MDM: CPT

## 2022-03-18 RX ORDER — BISACODYL 10 MG
10 SUPPOSITORY, RECTAL RECTAL DAILY PRN
Status: DISCONTINUED | OUTPATIENT
Start: 2022-03-18 | End: 2022-03-20 | Stop reason: HOSPADM

## 2022-03-18 RX ORDER — ACETAMINOPHEN 500 MG
1000 TABLET ORAL ONCE
Status: COMPLETED | OUTPATIENT
Start: 2022-03-18 | End: 2022-03-18

## 2022-03-18 RX ORDER — ACETAMINOPHEN 650 MG/1
650 SUPPOSITORY RECTAL EVERY 4 HOURS PRN
Status: DISCONTINUED | OUTPATIENT
Start: 2022-03-18 | End: 2022-03-20 | Stop reason: HOSPADM

## 2022-03-18 RX ORDER — SODIUM CHLORIDE 0.9 % (FLUSH) 0.9 %
10 SYRINGE (ML) INJECTION EVERY 12 HOURS SCHEDULED
Status: DISCONTINUED | OUTPATIENT
Start: 2022-03-18 | End: 2022-03-20 | Stop reason: HOSPADM

## 2022-03-18 RX ORDER — ONDANSETRON 4 MG/1
4 TABLET, FILM COATED ORAL EVERY 6 HOURS PRN
Status: DISCONTINUED | OUTPATIENT
Start: 2022-03-18 | End: 2022-03-20 | Stop reason: HOSPADM

## 2022-03-18 RX ORDER — NITROGLYCERIN 0.4 MG/1
0.4 TABLET SUBLINGUAL
Status: DISCONTINUED | OUTPATIENT
Start: 2022-03-18 | End: 2022-03-20 | Stop reason: HOSPADM

## 2022-03-18 RX ORDER — ALUMINA, MAGNESIA, AND SIMETHICONE 2400; 2400; 240 MG/30ML; MG/30ML; MG/30ML
15 SUSPENSION ORAL EVERY 6 HOURS PRN
Status: DISCONTINUED | OUTPATIENT
Start: 2022-03-18 | End: 2022-03-20 | Stop reason: HOSPADM

## 2022-03-18 RX ORDER — ONDANSETRON 2 MG/ML
4 INJECTION INTRAMUSCULAR; INTRAVENOUS EVERY 6 HOURS PRN
Status: DISCONTINUED | OUTPATIENT
Start: 2022-03-18 | End: 2022-03-20 | Stop reason: HOSPADM

## 2022-03-18 RX ORDER — CHOLECALCIFEROL (VITAMIN D3) 125 MCG
5 CAPSULE ORAL NIGHTLY PRN
Status: DISCONTINUED | OUTPATIENT
Start: 2022-03-18 | End: 2022-03-20 | Stop reason: HOSPADM

## 2022-03-18 RX ORDER — CYCLOBENZAPRINE HCL 10 MG
10 TABLET ORAL 3 TIMES DAILY PRN
Status: DISCONTINUED | OUTPATIENT
Start: 2022-03-18 | End: 2022-03-19

## 2022-03-18 RX ORDER — DEXTROSE MONOHYDRATE 25 G/50ML
25 INJECTION, SOLUTION INTRAVENOUS
Status: DISCONTINUED | OUTPATIENT
Start: 2022-03-18 | End: 2022-03-20 | Stop reason: HOSPADM

## 2022-03-18 RX ORDER — POLYETHYLENE GLYCOL 3350 17 G/17G
17 POWDER, FOR SOLUTION ORAL DAILY PRN
Status: DISCONTINUED | OUTPATIENT
Start: 2022-03-18 | End: 2022-03-20 | Stop reason: HOSPADM

## 2022-03-18 RX ORDER — ACETAMINOPHEN 160 MG/5ML
650 SOLUTION ORAL EVERY 4 HOURS PRN
Status: DISCONTINUED | OUTPATIENT
Start: 2022-03-18 | End: 2022-03-20 | Stop reason: HOSPADM

## 2022-03-18 RX ORDER — ATORVASTATIN CALCIUM 40 MG/1
80 TABLET, FILM COATED ORAL NIGHTLY
Status: DISCONTINUED | OUTPATIENT
Start: 2022-03-18 | End: 2022-03-20 | Stop reason: HOSPADM

## 2022-03-18 RX ORDER — OLANZAPINE 10 MG/2ML
1 INJECTION, POWDER, LYOPHILIZED, FOR SOLUTION INTRAMUSCULAR AS NEEDED
Status: DISCONTINUED | OUTPATIENT
Start: 2022-03-18 | End: 2022-03-20 | Stop reason: HOSPADM

## 2022-03-18 RX ORDER — SODIUM CHLORIDE 0.9 % (FLUSH) 0.9 %
10 SYRINGE (ML) INJECTION AS NEEDED
Status: DISCONTINUED | OUTPATIENT
Start: 2022-03-18 | End: 2022-03-20 | Stop reason: HOSPADM

## 2022-03-18 RX ORDER — AMOXICILLIN 250 MG
2 CAPSULE ORAL 2 TIMES DAILY
Status: DISCONTINUED | OUTPATIENT
Start: 2022-03-19 | End: 2022-03-20 | Stop reason: HOSPADM

## 2022-03-18 RX ORDER — INSULIN LISPRO 100 [IU]/ML
0-7 INJECTION, SOLUTION INTRAVENOUS; SUBCUTANEOUS AS NEEDED
Status: DISCONTINUED | OUTPATIENT
Start: 2022-03-18 | End: 2022-03-20 | Stop reason: HOSPADM

## 2022-03-18 RX ORDER — ACETAMINOPHEN 325 MG/1
650 TABLET ORAL EVERY 4 HOURS PRN
Status: DISCONTINUED | OUTPATIENT
Start: 2022-03-18 | End: 2022-03-20 | Stop reason: HOSPADM

## 2022-03-18 RX ORDER — NICOTINE POLACRILEX 4 MG
15 LOZENGE BUCCAL
Status: DISCONTINUED | OUTPATIENT
Start: 2022-03-18 | End: 2022-03-20 | Stop reason: HOSPADM

## 2022-03-18 RX ORDER — INSULIN LISPRO 100 [IU]/ML
0-7 INJECTION, SOLUTION INTRAVENOUS; SUBCUTANEOUS
Status: DISCONTINUED | OUTPATIENT
Start: 2022-03-19 | End: 2022-03-20 | Stop reason: HOSPADM

## 2022-03-18 RX ORDER — ASPIRIN 300 MG/1
300 SUPPOSITORY RECTAL DAILY
Status: DISCONTINUED | OUTPATIENT
Start: 2022-03-19 | End: 2022-03-20 | Stop reason: HOSPADM

## 2022-03-18 RX ORDER — ASPIRIN 81 MG/1
81 TABLET, CHEWABLE ORAL DAILY
Status: DISCONTINUED | OUTPATIENT
Start: 2022-03-19 | End: 2022-03-20 | Stop reason: HOSPADM

## 2022-03-18 RX ORDER — BISACODYL 5 MG/1
5 TABLET, DELAYED RELEASE ORAL DAILY PRN
Status: DISCONTINUED | OUTPATIENT
Start: 2022-03-18 | End: 2022-03-20 | Stop reason: HOSPADM

## 2022-03-18 RX ADMIN — ACETAMINOPHEN 1000 MG: 500 TABLET, FILM COATED ORAL at 22:35

## 2022-03-18 NOTE — ED PROVIDER NOTES
Subjective    Chief Complaint   Patient presents with   • Altered Mental Status     Laura Whitaker MD  No LMP for male patient.  Allergies   Allergen Reactions   • Lisinopril Cough       Patient is a 57-year-old male presents emergency department for possible altered mental status.  Patient reports that he works [employer], he went to go into work this afternoon at 4:30 PM, he reports he went and sat down in the office, and he reports he does not remember anything that happened after that.  He reports that sometimes this has occurred when his blood sugar is low, he does report taking 12 units of fast acting insulin at 11 AM, his he reports his morning blood sugar was somewhat elevated.  Patient denies any severe pain or discomfort this time.  He does report he had a stroke the end of last year, and reports he is had a headache since that time.  He is currently anticoagulated on Eliquis.  Patient denies any unilateral weakness.  No speech disturbances or visual disturbances, no numbness or tingling.  Denies thunderclap headache.          Review of Systems   Constitutional: Negative for chills and fever.   Eyes: Negative for photophobia and visual disturbance.   Respiratory: Negative for shortness of breath.    Cardiovascular: Negative for chest pain.   Gastrointestinal: Negative for abdominal pain, diarrhea, nausea and vomiting.   Genitourinary: Negative for dysuria.   Musculoskeletal: Negative for back pain and neck pain.   Skin: Negative for rash.   Neurological: Positive for headaches. Negative for dizziness, tremors, seizures, syncope, facial asymmetry, speech difficulty, weakness, light-headedness and numbness.   Psychiatric/Behavioral: Positive for confusion.       Past Medical History:   Diagnosis Date   • CKD (chronic kidney disease), stage III ([facility])    • Depression    • Depression with suicidal ideation 08/08/2021   • DJD (degenerative joint disease)    • DVT (deep venous thrombosis) ([facility])    •  Ganglion     rt wrist   • Gangrene of foot ([facility]) 10/09/2015   • GERD (gastroesophageal reflux disease)    • Headache    • Heart murmur    • Hiatal hernia    • History of esophageal stricture     s/p dialation 40-50 times last EGD 04/2016   • Hyperlipidemia    • Hypertension    • Hypothyroidism    • IBS (irritable bowel syndrome)    • Neuropathy    • Osteomyelitis of right foot ([facility]) 03/19/2020   • Pulmonary embolism ([facility]) 01/19/2017   • Rash     rt lower hip   • Retinopathy    • S/P BKA (below knee amputation), right ([facility]) 03/18/2022   • Seasonal allergies    • Sepsis due to group B Streptococcus ([facility]) 03/19/2020   • Sleep apnea    • Type 2 diabetes mellitus with peripheral vascular disease ([facility])    • Vitamin D deficiency        Allergies   Allergen Reactions   • Lisinopril Cough       Past Surgical History:   Procedure Laterality Date   • AMPUTATION FOOT / TOE Right     great toe   • AMPUTATION REVISION Right 4/8/2021    Procedure: BELOW KNEE AMPUTATION REVISAION;  Surgeon: Eduardo Reynolds MD;  Location: [facility] MAIN OR;  Service: Orthopedics;  Laterality: Right;   • AMPUTATION REVISION Right 4/29/2021    Procedure: AMPUTATION REVISION KNEE STUMP;  Surgeon: Eduardo Reynolds MD;  Location: [facility] MAIN OR;  Service: Orthopedics;  Laterality: Right;   • BELOW KNEE AMPUTATION Right 7/30/2020    Procedure: AMPUTATION BELOW KNEE;  Surgeon: Eduardo Reynolds MD;  Location: [facility] MAIN OR;  Service: Orthopedics;  Laterality: Right;   • CARDIAC CATHETERIZATION  04/2018    [facility]   • COLONOSCOPY     • ENDOSCOPY     • ENDOSCOPY N/A 10/4/2019    Procedure: ESOPHAGOGASTRODUODENOSCOPY with dilitation and biopsy x 1 area;  Surgeon: Declan Iqbal MD;  Location: [facility] ENDOSCOPY;  Service: Gastroenterology   • ENDOSCOPY N/A 12/13/2019    Procedure: ESOPHAGOGASTRODUODENOSCOPY WITH DILATATION (50, 52 BOUGIE);  Surgeon: Declan Iqbal MD;  Location: [facility] ENDOSCOPY;  Service: Gastroenterology   • ENDOSCOPY N/A 8/6/2021     Procedure: ESOPHAGOGASTRODUODENOSCOPY with biopsy x1 area and esophageal dilation (56FR Bougie);  Surgeon: Hussein Talavera MD;  Location: [facility] ENDOSCOPY;  Service: Gastroenterology;  Laterality: N/A;  post: hiatal hernia, erosive gastritis   • EYE SURGERY     • GANGLION CYST EXCISION Left    • HERNIA REPAIR Bilateral    • JOINT REPLACEMENT      Left total hip   • RETINOPATHY SURGERY      laser   • TOTAL HIP ARTHROPLASTY Left    • TOTAL HIP ARTHROPLASTY Left 2018   • TRANS METATARSAL AMPUTATION Right 3/17/2020    Procedure: AMPUTATION TRANS METATARSAL right;  Surgeon: ERWIN Baker DPM;  Location: [facility] MAIN OR;  Service: Podiatry;  Laterality: Right;  GANGRENOUS RIGHT FOOT   • VASECTOMY         Family History   Problem Relation Age of Onset   • Diabetes Mother         Patient  mom   • Heart disease Mother    • Arthritis Mother    • Hyperlipidemia Mother    • Leukemia Father    • Sleep apnea Maternal Aunt         GENO   • Stroke Maternal Grandmother    • Hypertension Other    • Hyperlipidemia Other    • Cancer Other    • Colon cancer Other         uncle       Social History     Socioeconomic History   • Marital status:    Tobacco Use   • Smoking status: Never Smoker   • Smokeless tobacco: Never Used   Vaping Use   • Vaping Use: Never used   Substance and Sexual Activity   • Alcohol use: No   • Drug use: No   • Sexual activity: Not Currently     Birth control/protection: None           Objective   Physical Exam  Vitals and nursing note reviewed.   Constitutional:       General: He is not in acute distress.     Appearance: He is well-developed. He is not ill-appearing, toxic-appearing or diaphoretic.   HENT:      Head: Normocephalic and atraumatic.      Mouth/Throat:      Mouth: Mucous membranes are moist.      Pharynx: Oropharynx is clear.   Eyes:      Extraocular Movements: Extraocular movements intact.      Pupils: Pupils are equal, round, and reactive to light.   Cardiovascular:      Rate and Rhythm:  "Normal rate and regular rhythm.      Heart sounds: Normal heart sounds. No murmur heard.    No friction rub. No gallop.   Pulmonary:      Effort: Pulmonary effort is normal.      Breath sounds: Normal breath sounds.   Abdominal:      General: Bowel sounds are normal.      Palpations: Abdomen is soft.   Musculoskeletal:         General: Normal range of motion.      Cervical back: Normal range of motion and neck supple.   Skin:     General: Skin is warm and dry.      Capillary Refill: Capillary refill takes less than 2 seconds.   Neurological:      Mental Status: He is alert and oriented to person, place, and time.      GCS: GCS eye subscore is 4. GCS verbal subscore is 5. GCS motor subscore is 6.      Cranial Nerves: No dysarthria or facial asymmetry.      Motor: No weakness.      Coordination: Coordination normal.      Comments: Nonfocal neuro exam.   Psychiatric:         Mood and Affect: Mood normal.         Speech: Speech normal.         Behavior: Behavior normal.         Procedures           ED Course  ED Course as of 03/19/22 0052   Fri Mar 18, 2022   2225 Case discussed with hospitalist for admission [LB]      ED Course User Index  [LB] Stephanie Parker, KEYLA      /69 (BP Location: Left arm, Patient Position: Lying)   Pulse 66   Temp 98.3 °F (36.8 °C) (Oral)   Resp 18   Ht 172.7 cm (68\")   Wt 90.8 kg (200 lb 2.8 oz)   SpO2 99%   BMI 30.44 kg/m²   Labs Reviewed   COMPREHENSIVE METABOLIC PANEL - Abnormal; Notable for the following components:       Result Value    BUN 25 (*)     Creatinine 1.55 (*)     eGFR 51.6 (*)     All other components within normal limits    Narrative:     GFR Normal >60  Chronic Kidney Disease <60  Kidney Failure <15     URINALYSIS W/ MICROSCOPIC IF INDICATED (NO CULTURE) - Abnormal; Notable for the following components:    Glucose,  mg/dL (2+) (*)     All other components within normal limits    Narrative:     Urine microscopic not indicated.   CBC WITH AUTO " DIFFERENTIAL - Abnormal; Notable for the following components:    Hemoglobin 12.3 (*)     MCV 72.7 (*)     MCH 22.7 (*)     MCHC 31.3 (*)     RDW 18.8 (*)     Neutrophil % 79.2 (*)     Lymphocyte % 11.3 (*)     Neutrophils, Absolute 8.60 (*)     All other components within normal limits   LIPID PANEL - Abnormal; Notable for the following components:    Triglycerides 205 (*)     All other components within normal limits    Narrative:     Cholesterol Reference Ranges  (U.S. Department of Health and Human Services ATP III Classifications)    Desirable          <200 mg/dL  Borderline High    200-239 mg/dL  High Risk          >240 mg/dL      Triglyceride Reference Ranges  (U.S. Department of Health and Human Services ATP III Classifications)    Normal           <150 mg/dL  Borderline High  150-199 mg/dL  High             200-499 mg/dL  Very High        >500 mg/dL    HDL Reference Ranges  (U.S. Department of Health and Human Services ATP III Classifications)    Low     <40 mg/dl (major risk factor for CHD)  High    >60 mg/dl ('negative' risk factor for CHD)        LDL Reference Ranges  (U.S. Department of Health and Human Services ATP III Classifications)    Optimal          <100 mg/dL  Near Optimal     100-129 mg/dL  Borderline High  130-159 mg/dL  High             160-189 mg/dL  Very High        >189 mg/dL   POCT GLUCOSE FINGERSTICK - Abnormal; Notable for the following components:    Glucose 60 (*)     All other components within normal limits   COVID-19,CEPHEID/MURALI,COR/FINA/PAD/KULDIP IN-HOUSE,NP SWAB IN TRANSPORT MEDIA 3-4 HR TAT, RT-PCR - Normal    Narrative:     Fact sheet for providers: https://www.fda.gov/media/892723/download     Fact sheet for patients: https://www.fda.gov/media/747653/download  Fact sheet for providers: https://www.fda.gov/media/502508/download     Fact sheet for patients: https://www.fda.gov/media/287835/download   BNP (IN-HOUSE) - Normal    Narrative:     Among patients with dyspnea, NT-proBNP  is highly sensitive for the detection of acute congestive heart failure. In addition NT-proBNP of <300 pg/ml effectively rules out acute congestive heart failure with 99% negative predictive value.    Results may be falsely decreased if patient taking Biotin.     TROPONIN (IN-HOUSE) - Normal    Narrative:     Troponin T Reference Range:  <= 0.03 ng/mL-   Negative for AMI  >0.03 ng/mL-     Abnormal for myocardial necrosis.  Clinicians would have to utilize clinical acumen, EKG, Troponin and serial changes to determine if it is an Acute Myocardial Infarction or myocardial injury due to an underlying chronic condition.       Results may be falsely decreased if patient taking Biotin.     URINE DRUG SCREEN - Normal    Narrative:     Negative Thresholds Per Drugs Screened:    Amphetamines                 500 ng/ml  Barbiturates                 200 ng/ml  Benzodiazepines              100 ng/ml  Cocaine                      300 ng/ml  Methadone                    300 ng/ml  Opiates                      300 ng/ml  Oxycodone                    100 ng/ml  THC                           50 ng/ml    The Normal Value for all drugs tested is negative. This report includes final unconfirmed screening results to be used for medical treatment purposes only. Unconfirmed results must not be used for non-medical purposes such as employment or legal testing. Clinical consideration should be applied to any drug of abuse test, particularly when unconfirmed results are used.          All urine drugs of abuse requests without chain of custody are for medical screening purposes only.  False positives are possible.     TSH - Normal   COVID PRE-OP / PRE-PROCEDURE SCREENING ORDER (NO ISOLATION)    Narrative:     The following orders were created for panel order COVID PRE-OP / PRE-PROCEDURE SCREENING ORDER (NO ISOLATION) - Swab, Nasopharynx.  Procedure                               Abnormality         Status                     ---------                                -----------         ------                     COVID-19,CEPHEID/MURALI,CO...[[id number]]  Normal              Final result                 Please view results for these tests on the individual orders.   HEMOGLOBIN A1C   VITAMIN B12   BASIC METABOLIC PANEL   TSH   VITAMIN B12   POCT GLUCOSE FINGERSTICK   POCT GLUCOSE FINGERSTICK   POCT GLUCOSE FINGERSTICK   POCT GLUCOSE FINGERSTICK   POCT GLUCOSE FINGERSTICK   CBC AND DIFFERENTIAL    Narrative:     The following orders were created for panel order CBC & Differential.  Procedure                               Abnormality         Status                     ---------                               -----------         ------                     CBC Auto Differential[[id number]]        Abnormal            Final result               Scan Slide[[id number]]                                                                    Please view results for these tests on the individual orders.     Medications   sodium chloride 0.9 % flush 10 mL (has no administration in time range)   nitroglycerin (NITROSTAT) SL tablet 0.4 mg (has no administration in time range)   sodium chloride 0.9 % flush 10 mL (10 mL Intravenous Not Given 3/19/22 0033)   sodium chloride 0.9 % flush 10 mL (has no administration in time range)   acetaminophen (TYLENOL) tablet 650 mg (has no administration in time range)     Or   acetaminophen (TYLENOL) 160 MG/5ML solution 650 mg (has no administration in time range)     Or   acetaminophen (TYLENOL) suppository 650 mg (has no administration in time range)   aluminum-magnesium hydroxide-simethicone (MAALOX MAX) 400-400-40 MG/5ML suspension 15 mL (has no administration in time range)   ondansetron (ZOFRAN) tablet 4 mg (has no administration in time range)     Or   ondansetron (ZOFRAN) injection 4 mg (has no administration in time range)   melatonin tablet 5 mg (has no administration in time range)   atorvastatin (LIPITOR) tablet 80 mg (has no  administration in time range)   dextrose (GLUTOSE) oral gel 15 g (has no administration in time range)   dextrose (D50W) (25 g/50 mL) IV injection 25 g (has no administration in time range)   glucagon (human recombinant) (GLUCAGEN DIAGNOSTIC) 1 mg in sterile water (preservative free) 1 mL injection (has no administration in time range)   apixaban (ELIQUIS) tablet 5 mg (5 mg Oral Given 3/19/22 0044)   insulin lispro (ADMELOG) injection 0-7 Units (has no administration in time range)     And   insulin lispro (ADMELOG) injection 0-7 Units (has no administration in time range)   sennosides-docusate (PERICOLACE) 8.6-50 MG per tablet 2 tablet (has no administration in time range)     And   polyethylene glycol (MIRALAX) packet 17 g (has no administration in time range)     And   bisacodyl (DULCOLAX) EC tablet 5 mg (has no administration in time range)     And   bisacodyl (DULCOLAX) suppository 10 mg (has no administration in time range)   aspirin chewable tablet 81 mg (has no administration in time range)     Or   aspirin suppository 300 mg (has no administration in time range)   cyclobenzaprine (FLEXERIL) tablet 10 mg (has no administration in time range)   acetaminophen (TYLENOL) tablet 1,000 mg (1,000 mg Oral Given 3/18/22 2235)     CT Head Without Contrast    Result Date: 3/18/2022  No acute intracranial abnormality or change in the CT appearance of the brain since 08/02/2021.  Electronically Signed By-Joaquim Ruth DO On:3/18/2022 9:26 PM This report was finalized on [id number] by  Joaquim Ruth DO.    XR Chest 1 View    Result Date: 3/18/2022  No active cardiopulmonary disease.   Electronically Signed By-Joaquim Ruth DO On:3/18/2022 8:59 PM This report was finalized on [id number] by  Joaquim Ruth DO.               EKG reviewed by MD and VANDANA, our findings are: Sinus rhythm rate of 61.  Borderline EKG.  Compared to previous 8/2/2021                                      MDM  Number of Diagnoses or Management  Options  Altered mental status, unspecified altered mental status type  Diagnosis management comments: Appropriate PPE was worn during the duration of the care for this patient while in the emergency department per [facility] Policy  Chart review: Recent ED visit 1/4/2022 for left leg swelling and fever.    Differentials: Stroke, hypoglycemia, infection  This list is not all inclusive and does not constitute the entireity of considered causes.   Patient was brought back to the emergency department room for evaluation and placed on appropriate monitoring.    Patient had IV established and blood work obtained  Imaging: Viewed by me, interpreted per radiologist.  Chest x-rays negative.  No acute intercranial abnormality.  Unchanged since 8/2/2021   Labs: CBC, troponin, BNP and CMP unremarkable.  UA not give infection.  EKG: As above    ED Course, Workup, Summary and Disposition: Patient of the above exam and work-up for altered mental status, confusion.  He has nonfocal neuro exam here in the ED.  However given this report of altered mental status he will be admitted to hospitalist for further evaluation and management.    I discussed with the patient their test results, work-up here in the emergency department, and need for admission and further evaluation. Patient is agreeable to the plan of care. At time of disposition patients VS are non concerning, and without acute distress.  Opportunity was provided for questions at the bedside, all questions and concerns were addressed.                                     Amount and/or Complexity of Data Reviewed  Clinical lab tests: reviewed  Tests in the radiology section of CPT®: reviewed  Tests in the medicine section of CPT®: reviewed  Review and summarize past medical records: yes  Discuss the patient with other providers: yes  Independent visualization of images, tracings, or specimens: yes        Final diagnoses:   Altered mental status, unspecified altered mental  status type       ED Disposition  ED Disposition     ED Disposition   Decision to Admit    Condition   --    Comment   Level of Care: Telemetry [5]   Diagnosis: Acute encephalopathy [[id number]]   Admitting Physician: FARAZ BARBER [[id number]]   Attending Physician: FARAZ BARBER [[id number]]   Bed Request Comments: MARIA LUZ               No follow-up provider specified.       Medication List      No changes were made to your prescriptions during this visit.          Stephanie Parker, APRN  03/19/22 0052

## 2022-03-19 ENCOUNTER — APPOINTMENT (OUTPATIENT)
Dept: CARDIOLOGY | Facility: HOSPITAL | Age: 57
End: 2022-03-19

## 2022-03-19 ENCOUNTER — APPOINTMENT (OUTPATIENT)
Dept: MRI IMAGING | Facility: HOSPITAL | Age: 57
End: 2022-03-19

## 2022-03-19 PROBLEM — I10 PRIMARY HYPERTENSION: Status: ACTIVE | Noted: 2022-03-19

## 2022-03-19 PROBLEM — I63.9 ACUTE CVA (CEREBROVASCULAR ACCIDENT) (HCC): Status: ACTIVE | Noted: 2022-03-19

## 2022-03-19 LAB
ANION GAP SERPL CALCULATED.3IONS-SCNC: 13 MMOL/L (ref 5–15)
BH CV ECHO MEAS - ACS: 1.47 CM
BH CV ECHO MEAS - AO MAX PG: 6.7 MMHG
BH CV ECHO MEAS - AO MEAN PG: 3.8 MMHG
BH CV ECHO MEAS - AO ROOT DIAM: 3 CM
BH CV ECHO MEAS - AO V2 MAX: 129.3 CM/SEC
BH CV ECHO MEAS - AO V2 VTI: 28.5 CM
BH CV ECHO MEAS - AVA(I,D): 3 CM2
BH CV ECHO MEAS - EDV(CUBED): 98 ML
BH CV ECHO MEAS - EDV(MOD-SP4): 50.9 ML
BH CV ECHO MEAS - EF(MOD-BP): 75 %
BH CV ECHO MEAS - EF(MOD-SP4): 74.8 %
BH CV ECHO MEAS - ESV(CUBED): 21.9 ML
BH CV ECHO MEAS - ESV(MOD-SP4): 12.8 ML
BH CV ECHO MEAS - FS: 39.4 %
BH CV ECHO MEAS - IVS/LVPW: 0.97 CM
BH CV ECHO MEAS - IVSD: 1.02 CM
BH CV ECHO MEAS - LA DIMENSION(2D): 3.9 CM
BH CV ECHO MEAS - LV DIASTOLIC VOL/BSA (35-75): 25.1 CM2
BH CV ECHO MEAS - LV MASS(C)D: 167.7 GRAMS
BH CV ECHO MEAS - LV MAX PG: 5.8 MMHG
BH CV ECHO MEAS - LV MEAN PG: 2.9 MMHG
BH CV ECHO MEAS - LV SYSTOLIC VOL/BSA (12-30): 6.3 CM2
BH CV ECHO MEAS - LV V1 MAX: 120.6 CM/SEC
BH CV ECHO MEAS - LV V1 VTI: 26.2 CM
BH CV ECHO MEAS - LVIDD: 4.6 CM
BH CV ECHO MEAS - LVIDS: 2.8 CM
BH CV ECHO MEAS - LVOT AREA: 3.3 CM2
BH CV ECHO MEAS - LVOT DIAM: 2.05 CM
BH CV ECHO MEAS - LVPWD: 1.05 CM
BH CV ECHO MEAS - MV A MAX VEL: 112.8 CM/SEC
BH CV ECHO MEAS - MV DEC SLOPE: 454.6 CM/SEC2
BH CV ECHO MEAS - MV DEC TIME: 0.21 MSEC
BH CV ECHO MEAS - MV E MAX VEL: 96.1 CM/SEC
BH CV ECHO MEAS - MV E/A: 0.85
BH CV ECHO MEAS - MV MAX PG: 4.9 MMHG
BH CV ECHO MEAS - MV MEAN PG: 1.94 MMHG
BH CV ECHO MEAS - MV V2 VTI: 29 CM
BH CV ECHO MEAS - MVA(VTI): 3 CM2
BH CV ECHO MEAS - PA ACC TIME: 0.09 SEC
BH CV ECHO MEAS - PA PR(ACCEL): 40.1 MMHG
BH CV ECHO MEAS - PA V2 MAX: 104.2 CM/SEC
BH CV ECHO MEAS - PULM A REVS DUR: 0.09 SEC
BH CV ECHO MEAS - PULM A REVS VEL: 28.6 CM/SEC
BH CV ECHO MEAS - PULM DIAS VEL: 46.3 CM/SEC
BH CV ECHO MEAS - PULM SYS VEL: 74.1 CM/SEC
BH CV ECHO MEAS - RAP SYSTOLE: 3 MMHG
BH CV ECHO MEAS - RV MAX PG: 1.38 MMHG
BH CV ECHO MEAS - RV V1 MAX: 58.7 CM/SEC
BH CV ECHO MEAS - RV V1 VTI: 13.6 CM
BH CV ECHO MEAS - RVDD: 2.04 CM
BH CV ECHO MEAS - SI(MOD-SP4): 18.8 ML/M2
BH CV ECHO MEAS - SV(LVOT): 86.3 ML
BH CV ECHO MEAS - SV(MOD-SP4): 38.1 ML
BH CV XLRA MEAS LEFT DIST CCA EDV: 30.6 CM/SEC
BH CV XLRA MEAS LEFT DIST CCA PSV: 128 CM/SEC
BH CV XLRA MEAS LEFT DIST ICA EDV: -27.3 CM/SEC
BH CV XLRA MEAS LEFT DIST ICA PSV: -82 CM/SEC
BH CV XLRA MEAS LEFT ICA/CCA RATIO: 0.8
BH CV XLRA MEAS LEFT PROX CCA EDV: 24.3 CM/SEC
BH CV XLRA MEAS LEFT PROX CCA PSV: 129 CM/SEC
BH CV XLRA MEAS LEFT PROX ECA PSV: -220 CM/SEC
BH CV XLRA MEAS LEFT PROX ICA EDV: -32.9 CM/SEC
BH CV XLRA MEAS LEFT PROX ICA PSV: -103 CM/SEC
BH CV XLRA MEAS LEFT PROX SCLA PSV: -90.4 CM/SEC
BH CV XLRA MEAS LEFT VERTEBRAL A PSV: -43.2 CM/SEC
BH CV XLRA MEAS RIGHT DIST CCA EDV: 29.2 CM/SEC
BH CV XLRA MEAS RIGHT DIST CCA PSV: 91.9 CM/SEC
BH CV XLRA MEAS RIGHT DIST ICA EDV: -31.4 CM/SEC
BH CV XLRA MEAS RIGHT DIST ICA PSV: -114 CM/SEC
BH CV XLRA MEAS RIGHT ICA/CCA RATIO: -1.22
BH CV XLRA MEAS RIGHT PROX CCA EDV: 19.3 CM/SEC
BH CV XLRA MEAS RIGHT PROX CCA PSV: 93.8 CM/SEC
BH CV XLRA MEAS RIGHT PROX ECA PSV: -331 CM/SEC
BH CV XLRA MEAS RIGHT PROX ICA EDV: -35.4 CM/SEC
BH CV XLRA MEAS RIGHT PROX ICA PSV: -91.3 CM/SEC
BH CV XLRA MEAS RIGHT PROX SCLA PSV: 209 CM/SEC
BH CV XLRA MEAS RIGHT VERTEBRAL A PSV: -49.4 CM/SEC
BUN SERPL-MCNC: 26 MG/DL (ref 6–20)
BUN/CREAT SERPL: 17.6 (ref 7–25)
CALCIUM SPEC-SCNC: 9.2 MG/DL (ref 8.6–10.5)
CHLORIDE SERPL-SCNC: 100 MMOL/L (ref 98–107)
CO2 SERPL-SCNC: 26 MMOL/L (ref 22–29)
CREAT SERPL-MCNC: 1.48 MG/DL (ref 0.76–1.27)
EGFRCR SERPLBLD CKD-EPI 2021: 54.5 ML/MIN/1.73
GLUCOSE BLDC GLUCOMTR-MCNC: 140 MG/DL (ref 70–105)
GLUCOSE BLDC GLUCOMTR-MCNC: 170 MG/DL (ref 70–105)
GLUCOSE BLDC GLUCOMTR-MCNC: 200 MG/DL (ref 70–105)
GLUCOSE SERPL-MCNC: 202 MG/DL (ref 65–99)
HBA1C MFR BLD: 7.6 % (ref 3.5–5.6)
MAXIMAL PREDICTED HEART RATE: 162 BPM
MAXIMAL PREDICTED HEART RATE: 162 BPM
POTASSIUM SERPL-SCNC: 4.3 MMOL/L (ref 3.5–5.2)
RIGHT ARM BP: NORMAL MMHG
SODIUM SERPL-SCNC: 139 MMOL/L (ref 136–145)
STRESS TARGET HR: 138 BPM
STRESS TARGET HR: 138 BPM
TSH SERPL DL<=0.05 MIU/L-ACNC: 4.51 UIU/ML (ref 0.27–4.2)
VIT B12 BLD-MCNC: 422 PG/ML (ref 211–946)

## 2022-03-19 PROCEDURE — 97162 PT EVAL MOD COMPLEX 30 MIN: CPT

## 2022-03-19 PROCEDURE — 99226 PR SBSQ OBSERVATION CARE/DAY 35 MINUTES: CPT | Performed by: INTERNAL MEDICINE

## 2022-03-19 PROCEDURE — 82607 VITAMIN B-12: CPT | Performed by: INTERNAL MEDICINE

## 2022-03-19 PROCEDURE — 96374 THER/PROPH/DIAG INJ IV PUSH: CPT

## 2022-03-19 PROCEDURE — 25010000002 LORAZEPAM PER 2 MG: Performed by: INTERNAL MEDICINE

## 2022-03-19 PROCEDURE — 84443 ASSAY THYROID STIM HORMONE: CPT | Performed by: INTERNAL MEDICINE

## 2022-03-19 PROCEDURE — 80048 BASIC METABOLIC PNL TOTAL CA: CPT | Performed by: NURSE PRACTITIONER

## 2022-03-19 PROCEDURE — 99214 OFFICE O/P EST MOD 30 MIN: CPT | Performed by: NURSE PRACTITIONER

## 2022-03-19 PROCEDURE — 82962 GLUCOSE BLOOD TEST: CPT

## 2022-03-19 PROCEDURE — 93306 TTE W/DOPPLER COMPLETE: CPT | Performed by: INTERNAL MEDICINE

## 2022-03-19 PROCEDURE — G0378 HOSPITAL OBSERVATION PER HR: HCPCS

## 2022-03-19 PROCEDURE — 93880 EXTRACRANIAL BILAT STUDY: CPT

## 2022-03-19 PROCEDURE — 63710000001 INSULIN LISPRO (HUMAN) PER 5 UNITS: Performed by: NURSE PRACTITIONER

## 2022-03-19 PROCEDURE — 36415 COLL VENOUS BLD VENIPUNCTURE: CPT | Performed by: NURSE PRACTITIONER

## 2022-03-19 PROCEDURE — 70551 MRI BRAIN STEM W/O DYE: CPT

## 2022-03-19 PROCEDURE — 93306 TTE W/DOPPLER COMPLETE: CPT

## 2022-03-19 RX ORDER — LORAZEPAM 2 MG/ML
0.5 INJECTION INTRAMUSCULAR EVERY 4 HOURS PRN
Status: DISCONTINUED | OUTPATIENT
Start: 2022-03-19 | End: 2022-03-20 | Stop reason: HOSPADM

## 2022-03-19 RX ORDER — LORAZEPAM 2 MG/ML
1 INJECTION INTRAMUSCULAR ONCE
Status: COMPLETED | OUTPATIENT
Start: 2022-03-19 | End: 2022-03-19

## 2022-03-19 RX ORDER — BACLOFEN 10 MG/1
10 TABLET ORAL 2 TIMES DAILY
Status: COMPLETED | OUTPATIENT
Start: 2022-03-19 | End: 2022-03-19

## 2022-03-19 RX ORDER — BUTALBITAL, ACETAMINOPHEN AND CAFFEINE 50; 325; 40 MG/1; MG/1; MG/1
1 TABLET ORAL EVERY 4 HOURS PRN
Status: DISCONTINUED | OUTPATIENT
Start: 2022-03-19 | End: 2022-03-19 | Stop reason: SDUPTHER

## 2022-03-19 RX ORDER — BACLOFEN 10 MG/1
10 TABLET ORAL 2 TIMES DAILY PRN
Status: DISCONTINUED | OUTPATIENT
Start: 2022-03-20 | End: 2022-03-20 | Stop reason: HOSPADM

## 2022-03-19 RX ORDER — BUTALBITAL, ACETAMINOPHEN AND CAFFEINE 50; 325; 40 MG/1; MG/1; MG/1
2 TABLET ORAL 2 TIMES DAILY PRN
Status: DISCONTINUED | OUTPATIENT
Start: 2022-03-19 | End: 2022-03-20 | Stop reason: HOSPADM

## 2022-03-19 RX ADMIN — SENNOSIDES AND DOCUSATE SODIUM 2 TABLET: 50; 8.6 TABLET ORAL at 21:28

## 2022-03-19 RX ADMIN — ATORVASTATIN CALCIUM 80 MG: 40 TABLET, FILM COATED ORAL at 21:27

## 2022-03-19 RX ADMIN — SENNOSIDES AND DOCUSATE SODIUM 2 TABLET: 50; 8.6 TABLET ORAL at 08:30

## 2022-03-19 RX ADMIN — INSULIN LISPRO 3 UNITS: 100 INJECTION, SOLUTION INTRAVENOUS; SUBCUTANEOUS at 08:30

## 2022-03-19 RX ADMIN — APIXABAN 5 MG: 5 TABLET, FILM COATED ORAL at 08:30

## 2022-03-19 RX ADMIN — LORAZEPAM 1 MG: 2 INJECTION INTRAMUSCULAR; INTRAVENOUS at 15:52

## 2022-03-19 RX ADMIN — APIXABAN 5 MG: 5 TABLET, FILM COATED ORAL at 00:44

## 2022-03-19 RX ADMIN — BACLOFEN 10 MG: 10 TABLET ORAL at 21:27

## 2022-03-19 RX ADMIN — Medication 10 ML: at 21:26

## 2022-03-19 RX ADMIN — APIXABAN 5 MG: 5 TABLET, FILM COATED ORAL at 21:27

## 2022-03-19 RX ADMIN — BACLOFEN 10 MG: 10 TABLET ORAL at 15:14

## 2022-03-19 RX ADMIN — INSULIN LISPRO 3 UNITS: 100 INJECTION, SOLUTION INTRAVENOUS; SUBCUTANEOUS at 12:50

## 2022-03-19 RX ADMIN — ATORVASTATIN CALCIUM 80 MG: 40 TABLET, FILM COATED ORAL at 01:45

## 2022-03-19 RX ADMIN — Medication 10 ML: at 08:30

## 2022-03-19 RX ADMIN — BUTALBITAL, ACETAMINOPHEN AND CAFFEINE 1 TABLET: 50; 325; 40 TABLET ORAL at 12:50

## 2022-03-19 RX ADMIN — ASPIRIN 81 MG CHEWABLE TABLET 81 MG: 81 TABLET CHEWABLE at 08:30

## 2022-03-19 RX ADMIN — INSULIN LISPRO 3 UNITS: 100 INJECTION, SOLUTION INTRAVENOUS; SUBCUTANEOUS at 18:02

## 2022-03-19 NOTE — PLAN OF CARE
Goal Outcome Evaluation:  Plan of Care Reviewed With: patient        Progress: no change  Outcome Evaluation:     SLP orders generated via stroke order set. Pt has passed swallow screen in ED, placed on regular diet per MD orders. Skilled bedside swallow evaluation deferred at this time.     Initial complaints of memory loss, confusion, aphasia. CT head negative for acute CVA, with MRI and neurology workup pending.     SLP will follow peripherally pending clinical course to determine if speech language evaluation is indicated.

## 2022-03-19 NOTE — PROGRESS NOTES
[facility] Medicine Services Daily Progress Note    Patient Name: Kuldeep Adhikari  : 1965  MRN: [id number]  Primary Care Physician:  Laura Whitaker MD  Date of admission: 3/18/2022      Subjective      Chief Complaint: Altered mental status.      Patient Reports no overnight events.    Review of Systems   Constitutional: Negative.   Eyes: Negative.    Cardiovascular: Negative.    Respiratory: Negative.    Endocrine: Negative.    Hematologic/Lymphatic: Negative.    Skin: Negative.    Musculoskeletal: Negative.    Gastrointestinal: Negative.    Genitourinary: Negative.    Neurological: Positive for headaches.   Psychiatric/Behavioral: Negative.    Allergic/Immunologic: Negative.             Objective      Vitals:   Temp:  [97.7 °F (36.5 °C)-99.2 °F (37.3 °C)] 98.2 °F (36.8 °C)  Heart Rate:  [59-98] 75  Resp:  [13-18] 13  BP: (120-160)/(52-77) 132/76    Physical Exam  Vitals and nursing note reviewed.   Constitutional:       General: He is not in acute distress.  HENT:      Head: Normocephalic.      Nose: Nose normal.      Mouth/Throat:      Mouth: Mucous membranes are dry.      Pharynx: Oropharynx is clear.   Eyes:      Extraocular Movements: Extraocular movements intact.      Conjunctiva/sclera: Conjunctivae normal.      Pupils: Pupils are equal, round, and reactive to light.   Cardiovascular:      Pulses: Normal pulses.      Heart sounds: No murmur heard.    No friction rub. No gallop.      Comments: S1 and S2 present.  No tachycardia.  Pulmonary:      Breath sounds: No stridor. No wheezing or rales.   Chest:      Chest wall: No tenderness.   Abdominal:      General: Bowel sounds are normal. There is no distension.      Palpations: Abdomen is soft.      Tenderness: There is no abdominal tenderness. There is no right CVA tenderness or guarding.   Musculoskeletal:         General: No swelling, tenderness, deformity or signs of injury.      Cervical back: Normal range of  motion. No rigidity.      Right lower leg: No edema.      Left lower leg: No edema.      Comments: Right BKA noted.   Skin:     General: Skin is warm and dry.      Capillary Refill: Capillary refill takes less than 2 seconds.      Coloration: Skin is not jaundiced.      Findings: No bruising, erythema, lesion or rash.   Neurological:      Mental Status: He is alert.      Comments: No facial asymmetry noted.  Gait and station not tested.   Psychiatric:      Comments: No agitation.               Result Review    Result Review:  I have personally reviewed the results from the time of this admission to 3/19/2022 14:40 EDT and agree with these findings:  [x]  Laboratory  [x]  Microbiology  [x]  Radiology  []  EKG/Telemetry   []  Cardiology/Vascular   []  Pathology  []  Old records  []  Other:  Most notable findings include:           Assessment/Plan    From previous note and with minor updates.  Brief Patient Summary:  Patient is a 57 y.o. male with a history of pulmonary embolism on anticoagulation, CVA, Type 2 DM, HTN, HLD, PVD and right BKA who presented to the ER at [facility] on 3/18/2022 due to altered mental status. The patient is currently alert and oriented x 3. He states he was at work earlier today when he lost track of approximately 1 hour of time. He states he was told by his coworkers that they were concerned because he would just stare at them and not talk. He states upon EMS arrival his glucose was 74. He states he took 12 or 15 units of insulin at 11:30 am, but didn't eat much. He states yesterday he had an episode where he felt dizzy and lightheaded. He reports near syncope. He denies any chest pain, shortness of breath, or palpitations. He is now complaining of a tension-type headache. He states he gets them frequently. He reports increased stress at home. He denies any other complaints.   Patient was seen in the emergency room and workup in the ER was unremarkable. The patient was admitted  to the Hospitalist group for further evaluation.   Neurology consult was completed.       apixaban, 5 mg, Oral, Q12H  aspirin, 81 mg, Oral, Daily   Or  aspirin, 300 mg, Rectal, Daily  atorvastatin, 80 mg, Oral, Nightly  baclofen, 10 mg, Oral, BID  insulin lispro, 0-7 Units, Subcutaneous, TID AC  senna-docusate sodium, 2 tablet, Oral, BID  sodium chloride, 10 mL, Intravenous, Q12H             Active Hospital Problems:  Active Hospital Problems    Diagnosis    •  Rule out acute CVA (cerebrovascular accident) (HCC)  Follow MRI results.  Follow neurology recommendations.         • Acute encephalopathy  Treat with supportive care.      • History of CVA (cerebrovascular accident)    • Acute tension-type headache  Treat with Fioricet.      • Primary hypertension  Treat with hydralazine as needed.      • S/P BKA (below knee amputation), right (HCC)    • Type 2 diabetes mellitus with peripheral vascular disease (HCC)  Treat with insulin therapy.      • Obesity (BMI 30-39.9)  Encourage lifestyle modifications.      • Coronary artery disease involving native coronary artery of native heart without angina pectoris  Treat with antithrombotic therapy, aspirin.      • Obstructive sleep apnea syndrome    • Diabetic peripheral neuropathy (HCC)  Treat with gabapentin.      • Acquired hypothyroidism  Treat with Synthroid.      • Chronic renal impairment, stage 3a (HCC)  Continue to monitor.      • GERD without esophagitis  Treat with Protonix.      • Major depressive disorder, recurrent episode, severe (HCC)    • Mixed hyperlipidemia  Treat with Lipitor.      • Peripheral vascular disease (HCC)    • History of pulmonary embolism      Continue appropriate patient's home medications for other chronic medical conditions.  Continue the present level of care.  Patient and family agreed with the plan of care.      DVT prophylaxis:  Medical and mechanical DVT prophylaxis orders are present.    CODE STATUS:    Code Status (Patient has no  pulse and is not breathing): CPR (Attempt to Resuscitate)  Medical Interventions (Patient has pulse or is breathing): Full Support      Disposition: Pending patient's clinical improvement..    This patient has been examined wearing appropriate Personal Protective Equipment and discussed with hospital infection control department, [facility], infectious disease specialist and pulmonologist. 03/19/22      Electronically signed by Behzad Plaza MD, FACP, 03/19/22, 14:40 EDT.      [facility] Hospitalist Team

## 2022-03-19 NOTE — CONSULTS
Primary Care Provider: Laura Whitaker*     Consult requested by:  KEYLA Salazar    Reason for Consultation: Neurological evaluation     History taken from: patient chart RN    Chief complaint: altered mental status       SUBJECTIVE:    History of present illness: Background per H&P: Kuldeep Adhikari is a 57 y.o. year old male who presented to the ER at [facility] on 3/18/2022 due to altered mental status. The patient noted to be alert and oriented x 3 on arrival. He states he was at work that morning when he lost track of approximately 1 hour of time. He reports that this has happened when is blood sugar is low. He states he was told by his coworkers that they were concerned because he would just stare at them and not talk. He states upon EMS arrival his glucose was 74. He states he took 12 or 15 units of insulin at 11:30 am, but didn't eat much. He states yesterday he had an episode where he felt dizzy and lightheaded. He reports near syncope. He denies any chest pain, shortness of breath, or palpitations. He is now complaining of a tension-type headache. He states he gets them frequently. He reports increased stress at home. He denies any other complaints.     Pt states he did not lose consciousness and could hear his co-workers talking, but he could not respond. He reports having tunnel vision and feeling sweaty. He did have a headache, but reports that is chronic. No focal deficits. He was observed by co-workers the entire time and did not lose consciousness and no seizures noted. He states his BS was 60 and he thinks that may have been part of the issue. He also reports non-compliance with his CPAP.   - Portions of the above HPI were copied from previous encounters and edited as appropriate. PMH as detailed below.     Records reviewed: Pt previously evaluated by neurology in August 2021 for right sided numbness. Pt found to have a small vessel stroke within the left basal ganglia. CTA was  unremarkable. Echo showed EF 70% and a calcified nodule on the mitral valve. At that time, pt reported compliance with Eliquis which he was taking for a hx of PE. He stated he had not been taking his BP meds because he needed a new prescription.  He also reported non-compliance with his CPAP. Pt was discharged on ASA, Eliquis, and statin which records show he is still taking.      Pt reports increased stress at home over the past few months. His wife has apparently filed for divorce and his relationship with his adult children has been strained. He states he is lonely and wants to talk. He reports having SI on a previous admission, but currently denies SI/HI.     Pt c/o chronic daily headaches over the past several months that feels like a tight band around his head. No vision changes, speech changes, or focal deficits.     Pt states he feels back to baseline and denies any complaints except as detailed above and his chronic tension headache is still present.     Review of Systems   Constitutional: Negative for chills, diaphoresis and fatigue.   Neurological: Negative for dizziness, tremors, seizures, syncope, facial asymmetry, speech difficulty, weakness, light-headedness, numbness and headaches.          PATIENT HISTORY:  Past Medical History:   Diagnosis Date   • CKD (chronic kidney disease), stage III ([facility])    • Depression    • Depression with suicidal ideation 08/08/2021   • DJD (degenerative joint disease)    • DVT (deep venous thrombosis) ([facility])    • Ganglion     rt wrist   • Gangrene of foot ([facility]) 10/09/2015   • GERD (gastroesophageal reflux disease)    • Headache    • Heart murmur    • Hiatal hernia    • History of esophageal stricture     s/p dialation 40-50 times last EGD 04/2016   • Hyperlipidemia    • Hypertension    • Hypothyroidism    • IBS (irritable bowel syndrome)    • Neuropathy    • Osteomyelitis of right foot ([facility]) 03/19/2020   • Pulmonary embolism ([facility]) 01/19/2017   • Rash     rt lower hip   •  Retinopathy    • S/P BKA (below knee amputation), right ([facility]) 03/18/2022   • Seasonal allergies    • Sepsis due to group B Streptococcus ([facility]) 03/19/2020   • Sleep apnea    • Type 2 diabetes mellitus with peripheral vascular disease ([facility])    • Vitamin D deficiency    ,   Past Surgical History:   Procedure Laterality Date   • AMPUTATION FOOT / TOE Right     great toe   • AMPUTATION REVISION Right 4/8/2021    Procedure: BELOW KNEE AMPUTATION REVISAION;  Surgeon: Eduardo Reynolds MD;  Location: [facility] MAIN OR;  Service: Orthopedics;  Laterality: Right;   • AMPUTATION REVISION Right 4/29/2021    Procedure: AMPUTATION REVISION KNEE STUMP;  Surgeon: Eduardo Reynolds MD;  Location: [facility] MAIN OR;  Service: Orthopedics;  Laterality: Right;   • BELOW KNEE AMPUTATION Right 7/30/2020    Procedure: AMPUTATION BELOW KNEE;  Surgeon: Eduardo Reynolds MD;  Location: [facility] MAIN OR;  Service: Orthopedics;  Laterality: Right;   • CARDIAC CATHETERIZATION  04/2018    [facility]   • COLONOSCOPY     • ENDOSCOPY     • ENDOSCOPY N/A 10/4/2019    Procedure: ESOPHAGOGASTRODUODENOSCOPY with dilitation and biopsy x 1 area;  Surgeon: Declan Iqbal MD;  Location: [facility] ENDOSCOPY;  Service: Gastroenterology   • ENDOSCOPY N/A 12/13/2019    Procedure: ESOPHAGOGASTRODUODENOSCOPY WITH DILATATION (50, 52 BOUGIE);  Surgeon: Declan Iqbal MD;  Location: [facility] ENDOSCOPY;  Service: Gastroenterology   • ENDOSCOPY N/A 8/6/2021    Procedure: ESOPHAGOGASTRODUODENOSCOPY with biopsy x1 area and esophageal dilation (56FR Bougie);  Surgeon: Hussein Talavera MD;  Location: [facility] ENDOSCOPY;  Service: Gastroenterology;  Laterality: N/A;  post: hiatal hernia, erosive gastritis   • EYE SURGERY     • GANGLION CYST EXCISION Left    • HERNIA REPAIR Bilateral    • JOINT REPLACEMENT      Left total hip   • RETINOPATHY SURGERY      laser   • TOTAL HIP ARTHROPLASTY Left    • TOTAL HIP ARTHROPLASTY Left 2018   • TRANS METATARSAL AMPUTATION Right 3/17/2020     Procedure: AMPUTATION TRANS METATARSAL right;  Surgeon: ERWIN Baker DPM;  Location: [facility] MAIN OR;  Service: Podiatry;  Laterality: Right;  GANGRENOUS RIGHT FOOT   • VASECTOMY     ,   Family History   Problem Relation Age of Onset   • Diabetes Mother         Patient  mom   • Heart disease Mother    • Arthritis Mother    • Hyperlipidemia Mother    • Leukemia Father    • Sleep apnea Maternal Aunt         GENO   • Stroke Maternal Grandmother    • Hypertension Other    • Hyperlipidemia Other    • Cancer Other    • Colon cancer Other         uncle   ,   Social History     Tobacco Use   • Smoking status: Never Smoker   • Smokeless tobacco: Never Used   Vaping Use   • Vaping Use: Never used   Substance Use Topics   • Alcohol use: No   • Drug use: No   ,   Prior to Admission medications    Medication Sig Start Date End Date Taking? Authorizing Provider   apixaban (Eliquis) 5 MG tablet tablet Take 1 tablet by mouth 2 (Two) Times a Day. 1/21/22  Yes Laura Whitaker MD   aspirin 81 MG EC tablet Take 1 tablet by mouth Daily. 8/9/21  Yes Endy Lazaro DO   atorvastatin (LIPITOR) 80 MG tablet Take 1 tablet by mouth Every Night. 1/21/22  Yes Laura Whitaker MD   busPIRone (BUSPAR) 5 MG tablet  11/20/21  Yes ProviderPadilla MD   cephalexin (KEFLEX) 500 MG capsule Take 1 capsule by mouth 3 (Three) Times a Day. 1/4/22  Yes Ian Palacios MD   cyclobenzaprine (FLEXERIL) 10 MG tablet Take I tab q 8 hrs as needed for tension headaches 1/21/22  Yes Laura Whitaker MD   DULoxetine (CYMBALTA) 60 MG capsule Take 1 capsule by mouth Every Morning. 1/21/22  Yes Laura Whitaker MD   empagliflozin (Jardiance) 10 MG tablet tablet Take 1 tablet by mouth Daily. 1/21/22  Yes Laura Whitaker MD   gabapentin (NEURONTIN) 400 MG capsule Take 1 capsule by mouth 3 (Three) Times a Day. 1/21/22  Yes Laura Whitaker MD   insulin aspart (NovoLOG FlexPen) 100 UNIT/ML solution  pen-injector sc pen Inject 20 Units under the skin into the appropriate area as directed 3 (Three) Times a Day With Meals. 1/18/22  Yes Tamara Michaels MD   Insulin Glargine (Lantus SoloStar) 100 UNIT/ML injection pen Inject 40 Units under the skin into the appropriate area as directed Every Night. 1/18/22  Yes Tamara Michaels MD   levothyroxine (SYNTHROID, LEVOTHROID) 100 MCG tablet Take 1 tablet by mouth Daily. 1/21/22  Yes Laura Whitaker MD   losartan (COZAAR) 50 MG tablet Take 1 tablet by mouth Daily. 1/21/22  Yes Laura Whitaker MD   metoprolol succinate XL (TOPROL-XL) 50 MG 24 hr tablet Take 1 tablet by mouth Daily. 1/21/22  Yes Laura Whitaker MD   omeprazole (priLOSEC) 40 MG capsule Take 1 capsule by mouth Daily. 1/25/22  Yes Laura Whitaker MD   ondansetron (ZOFRAN) 4 MG tablet Take 1 tablet by mouth Every 8 (Eight) Hours As Needed for Nausea or Vomiting. 1/21/22  Yes Laura Whitaker MD   SITagliptin-metFORMIN HCl ER (Janumet XR)  MG tablet Take 1 tablet by mouth 2 (Two) Times a Day. 1/21/22  Yes Laura Whitaker MD   benzonatate (TESSALON) 200 MG capsule Take 1 capsule by mouth 3 (Three) Times a Day As Needed for Cough. 1/21/22   Laura Whitaker MD   Continuous Blood Gluc Sensor (FreeStyle Mark 2 Sensor) misc 1 each Every 14 (Fourteen) Days. 2/2/22   Tamara Michaels MD    Allergies:  Lisinopril    Current Facility-Administered Medications   Medication Dose Route Frequency Provider Last Rate Last Admin   • acetaminophen (TYLENOL) tablet 650 mg  650 mg Oral Q4H PRN Cantu, Shanna, APRN        Or   • acetaminophen (TYLENOL) 160 MG/5ML solution 650 mg  650 mg Oral Q4H PRN Cantu, Shanna, APRN        Or   • acetaminophen (TYLENOL) suppository 650 mg  650 mg Rectal Q4H PRN Shanna Cantu, APRN       • aluminum-magnesium hydroxide-simethicone (MAALOX MAX) 400-400-40 MG/5ML suspension 15 mL  15 mL Oral Q6H PRN Shanna Cantu, APRN        • apixaban (ELIQUIS) tablet 5 mg  5 mg Oral Q12H Shanna Cantu APRN   5 mg at 03/19/22 0830   • aspirin chewable tablet 81 mg  81 mg Oral Daily Shanna Cantu APRN   81 mg at 03/19/22 0830    Or   • aspirin suppository 300 mg  300 mg Rectal Daily Shanna Cantu APRN       • atorvastatin (LIPITOR) tablet 80 mg  80 mg Oral Nightly Shanna Cantu APRN   80 mg at 03/19/22 0145   • sennosides-docusate (PERICOLACE) 8.6-50 MG per tablet 2 tablet  2 tablet Oral BID Shanna Cantu APRN   2 tablet at 03/19/22 0830    And   • polyethylene glycol (MIRALAX) packet 17 g  17 g Oral Daily PRN Shanna Cantu APRN        And   • bisacodyl (DULCOLAX) EC tablet 5 mg  5 mg Oral Daily PRN Shanna Cantu APRN        And   • bisacodyl (DULCOLAX) suppository 10 mg  10 mg Rectal Daily PRN Shanna Cantu APRN       • butalbital-acetaminophen-caffeine (FIORICET, ESGIC) -40 MG per tablet 1 tablet  1 tablet Oral Q4H PRN Behzad Plaza MD       • cyclobenzaprine (FLEXERIL) tablet 10 mg  10 mg Oral TID PRN Shanna Cantu APRN       • dextrose (D50W) (25 g/50 mL) IV injection 25 g  25 g Intravenous Q15 Min PRN Shanna Cantu APRN       • dextrose (GLUTOSE) oral gel 15 g  15 g Oral Q15 Min PRN Shanna Cantu APRN       • glucagon (human recombinant) (GLUCAGEN DIAGNOSTIC) 1 mg in sterile water (preservative free) 1 mL injection  1 mg Subcutaneous PRN Shanna Cantu APRN       • insulin lispro (ADMELOG) injection 0-7 Units  0-7 Units Subcutaneous TID AC Shanna Cantu APRN   3 Units at 03/19/22 0830    And   • insulin lispro (ADMELOG) injection 0-7 Units  0-7 Units Subcutaneous PRN Cantu, Shanna, APRN       • melatonin tablet 5 mg  5 mg Oral Nightly PRN Cantu, Shanna, APRN       • nitroglycerin (NITROSTAT) SL tablet 0.4 mg  0.4 mg Sublingual Q5 Min PRN Cantu, Shanna, APRN       • ondansetron (ZOFRAN) tablet 4 mg  4 mg Oral Q6H PRN Cantu, Shanna, APRN        Or   • ondansetron (ZOFRAN) injection 4 mg  4 mg Intravenous Q6H PRN Cantu, Shanna,  APRN       • sodium chloride 0.9 % flush 10 mL  10 mL Intravenous PRN Stephanie Parker, APRN       • sodium chloride 0.9 % flush 10 mL  10 mL Intravenous Q12H Shanna Cantu APRN   10 mL at 03/19/22 0830   • sodium chloride 0.9 % flush 10 mL  10 mL Intravenous PRN Shanna Cantu APRN            ________________________________________________________        OBJECTIVE:  Upon today's exam, pt is awake and alert. He is oriented, pleasant. Pt very open about his personal life and shares details regarding recent stress and family difficulties without prompting.      Neurologic Exam  PHYSICAL EXAM:    CONSTITUTIONAL: The patient is in no apparent distress, bright awake and alert. There is no shortness of breath.     HEENT: There is no tenderness over the temporal arteries bilaterally. Normocephalic, atraumatic. TMJ open symmetrically without tenderness.    NECK: ROM normal, supple    RESPIRATORY: Breathing unlabored, Breath sounds are normal    CARDIAC: Regular rate and rhythm.     EXTREMITIES:  No clubbing, cyanosis or edema.    PSYCHIATRIC: Mood/affect normal, judgement normal, appropriate    NEUROLOGICAL:    Cognition:   Fully oriented.  Fund of knowledge excellent.  Concentration and attention normal.   Language normal with normal comprehension, fluent speech, intact repetition and naming.   Short and long term memory appears intact    Cranial nerves;    II - pupils bilaterally equal reacting to light,  No new Visual field deficits;  Fundoscopic exam- Not able to be done, non-dilated exam  III,IV,VI: EOMI with no diplopia  V: Normal facial sensations  VII: No facial asymmetry,  VIII: No New hearing abnormality  IX, X, XI: normal gag and shoulder shrug;  XII: tongue is in the midline.    Sensory:  Intact to light touch in all extremities.     Motor: Strength 5/5 bilaterally upper and lower extremities. No involuntary movements present. Normal tone and bulk.  Deep tendon reflexes: 2/4 and symmetrical in biceps,  brachioradialis, triceps, bilateral 2/4 knees and ankles. Both plantars are flexor.    Cerebellar: Finger to nose and mirror movements normal bilaterally.    Gait and balance: deferred    ________________________________________________________   RESULTS REVIEW:    VITAL SIGNS:   Temp:  [97.7 °F (36.5 °C)-99.2 °F (37.3 °C)] 99.2 °F (37.3 °C)  Heart Rate:  [59-98] 98  Resp:  [16-18] 18  BP: (120-160)/(52-77) 128/70     LABS:      Lab 03/18/22 1922   WBC 10.80   HEMOGLOBIN 12.3*   HEMATOCRIT 39.3   PLATELETS 264   NEUTROS ABS 8.60*   LYMPHS ABS 1.20   MONOS ABS 0.70   EOS ABS 0.20   MCV 72.7*         Lab 03/19/22  0150 03/18/22 1922   SODIUM 139 137   POTASSIUM 4.3 5.2   CHLORIDE 100 100   CO2 26.0 24.0   ANION GAP 13.0 13.0   BUN 26* 25*   CREATININE 1.48* 1.55*   EGFR 54.5* 51.6*   GLUCOSE 202* 94   CALCIUM 9.2 9.2   TSH 4.510* 3.720         Lab 03/18/22 1922   TOTAL PROTEIN 7.2   ALBUMIN 4.30   GLOBULIN 2.9   ALT (SGPT) 22   AST (SGOT) 24   BILIRUBIN 0.9   ALK PHOS 108         Lab 03/18/22 1922   PROBNP 52.5   TROPONIN T <0.010         Lab 03/18/22 1922   CHOLESTEROL 158   LDL CHOL 81   HDL CHOL 43   TRIGLYCERIDES 205*             UA    Urinalysis 8/2/21 3/18/22   Specific [place], UA 1.044 (A) 1.020   Ketones, UA Negative Negative   Blood, UA Negative Negative   Leukocytes, UA Negative Negative   Nitrite, UA Negative Negative   (A) Abnormal value              Lab Results   Component Value Date    TSH 4.510 (H) 03/19/2022    LDL 81 03/18/2022    HGBA1C 11.2 (H) 08/02/2021    [id number] 315 08/02/2021       IMAGING STUDIES:  CT Head Without Contrast    Result Date: 3/18/2022  No acute intracranial abnormality or change in the CT appearance of the brain since 08/02/2021.  Electronically Signed By-Joaquim Ruth DO On:3/18/2022 9:26 PM This report was finalized on [id number] by  Joaquim Ruth DO.    XR Chest 1 View    Result Date: 3/18/2022  No active cardiopulmonary disease.   Electronically Signed  By-Joaquim Ruth DO On:3/18/2022 8:59 PM This report was finalized on [id number] by  Joaquim Ruth DO.      I reviewed the patient's new clinical results.    ________________________________________________________     PROBLEM LIST:    Acute encephalopathy    Benign essential hypertension    Chronic coronary artery disease    Chronic renal impairment, stage 3a (HCC)    Major depressive disorder, recurrent episode, severe (HCC)    Diabetic peripheral neuropathy (HCC)    Gastroesophageal reflux disease    Mixed hyperlipidemia    Acquired hypothyroidism    Obstructive sleep apnea syndrome    Peripheral vascular disease (HCC)    Obesity (BMI 30-39.9)    History of CVA (cerebrovascular accident)    Type 2 diabetes mellitus with peripheral vascular disease (HCC)    Acute tension-type headache    S/P BKA (below knee amputation), right (HCC)          Assessment/Plan   ASSESSMENT/PLAN:  1. Transient confusional state--resolved. Pt reports feeling confused and states his co-workers reported he was staring blankly. Pt reports he could hear his co-workers and knew what was going on, but he could not respond. He had tunnel vision, diaphoresis, and confusion. He does report similar symptoms in the past when his glucose was low. He is back to baseline now. No focal deficits. Suspect a combination of hypoglycemia, headaches, increased stress/panic attacks, and untreated sleep apnea. Stroke is considered less like, but workup is underway. Presentation not consistent with seizure.   - CT head: No acute intracranial abnormality or change in the CT appearance of the  brain since 08/02/2021  - MRI brain: pending   - Carotid duplex: pending   - Echo: pending   - EKG: Sinus rhythm, Low voltage, precordial leads  - Labs: A1C: P, B12: 422, LDL: 81, TSH: 4.51, UA: +glucose only, UDS negative  - PT/OT/ST as appropriate, fall precautions as appropriate, Neuro checks per protocol, DVT prophylaxis, Stroke education    2. Chronic daily  headaches, tension-type  - Discussed life-style modifications, recommend heat/ice/massage  - Baclofen 10mg BID  - Fioricet 1-2 tabs BID PRN  - Discussed risks of frequent NSAID and acetaminophen use such as rebound and medication overuse headaches as well as GI and renal issues. Pt advised to use sparingly.     3. GENO  - Non-compliant. Has new CPAP ordered  - Pt educated, compliance encouraged.     4. Hypertension  - Strict BP control, monitor per protocol    5. Type 2 diabetes mellitus  - Strict glycemic control, SSI per primary    6. CKD stage III  - Cr: 1.48  - Monitor renal status/CMP  - Avoid nephrotoxic medications/contrast dye       7. Depression, increased stress, history of SI.  - Denies SI/HI now  - Recommend psychiatric evaluation which can be done outpt.    8. Chronic stroke  - On anticoagluation with Eliquis for PE, also on ASA and statin   - Modification of stroke risk factors: Blood pressure should be less than 130/80 outpatient, HbA1c less than 6.5, LDL less than 70; b12>500 and smoking cessation if applicable. We would be grateful if the primary team / primary care physician would keep a close watch on the above targets.  - Stroke education    Will follow up on testing.     I discussed the patient's findings and my recommendations with patient, nursing staff and consulting provider    KEYLA Mercado  03/19/22  10:25 EDT

## 2022-03-19 NOTE — NURSING NOTE
Pt was going to get a MRI completed today but MRI called and stated Pt was unable to have procedure done due to being claustrophobic; MD contacted in regards to pt condition with PRN medications ordered; MRI stated it may not be until the next day before they can get pt back on the schedule; MD notified with no new orders at this time.

## 2022-03-19 NOTE — PLAN OF CARE
Goal Outcome Evaluation:  Plan of Care Reviewed With: patient        Progress: no change  Outcome Evaluation: Note MRI negative for acute infarct. Additional speech, language, cognitive workup does not appear indicated. Please re-consult via new order if difficulties arise, with changes or condition, or if eval is indicated per discharge planning.   SLP to sign off.

## 2022-03-19 NOTE — THERAPY EVALUATION
Patient Name: Kuldeep Adhikari  : 1965    MRN: [id number]                              Today's Date: 3/19/2022       Admit Date: 3/18/2022    Visit Dx:     ICD-10-CM ICD-9-CM   1. Altered mental status, unspecified altered mental status type  R41.82 780.97     Patient Active Problem List   Diagnosis   • Benign essential hypertension   • Chronic coronary artery disease   • Chronic renal impairment, stage 3a ([facility])   • Degenerative joint disease   • Major depressive disorder, recurrent episode, severe ([facility])   • Diabetic peripheral neuropathy ([facility])   • Type 2 diabetes mellitus with hyperglycemia, with long-term current use of insulin ([facility])   • Encounter for screening for malignant neoplasm of colon   • Fatigue   • Foot pain, right   • Ganglion cyst   • Gastroesophageal reflux disease   • History of amputation of hallux ([facility])   • History of pulmonary embolism   • Mixed hyperlipidemia   • Acquired hypothyroidism   • Obstructive sleep apnea syndrome   • Palpitations   • Peripheral vascular disease ([facility])   • Subacromial bursitis of right shoulder joint   • Vitamin D deficiency   • Obesity (BMI 30-39.9)   • Encounter for other orthopedic aftercare   • Other ossification of muscle, unspecified site   • History of CVA (cerebrovascular accident)   • Type 2 diabetes mellitus with peripheral vascular disease ([facility])   • Depression with suicidal ideation   • Acute encephalopathy   • Acute tension-type headache   • S/P BKA (below knee amputation), right ([facility])     Past Medical History:   Diagnosis Date   • CKD (chronic kidney disease), stage III ([facility])    • Depression    • Depression with suicidal ideation 2021   • DJD (degenerative joint disease)    • DVT (deep venous thrombosis) ([facility])    • Ganglion     rt wrist   • Gangrene of foot ([facility]) 10/09/2015   • GERD (gastroesophageal reflux disease)    • Headache    • Heart murmur    • Hiatal hernia    • History of esophageal stricture     s/p dialation 40-50 times last EGD  04/2016   • Hyperlipidemia    • Hypertension    • Hypothyroidism    • IBS (irritable bowel syndrome)    • Neuropathy    • Osteomyelitis of right foot ([facility]) 03/19/2020   • Pulmonary embolism ([facility]) 01/19/2017   • Rash     rt lower hip   • Retinopathy    • S/P BKA (below knee amputation), right ([facility]) 03/18/2022   • Seasonal allergies    • Sepsis due to group B Streptococcus ([facility]) 03/19/2020   • Sleep apnea    • Type 2 diabetes mellitus with peripheral vascular disease ([facility])    • Vitamin D deficiency      Past Surgical History:   Procedure Laterality Date   • AMPUTATION FOOT / TOE Right     great toe   • AMPUTATION REVISION Right 4/8/2021    Procedure: BELOW KNEE AMPUTATION REVISAION;  Surgeon: Eduardo Reynolds MD;  Location: [facility] MAIN OR;  Service: Orthopedics;  Laterality: Right;   • AMPUTATION REVISION Right 4/29/2021    Procedure: AMPUTATION REVISION KNEE STUMP;  Surgeon: Eduardo Reynolds MD;  Location: [facility] MAIN OR;  Service: Orthopedics;  Laterality: Right;   • BELOW KNEE AMPUTATION Right 7/30/2020    Procedure: AMPUTATION BELOW KNEE;  Surgeon: Eduardo Reynolds MD;  Location: [facility] MAIN OR;  Service: Orthopedics;  Laterality: Right;   • CARDIAC CATHETERIZATION  04/2018    [facility]   • COLONOSCOPY     • ENDOSCOPY     • ENDOSCOPY N/A 10/4/2019    Procedure: ESOPHAGOGASTRODUODENOSCOPY with dilitation and biopsy x 1 area;  Surgeon: Declan Iqbal MD;  Location: [facility] ENDOSCOPY;  Service: Gastroenterology   • ENDOSCOPY N/A 12/13/2019    Procedure: ESOPHAGOGASTRODUODENOSCOPY WITH DILATATION (50, 52 BOUGIE);  Surgeon: Declan Iqbal MD;  Location: [facility] ENDOSCOPY;  Service: Gastroenterology   • ENDOSCOPY N/A 8/6/2021    Procedure: ESOPHAGOGASTRODUODENOSCOPY with biopsy x1 area and esophageal dilation (56FR Bougie);  Surgeon: Hussein Talavera MD;  Location: [facility] ENDOSCOPY;  Service: Gastroenterology;  Laterality: N/A;  post: hiatal hernia, erosive gastritis   • EYE SURGERY     • GANGLION CYST EXCISION  Left    • HERNIA REPAIR Bilateral    • JOINT REPLACEMENT      Left total hip   • RETINOPATHY SURGERY      laser   • TOTAL HIP ARTHROPLASTY Left    • TOTAL HIP ARTHROPLASTY Left 2018   • TRANS METATARSAL AMPUTATION Right 3/17/2020    Procedure: AMPUTATION TRANS METATARSAL right;  Surgeon: ERWIN Baker DPM;  Location: [facility] MAIN OR;  Service: Podiatry;  Laterality: Right;  GANGRENOUS RIGHT FOOT   • VASECTOMY        General Information     Row Name 03/19/22 1240          Physical Therapy Time and Intention    Document Type evaluation  -     Mode of Treatment physical therapy  -     Row Name 03/19/22 1240          General Information    Prior Level of Function independent:;all household mobility;transfer;bed mobility;ADL's  Pt reports he has a RW at home, but was not using it. His wife drives him to work daily.  -     Row Name 03/19/22 1240          Living Environment    People in Home spouse;child(bryant), dependent  -     Row Name 03/19/22 1240          Home Main Entrance    Number of Stairs, Main Entrance two  Pt ascends/descends by side stepping in  -     Stair Railings, Main Entrance none  -     Row Name 03/19/22 1240          Stairs Within Home, Primary    Number of Stairs, Within Home, Primary none  -     Row Name 03/19/22 1240          Cognition    Orientation Status (Cognition) oriented x 4  -     Row Name 03/19/22 1240          Safety Issues, Functional Mobility    Impairments Affecting Function (Mobility) balance;endurance/activity tolerance  -           User Key  (r) = Recorded By, (t) = Taken By, (c) = Cosigned By    Initials Name Provider Type     Lorraine Cormier, PT Physical Therapist               Mobility     Row Name 03/19/22 1242          Bed Mobility    Bed Mobility supine-sit  -     Supine-Sit [place] (Bed Mobility) independent  -     Assistive Device (Bed Mobility) head of bed elevated  -     Row Name 03/19/22 1242          Sit-Stand Transfer    Sit-Stand  [place] (Transfers) contact guard  -     Comment, (Sit-Stand Transfer) STS w/ RW SBA, w/out RW CGA as pt demonstrates moderate trunk sway secondary to dizziness/nausea (BP WNL)  -     Row Name 03/19/22 1242          Gait/Stairs (Locomotion)    [place] Level (Gait) standby assist  -     Assistive Device (Gait) walker, front-wheeled  -     Distance in Feet (Gait) 75ft  -     Deviations/Abnormal Patterns (Gait) gait speed decreased;stride length decreased  -     Bilateral Gait Deviations heel strike decreased  -     Comment, (Gait/Stairs) Pt demonstrates decreased gait speed and L steppage gait. Pt requires standing rest break following 45ft d/t nausea.  -           User Key  (r) = Recorded By, (t) = Taken By, (c) = Cosigned By    Initials Name Provider Type    Lorraine Cox PT Physical Therapist               Obj/Interventions     Row Name 03/19/22 1247          Range of Motion Comprehensive    General Range of Motion bilateral upper extremity ROM WFL;bilateral lower extremity ROM WFL  -     Row Name 03/19/22 1247          Strength Comprehensive (MMT)    Comment, General Manual Muscle Testing (MMT) Assessment BLE grossly 4+-5/5  -     Row Name 03/19/22 1247          Balance    Balance Assessment sitting static balance;sitting dynamic balance;standing static balance;standing dynamic balance  -     Static Sitting Balance independent  -     Dynamic Sitting Balance independent  -     Position, Sitting Balance unsupported;sitting edge of bed  -     Static Standing Balance modified independence  -     Dynamic Standing Balance supervision  -     Position/Device Used, Standing Balance supported  -     Row Name 03/19/22 1247          Sensory Assessment (Somatosensory)    Sensory Assessment (Somatosensory) sensation intact  -           User Key  (r) = Recorded By, (t) = Taken By, (c) = Cosigned By    Initials Name Provider Type    Lorraine Cox PT Physical Therapist                Goals/Plan     Row Name 03/19/22 1259          Bed Mobility Goal 1 (PT)    Activity/Assistive Device (Bed Mobility Goal 1, PT) bed mobility activities, all  -CINDY     [place] Level/Cues Needed (Bed Mobility Goal 1, PT) independent  -CINDY     Time Frame (Bed Mobility Goal 1, PT) long term goal (LTG);2 weeks  -CINDY     Row Name 03/19/22 1259          Transfer Goal 1 (PT)    Activity/Assistive Device (Transfer Goal 1, PT) transfers, all  -CINDY     [place] Level/Cues Needed (Transfer Goal 1, PT) independent  -CINDY     Time Frame (Transfer Goal 1, PT) long term goal (LTG);2 weeks  -     Row Name 03/19/22 1259          Gait Training Goal 1 (PT)    Activity/Assistive Device (Gait Training Goal 1, PT) gait (walking locomotion)  -CINDY     [place] Level (Gait Training Goal 1, PT) independent  -CINDY     Distance (Gait Training Goal 1, PT) 200ft  -CINDY     Time Frame (Gait Training Goal 1, PT) long term goal (LTG);2 weeks  -     Row Name 03/19/22 1259          Stairs Goal 1 (PT)    Activity/Assistive Device (Stairs Goal 1, PT) stairs, all skills  -CINDY     [place] Level/Cues Needed (Stairs Goal 1, PT) modified independence  -CINDY     Number of Stairs (Stairs Goal 1, PT) 3 steps  -CINDY     Time Frame (Stairs Goal 1, PT) long term goal (LTG);2 weeks  -           User Key  (r) = Recorded By, (t) = Taken By, (c) = Cosigned By    Initials Name Provider Type    Lorraine Cox, PT Physical Therapist               Clinical Impression     Row Name 03/19/22 1248          Pain    Pretreatment Pain Rating 0/10 - no pain  -CINDY     Posttreatment Pain Rating 0/10 - no pain  -CINDY     Pre/Posttreatment Pain Comment Pt reports chronic pain.  -     Row Name 03/19/22 124          Plan of Care Review    Plan of Care Reviewed With patient  -     Outcome Evaluation Pt is a 58 y/o male who presents to [facility] to r/o CVA vs hypoglycemic episode. PMH includes but is not limited to R BKA w/ prosthesis, h/o suicidal ideation, CVA, Type 2  DM, HTN, HLD, and PVD. CT of the head demonstrates no acute changes. Pt lives with his spouse and three children ages 13, 18, and 19. Pt reports he will have support 24/7 if needed from spouse and 18y/o. Discussed w/ pt s/s of stroke and to monitor at home. He demonstrates some instability upon initial stand w/out RW, but presents w/ improved stability using RW. Pt ambulates 75ft and becomes nausous/dizzy during ambulation. /67 following ambulation. Plan for home with family assist and home health PT at discharge to ensure a safe transition home and return balance to previous function.  -     Row Name 03/19/22 1248          Therapy Assessment/Plan (PT)    Predicted Duration of Therapy Intervention (PT) Until d/c  -     Row Name 03/19/22 1248          Vital Signs    Pre Systolic BP Rehab 130  -CINDY     Pre Treatment Diastolic BP 80  -CINDY     Post Systolic BP Rehab 133  -CINDY     Post Treatment Diastolic BP 67  -CINDY     O2 Delivery Pre Treatment room air  -CINDY     O2 Delivery Intra Treatment room air  -CINDY     O2 Delivery Post Treatment room air  -     Row Name 03/19/22 1248          Positioning and Restraints    Pre-Treatment Position in bed  -CINDY     Post Treatment Position chair  -CINDY     In Chair reclined;call light within reach;encouraged to call for assist;exit alarm on  -CINDY           User Key  (r) = Recorded By, (t) = Taken By, (c) = Cosigned By    Initials Name Provider Type    Lorraine Cox, PT Physical Therapist               Outcome Measures     Row Name 03/19/22 1300          How much help from another person do you currently need...    Turning from your back to your side while in flat bed without using bedrails? 3  -CINDY     Moving from lying on back to sitting on the side of a flat bed without bedrails? 3  -CINDY     Moving to and from a bed to a chair (including a wheelchair)? 3  -CINDY     Standing up from a chair using your arms (e.g., wheelchair, bedside chair)? 3  -CINDY     Climbing 3-5 steps with a  railing? 3  -     To walk in hospital room? 3  -     AM-PAC 6 Clicks Score (PT) 18  -     Row Name 03/19/22 1300          Modified Jerome Scale    Pre-Stroke Modified Jerome Scale 1 - No significant disability despite symptoms.  Able to carry out all usual duties and activities.  -     Modified Jerome Scale 1 - No significant disability despite symptoms.  Able to carry out all usual duties and activities.  -     Row Name 03/19/22 1300          Functional Assessment    Outcome Measure Options AM-PAC 6 Clicks Basic Mobility (PT);Modified Jerome  -           User Key  (r) = Recorded By, (t) = Taken By, (c) = Cosigned By    Initials Name Provider Type    Lorraine Cox, YENY Physical Therapist                             Physical Therapy Education                 Title: PT OT SLP Therapies (Done)     Topic: Physical Therapy (Done)     Point: Mobility training (Done)     Learning Progress Summary           Patient Acceptance, E,TB, VU by  at 3/19/2022 1300                   Point: Home exercise program (Done)     Learning Progress Summary           Patient Acceptance, E,TB, VU by  at 3/19/2022 1300                   Point: Body mechanics (Done)     Learning Progress Summary           Patient Acceptance, E,TB, VU by  at 3/19/2022 1300                   Point: Precautions (Done)     Learning Progress Summary           Patient Acceptance, E,TB, VU by  at 3/19/2022 1300                               User Key     Initials Effective Dates Name Provider Type [facility] 08/23/21 -  Lorraine Cormier, YENY Physical Therapist PT              PT Recommendation and Plan  Planned Therapy Interventions (PT): balance training, bed mobility training, gait training, home exercise program, motor coordination training, neuromuscular re-education, patient/family education, postural re-education, strengthening, stair training, transfer training  Plan of Care Reviewed With: patient  Outcome Evaluation: Pt is a 56 y/o  male who presents to [facility] to r/o CVA vs hypoglycemic episode. PMH includes but is not limited to R BKA w/ prosthesis, h/o suicidal ideation, CVA, Type 2 DM, HTN, HLD, and PVD. CT of the head demonstrates no acute changes. Pt lives with his spouse and three children ages 13, 18, and 19. Pt reports he will have support 24/7 if needed from spouse and 20y/o. Discussed w/ pt s/s of stroke and to monitor at home. He demonstrates some instability upon initial stand w/out RW, but presents w/ improved stability using RW. Pt ambulates 75ft and becomes nausous/dizzy during ambulation. /67 following ambulation. Plan for home with family assist and home health PT at discharge to ensure a safe transition home and return balance to previous function.     Time Calculation:    PT Charges     Row Name 03/19/22 1301             Time Calculation    Start Time 1008  -CINDY      Stop Time 1029  -CINDY      Time Calculation (min) 21 min  -CINDY      PT Received On 03/19/22  -CINDY      PT - Next Appointment 03/21/22  -CINDY      PT Goal Re-Cert Due Date 04/02/22  -CINDY            User Key  (r) = Recorded By, (t) = Taken By, (c) = Cosigned By    Initials Name Provider Type    Lorraine Cox, PT Physical Therapist              Therapy Charges for Today     Code Description Service Date Service Provider Modifiers Qty    [id number]  PT EVAL MOD COMPLEXITY 4 3/19/2022 Lorraine Cormier, PT GP 1          PT G-Codes  Outcome Measure Options: AM-PAC 6 Clicks Basic Mobility (PT), Modified [place]  AM-PAC 6 Clicks Score (PT): 18  Modified White Scale: 1 - No significant disability despite symptoms.  Able to carry out all usual duties and activities.    Lorraine Cormier PT  3/19/2022

## 2022-03-19 NOTE — PLAN OF CARE
Goal Outcome Evaluation:    Outcome Evaluation: Pt is a 58 y/o male who presents to [facility] to r/o CVA vs hypoglycemic episode. PMH includes but is not limited to R BKA w/ prosthesis, h/o suicidal ideation, CVA, Type 2 DM, HTN, HLD, and PVD. CT of the head demonstrates no acute changes. Pt lives with his spouse and three children ages 13, 18, and 19. Pt reports he will have support 24/7 if needed from spouse and 18y/o. Discussed w/ pt s/s of stroke and to monitor at home. He demonstrates some instability upon initial stand w/out RW, but presents w/ improved stability using RW. Pt ambulates 75ft and becomes nausous/dizzy during ambulation. /67 following ambulation. Plan for home with family assist and home health PT at discharge to ensure a safe transition home and return balance to previous function.

## 2022-03-19 NOTE — H&P
[facility] Services      Patient Name: Kuldeep Adhikari  : 1965  MRN: [id number]  Primary Care Physician:  Laura Whitaker MD  Date of admission: 3/18/2022      Subjective      Chief Complaint:  Altered mental status    History of Present Illness: Kuldeep Adhikari is a 57 y.o. male with a history of CVA, Type 2 DM, HTN, HLD, PVD, right BKA, and PE on chronic anticoagulation who presented to the ER at [facility] on 3/18/2022 due to altered mental status. The patient is currently alert and oriented x 3. He states he was at work earlier today when he lost track of approximately 1 hour of time. He states he was told by his coworkers that they were concerned because he would just stare at them and not talk. He states upon EMS arrival his glucose was 74. He states he took 12 or 15 units of insulin at 11:30 am, but didn't eat much. He states yesterday he had an episode where he felt dizzy and lightheaded. He reports near syncope. He denies any chest pain, shortness of breath, or palpitations. He is now complaining of a tension-type headache. He states he gets them frequently. He reports increased stress at home. He denies any other complaints.     Workup in the ER was unremarkable. The patient was admitted to the Hospitalist group for further evaluation.       Review of Systems   Cardiovascular: Positive for near-syncope. Negative for chest pain.   Respiratory: Negative for shortness of breath.    Neurological: Positive for dizziness, headaches and light-headedness.   Psychiatric/Behavioral: Positive for altered mental status and memory loss.   All other systems reviewed and are negative.       Personal History     Past Medical History:   Diagnosis Date   • CKD (chronic kidney disease), stage III ([facility])    • Depression    • Depression with suicidal ideation 2021   • DJD (degenerative joint disease)    • DVT (deep venous thrombosis) ([facility])    • Ganglion     rt  wrist   • Gangrene of foot ([facility]) 10/09/2015   • GERD (gastroesophageal reflux disease)    • Headache    • Heart murmur    • Hiatal hernia    • History of esophageal stricture     s/p dialation 40-50 times last EGD 04/2016   • Hyperlipidemia    • Hypertension    • Hypothyroidism    • IBS (irritable bowel syndrome)    • Neuropathy    • Osteomyelitis of right foot ([facility]) 03/19/2020   • Pulmonary embolism ([facility]) 01/19/2017   • Rash     rt lower hip   • Retinopathy    • S/P BKA (below knee amputation), right ([facility]) 03/18/2022   • Seasonal allergies    • Sepsis due to group B Streptococcus ([facility]) 03/19/2020   • Sleep apnea    • Type 2 diabetes mellitus with peripheral vascular disease ([facility])    • Vitamin D deficiency        Past Surgical History:   Procedure Laterality Date   • AMPUTATION FOOT / TOE Right     great toe   • AMPUTATION REVISION Right 4/8/2021    Procedure: BELOW KNEE AMPUTATION REVISAION;  Surgeon: Eduardo Reynolds MD;  Location: [facility] MAIN OR;  Service: Orthopedics;  Laterality: Right;   • AMPUTATION REVISION Right 4/29/2021    Procedure: AMPUTATION REVISION KNEE STUMP;  Surgeon: Eduardo Reynolds MD;  Location: [facility] MAIN OR;  Service: Orthopedics;  Laterality: Right;   • BELOW KNEE AMPUTATION Right 7/30/2020    Procedure: AMPUTATION BELOW KNEE;  Surgeon: Eduardo Reynolds MD;  Location: [facility] MAIN OR;  Service: Orthopedics;  Laterality: Right;   • CARDIAC CATHETERIZATION  04/2018    [facility]   • COLONOSCOPY     • ENDOSCOPY     • ENDOSCOPY N/A 10/4/2019    Procedure: ESOPHAGOGASTRODUODENOSCOPY with dilitation and biopsy x 1 area;  Surgeon: Declan Iqbal MD;  Location: [facility] ENDOSCOPY;  Service: Gastroenterology   • ENDOSCOPY N/A 12/13/2019    Procedure: ESOPHAGOGASTRODUODENOSCOPY WITH DILATATION (50, 52 BOUGIE);  Surgeon: Declan Iqbal MD;  Location: [facility] ENDOSCOPY;  Service: Gastroenterology   • ENDOSCOPY N/A 8/6/2021    Procedure: ESOPHAGOGASTRODUODENOSCOPY with biopsy x1 area and esophageal  dilation (56FR Bougie);  Surgeon: Hussein Talavera MD;  Location: [facility] ENDOSCOPY;  Service: Gastroenterology;  Laterality: N/A;  post: hiatal hernia, erosive gastritis   • EYE SURGERY     • GANGLION CYST EXCISION Left    • HERNIA REPAIR Bilateral    • JOINT REPLACEMENT      Left total hip   • RETINOPATHY SURGERY      laser   • TOTAL HIP ARTHROPLASTY Left    • TOTAL HIP ARTHROPLASTY Left 2018   • TRANS METATARSAL AMPUTATION Right 3/17/2020    Procedure: AMPUTATION TRANS METATARSAL right;  Surgeon: ERWIN Baker DPM;  Location: [facility] MAIN OR;  Service: Podiatry;  Laterality: Right;  GANGRENOUS RIGHT FOOT   • VASECTOMY         Family History: family history includes Arthritis in his mother; Cancer in an other family member; Colon cancer in an other family member; Diabetes in his mother; Heart disease in his mother; Hyperlipidemia in his mother and another family member; Hypertension in an other family member; Leukemia in his father; Sleep apnea in his maternal aunt; Stroke in his maternal grandmother. Otherwise pertinent FHx was reviewed and not pertinent to current issue.    Social History:  reports that he has never smoked. He has never used smokeless tobacco. He reports that he does not drink alcohol and does not use drugs.    Home Medications:  Prior to Admission Medications     Prescriptions Last Dose Informant Patient Reported? Taking?    apixaban (Eliquis) 5 MG tablet tablet   No No    Take 1 tablet by mouth 2 (Two) Times a Day.    aspirin 81 MG EC tablet   No No    Take 1 tablet by mouth Daily.    atorvastatin (LIPITOR) 80 MG tablet   No No    Take 1 tablet by mouth Every Night.    benzonatate (TESSALON) 200 MG capsule   No No    Take 1 capsule by mouth 3 (Three) Times a Day As Needed for Cough.    busPIRone (BUSPAR) 5 MG tablet   Yes No    cephalexin (KEFLEX) 500 MG capsule   No No    Take 1 capsule by mouth 3 (Three) Times a Day.    Continuous Blood Gluc Sensor (FreeStyle Mark 2 Sensor) [facility]   No No     1 each Every 14 (Fourteen) Days.    cyclobenzaprine (FLEXERIL) 10 MG tablet   No No    Take I tab q 8 hrs as needed for tension headaches    DULoxetine (CYMBALTA) 60 MG capsule   No No    Take 1 capsule by mouth Every Morning.    empagliflozin (Jardiance) 10 MG tablet tablet   No No    Take 1 tablet by mouth Daily.    gabapentin (NEURONTIN) 400 MG capsule   No No    Take 1 capsule by mouth 3 (Three) Times a Day.    insulin aspart (NovoLOG FlexPen) 100 UNIT/ML solution pen-injector sc pen   No No    Inject 20 Units under the skin into the appropriate area as directed 3 (Three) Times a Day With Meals.    Insulin Glargine (Lantus SoloStar) 100 UNIT/ML injection pen   No No    Inject 40 Units under the skin into the appropriate area as directed Every Night.    levothyroxine (SYNTHROID, LEVOTHROID) 100 MCG tablet   No No    Take 1 tablet by mouth Daily.    losartan (COZAAR) 50 MG tablet   No No    Take 1 tablet by mouth Daily.    metoprolol succinate XL (TOPROL-XL) 50 MG 24 hr tablet   No No    Take 1 tablet by mouth Daily.    omeprazole (priLOSEC) 40 MG capsule   No No    Take 1 capsule by mouth Daily.    ondansetron (ZOFRAN) 4 MG tablet   No No    Take 1 tablet by mouth Every 8 (Eight) Hours As Needed for Nausea or Vomiting.    SITagliptin-metFORMIN HCl ER (Janumet XR)  MG tablet   No No    Take 1 tablet by mouth 2 (Two) Times a Day.            Allergies:  Allergies   Allergen Reactions   • Lisinopril Cough       Objective      Vitals:   Temp:  [97.7 °F (36.5 °C)] 97.7 °F (36.5 °C)  Heart Rate:  [59-66] 66  Resp:  [16-18] 18  BP: (148-155)/(52-77) 155/77    Physical Exam  Vitals reviewed.   Constitutional:       Appearance: He is obese.   HENT:      Head: Normocephalic.   Eyes:      Extraocular Movements: Extraocular movements intact.      Conjunctiva/sclera: Conjunctivae normal.      Pupils: Pupils are equal, round, and reactive to light.   Cardiovascular:      Rate and Rhythm: Normal rate and regular  rhythm.      Pulses: Normal pulses.      Heart sounds: Normal heart sounds.   Pulmonary:      Effort: Pulmonary effort is normal.      Breath sounds: Normal breath sounds.   Abdominal:      General: Bowel sounds are normal. There is no distension.      Palpations: Abdomen is soft.      Tenderness: There is no abdominal tenderness.   Musculoskeletal:      Cervical back: Normal range of motion.      Left lower leg: No edema.      Comments: Right BKA; prosthesis in place   Skin:     General: Skin is warm and dry.   Neurological:      Mental Status: He is alert and oriented to person, place, and time.      Comments: Memory loss   Psychiatric:         Mood and Affect: Mood is depressed.         Behavior: Behavior is cooperative.          Result Review    Result Review:  I have personally reviewed the results from the time of this admission to 3/18/2022 23:20 EDT and agree with these findings:  [x]  Laboratory  [x]  Microbiology  [x]  Radiology  [x]  EKG/Telemetry   []  Cardiology/Vascular   []  Pathology  [x]  Old records  []  Other:    Most notable findings include:   hgb 12.3, BUN 25, Cr 1.55, glucose 94, troponin <0.010    CTH:  No acute findings      Assessment/Plan        Active Hospital Problems:  Active Hospital Problems    Diagnosis    • **Acute encephalopathy    • Acute tension-type headache    • S/P BKA (below knee amputation), right (HCC)    • History of CVA (cerebrovascular accident)    • Type 2 diabetes mellitus with peripheral vascular disease (HCC)    • Obesity (BMI 30-39.9)    • Chronic coronary artery disease    • Obstructive sleep apnea syndrome    • Benign essential hypertension    • Diabetic peripheral neuropathy (HCC)    • Acquired hypothyroidism    • Chronic renal impairment, stage 3a (HCC)    • Gastroesophageal reflux disease    • Major depressive disorder, recurrent episode, severe (HCC)    • Mixed hyperlipidemia    • Peripheral vascular disease (HCC)      Plan:     Acute encephalopathy  History  of CVA (cerebrovascular accident)  -r/o CVA; possible hypoglycemic episode  -CTH reviewed  -obtain MRI brain, 2D echo, and carotid duplex  -consult neurology  -check A1c, lipid panel, TSH, and B12  -SLP/PT/OT to eval and treat  -CM consulted for DC planning  -continue aspirin, Eliquis, and statin     Acute on chronic tension-type headache   -Tylenol PRN  -continue Flexeril TID PRN    Type 2 diabetes mellitus with peripheral vascular disease and Diabetic peripheral neuropathy   -check A1c  -accu checks AC&HS with SSI  -diabetic consistent carb diet    Chronic coronary artery disease / Benign essential hypertension, chronic / Mixed hyperlipidemia  -continue aspirin, Eliquis, and statin  -continue antihypertensives once list verified  -monitor BP    Chronic renal impairment, stage 3a  -at baseline  -monitor BMP    Major depressive disorder, recurrent episode, severe  History of suicidal ideation  -patient reports increased stress at home, but denies any current SI  -continue home medication once list verified     Gastroesophageal reflux disease  -continue home medication once list verified     Acquired hypothyroidism  -continue home medication once list verified     Obstructive sleep apnea syndrome  -patient reports he has a CPAP, but it has been recalled and he hasn't received a new machine yet  -supplemental O2 PRN to keep sat >92%    Peripheral vascular disease   S/P BKA (below knee amputation), right   -continue aspirin, Eliquis, and statin     Obesity (BMI 30-39.9)  -BMI 30.44 on admission  -encourage lifestyle modifications     History of pulmonary embolism  -continue Eliquis         DVT prophylaxis:  Medical and mechanical DVT prophylaxis orders are present.    CODE STATUS:    Code Status (Patient has no pulse and is not breathing): CPR (Attempt to Resuscitate)  Medical Interventions (Patient has pulse or is breathing): Full Support    Admission Status:  I believe this patient meets observation status.    I  discussed the patient's findings and my recommendations with patient.    This patient has been examined wearing appropriate Personal Protective Equipment. 03/18/22      Signature: Electronically signed by KEYLA Salazar, 03/18/22, 11:37 PM EDT.

## 2022-03-19 NOTE — PLAN OF CARE
Problem: Adult Inpatient Plan of Care  Goal: Absence of Hospital-Acquired Illness or Injury  Intervention: Identify and Manage Fall Risk  Recent Flowsheet Documentation  Taken 3/19/2022 0346 by Brice Martinez RN  Safety Promotion/Fall Prevention:   assistive device/personal items within reach   clutter free environment maintained   fall prevention program maintained   nonskid shoes/slippers when out of bed   room organization consistent   safety round/check completed  Taken 3/19/2022 0200 by Brice Martinez RN  Safety Promotion/Fall Prevention: safety round/check completed  Taken 3/19/2022 0008 by Brice Martinez RN  Safety Promotion/Fall Prevention:   assistive device/personal items within reach   clutter free environment maintained   fall prevention program maintained   nonskid shoes/slippers when out of bed   room organization consistent   safety round/check completed  Intervention: Prevent Skin Injury  Recent Flowsheet Documentation  Taken 3/19/2022 0346 by Brice Martinez RN  Body Position: position changed independently  Taken 3/19/2022 0008 by Brice Martinez RN  Body Position: position changed independently  Intervention: Prevent and Manage VTE (Venous Thromboembolism) Risk  Recent Flowsheet Documentation  Taken 3/19/2022 0346 by Brice Martinez RN  Activity Management: activity adjusted per tolerance  VTE Prevention/Management:   bilateral   sequential compression devices on  Taken 3/19/2022 0008 by Brice Martinez RN  Activity Management: activity adjusted per tolerance  VTE Prevention/Management:   bilateral   sequential compression devices on  Intervention: Prevent Infection  Recent Flowsheet Documentation  Taken 3/19/2022 0346 by Brice Martinez RN  Infection Prevention:   environmental surveillance performed   hand hygiene promoted   rest/sleep promoted   single patient room provided  Taken 3/19/2022 0008 by Brice Martinez RN  Infection Prevention:    environmental surveillance performed   equipment surfaces disinfected   personal protective equipment utilized   rest/sleep promoted   single patient room provided  Goal: Optimal Comfort and Wellbeing  Intervention: Provide Person-Centered Care  Recent Flowsheet Documentation  Taken 3/19/2022 0346 by Brice Martinez RN  Trust Relationship/Rapport:   care explained   choices provided   questions answered   questions encouraged  Taken 3/19/2022 0008 by Brice Martinez RN  Trust Relationship/Rapport:   care explained   choices provided   questions answered   questions encouraged  Goal: Readiness for Transition of Care  Intervention: Mutually Develop Transition Plan  Recent Flowsheet Documentation  Taken 3/19/2022 0012 by Brice Martinez RN  Transportation Anticipated: family or friend will provide  Patient/Family Anticipated Services at Transition: none  Patient/Family Anticipates Transition to: home with family  Taken 3/19/2022 0007 by Brice Martinez RN  Equipment Currently Used at Home: prosthesis     Problem: Hypertension Comorbidity  Goal: Blood Pressure in Desired Range  Intervention: Maintain Blood Pressure Management  Recent Flowsheet Documentation  Taken 3/19/2022 0346 by Brice Martinez RN  Medication Review/Management:   medications reviewed   high-risk medications identified  Taken 3/19/2022 0008 by Brice Martinez RN  Medication Review/Management:   medications reviewed   high-risk medications identified   Goal Outcome Evaluation:

## 2022-03-20 ENCOUNTER — READMISSION MANAGEMENT (OUTPATIENT)
Dept: CALL CENTER | Facility: HOSPITAL | Age: 57
End: 2022-03-20

## 2022-03-20 VITALS
OXYGEN SATURATION: 99 % | SYSTOLIC BLOOD PRESSURE: 147 MMHG | TEMPERATURE: 97.8 F | HEIGHT: 68 IN | BODY MASS INDEX: 29.34 KG/M2 | WEIGHT: 193.56 LBS | RESPIRATION RATE: 11 BRPM | HEART RATE: 74 BPM | DIASTOLIC BLOOD PRESSURE: 79 MMHG

## 2022-03-20 LAB
GLUCOSE BLDC GLUCOMTR-MCNC: 156 MG/DL (ref 70–105)
GLUCOSE BLDC GLUCOMTR-MCNC: 167 MG/DL (ref 70–105)
GLUCOSE BLDC GLUCOMTR-MCNC: 221 MG/DL (ref 70–105)
QT INTERVAL: 398 MS

## 2022-03-20 PROCEDURE — 99213 OFFICE O/P EST LOW 20 MIN: CPT | Performed by: PSYCHIATRY & NEUROLOGY

## 2022-03-20 PROCEDURE — 63710000001 INSULIN LISPRO (HUMAN) PER 5 UNITS: Performed by: NURSE PRACTITIONER

## 2022-03-20 PROCEDURE — G0378 HOSPITAL OBSERVATION PER HR: HCPCS

## 2022-03-20 PROCEDURE — 99217 PR OBSERVATION CARE DISCHARGE MANAGEMENT: CPT | Performed by: INTERNAL MEDICINE

## 2022-03-20 PROCEDURE — 82962 GLUCOSE BLOOD TEST: CPT

## 2022-03-20 RX ADMIN — SENNOSIDES AND DOCUSATE SODIUM 2 TABLET: 50; 8.6 TABLET ORAL at 08:44

## 2022-03-20 RX ADMIN — ASPIRIN 81 MG CHEWABLE TABLET 81 MG: 81 TABLET CHEWABLE at 08:44

## 2022-03-20 RX ADMIN — APIXABAN 5 MG: 5 TABLET, FILM COATED ORAL at 08:44

## 2022-03-20 RX ADMIN — INSULIN LISPRO 2 UNITS: 100 INJECTION, SOLUTION INTRAVENOUS; SUBCUTANEOUS at 08:44

## 2022-03-20 RX ADMIN — Medication 10 ML: at 08:45

## 2022-03-20 NOTE — PLAN OF CARE
Problem: Adult Inpatient Plan of Care  Goal: Absence of Hospital-Acquired Illness or Injury  Intervention: Identify and Manage Fall Risk  Recent Flowsheet Documentation  Taken 3/20/2022 1124 by Luisa Manriquez RN  Safety Promotion/Fall Prevention:   activity supervised   assistive device/personal items within reach   clutter free environment maintained   fall prevention program maintained   nonskid shoes/slippers when out of bed   safety round/check completed  Taken 3/20/2022 0846 by Luisa Manriquez RN  Safety Promotion/Fall Prevention:   activity supervised   assistive device/personal items within reach   clutter free environment maintained   nonskid shoes/slippers when out of bed   safety round/check completed  Intervention: Prevent Skin Injury  Recent Flowsheet Documentation  Taken 3/20/2022 0846 by Luisa Manriquez RN  Skin Protection:   adhesive use limited   tubing/devices free from skin contact  Intervention: Prevent and Manage VTE (Venous Thromboembolism) Risk  Recent Flowsheet Documentation  Taken 3/20/2022 1124 by Luisa Manriquez RN  Activity Management: activity adjusted per tolerance  Taken 3/20/2022 0846 by Luisa Manriquez RN  Activity Management: activity adjusted per tolerance  VTE Prevention/Management:   bilateral   sequential compression devices on  Range of Motion: active ROM (range of motion) encouraged  Intervention: Prevent Infection  Recent Flowsheet Documentation  Taken 3/20/2022 1124 by Luisa Manriquez RN  Infection Prevention: environmental surveillance performed  Taken 3/20/2022 0846 by Luisa Manriquez RN  Infection Prevention: environmental surveillance performed  Goal: Optimal Comfort and Wellbeing  Intervention: Provide Person-Centered Care  Recent Flowsheet Documentation  Taken 3/20/2022 0846 by Luisa Manriquez RN  Trust Relationship/Rapport:   care explained   thoughts/feelings acknowledged     Problem: Diabetes Comorbidity  Goal: Blood Glucose Level Within Targeted  Range  Intervention: Monitor and Manage Glycemia  Recent Flowsheet Documentation  Taken 3/20/2022 0846 by Luisa Manriquez RN  Glycemic Management:   blood glucose monitored   supplemental insulin given     Problem: Hypertension Comorbidity  Goal: Blood Pressure in Desired Range  Intervention: Maintain Blood Pressure Management  Recent Flowsheet Documentation  Taken 3/20/2022 1124 by Luisa Manriquez RN  Medication Review/Management: medications reviewed  Taken 3/20/2022 0846 by Luisa Manriquez RN  Medication Review/Management: medications reviewed     Problem: Skin Injury Risk Increased  Goal: Skin Health and Integrity  Intervention: Optimize Skin Protection  Recent Flowsheet Documentation  Taken 3/20/2022 0846 by Luisa Manriquez RN  Skin Protection:   adhesive use limited   tubing/devices free from skin contact   Goal Outcome Evaluation:

## 2022-03-20 NOTE — DISCHARGE INSTRUCTIONS
Patient was advised to follow-up with his primary care physician who will review his current medications.     Patient was advised to follow-up with his neurologist who will reassess in his neuro function.     Patient was advised to follow-up with his endocrinologist who will reassess his diabetes and glucose control.

## 2022-03-20 NOTE — OUTREACH NOTE
Prep Survey    Flowsheet Row Responses   [facility] patient discharged from? [facility]   Is LACE score < 7 ? No   Emergency Room discharge w/ pulse ox? No   Eligibility [facility]   Date of Admission 03/18/22   Date of Discharge 03/20/22   Discharge Disposition Home or Self Care   Discharge diagnosis Acute encephalopathy,    Acute tension-type headache    Does the patient have one of the following disease processes/diagnoses(primary or secondary)? Other   Does the patient have Home health ordered? No   Is there a DME ordered? No   Prep survey completed? Yes          DODIE KIRBY - Registered Nurse

## 2022-03-20 NOTE — DISCHARGE SUMMARY
[facility] Medicine Services  DISCHARGE SUMMARY    Patient Name: Kuldeep Adhikari  : 1965  MRN: [id number]    Date of Admission: 3/18/2022  Discharge Diagnosis: Acute encephalopathy/relative hypoglycemia.  Date of Discharge: 3/20/2022  Primary Care Physician: Laura Whitaker MD      Presenting Problem:   Acute encephalopathy [G93.40]  Altered mental status, unspecified altered mental status type [R41.82]    Active and Resolved Hospital Problems:  Active Hospital Problems    Diagnosis POA   • **Acute encephalopathy [G93.40] Yes     Priority: High   • History of CVA (cerebrovascular accident) [Z86.73] Not Applicable     Priority: High   • Acute tension-type headache [G44.209] Yes     Priority: Medium   • Primary hypertension [I10] Yes   • S/P BKA (below knee amputation), right (HCC) [Z89.511] Not Applicable   • Type 2 diabetes mellitus with peripheral vascular disease (HCC) [E11.51] Yes   • Obesity (BMI 30-39.9) [E66.9] Yes   • Coronary artery disease involving native coronary artery of native heart without angina pectoris [I25.10] Yes   • Obstructive sleep apnea syndrome [G47.33] Yes   • Diabetic peripheral neuropathy (HCC) [E11.42] Yes   • Acquired hypothyroidism [E03.9] Yes   • Chronic renal impairment, stage 3a (HCC) [N18.31] Yes   • GERD without esophagitis [K21.9] Yes   • Major depressive disorder, recurrent episode, severe (HCC) [F33.2] Yes   • Mixed hyperlipidemia [E78.2] Yes   • Peripheral vascular disease (HCC) [I73.9] Yes   • History of pulmonary embolism [Z86.711] Yes      Resolved Hospital Problems   No resolved problems to display.         Hospital Course     Hospital Course:   Patient is a 57 y.o. male with a history of pulmonary embolism on anticoagulation, CVA, Type 2 DM, HTN, HLD, PVD and right BKA who presented to the ER at [facility] on 3/18/2022 due to altered mental status. The patient is currently alert and oriented x 3. He states he was  at work earlier today when he lost track of approximately 1 hour of time. He states he was told by his coworkers that they were concerned because he would just stare at them and not talk. He states upon EMS arrival his glucose was 74. He states he took 12 or 15 units of insulin at 11:30 am, but didn't eat much. He states yesterday he had an episode where he felt dizzy and lightheaded. He reports near syncope. He denies any chest pain, shortness of breath, or palpitations. He is now complaining of a tension-type headache. He states he gets them frequently. He reports increased stress at home. He denies any other complaints.   Patient was seen in the emergency room and workup in the ER was unremarkable. The patient was admitted to the Hospitalist group for further evaluation.   Neurology consult was completed.  Acute tension headache was treated with Fioricet.  Acute encephalopathy was treated with supportive care.  Acute encephalopathy improved prior to discharge.  Diabetes mellitus type 2 was treated with insulin therapy.  Coronary artery disease was treated with aspirin.  Hyperlipidemia was treated with Lipitor.  History of pulmonary embolism was not treated with Eliquis.  Appropriate patient's home medications were resumed in the hospital for other chronic medical conditions.  Patient reported significant improvement in his symptoms after over 48 hours in the hospital and requested to be discharged home.  Patient was advised to take his medications as prescribed.  Discharge medications are as per medication reconciliation list.  Patient was advised to follow-up with his primary care physician within 3 to 5 days of discharge.  Patient was advised to follow-up with his neurologist within 14 days of discharge.  Patient was advised to follow-up with his endocrinologist within 14 days of discharge.  Patient was advised to return to the emergency department if he experiences any recurrence of his symptoms.  Patient and  family agreed with the plan and patient was discharged in a stable condition.      DISCHARGE Follow Up Recommendations for labs and diagnostics:     Patient was advised to follow-up with his primary care physician who will review his current medications.    Patient was advised to follow-up with his neurologist who will reassess in his neuro function.    Patient was advised to follow-up with his endocrinologist who will reassess his diabetes and glucose control.      Reasons For Change In Medications and Indications for New Medications:      Day of Discharge     Vital Signs:  Temp:  [97.8 °F (36.6 °C)-99.6 °F (37.6 °C)] 97.8 °F (36.6 °C)  Heart Rate:  [63-77] 74  Resp:  [11-17] 11  BP: (128-150)/(59-79) 147/79    Physical Exam:  Physical Exam  Vitals and nursing note reviewed.   Constitutional:       General: He is not in acute distress.  HENT:      Head: Normocephalic.      Nose: Nose normal.      Mouth/Throat:      Mouth: Mucous membranes are dry.      Pharynx: Oropharynx is clear.   Eyes:      Extraocular Movements: Extraocular movements intact.      Conjunctiva/sclera: Conjunctivae normal.      Pupils: Pupils are equal, round, and reactive to light.   Cardiovascular:      Pulses: Normal pulses.      Heart sounds: No murmur heard.    No friction rub. No gallop.   Pulmonary:      Effort: Pulmonary effort is normal.      Breath sounds: No stridor. No wheezing or rales.   Chest:      Chest wall: No tenderness.   Abdominal:      General: Bowel sounds are normal. There is no distension.      Palpations: Abdomen is soft.      Tenderness: There is no abdominal tenderness. There is no right CVA tenderness or guarding.   Musculoskeletal:         General: No swelling, tenderness, deformity or signs of injury.      Cervical back: Normal range of motion. No rigidity.      Right lower leg: No edema.      Left lower leg: No edema.   Skin:     General: Skin is warm and dry.      Capillary Refill: Capillary refill takes less than  2 seconds.      Coloration: Skin is not jaundiced.      Findings: No bruising, erythema, lesion or rash.   Neurological:      Comments: No facial asymmetry noted.  Gait and station not tested.   Psychiatric:      Comments: No agitation.              Pertinent  and/or Most Recent Results     LAB RESULTS:      Lab 03/18/22 1922   WBC 10.80   HEMOGLOBIN 12.3*   HEMATOCRIT 39.3   PLATELETS 264   NEUTROS ABS 8.60*   LYMPHS ABS 1.20   MONOS ABS 0.70   EOS ABS 0.20   MCV 72.7*         Lab 03/19/22  0150 03/18/22 1922   SODIUM 139 137   POTASSIUM 4.3 5.2   CHLORIDE 100 100   CO2 26.0 24.0   ANION GAP 13.0 13.0   BUN 26* 25*   CREATININE 1.48* 1.55*   EGFR 54.5* 51.6*   GLUCOSE 202* 94   CALCIUM 9.2 9.2   HEMOGLOBIN A1C  --  7.6*   TSH 4.510* 3.720         Lab 03/18/22 1922   TOTAL PROTEIN 7.2   ALBUMIN 4.30   GLOBULIN 2.9   ALT (SGPT) 22   AST (SGOT) 24   BILIRUBIN 0.9   ALK PHOS 108         Lab 03/18/22 1922   PROBNP 52.5   TROPONIN T <0.010         Lab 03/18/22 1922   CHOLESTEROL 158   LDL CHOL 81   HDL CHOL 43   TRIGLYCERIDES 205*         Lab 03/19/22  0150   VITAMIN B 12 422         Brief Urine Lab Results  (Last result in the past 365 days)      Color   Clarity   Blood   Leuk Est   Nitrite   Protein   CREAT   Urine HCG        03/18/22 1930 Yellow   Clear   Negative   Negative   Negative   Negative               Microbiology Results (last 10 days)     Procedure Component Value - Date/Time    COVID PRE-OP / PRE-PROCEDURE SCREENING ORDER (NO ISOLATION) - Swab, Nasopharynx [[id number]]  (Normal) Collected: 03/18/22 2250    Lab Status: Final result Specimen: Swab from Nasopharynx Updated: 03/18/22 8126    Narrative:      The following orders were created for panel order COVID PRE-OP / PRE-PROCEDURE SCREENING ORDER (NO ISOLATION) - Swab, Nasopharynx.  Procedure                               Abnormality         Status                     ---------                               -----------         ------                      COVID-19,CEPHEID/MURALI,CO...[[id number]]  Normal              Final result                 Please view results for these tests on the individual orders.    COVID-19,CEPHEID/MURALI,COR/FINA/PAD/KULDIP IN-HOUSE(OR EMERGENT/ADD-ON),NP SWAB IN TRANSPORT MEDIA 3-4 HR TAT, RT-PCR - Swab, Nasopharynx [[id number]]  (Normal) Collected: 03/18/22 2250    Lab Status: Final result Specimen: Swab from Nasopharynx Updated: 03/18/22 2356     COVID19 Not Detected    Narrative:      Fact sheet for providers: https://www.fda.gov/media/807025/download     Fact sheet for patients: https://www.fda.gov/media/284394/download  Fact sheet for providers: https://www.fda.gov/media/456542/download     Fact sheet for patients: https://www.fda.gov/media/855309/download          XR Foot 3+ View Left    Result Date: 2/22/2022  Impression:  1. Significant deformity of the fifth MTP joint which appears unchanged from the last study. Differential consideration should include rheumatoid arthritis versus prior septic arthritis. This remains stable. 2. Periarticular demineralization particularly of the MTP joint of the fourth digit.  Electronically Signed By-Eduardo Randall MD On:2/22/2022 11:29 AM This report was finalized on [id number] by  Eduardo Randall MD.    CT Head Without Contrast    Result Date: 3/18/2022  Impression: No acute intracranial abnormality or change in the CT appearance of the brain since 08/02/2021.  Electronically Signed By-Joaquim Ruth DO On:3/18/2022 9:26 PM This report was finalized on [id number] by  Joaquim Ruth DO.    MRI Brain Without Contrast    Result Date: 3/19/2022  Impression: No acute findings.   Electronically Signed By-Bridger Cazares MD On:3/19/2022 6:28 PM This report was finalized on [id number] by  Bridger Cazares MD.    XR Chest 1 View    Result Date: 3/18/2022  Impression: No active cardiopulmonary disease.   Electronically Signed By-Joaquim Ruth DO On:3/18/2022 8:59 PM This report was finalized on [id number] by   Joaquim Ruth DO.      Results for orders placed during the hospital encounter of 03/18/22    Bilateral Carotid Duplex    Interpretation Summary  · Proximal right internal carotid artery is normal.  · Proximal left internal carotid artery is normal.      Results for orders placed during the hospital encounter of 03/18/22    Bilateral Carotid Duplex    Interpretation Summary  · Proximal right internal carotid artery is normal.  · Proximal left internal carotid artery is normal.      Results for orders placed during the hospital encounter of 03/18/22    Adult Transthoracic Echo Complete W/ Cont if Necessary Per Protocol (With Agitated Saline)    Interpretation Summary  Normal LV size and contractility EF of 60 to 65%  Normal RV size  Mild left atrial enlargement seen  Aortic valve appears structurally normal  Mitral valve leaflets are thickened anterior mitral leaflet appears calcified, but has good cusp separation.   No significant MR seen.  Tricuspid valve appears structurally normal, no significant regurgitation seen.  No pericardial effusion seen.  Proximal aorta appears normal in size.      Labs Pending at Discharge:      Procedures Performed           Consults:   Consults     Date and Time Order Name Status Description    3/18/2022 11:11 PM Inpatient Neurology Consult Stroke Completed             Discharge Details        Discharge Medications      Continue These Medications      Instructions Start Date   apixaban 5 MG tablet tablet  Commonly known as: Eliquis   5 mg, Oral, 2 Times Daily      aspirin 81 MG EC tablet   81 mg, Oral, Daily      atorvastatin 80 MG tablet  Commonly known as: LIPITOR   80 mg, Oral, Nightly      benzonatate 200 MG capsule  Commonly known as: TESSALON   200 mg, Oral, 3 Times Daily PRN      busPIRone 5 MG tablet  Commonly known as: BUSPAR   No dose, route, or frequency recorded.      cephalexin 500 MG capsule  Commonly known as: KEFLEX   500 mg, Oral, 3 Times Daily      cyclobenzaprine  10 MG tablet  Commonly known as: FLEXERIL   Take I tab q 8 hrs as needed for tension headaches      DULoxetine 60 MG capsule  Commonly known as: CYMBALTA   60 mg, Oral, Every Morning      empagliflozin 10 MG tablet tablet  Commonly known as: Jardiance   10 mg, Oral, Daily      FreeStyle Mark 2 Sensor misc   1 each, Does not apply, Every 14 Days      gabapentin 400 MG capsule  Commonly known as: NEURONTIN   400 mg, Oral, 3 Times Daily      Janumet XR  MG tablet  Generic drug: SITagliptin-metFORMIN HCl ER   1 tablet, Oral, 2 Times Daily      Lantus SoloStar 100 UNIT/ML injection pen  Generic drug: Insulin Glargine   40 Units, Subcutaneous, Nightly      levothyroxine 100 MCG tablet  Commonly known as: SYNTHROID, LEVOTHROID   100 mcg, Oral, Daily      losartan 50 MG tablet  Commonly known as: COZAAR   50 mg, Oral, Daily      metoprolol succinate XL 50 MG 24 hr tablet  Commonly known as: TOPROL-XL   50 mg, Oral, Daily      NovoLOG FlexPen 100 UNIT/ML solution pen-injector sc pen  Generic drug: insulin aspart   20 Units, Subcutaneous, 3 Times Daily With Meals      omeprazole 40 MG capsule  Commonly known as: priLOSEC   40 mg, Oral, Daily      ondansetron 4 MG tablet  Commonly known as: ZOFRAN   4 mg, Oral, Every 8 Hours PRN             Allergies   Allergen Reactions   • Lisinopril Cough         Discharge Disposition: Stable.  Home or Self Care    Diet:  Hospital:  Diet Order   Procedures   • Diet Diabetic/Consistent Carbs, Cardiac; 2gm Na+; Diabetic - Consistent Carb         Discharge Activity:  As tolerated.      CODE STATUS:  Code Status and Medical Interventions:   Ordered at: 03/18/22 8239     Code Status (Patient has no pulse and is not breathing):    CPR (Attempt to Resuscitate)     Medical Interventions (Patient has pulse or is breathing):    Full Support         Future Appointments   Date Time Provider Department Center   4/26/2022 10:00 AM ERWIN Baker DPM [facility]           Time spent on  Discharge including face to face service: 40 minutes    This patient has been examined wearing appropriate Personal Protective Equipment and discussed with hospital infection control department, [facility] department, infectious disease specialist and pulmonologist. 03/20/22      Signature: Electronically signed by Behzad Plaza MD, FACP, 03/20/22, 11:47 AM EDT.

## 2022-03-20 NOTE — PROGRESS NOTES
LOS: 0 days     Kuldeep Adhikari is a 57 y.o. male     Chief Complaint:  Follow up for altered mental status.        SUBJECTIVE:  History taken from: patient chart RN    Interval History:   57 yr old gentleman with multiple medical problems including GERD, HLD, HTN, s/p BKA, depression, history of PE who presented to [facility] secondary to confusion.  His blood sugar was low and he was starring at that time.  His blood sugar was 74 at the time of arrival at hospital.  He also felt dizzy and lightheaded.  His condition has improved.  He seems to be at his baseline.  He denies double vision, speech and swallowing problems and focal weakness.           Patient Complaints: none.       Review of Systems   Review of Systems   Review of Systems   Constitutional: Negative  HENT: Negative.    Eyes: Negative.    Respiratory: PE.    Cardiovascular: Negative.    Gastrointestinal: GERD.    Genitourinary: CKD    Musculoskeletal: Negative  Skin: Negative.    Neurological: Negative.    Hematological: Negative.    Psychiatric/Behavioral: depression.      Pertinent PMH:  has a past medical history of CKD (chronic kidney disease), stage III ([facility]), Depression, Depression with suicidal ideation (08/08/2021), DJD (degenerative joint disease), DVT (deep venous thrombosis) ([facility]), Ganglion, Gangrene of foot ([facility]) (10/09/2015), GERD (gastroesophageal reflux disease), Headache, Heart murmur, Hiatal hernia, History of esophageal stricture, Hyperlipidemia, Hypertension, Hypothyroidism, IBS (irritable bowel syndrome), Neuropathy, Osteomyelitis of right foot ([facility]) (03/19/2020), Pulmonary embolism ([facility]) (01/19/2017), Rash, Retinopathy, S/P BKA (below knee amputation), right ([facility]) (03/18/2022), Seasonal allergies, Sepsis due to group B Streptococcus ([facility]) (03/19/2020), Sleep apnea, Type 2 diabetes mellitus with peripheral vascular disease ([facility]), and Vitamin D deficiency.   ________________________________________________     OBJECTIVE:  The patient is  laying in bed in no apparent distress.  Head NC, AT, Neck supple, trachea midline.  Lungs CTA, good pulmonary effort.  CV  S1-S2 no murmur.  Abdomen soft, non tender. Ext no edema, no cyanosis.        Neurologic Exam    The patient is awake, alert, oriented x 3, speech is fluent with good comprehension.  CN EOMI, no facial droop. Tongue midline.  Motor 5/5, right BKA noted.  Reflexes absent. Cerebellum finger to nose intact.      ________________________________________________   RESULTS REVIEW    VITAL SIGNS:  Temp:  [97.8 °F (36.6 °C)-99.6 °F (37.6 °C)] 97.8 °F (36.6 °C)  Heart Rate:  [63-77] 74  Resp:  [11-17] 11  BP: (128-150)/(59-79) 147/79    LABS:   Lab Results   Component Value Date    WBC 10.80 03/18/2022    HGB 12.3 (L) 03/18/2022    HCT 39.3 03/18/2022    MCV 72.7 (L) 03/18/2022     03/18/2022     Lab Results   Component Value Date    GLUCOSE 202 (H) 03/19/2022    BUN 26 (H) 03/19/2022    CREATININE 1.48 (H) 03/19/2022    EGFRIFNONA 48 (L) 01/04/2022    BCR 17.6 03/19/2022    K 4.3 03/19/2022    CO2 26.0 03/19/2022    CALCIUM 9.2 03/19/2022    ALBUMIN 4.30 03/18/2022    LABIL2 1.3 04/22/2019    AST 24 03/18/2022    ALT 22 03/18/2022       Lab Results   Component Value Date    TSH 4.510 (H) 03/19/2022    LDL 81 03/18/2022    HGBA1C 7.6 (H) 03/18/2022    [id number] 422 03/19/2022         IMAGING STUDIES:  CT Head Without Contrast    Result Date: 3/18/2022  No acute intracranial abnormality or change in the CT appearance of the brain since 08/02/2021.  Electronically Signed By-Joaquim Ruth DO On:3/18/2022 9:26 PM This report was finalized on [id number] by  Joaquim Ruth DO.    MRI Brain Without Contrast    Result Date: 3/19/2022  No acute findings.   Electronically Signed By-Bridger Cazares MD On:3/19/2022 6:28 PM This report was finalized on [id number] by  Bridger Cazares MD.    XR Chest 1 View    Result Date: 3/18/2022  No active cardiopulmonary disease.   Electronically Signed By-Joaquim Ruth   On:3/18/2022 8:59 PM This report was finalized on [id number] by  Joaquim Ruth DO.      I reviewed the patient's new clinical results.    ________________________________________________      PROBLEM LIST:    Acute encephalopathy    Coronary artery disease involving native coronary artery of native heart without angina pectoris    Chronic renal impairment, stage 3a (HCC)    Major depressive disorder, recurrent episode, severe (HCC)    Diabetic peripheral neuropathy (HCC)    GERD without esophagitis    History of pulmonary embolism    Mixed hyperlipidemia    Acquired hypothyroidism    Obstructive sleep apnea syndrome    Peripheral vascular disease (HCC)    Obesity (BMI 30-39.9)    History of CVA (cerebrovascular accident)    Type 2 diabetes mellitus with peripheral vascular disease (HCC)    Acute tension-type headache    S/P BKA (below knee amputation), right (HCC)    Primary hypertension        Assessment/Plan   ASSESSMENT/PLAN:  The patient is a 57 yr old gentleman with multiple medical problems including transient confusional state suggestive of metabolic encephalopathy, HTN, DM II, depression, OSAS who had transient confusion.  Clinical features suggestive of metabolic encephalopathy. His condition has improved.   Rec:  Will continue supportive care.  Neuro status stable.  Patient is signed off.  Please call for further assistance.  DM II, HTN, depression, OSAS, CKD III as per MD magdaleno.      **Please refer to previous notes for further details and recommendations.     I discussed the patients findings and my recommendations with patient and nursing staff    Vikas Real MD  03/20/22  12:28 EDT

## 2022-03-21 ENCOUNTER — TRANSITIONAL CARE MANAGEMENT TELEPHONE ENCOUNTER (OUTPATIENT)
Dept: CALL CENTER | Facility: HOSPITAL | Age: 57
End: 2022-03-21

## 2022-03-21 NOTE — CASE MANAGEMENT/SOCIAL WORK
Case Management Discharge Note      Final Note: DC Home before CM assessment.         Selected Continued Care - Discharged on 3/20/2022 Admission date: 3/18/2022 - Discharge disposition: Home or Self Care          Final Discharge Disposition Code: 01 - home or self-care

## 2022-03-21 NOTE — OUTREACH NOTE
Call Center TCM Note    Flowsheet Row Responses   [facility] patient discharged from? [facility]   Does the patient have one of the following disease processes/diagnoses(primary or secondary)? Other   TCM attempt successful? Yes   Call start time 1503   Call end time 1505   Discharge diagnosis Acute encephalopathy,    Acute tension-type headache    Person spoke with today (if not patient) and relationship Patient   Meds reviewed with patient/caregiver? Yes   Is the patient having any side effects they believe may be caused by any medication additions or changes? No   Does the patient have all medications ordered at discharge? N/A   Is the patient taking all medications as directed (includes completed medication regime)? Yes   Does the patient have a primary care provider?  Yes   Does the patient have an appointment with their PCP within 7 days of discharge? Greater than 7 days   Comments regarding [facility] appt on 3/29/22 at 10:00 AM   Nursing Interventions Verified appointment date/time/provider   Has the patient kept scheduled appointments due by today? N/A   Psychosocial issues? No   Did the patient receive a copy of their discharge instructions? Yes   Nursing interventions Reviewed instructions with patient   What is the patient's perception of their health status since discharge? Improving   Is the patient/caregiver able to teach back signs and symptoms related to disease process for when to call PCP? Yes   Is the patient/caregiver able to teach back signs and symptoms related to disease process for when to call 911? Yes   Is the patient/caregiver able to teach back the hierarchy of who to call/visit for symptoms/problems? PCP, Specialist, Home health nurse, Urgent Care, ED, 911 Yes   If the patient is a current smoker, are they able to teach back resources for cessation? Not a smoker   TCM call completed? Yes   Wrap up additional comments Pt states he is feeling better. Pt verified [facility] appt  on 3/29/22. No questions/concerns.          Yuliya Pham RN    3/21/2022, 15:06 EDT

## 2022-03-29 ENCOUNTER — OFFICE VISIT (OUTPATIENT)
Dept: FAMILY MEDICINE CLINIC | Facility: CLINIC | Age: 57
End: 2022-03-29

## 2022-03-29 VITALS
WEIGHT: 200 LBS | DIASTOLIC BLOOD PRESSURE: 78 MMHG | HEART RATE: 81 BPM | HEIGHT: 68 IN | SYSTOLIC BLOOD PRESSURE: 147 MMHG | OXYGEN SATURATION: 99 % | RESPIRATION RATE: 16 BRPM | BODY MASS INDEX: 30.31 KG/M2 | TEMPERATURE: 98.4 F

## 2022-03-29 DIAGNOSIS — E11.621 DIABETIC ULCER OF LEFT GREAT TOE: Primary | ICD-10-CM

## 2022-03-29 DIAGNOSIS — R41.82 ALTERED MENTAL STATUS, UNSPECIFIED ALTERED MENTAL STATUS TYPE: ICD-10-CM

## 2022-03-29 DIAGNOSIS — I10 ESSENTIAL HYPERTENSION: ICD-10-CM

## 2022-03-29 DIAGNOSIS — F41.9 ANXIETY AND DEPRESSION: ICD-10-CM

## 2022-03-29 DIAGNOSIS — F32.A ANXIETY AND DEPRESSION: ICD-10-CM

## 2022-03-29 DIAGNOSIS — L97.529 DIABETIC ULCER OF LEFT GREAT TOE: Primary | ICD-10-CM

## 2022-03-29 PROCEDURE — 99495 TRANSJ CARE MGMT MOD F2F 14D: CPT | Performed by: PHYSICIAN ASSISTANT

## 2022-03-29 PROCEDURE — 1111F DSCHRG MED/CURRENT MED MERGE: CPT | Performed by: PHYSICIAN ASSISTANT

## 2022-03-29 RX ORDER — LOSARTAN POTASSIUM 100 MG/1
100 TABLET ORAL DAILY
Qty: 90 TABLET | Refills: 3 | Status: SHIPPED | OUTPATIENT
Start: 2022-03-29 | End: 2022-04-28 | Stop reason: HOSPADM

## 2022-03-29 NOTE — PROGRESS NOTES
Transitional Care Follow Up Visit  Subjective     Kuldeep Adhikari is a 57 y.o. male who presents for a transitional care management visit.    Within 48 business hours after discharge our office contacted him via telephone to coordinate his care and needs.      I reviewed and discussed the details of that call along with the discharge summary, hospital problems, inpatient lab results, inpatient diagnostic studies, and consultation reports with Kuldeep.     Current outpatient and discharge medications have been reconciled for the patient.  Reviewed by: OSWALD Oglesby      Date of TCM Phone Call 3/20/2022   [facility]   Date of Admission 3/18/2022   Date of Discharge 3/20/2022   Discharge Disposition Home or Self Care     Risk for Readmission (LACE) Score: 9 (3/20/2022  6:00 AM)      Pt presents to follow up from recent hospitalization due to altered mental status.  He states episode started when he was at work and he thinks he gave himself too much insulin.  He had evaluation at ER and was admitted for observation.  He is feeling much better.  He has since noticed an ulcer on his left great toe.  He has called Dr. Baker but he cannot see him until end of April.  He has had bloody/clear discharge.  He has no pain in his toe due to neuropathy. He is going to make follow up with Dr. Michaels and his neurologist.  His blood pressure is elevated today.  He is also under a lot of stress due to family issues.  He does have suicidal thoughts at times but he doesn't plan to follow through with it.  He has gone to therapy in the past.  He is going to make an appointment to start therapy again.      Course During Hospital Stay:  Patient is a 57 y.o. male with a history of pulmonary embolism on anticoagulation, CVA, Type 2 DM, HTN, HLD, PVD and right BKA who presented to the ER at [facility] on 3/18/2022 due to altered mental status. The patient is currently alert and oriented x 3. He states he was at  work earlier today when he lost track of approximately 1 hour of time. He states he was told by his coworkers that they were concerned because he would just stare at them and not talk. He states upon EMS arrival his glucose was 74. He states he took 12 or 15 units of insulin at 11:30 am, but didn't eat much. He states yesterday he had an episode where he felt dizzy and lightheaded. He reports near syncope. He denies any chest pain, shortness of breath, or palpitations. He is now complaining of a tension-type headache. He states he gets them frequently. He reports increased stress at home. He denies any other complaints.   Patient was seen in the emergency room and workup in the ER was unremarkable. The patient was admitted to the Hospitalist group for further evaluation.   Neurology consult was completed.  Acute tension headache was treated with Fioricet.  Acute encephalopathy was treated with supportive care.  Acute encephalopathy improved prior to discharge.  Diabetes mellitus type 2 was treated with insulin therapy.  Coronary artery disease was treated with aspirin.  Hyperlipidemia was treated with Lipitor.  History of pulmonary embolism was not treated with Eliquis.  Appropriate patient's home medications were resumed in the hospital for other chronic medical conditions.  Patient reported significant improvement in his symptoms after over 48 hours in the hospital and requested to be discharged home.  Patient was advised to take his medications as prescribed.  Discharge medications are as per medication reconciliation list.  Patient was advised to follow-up with his primary care physician within 3 to 5 days of discharge.  Patient was advised to follow-up with his neurologist within 14 days of discharge.  Patient was advised to follow-up with his endocrinologist within 14 days of discharge.  Patient was advised to return to the emergency department if he experiences any recurrence of his symptoms.  Patient and  family agreed with the plan and patient was discharged in a stable condition.        DISCHARGE Follow Up Recommendations for labs and diagnostics:      Patient was advised to follow-up with his primary care physician who will review his current medications.     Patient was advised to follow-up with his neurologist who will reassess in his neuro function.     Patient was advised to follow-up with his endocrinologist who will reassess his diabetes and glucose control.       The following portions of the patient's history were reviewed and updated as appropriate: allergies, current medications, past family history, past medical history, past social history, past surgical history and problem list.    Review of Systems   Constitutional: Positive for fatigue. Negative for activity change, appetite change, chills, diaphoresis, fever and unexpected weight change.   Eyes: Negative for visual disturbance.   Respiratory: Negative for chest tightness and shortness of breath.    Cardiovascular: Negative for chest pain, palpitations and leg swelling.   Gastrointestinal: Negative for abdominal pain, constipation, diarrhea, nausea and vomiting.   Musculoskeletal: Positive for arthralgias.   Skin: Positive for wound.   Neurological: Positive for numbness and headaches. Negative for dizziness, tremors, seizures, syncope, facial asymmetry, speech difficulty, weakness and light-headedness.   Psychiatric/Behavioral: Positive for sleep disturbance and suicidal ideas. Negative for agitation, confusion and hallucinations. The patient is nervous/anxious.        Objective   Physical Exam  Vitals and nursing note reviewed.   Constitutional:       Appearance: Normal appearance.   Eyes:      Pupils: Pupils are equal, round, and reactive to light.   Neck:      Vascular: No carotid bruit.   Cardiovascular:      Rate and Rhythm: Normal rate and regular rhythm.      Heart sounds: Normal heart sounds.   Pulmonary:      Effort: Pulmonary effort is  normal.      Breath sounds: Normal breath sounds.   Abdominal:      General: Abdomen is flat. Bowel sounds are normal.      Palpations: Abdomen is soft.   Musculoskeletal:      Cervical back: Normal range of motion and neck supple.   Skin:     General: Skin is warm and dry.      Capillary Refill: Capillary refill takes less than 2 seconds.      Findings: Wound present.      Comments: Ulcer left great toe    Neurological:      General: No focal deficit present.      Mental Status: He is alert and oriented to person, place, and time.   Psychiatric:         Mood and Affect: Mood normal.         Behavior: Behavior normal.         Thought Content: Thought content normal.         Assessment/Plan   Diagnoses and all orders for this visit:    1. Diabetic ulcer of left great toe (HCC) (Primary)  Comments:  Pt to see wound care.  No infection noted today.  Also follow up with podiatry.  Orders:  -     Ambulatory Referral to Wound Clinic    2. Essential hypertension  Comments:  Will increase losartan dose.  Pt to monitor.    3. Altered mental status, unspecified altered mental status type  Comments:  Resolved and likely secondary to low glucose due to taking too much insulin. Pt to follow up with neurology and endocrinology.    4. Anxiety and depression  Comments:  Pt to make appointment with therapist.  Pt does have suicidal ideation at times but denies any plan to follow through with this.

## 2022-04-05 ENCOUNTER — APPOINTMENT (OUTPATIENT)
Dept: WOUND CARE | Facility: HOSPITAL | Age: 57
End: 2022-04-05

## 2022-04-05 ENCOUNTER — READMISSION MANAGEMENT (OUTPATIENT)
Dept: CALL CENTER | Facility: HOSPITAL | Age: 57
End: 2022-04-05

## 2022-04-05 NOTE — OUTREACH NOTE
Medical Week 3 Survey    Flowsheet Row Responses   [facility], [place], operated by [facility] patient discharged from? [facility]   Does the patient have one of the following disease processes/diagnoses(primary or secondary)? Other   Week 3 attempt successful? Yes   Call start time 1056   Call end time 1058   Discharge diagnosis Acute encephalopathy,    Acute tension-type headache    Meds reviewed with patient/caregiver? Yes   Is the patient taking all medications as directed (includes completed medication regime)? Yes   Has the patient kept scheduled appointments due by today? Yes   Has home health visited the patient within 72 hours of discharge? N/A   Psychosocial issues? No   Did the patient receive a copy of their discharge instructions? Yes   Nursing interventions Reviewed instructions with patient   What is the patient's perception of their health status since discharge? Improving   Is the patient/caregiver able to teach back signs and symptoms related to disease process for when to call PCP? Yes   Is the patient/caregiver able to teach back signs and symptoms related to disease process for when to call 911? Yes   Is the patient/caregiver able to teach back the hierarchy of who to call/visit for symptoms/problems? PCP, Specialist, Home health nurse, Urgent Care, ED, 911 Yes   Week 3 Call Completed? Yes   Graduated Yes   Is the patient interested in additional calls from an ambulatory ?  NOTE:  applies to high risk patients requiring additional follow-up. No   Graduated/Revoked comments Pt reports he is doing well and does not need another fu call.    Wrap up additional comments Going to wound care for blister. Pt back to work          RADHA BELTRAN - Registered Nurse

## 2022-04-18 ENCOUNTER — PATIENT OUTREACH (OUTPATIENT)
Dept: CASE MANAGEMENT | Facility: OTHER | Age: 57
End: 2022-04-18

## 2022-04-18 NOTE — OUTREACH NOTE
AMBULATORY CASE MANAGEMENT NOTE    Name and Relationship of Patient/Support Person: Kuldeep Adhikari - Self    Patient Outreach    Pt identified for proactive outreach based on A1C. RN-ACM outreach call made to pt, able to reach pt on 3rd attempt. Explained role of RN-ACM. Pt reports compliant with BG monitoring, states current reading is 103, readings usually in the 100's. He reports compliant with medications, diabetic diet, staying active. Disease education provided. Reviewed SDOH. He denies any current needs. Pt reports he plans to schedule next routine endocrinology and PCP/AWV appts. Pt enrolled in [facility] services. Care plan created this call. Advised pt to call RN-ACM or [facility] 24 hour nurse line with any needs. Follow up outreach scheduled for 1 month.     Adult Patient Profile  Questions/Answers    Flowsheet Row Most Recent Value   Symptoms/Conditions Managed at Home diabetes, type 2   Diabetes Management Strategies activity, blood glucose testing, insulin therapy, medication therapy, diet modification   Usual Blood Glucose 100's   Last A1C Result 7.6   Barriers to Taking Medication as Prescribed none   Equipment Currently Used at Home prosthesis, cane, straight, cpap  [camwalker boot]   Primary Source of Support/Comfort child(bryant), spouse   People in Home spouse, child(bryant), adult   Current Living Arrangements home        Send Education  Questions/Answers    Flowsheet Row Most Recent Value   Annual Wellness Visit:  Patient Will Schedule   Other Patient Education/Resources  24/7 [facility] Nurse Call Line   24/7 Nurse Call Line Education Method Verbal        SDOH updated and reviewed with the patient during this program:  Financial Resource Strain: Low Risk    • Difficulty of Paying Living Expenses: Not very hard      Food Insecurity: No Food Insecurity   • Worried About Running Out of Food in the Last Year: Never true   • Ran Out of Food in the Last Year: Never true      Transportation Needs: Unmet  Transportation Needs   • Lack of Transportation (Medical): Yes   • Lack of Transportation (Non-Medical): No       Education Documentation  Risks, taught by Ioana Ellison RN at 4/18/2022 11:30 AM.  Learner: Patient  Readiness: Acceptance  Method: Explanation  Response: Verbalizes Understanding    Benefits, taught by Ioana Ellison RN at 4/18/2022 11:30 AM.  Learner: Patient  Readiness: Acceptance  Method: Explanation  Response: Verbalizes Understanding    Adjustment to Diet/Medications, taught by Ioana Ellison RN at 4/18/2022 11:30 AM.  Learner: Patient  Readiness: Acceptance  Method: Explanation  Response: Verbalizes Understanding    Follow-Up Care, taught by Ioana Ellison RN at 4/18/2022 11:30 AM.  Learner: Patient  Readiness: Acceptance  Method: Explanation  Response: Verbalizes Understanding    Carbohydrate Counting, taught by Ioana Ellison RN at 4/18/2022 11:30 AM.  Learner: Patient  Readiness: Acceptance  Method: Explanation  Response: Verbalizes Understanding    Monitoring Carbohydrate Intake, taught by Ioana Ellison RN at 4/18/2022 11:30 AM.  Learner: Patient  Readiness: Acceptance  Method: Explanation  Response: Verbalizes Understanding    Healthy Food Choices, taught by Ioana Ellison RN at 4/18/2022 11:30 AM.  Learner: Patient  Readiness: Acceptance  Method: Explanation  Response: Verbalizes Understanding    Carbohydrate-Containing Foods, taught by Ioana Ellison RN at 4/18/2022 11:30 AM.  Learner: Patient  Readiness: Acceptance  Method: Explanation  Response: Verbalizes Understanding    Oral Medication, taught by Ioana Ellison RN at 4/18/2022 11:30 AM.  Learner: Patient  Readiness: Acceptance  Method: Explanation  Response: Verbalizes Understanding    Insulin Therapy, taught by Ioana Ellison RN at 4/18/2022 11:30 AM.  Learner: Patient  Readiness: Acceptance  Method: Explanation  Response: Verbalizes Understanding    Blood Glucose Monitor Use, taught by Ioana Ellison RN at 4/18/2022  11:30 AM.  Learner: Patient  Readiness: Acceptance  Method: Explanation  Response: Verbalizes Understanding    Blood Glucose Monitoring, taught by Ioana Ellison RN at 4/18/2022 11:30 AM.  Learner: Patient  Readiness: Acceptance  Method: Explanation  Response: Verbalizes Understanding          IOANA HAM  Ambulatory Case Management    4/18/2022, 11:31 EDT

## 2022-04-19 ENCOUNTER — OFFICE VISIT (OUTPATIENT)
Dept: PODIATRY | Facility: CLINIC | Age: 57
End: 2022-04-19

## 2022-04-19 VITALS
HEART RATE: 90 BPM | OXYGEN SATURATION: 95 % | BODY MASS INDEX: 30.31 KG/M2 | WEIGHT: 200 LBS | DIASTOLIC BLOOD PRESSURE: 73 MMHG | HEIGHT: 68 IN | SYSTOLIC BLOOD PRESSURE: 135 MMHG

## 2022-04-19 DIAGNOSIS — L97.522 CHRONIC ULCER OF LEFT FOOT WITH FAT LAYER EXPOSED: Primary | ICD-10-CM

## 2022-04-19 DIAGNOSIS — Z89.511 HX OF BKA, RIGHT: ICD-10-CM

## 2022-04-19 DIAGNOSIS — M20.5X2 MALLET TOE, LEFT: ICD-10-CM

## 2022-04-19 DIAGNOSIS — E11.42 DM TYPE 2 WITH DIABETIC PERIPHERAL NEUROPATHY: ICD-10-CM

## 2022-04-19 PROCEDURE — 99213 OFFICE O/P EST LOW 20 MIN: CPT | Performed by: PODIATRIST

## 2022-04-19 NOTE — PROGRESS NOTES
04/19/2022  Foot and Ankle Surgery - Established Patient/Follow-up  Provider: Dr. Bong Baker DPM  Location: [facility]    Subjective:  Kuldeep Adhikari is a 57 y.o. male.     Chief Complaint   Patient presents with   • Left Foot - Skin Ulcer   • Follow-up     [facility] APRN 3-29-22       HPI: Patient returns for evaluation of his left foot.  He continues to have an open wound involving the plantar aspect of his left hallux.  He was briefly evaluated at the wound care center previously for these issues.  He also complains of open wound involving his right stump site.  He has seen Dr. Reynolds for this issue.  Patient has been in the cam boot on the left lower extremity and feels that the wound is looking better.  He has decreased his overall activity and applying Betadine on a daily basis    Allergies   Allergen Reactions   • Lisinopril Cough       Current Outpatient Medications on File Prior to Visit   Medication Sig Dispense Refill   • apixaban (Eliquis) 5 MG tablet tablet Take 1 tablet by mouth 2 (Two) Times a Day. 180 tablet 0   • aspirin 81 MG EC tablet Take 1 tablet by mouth Daily.     • atorvastatin (LIPITOR) 80 MG tablet Take 1 tablet by mouth Every Night. 90 tablet 0   • busPIRone (BUSPAR) 5 MG tablet      • Continuous Blood Gluc Sensor (FreeStyle Mark 2 Sensor) misc 1 each Every 14 (Fourteen) Days. 6 each 4   • cyclobenzaprine (FLEXERIL) 10 MG tablet Take I tab q 8 hrs as needed for tension headaches 30 tablet 0   • DULoxetine (CYMBALTA) 60 MG capsule Take 1 capsule by mouth Every Morning. 90 capsule 0   • empagliflozin (Jardiance) 10 MG tablet tablet Take 1 tablet by mouth Daily. 90 tablet 0   • gabapentin (NEURONTIN) 400 MG capsule Take 1 capsule by mouth 3 (Three) Times a Day. 270 capsule 0   • insulin aspart (NovoLOG FlexPen) 100 UNIT/ML solution pen-injector sc pen Inject 20 Units under the skin into the appropriate area as directed 3 (Three) Times a Day With Meals. 54 mL 4   • Insulin Glargine  "(Lantus SoloStar) 100 UNIT/ML injection pen Inject 40 Units under the skin into the appropriate area as directed Every Night. 36 mL 4   • levothyroxine (SYNTHROID, LEVOTHROID) 100 MCG tablet Take 1 tablet by mouth Daily. 90 tablet 0   • losartan (COZAAR) 100 MG tablet Take 1 tablet by mouth Daily. 90 tablet 3   • metoprolol succinate XL (TOPROL-XL) 50 MG 24 hr tablet Take 1 tablet by mouth Daily. 90 tablet 0   • omeprazole (priLOSEC) 40 MG capsule Take 1 capsule by mouth Daily. 90 capsule 0   • ondansetron (ZOFRAN) 4 MG tablet Take 1 tablet by mouth Every 8 (Eight) Hours As Needed for Nausea or Vomiting. 45 tablet 0   • SITagliptin-metFORMIN HCl ER (Janumet XR)  MG tablet Take 1 tablet by mouth 2 (Two) Times a Day. 180 tablet 0     No current facility-administered medications on file prior to visit.       Objective   /73   Pulse 90   Ht 172.7 cm (68\")   Wt 90.7 kg (200 lb)   SpO2 95%   BMI 30.41 kg/m²     General:   Appearance: appears stated age and healthy    Orientation: AAOx3    Affect: appropriate       VASCULAR       Left Foot Vascularity   Normal vascular exam    Dorsalis pedis:  2+  Posterior tibial:  2+  Skin Temperature: warm    Edema Gradin+  CFT:  < 3 seconds  Pedal Hair Growth:  Absent      NEUROLOGIC      Left Foot Neurologic   Light touch sensation:  Diminished  Hot/cold sensation: diminished    Protective Sensation using [place]-Debi Monofilament:  Diminished  Achilles reflex:  2+      MUSCULOSKELETAL       Right Foot Musculoskeletal    Amputation   Below right knee: Yes        Left Foot Musculoskeletal    Amputation   Left toes amputated: No    Ecchymosis:  None  Tenderness: Plantar fascia  Arch:  Normal  Hammertoe:  Second toe      MUSCLE STRENGTH      Left Foot Muscle Strength   Normal strength    Foot dorsiflexion:  5  Foot plantar flexion:  5  Foot inversion:  5  Foot eversion:  5      DERMATOLOGIC      Left Foot Dermatologic   Skin: Obvious open wound involving the " plantar aspect of the left hallux.  The wound is circular and measures approximately 1.5 cm in diameter.  Wound extends to subcutaneous tissue.  No active signs of infection are noted.  No other open wounds involving the left lower extremity.    Continued soft tissue rigidity involving the forefoot and moderate equinus contracture with knee extended and flexed    Assessment/Plan   Diagnoses and all orders for this visit:    1. Chronic ulcer of left foot with fat layer exposed (HCC) (Primary)  -     XR Foot 3+ View Left    2. Mallet toe, left    3. DM type 2 with diabetic peripheral neuropathy (HCC)    4. Hx of BKA, right (HCC)      Patient returns to office with wound involving the plantar aspect of his left great toe.  Patient has been in the cam boot which has offloaded this and stabilize the wound.  He has been applying Betadine on a daily basis.  No obvious signs of infection are noted today.  Imaging was performed showing no osseous abnormalities or concerning features.  Patient states that he does have collagen at home.  I have asked that he start collagen dressing changes on a daily basis.  He is to continue wearing the cam boot and offload.  I do feel that he should reduce overall activity and remain off work.  Patient is to continue following Dr. Reynolds for management involving his stump wound.  He is to return in 2 weeks for reevaluation.  Greater than 20 minutes was spent before, during, and after evaluation for patient care.    Orders Placed This Encounter   Procedures   • XR Foot 3+ View Left     Order Specific Question:   Reason for Exam:     Answer:   left great toe r/o osteomyelitis  room 14 wb  may leave after xray.     Order Specific Question:   Does this patient have a diabetic monitoring/medication delivering device on?     Answer:   No     Order Specific Question:   Release to patient     Answer:   Immediate          Note is dictated utilizing voice recognition software. Unfortunately this leads  to occasional typographical errors. I apologize in advance if the situation occurs. If questions occur please do not hesitate to call our office.

## 2022-04-23 ENCOUNTER — HOSPITAL ENCOUNTER (INPATIENT)
Facility: HOSPITAL | Age: 57
LOS: 4 days | Discharge: HOME | End: 2022-04-28
Attending: EMERGENCY MEDICINE | Admitting: PODIATRIST

## 2022-04-23 ENCOUNTER — APPOINTMENT (OUTPATIENT)
Dept: GENERAL RADIOLOGY | Facility: HOSPITAL | Age: 57
End: 2022-04-23

## 2022-04-23 DIAGNOSIS — M86.9 OSTEOMYELITIS: ICD-10-CM

## 2022-04-23 DIAGNOSIS — E11.65 UNCONTROLLED TYPE 2 DIABETES MELLITUS WITH HYPERGLYCEMIA: ICD-10-CM

## 2022-04-23 DIAGNOSIS — L03.116 CELLULITIS OF LEFT LOWER EXTREMITY: Primary | ICD-10-CM

## 2022-04-23 PROCEDURE — 73630 X-RAY EXAM OF FOOT: CPT

## 2022-04-23 PROCEDURE — 99283 EMERGENCY DEPT VISIT LOW MDM: CPT

## 2022-04-24 ENCOUNTER — APPOINTMENT (OUTPATIENT)
Dept: CARDIOLOGY | Facility: HOSPITAL | Age: 57
End: 2022-04-24

## 2022-04-24 ENCOUNTER — APPOINTMENT (OUTPATIENT)
Dept: MRI IMAGING | Facility: HOSPITAL | Age: 57
End: 2022-04-24

## 2022-04-24 PROBLEM — L03.032 CELLULITIS OF GREAT TOE OF LEFT FOOT: Status: ACTIVE | Noted: 2022-04-24

## 2022-04-24 PROBLEM — D63.8 ANEMIA OF CHRONIC DISEASE: Status: ACTIVE | Noted: 2022-04-24

## 2022-04-24 PROBLEM — L03.116 CELLULITIS OF LEFT LOWER EXTREMITY: Status: ACTIVE | Noted: 2022-04-24

## 2022-04-24 LAB
ALBUMIN SERPL-MCNC: 3.7 G/DL (ref 3.5–5.2)
ALBUMIN/GLOB SERPL: 1.1 G/DL
ALP SERPL-CCNC: 108 U/L (ref 39–117)
ALT SERPL W P-5'-P-CCNC: 15 U/L (ref 1–41)
ANION GAP SERPL CALCULATED.3IONS-SCNC: 14 MMOL/L (ref 5–15)
ANION GAP SERPL CALCULATED.3IONS-SCNC: 16 MMOL/L (ref 5–15)
ANISOCYTOSIS BLD QL: ABNORMAL
APTT PPP: 40.3 SECONDS (ref 24–31)
AST SERPL-CCNC: 18 U/L (ref 1–40)
BASOPHILS # BLD AUTO: 0.1 10*3/MM3 (ref 0–0.2)
BASOPHILS NFR BLD AUTO: 0.8 % (ref 0–1.5)
BH CV LOWER ARTERIAL LEFT ABI RATIO: 1.24
BH CV LOWER ARTERIAL LEFT DORSALIS PEDIS SYS MAX: 175
BH CV LOWER ARTERIAL LEFT GREAT TOE SYS MAX: 254
BH CV LOWER ARTERIAL LEFT POST TIBIAL SYS MAX: 165
BILIRUB SERPL-MCNC: 1.2 MG/DL (ref 0–1.2)
BUN SERPL-MCNC: 23 MG/DL (ref 6–20)
BUN SERPL-MCNC: 25 MG/DL (ref 6–20)
BUN/CREAT SERPL: 14.4 (ref 7–25)
BUN/CREAT SERPL: 16 (ref 7–25)
CALCIUM SPEC-SCNC: 8.5 MG/DL (ref 8.6–10.5)
CALCIUM SPEC-SCNC: 9 MG/DL (ref 8.6–10.5)
CHLORIDE SERPL-SCNC: 97 MMOL/L (ref 98–107)
CHLORIDE SERPL-SCNC: 99 MMOL/L (ref 98–107)
CO2 SERPL-SCNC: 23 MMOL/L (ref 22–29)
CO2 SERPL-SCNC: 24 MMOL/L (ref 22–29)
CREAT SERPL-MCNC: 1.56 MG/DL (ref 0.76–1.27)
CREAT SERPL-MCNC: 1.6 MG/DL (ref 0.76–1.27)
CRP SERPL-MCNC: 8.41 MG/DL (ref 0–0.5)
CRP SERPL-MCNC: 9.18 MG/DL (ref 0–0.5)
D-LACTATE SERPL-SCNC: 1.2 MMOL/L (ref 0.5–2)
DEPRECATED RDW RBC AUTO: 43.3 FL (ref 37–54)
DEPRECATED RDW RBC AUTO: 44.6 FL (ref 37–54)
EGFRCR SERPLBLD CKD-EPI 2021: 49.6 ML/MIN/1.73
EGFRCR SERPLBLD CKD-EPI 2021: 51.2 ML/MIN/1.73
EOSINOPHIL # BLD AUTO: 0.4 10*3/MM3 (ref 0–0.4)
EOSINOPHIL NFR BLD AUTO: 3.8 % (ref 0.3–6.2)
ERYTHROCYTE [DISTWIDTH] IN BLOOD BY AUTOMATED COUNT: 17.1 % (ref 12.3–15.4)
ERYTHROCYTE [DISTWIDTH] IN BLOOD BY AUTOMATED COUNT: 17.7 % (ref 12.3–15.4)
ERYTHROCYTE [SEDIMENTATION RATE] IN BLOOD: 91 MM/HR (ref 0–20)
GLOBULIN UR ELPH-MCNC: 3.4 GM/DL
GLUCOSE BLDC GLUCOMTR-MCNC: 100 MG/DL (ref 70–105)
GLUCOSE BLDC GLUCOMTR-MCNC: 128 MG/DL (ref 70–105)
GLUCOSE BLDC GLUCOMTR-MCNC: 170 MG/DL (ref 70–105)
GLUCOSE BLDC GLUCOMTR-MCNC: 175 MG/DL (ref 70–105)
GLUCOSE SERPL-MCNC: 126 MG/DL (ref 65–99)
GLUCOSE SERPL-MCNC: 128 MG/DL (ref 65–99)
HCT VFR BLD AUTO: 32 % (ref 37.5–51)
HCT VFR BLD AUTO: 35.6 % (ref 37.5–51)
HGB BLD-MCNC: 11.3 G/DL (ref 13–17.7)
HGB BLD-MCNC: 9.9 G/DL (ref 13–17.7)
INR PPP: 1.18 (ref 0.93–1.1)
LYMPHOCYTES # BLD AUTO: 1.8 10*3/MM3 (ref 0.7–3.1)
LYMPHOCYTES # BLD MANUAL: 1.39 10*3/MM3 (ref 0.7–3.1)
LYMPHOCYTES NFR BLD AUTO: 17.4 % (ref 19.6–45.3)
LYMPHOCYTES NFR BLD MANUAL: 9 % (ref 5–12)
MAXIMAL PREDICTED HEART RATE: 162 BPM
MCH RBC QN AUTO: 21.9 PG (ref 26.6–33)
MCH RBC QN AUTO: 22.4 PG (ref 26.6–33)
MCHC RBC AUTO-ENTMCNC: 31 G/DL (ref 31.5–35.7)
MCHC RBC AUTO-ENTMCNC: 31.6 G/DL (ref 31.5–35.7)
MCV RBC AUTO: 70.8 FL (ref 79–97)
MCV RBC AUTO: 70.8 FL (ref 79–97)
MICROCYTES BLD QL: ABNORMAL
MONOCYTES # BLD AUTO: 1 10*3/MM3 (ref 0.1–0.9)
MONOCYTES # BLD: 1.13 10*3/MM3 (ref 0.1–0.9)
MONOCYTES NFR BLD AUTO: 10.1 % (ref 5–12)
NEUTROPHILS # BLD AUTO: 10.08 10*3/MM3 (ref 1.7–7)
NEUTROPHILS NFR BLD AUTO: 67.9 % (ref 42.7–76)
NEUTROPHILS NFR BLD AUTO: 7 10*3/MM3 (ref 1.7–7)
NEUTROPHILS NFR BLD MANUAL: 75 % (ref 42.7–76)
NEUTS BAND NFR BLD MANUAL: 5 % (ref 0–5)
NRBC BLD AUTO-RTO: 0.2 /100 WBC (ref 0–0.2)
PLAT MORPH BLD: NORMAL
PLATELET # BLD AUTO: 295 10*3/MM3 (ref 140–450)
PLATELET # BLD AUTO: 310 10*3/MM3 (ref 140–450)
PMV BLD AUTO: 7.3 FL (ref 6–12)
PMV BLD AUTO: 7.5 FL (ref 6–12)
POTASSIUM SERPL-SCNC: 4.3 MMOL/L (ref 3.5–5.2)
POTASSIUM SERPL-SCNC: 4.6 MMOL/L (ref 3.5–5.2)
PROCALCITONIN SERPL-MCNC: 0.32 NG/ML (ref 0–0.25)
PROT SERPL-MCNC: 7.1 G/DL (ref 6–8.5)
PROTHROMBIN TIME: 12 SECONDS (ref 9.6–11.7)
RBC # BLD AUTO: 4.52 10*6/MM3 (ref 4.14–5.8)
RBC # BLD AUTO: 5.02 10*6/MM3 (ref 4.14–5.8)
SCAN SLIDE: NORMAL
SODIUM SERPL-SCNC: 136 MMOL/L (ref 136–145)
SODIUM SERPL-SCNC: 137 MMOL/L (ref 136–145)
STRESS TARGET HR: 138 BPM
UPPER ARTERIAL LEFT ARM BRACHIAL SYS MAX: 134
UPPER ARTERIAL RIGHT ARM BRACHIAL SYS MAX: 141
URATE SERPL-MCNC: 7.7 MG/DL (ref 3.4–7)
VARIANT LYMPHS NFR BLD MANUAL: 11 % (ref 19.6–45.3)
WBC MORPH BLD: NORMAL
WBC NRBC COR # BLD: 10.3 10*3/MM3 (ref 3.4–10.8)
WBC NRBC COR # BLD: 12.6 10*3/MM3 (ref 3.4–10.8)

## 2022-04-24 PROCEDURE — 93922 UPR/L XTREMITY ART 2 LEVELS: CPT

## 2022-04-24 PROCEDURE — 86140 C-REACTIVE PROTEIN: CPT | Performed by: NURSE PRACTITIONER

## 2022-04-24 PROCEDURE — 73718 MRI LOWER EXTREMITY W/O DYE: CPT

## 2022-04-24 PROCEDURE — 87040 BLOOD CULTURE FOR BACTERIA: CPT | Performed by: NURSE PRACTITIONER

## 2022-04-24 PROCEDURE — 85652 RBC SED RATE AUTOMATED: CPT | Performed by: NURSE PRACTITIONER

## 2022-04-24 PROCEDURE — 25010000002 ONDANSETRON PER 1 MG: Performed by: NURSE PRACTITIONER

## 2022-04-24 PROCEDURE — 87077 CULTURE AEROBIC IDENTIFY: CPT | Performed by: NURSE PRACTITIONER

## 2022-04-24 PROCEDURE — 82962 GLUCOSE BLOOD TEST: CPT

## 2022-04-24 PROCEDURE — 99223 1ST HOSP IP/OBS HIGH 75: CPT | Performed by: HOSPITALIST

## 2022-04-24 PROCEDURE — 85025 COMPLETE CBC W/AUTO DIFF WBC: CPT | Performed by: NURSE PRACTITIONER

## 2022-04-24 PROCEDURE — 83605 ASSAY OF LACTIC ACID: CPT | Performed by: NURSE PRACTITIONER

## 2022-04-24 PROCEDURE — 85610 PROTHROMBIN TIME: CPT | Performed by: NURSE PRACTITIONER

## 2022-04-24 PROCEDURE — 25010000002 CEFEPIME PER 500 MG: Performed by: NURSE PRACTITIONER

## 2022-04-24 PROCEDURE — 84145 PROCALCITONIN (PCT): CPT | Performed by: NURSE PRACTITIONER

## 2022-04-24 PROCEDURE — 85007 BL SMEAR W/DIFF WBC COUNT: CPT | Performed by: NURSE PRACTITIONER

## 2022-04-24 PROCEDURE — 25010000002 HYDROMORPHONE 1 MG/ML SOLUTION: Performed by: HOSPITALIST

## 2022-04-24 PROCEDURE — 84550 ASSAY OF BLOOD/URIC ACID: CPT | Performed by: NURSE PRACTITIONER

## 2022-04-24 PROCEDURE — 36415 COLL VENOUS BLD VENIPUNCTURE: CPT | Performed by: NURSE PRACTITIONER

## 2022-04-24 PROCEDURE — 87205 SMEAR GRAM STAIN: CPT | Performed by: NURSE PRACTITIONER

## 2022-04-24 PROCEDURE — 87070 CULTURE OTHR SPECIMN AEROBIC: CPT | Performed by: NURSE PRACTITIONER

## 2022-04-24 PROCEDURE — 80053 COMPREHEN METABOLIC PANEL: CPT | Performed by: NURSE PRACTITIONER

## 2022-04-24 PROCEDURE — 25010000002 VANCOMYCIN 10 G RECONSTITUTED SOLUTION: Performed by: NURSE PRACTITIONER

## 2022-04-24 PROCEDURE — 63710000001 INSULIN LISPRO (HUMAN) PER 5 UNITS: Performed by: NURSE PRACTITIONER

## 2022-04-24 PROCEDURE — 85730 THROMBOPLASTIN TIME PARTIAL: CPT | Performed by: NURSE PRACTITIONER

## 2022-04-24 PROCEDURE — 87186 SC STD MICRODIL/AGAR DIL: CPT | Performed by: NURSE PRACTITIONER

## 2022-04-24 PROCEDURE — 87147 CULTURE TYPE IMMUNOLOGIC: CPT | Performed by: NURSE PRACTITIONER

## 2022-04-24 RX ORDER — DULOXETIN HYDROCHLORIDE 30 MG/1
60 CAPSULE, DELAYED RELEASE ORAL DAILY
Status: DISCONTINUED | OUTPATIENT
Start: 2022-04-25 | End: 2022-04-25

## 2022-04-24 RX ORDER — GABAPENTIN 300 MG/1
300 CAPSULE ORAL NIGHTLY
Status: DISCONTINUED | OUTPATIENT
Start: 2022-04-24 | End: 2022-04-28 | Stop reason: HOSPADM

## 2022-04-24 RX ORDER — MULTIPLE VITAMINS W/ MINERALS TAB 9MG-400MCG
1 TAB ORAL DAILY
COMMUNITY

## 2022-04-24 RX ORDER — ACETAMINOPHEN 160 MG/5ML
650 SOLUTION ORAL EVERY 4 HOURS PRN
Status: DISCONTINUED | OUTPATIENT
Start: 2022-04-24 | End: 2022-04-28 | Stop reason: HOSPADM

## 2022-04-24 RX ORDER — INSULIN LISPRO 100 [IU]/ML
0-14 INJECTION, SOLUTION INTRAVENOUS; SUBCUTANEOUS AS NEEDED
Status: DISCONTINUED | OUTPATIENT
Start: 2022-04-24 | End: 2022-04-25

## 2022-04-24 RX ORDER — INSULIN GLARGINE 100 [IU]/ML
34 INJECTION, SOLUTION SUBCUTANEOUS NIGHTLY
COMMUNITY
End: 2022-04-28 | Stop reason: HOSPADM

## 2022-04-24 RX ORDER — SODIUM CHLORIDE 0.9 % (FLUSH) 0.9 %
10 SYRINGE (ML) INJECTION EVERY 12 HOURS SCHEDULED
Status: DISCONTINUED | OUTPATIENT
Start: 2022-04-24 | End: 2022-04-28 | Stop reason: HOSPADM

## 2022-04-24 RX ORDER — NICOTINE POLACRILEX 4 MG
15 LOZENGE BUCCAL
Status: DISCONTINUED | OUTPATIENT
Start: 2022-04-24 | End: 2022-04-28 | Stop reason: HOSPADM

## 2022-04-24 RX ORDER — CHOLECALCIFEROL (VITAMIN D3) 125 MCG
5 CAPSULE ORAL NIGHTLY PRN
Status: DISCONTINUED | OUTPATIENT
Start: 2022-04-24 | End: 2022-04-28 | Stop reason: HOSPADM

## 2022-04-24 RX ORDER — PANTOPRAZOLE SODIUM 40 MG/1
40 TABLET, DELAYED RELEASE ORAL
Status: DISCONTINUED | OUTPATIENT
Start: 2022-04-25 | End: 2022-04-28 | Stop reason: HOSPADM

## 2022-04-24 RX ORDER — INSULIN GLARGINE 100 [IU]/ML
34 INJECTION, SOLUTION SUBCUTANEOUS
Status: DISCONTINUED | OUTPATIENT
Start: 2022-04-25 | End: 2022-04-25

## 2022-04-24 RX ORDER — ACETAMINOPHEN 325 MG/1
650 TABLET ORAL EVERY 4 HOURS PRN
Status: DISCONTINUED | OUTPATIENT
Start: 2022-04-24 | End: 2022-04-28 | Stop reason: HOSPADM

## 2022-04-24 RX ORDER — OLANZAPINE 10 MG/2ML
1 INJECTION, POWDER, LYOPHILIZED, FOR SOLUTION INTRAMUSCULAR AS NEEDED
Status: DISCONTINUED | OUTPATIENT
Start: 2022-04-24 | End: 2022-04-28 | Stop reason: HOSPADM

## 2022-04-24 RX ORDER — GABAPENTIN 300 MG/1
300 CAPSULE ORAL NIGHTLY
COMMUNITY
End: 2022-06-09 | Stop reason: SINTOL

## 2022-04-24 RX ORDER — CALCIUM CARBONATE 200(500)MG
2 TABLET,CHEWABLE ORAL 2 TIMES DAILY PRN
Status: DISCONTINUED | OUTPATIENT
Start: 2022-04-24 | End: 2022-04-28 | Stop reason: HOSPADM

## 2022-04-24 RX ORDER — METOPROLOL SUCCINATE 50 MG/1
50 TABLET, EXTENDED RELEASE ORAL DAILY
Status: DISCONTINUED | OUTPATIENT
Start: 2022-04-25 | End: 2022-04-28 | Stop reason: HOSPADM

## 2022-04-24 RX ORDER — CYCLOBENZAPRINE HCL 10 MG
10 TABLET ORAL 3 TIMES DAILY PRN
Status: DISCONTINUED | OUTPATIENT
Start: 2022-04-24 | End: 2022-04-28 | Stop reason: HOSPADM

## 2022-04-24 RX ORDER — ASPIRIN 81 MG/1
81 TABLET ORAL DAILY
Status: DISCONTINUED | OUTPATIENT
Start: 2022-04-24 | End: 2022-04-28 | Stop reason: HOSPADM

## 2022-04-24 RX ORDER — ATORVASTATIN CALCIUM 40 MG/1
80 TABLET, FILM COATED ORAL NIGHTLY
Status: DISCONTINUED | OUTPATIENT
Start: 2022-04-24 | End: 2022-04-28 | Stop reason: HOSPADM

## 2022-04-24 RX ORDER — ONDANSETRON 4 MG/1
4 TABLET, FILM COATED ORAL EVERY 6 HOURS PRN
Status: DISCONTINUED | OUTPATIENT
Start: 2022-04-24 | End: 2022-04-28 | Stop reason: HOSPADM

## 2022-04-24 RX ORDER — ALUMINA, MAGNESIA, AND SIMETHICONE 2400; 2400; 240 MG/30ML; MG/30ML; MG/30ML
15 SUSPENSION ORAL EVERY 6 HOURS PRN
Status: DISCONTINUED | OUTPATIENT
Start: 2022-04-24 | End: 2022-04-28 | Stop reason: HOSPADM

## 2022-04-24 RX ORDER — INSULIN LISPRO 100 [IU]/ML
0-14 INJECTION, SOLUTION INTRAVENOUS; SUBCUTANEOUS
Status: DISCONTINUED | OUTPATIENT
Start: 2022-04-24 | End: 2022-04-28 | Stop reason: HOSPADM

## 2022-04-24 RX ORDER — ONDANSETRON 2 MG/ML
4 INJECTION INTRAMUSCULAR; INTRAVENOUS EVERY 6 HOURS PRN
Status: DISCONTINUED | OUTPATIENT
Start: 2022-04-24 | End: 2022-04-28 | Stop reason: HOSPADM

## 2022-04-24 RX ORDER — SODIUM CHLORIDE 0.9 % (FLUSH) 0.9 %
10 SYRINGE (ML) INJECTION AS NEEDED
Status: DISCONTINUED | OUTPATIENT
Start: 2022-04-24 | End: 2022-04-28 | Stop reason: HOSPADM

## 2022-04-24 RX ORDER — LEVOTHYROXINE SODIUM 0.1 MG/1
100 TABLET ORAL
Status: DISCONTINUED | OUTPATIENT
Start: 2022-04-25 | End: 2022-04-28 | Stop reason: HOSPADM

## 2022-04-24 RX ORDER — MULTIPLE VITAMINS W/ MINERALS TAB 9MG-400MCG
1 TAB ORAL DAILY
Status: DISCONTINUED | OUTPATIENT
Start: 2022-04-25 | End: 2022-04-28 | Stop reason: HOSPADM

## 2022-04-24 RX ORDER — ACETAMINOPHEN 650 MG/1
650 SUPPOSITORY RECTAL EVERY 4 HOURS PRN
Status: DISCONTINUED | OUTPATIENT
Start: 2022-04-24 | End: 2022-04-28 | Stop reason: HOSPADM

## 2022-04-24 RX ORDER — DEXTROSE MONOHYDRATE 25 G/50ML
25 INJECTION, SOLUTION INTRAVENOUS
Status: DISCONTINUED | OUTPATIENT
Start: 2022-04-24 | End: 2022-04-28 | Stop reason: HOSPADM

## 2022-04-24 RX ADMIN — Medication 10 ML: at 20:35

## 2022-04-24 RX ADMIN — ONDANSETRON 4 MG: 2 INJECTION INTRAMUSCULAR; INTRAVENOUS at 05:16

## 2022-04-24 RX ADMIN — Medication 10 ML: at 08:32

## 2022-04-24 RX ADMIN — Medication 10 ML: at 04:33

## 2022-04-24 RX ADMIN — CEFEPIME HYDROCHLORIDE 2 G: 2 INJECTION, POWDER, FOR SOLUTION INTRAVENOUS at 04:22

## 2022-04-24 RX ADMIN — SODIUM CHLORIDE 500 ML: 9 INJECTION, SOLUTION INTRAVENOUS at 04:44

## 2022-04-24 RX ADMIN — INSULIN LISPRO 3 UNITS: 100 INJECTION, SOLUTION INTRAVENOUS; SUBCUTANEOUS at 12:07

## 2022-04-24 RX ADMIN — ATORVASTATIN CALCIUM 80 MG: 40 TABLET, FILM COATED ORAL at 20:34

## 2022-04-24 RX ADMIN — GABAPENTIN 300 MG: 300 CAPSULE ORAL at 20:34

## 2022-04-24 RX ADMIN — CEFEPIME HYDROCHLORIDE 2 G: 2 INJECTION, POWDER, FOR SOLUTION INTRAVENOUS at 16:52

## 2022-04-24 RX ADMIN — ASPIRIN 81 MG: 81 TABLET, COATED ORAL at 20:34

## 2022-04-24 RX ADMIN — HYDROMORPHONE HYDROCHLORIDE 0.5 MG: 1 INJECTION, SOLUTION INTRAMUSCULAR; INTRAVENOUS; SUBCUTANEOUS at 20:35

## 2022-04-24 RX ADMIN — VANCOMYCIN HYDROCHLORIDE 1500 MG: 10 INJECTION, POWDER, LYOPHILIZED, FOR SOLUTION INTRAVENOUS at 05:02

## 2022-04-24 RX ADMIN — APIXABAN 5 MG: 5 TABLET, FILM COATED ORAL at 20:34

## 2022-04-25 LAB
ANION GAP SERPL CALCULATED.3IONS-SCNC: 17 MMOL/L (ref 5–15)
BASOPHILS # BLD AUTO: 0.1 10*3/MM3 (ref 0–0.2)
BASOPHILS NFR BLD AUTO: 0.8 % (ref 0–1.5)
BUN SERPL-MCNC: 20 MG/DL (ref 6–20)
BUN/CREAT SERPL: 14 (ref 7–25)
CALCIUM SPEC-SCNC: 9.1 MG/DL (ref 8.6–10.5)
CHLORIDE SERPL-SCNC: 99 MMOL/L (ref 98–107)
CO2 SERPL-SCNC: 22 MMOL/L (ref 22–29)
CREAT SERPL-MCNC: 1.43 MG/DL (ref 0.76–1.27)
DEPRECATED RDW RBC AUTO: 42.4 FL (ref 37–54)
EGFRCR SERPLBLD CKD-EPI 2021: 56.8 ML/MIN/1.73
EOSINOPHIL # BLD AUTO: 0.2 10*3/MM3 (ref 0–0.4)
EOSINOPHIL NFR BLD AUTO: 3.5 % (ref 0.3–6.2)
ERYTHROCYTE [DISTWIDTH] IN BLOOD BY AUTOMATED COUNT: 16.9 % (ref 12.3–15.4)
GLUCOSE BLDC GLUCOMTR-MCNC: 125 MG/DL (ref 70–105)
GLUCOSE BLDC GLUCOMTR-MCNC: 193 MG/DL (ref 70–105)
GLUCOSE BLDC GLUCOMTR-MCNC: 219 MG/DL (ref 70–105)
GLUCOSE BLDC GLUCOMTR-MCNC: 219 MG/DL (ref 70–105)
GLUCOSE SERPL-MCNC: 163 MG/DL (ref 65–99)
HBA1C MFR BLD: 7.9 % (ref 3.5–5.6)
HCT VFR BLD AUTO: 35 % (ref 37.5–51)
HGB BLD-MCNC: 11 G/DL (ref 13–17.7)
LYMPHOCYTES # BLD AUTO: 0.6 10*3/MM3 (ref 0.7–3.1)
LYMPHOCYTES NFR BLD AUTO: 9.3 % (ref 19.6–45.3)
MCH RBC QN AUTO: 22 PG (ref 26.6–33)
MCHC RBC AUTO-ENTMCNC: 31.4 G/DL (ref 31.5–35.7)
MCV RBC AUTO: 70.1 FL (ref 79–97)
MONOCYTES # BLD AUTO: 0.7 10*3/MM3 (ref 0.1–0.9)
MONOCYTES NFR BLD AUTO: 10.3 % (ref 5–12)
NEUTROPHILS NFR BLD AUTO: 4.9 10*3/MM3 (ref 1.7–7)
NEUTROPHILS NFR BLD AUTO: 76.1 % (ref 42.7–76)
NRBC BLD AUTO-RTO: 0.2 /100 WBC (ref 0–0.2)
PLATELET # BLD AUTO: 269 10*3/MM3 (ref 140–450)
PMV BLD AUTO: 7.4 FL (ref 6–12)
POTASSIUM SERPL-SCNC: 4.4 MMOL/L (ref 3.5–5.2)
RBC # BLD AUTO: 5 10*6/MM3 (ref 4.14–5.8)
SODIUM SERPL-SCNC: 138 MMOL/L (ref 136–145)
WBC NRBC COR # BLD: 6.5 10*3/MM3 (ref 3.4–10.8)

## 2022-04-25 PROCEDURE — 99221 1ST HOSP IP/OBS SF/LOW 40: CPT | Performed by: PODIATRIST

## 2022-04-25 PROCEDURE — 99222 1ST HOSP IP/OBS MODERATE 55: CPT | Performed by: PHYSICIAN ASSISTANT

## 2022-04-25 PROCEDURE — 85025 COMPLETE CBC W/AUTO DIFF WBC: CPT | Performed by: NURSE PRACTITIONER

## 2022-04-25 PROCEDURE — 80048 BASIC METABOLIC PNL TOTAL CA: CPT | Performed by: NURSE PRACTITIONER

## 2022-04-25 PROCEDURE — 99232 SBSQ HOSP IP/OBS MODERATE 35: CPT | Performed by: HOSPITALIST

## 2022-04-25 PROCEDURE — 63710000001 INSULIN LISPRO (HUMAN) PER 5 UNITS: Performed by: NURSE PRACTITIONER

## 2022-04-25 PROCEDURE — 83036 HEMOGLOBIN GLYCOSYLATED A1C: CPT | Performed by: NURSE PRACTITIONER

## 2022-04-25 PROCEDURE — 25010000002 HYDROMORPHONE 1 MG/ML SOLUTION: Performed by: INTERNAL MEDICINE

## 2022-04-25 PROCEDURE — 63710000001 INSULIN GLARGINE PER 5 UNITS: Performed by: HOSPITALIST

## 2022-04-25 PROCEDURE — 99222 1ST HOSP IP/OBS MODERATE 55: CPT | Performed by: INTERNAL MEDICINE

## 2022-04-25 PROCEDURE — 63710000001 INSULIN LISPRO (HUMAN) PER 5 UNITS: Performed by: INTERNAL MEDICINE

## 2022-04-25 PROCEDURE — 25010000002 CEFEPIME PER 500 MG: Performed by: NURSE PRACTITIONER

## 2022-04-25 PROCEDURE — 36415 COLL VENOUS BLD VENIPUNCTURE: CPT | Performed by: NURSE PRACTITIONER

## 2022-04-25 PROCEDURE — 25010000002 VANCOMYCIN HCL 1.25 G RECONSTITUTED SOLUTION 1 EACH VIAL: Performed by: NURSE PRACTITIONER

## 2022-04-25 PROCEDURE — 82962 GLUCOSE BLOOD TEST: CPT

## 2022-04-25 RX ORDER — INSULIN GLARGINE 100 [IU]/ML
30 INJECTION, SOLUTION SUBCUTANEOUS
Status: DISCONTINUED | OUTPATIENT
Start: 2022-04-26 | End: 2022-04-28 | Stop reason: HOSPADM

## 2022-04-25 RX ORDER — INSULIN LISPRO 100 [IU]/ML
10 INJECTION, SOLUTION INTRAVENOUS; SUBCUTANEOUS
Status: DISCONTINUED | OUTPATIENT
Start: 2022-04-25 | End: 2022-04-28 | Stop reason: HOSPADM

## 2022-04-25 RX ORDER — BUSPIRONE HYDROCHLORIDE 5 MG/1
10 TABLET ORAL EVERY 12 HOURS SCHEDULED
Status: DISCONTINUED | OUTPATIENT
Start: 2022-04-25 | End: 2022-04-28 | Stop reason: HOSPADM

## 2022-04-25 RX ORDER — DULOXETIN HYDROCHLORIDE 30 MG/1
60 CAPSULE, DELAYED RELEASE ORAL EVERY 12 HOURS SCHEDULED
Status: DISCONTINUED | OUTPATIENT
Start: 2022-04-25 | End: 2022-04-28 | Stop reason: HOSPADM

## 2022-04-25 RX ADMIN — Medication 10 ML: at 20:48

## 2022-04-25 RX ADMIN — CEFEPIME HYDROCHLORIDE 2 G: 2 INJECTION, POWDER, FOR SOLUTION INTRAVENOUS at 03:35

## 2022-04-25 RX ADMIN — HYDROMORPHONE HYDROCHLORIDE 0.5 MG: 1 INJECTION, SOLUTION INTRAMUSCULAR; INTRAVENOUS; SUBCUTANEOUS at 22:21

## 2022-04-25 RX ADMIN — ASPIRIN 81 MG: 81 TABLET, COATED ORAL at 08:27

## 2022-04-25 RX ADMIN — INSULIN GLARGINE 34 UNITS: 100 INJECTION, SOLUTION SUBCUTANEOUS at 08:28

## 2022-04-25 RX ADMIN — METOPROLOL SUCCINATE 50 MG: 50 TABLET, EXTENDED RELEASE ORAL at 08:27

## 2022-04-25 RX ADMIN — APIXABAN 5 MG: 5 TABLET, FILM COATED ORAL at 08:27

## 2022-04-25 RX ADMIN — GABAPENTIN 300 MG: 300 CAPSULE ORAL at 20:47

## 2022-04-25 RX ADMIN — INSULIN LISPRO 5 UNITS: 100 INJECTION, SOLUTION INTRAVENOUS; SUBCUTANEOUS at 17:35

## 2022-04-25 RX ADMIN — MULTIPLE VITAMINS W/ MINERALS TAB 1 TABLET: TAB at 08:27

## 2022-04-25 RX ADMIN — INSULIN LISPRO 5 UNITS: 100 INJECTION, SOLUTION INTRAVENOUS; SUBCUTANEOUS at 12:53

## 2022-04-25 RX ADMIN — CALCIUM CARBONATE (ANTACID) CHEW TAB 500 MG 2 TABLET: 500 CHEW TAB at 10:41

## 2022-04-25 RX ADMIN — Medication 10 ML: at 08:28

## 2022-04-25 RX ADMIN — PANTOPRAZOLE SODIUM 40 MG: 40 TABLET, DELAYED RELEASE ORAL at 08:27

## 2022-04-25 RX ADMIN — LEVOTHYROXINE SODIUM 100 MCG: 0.1 TABLET ORAL at 05:39

## 2022-04-25 RX ADMIN — BUSPIRONE HYDROCHLORIDE 10 MG: 5 TABLET ORAL at 20:47

## 2022-04-25 RX ADMIN — DULOXETINE HYDROCHLORIDE 60 MG: 30 CAPSULE, DELAYED RELEASE ORAL at 08:27

## 2022-04-25 RX ADMIN — ATORVASTATIN CALCIUM 80 MG: 40 TABLET, FILM COATED ORAL at 20:48

## 2022-04-25 RX ADMIN — DULOXETINE HYDROCHLORIDE 60 MG: 30 CAPSULE, DELAYED RELEASE ORAL at 20:48

## 2022-04-25 RX ADMIN — VANCOMYCIN HYDROCHLORIDE 1250 MG: 1.25 INJECTION, POWDER, LYOPHILIZED, FOR SOLUTION INTRAVENOUS at 05:18

## 2022-04-25 RX ADMIN — CEFEPIME HYDROCHLORIDE 2 G: 2 INJECTION, POWDER, FOR SOLUTION INTRAVENOUS at 16:26

## 2022-04-25 RX ADMIN — INSULIN LISPRO 10 UNITS: 100 INJECTION, SOLUTION INTRAVENOUS; SUBCUTANEOUS at 17:35

## 2022-04-26 ENCOUNTER — ANESTHESIA EVENT (OUTPATIENT)
Dept: PERIOP | Facility: HOSPITAL | Age: 57
End: 2022-04-26

## 2022-04-26 ENCOUNTER — ANESTHESIA (OUTPATIENT)
Dept: PERIOP | Facility: HOSPITAL | Age: 57
End: 2022-04-26

## 2022-04-26 LAB
ALBUMIN SERPL-MCNC: 3.7 G/DL (ref 3.5–5.2)
ALBUMIN/GLOB SERPL: 1.1 G/DL
ALP SERPL-CCNC: 115 U/L (ref 39–117)
ALT SERPL W P-5'-P-CCNC: 16 U/L (ref 1–41)
ANION GAP SERPL CALCULATED.3IONS-SCNC: 13 MMOL/L (ref 5–15)
AST SERPL-CCNC: 24 U/L (ref 1–40)
BASOPHILS # BLD AUTO: 0.1 10*3/MM3 (ref 0–0.2)
BASOPHILS NFR BLD AUTO: 1.2 % (ref 0–1.5)
BILIRUB SERPL-MCNC: 0.8 MG/DL (ref 0–1.2)
BUN SERPL-MCNC: 20 MG/DL (ref 6–20)
BUN/CREAT SERPL: 14.7 (ref 7–25)
CALCIUM SPEC-SCNC: 9.3 MG/DL (ref 8.6–10.5)
CHLORIDE SERPL-SCNC: 100 MMOL/L (ref 98–107)
CO2 SERPL-SCNC: 26 MMOL/L (ref 22–29)
CREAT SERPL-MCNC: 1.36 MG/DL (ref 0.76–1.27)
DEPRECATED RDW RBC AUTO: 43.3 FL (ref 37–54)
EGFRCR SERPLBLD CKD-EPI 2021: 60.3 ML/MIN/1.73
EOSINOPHIL # BLD AUTO: 0.3 10*3/MM3 (ref 0–0.4)
EOSINOPHIL NFR BLD AUTO: 5.7 % (ref 0.3–6.2)
ERYTHROCYTE [DISTWIDTH] IN BLOOD BY AUTOMATED COUNT: 17.2 % (ref 12.3–15.4)
GLOBULIN UR ELPH-MCNC: 3.4 GM/DL
GLUCOSE BLDC GLUCOMTR-MCNC: 120 MG/DL (ref 70–105)
GLUCOSE BLDC GLUCOMTR-MCNC: 128 MG/DL (ref 70–105)
GLUCOSE BLDC GLUCOMTR-MCNC: 132 MG/DL (ref 70–105)
GLUCOSE BLDC GLUCOMTR-MCNC: 171 MG/DL (ref 70–105)
GLUCOSE SERPL-MCNC: 147 MG/DL (ref 65–99)
HCT VFR BLD AUTO: 36.4 % (ref 37.5–51)
HGB BLD-MCNC: 11.5 G/DL (ref 13–17.7)
LYMPHOCYTES # BLD AUTO: 0.8 10*3/MM3 (ref 0.7–3.1)
LYMPHOCYTES NFR BLD AUTO: 14.8 % (ref 19.6–45.3)
MCH RBC QN AUTO: 22.1 PG (ref 26.6–33)
MCHC RBC AUTO-ENTMCNC: 31.5 G/DL (ref 31.5–35.7)
MCV RBC AUTO: 70.1 FL (ref 79–97)
MONOCYTES # BLD AUTO: 0.7 10*3/MM3 (ref 0.1–0.9)
MONOCYTES NFR BLD AUTO: 12.8 % (ref 5–12)
NEUTROPHILS NFR BLD AUTO: 3.5 10*3/MM3 (ref 1.7–7)
NEUTROPHILS NFR BLD AUTO: 65.5 % (ref 42.7–76)
NRBC BLD AUTO-RTO: 0.1 /100 WBC (ref 0–0.2)
PLATELET # BLD AUTO: 288 10*3/MM3 (ref 140–450)
PMV BLD AUTO: 7.1 FL (ref 6–12)
POTASSIUM SERPL-SCNC: 4.2 MMOL/L (ref 3.5–5.2)
PROT SERPL-MCNC: 7.1 G/DL (ref 6–8.5)
RBC # BLD AUTO: 5.19 10*6/MM3 (ref 4.14–5.8)
SODIUM SERPL-SCNC: 139 MMOL/L (ref 136–145)
TSH SERPL DL<=0.05 MIU/L-ACNC: 5.09 UIU/ML (ref 0.27–4.2)
WBC NRBC COR # BLD: 5.4 10*3/MM3 (ref 3.4–10.8)

## 2022-04-26 PROCEDURE — 28232 INCISION OF TOE TENDON: CPT | Performed by: PODIATRIST

## 2022-04-26 PROCEDURE — 99232 SBSQ HOSP IP/OBS MODERATE 35: CPT | Performed by: INTERNAL MEDICINE

## 2022-04-26 PROCEDURE — 0Y6Q0Z0 DETACHMENT AT LEFT 1ST TOE, COMPLETE, OPEN APPROACH: ICD-10-PCS | Performed by: PODIATRIST

## 2022-04-26 PROCEDURE — 63710000001 INSULIN GLARGINE PER 5 UNITS: Performed by: INTERNAL MEDICINE

## 2022-04-26 PROCEDURE — 25010000002 HYDROMORPHONE 1 MG/ML SOLUTION: Performed by: INTERNAL MEDICINE

## 2022-04-26 PROCEDURE — 28820 AMPUTATION OF TOE: CPT | Performed by: PODIATRIST

## 2022-04-26 PROCEDURE — 85025 COMPLETE CBC W/AUTO DIFF WBC: CPT | Performed by: NURSE PRACTITIONER

## 2022-04-26 PROCEDURE — 99232 SBSQ HOSP IP/OBS MODERATE 35: CPT | Performed by: HOSPITALIST

## 2022-04-26 PROCEDURE — 82962 GLUCOSE BLOOD TEST: CPT

## 2022-04-26 PROCEDURE — 0LNW3ZZ RELEASE LEFT FOOT TENDON, PERCUTANEOUS APPROACH: ICD-10-PCS | Performed by: PODIATRIST

## 2022-04-26 PROCEDURE — 25010000002 PROPOFOL 10 MG/ML EMULSION: Performed by: NURSE ANESTHETIST, CERTIFIED REGISTERED

## 2022-04-26 PROCEDURE — 88305 TISSUE EXAM BY PATHOLOGIST: CPT | Performed by: PODIATRIST

## 2022-04-26 PROCEDURE — 25010000002 CEFEPIME PER 500 MG: Performed by: NURSE PRACTITIONER

## 2022-04-26 PROCEDURE — 80053 COMPREHEN METABOLIC PANEL: CPT | Performed by: INTERNAL MEDICINE

## 2022-04-26 PROCEDURE — 88311 DECALCIFY TISSUE: CPT | Performed by: PODIATRIST

## 2022-04-26 PROCEDURE — 25010000002 FENTANYL CITRATE (PF) 100 MCG/2ML SOLUTION: Performed by: NURSE ANESTHETIST, CERTIFIED REGISTERED

## 2022-04-26 PROCEDURE — 25010000002 ONDANSETRON PER 1 MG: Performed by: NURSE ANESTHETIST, CERTIFIED REGISTERED

## 2022-04-26 PROCEDURE — 84443 ASSAY THYROID STIM HORMONE: CPT | Performed by: INTERNAL MEDICINE

## 2022-04-26 PROCEDURE — 25010000002 VANCOMYCIN HCL 1.25 G RECONSTITUTED SOLUTION 1 EACH VIAL: Performed by: NURSE PRACTITIONER

## 2022-04-26 RX ORDER — HYDRALAZINE HYDROCHLORIDE 20 MG/ML
5 INJECTION INTRAMUSCULAR; INTRAVENOUS
Status: DISCONTINUED | OUTPATIENT
Start: 2022-04-26 | End: 2022-04-26

## 2022-04-26 RX ORDER — HYDROCODONE BITARTRATE AND ACETAMINOPHEN 7.5; 325 MG/1; MG/1
1 TABLET ORAL EVERY 4 HOURS PRN
Status: DISPENSED | OUTPATIENT
Start: 2022-04-26 | End: 2022-04-27

## 2022-04-26 RX ORDER — ONDANSETRON 2 MG/ML
4 INJECTION INTRAMUSCULAR; INTRAVENOUS ONCE AS NEEDED
Status: DISCONTINUED | OUTPATIENT
Start: 2022-04-26 | End: 2022-04-26

## 2022-04-26 RX ORDER — LIDOCAINE HYDROCHLORIDE 10 MG/ML
INJECTION, SOLUTION EPIDURAL; INFILTRATION; INTRACAUDAL; PERINEURAL AS NEEDED
Status: DISCONTINUED | OUTPATIENT
Start: 2022-04-26 | End: 2022-04-26 | Stop reason: SURG

## 2022-04-26 RX ORDER — ACETAMINOPHEN 650 MG/1
650 SUPPOSITORY RECTAL ONCE AS NEEDED
Status: DISCONTINUED | OUTPATIENT
Start: 2022-04-26 | End: 2022-04-26

## 2022-04-26 RX ORDER — SODIUM CHLORIDE, SODIUM LACTATE, POTASSIUM CHLORIDE, AND CALCIUM CHLORIDE .6; .31; .03; .02 G/100ML; G/100ML; G/100ML; G/100ML
20 INJECTION, SOLUTION INTRAVENOUS CONTINUOUS
Status: DISCONTINUED | OUTPATIENT
Start: 2022-04-26 | End: 2022-04-28 | Stop reason: HOSPADM

## 2022-04-26 RX ORDER — PROPOFOL 10 MG/ML
VIAL (ML) INTRAVENOUS AS NEEDED
Status: DISCONTINUED | OUTPATIENT
Start: 2022-04-26 | End: 2022-04-26 | Stop reason: SURG

## 2022-04-26 RX ORDER — LABETALOL HYDROCHLORIDE 5 MG/ML
5 INJECTION, SOLUTION INTRAVENOUS
Status: DISCONTINUED | OUTPATIENT
Start: 2022-04-26 | End: 2022-04-26

## 2022-04-26 RX ORDER — ONDANSETRON 2 MG/ML
INJECTION INTRAMUSCULAR; INTRAVENOUS AS NEEDED
Status: DISCONTINUED | OUTPATIENT
Start: 2022-04-26 | End: 2022-04-26 | Stop reason: SURG

## 2022-04-26 RX ORDER — FENTANYL CITRATE 50 UG/ML
INJECTION, SOLUTION INTRAMUSCULAR; INTRAVENOUS AS NEEDED
Status: DISCONTINUED | OUTPATIENT
Start: 2022-04-26 | End: 2022-04-26 | Stop reason: SURG

## 2022-04-26 RX ORDER — MEPERIDINE HYDROCHLORIDE 25 MG/ML
12.5 INJECTION INTRAMUSCULAR; INTRAVENOUS; SUBCUTANEOUS
Status: DISCONTINUED | OUTPATIENT
Start: 2022-04-26 | End: 2022-04-26

## 2022-04-26 RX ORDER — PROMETHAZINE HYDROCHLORIDE 25 MG/1
25 SUPPOSITORY RECTAL ONCE AS NEEDED
Status: DISCONTINUED | OUTPATIENT
Start: 2022-04-26 | End: 2022-04-26

## 2022-04-26 RX ORDER — DIPHENHYDRAMINE HYDROCHLORIDE 50 MG/ML
12.5 INJECTION INTRAMUSCULAR; INTRAVENOUS
Status: DISCONTINUED | OUTPATIENT
Start: 2022-04-26 | End: 2022-04-26

## 2022-04-26 RX ORDER — ACETAMINOPHEN 325 MG/1
650 TABLET ORAL ONCE AS NEEDED
Status: DISCONTINUED | OUTPATIENT
Start: 2022-04-26 | End: 2022-04-26

## 2022-04-26 RX ORDER — MORPHINE SULFATE 4 MG/ML
2 INJECTION, SOLUTION INTRAMUSCULAR; INTRAVENOUS
Status: DISCONTINUED | OUTPATIENT
Start: 2022-04-26 | End: 2022-04-26

## 2022-04-26 RX ORDER — PROMETHAZINE HYDROCHLORIDE 25 MG/1
25 TABLET ORAL ONCE AS NEEDED
Status: DISCONTINUED | OUTPATIENT
Start: 2022-04-26 | End: 2022-04-26

## 2022-04-26 RX ADMIN — HYDROCODONE BITARTRATE AND ACETAMINOPHEN 1 TABLET: 7.5; 325 TABLET ORAL at 22:27

## 2022-04-26 RX ADMIN — BUSPIRONE HYDROCHLORIDE 10 MG: 5 TABLET ORAL at 21:51

## 2022-04-26 RX ADMIN — CEFEPIME HYDROCHLORIDE 2 G: 2 INJECTION, POWDER, FOR SOLUTION INTRAVENOUS at 16:31

## 2022-04-26 RX ADMIN — VANCOMYCIN HYDROCHLORIDE 1250 MG: 1.25 INJECTION, POWDER, LYOPHILIZED, FOR SOLUTION INTRAVENOUS at 04:07

## 2022-04-26 RX ADMIN — DULOXETINE HYDROCHLORIDE 60 MG: 30 CAPSULE, DELAYED RELEASE ORAL at 21:51

## 2022-04-26 RX ADMIN — ONDANSETRON 4 MG: 2 INJECTION INTRAMUSCULAR; INTRAVENOUS at 15:14

## 2022-04-26 RX ADMIN — HYDROMORPHONE HYDROCHLORIDE 0.5 MG: 1 INJECTION, SOLUTION INTRAMUSCULAR; INTRAVENOUS; SUBCUTANEOUS at 10:02

## 2022-04-26 RX ADMIN — FENTANYL CITRATE 50 MCG: 50 INJECTION, SOLUTION INTRAMUSCULAR; INTRAVENOUS at 15:14

## 2022-04-26 RX ADMIN — GABAPENTIN 300 MG: 300 CAPSULE ORAL at 21:51

## 2022-04-26 RX ADMIN — BUSPIRONE HYDROCHLORIDE 10 MG: 5 TABLET ORAL at 08:00

## 2022-04-26 RX ADMIN — DULOXETINE HYDROCHLORIDE 60 MG: 30 CAPSULE, DELAYED RELEASE ORAL at 07:56

## 2022-04-26 RX ADMIN — LEVOTHYROXINE SODIUM 100 MCG: 0.1 TABLET ORAL at 04:22

## 2022-04-26 RX ADMIN — SODIUM CHLORIDE, POTASSIUM CHLORIDE, SODIUM LACTATE AND CALCIUM CHLORIDE 20 ML/HR: 600; 310; 30; 20 INJECTION, SOLUTION INTRAVENOUS at 13:51

## 2022-04-26 RX ADMIN — Medication 10 ML: at 11:06

## 2022-04-26 RX ADMIN — PROPOFOL 200 MG: 10 INJECTION, EMULSION INTRAVENOUS at 14:42

## 2022-04-26 RX ADMIN — HYDROMORPHONE HYDROCHLORIDE 0.5 MG: 1 INJECTION, SOLUTION INTRAMUSCULAR; INTRAVENOUS; SUBCUTANEOUS at 04:22

## 2022-04-26 RX ADMIN — CEFEPIME HYDROCHLORIDE 2 G: 2 INJECTION, POWDER, FOR SOLUTION INTRAVENOUS at 05:23

## 2022-04-26 RX ADMIN — FENTANYL CITRATE 25 MCG: 50 INJECTION, SOLUTION INTRAMUSCULAR; INTRAVENOUS at 14:51

## 2022-04-26 RX ADMIN — INSULIN GLARGINE 15 UNITS: 100 INJECTION, SOLUTION SUBCUTANEOUS at 07:54

## 2022-04-26 RX ADMIN — METOPROLOL SUCCINATE 50 MG: 50 TABLET, EXTENDED RELEASE ORAL at 07:56

## 2022-04-26 RX ADMIN — APIXABAN 5 MG: 5 TABLET, FILM COATED ORAL at 21:51

## 2022-04-26 RX ADMIN — Medication 10 ML: at 21:52

## 2022-04-26 RX ADMIN — PANTOPRAZOLE SODIUM 40 MG: 40 TABLET, DELAYED RELEASE ORAL at 07:56

## 2022-04-26 RX ADMIN — LIDOCAINE HYDROCHLORIDE 50 MG: 10 INJECTION, SOLUTION EPIDURAL; INFILTRATION; INTRACAUDAL; PERINEURAL at 14:42

## 2022-04-26 RX ADMIN — FENTANYL CITRATE 25 MCG: 50 INJECTION, SOLUTION INTRAMUSCULAR; INTRAVENOUS at 15:04

## 2022-04-26 RX ADMIN — ATORVASTATIN CALCIUM 80 MG: 40 TABLET, FILM COATED ORAL at 21:51

## 2022-04-26 NOTE — ANESTHESIA PREPROCEDURE EVALUATION
Anesthesia Evaluation     Patient summary reviewed and Nursing notes reviewed   no history of anesthetic complications:  NPO Solid Status: > 8 hours  NPO Liquid Status: > 8 hours           Airway   Dental      Pulmonary    (+) pulmonary embolism, sleep apnea,   Cardiovascular     ECG reviewed    (+) hypertension, valvular problems/murmurs murmur, CAD, PVD, DVT, hyperlipidemia,       Neuro/Psych  (+) headaches, numbness, psychiatric history Depression,    GI/Hepatic/Renal/Endo    (+)  hiatal hernia, GERD,  renal disease CRI, diabetes mellitus, thyroid problem hypothyroidism    Musculoskeletal     Abdominal    Substance History      OB/GYN          Other   arthritis, blood dyscrasia anemia,     ROS/Med Hx Other: Dysphagia, gangrene of foot, cellulitis/osteomyelitis, chest pain, h/o DKA, fatigue, neuropathy, foot pain, low vit D, palpitations, allergies, retinopathy, IBS, esophageal stricture, h/o sepsis, bursitis, rash, hyperglycemia, acute encephalopathy    Echo    Normal LV size and contractility EF of 60 to 65%  Normal RV size  Mild left atrial enlargement seen  Aortic valve appears structurally normal  Mitral valve leaflets are thickened anterior mitral leaflet appears calcified, but has good cusp separation.   No significant MR seen.  Tricuspid valve appears structurally normal, no significant regurgitation seen.  No pericardial effusion seen.  Proximal aorta appears normal in size.    PSH  TOTAL HIP ARTHROPLASTY TOTAL HIP ARTHROPLASTY  ENDOSCOPY ENDOSCOPY  HERNIA REPAIR CARDIAC CATHETERIZATION  AMPUTATION FOOT / TOE GANGLION CYST EXCISION  RETINOPATHY SURGERY ENDOSCOPY  TRANS METATARSAL AMPUTATION BELOW KNEE AMPUTATION  AMPUTATION REVISION AMPUTATION REVISION  ENDOSCOPY EYE SURGERY  JOINT REPLACEMENT VASECTOMY  COLONOSCOPY                   Anesthesia Plan    ASA 4     general   (Patient identified; pre-operative vital signs, all relevant labs/studies, complete medical/surgical/anesthetic history, full  medication list, full allergy list, and NPO status obtained/reviewed; physical assessment performed; anesthetic options, side effects, potential complications, risks, and benefits discussed; questions answered; written anesthesia consent obtained; patient cleared for procedure; anesthesia machine and equipment checked and functioning)  intravenous induction     Anesthetic plan, all risks, benefits, and alternatives have been provided, discussed and informed consent has been obtained with: patient.    Plan discussed with CRNA and CAA.        CODE STATUS:    Code Status (Patient has no pulse and is not breathing): CPR (Attempt to Resuscitate)  Medical Interventions (Patient has pulse or is breathing): Full Support

## 2022-04-26 NOTE — ANESTHESIA POSTPROCEDURE EVALUATION
Patient: Kuldeep Adhikari    Procedure Summary     Date: 04/26/22 Room / Location: [facility] OR 07 / [facility] MAIN OR    Anesthesia Start: 1436 Anesthesia Stop: 1530    Procedure: AMPUTATION left great toe (Left Foot) Diagnosis:     Surgeons: ERWIN Baker DPM Provider: Jose Tejada MD    Anesthesia Type: general ASA Status: 4          Anesthesia Type: general    Vitals  Vitals Value Taken Time   /68 04/26/22 1541   Temp 97.4 °F (36.3 °C) 04/26/22 1530   Pulse 72 04/26/22 1545   Resp 17 04/26/22 1540   SpO2 100 % 04/26/22 1545   Vitals shown include unvalidated device data.        Post Anesthesia Care and Evaluation    Patient location during evaluation: PACU  Patient participation: complete - patient participated  Level of consciousness: awake  Pain scale: See nurse's notes for pain score.  Pain management: adequate  Airway patency: patent  Anesthetic complications: No anesthetic complications  PONV Status: none  Cardiovascular status: acceptable  Respiratory status: acceptable  Hydration status: acceptable    Comments: Patient seen and examined postoperatively; vital signs stable; SpO2 greater than or equal to 90%; cardiopulmonary status stable; nausea/vomiting adequately controlled; pain adequately controlled; no apparent anesthesia complications; patient discharged from anesthesia care when discharge criteria were met

## 2022-04-26 NOTE — ANESTHESIA PROCEDURE NOTES
Airway  Urgency: elective    Date/Time: 4/26/2022 2:43 PM  Airway not difficult    General Information and Staff    Patient location during procedure: OR    Indications and Patient Condition  Indications for airway management: airway protection    Preoxygenated: yes  MILS maintained throughout  Mask difficulty assessment: 0 - not attempted    Final Airway Details  Final airway type: supraglottic airway      Successful airway: LMA  Size 5    Number of attempts at approach: 1  Assessment: lips, teeth, and gum same as pre-op

## 2022-04-27 LAB
ANION GAP SERPL CALCULATED.3IONS-SCNC: 12 MMOL/L (ref 5–15)
ANION GAP SERPL CALCULATED.3IONS-SCNC: 12 MMOL/L (ref 5–15)
BASOPHILS # BLD AUTO: 0.1 10*3/MM3 (ref 0–0.2)
BASOPHILS # BLD AUTO: 0.1 10*3/MM3 (ref 0–0.2)
BASOPHILS NFR BLD AUTO: 0.9 % (ref 0–1.5)
BASOPHILS NFR BLD AUTO: 1.2 % (ref 0–1.5)
BUN SERPL-MCNC: 21 MG/DL (ref 6–20)
BUN SERPL-MCNC: 21 MG/DL (ref 6–20)
BUN/CREAT SERPL: 15.9 (ref 7–25)
BUN/CREAT SERPL: 17.2 (ref 7–25)
CALCIUM SPEC-SCNC: 8.6 MG/DL (ref 8.6–10.5)
CALCIUM SPEC-SCNC: 8.8 MG/DL (ref 8.6–10.5)
CHLORIDE SERPL-SCNC: 103 MMOL/L (ref 98–107)
CHLORIDE SERPL-SCNC: 103 MMOL/L (ref 98–107)
CO2 SERPL-SCNC: 25 MMOL/L (ref 22–29)
CO2 SERPL-SCNC: 27 MMOL/L (ref 22–29)
CREAT SERPL-MCNC: 1.22 MG/DL (ref 0.76–1.27)
CREAT SERPL-MCNC: 1.32 MG/DL (ref 0.76–1.27)
DEPRECATED RDW RBC AUTO: 42.9 FL (ref 37–54)
DEPRECATED RDW RBC AUTO: 43.3 FL (ref 37–54)
EGFRCR SERPLBLD CKD-EPI 2021: 62.5 ML/MIN/1.73
EGFRCR SERPLBLD CKD-EPI 2021: 68.7 ML/MIN/1.73
EOSINOPHIL # BLD AUTO: 0.4 10*3/MM3 (ref 0–0.4)
EOSINOPHIL # BLD AUTO: 0.4 10*3/MM3 (ref 0–0.4)
EOSINOPHIL NFR BLD AUTO: 6.8 % (ref 0.3–6.2)
EOSINOPHIL NFR BLD AUTO: 7.6 % (ref 0.3–6.2)
ERYTHROCYTE [DISTWIDTH] IN BLOOD BY AUTOMATED COUNT: 16.9 % (ref 12.3–15.4)
ERYTHROCYTE [DISTWIDTH] IN BLOOD BY AUTOMATED COUNT: 17 % (ref 12.3–15.4)
GLUCOSE BLDC GLUCOMTR-MCNC: 111 MG/DL (ref 70–105)
GLUCOSE BLDC GLUCOMTR-MCNC: 179 MG/DL (ref 70–105)
GLUCOSE BLDC GLUCOMTR-MCNC: 221 MG/DL (ref 70–105)
GLUCOSE BLDC GLUCOMTR-MCNC: 67 MG/DL (ref 70–105)
GLUCOSE BLDC GLUCOMTR-MCNC: 78 MG/DL (ref 70–105)
GLUCOSE SERPL-MCNC: 133 MG/DL (ref 65–99)
GLUCOSE SERPL-MCNC: 290 MG/DL (ref 65–99)
HCT VFR BLD AUTO: 33.1 % (ref 37.5–51)
HCT VFR BLD AUTO: 34.1 % (ref 37.5–51)
HGB BLD-MCNC: 10.3 G/DL (ref 13–17.7)
HGB BLD-MCNC: 10.7 G/DL (ref 13–17.7)
LYMPHOCYTES # BLD AUTO: 0.8 10*3/MM3 (ref 0.7–3.1)
LYMPHOCYTES # BLD AUTO: 1.1 10*3/MM3 (ref 0.7–3.1)
LYMPHOCYTES NFR BLD AUTO: 15.1 % (ref 19.6–45.3)
LYMPHOCYTES NFR BLD AUTO: 17.1 % (ref 19.6–45.3)
MCH RBC QN AUTO: 22.1 PG (ref 26.6–33)
MCH RBC QN AUTO: 22.2 PG (ref 26.6–33)
MCHC RBC AUTO-ENTMCNC: 31.2 G/DL (ref 31.5–35.7)
MCHC RBC AUTO-ENTMCNC: 31.4 G/DL (ref 31.5–35.7)
MCV RBC AUTO: 70.8 FL (ref 79–97)
MCV RBC AUTO: 71 FL (ref 79–97)
MONOCYTES # BLD AUTO: 0.6 10*3/MM3 (ref 0.1–0.9)
MONOCYTES # BLD AUTO: 0.8 10*3/MM3 (ref 0.1–0.9)
MONOCYTES NFR BLD AUTO: 11.5 % (ref 5–12)
MONOCYTES NFR BLD AUTO: 11.9 % (ref 5–12)
NEUTROPHILS NFR BLD AUTO: 3.4 10*3/MM3 (ref 1.7–7)
NEUTROPHILS NFR BLD AUTO: 4.2 10*3/MM3 (ref 1.7–7)
NEUTROPHILS NFR BLD AUTO: 63.7 % (ref 42.7–76)
NEUTROPHILS NFR BLD AUTO: 64.2 % (ref 42.7–76)
NRBC BLD AUTO-RTO: 0.1 /100 WBC (ref 0–0.2)
NRBC BLD AUTO-RTO: 0.1 /100 WBC (ref 0–0.2)
PLATELET # BLD AUTO: 275 10*3/MM3 (ref 140–450)
PLATELET # BLD AUTO: 298 10*3/MM3 (ref 140–450)
PMV BLD AUTO: 7.1 FL (ref 6–12)
PMV BLD AUTO: 7.2 FL (ref 6–12)
POTASSIUM SERPL-SCNC: 4.1 MMOL/L (ref 3.5–5.2)
POTASSIUM SERPL-SCNC: 4.8 MMOL/L (ref 3.5–5.2)
RBC # BLD AUTO: 4.67 10*6/MM3 (ref 4.14–5.8)
RBC # BLD AUTO: 4.81 10*6/MM3 (ref 4.14–5.8)
SODIUM SERPL-SCNC: 140 MMOL/L (ref 136–145)
SODIUM SERPL-SCNC: 142 MMOL/L (ref 136–145)
VANCOMYCIN SERPL-MCNC: 15.9 MCG/ML (ref 5–40)
VANCOMYCIN TROUGH SERPL-MCNC: 38.2 MCG/ML (ref 5–20)
WBC NRBC COR # BLD: 5.3 10*3/MM3 (ref 3.4–10.8)
WBC NRBC COR # BLD: 6.5 10*3/MM3 (ref 3.4–10.8)
WHOLE BLOOD HOLD SPECIMEN: NORMAL

## 2022-04-27 PROCEDURE — 97116 GAIT TRAINING THERAPY: CPT

## 2022-04-27 PROCEDURE — 80202 ASSAY OF VANCOMYCIN: CPT | Performed by: PODIATRIST

## 2022-04-27 PROCEDURE — 63710000001 INSULIN LISPRO (HUMAN) PER 5 UNITS: Performed by: PODIATRIST

## 2022-04-27 PROCEDURE — 85025 COMPLETE CBC W/AUTO DIFF WBC: CPT | Performed by: NURSE PRACTITIONER

## 2022-04-27 PROCEDURE — 99024 POSTOP FOLLOW-UP VISIT: CPT | Performed by: PODIATRIST

## 2022-04-27 PROCEDURE — 25010000002 CEFEPIME PER 500 MG: Performed by: PODIATRIST

## 2022-04-27 PROCEDURE — 25010000002 VANCOMYCIN HCL 1.25 G RECONSTITUTED SOLUTION 1 EACH VIAL: Performed by: PODIATRIST

## 2022-04-27 PROCEDURE — 97161 PT EVAL LOW COMPLEX 20 MIN: CPT

## 2022-04-27 PROCEDURE — 82962 GLUCOSE BLOOD TEST: CPT

## 2022-04-27 PROCEDURE — 99231 SBSQ HOSP IP/OBS SF/LOW 25: CPT | Performed by: PSYCHIATRY & NEUROLOGY

## 2022-04-27 PROCEDURE — 63710000001 INSULIN GLARGINE PER 5 UNITS: Performed by: PODIATRIST

## 2022-04-27 PROCEDURE — 80202 ASSAY OF VANCOMYCIN: CPT | Performed by: INTERNAL MEDICINE

## 2022-04-27 PROCEDURE — 80048 BASIC METABOLIC PNL TOTAL CA: CPT | Performed by: NURSE PRACTITIONER

## 2022-04-27 PROCEDURE — 99232 SBSQ HOSP IP/OBS MODERATE 35: CPT | Performed by: HOSPITALIST

## 2022-04-27 PROCEDURE — 99232 SBSQ HOSP IP/OBS MODERATE 35: CPT | Performed by: INTERNAL MEDICINE

## 2022-04-27 RX ORDER — HYDROCODONE BITARTRATE AND ACETAMINOPHEN 7.5; 325 MG/1; MG/1
1 TABLET ORAL EVERY 4 HOURS PRN
Status: DISCONTINUED | OUTPATIENT
Start: 2022-04-27 | End: 2022-04-28 | Stop reason: HOSPADM

## 2022-04-27 RX ORDER — AMOXICILLIN AND CLAVULANATE POTASSIUM 875; 125 MG/1; MG/1
1 TABLET, FILM COATED ORAL EVERY 12 HOURS SCHEDULED
Status: DISCONTINUED | OUTPATIENT
Start: 2022-04-27 | End: 2022-04-28 | Stop reason: HOSPADM

## 2022-04-27 RX ADMIN — CEFEPIME HYDROCHLORIDE 2 G: 2 INJECTION, POWDER, FOR SOLUTION INTRAVENOUS at 04:17

## 2022-04-27 RX ADMIN — VANCOMYCIN HYDROCHLORIDE 1250 MG: 1.25 INJECTION, POWDER, LYOPHILIZED, FOR SOLUTION INTRAVENOUS at 04:18

## 2022-04-27 RX ADMIN — HYDROCODONE BITARTRATE AND ACETAMINOPHEN 1 TABLET: 7.5; 325 TABLET ORAL at 08:51

## 2022-04-27 RX ADMIN — APIXABAN 5 MG: 5 TABLET, FILM COATED ORAL at 20:16

## 2022-04-27 RX ADMIN — DULOXETINE HYDROCHLORIDE 60 MG: 30 CAPSULE, DELAYED RELEASE ORAL at 08:51

## 2022-04-27 RX ADMIN — PANTOPRAZOLE SODIUM 40 MG: 40 TABLET, DELAYED RELEASE ORAL at 08:51

## 2022-04-27 RX ADMIN — GABAPENTIN 300 MG: 300 CAPSULE ORAL at 20:16

## 2022-04-27 RX ADMIN — BUSPIRONE HYDROCHLORIDE 10 MG: 5 TABLET ORAL at 20:15

## 2022-04-27 RX ADMIN — HYDROCODONE BITARTRATE AND ACETAMINOPHEN 1 TABLET: 7.5; 325 TABLET ORAL at 15:09

## 2022-04-27 RX ADMIN — INSULIN LISPRO 10 UNITS: 100 INJECTION, SOLUTION INTRAVENOUS; SUBCUTANEOUS at 08:54

## 2022-04-27 RX ADMIN — Medication 10 ML: at 20:16

## 2022-04-27 RX ADMIN — INSULIN LISPRO 10 UNITS: 100 INJECTION, SOLUTION INTRAVENOUS; SUBCUTANEOUS at 17:39

## 2022-04-27 RX ADMIN — DULOXETINE HYDROCHLORIDE 60 MG: 30 CAPSULE, DELAYED RELEASE ORAL at 20:16

## 2022-04-27 RX ADMIN — Medication 10 ML: at 08:54

## 2022-04-27 RX ADMIN — BUSPIRONE HYDROCHLORIDE 10 MG: 5 TABLET ORAL at 08:51

## 2022-04-27 RX ADMIN — INSULIN GLARGINE 30 UNITS: 100 INJECTION, SOLUTION SUBCUTANEOUS at 08:52

## 2022-04-27 RX ADMIN — INSULIN LISPRO 5 UNITS: 100 INJECTION, SOLUTION INTRAVENOUS; SUBCUTANEOUS at 08:55

## 2022-04-27 RX ADMIN — HYDROCODONE BITARTRATE AND ACETAMINOPHEN 1 TABLET: 7.5; 325 TABLET ORAL at 03:29

## 2022-04-27 RX ADMIN — AMOXICILLIN AND CLAVULANATE POTASSIUM 1 TABLET: 875; 125 TABLET, FILM COATED ORAL at 20:15

## 2022-04-27 RX ADMIN — LEVOTHYROXINE SODIUM 100 MCG: 0.1 TABLET ORAL at 05:28

## 2022-04-27 RX ADMIN — METOPROLOL SUCCINATE 50 MG: 50 TABLET, EXTENDED RELEASE ORAL at 08:52

## 2022-04-27 RX ADMIN — ASPIRIN 81 MG: 81 TABLET, COATED ORAL at 08:51

## 2022-04-27 RX ADMIN — ATORVASTATIN CALCIUM 80 MG: 40 TABLET, FILM COATED ORAL at 20:16

## 2022-04-27 RX ADMIN — MULTIPLE VITAMINS W/ MINERALS TAB 1 TABLET: TAB at 08:54

## 2022-04-27 RX ADMIN — APIXABAN 5 MG: 5 TABLET, FILM COATED ORAL at 08:51

## 2022-04-27 RX ADMIN — Medication 10 ML: at 03:35

## 2022-04-27 RX ADMIN — HYDROCODONE BITARTRATE AND ACETAMINOPHEN 1 TABLET: 7.5; 325 TABLET ORAL at 20:22

## 2022-04-28 ENCOUNTER — READMISSION MANAGEMENT (OUTPATIENT)
Dept: CALL CENTER | Facility: HOSPITAL | Age: 57
End: 2022-04-28

## 2022-04-28 VITALS
OXYGEN SATURATION: 95 % | HEIGHT: 68 IN | BODY MASS INDEX: 27.77 KG/M2 | DIASTOLIC BLOOD PRESSURE: 72 MMHG | SYSTOLIC BLOOD PRESSURE: 124 MMHG | HEART RATE: 70 BPM | TEMPERATURE: 98.1 F | WEIGHT: 183.2 LBS | RESPIRATION RATE: 18 BRPM

## 2022-04-28 LAB
BACTERIA SPEC AEROBE CULT: ABNORMAL
GLUCOSE BLDC GLUCOMTR-MCNC: 105 MG/DL (ref 70–105)
GLUCOSE BLDC GLUCOMTR-MCNC: 179 MG/DL (ref 70–105)
GRAM STN SPEC: ABNORMAL
GRAM STN SPEC: ABNORMAL
LAB AP CASE REPORT: NORMAL
PATH REPORT.FINAL DX SPEC: NORMAL
PATH REPORT.GROSS SPEC: NORMAL

## 2022-04-28 PROCEDURE — 63710000001 INSULIN GLARGINE PER 5 UNITS: Performed by: PODIATRIST

## 2022-04-28 PROCEDURE — 63710000001 INSULIN LISPRO (HUMAN) PER 5 UNITS: Performed by: PODIATRIST

## 2022-04-28 PROCEDURE — 82962 GLUCOSE BLOOD TEST: CPT

## 2022-04-28 PROCEDURE — 99232 SBSQ HOSP IP/OBS MODERATE 35: CPT | Performed by: INTERNAL MEDICINE

## 2022-04-28 PROCEDURE — 99239 HOSP IP/OBS DSCHRG MGMT >30: CPT | Performed by: HOSPITALIST

## 2022-04-28 RX ORDER — INSULIN ASPART 100 [IU]/ML
10 INJECTION, SOLUTION INTRAVENOUS; SUBCUTANEOUS
Qty: 54 ML | Refills: 4 | Status: SHIPPED | OUTPATIENT
Start: 2022-04-28 | End: 2022-04-28 | Stop reason: SDUPTHER

## 2022-04-28 RX ORDER — AMOXICILLIN AND CLAVULANATE POTASSIUM 875; 125 MG/1; MG/1
1 TABLET, FILM COATED ORAL EVERY 12 HOURS SCHEDULED
Qty: 26 TABLET | Refills: 0 | Status: SHIPPED | OUTPATIENT
Start: 2022-04-28 | End: 2022-05-11

## 2022-04-28 RX ORDER — BUSPIRONE HYDROCHLORIDE 10 MG/1
10 TABLET ORAL EVERY 12 HOURS SCHEDULED
Qty: 60 TABLET | Refills: 0 | Status: SHIPPED | OUTPATIENT
Start: 2022-04-28 | End: 2022-04-28 | Stop reason: SDUPTHER

## 2022-04-28 RX ORDER — INSULIN GLARGINE 100 [IU]/ML
30 INJECTION, SOLUTION SUBCUTANEOUS
Qty: 9 ML | Refills: 0 | Status: SHIPPED | OUTPATIENT
Start: 2022-04-29 | End: 2022-04-28 | Stop reason: SDUPTHER

## 2022-04-28 RX ORDER — INSULIN GLARGINE 100 [IU]/ML
30 INJECTION, SOLUTION SUBCUTANEOUS
Qty: 15 ML | Refills: 0 | Status: SHIPPED | OUTPATIENT
Start: 2022-04-29 | End: 2023-01-30

## 2022-04-28 RX ORDER — BUSPIRONE HYDROCHLORIDE 10 MG/1
10 TABLET ORAL EVERY 12 HOURS SCHEDULED
Qty: 60 TABLET | Refills: 0 | Status: SHIPPED | OUTPATIENT
Start: 2022-04-28 | End: 2022-06-01 | Stop reason: SDUPTHER

## 2022-04-28 RX ORDER — AMOXICILLIN AND CLAVULANATE POTASSIUM 875; 125 MG/1; MG/1
1 TABLET, FILM COATED ORAL EVERY 12 HOURS SCHEDULED
Qty: 26 TABLET | Refills: 0 | Status: SHIPPED | OUTPATIENT
Start: 2022-04-28 | End: 2022-04-28 | Stop reason: SDUPTHER

## 2022-04-28 RX ORDER — INSULIN LISPRO 100 [IU]/ML
10 INJECTION, SOLUTION INTRAVENOUS; SUBCUTANEOUS
Qty: 54 ML | Refills: 4 | Status: SHIPPED | OUTPATIENT
Start: 2022-04-28 | End: 2022-06-03 | Stop reason: SDUPTHER

## 2022-04-28 RX ADMIN — INSULIN LISPRO 3 UNITS: 100 INJECTION, SOLUTION INTRAVENOUS; SUBCUTANEOUS at 11:29

## 2022-04-28 RX ADMIN — INSULIN GLARGINE 30 UNITS: 100 INJECTION, SOLUTION SUBCUTANEOUS at 08:05

## 2022-04-28 RX ADMIN — HYDROCODONE BITARTRATE AND ACETAMINOPHEN 1 TABLET: 7.5; 325 TABLET ORAL at 10:35

## 2022-04-28 RX ADMIN — AMOXICILLIN AND CLAVULANATE POTASSIUM 1 TABLET: 875; 125 TABLET, FILM COATED ORAL at 08:05

## 2022-04-28 RX ADMIN — ASPIRIN 81 MG: 81 TABLET, COATED ORAL at 08:05

## 2022-04-28 RX ADMIN — DULOXETINE HYDROCHLORIDE 60 MG: 30 CAPSULE, DELAYED RELEASE ORAL at 08:05

## 2022-04-28 RX ADMIN — INSULIN LISPRO 10 UNITS: 100 INJECTION, SOLUTION INTRAVENOUS; SUBCUTANEOUS at 11:29

## 2022-04-28 RX ADMIN — LEVOTHYROXINE SODIUM 100 MCG: 0.1 TABLET ORAL at 05:34

## 2022-04-28 RX ADMIN — APIXABAN 5 MG: 5 TABLET, FILM COATED ORAL at 08:05

## 2022-04-28 RX ADMIN — PANTOPRAZOLE SODIUM 40 MG: 40 TABLET, DELAYED RELEASE ORAL at 08:05

## 2022-04-28 RX ADMIN — METOPROLOL SUCCINATE 50 MG: 50 TABLET, EXTENDED RELEASE ORAL at 08:05

## 2022-04-28 RX ADMIN — MULTIPLE VITAMINS W/ MINERALS TAB 1 TABLET: TAB at 08:05

## 2022-04-28 RX ADMIN — BUSPIRONE HYDROCHLORIDE 10 MG: 5 TABLET ORAL at 08:05

## 2022-04-28 RX ADMIN — Medication 10 ML: at 08:07

## 2022-04-28 RX ADMIN — INSULIN LISPRO 10 UNITS: 100 INJECTION, SOLUTION INTRAVENOUS; SUBCUTANEOUS at 08:05

## 2022-04-28 NOTE — OUTREACH NOTE
Prep Survey    Flowsheet Row Responses   [facility] patient discharged from? [facility]   Is LACE score < 7 ? No   Emergency Room discharge w/ pulse ox? No   Eligibility TCM   H[facility]   Date of Admission 04/23/22   Date of Discharge 04/28/22   Discharge Disposition Home-Health Care [facility]   Discharge diagnosis Amputation of left great toe   Does the patient have one of the following disease processes/diagnoses(primary or secondary)? General Surgery   Does the patient have Home health ordered? No   Is there a DME ordered? No   Prep survey completed? Yes          ADRIANO WHITLEY - Registered Nurse

## 2022-04-29 ENCOUNTER — TRANSITIONAL CARE MANAGEMENT TELEPHONE ENCOUNTER (OUTPATIENT)
Dept: CALL CENTER | Facility: HOSPITAL | Age: 57
End: 2022-04-29

## 2022-04-29 LAB
BACTERIA SPEC AEROBE CULT: NORMAL
BACTERIA SPEC AEROBE CULT: NORMAL

## 2022-04-29 NOTE — OUTREACH NOTE
Call Center TCM Note    Flowsheet Row Responses   [facility] facility patient discharged from? [facility]   Does the patient have one of the following disease processes/diagnoses(primary or secondary)? General Surgery   TCM attempt successful? No  [verbal release for contacts]   Unsuccessful attempts Attempt 1  [Contacts have no info on med. Will call Pt back today]          Denise Zamora RN    4/29/2022, 09:32 EDT

## 2022-04-29 NOTE — OUTREACH NOTE
Call Center TCM Note    Flowsheet Row Responses   [facility] patient discharged from? [facility]   Does the patient have one of the following disease processes/diagnoses(primary or secondary)? General Surgery   TCM attempt successful? Yes   Call start time 1432   Call end time 1437   Discharge diagnosis Amputation of left great toe   Meds reviewed with patient/caregiver? Yes   Is the patient having any side effects they believe may be caused by any medication additions or changes? No   Does the patient have all medications related to this admission filled (includes all antibiotics, pain medications, etc.) Yes   Is the patient taking all medications as directed (includes completed medication regime)? Yes   Does the patient have a follow up appointment scheduled with their surgeon? Yes  [5/3/22]   Has the patient kept scheduled appointments due by today? N/A   Comments HOSP DC FU appt with PCP office 5/12/22 @ 1:30 pm.    Has home health visited the patient within 72 hours of discharge? N/A   Psychosocial issues? No   Did the patient receive a copy of their discharge instructions? Yes   Nursing interventions Reviewed instructions with patient   What is the patient's perception of their health status since discharge? Improving   Nursing interventions Nurse provided patient education   Is the patient /caregiver able to teach back basic post-op care? Lifting as instructed by MD in discharge instructions, No tub bath, swimming, or hot tub until instructed by MD, Take showers only when approved by MD-sponge bathe until then, Drive as instructed by MD in discharge instructions, Practice 'cough and deep breath'   Is the patient/caregiver able to teach back signs and symptoms of incisional infection? Fever, Increased drainage or bleeding, Increased redness, swelling or pain at the incisonal site   Is the patient/caregiver able to teach back steps to recovery at home? Eat a well-balance diet, Rest and rebuild strength,  gradually increase activity, Set small, achievable goals for return to baseline health   If the patient is a current smoker, are they able to teach back resources for cessation? Not a smoker   Is the patient/caregiver able to teach back the hierarchy of who to call/visit for symptoms/problems? PCP, Specialist, Home health nurse, Urgent Care, ED, 911 Yes   TCM call completed? Yes   Wrap up additional comments Pt reports foot sore , but he is doing ok. Pt has questions on why insulin was changed and will speak to his Dr. Denise Zamora, RN    4/29/2022, 14:40 EDT

## 2022-05-09 ENCOUNTER — READMISSION MANAGEMENT (OUTPATIENT)
Dept: CALL CENTER | Facility: HOSPITAL | Age: 57
End: 2022-05-09

## 2022-05-09 NOTE — OUTREACH NOTE
General Surgery Week 2 Survey    Flowsheet Row Responses   [facility] patient discharged from? [facility]   Does the patient have one of the following disease processes/diagnoses(primary or secondary)? General Surgery   Week 2 attempt successful? Yes   Call start time 1728   Call end time 1730   Discharge diagnosis Amputation of left great toe   Meds reviewed with patient/caregiver? Yes   Is the patient having any side effects they believe may be caused by any medication additions or changes? No   Does the patient have all medications related to this admission filled (includes all antibiotics, pain medications, etc.) Yes   Is the patient taking all medications as directed (includes completed medication regime)? Yes   Does the patient have a follow up appointment scheduled with their surgeon? Yes   Has the patient kept scheduled appointments due by today? N/A   Comments HOSP DC FU appt with PCP office 5/12/22 @ 1:30 pm.    Has home health visited the patient within 72 hours of discharge? N/A   Psychosocial issues? No   Did the patient receive a copy of their discharge instructions? Yes   Nursing interventions Reviewed instructions with patient   Nursing interventions Nurse provided patient education   Is the patient /caregiver able to teach back basic post-op care? Keep incision areas clean,dry and protected   Is the patient/caregiver able to teach back signs and symptoms of incisional infection? Fever, Increased redness, swelling or pain at the incisonal site   Is the patient/caregiver able to teach back steps to recovery at home? Set small, achievable goals for return to baseline health, Make a list of questions for surgeon's appointment   If the patient is a current smoker, are they able to teach back resources for cessation? Not a smoker   Is the patient/caregiver able to teach back the hierarchy of who to call/visit for symptoms/problems? PCP, Specialist, Home health nurse, Urgent Care, ED, 911 Yes   Week 2  call completed? Yes          OCHOA W - Registered Nurse

## 2022-05-10 RX ORDER — ATORVASTATIN CALCIUM 80 MG/1
80 TABLET, FILM COATED ORAL NIGHTLY
Qty: 90 TABLET | Refills: 2 | Status: SHIPPED | OUTPATIENT
Start: 2022-05-10 | End: 2022-09-12 | Stop reason: SDUPTHER

## 2022-05-11 ENCOUNTER — OFFICE VISIT (OUTPATIENT)
Dept: PODIATRY | Facility: CLINIC | Age: 57
End: 2022-05-11

## 2022-05-11 VITALS
DIASTOLIC BLOOD PRESSURE: 77 MMHG | HEIGHT: 68 IN | SYSTOLIC BLOOD PRESSURE: 153 MMHG | WEIGHT: 183 LBS | BODY MASS INDEX: 27.74 KG/M2 | HEART RATE: 88 BPM

## 2022-05-11 DIAGNOSIS — E11.42 DM TYPE 2 WITH DIABETIC PERIPHERAL NEUROPATHY: ICD-10-CM

## 2022-05-11 DIAGNOSIS — Z89.511 HX OF BKA, RIGHT: ICD-10-CM

## 2022-05-11 DIAGNOSIS — M20.42 HAMMER TOE OF LEFT FOOT: ICD-10-CM

## 2022-05-11 DIAGNOSIS — Z89.412 LEFT GREAT TOE AMPUTEE: Primary | ICD-10-CM

## 2022-05-11 PROCEDURE — 99024 POSTOP FOLLOW-UP VISIT: CPT | Performed by: PODIATRIST

## 2022-05-11 NOTE — PROGRESS NOTES
2022  Foot and Ankle Surgery - Established Patient/Follow-up  Provider: Dr. Bong Baker DPM  Location: [facility]    Subjective:  Kuldeep Adhikari is a 57 y.o. male.     Chief Complaint   Patient presents with   • Left Foot - Post-op   • Follow-up     Last pcp appt with jenifer Cortez md 3/29/2022       HPI:     The patient presents today in follow-up. He is 2 weeks postoperative left great toe amputation and flexor tenotomies of lesser digits.     He reports on 2022 will conclude his antibiotic course.  He states that he is doing well.  He denies any new issues or concerns.  He has remained mostly off weightbearing    Allergies   Allergen Reactions   • Lisinopril Cough       Current Outpatient Medications on File Prior to Visit   Medication Sig Dispense Refill   • [] amoxicillin-clavulanate (AUGMENTIN) 875-125 MG per tablet Take 1 tablet by mouth Every 12 (Twelve) Hours for 26 doses. Indications: Infection of the Skin and/or Soft Tissue 26 tablet 0   • apixaban (Eliquis) 5 MG tablet tablet Take 1 tablet by mouth 2 (Two) Times a Day. 180 tablet 0   • aspirin 81 MG EC tablet Take 1 tablet by mouth Daily.     • atorvastatin (LIPITOR) 80 MG tablet Take 1 tablet by mouth Every Night. 90 tablet 2   • busPIRone (BUSPAR) 10 MG tablet Take 1 tablet by mouth Every 12 (Twelve) Hours for 30 days. 60 tablet 0   • cyclobenzaprine (FLEXERIL) 10 MG tablet Take I tab q 8 hrs as needed for tension headaches 30 tablet 0   • DULoxetine (CYMBALTA) 60 MG capsule Take 1 capsule by mouth Every Morning. 90 capsule 0   • gabapentin (NEURONTIN) 300 MG capsule Take 300 mg by mouth Every Night.     • Insulin Glargine (Lantus SoloStar) 100 UNIT/ML injection pen Inject 30 Units under the skin into the appropriate area as directed Daily With Breakfast 15 mL 0   • Insulin Lispro, 1 Unit Dial, (HumaLOG KwikPen) 100 UNIT/ML solution pen-injector Inject 10 Units under the skin into the appropriate area as directed 3  "(Three) Times a Day With Meals. 54 mL 4   • levothyroxine (SYNTHROID, LEVOTHROID) 100 MCG tablet Take 1 tablet by mouth Daily. 90 tablet 0   • metoprolol succinate XL (TOPROL-XL) 50 MG 24 hr tablet Take 1 tablet by mouth Daily. 90 tablet 0   • multivitamin with minerals tablet tablet Take 1 tablet by mouth Daily.     • omeprazole (priLOSEC) 40 MG capsule Take 1 capsule by mouth Daily. 90 capsule 0   • SITagliptin-metFORMIN HCl ER (Janumet XR)  MG tablet Take 1 tablet by mouth 2 (Two) Times a Day. 180 tablet 0   • [DISCONTINUED] insulin aspart (NovoLOG FlexPen) 100 UNIT/ML solution pen-injector sc pen Inject 10 Units under the skin into the appropriate area as directed 3 (Three) Times a Day With Meals. 54 mL 4     No current facility-administered medications on file prior to visit.       Objective   /77   Pulse 88   Ht 172.7 cm (68\")   Wt 83 kg (183 lb)   BMI 27.83 kg/m²     Foot/Ankle Exam:       General:   Appearance: appears stated age and healthy    Orientation: AAOx3    Affect: appropriate      VASCULAR      Left Foot Vascularity   Normal vascular exam    Dorsalis pedis:  2+  Posterior tibial:  2+  Skin Temperature: warm    Edema Grading:  None  CFT:  < 3 seconds  Pedal Hair Growth:  Present  Varicosities: none        NEUROLOGIC     Left Foot Neurologic   Light touch sensation:  Normal  Hot/cold sensation: normal    Achilles reflex:  2+     MUSCULOSKELETAL      Right Foot Musculoskeletal    Amputation   Below right knee: Yes       Left Foot Musculoskeletal   Ecchymosis:  None  Arch:  Normal     MUSCLE STRENGTH     Left Foot Muscle Strength   Normal strength    Foot dorsiflexion:  5  Foot plantar flexion:  5  Foot inversion:  5  Foot eversion:  5     DERMATOLOGIC     Right Foot Dermatologic   Nails comment:  Nails 1-5     Left Foot Dermatologic   Skin: skin intact    Nails comment:  Nails 1-5     TESTS     Left Foot Tests   Anterior drawer: negative    Varus tilt: negative        Left Foot " Additional Comments: Incision site is dry and stable with intact staples.   No evidence of dehiscence, or infection. Rectus alignment of the lesser digits.      Assessment & Plan   Diagnoses and all orders for this visit:    1. Left great toe amputee (HCC) (Primary)    2. Hammer toe of left foot    3. DM type 2 with diabetic peripheral neuropathy (HCC)    4. Hx of BKA, right (HCC)        He is advised he is able to shower, or bathe as his staples are removed today; however, he is advised to not soak his foot, or to scrub the affected area to harshly. The patient is to continue wearing his boot. He is advised if he notes any drainage he is to utilize Betadine as discussed.     He will follow-up in 2 weeks.     No orders of the defined types were placed in this encounter.         Note is dictated utilizing voice recognition software. Unfortunately this leads to occasional typographical errors. I apologize in advance if the situation occurs. If questions occur please do not hesitate to call our office.    Transcribed from ambient dictation for ERWIN Baker DPM by Jazzy Acuna.  05/11/22   12:28 EDT    Patient verbalized consent to the visit recording.

## 2022-05-12 ENCOUNTER — OFFICE (AMBULATORY)
Dept: URBAN - METROPOLITAN AREA CLINIC 64 | Facility: CLINIC | Age: 57
End: 2022-05-12

## 2022-05-12 ENCOUNTER — OFFICE VISIT (OUTPATIENT)
Dept: FAMILY MEDICINE CLINIC | Facility: CLINIC | Age: 57
End: 2022-05-12

## 2022-05-12 VITALS
OXYGEN SATURATION: 98 % | HEART RATE: 81 BPM | TEMPERATURE: 98.2 F | BODY MASS INDEX: 28.19 KG/M2 | SYSTOLIC BLOOD PRESSURE: 149 MMHG | WEIGHT: 186 LBS | RESPIRATION RATE: 16 BRPM | HEIGHT: 68 IN | DIASTOLIC BLOOD PRESSURE: 77 MMHG

## 2022-05-12 VITALS
HEIGHT: 69 IN | DIASTOLIC BLOOD PRESSURE: 80 MMHG | SYSTOLIC BLOOD PRESSURE: 133 MMHG | WEIGHT: 193 LBS | HEART RATE: 76 BPM

## 2022-05-12 DIAGNOSIS — F32.9 REACTIVE DEPRESSION: ICD-10-CM

## 2022-05-12 DIAGNOSIS — L03.116 CELLULITIS OF LEFT LOWER EXTREMITY: ICD-10-CM

## 2022-05-12 DIAGNOSIS — E11.65 TYPE 2 DIABETES MELLITUS WITH HYPERGLYCEMIA, WITH LONG-TERM CURRENT USE OF INSULIN: ICD-10-CM

## 2022-05-12 DIAGNOSIS — Z89.412 STATUS POST AMPUTATION OF LEFT GREAT TOE: Primary | ICD-10-CM

## 2022-05-12 DIAGNOSIS — K21.9 GASTRO-ESOPHAGEAL REFLUX DISEASE WITHOUT ESOPHAGITIS: ICD-10-CM

## 2022-05-12 DIAGNOSIS — M25.511 ACUTE PAIN OF RIGHT SHOULDER: ICD-10-CM

## 2022-05-12 DIAGNOSIS — Z79.4 TYPE 2 DIABETES MELLITUS WITH HYPERGLYCEMIA, WITH LONG-TERM CURRENT USE OF INSULIN: ICD-10-CM

## 2022-05-12 PROCEDURE — 99495 TRANSJ CARE MGMT MOD F2F 14D: CPT | Performed by: PHYSICIAN ASSISTANT

## 2022-05-12 PROCEDURE — 1111F DSCHRG MED/CURRENT MED MERGE: CPT | Performed by: PHYSICIAN ASSISTANT

## 2022-05-12 PROCEDURE — 99213 OFFICE O/P EST LOW 20 MIN: CPT | Performed by: NURSE PRACTITIONER

## 2022-05-12 RX ORDER — BUPROPION HYDROCHLORIDE 150 MG/1
150 TABLET ORAL EVERY MORNING
Qty: 30 TABLET | Refills: 5 | Status: SHIPPED | OUTPATIENT
Start: 2022-05-12 | End: 2022-09-12 | Stop reason: SDUPTHER

## 2022-05-12 RX ORDER — INSULIN ASPART 100 [IU]/ML
INJECTION, SOLUTION INTRAVENOUS; SUBCUTANEOUS
Qty: 5 PEN | Refills: 5 | Status: ON HOLD | OUTPATIENT
Start: 2022-05-12 | End: 2022-06-13

## 2022-05-12 NOTE — PROGRESS NOTES
Transitional Care Follow Up Visit  Subjective     Kuldeep Adhikari is a 57 y.o. male who presents for a transitional care management visit.    Within 48 business hours after discharge our office contacted him via telephone to coordinate his care and needs.      I reviewed and discussed the details of that call along with the discharge summary, hospital problems, inpatient lab results, inpatient diagnostic studies, and consultation reports with Kuldeep.     Current outpatient and discharge medications have been reconciled for the patient.  Reviewed by: OSWALD Oglesby      Date of TCM Phone Call 4/28/2022   [facility]   Date of Admission 4/23/2022   Date of Discharge 4/28/2022   [place] [facility]     Risk for Readmission (LACE) Score: 15 (4/28/2022  6:01 AM)      Pt presents from recent hospitalization due to cellulitis and osteomyelitis of his left great toe which resulted in amputation of his left great toe. He feels that is toe is doing well and he is supposed to see Dr. Baker in 2 weeks.  He has had some bleeding on the inside of his left foot and he isn't sure why.  He is wearing his cam boot but he doesn't like to put the strap over his foot so he rests his foot with the Velcro strap under his foot. He denies any purulent drainage from his foot.  He does not have any feeling in his foot.      He has been monitoring his glucose levels closely at home.  The hospitalist switched his novolog to humalog but his insurance will not cover the humalog so he would like it switched back to his previous medications.  He no longer wants to go to endocrinology since his glucose levels have been doing better with most recent hemoglobin a1c being 7.9.      He is under a lot of stress and is feeling depressed due to recently getting served divorce papers.  He is still living in the house with his wife but has plans to go live with his brother once he has to leave the house.  He is very upset about  the situation.  He is no longer going to his counselor due to not driving and difficulty with rides.  He also has to keep his rides for his medical appointments.  He has had suicidal thoughts but doesn't have a plan and wants to be able to see his grandkids grow up. He was prescribed buspar 10mg twice daily in the hospital which he has been taking.       Course During Hospital Stay:  Cellulitis with osteomyelitis of great toe of left foot status post  1.  Left hallux amputation at the metatarsophalangeal joint  2.  Flexor tenotomy of digits 3, 4, and 5, left foot  -Left foot x-ray reviewed  -WBC 12.6  -CRP 8.41  -ESR 91  -Wound culture revealing growth of Staph aureus, Pasteurella and Proteus species, treated with IV Vanco and cefepime, changed to oral Augmentin for 2 more weeks as per ID recommendation.  -Vancomycin and cefepime given in the ED, continue  -MRI foot finding reviewed  -Podiatry consulted        Type 2 diabetes mellitus with hyperglycemia, with long-term current use of insulin improved  Diabetic peripheral neuropathy  Peripheral vascular disease  -Glucose 128  -Hemoglobin A1c on 3/18/2022 was 7.6  -Accu-Cheks  -Sliding scale insulin ordered   -Endo consulted, patient will be discharged on adjusted insulin regime of 30 units Lantus daily and together with Premeal aspart of 10 units and together with oral hypoglycemics with outpatient follow-up with Endo service.     Chronic renal impairment, stage 3a   -Creatinine 1.6, baseline 1.48  -GFR 49.6, baseline 52     Anemia of chronic disease  -Hemoglobin 11.3  -Hematocrit 35.6  -Monitor     S/P BKA (below knee amputation), right      Coronary artery disease involving native coronary artery of native heart without angina pectoris     History of pulmonary embolism  -Takes Eliquis at home  -PT/INR, PTT ordered     Primary hypertension  Mixed hyperlipidemia  -BP well controlled  -Lipid panel on 3/18/2022 unremarkable except triglycerides 205     Major depressive  disorder, recurrent episode, severe  -Stable      GERD without esophagitis     Acquired hypothyroidism     Obstructive sleep apnea syndrome     History of CVA (cerebrovascular accident)     Condition on discharge stable.     DISCHARGE Follow Up with PCP in a week time.  Follow up with Podiatry service in two week time.  Wound care and weight bearing as per podiatry service recommendation       The following portions of the patient's history were reviewed and updated as appropriate: allergies, current medications, past family history, past medical history, past social history, past surgical history and problem list.    Review of Systems   Constitutional: Positive for fatigue. Negative for activity change, appetite change, chills, diaphoresis, fever and unexpected weight change.   HENT: Negative for trouble swallowing.    Eyes: Negative for visual disturbance.   Respiratory: Negative for chest tightness and shortness of breath.    Cardiovascular: Negative for chest pain, palpitations and leg swelling.   Gastrointestinal: Negative for abdominal pain, diarrhea, nausea and vomiting.   Musculoskeletal: Positive for arthralgias. Negative for gait problem.   Neurological: Negative for dizziness, tremors, weakness, light-headedness and headaches.   Psychiatric/Behavioral: Positive for dysphoric mood and suicidal ideas. Negative for confusion, self-injury and sleep disturbance. The patient is nervous/anxious.        Objective   Physical Exam  Vitals and nursing note reviewed.   Constitutional:       Appearance: Normal appearance.   HENT:      Head: Normocephalic and atraumatic.   Neck:      Vascular: No carotid bruit.   Cardiovascular:      Rate and Rhythm: Normal rate and regular rhythm.      Heart sounds: Normal heart sounds.   Pulmonary:      Effort: Pulmonary effort is normal.      Breath sounds: Normal breath sounds.   Musculoskeletal:      Right shoulder: Tenderness present. Decreased range of motion.      Cervical  back: Normal range of motion and neck supple.      Right lower leg: No edema.      Left lower leg: No edema.      Right Lower Extremity: Right leg is amputated below knee.   Feet:      Comments: Left great toe amputation healing well with no signs of infection.  Abrasions to bilateral sides of left foot without signs of infection.  Skin:     General: Skin is warm and dry.   Neurological:      General: No focal deficit present.      Mental Status: He is alert and oriented to person, place, and time.   Psychiatric:         Mood and Affect: Mood is depressed. Affect is tearful.         Behavior: Behavior normal.         Thought Content: Thought content normal.         Assessment & Plan   Diagnoses and all orders for this visit:    1. Status post amputation of left great toe (HCC) (Primary)  Comments:  Pt doing well but he does have some abrasions to bilateral sides of feet since he hasn't been wearing his cam boot correctly which has resulted in areas rubbing    2. Cellulitis of left lower extremity  Comments:  Resolved.  Pt just finished antibiotic and will monitor closely for any return of symptoms.    3. Type 2 diabetes mellitus with hyperglycemia, with long-term current use of insulin ([facility])  Comments:  Will switch back to novolog flexpen since insurance will not cover the humalog.  Pt to monitor glucose levels closely. Follow up in 1 month.  Orders:  -     insulin aspart (NovoLOG FlexPen) 100 UNIT/ML solution pen-injector sc pen; Inject 15 units before meals 3 times daily per sliding scale.  Maximum of 60 units per day.  Dispense: 5 pen; Refill: 5    4. Reactive depression  Comments:  Will add wellbutrin and pt to follow up in 1 month.  He is unable to do therapy currently due to transportation issues.  He does have support of [facility] friends.  Orders:  -     buPROPion XL (Wellbutrin XL) 150 MG 24 hr tablet; Take 1 tablet by mouth Every Morning.  Dispense: 30 tablet; Refill: 5    5. Acute pain of right  shoulder  Comments:  Pt to get xray of shoulder at his convenience.  Orders:  -     XR Shoulder 2+ View Right; Future

## 2022-05-18 ENCOUNTER — READMISSION MANAGEMENT (OUTPATIENT)
Dept: CALL CENTER | Facility: HOSPITAL | Age: 57
End: 2022-05-18

## 2022-05-18 NOTE — OUTREACH NOTE
General Surgery Week 3 Survey    Flowsheet Row Responses   [facility] patient discharged from? [facility]   Does the patient have one of the following disease processes/diagnoses(primary or secondary)? General Surgery   Week 3 attempt successful? No   Unsuccessful attempts Attempt 1   Discharge diagnosis Amputation of left great toe          RADHA T - Registered Nurse

## 2022-05-25 ENCOUNTER — OFFICE VISIT (OUTPATIENT)
Dept: PODIATRY | Facility: CLINIC | Age: 57
End: 2022-05-25

## 2022-05-25 VITALS
HEART RATE: 70 BPM | HEIGHT: 68 IN | SYSTOLIC BLOOD PRESSURE: 162 MMHG | WEIGHT: 186 LBS | DIASTOLIC BLOOD PRESSURE: 77 MMHG | BODY MASS INDEX: 28.19 KG/M2

## 2022-05-25 DIAGNOSIS — E11.42 DM TYPE 2 WITH DIABETIC PERIPHERAL NEUROPATHY: ICD-10-CM

## 2022-05-25 DIAGNOSIS — Z89.412 LEFT GREAT TOE AMPUTEE: Primary | ICD-10-CM

## 2022-05-25 DIAGNOSIS — Z89.511 HX OF BKA, RIGHT: ICD-10-CM

## 2022-05-25 PROCEDURE — 99024 POSTOP FOLLOW-UP VISIT: CPT | Performed by: PODIATRIST

## 2022-05-25 NOTE — PROGRESS NOTES
05/25/2022  Foot and Ankle Surgery - Established Patient/Follow-up  Provider: Dr. Bong Baker DPM  Location: [facility]    Subjective:  Kuldeep Adhikari is a 57 y.o. male.     Chief Complaint   Patient presents with   • Left Foot - Follow-up     2 staples remain   • Follow-up     Last visit PCP Dr. MALICK Whitaker 05/12/2022       HPI:     The patient is a 57-year-old male who returns for a 2 week follow-up for his left foot. He states that his wound looks good. He reports that his staples were removed today. He reports that he has already transitioned from the tall CAM boot to his regular shoes due to friction to the side of his left foot. The patient is concerned that his left 5th toe is beginning to abduct and underlap his 4th toe.     Allergies   Allergen Reactions   • Lisinopril Cough       Current Outpatient Medications on File Prior to Visit   Medication Sig Dispense Refill   • apixaban (Eliquis) 5 MG tablet tablet Take 1 tablet by mouth 2 (Two) Times a Day. 180 tablet 0   • aspirin 81 MG EC tablet Take 1 tablet by mouth Daily.     • atorvastatin (LIPITOR) 80 MG tablet Take 1 tablet by mouth Every Night. 90 tablet 2   • buPROPion XL (Wellbutrin XL) 150 MG 24 hr tablet Take 1 tablet by mouth Every Morning. 30 tablet 5   • busPIRone (BUSPAR) 10 MG tablet Take 1 tablet by mouth Every 12 (Twelve) Hours for 30 days. 60 tablet 0   • cyclobenzaprine (FLEXERIL) 10 MG tablet Take I tab q 8 hrs as needed for tension headaches 30 tablet 0   • DULoxetine (CYMBALTA) 60 MG capsule Take 1 capsule by mouth Every Morning. 90 capsule 0   • gabapentin (NEURONTIN) 300 MG capsule Take 300 mg by mouth Every Night.     • insulin aspart (NovoLOG FlexPen) 100 UNIT/ML solution pen-injector sc pen Inject 15 units before meals 3 times daily per sliding scale.  Maximum of 60 units per day. 5 pen 5   • Insulin Glargine (Lantus SoloStar) 100 UNIT/ML injection pen Inject 30 Units under the skin into the appropriate area as  "directed Daily With Breakfast 15 mL 0   • Insulin Lispro, 1 Unit Dial, (HumaLOG KwikPen) 100 UNIT/ML solution pen-injector Inject 10 Units under the skin into the appropriate area as directed 3 (Three) Times a Day With Meals. 54 mL 4   • levothyroxine (SYNTHROID, LEVOTHROID) 100 MCG tablet Take 1 tablet by mouth Daily. 90 tablet 0   • metoprolol succinate XL (TOPROL-XL) 50 MG 24 hr tablet Take 1 tablet by mouth Daily. 90 tablet 0   • multivitamin with minerals tablet tablet Take 1 tablet by mouth Daily.     • omeprazole (priLOSEC) 40 MG capsule Take 1 capsule by mouth Daily. 90 capsule 0   • SITagliptin-metFORMIN HCl ER (Janumet XR)  MG tablet Take 1 tablet by mouth 2 (Two) Times a Day. 180 tablet 0     No current facility-administered medications on file prior to visit.       Objective   /77   Pulse 70   Ht 172.7 cm (68\")   Wt 84.4 kg (186 lb)   BMI 28.28 kg/m²     Foot/Ankle Exam:       General:   Appearance: appears stated age and healthy    Orientation: AAOx3    Affect: appropriate      VASCULAR      Left Foot Vascularity   Normal vascular exam    Dorsalis pedis:  2+  Posterior tibial:  2+  Skin Temperature: warm    Edema Grading:  None  CFT:  < 3 seconds  Pedal Hair Growth:  Present  Varicosities: none        NEUROLOGIC     Left Foot Neurologic   Light touch sensation:  Normal  Hot/cold sensation: normal    Achilles reflex:  2+     MUSCULOSKELETAL      Right Foot Musculoskeletal    Amputation   Below right knee: Yes       Left Foot Musculoskeletal   Ecchymosis:  None  Arch:  Normal     MUSCLE STRENGTH     Left Foot Muscle Strength   Normal strength    Foot dorsiflexion:  5  Foot plantar flexion:  5  Foot inversion:  5  Foot eversion:  5     DERMATOLOGIC     Right Foot Dermatologic   Nails comment:  Nails 1-5     Left Foot Dermatologic   Skin: skin intact    Nails comment:  Nails 1-5     TESTS     Left Foot Tests   Anterior drawer: negative    Varus tilt: negative        Left Foot Additional " Comments: Incision site is dry and stable with intact staples.   No evidence of dehiscence, or infection. Rectus alignment of the lesser digits.    05/25/2022  Minimal erythema to the amputation site. No signs of infection.      Assessment & Plan   Diagnoses and all orders for this visit:    1. Left great toe amputee (HCC) (Primary)    2. DM type 2 with diabetic peripheral neuropathy (HCC)    3. Hx of BKA, right (HCC)        1. Left great toe amputation  The patient's left great toe amputation site incision is healing well with no signs of infection. There is minimal erythema mostly likely due to inflammation from the healing process. He can return to regular activities at this time. He has already transitioned to a regular shoe due to the CAM boot causing friction to the side of his left foot. The patient can continue in a regular shoe, and we will place him on a list to receive a diabetic shoe and insert. We will continue to monitor his other toes for any changes. He will follow up in 6 weeks with KEYLA Becker.     No orders of the defined types were placed in this encounter.         Note is dictated utilizing voice recognition software. Unfortunately this leads to occasional typographical errors. I apologize in advance if the situation occurs. If questions occur please do not hesitate to call our office.    Transcribed from ambient dictation for ERWIN Baker DPM by WILL SOL.  05/25/22   12:25 EDT    Patient verbalized consent to the visit recording.  I have personally performed the services described in this document as transcribed by the above individual, and it is both accurate and complete.  ERWIN Baker DPM  5/26/2022  07:04 EDT

## 2022-06-01 ENCOUNTER — PATIENT OUTREACH (OUTPATIENT)
Dept: CASE MANAGEMENT | Facility: OTHER | Age: 57
End: 2022-06-01

## 2022-06-01 DIAGNOSIS — I10 ESSENTIAL HYPERTENSION: ICD-10-CM

## 2022-06-01 DIAGNOSIS — E11.65 UNCONTROLLED TYPE 2 DIABETES MELLITUS WITH HYPERGLYCEMIA: ICD-10-CM

## 2022-06-01 DIAGNOSIS — E03.9 HYPOTHYROIDISM, UNSPECIFIED TYPE: ICD-10-CM

## 2022-06-01 RX ORDER — DULOXETIN HYDROCHLORIDE 60 MG/1
CAPSULE, DELAYED RELEASE ORAL
Qty: 90 CAPSULE | Refills: 0 | Status: SHIPPED | OUTPATIENT
Start: 2022-06-01 | End: 2022-09-12 | Stop reason: SDUPTHER

## 2022-06-01 RX ORDER — APIXABAN 5 MG/1
TABLET, FILM COATED ORAL
Qty: 180 TABLET | Refills: 0 | Status: SHIPPED | OUTPATIENT
Start: 2022-06-01 | End: 2022-09-12 | Stop reason: SDUPTHER

## 2022-06-01 RX ORDER — BUSPIRONE HYDROCHLORIDE 10 MG/1
10 TABLET ORAL EVERY 12 HOURS SCHEDULED
Qty: 180 TABLET | Refills: 0 | Status: SHIPPED | OUTPATIENT
Start: 2022-06-01 | End: 2022-09-12 | Stop reason: SDUPTHER

## 2022-06-01 RX ORDER — LEVOTHYROXINE SODIUM 100 MCG
TABLET ORAL
Qty: 90 TABLET | Refills: 0 | Status: SHIPPED | OUTPATIENT
Start: 2022-06-01 | End: 2022-09-12 | Stop reason: SDUPTHER

## 2022-06-01 RX ORDER — LOSARTAN POTASSIUM 50 MG/1
TABLET ORAL
Qty: 90 TABLET | Refills: 0 | OUTPATIENT
Start: 2022-06-01

## 2022-06-01 RX ORDER — GABAPENTIN 400 MG/1
CAPSULE ORAL
Qty: 270 CAPSULE | Refills: 0 | OUTPATIENT
Start: 2022-06-01

## 2022-06-01 RX ORDER — METOPROLOL SUCCINATE 50 MG/1
TABLET, EXTENDED RELEASE ORAL
Qty: 90 TABLET | Refills: 0 | Status: SHIPPED | OUTPATIENT
Start: 2022-06-01 | End: 2022-06-09 | Stop reason: SDUPTHER

## 2022-06-01 RX ORDER — EMPAGLIFLOZIN 10 MG/1
TABLET, FILM COATED ORAL
Qty: 90 TABLET | Refills: 0 | OUTPATIENT
Start: 2022-06-01

## 2022-06-01 RX ORDER — INSULIN LISPRO 100 [IU]/ML
10 INJECTION, SOLUTION INTRAVENOUS; SUBCUTANEOUS
Qty: 54 ML | Refills: 4 | OUTPATIENT
Start: 2022-06-01

## 2022-06-01 RX ORDER — ONDANSETRON 4 MG/1
TABLET, FILM COATED ORAL
Qty: 45 TABLET | Refills: 0 | OUTPATIENT
Start: 2022-06-01

## 2022-06-01 RX ORDER — SITAGLIPTIN AND METFORMIN HYDROCHLORIDE 1000; 50 MG/1; MG/1
TABLET, FILM COATED, EXTENDED RELEASE ORAL
Qty: 180 TABLET | Refills: 0 | Status: SHIPPED | OUTPATIENT
Start: 2022-06-01 | End: 2022-06-20 | Stop reason: HOSPADM

## 2022-06-01 RX ORDER — OMEPRAZOLE 40 MG/1
40 CAPSULE, DELAYED RELEASE ORAL DAILY
Qty: 90 CAPSULE | Refills: 0 | Status: SHIPPED | OUTPATIENT
Start: 2022-06-01 | End: 2022-09-12 | Stop reason: SDUPTHER

## 2022-06-01 NOTE — OUTREACH NOTE
AMBULATORY CASE MANAGEMENT NOTE    Name and Relationship of Patient/Support Person: Kuldeep Adhikari L - Self    Patient Outreach    Follow up RN-ACM outreach call made to pt. Discussed most recent hospital admission at [facility]. Pt states toe is doing great, has healed. He has seen PCP and podiatry post hospital discharge. He reports to be compliant with medications and doing well with DM management. Pt states he and his wife have , discussed home situation, emotional support provided. Pt is staying with his brother while looking for a new house. Pt reports to be receiving support from his preacher, brothers, states he's going back to Spiritism. Discussed counseling, offered resources to pt. Pt states he has a counselor with [facility] and he plans to reach out to him for an appt. RN-ACM encouraged pt to do so. Reviewed SDOH. Pt reports transportation insecurities. Resources provided, including [employer] and [employer]. He denies other needs. Reviewed appointments. Next routine PCP appt scheduled. Advised pt to call RN-ACM or [facility] nurse line with any needs. Follow up outreach scheduled for 1 month.     Send Education  Questions/Answers    Flowsheet Row Most Recent Value   Other Patient Education/Resources  24/[facility] Nurse Call Line   24/[employer] Nurse Call Line Education Method Verbal        SDOH updated and reviewed with the patient during this program:  Financial Resource Strain: Low Risk    • Difficulty of Paying Living Expenses: Not very hard      Food Insecurity: No Food Insecurity   • Worried About Running Out of Food in the Last Year: Never true   • Ran Out of Food in the Last Year: Never true      Transportation Needs: Unmet Transportation Needs   • Lack of Transportation (Medical): Yes   • Lack of Transportation (Non-Medical): No       NAHOMI HAM  Ambulatory Case Management    6/1/2022, 14:17 EDT

## 2022-06-03 DIAGNOSIS — E11.65 UNCONTROLLED TYPE 2 DIABETES MELLITUS WITH HYPERGLYCEMIA: ICD-10-CM

## 2022-06-03 RX ORDER — INSULIN LISPRO 100 [IU]/ML
10 INJECTION, SOLUTION INTRAVENOUS; SUBCUTANEOUS
Qty: 54 ML | Refills: 4 | Status: SHIPPED | OUTPATIENT
Start: 2022-06-03 | End: 2022-06-20 | Stop reason: HOSPADM

## 2022-06-09 ENCOUNTER — OFFICE VISIT (OUTPATIENT)
Dept: FAMILY MEDICINE CLINIC | Facility: CLINIC | Age: 57
End: 2022-06-09

## 2022-06-09 VITALS
SYSTOLIC BLOOD PRESSURE: 163 MMHG | HEART RATE: 86 BPM | BODY MASS INDEX: 28.95 KG/M2 | WEIGHT: 191 LBS | HEIGHT: 68 IN | DIASTOLIC BLOOD PRESSURE: 83 MMHG | TEMPERATURE: 98 F | OXYGEN SATURATION: 100 % | RESPIRATION RATE: 16 BRPM

## 2022-06-09 DIAGNOSIS — I10 ESSENTIAL HYPERTENSION: ICD-10-CM

## 2022-06-09 DIAGNOSIS — Z79.4 TYPE 2 DIABETES MELLITUS WITH HYPERGLYCEMIA, WITH LONG-TERM CURRENT USE OF INSULIN: ICD-10-CM

## 2022-06-09 DIAGNOSIS — F32.A ANXIETY AND DEPRESSION: Primary | ICD-10-CM

## 2022-06-09 DIAGNOSIS — M25.512 ACUTE PAIN OF LEFT SHOULDER: ICD-10-CM

## 2022-06-09 DIAGNOSIS — F41.9 ANXIETY AND DEPRESSION: Primary | ICD-10-CM

## 2022-06-09 DIAGNOSIS — M54.2 NECK PAIN: ICD-10-CM

## 2022-06-09 DIAGNOSIS — E11.65 TYPE 2 DIABETES MELLITUS WITH HYPERGLYCEMIA, WITH LONG-TERM CURRENT USE OF INSULIN: ICD-10-CM

## 2022-06-09 DIAGNOSIS — M79.2 NEUROPATHIC PAIN: ICD-10-CM

## 2022-06-09 PROCEDURE — 99214 OFFICE O/P EST MOD 30 MIN: CPT | Performed by: PHYSICIAN ASSISTANT

## 2022-06-09 RX ORDER — METOPROLOL SUCCINATE 100 MG/1
100 TABLET, EXTENDED RELEASE ORAL DAILY
Qty: 90 TABLET | Refills: 3 | Status: SHIPPED | OUTPATIENT
Start: 2022-06-09 | End: 2022-09-12 | Stop reason: SDUPTHER

## 2022-06-09 RX ORDER — PREGABALIN 75 MG/1
75 CAPSULE ORAL 2 TIMES DAILY
Qty: 60 CAPSULE | Refills: 2 | Status: SHIPPED | OUTPATIENT
Start: 2022-06-09 | End: 2022-09-12 | Stop reason: SDUPTHER

## 2022-06-09 RX ORDER — PROCHLORPERAZINE 25 MG/1
SUPPOSITORY RECTAL
Qty: 3 EACH | Refills: 12 | Status: ON HOLD | OUTPATIENT
Start: 2022-06-09 | End: 2022-06-13

## 2022-06-09 RX ORDER — PROCHLORPERAZINE 25 MG/1
1 SUPPOSITORY RECTAL CONTINUOUS
Qty: 1 EACH | Refills: 3 | Status: ON HOLD | OUTPATIENT
Start: 2022-06-09 | End: 2022-06-13

## 2022-06-09 RX ORDER — PROCHLORPERAZINE 25 MG/1
1 SUPPOSITORY RECTAL CONTINUOUS
Qty: 1 EACH | Refills: 0 | Status: ON HOLD | OUTPATIENT
Start: 2022-06-09 | End: 2022-06-13

## 2022-06-09 NOTE — PROGRESS NOTES
Subjective   Kuldeep Adhikari is a 57 y.o. male.     Pt presents to follow up on his depression. He has been living at his brother's for about 1 week. He has been sleeping on an old couch. He is going to have an upper GI at the hospital next week so he would like to get both shoulder x-rayed at that time also. Both of his shoulders cause him pain mainly in the anterior aspects.  He also gets some neck pain.  His pain in his shoulders only bothers him with movement of his arms or when directly palpating the anterior aspect of his shoulders.  He denies any chest pain.  He does get some numbness in his hands that occurs when he has been sitting and his hands are in his lap.  That has been going on for a long time.  He does have hx of neuropathy with his diabetes.  He has been taking gabapentin but he thinks it has been giving him headaches.  He would like to try something else for his neuropathic pain. He is doing better with his depression and things have settled down a little with the DCF case with his grandchild.       The following portions of the patient's history were reviewed and updated as appropriate: allergies, current medications, past family history, past medical history, past social history, past surgical history and problem list.  Past Medical History:   Diagnosis Date   • CKD (chronic kidney disease), stage III ([facility])    • Depression    • Depression with suicidal ideation 08/08/2021   • DJD (degenerative joint disease)    • DVT (deep venous thrombosis) ([facility])    • Ganglion     rt wrist   • Gangrene of foot ([facility]) 10/09/2015   • GERD (gastroesophageal reflux disease)    • Headache    • Heart murmur    • Hiatal hernia    • History of esophageal stricture     s/p dialation 40-50 times last EGD 04/2016   • Hyperlipidemia    • Hypertension    • Hypothyroidism    • IBS (irritable bowel syndrome)    • Neuropathy    • Osteomyelitis of right foot ([facility]) 03/19/2020   • Pulmonary embolism ([facility]) 01/19/2017   • Rash     rt  lower hip   • Retinopathy    • S/P BKA (below knee amputation), right ([facility]) 03/18/2022   • Seasonal allergies    • Sepsis due to group B Streptococcus ([facility]) 03/19/2020   • Sleep apnea    • Type 2 diabetes mellitus with peripheral vascular disease ([facility])    • Vitamin D deficiency      Past Surgical History:   Procedure Laterality Date   • AMPUTATION DIGIT Left 4/26/2022    Procedure: AMPUTATION left great toe;  Surgeon: ERWIN Baker DPM;  Location: [facility] MAIN OR;  Service: Podiatry;  Laterality: Left;   • AMPUTATION DIGIT  4/26/2022    Procedure: ;  Surgeon: ERWIN Baker DPM;  Location: [facility] MAIN OR;  Service: Podiatry;;   • AMPUTATION FOOT / TOE Right     great toe   • AMPUTATION REVISION Right 4/8/2021    Procedure: BELOW KNEE AMPUTATION REVISAION;  Surgeon: Eduardo Reynolds MD;  Location: [facility] MAIN OR;  Service: Orthopedics;  Laterality: Right;   • AMPUTATION REVISION Right 4/29/2021    Procedure: AMPUTATION REVISION KNEE STUMP;  Surgeon: Eduardo Reynolds MD;  Location: [facility] MAIN OR;  Service: Orthopedics;  Laterality: Right;   • BELOW KNEE AMPUTATION Right 7/30/2020    Procedure: AMPUTATION BELOW KNEE;  Surgeon: Eduardo Reynolds MD;  Location: [facility] MAIN OR;  Service: Orthopedics;  Laterality: Right;   • CARDIAC CATHETERIZATION  04/2018    [facility]   • COLONOSCOPY     • ENDOSCOPY     • ENDOSCOPY N/A 10/4/2019    Procedure: ESOPHAGOGASTRODUODENOSCOPY with dilitation and biopsy x 1 area;  Surgeon: Declan Iqbal MD;  Location: [facility] ENDOSCOPY;  Service: Gastroenterology   • ENDOSCOPY N/A 12/13/2019    Procedure: ESOPHAGOGASTRODUODENOSCOPY WITH DILATATION (50, 52 BOUGIE);  Surgeon: Declan Iqbal MD;  Location: [facility] ENDOSCOPY;  Service: Gastroenterology   • ENDOSCOPY N/A 8/6/2021    Procedure: ESOPHAGOGASTRODUODENOSCOPY with biopsy x1 area and esophageal dilation (56FR Bougie);  Surgeon: Hussein Talavera MD;  Location: [facility] ENDOSCOPY;  Service: Gastroenterology;  Laterality: N/A;   post: hiatal hernia, erosive gastritis   • EYE SURGERY     • GANGLION CYST EXCISION Left    • HERNIA REPAIR Bilateral    • JOINT REPLACEMENT      Left total hip   • RETINOPATHY SURGERY      laser   • TOTAL HIP ARTHROPLASTY Left    • TOTAL HIP ARTHROPLASTY Left 2018   • TRANS METATARSAL AMPUTATION Right 3/17/2020    Procedure: AMPUTATION TRANS METATARSAL right;  Surgeon: ERWIN Baker DPM;  Location: [facility] MAIN OR;  Service: Podiatry;  Laterality: Right;  GANGRENOUS RIGHT FOOT   • VASECTOMY       Family History   Problem Relation Age of Onset   • Diabetes Mother         Patient  mom   • Heart disease Mother    • Arthritis Mother    • Hyperlipidemia Mother    • Leukemia Father    • Sleep apnea Maternal Aunt         GENO   • Stroke Maternal Grandmother    • Hypertension Other    • Hyperlipidemia Other    • Cancer Other    • Colon cancer Other         uncle     Social History     Socioeconomic History   • Marital status:    Tobacco Use   • Smoking status: Never Smoker   • Smokeless tobacco: Never Used   Vaping Use   • Vaping Use: Never used   Substance and Sexual Activity   • Alcohol use: No   • Drug use: No   • Sexual activity: Not Currently     Birth control/protection: None         Current Outpatient Medications:   •  aspirin 81 MG EC tablet, Take 1 tablet by mouth Daily., Disp: , Rfl:   •  atorvastatin (LIPITOR) 80 MG tablet, Take 1 tablet by mouth Every Night., Disp: 90 tablet, Rfl: 2  •  buPROPion XL (Wellbutrin XL) 150 MG 24 hr tablet, Take 1 tablet by mouth Every Morning., Disp: 30 tablet, Rfl: 5  •  busPIRone (BUSPAR) 10 MG tablet, Take 1 tablet by mouth Every 12 (Twelve) Hours for 30 days., Disp: 180 tablet, Rfl: 0  •  cyclobenzaprine (FLEXERIL) 10 MG tablet, Take I tab q 8 hrs as needed for tension headaches, Disp: 30 tablet, Rfl: 0  •  DULoxetine (CYMBALTA) 60 MG capsule, TAKE 1 CAPSULE EVERY       MORNING, Disp: 90 capsule, Rfl: 0  •  Eliquis 5 MG tablet tablet, TAKE 1 TABLET TWICE A DAY, Disp:  180 tablet, Rfl: 0  •  insulin aspart (NovoLOG FlexPen) 100 UNIT/ML solution pen-injector sc pen, Inject 15 units before meals 3 times daily per sliding scale.  Maximum of 60 units per day., Disp: 5 pen, Rfl: 5  •  Insulin Glargine (Lantus SoloStar) 100 UNIT/ML injection pen, Inject 30 Units under the skin into the appropriate area as directed Daily With Breakfast, Disp: 15 mL, Rfl: 0  •  Insulin Lispro, 1 Unit Dial, (HumaLOG KwikPen) 100 UNIT/ML solution pen-injector, Inject 10 Units under the skin into the appropriate area as directed 3 (Three) Times a Day With Meals., Disp: 54 mL, Rfl: 4  •  Janumet XR  MG tablet, TAKE 1 TABLET TWICE A DAY, Disp: 180 tablet, Rfl: 0  •  metoprolol succinate XL (TOPROL-XL) 100 MG 24 hr tablet, Take 1 tablet by mouth Daily., Disp: 90 tablet, Rfl: 3  •  multivitamin with minerals tablet tablet, Take 1 tablet by mouth Daily., Disp: , Rfl:   •  omeprazole (priLOSEC) 40 MG capsule, Take 1 capsule by mouth Daily., Disp: 90 capsule, Rfl: 0  •  Synthroid 100 MCG tablet, TAKE 1 TABLET DAILY, Disp: 90 tablet, Rfl: 0  •  Continuous Blood Gluc  (Dexcom G6 ) device, 1 each Continuous., Disp: 1 each, Rfl: 0  •  Continuous Blood Gluc Sensor (Dexcom G6 Sensor), Every 10 (Ten) Days., Disp: 3 each, Rfl: 12  •  Continuous Blood Gluc Transmit (Dexcom G6 Transmitter) misc, 1 each Continuous., Disp: 1 each, Rfl: 3  •  pregabalin (Lyrica) 75 MG capsule, Take 1 capsule by mouth 2 (Two) Times a Day., Disp: 60 capsule, Rfl: 2    Review of Systems   Constitutional: Negative for activity change, appetite change, chills, diaphoresis, fatigue, fever, unexpected weight gain and unexpected weight loss.   Eyes: Negative for blurred vision and visual disturbance.   Respiratory: Negative for chest tightness and shortness of breath.    Cardiovascular: Negative for chest pain, palpitations and leg swelling.   Musculoskeletal: Positive for arthralgias and neck pain. Negative for gait problem,  "joint swelling, myalgias and neck stiffness.   Neurological: Positive for numbness and headache. Negative for dizziness, tremors, seizures, syncope, speech difficulty, weakness, light-headedness, memory problem and confusion.   Psychiatric/Behavioral: Positive for sleep disturbance, depressed mood and stress. Negative for self-injury and suicidal ideas. The patient is nervous/anxious.      /83   Pulse 86   Temp 98 °F (36.7 °C) (Temporal)   Resp 16   Ht 172.7 cm (68\")   Wt 86.6 kg (191 lb)   SpO2 100%   BMI 29.04 kg/m²       Objective   Physical Exam  Vitals and nursing note reviewed.   Constitutional:       Appearance: Normal appearance.   HENT:      Head: Normocephalic and atraumatic.   Eyes:      Pupils: Pupils are equal, round, and reactive to light.   Neck:      Vascular: No carotid bruit.   Cardiovascular:      Rate and Rhythm: Normal rate and regular rhythm.      Heart sounds: Normal heart sounds.   Pulmonary:      Effort: Pulmonary effort is normal.      Breath sounds: Normal breath sounds.   Musculoskeletal:         General: Tenderness present.      Right shoulder: Tenderness and bony tenderness present. Decreased range of motion. Normal strength. Normal pulse.      Left shoulder: Tenderness and bony tenderness present. Decreased range of motion. Normal strength. Normal pulse.      Cervical back: Normal range of motion and neck supple. Tenderness present.      Left lower leg: No edema.      Right Lower Extremity: Right leg is amputated below knee.   Skin:     General: Skin is warm and dry.   Neurological:      General: No focal deficit present.      Mental Status: He is alert and oriented to person, place, and time.   Psychiatric:         Mood and Affect: Mood normal.         Behavior: Behavior normal.         Thought Content: Thought content normal.         Judgment: Judgment normal.         Procedures       Diagnoses and all orders for this visit:    1. Anxiety and depression " (Primary)  Comments:  Pt seems to be in a better place mentally this visit.  He is no longer having suicidal thoughts and seems a little less stressed. Will continue current meds.    2. Acute pain of left shoulder  Comments:  Will get bilateral xrays of his shoulders when he goes for his upper GI study.  Orders:  -     XR Shoulder 2+ View Left; Future    3. Neuropathic pain  Comments:  Will stop gabapentin since he thinks it may be causing his headaches.  Will try on lyrica.  Orders:  -     pregabalin (Lyrica) 75 MG capsule; Take 1 capsule by mouth 2 (Two) Times a Day.  Dispense: 60 capsule; Refill: 2    4. Neck pain  Comments:  Pt to get imaging of neck.  Orders:  -     XR Spine Cervical 2 or 3 View; Future    5. Essential hypertension  Comments:  Elevated today.  Pt to increase metoprolol to 100mg daily and monitor blood pressure and heart rate at home.  Let me know if not improving.  Orders:  -     metoprolol succinate XL (TOPROL-XL) 100 MG 24 hr tablet; Take 1 tablet by mouth Daily.  Dispense: 90 tablet; Refill: 3    6. Type 2 diabetes mellitus with hyperglycemia, with long-term current use of insulin (HCC)  Comments:  Will send in Dexcom G6 per pt's request.  Orders:  -     Continuous Blood Gluc Sensor (Dexcom G6 Sensor); Every 10 (Ten) Days.  Dispense: 3 each; Refill: 12  -     Continuous Blood Gluc  (Dexcom G6 ) device; 1 each Continuous.  Dispense: 1 each; Refill: 0  -     Continuous Blood Gluc Transmit (Dexcom G6 Transmitter) misc; 1 each Continuous.  Dispense: 1 each; Refill: 3        I spent 30 minutes of patient care including reviewing pt's previous hx, reviewing current symptoms, performing exam, ordering tests, discussing treatment plan and completing my note.

## 2022-06-12 ENCOUNTER — HOSPITAL ENCOUNTER (INPATIENT)
Facility: HOSPITAL | Age: 57
LOS: 7 days | Discharge: NURSING, REHAB OR LONG-TERM CARE | End: 2022-06-20
Attending: INTERNAL MEDICINE | Admitting: INTERNAL MEDICINE

## 2022-06-12 DIAGNOSIS — E11.65 TYPE 2 DIABETES MELLITUS WITH HYPERGLYCEMIA, WITH LONG-TERM CURRENT USE OF INSULIN: ICD-10-CM

## 2022-06-12 DIAGNOSIS — L03.116 CELLULITIS OF LEFT LOWER EXTREMITY: ICD-10-CM

## 2022-06-12 DIAGNOSIS — Z79.4 TYPE 2 DIABETES MELLITUS WITH HYPERGLYCEMIA, WITH LONG-TERM CURRENT USE OF INSULIN: ICD-10-CM

## 2022-06-12 DIAGNOSIS — R50.9 FEVER, UNSPECIFIED FEVER CAUSE: ICD-10-CM

## 2022-06-12 DIAGNOSIS — L03.115 CELLULITIS OF RIGHT LOWER EXTREMITY: Primary | ICD-10-CM

## 2022-06-12 LAB
ALBUMIN SERPL-MCNC: 4 G/DL (ref 3.5–5.2)
ALBUMIN/GLOB SERPL: 1.5 G/DL
ALP SERPL-CCNC: 105 U/L (ref 39–117)
ALT SERPL W P-5'-P-CCNC: 13 U/L (ref 1–41)
ANION GAP SERPL CALCULATED.3IONS-SCNC: 14 MMOL/L (ref 5–15)
AST SERPL-CCNC: 16 U/L (ref 1–40)
BASOPHILS # BLD AUTO: 0.1 10*3/MM3 (ref 0–0.2)
BASOPHILS NFR BLD AUTO: 0.5 % (ref 0–1.5)
BILIRUB SERPL-MCNC: 1.5 MG/DL (ref 0–1.2)
BUN SERPL-MCNC: 20 MG/DL (ref 6–20)
BUN/CREAT SERPL: 12.7 (ref 7–25)
CALCIUM SPEC-SCNC: 8.9 MG/DL (ref 8.6–10.5)
CHLORIDE SERPL-SCNC: 103 MMOL/L (ref 98–107)
CO2 SERPL-SCNC: 24 MMOL/L (ref 22–29)
CREAT SERPL-MCNC: 1.57 MG/DL (ref 0.76–1.27)
CRP SERPL-MCNC: 2.56 MG/DL (ref 0–0.5)
D-LACTATE SERPL-SCNC: 1 MMOL/L (ref 0.5–2)
DEPRECATED RDW RBC AUTO: 47.3 FL (ref 37–54)
EGFRCR SERPLBLD CKD-EPI 2021: 50.8 ML/MIN/1.73
EOSINOPHIL # BLD AUTO: 0 10*3/MM3 (ref 0–0.4)
EOSINOPHIL NFR BLD AUTO: 0.3 % (ref 0.3–6.2)
ERYTHROCYTE [DISTWIDTH] IN BLOOD BY AUTOMATED COUNT: 18.8 % (ref 12.3–15.4)
ERYTHROCYTE [SEDIMENTATION RATE] IN BLOOD: 42 MM/HR (ref 0–20)
GLOBULIN UR ELPH-MCNC: 2.7 GM/DL
GLUCOSE BLDC GLUCOMTR-MCNC: 169 MG/DL (ref 70–105)
GLUCOSE SERPL-MCNC: 144 MG/DL (ref 65–99)
HCT VFR BLD AUTO: 37.3 % (ref 37.5–51)
HGB BLD-MCNC: 11.6 G/DL (ref 13–17.7)
LYMPHOCYTES # BLD AUTO: 1 10*3/MM3 (ref 0.7–3.1)
LYMPHOCYTES NFR BLD AUTO: 7.2 % (ref 19.6–45.3)
MCH RBC QN AUTO: 22.3 PG (ref 26.6–33)
MCHC RBC AUTO-ENTMCNC: 31 G/DL (ref 31.5–35.7)
MCV RBC AUTO: 71.7 FL (ref 79–97)
MONOCYTES # BLD AUTO: 1.3 10*3/MM3 (ref 0.1–0.9)
MONOCYTES NFR BLD AUTO: 9.5 % (ref 5–12)
NEUTROPHILS NFR BLD AUTO: 11.3 10*3/MM3 (ref 1.7–7)
NEUTROPHILS NFR BLD AUTO: 82.5 % (ref 42.7–76)
NRBC BLD AUTO-RTO: 0 /100 WBC (ref 0–0.2)
PLATELET # BLD AUTO: 301 10*3/MM3 (ref 140–450)
PMV BLD AUTO: 7.7 FL (ref 6–12)
POTASSIUM SERPL-SCNC: 4.4 MMOL/L (ref 3.5–5.2)
PROT SERPL-MCNC: 6.7 G/DL (ref 6–8.5)
RBC # BLD AUTO: 5.2 10*6/MM3 (ref 4.14–5.8)
SARS-COV-2 RNA PNL SPEC NAA+PROBE: NOT DETECTED
SODIUM SERPL-SCNC: 141 MMOL/L (ref 136–145)
WBC NRBC COR # BLD: 13.7 10*3/MM3 (ref 3.4–10.8)

## 2022-06-12 PROCEDURE — G0378 HOSPITAL OBSERVATION PER HR: HCPCS

## 2022-06-12 PROCEDURE — 85652 RBC SED RATE AUTOMATED: CPT

## 2022-06-12 PROCEDURE — 83605 ASSAY OF LACTIC ACID: CPT

## 2022-06-12 PROCEDURE — 25010000002 CEFEPIME PER 500 MG

## 2022-06-12 PROCEDURE — 99284 EMERGENCY DEPT VISIT MOD MDM: CPT

## 2022-06-12 PROCEDURE — 87040 BLOOD CULTURE FOR BACTERIA: CPT

## 2022-06-12 PROCEDURE — 25010000002 VANCOMYCIN 10 G RECONSTITUTED SOLUTION

## 2022-06-12 PROCEDURE — 85025 COMPLETE CBC W/AUTO DIFF WBC: CPT

## 2022-06-12 PROCEDURE — 99219 PR INITIAL OBSERVATION CARE/DAY 50 MINUTES: CPT | Performed by: NURSE PRACTITIONER

## 2022-06-12 PROCEDURE — 86140 C-REACTIVE PROTEIN: CPT

## 2022-06-12 PROCEDURE — 80053 COMPREHEN METABOLIC PANEL: CPT

## 2022-06-12 PROCEDURE — 82962 GLUCOSE BLOOD TEST: CPT

## 2022-06-12 PROCEDURE — 87635 SARS-COV-2 COVID-19 AMP PRB: CPT | Performed by: INTERNAL MEDICINE

## 2022-06-12 RX ORDER — NICOTINE POLACRILEX 4 MG
15 LOZENGE BUCCAL
Status: DISCONTINUED | OUTPATIENT
Start: 2022-06-12 | End: 2022-06-20 | Stop reason: HOSPADM

## 2022-06-12 RX ORDER — ALUMINA, MAGNESIA, AND SIMETHICONE 2400; 2400; 240 MG/30ML; MG/30ML; MG/30ML
15 SUSPENSION ORAL EVERY 6 HOURS PRN
Status: DISCONTINUED | OUTPATIENT
Start: 2022-06-12 | End: 2022-06-20 | Stop reason: HOSPADM

## 2022-06-12 RX ORDER — VANCOMYCIN 1.75 GRAM/500 ML IN 0.9 % SODIUM CHLORIDE INTRAVENOUS
20 ONCE
Status: COMPLETED | OUTPATIENT
Start: 2022-06-12 | End: 2022-06-12

## 2022-06-12 RX ORDER — SODIUM CHLORIDE 0.9 % (FLUSH) 0.9 %
10 SYRINGE (ML) INJECTION EVERY 12 HOURS SCHEDULED
Status: DISCONTINUED | OUTPATIENT
Start: 2022-06-12 | End: 2022-06-20 | Stop reason: HOSPADM

## 2022-06-12 RX ORDER — DIPHENOXYLATE HYDROCHLORIDE AND ATROPINE SULFATE 2.5; .025 MG/1; MG/1
1 TABLET ORAL DAILY
Status: DISCONTINUED | OUTPATIENT
Start: 2022-06-13 | End: 2022-06-20 | Stop reason: HOSPADM

## 2022-06-12 RX ORDER — CHOLECALCIFEROL (VITAMIN D3) 125 MCG
5 CAPSULE ORAL NIGHTLY PRN
Status: DISCONTINUED | OUTPATIENT
Start: 2022-06-12 | End: 2022-06-20 | Stop reason: HOSPADM

## 2022-06-12 RX ORDER — SODIUM CHLORIDE 0.9 % (FLUSH) 0.9 %
10 SYRINGE (ML) INJECTION AS NEEDED
Status: DISCONTINUED | OUTPATIENT
Start: 2022-06-12 | End: 2022-06-20 | Stop reason: HOSPADM

## 2022-06-12 RX ORDER — ONDANSETRON 4 MG/1
4 TABLET, FILM COATED ORAL EVERY 6 HOURS PRN
Status: DISCONTINUED | OUTPATIENT
Start: 2022-06-12 | End: 2022-06-15

## 2022-06-12 RX ORDER — ONDANSETRON 2 MG/ML
4 INJECTION INTRAMUSCULAR; INTRAVENOUS EVERY 6 HOURS PRN
Status: DISCONTINUED | OUTPATIENT
Start: 2022-06-12 | End: 2022-06-15

## 2022-06-12 RX ORDER — ACETAMINOPHEN 160 MG/5ML
650 SOLUTION ORAL EVERY 4 HOURS PRN
Status: DISCONTINUED | OUTPATIENT
Start: 2022-06-12 | End: 2022-06-20 | Stop reason: HOSPADM

## 2022-06-12 RX ORDER — ACETAMINOPHEN 650 MG/1
650 SUPPOSITORY RECTAL EVERY 4 HOURS PRN
Status: DISCONTINUED | OUTPATIENT
Start: 2022-06-12 | End: 2022-06-20 | Stop reason: HOSPADM

## 2022-06-12 RX ORDER — HEPARIN SODIUM 5000 [USP'U]/ML
5000 INJECTION, SOLUTION INTRAVENOUS; SUBCUTANEOUS EVERY 12 HOURS SCHEDULED
Status: DISCONTINUED | OUTPATIENT
Start: 2022-06-12 | End: 2022-06-15

## 2022-06-12 RX ORDER — IBUPROFEN 400 MG/1
800 TABLET ORAL ONCE
Status: COMPLETED | OUTPATIENT
Start: 2022-06-12 | End: 2022-06-12

## 2022-06-12 RX ORDER — INSULIN LISPRO 100 [IU]/ML
0-14 INJECTION, SOLUTION INTRAVENOUS; SUBCUTANEOUS
Status: DISCONTINUED | OUTPATIENT
Start: 2022-06-13 | End: 2022-06-17 | Stop reason: DRUGHIGH

## 2022-06-12 RX ORDER — OLANZAPINE 10 MG/2ML
1 INJECTION, POWDER, LYOPHILIZED, FOR SOLUTION INTRAMUSCULAR AS NEEDED
Status: DISCONTINUED | OUTPATIENT
Start: 2022-06-12 | End: 2022-06-20 | Stop reason: HOSPADM

## 2022-06-12 RX ORDER — ACETAMINOPHEN 325 MG/1
650 TABLET ORAL EVERY 4 HOURS PRN
Status: DISCONTINUED | OUTPATIENT
Start: 2022-06-12 | End: 2022-06-20 | Stop reason: HOSPADM

## 2022-06-12 RX ORDER — DEXTROSE MONOHYDRATE 25 G/50ML
25 INJECTION, SOLUTION INTRAVENOUS
Status: DISCONTINUED | OUTPATIENT
Start: 2022-06-12 | End: 2022-06-17 | Stop reason: DRUGHIGH

## 2022-06-12 RX ORDER — NITROGLYCERIN 0.4 MG/1
0.4 TABLET SUBLINGUAL
Status: DISCONTINUED | OUTPATIENT
Start: 2022-06-12 | End: 2022-06-20 | Stop reason: HOSPADM

## 2022-06-12 RX ORDER — INSULIN LISPRO 100 [IU]/ML
0-14 INJECTION, SOLUTION INTRAVENOUS; SUBCUTANEOUS AS NEEDED
Status: DISCONTINUED | OUTPATIENT
Start: 2022-06-12 | End: 2022-06-17 | Stop reason: DRUGHIGH

## 2022-06-12 RX ORDER — SODIUM CHLORIDE 9 MG/ML
100 INJECTION, SOLUTION INTRAVENOUS CONTINUOUS
Status: DISCONTINUED | OUTPATIENT
Start: 2022-06-12 | End: 2022-06-13

## 2022-06-12 RX ADMIN — VANCOMYCIN HYDROCHLORIDE 1750 MG: 10 INJECTION, POWDER, LYOPHILIZED, FOR SOLUTION INTRAVENOUS at 21:49

## 2022-06-12 RX ADMIN — IBUPROFEN 800 MG: 400 TABLET, FILM COATED ORAL at 19:02

## 2022-06-12 RX ADMIN — CEFEPIME HYDROCHLORIDE 2 G: 2 INJECTION, POWDER, FOR SOLUTION INTRAVENOUS at 20:33

## 2022-06-13 ENCOUNTER — APPOINTMENT (OUTPATIENT)
Dept: CT IMAGING | Facility: HOSPITAL | Age: 57
End: 2022-06-13

## 2022-06-13 ENCOUNTER — APPOINTMENT (OUTPATIENT)
Dept: MRI IMAGING | Facility: HOSPITAL | Age: 57
End: 2022-06-13

## 2022-06-13 LAB
ALBUMIN SERPL-MCNC: 3.4 G/DL (ref 3.5–5.2)
ALBUMIN/GLOB SERPL: 1.5 G/DL
ALP SERPL-CCNC: 95 U/L (ref 39–117)
ALT SERPL W P-5'-P-CCNC: 11 U/L (ref 1–41)
ANION GAP SERPL CALCULATED.3IONS-SCNC: 12 MMOL/L (ref 5–15)
AST SERPL-CCNC: 14 U/L (ref 1–40)
BASOPHILS # BLD AUTO: 0 10*3/MM3 (ref 0–0.2)
BASOPHILS NFR BLD AUTO: 0.3 % (ref 0–1.5)
BILIRUB SERPL-MCNC: 1.7 MG/DL (ref 0–1.2)
BUN SERPL-MCNC: 24 MG/DL (ref 6–20)
BUN/CREAT SERPL: 12.8 (ref 7–25)
CALCIUM SPEC-SCNC: 8.3 MG/DL (ref 8.6–10.5)
CHLORIDE SERPL-SCNC: 103 MMOL/L (ref 98–107)
CO2 SERPL-SCNC: 24 MMOL/L (ref 22–29)
CREAT SERPL-MCNC: 1.88 MG/DL (ref 0.76–1.27)
D-LACTATE SERPL-SCNC: 1.9 MMOL/L (ref 0.5–2)
D-LACTATE SERPL-SCNC: 2.3 MMOL/L (ref 0.5–2)
DEPRECATED RDW RBC AUTO: 48.1 FL (ref 37–54)
EGFRCR SERPLBLD CKD-EPI 2021: 40.9 ML/MIN/1.73
EOSINOPHIL # BLD AUTO: 0.1 10*3/MM3 (ref 0–0.4)
EOSINOPHIL NFR BLD AUTO: 0.6 % (ref 0.3–6.2)
ERYTHROCYTE [DISTWIDTH] IN BLOOD BY AUTOMATED COUNT: 19.2 % (ref 12.3–15.4)
GLOBULIN UR ELPH-MCNC: 2.2 GM/DL
GLUCOSE BLDC GLUCOMTR-MCNC: 207 MG/DL (ref 70–105)
GLUCOSE BLDC GLUCOMTR-MCNC: 208 MG/DL (ref 70–105)
GLUCOSE BLDC GLUCOMTR-MCNC: 248 MG/DL (ref 70–105)
GLUCOSE BLDC GLUCOMTR-MCNC: 86 MG/DL (ref 70–105)
GLUCOSE SERPL-MCNC: 339 MG/DL (ref 65–99)
HBA1C MFR BLD: 7.5 % (ref 3.5–5.6)
HCT VFR BLD AUTO: 34 % (ref 37.5–51)
HGB BLD-MCNC: 10.4 G/DL (ref 13–17.7)
LYMPHOCYTES # BLD AUTO: 0.9 10*3/MM3 (ref 0.7–3.1)
LYMPHOCYTES NFR BLD AUTO: 9.2 % (ref 19.6–45.3)
MAGNESIUM SERPL-MCNC: 1.4 MG/DL (ref 1.6–2.6)
MCH RBC QN AUTO: 22 PG (ref 26.6–33)
MCHC RBC AUTO-ENTMCNC: 30.7 G/DL (ref 31.5–35.7)
MCV RBC AUTO: 71.8 FL (ref 79–97)
MONOCYTES # BLD AUTO: 0.2 10*3/MM3 (ref 0.1–0.9)
MONOCYTES NFR BLD AUTO: 1.9 % (ref 5–12)
NEUTROPHILS NFR BLD AUTO: 8.7 10*3/MM3 (ref 1.7–7)
NEUTROPHILS NFR BLD AUTO: 88 % (ref 42.7–76)
NRBC BLD AUTO-RTO: 0 /100 WBC (ref 0–0.2)
PLATELET # BLD AUTO: 260 10*3/MM3 (ref 140–450)
PMV BLD AUTO: 8.1 FL (ref 6–12)
POTASSIUM SERPL-SCNC: 4.5 MMOL/L (ref 3.5–5.2)
PROT SERPL-MCNC: 5.6 G/DL (ref 6–8.5)
QT INTERVAL: 349 MS
RBC # BLD AUTO: 4.74 10*6/MM3 (ref 4.14–5.8)
SODIUM SERPL-SCNC: 139 MMOL/L (ref 136–145)
WBC NRBC COR # BLD: 9.9 10*3/MM3 (ref 3.4–10.8)

## 2022-06-13 PROCEDURE — 63710000001 INSULIN GLARGINE PER 5 UNITS: Performed by: INTERNAL MEDICINE

## 2022-06-13 PROCEDURE — 63710000001 INSULIN LISPRO (HUMAN) PER 5 UNITS: Performed by: INTERNAL MEDICINE

## 2022-06-13 PROCEDURE — 94799 UNLISTED PULMONARY SVC/PX: CPT

## 2022-06-13 PROCEDURE — 93005 ELECTROCARDIOGRAM TRACING: CPT | Performed by: NURSE PRACTITIONER

## 2022-06-13 PROCEDURE — 73701 CT LOWER EXTREMITY W/DYE: CPT

## 2022-06-13 PROCEDURE — 99225 PR SBSQ OBSERVATION CARE/DAY 25 MINUTES: CPT | Performed by: INTERNAL MEDICINE

## 2022-06-13 PROCEDURE — 80053 COMPREHEN METABOLIC PANEL: CPT | Performed by: NURSE PRACTITIONER

## 2022-06-13 PROCEDURE — 85025 COMPLETE CBC W/AUTO DIFF WBC: CPT | Performed by: NURSE PRACTITIONER

## 2022-06-13 PROCEDURE — 87205 SMEAR GRAM STAIN: CPT | Performed by: NURSE PRACTITIONER

## 2022-06-13 PROCEDURE — 25010000002 HEPARIN (PORCINE) PER 1000 UNITS: Performed by: INTERNAL MEDICINE

## 2022-06-13 PROCEDURE — 93010 ELECTROCARDIOGRAM REPORT: CPT | Performed by: INTERNAL MEDICINE

## 2022-06-13 PROCEDURE — 25010000002 HEPARIN (PORCINE) PER 1000 UNITS: Performed by: NURSE PRACTITIONER

## 2022-06-13 PROCEDURE — 83735 ASSAY OF MAGNESIUM: CPT | Performed by: NURSE PRACTITIONER

## 2022-06-13 PROCEDURE — 87070 CULTURE OTHR SPECIMN AEROBIC: CPT | Performed by: NURSE PRACTITIONER

## 2022-06-13 PROCEDURE — 87147 CULTURE TYPE IMMUNOLOGIC: CPT | Performed by: NURSE PRACTITIONER

## 2022-06-13 PROCEDURE — 0 IOPAMIDOL PER 1 ML: Performed by: INTERNAL MEDICINE

## 2022-06-13 PROCEDURE — 25010000002 CEFEPIME PER 500 MG: Performed by: INTERNAL MEDICINE

## 2022-06-13 PROCEDURE — 73718 MRI LOWER EXTREMITY W/O DYE: CPT

## 2022-06-13 PROCEDURE — 25010000002 VANCOMYCIN HCL 1.25 G RECONSTITUTED SOLUTION 1 EACH VIAL: Performed by: INTERNAL MEDICINE

## 2022-06-13 PROCEDURE — 83036 HEMOGLOBIN GLYCOSYLATED A1C: CPT | Performed by: NURSE PRACTITIONER

## 2022-06-13 PROCEDURE — 63710000001 INSULIN LISPRO (HUMAN) PER 5 UNITS: Performed by: NURSE PRACTITIONER

## 2022-06-13 PROCEDURE — 87186 SC STD MICRODIL/AGAR DIL: CPT | Performed by: NURSE PRACTITIONER

## 2022-06-13 PROCEDURE — 94660 CPAP INITIATION&MGMT: CPT

## 2022-06-13 PROCEDURE — 83605 ASSAY OF LACTIC ACID: CPT | Performed by: NURSE PRACTITIONER

## 2022-06-13 PROCEDURE — 82962 GLUCOSE BLOOD TEST: CPT

## 2022-06-13 PROCEDURE — 25010000002 CEFAZOLIN PER 500 MG: Performed by: NURSE PRACTITIONER

## 2022-06-13 RX ORDER — INSULIN GLARGINE 100 [IU]/ML
25 INJECTION, SOLUTION SUBCUTANEOUS NIGHTLY
Refills: 0 | Status: DISCONTINUED | OUTPATIENT
Start: 2022-06-13 | End: 2022-06-19

## 2022-06-13 RX ORDER — PANTOPRAZOLE SODIUM 40 MG/1
40 TABLET, DELAYED RELEASE ORAL EVERY MORNING
Refills: 0 | Status: DISCONTINUED | OUTPATIENT
Start: 2022-06-13 | End: 2022-06-20 | Stop reason: HOSPADM

## 2022-06-13 RX ORDER — TRAMADOL HYDROCHLORIDE 50 MG/1
50 TABLET ORAL EVERY 6 HOURS PRN
Status: DISCONTINUED | OUTPATIENT
Start: 2022-06-13 | End: 2022-06-15

## 2022-06-13 RX ORDER — METOPROLOL SUCCINATE 50 MG/1
100 TABLET, EXTENDED RELEASE ORAL DAILY
Status: DISCONTINUED | OUTPATIENT
Start: 2022-06-13 | End: 2022-06-20 | Stop reason: HOSPADM

## 2022-06-13 RX ORDER — CYCLOBENZAPRINE HCL 5 MG
5 TABLET ORAL 3 TIMES DAILY PRN
Status: DISCONTINUED | OUTPATIENT
Start: 2022-06-13 | End: 2022-06-20 | Stop reason: HOSPADM

## 2022-06-13 RX ORDER — ASPIRIN 81 MG/1
81 TABLET ORAL DAILY
Status: DISCONTINUED | OUTPATIENT
Start: 2022-06-13 | End: 2022-06-15

## 2022-06-13 RX ORDER — VANCOMYCIN 1.75 GRAM/500 ML IN 0.9 % SODIUM CHLORIDE INTRAVENOUS
20 ONCE
Status: DISCONTINUED | OUTPATIENT
Start: 2022-06-13 | End: 2022-06-13

## 2022-06-13 RX ORDER — GABAPENTIN 400 MG/1
400 CAPSULE ORAL NIGHTLY
Status: ON HOLD | COMMUNITY
End: 2022-06-13

## 2022-06-13 RX ORDER — LEVOTHYROXINE SODIUM 0.1 MG/1
100 TABLET ORAL
Status: DISCONTINUED | OUTPATIENT
Start: 2022-06-13 | End: 2022-06-20 | Stop reason: HOSPADM

## 2022-06-13 RX ORDER — PREGABALIN 75 MG/1
75 CAPSULE ORAL 2 TIMES DAILY
Status: DISCONTINUED | OUTPATIENT
Start: 2022-06-13 | End: 2022-06-20 | Stop reason: HOSPADM

## 2022-06-13 RX ORDER — SODIUM CHLORIDE 9 MG/ML
75 INJECTION, SOLUTION INTRAVENOUS CONTINUOUS
Status: DISCONTINUED | OUTPATIENT
Start: 2022-06-13 | End: 2022-06-18

## 2022-06-13 RX ORDER — BUPROPION HYDROCHLORIDE 150 MG/1
150 TABLET ORAL EVERY MORNING
Status: DISCONTINUED | OUTPATIENT
Start: 2022-06-13 | End: 2022-06-20 | Stop reason: HOSPADM

## 2022-06-13 RX ORDER — DULOXETIN HYDROCHLORIDE 30 MG/1
60 CAPSULE, DELAYED RELEASE ORAL EVERY MORNING
Status: DISCONTINUED | OUTPATIENT
Start: 2022-06-13 | End: 2022-06-20 | Stop reason: HOSPADM

## 2022-06-13 RX ORDER — GABAPENTIN 400 MG/1
400 CAPSULE ORAL NIGHTLY
Status: DISCONTINUED | OUTPATIENT
Start: 2022-06-13 | End: 2022-06-13

## 2022-06-13 RX ORDER — BUSPIRONE HYDROCHLORIDE 5 MG/1
10 TABLET ORAL EVERY 12 HOURS SCHEDULED
Status: DISCONTINUED | OUTPATIENT
Start: 2022-06-13 | End: 2022-06-20 | Stop reason: HOSPADM

## 2022-06-13 RX ORDER — INSULIN LISPRO 100 [IU]/ML
15 INJECTION, SOLUTION INTRAVENOUS; SUBCUTANEOUS
Refills: 5 | Status: DISCONTINUED | OUTPATIENT
Start: 2022-06-13 | End: 2022-06-20 | Stop reason: HOSPADM

## 2022-06-13 RX ORDER — ATORVASTATIN CALCIUM 40 MG/1
80 TABLET, FILM COATED ORAL NIGHTLY
Status: DISCONTINUED | OUTPATIENT
Start: 2022-06-13 | End: 2022-06-20 | Stop reason: HOSPADM

## 2022-06-13 RX ADMIN — INSULIN LISPRO 15 UNITS: 100 INJECTION, SOLUTION INTRAVENOUS; SUBCUTANEOUS at 11:55

## 2022-06-13 RX ADMIN — INSULIN LISPRO 5 UNITS: 100 INJECTION, SOLUTION INTRAVENOUS; SUBCUTANEOUS at 20:40

## 2022-06-13 RX ADMIN — CEFAZOLIN 2 G: 2 INJECTION, POWDER, FOR SOLUTION INTRAMUSCULAR; INTRAVENOUS at 00:00

## 2022-06-13 RX ADMIN — VANCOMYCIN HYDROCHLORIDE 1250 MG: 1.25 INJECTION, POWDER, LYOPHILIZED, FOR SOLUTION INTRAVENOUS at 18:49

## 2022-06-13 RX ADMIN — TRAMADOL HYDROCHLORIDE 50 MG: 50 TABLET, COATED ORAL at 02:23

## 2022-06-13 RX ADMIN — BUSPIRONE HYDROCHLORIDE 10 MG: 5 TABLET ORAL at 11:54

## 2022-06-13 RX ADMIN — CEFEPIME HYDROCHLORIDE 2 G: 2 INJECTION, POWDER, FOR SOLUTION INTRAVENOUS at 11:55

## 2022-06-13 RX ADMIN — IOPAMIDOL 100 ML: 755 INJECTION, SOLUTION INTRAVENOUS at 02:21

## 2022-06-13 RX ADMIN — BUSPIRONE HYDROCHLORIDE 10 MG: 5 TABLET ORAL at 20:32

## 2022-06-13 RX ADMIN — HEPARIN SODIUM 5000 UNITS: 5000 INJECTION INTRAVENOUS; SUBCUTANEOUS at 20:32

## 2022-06-13 RX ADMIN — TRAMADOL HYDROCHLORIDE 50 MG: 50 TABLET, COATED ORAL at 08:17

## 2022-06-13 RX ADMIN — HEPARIN SODIUM 5000 UNITS: 5000 INJECTION INTRAVENOUS; SUBCUTANEOUS at 08:17

## 2022-06-13 RX ADMIN — PANTOPRAZOLE SODIUM 40 MG: 40 TABLET, DELAYED RELEASE ORAL at 11:54

## 2022-06-13 RX ADMIN — DULOXETINE HYDROCHLORIDE 60 MG: 30 CAPSULE, DELAYED RELEASE ORAL at 11:54

## 2022-06-13 RX ADMIN — HEPARIN SODIUM 5000 UNITS: 5000 INJECTION INTRAVENOUS; SUBCUTANEOUS at 00:00

## 2022-06-13 RX ADMIN — METOPROLOL SUCCINATE 100 MG: 50 TABLET, EXTENDED RELEASE ORAL at 11:54

## 2022-06-13 RX ADMIN — INSULIN GLARGINE 25 UNITS: 100 INJECTION, SOLUTION SUBCUTANEOUS at 20:41

## 2022-06-13 RX ADMIN — SODIUM CHLORIDE 100 ML/HR: 0.9 INJECTION, SOLUTION INTRAVENOUS at 00:29

## 2022-06-13 RX ADMIN — Medication 10 ML: at 00:01

## 2022-06-13 RX ADMIN — LEVOTHYROXINE SODIUM 100 MCG: 0.1 TABLET ORAL at 11:54

## 2022-06-13 RX ADMIN — Medication 10 ML: at 08:17

## 2022-06-13 RX ADMIN — CEFEPIME HYDROCHLORIDE 2 G: 2 INJECTION, POWDER, FOR SOLUTION INTRAVENOUS at 20:33

## 2022-06-13 RX ADMIN — THERA TABS 1 TABLET: TAB at 08:17

## 2022-06-13 RX ADMIN — CYCLOBENZAPRINE 5 MG: 5 TABLET, FILM COATED ORAL at 20:06

## 2022-06-13 RX ADMIN — INSULIN LISPRO 5 UNITS: 100 INJECTION, SOLUTION INTRAVENOUS; SUBCUTANEOUS at 08:17

## 2022-06-13 RX ADMIN — CEFAZOLIN 2 G: 2 INJECTION, POWDER, FOR SOLUTION INTRAMUSCULAR; INTRAVENOUS at 08:17

## 2022-06-13 RX ADMIN — TRAMADOL HYDROCHLORIDE 50 MG: 50 TABLET, COATED ORAL at 19:02

## 2022-06-13 RX ADMIN — BUPROPION HYDROCHLORIDE 150 MG: 150 TABLET, EXTENDED RELEASE ORAL at 11:54

## 2022-06-13 RX ADMIN — PREGABALIN 75 MG: 75 CAPSULE ORAL at 20:32

## 2022-06-13 RX ADMIN — ASPIRIN 81 MG: 81 TABLET, COATED ORAL at 11:54

## 2022-06-13 RX ADMIN — ATORVASTATIN CALCIUM 80 MG: 40 TABLET, FILM COATED ORAL at 20:32

## 2022-06-13 RX ADMIN — Medication 10 ML: at 20:33

## 2022-06-14 LAB
ALBUMIN SERPL-MCNC: 3 G/DL (ref 3.5–5.2)
ALBUMIN/GLOB SERPL: 1.3 G/DL
ALP SERPL-CCNC: 88 U/L (ref 39–117)
ALT SERPL W P-5'-P-CCNC: 7 U/L (ref 1–41)
ANION GAP SERPL CALCULATED.3IONS-SCNC: 9 MMOL/L (ref 5–15)
AST SERPL-CCNC: 11 U/L (ref 1–40)
BASOPHILS # BLD AUTO: 0.1 10*3/MM3 (ref 0–0.2)
BASOPHILS NFR BLD AUTO: 0.7 % (ref 0–1.5)
BILIRUB SERPL-MCNC: 0.9 MG/DL (ref 0–1.2)
BUN SERPL-MCNC: 19 MG/DL (ref 6–20)
BUN/CREAT SERPL: 13.5 (ref 7–25)
CALCIUM SPEC-SCNC: 8.4 MG/DL (ref 8.6–10.5)
CHLORIDE SERPL-SCNC: 104 MMOL/L (ref 98–107)
CO2 SERPL-SCNC: 23 MMOL/L (ref 22–29)
CREAT SERPL-MCNC: 1.41 MG/DL (ref 0.76–1.27)
DEPRECATED RDW RBC AUTO: 48.1 FL (ref 37–54)
EGFRCR SERPLBLD CKD-EPI 2021: 57.8 ML/MIN/1.73
EOSINOPHIL # BLD AUTO: 0.3 10*3/MM3 (ref 0–0.4)
EOSINOPHIL NFR BLD AUTO: 3 % (ref 0.3–6.2)
ERYTHROCYTE [DISTWIDTH] IN BLOOD BY AUTOMATED COUNT: 19.3 % (ref 12.3–15.4)
GLOBULIN UR ELPH-MCNC: 2.4 GM/DL
GLUCOSE BLDC GLUCOMTR-MCNC: 111 MG/DL (ref 70–105)
GLUCOSE BLDC GLUCOMTR-MCNC: 143 MG/DL (ref 70–105)
GLUCOSE BLDC GLUCOMTR-MCNC: 178 MG/DL (ref 70–105)
GLUCOSE BLDC GLUCOMTR-MCNC: 214 MG/DL (ref 70–105)
GLUCOSE BLDC GLUCOMTR-MCNC: 69 MG/DL (ref 70–105)
GLUCOSE BLDC GLUCOMTR-MCNC: 84 MG/DL (ref 70–105)
GLUCOSE SERPL-MCNC: 148 MG/DL (ref 65–99)
HCT VFR BLD AUTO: 32.6 % (ref 37.5–51)
HGB BLD-MCNC: 10.2 G/DL (ref 13–17.7)
LYMPHOCYTES # BLD AUTO: 1.1 10*3/MM3 (ref 0.7–3.1)
LYMPHOCYTES NFR BLD AUTO: 11.8 % (ref 19.6–45.3)
MCH RBC QN AUTO: 22.3 PG (ref 26.6–33)
MCHC RBC AUTO-ENTMCNC: 31.4 G/DL (ref 31.5–35.7)
MCV RBC AUTO: 71 FL (ref 79–97)
MONOCYTES # BLD AUTO: 0.9 10*3/MM3 (ref 0.1–0.9)
MONOCYTES NFR BLD AUTO: 10 % (ref 5–12)
NEUTROPHILS NFR BLD AUTO: 6.7 10*3/MM3 (ref 1.7–7)
NEUTROPHILS NFR BLD AUTO: 74.5 % (ref 42.7–76)
NRBC BLD AUTO-RTO: 0 /100 WBC (ref 0–0.2)
PLATELET # BLD AUTO: 241 10*3/MM3 (ref 140–450)
PMV BLD AUTO: 8.1 FL (ref 6–12)
POTASSIUM SERPL-SCNC: 4.2 MMOL/L (ref 3.5–5.2)
PROT SERPL-MCNC: 5.4 G/DL (ref 6–8.5)
RBC # BLD AUTO: 4.59 10*6/MM3 (ref 4.14–5.8)
SODIUM SERPL-SCNC: 136 MMOL/L (ref 136–145)
VANCOMYCIN SERPL-MCNC: 10.1 MCG/ML (ref 5–40)
WBC NRBC COR # BLD: 9 10*3/MM3 (ref 3.4–10.8)

## 2022-06-14 PROCEDURE — 82962 GLUCOSE BLOOD TEST: CPT

## 2022-06-14 PROCEDURE — 63710000001 INSULIN LISPRO (HUMAN) PER 5 UNITS: Performed by: INTERNAL MEDICINE

## 2022-06-14 PROCEDURE — 25010000002 CEFEPIME PER 500 MG: Performed by: INTERNAL MEDICINE

## 2022-06-14 PROCEDURE — 99232 SBSQ HOSP IP/OBS MODERATE 35: CPT | Performed by: INTERNAL MEDICINE

## 2022-06-14 PROCEDURE — 80053 COMPREHEN METABOLIC PANEL: CPT | Performed by: INTERNAL MEDICINE

## 2022-06-14 PROCEDURE — 80202 ASSAY OF VANCOMYCIN: CPT | Performed by: INTERNAL MEDICINE

## 2022-06-14 PROCEDURE — 85025 COMPLETE CBC W/AUTO DIFF WBC: CPT | Performed by: INTERNAL MEDICINE

## 2022-06-14 PROCEDURE — 63710000001 INSULIN GLARGINE PER 5 UNITS: Performed by: INTERNAL MEDICINE

## 2022-06-14 PROCEDURE — 25010000002 HEPARIN (PORCINE) PER 1000 UNITS: Performed by: INTERNAL MEDICINE

## 2022-06-14 PROCEDURE — 94799 UNLISTED PULMONARY SVC/PX: CPT

## 2022-06-14 PROCEDURE — 94660 CPAP INITIATION&MGMT: CPT

## 2022-06-14 PROCEDURE — 25010000002 VANCOMYCIN HCL 1.25 G RECONSTITUTED SOLUTION 1 EACH VIAL: Performed by: INTERNAL MEDICINE

## 2022-06-14 RX ADMIN — HEPARIN SODIUM 5000 UNITS: 5000 INJECTION INTRAVENOUS; SUBCUTANEOUS at 08:41

## 2022-06-14 RX ADMIN — DULOXETINE HYDROCHLORIDE 60 MG: 30 CAPSULE, DELAYED RELEASE ORAL at 06:18

## 2022-06-14 RX ADMIN — THERA TABS 1 TABLET: TAB at 08:41

## 2022-06-14 RX ADMIN — INSULIN LISPRO 15 UNITS: 100 INJECTION, SOLUTION INTRAVENOUS; SUBCUTANEOUS at 08:42

## 2022-06-14 RX ADMIN — CEFEPIME HYDROCHLORIDE 2 G: 2 INJECTION, POWDER, FOR SOLUTION INTRAVENOUS at 08:42

## 2022-06-14 RX ADMIN — Medication 10 ML: at 08:42

## 2022-06-14 RX ADMIN — INSULIN LISPRO 5 UNITS: 100 INJECTION, SOLUTION INTRAVENOUS; SUBCUTANEOUS at 20:18

## 2022-06-14 RX ADMIN — INSULIN LISPRO 15 UNITS: 100 INJECTION, SOLUTION INTRAVENOUS; SUBCUTANEOUS at 12:17

## 2022-06-14 RX ADMIN — LEVOTHYROXINE SODIUM 100 MCG: 0.1 TABLET ORAL at 06:18

## 2022-06-14 RX ADMIN — VANCOMYCIN HYDROCHLORIDE 1250 MG: 1.25 INJECTION, POWDER, LYOPHILIZED, FOR SOLUTION INTRAVENOUS at 14:25

## 2022-06-14 RX ADMIN — BUSPIRONE HYDROCHLORIDE 10 MG: 5 TABLET ORAL at 20:06

## 2022-06-14 RX ADMIN — BUSPIRONE HYDROCHLORIDE 10 MG: 5 TABLET ORAL at 08:41

## 2022-06-14 RX ADMIN — Medication 10 ML: at 20:06

## 2022-06-14 RX ADMIN — SODIUM CHLORIDE 75 ML/HR: 9 INJECTION, SOLUTION INTRAVENOUS at 14:25

## 2022-06-14 RX ADMIN — INSULIN GLARGINE 25 UNITS: 100 INJECTION, SOLUTION SUBCUTANEOUS at 20:18

## 2022-06-14 RX ADMIN — ASPIRIN 81 MG: 81 TABLET, COATED ORAL at 08:41

## 2022-06-14 RX ADMIN — CEFEPIME HYDROCHLORIDE 2 G: 2 INJECTION, POWDER, FOR SOLUTION INTRAVENOUS at 20:05

## 2022-06-14 RX ADMIN — TRAMADOL HYDROCHLORIDE 50 MG: 50 TABLET, COATED ORAL at 20:05

## 2022-06-14 RX ADMIN — ATORVASTATIN CALCIUM 80 MG: 40 TABLET, FILM COATED ORAL at 20:18

## 2022-06-14 RX ADMIN — PANTOPRAZOLE SODIUM 40 MG: 40 TABLET, DELAYED RELEASE ORAL at 06:18

## 2022-06-14 RX ADMIN — BUPROPION HYDROCHLORIDE 150 MG: 150 TABLET, EXTENDED RELEASE ORAL at 06:18

## 2022-06-14 RX ADMIN — METOPROLOL SUCCINATE 100 MG: 50 TABLET, EXTENDED RELEASE ORAL at 08:41

## 2022-06-14 RX ADMIN — PREGABALIN 75 MG: 75 CAPSULE ORAL at 20:05

## 2022-06-14 RX ADMIN — PREGABALIN 75 MG: 75 CAPSULE ORAL at 08:41

## 2022-06-14 RX ADMIN — CYCLOBENZAPRINE 5 MG: 5 TABLET, FILM COATED ORAL at 20:06

## 2022-06-14 RX ADMIN — HEPARIN SODIUM 5000 UNITS: 5000 INJECTION INTRAVENOUS; SUBCUTANEOUS at 20:05

## 2022-06-15 ENCOUNTER — ANESTHESIA EVENT (OUTPATIENT)
Dept: PERIOP | Facility: HOSPITAL | Age: 57
End: 2022-06-15

## 2022-06-15 ENCOUNTER — ANESTHESIA (OUTPATIENT)
Dept: PERIOP | Facility: HOSPITAL | Age: 57
End: 2022-06-15

## 2022-06-15 LAB
ALBUMIN SERPL-MCNC: 3.2 G/DL (ref 3.5–5.2)
ALBUMIN/GLOB SERPL: 1.1 G/DL
ALP SERPL-CCNC: 99 U/L (ref 39–117)
ALT SERPL W P-5'-P-CCNC: 14 U/L (ref 1–41)
ANION GAP SERPL CALCULATED.3IONS-SCNC: 14 MMOL/L (ref 5–15)
ANISOCYTOSIS BLD QL: NORMAL
AST SERPL-CCNC: 23 U/L (ref 1–40)
BASOPHILS # BLD AUTO: 0.1 10*3/MM3 (ref 0–0.2)
BASOPHILS NFR BLD AUTO: 1.1 % (ref 0–1.5)
BILIRUB SERPL-MCNC: 0.7 MG/DL (ref 0–1.2)
BUN SERPL-MCNC: 18 MG/DL (ref 6–20)
BUN/CREAT SERPL: 13.5 (ref 7–25)
CALCIUM SPEC-SCNC: 8.6 MG/DL (ref 8.6–10.5)
CHLORIDE SERPL-SCNC: 104 MMOL/L (ref 98–107)
CO2 SERPL-SCNC: 20 MMOL/L (ref 22–29)
CREAT SERPL-MCNC: 1.33 MG/DL (ref 0.76–1.27)
DEPRECATED RDW RBC AUTO: 49 FL (ref 37–54)
EGFRCR SERPLBLD CKD-EPI 2021: 62 ML/MIN/1.73
ELLIPTOCYTES BLD QL SMEAR: NORMAL
EOSINOPHIL # BLD AUTO: 0.5 10*3/MM3 (ref 0–0.4)
EOSINOPHIL NFR BLD AUTO: 5.8 % (ref 0.3–6.2)
ERYTHROCYTE [DISTWIDTH] IN BLOOD BY AUTOMATED COUNT: 19.4 % (ref 12.3–15.4)
GLOBULIN UR ELPH-MCNC: 2.8 GM/DL
GLUCOSE BLDC GLUCOMTR-MCNC: 208 MG/DL (ref 70–105)
GLUCOSE BLDC GLUCOMTR-MCNC: 73 MG/DL (ref 70–105)
GLUCOSE BLDC GLUCOMTR-MCNC: 77 MG/DL (ref 70–105)
GLUCOSE BLDC GLUCOMTR-MCNC: 86 MG/DL (ref 70–105)
GLUCOSE BLDC GLUCOMTR-MCNC: 92 MG/DL (ref 70–105)
GLUCOSE SERPL-MCNC: 95 MG/DL (ref 65–99)
HCT VFR BLD AUTO: 31.6 % (ref 37.5–51)
HGB BLD-MCNC: 10 G/DL (ref 13–17.7)
LYMPHOCYTES # BLD AUTO: 1.3 10*3/MM3 (ref 0.7–3.1)
LYMPHOCYTES NFR BLD AUTO: 16.6 % (ref 19.6–45.3)
MCH RBC QN AUTO: 22.5 PG (ref 26.6–33)
MCHC RBC AUTO-ENTMCNC: 31.7 G/DL (ref 31.5–35.7)
MCV RBC AUTO: 71.1 FL (ref 79–97)
MONOCYTES # BLD AUTO: 0.7 10*3/MM3 (ref 0.1–0.9)
MONOCYTES NFR BLD AUTO: 8.7 % (ref 5–12)
NEUTROPHILS NFR BLD AUTO: 5.5 10*3/MM3 (ref 1.7–7)
NEUTROPHILS NFR BLD AUTO: 67.8 % (ref 42.7–76)
NRBC BLD AUTO-RTO: 0.2 /100 WBC (ref 0–0.2)
PLATELET # BLD AUTO: 246 10*3/MM3 (ref 140–450)
PMV BLD AUTO: 8.3 FL (ref 6–12)
POIKILOCYTOSIS BLD QL SMEAR: NORMAL
POTASSIUM SERPL-SCNC: 4.1 MMOL/L (ref 3.5–5.2)
PROT SERPL-MCNC: 6 G/DL (ref 6–8.5)
RBC # BLD AUTO: 4.45 10*6/MM3 (ref 4.14–5.8)
SMALL PLATELETS BLD QL SMEAR: ADEQUATE
SODIUM SERPL-SCNC: 138 MMOL/L (ref 136–145)
VANCOMYCIN TROUGH SERPL-MCNC: 8.9 MCG/ML (ref 5–20)
WBC MORPH BLD: NORMAL
WBC NRBC COR # BLD: 8.1 10*3/MM3 (ref 3.4–10.8)

## 2022-06-15 PROCEDURE — 85007 BL SMEAR W/DIFF WBC COUNT: CPT | Performed by: INTERNAL MEDICINE

## 2022-06-15 PROCEDURE — 25010000002 CEFEPIME PER 500 MG: Performed by: ORTHOPAEDIC SURGERY

## 2022-06-15 PROCEDURE — 94799 UNLISTED PULMONARY SVC/PX: CPT

## 2022-06-15 PROCEDURE — 80053 COMPREHEN METABOLIC PANEL: CPT | Performed by: INTERNAL MEDICINE

## 2022-06-15 PROCEDURE — 25010000002 CEFEPIME PER 500 MG: Performed by: INTERNAL MEDICINE

## 2022-06-15 PROCEDURE — 80202 ASSAY OF VANCOMYCIN: CPT | Performed by: INTERNAL MEDICINE

## 2022-06-15 PROCEDURE — 82962 GLUCOSE BLOOD TEST: CPT

## 2022-06-15 PROCEDURE — 0JBN0ZZ EXCISION OF RIGHT LOWER LEG SUBCUTANEOUS TISSUE AND FASCIA, OPEN APPROACH: ICD-10-PCS | Performed by: ORTHOPAEDIC SURGERY

## 2022-06-15 PROCEDURE — 25010000002 FENTANYL CITRATE (PF) 100 MCG/2ML SOLUTION: Performed by: ANESTHESIOLOGIST ASSISTANT

## 2022-06-15 PROCEDURE — 25010000002 PROPOFOL 10 MG/ML EMULSION: Performed by: ANESTHESIOLOGIST ASSISTANT

## 2022-06-15 PROCEDURE — 97162 PT EVAL MOD COMPLEX 30 MIN: CPT

## 2022-06-15 PROCEDURE — 25010000002 VANCOMYCIN 1 G RECONSTITUTED SOLUTION 1 EACH VIAL: Performed by: ORTHOPAEDIC SURGERY

## 2022-06-15 PROCEDURE — 63710000001 INSULIN GLARGINE PER 5 UNITS: Performed by: ORTHOPAEDIC SURGERY

## 2022-06-15 PROCEDURE — 85025 COMPLETE CBC W/AUTO DIFF WBC: CPT | Performed by: INTERNAL MEDICINE

## 2022-06-15 PROCEDURE — 87205 SMEAR GRAM STAIN: CPT | Performed by: ORTHOPAEDIC SURGERY

## 2022-06-15 PROCEDURE — 99232 SBSQ HOSP IP/OBS MODERATE 35: CPT | Performed by: INTERNAL MEDICINE

## 2022-06-15 PROCEDURE — 87070 CULTURE OTHR SPECIMN AEROBIC: CPT | Performed by: ORTHOPAEDIC SURGERY

## 2022-06-15 PROCEDURE — 63710000001 INSULIN LISPRO (HUMAN) PER 5 UNITS: Performed by: ORTHOPAEDIC SURGERY

## 2022-06-15 RX ORDER — ONDANSETRON 2 MG/ML
4 INJECTION INTRAMUSCULAR; INTRAVENOUS ONCE AS NEEDED
Status: DISCONTINUED | OUTPATIENT
Start: 2022-06-15 | End: 2022-06-15

## 2022-06-15 RX ORDER — NALOXONE HCL 0.4 MG/ML
0.4 VIAL (ML) INJECTION AS NEEDED
Status: DISCONTINUED | OUTPATIENT
Start: 2022-06-15 | End: 2022-06-15

## 2022-06-15 RX ORDER — OXYCODONE HYDROCHLORIDE 5 MG/1
10 TABLET ORAL EVERY 4 HOURS PRN
Status: DISCONTINUED | OUTPATIENT
Start: 2022-06-15 | End: 2022-06-20 | Stop reason: HOSPADM

## 2022-06-15 RX ORDER — DIPHENHYDRAMINE HYDROCHLORIDE 50 MG/ML
12.5 INJECTION INTRAMUSCULAR; INTRAVENOUS
Status: DISCONTINUED | OUTPATIENT
Start: 2022-06-15 | End: 2022-06-15

## 2022-06-15 RX ORDER — FENTANYL CITRATE 50 UG/ML
50 INJECTION, SOLUTION INTRAMUSCULAR; INTRAVENOUS
Status: DISCONTINUED | OUTPATIENT
Start: 2022-06-15 | End: 2022-06-15

## 2022-06-15 RX ORDER — ONDANSETRON 4 MG/1
4 TABLET, FILM COATED ORAL EVERY 6 HOURS PRN
Status: DISCONTINUED | OUTPATIENT
Start: 2022-06-15 | End: 2022-06-20 | Stop reason: HOSPADM

## 2022-06-15 RX ORDER — FLUMAZENIL 0.1 MG/ML
0.2 INJECTION INTRAVENOUS AS NEEDED
Status: DISCONTINUED | OUTPATIENT
Start: 2022-06-15 | End: 2022-06-15

## 2022-06-15 RX ORDER — HYDROMORPHONE HCL 110MG/55ML
1 PATIENT CONTROLLED ANALGESIA SYRINGE INTRAVENOUS
Status: DISCONTINUED | OUTPATIENT
Start: 2022-06-15 | End: 2022-06-20 | Stop reason: HOSPADM

## 2022-06-15 RX ORDER — ENOXAPARIN SODIUM 100 MG/ML
30 INJECTION SUBCUTANEOUS DAILY
Status: DISCONTINUED | OUTPATIENT
Start: 2022-06-17 | End: 2022-06-20

## 2022-06-15 RX ORDER — ONDANSETRON 2 MG/ML
4 INJECTION INTRAMUSCULAR; INTRAVENOUS EVERY 6 HOURS PRN
Status: DISCONTINUED | OUTPATIENT
Start: 2022-06-15 | End: 2022-06-20 | Stop reason: HOSPADM

## 2022-06-15 RX ORDER — LIDOCAINE HYDROCHLORIDE 10 MG/ML
INJECTION, SOLUTION EPIDURAL; INFILTRATION; INTRACAUDAL; PERINEURAL AS NEEDED
Status: DISCONTINUED | OUTPATIENT
Start: 2022-06-15 | End: 2022-06-15 | Stop reason: SURG

## 2022-06-15 RX ORDER — DIAZEPAM 5 MG/1
5 TABLET ORAL EVERY 6 HOURS PRN
Status: DISCONTINUED | OUTPATIENT
Start: 2022-06-15 | End: 2022-06-20 | Stop reason: HOSPADM

## 2022-06-15 RX ORDER — PROPOFOL 10 MG/ML
VIAL (ML) INTRAVENOUS AS NEEDED
Status: DISCONTINUED | OUTPATIENT
Start: 2022-06-15 | End: 2022-06-15 | Stop reason: SURG

## 2022-06-15 RX ORDER — NALOXONE HCL 0.4 MG/ML
0.4 VIAL (ML) INJECTION
Status: DISCONTINUED | OUTPATIENT
Start: 2022-06-15 | End: 2022-06-20 | Stop reason: HOSPADM

## 2022-06-15 RX ORDER — HYDROCODONE BITARTRATE AND ACETAMINOPHEN 5; 325 MG/1; MG/1
1 TABLET ORAL EVERY 4 HOURS PRN
Status: DISCONTINUED | OUTPATIENT
Start: 2022-06-15 | End: 2022-06-20 | Stop reason: HOSPADM

## 2022-06-15 RX ORDER — FENTANYL CITRATE 50 UG/ML
INJECTION, SOLUTION INTRAMUSCULAR; INTRAVENOUS AS NEEDED
Status: DISCONTINUED | OUTPATIENT
Start: 2022-06-15 | End: 2022-06-15 | Stop reason: SURG

## 2022-06-15 RX ORDER — DIPHENHYDRAMINE HCL 25 MG
25 CAPSULE ORAL EVERY 6 HOURS PRN
Status: DISCONTINUED | OUTPATIENT
Start: 2022-06-15 | End: 2022-06-20 | Stop reason: HOSPADM

## 2022-06-15 RX ADMIN — INSULIN GLARGINE 25 UNITS: 100 INJECTION, SOLUTION SUBCUTANEOUS at 21:55

## 2022-06-15 RX ADMIN — PREGABALIN 75 MG: 75 CAPSULE ORAL at 21:44

## 2022-06-15 RX ADMIN — DIAZEPAM 5 MG: 5 TABLET ORAL at 21:50

## 2022-06-15 RX ADMIN — FENTANYL CITRATE 100 MCG: 50 INJECTION, SOLUTION INTRAMUSCULAR; INTRAVENOUS at 12:00

## 2022-06-15 RX ADMIN — Medication 10 ML: at 09:36

## 2022-06-15 RX ADMIN — CEFEPIME HYDROCHLORIDE 2 G: 2 INJECTION, POWDER, FOR SOLUTION INTRAVENOUS at 12:06

## 2022-06-15 RX ADMIN — CEFEPIME HYDROCHLORIDE 2 G: 2 INJECTION, POWDER, FOR SOLUTION INTRAVENOUS at 21:44

## 2022-06-15 RX ADMIN — ATORVASTATIN CALCIUM 80 MG: 40 TABLET, FILM COATED ORAL at 21:44

## 2022-06-15 RX ADMIN — Medication 10 ML: at 21:45

## 2022-06-15 RX ADMIN — CEFEPIME HYDROCHLORIDE 2 G: 2 INJECTION, POWDER, FOR SOLUTION INTRAVENOUS at 09:39

## 2022-06-15 RX ADMIN — BUSPIRONE HYDROCHLORIDE 10 MG: 5 TABLET ORAL at 21:44

## 2022-06-15 RX ADMIN — OXYCODONE 10 MG: 5 TABLET ORAL at 21:50

## 2022-06-15 RX ADMIN — VANCOMYCIN HYDROCHLORIDE 1000 MG: 1 INJECTION, POWDER, LYOPHILIZED, FOR SOLUTION INTRAVENOUS at 14:10

## 2022-06-15 RX ADMIN — LIDOCAINE HYDROCHLORIDE 50 MG: 10 INJECTION, SOLUTION EPIDURAL; INFILTRATION; INTRACAUDAL; PERINEURAL at 12:00

## 2022-06-15 RX ADMIN — PROPOFOL 180 MG: 10 INJECTION, EMULSION INTRAVENOUS at 12:00

## 2022-06-15 RX ADMIN — INSULIN LISPRO 5 UNITS: 100 INJECTION, SOLUTION INTRAVENOUS; SUBCUTANEOUS at 21:45

## 2022-06-15 NOTE — ANESTHESIA PREPROCEDURE EVALUATION
Anesthesia Evaluation     Patient summary reviewed and Nursing notes reviewed   no history of anesthetic complications:  NPO Solid Status: > 8 hours  NPO Liquid Status: > 8 hours           Airway   Dental      Pulmonary    (+) pulmonary embolism, sleep apnea,   Cardiovascular     ECG reviewed    (+) hypertension, valvular problems/murmurs murmur, CAD, PVD, DVT, hyperlipidemia,       Neuro/Psych  (+) headaches, numbness, psychiatric history Depression,    GI/Hepatic/Renal/Endo    (+) obesity,  hiatal hernia, GERD,  renal disease CRI, diabetes mellitus, thyroid problem hypothyroidism    Musculoskeletal     Abdominal    Substance History      OB/GYN          Other   arthritis, blood dyscrasia anemia,     ROS/Med Hx Other: Chief Complaint:right leg pain     History of Present Illness:  Kuldeep Adhikari is a 57 y.o. male w/PMH of CAD, CKD, GERD, HLD, hypothyroidism, PVD, DM, HTN, DJD, depression, osteomyelitis, PE, CVA, s/p RT BKA who presents to [facility] ED due to right leg pain.      Echo 3/2022    Normal LV size and contractility EF of 60 to 65%  Normal RV size  Mild left atrial enlargement seen  Aortic valve appears structurally normal  Mitral valve leaflets are thickened anterior mitral leaflet appears calcified, but has good cusp separation.   No significant MR seen.  Tricuspid valve appears structurally normal, no significant regurgitation seen.  No pericardial effusion seen.  Proximal aorta appears normal in size.    PSH  TOTAL HIP ARTHROPLASTY TOTAL HIP ARTHROPLASTY  ENDOSCOPY ENDOSCOPY  HERNIA REPAIR CARDIAC CATHETERIZATION  AMPUTATION FOOT / TOE GANGLION CYST EXCISION  RETINOPATHY SURGERY ENDOSCOPY  TRANS METATARSAL AMPUTATION BELOW KNEE AMPUTATION  AMPUTATION REVISION AMPUTATION REVISION  ENDOSCOPY EYE SURGERY  JOINT REPLACEMENT VASECTOMY  COLONOSCOPY                     Anesthesia Plan    ASA 4     general     (Patient identified; pre-operative vital signs, all relevant labs/studies, complete  medical/surgical/anesthetic history, full medication list, full allergy list, and NPO status obtained/reviewed; physical assessment performed; anesthetic options, side effects, potential complications, risks, and benefits discussed; questions answered; written anesthesia consent obtained; patient cleared for procedure; anesthesia machine and equipment checked and functioning)  intravenous induction     Anesthetic plan, risks, benefits, and alternatives have been provided, discussed and informed consent has been obtained with: patient.    Plan discussed with CRNA and CAA.        CODE STATUS:

## 2022-06-15 NOTE — ANESTHESIA PROCEDURE NOTES
Airway  Urgency: elective    Date/Time: 6/15/2022 12:03 PM  Airway not difficult    General Information and Staff    Patient location during procedure: OR    Indications and Patient Condition  Indications for airway management: airway protection    Preoxygenated: yes  MILS maintained throughout  Mask difficulty assessment: 0 - not attempted    Final Airway Details  Final airway type: supraglottic airway      Successful airway: LMA  Size 4    Number of attempts at approach: 1  Assessment: lips, teeth, and gum same as pre-op

## 2022-06-15 NOTE — ANESTHESIA POSTPROCEDURE EVALUATION
Patient: Kuldeep Adhikari    Procedure Summary     Date: 06/15/22 Room / Location: [facility] OR  / [facility] MAIN OR    Anesthesia Start: 1153 Anesthesia Stop: 1255    Procedure: INCISION AND DRAINAGE WOUND with debridement (Right Leg Lower) Diagnosis:       Cellulitis of right lower extremity      (Cellulitis of right lower extremity [L03.115])    Surgeons: Fito Forte MD Provider: Zamzam Almaguer MD    Anesthesia Type: general ASA Status: 4          Anesthesia Type: general    Vitals  Vitals Value Taken Time   /65 06/15/22 1258   Temp     Pulse 62 06/15/22 1301   Resp     SpO2 100 % 06/15/22 1301   Vitals shown include unvalidated device data.        Post Anesthesia Care and Evaluation    Patient location during evaluation: PACU  Patient participation: complete - patient participated  Level of consciousness: awake  Pain management: adequate    Airway patency: patent  Anesthetic complications: No anesthetic complications  PONV Status: none  Cardiovascular status: acceptable  Respiratory status: acceptable  Hydration status: acceptable    Comments: Patient seen and examined postoperatively; vital signs stable; SpO2 greater than or equal to 90%; cardiopulmonary status stable; nausea/vomiting adequately controlled; pain adequately controlled; no apparent anesthesia complications; patient discharged from anesthesia care when discharge criteria were met

## 2022-06-16 ENCOUNTER — APPOINTMENT (OUTPATIENT)
Dept: MRI IMAGING | Facility: HOSPITAL | Age: 57
End: 2022-06-16

## 2022-06-16 ENCOUNTER — APPOINTMENT (OUTPATIENT)
Dept: GENERAL RADIOLOGY | Facility: HOSPITAL | Age: 57
End: 2022-06-16

## 2022-06-16 LAB
ANION GAP SERPL CALCULATED.3IONS-SCNC: 12 MMOL/L (ref 5–15)
BACTERIA SPEC AEROBE CULT: ABNORMAL
BUN SERPL-MCNC: 17 MG/DL (ref 6–20)
BUN/CREAT SERPL: 14.4 (ref 7–25)
CALCIUM SPEC-SCNC: 8.3 MG/DL (ref 8.6–10.5)
CHLORIDE SERPL-SCNC: 106 MMOL/L (ref 98–107)
CO2 SERPL-SCNC: 20 MMOL/L (ref 22–29)
CREAT SERPL-MCNC: 1.18 MG/DL (ref 0.76–1.27)
EGFRCR SERPLBLD CKD-EPI 2021: 71.5 ML/MIN/1.73
GLUCOSE BLDC GLUCOMTR-MCNC: 142 MG/DL (ref 70–105)
GLUCOSE BLDC GLUCOMTR-MCNC: 168 MG/DL (ref 70–105)
GLUCOSE BLDC GLUCOMTR-MCNC: 227 MG/DL (ref 70–105)
GLUCOSE BLDC GLUCOMTR-MCNC: 262 MG/DL (ref 70–105)
GLUCOSE SERPL-MCNC: 180 MG/DL (ref 65–99)
GRAM STN SPEC: ABNORMAL
HCT VFR BLD AUTO: 31.3 % (ref 37.5–51)
HGB BLD-MCNC: 9.7 G/DL (ref 13–17.7)
POTASSIUM SERPL-SCNC: 4.5 MMOL/L (ref 3.5–5.2)
SODIUM SERPL-SCNC: 138 MMOL/L (ref 136–145)

## 2022-06-16 PROCEDURE — 63710000001 DIPHENHYDRAMINE PER 50 MG: Performed by: ORTHOPAEDIC SURGERY

## 2022-06-16 PROCEDURE — 82962 GLUCOSE BLOOD TEST: CPT

## 2022-06-16 PROCEDURE — 72040 X-RAY EXAM NECK SPINE 2-3 VW: CPT

## 2022-06-16 PROCEDURE — 94660 CPAP INITIATION&MGMT: CPT

## 2022-06-16 PROCEDURE — 80048 BASIC METABOLIC PNL TOTAL CA: CPT | Performed by: ORTHOPAEDIC SURGERY

## 2022-06-16 PROCEDURE — 85014 HEMATOCRIT: CPT | Performed by: ORTHOPAEDIC SURGERY

## 2022-06-16 PROCEDURE — 72141 MRI NECK SPINE W/O DYE: CPT

## 2022-06-16 PROCEDURE — 94799 UNLISTED PULMONARY SVC/PX: CPT

## 2022-06-16 PROCEDURE — 99232 SBSQ HOSP IP/OBS MODERATE 35: CPT | Performed by: INTERNAL MEDICINE

## 2022-06-16 PROCEDURE — 63710000001 INSULIN GLARGINE PER 5 UNITS: Performed by: ORTHOPAEDIC SURGERY

## 2022-06-16 PROCEDURE — 85018 HEMOGLOBIN: CPT | Performed by: ORTHOPAEDIC SURGERY

## 2022-06-16 PROCEDURE — 25010000002 VANCOMYCIN 1 G RECONSTITUTED SOLUTION 1 EACH VIAL: Performed by: ORTHOPAEDIC SURGERY

## 2022-06-16 PROCEDURE — 97165 OT EVAL LOW COMPLEX 30 MIN: CPT

## 2022-06-16 PROCEDURE — 63710000001 INSULIN LISPRO (HUMAN) PER 5 UNITS: Performed by: ORTHOPAEDIC SURGERY

## 2022-06-16 PROCEDURE — 25010000002 CEFEPIME PER 500 MG: Performed by: ORTHOPAEDIC SURGERY

## 2022-06-16 RX ADMIN — BUPROPION HYDROCHLORIDE 150 MG: 150 TABLET, EXTENDED RELEASE ORAL at 08:22

## 2022-06-16 RX ADMIN — DIAZEPAM 5 MG: 5 TABLET ORAL at 10:44

## 2022-06-16 RX ADMIN — LEVOTHYROXINE SODIUM 100 MCG: 0.1 TABLET ORAL at 05:37

## 2022-06-16 RX ADMIN — BUSPIRONE HYDROCHLORIDE 10 MG: 5 TABLET ORAL at 08:22

## 2022-06-16 RX ADMIN — VANCOMYCIN HYDROCHLORIDE 1000 MG: 1 INJECTION, POWDER, LYOPHILIZED, FOR SOLUTION INTRAVENOUS at 04:31

## 2022-06-16 RX ADMIN — OXYCODONE 10 MG: 5 TABLET ORAL at 21:05

## 2022-06-16 RX ADMIN — DIAZEPAM 5 MG: 5 TABLET ORAL at 04:39

## 2022-06-16 RX ADMIN — VANCOMYCIN HYDROCHLORIDE 1000 MG: 1 INJECTION, POWDER, LYOPHILIZED, FOR SOLUTION INTRAVENOUS at 15:52

## 2022-06-16 RX ADMIN — INSULIN LISPRO 5 UNITS: 100 INJECTION, SOLUTION INTRAVENOUS; SUBCUTANEOUS at 21:05

## 2022-06-16 RX ADMIN — PREGABALIN 75 MG: 75 CAPSULE ORAL at 08:22

## 2022-06-16 RX ADMIN — BUSPIRONE HYDROCHLORIDE 10 MG: 5 TABLET ORAL at 21:04

## 2022-06-16 RX ADMIN — CEFEPIME HYDROCHLORIDE 2 G: 2 INJECTION, POWDER, FOR SOLUTION INTRAVENOUS at 08:22

## 2022-06-16 RX ADMIN — THERA TABS 1 TABLET: TAB at 08:22

## 2022-06-16 RX ADMIN — DIPHENHYDRAMINE HYDROCHLORIDE 25 MG: 25 CAPSULE ORAL at 10:44

## 2022-06-16 RX ADMIN — Medication 10 ML: at 08:22

## 2022-06-16 RX ADMIN — INSULIN GLARGINE 25 UNITS: 100 INJECTION, SOLUTION SUBCUTANEOUS at 21:04

## 2022-06-16 RX ADMIN — OXYCODONE 10 MG: 5 TABLET ORAL at 04:39

## 2022-06-16 RX ADMIN — PREGABALIN 75 MG: 75 CAPSULE ORAL at 21:04

## 2022-06-16 RX ADMIN — PANTOPRAZOLE SODIUM 40 MG: 40 TABLET, DELAYED RELEASE ORAL at 08:22

## 2022-06-16 RX ADMIN — INSULIN LISPRO 10 UNITS: 100 INJECTION, SOLUTION INTRAVENOUS; SUBCUTANEOUS at 18:10

## 2022-06-16 RX ADMIN — ATORVASTATIN CALCIUM 80 MG: 40 TABLET, FILM COATED ORAL at 21:04

## 2022-06-16 RX ADMIN — DULOXETINE HYDROCHLORIDE 60 MG: 30 CAPSULE, DELAYED RELEASE ORAL at 08:22

## 2022-06-16 RX ADMIN — Medication 10 ML: at 21:07

## 2022-06-16 RX ADMIN — METOPROLOL SUCCINATE 100 MG: 50 TABLET, EXTENDED RELEASE ORAL at 08:22

## 2022-06-16 RX ADMIN — INSULIN LISPRO 2 UNITS: 100 INJECTION, SOLUTION INTRAVENOUS; SUBCUTANEOUS at 12:57

## 2022-06-17 ENCOUNTER — APPOINTMENT (OUTPATIENT)
Dept: MRI IMAGING | Facility: HOSPITAL | Age: 57
End: 2022-06-17

## 2022-06-17 LAB
ANION GAP SERPL CALCULATED.3IONS-SCNC: 11 MMOL/L (ref 5–15)
BACTERIA SPEC AEROBE CULT: NORMAL
BACTERIA SPEC AEROBE CULT: NORMAL
BASOPHILS # BLD AUTO: 0.1 10*3/MM3 (ref 0–0.2)
BASOPHILS NFR BLD AUTO: 1 % (ref 0–1.5)
BUN SERPL-MCNC: 18 MG/DL (ref 6–20)
BUN/CREAT SERPL: 12.4 (ref 7–25)
CALCIUM SPEC-SCNC: 8.6 MG/DL (ref 8.6–10.5)
CHLORIDE SERPL-SCNC: 103 MMOL/L (ref 98–107)
CO2 SERPL-SCNC: 24 MMOL/L (ref 22–29)
CREAT SERPL-MCNC: 1.45 MG/DL (ref 0.76–1.27)
DEPRECATED RDW RBC AUTO: 47.7 FL (ref 37–54)
EGFRCR SERPLBLD CKD-EPI 2021: 55.9 ML/MIN/1.73
EOSINOPHIL # BLD AUTO: 0.5 10*3/MM3 (ref 0–0.4)
EOSINOPHIL NFR BLD AUTO: 6.7 % (ref 0.3–6.2)
ERYTHROCYTE [DISTWIDTH] IN BLOOD BY AUTOMATED COUNT: 19.1 % (ref 12.3–15.4)
GLUCOSE BLDC GLUCOMTR-MCNC: 135 MG/DL (ref 70–105)
GLUCOSE BLDC GLUCOMTR-MCNC: 136 MG/DL (ref 70–105)
GLUCOSE BLDC GLUCOMTR-MCNC: 238 MG/DL (ref 70–105)
GLUCOSE BLDC GLUCOMTR-MCNC: 256 MG/DL (ref 70–105)
GLUCOSE SERPL-MCNC: 161 MG/DL (ref 65–99)
HCT VFR BLD AUTO: 31.2 % (ref 37.5–51)
HGB BLD-MCNC: 9.8 G/DL (ref 13–17.7)
LYMPHOCYTES # BLD AUTO: 1.5 10*3/MM3 (ref 0.7–3.1)
LYMPHOCYTES NFR BLD AUTO: 22 % (ref 19.6–45.3)
MCH RBC QN AUTO: 22.4 PG (ref 26.6–33)
MCHC RBC AUTO-ENTMCNC: 31.4 G/DL (ref 31.5–35.7)
MCV RBC AUTO: 71.4 FL (ref 79–97)
MONOCYTES # BLD AUTO: 0.8 10*3/MM3 (ref 0.1–0.9)
MONOCYTES NFR BLD AUTO: 10.7 % (ref 5–12)
NEUTROPHILS NFR BLD AUTO: 4.2 10*3/MM3 (ref 1.7–7)
NEUTROPHILS NFR BLD AUTO: 59.6 % (ref 42.7–76)
NRBC BLD AUTO-RTO: 0 /100 WBC (ref 0–0.2)
PLATELET # BLD AUTO: 294 10*3/MM3 (ref 140–450)
PMV BLD AUTO: 7.9 FL (ref 6–12)
POTASSIUM SERPL-SCNC: 4.3 MMOL/L (ref 3.5–5.2)
RBC # BLD AUTO: 4.37 10*6/MM3 (ref 4.14–5.8)
SODIUM SERPL-SCNC: 138 MMOL/L (ref 136–145)
VANCOMYCIN SERPL-MCNC: 20 MCG/ML (ref 5–40)
WBC NRBC COR # BLD: 7 10*3/MM3 (ref 3.4–10.8)

## 2022-06-17 PROCEDURE — 97116 GAIT TRAINING THERAPY: CPT

## 2022-06-17 PROCEDURE — C1751 CATH, INF, PER/CENT/MIDLINE: HCPCS

## 2022-06-17 PROCEDURE — 82962 GLUCOSE BLOOD TEST: CPT

## 2022-06-17 PROCEDURE — 85025 COMPLETE CBC W/AUTO DIFF WBC: CPT | Performed by: NURSE PRACTITIONER

## 2022-06-17 PROCEDURE — 63710000001 DIPHENHYDRAMINE PER 50 MG: Performed by: ORTHOPAEDIC SURGERY

## 2022-06-17 PROCEDURE — 05HD33Z INSERTION OF INFUSION DEVICE INTO RIGHT CEPHALIC VEIN, PERCUTANEOUS APPROACH: ICD-10-PCS | Performed by: INTERNAL MEDICINE

## 2022-06-17 PROCEDURE — 80202 ASSAY OF VANCOMYCIN: CPT | Performed by: INTERNAL MEDICINE

## 2022-06-17 PROCEDURE — 80048 BASIC METABOLIC PNL TOTAL CA: CPT | Performed by: NURSE PRACTITIONER

## 2022-06-17 PROCEDURE — 25010000002 VANCOMYCIN 1 G RECONSTITUTED SOLUTION 1 EACH VIAL: Performed by: INTERNAL MEDICINE

## 2022-06-17 PROCEDURE — 25010000002 VANCOMYCIN 1 G RECONSTITUTED SOLUTION 1 EACH VIAL: Performed by: ORTHOPAEDIC SURGERY

## 2022-06-17 PROCEDURE — 99232 SBSQ HOSP IP/OBS MODERATE 35: CPT | Performed by: INTERNAL MEDICINE

## 2022-06-17 PROCEDURE — 36410 VNPNXR 3YR/> PHY/QHP DX/THER: CPT

## 2022-06-17 PROCEDURE — 63710000001 INSULIN LISPRO (HUMAN) PER 5 UNITS: Performed by: ORTHOPAEDIC SURGERY

## 2022-06-17 PROCEDURE — 97110 THERAPEUTIC EXERCISES: CPT

## 2022-06-17 PROCEDURE — 73221 MRI JOINT UPR EXTREM W/O DYE: CPT

## 2022-06-17 PROCEDURE — 25010000002 ENOXAPARIN PER 10 MG: Performed by: ORTHOPAEDIC SURGERY

## 2022-06-17 PROCEDURE — 25010000002 METHYLPREDNISOLONE PER 40 MG: Performed by: ORTHOPAEDIC SURGERY

## 2022-06-17 PROCEDURE — 63710000001 INSULIN GLARGINE PER 5 UNITS: Performed by: ORTHOPAEDIC SURGERY

## 2022-06-17 PROCEDURE — 05HF33Z INSERTION OF INFUSION DEVICE INTO LEFT CEPHALIC VEIN, PERCUTANEOUS APPROACH: ICD-10-PCS | Performed by: INTERNAL MEDICINE

## 2022-06-17 RX ORDER — INSULIN LISPRO 100 [IU]/ML
0-24 INJECTION, SOLUTION INTRAVENOUS; SUBCUTANEOUS
Status: DISCONTINUED | OUTPATIENT
Start: 2022-06-18 | End: 2022-06-20 | Stop reason: HOSPADM

## 2022-06-17 RX ORDER — INSULIN LISPRO 100 [IU]/ML
0-24 INJECTION, SOLUTION INTRAVENOUS; SUBCUTANEOUS AS NEEDED
Status: DISCONTINUED | OUTPATIENT
Start: 2022-06-17 | End: 2022-06-20 | Stop reason: HOSPADM

## 2022-06-17 RX ORDER — METHYLPREDNISOLONE SODIUM SUCCINATE 40 MG/ML
40 INJECTION, POWDER, LYOPHILIZED, FOR SOLUTION INTRAMUSCULAR; INTRAVENOUS EVERY 8 HOURS
Status: COMPLETED | OUTPATIENT
Start: 2022-06-17 | End: 2022-06-18

## 2022-06-17 RX ADMIN — METOPROLOL SUCCINATE 100 MG: 50 TABLET, EXTENDED RELEASE ORAL at 10:00

## 2022-06-17 RX ADMIN — METHYLPREDNISOLONE SODIUM SUCCINATE 40 MG: 40 INJECTION, POWDER, FOR SOLUTION INTRAMUSCULAR; INTRAVENOUS at 17:54

## 2022-06-17 RX ADMIN — ENOXAPARIN SODIUM 30 MG: 30 INJECTION SUBCUTANEOUS at 10:00

## 2022-06-17 RX ADMIN — DULOXETINE HYDROCHLORIDE 60 MG: 30 CAPSULE, DELAYED RELEASE ORAL at 06:05

## 2022-06-17 RX ADMIN — DIPHENHYDRAMINE HYDROCHLORIDE 25 MG: 25 CAPSULE ORAL at 15:08

## 2022-06-17 RX ADMIN — INSULIN LISPRO 15 UNITS: 100 INJECTION, SOLUTION INTRAVENOUS; SUBCUTANEOUS at 12:00

## 2022-06-17 RX ADMIN — CYCLOBENZAPRINE 5 MG: 5 TABLET, FILM COATED ORAL at 22:25

## 2022-06-17 RX ADMIN — INSULIN GLARGINE 25 UNITS: 100 INJECTION, SOLUTION SUBCUTANEOUS at 21:04

## 2022-06-17 RX ADMIN — DIAZEPAM 5 MG: 5 TABLET ORAL at 15:08

## 2022-06-17 RX ADMIN — INSULIN LISPRO 15 UNITS: 100 INJECTION, SOLUTION INTRAVENOUS; SUBCUTANEOUS at 17:54

## 2022-06-17 RX ADMIN — ATORVASTATIN CALCIUM 80 MG: 40 TABLET, FILM COATED ORAL at 21:04

## 2022-06-17 RX ADMIN — THERA TABS 1 TABLET: TAB at 10:00

## 2022-06-17 RX ADMIN — Medication 10 ML: at 21:04

## 2022-06-17 RX ADMIN — OXYCODONE 10 MG: 5 TABLET ORAL at 22:24

## 2022-06-17 RX ADMIN — VANCOMYCIN HYDROCHLORIDE 1000 MG: 1 INJECTION, POWDER, LYOPHILIZED, FOR SOLUTION INTRAVENOUS at 01:54

## 2022-06-17 RX ADMIN — PREGABALIN 75 MG: 75 CAPSULE ORAL at 10:00

## 2022-06-17 RX ADMIN — BUSPIRONE HYDROCHLORIDE 10 MG: 5 TABLET ORAL at 10:00

## 2022-06-17 RX ADMIN — PREGABALIN 75 MG: 75 CAPSULE ORAL at 21:04

## 2022-06-17 RX ADMIN — INSULIN LISPRO 8 UNITS: 100 INJECTION, SOLUTION INTRAVENOUS; SUBCUTANEOUS at 17:54

## 2022-06-17 RX ADMIN — Medication 10 ML: at 10:00

## 2022-06-17 RX ADMIN — PANTOPRAZOLE SODIUM 40 MG: 40 TABLET, DELAYED RELEASE ORAL at 06:05

## 2022-06-17 RX ADMIN — LEVOTHYROXINE SODIUM 100 MCG: 0.1 TABLET ORAL at 06:05

## 2022-06-17 RX ADMIN — VANCOMYCIN HYDROCHLORIDE 1000 MG: 1 INJECTION, POWDER, LYOPHILIZED, FOR SOLUTION INTRAVENOUS at 21:03

## 2022-06-17 RX ADMIN — HYDROCODONE BITARTRATE AND ACETAMINOPHEN 1 TABLET: 5; 325 TABLET ORAL at 17:54

## 2022-06-17 RX ADMIN — BUPROPION HYDROCHLORIDE 150 MG: 150 TABLET, EXTENDED RELEASE ORAL at 06:05

## 2022-06-17 RX ADMIN — BUSPIRONE HYDROCHLORIDE 10 MG: 5 TABLET ORAL at 21:04

## 2022-06-18 LAB
ANION GAP SERPL CALCULATED.3IONS-SCNC: 13 MMOL/L (ref 5–15)
BASOPHILS # BLD AUTO: 0.1 10*3/MM3 (ref 0–0.2)
BASOPHILS NFR BLD AUTO: 1 % (ref 0–1.5)
BUN SERPL-MCNC: 18 MG/DL (ref 6–20)
BUN/CREAT SERPL: 15.5 (ref 7–25)
CALCIUM SPEC-SCNC: 9.2 MG/DL (ref 8.6–10.5)
CHLORIDE SERPL-SCNC: 104 MMOL/L (ref 98–107)
CO2 SERPL-SCNC: 21 MMOL/L (ref 22–29)
CREAT SERPL-MCNC: 1.16 MG/DL (ref 0.76–1.27)
DEPRECATED RDW RBC AUTO: 48.1 FL (ref 37–54)
EGFRCR SERPLBLD CKD-EPI 2021: 73 ML/MIN/1.73
EOSINOPHIL # BLD AUTO: 0.4 10*3/MM3 (ref 0–0.4)
EOSINOPHIL NFR BLD AUTO: 5 % (ref 0.3–6.2)
ERYTHROCYTE [DISTWIDTH] IN BLOOD BY AUTOMATED COUNT: 19 % (ref 12.3–15.4)
GLUCOSE BLDC GLUCOMTR-MCNC: 207 MG/DL (ref 70–105)
GLUCOSE BLDC GLUCOMTR-MCNC: 212 MG/DL (ref 70–105)
GLUCOSE BLDC GLUCOMTR-MCNC: 237 MG/DL (ref 70–105)
GLUCOSE BLDC GLUCOMTR-MCNC: 267 MG/DL (ref 70–105)
GLUCOSE BLDC GLUCOMTR-MCNC: 271 MG/DL (ref 70–105)
GLUCOSE SERPL-MCNC: 176 MG/DL (ref 65–99)
HCT VFR BLD AUTO: 33.6 % (ref 37.5–51)
HGB BLD-MCNC: 10.4 G/DL (ref 13–17.7)
LYMPHOCYTES # BLD AUTO: 0.9 10*3/MM3 (ref 0.7–3.1)
LYMPHOCYTES NFR BLD AUTO: 13 % (ref 19.6–45.3)
MCH RBC QN AUTO: 22.2 PG (ref 26.6–33)
MCHC RBC AUTO-ENTMCNC: 31.1 G/DL (ref 31.5–35.7)
MCV RBC AUTO: 71.4 FL (ref 79–97)
MONOCYTES # BLD AUTO: 0.4 10*3/MM3 (ref 0.1–0.9)
MONOCYTES NFR BLD AUTO: 5.9 % (ref 5–12)
NEUTROPHILS NFR BLD AUTO: 5.4 10*3/MM3 (ref 1.7–7)
NEUTROPHILS NFR BLD AUTO: 75.1 % (ref 42.7–76)
NRBC BLD AUTO-RTO: 0.1 /100 WBC (ref 0–0.2)
PLATELET # BLD AUTO: 293 10*3/MM3 (ref 140–450)
PMV BLD AUTO: 7.9 FL (ref 6–12)
POTASSIUM SERPL-SCNC: 4.5 MMOL/L (ref 3.5–5.2)
RBC # BLD AUTO: 4.7 10*6/MM3 (ref 4.14–5.8)
SODIUM SERPL-SCNC: 138 MMOL/L (ref 136–145)
WBC NRBC COR # BLD: 7.2 10*3/MM3 (ref 3.4–10.8)

## 2022-06-18 PROCEDURE — 85025 COMPLETE CBC W/AUTO DIFF WBC: CPT | Performed by: NURSE PRACTITIONER

## 2022-06-18 PROCEDURE — 94799 UNLISTED PULMONARY SVC/PX: CPT

## 2022-06-18 PROCEDURE — 99232 SBSQ HOSP IP/OBS MODERATE 35: CPT | Performed by: INTERNAL MEDICINE

## 2022-06-18 PROCEDURE — 63710000001 INSULIN LISPRO (HUMAN) PER 5 UNITS: Performed by: ORTHOPAEDIC SURGERY

## 2022-06-18 PROCEDURE — 82962 GLUCOSE BLOOD TEST: CPT

## 2022-06-18 PROCEDURE — 25010000002 VANCOMYCIN 1 G RECONSTITUTED SOLUTION 1 EACH VIAL: Performed by: INTERNAL MEDICINE

## 2022-06-18 PROCEDURE — 80048 BASIC METABOLIC PNL TOTAL CA: CPT | Performed by: NURSE PRACTITIONER

## 2022-06-18 PROCEDURE — 25010000002 ENOXAPARIN PER 10 MG: Performed by: ORTHOPAEDIC SURGERY

## 2022-06-18 PROCEDURE — 63710000001 INSULIN GLARGINE PER 5 UNITS: Performed by: ORTHOPAEDIC SURGERY

## 2022-06-18 PROCEDURE — 25010000002 METHYLPREDNISOLONE PER 40 MG: Performed by: ORTHOPAEDIC SURGERY

## 2022-06-18 PROCEDURE — 63710000001 INSULIN LISPRO (HUMAN) PER 5 UNITS: Performed by: INTERNAL MEDICINE

## 2022-06-18 RX ADMIN — ENOXAPARIN SODIUM 30 MG: 30 INJECTION SUBCUTANEOUS at 08:40

## 2022-06-18 RX ADMIN — INSULIN LISPRO 8 UNITS: 100 INJECTION, SOLUTION INTRAVENOUS; SUBCUTANEOUS at 17:07

## 2022-06-18 RX ADMIN — VANCOMYCIN HYDROCHLORIDE 1000 MG: 1 INJECTION, POWDER, LYOPHILIZED, FOR SOLUTION INTRAVENOUS at 21:08

## 2022-06-18 RX ADMIN — PANTOPRAZOLE SODIUM 40 MG: 40 TABLET, DELAYED RELEASE ORAL at 05:55

## 2022-06-18 RX ADMIN — METOPROLOL SUCCINATE 100 MG: 50 TABLET, EXTENDED RELEASE ORAL at 08:40

## 2022-06-18 RX ADMIN — METHYLPREDNISOLONE SODIUM SUCCINATE 40 MG: 40 INJECTION, POWDER, FOR SOLUTION INTRAMUSCULAR; INTRAVENOUS at 08:39

## 2022-06-18 RX ADMIN — INSULIN LISPRO 15 UNITS: 100 INJECTION, SOLUTION INTRAVENOUS; SUBCUTANEOUS at 17:06

## 2022-06-18 RX ADMIN — PREGABALIN 75 MG: 75 CAPSULE ORAL at 21:08

## 2022-06-18 RX ADMIN — INSULIN GLARGINE 25 UNITS: 100 INJECTION, SOLUTION SUBCUTANEOUS at 21:55

## 2022-06-18 RX ADMIN — INSULIN LISPRO 12 UNITS: 100 INJECTION, SOLUTION INTRAVENOUS; SUBCUTANEOUS at 08:41

## 2022-06-18 RX ADMIN — Medication 5 MG: at 21:08

## 2022-06-18 RX ADMIN — INSULIN LISPRO 15 UNITS: 100 INJECTION, SOLUTION INTRAVENOUS; SUBCUTANEOUS at 12:12

## 2022-06-18 RX ADMIN — METHYLPREDNISOLONE SODIUM SUCCINATE 40 MG: 40 INJECTION, POWDER, FOR SOLUTION INTRAMUSCULAR; INTRAVENOUS at 00:09

## 2022-06-18 RX ADMIN — BUSPIRONE HYDROCHLORIDE 10 MG: 5 TABLET ORAL at 08:40

## 2022-06-18 RX ADMIN — ATORVASTATIN CALCIUM 80 MG: 40 TABLET, FILM COATED ORAL at 21:07

## 2022-06-18 RX ADMIN — Medication 10 ML: at 21:08

## 2022-06-18 RX ADMIN — BUSPIRONE HYDROCHLORIDE 10 MG: 5 TABLET ORAL at 21:07

## 2022-06-18 RX ADMIN — LEVOTHYROXINE SODIUM 100 MCG: 0.1 TABLET ORAL at 05:54

## 2022-06-18 RX ADMIN — VANCOMYCIN HYDROCHLORIDE 1000 MG: 1 INJECTION, POWDER, LYOPHILIZED, FOR SOLUTION INTRAVENOUS at 08:39

## 2022-06-18 RX ADMIN — THERA TABS 1 TABLET: TAB at 08:40

## 2022-06-18 RX ADMIN — DULOXETINE HYDROCHLORIDE 60 MG: 30 CAPSULE, DELAYED RELEASE ORAL at 05:56

## 2022-06-18 RX ADMIN — PREGABALIN 75 MG: 75 CAPSULE ORAL at 08:40

## 2022-06-18 RX ADMIN — OXYCODONE 10 MG: 5 TABLET ORAL at 21:08

## 2022-06-18 RX ADMIN — INSULIN LISPRO 12 UNITS: 100 INJECTION, SOLUTION INTRAVENOUS; SUBCUTANEOUS at 12:12

## 2022-06-18 RX ADMIN — BUPROPION HYDROCHLORIDE 150 MG: 150 TABLET, EXTENDED RELEASE ORAL at 05:56

## 2022-06-18 RX ADMIN — Medication 10 ML: at 08:41

## 2022-06-18 RX ADMIN — INSULIN LISPRO 15 UNITS: 100 INJECTION, SOLUTION INTRAVENOUS; SUBCUTANEOUS at 08:40

## 2022-06-19 LAB
ALBUMIN SERPL-MCNC: 3 G/DL (ref 3.5–5.2)
ALBUMIN/GLOB SERPL: 1.1 G/DL
ALP SERPL-CCNC: 99 U/L (ref 39–117)
ALT SERPL W P-5'-P-CCNC: 20 U/L (ref 1–41)
ANION GAP SERPL CALCULATED.3IONS-SCNC: 12 MMOL/L (ref 5–15)
AST SERPL-CCNC: 17 U/L (ref 1–40)
BASOPHILS # BLD AUTO: 0.1 10*3/MM3 (ref 0–0.2)
BASOPHILS NFR BLD AUTO: 1 % (ref 0–1.5)
BILIRUB SERPL-MCNC: 0.3 MG/DL (ref 0–1.2)
BUN SERPL-MCNC: 24 MG/DL (ref 6–20)
BUN/CREAT SERPL: 19.2 (ref 7–25)
CALCIUM SPEC-SCNC: 8.5 MG/DL (ref 8.6–10.5)
CHLORIDE SERPL-SCNC: 106 MMOL/L (ref 98–107)
CO2 SERPL-SCNC: 22 MMOL/L (ref 22–29)
CREAT SERPL-MCNC: 1.25 MG/DL (ref 0.76–1.27)
DEPRECATED RDW RBC AUTO: 48.1 FL (ref 37–54)
EGFRCR SERPLBLD CKD-EPI 2021: 66.7 ML/MIN/1.73
EOSINOPHIL # BLD AUTO: 0.2 10*3/MM3 (ref 0–0.4)
EOSINOPHIL NFR BLD AUTO: 1.5 % (ref 0.3–6.2)
ERYTHROCYTE [DISTWIDTH] IN BLOOD BY AUTOMATED COUNT: 19.2 % (ref 12.3–15.4)
GLOBULIN UR ELPH-MCNC: 2.8 GM/DL
GLUCOSE BLDC GLUCOMTR-MCNC: 103 MG/DL (ref 70–105)
GLUCOSE BLDC GLUCOMTR-MCNC: 129 MG/DL (ref 70–105)
GLUCOSE BLDC GLUCOMTR-MCNC: 207 MG/DL (ref 70–105)
GLUCOSE BLDC GLUCOMTR-MCNC: 215 MG/DL (ref 70–105)
GLUCOSE SERPL-MCNC: 248 MG/DL (ref 65–99)
HCT VFR BLD AUTO: 30.2 % (ref 37.5–51)
HGB BLD-MCNC: 9.5 G/DL (ref 13–17.7)
LYMPHOCYTES # BLD AUTO: 2.5 10*3/MM3 (ref 0.7–3.1)
LYMPHOCYTES NFR BLD AUTO: 21.8 % (ref 19.6–45.3)
MCH RBC QN AUTO: 22 PG (ref 26.6–33)
MCHC RBC AUTO-ENTMCNC: 31.3 G/DL (ref 31.5–35.7)
MCV RBC AUTO: 70.2 FL (ref 79–97)
MONOCYTES # BLD AUTO: 0.7 10*3/MM3 (ref 0.1–0.9)
MONOCYTES NFR BLD AUTO: 6.4 % (ref 5–12)
NEUTROPHILS NFR BLD AUTO: 69.3 % (ref 42.7–76)
NEUTROPHILS NFR BLD AUTO: 8.1 10*3/MM3 (ref 1.7–7)
NRBC BLD AUTO-RTO: 0.1 /100 WBC (ref 0–0.2)
PLATELET # BLD AUTO: 295 10*3/MM3 (ref 140–450)
PMV BLD AUTO: 8 FL (ref 6–12)
POTASSIUM SERPL-SCNC: 3.9 MMOL/L (ref 3.5–5.2)
PROT SERPL-MCNC: 5.8 G/DL (ref 6–8.5)
RBC # BLD AUTO: 4.3 10*6/MM3 (ref 4.14–5.8)
SODIUM SERPL-SCNC: 140 MMOL/L (ref 136–145)
VANCOMYCIN SERPL-MCNC: 20.6 MCG/ML (ref 5–40)
VANCOMYCIN TROUGH SERPL-MCNC: 20 MCG/ML (ref 5–20)
WBC NRBC COR # BLD: 11.7 10*3/MM3 (ref 3.4–10.8)

## 2022-06-19 PROCEDURE — 63710000001 INSULIN LISPRO (HUMAN) PER 5 UNITS: Performed by: ORTHOPAEDIC SURGERY

## 2022-06-19 PROCEDURE — 80053 COMPREHEN METABOLIC PANEL: CPT | Performed by: INTERNAL MEDICINE

## 2022-06-19 PROCEDURE — 82962 GLUCOSE BLOOD TEST: CPT

## 2022-06-19 PROCEDURE — 80202 ASSAY OF VANCOMYCIN: CPT | Performed by: INTERNAL MEDICINE

## 2022-06-19 PROCEDURE — 99232 SBSQ HOSP IP/OBS MODERATE 35: CPT | Performed by: INTERNAL MEDICINE

## 2022-06-19 PROCEDURE — 63710000001 INSULIN LISPRO (HUMAN) PER 5 UNITS: Performed by: INTERNAL MEDICINE

## 2022-06-19 PROCEDURE — 85025 COMPLETE CBC W/AUTO DIFF WBC: CPT | Performed by: INTERNAL MEDICINE

## 2022-06-19 PROCEDURE — 25010000002 ENOXAPARIN PER 10 MG: Performed by: ORTHOPAEDIC SURGERY

## 2022-06-19 PROCEDURE — 25010000002 VANCOMYCIN 1 G RECONSTITUTED SOLUTION 1 EACH VIAL: Performed by: INTERNAL MEDICINE

## 2022-06-19 RX ORDER — INSULIN GLARGINE 100 [IU]/ML
30 INJECTION, SOLUTION SUBCUTANEOUS NIGHTLY
Status: DISCONTINUED | OUTPATIENT
Start: 2022-06-19 | End: 2022-06-20 | Stop reason: HOSPADM

## 2022-06-19 RX ADMIN — PANTOPRAZOLE SODIUM 40 MG: 40 TABLET, DELAYED RELEASE ORAL at 05:25

## 2022-06-19 RX ADMIN — ENOXAPARIN SODIUM 30 MG: 30 INJECTION SUBCUTANEOUS at 09:34

## 2022-06-19 RX ADMIN — INSULIN LISPRO 15 UNITS: 100 INJECTION, SOLUTION INTRAVENOUS; SUBCUTANEOUS at 12:43

## 2022-06-19 RX ADMIN — Medication 10 ML: at 09:33

## 2022-06-19 RX ADMIN — BUPROPION HYDROCHLORIDE 150 MG: 150 TABLET, EXTENDED RELEASE ORAL at 05:25

## 2022-06-19 RX ADMIN — BUSPIRONE HYDROCHLORIDE 10 MG: 5 TABLET ORAL at 20:14

## 2022-06-19 RX ADMIN — LEVOTHYROXINE SODIUM 100 MCG: 0.1 TABLET ORAL at 05:25

## 2022-06-19 RX ADMIN — THERA TABS 1 TABLET: TAB at 09:35

## 2022-06-19 RX ADMIN — VANCOMYCIN HYDROCHLORIDE 1000 MG: 1 INJECTION, POWDER, LYOPHILIZED, FOR SOLUTION INTRAVENOUS at 09:56

## 2022-06-19 RX ADMIN — INSULIN LISPRO 15 UNITS: 100 INJECTION, SOLUTION INTRAVENOUS; SUBCUTANEOUS at 17:34

## 2022-06-19 RX ADMIN — INSULIN LISPRO 15 UNITS: 100 INJECTION, SOLUTION INTRAVENOUS; SUBCUTANEOUS at 09:35

## 2022-06-19 RX ADMIN — ATORVASTATIN CALCIUM 80 MG: 40 TABLET, FILM COATED ORAL at 20:14

## 2022-06-19 RX ADMIN — PREGABALIN 75 MG: 75 CAPSULE ORAL at 20:14

## 2022-06-19 RX ADMIN — DULOXETINE HYDROCHLORIDE 60 MG: 30 CAPSULE, DELAYED RELEASE ORAL at 05:25

## 2022-06-19 RX ADMIN — METOPROLOL SUCCINATE 100 MG: 50 TABLET, EXTENDED RELEASE ORAL at 09:35

## 2022-06-19 RX ADMIN — INSULIN LISPRO 8 UNITS: 100 INJECTION, SOLUTION INTRAVENOUS; SUBCUTANEOUS at 09:34

## 2022-06-19 RX ADMIN — Medication 10 ML: at 20:24

## 2022-06-19 RX ADMIN — BUSPIRONE HYDROCHLORIDE 10 MG: 5 TABLET ORAL at 09:35

## 2022-06-19 RX ADMIN — INSULIN LISPRO 8 UNITS: 100 INJECTION, SOLUTION INTRAVENOUS; SUBCUTANEOUS at 12:43

## 2022-06-19 RX ADMIN — OXYCODONE 10 MG: 5 TABLET ORAL at 05:25

## 2022-06-19 RX ADMIN — OXYCODONE 10 MG: 5 TABLET ORAL at 20:14

## 2022-06-19 RX ADMIN — PREGABALIN 75 MG: 75 CAPSULE ORAL at 09:34

## 2022-06-20 VITALS
HEART RATE: 68 BPM | OXYGEN SATURATION: 100 % | RESPIRATION RATE: 23 BRPM | WEIGHT: 186.2 LBS | DIASTOLIC BLOOD PRESSURE: 59 MMHG | TEMPERATURE: 97.9 F | BODY MASS INDEX: 27.58 KG/M2 | SYSTOLIC BLOOD PRESSURE: 139 MMHG | HEIGHT: 69 IN

## 2022-06-20 LAB
ANION GAP SERPL CALCULATED.3IONS-SCNC: 13 MMOL/L (ref 5–15)
BUN SERPL-MCNC: 23 MG/DL (ref 6–20)
BUN/CREAT SERPL: 20.2 (ref 7–25)
CALCIUM SPEC-SCNC: 8.9 MG/DL (ref 8.6–10.5)
CHLORIDE SERPL-SCNC: 106 MMOL/L (ref 98–107)
CO2 SERPL-SCNC: 25 MMOL/L (ref 22–29)
CREAT SERPL-MCNC: 1.14 MG/DL (ref 0.76–1.27)
EGFRCR SERPLBLD CKD-EPI 2021: 74.5 ML/MIN/1.73
GLUCOSE BLDC GLUCOMTR-MCNC: 106 MG/DL (ref 70–105)
GLUCOSE BLDC GLUCOMTR-MCNC: 164 MG/DL (ref 70–105)
GLUCOSE SERPL-MCNC: 104 MG/DL (ref 65–99)
HCT VFR BLD AUTO: 32.7 % (ref 37.5–51)
HGB BLD-MCNC: 10.3 G/DL (ref 13–17.7)
POTASSIUM SERPL-SCNC: 4.1 MMOL/L (ref 3.5–5.2)
SODIUM SERPL-SCNC: 144 MMOL/L (ref 136–145)

## 2022-06-20 PROCEDURE — 25010000002 VANCOMYCIN 1 G RECONSTITUTED SOLUTION 1 EACH VIAL: Performed by: INTERNAL MEDICINE

## 2022-06-20 PROCEDURE — 99239 HOSP IP/OBS DSCHRG MGMT >30: CPT | Performed by: INTERNAL MEDICINE

## 2022-06-20 PROCEDURE — 85018 HEMOGLOBIN: CPT | Performed by: ORTHOPAEDIC SURGERY

## 2022-06-20 PROCEDURE — 94799 UNLISTED PULMONARY SVC/PX: CPT

## 2022-06-20 PROCEDURE — 85014 HEMATOCRIT: CPT | Performed by: ORTHOPAEDIC SURGERY

## 2022-06-20 PROCEDURE — 63710000001 INSULIN LISPRO (HUMAN) PER 5 UNITS: Performed by: INTERNAL MEDICINE

## 2022-06-20 PROCEDURE — 94660 CPAP INITIATION&MGMT: CPT

## 2022-06-20 PROCEDURE — 82962 GLUCOSE BLOOD TEST: CPT

## 2022-06-20 PROCEDURE — 80048 BASIC METABOLIC PNL TOTAL CA: CPT | Performed by: INTERNAL MEDICINE

## 2022-06-20 PROCEDURE — 63710000001 INSULIN LISPRO (HUMAN) PER 5 UNITS: Performed by: ORTHOPAEDIC SURGERY

## 2022-06-20 PROCEDURE — 94761 N-INVAS EAR/PLS OXIMETRY MLT: CPT

## 2022-06-20 PROCEDURE — 25010000002 ENOXAPARIN PER 10 MG: Performed by: ORTHOPAEDIC SURGERY

## 2022-06-20 RX ORDER — SODIUM CHLORIDE 0.9 % (FLUSH) 0.9 %
10 SYRINGE (ML) INJECTION EVERY 12 HOURS SCHEDULED
Status: DISCONTINUED | OUTPATIENT
Start: 2022-06-20 | End: 2022-06-20 | Stop reason: HOSPADM

## 2022-06-20 RX ORDER — INSULIN LISPRO 100 [IU]/ML
0-24 INJECTION, SOLUTION INTRAVENOUS; SUBCUTANEOUS
Qty: 10 ML | Refills: 12
Start: 2022-06-20 | End: 2023-03-15

## 2022-06-20 RX ORDER — SODIUM CHLORIDE 0.9 % (FLUSH) 0.9 %
10 SYRINGE (ML) INJECTION AS NEEDED
Status: DISCONTINUED | OUTPATIENT
Start: 2022-06-20 | End: 2022-06-20 | Stop reason: HOSPADM

## 2022-06-20 RX ORDER — SODIUM CHLORIDE 0.9 % (FLUSH) 0.9 %
20 SYRINGE (ML) INJECTION AS NEEDED
Status: DISCONTINUED | OUTPATIENT
Start: 2022-06-20 | End: 2022-06-20 | Stop reason: HOSPADM

## 2022-06-20 RX ORDER — INSULIN ASPART 100 [IU]/ML
INJECTION, SOLUTION INTRAVENOUS; SUBCUTANEOUS
Qty: 5 PEN | Refills: 5 | Status: SHIPPED | OUTPATIENT
Start: 2022-06-20 | End: 2023-01-30

## 2022-06-20 RX ORDER — HYDROCODONE BITARTRATE AND ACETAMINOPHEN 5; 325 MG/1; MG/1
1 TABLET ORAL EVERY 4 HOURS PRN
Qty: 6 TABLET | Refills: 0
Start: 2022-06-20 | End: 2022-06-22

## 2022-06-20 RX ADMIN — Medication 10 ML: at 08:10

## 2022-06-20 RX ADMIN — VANCOMYCIN HYDROCHLORIDE 1000 MG: 1 INJECTION, POWDER, LYOPHILIZED, FOR SOLUTION INTRAVENOUS at 13:12

## 2022-06-20 RX ADMIN — VANCOMYCIN HYDROCHLORIDE 1000 MG: 1 INJECTION, POWDER, LYOPHILIZED, FOR SOLUTION INTRAVENOUS at 01:21

## 2022-06-20 RX ADMIN — PREGABALIN 75 MG: 75 CAPSULE ORAL at 08:09

## 2022-06-20 RX ADMIN — INSULIN LISPRO 15 UNITS: 100 INJECTION, SOLUTION INTRAVENOUS; SUBCUTANEOUS at 08:09

## 2022-06-20 RX ADMIN — THERA TABS 1 TABLET: TAB at 08:09

## 2022-06-20 RX ADMIN — METOPROLOL SUCCINATE 100 MG: 50 TABLET, EXTENDED RELEASE ORAL at 08:09

## 2022-06-20 RX ADMIN — BUPROPION HYDROCHLORIDE 150 MG: 150 TABLET, EXTENDED RELEASE ORAL at 06:29

## 2022-06-20 RX ADMIN — BUSPIRONE HYDROCHLORIDE 10 MG: 5 TABLET ORAL at 08:09

## 2022-06-20 RX ADMIN — PANTOPRAZOLE SODIUM 40 MG: 40 TABLET, DELAYED RELEASE ORAL at 06:30

## 2022-06-20 RX ADMIN — ENOXAPARIN SODIUM 30 MG: 30 INJECTION SUBCUTANEOUS at 08:09

## 2022-06-20 RX ADMIN — LEVOTHYROXINE SODIUM 100 MCG: 0.1 TABLET ORAL at 06:30

## 2022-06-20 RX ADMIN — INSULIN LISPRO 4 UNITS: 100 INJECTION, SOLUTION INTRAVENOUS; SUBCUTANEOUS at 08:09

## 2022-06-20 RX ADMIN — DULOXETINE HYDROCHLORIDE 60 MG: 30 CAPSULE, DELAYED RELEASE ORAL at 06:30

## 2022-06-20 RX ADMIN — INSULIN LISPRO 15 UNITS: 100 INJECTION, SOLUTION INTRAVENOUS; SUBCUTANEOUS at 13:12

## 2022-06-30 ENCOUNTER — PATIENT OUTREACH (OUTPATIENT)
Dept: CASE MANAGEMENT | Facility: OTHER | Age: 57
End: 2022-06-30

## 2022-06-30 NOTE — OUTREACH NOTE
AMBULATORY CASE MANAGEMENT NOTE    Name and Relationship of Patient/Support Person: [facility] -     SNF Follow-up    Questions/Answers    Flowsheet Row Responses   Acute Facility Discharged From [facility]   Acute Discharge Date 06/20/22   Name of the Skilled Nursing Facility? [facility]   Purpose of SNF Admission PT, OT, SN   Estimated length of stay for the patient? TBD   Who is the insurance provider or payor of patient stay? Medicare   Progression of Patient? daily therapies continue        RN-ACM outreach call made to [facility]. Staff reports pt is still there.  dept unavailable for additional information. RN-ACM will continue to monitor for discharge.     NAHOMI HAM  Ambulatory Case Management    6/30/2022, 15:20 EDT   TBA

## 2022-07-06 ENCOUNTER — OFFICE VISIT (OUTPATIENT)
Dept: PODIATRY | Facility: CLINIC | Age: 57
End: 2022-07-06

## 2022-07-06 VITALS
DIASTOLIC BLOOD PRESSURE: 71 MMHG | BODY MASS INDEX: 27.55 KG/M2 | SYSTOLIC BLOOD PRESSURE: 119 MMHG | HEIGHT: 69 IN | WEIGHT: 186 LBS | HEART RATE: 69 BPM

## 2022-07-06 DIAGNOSIS — E11.42 DM TYPE 2 WITH DIABETIC PERIPHERAL NEUROPATHY: ICD-10-CM

## 2022-07-06 DIAGNOSIS — Z89.511 HX OF BKA, RIGHT: ICD-10-CM

## 2022-07-06 DIAGNOSIS — Z89.412 LEFT GREAT TOE AMPUTEE: Primary | ICD-10-CM

## 2022-07-06 DIAGNOSIS — M20.42 HAMMER TOE OF LEFT FOOT: ICD-10-CM

## 2022-07-06 PROCEDURE — 99024 POSTOP FOLLOW-UP VISIT: CPT | Performed by: PODIATRIST

## 2022-07-06 RX ORDER — HYDROCODONE BITARTRATE AND ACETAMINOPHEN 5; 325 MG/1; MG/1
1 TABLET ORAL EVERY MORNING
COMMUNITY
Start: 2022-07-01

## 2022-07-06 RX ORDER — AMLODIPINE BESYLATE 5 MG/1
5 TABLET ORAL DAILY
COMMUNITY
Start: 2022-07-02 | End: 2023-03-12 | Stop reason: SDUPTHER

## 2022-07-06 RX ORDER — BUSPIRONE HYDROCHLORIDE 5 MG/1
TABLET ORAL
COMMUNITY
Start: 2022-07-02 | End: 2022-07-06 | Stop reason: SDUPTHER

## 2022-07-06 NOTE — PROGRESS NOTES
07/06/2022  Foot and Ankle Surgery - Established Patient/Follow-up  Provider: Dr. Bong Baker DPM  Location: [facility]    Subjective:  Kuldeep Adhikari is a 57 y.o. male.     Chief Complaint   Patient presents with   • Left Foot - Follow-up   • Follow-up     Last Visit PCP Dr MALICK Whitaker 04/2022 approx       HPI: Patient states that he is doing quite well.  He has been ambulating with his prosthesis.  No issues involving his left foot.  He has not had any open wounds or concerns of infection.  He states that he has been monitoring his foot closely.      Allergies   Allergen Reactions   • Lisinopril Cough       Current Outpatient Medications on File Prior to Visit   Medication Sig Dispense Refill   • amLODIPine (NORVASC) 5 MG tablet      • aspirin 81 MG EC tablet Take 1 tablet by mouth Daily.     • atorvastatin (LIPITOR) 80 MG tablet Take 1 tablet by mouth Every Night. 90 tablet 2   • buPROPion XL (Wellbutrin XL) 150 MG 24 hr tablet Take 1 tablet by mouth Every Morning. 30 tablet 5   • cyclobenzaprine (FLEXERIL) 10 MG tablet Take I tab q 8 hrs as needed for tension headaches 30 tablet 0   • DULoxetine (CYMBALTA) 60 MG capsule TAKE 1 CAPSULE EVERY       MORNING 90 capsule 0   • Eliquis 5 MG tablet tablet TAKE 1 TABLET TWICE A  tablet 0   • HYDROcodone-acetaminophen (NORCO) 5-325 MG per tablet      • insulin aspart (NovoLOG FlexPen) 100 UNIT/ML solution pen-injector sc pen Inject 15 units before meals 3 times daily per sliding scale.  Maximum of 60 units per day. 5 pen 5   • insulin lispro (ADMELOG) 100 UNIT/ML injection Inject 0-24 Units under the skin into the appropriate area as directed 3 (Three) Times a Day Before Meals. 10 mL 12   • metoprolol succinate XL (TOPROL-XL) 100 MG 24 hr tablet Take 1 tablet by mouth Daily. 90 tablet 3   • multivitamin with minerals tablet tablet Take 1 tablet by mouth Daily.     • omeprazole (priLOSEC) 40 MG capsule Take 1 capsule by mouth Daily. 90 capsule 0  "  • pregabalin (Lyrica) 75 MG capsule Take 1 capsule by mouth 2 (Two) Times a Day. 60 capsule 2   • Synthroid 100 MCG tablet TAKE 1 TABLET DAILY 90 tablet 0   • busPIRone (BUSPAR) 10 MG tablet Take 1 tablet by mouth Every 12 (Twelve) Hours for 30 days. 180 tablet 0   • Insulin Glargine (Lantus SoloStar) 100 UNIT/ML injection pen Inject 30 Units under the skin into the appropriate area as directed Daily With Breakfast 15 mL 0     No current facility-administered medications on file prior to visit.       Objective   /71   Pulse 69   Ht 175.3 cm (69\")   Wt 84.4 kg (186 lb)   BMI 27.47 kg/m²     Foot/Ankle Exam:       General:   Appearance: appears stated age and healthy    Orientation: AAOx3    Affect: appropriate      VASCULAR      Left Foot Vascularity   Normal vascular exam    Dorsalis pedis:  2+  Posterior tibial:  2+  Skin Temperature: warm    Edema Grading:  None  CFT:  < 3 seconds  Pedal Hair Growth:  Present  Varicosities: none        NEUROLOGIC     Left Foot Neurologic   Light touch sensation:  Normal  Hot/cold sensation: normal    Achilles reflex:  2+     MUSCULOSKELETAL      Right Foot Musculoskeletal    Amputation   Below right knee: Yes       Left Foot Musculoskeletal   Ecchymosis:  None  Arch:  Normal     MUSCLE STRENGTH     Left Foot Muscle Strength   Normal strength    Foot dorsiflexion:  5  Foot plantar flexion:  5  Foot inversion:  5  Foot eversion:  5     DERMATOLOGIC     Right Foot Dermatologic   Nails comment:  Nails 1-5     Left Foot Dermatologic   Skin: skin intact    Nails comment:  Nails 1-5     TESTS     Left Foot Tests   Anterior drawer: negative    Varus tilt: negative        Left Foot Additional Comments: Incision site is dry and stable with intact staples.   No evidence of dehiscence, or infection. Rectus alignment of the lesser digits.    05/25/2022  Minimal erythema to the amputation site. No signs of infection.    07/06/2022  No open wounds or signs of " infection.      Assessment & Plan   Diagnoses and all orders for this visit:    1. Left great toe amputee (HCC) (Primary)    2. DM type 2 with diabetic peripheral neuropathy (HCC)    3. Hx of BKA, right (HCC)    4. Hammer toe of left foot      Patient is doing very well at this time.  He has no open wounds or concerning features involving his left foot.  He understands that he remains at very high risk of pedal complications.  I have asked that he monitor his stump site on his right lower extremity as well as his left foot closely.  He is to call with any additional issues or concerns.  Patient is to return for routine diabetic foot check in 3 months with DAVID Sidhu.    No orders of the defined types were placed in this encounter.         Note is dictated utilizing voice recognition software. Unfortunately this leads to occasional typographical errors. I apologize in advance if the situation occurs. If questions occur please do not hesitate to call our office.    Transcribed from ambient dictation for ERWIN Baker DPM by Marsha Smith.  07/06/22   12:28 EDT    Patient verbalized consent to the visit recording.  I have personally performed the services described in this document as transcribed by the above individual, and it is both accurate and complete.  ERWIN Baker DPM  7/7/2022  07:25 EDT

## 2022-07-13 ENCOUNTER — PATIENT OUTREACH (OUTPATIENT)
Dept: CASE MANAGEMENT | Facility: OTHER | Age: 57
End: 2022-07-13

## 2022-07-13 NOTE — OUTREACH NOTE
AMBULATORY CASE MANAGEMENT NOTE    Name and Relationship of Patient/Support Person: [facility] -     SNF Follow-up    Questions/Answers    Flowsheet Row Responses   Acute Facility Discharged From [facility]   Acute Discharge Date 06/20/22   Name of the Skilled Nursing Facility? [facility]   Purpose of SNF Admission PT, OT, SN   Who is the insurance provider or payor of patient stay? Medicare   Skilled Nursing Discharge Date? 07/02/22        RN-ACM outreach call made to [facility]. Staff reports pt was discharged on 7/2/22.  dept unavailable for additional information. RN-ACM outreach call to pt scheduled.     NAHOMI HAM  Ambulatory Case Management    7/13/2022, 09:42 EDT

## 2022-07-13 NOTE — OUTREACH NOTE
AMBULATORY CASE MANAGEMENT NOTE    Name and Relationship of Patient/Support Person: Kuldeep Adhikari L - Self    Adult Patient Profile  Questions/Answers    Flowsheet Row Most Recent Value   Equipment Currently Used at Home prosthesis, crutches, walker, standard, cane, straight, wheelchair   Primary Source of Support/Comfort sibling(s)   People in Home sibling(s)  [brother]   Current Living Arrangements home        Send Education  Questions/Answers    Flowsheet Row Most Recent Value   Other Patient Education/Resources  24/7 [facility] Nurse Call Line   24/7 Nurse Call Line Education Method Verbal        SDOH updated and reviewed with the patient during this program:  Financial Resource Strain: Low Risk    • Difficulty of Paying Living Expenses: Not very hard      Food Insecurity: No Food Insecurity   • Worried About Running Out of Food in the Last Year: Never true   • Ran Out of Food in the Last Year: Never true      Transportation Needs: No Transportation Needs   • Lack of Transportation (Medical): No   • Lack of Transportation (Non-Medical): No     Patient Outreach    Pt discharged from [facility] rehab to home on 7/2/22. Pt previously engaged with RN-ACM services. Pt reports he is not current with HH, declines need at this time, states he will discuss with his orthopedic doctor, MD Forte, seeing him this Friday. He reports to have all needed DME at home. He reports compliant with DM medications. Pt reports noncompliance with some of his other medications, states he wants to discuss with PCP before taking. RN-ACM emphasized importance of PCP follow up, pt states he will call PCP office. He reports compliant with BG monitoring, states BG this am was 130. Updated SDOH. He denies any needs at this time. Advised pt to call RN-ACM or [facility] nurse line with any needs. Follow up outreach scheduled for 2-3 weeks.     Education Documentation  Follow-Up Care, taught by Ioana Ellison, RN at 7/13/2022 12:53 PM.  Learner:  Patient  Readiness: Acceptance  Method: Explanation  Response: Verbalizes Understanding    Oral Medication, taught by Ioana Ellison RN at 7/13/2022 12:53 PM.  Learner: Patient  Readiness: Acceptance  Method: Explanation  Response: Verbalizes Understanding    Insulin Therapy, taught by Ioana Ellison, RN at 7/13/2022 12:53 PM.  Learner: Patient  Readiness: Acceptance  Method: Explanation  Response: Verbalizes Understanding    Blood Glucose Monitoring, taught by Ioana Ellison RN at 7/13/2022 12:53 PM.  Learner: Patient  Readiness: Acceptance  Method: Explanation  Response: Verbalizes Understanding          IOANA HAM  Ambulatory Case Management    7/13/2022, 12:55 EDT

## 2022-07-14 ENCOUNTER — TELEPHONE (OUTPATIENT)
Dept: FAMILY MEDICINE CLINIC | Facility: CLINIC | Age: 57
End: 2022-07-14

## 2022-07-14 NOTE — TELEPHONE ENCOUNTER
Caller: Kuldeep Adhikari    Relationship to patient: Self    Best call back number: [phone] (H)    Type of visit: HOSPITAL FOLLOW UP     Requested date: 07/19/22    If rescheduling, when is the original appointment: 07/15/22    Additional notes:    HUB ATTEMPTED TO WARM TRANSFER TO THE OFFICE AND WAS UNSUCCESSFUL    PATIENT WILLING TO SEE ANY PROVIDER   PLEASE ADVISE

## 2022-07-19 ENCOUNTER — OFFICE VISIT (OUTPATIENT)
Dept: FAMILY MEDICINE CLINIC | Facility: CLINIC | Age: 57
End: 2022-07-19

## 2022-07-19 VITALS
TEMPERATURE: 98.4 F | SYSTOLIC BLOOD PRESSURE: 125 MMHG | DIASTOLIC BLOOD PRESSURE: 84 MMHG | WEIGHT: 186.23 LBS | BODY MASS INDEX: 27.58 KG/M2 | OXYGEN SATURATION: 99 % | HEIGHT: 69 IN | HEART RATE: 78 BPM

## 2022-07-19 DIAGNOSIS — Z79.4 TYPE 2 DIABETES MELLITUS WITH HYPERGLYCEMIA, WITH LONG-TERM CURRENT USE OF INSULIN: ICD-10-CM

## 2022-07-19 DIAGNOSIS — L03.115 CELLULITIS AND ABSCESS OF RIGHT LOWER EXTREMITY: Primary | ICD-10-CM

## 2022-07-19 DIAGNOSIS — E11.65 TYPE 2 DIABETES MELLITUS WITH HYPERGLYCEMIA, WITH LONG-TERM CURRENT USE OF INSULIN: ICD-10-CM

## 2022-07-19 DIAGNOSIS — L02.415 CELLULITIS AND ABSCESS OF RIGHT LOWER EXTREMITY: Primary | ICD-10-CM

## 2022-07-19 PROCEDURE — 99213 OFFICE O/P EST LOW 20 MIN: CPT | Performed by: FAMILY MEDICINE

## 2022-07-19 RX ORDER — BUPROPION HYDROCHLORIDE 150 MG/1
150 TABLET, EXTENDED RELEASE ORAL DAILY
COMMUNITY
Start: 2022-07-02 | End: 2022-07-19 | Stop reason: ALTCHOICE

## 2022-07-19 RX ORDER — PANTOPRAZOLE SODIUM 40 MG/1
40 TABLET, DELAYED RELEASE ORAL 2 TIMES DAILY
COMMUNITY
Start: 2022-07-02 | End: 2023-03-13

## 2022-07-19 NOTE — PROGRESS NOTES
Subjective   Kuldeep Adhikari is a 57 y.o. male.     History of Present Illness   The patient presents today for follow-up after he was hospitalized in 06/2022 with cellulitis involving his right lower extremity at the previous site of a right below knee amputation. This required surgical incision and drainage by orthopedics. He was followed by infectious disease while he was hospitalized. He grew methicillin resistant staph aureus. After he was doing well, he was discharged to rehab and then discharged from rehab on 07/02/2022.    Right lower extremity cellulitis  The patient states that he has been staying at his sister-in-law's house because it is difficult to climb steps. He states that it is starting to get a little bit better, but it still bothers him. He states that his right shoulder bothers him the most. The patient states that he talked to Dr. Forte about his right shoulder and he was told that they will consider spinal injections because he has a couple of vertebrae in his neck that are deteriorating. He states that he has an appointment for follow-up with Dr. Forte coming up in 6 weeks. He denies any more fever at home.    Diabetes mellitus  The patient states that when he left, his blood glucose was running in the 300s mg/dL, but he was told that it was due to the injection. He states that he was told to keep an eye on that. He states that at noon he took some fast-acting insulin to get it back down. The patient states that around 4:00 PM, it should start dropping. He states that we are managing his diabetes in our office.    Health maintenance  The patient reports that he needs to come in for a yearly exam for Medicare. He states that he has not been to the eye doctor in over a year. He states that he went through divorce. The patient states that while he was in the hospital because of a CVA his wife filed for divorce.    The following portions of the patient's history were reviewed and updated as  appropriate: allergies, current medications, past family history, past medical history, past social history, past surgical history, and problem list.  Past Medical History:   Diagnosis Date   • CKD (chronic kidney disease), stage III ([facility])    • Depression    • Depression with suicidal ideation 08/08/2021   • DJD (degenerative joint disease)    • DVT (deep venous thrombosis) ([facility])    • Ganglion     rt wrist   • Gangrene of foot ([facility]) 10/09/2015   • GERD (gastroesophageal reflux disease)    • Headache    • Heart murmur    • Hiatal hernia    • History of esophageal stricture     s/p dialation 40-50 times last EGD 04/2016   • Hyperlipidemia    • Hypertension    • Hypothyroidism    • IBS (irritable bowel syndrome)    • Neuropathy    • Osteomyelitis of right foot ([facility]) 03/19/2020   • Pulmonary embolism ([facility]) 01/19/2017   • Rash     rt lower hip   • Retinopathy    • S/P BKA (below knee amputation), right ([facility]) 03/18/2022   • Seasonal allergies    • Sepsis due to group B Streptococcus ([facility]) 03/19/2020   • Sleep apnea    • Type 2 diabetes mellitus with peripheral vascular disease ([facility])    • Vitamin D deficiency      Past Surgical History:   Procedure Laterality Date   • AMPUTATION DIGIT Left 4/26/2022    Procedure: AMPUTATION left great toe;  Surgeon: ERWIN Baker DPM;  Location: [facility] MAIN OR;  Service: Podiatry;  Laterality: Left;   • AMPUTATION DIGIT  4/26/2022    Procedure: ;  Surgeon: ERWIN Baker DPM;  Location: [facility] MAIN OR;  Service: Podiatry;;   • AMPUTATION FOOT / TOE Right     great toe   • AMPUTATION REVISION Right 4/8/2021    Procedure: BELOW KNEE AMPUTATION REVISAION;  Surgeon: Eduardo Reynolds MD;  Location: [facility] MAIN OR;  Service: Orthopedics;  Laterality: Right;   • AMPUTATION REVISION Right 4/29/2021    Procedure: AMPUTATION REVISION KNEE STUMP;  Surgeon: Eduardo Reynolds MD;  Location: [facility] MAIN OR;  Service: Orthopedics;  Laterality: Right;   • BELOW KNEE AMPUTATION Right 7/30/2020     Procedure: AMPUTATION BELOW KNEE;  Surgeon: Eduardo Reynolds MD;  Location: [facility] MAIN OR;  Service: Orthopedics;  Laterality: Right;   • CARDIAC CATHETERIZATION  04/2018    bhf   • COLONOSCOPY     • ENDOSCOPY     • ENDOSCOPY N/A 10/4/2019    Procedure: ESOPHAGOGASTRODUODENOSCOPY with dilitation and biopsy x 1 area;  Surgeon: Declan Iqbal MD;  Location: [facility] END[facility]COPY;  Service: Gastroenterology   • ENDOSCOPY N/A 12/13/2019    Procedure: ESOPHAGOGASTRODUODENOSCOPY WITH DILATATION (50, 52 BOUGIE);  Surgeon: Declan Iqbal MD;  Location: [facility];  Service: Gastroenterology   • ENDOSCOPY N/A 8/6/2021    Procedure: ESOPHAGOGASTRODUODENOSCOPY with biopsy x1 area and esophageal dilation (56FR Bougie);  Surgeon: Hussein Talavera MD;  Location: [facility];  Service: Gastroenterology;  Laterality: N/A;  post: hiatal hernia, erosive gastritis   • EYE SURGERY     • GANGLION CYST EXCISION Left    • HERNIA REPAIR Bilateral    • INCISION AND DRAINAGE OF WOUND Right 6/15/2022    Procedure: INCISION AND DRAINAGE WOUND with debridement;  Surgeon: Fito Forte MD;  Location: [facility] MAIN OR;  Service: Orthopedics;  Laterality: Right;   • JOINT REPLACEMENT      Left total hip   • RETINOPATHY SURGERY      laser   • TOTAL HIP ARTHROPLASTY Left    • TOTAL HIP ARTHROPLASTY Left 2018   • TRANS METATARSAL AMPUTATION Right 3/17/2020    Procedure: AMPUTATION TRANS METATARSAL right;  Surgeon: ERWIN Baker DPM;  Location: [facility] MAIN OR;  Service: Podiatry;  Laterality: Right;  GANGRENOUS RIGHT FOOT   • VASECTOMY       Family History   Problem Relation Age of Onset   • Diabetes Mother         Patient  mom   • Heart disease Mother    • Arthritis Mother    • Hyperlipidemia Mother    • Leukemia Father    • Sleep apnea Maternal Aunt         GENO   • Stroke Maternal Grandmother    • Hypertension Other    • Hyperlipidemia Other    • Cancer Other    • Colon cancer Other         uncle     Social History      Socioeconomic History   • Marital status:    Tobacco Use   • Smoking status: Never Smoker   • Smokeless tobacco: Never Used   Vaping Use   • Vaping Use: Never used   Substance and Sexual Activity   • Alcohol use: No   • Drug use: No   • Sexual activity: Not Currently     Birth control/protection: None         Current Outpatient Medications:   •  amLODIPine (NORVASC) 5 MG tablet, , Disp: , Rfl:   •  aspirin 81 MG EC tablet, Take 1 tablet by mouth Daily., Disp: , Rfl:   •  atorvastatin (LIPITOR) 80 MG tablet, Take 1 tablet by mouth Every Night., Disp: 90 tablet, Rfl: 2  •  buPROPion XL (Wellbutrin XL) 150 MG 24 hr tablet, Take 1 tablet by mouth Every Morning., Disp: 30 tablet, Rfl: 5  •  Continuous Blood Gluc Sensor (FreeStyle Mark 2 Sensor) misc, , Disp: , Rfl:   •  cyclobenzaprine (FLEXERIL) 10 MG tablet, Take I tab q 8 hrs as needed for tension headaches, Disp: 30 tablet, Rfl: 0  •  DULoxetine (CYMBALTA) 60 MG capsule, TAKE 1 CAPSULE EVERY       MORNING, Disp: 90 capsule, Rfl: 0  •  Eliquis 5 MG tablet tablet, TAKE 1 TABLET TWICE A DAY, Disp: 180 tablet, Rfl: 0  •  HYDROcodone-acetaminophen (NORCO) 5-325 MG per tablet, , Disp: , Rfl:   •  insulin aspart (NovoLOG FlexPen) 100 UNIT/ML solution pen-injector sc pen, Inject 15 units before meals 3 times daily per sliding scale.  Maximum of 60 units per day., Disp: 5 pen, Rfl: 5  •  insulin lispro (ADMELOG) 100 UNIT/ML injection, Inject 0-24 Units under the skin into the appropriate area as directed 3 (Three) Times a Day Before Meals., Disp: 10 mL, Rfl: 12  •  metoprolol succinate XL (TOPROL-XL) 100 MG 24 hr tablet, Take 1 tablet by mouth Daily., Disp: 90 tablet, Rfl: 3  •  multivitamin with minerals tablet tablet, Take 1 tablet by mouth Daily., Disp: , Rfl:   •  omeprazole (priLOSEC) 40 MG capsule, Take 1 capsule by mouth Daily., Disp: 90 capsule, Rfl: 0  •  pregabalin (Lyrica) 75 MG capsule, Take 1 capsule by mouth 2 (Two) Times a Day., Disp: 60 capsule,  "Rfl: 2  •  Synthroid 100 MCG tablet, TAKE 1 TABLET DAILY, Disp: 90 tablet, Rfl: 0  •  busPIRone (BUSPAR) 10 MG tablet, Take 1 tablet by mouth Every 12 (Twelve) Hours for 30 days., Disp: 180 tablet, Rfl: 0  •  Insulin Glargine (Lantus SoloStar) 100 UNIT/ML injection pen, Inject 30 Units under the skin into the appropriate area as directed Daily With Breakfast, Disp: 15 mL, Rfl: 0  •  pantoprazole (PROTONIX) 40 MG EC tablet, Take 40 mg by mouth 2 (Two) Times a Day., Disp: , Rfl:     Review of Systems  /84 (BP Location: Left arm, Patient Position: Sitting, Cuff Size: Large Adult)   Pulse 78   Temp 98.4 °F (36.9 °C) (Temporal)   Ht 175.3 cm (69\")   Wt 84.5 kg (186 lb 3.7 oz)   SpO2 99%   BMI 27.50 kg/m²   Review of systems was performed, and pertinent findings are noted in the HPI.    Objective   Physical Exam  Vitals and nursing note reviewed.   Constitutional:       General: He is not in acute distress.     Appearance: He is well-developed.      Comments: Vitals are all stable. He is in no acute distress. He is alert and cooperative with exam.   HENT:      Head: Normocephalic and atraumatic.   Neck:      Thyroid: No thyromegaly.   Cardiovascular:      Rate and Rhythm: Normal rate and regular rhythm.      Heart sounds: Normal heart sounds. No murmur heard.    No friction rub. No gallop.      Comments: Heart has regular rate and rhythm without murmur.  Pulmonary:      Effort: Pulmonary effort is normal. No respiratory distress.      Breath sounds: Normal breath sounds. No wheezing or rales.      Comments: Lungs are clear to auscultation bilaterally.  Musculoskeletal:      Cervical back: Neck supple.      Comments: He is using a cane and has his right lower extremity prosthesis on.   Lymphadenopathy:      Cervical: No cervical adenopathy.   Skin:     General: Skin is warm and dry.   Neurological:      Mental Status: He is alert.     His last HbA1c was 7.5 percent on 06/13/2022.      Assessment & Plan "   Problems Addressed this Visit        Other    Type 2 diabetes mellitus with hyperglycemia, with long-term current use of insulin (HCC)      Other Visit Diagnoses     Cellulitis and abscess of right lower extremity    -  Primary      Diagnoses       Codes Comments    Cellulitis and abscess of right lower extremity    -  Primary ICD-10-CM: L03.115, L02.415  ICD-9-CM: 682.6     Type 2 diabetes mellitus with hyperglycemia, with long-term current use of insulin (HCC)     ICD-10-CM: E11.65, Z79.4  ICD-9-CM: 250.00, 790.29, V58.67         1. Right lower extremity cellulitis status post incision and drainage:  - Doing well. He has completed rehab. He is back to being able to wear his prosthesis. He will keep follow-up with Dr. Baker regarding his left foot, and keep follow-up with Dr. Forte regarding the below knee amputation and subsequent infection.    2. Diabetes mellitus:  - He will continue his current medications and was counseled on the need for compliance with his diet and his medications. I will see him back in 6 months for follow-up and his Medicare wellness.    Transcribed from ambient dictation for Laura Whitaker MD by Rose Marie Neal.  07/19/22   16:46 EDT    Patient verbalized consent to the visit recording.

## 2022-07-28 ENCOUNTER — PATIENT OUTREACH (OUTPATIENT)
Dept: CASE MANAGEMENT | Facility: OTHER | Age: 57
End: 2022-07-28

## 2022-07-28 NOTE — OUTREACH NOTE
AMBULATORY CASE MANAGEMENT NOTE    Name and Relationship of Patient/Support Person: Kuldeep Adhikari L - Self    Send Education  Questions/Answers    Flowsheet Row Most Recent Value   Annual Wellness Visit:  Patient Has Completed  [scheduled for 1/26/23]   Other Patient Education/Resources  24/7 [facility] Nurse Call Line   24/7 Nurse Call Line Education Method Verbal   Advanced Directives: Send Materials        Patient Outreach    Follow up RN-ACM outreach call made to pt. He reports to be doing well. Discussed most recent PCP visit. Reviewed AVS with pt. He reports to be monitoring BG at home, states readings have been around 100. Disease education provided. He denies any needs. AWV scheduled. Pt states he is seeing neurologist today. No questions per pt. Advised him to call with any needs. Follow up outreach scheduled for 1 month.     Education Documentation  Follow-Up Care, taught by Ioana Ellison, RN at 7/28/2022 12:00 PM.  Learner: Patient  Readiness: Acceptance  Method: Explanation  Response: Verbalizes Understanding    Healthy Food Choices, taught by Ioana Ellison, RN at 7/28/2022 12:00 PM.  Learner: Patient  Readiness: Acceptance  Method: Explanation  Response: Verbalizes Understanding    Insulin Therapy, taught by Ioana Ellison, RN at 7/28/2022 12:00 PM.  Learner: Patient  Readiness: Acceptance  Method: Explanation  Response: Verbalizes Understanding    Blood Glucose Monitoring, taught by Ioana Ellison, RN at 7/28/2022 12:00 PM.  Learner: Patient  Readiness: Acceptance  Method: Explanation  Response: Verbalizes Understanding          IOANA HAM  Ambulatory Case Management    7/28/2022, 12:01 EDT

## 2022-08-25 ENCOUNTER — PATIENT OUTREACH (OUTPATIENT)
Dept: CASE MANAGEMENT | Facility: OTHER | Age: 57
End: 2022-08-25

## 2022-08-25 NOTE — OUTREACH NOTE
AMBULATORY CASE MANAGEMENT NOTE    Name and Relationship of Patient/Support Person: Zeynep Kuldeep EMIR - Self    Send Education  Questions/Answers    Flowsheet Row Most Recent Value   Annual Wellness Visit:  Patient Has Completed  [scheduled for 1/26/23]   Other Patient Education/Resources  24/7 [facility] Nurse Call Line   24/7 Nurse Call Line Education Method Verbal   Advanced Directives: --  [materials sent]        SDOH updated and reviewed with the patient during this program:  Financial Resource Strain: Low Risk    • Difficulty of Paying Living Expenses: Not very hard      Food Insecurity: No Food Insecurity   • Worried About Running Out of Food in the Last Year: Never true   • Ran Out of Food in the Last Year: Never true      Transportation Needs: No Transportation Needs   • Lack of Transportation (Medical): No   • Lack of Transportation (Non-Medical): No     Patient Outreach    Follow up RN-ACM outreach call made to pt. He reports to be monitoring BG at home, compliant with medications. Disease education provided. Updated SDOH. He denies any needs. AWV scheduled. Pt states he is seeing orthopedic doctor today. No questions per pt. Advised him to call with any needs. Follow up outreach scheduled for 1 month.    Education Documentation  Follow-Up Care, taught by Ioana Ellison, RN at 8/25/2022 11:12 AM.  Learner: Patient  Readiness: Acceptance  Method: Explanation  Response: Verbalizes Understanding    Healthy Food Choices, taught by Ioana Ellison, RN at 8/25/2022 11:12 AM.  Learner: Patient  Readiness: Acceptance  Method: Explanation  Response: Verbalizes Understanding    Insulin Therapy, taught by Ioana Ellison, RN at 8/25/2022 11:12 AM.  Learner: Patient  Readiness: Acceptance  Method: Explanation  Response: Verbalizes Understanding    Blood Glucose Monitoring, taught by Ioana Ellison, RN at 8/25/2022 11:12 AM.  Learner: Patient  Readiness: Acceptance  Method: Explanation  Response: Verbalizes  Understanding          NAHOMI HAM  Ambulatory Case Management    8/25/2022, 11:12 EDT

## 2022-09-12 DIAGNOSIS — M79.2 NEUROPATHIC PAIN: ICD-10-CM

## 2022-09-12 DIAGNOSIS — G44.221 CHRONIC TENSION-TYPE HEADACHE, INTRACTABLE: ICD-10-CM

## 2022-09-12 DIAGNOSIS — F32.9 REACTIVE DEPRESSION: ICD-10-CM

## 2022-09-12 DIAGNOSIS — I10 ESSENTIAL HYPERTENSION: ICD-10-CM

## 2022-09-12 DIAGNOSIS — E03.9 HYPOTHYROIDISM, UNSPECIFIED TYPE: ICD-10-CM

## 2022-09-15 RX ORDER — DULOXETIN HYDROCHLORIDE 60 MG/1
60 CAPSULE, DELAYED RELEASE ORAL EVERY MORNING
Qty: 90 CAPSULE | Refills: 2 | Status: SHIPPED | OUTPATIENT
Start: 2022-09-15 | End: 2022-11-12 | Stop reason: SDUPTHER

## 2022-09-15 RX ORDER — BUSPIRONE HYDROCHLORIDE 10 MG/1
10 TABLET ORAL EVERY 12 HOURS SCHEDULED
Qty: 180 TABLET | Refills: 2 | Status: SHIPPED | OUTPATIENT
Start: 2022-09-15 | End: 2022-11-12 | Stop reason: SDUPTHER

## 2022-09-15 RX ORDER — CYCLOBENZAPRINE HCL 10 MG
TABLET ORAL
Qty: 30 TABLET | Refills: 0 | Status: SHIPPED | OUTPATIENT
Start: 2022-09-15 | End: 2022-11-12 | Stop reason: SDUPTHER

## 2022-09-15 RX ORDER — ATORVASTATIN CALCIUM 80 MG/1
80 TABLET, FILM COATED ORAL NIGHTLY
Qty: 90 TABLET | Refills: 2 | Status: SHIPPED | OUTPATIENT
Start: 2022-09-15 | End: 2022-11-12 | Stop reason: SDUPTHER

## 2022-09-15 RX ORDER — OMEPRAZOLE 40 MG/1
40 CAPSULE, DELAYED RELEASE ORAL DAILY
Qty: 90 CAPSULE | Refills: 2 | Status: SHIPPED | OUTPATIENT
Start: 2022-09-15 | End: 2022-11-12 | Stop reason: SDUPTHER

## 2022-09-15 RX ORDER — LEVOTHYROXINE SODIUM 0.1 MG/1
100 TABLET ORAL
Qty: 90 TABLET | Refills: 2 | Status: SHIPPED | OUTPATIENT
Start: 2022-09-15 | End: 2022-11-12 | Stop reason: SDUPTHER

## 2022-09-15 NOTE — TELEPHONE ENCOUNTER
PATIENT CALLED TO RETURN CALL I ASKED WHICH HE WAS TAKING AND HE STATES THAT HE IS NOT SURE AND WOULD LIKE CALLBACK.    CALLBACK NUMBER IS  [phone]

## 2022-09-15 NOTE — TELEPHONE ENCOUNTER
I sent pt mychart msg and left pt detailed vm to call back with which med he is taking.    Slit Excision Additional Text (Leave Blank If You Do Not Want): A linear line was drawn on the skin overlying the lesion. An incision was made slowly until the lesion was visualized. Once visualized, the lesion was removed with blunt dissection.

## 2022-09-16 RX ORDER — METOPROLOL SUCCINATE 100 MG/1
100 TABLET, EXTENDED RELEASE ORAL DAILY
Qty: 90 TABLET | Refills: 2 | Status: SHIPPED | OUTPATIENT
Start: 2022-09-16 | End: 2022-11-12 | Stop reason: SDUPTHER

## 2022-09-16 RX ORDER — PREGABALIN 75 MG/1
75 CAPSULE ORAL 2 TIMES DAILY
Qty: 180 CAPSULE | Refills: 2 | Status: SHIPPED | OUTPATIENT
Start: 2022-09-16 | End: 2022-11-12 | Stop reason: SDUPTHER

## 2022-09-16 RX ORDER — BUPROPION HYDROCHLORIDE 150 MG/1
150 TABLET ORAL EVERY MORNING
Qty: 90 TABLET | Refills: 2 | Status: SHIPPED | OUTPATIENT
Start: 2022-09-16 | End: 2022-11-12 | Stop reason: SDUPTHER

## 2022-09-27 ENCOUNTER — PATIENT OUTREACH (OUTPATIENT)
Dept: CASE MANAGEMENT | Facility: OTHER | Age: 57
End: 2022-09-27

## 2022-09-27 NOTE — OUTREACH NOTE
AMBULATORY CASE MANAGEMENT NOTE    Name and Relationship of Patient/Support Person: Zeynep Kuldeep FIELD - Self    Send Education  Questions/Answers    Flowsheet Row Most Recent Value   Annual Wellness Visit:  Patient Has Completed  [scheduled for 1/26/23]   Other Patient Education/Resources  24/7 [facility] Nurse Call Line   24/7 Nurse Call Line Education Method Verbal   Advanced Directives: --  [materials provided]        SDOH updated and reviewed with the patient during this program:  Financial Resource Strain: Low Risk    • Difficulty of Paying Living Expenses: Not very hard      Food Insecurity: No Food Insecurity   • Worried About Running Out of Food in the Last Year: Never true   • Ran Out of Food in the Last Year: Never true      Transportation Needs: No Transportation Needs   • Lack of Transportation (Medical): No   • Lack of Transportation (Non-Medical): No     Patient Outreach    Follow up RN-ACM outreach call made to pt. He reports to be monitoring BG at home, reviewed readings, compliant with medications. Disease education provided. Updated SDOH. He denies any needs. AWV scheduled for 1/26/23. No questions per pt. Advised him to call with any needs. Follow up outreach as needed.     Education Documentation  Follow-Up Care, taught by Ioana Ellison, RN at 9/27/2022  4:11 PM.  Learner: Patient  Readiness: Acceptance  Method: Explanation  Response: Verbalizes Understanding    Healthy Food Choices, taught by Ioana Ellison, RN at 9/27/2022  4:11 PM.  Learner: Patient  Readiness: Acceptance  Method: Explanation  Response: Verbalizes Understanding    Insulin Therapy, taught by Ioana Ellison, RN at 9/27/2022  4:11 PM.  Learner: Patient  Readiness: Acceptance  Method: Explanation  Response: Verbalizes Understanding    Blood Glucose Monitoring, taught by Ioana Ellison, RN at 9/27/2022  4:11 PM.  Learner: Patient  Readiness: Acceptance  Method: Explanation  Response: Verbalizes Understanding          IOANA  P  Ambulatory Case Management    9/27/2022, 16:11 EDT

## 2022-09-29 ENCOUNTER — TELEPHONE (OUTPATIENT)
Dept: FAMILY MEDICINE CLINIC | Facility: CLINIC | Age: 57
End: 2022-09-29

## 2022-11-12 DIAGNOSIS — F32.9 REACTIVE DEPRESSION: ICD-10-CM

## 2022-11-12 DIAGNOSIS — G44.221 CHRONIC TENSION-TYPE HEADACHE, INTRACTABLE: ICD-10-CM

## 2022-11-12 DIAGNOSIS — E03.9 HYPOTHYROIDISM, UNSPECIFIED TYPE: ICD-10-CM

## 2022-11-12 DIAGNOSIS — I10 ESSENTIAL HYPERTENSION: ICD-10-CM

## 2022-11-12 DIAGNOSIS — M79.2 NEUROPATHIC PAIN: ICD-10-CM

## 2022-11-13 ENCOUNTER — TELEPHONE (OUTPATIENT)
Dept: FAMILY MEDICINE CLINIC | Facility: CLINIC | Age: 57
End: 2022-11-13

## 2022-11-13 RX ORDER — ATORVASTATIN CALCIUM 80 MG/1
80 TABLET, FILM COATED ORAL NIGHTLY
Qty: 90 TABLET | Refills: 2 | Status: SHIPPED | OUTPATIENT
Start: 2022-11-13 | End: 2023-03-12 | Stop reason: SDUPTHER

## 2022-11-13 RX ORDER — BUSPIRONE HYDROCHLORIDE 10 MG/1
10 TABLET ORAL EVERY 12 HOURS SCHEDULED
Qty: 180 TABLET | Refills: 2 | Status: SHIPPED | OUTPATIENT
Start: 2022-11-13 | End: 2023-03-12 | Stop reason: SDUPTHER

## 2022-11-13 RX ORDER — LEVOTHYROXINE SODIUM 0.1 MG/1
100 TABLET ORAL
Qty: 90 TABLET | Refills: 2 | Status: SHIPPED | OUTPATIENT
Start: 2022-11-13 | End: 2023-03-12 | Stop reason: SDUPTHER

## 2022-11-13 RX ORDER — CYCLOBENZAPRINE HCL 10 MG
TABLET ORAL
Qty: 30 TABLET | Refills: 0 | Status: SHIPPED | OUTPATIENT
Start: 2022-11-13 | End: 2023-03-12 | Stop reason: SDUPTHER

## 2022-11-13 RX ORDER — DULOXETIN HYDROCHLORIDE 60 MG/1
60 CAPSULE, DELAYED RELEASE ORAL EVERY MORNING
Qty: 90 CAPSULE | Refills: 2 | Status: SHIPPED | OUTPATIENT
Start: 2022-11-13 | End: 2023-03-12 | Stop reason: SDUPTHER

## 2022-11-13 RX ORDER — OMEPRAZOLE 40 MG/1
40 CAPSULE, DELAYED RELEASE ORAL DAILY
Qty: 90 CAPSULE | Refills: 2 | Status: SHIPPED | OUTPATIENT
Start: 2022-11-13 | End: 2023-03-12 | Stop reason: SDUPTHER

## 2022-11-13 NOTE — TELEPHONE ENCOUNTER
----- Message from Kuldeep Adhikari sent at 11/12/2022  2:11 PM EST -----  Regarding: Sensor  Contact: [phone]  Talk can you do me a favor and order a 3-month supply of the dexcom G6 sensor right now they're only sending me one and I need to see if you can send a prescription in for 3 months supply and for some reason these sensors after a while or either going bad thank you and have a blessed day

## 2022-11-14 NOTE — TELEPHONE ENCOUNTER
Lv: 7/19/22  Apt: 1/26/23  lyrica last written and sold 9/16/22  norco last written 9/2/22 sold: 9/8/22 by Leah Morris [address] [facility] [address]

## 2022-11-15 RX ORDER — BUPROPION HYDROCHLORIDE 150 MG/1
150 TABLET ORAL EVERY MORNING
Qty: 90 TABLET | Refills: 0 | Status: SHIPPED | OUTPATIENT
Start: 2022-11-15 | End: 2023-03-12 | Stop reason: SDUPTHER

## 2022-11-15 RX ORDER — PREGABALIN 75 MG/1
75 CAPSULE ORAL 2 TIMES DAILY
Qty: 180 CAPSULE | Refills: 0 | Status: SHIPPED | OUTPATIENT
Start: 2022-11-15 | End: 2023-03-12 | Stop reason: SDUPTHER

## 2022-11-15 RX ORDER — HYDROCODONE BITARTRATE AND ACETAMINOPHEN 5; 325 MG/1; MG/1
TABLET ORAL
OUTPATIENT
Start: 2022-11-15

## 2022-11-15 RX ORDER — METOPROLOL SUCCINATE 100 MG/1
100 TABLET, EXTENDED RELEASE ORAL DAILY
Qty: 90 TABLET | Refills: 0 | Status: SHIPPED | OUTPATIENT
Start: 2022-11-15 | End: 2023-03-12 | Stop reason: SDUPTHER

## 2022-11-15 RX ORDER — PANTOPRAZOLE SODIUM 40 MG/1
40 TABLET, DELAYED RELEASE ORAL 2 TIMES DAILY
OUTPATIENT
Start: 2022-11-15

## 2022-11-15 RX ORDER — AMLODIPINE BESYLATE 5 MG/1
TABLET ORAL
OUTPATIENT
Start: 2022-11-15

## 2022-12-12 ENCOUNTER — ANESTHESIA EVENT (OUTPATIENT)
Dept: GASTROENTEROLOGY | Facility: HOSPITAL | Age: 57
End: 2022-12-12

## 2022-12-13 ENCOUNTER — ANESTHESIA (OUTPATIENT)
Dept: GASTROENTEROLOGY | Facility: HOSPITAL | Age: 57
End: 2022-12-13

## 2022-12-13 ENCOUNTER — HOSPITAL ENCOUNTER (OUTPATIENT)
Facility: HOSPITAL | Age: 57
Setting detail: HOSPITAL OUTPATIENT SURGERY
Discharge: HOME | End: 2022-12-13
Attending: INTERNAL MEDICINE | Admitting: INTERNAL MEDICINE

## 2022-12-13 VITALS
WEIGHT: 196.87 LBS | DIASTOLIC BLOOD PRESSURE: 76 MMHG | SYSTOLIC BLOOD PRESSURE: 148 MMHG | BODY MASS INDEX: 29.84 KG/M2 | HEIGHT: 68 IN | TEMPERATURE: 98.9 F | RESPIRATION RATE: 12 BRPM | OXYGEN SATURATION: 97 % | HEART RATE: 67 BPM

## 2022-12-13 DIAGNOSIS — K20.0 EOSINOPHILIC ESOPHAGITIS: ICD-10-CM

## 2022-12-13 DIAGNOSIS — R13.10 DYSPHAGIA: ICD-10-CM

## 2022-12-13 DIAGNOSIS — K21.9 GERD (GASTROESOPHAGEAL REFLUX DISEASE): ICD-10-CM

## 2022-12-13 LAB
GLUCOSE BLDC GLUCOMTR-MCNC: 63 MG/DL (ref 70–105)
GLUCOSE BLDC GLUCOMTR-MCNC: 88 MG/DL (ref 70–105)

## 2022-12-13 PROCEDURE — 82962 GLUCOSE BLOOD TEST: CPT

## 2022-12-13 PROCEDURE — 88305 TISSUE EXAM BY PATHOLOGIST: CPT | Performed by: INTERNAL MEDICINE

## 2022-12-13 PROCEDURE — 25010000002 PROPOFOL 200 MG/20ML EMULSION: Performed by: NURSE ANESTHETIST, CERTIFIED REGISTERED

## 2022-12-13 RX ORDER — SODIUM CHLORIDE 9 MG/ML
9 INJECTION, SOLUTION INTRAVENOUS CONTINUOUS
Status: DISCONTINUED | OUTPATIENT
Start: 2022-12-13 | End: 2022-12-13 | Stop reason: HOSPADM

## 2022-12-13 RX ORDER — DEXTROSE MONOHYDRATE 25 G/50ML
25 INJECTION, SOLUTION INTRAVENOUS ONCE
Status: COMPLETED | OUTPATIENT
Start: 2022-12-13 | End: 2022-12-13

## 2022-12-13 RX ORDER — ONDANSETRON 2 MG/ML
4 INJECTION INTRAMUSCULAR; INTRAVENOUS ONCE AS NEEDED
Status: DISCONTINUED | OUTPATIENT
Start: 2022-12-13 | End: 2022-12-13 | Stop reason: HOSPADM

## 2022-12-13 RX ORDER — SODIUM CHLORIDE 0.9 % (FLUSH) 0.9 %
3 SYRINGE (ML) INJECTION EVERY 12 HOURS SCHEDULED
Status: DISCONTINUED | OUTPATIENT
Start: 2022-12-13 | End: 2022-12-13 | Stop reason: HOSPADM

## 2022-12-13 RX ORDER — SODIUM CHLORIDE 9 MG/ML
INJECTION, SOLUTION INTRAVENOUS CONTINUOUS PRN
Status: DISCONTINUED | OUTPATIENT
Start: 2022-12-13 | End: 2022-12-13 | Stop reason: SURG

## 2022-12-13 RX ORDER — LIDOCAINE HYDROCHLORIDE 20 MG/ML
INJECTION, SOLUTION EPIDURAL; INFILTRATION; INTRACAUDAL; PERINEURAL AS NEEDED
Status: DISCONTINUED | OUTPATIENT
Start: 2022-12-13 | End: 2022-12-13 | Stop reason: SURG

## 2022-12-13 RX ORDER — SODIUM CHLORIDE 0.9 % (FLUSH) 0.9 %
10 SYRINGE (ML) INJECTION AS NEEDED
Status: DISCONTINUED | OUTPATIENT
Start: 2022-12-13 | End: 2022-12-13 | Stop reason: HOSPADM

## 2022-12-13 RX ORDER — PROPOFOL 10 MG/ML
INJECTION, EMULSION INTRAVENOUS AS NEEDED
Status: DISCONTINUED | OUTPATIENT
Start: 2022-12-13 | End: 2022-12-13 | Stop reason: SURG

## 2022-12-13 RX ORDER — DEXTROSE MONOHYDRATE 25 G/50ML
INJECTION, SOLUTION INTRAVENOUS
Status: DISCONTINUED
Start: 2022-12-13 | End: 2022-12-13 | Stop reason: HOSPADM

## 2022-12-13 RX ADMIN — DEXTROSE MONOHYDRATE 25 ML: 25 INJECTION, SOLUTION INTRAVENOUS at 11:19

## 2022-12-13 RX ADMIN — PROPOFOL 50 MG: 10 INJECTION, EMULSION INTRAVENOUS at 11:38

## 2022-12-13 RX ADMIN — PROPOFOL 100 MG: 10 INJECTION, EMULSION INTRAVENOUS at 11:34

## 2022-12-13 RX ADMIN — SODIUM CHLORIDE 9 ML/HR: 9 INJECTION, SOLUTION INTRAVENOUS at 10:43

## 2022-12-13 RX ADMIN — SODIUM CHLORIDE: 0.9 INJECTION, SOLUTION INTRAVENOUS at 11:30

## 2022-12-13 RX ADMIN — PROPOFOL 100 MG: 10 INJECTION, EMULSION INTRAVENOUS at 11:32

## 2022-12-13 RX ADMIN — LIDOCAINE HYDROCHLORIDE 80 MG: 20 INJECTION, SOLUTION EPIDURAL; INFILTRATION; INTRACAUDAL; PERINEURAL at 11:32

## 2022-12-13 NOTE — NURSING NOTE
"Patient's blood glucose is 63 mg/dl and patient states \"he does not feel well\", notified Dr. Tejada and verbal order for dextrose 50% 25mg half of a dose was ordered.   "

## 2022-12-13 NOTE — ANESTHESIA PREPROCEDURE EVALUATION
Anesthesia Evaluation     Patient summary reviewed and Nursing notes reviewed   no history of anesthetic complications:  NPO Solid Status: > 8 hours  NPO Liquid Status: > 8 hours           Airway   Dental      Pulmonary    (+) pulmonary embolism, sleep apnea,   Cardiovascular     ECG reviewed  PT is on anticoagulation therapy  Patient on routine beta blocker    (+) hypertension, valvular problems/murmurs murmur, CAD, PVD, DVT, hyperlipidemia,       Neuro/Psych  (+) headaches, numbness, psychiatric history Depression,    GI/Hepatic/Renal/Endo    (+)  hiatal hernia, GERD,  renal disease CRI, diabetes mellitus using insulin, thyroid problem hypothyroidism    Musculoskeletal     Abdominal    Substance History      OB/GYN          Other   arthritis, blood dyscrasia anemia,     ROS/Med Hx Other: Low protein/albumin, dysphagia, gangrene of foot, cellulitis/osteomyelitis, chest pain, h/o DKA, fatigue, neuropathy, foot pain, low vit D, palpitations, allergies, retinopathy, IBS, esophageal stricture, h/o sepsis, bursitis, rash, hyperglycemia, acute encephalopathy    Echo  Normal LV size and contractility EF of 60 to 65%  Normal RV size  Mild left atrial enlargement seen  Aortic valve appears structurally normal  Mitral valve leaflets are thickened anterior mitral leaflet appears calcified, but has good cusp separation.   No significant MR seen.  Tricuspid valve appears structurally normal, no significant regurgitation seen.  No pericardial effusion seen.  Proximal aorta appears normal in size.    PSH  TOTAL HIP ARTHROPLASTY TOTAL HIP ARTHROPLASTY  ENDOSCOPY ENDOSCOPY  HERNIA REPAIR CARDIAC CATHETERIZATION  AMPUTATION FOOT / TOE GANGLION CYST EXCISION  RETINOPATHY SURGERY ENDOSCOPY  TRANS METATARSAL AMPUTATION BELOW KNEE AMPUTATION  AMPUTATION REVISION AMPUTATION REVISION  ENDOSCOPY EYE SURGERY  JOINT REPLACEMENT VASECTOMY  COLONOSCOPY AMPUTATION DIGIT  AMPUTATION DIGIT INCISION AND DRAINAGE OF WOUND                   Anesthesia Plan    ASA 4     MAC     (Patient identified; pre-operative vital signs, all relevant labs/studies, complete medical/surgical/anesthetic history, full medication list, full allergy list, and NPO status obtained/reviewed; physical assessment performed; anesthetic options, side effects, potential complications, risks, and benefits discussed; questions answered; written anesthesia consent obtained; patient cleared for procedure; anesthesia machine and equipment checked and functioning)    Anesthetic plan, risks, benefits, and alternatives have been provided, discussed and informed consent has been obtained with: patient.    Plan discussed with CRNA and CAA.        CODE STATUS:

## 2022-12-13 NOTE — OP NOTE
ESOPHAGOGASTRODUODENOSCOPY Procedure Report    Patient Name:  Kuldeep Adhikari  YOB: 1965    Date of Surgery:  12/13/2022     Pre-Op Diagnosis:  GERD (gastroesophageal reflux disease) [K21.9]  Dysphagia [R13.10]  Eosinophilic esophagitis [K20.0]       Postop diagnosis:  1.  Normal EGD    Procedure/CPT® Codes:      Procedure(s):  ESOPHAGOGASTRODUODENOSCOPY WITH DILATATION (BOUGIE #54, #56) AND BIOPSY X1    Staff:  Surgeon(s):  David Rodriguez MD      Anesthesia: Monitored Anesthesia Care    Description of Procedure:  A description of the procedure as well as risks, benefits and alternative methods were explained to the patient who voiced understanding and signed the corresponding consent form. A physical exam was performed and vital signs were monitored throughout the procedure.    An upper GI endoscope was placed into the mouth and proceeded through the esophagus, stomach and second portion of the duodenum without difficulty. The scope was then retroflexed and the fundus was visualized. The procedure was not difficult and there were no immediate complications.  There was no blood loss.      Findings:  1.  Normal esophageal mucosa entire esophagus, 54 French nonguided bougie dilator was advanced blindly into the esophagus with minimal to no resistance no trauma upon relook so the size was increased to 56 French nonguided bougie dilated advanced with minimal to no resistance no trauma upon relook.  Cold forcep biopsies of the mid and upper esophagus were taken to rule out eosinophilic esophagitis  2.  Normal gastric mucosa entire stomach  3.  Normal duodenal mucosa visualized to D3    Recommendations:  Continue PPI  Follow-up biopsy results to see if eosinophilic esophagitis is confirmed  Repeat EGD as needed for dilation if dysphagia returns  If no improvement in dysphagia, recommend esophageal manometry      David Rodriguez MD     Date: 12/13/2022    Time: 11:41 EST

## 2022-12-13 NOTE — H&P
GI CONSULT  NOTE:    Referring Provider:    Laura Whitaker MD  [unfilled]    Chief complaint: <principal problem not specified>    Subjective .       Pre op diagnosis  GERD (gastroesophageal reflux disease) [K21.9]  Dysphagia [R13.10]  Eosinophilic esophagitis [K20.0]      History of present illness:      Kuldeep Adhikari is a 57 y.o. male who presents today for Procedure(s):  ESOPHAGOGASTRODUODENOSCOPY for the indications listed below.     The updated Patient Profile was reviewed prior to the procedure, in conjunction with the Physical Exam, including medical conditions, surgical procedures, medications, allergies, family history and social history.     Pre-operatively, I reviewed the indication(s) for the procedure, the risks of the procedure [including but not limited to: unexpected bleeding possibly requiring hospitalization and/or unplanned repeat procedures, perforation possibly requiring surgical treatment, missed lesions and complications of sedation/MAC (also explained by anesthesia staff)].     I have evaluated the patient for risks associated with the planned anesthesia and the procedure to be performed and find the patient an acceptable candidate for IV sedation.    Multiple opportunities were provided for any questions or concerns, and all questions were answered satisfactorily before any anesthesia was administered. We will proceed with the planned procedure.    Past Medical History:  Past Medical History:   Diagnosis Date   • CKD (chronic kidney disease), stage III ([facility])    • Depression    • Depression with suicidal ideation 08/08/2021   • DJD (degenerative joint disease)    • DVT (deep venous thrombosis) ([facility])    • Ganglion     rt wrist   • Gangrene of foot ([facility]) 10/09/2015   • GERD (gastroesophageal reflux disease)    • Headache    • Heart murmur    • Hiatal hernia    • History of esophageal stricture     s/p dialation 40-50 times last EGD 04/2016   • Hyperlipidemia    •  Hypertension    • Hypothyroidism    • IBS (irritable bowel syndrome)    • Neuropathy    • Osteomyelitis of right foot ([facility]) 03/19/2020   • Pulmonary embolism ([facility]) 01/19/2017   • Rash     rt lower hip   • Retinopathy    • S/P BKA (below knee amputation), right ([facility]) 03/18/2022   • Seasonal allergies    • Sepsis due to group B Streptococcus ([facility]) 03/19/2020   • Sleep apnea    • Type 2 diabetes mellitus with peripheral vascular disease ([facility])    • Vitamin D deficiency        Past Surgical History:  Past Surgical History:   Procedure Laterality Date   • AMPUTATION DIGIT Left 4/26/2022    Procedure: AMPUTATION left great toe;  Surgeon: ERWIN Baker DPM;  Location: [facility] MAIN OR;  Service: Podiatry;  Laterality: Left;   • AMPUTATION DIGIT  4/26/2022    Procedure: ;  Surgeon: ERWIN Baker DPM;  Location: [facility] MAIN OR;  Service: Podiatry;;   • AMPUTATION FOOT / TOE Right     great toe   • AMPUTATION REVISION Right 4/8/2021    Procedure: BELOW KNEE AMPUTATION REVISAION;  Surgeon: Eduardo Reynolds MD;  Location: [facility] MAIN OR;  Service: Orthopedics;  Laterality: Right;   • AMPUTATION REVISION Right 4/29/2021    Procedure: AMPUTATION REVISION KNEE STUMP;  Surgeon: Eduardo Reynolds MD;  Location: [facility] MAIN OR;  Service: Orthopedics;  Laterality: Right;   • BELOW KNEE AMPUTATION Right 7/30/2020    Procedure: AMPUTATION BELOW KNEE;  Surgeon: Eduardo Reynolds MD;  Location: [facility] MAIN OR;  Service: Orthopedics;  Laterality: Right;   • CARDIAC CATHETERIZATION  04/2018    [facility]   • COLONOSCOPY     • ENDOSCOPY     • ENDOSCOPY N/A 10/4/2019    Procedure: ESOPHAGOGASTRODUODENOSCOPY with dilitation and biopsy x 1 area;  Surgeon: Declan Iqbal MD;  Location: [facility] ENDOSCOPY;  Service: Gastroenterology   • ENDOSCOPY N/A 12/13/2019    Procedure: ESOPHAGOGASTRODUODENOSCOPY WITH DILATATION (50, 52 BOUGIE);  Surgeon: Declan Iqbal MD;  Location: [facility] ENDOSCOPY;  Service: Gastroenterology   • ENDOSCOPY N/A  8/6/2021    Procedure: ESOPHAGOGASTRODUODENOSCOPY with biopsy x1 area and esophageal dilation (56FR Bougie);  Surgeon: Hussein Talavera MD;  Location: [facility] ENDOSCOPY;  Service: Gastroenterology;  Laterality: N/A;  post: hiatal hernia, erosive gastritis   • EYE SURGERY     • GANGLION CYST EXCISION Left    • HERNIA REPAIR Bilateral    • INCISION AND DRAINAGE OF WOUND Right 6/15/2022    Procedure: INCISION AND DRAINAGE WOUND with debridement;  Surgeon: Fito Forte MD;  Location: [facility] MAIN OR;  Service: Orthopedics;  Laterality: Right;   • JOINT REPLACEMENT      Left total hip   • RETINOPATHY SURGERY      laser   • TOTAL HIP ARTHROPLASTY Left    • TOTAL HIP ARTHROPLASTY Left 2018   • TRANS METATARSAL AMPUTATION Right 3/17/2020    Procedure: AMPUTATION TRANS METATARSAL right;  Surgeon: ERWIN Baker DPM;  Location: [facility] MAIN OR;  Service: Podiatry;  Laterality: Right;  GANGRENOUS RIGHT FOOT   • VASECTOMY         Social History:  Social History     Tobacco Use   • Smoking status: Never   • Smokeless tobacco: Never   Vaping Use   • Vaping Use: Never used   Substance Use Topics   • Alcohol use: No   • Drug use: No       Family History:  Family History   Problem Relation Age of Onset   • Diabetes Mother         Patient  mom   • Heart disease Mother    • Arthritis Mother    • Hyperlipidemia Mother    • Leukemia Father    • Sleep apnea Maternal Aunt         GENO   • Stroke Maternal Grandmother    • Hypertension Other    • Hyperlipidemia Other    • Cancer Other    • Colon cancer Other         uncle       Medications:  Medications Prior to Admission   Medication Sig Dispense Refill Last Dose   • amLODIPine (NORVASC) 5 MG tablet Take 5 mg by mouth Daily.   12/12/2022   • apixaban (Eliquis) 5 MG tablet tablet Take 1 tablet by mouth 2 (Two) Times a Day. 180 tablet 2 12/10/2022   • aspirin 81 MG EC tablet Take 1 tablet by mouth Daily.   12/10/2022   • atorvastatin (LIPITOR) 80 MG tablet Take 1 tablet by mouth Every  Night. 90 tablet 2 12/12/2022   • buPROPion XL (Wellbutrin XL) 150 MG 24 hr tablet Take 1 tablet by mouth Every Morning. 90 tablet 0 12/12/2022   • busPIRone (BUSPAR) 10 MG tablet Take 1 tablet by mouth Every 12 (Twelve) Hours for 30 days. 180 tablet 2 12/12/2022   • cyclobenzaprine (FLEXERIL) 10 MG tablet Take I tab q 8 hrs as needed for tension headaches 30 tablet 0 12/12/2022   • DULoxetine (CYMBALTA) 60 MG capsule Take 1 capsule by mouth Every Morning. 90 capsule 2 12/12/2022   • HYDROcodone-acetaminophen (NORCO) 5-325 MG per tablet Take 1 tablet by mouth Every Morning.   Past Week   • insulin aspart (NovoLOG FlexPen) 100 UNIT/ML solution pen-injector sc pen Inject 15 units before meals 3 times daily per sliding scale.  Maximum of 60 units per day. (Patient taking differently: Inject 15 Units under the skin into the appropriate area as directed 3 (Three) Times a Day With Meals. Inject 15 units before meals 3 times daily per sliding scale.  Maximum of 60 units per day.) 5 pen 5 12/12/2022   • Insulin Glargine (Lantus SoloStar) 100 UNIT/ML injection pen Inject 30 Units under the skin into the appropriate area as directed Daily With Breakfast (Patient taking differently: Inject 34 Units under the skin into the appropriate area as directed Every Night.) 15 mL 0    • levothyroxine (Synthroid) 100 MCG tablet Take 1 tablet by mouth Every Morning. 90 tablet 2 12/12/2022   • metoprolol succinate XL (TOPROL-XL) 100 MG 24 hr tablet Take 1 tablet by mouth Daily. 90 tablet 0 12/12/2022   • multivitamin with minerals tablet tablet Take 1 tablet by mouth Daily.   12/12/2022   • omeprazole (priLOSEC) 40 MG capsule Take 1 capsule by mouth Daily. 90 capsule 2 12/12/2022   • pantoprazole (PROTONIX) 40 MG EC tablet Take 40 mg by mouth 2 (Two) Times a Day.   12/12/2022   • pregabalin (Lyrica) 75 MG capsule Take 1 capsule by mouth 2 (Two) Times a Day. 180 capsule 0 12/12/2022   • Continuous Blood Gluc Sensor (FreeStyle Mark 2  "Sensor) misc       • insulin lispro (ADMELOG) 100 UNIT/ML injection Inject 0-24 Units under the skin into the appropriate area as directed 3 (Three) Times a Day Before Meals. (Patient not taking: Reported on 12/6/2022) 10 mL 12 Not Taking       Scheduled Meds:dextrose, , ,       Continuous Infusions:sodium chloride, 9 mL/hr, Last Rate: 9 mL/hr (12/13/22 1043)      PRN Meds:.    ALLERGIES:  Lisinopril    ROS:  The following systems were reviewed and negative;  Constitution:  No fevers, chills, no unintentional weight loss  Skin: no rash, no jaundice  Eyes:  No blurry vision, no eye pain  HENT:  No change in hearing or smell  Resp:  No dyspnea or cough  CV:  No chest pain or palpitations  :  No dysuria, hematuria  Musculoskeletal:  No leg cramps or arthralgias  Neuro:  No tremor, no numbness  Psych:  No depression or confsusion    Objective     Vital Signs:   Vitals:    12/06/22 1228 12/13/22 1035   BP:  153/49   BP Location:  Right leg   Patient Position:  Lying   Pulse:  74   Resp:  14   Temp:  98.9 °F (37.2 °C)   TempSrc:  Oral   SpO2:  100%   Weight: 86.2 kg (190 lb) 89.3 kg (196 lb 13.9 oz)   Height: 172.7 cm (68\") 172.7 cm (68\")       Physical Exam:       General Appearance:    Awake and alert, in no acute distress   Head:    Normocephalic, without obvious abnormality, atraumatic   Throat:   No oral lesions, no thrush, oral mucosa moist   Lungs:     respirations regular, even and unlabored   Skin:   No rash, no jaundice       Results Review:  Lab Results (last 24 hours)     Procedure Component Value Units Date/Time    POC Glucose Once [[id number]]  (Abnormal) Collected: 12/13/22 1020    Specimen: Blood Updated: 12/13/22 1022     Glucose 63 mg/dL      Comment: Serial Number: [id number]:  [id number]             Imaging Results (Last 24 Hours)     ** No results found for the last 24 hours. **           I reviewed the patient's labs and imaging.    ASSESSMENT AND PLAN:      Active Problems:    * No active " hospital problems. *       Procedure(s):  ESOPHAGOGASTRODUODENOSCOPY      I discussed the patient's findings and my recommendations with the patient.    David Rodriguez MD  12/13/22  11:29 EST

## 2022-12-13 NOTE — ANESTHESIA POSTPROCEDURE EVALUATION
Patient: Kuldeep Adhikari    Procedure Summary     Date: 12/13/22 Room / Location: [facility] ENDOSCOPY 1 / [facility] ENDOSCOPY    Anesthesia Start: 1125 Anesthesia Stop: 1146    Procedure: ESOPHAGOGASTRODUODENOSCOPY WITH DILATATION (BOUGIE #54, #56) AND BIOPSY X1 Diagnosis:       GERD (gastroesophageal reflux disease)      Dysphagia      Eosinophilic esophagitis      (GERD (gastroesophageal reflux disease) [K21.9])      (Dysphagia [R13.10])      (Eosinophilic esophagitis [K20.0])    Surgeons: David Rodriguez MD Provider: Jose Tejada MD    Anesthesia Type: MAC ASA Status: 4          Anesthesia Type: MAC    Vitals  Vitals Value Taken Time   /76 12/13/22 1215   Temp     Pulse 69 12/13/22 1218   Resp 12 12/13/22 1210   SpO2 97 % 12/13/22 1218   Vitals shown include unvalidated device data.        Post Anesthesia Care and Evaluation    Patient location during evaluation: PACU  Patient participation: complete - patient participated  Level of consciousness: awake  Pain scale: See nurse's notes for pain score.  Pain management: adequate    Airway patency: patent  Anesthetic complications: No anesthetic complications  PONV Status: none  Cardiovascular status: acceptable  Respiratory status: acceptable and spontaneous ventilation  Hydration status: acceptable    Comments: Patient seen and examined postoperatively; vital signs stable; SpO2 greater than or equal to 90%; cardiopulmonary status stable; nausea/vomiting adequately controlled; pain adequately controlled; no apparent anesthesia complications; patient discharged from anesthesia care when discharge criteria were met

## 2022-12-13 NOTE — DISCHARGE INSTRUCTIONS
A responsible adult should stay with you and you should rest quietly for the rest of the day.    Do not drink alcohol, drive operate any heavy machinery or power tools or make any legal/important decisions for the next 24 hours.    Progress your diet as tolerated.  If you begin to experience severe pain, increased shortness of breath, racing heartbeat or a fever above 101F, seek immediate medical attention.     Follow up with MD as instructed.  Call office for results in 3 to 5 days if needed.    Findings:  1.  Normal esophageal mucosa entire esophagus, 54 French nonguided bougie dilator was advanced blindly into the esophagus with minimal to no resistance no trauma upon relook so the size was increased to 56 French nonguided bougie dilated advanced with minimal to no resistance no trauma upon relook.  Cold forcep biopsies of the mid and upper esophagus were taken to rule out eosinophilic esophagitis  2.  Normal gastric mucosa entire stomach  3.  Normal duodenal mucosa visualized to D3     Recommendations:  Continue PPI  Follow-up biopsy results to see if eosinophilic esophagitis is confirmed  Repeat EGD as needed for dilation if dysphagia returns  If no improvement in dysphagia, recommend esophageal manometry     David Rodriguez MD      Date: 12/13/2022    Time: 11:41 EST

## 2022-12-14 LAB
LAB AP CASE REPORT: NORMAL
LAB AP CLINICAL INFORMATION: NORMAL
PATH REPORT.FINAL DX SPEC: NORMAL
PATH REPORT.GROSS SPEC: NORMAL

## 2022-12-15 ENCOUNTER — ON CAMPUS - OUTPATIENT (AMBULATORY)
Dept: URBAN - METROPOLITAN AREA HOSPITAL 85 | Facility: HOSPITAL | Age: 57
End: 2022-12-15

## 2022-12-15 DIAGNOSIS — R13.10 DYSPHAGIA, UNSPECIFIED: ICD-10-CM

## 2022-12-15 DIAGNOSIS — K21.9 GASTRO-ESOPHAGEAL REFLUX DISEASE WITHOUT ESOPHAGITIS: ICD-10-CM

## 2022-12-15 DIAGNOSIS — K20.0 EOSINOPHILIC ESOPHAGITIS: ICD-10-CM

## 2022-12-15 PROCEDURE — 43450 DILATE ESOPHAGUS 1/MULT PASS: CPT | Performed by: INTERNAL MEDICINE

## 2022-12-15 PROCEDURE — 43239 EGD BIOPSY SINGLE/MULTIPLE: CPT | Performed by: INTERNAL MEDICINE

## 2022-12-21 NOTE — TELEPHONE ENCOUNTER
Pt has called and stated he was admitted in to [facility] to the psych unit and they saw  His foot and did x ray and CT on it stated it has another infection and has started IV antibiotics. They wanted you to be aware he has an apt with us July 2nd for a follow up I have advised him if anything at all get any worse he needs to head to the ED an he needs to stay non weight bearing if possible at all.   
Thank you. That is fine.  
5

## 2023-01-26 ENCOUNTER — OFFICE VISIT (OUTPATIENT)
Dept: FAMILY MEDICINE CLINIC | Facility: CLINIC | Age: 58
End: 2023-01-26
Payer: MEDICARE

## 2023-01-26 VITALS
SYSTOLIC BLOOD PRESSURE: 120 MMHG | WEIGHT: 200 LBS | HEART RATE: 64 BPM | DIASTOLIC BLOOD PRESSURE: 75 MMHG | BODY MASS INDEX: 29.62 KG/M2 | OXYGEN SATURATION: 99 % | HEIGHT: 69 IN | TEMPERATURE: 98.2 F

## 2023-01-26 DIAGNOSIS — Z00.00 ENCOUNTER FOR ANNUAL WELLNESS EXAM IN MEDICARE PATIENT: Primary | ICD-10-CM

## 2023-01-26 DIAGNOSIS — I10 PRIMARY HYPERTENSION: ICD-10-CM

## 2023-01-26 DIAGNOSIS — E78.2 MIXED HYPERLIPIDEMIA: Chronic | ICD-10-CM

## 2023-01-26 DIAGNOSIS — M25.511 RIGHT SHOULDER PAIN, UNSPECIFIED CHRONICITY: ICD-10-CM

## 2023-01-26 PROBLEM — M54.2 NECK PAIN: Status: ACTIVE | Noted: 2023-01-26

## 2023-01-26 PROBLEM — M54.12 CERVICAL RADICULOPATHY: Status: ACTIVE | Noted: 2022-08-02

## 2023-01-26 PROBLEM — M47.816 LUMBAR SPONDYLOSIS: Status: ACTIVE | Noted: 2023-01-26

## 2023-01-26 PROCEDURE — 1170F FXNL STATUS ASSESSED: CPT | Performed by: FAMILY MEDICINE

## 2023-01-26 PROCEDURE — 99213 OFFICE O/P EST LOW 20 MIN: CPT | Performed by: FAMILY MEDICINE

## 2023-01-26 PROCEDURE — 1125F AMNT PAIN NOTED PAIN PRSNT: CPT | Performed by: FAMILY MEDICINE

## 2023-01-26 PROCEDURE — 1159F MED LIST DOCD IN RCRD: CPT | Performed by: FAMILY MEDICINE

## 2023-01-26 PROCEDURE — G0439 PPPS, SUBSEQ VISIT: HCPCS | Performed by: FAMILY MEDICINE

## 2023-01-28 DIAGNOSIS — Z79.4 TYPE 2 DIABETES MELLITUS WITH HYPERGLYCEMIA, WITH LONG-TERM CURRENT USE OF INSULIN: ICD-10-CM

## 2023-01-28 DIAGNOSIS — E11.65 TYPE 2 DIABETES MELLITUS WITH HYPERGLYCEMIA, WITH LONG-TERM CURRENT USE OF INSULIN: ICD-10-CM

## 2023-01-30 RX ORDER — INSULIN GLARGINE 100 [IU]/ML
INJECTION, SOLUTION SUBCUTANEOUS
Qty: 30 ML | Refills: 0 | Status: SHIPPED | OUTPATIENT
Start: 2023-01-30

## 2023-01-30 RX ORDER — INSULIN ASPART 100 [IU]/ML
INJECTION, SOLUTION INTRAVENOUS; SUBCUTANEOUS
Qty: 15 ML | Refills: 0 | Status: SHIPPED | OUTPATIENT
Start: 2023-01-30 | End: 2023-03-15

## 2023-02-14 PROBLEM — Z00.00 ENCOUNTER FOR ANNUAL WELLNESS EXAM IN MEDICARE PATIENT: Status: ACTIVE | Noted: 2017-01-19

## 2023-02-14 NOTE — TELEPHONE ENCOUNTER
Caller: Kuldeep Adhikari    Relationship to patient: Self     Best call back number: [phone]    Patient is needing: PATIENT STATES THAT [employer] [facility] [employer] [facility] IS REQUESTING TO GET CLEARANCE FOR HIM TO BE OFF OF apixaban (Eliquis) 5 MG tablet tablet AND aspirin 81 MG EC tablet BECAUSE HE WILL BE RECIEVING AN EPIDURAL SHOT IN THE BACK OF HIS NECK.PATIENT PROVIDED NUMBER FOR [facility]: [phone]   
Was this the same company that needed the clearance yesterday?  
Yes. I re-faxed it.   
Sonia Antonio DO

## 2023-02-27 NOTE — PROGRESS NOTES
"Chief Complaint  Sleep Apnea    Subjective          Kuldeep Adhikari presents to [facility] NEUROLOG[facility]  History of Present Illness  GENO f/u patient states he is benefiting from pap therapy but has not been sleeping well due to vertigo and shoulder pain    he uses [facility] and goes through [employer] for supplies.    Pt was seen for stroke follow up about a year ago,   Hst ordered which showed:  Sleep testing history:    On NPSG at [facility] , 12/7/21 patient had Mild obstructive sleep apnea syndrome with apnea-hypopnea index of 9.2 per sleep hour, minimum SpO2 of 79% but severe supine at 30 per hour    Pt currently unable to sleep in side positions due to shoulder pain     PAP download: will review when available     [place] Sleepiness Scale:  Sitting and reading 0 WatchingTV 0  Sitting, inactive, in a public place 0  As a passenger in a car for 1 hour w/o a break  0  Lying down to rest in the afternoon  3  Sitting and talking to someone  0  Sitting quietly after a lunch  0  In a car, while stopped for traffic or a light  0  Total 3    Review of Systems   HENT: Positive for dental problem, postnasal drip and sinus pressure.    Eyes: Positive for itching.   Respiratory: Positive for shortness of breath.    Musculoskeletal: Positive for back pain and neck pain.   Neurological: Positive for dizziness (vertigo) and headaches.   Psychiatric/Behavioral: Positive for sleep disturbance.   All other systems reviewed and are negative.        Objective   Vital Signs:   /74   Pulse 69   Temp 97.9 °F (36.6 °C) (Infrared)   Resp 18   Ht 175.3 cm (69\")   Wt 90.7 kg (200 lb)   BMI 29.53 kg/m²     Physical Exam  Vitals reviewed.   Eyes:      Conjunctiva/sclera: Conjunctivae normal.   Cardiovascular:      Rate and Rhythm: Normal rate.   Pulmonary:      Effort: Pulmonary effort is normal. No respiratory distress.   Neurological:      Mental Status: He is alert and oriented to person, place, and time.        Result Review " :                 Assessment and Plan    Diagnoses and all orders for this visit:    1. Obstructive sleep apnea syndrome (Primary)      Continue CPAP, will review scr when available     The patient is compliant with and benefiting from PAP therapy.      Follow Up   Return in about 1 year (around 2/28/2024).    Patient was given instructions and counseling regarding his condition or for health maintenance advice. Please see specific information pulled into the AVS if appropriate.       This document has been electronically signed by Joseph Seipel, MD on February 28, 2023 08:39 EST

## 2023-02-28 ENCOUNTER — OFFICE VISIT (OUTPATIENT)
Dept: NEUROLOGY | Facility: CLINIC | Age: 58
End: 2023-02-28
Payer: MEDICARE

## 2023-02-28 VITALS
RESPIRATION RATE: 18 BRPM | DIASTOLIC BLOOD PRESSURE: 74 MMHG | WEIGHT: 200 LBS | TEMPERATURE: 97.9 F | SYSTOLIC BLOOD PRESSURE: 160 MMHG | HEIGHT: 69 IN | BODY MASS INDEX: 29.62 KG/M2 | HEART RATE: 69 BPM

## 2023-02-28 DIAGNOSIS — G47.33 OBSTRUCTIVE SLEEP APNEA SYNDROME: Primary | Chronic | ICD-10-CM

## 2023-02-28 PROCEDURE — 99213 OFFICE O/P EST LOW 20 MIN: CPT | Performed by: PSYCHIATRY & NEUROLOGY

## 2023-03-07 ENCOUNTER — HOSPITAL ENCOUNTER (OUTPATIENT)
Facility: HOSPITAL | Age: 58
Setting detail: OBSERVATION
Discharge: HOME | End: 2023-03-08
Attending: EMERGENCY MEDICINE | Admitting: EMERGENCY MEDICINE
Payer: MEDICARE

## 2023-03-07 ENCOUNTER — APPOINTMENT (OUTPATIENT)
Dept: CT IMAGING | Facility: HOSPITAL | Age: 58
End: 2023-03-07
Payer: MEDICARE

## 2023-03-07 DIAGNOSIS — R42 DIZZINESS: Primary | ICD-10-CM

## 2023-03-07 DIAGNOSIS — M54.2 NECK PAIN: ICD-10-CM

## 2023-03-07 LAB
ALBUMIN SERPL-MCNC: 4.3 G/DL (ref 3.5–5.2)
ALBUMIN/GLOB SERPL: 1.5 G/DL
ALP SERPL-CCNC: 130 U/L (ref 39–117)
ALT SERPL W P-5'-P-CCNC: 24 U/L (ref 1–41)
ANION GAP SERPL CALCULATED.3IONS-SCNC: 9 MMOL/L (ref 5–15)
APTT PPP: 26.8 SECONDS (ref 24–31)
AST SERPL-CCNC: 19 U/L (ref 1–40)
BASOPHILS # BLD AUTO: 0.1 10*3/MM3 (ref 0–0.2)
BASOPHILS NFR BLD AUTO: 1.1 % (ref 0–1.5)
BILIRUB SERPL-MCNC: 0.6 MG/DL (ref 0–1.2)
BILIRUB UR QL STRIP: NEGATIVE
BUN SERPL-MCNC: 25 MG/DL (ref 6–20)
BUN/CREAT SERPL: 18 (ref 7–25)
CALCIUM SPEC-SCNC: 9.8 MG/DL (ref 8.6–10.5)
CHLORIDE SERPL-SCNC: 105 MMOL/L (ref 98–107)
CLARITY UR: CLEAR
CO2 SERPL-SCNC: 28 MMOL/L (ref 22–29)
COLOR UR: YELLOW
CREAT SERPL-MCNC: 1.39 MG/DL (ref 0.76–1.27)
DEPRECATED RDW RBC AUTO: 44.6 FL (ref 37–54)
EGFRCR SERPLBLD CKD-EPI 2021: 58.8 ML/MIN/1.73
EOSINOPHIL # BLD AUTO: 0.4 10*3/MM3 (ref 0–0.4)
EOSINOPHIL NFR BLD AUTO: 4.8 % (ref 0.3–6.2)
ERYTHROCYTE [DISTWIDTH] IN BLOOD BY AUTOMATED COUNT: 16.8 % (ref 12.3–15.4)
GLOBULIN UR ELPH-MCNC: 2.8 GM/DL
GLUCOSE SERPL-MCNC: 225 MG/DL (ref 65–99)
GLUCOSE UR STRIP-MCNC: ABNORMAL MG/DL
HCT VFR BLD AUTO: 42.5 % (ref 37.5–51)
HGB BLD-MCNC: 13 G/DL (ref 13–17.7)
HGB UR QL STRIP.AUTO: NEGATIVE
INR PPP: 1.01 (ref 0.93–1.1)
KETONES UR QL STRIP: ABNORMAL
LEUKOCYTE ESTERASE UR QL STRIP.AUTO: NEGATIVE
LYMPHOCYTES # BLD AUTO: 1.6 10*3/MM3 (ref 0.7–3.1)
LYMPHOCYTES NFR BLD AUTO: 20.5 % (ref 19.6–45.3)
MAGNESIUM SERPL-MCNC: 2 MG/DL (ref 1.6–2.6)
MCH RBC QN AUTO: 23.3 PG (ref 26.6–33)
MCHC RBC AUTO-ENTMCNC: 30.6 G/DL (ref 31.5–35.7)
MCV RBC AUTO: 76.1 FL (ref 79–97)
MONOCYTES # BLD AUTO: 0.8 10*3/MM3 (ref 0.1–0.9)
MONOCYTES NFR BLD AUTO: 10.1 % (ref 5–12)
NEUTROPHILS NFR BLD AUTO: 4.8 10*3/MM3 (ref 1.7–7)
NEUTROPHILS NFR BLD AUTO: 63.5 % (ref 42.7–76)
NITRITE UR QL STRIP: NEGATIVE
NRBC BLD AUTO-RTO: 0.1 /100 WBC (ref 0–0.2)
PH UR STRIP.AUTO: <=5 [PH] (ref 5–8)
PLATELET # BLD AUTO: 274 10*3/MM3 (ref 140–450)
PMV BLD AUTO: 8.3 FL (ref 6–12)
POTASSIUM SERPL-SCNC: 5.2 MMOL/L (ref 3.5–5.2)
PROT SERPL-MCNC: 7.1 G/DL (ref 6–8.5)
PROT UR QL STRIP: NEGATIVE
PROTHROMBIN TIME: 10.4 SECONDS (ref 9.6–11.7)
RBC # BLD AUTO: 5.58 10*6/MM3 (ref 4.14–5.8)
SODIUM SERPL-SCNC: 142 MMOL/L (ref 136–145)
SP GR UR STRIP: 1.02 (ref 1–1.03)
TROPONIN T SERPL HS-MCNC: 12 NG/L
TROPONIN T SERPL HS-MCNC: 15 NG/L
TSH SERPL DL<=0.05 MIU/L-ACNC: 4.73 UIU/ML (ref 0.27–4.2)
UROBILINOGEN UR QL STRIP: ABNORMAL
WBC NRBC COR # BLD: 7.6 10*3/MM3 (ref 3.4–10.8)

## 2023-03-07 PROCEDURE — 83735 ASSAY OF MAGNESIUM: CPT | Performed by: NURSE PRACTITIONER

## 2023-03-07 PROCEDURE — 85730 THROMBOPLASTIN TIME PARTIAL: CPT | Performed by: NURSE PRACTITIONER

## 2023-03-07 PROCEDURE — 70450 CT HEAD/BRAIN W/O DYE: CPT

## 2023-03-07 PROCEDURE — 99285 EMERGENCY DEPT VISIT HI MDM: CPT

## 2023-03-07 PROCEDURE — 96374 THER/PROPH/DIAG INJ IV PUSH: CPT

## 2023-03-07 PROCEDURE — 25010000002 LORAZEPAM PER 2 MG: Performed by: NURSE PRACTITIONER

## 2023-03-07 PROCEDURE — G0378 HOSPITAL OBSERVATION PER HR: HCPCS

## 2023-03-07 PROCEDURE — 93005 ELECTROCARDIOGRAM TRACING: CPT | Performed by: EMERGENCY MEDICINE

## 2023-03-07 PROCEDURE — 85025 COMPLETE CBC W/AUTO DIFF WBC: CPT | Performed by: NURSE PRACTITIONER

## 2023-03-07 PROCEDURE — 81003 URINALYSIS AUTO W/O SCOPE: CPT | Performed by: NURSE PRACTITIONER

## 2023-03-07 PROCEDURE — 84443 ASSAY THYROID STIM HORMONE: CPT | Performed by: NURSE PRACTITIONER

## 2023-03-07 PROCEDURE — 85610 PROTHROMBIN TIME: CPT | Performed by: NURSE PRACTITIONER

## 2023-03-07 PROCEDURE — 80053 COMPREHEN METABOLIC PANEL: CPT | Performed by: NURSE PRACTITIONER

## 2023-03-07 PROCEDURE — 84484 ASSAY OF TROPONIN QUANT: CPT | Performed by: NURSE PRACTITIONER

## 2023-03-07 PROCEDURE — 93005 ELECTROCARDIOGRAM TRACING: CPT

## 2023-03-07 RX ORDER — LORAZEPAM 2 MG/ML
1 INJECTION INTRAMUSCULAR ONCE
Status: COMPLETED | OUTPATIENT
Start: 2023-03-07 | End: 2023-03-07

## 2023-03-07 RX ORDER — ACETAMINOPHEN 325 MG/1
650 TABLET ORAL EVERY 4 HOURS PRN
Status: DISCONTINUED | OUTPATIENT
Start: 2023-03-07 | End: 2023-03-08 | Stop reason: HOSPADM

## 2023-03-07 RX ORDER — ONDANSETRON 2 MG/ML
4 INJECTION INTRAMUSCULAR; INTRAVENOUS EVERY 6 HOURS PRN
Status: DISCONTINUED | OUTPATIENT
Start: 2023-03-07 | End: 2023-03-08 | Stop reason: HOSPADM

## 2023-03-07 RX ORDER — SODIUM CHLORIDE 9 MG/ML
40 INJECTION, SOLUTION INTRAVENOUS AS NEEDED
Status: DISCONTINUED | OUTPATIENT
Start: 2023-03-07 | End: 2023-03-08 | Stop reason: HOSPADM

## 2023-03-07 RX ORDER — SODIUM CHLORIDE 0.9 % (FLUSH) 0.9 %
10 SYRINGE (ML) INJECTION AS NEEDED
Status: DISCONTINUED | OUTPATIENT
Start: 2023-03-07 | End: 2023-03-08 | Stop reason: HOSPADM

## 2023-03-07 RX ORDER — CHOLECALCIFEROL (VITAMIN D3) 125 MCG
5 CAPSULE ORAL NIGHTLY PRN
Status: DISCONTINUED | OUTPATIENT
Start: 2023-03-07 | End: 2023-03-08 | Stop reason: HOSPADM

## 2023-03-07 RX ORDER — SODIUM CHLORIDE 0.9 % (FLUSH) 0.9 %
10 SYRINGE (ML) INJECTION EVERY 12 HOURS SCHEDULED
Status: DISCONTINUED | OUTPATIENT
Start: 2023-03-07 | End: 2023-03-08 | Stop reason: HOSPADM

## 2023-03-07 RX ADMIN — Medication 10 ML: at 23:38

## 2023-03-07 RX ADMIN — LORAZEPAM 1 MG: 2 INJECTION INTRAMUSCULAR; INTRAVENOUS at 19:47

## 2023-03-07 RX ADMIN — SODIUM CHLORIDE 500 ML: 9 INJECTION, SOLUTION INTRAVENOUS at 20:36

## 2023-03-08 ENCOUNTER — APPOINTMENT (OUTPATIENT)
Dept: MRI IMAGING | Facility: HOSPITAL | Age: 58
End: 2023-03-08
Payer: MEDICARE

## 2023-03-08 ENCOUNTER — READMISSION MANAGEMENT (OUTPATIENT)
Dept: CALL CENTER | Facility: HOSPITAL | Age: 58
End: 2023-03-08
Payer: MEDICARE

## 2023-03-08 VITALS
SYSTOLIC BLOOD PRESSURE: 121 MMHG | HEIGHT: 68 IN | TEMPERATURE: 97.8 F | OXYGEN SATURATION: 97 % | RESPIRATION RATE: 16 BRPM | DIASTOLIC BLOOD PRESSURE: 77 MMHG | WEIGHT: 191.2 LBS | BODY MASS INDEX: 28.98 KG/M2 | HEART RATE: 75 BPM

## 2023-03-08 LAB
ANION GAP SERPL CALCULATED.3IONS-SCNC: 8 MMOL/L (ref 5–15)
BASOPHILS # BLD AUTO: 0.1 10*3/MM3 (ref 0–0.2)
BASOPHILS NFR BLD AUTO: 1 % (ref 0–1.5)
BUN SERPL-MCNC: 25 MG/DL (ref 6–20)
BUN/CREAT SERPL: 17.9 (ref 7–25)
CALCIUM SPEC-SCNC: 9.1 MG/DL (ref 8.6–10.5)
CHLORIDE SERPL-SCNC: 103 MMOL/L (ref 98–107)
CO2 SERPL-SCNC: 27 MMOL/L (ref 22–29)
CREAT SERPL-MCNC: 1.4 MG/DL (ref 0.76–1.27)
DEPRECATED RDW RBC AUTO: 44.6 FL (ref 37–54)
EGFRCR SERPLBLD CKD-EPI 2021: 58.3 ML/MIN/1.73
EOSINOPHIL # BLD AUTO: 0.4 10*3/MM3 (ref 0–0.4)
EOSINOPHIL NFR BLD AUTO: 5.2 % (ref 0.3–6.2)
ERYTHROCYTE [DISTWIDTH] IN BLOOD BY AUTOMATED COUNT: 16.7 % (ref 12.3–15.4)
GLUCOSE BLDC GLUCOMTR-MCNC: 172 MG/DL (ref 70–105)
GLUCOSE BLDC GLUCOMTR-MCNC: 180 MG/DL (ref 70–105)
GLUCOSE BLDC GLUCOMTR-MCNC: 213 MG/DL (ref 70–105)
GLUCOSE SERPL-MCNC: 302 MG/DL (ref 65–99)
HBA1C MFR BLD: 9 % (ref 4.8–5.6)
HCT VFR BLD AUTO: 40 % (ref 37.5–51)
HGB BLD-MCNC: 12.2 G/DL (ref 13–17.7)
LYMPHOCYTES # BLD AUTO: 1.9 10*3/MM3 (ref 0.7–3.1)
LYMPHOCYTES NFR BLD AUTO: 25.8 % (ref 19.6–45.3)
MCH RBC QN AUTO: 23.1 PG (ref 26.6–33)
MCHC RBC AUTO-ENTMCNC: 30.6 G/DL (ref 31.5–35.7)
MCV RBC AUTO: 75.6 FL (ref 79–97)
MONOCYTES # BLD AUTO: 0.7 10*3/MM3 (ref 0.1–0.9)
MONOCYTES NFR BLD AUTO: 9.8 % (ref 5–12)
NEUTROPHILS NFR BLD AUTO: 4.2 10*3/MM3 (ref 1.7–7)
NEUTROPHILS NFR BLD AUTO: 58.2 % (ref 42.7–76)
NRBC BLD AUTO-RTO: 0 /100 WBC (ref 0–0.2)
PLATELET # BLD AUTO: 229 10*3/MM3 (ref 140–450)
PMV BLD AUTO: 8.3 FL (ref 6–12)
POTASSIUM SERPL-SCNC: 4.9 MMOL/L (ref 3.5–5.2)
RBC # BLD AUTO: 5.29 10*6/MM3 (ref 4.14–5.8)
SODIUM SERPL-SCNC: 138 MMOL/L (ref 136–145)
T4 FREE SERPL-MCNC: 1.09 NG/DL (ref 0.93–1.7)
WBC NRBC COR # BLD: 7.3 10*3/MM3 (ref 3.4–10.8)

## 2023-03-08 PROCEDURE — 70549 MR ANGIOGRAPH NECK W/O&W/DYE: CPT

## 2023-03-08 PROCEDURE — 25010000002 GADOTERIDOL PER 1 ML: Performed by: EMERGENCY MEDICINE

## 2023-03-08 PROCEDURE — 25010000002 LORAZEPAM PER 2 MG: Performed by: PHYSICIAN ASSISTANT

## 2023-03-08 PROCEDURE — 97162 PT EVAL MOD COMPLEX 30 MIN: CPT

## 2023-03-08 PROCEDURE — G0378 HOSPITAL OBSERVATION PER HR: HCPCS

## 2023-03-08 PROCEDURE — 70544 MR ANGIOGRAPHY HEAD W/O DYE: CPT

## 2023-03-08 PROCEDURE — 70551 MRI BRAIN STEM W/O DYE: CPT

## 2023-03-08 PROCEDURE — 96376 TX/PRO/DX INJ SAME DRUG ADON: CPT

## 2023-03-08 PROCEDURE — 96375 TX/PRO/DX INJ NEW DRUG ADDON: CPT

## 2023-03-08 PROCEDURE — 80048 BASIC METABOLIC PNL TOTAL CA: CPT | Performed by: EMERGENCY MEDICINE

## 2023-03-08 PROCEDURE — 25010000002 ONDANSETRON PER 1 MG: Performed by: EMERGENCY MEDICINE

## 2023-03-08 PROCEDURE — 85025 COMPLETE CBC W/AUTO DIFF WBC: CPT | Performed by: EMERGENCY MEDICINE

## 2023-03-08 PROCEDURE — A9579 GAD-BASE MR CONTRAST NOS,1ML: HCPCS | Performed by: EMERGENCY MEDICINE

## 2023-03-08 PROCEDURE — 97116 GAIT TRAINING THERAPY: CPT

## 2023-03-08 PROCEDURE — 63710000001 INSULIN LISPRO (HUMAN) PER 5 UNITS: Performed by: PHYSICIAN ASSISTANT

## 2023-03-08 PROCEDURE — 82962 GLUCOSE BLOOD TEST: CPT

## 2023-03-08 PROCEDURE — 83036 HEMOGLOBIN GLYCOSYLATED A1C: CPT | Performed by: PHYSICIAN ASSISTANT

## 2023-03-08 PROCEDURE — 97530 THERAPEUTIC ACTIVITIES: CPT

## 2023-03-08 PROCEDURE — 84439 ASSAY OF FREE THYROXINE: CPT | Performed by: PHYSICIAN ASSISTANT

## 2023-03-08 RX ORDER — HYDROCODONE BITARTRATE AND ACETAMINOPHEN 5; 325 MG/1; MG/1
1 TABLET ORAL EVERY MORNING
Status: DISCONTINUED | OUTPATIENT
Start: 2023-03-08 | End: 2023-03-08 | Stop reason: HOSPADM

## 2023-03-08 RX ORDER — NICOTINE POLACRILEX 4 MG
15 LOZENGE BUCCAL
Status: DISCONTINUED | OUTPATIENT
Start: 2023-03-08 | End: 2023-03-08 | Stop reason: HOSPADM

## 2023-03-08 RX ORDER — LEVOTHYROXINE SODIUM 0.1 MG/1
100 TABLET ORAL
Status: DISCONTINUED | OUTPATIENT
Start: 2023-03-08 | End: 2023-03-08 | Stop reason: HOSPADM

## 2023-03-08 RX ORDER — MECLIZINE HYDROCHLORIDE 25 MG/1
25 TABLET ORAL 3 TIMES DAILY PRN
Status: DISCONTINUED | OUTPATIENT
Start: 2023-03-08 | End: 2023-03-08 | Stop reason: HOSPADM

## 2023-03-08 RX ORDER — HYDROCODONE BITARTRATE AND ACETAMINOPHEN 5; 325 MG/1; MG/1
1 TABLET ORAL ONCE
Status: COMPLETED | OUTPATIENT
Start: 2023-03-08 | End: 2023-03-08

## 2023-03-08 RX ORDER — DULOXETIN HYDROCHLORIDE 30 MG/1
60 CAPSULE, DELAYED RELEASE ORAL EVERY MORNING
Status: DISCONTINUED | OUTPATIENT
Start: 2023-03-08 | End: 2023-03-08 | Stop reason: HOSPADM

## 2023-03-08 RX ORDER — LORAZEPAM 2 MG/ML
1 INJECTION INTRAMUSCULAR ONCE AS NEEDED
Status: COMPLETED | OUTPATIENT
Start: 2023-03-08 | End: 2023-03-08

## 2023-03-08 RX ORDER — OLANZAPINE 10 MG/2ML
1 INJECTION, POWDER, LYOPHILIZED, FOR SOLUTION INTRAMUSCULAR
Status: DISCONTINUED | OUTPATIENT
Start: 2023-03-08 | End: 2023-03-08 | Stop reason: HOSPADM

## 2023-03-08 RX ORDER — AMLODIPINE BESYLATE 5 MG/1
5 TABLET ORAL DAILY
Status: DISCONTINUED | OUTPATIENT
Start: 2023-03-08 | End: 2023-03-08 | Stop reason: HOSPADM

## 2023-03-08 RX ORDER — ATORVASTATIN CALCIUM 40 MG/1
80 TABLET, FILM COATED ORAL NIGHTLY
Status: DISCONTINUED | OUTPATIENT
Start: 2023-03-08 | End: 2023-03-08 | Stop reason: HOSPADM

## 2023-03-08 RX ORDER — METOPROLOL SUCCINATE 50 MG/1
100 TABLET, EXTENDED RELEASE ORAL DAILY
Status: DISCONTINUED | OUTPATIENT
Start: 2023-03-08 | End: 2023-03-08 | Stop reason: HOSPADM

## 2023-03-08 RX ORDER — PANTOPRAZOLE SODIUM 40 MG/1
40 TABLET, DELAYED RELEASE ORAL
Status: DISCONTINUED | OUTPATIENT
Start: 2023-03-08 | End: 2023-03-08 | Stop reason: HOSPADM

## 2023-03-08 RX ORDER — DEXTROSE MONOHYDRATE 25 G/50ML
10-50 INJECTION, SOLUTION INTRAVENOUS
Status: DISCONTINUED | OUTPATIENT
Start: 2023-03-08 | End: 2023-03-08 | Stop reason: HOSPADM

## 2023-03-08 RX ORDER — ASPIRIN 81 MG/1
81 TABLET ORAL DAILY
Status: DISCONTINUED | OUTPATIENT
Start: 2023-03-08 | End: 2023-03-08 | Stop reason: HOSPADM

## 2023-03-08 RX ORDER — INSULIN LISPRO 100 [IU]/ML
1-200 INJECTION, SOLUTION INTRAVENOUS; SUBCUTANEOUS AS NEEDED
Status: DISCONTINUED | OUTPATIENT
Start: 2023-03-08 | End: 2023-03-08 | Stop reason: HOSPADM

## 2023-03-08 RX ORDER — PREGABALIN 75 MG/1
75 CAPSULE ORAL 2 TIMES DAILY
Status: DISCONTINUED | OUTPATIENT
Start: 2023-03-08 | End: 2023-03-08 | Stop reason: HOSPADM

## 2023-03-08 RX ORDER — BUPROPION HYDROCHLORIDE 150 MG/1
150 TABLET ORAL EVERY MORNING
Status: DISCONTINUED | OUTPATIENT
Start: 2023-03-08 | End: 2023-03-08 | Stop reason: HOSPADM

## 2023-03-08 RX ORDER — INSULIN LISPRO 100 [IU]/ML
1-200 INJECTION, SOLUTION INTRAVENOUS; SUBCUTANEOUS
Status: DISCONTINUED | OUTPATIENT
Start: 2023-03-08 | End: 2023-03-08 | Stop reason: HOSPADM

## 2023-03-08 RX ORDER — DIPHENHYDRAMINE HYDROCHLORIDE, ZINC ACETATE 2; .1 G/100G; G/100G
1 CREAM TOPICAL 3 TIMES DAILY PRN
Status: DISCONTINUED | OUTPATIENT
Start: 2023-03-08 | End: 2023-03-08 | Stop reason: HOSPADM

## 2023-03-08 RX ADMIN — Medication 10 ML: at 11:15

## 2023-03-08 RX ADMIN — INSULIN LISPRO 5 UNITS: 100 INJECTION, SOLUTION INTRAVENOUS; SUBCUTANEOUS at 18:15

## 2023-03-08 RX ADMIN — Medication 10 ML: at 09:00

## 2023-03-08 RX ADMIN — INSULIN LISPRO 4 UNITS: 100 INJECTION, SOLUTION INTRAVENOUS; SUBCUTANEOUS at 09:00

## 2023-03-08 RX ADMIN — ONDANSETRON 4 MG: 2 INJECTION INTRAMUSCULAR; INTRAVENOUS at 09:50

## 2023-03-08 RX ADMIN — HYDROCODONE BITARTRATE AND ACETAMINOPHEN 1 TABLET: 5; 325 TABLET ORAL at 09:00

## 2023-03-08 RX ADMIN — AMLODIPINE BESYLATE 5 MG: 5 TABLET ORAL at 09:00

## 2023-03-08 RX ADMIN — INSULIN LISPRO 6 UNITS: 100 INJECTION, SOLUTION INTRAVENOUS; SUBCUTANEOUS at 14:20

## 2023-03-08 RX ADMIN — DIPHENHYDRAMINE HYDROCHLORIDE, ZINC ACETATE 1 APPLICATION: 2; .1 CREAM TOPICAL at 15:55

## 2023-03-08 RX ADMIN — Medication 10 ML: at 15:55

## 2023-03-08 RX ADMIN — GADOTERIDOL 20 ML: 279.3 INJECTION, SOLUTION INTRAVENOUS at 12:48

## 2023-03-08 RX ADMIN — PANTOPRAZOLE SODIUM 40 MG: 40 TABLET, DELAYED RELEASE ORAL at 09:00

## 2023-03-08 RX ADMIN — DULOXETINE HYDROCHLORIDE 60 MG: 30 CAPSULE, DELAYED RELEASE ORAL at 09:00

## 2023-03-08 RX ADMIN — HYDROCODONE BITARTRATE AND ACETAMINOPHEN 1 TABLET: 5; 325 TABLET ORAL at 15:55

## 2023-03-08 RX ADMIN — PREGABALIN 75 MG: 75 CAPSULE ORAL at 09:00

## 2023-03-08 RX ADMIN — ASPIRIN 81 MG: 81 TABLET, COATED ORAL at 09:00

## 2023-03-08 RX ADMIN — SODIUM CHLORIDE 500 ML: 9 INJECTION, SOLUTION INTRAVENOUS at 15:55

## 2023-03-08 RX ADMIN — BUPROPION HYDROCHLORIDE 150 MG: 150 TABLET, EXTENDED RELEASE ORAL at 09:00

## 2023-03-08 RX ADMIN — LORAZEPAM 1 MG: 2 INJECTION INTRAMUSCULAR; INTRAVENOUS at 11:15

## 2023-03-08 RX ADMIN — LEVOTHYROXINE SODIUM 100 MCG: 100 TABLET ORAL at 09:00

## 2023-03-08 NOTE — OUTREACH NOTE
Prep Survey    Flowsheet Row Responses   [facility] facility patient discharged from? [facility]   Is LACE score < 7 ? No   Eligibility [facility]   Date of Admission 03/07/23   Date of Discharge 03/08/23   Discharge Disposition Home or Self Care   Discharge diagnosis Dizziness   Does the patient have one of the following disease processes/diagnoses(primary or secondary)? Other   Does the patient have Home health ordered? Yes   What is the Home health agency?  [facility] OUTPAT[facility]    Is there a DME ordered? No   Prep survey completed? Yes          Alis JEFFREY - Registered Nurse

## 2023-03-08 NOTE — CONSULTS
"Diabetes Education  Assessment/Teaching    Patient Name:  Kuldeep Adhikari  YOB: 1965  MRN: [id number]  Admit Date:  3/7/2023      Assessment Date:  3/8/2023  Flowsheet Row Most Recent Value   General Information     Referral From: A1c, Blood glucose  [On 3/8/2023 A1c was 9.0% and admission blood sugar was 225.]   Height 172.7 cm (68\")   Height Method Stated   Weight 86.7 kg (191 lb 3.2 oz)   Weight Method Standing scale   Pregnancy Assessment    Diabetes History    What type of diabetes do you have? Type 2   Length of Diabetes Diagnosis 10 + years  [Diagnosed in 1999.]   Current DM knowledge fair   Have you had diabetes education/teaching in the past? yes   When and where was your diabetes education? Inpatient hospitalizations at [facility] with last one being 6/17/2022   Do you test your blood sugar at home? yes   Frequency of checks as often as needs   Meter type Dexcom G6   Who performs the test? patient   Typical readings 200-400   Have you had low blood sugar? (<70mg/dl) yes   How often do you have low blood sugar? occasionally   Have you had high blood sugar? (>140mg/dl) yes   How often do you have high blood sugar? frequently   When was your last high blood sugar? Admission blood sugar 225.   Education Preferences    What areas of diabetes would you like to learn about? avoiding high blood sugar, avoiding low blood sugar, diabetes complications   Nutrition Information    Assessment Topics    Problem Solving - Assessment Needs education   Reducing Risk - Assessment Needs education   DM Goals    Problem Solving - Goal Today   Reducing Risk - Goal Today          Flowsheet Row Most Recent Value   DM Education Needs    Meter Has own   Meter Type Other (comment)  [Dexcom G6]   Medication Insulin   Problem Solving Hypoglycemia, Hyperglycemia, Signs, Symptoms, Treatment   Reducing Risks A1C testing  [On 3/8/2023 A1c was 9.0%.]   Discharge Plan Home   Motivation Moderate   Teaching Method Discussion, Handouts "   Patient Response Verbalized understanding            Other Comments:  A1c info sheet given with discussion on A1c target and healthy blood sugar range. Patient stated he normally has a turkey sandwich and small bag of chips before going to bed and if his blood sugar is high will take Novolog 15 units prior to bed. Asked patient if he has low blood sugars when he does this and he says sometimes it has been as low as 43 and he treats it with a spoonful of peanut butter. Explained to patient that peanut butter is not recommended to treat low blood sugar. Handouts given with discussion on hypoglycemia signs, symptoms, and treatment. Explained to patient that it is not recommended to take Novolog prior to bed due to the chance of dropping the blood sugar during the night. Patient stated he drinks mainly water at home and sometimes diet soda or diet Gatorade. Patient has no further questions or concerns related to diabetes at this time.        Electronically signed by:  Ann Hyman RN  03/08/23 14:01 EST

## 2023-03-08 NOTE — ED PROVIDER NOTES
Subjective   History of Present Illness  Patient is a 57-year-old white male with history of insulin-dependent diabetes,, prior DVT on Eliquis, hypertension, chronic kidney disease.  He presents today from home with complaints of dizziness.  He states he has been dizzy for the last 2 months or so.  He states he was at rehab center to have his neck adjusted.  He states immediately after this he became dizzy.  He states he had an intermittent headache since that time as well.  He reports his dizziness recurs with sudden changes in position.  He describes it as a room spinning type feeling and states he feels like he is riding on a merry-go-round.  He has called his primary care provider who placed him on meclizine.  He states he has had no improvement with this medication.  Does complain of a generalized intermittent headache.  No visual changes.  No unilateral weakness or deficit.  No speech difficulty.  States over last 24 hours his dizziness seems to be worse.  He states he has pretty much been lying in bed as he feels like he might fall when he gets up.  Denies any fever chest pain shortness of breath palpitations syncope or other complaint.        Review of Systems   Constitutional: Negative for fever.   Eyes: Negative for photophobia and visual disturbance.   Respiratory: Negative for shortness of breath.    Cardiovascular: Negative for chest pain.   Gastrointestinal: Positive for nausea. Negative for abdominal pain and vomiting.   Neurological: Positive for dizziness and headaches. Negative for syncope, facial asymmetry, speech difficulty, weakness, light-headedness and numbness.       Past Medical History:   Diagnosis Date   • CKD (chronic kidney disease), stage III ([facility])    • Depression    • Depression with suicidal ideation 08/08/2021   • DJD (degenerative joint disease)    • DVT (deep venous thrombosis) ([facility])    • Ganglion     rt wrist   • Gangrene of foot ([facility]) 10/09/2015   • GERD (gastroesophageal reflux  disease)    • Headache    • Heart murmur    • Hiatal hernia    • History of esophageal stricture     s/p dialation 40-50 times last EGD 04/2016   • Hyperlipidemia    • Hypertension    • Hypothyroidism    • IBS (irritable bowel syndrome)    • Neuropathy    • Osteomyelitis of right foot ([facility]) 03/19/2020   • Pulmonary embolism ([facility]) 01/19/2017   • Rash     rt lower hip   • Retinopathy    • S/P BKA (below knee amputation), right ([facility]) 03/18/2022   • Seasonal allergies    • Sepsis due to group B Streptococcus ([facility]) 03/19/2020   • Sleep apnea    • Type 2 diabetes mellitus with peripheral vascular disease ([facility])    • Vitamin D deficiency        Allergies   Allergen Reactions   • Lisinopril Cough   • Cefixime Other (See Comments)       Past Surgical History:   Procedure Laterality Date   • AMPUTATION DIGIT Left 4/26/2022    Procedure: AMPUTATION left great toe;  Surgeon: ERWIN Baker DPM;  Location: [facility] MAIN OR;  Service: Podiatry;  Laterality: Left;   • AMPUTATION DIGIT  4/26/2022    Procedure: ;  Surgeon: ERWIN Baker DPM;  Location: [facility] MAIN OR;  Service: Podiatry;;   • AMPUTATION FOOT / TOE Right     great toe   • AMPUTATION REVISION Right 4/8/2021    Procedure: BELOW KNEE AMPUTATION REVISAION;  Surgeon: Eduardo Reynolds MD;  Location: [facility] MAIN OR;  Service: Orthopedics;  Laterality: Right;   • AMPUTATION REVISION Right 4/29/2021    Procedure: AMPUTATION REVISION KNEE STUMP;  Surgeon: Eduardo Reynolds MD;  Location: [facility] MAIN OR;  Service: Orthopedics;  Laterality: Right;   • BELOW KNEE AMPUTATION Right 7/30/2020    Procedure: AMPUTATION BELOW KNEE;  Surgeon: Eduardo Reynolds MD;  Location: [facility] MAIN OR;  Service: Orthopedics;  Laterality: Right;   • CARDIAC CATHETERIZATION  04/2018    [facility]   • COLONOSCOPY     • ENDOSCOPY     • ENDOSCOPY N/A 10/4/2019    Procedure: ESOPHAGOGASTRODUODENOSCOPY with dilitation and biopsy x 1 area;  Surgeon: Declan Iqbal MD;  Location: [facility] ENDOSCOPY;   Service: Gastroenterology   • ENDOSCOPY N/A 12/13/2019    Procedure: ESOPHAGOGASTRODUODENOSCOPY WITH DILATATION (50, 52 BOUGIE);  Surgeon: Declan Iqbal MD;  Location: [facility] ENDOSCOPY;  Service: Gastroenterology   • ENDOSCOPY N/A 8/6/2021    Procedure: ESOPHAGOGASTRODUODENOSCOPY with biopsy x1 area and esophageal dilation (56FR Bougie);  Surgeon: Hussein Talavera MD;  Location: [facility] ENDOSCOPY;  Service: Gastroenterology;  Laterality: N/A;  post: hiatal hernia, erosive gastritis   • ENDOSCOPY N/A 12/13/2022    Procedure: ESOPHAGOGASTRODUODENOSCOPY WITH DILATATION (BOUGIE #54, #56) AND BIOPSY X1;  Surgeon: David Rodriguez MD;  Location: [facility] ENDOSCOPY;  Service: Gastroenterology;  Laterality: N/A;  POST: dysphagia    • EYE SURGERY     • GANGLION CYST EXCISION Left    • HERNIA REPAIR Bilateral    • INCISION AND DRAINAGE OF WOUND Right 6/15/2022    Procedure: INCISION AND DRAINAGE WOUND with debridement;  Surgeon: Fito Forte MD;  Location: [facility] MAIN OR;  Service: Orthopedics;  Laterality: Right;   • JOINT REPLACEMENT      Left total hip   • RETINOPATHY SURGERY      laser   • TOTAL HIP ARTHROPLASTY Left    • TOTAL HIP ARTHROPLASTY Left 2018   • TRANS METATARSAL AMPUTATION Right 3/17/2020    Procedure: AMPUTATION TRANS METATARSAL right;  Surgeon: ERWIN Baker DPM;  Location: [facility] MAIN OR;  Service: Podiatry;  Laterality: Right;  GANGRENOUS RIGHT FOOT   • VASECTOMY         Family History   Problem Relation Age of Onset   • Diabetes Mother         Patient  mom   • Heart disease Mother    • Arthritis Mother    • Hyperlipidemia Mother    • Leukemia Father    • Sleep apnea Maternal Aunt         GENO   • Stroke Maternal Grandmother    • Hypertension Other    • Hyperlipidemia Other    • Cancer Other    • Colon cancer Other         uncle       Social History     Socioeconomic History   • Marital status:    Tobacco Use   • Smoking status: Never   • Smokeless tobacco: Never   Vaping Use   •  Vaping Use: Never used   Substance and Sexual Activity   • Alcohol use: No   • Drug use: No   • Sexual activity: Not Currently     Birth control/protection: None           Objective   Physical Exam  Vital signs and triage nurse note reviewed.  Constitutional: Awake, alert; well-developed and well-nourished. No acute distress is noted.  HEENT: Normocephalic, atraumatic; pupils are PERRL with intact EOM; oropharynx is pink and moist without exudate or erythema.  No drooling or pooling of oral secretions.  Neck: Supple, full range of motion without pain; no cervical lymphadenopathy. Normal phonation.  Cardiovascular: Regular rate and rhythm, normal S1-S2.  No murmur noted.  Pulmonary: Respiratory effort regular nonlabored, breath sounds clear to auscultation all fields.  Abdomen: Soft, nontender, nondistended with normoactive bowel sounds; no rebound or guarding.  Musculoskeletal: Independent range of motion of all extremities with no palpable tenderness or edema.  Neuro: Alert oriented x3, speech is clear and appropriate, GCS 15.  No focal sensorimotor deficits noted. No facial asymmetry. Muscle strength 5/5 bilateral.  Sensation intact bilaterally.  Cranial nerves 2-12 grossly intact. Normal coordination.  No gaze, deviation.  Positive Khadijah-Hallpike. Follows commands.  Skin: Flesh tone, warm, dry, intact; no erythematous or petechial rash or lesion.    Procedures           ED Course      Labs Reviewed   COMPREHENSIVE METABOLIC PANEL - Abnormal; Notable for the following components:       Result Value    Glucose 225 (*)     BUN 25 (*)     Creatinine 1.39 (*)     Alkaline Phosphatase 130 (*)     eGFR 58.8 (*)     All other components within normal limits    Narrative:     GFR Normal >60  Chronic Kidney Disease <60  Kidney Failure <15     URINALYSIS W/ MICROSCOPIC IF INDICATED (NO CULTURE) - Abnormal; Notable for the following components:    Glucose, UA >=1000 mg/dL (3+) (*)     Ketones, UA Trace (*)     All other  components within normal limits    Narrative:     Urine microscopic not indicated.   SINGLE HSTROPONIN T - Abnormal; Notable for the following components:    HS Troponin T 15 (*)     All other components within normal limits    Narrative:     High Sensitive Troponin T Reference Range:  <10.0 ng/L- Negative Female for AMI  <15.0 ng/L- Negative Male for AMI  >=10 - Abnormal Female indicating possible myocardial injury.  >=15 - Abnormal Male indicating possible myocardial injury.   Clinicians would have to utilize clinical acumen, EKG, Troponin, and serial changes to determine if it is an Acute Myocardial Infarction or myocardial injury due to an underlying chronic condition.        TSH - Abnormal; Notable for the following components:    TSH 4.730 (*)     All other components within normal limits   CBC WITH AUTO DIFFERENTIAL - Abnormal; Notable for the following components:    MCV 76.1 (*)     MCH 23.3 (*)     MCHC 30.6 (*)     RDW 16.8 (*)     All other components within normal limits   PROTIME-INR - Normal   APTT - Normal   MAGNESIUM - Normal   SINGLE HSTROPONIN T   BASIC METABOLIC PANEL   CBC WITH AUTO DIFFERENTIAL   CBC AND DIFFERENTIAL    Narrative:     The following orders were created for panel order CBC & Differential.  Procedure                               Abnormality         Status                     ---------                               -----------         ------                     CBC Auto Differential[[id number]]        Abnormal            Final result                 Please view results for these tests on the individual orders.     CT Head Without Contrast    Result Date: 3/7/2023  1.No acute intracranial hemorrhage. Calvarium is intact. 2.Bilateral maxillary sinusitis. Electronically Signed: Freddie Avalos  3/7/2023 9:47 PM EST  Workstation ID: [id number]    Medications   sodium chloride 0.9 % flush 10 mL (has no administration in time range)   sodium chloride 0.9 % flush 10 mL (has no administration  in time range)   sodium chloride 0.9 % flush 10 mL (has no administration in time range)   sodium chloride 0.9 % infusion 40 mL (has no administration in time range)   acetaminophen (TYLENOL) tablet 650 mg (has no administration in time range)   ondansetron (ZOFRAN) injection 4 mg (has no administration in time range)   melatonin tablet 5 mg (has no administration in time range)   LORazepam (ATIVAN) injection 1 mg (1 mg Intravenous Given 3/7/23 1947)   sodium chloride 0.9 % bolus 500 mL (0 mL Intravenous Stopped 3/7/23 2221)                                          Medical Decision Making  Patient presents today from home with a 2-month history of dizziness feeling like the room is spinning.  No improvement with meclizine that was prescribed to him by his primary care provider.  He also complains of intermittent generalized headache and some ringing in both of his ears.  Denies any unilateral weakness or deficit, no chest pain or shortness of breath.  Differentials include but not limited to central versus peripheral vertigo, CVA, electrolyte disturbance.    Patient had above exam and evaluation.  He is placed on continue cardiac monitor.  An IV was established.  Labs EKG and CT head were obtained.  He was given Ativan IV for his dizziness as he states he has taken meclizine today with no improvement.    Work-up: EKG reviewed and interpreted by myself but corroborated by Dr. Palacios shows sinus rhythm with ventricular rate of 74, no acute ST or T wave changes, no ectopy.  CBC is unremarkable with normal white blood cell count, normal hemoglobin.  Metabolic panel significant for glucose 225, BUN 25, creatinine 1.39.  High-sensitivity troponin 15.  Normal magnesium.  TSH 4.73.  Urinalysis shows greater than 1000 glucose, trace ketones.  CT head shows no acute intracranial hemorrhage, intact calvarium.  Bilateral maxillary sinusitis.    Patient is given 500 cc normal saline fluid bolus.    On reexamination he is resting  quietly and in no distress.  Reports no improvement in his dizziness.  He is unable to ambulate due to his dizziness.  He remains hemodynamically stable.  There is no focal deficit on exam.    Findings were discussed with patient.  Feel that her symptoms are most consistent with BPPV however he feels he may fall or injure himself at home due to his dizziness.  He will be placed in the observation unit for PT consultation in the morning and if he has no improvement may need MRI.    Dizziness: chronic illness or injury  Amount and/or Complexity of Data Reviewed  Labs: ordered.  Radiology: ordered.  ECG/medicine tests: ordered.      Risk  OTC drugs.  Prescription drug management.  Decision regarding hospitalization.          Final diagnoses:   Dizziness       ED Disposition  ED Disposition     ED Disposition   Decision to Admit    Condition   --    Comment   --             No follow-up provider specified.       Medication List      No changes were made to your prescriptions during this visit.          Ebony Huddleston, KEYLA  03/07/23 8113

## 2023-03-08 NOTE — DISCHARGE SUMMARY
"[facility]    Laura Whitaker MD    CHIEF COMPLAINT:     Dizziness    HISTORY OF PRESENT ILLNESS:    Obtained from ED provider HPI on 3/7/2023:  Patient is a 57-year-old white male with history of insulin-dependent diabetes,, prior DVT on Eliquis, hypertension, chronic kidney disease.  He presents today from home with complaints of dizziness.  He states he has been dizzy for the last 2 months or so.  He states he was at rehab center to have his neck adjusted.  He states immediately after this he became dizzy.  He states he had an intermittent headache since that time as well.  He reports his dizziness recurs with sudden changes in position.  He describes it as a room spinning type feeling and states he feels like he is riding on a feedPack-round.  He has called his primary care provider who placed him on meclizine.  He states he has had no improvement with this medication.  Does complain of a generalized intermittent headache.  No visual changes.  No unilateral weakness or deficit.  No speech difficulty.  States over last 24 hours his dizziness seems to be worse.  He states he has pretty much been lying in bed as he feels like he might fall when he gets up.  Denies any fever chest pain shortness of breath palpitations syncope or other complaint.    3/8/2023:  Patient confirms the HPI noted above including a fairly prolonged history of some mild dizziness which has become significantly worse over the past several weeks.  He notes that things seem to have worsened following a visit to his outpatient physical therapist when they attempted to manipulate his head and he \"felt a pop\".  Followed by profound dizziness.  He continues to note some dizziness at baseline but significant dizziness with position changes including rolling over in bed.  Chronic tinnitus is noted left greater than right which is somewhat increased from baseline he also reports a mild headache.  He does have a " "remote history of TIAs as well.  Patient confirms compliance with his outpatient medical therapies and denies any specific pain beyond some chronic pain at baseline.  No dyspnea, nausea or vomiting, cough or fever reported patient does acknowledge some poor control of his glucose recently as well noting that he has a continuous monitor and reporting that sometimes he has a strong craving for sugar and will have \"just a little bit\"        Past Medical History:   Diagnosis Date   • CKD (chronic kidney disease), stage III ([facility])    • Depression    • Depression with suicidal ideation 08/08/2021   • DJD (degenerative joint disease)    • DVT (deep venous thrombosis) ([facility])    • Ganglion     rt wrist   • Gangrene of foot ([facility]) 10/09/2015   • GERD (gastroesophageal reflux disease)    • Headache    • Heart murmur    • Hiatal hernia    • History of esophageal stricture     s/p dialation 40-50 times last EGD 04/2016   • Hyperlipidemia    • Hypertension    • Hypothyroidism    • IBS (irritable bowel syndrome)    • Neuropathy    • Osteomyelitis of right foot ([facility]) 03/19/2020   • Pulmonary embolism ([facility]) 01/19/2017   • Rash     rt lower hip   • Retinopathy    • S/P BKA (below knee amputation), right ([facility]) 03/18/2022   • Seasonal allergies    • Sepsis due to group B Streptococcus ([facility]) 03/19/2020   • Sleep apnea    • Type 2 diabetes mellitus with peripheral vascular disease ([facility])    • Vitamin D deficiency      Past Surgical History:   Procedure Laterality Date   • AMPUTATION DIGIT Left 4/26/2022    Procedure: AMPUTATION left great toe;  Surgeon: ERWIN Baker DPM;  Location: [facility] MAIN OR;  Service: Podiatry;  Laterality: Left;   • AMPUTATION DIGIT  4/26/2022    Procedure: ;  Surgeon: ERWIN Baker DPM;  Location: [facility] OR;  Service: Podiatry;;   • AMPUTATION FOOT / TOE Right     great toe   • AMPUTATION REVISION Right 4/8/2021    Procedure: BELOW KNEE AMPUTATION REVISAION;  Surgeon: Eduardo Reynolds MD;  Location: [facility]" [facility] MAIN OR;  Service: Orthopedics;  Laterality: Right;   • AMPUTATION REVISION Right 4/29/2021    Procedure: AMPUTATION REVISION KNEE STUMP;  Surgeon: Eduardo Reynolds MD;  Location: [facility] MAIN OR;  Service: Orthopedics;  Laterality: Right;   • BELOW KNEE AMPUTATION Right 7/30/2020    Procedure: AMPUTATION BELOW KNEE;  Surgeon: Eduardo Reynolds MD;  Location: [facility] MAIN OR;  Service: Orthopedics;  Laterality: Right;   • CARDIAC CATHETERIZATION  04/2018    bhf   • COLONOSCOPY     • ENDOSCOPY     • ENDOSCOPY N/A 10/4/2019    Procedure: ESOPHAGOGASTRODUODENOSCOPY with dilitation and biopsy x 1 area;  Surgeon: Declan Iqbal MD;  Location: [facility] ENDOSCOPY;  Service: Gastroenterology   • ENDOSCOPY N/A 12/13/2019    Procedure: ESOPHAGOGASTRODUODENOSCOPY WITH DILATATION (50, 52 BOUGIE);  Surgeon: Declan Iqbal MD;  Location: [facility] ENDOSCOPY;  Service: Gastroenterology   • ENDOSCOPY N/A 8/6/2021    Procedure: ESOPHAGOGASTRODUODENOSCOPY with biopsy x1 area and esophageal dilation (56FR Bougie);  Surgeon: Hussein Talavera MD;  Location: [facility] ENDOSCOPY;  Service: Gastroenterology;  Laterality: N/A;  post: hiatal hernia, erosive gastritis   • ENDOSCOPY N/A 12/13/2022    Procedure: ESOPHAGOGASTRODUODENOSCOPY WITH DILATATION (BOUGIE #54, #56) AND BIOPSY X1;  Surgeon: David Rodriguez MD;  Location: [facility] ENDOSCOPY;  Service: Gastroenterology;  Laterality: N/A;  POST: dysphagia    • EYE SURGERY     • GANGLION CYST EXCISION Left    • HERNIA REPAIR Bilateral    • INCISION AND DRAINAGE OF WOUND Right 6/15/2022    Procedure: INCISION AND DRAINAGE WOUND with debridement;  Surgeon: Fito Forte MD;  Location: [facility] MAIN OR;  Service: Orthopedics;  Laterality: Right;   • JOINT REPLACEMENT      Left total hip   • RETINOPATHY SURGERY      laser   • TOTAL HIP ARTHROPLASTY Left    • TOTAL HIP ARTHROPLASTY Left 2018   • TRANS METATARSAL AMPUTATION Right 3/17/2020    Procedure: AMPUTATION TRANS METATARSAL right;   Surgeon: ERWIN Baker DPM;  Location: [facility] MAIN OR;  Service: Podiatry;  Laterality: Right;  GANGRENOUS RIGHT FOOT   • VASECTOMY       Family History   Problem Relation Age of Onset   • Diabetes Mother         Patient  mom   • Heart disease Mother    • Arthritis Mother    • Hyperlipidemia Mother    • Leukemia Father    • Sleep apnea Maternal Aunt         GENO   • Stroke Maternal Grandmother    • Hypertension Other    • Hyperlipidemia Other    • Cancer Other    • Colon cancer Other         uncle     Social History     Tobacco Use   • Smoking status: Never   • Smokeless tobacco: Never   Vaping Use   • Vaping Use: Never used   Substance Use Topics   • Alcohol use: No   • Drug use: No     Medications Prior to Admission   Medication Sig Dispense Refill Last Dose   • amLODIPine (NORVASC) 5 MG tablet Take 1 tablet by mouth Daily.   3/7/2023   • apixaban (Eliquis) 5 MG tablet tablet Take 1 tablet by mouth 2 (Two) Times a Day. 180 tablet 2 Past Week   • aspirin 81 MG EC tablet Take 1 tablet by mouth Daily.   Past Week   • atorvastatin (LIPITOR) 80 MG tablet Take 1 tablet by mouth Every Night. 90 tablet 2 3/7/2023   • buPROPion XL (Wellbutrin XL) 150 MG 24 hr tablet Take 1 tablet by mouth Every Morning. 90 tablet 0 3/7/2023   • Continuous Blood Gluc Sensor (FreeStyle Mark 2 Sensor) misc    Past Week   • cyclobenzaprine (FLEXERIL) 10 MG tablet Take I tab q 8 hrs as needed for tension headaches 30 tablet 0 3/7/2023   • DULoxetine (CYMBALTA) 60 MG capsule Take 1 capsule by mouth Every Morning. 90 capsule 2 3/7/2023   • HYDROcodone-acetaminophen (NORCO) 5-325 MG per tablet Take 1 tablet by mouth Every Morning.   3/7/2023   • insulin aspart (NovoLOG FlexPen) 100 UNIT/ML solution pen-injector sc pen INJECT 20 UNITS UNDER THE  SKIN INTO THE APPROPRIATE  AREA AS DIRECTED 3 TIMES A DAY WITH MEALS 15 mL 0 3/7/2023   • insulin lispro (ADMELOG) 100 UNIT/ML injection Inject 0-24 Units under the skin into the appropriate area as  directed 3 (Three) Times a Day Before Meals. 10 mL 12 3/7/2023   • Lantus SoloStar 100 UNIT/ML injection pen INJECT 40 UNITS            SUBCUTANEOUSLY INTO THE    APPROPRIATE AREA AS        DIRECTED EVERY NIGHT 30 mL 0 3/7/2023   • levothyroxine (Synthroid) 100 MCG tablet Take 1 tablet by mouth Every Morning. 90 tablet 2 3/7/2023   • meclizine (ANTIVERT) 25 MG tablet Take 1 tablet by mouth 3 (Three) Times a Day As Needed for Dizziness. 30 tablet 0 3/7/2023   • metoprolol succinate XL (TOPROL-XL) 100 MG 24 hr tablet Take 1 tablet by mouth Daily. 90 tablet 0 3/7/2023   • multivitamin with minerals tablet tablet Take 1 tablet by mouth Daily.   3/6/2023   • omeprazole (priLOSEC) 40 MG capsule Take 1 capsule by mouth Daily. 90 capsule 2 3/7/2023   • pantoprazole (PROTONIX) 40 MG EC tablet Take 1 tablet by mouth 2 (Two) Times a Day.   3/7/2023   • pregabalin (Lyrica) 75 MG capsule Take 1 capsule by mouth 2 (Two) Times a Day. 180 capsule 0 3/7/2023   • busPIRone (BUSPAR) 10 MG tablet Take 1 tablet by mouth Every 12 (Twelve) Hours for 30 days. 180 tablet 2    • CINNAMON PO Take  by mouth.   Unknown     Allergies:  Lisinopril and Cefixime    Immunization History   Administered Date(s) Administered   • Flu Vaccine Quad PF 6-35MO 10/15/2016, 08/17/2017, 09/21/2018, 09/03/2019   • FluLaval/Fluzone >6mos 09/03/2019, 10/28/2020   • Fluzone High Dose =>65 Years ([facility] ONLY) 09/03/2019   • Fluzone High-Dose 65+yrs 10/02/2022   • Hepatitis A 05/18/2018, 09/03/2019   • Influenza, Unspecified 08/06/2019   • Pneumococcal Polysaccharide (PPSV23) 01/19/2017   • Shingrix 07/18/2019, 09/03/2019   • Td 07/21/2018           REVIEW OF SYSTEMS:    Review of Systems   Constitutional: Negative.   HENT: Positive for tinnitus.    Eyes: Negative.    Cardiovascular: Negative.    Respiratory: Negative.    Skin: Negative.    Musculoskeletal: Negative.    Gastrointestinal: Negative.    Genitourinary: Negative.    Neurological: Positive for dizziness.    Psychiatric/Behavioral: Negative.          Vital Signs  Temp:  [97.5 °F (36.4 °C)-98.7 °F (37.1 °C)] 97.8 °F (36.6 °C)  Heart Rate:  [] 75  Resp:  [16-18] 16  BP: (121-165)/(62-92) 121/77          Physical Exam:  Physical Exam  Vitals reviewed.   Constitutional:       General: He is not in acute distress.     Appearance: Normal appearance. He is normal weight. He is not ill-appearing, toxic-appearing or diaphoretic.   HENT:      Head: Normocephalic.      Right Ear: External ear normal.      Left Ear: External ear normal.      Nose: Nose normal.      Mouth/Throat:      Mouth: Mucous membranes are moist.   Eyes:      Extraocular Movements: Extraocular movements intact.   Cardiovascular:      Rate and Rhythm: Normal rate and regular rhythm.      Pulses: Normal pulses.      Heart sounds: Normal heart sounds.   Pulmonary:      Effort: Pulmonary effort is normal.      Breath sounds: Normal breath sounds.   Abdominal:      General: Bowel sounds are normal.      Palpations: Abdomen is soft.      Tenderness: There is no abdominal tenderness.   Musculoskeletal:         General: Normal range of motion.      Cervical back: Normal range of motion.      Right lower leg: No edema.      Left lower leg: No edema.      Comments: Right BKA   Skin:     General: Skin is warm and dry.      Capillary Refill: Capillary refill takes less than 2 seconds.   Neurological:      General: No focal deficit present.      Mental Status: He is alert and oriented to person, place, and time.   Psychiatric:         Mood and Affect: Mood normal.         Behavior: Behavior normal.         Thought Content: Thought content normal.         Judgment: Judgment normal.           Emotional Behavior:   Normal   Debilities:  None  Results Review:    I reviewed the patient's new clinical results.  Lab Results (most recent)     Procedure Component Value Units Date/Time    POC Glucose Once [[id number]]  (Abnormal) Collected: 03/08/23 1254    Specimen: Blood  Updated: 03/08/23 1255     Glucose 180 mg/dL      Comment: Serial Number: [id number]:  [id number]       Hemoglobin A1c [[id number]]  (Abnormal) Collected: 03/08/23 0407    Specimen: Blood Updated: 03/08/23 0716     Hemoglobin A1C 9.00 %     POC Glucose Once [[id number]]  (Abnormal) Collected: 03/08/23 0712    Specimen: Blood Updated: 03/08/23 0713     Glucose 172 mg/dL      Comment: Serial Number: [id number]:  [id number]       Basic Metabolic Panel [[id number]]  (Abnormal) Collected: 03/08/23 0407    Specimen: Blood Updated: 03/08/23 0506     Glucose 302 mg/dL      BUN 25 mg/dL      Creatinine 1.40 mg/dL      Sodium 138 mmol/L      Potassium 4.9 mmol/L      Chloride 103 mmol/L      CO2 27.0 mmol/L      Calcium 9.1 mg/dL      BUN/Creatinine Ratio 17.9     Anion Gap 8.0 mmol/L      eGFR 58.3 mL/min/1.73     Narrative:      GFR Normal >60  Chronic Kidney Disease <60  Kidney Failure <15      CBC Auto Differential [[id number]]  (Abnormal) Collected: 03/08/23 0407    Specimen: Blood Updated: 03/08/23 0447     WBC 7.30 10*3/mm3      RBC 5.29 10*6/mm3      Hemoglobin 12.2 g/dL      Hematocrit 40.0 %      MCV 75.6 fL      MCH 23.1 pg      MCHC 30.6 g/dL      RDW 16.7 %      RDW-SD 44.6 fl      MPV 8.3 fL      Platelets 229 10*3/mm3      Neutrophil % 58.2 %      Lymphocyte % 25.8 %      Monocyte % 9.8 %      Eosinophil % 5.2 %      Basophil % 1.0 %      Neutrophils, Absolute 4.20 10*3/mm3      Lymphocytes, Absolute 1.90 10*3/mm3      Monocytes, Absolute 0.70 10*3/mm3      Eosinophils, Absolute 0.40 10*3/mm3      Basophils, Absolute 0.10 10*3/mm3      nRBC 0.0 /100 WBC     Single High Sensitivity Troponin T [[id number]]  (Normal) Collected: 03/07/23 2238    Specimen: Blood Updated: 03/07/23 2302     HS Troponin T 12 ng/L     Narrative:      High Sensitive Troponin T Reference Range:  <10.0 ng/L- Negative Female for AMI  <15.0 ng/L- Negative Male for AMI  >=10 - Abnormal Female indicating possible myocardial  injury.  >=15 - Abnormal Male indicating possible myocardial injury.   Clinicians would have to utilize clinical acumen, EKG, Troponin, and serial changes to determine if it is an Acute Myocardial Infarction or myocardial injury due to an underlying chronic condition.         Single High Sensitivity Troponin T [[id number]]  (Abnormal) Collected: 03/07/23 1942    Specimen: Blood Updated: 03/07/23 2021     HS Troponin T 15 ng/L     Narrative:      High Sensitive Troponin T Reference Range:  <10.0 ng/L- Negative Female for AMI  <15.0 ng/L- Negative Male for AMI  >=10 - Abnormal Female indicating possible myocardial injury.  >=15 - Abnormal Male indicating possible myocardial injury.   Clinicians would have to utilize clinical acumen, EKG, Troponin, and serial changes to determine if it is an Acute Myocardial Infarction or myocardial injury due to an underlying chronic condition.         TSH [[id number]]  (Abnormal) Collected: 03/07/23 1942    Specimen: Blood Updated: 03/07/23 2021     TSH 4.730 uIU/mL     Comprehensive Metabolic Panel [[id number]]  (Abnormal) Collected: 03/07/23 1942    Specimen: Blood Updated: 03/07/23 2014     Glucose 225 mg/dL      BUN 25 mg/dL      Creatinine 1.39 mg/dL      Sodium 142 mmol/L      Potassium 5.2 mmol/L      Chloride 105 mmol/L      CO2 28.0 mmol/L      Calcium 9.8 mg/dL      Total Protein 7.1 g/dL      Albumin 4.3 g/dL      ALT (SGPT) 24 U/L      AST (SGOT) 19 U/L      Alkaline Phosphatase 130 U/L      Total Bilirubin 0.6 mg/dL      Globulin 2.8 gm/dL      A/G Ratio 1.5 g/dL      BUN/Creatinine Ratio 18.0     Anion Gap 9.0 mmol/L      eGFR 58.8 mL/min/1.73     Narrative:      GFR Normal >60  Chronic Kidney Disease <60  Kidney Failure <15      Magnesium [[id number]]  (Normal) Collected: 03/07/23 1942    Specimen: Blood Updated: 03/07/23 2014     Magnesium 2.0 mg/dL     Urinalysis With Microscopic If Indicated (No Culture) - Urine, Clean Catch [[id number]]  (Abnormal) Collected: 03/07/23  1958    Specimen: Urine, Clean Catch Updated: 03/07/23 2007     Color, UA Yellow     Appearance, UA Clear     pH, UA <=5.0     Specific Gravity, UA 1.024     Glucose, UA >=1000 mg/dL (3+)     Ketones, UA Trace     Bilirubin, UA Negative     Blood, UA Negative     Protein, UA Negative     Leuk Esterase, UA Negative     Nitrite, UA Negative     Urobilinogen, UA 1.0 E.U./dL    Narrative:      Urine microscopic not indicated.    Protime-INR [[id number]]  (Normal) Collected: 03/07/23 1942    Specimen: Blood Updated: 03/07/23 2003     Protime 10.4 Seconds      INR 1.01    aPTT [[id number]]  (Normal) Collected: 03/07/23 1942    Specimen: Blood Updated: 03/07/23 2003     PTT 26.8 seconds     CBC & Differential [[id number]]  (Abnormal) Collected: 03/07/23 1942    Specimen: Blood Updated: 03/07/23 1949    Narrative:      The following orders were created for panel order CBC & Differential.  Procedure                               Abnormality         Status                     ---------                               -----------         ------                     CBC Auto Differential[[id number]]        Abnormal            Final result                 Please view results for these tests on the individual orders.    CBC Auto Differential [[id number]]  (Abnormal) Collected: 03/07/23 1942    Specimen: Blood Updated: 03/07/23 1949     WBC 7.60 10*3/mm3      RBC 5.58 10*6/mm3      Hemoglobin 13.0 g/dL      Hematocrit 42.5 %      MCV 76.1 fL      MCH 23.3 pg      MCHC 30.6 g/dL      RDW 16.8 %      RDW-SD 44.6 fl      MPV 8.3 fL      Platelets 274 10*3/mm3      Neutrophil % 63.5 %      Lymphocyte % 20.5 %      Monocyte % 10.1 %      Eosinophil % 4.8 %      Basophil % 1.1 %      Neutrophils, Absolute 4.80 10*3/mm3      Lymphocytes, Absolute 1.60 10*3/mm3      Monocytes, Absolute 0.80 10*3/mm3      Eosinophils, Absolute 0.40 10*3/mm3      Basophils, Absolute 0.10 10*3/mm3      nRBC 0.1 /100 WBC           Imaging Results (Most Recent)      Procedure Component Value Units Date/Time    MRI Angiogram Neck With & Without Contrast [[id number]] Collected: 03/08/23 1254     Updated: 03/08/23 1300    Narrative:      MRI ANGIOGRAM NECK W WO CONTRAST    Date of Exam: 3/8/2023 12:30 PM EST    Indication: Dizziness, non-specific.     Comparison: CT angiography neck from August 2, 2021    Technique:  Routine 3-D time-of-flight gradient echo imaging was obtained of the neck before and after the uneventful administration of Prohance.    Findings:  There is a three-vessel aortic arch. The bilateral common carotid, bilateral carotid bifurcations, and bilateral internal carotid arteries are widely patent. The bilateral vertebral arteries are widely patent. The right vertebral is dominant. The left   vertebral artery ends giving off the left posterior inferior cerebellar artery, normal variant.      Impression:      Impression:  Major arterial vasculature within neck appears widely patent, with no hemodynamically significant stenosis or dissection.    Electronically Signed: eDclan Díaz    3/8/2023 12:58 PM EST    Workstation ID: [id number]  Prohance    MRI Brain Without Contrast [[id number]] Collected: 03/08/23 1236     Updated: 03/08/23 1251    Narrative:      MRI BRAIN WO CONTRAST    Date of Exam: 3/8/2023 12:18 PM EST    Indication: Transient ischemic attack (TIA)  Dizziness, non-specific.     Comparison: CT same day and prior including MRI from 3/19/2022     Technique:  Routine multiplanar/multisequence sequence images of the brain were obtained without contrast administration.    Findings:    Diffusion-weighted imaging demonstrates no acute restriction abnormality. The ventricles, cisterns, and sulci appear within normal limits. No suspicious extra axial collections noted. Midline structures of the brain appear unremarkable in appearance.   Pituitary and sellar structures are unremarkable.    Cervical junction demonstrates no acute abnormality. No acute  intracranial hemorrhage or mass effect is noted. Major intracranial flow voids appear grossly unremarkable. Please see dedicated MRA brain from same day    Cerebral pontine angle structures are grossly unremarkable. Mastoid air cells are grossly clear. There is mucosal thickening of the paranasal sinuses most prominently within the maxillary sinuses right greater than left. Globes orbits are grossly   unremarkable    Brain parenchyma demonstrates no acute signal abnormality given mild motion limitation        Impression:      Impression:    1. No acute ischemic change  2. No acute intracranial abnormality given mild motion limitation  3. Paranasal sinus disease. Please correlate clinically    Electronically Signed: David Sanderson    3/8/2023 12:49 PM EST    Workstation ID: [id number]    MRI Angiogram Head Without Contrast [[id number]] Collected: 03/08/23 1237     Updated: 03/08/23 1246    Narrative:      MRI ANGIOGRAM HEAD WO CONTRAST    Date of Exam: 3/8/2023 12:05 PM EST    Indication: Dizziness, persistent/recurrent, cardiac or vascular cause suspected.     Comparison: CT same day and prior MRI from 3/19/2022 prior CTA from 8/2/2021    Technique:  Routine 3-D time-of-flight gradient echo imaging was obtained of the head without contrast administration.    Findings:  Right vertebral artery appears dominant. Left vertebral artery is diminutive beyond the lower portion of the posterior fossa (after the origin of the PICA branch). Findings represent remote occlusion versus normal variation. This appears essentially   unchanged dating back to a CTA from 8/2/2021 Posterior circulation otherwise appears patent.    Anterior circulation is unremarkable in appearance    No evidence of large vessel occlusion, significant stenosis or aneurysm formation      Impression:      Impression:  Stable MRA of the brain. No acute abnormality noted.    Diminutive left vertebral artery variant versus remote occlusion again  appreciated    Electronically Signed: David Sanderson    3/8/2023 12:43 PM EST    Workstation ID: [id number]    CT Head Without Contrast [[id number]] Collected: 03/07/23 2145     Updated: 03/07/23 2149    Narrative:      CT HEAD WO CONTRAST    Date of Exam: 3/7/2023 9:13 PM EST    Indication: dizziness/headache.    Comparison: MRI 3/19/2022.    Technique: Axial CT images were obtained of the head without contrast administration.  Sagittal and coronal reconstructions were performed.  Automated exposure control and iterative reconstruction methods were used.     Findings:  *No acute intracranial hemorrhage.  *No masses, mass effect, midline shift or hydrocephalus.  *White matter is intact.   *Calvarium is intact.  *Visualized orbits and globes are unremarkable without radiopaque foreign bodies.  *Mucosal thickening involving bilateral maxillary sinuses..  *Visualized mastoid air cells are clear.        Impression:      1.No acute intracranial hemorrhage. Calvarium is intact.  2.Bilateral maxillary sinusitis.        Electronically Signed: Freddie Ali    3/7/2023 9:47 PM EST    Workstation ID: [id number]        reviewed    ECG/EMG Results (most recent)     Procedure Component Value Units Date/Time    ECG 12 Lead Other; dizziness [[id number]] Collected: 03/07/23 1839     Updated: 03/07/23 1840     QT Interval 356 ms     Narrative:      HEART RATE= 74  bpm  RR Interval= 812  ms  PA Interval= 183  ms  P Horizontal Axis= 1  deg  P Front Axis= 49  deg  QRSD Interval= 74  ms  QT Interval= 356  ms  QRS Axis= 36  deg  T Wave Axis= 61  deg  - OTHERWISE NORMAL ECG -  Sinus rhythm  Low voltage, precordial leads  Electronically Signed By:   Date and Time of Study: 2023-03-07 18:39:51    SCANNED - TELEMETRY   [[id number]] Resulted: 03/07/23     Updated: 03/08/23 1148    SCANNED - TELEMETRY   [[id number]] Resulted: 03/07/23     Updated: 03/08/23 1156    SCANNED - TELEMETRY   [[id number]] Resulted: 03/07/23     Updated: 03/08/23 1156         reviewed    Results for orders placed during the hospital encounter of 04/23/22    Doppler Ankle Brachial Index Single [facility]    Interpretation Summary  · Right Conclusion: The right BETTY is unable to be assessed due to a below the knee amputation.  · Left Conclusion: The left BETTY is normal. Toe brachial index is unable to be assessed due to vessel noncompressibility. However, no evidence to suggest arterial insufficiency.      Results for orders placed during the hospital encounter of 03/18/22    Adult Transthoracic Echo Complete W/ Cont if Necessary Per Protocol (With Agitated Saline)    Interpretation Summary  Normal LV size and contractility EF of 60 to 65%  Normal RV size  Mild left atrial enlargement seen  Aortic valve appears structurally normal  Mitral valve leaflets are thickened anterior mitral leaflet appears calcified, but has good cusp separation.   No significant MR seen.  Tricuspid valve appears structurally normal, no significant regurgitation seen.  No pericardial effusion seen.  Proximal aorta appears normal in size.      Microbiology Results (last 10 days)     ** No results found for the last 240 hours. **          Assessment & Plan     Dizziness     Dizziness  -Troponin: 15, 12  -Blood glucose range, 225-302 following admission  -A1c: 9.0  -TSH: 4.730, check T4  -Magnesium: 2.0  -UA shows trace ketones with 3+ glucose but was otherwise unremarkable  -CT of head showed no acute intracranial hemorrhage with calvarium intact and bilateral maxillary sinusitis  -EKG shows sinus rhythm at 74 without obvious acute ST changes or ectopy and a QTc of 395 ms  -MRI of brain showed no acute abnormalities with diminutive left vertebral artery variant versus remote occlusion again noted  -CTA of neck showed the major arterial vascular within the neck widely patent with no hemodynamically significant stenosis or dissection  -MRA of brain showed no acute ischemic change with no acute intracranial  abnormality though some mild motion limitation was reported.  Paranasal sinus disease also present  -PT consulted who recommended rolling walker and outpatient physical therapy  -Continue meclizine  -Fall precautions    DAISY  Lab Results   Component Value Date    CREATININE 1.40 (H) 03/08/2023    BUN 25 (H) 03/08/2023    BCR 17.9 03/08/2023    EGFR 58.3 (L) 03/08/2023   -Creatinine: 1.14 on 6/20/2022  -500 mL saline bolus given in ED, repeat x1  -Avoid nephrotoxic medication IV dye unless urgently needed  -Monitor BMP and I's and O's while admitted  -Follow-up for repeat BMP with primary care provider    Diabetes mellitus  -Poorly controlled   Lab Results   Component Value Date    GLUCOSE 302 (H) 03/08/2023    GLUCOSE 225 (H) 03/07/2023    GLUCOSE 104 (H) 06/20/2022    GLUCOSE 248 (H) 06/19/2022   -Glucomander protocol ordered  -Discussed importance of strict diet control of carbohydrate intake with patient  -Diabetic diet  -Monitor AC and HS  -Follow-up with endocrinology    Hypertension  -Moderately controlled   BP Readings from Last 1 Encounters:   03/08/23 121/77   - Continue amlodipine and metoprolol  - Monitor while admitted    Hypothyroidism  -TSH noted is elevated  -Check T4  -Continue levothyroxine    Hyperlipidemia  -Statin    Depression/anxiety  -Cymbalta and Wellbutrin        I discussed the patients findings and my recommendations with patient and nursing staff.     Discharge Diagnosis:      Dizziness      Hospital Course  Patient is a 57 y.o. male presented with persistent dizziness with an HPI noted above.  Serial troponins were assessed and found to be 15, 12 with patient's glucose poorly controlled with a max of 302 on admission.  TSH was slightly elevated 4.730.  Creatinine was also noted is elevated at 1.40 with a BUN of 25 with a baseline of 1.14 in June 2022 noted.  UA showed trace ketones as well as 3+ glucose but was otherwise unremarkable and magnesium within normal limits at 2.0.  EKG showed  sinus rhythm at 74 without obvious acute changes or ectopy.  CT of head showed no acute intracranial hemorrhage with an intact calvarium and sinusitis present.  He was given 500 mL saline bolus in the ED and that was repeated following his admission.  Glucomander protocol was started with improvement in glucose noted and A1c was found to be elevated at 9.0.  MRI of brain showed no abnormalities with a diminutive left vertebral artery variant versus remote occlusion again noted with MRA of neck showing no major arterial vascular abnormality within the neck with widely patent vessels and no hemodynamically significant stenosis or dissection.  MRI of brain showed no acute ischemic change with no acute intracranial abnormality noted though some motion artifact was reported.  PT was consulted who evaluated patient recommending outpatient physical therapy as well as rolling walker which will be ordered at discharge.  He will continue his meclizine.  A lengthy discussion was had with patient regarding the importance of blood glucose and diet control.  He is being referred back to endocrinology for further evaluation of his diabetes regimen as well as hypothyroidism.  At this time patient felt to be in good condition for discharge with close follow-up with his PCP as well as endocrinology on an outpatient basis.  His full testing/results and plan were discussed with patient along with concerning/alarm symptoms for which to call 911/return to the ED.  All questions were answered and he verbalizes his understanding and agreement.    Past Medical History:     Past Medical History:   Diagnosis Date   • CKD (chronic kidney disease), stage III ([facility])    • Depression    • Depression with suicidal ideation 08/08/2021   • DJD (degenerative joint disease)    • DVT (deep venous thrombosis) ([facility])    • Ganglion     rt wrist   • Gangrene of foot ([facility]) 10/09/2015   • GERD (gastroesophageal reflux disease)    • Headache    • Heart murmur     • Hiatal hernia    • History of esophageal stricture     s/p dialation 40-50 times last EGD 04/2016   • Hyperlipidemia    • Hypertension    • Hypothyroidism    • IBS (irritable bowel syndrome)    • Neuropathy    • Osteomyelitis of right foot ([facility]) 03/19/2020   • Pulmonary embolism ([facility]) 01/19/2017   • Rash     rt lower hip   • Retinopathy    • S/P BKA (below knee amputation), right ([facility]) 03/18/2022   • Seasonal allergies    • Sepsis due to group B Streptococcus ([facility]) 03/19/2020   • Sleep apnea    • Type 2 diabetes mellitus with peripheral vascular disease ([facility])    • Vitamin D deficiency        Past Surgical History:     Past Surgical History:   Procedure Laterality Date   • AMPUTATION DIGIT Left 4/26/2022    Procedure: AMPUTATION left great toe;  Surgeon: ERWIN Baker DPM;  Location: [facility] MAIN OR;  Service: Podiatry;  Laterality: Left;   • AMPUTATION DIGIT  4/26/2022    Procedure: ;  Surgeon: ERWIN Baker DPM;  Location: [facility] MAIN OR;  Service: Podiatry;;   • AMPUTATION FOOT / TOE Right     great toe   • AMPUTATION REVISION Right 4/8/2021    Procedure: BELOW KNEE AMPUTATION REVISAION;  Surgeon: Eduardo Reynolds MD;  Location: [facility] MAIN OR;  Service: Orthopedics;  Laterality: Right;   • AMPUTATION REVISION Right 4/29/2021    Procedure: AMPUTATION REVISION KNEE STUMP;  Surgeon: Eduardo Reynolds MD;  Location: [facility] MAIN OR;  Service: Orthopedics;  Laterality: Right;   • BELOW KNEE AMPUTATION Right 7/30/2020    Procedure: AMPUTATION BELOW KNEE;  Surgeon: Eduardo Reynolds MD;  Location: [facility] MAIN OR;  Service: Orthopedics;  Laterality: Right;   • CARDIAC CATHETERIZATION  04/2018    [facility]   • COLONOSCOPY     • ENDOSCOPY     • ENDOSCOPY N/A 10/4/2019    Procedure: ESOPHAGOGASTRODUODENOSCOPY with dilitation and biopsy x 1 area;  Surgeon: Declan Iqbal MD;  Location: [facility] ENDOSCOPY;  Service: Gastroenterology   • ENDOSCOPY N/A 12/13/2019    Procedure: ESOPHAGOGASTRODUODENOSCOPY WITH DILATATION  (50, 52 BOUGIE);  Surgeon: Declan Iqbal MD;  Location: [facility] ENDOSCOPY;  Service: Gastroenterology   • ENDOSCOPY N/A 8/6/2021    Procedure: ESOPHAGOGASTRODUODENOSCOPY with biopsy x1 area and esophageal dilation (56FR Bougie);  Surgeon: Hussein Talavera MD;  Location: [facility] ENDOSCOPY;  Service: Gastroenterology;  Laterality: N/A;  post: hiatal hernia, erosive gastritis   • ENDOSCOPY N/A 12/13/2022    Procedure: ESOPHAGOGASTRODUODENOSCOPY WITH DILATATION (BOUGIE #54, #56) AND BIOPSY X1;  Surgeon: David Rodriguez MD;  Location: [facility] ENDOSCOPY;  Service: Gastroenterology;  Laterality: N/A;  POST: dysphagia    • EYE SURGERY     • GANGLION CYST EXCISION Left    • HERNIA REPAIR Bilateral    • INCISION AND DRAINAGE OF WOUND Right 6/15/2022    Procedure: INCISION AND DRAINAGE WOUND with debridement;  Surgeon: Fito Forte MD;  Location: [facility] MAIN OR;  Service: Orthopedics;  Laterality: Right;   • JOINT REPLACEMENT      Left total hip   • RETINOPATHY SURGERY      laser   • TOTAL HIP ARTHROPLASTY Left    • TOTAL HIP ARTHROPLASTY Left 2018   • TRANS METATARSAL AMPUTATION Right 3/17/2020    Procedure: AMPUTATION TRANS METATARSAL right;  Surgeon: ERWIN Baker DPM;  Location: [facility] MAIN OR;  Service: Podiatry;  Laterality: Right;  GANGRENOUS RIGHT FOOT   • VASECTOMY         Social History:   Social History     Socioeconomic History   • Marital status:    Tobacco Use   • Smoking status: Never   • Smokeless tobacco: Never   Vaping Use   • Vaping Use: Never used   Substance and Sexual Activity   • Alcohol use: No   • Drug use: No   • Sexual activity: Not Currently     Birth control/protection: None       Procedures Performed         Consults:   Consults     No orders found for last 30 day(s).          Condition on Discharge:     Stable    Discharge Disposition  Home or Self Care    Discharge Medications     Discharge Medications      Continue These Medications      Instructions Start Date    amLODIPine 5 MG tablet  Commonly known as: NORVASC   5 mg, Oral, Daily      apixaban 5 MG tablet tablet  Commonly known as: Eliquis   5 mg, Oral, 2 Times Daily      aspirin 81 MG EC tablet   81 mg, Oral, Daily      atorvastatin 80 MG tablet  Commonly known as: LIPITOR   80 mg, Oral, Nightly      buPROPion  MG 24 hr tablet  Commonly known as: Wellbutrin XL   150 mg, Oral, Every Morning      busPIRone 10 MG tablet  Commonly known as: BUSPAR   10 mg, Oral, Every 12 Hours Scheduled      CINNAMON PO   Oral      cyclobenzaprine 10 MG tablet  Commonly known as: FLEXERIL   Take I tab q 8 hrs as needed for tension headaches      DULoxetine 60 MG capsule  Commonly known as: CYMBALTA   60 mg, Oral, Every Morning      FreeStyle Mark 2 Sensor misc   No dose, route, or frequency recorded.      HYDROcodone-acetaminophen 5-325 MG per tablet  Commonly known as: NORCO   1 tablet, Oral, Every Morning      insulin lispro 100 UNIT/ML injection  Commonly known as: ADMELOG   0-24 Units, Subcutaneous, 3 Times Daily Before Meals      Lantus SoloStar 100 UNIT/ML injection pen  Generic drug: Insulin Glargine   INJECT 40 UNITS            SUBCUTANEOUSLY INTO THE    APPROPRIATE AREA AS        DIRECTED EVERY NIGHT      levothyroxine 100 MCG tablet  Commonly known as: Synthroid   100 mcg, Oral, Every Early Morning      meclizine 25 MG tablet  Commonly known as: ANTIVERT   25 mg, Oral, 3 Times Daily PRN      metoprolol succinate  MG 24 hr tablet  Commonly known as: TOPROL-XL   100 mg, Oral, Daily      multivitamin with minerals tablet tablet   1 tablet, Oral, Daily      NovoLOG FlexPen 100 UNIT/ML solution pen-injector sc pen  Generic drug: insulin aspart   INJECT 20 UNITS UNDER THE  SKIN INTO THE APPROPRIATE  AREA AS DIRECTED 3 TIMES A DAY WITH MEALS      omeprazole 40 MG capsule  Commonly known as: priLOSEC   40 mg, Oral, Daily      pantoprazole 40 MG EC tablet  Commonly known as: PROTONIX   40 mg, Oral, 2 Times Daily       pregabalin 75 MG capsule  Commonly known as: Lyrica   75 mg, Oral, 2 Times Daily             Discharge Diet:     Activity at Discharge:     Follow-up Appointments  Future Appointments   Date Time Provider Department Center   2/28/2024  9:15 AM Seipel, Joseph F, MD [facility]     Additional Instructions for the Follow-ups that You Need to Schedule     Ambulatory Referral to Physical Therapy Evaluate and treat   As directed      Specialty needed: Evaluate and treat    Follow-up needed: Yes         Discharge Follow-up with PCP   As directed       Currently Documented PCP:    Laura Whitaker MD    PCP Phone Number:    [phone]     Follow Up Details: 5 to 7 days         Discharge Follow-up with Specified Provider: Endocrinology; 2 Weeks   As directed      To: Endocrinology    Follow Up: 2 Weeks               Test Results Pending at Discharge       Risk for Readmission (LACE) Score: 7 (3/8/2023  6:00 AM)      Greater than 30 minutes spent in discharge activities for this patient    Geovanny Pike PA-C  03/08/23  16:13 EST

## 2023-03-08 NOTE — CASE MANAGEMENT/SOCIAL WORK
Discharge Planning Assessment   [place]     Patient Name: Kuldeep Adhikari  MRN: [id number]  Today's Date: 3/8/2023    Admit Date: 3/7/2023    Plan: Home with family. OP PT [facility] (current).   Discharge Needs Assessment     Row Name 03/08/23 1516       Living Environment    People in Home sibling(s)    Name(s) of People in Home Brother and DIL    Current Living Arrangements home    Primary Care Provided by self    Provides Primary Care For no one    Family Caregiver if Needed sibling(s)    Quality of Family Relationships helpful;involved;supportive    Able to Return to Prior Arrangements yes       Resource/Environmental Concerns    Resource/Environmental Concerns none    Transportation Concerns none       Transition Planning    Patient/Family Anticipates Transition to home with family    Patient/Family Anticipated Services at Transition outpatient care  [facility] OP PT    Transportation Anticipated family or friend will provide       Discharge Needs Assessment    Readmission Within the Last 30 Days no previous admission in last 30 days    Equipment Currently Used at Home cpap;prosthesis;walker, rolling;cane, straight;shower chair    Concerns to be Addressed discharge planning    Anticipated Changes Related to Illness none    Equipment Needed After Discharge walker, rolling               Discharge Plan     Row Name 03/08/23 7483       Plan    Plan Home with family. OP PT [facility] (current).    Patient/Family in Agreement with Plan yes    Plan Comments Patient lives at home with brother and sister in law. Patient does not drive, brother or ANA MARIA will provide transportation at discharge. Patient performs ADL. PCP and pharmacy confirmed. Denies financial assistance for medication and/or food. Patient states he is current with [facility] for OP PT, CM confirmed with carolann Ariza, patient's insurance pending for OP PT services. Patient asked about additional rolling walker. CM sent referral to carolann Hameed patient would  owe $75, CM will inform patient. CM will update [place] if patient wants walker.    UPDATE 3/8/23 1640:  CM spoke to patient about walker cost, patient declined to pay $75 and will find walker elsewhere. Patient already has one walker at home.              Continued Care and Services - Admitted Since 3/7/2023     Durable Medical Equipment Coordination complete.    Service Provider Request Status Selected Services Address Phone Fax Patient Preferred    [facility]  Selected Durable Medical Equipment [address], [facility] [address] [phone] --          Therapy Coordination complete.    Service Provider Request Status Selected Services Address Phone Fax Patient Preferred    [facility] OUTPATIENT THERAPY  Selected Outpatient Rehabilitation [address] [place] [id number] [phone] --              Expected Discharge Date and Time     Expected Discharge Date Expected Discharge Time    Mar 9, 2023          Demographic Summary     Row Name 03/08/23 1515       General Information    Admission Type observation    Arrived From emergency department    Required Notices Provided Observation Status Notice    Referral Source admission list    Reason for Consult discharge planning    Preferred Language English               Functional Status     Row Name 03/08/23 1516       Functional Status    Usual Activity Tolerance moderate    Current Activity Tolerance moderate       Functional Status, IADL    Medications independent    Meal Preparation independent    Housekeeping independent    Laundry independent    Shopping independent              Met with patient in room wearing PPE: mask.    Maintained distance greater than six feet and spent less than 15 minutes in the room.          Constanza Mclaughlin RN

## 2023-03-08 NOTE — PLAN OF CARE
"Goal Outcome Evaluation:  Plan of Care Reviewed With: patient        Progress: no change  Outcome Evaluation: Pt is a 58 y/o M admitted to [facility] on 3/7/23 with c/o dizziness and symptoms of room spinning with feeling like he is on a \"merry-go-round\". Pt states symptoms have been occurring for years now, but have progressively worsened over the past week or two when he received a neck adjustment from OP PT. PMH includes neck/shoulder chronic pain, history of falls, diabetic neuropathy, HTN. Pt is currently living with brother, ANA MARIA, and their children in multi-level home with no steps to enter. He does have to perform full set of steps to reach second level bedroom. Prior to admission, pt was IND with household mobility, community amb with no AD, ADLs, and stair navigation. He has history of R BKA and uses prosthetic with ambulation. Today he req min A for bed mobility and balance EOB secondary to vertigo symptoms, CGA for transfers, and able to amb 3x40' CGA with RW. During vestibular assessment, oculomotor testing showed slight overshooting with saccadic movement and minor nystagmus with VOR slow, but positional testing came back negative. Vertigo symptoms present with sitting EOB as well as ambulation, but no safety issues during activity during gait or balance activities. Pt was educated on cause of vertigo symptoms likely due to cervicogenic nature and previous neck injury. Educated on purpose of continued OPPT services to address the issue and ways to compensate until symptoms begin to reside. Pt may req additional RW at d/c for safety with stair navigation.  "

## 2023-03-08 NOTE — DISCHARGE PLACEMENT REQUEST
"Kuldeep Adhikari (57 y.o. Male)     Date of Birth   1965    Social Security Number       Address   [address] [place] [address]    Home Phone   [phone]    MRN   [id number]       Voodoo   Rastafarian    Marital Status                               Admission Date   3/7/23    Admission Type   Emergency    Admitting Provider   Ian Palacios MD    Attending Provider   Ian Palacios MD    Department, Room/Bed   [facility] OBSERVATION, 108/1       Discharge Date       Discharge Disposition       Discharge Destination                               Attending Provider: Ian Palacios MD    Allergies: Lisinopril, Cefixime    Isolation: Contact   Infection: COVID Screen (preop/placement) (06/14/22), MRSA (06/15/22)   Code Status: CPR    Ht: 172.7 cm (68\")   Wt: 86.7 kg (191 lb 3.2 oz)    Admission Cmt: None   Principal Problem: Dizziness [R42]                 Active Insurance as of 3/7/2023     Primary Coverage     Payor Plan Insurance Group Employer/Plan Group    ANTHEM MEDICARE REPLACEMENT ANTHEM MEDICARE ADVANTAGE [id number]     Payor Plan Address Payor Plan Phone Number Payor Plan Fax Number Effective Dates    [address] [phone]  1/1/2022 - None Entered    [facility] [address]       Subscriber Name Subscriber Birth Date Member ID       KULDEEP ADHIKARI 1965 [id number]           Secondary Coverage     Payor Plan Insurance Group Employer/Plan Group    [place]      Payor Plan Address Payor Plan Phone Number Payor Plan Fax Number Effective Dates    [address]   3/1/2022 - None Entered    [place] [address]       Subscriber Name Subscriber Birth Date Member ID       KULDEEP ADHIKARI 1965 [id number]                 Emergency Contacts      (Rel.) Home Phone Work Phone Mobile Phone    JUAN CARLOSPOOJA (Brother) -- -- [phone]    JUAN CARLOSSHANE (Brother) -- [phone]              "

## 2023-03-08 NOTE — ED NOTES
Nursing report ED to floor  Kuldeep Adhikari  57 y.o.  male    HPI:   Chief Complaint   Patient presents with    Dizziness     Pt reports dizziness x4 weeks after getting neck adjusted.  Pt reports worsening dizziness since yesterday.        Admitting doctor:   Ian Palacios MD    Admitting diagnosis:   The encounter diagnosis was Dizziness.    Code status:   Current Code Status       Date Active Code Status Order ID Comments User Context       Prior            Allergies:   Lisinopril and Cefixime    Isolation:  No active isolations     Fall Risk:  Fall Risk Assessment was completed, and patient is at moderate risk for falls.   Predictive Model Details         4 (Low) Factor Value    Calculated 3/7/2023 22:05 Age 57    Risk of Fall Model Musculoskeletal Assessment WDL     Active Peripheral IV Present     Imaging order in this encounter Present     Skin Assessment WDL     Financial Class Other     Magnesium 2 mg/dL     Diastolic BP 67     Drug Use No     Keshav Scale not on file     Number of Distinct Medication Classes administered 2     Peripheral Vascular Assessment WDL     Creatinine 1.39 mg/dL     Clinically Relevant Sex Not Female     Gastrointestinal Assessment WDL     Total Bilirubin 0.6 mg/dL     Cardiac Assessment WDL     Respiratory Rate 16     Potassium 5.2 mmol/L     ALT 24 U/L     Albumin 4.3 g/dL     Days after Admission 0.137     Chloride 105 mmol/L     Calcium 9.8 mg/dL         Weight:       03/07/23  1832   Weight: 90.5 kg (199 lb 8.3 oz)       Intake and Output  No intake or output data in the 24 hours ending 03/07/23 2247    Diet:   Dietary Orders (From admission, onward)       Start     Ordered    03/07/23 2221  Diet: Cardiac Diets, Diabetic Diets; Healthy Heart (2-3 Na+); Consistent Carbohydrate; Texture: Regular Texture (IDDSI 7); Fluid Consistency: Thin (IDDSI 0)  Diet Effective Now        References:    Diet Order [facility]   Question Answer Comment   Diets: Cardiac Diets    Diets: Diabetic  Diets    Cardiac Diet: Healthy Heart (2-3 Na+)    Diabetic Diet: Consistent Carbohydrate    Texture: Regular Texture (IDDSI 7)    Fluid Consistency: Thin (IDDSI 0)        03/07/23 2222                     Most recent vitals:   Vitals:    03/07/23 1930 03/07/23 2017 03/07/23 2148 03/07/23 2218   BP: 128/67 121/67 146/80 149/80   BP Location:       Patient Position:       Pulse: 69 67 64 62   Resp:       Temp:       TempSrc:       SpO2: 94% 94% 98% 98%   Weight:       Height:           Active LDAs/IV Access:   Lines, Drains & Airways       Active LDAs       Name Placement date Placement time Site Days    Peripheral IV 03/07/23 1947 Right Antecubital 03/07/23 1947  Antecubital  less than 1                    Skin Condition:   Skin Assessments (last day)       None             Labs (abnormal labs have a star):   Labs Reviewed   COMPREHENSIVE METABOLIC PANEL - Abnormal; Notable for the following components:       Result Value    Glucose 225 (*)     BUN 25 (*)     Creatinine 1.39 (*)     Alkaline Phosphatase 130 (*)     eGFR 58.8 (*)     All other components within normal limits    Narrative:     GFR Normal >60  Chronic Kidney Disease <60  Kidney Failure <15     URINALYSIS W/ MICROSCOPIC IF INDICATED (NO CULTURE) - Abnormal; Notable for the following components:    Glucose, UA >=1000 mg/dL (3+) (*)     Ketones, UA Trace (*)     All other components within normal limits    Narrative:     Urine microscopic not indicated.   SINGLE HSTROPONIN T - Abnormal; Notable for the following components:    HS Troponin T 15 (*)     All other components within normal limits    Narrative:     High Sensitive Troponin T Reference Range:  <10.0 ng/L- Negative Female for AMI  <15.0 ng/L- Negative Male for AMI  >=10 - Abnormal Female indicating possible myocardial injury.  >=15 - Abnormal Male indicating possible myocardial injury.   Clinicians would have to utilize clinical acumen, EKG, Troponin, and serial changes to determine if it is an  Acute Myocardial Infarction or myocardial injury due to an underlying chronic condition.        TSH - Abnormal; Notable for the following components:    TSH 4.730 (*)     All other components within normal limits   CBC WITH AUTO DIFFERENTIAL - Abnormal; Notable for the following components:    MCV 76.1 (*)     MCH 23.3 (*)     MCHC 30.6 (*)     RDW 16.8 (*)     All other components within normal limits   PROTIME-INR - Normal   APTT - Normal   MAGNESIUM - Normal   SINGLE HSTROPONIN T   BASIC METABOLIC PANEL   CBC WITH AUTO DIFFERENTIAL   CBC AND DIFFERENTIAL    Narrative:     The following orders were created for panel order CBC & Differential.  Procedure                               Abnormality         Status                     ---------                               -----------         ------                     CBC Auto Differential[[id number]]        Abnormal            Final result                 Please view results for these tests on the individual orders.       LOC: Person, Place, Time, and Situation    Telemetry:  Observation Unit    Cardiac Monitoring Ordered: yes    EKG:   ECG 12 Lead Other; dizziness   Preliminary Result   HEART RATE= 74  bpm   RR Interval= 812  ms   ID Interval= 183  ms   P Horizontal Axis= 1  deg   P Front Axis= 49  deg   QRSD Interval= 74  ms   QT Interval= 356  ms   QRS Axis= 36  deg   T Wave Axis= 61  deg   - OTHERWISE NORMAL ECG -   Sinus rhythm   Low voltage, precordial leads   Electronically Signed By:    Date and Time of Study: 2023-03-07 18:39:51          Medications Given in the ED:   Medications   sodium chloride 0.9 % flush 10 mL (has no administration in time range)   sodium chloride 0.9 % flush 10 mL (has no administration in time range)   sodium chloride 0.9 % flush 10 mL (has no administration in time range)   sodium chloride 0.9 % infusion 40 mL (has no administration in time range)   acetaminophen (TYLENOL) tablet 650 mg (has no administration in time range)    ondansetron (ZOFRAN) injection 4 mg (has no administration in time range)   melatonin tablet 5 mg (has no administration in time range)   LORazepam (ATIVAN) injection 1 mg (1 mg Intravenous Given 3/7/23 1947)   sodium chloride 0.9 % bolus 500 mL (0 mL Intravenous Stopped 3/7/23 2221)       Imaging results:  CT Head Without Contrast    Result Date: 3/7/2023  1.No acute intracranial hemorrhage. Calvarium is intact. 2.Bilateral maxillary sinusitis. Electronically Signed: Freddie Ali  3/7/2023 9:47 PM EST  Workstation ID: [id number]     Social issues:   Social History     Socioeconomic History    Marital status:    Tobacco Use    Smoking status: Never    Smokeless tobacco: Never   Vaping Use    Vaping Use: Never used   Substance and Sexual Activity    Alcohol use: No    Drug use: No    Sexual activity: Not Currently     Birth control/protection: None       NIH Stroke Scale:  Interval: (not recorded)  1a. Level of Consciousness: (not recorded)  1b. LOC Questions: (not recorded)  1c. LOC Commands: (not recorded)  2. Best Gaze: (not recorded)  3. Visual: (not recorded)  4. Facial Palsy: (not recorded)  5a. Motor Arm, Left: (not recorded)  5b. Motor Arm, Right: (not recorded)  6a. Motor Leg, Left: (not recorded)  6b. Motor Leg, Right: (not recorded)  7. Limb Ataxia: (not recorded)  8. Sensory: (not recorded)  9. Best Language: (not recorded)  10. Dysarthria: (not recorded)  11. Extinction and Inattention (formerly Neglect): (not recorded)    Total (NIH Stroke Scale): (not recorded)     Additional notable assessment information:    Nursing report ED to floor:  Merry Joyce, RN   03/07/23 22:47 EST

## 2023-03-08 NOTE — CASE MANAGEMENT/SOCIAL WORK
Case Management Discharge Note      Final Note: Home          Therapy Coordination complete.    Service Provider Selected Services Address Phone Fax Patient Preferred    [facility] [address] [place] [id number] [phone] --                 Transportation Services  Private: Car    Final Discharge Disposition Code: 01 - home or self-care

## 2023-03-08 NOTE — THERAPY EVALUATION
Patient Name: Kuldeep Adhikari  : 1965    MRN: [id number]                              Today's Date: 3/8/2023       Admit Date: 3/7/2023    Visit Dx:     ICD-10-CM ICD-9-CM   1. Dizziness  R42 780.4   2. Neck pain  M54.2 723.1     Patient Active Problem List   Diagnosis   • Coronary artery disease involving native coronary artery of native heart without angina pectoris   • Chronic renal impairment, stage 3a ([facility])   • Degenerative joint disease   • Major depressive disorder, recurrent episode, severe ([facility])   • Diabetic peripheral neuropathy ([facility])   • Type 2 diabetes mellitus with hyperglycemia, with long-term current use of insulin ([facility])   • Encounter for annual wellness exam in Medicare patient   • Fatigue   • Foot pain, right   • Ganglion cyst   • GERD without esophagitis   • History of amputation of hallux ([facility])   • History of pulmonary embolism   • Mixed hyperlipidemia   • Acquired hypothyroidism   • Obstructive sleep apnea syndrome   • Palpitations   • Peripheral vascular disease ([facility])   • Subacromial bursitis of right shoulder joint   • Vitamin D deficiency   • Obesity (BMI 30-39.9)   • Encounter for other orthopedic aftercare   • Other ossification of muscle, unspecified site   • History of CVA (cerebrovascular accident)   • Type 2 diabetes mellitus with peripheral vascular disease ([facility])   • Depression with suicidal ideation   • Acute encephalopathy   • Acute tension-type headache   • S/P BKA (below knee amputation), right ([facility])   • Primary hypertension   • Cellulitis of great toe of left foot   • Anemia of chronic disease   • Cellulitis of left lower extremity   • Cellulitis of right lower extremity   • Cervical radiculopathy   • Lumbar spondylosis   • Neck pain   • Dizziness     Past Medical History:   Diagnosis Date   • CKD (chronic kidney disease), stage III ([facility])    • Depression    • Depression with suicidal ideation 2021   • DJD (degenerative joint disease)    • DVT (deep venous thrombosis)  ([facility])    • Ganglion     rt wrist   • Gangrene of foot ([facility]) 10/09/2015   • GERD (gastroesophageal reflux disease)    • Headache    • Heart murmur    • Hiatal hernia    • History of esophageal stricture     s/p dialation 40-50 times last EGD 04/2016   • Hyperlipidemia    • Hypertension    • Hypothyroidism    • IBS (irritable bowel syndrome)    • Neuropathy    • Osteomyelitis of right foot ([facility]) 03/19/2020   • Pulmonary embolism ([facility]) 01/19/2017   • Rash     rt lower hip   • Retinopathy    • S/P BKA (below knee amputation), right ([facility]) 03/18/2022   • Seasonal allergies    • Sepsis due to group B Streptococcus ([facility]) 03/19/2020   • Sleep apnea    • Type 2 diabetes mellitus with peripheral vascular disease ([facility])    • Vitamin D deficiency      Past Surgical History:   Procedure Laterality Date   • AMPUTATION DIGIT Left 4/26/2022    Procedure: AMPUTATION left great toe;  Surgeon: ERWIN Baker DPM;  Location: [facility] MAIN OR;  Service: Podiatry;  Laterality: Left;   • AMPUTATION DIGIT  4/26/2022    Procedure: ;  Surgeon: ERWIN Baker DPM;  Location: [facility] MAIN OR;  Service: Podiatry;;   • AMPUTATION FOOT / TOE Right     great toe   • AMPUTATION REVISION Right 4/8/2021    Procedure: BELOW KNEE AMPUTATION REVISAION;  Surgeon: Eduardo Reynolds MD;  Location: [facility] MAIN OR;  Service: Orthopedics;  Laterality: Right;   • AMPUTATION REVISION Right 4/29/2021    Procedure: AMPUTATION REVISION KNEE STUMP;  Surgeon: Eduardo Reynolds MD;  Location: [facility] MAIN OR;  Service: Orthopedics;  Laterality: Right;   • BELOW KNEE AMPUTATION Right 7/30/2020    Procedure: AMPUTATION BELOW KNEE;  Surgeon: Eduardo Reynolds MD;  Location: [facility] MAIN OR;  Service: Orthopedics;  Laterality: Right;   • CARDIAC CATHETERIZATION  04/2018    bh   • COLONOSCOPY     • ENDOSCOPY     • ENDOSCOPY N/A 10/4/2019    Procedure: ESOPHAGOGASTRODUODENOSCOPY with dilitation and biopsy x 1 area;  Surgeon: Declan Iqbal MD;  Location: [facility]  ENDOSCOPY;  Service: Gastroenterology   • ENDOSCOPY N/A 12/13/2019    Procedure: ESOPHAGOGASTRODUODENOSCOPY WITH DILATATION (50, 52 BOUGIE);  Surgeon: Declan Iqbal MD;  Location: [facility] ENDOSCOPY;  Service: Gastroenterology   • ENDOSCOPY N/A 8/6/2021    Procedure: ESOPHAGOGASTRODUODENOSCOPY with biopsy x1 area and esophageal dilation (56FR Bougie);  Surgeon: Hussein Talavera MD;  Location: [facility] ENDOSCOPY;  Service: Gastroenterology;  Laterality: N/A;  post: hiatal hernia, erosive gastritis   • ENDOSCOPY N/A 12/13/2022    Procedure: ESOPHAGOGASTRODUODENOSCOPY WITH DILATATION (BOUGIE #54, #56) AND BIOPSY X1;  Surgeon: David Rodriguez MD;  Location: [facility] ENDOSCOPY;  Service: Gastroenterology;  Laterality: N/A;  POST: dysphagia    • EYE SURGERY     • GANGLION CYST EXCISION Left    • HERNIA REPAIR Bilateral    • INCISION AND DRAINAGE OF WOUND Right 6/15/2022    Procedure: INCISION AND DRAINAGE WOUND with debridement;  Surgeon: Fito Forte MD;  Location: [facility] MAIN OR;  Service: Orthopedics;  Laterality: Right;   • JOINT REPLACEMENT      Left total hip   • RETINOPATHY SURGERY      laser   • TOTAL HIP ARTHROPLASTY Left    • TOTAL HIP ARTHROPLASTY Left 2018   • TRANS METATARSAL AMPUTATION Right 3/17/2020    Procedure: AMPUTATION TRANS METATARSAL right;  Surgeon: ERWIN Baker DPM;  Location: [facility] MAIN OR;  Service: Podiatry;  Laterality: Right;  GANGRENOUS RIGHT FOOT   • VASECTOMY        General Information     Row Name 03/08/23 1554          Physical Therapy Time and Intention    Document Type evaluation  -SS (r) MB (t) SS (c)     Mode of Treatment individual therapy;physical therapy  -SS (r) MB (t) SS (c)     Row Name 03/08/23 1551          General Information    Patient Profile Reviewed yes  -SS (r) MB (t) SS (c)     Prior Level of Function independent:;all household mobility;community mobility;gait;transfer;ADL's;home management;using stairs  -SS (r) MB (t) SS (c)     Existing  Precautions/Restrictions fall;other (see comments)  prosthetic for ambulation  -SS (r) MB (t) SS (c)     Barriers to Rehab none identified  -SS (r) MB (t) SS (c)     Row Name 03/08/23 1554          Living Environment    People in Home sibling(s);other (see comments)  brother and family  -SS (r) MB (t) SS (c)     Row Name 03/08/23 1554          Home Main Entrance    Number of Stairs, Main Entrance none  -SS (r) MB (t) SS (c)     Row Name 03/08/23 1554          Stairs Within Home, Primary    Stairs Comment, Within Home, Primary one flight of steps up to bedroom  -SS (r) MB (t) SS (c)     Row Name 03/08/23 1554          Cognition    Orientation Status (Cognition) oriented x 4  -SS (r) MB (t) SS (c)     Row Name 03/08/23 1554          Safety Issues, Functional Mobility    Impairments Affecting Function (Mobility) balance;coordination;endurance/activity tolerance;postural/trunk control;sensation/sensory awareness;strength  -SS (r) MB (t) SS (c)           User Key  (r) = Recorded By, (t) = Taken By, (c) = Cosigned By    Initials Name Provider Type    SS Chana Laureano, PT Physical Therapist    Gregorio Mejia, PT Student PT Student               Mobility     Row Name 03/08/23 1558          Bed Mobility    Bed Mobility bed mobility (all) activities  -SS (r) MB (t) SS (c)     All Activities, [place] (Bed Mobility) minimum assist (75% patient effort)  -SS (r) MB (t) SS (c)     Assistive Device (Bed Mobility) bed rails;head of bed elevated  -SS (r) MB (t) SS (c)     Row Name 03/08/23 1558          Bed-Chair Transfer    Bed-Chair [place] (Transfers) contact guard  -SS (r) MB (t) SS (c)     Assistive Device (Bed-Chair Transfers) walker, front-wheeled  -SS (r) MB (t) SS (c)     Comment, (Bed-Chair Transfer) with prosthetic limb  -SS (r) MB (t) SS (c)     Row Name 03/08/23 1558          Sit-Stand Transfer    Sit-Stand [place] (Transfers) contact guard  -SS (r) MB (t) SS (c)     Assistive Device (Sit-Stand  Transfers) walker, front-wheeled  -SS (r) MB (t) SS (c)     Comment, (Sit-Stand Transfer) with prosthetic limb  -SS (r) MB (t) SS (c)     Row Name 03/08/23 1558          Gait/Stairs (Locomotion)    [place] Level (Gait) contact guard  -SS (r) MB (t) SS (c)     Assistive Device (Gait) walker, front-wheeled  -SS (r) MB (t) SS (c)     Distance in Feet (Gait) 120  -SS (r) MB (t) SS (c)     Deviations/Abnormal Patterns (Gait) right sided deviations  -SS (r) MB (t) SS (c)     Right Sided Gait Deviations lateral trunk flexion;leans right  -SS (r) MB (t) SS (c)     Comment, (Gait/Stairs) 3x40' CGA with RW with use of prosthetic leg  -SS (r) MB (t) SS (c)           User Key  (r) = Recorded By, (t) = Taken By, (c) = Cosigned By    Initials Name Provider Type    SS Chana Laureano, PT Physical Therapist    Gregorio Mejia, YENY Student PT Student               Obj/Interventions     Row Name 03/08/23 1604          Range of Motion Comprehensive    General Range of Motion no range of motion deficits identified  -SS (r) MB (t) SS (c)     Comment, General Range of Motion LE AROM WFL  -SS (r) MB (t) SS (c)     Row Name 03/08/23 1604          Strength Comprehensive (MMT)    General Manual Muscle Testing (MMT) Assessment no strength deficits identified  -SS (r) MB (t) SS (c)     Comment, General Manual Muscle Testing (MMT) Assessment LE Strength WFL  -SS (r) MB (t) SS (c)     Row Name 03/08/23 1604          Balance    Balance Assessment sitting static balance;sit to stand dynamic balance;sitting dynamic balance;standing dynamic balance;standing static balance  -SS (r) MB (t) SS (c)     Static Sitting Balance standby assist  -SS (r) MB (t) SS (c)     Dynamic Sitting Balance minimal assist  -SS (r) MB (t) SS (c)     Position, Sitting Balance supported;sitting edge of bed  -SS (r) MB (t) SS (c)     Sit to Stand Dynamic Balance contact guard  -SS (r) MB (t) SS (c)     Static Standing Balance contact guard  -SS (r) MB (t) SS (c)      Dynamic Standing Balance contact guard  -SS (r) MB (t) SS (c)     Position/Device Used, Standing Balance walker, front-wheeled  -SS (r) MB (t) SS (c)     Row Name 03/08/23 1604          Sensory Assessment (Somatosensory)    Sensory Assessment (Somatosensory) sensation intact  -SS (r) MB (t) SS (c)           User Key  (r) = Recorded By, (t) = Taken By, (c) = Cosigned By    Initials Name Provider Type     Chana Laureano, PT Physical Therapist    Gregorio Mejia, PT Student PT Student            Vestibular Exam     Smooth pursuit: Normal  Spontaneous nystagmus: Not Present  Gaze holding nystagmus: Not Present  Saccades: Slight overshooting, but able to correct  Convergence/divergence: 16 inches  VOR slow: Minor nystagmus noted with left rotation  Head thrust test: Not Att, neck/shoulder pain  Head shaking nystagmus test: Not Att, neck/shoulder pain  [place] hallpike for posterior canal: Negative  Roll test for horizontal canal: Negative  Orthostatic vitals: Normal  Standing balance: No LOB when standing or ambulation  Gait: 3x40' CGA with RW and no LOB. Standing rest breaks req after 40'   Goals/Plan     Row Name 03/08/23 1609          Bed Mobility Goal 1 (PT)    Activity/Assistive Device (Bed Mobility Goal 1, PT) bed mobility activities, all  -SS (r) MB (t) SS (c)     [place] Level/Cues Needed (Bed Mobility Goal 1, PT) independent  -SS (r) MB (t) SS (c)     Time Frame (Bed Mobility Goal 1, PT) long term goal (LTG);2 weeks  -SS (r) MB (t) SS (c)     Row Name 03/08/23 1603          Transfer Goal 1 (PT)    Activity/Assistive Device (Transfer Goal 1, PT) transfers, all  -SS (r) MB (t) SS (c)     [place] Level/Cues Needed (Transfer Goal 1, PT) independent  -SS (r) MB (t) SS (c)     Time Frame (Transfer Goal 1, PT) long term goal (LTG);2 weeks  -SS (r) MB (t) SS (c)     Row Name 03/08/23 1603          Gait Training Goal 1 (PT)    Activity/Assistive Device (Gait Training Goal 1, PT) gait (walking  "locomotion);walker, rolling  -SS (r) MB (t) SS (c)     [place] Level (Gait Training Goal 1, PT) independent  -SS (r) MB (t) SS (c)     Distance (Gait Training Goal 1, PT) 250  -SS (r) MB (t) SS (c)     Time Frame (Gait Training Goal 1, PT) long term goal (LTG);2 weeks  -SS (r) MB (t) SS (c)     Row Name 03/08/23 1600          Stairs Goal 1 (PT)    Activity/Assistive Device (Stairs Goal 1, PT) stairs, all skills  -SS (r) MB (t) SS (c)     Number of Stairs (Stairs Goal 1, PT) 12  -SS (r) MB (t) SS (c)     Time Frame (Stairs Goal 1, PT) long term goal (LTG);2 weeks  -SS (r) MB (t) SS (c)     Row Name 03/08/23 1606          Therapy Assessment/Plan (PT)    Planned Therapy Interventions (PT) balance training;gait training;home exercise program;neuromuscular re-education;patient/family education;stair training;strengthening;transfer training  -SS (r) MB (t) SS (c)           User Key  (r) = Recorded By, (t) = Taken By, (c) = Cosigned By    Initials Name Provider Type    SS Chana Laureano, PT Physical Therapist    Gregorio Mejia, PT Student PT Student               Clinical Impression     Row Name 03/08/23 1602          Pain    Additional Documentation Pain Scale: FACES Pre/Post-Treatment (Group)  -SS (r) MB (t) SS (c)     Row Name 03/08/23 1605          Pain Scale: FACES Pre/Post-Treatment    Pain: FACES Scale, Pretreatment 2-->hurts little bit  -SS (r) MB (t) SS (c)     Posttreatment Pain Rating 2-->hurts little bit  -SS (r) MB (t) SS (c)     Pain Location - neck  -SS (r) MB (t) SS (c)     Row Name 03/08/23 1631 03/08/23 1608       Plan of Care Review    Plan of Care Reviewed With -- patient  -SS (r) MB (t) SS (c)    Progress -- no change  -SS (r) MB (t) SS (c)    Outcome Evaluation Pt is a 56 y/o M admitted to [facility] on 3/7/23 with c/o dizziness and symptoms of room spinning with feeling like he is on a \"merry-go-round\". Pt states symptoms have been occurring for years now, but have progressively worsened over the " past week or two when he received a neck adjustment from OP PT. PMH includes neck/shoulder chronic pain, history of falls, diabetic neuropathy, HTN. Pt is currently living with brother, ANA MARIA, and their children in multi-level home with no steps to enter. He does have to perform full set of steps to reach second level bedroom. Prior to admission, pt was IND with household mobility, community amb with no AD, ADLs, and stair navigation. He has history of R BKA and uses prosthetic with ambulation. Today he req min A for bed mobility and balance EOB secondary to vertigo symptoms, CGA for transfers, and able to amb 3x40' CGA with RW. During vestibular assessment, oculomotor testing showed slight overshooting with saccadic movement and minor nystagmus with VOR slow, but positional testing came back negative. Vertigo symptoms present with sitting EOB as well as ambulation, but no safety issues during activity during gait or balance activities. Pt was educated on cause of vertigo symptoms likely due to cervicogenic nature and previous neck injury. Educated on purpose of continued OPPT services to address the issue and ways to compensate until symptoms begin to reside. Pt may req additional RW at d/c for safety with stair navigation.  -SS (r) MB (t) SS (c) --    Row Name 03/08/23 6575          Therapy Assessment/Plan (PT)    Rehab Potential (PT) fair, will monitor progress closely  -SS (r) MB (t) SS (c)     Criteria for Skilled Interventions Met (PT) yes  -SS (r) MB (t) SS (c)     Therapy Frequency (PT) 3 times/wk  -SS (r) MB (t) SS (c)     Predicted Duration of Therapy Intervention (PT) until d/c  -SS (r) MB (t) SS (c)     Row Name 03/08/23 1600          Vital Signs    O2 Delivery Pre Treatment room air  -SS (r) MB (t) SS (c)     O2 Delivery Intra Treatment room air  -SS (r) MB (t) SS (c)     O2 Delivery Post Treatment room air  -SS (r) MB (t) SS (c)     Pre Patient Position Supine  -SS (r) MB (t) SS (c)     Intra Patient  Position Standing  -SS (r) MB (t) SS (c)     Post Patient Position Supine  -SS (r) MB (t) SS (c)     Row Name 03/08/23 1605          Positioning and Restraints    Pre-Treatment Position in bed  -SS (r) MB (t) SS (c)     Post Treatment Position bed  -SS (r) MB (t) SS (c)     In Bed notified nsg;supine;call light within reach;encouraged to call for assist;exit alarm on  -SS (r) MB (t) SS (c)           User Key  (r) = Recorded By, (t) = Taken By, (c) = Cosigned By    Initials Name Provider Type    SS Chana Laureano, PT Physical Therapist    Gregorio Mejia, PT Student PT Student               Outcome Measures     Row Name 03/08/23 1610 03/08/23 0800       How much help from another person do you currently need...    Turning from your back to your side while in flat bed without using bedrails? 4  -SS (r) MB (t) SS (c) 4  -MR    Moving from lying on back to sitting on the side of a flat bed without bedrails? 4  -SS (r) MB (t) SS (c) 4  -MR    Moving to and from a bed to a chair (including a wheelchair)? 4  -SS (r) MB (t) SS (c) 4  -MR    Standing up from a chair using your arms (e.g., wheelchair, bedside chair)? 4  -SS (r) MB (t) SS (c) 4  -MR    Climbing 3-5 steps with a railing? 3  -SS (r) MB (t) SS (c) 3  -MR    To walk in hospital room? 4  -SS (r) MB (t) SS (c) 4  -MR    AM-PAC 6 Clicks Score (PT) 23  -SS (r) MB (t) 23  -MR    Highest level of mobility 7 --> Walked 25 feet or more  -SS (r) MB (t) 7 --> Walked 25 feet or more  -MR          User Key  (r) = Recorded By, (t) = Taken By, (c) = Cosigned By    Initials Name Provider Type    SS Chana Laureano, PT Physical Therapist    Tutu Castaneda RN Registered Nurse    Gregorio Mejia, PT Student PT Student                             Physical Therapy Education     Title: PT OT SLP Therapies (Resolved)     Topic: Physical Therapy (Resolved)     Point: Mobility training (Resolved)     Learning Progress Summary           Patient Acceptance, E,TB, VU by MB  "at 3/8/2023 1611                   Point: Home exercise program (Resolved)     Learning Progress Summary           Patient Acceptance, E,TB, VU by MB at 3/8/2023 1611                   Point: Body mechanics (Resolved)     Learning Progress Summary           Patient Acceptance, E,TB, VU by MB at 3/8/2023 1611                   Point: Precautions (Resolved)     Learning Progress Summary           Patient Acceptance, E,TB, VU by MB at 3/8/2023 1611                               User Key     Initials Effective Dates Name Provider Type Discipline    MB 01/30/23 -  Gregorio Burns, PT Student PT Student PT              PT Recommendation and Plan  Planned Therapy Interventions (PT): balance training, gait training, home exercise program, neuromuscular re-education, patient/family education, stair training, strengthening, transfer training  Plan of Care Reviewed With: patient  Progress: no change  Outcome Evaluation: Pt is a 56 y/o M admitted to [facility] on 3/7/23 with c/o dizziness and symptoms of room spinning with feeling like he is on a \"merry-go-round\". Pt states symptoms have been occurring for years now, but have progressively worsened over the past week or two when he received a neck adjustment from OP PT. PMH includes neck/shoulder chronic pain, history of falls, diabetic neuropathy, HTN. Pt is currently living with brother, ANA MARIA, and their children in multi-level home with no steps to enter. He does have to perform full set of steps to reach second level bedroom. Prior to admission, pt was IND with household mobility, community amb with no AD, ADLs, and stair navigation. He has history of R BKA and uses prosthetic with ambulation. Today he req min A for bed mobility and balance EOB secondary to vertigo symptoms, CGA for transfers, and able to amb 3x40' CGA with RW. During vestibular assessment, oculomotor testing showed slight overshooting with saccadic movement and minor nystagmus with VOR slow, but positional testing " came back negative. Vertigo symptoms present with sitting EOB as well as ambulation, but no safety issues during activity during gait or balance activities. Pt was educated on cause of vertigo symptoms likely due to cervicogenic nature and previous neck injury. Educated on purpose of continued OPPT services to address the issue and ways to compensate until symptoms begin to reside. Pt may req additional RW at d/c for safety with stair navigation.     Time Calculation:    PT Charges     Row Name 03/08/23 1611             Time Calculation    Start Time 0934  -SS (r) MB (t) SS (c)      Stop Time 1029  -SS (r) MB (t) SS (c)      Time Calculation (min) 55 min  -SS (r) MB (t)      PT Received On 03/08/23  -SS (r) MB (t) SS (c)      PT - Next Appointment 03/10/23  -SS (r) MB (t) SS (c)      PT Goal Re-Cert Due Date 03/22/23  -SS (r) MB (t) SS (c)         Time Calculation- PT    Total Timed Code Minutes- PT 25 minute(s)  -SS (r) MB (t) SS (c)            User Key  (r) = Recorded By, (t) = Taken By, (c) = Cosigned By    Initials Name Provider Type    SS Chana Laureano, PT Physical Therapist    Gregorio Mejia, PT Student PT Student              Therapy Charges for Today     Code Description Service Date Service Provider Modifiers Qty    42801233134 HC PT EVAL MOD COMPLEXITY 4 3/8/2023 Gregorio Burns, PT Student GP 1    24044308854 HC GAIT TRAINING EA 15 MIN 3/8/2023 Gregorio Burns, PT Student GP 1    [id number] HC PT THERAPEUTIC ACT EA 15 MIN 3/8/2023 Gregorio Burns, PT Student GP 1          PT G-Codes  AM-PAC 6 Clicks Score (PT): 23  PT Discharge Summary  Anticipated Discharge Disposition (PT): home with outpatient therapy services    YENY Crump  3/8/2023

## 2023-03-08 NOTE — PLAN OF CARE
Goal Outcome Evaluation:    MRI's ordered/pending. Awaiting PT eval. BG monitoring with ISS coverage using glucommander.

## 2023-03-09 ENCOUNTER — TRANSITIONAL CARE MANAGEMENT TELEPHONE ENCOUNTER (OUTPATIENT)
Dept: CALL CENTER | Facility: HOSPITAL | Age: 58
End: 2023-03-09
Payer: MEDICARE

## 2023-03-09 LAB — QT INTERVAL: 356 MS

## 2023-03-09 NOTE — OUTREACH NOTE
Call Center TCM Note    Flowsheet Row Responses   [facility] patient discharged from? [facility]   Does the patient have one of the following disease processes/diagnoses(primary or secondary)? Other   TCM attempt successful? Yes   Call start time 1516   Call end time 1525   Meds reviewed with patient/caregiver? Yes   Is the patient having any side effects they believe may be caused by any medication additions or changes? No   Does the patient have all medications ordered at discharge? N/A   Is the patient taking all medications as directed (includes completed medication regime)? Yes   Comments PCP appt with Beba Everett scheduled for 03/13/23 at 9:00 am. No appts available with Dr Whitaker.    Does the patient have an appointment with their PCP within 7 days of discharge? Yes   Has home health visited the patient within 72 hours of discharge? N/A   Home health comments States waiting on insurance to clear outpatient PT.   Psychosocial issues? No   Did the patient receive a copy of their discharge instructions? Yes   Nursing interventions Reviewed instructions with patient   What is the patient's perception of their health status since discharge? Improving   Is the patient/caregiver able to teach back signs and symptoms related to disease process for when to call PCP? Yes   Is the patient/caregiver able to teach back signs and symptoms related to disease process for when to call 911? Yes   Is the patient/caregiver able to teach back the hierarchy of who to call/visit for symptoms/problems? PCP, Specialist, Home health nurse, Urgent Care, ED, 911 Yes   If the patient is a current smoker, are they able to teach back resources for cessation? Not a smoker   Additional teach back comments Reports BG are varying by continuous glucose monitoring. States currently 215.    TCM call completed? Yes   Wrap up additional comments Patient states is improving. States vertigo is much better. States will notify PCP if BG  continue to be elevated. Denies any needs today.   Call end time 1525   Would this patient benefit from a Referral to [facility] Social Work? No   Is the patient interested in additional calls from an ambulatory ?  NOTE:  applies to high risk patients requiring additional follow-up. No          Pauline Irby RN    3/9/2023, 15:26 EST

## 2023-03-12 DIAGNOSIS — Z79.4 TYPE 2 DIABETES MELLITUS WITH HYPERGLYCEMIA, WITH LONG-TERM CURRENT USE OF INSULIN: ICD-10-CM

## 2023-03-12 DIAGNOSIS — E11.65 TYPE 2 DIABETES MELLITUS WITH HYPERGLYCEMIA, WITH LONG-TERM CURRENT USE OF INSULIN: ICD-10-CM

## 2023-03-12 DIAGNOSIS — F32.9 REACTIVE DEPRESSION: ICD-10-CM

## 2023-03-12 DIAGNOSIS — M79.2 NEUROPATHIC PAIN: ICD-10-CM

## 2023-03-12 DIAGNOSIS — E03.9 HYPOTHYROIDISM, UNSPECIFIED TYPE: ICD-10-CM

## 2023-03-12 DIAGNOSIS — I10 ESSENTIAL HYPERTENSION: ICD-10-CM

## 2023-03-12 DIAGNOSIS — G44.221 CHRONIC TENSION-TYPE HEADACHE, INTRACTABLE: ICD-10-CM

## 2023-03-13 RX ORDER — LEVOTHYROXINE SODIUM 0.1 MG/1
100 TABLET ORAL
Qty: 90 TABLET | Refills: 2 | Status: SHIPPED | OUTPATIENT
Start: 2023-03-13

## 2023-03-13 RX ORDER — ATORVASTATIN CALCIUM 80 MG/1
80 TABLET, FILM COATED ORAL NIGHTLY
Qty: 90 TABLET | Refills: 2 | Status: SHIPPED | OUTPATIENT
Start: 2023-03-13

## 2023-03-13 RX ORDER — OMEPRAZOLE 40 MG/1
40 CAPSULE, DELAYED RELEASE ORAL DAILY
Qty: 90 CAPSULE | Refills: 2 | Status: SHIPPED | OUTPATIENT
Start: 2023-03-13

## 2023-03-13 RX ORDER — INSULIN GLARGINE 100 [IU]/ML
INJECTION, SOLUTION SUBCUTANEOUS
Qty: 30 ML | Refills: 0 | OUTPATIENT
Start: 2023-03-13

## 2023-03-13 RX ORDER — INSULIN ASPART 100 [IU]/ML
INJECTION, SOLUTION INTRAVENOUS; SUBCUTANEOUS
Qty: 15 ML | Refills: 0 | OUTPATIENT
Start: 2023-03-13

## 2023-03-13 RX ORDER — DULOXETIN HYDROCHLORIDE 60 MG/1
60 CAPSULE, DELAYED RELEASE ORAL EVERY MORNING
Qty: 90 CAPSULE | Refills: 2 | Status: SHIPPED | OUTPATIENT
Start: 2023-03-13

## 2023-03-13 RX ORDER — BUSPIRONE HYDROCHLORIDE 10 MG/1
10 TABLET ORAL EVERY 12 HOURS SCHEDULED
Qty: 180 TABLET | Refills: 2 | Status: SHIPPED | OUTPATIENT
Start: 2023-03-13 | End: 2023-04-12

## 2023-03-13 RX ORDER — CYCLOBENZAPRINE HCL 10 MG
TABLET ORAL
Qty: 30 TABLET | Refills: 0 | Status: SHIPPED | OUTPATIENT
Start: 2023-03-13

## 2023-03-14 RX ORDER — PREGABALIN 75 MG/1
75 CAPSULE ORAL 2 TIMES DAILY
Qty: 180 CAPSULE | Refills: 0 | Status: SHIPPED | OUTPATIENT
Start: 2023-03-14

## 2023-03-14 RX ORDER — HYDROCODONE BITARTRATE AND ACETAMINOPHEN 5; 325 MG/1; MG/1
1 TABLET ORAL EVERY MORNING
OUTPATIENT
Start: 2023-03-14

## 2023-03-14 RX ORDER — MECLIZINE HYDROCHLORIDE 25 MG/1
25 TABLET ORAL 3 TIMES DAILY PRN
Qty: 30 TABLET | Refills: 0 | Status: SHIPPED | OUTPATIENT
Start: 2023-03-14 | End: 2023-03-15 | Stop reason: SDUPTHER

## 2023-03-14 RX ORDER — METOPROLOL SUCCINATE 100 MG/1
100 TABLET, EXTENDED RELEASE ORAL DAILY
Qty: 90 TABLET | Refills: 0 | Status: SHIPPED | OUTPATIENT
Start: 2023-03-14

## 2023-03-14 RX ORDER — AMLODIPINE BESYLATE 5 MG/1
5 TABLET ORAL DAILY
Qty: 90 TABLET | Refills: 0 | Status: SHIPPED | OUTPATIENT
Start: 2023-03-14

## 2023-03-14 RX ORDER — PANTOPRAZOLE SODIUM 40 MG/1
40 TABLET, DELAYED RELEASE ORAL 2 TIMES DAILY
OUTPATIENT
Start: 2023-03-14

## 2023-03-14 RX ORDER — BUPROPION HYDROCHLORIDE 150 MG/1
150 TABLET ORAL EVERY MORNING
Qty: 90 TABLET | Refills: 0 | Status: SHIPPED | OUTPATIENT
Start: 2023-03-14

## 2023-03-14 NOTE — PROGRESS NOTES
"Transitional Care Follow Up Visit  Subjective     Kuldeep Adhikari is a 57 y.o. male who presents for a transitional care management visit.    Within 48 business hours after discharge our office contacted him via telephone to coordinate his care and needs.      I reviewed and discussed the details of that call along with the discharge summary, hospital problems, inpatient lab results, inpatient diagnostic studies, and consultation reports with Kuldeep.     Current outpatient and discharge medications have been reconciled for the patient.  Reviewed by: KEYLA Mitchell      Date of TCM Phone Call 3/8/2023   [facility]   Date of Admission 3/7/2023   Date of Discharge 3/8/2023   Discharge Disposition Home or Self Care     Risk for Readmission (LACE) Score: 7 (3/8/2023  6:00 AM)      History of Present Illness   Course During Hospital Stay:     \"Patient is a 57 y.o. male presented with persistent dizziness with an HPI noted above.  Serial troponins were assessed and found to be 15, 12 with patient's glucose poorly controlled with a max of 302 on admission.  TSH was slightly elevated 4.730.  Creatinine was also noted is elevated at 1.40 with a BUN of 25 with a baseline of 1.14 in June 2022 noted.  UA showed trace ketones as well as 3+ glucose but was otherwise unremarkable and magnesium within normal limits at 2.0.  EKG showed sinus rhythm at 74 without obvious acute changes or ectopy.  CT of head showed no acute intracranial hemorrhage with an intact calvarium and sinusitis present.  He was given 500 mL saline bolus in the ED and that was repeated following his admission.  Glucomander protocol was started with improvement in glucose noted and A1c was found to be elevated at 9.0.  MRI of brain showed no abnormalities with a diminutive left vertebral artery variant versus remote occlusion again noted with MRA of neck showing no major arterial vascular abnormality within the neck with widely " "patent vessels and no hemodynamically significant stenosis or dissection.  MRI of brain showed no acute ischemic change with no acute intracranial abnormality noted though some motion artifact was reported.  PT was consulted who evaluated patient recommending outpatient physical therapy as well as rolling walker which will be ordered at discharge.  He will continue his meclizine.  A lengthy discussion was had with patient regarding the importance of blood glucose and diet control.  He is being referred back to endocrinology for further evaluation of his diabetes regimen as well as hypothyroidism.  At this time patient felt to be in good condition for discharge with close follow-up with his PCP as well as endocrinology on an outpatient basis.  His full testing/results and plan were discussed with patient along with concerning/alarm symptoms for which to call 911/return to the ED.  All questions were answered and he verbalizes his understanding and agreement.\"      He says he had an adjustment for his bad shoulder that day and it caused the dizziness to start. He has had a frontal headache as well. The CT in the ED showed bilateral maxillary sinusitis. He has been blowing yellow sputum out of his nose he also feels stuffy.     He is going to follow up with Dr. Michaels about the sugars. He feels his humalog is taking too long to kick in.    He is feeling better overall. The dizziness is less frequent and severe when it happens now.     The following portions of the patient's history were reviewed and updated as appropriate: allergies, current medications, past family history, past medical history, past social history, past surgical history and problem list.    Review of Systems   Constitutional: Negative for chills, fatigue and fever.   HENT: Positive for congestion, ear pain and sinus pressure. Negative for ear discharge, postnasal drip, sinus pain, sore throat and tinnitus.    Eyes: Negative for pain, discharge and " itching.   Respiratory: Negative for cough, shortness of breath and wheezing.    Cardiovascular: Negative for chest pain, palpitations and leg swelling.   Gastrointestinal: Negative for abdominal pain, nausea and vomiting.   Musculoskeletal: Negative for gait problem, joint swelling and myalgias.   Skin: Negative for color change, rash and wound.   Neurological: Positive for light-headedness and headaches. Negative for dizziness, syncope and weakness.   Psychiatric/Behavioral: Negative for agitation, behavioral problems, confusion and decreased concentration. The patient is not nervous/anxious.        Objective   Physical Exam  Vitals reviewed.   Constitutional:       General: He is not in acute distress.     Appearance: Normal appearance. He is not ill-appearing.   HENT:      Right Ear: Tympanic membrane, ear canal and external ear normal. There is no impacted cerumen.      Left Ear: Tympanic membrane, ear canal and external ear normal. There is no impacted cerumen.      Nose: Congestion present.      Right Sinus: Maxillary sinus tenderness and frontal sinus tenderness present.      Left Sinus: Maxillary sinus tenderness and frontal sinus tenderness present.      Mouth/Throat:      Pharynx: Posterior oropharyngeal erythema present. No oropharyngeal exudate.   Eyes:      Conjunctiva/sclera: Conjunctivae normal.   Cardiovascular:      Rate and Rhythm: Normal rate and regular rhythm.      Pulses: Normal pulses.      Heart sounds: Normal heart sounds.   Pulmonary:      Effort: Pulmonary effort is normal. No respiratory distress.      Breath sounds: Decreased breath sounds present.   Musculoskeletal:         General: Normal range of motion.   Skin:     General: Skin is warm and dry.      Capillary Refill: Capillary refill takes less than 2 seconds.   Neurological:      General: No focal deficit present.      Mental Status: He is alert and oriented to person, place, and time.   Psychiatric:         Mood and Affect: Mood  normal.         Behavior: Behavior normal.         Thought Content: Thought content normal.         Judgment: Judgment normal.         Assessment & Plan   Diagnoses and all orders for this visit:    1. Dizziness (Primary)  Comments:  -meclizine PRN  -treat sinusitis  Orders:  -     meclizine (ANTIVERT) 25 MG tablet; Take 1 tablet by mouth 3 (Three) Times a Day As Needed for Dizziness.  Dispense: 30 tablet; Refill: 0    2. Type 2 diabetes mellitus with hyperglycemia, with long-term current use of insulin ([facility])  Comments:  -follow up with Dr. Michaels    3. Acquired hypothyroidism  Comments:  -follow up with Dr. Michaels    4. Maxillary sinusitis, unspecified chronicity  Comments:  -Azithromycin  -Flonase  -Let me know if you aren't feeling improvements in the next 3-4 days.  Orders:  -     azithromycin (Zithromax Z-Wilfrid) 250 MG tablet; Take 2 tablets by mouth on day 1, then 1 tablet daily on days 2-5  Dispense: 6 tablet; Refill: 0  -     fluticasone (FLONASE) 50 MCG/ACT nasal spray; 2 sprays into the nostril(s) as directed by provider Daily.  Dispense: 16 g; Refill: 0

## 2023-03-15 ENCOUNTER — OFFICE VISIT (OUTPATIENT)
Dept: FAMILY MEDICINE CLINIC | Facility: CLINIC | Age: 58
End: 2023-03-15
Payer: MEDICARE

## 2023-03-15 VITALS
TEMPERATURE: 98.1 F | HEART RATE: 61 BPM | WEIGHT: 203.2 LBS | BODY MASS INDEX: 30.8 KG/M2 | OXYGEN SATURATION: 98 % | SYSTOLIC BLOOD PRESSURE: 136 MMHG | HEIGHT: 68 IN | DIASTOLIC BLOOD PRESSURE: 71 MMHG

## 2023-03-15 DIAGNOSIS — R42 DIZZINESS: Primary | ICD-10-CM

## 2023-03-15 DIAGNOSIS — Z79.4 TYPE 2 DIABETES MELLITUS WITH HYPERGLYCEMIA, WITH LONG-TERM CURRENT USE OF INSULIN: ICD-10-CM

## 2023-03-15 DIAGNOSIS — E11.65 TYPE 2 DIABETES MELLITUS WITH HYPERGLYCEMIA, WITH LONG-TERM CURRENT USE OF INSULIN: ICD-10-CM

## 2023-03-15 DIAGNOSIS — E03.9 ACQUIRED HYPOTHYROIDISM: Chronic | ICD-10-CM

## 2023-03-15 DIAGNOSIS — J32.0 MAXILLARY SINUSITIS, UNSPECIFIED CHRONICITY: ICD-10-CM

## 2023-03-15 PROCEDURE — 3052F HG A1C>EQUAL 8.0%<EQUAL 9.0%: CPT

## 2023-03-15 PROCEDURE — 1160F RVW MEDS BY RX/DR IN RCRD: CPT

## 2023-03-15 PROCEDURE — 1111F DSCHRG MED/CURRENT MED MERGE: CPT

## 2023-03-15 PROCEDURE — 99495 TRANSJ CARE MGMT MOD F2F 14D: CPT

## 2023-03-15 PROCEDURE — 3078F DIAST BP <80 MM HG: CPT

## 2023-03-15 PROCEDURE — 1159F MED LIST DOCD IN RCRD: CPT

## 2023-03-15 PROCEDURE — 3075F SYST BP GE 130 - 139MM HG: CPT

## 2023-03-15 RX ORDER — AZITHROMYCIN 250 MG/1
TABLET, FILM COATED ORAL
Qty: 6 TABLET | Refills: 0 | Status: SHIPPED | OUTPATIENT
Start: 2023-03-15

## 2023-03-15 RX ORDER — INSULIN LISPRO 100 [IU]/ML
100 INJECTION, SOLUTION INTRAVENOUS; SUBCUTANEOUS
COMMUNITY

## 2023-03-15 RX ORDER — MECLIZINE HYDROCHLORIDE 25 MG/1
25 TABLET ORAL 3 TIMES DAILY PRN
Qty: 30 TABLET | Refills: 0 | Status: SHIPPED | OUTPATIENT
Start: 2023-03-15

## 2023-03-15 RX ORDER — FLUTICASONE PROPIONATE 50 MCG
2 SPRAY, SUSPENSION (ML) NASAL DAILY
Qty: 16 G | Refills: 0 | Status: SHIPPED | OUTPATIENT
Start: 2023-03-15

## 2023-03-15 NOTE — PATIENT INSTRUCTIONS
Make sure Humalog isn't Humulin.    You may begin feeling better before you are finished with your antibiotics. Please finish the course completely even after feeling better, to prevent future drug resistant infections.

## 2023-04-06 RX ORDER — INSULIN GLARGINE 100 [IU]/ML
INJECTION, SOLUTION SUBCUTANEOUS
Qty: 30 ML | Refills: 0 | OUTPATIENT
Start: 2023-04-06

## 2023-04-10 ENCOUNTER — TELEPHONE (OUTPATIENT)
Dept: FAMILY MEDICINE CLINIC | Facility: CLINIC | Age: 58
End: 2023-04-10
Payer: MEDICARE

## 2023-04-10 NOTE — TELEPHONE ENCOUNTER
Provider: DR. BEDOLLA    Caller: LANA    Relationship to Patient: NURSE WITH [employer]     Phone Number: [phone]    Reason for Call: [facility] WANTED TO LET PT'S PCP KNOW HIS CARE PLANS HAVE BEEN UPDATED FOR THE YEAR AND SHE IS ABLE TO ACCESS THEM THROUGH THE PROVIDER PORTAL. IF DR. BEDOLLA WOULD LIKE TO SIT IN ON PATIENT ROUNDS SHE CAN CALL THE NUMBER:    [phone]  OPTION 3

## 2023-05-16 DIAGNOSIS — I10 ESSENTIAL HYPERTENSION: ICD-10-CM

## 2023-05-16 DIAGNOSIS — R42 DIZZINESS: ICD-10-CM

## 2023-05-16 DIAGNOSIS — M79.2 NEUROPATHIC PAIN: ICD-10-CM

## 2023-05-16 DIAGNOSIS — E03.9 HYPOTHYROIDISM, UNSPECIFIED TYPE: ICD-10-CM

## 2023-05-26 ENCOUNTER — TELEPHONE (OUTPATIENT)
Dept: FAMILY MEDICINE CLINIC | Facility: CLINIC | Age: 58
End: 2023-05-26

## 2023-05-26 NOTE — TELEPHONE ENCOUNTER
Caller: Kuldeep Adhikari    Relationship: Self    Best call back number:   Kuldeep Adhikari (Self) [phone] (Mobile)     What was the call regarding: PRIOR AUTH FOR DEXCOM 6 SYSTEM       SEND TO [facility] [phone]         Do you require a callback: YES PLEASE

## 2023-05-30 RX ORDER — PROCHLORPERAZINE 25 MG/1
1 SUPPOSITORY RECTAL DAILY
Qty: 1 EACH | Refills: 0 | Status: SHIPPED | OUTPATIENT
Start: 2023-05-30 | End: 2023-08-28 | Stop reason: SDUPTHER

## 2023-05-30 RX ORDER — PROCHLORPERAZINE 25 MG/1
1 SUPPOSITORY RECTAL
Qty: 1 EACH | Refills: 3 | Status: SHIPPED | OUTPATIENT
Start: 2023-05-30 | End: 2023-08-28

## 2023-05-30 RX ORDER — PROCHLORPERAZINE 25 MG/1
SUPPOSITORY RECTAL
Qty: 3 EACH | Refills: 11 | Status: SHIPPED | OUTPATIENT
Start: 2023-05-30 | End: 2023-08-28 | Stop reason: SDUPTHER

## 2023-06-05 ENCOUNTER — TELEPHONE (OUTPATIENT)
Dept: FAMILY MEDICINE CLINIC | Facility: CLINIC | Age: 58
End: 2023-06-05
Payer: MEDICARE

## 2023-06-05 NOTE — TELEPHONE ENCOUNTER
Prior Auth completed for Dexcom G6 Sensor via covermymeds. Pending determination.  Key: [id number]   PA Case ID: [id number]    This system cannot be used to initiate this request. Excluded on pharmacy benefit, please use Availity or call [phone] to initiate prior authorization review. Please do not submit to plan in ePA portal.    Called number above to initiate prior auth via telephone call.

## 2023-06-05 NOTE — TELEPHONE ENCOUNTER
Unable to complete via covermymeds for Dexcom G6 Transmitter      This system cannot be used to initiate this request. Excluded on pharmacy benefit, please use Availity or call [phone] to initiate prior authorization review. Please do not submit to plan in ePA portal.    Call [employer] to initiate prior auth for Dexcom G6 Transmitter via telephone call.

## 2023-06-05 NOTE — TELEPHONE ENCOUNTER
Called patient to get updated insurance information. Advise patient to call [facility] and give them his new [employer] insurance information. Also to see if an PA required for his Dexcom G 6 Senor and Transmitter. Patient will call office back with any new information he have.    I have also left a message for [facility] to call back so I can give them his new insurance information and to verify if a PA is needed.

## 2023-06-14 ENCOUNTER — TELEPHONE (OUTPATIENT)
Dept: FAMILY MEDICINE CLINIC | Facility: CLINIC | Age: 58
End: 2023-06-14

## 2023-06-14 NOTE — TELEPHONE ENCOUNTER
Caller: Kuldeep Adhikari    Relationship to patient: Self    Best call back number: [phone]    Patient is needing:     WANTED TO GIVE THE OFFICE A HEADS UP ABOUT A FAX THAT SHOULD HAVE BEEN SENT CONCERNING HIS G7 DEVICE.    [facility] TOLD HIM THEY NEED MORE INFORMATION BEFORE THEY WILL SEND HIM WHAT HE NEEDS.

## 2023-08-02 ENCOUNTER — TELEPHONE (OUTPATIENT)
Dept: NEUROSURGERY | Facility: CLINIC | Age: 58
End: 2023-08-02
Payer: MEDICARE

## 2023-08-02 ENCOUNTER — HOSPITAL ENCOUNTER (OUTPATIENT)
Dept: MRI IMAGING | Facility: HOSPITAL | Age: 58
Discharge: HOME | End: 2023-08-02
Payer: MEDICARE

## 2023-08-02 DIAGNOSIS — M50.30 DDD (DEGENERATIVE DISC DISEASE), CERVICAL: ICD-10-CM

## 2023-08-02 DIAGNOSIS — M54.12 CERVICAL RADICULOPATHY: ICD-10-CM

## 2023-08-02 DIAGNOSIS — M54.2 NECK PAIN: ICD-10-CM

## 2023-08-02 PROCEDURE — 72141 MRI NECK SPINE W/O DYE: CPT

## 2023-08-04 ENCOUNTER — OFFICE VISIT (OUTPATIENT)
Dept: NEUROSURGERY | Facility: CLINIC | Age: 58
End: 2023-08-04
Payer: MEDICARE

## 2023-08-04 ENCOUNTER — LAB (OUTPATIENT)
Dept: LAB | Facility: HOSPITAL | Age: 58
End: 2023-08-04
Payer: MEDICARE

## 2023-08-04 VITALS
SYSTOLIC BLOOD PRESSURE: 125 MMHG | WEIGHT: 198 LBS | HEIGHT: 68 IN | HEART RATE: 64 BPM | DIASTOLIC BLOOD PRESSURE: 72 MMHG | BODY MASS INDEX: 30.01 KG/M2

## 2023-08-04 DIAGNOSIS — Z79.4 TYPE 2 DIABETES MELLITUS WITH HYPERGLYCEMIA, WITH LONG-TERM CURRENT USE OF INSULIN: ICD-10-CM

## 2023-08-04 DIAGNOSIS — M50.30 DDD (DEGENERATIVE DISC DISEASE), CERVICAL: Primary | ICD-10-CM

## 2023-08-04 DIAGNOSIS — E11.65 TYPE 2 DIABETES MELLITUS WITH HYPERGLYCEMIA, WITH LONG-TERM CURRENT USE OF INSULIN: ICD-10-CM

## 2023-08-04 LAB — HBA1C MFR BLD: 11.6 % (ref 4.8–5.6)

## 2023-08-04 PROCEDURE — 83036 HEMOGLOBIN GLYCOSYLATED A1C: CPT

## 2023-08-04 PROCEDURE — 36415 COLL VENOUS BLD VENIPUNCTURE: CPT

## 2023-08-09 ENCOUNTER — TELEPHONE (OUTPATIENT)
Dept: NEUROSURGERY | Facility: CLINIC | Age: 58
End: 2023-08-09
Payer: MEDICARE

## 2023-08-09 NOTE — TELEPHONE ENCOUNTER
Called patient to ask if he had completed the things that Sanjiv Singh recommended at his last visit and he stated that he had not. I did explain to him that there will not be anything Dr Cantu can recommend at this time until he can get his A1c under control with an Endocrinologist. He stated that he does have an appointment on 8/14/23 with Dr Ramachandran at Endocrinology,but that he still wants to follow up with .

## 2023-08-09 NOTE — PROGRESS NOTES
Neurosurgical Consultation      Kuldeep Adhikari is a 58 y.o. male is here today for follow-up for neck pain. Today patient reports continued neck pain with numbness into his right arm and hand.    Chief Complaint   Patient presents with    Neck Pain     Follow up         Previous treatment:     HPI: This is a 58-year-old gentleman with chronic kidney disease, insulin-dependent diabetes status post right-sided below the knee amputation, severe diabetic neuropathy as well as vascular disease who was last evaluated by Sanjiv Singh on August 4, 2023.  He is being worked up for neck pain as well as a right sided more than left-sided numbness and pain into his arm.  Upon my discussion with him his numbness is into his fingertips predominantly there is no specific finger that is worse.  He has significant neck pain and throbbing.  It is associated with activity.  Unfortunately his most recent hemoglobin A1c was 11.6%.  He does have cervical pathology including a right sided C6/C7 disc herniation without profound foraminal stenosis.    Past Medical History:   Diagnosis Date    CKD (chronic kidney disease), stage III     Depression     Depression with suicidal ideation 08/08/2021    DJD (degenerative joint disease)     DVT (deep venous thrombosis)     Ganglion     rt wrist    Gangrene of foot 10/09/2015    GERD (gastroesophageal reflux disease)     Headache     Heart murmur     Hiatal hernia     History of esophageal stricture     s/p dialation 40-50 times last EGD 04/2016    Hyperlipidemia     Hypertension     Hypothyroidism     IBS (irritable bowel syndrome)     Neuropathy     Osteomyelitis of right foot 03/19/2020    Pulmonary embolism 01/19/2017    Rash     rt lower hip    Retinopathy     S/P BKA (below knee amputation), right 03/18/2022    Seasonal allergies     Sepsis due to group B Streptococcus 03/19/2020    Sleep apnea     Stroke     Type 2 diabetes mellitus with peripheral vascular disease     Vitamin D deficiency          Past Surgical History:   Procedure Laterality Date    AMPUTATION DIGIT Left 4/26/2022    Procedure: AMPUTATION left great toe;  Surgeon: ERWIN Baker DPM;  Location: [facility] MAIN OR;  Service: Podiatry;  Laterality: Left;    AMPUTATION DIGIT  4/26/2022    Procedure: ;  Surgeon: ERWIN Baker DPM;  Location: [facility] MAIN OR;  Service: Podiatry;;    AMPUTATION FOOT / TOE Right     great toe    AMPUTATION REVISION Right 4/8/2021    Procedure: BELOW KNEE AMPUTATION REVISAION;  Surgeon: Eduardo Reynolds MD;  Location: [facility] MAIN OR;  Service: Orthopedics;  Laterality: Right;    AMPUTATION REVISION Right 4/29/2021    Procedure: AMPUTATION REVISION KNEE STUMP;  Surgeon: Eduardo Reynolds MD;  Location: [facility] MAIN OR;  Service: Orthopedics;  Laterality: Right;    BELOW KNEE AMPUTATION Right 7/30/2020    Procedure: AMPUTATION BELOW KNEE;  Surgeon: Eduardo Reynolds MD;  Location: [facility] MAIN OR;  Service: Orthopedics;  Laterality: Right;    CARDIAC CATHETERIZATION  04/2018    [facility]    COLONOSCOPY      ENDOSCOPY      ENDOSCOPY N/A 10/4/2019    Procedure: ESOPHAGOGASTRODUODENOSCOPY with dilitation and biopsy x 1 area;  Surgeon: Declan Iqbal MD;  Location: [facility] ENDOSCOPY;  Service: Gastroenterology    ENDOSCOPY N/A 12/13/2019    Procedure: ESOPHAGOGASTRODUODENOSCOPY WITH DILATATION (50, 52 BOUGIE);  Surgeon: Declan Iqbal MD;  Location: [facility] ENDOSCOPY;  Service: Gastroenterology    ENDOSCOPY N/A 8/6/2021    Procedure: ESOPHAGOGASTRODUODENOSCOPY with biopsy x1 area and esophageal dilation (56FR Bougie);  Surgeon: Hussein Talavera MD;  Location: [facility] ENDOSCOPY;  Service: Gastroenterology;  Laterality: N/A;  post: hiatal hernia, erosive gastritis    ENDOSCOPY N/A 12/13/2022    Procedure: ESOPHAGOGASTRODUODENOSCOPY WITH DILATATION (BOUGIE #54, #56) AND BIOPSY X1;  Surgeon: David Rodriguez MD;  Location: [facility] ENDOSCOPY;  Service: Gastroenterology;  Laterality: N/A;  POST: dysphagia     EYE  SURGERY      GANGLION CYST EXCISION Left     HERNIA REPAIR Bilateral     INCISION AND DRAINAGE OF WOUND Right 6/15/2022    Procedure: INCISION AND DRAINAGE WOUND with debridement;  Surgeon: Fito Forte MD;  Location: [facility] MAIN OR;  Service: Orthopedics;  Laterality: Right;    JOINT REPLACEMENT      Left total hip    RETINOPATHY SURGERY      laser    TOTAL HIP ARTHROPLASTY Left     TOTAL HIP ARTHROPLASTY Left 2018    TRANS METATARSAL AMPUTATION Right 3/17/2020    Procedure: AMPUTATION TRANS METATARSAL right;  Surgeon: ERWIN Baker DPM;  Location: [facility] MAIN OR;  Service: Podiatry;  Laterality: Right;  GANGRENOUS RIGHT FOOT    VASECTOMY          Current Outpatient Medications on File Prior to Visit   Medication Sig Dispense Refill    amLODIPine (NORVASC) 5 MG tablet Take 1 tablet by mouth Daily. 90 tablet 0    apixaban (Eliquis) 5 MG tablet tablet Take 1 tablet by mouth 2 (Two) Times a Day. 180 tablet 1    aspirin 81 MG EC tablet Take 1 tablet by mouth Daily.      atorvastatin (LIPITOR) 80 MG tablet Take 1 tablet by mouth Every Night. 90 tablet 1    azithromycin (Zithromax Z-Wilfrid) 250 MG tablet Take 2 tablets by mouth on day 1, then 1 tablet daily on days 2-5 6 tablet 0    buPROPion XL (Wellbutrin XL) 150 MG 24 hr tablet Take 1 tablet by mouth Every Morning. 90 tablet 0    busPIRone (BUSPAR) 10 MG tablet Take 1 tablet by mouth Every 12 (Twelve) Hours. 180 tablet 1    CINNAMON PO Take  by mouth.      Continuous Blood Gluc  (Dexcom G6 ) device 1 Device Daily. Use to check BS 1 each 0    Continuous Blood Gluc Sensor (Dexcom G6 Sensor) Every 10 (Ten) Days. Use to check BS daily 3 each 11    Continuous Blood Gluc Transmit (Dexcom G6 Transmitter) misc 1 Device Every 3 (Three) Months. To check BS daily 1 each 3    cyclobenzaprine (FLEXERIL) 10 MG tablet Take I tab q 8 hrs as needed for tension headaches 30 tablet 0    DULoxetine (CYMBALTA) 60 MG capsule Take 1 capsule by mouth Every Morning. 90  capsule 1    fluticasone (FLONASE) 50 MCG/ACT nasal spray 2 sprays into the nostril(s) as directed by provider Daily. 16 g 0    HYDROcodone-acetaminophen (NORCO) 5-325 MG per tablet Take 1 tablet by mouth Every Morning.      Insulin Lispro (humaLOG) 100 UNIT/ML injection Inject 100 Units under the skin into the appropriate area as directed 3 (Three) Times a Day Before Meals.      Lantus SoloStar 100 UNIT/ML injection pen INJECT 40 UNITS            SUBCUTANEOUSLY INTO THE    APPROPRIATE AREA AS        DIRECTED EVERY NIGHT 30 mL 0    levothyroxine (Synthroid) 100 MCG tablet Take 1 tablet by mouth Every Morning. 90 tablet 1    meclizine (ANTIVERT) 25 MG tablet Take 1 tablet by mouth 3 (Three) Times a Day As Needed for Dizziness. 30 tablet 0    metoprolol succinate XL (TOPROL-XL) 100 MG 24 hr tablet Take 1 tablet by mouth Daily. 90 tablet 0    multivitamin with minerals tablet tablet Take 1 tablet by mouth Daily.      omeprazole (priLOSEC) 40 MG capsule Take 1 capsule by mouth Daily. 90 capsule 1    [DISCONTINUED] pregabalin (Lyrica) 75 MG capsule Take 1 capsule by mouth 2 (Two) Times a Day. 180 capsule 0     No current facility-administered medications on file prior to visit.        Allergies   Allergen Reactions    Lisinopril Cough    Cefixime Other (See Comments)        Social History     Socioeconomic History    Marital status:    Tobacco Use    Smoking status: Never    Smokeless tobacco: Never   Vaping Use    Vaping Use: Never used   Substance and Sexual Activity    Alcohol use: No    Drug use: No    Sexual activity: Not Currently     Partners: Female     Birth control/protection: None     Comment: I have Ed problem at this time          Review of Systems   Constitutional:  Positive for activity change.   HENT: Negative.     Eyes: Negative.    Respiratory: Negative.     Cardiovascular: Negative.    Gastrointestinal: Negative.    Endocrine: Negative.    Genitourinary: Negative.    Musculoskeletal:  Positive  "for arthralgias, myalgias, neck pain and neck stiffness.   Allergic/Immunologic: Negative.    Neurological:  Positive for numbness (tingling/right arm).   Hematological: Negative.    Psychiatric/Behavioral:  Positive for sleep disturbance.       Physical Examination:     Vitals:    08/11/23 1050   BP: 125/74   Pulse: 66   Weight: 90.7 kg (200 lb)   Height: 172.7 cm (68\")   PainSc:   9        Physical Exam     Neurological Exam   Neurological examination appears stable compared to Sanjiv Singh's last evaluation without any new red flag signs.    Result Review  The following data was reviewed by: Bridger Cantu MD on 08/11/2023:    Data reviewed : Radiologic studies MRI shows C5-C6 broad-based disc bulge with some mild central canal stenosis as well as some mild neuroforaminal narrowing.  There is a C6/C7 right-sided eccentric disc herniation with some lateral recess stenosis.  I do not appreciate any dynamic spondylolisthesis on flexion-extension x-rays.      Assessment/plan:  Is a 58-year-old gentleman with poorly controlled diabetes and a prior right-sided below the knee amputation secondary to diabetic issues who presents with predominantly neck pain.  He also has some numbness into his right more than his left arm.  He struggles today for me to describe a clear radicular pattern and stresses that his numbness is simply at the tips of his fingers.  He does have cervical pathology that is not profound but could explain a C7 radiculopathy on the right.  I would recommend a course of physical therapy for his neck pain.  I Minoo recommend a trial of Lyrica to see if this improves his neuropathic pain.  I recommend pain management reevaluation and possible radiofrequency ablation versus medial branch blocks in his cervical spine.  He is going to work to try to control his diabetes and repeat hemoglobin A1c in approximately 3 months.  He will complete a right upper extremity EMG/nerve conduction study to evaluate for " diabetic neuropathy versus radiculopathy.  He will return to see me in approximately 3 months for clinical reevaluation.  I have encouraged him to call with any questions or concerns.  Diagnoses and all orders for this visit:    1. S/P BKA (below knee amputation), right (Primary)  -     Hemoglobin A1c; Future    2. Type 2 diabetes mellitus with peripheral vascular disease  -     Hemoglobin A1c; Future    3. Type 2 diabetes mellitus with hyperglycemia, with long-term current use of insulin  -     Hemoglobin A1c; Future    4. Cervical radiculopathy  -     EMG & Nerve Conduction Test; Future  -     pregabalin (LYRICA) 100 MG capsule; Take 1 capsule by mouth 2 (Two) Times a Day.  Dispense: 60 capsule; Refill: 0  -     Ambulatory Referral to Physical Therapy Evaluate and treat         Return in about 3 months (around 11/11/2023).            Bridger Cantu MD

## 2023-08-11 ENCOUNTER — OFFICE VISIT (OUTPATIENT)
Dept: NEUROSURGERY | Facility: CLINIC | Age: 58
End: 2023-08-11
Payer: MEDICARE

## 2023-08-11 VITALS
SYSTOLIC BLOOD PRESSURE: 125 MMHG | HEART RATE: 66 BPM | WEIGHT: 200 LBS | BODY MASS INDEX: 30.31 KG/M2 | HEIGHT: 68 IN | DIASTOLIC BLOOD PRESSURE: 74 MMHG

## 2023-08-11 DIAGNOSIS — E11.65 TYPE 2 DIABETES MELLITUS WITH HYPERGLYCEMIA, WITH LONG-TERM CURRENT USE OF INSULIN: ICD-10-CM

## 2023-08-11 DIAGNOSIS — E11.51 TYPE 2 DIABETES MELLITUS WITH PERIPHERAL VASCULAR DISEASE: Chronic | ICD-10-CM

## 2023-08-11 DIAGNOSIS — Z89.511 S/P BKA (BELOW KNEE AMPUTATION), RIGHT: Primary | Chronic | ICD-10-CM

## 2023-08-11 DIAGNOSIS — Z79.4 TYPE 2 DIABETES MELLITUS WITH HYPERGLYCEMIA, WITH LONG-TERM CURRENT USE OF INSULIN: ICD-10-CM

## 2023-08-11 DIAGNOSIS — M54.12 CERVICAL RADICULOPATHY: ICD-10-CM

## 2023-08-11 RX ORDER — PREGABALIN 100 MG/1
100 CAPSULE ORAL 2 TIMES DAILY
Qty: 60 CAPSULE | Refills: 0 | Status: SHIPPED | OUTPATIENT
Start: 2023-08-11

## 2023-08-14 ENCOUNTER — OFFICE VISIT (OUTPATIENT)
Dept: ENDOCRINOLOGY | Facility: CLINIC | Age: 58
End: 2023-08-14
Payer: MEDICARE

## 2023-08-14 ENCOUNTER — SPECIALTY PHARMACY (OUTPATIENT)
Dept: ENDOCRINOLOGY | Facility: CLINIC | Age: 58
End: 2023-08-14
Payer: MEDICARE

## 2023-08-14 VITALS
WEIGHT: 201 LBS | HEART RATE: 73 BPM | OXYGEN SATURATION: 97 % | BODY MASS INDEX: 32.3 KG/M2 | SYSTOLIC BLOOD PRESSURE: 120 MMHG | HEIGHT: 66 IN | DIASTOLIC BLOOD PRESSURE: 82 MMHG

## 2023-08-14 DIAGNOSIS — E66.9 CLASS 1 OBESITY WITH SERIOUS COMORBIDITY AND BODY MASS INDEX (BMI) OF 32.0 TO 32.9 IN ADULT, UNSPECIFIED OBESITY TYPE: ICD-10-CM

## 2023-08-14 DIAGNOSIS — E11.319 DIABETIC RETINOPATHY ASSOCIATED WITH TYPE 2 DIABETES MELLITUS, MACULAR EDEMA PRESENCE UNSPECIFIED, UNSPECIFIED LATERALITY, UNSPECIFIED RETINOPATHY SEVERITY: ICD-10-CM

## 2023-08-14 DIAGNOSIS — N18.31 STAGE 3A CHRONIC KIDNEY DISEASE: ICD-10-CM

## 2023-08-14 DIAGNOSIS — E11.49 OTHER DIABETIC NEUROLOGICAL COMPLICATION ASSOCIATED WITH TYPE 2 DIABETES MELLITUS: ICD-10-CM

## 2023-08-14 DIAGNOSIS — I10 PRIMARY HYPERTENSION: ICD-10-CM

## 2023-08-14 DIAGNOSIS — Z79.4 TYPE 2 DIABETES MELLITUS WITH HYPERGLYCEMIA, WITH LONG-TERM CURRENT USE OF INSULIN: Primary | ICD-10-CM

## 2023-08-14 DIAGNOSIS — E78.2 MIXED HYPERLIPIDEMIA: ICD-10-CM

## 2023-08-14 DIAGNOSIS — E11.21 DIABETIC NEPHROPATHY ASSOCIATED WITH TYPE 2 DIABETES MELLITUS: ICD-10-CM

## 2023-08-14 DIAGNOSIS — E11.65 TYPE 2 DIABETES MELLITUS WITH HYPERGLYCEMIA, WITH LONG-TERM CURRENT USE OF INSULIN: Primary | ICD-10-CM

## 2023-08-14 LAB — GLUCOSE BLDC GLUCOMTR-MCNC: 330 MG/DL (ref 70–105)

## 2023-08-14 PROCEDURE — 3046F HEMOGLOBIN A1C LEVEL >9.0%: CPT | Performed by: INTERNAL MEDICINE

## 2023-08-14 PROCEDURE — 99204 OFFICE O/P NEW MOD 45 MIN: CPT | Performed by: INTERNAL MEDICINE

## 2023-08-14 PROCEDURE — 82948 REAGENT STRIP/BLOOD GLUCOSE: CPT | Performed by: INTERNAL MEDICINE

## 2023-08-14 PROCEDURE — 1160F RVW MEDS BY RX/DR IN RCRD: CPT | Performed by: INTERNAL MEDICINE

## 2023-08-14 PROCEDURE — 3079F DIAST BP 80-89 MM HG: CPT | Performed by: INTERNAL MEDICINE

## 2023-08-14 PROCEDURE — 1159F MED LIST DOCD IN RCRD: CPT | Performed by: INTERNAL MEDICINE

## 2023-08-14 PROCEDURE — 3074F SYST BP LT 130 MM HG: CPT | Performed by: INTERNAL MEDICINE

## 2023-08-14 RX ORDER — INSULIN LISPRO 100 U/ML
10 INJECTION, SOLUTION SUBCUTANEOUS
Qty: 15 ML | Refills: 5 | Status: SHIPPED | OUTPATIENT
Start: 2023-08-14

## 2023-08-14 RX ORDER — BLOOD SUGAR DIAGNOSTIC
STRIP MISCELLANEOUS
Qty: 100 EACH | Refills: 12 | Status: SHIPPED | OUTPATIENT
Start: 2023-08-14

## 2023-08-14 RX ORDER — INSULIN GLARGINE 100 [IU]/ML
34 INJECTION, SOLUTION SUBCUTANEOUS NIGHTLY
Qty: 15 ML | Refills: 5 | Status: SHIPPED | OUTPATIENT
Start: 2023-08-14 | End: 2024-05-05

## 2023-08-14 RX ORDER — LANCETS
EACH MISCELLANEOUS
Qty: 100 EACH | Refills: 5 | Status: SHIPPED | OUTPATIENT
Start: 2023-08-14 | End: 2024-08-13

## 2023-08-14 RX ORDER — LANCETS 30 GAUGE
1 EACH MISCELLANEOUS
Qty: 200 EACH | Refills: 5 | Status: SHIPPED | OUTPATIENT
Start: 2023-08-14 | End: 2023-08-14 | Stop reason: CLARIF

## 2023-08-14 RX ORDER — BLOOD-GLUCOSE METER
1 EACH MISCELLANEOUS ONCE
Qty: 1 KIT | Refills: 0 | Status: SHIPPED | OUTPATIENT
Start: 2023-08-14 | End: 2023-08-14

## 2023-08-14 NOTE — PATIENT INSTRUCTIONS
Please,    # Diabetes Management:    Please start insulin therapy as:    - Insulin Glargine (Slow acting insulin) 34 Units in the evening.  - Insulin lispro / aspart (Fast acting / meal time insulin): 10 Units with each meal.    Meal time insulin need to be taken 15 mins before meal. You can bring your insulin with you if you are planning to eat out.    If you plan to miss the meal please miss the meal time insulin as well.    Check your blood glucose four times a day: before breakfast, before lunch, before dinner and before bedtime. Maintain blood glucose logs.  Please bring the logs to your follow-up visit for me to review and adjust her therapy accordingly.    Low Blood Glucose:    - If you feel sweaty, anxious, shakiness, feel weak, trouble walking, feels like passing out or trouble seeing thing, please check your blood glucose and if its less than 70 or if your feel symptoms of low blood glucose then take one of the following:    *3 or 4 glucose tablets  *« cup of juice or regular soda (not sugar-free)  *2 tablespoons of raisins  *4 or 5 saltine crackers  *1 tablespoon of sugar  *1 tablespoon of honey or corn syrup  *6 to 8 hard candies     Follow up in 2 weeks time.    Thank you for your visit today.    If you have any questions or concerns please feel free to reach out of the office.

## 2023-08-14 NOTE — PROGRESS NOTES
Specialty Pharmacy        Kuldeep Adhikari is a 58 y.o. male seen by an Endocrinology provider for Type 2 Diabetes.    Requested Prescriptions     Signed Prescriptions Disp Refills    glucose blood (Accu-Chek Guide) test strip 100 each 12     Sig: Use as instructed to test blood sugar 4 times daily before meals and at bedtime    Accu-Chek Softclix Lancets lancets 100 each 5     Sig: Use as directed to test blood sugar 4 times daily before meals and at bedtime.    Blood Glucose Monitoring Suppl (Accu-Chek Guide) w/Device kit 1 kit 0     Si each 1 (One) Time for 1 dose.     Sending prescriptions for accu-chek guide based on insurance claims.    Refill sent in to patient's pharmacy for above medication prescribed by Dr. Michaels.   Last office visit 23 .  Next visit scheduled 23.    Angélica Zamora, PharmD  Clinical Specialty Pharmacist, Endocrinology  2023  16:41 EDT

## 2023-08-14 NOTE — PROGRESS NOTES
-----------------------------------------------------------------  ENDOCRINE CLINIC NOTE  -----------------------------------------------------------------        PATIENT NAME: Kuldeep Adhikari  PATIENT : 1965 AGE: 58 y.o.  MRN NUMBER: [id number]  PRIMARY CARE: Laura Whitaker MD    ==========================================================================    CHIEF COMPLAINT: T2DM  DATE OF SERVICE: 2023         ==========================================================================    HPI / SUBJECTIVE    58 y.o. male is seen in the clinic today for optimization of care for type 2 diabetes.  Ports that he was diagnosed with type 2 diabetes around 30 years ago.  Patient last A1c in 2023 is 11.6%.  Patient current therapy includes:  Insulin Lantus 34 units nightly.  Patient was previously using insulin lispro as well but not have used because reports that it was not working for him.  Patient had previously tried Janumet as well but currently not on any oral therapy.  Patient have history significant for neuropathy and is s/p right BKA, prosthesis in place.  Patient also have significant neuropathy in the left lower extremity which was complicated with osteomyelitis in  during which patient was seen by endocrinology in the office and insulin therapy was adjusted.  Patient do have underlying history of diabetic retinopathy as well status post laser treatments.  Following at [facility].  Patient typically take 2 meals a day.  Patient also have an underlying history of CVA.  No history of CAD.  Not currently checking blood sugars because he is out of supplies.  Last blood check was 3 months ago.    ==========================================================================                                                PAST MEDICAL HISTORY    Past Medical History:   Diagnosis Date    CKD (chronic kidney disease), stage III     Depression     Depression with suicidal ideation  08/08/2021    DJD (degenerative joint disease)     DVT (deep venous thrombosis)     Ganglion     rt wrist    Gangrene of foot 10/09/2015    GERD (gastroesophageal reflux disease)     Headache     Heart murmur     Hiatal hernia     History of esophageal stricture     s/p dialation 40-50 times last EGD 04/2016    Hyperlipidemia     Hypertension     Hypothyroidism     IBS (irritable bowel syndrome)     Neuropathy     Osteomyelitis of right foot 03/19/2020    Pulmonary embolism 01/19/2017    Rash     rt lower hip    Retinopathy     S/P BKA (below knee amputation), right 03/18/2022    Seasonal allergies     Sepsis due to group B Streptococcus 03/19/2020    Sleep apnea     Stroke     Type 2 diabetes mellitus with peripheral vascular disease     Vitamin D deficiency        ==========================================================================    PAST SURGICAL HISTORY    Past Surgical History:   Procedure Laterality Date    AMPUTATION DIGIT Left 4/26/2022    Procedure: AMPUTATION left great toe;  Surgeon: ERWIN Baker DPM;  Location: [facility] MAIN OR;  Service: Podiatry;  Laterality: Left;    AMPUTATION DIGIT  4/26/2022    Procedure: ;  Surgeon: ERWIN Baker DPM;  Location: [facility] MAIN OR;  Service: Podiatry;;    AMPUTATION FOOT / TOE Right     great toe    AMPUTATION REVISION Right 4/8/2021    Procedure: BELOW KNEE AMPUTATION REVISAION;  Surgeon: Eduardo Reynolds MD;  Location: [facility] MAIN OR;  Service: Orthopedics;  Laterality: Right;    AMPUTATION REVISION Right 4/29/2021    Procedure: AMPUTATION REVISION KNEE STUMP;  Surgeon: Eduardo Reynolds MD;  Location: [facility] MAIN OR;  Service: Orthopedics;  Laterality: Right;    BELOW KNEE AMPUTATION Right 7/30/2020    Procedure: AMPUTATION BELOW KNEE;  Surgeon: Eduardo Reynolds MD;  Location: [facility] MAIN OR;  Service: Orthopedics;  Laterality: Right;    CARDIAC CATHETERIZATION  04/2018    [facility]    COLONOSCOPY      ENDOSCOPY      ENDOSCOPY N/A 10/4/2019    Procedure:  ESOPHAGOGASTRODUODENOSCOPY with dilitation and biopsy x 1 area;  Surgeon: Declan Iqbal MD;  Location: [facility] ENDOSCOPY;  Service: Gastroenterology    ENDOSCOPY N/A 12/13/2019    Procedure: ESOPHAGOGASTRODUODENOSCOPY WITH DILATATION (50, 52 BOUGIE);  Surgeon: Declan Iqbal MD;  Location: [facility] ENDOSCOPY;  Service: Gastroenterology    ENDOSCOPY N/A 8/6/2021    Procedure: ESOPHAGOGASTRODUODENOSCOPY with biopsy x1 area and esophageal dilation (56FR Bougie);  Surgeon: Hussein Talavera MD;  Location: [facility] ENDOSCOPY;  Service: Gastroenterology;  Laterality: N/A;  post: hiatal hernia, erosive gastritis    ENDOSCOPY N/A 12/13/2022    Procedure: ESOPHAGOGASTRODUODENOSCOPY WITH DILATATION (BOUGIE #54, #56) AND BIOPSY X1;  Surgeon: David Rodriguez MD;  Location: [facility] ENDOSCOPY;  Service: Gastroenterology;  Laterality: N/A;  POST: dysphagia     EYE SURGERY      GANGLION CYST EXCISION Left     HERNIA REPAIR Bilateral     INCISION AND DRAINAGE OF WOUND Right 6/15/2022    Procedure: INCISION AND DRAINAGE WOUND with debridement;  Surgeon: Fito Forte MD;  Location: [facility] MAIN OR;  Service: Orthopedics;  Laterality: Right;    JOINT REPLACEMENT      Left total hip    RETINOPATHY SURGERY      laser    TOTAL HIP ARTHROPLASTY Left     TOTAL HIP ARTHROPLASTY Left 2018    TRANS METATARSAL AMPUTATION Right 3/17/2020    Procedure: AMPUTATION TRANS METATARSAL right;  Surgeon: ERWIN Baker DPM;  Location: [facility] MAIN OR;  Service: Podiatry;  Laterality: Right;  GANGRENOUS RIGHT FOOT    VASECTOMY         ==========================================================================    FAMILY HISTORY    Family History   Problem Relation Age of Onset    Diabetes Mother         Patient  mom    Heart disease Mother     Arthritis Mother     Hyperlipidemia Mother     Leukemia Father     Sleep apnea Maternal Aunt         GENO    Stroke Maternal Grandmother     Hypertension Other     Hyperlipidemia Other     Cancer Other      Colon cancer Other         uncle       ==========================================================================    SOCIAL HISTORY    Social History     Socioeconomic History    Marital status:     Number of children: 3    Highest education level: High school graduate   Tobacco Use    Smoking status: Never    Smokeless tobacco: Never   Vaping Use    Vaping Use: Never used   Substance and Sexual Activity    Alcohol use: Yes     Comment: OCCASIONAL    Drug use: No    Sexual activity: Not Currently     Partners: Female     Birth control/protection: None     Comment: I have Ed problem at this time       ==========================================================================    MEDICATIONS      Current Outpatient Medications:     amLODIPine (NORVASC) 5 MG tablet, Take 1 tablet by mouth Daily., Disp: 90 tablet, Rfl: 0    apixaban (Eliquis) 5 MG tablet tablet, Take 1 tablet by mouth 2 (Two) Times a Day., Disp: 180 tablet, Rfl: 1    aspirin 81 MG EC tablet, Take 1 tablet by mouth Daily., Disp: , Rfl:     atorvastatin (LIPITOR) 80 MG tablet, Take 1 tablet by mouth Every Night., Disp: 90 tablet, Rfl: 1    azithromycin (Zithromax Z-Wilfrid) 250 MG tablet, Take 2 tablets by mouth on day 1, then 1 tablet daily on days 2-5, Disp: 6 tablet, Rfl: 0    buPROPion XL (Wellbutrin XL) 150 MG 24 hr tablet, Take 1 tablet by mouth Every Morning., Disp: 90 tablet, Rfl: 0    busPIRone (BUSPAR) 10 MG tablet, Take 1 tablet by mouth Every 12 (Twelve) Hours., Disp: 180 tablet, Rfl: 1    CINNAMON PO, Take  by mouth., Disp: , Rfl:     Continuous Blood Gluc  (Dexcom G6 ) device, 1 Device Daily. Use to check BS, Disp: 1 each, Rfl: 0    Continuous Blood Gluc Sensor (Dexcom G6 Sensor), Every 10 (Ten) Days. Use to check BS daily, Disp: 3 each, Rfl: 11    Continuous Blood Gluc Transmit (Dexcom G6 Transmitter) misc, 1 Device Every 3 (Three) Months. To check BS daily, Disp: 1 each, Rfl: 3    cyclobenzaprine (FLEXERIL) 10 MG  tablet, Take I tab q 8 hrs as needed for tension headaches, Disp: 30 tablet, Rfl: 0    DULoxetine (CYMBALTA) 60 MG capsule, Take 1 capsule by mouth Every Morning., Disp: 90 capsule, Rfl: 1    fluticasone (FLONASE) 50 MCG/ACT nasal spray, 2 sprays into the nostril(s) as directed by provider Daily., Disp: 16 g, Rfl: 0    HYDROcodone-acetaminophen (NORCO) 5-325 MG per tablet, Take 1 tablet by mouth Every Morning., Disp: , Rfl:     Insulin Glargine (Lantus SoloStar) 100 UNIT/ML injection pen, Inject 34 Units under the skin into the appropriate area as directed Every Night for 265 days., Disp: 15 mL, Rfl: 5    levothyroxine (Synthroid) 100 MCG tablet, Take 1 tablet by mouth Every Morning., Disp: 90 tablet, Rfl: 1    meclizine (ANTIVERT) 25 MG tablet, Take 1 tablet by mouth 3 (Three) Times a Day As Needed for Dizziness., Disp: 30 tablet, Rfl: 0    metoprolol succinate XL (TOPROL-XL) 100 MG 24 hr tablet, Take 1 tablet by mouth Daily., Disp: 90 tablet, Rfl: 0    multivitamin with minerals tablet tablet, Take 1 tablet by mouth Daily., Disp: , Rfl:     omeprazole (priLOSEC) 40 MG capsule, Take 1 capsule by mouth Daily., Disp: 90 capsule, Rfl: 1    pregabalin (LYRICA) 100 MG capsule, Take 1 capsule by mouth 2 (Two) Times a Day., Disp: 60 capsule, Rfl: 0    glucose blood test strip, 1 each by Other route 4 (Four) Times a Day Before Meals & at Bedtime. PRN, Disp: 200 each, Rfl: 5    Insulin Lispro (ADMELOG SOLOSTAR) 100 UNIT/ML injection pen, Inject 10 Units under the skin into the appropriate area as directed 3 (Three) Times a Day With Meals., Disp: 15 mL, Rfl: 5    Lancets misc, 1 each 4 (Four) Times a Day Before Meals & at Bedtime., Disp: 200 each, Rfl: 5    ==========================================================================    ALLERGIES    Allergies   Allergen Reactions    Lisinopril Cough    Cefixime Other (See Comments)        ==========================================================================    OBJECTIVE    Vitals:    08/14/23 1531   BP: 120/82   Pulse: 73   SpO2: 97%     Body mass index is 32.44 kg/mý.     General: Alert, cooperative, no acute distress  Thyroid:  no enlargement/tenderness/palpable nodules  Lungs: Clear to auscultation bilaterally, respirations unlabored  Heart: Regular rate and rhythm, S1 and S2 normal, no murmur, rub or gallop  Abdomen: Soft, NT, ND and Bowel sounds Positive  Extremities:  Right LE BKA and LE amputated big toe with neuropathy    ==========================================================================    LAB EVALUATION    Lab Results   Component Value Date    GLUCOSE 302 (H) 03/08/2023    BUN 25 (H) 03/08/2023    CREATININE 1.40 (H) 03/08/2023    EGFRIFNONA 48 (L) 01/04/2022    BCR 17.9 03/08/2023    K 4.9 03/08/2023    CO2 27.0 03/08/2023    CALCIUM 9.1 03/08/2023    ALBUMIN 4.3 03/07/2023    LABIL2 1.3 04/22/2019    AST 19 03/07/2023    ALT 24 03/07/2023    CHOL 158 03/18/2022    TRIG 205 (H) 03/18/2022    HDL 43 03/18/2022    LDL 81 03/18/2022     Lab Results   Component Value Date    HGBA1C 11.60 (H) 08/04/2023    HGBA1C 9.00 (H) 03/08/2023    HGBA1C 7.5 (H) 06/13/2022     Lab Results   Component Value Date    MICROALBUR <1.2 04/06/2021    CREATININE 1.40 (H) 03/08/2023     Lab Results   Component Value Date    TSH 4.730 (H) 03/07/2023    FREET4 1.09 03/08/2023       ==========================================================================    ASSESSMENT AND PLAN    Assessment:  #Type 2 diabetes complicated with retinopathy, neuropathy and nephropathy  #Hypertension  #Hyperlipidemia  #Hypothyroidism  #Obesity with BMI of 32.44  #CKD IIIa    Plan:  -During this visit I have no blood glucose to review but there is significant elevation in A1c and patient was maintained on both basal and bolus insulin therapy in the past that suggest me that he will still require both of those  - Given  "BMI of 22.44 and history of CVA patient will benefit from introducing both SGLT2 and GLP-1 inhibitor therapy  - Patient to have CKD stage IIIa therefore will be careful when introducing SGLT2i therapy  - For now patient is just using basal insulin 34 units at night therefore we will introduce mealtime insulin at 10 units with each meal, patient is taking 2 meals a day that will make total daily insulin of 54 units  - Patient to follow-up in my clinic in 2 weeks time with blood glucose monitoring [facility], after reviewing the numbers we will plan further accordingly  - This was discussed with patient in detail to which she verbalized understanding  - Answered all questions  - Patient was concerned that his insulin lispro was not working properly and in review there was evidence of insulin been injected on same side that can cause scarring and inappropriate absorption of insulin this was counseled to patient and counseled about proper way to give insulin and to change sides, patient verbalized understanding    Thank you for courtesy of consultation.    Return to clinic: 2 weeks    Entire assessment and plan was discussed and counseled the patient in detail to which patient verbalized understanding and agreed with care.  Answered all queries and concerns.    This note was created using voice recognition software and is inherently subject to errors including those of syntax and \"sound-alike\" substitutions which may escape proofreading.  In such instances, original meaning may be extrapolated by contextual derivation.    Note: Portions of this note may have been copied from previous notes but documentation have been reviewed and edited as necessary to support clinical decision making for today's visit.    ==========================================================================    INFORMATION PROVIDED TO PATIENT    Patient Instructions   Please,    # Diabetes Management:    Please start insulin therapy as:    - Insulin " Glargine (Slow acting insulin) 34 Units in the evening.  - Insulin lispro / aspart (Fast acting / meal time insulin): 10 Units with each meal.    Meal time insulin need to be taken 15 mins before meal. You can bring your insulin with you if you are planning to eat out.    If you plan to miss the meal please miss the meal time insulin as well.    Check your blood glucose four times a day: before breakfast, before lunch, before dinner and before bedtime. Maintain blood glucose logs.  Please bring the logs to your follow-up visit for me to review and adjust her therapy accordingly.    Low Blood Glucose:    - If you feel sweaty, anxious, shakiness, feel weak, trouble walking, feels like passing out or trouble seeing thing, please check your blood glucose and if its less than 70 or if your feel symptoms of low blood glucose then take one of the following:    *3 or 4 glucose tablets  *« cup of juice or regular soda (not sugar-free)  *2 tablespoons of raisins  *4 or 5 saltine crackers  *1 tablespoon of sugar  *1 tablespoon of honey or corn syrup  *6 to 8 hard candies     Follow up in 2 weeks time.    Thank you for your visit today.    If you have any questions or concerns please feel free to reach out of the office.       ==========================================================================  Dionicio Ramachandran MD  Department of Endocrine, Diabetes and Metabolism  [facility] [place]  ==========================================================================

## 2023-08-17 ENCOUNTER — PATIENT ROUNDING (BHMG ONLY) (OUTPATIENT)
Dept: ENDOCRINOLOGY | Facility: CLINIC | Age: 58
End: 2023-08-17
Payer: MEDICARE

## 2023-08-17 NOTE — PROGRESS NOTES
August 17, 2023    Hello, may I speak with Kuldeep Adhikari?    My name is Umu      I am  with [facility]  [address] [place] [id number].    Before we get started may I verify your date of birth? 1965    I am calling to officially welcome you to our practice and ask about your recent visit. Is this a good time to talk? yes    Tell me about your visit with us. What things went well?  it went really well, he is very nice       We're always looking for ways to make our patients' experiences even better. Do you have recommendations on ways we may improve?  no    Overall were you satisfied with your first visit to our practice? yes       I appreciate you taking the time to speak with me today. Is there anything else I can do for you? no      Thank you, and have a great day.

## 2023-08-21 ENCOUNTER — TREATMENT (OUTPATIENT)
Dept: PHYSICAL THERAPY | Facility: CLINIC | Age: 58
End: 2023-08-21
Payer: MEDICARE

## 2023-08-21 DIAGNOSIS — M54.12 RADICULOPATHY, CERVICAL: Primary | ICD-10-CM

## 2023-08-21 DIAGNOSIS — M54.2 PAIN, NECK: ICD-10-CM

## 2023-08-21 NOTE — PROGRESS NOTES
Physical Therapy Initial Evaluation and Plan of Care  [address] [address]    Patient: Kuldeep Adhikari   : 1965  Diagnosis/ICD-10 Code:  Radiculopathy, cervical [M54.12]  Referring practitioner: Bridger Cantu MD  Date of Initial Visit: 2023  Today's Date: 2023  Patient seen for 1 session         Visit Diagnoses:     ICD-10-CM ICD-9-CM   1. Radiculopathy, cervical  M54.12 723.4   2. Pain, neck  M54.2 723.1       Subjective Questionnaire: NDI:    Subjective Evaluation    History of Present Illness  Mechanism of injury: Patient presents to physical therapy with orders to address cervical radiculopathy and neck pain.  He states that he has had trouble with neck and radicular pain for several months now.  He states that he has numbness into his R hand, digits 4-5, and into his lateral wrist.   He states that at nighttime he notices increased pain on the dorsum of his right hand and up his forearm.  He feels a squeezing pain in the back of his neck and he has headaches all of the time.  He states that he was in physical therapy recently and he noticed relief with manual traction but when they released the pull, he had significant vertigo.  He states that he has dizziness with quick position changes and when transitioning from supine to sit and sit to stand.   He states that he falls occasionally and he feels like he is being pushed over.  He is not sleeping well.  He can not find a comfortable position to get away from the pain.  He states that nothing has helped with his pain.  He gets more pain into his right hand when he is laying on his back.   He states that his blood pressure has been doing okay but his blood glucose remains high.  His A1C was 11.6 when measured recently and his neurosurgeon can not consider any surgical repair of his neck until his glucose levels are more stable.       Pertinent past medical history: R LE prosthesis for 3 years,  repeated amputations on R LE due to progression of diabetes, CVAx 2  2019, L total hip replacement       Patient Occupation: Disability since 2018- worked at [employer] for 13 years   Precautions and Work Restrictions: Unable to drive due to R LE amputationPain  Current pain rating: 10  At best pain ratin  At worst pain rating: 10  Location: neck and R UE into hand  Alleviating factors: nothing helps.  Aggravating factors: sleeping, repetitive movement, movement and prolonged positioning    Social Support  Lives in: multiple-level home (13 stairs)  Lives with: Brother.    Hand dominance: right    Diagnostic Tests  MRI studies: abnormal (MRI shows C5-C6 broad-based disc bulge with some mild central canal stenosis as well as some mild neuroforaminal narrowing. There is a C6/C7 right-sided eccentric disc herniation with some lateral recess stenosis)    Treatments  Previous treatment: physical therapy and medication  Current treatment: physical therapy  Patient Goals  Patient goals for therapy: decreased pain, increased motion and increased strength       Objective        Special Questions  Patient is experiencing disturbed sleep, dizziness and headaches.       Static Posture   General Observations  Guarded.     Shoulders  Elevated.    Postural Observations  Seated posture: fair      Palpation   Left   Hypertonic in the suboccipitals and upper trapezius.   Tenderness of the cervical paraspinals and suboccipitals.     Right   Hypertonic in the levator scapulae, pectoralis minor, suboccipitals and upper trapezius. Tenderness of the cervical paraspinals, levator scapulae, pectoralis minor, suboccipitals and upper trapezius.     Neurological Testing     Sensation   Cervical/Thoracic     Right   Intact: light touch    Active Range of Motion   Cervical/Thoracic Spine   Cervical    Flexion: 47 degrees with pain  Extension: 24 degrees   Left lateral flexion: 24 degrees with pain  Right lateral flexion: 24 degrees with  pain  Left rotation: 54 degrees   Right rotation: 48 degrees with pain    Strength/Myotome Testing     Right Shoulder     Planes of Motion   Flexion: 4   Abduction: 4   External rotation at 0ø: 4+   Internal rotation at 0ø: 4+     Isolated Muscles   Biceps: 4   Triceps: 4     Additional Strength Details  Pain with resisted testing on R UE    Tests   Cervical     Right   Positive cervical distraction, Spurling's sign and ULTT4.     Cervical Flexibility Comments:   Moderate tightness in bilateral upper traps    Ambulation   Weight-Bearing Status   Assistive device used: none    Additional Weight-Bearing Status Details  Prosthesis on R LE      Patient Education: Discussed treatment plan and need for insurance authorization to begin treatment    Assessment & Plan       Assessment  Impairments: abnormal coordination, abnormal muscle tone, abnormal or restricted ROM, activity intolerance, impaired physical strength, lacks appropriate home exercise program and pain with function   Functional limitations: carrying objects, lifting, sleeping, uncomfortable because of pain, moving in bed, sitting, reaching behind back, reaching overhead and unable to perform repetitive tasks   Assessment details: The patient is a 58 y.o. male who presents to physical therapy today for neck pain and cervical radiculopathy. Upon initial evaluation, the patient demonstrates the following impairments: moderate daily pain, frequent headaches, increased muscle tone, decreased flexibility, radicular symptoms and UE weakness. Due to these impairments, the patient is unable to sleep through the night without waking in pain.  He has daily headaches and has difficulty with lifting. The patient would benefit from skilled PT services to address functional limitations and impairments and to improve patient quality of life.    Barriers to therapy: transportation, poorly controlled diabetes, vertigo  Prognosis: fair    Goals  Plan Goals: STG's: 3 weeks    Patient will report a reduction in pain by >25%  Patient will be able to perform HEP with minimal verbal cues     LTG's: By discharge   Patient will report a reduction in pain by >65%  Patient will report a decrease in headache frequency by >50%  Patient will have >10% improvement on the Neck Disability Index to demonstrate overall improvement in daily functional level  Patient will be able to reach into overhead cabinet to get a dish without pain or difficulty  Patient will be able to lift a cup of coffee without pain or dropping  Patient will be able to tolerate sitting > 60 minutes without increased pain  Patient will demonstrate sufficient cervical AROM to allow patient to turn his head while ambulating to view his periphery  Patient will be able to sleep > 5 hours without waking in pain   Patient will be independent with HEP      Plan  Therapy options: will be seen for skilled therapy services  Planned modality interventions: cryotherapy, electrical stimulation/Russian stimulation, TENS, thermotherapy (hydrocollator packs) and ultrasound  Planned therapy interventions: body mechanics training, flexibility, functional ROM exercises, home exercise program, manual therapy, neuromuscular re-education, postural training, soft tissue mobilization, spinal/joint mobilization, strengthening, stretching, joint mobilization and therapeutic activities  Frequency: 2x week  Duration in weeks: 12  Treatment plan discussed with: patient      History # of Personal Factors and/or Comorbidities: HIGH (3+)  Examination of Body System(s): # of elements: MODERATE (3)  Clinical Presentation: EVOLVING  Clinical Decision Making: MODERATE      Timed:         Manual Therapy:         mins  47100;     Therapeutic Exercise:         mins  72442;     Neuromuscular Terrie:        mins  25757;    Therapeutic Activity:          mins  89003;     Gait Training:           mins  07255;     Ultrasound:          mins  76432;    Ionto                                    mins   19289  Self Care                            mins   25233    Un-Timed:  Electrical Stimulation:         mins  87968 ( );  Canalith Repos         mins 08724  Dry Needling          mins 09642/74360  Traction          mins 29091  Low Eval          Mins  62412  Mod Eval     45     Mins  14528  High Eval                            Mins  66113    No Charge:   Cryopack                         mins  Moist Heat                       mins        Timed Treatment:   0   mins   Total Treatment:     45   mins        PT SIGNATURE: Adela Peña PT   [place] License: [id number]  DATE TREATMENT INITIATED: 8/21/2023    Medicare Initial Certification  Certification Period: 8/21/2023 thru 11/18/2023  I certify that the therapy services are furnished while this patient is under my care.  The services outlined above are required by this patient, and will be reviewed every 90 days.      Physician Signature: _________________________  PHYSICIAN: Bridger Cantu MD   NPI: [id number]                                             DATE: _____________________________________    Please sign and return via fax to [phone]. Thank you, [facility].

## 2023-08-23 ENCOUNTER — TREATMENT (OUTPATIENT)
Dept: PHYSICAL THERAPY | Facility: CLINIC | Age: 58
End: 2023-08-23
Payer: MEDICARE

## 2023-08-23 DIAGNOSIS — M54.12 RADICULOPATHY, CERVICAL: Primary | ICD-10-CM

## 2023-08-23 DIAGNOSIS — M54.2 PAIN, NECK: ICD-10-CM

## 2023-08-23 NOTE — PROGRESS NOTES
"Physical Therapy Daily Treatment Note    Patient: Kuldeep Adhikari  : 1965  Referring practitioner: Bridger Cantu MD  Date of Initial Visit: No linked episodes  Today's Date: 2023  Patient seen for Visit count could not be calculated. Make sure you are using a visit which is associated with an episode. sessions      Visit Diagnoses:    ICD-10-CM ICD-9-CM   1. Radiculopathy, cervical  M54.12 723.4   2. Pain, neck  M54.2 723.1       VISIT#: Visit count could not be calculated. Make sure you are using a visit which is associated with an episode.    Subjective   Kuldeep Adhikari reports he reached for his nephew this week and had more pain in his right arm. He also states that he has elastic bands from a different facility but that every time he tries to use them, it increases his pain.    Pain Rating (0-10): 9    Objective     See Exercise, Manual, and Modality Logs for complete treatment.     Patient Education: New exercises.    Assessment/Plan    Initiated exercises for AROM and flexibility today and issued HEP. Also trialed gentle STM, with which pt reported good results. Plan to add strengthening and nerve glides next session. Pt reported some \"vertigo\" with UT stretches, so discontinued after 2 reps. Instructed to skip UT stretch at home if it provokes symptoms.    Progress per Plan of Care         Timed:         Manual Therapy:    4     mins  62063;     Therapeutic Exercise:    28     mins  74826;     Neuromuscular Terrie:        mins  72661;    Therapeutic Activity:          mins  53160;     Gait Training:           mins  57704;     Ultrasound:          mins  54542;    Ionto                                   mins   14148  Self Care                            mins   39640    Un-Timed:  Electrical Stimulation:         mins  19570 ( );  Traction          mins 50378  Re-Eval                               mins  82026    Timed Treatment:   32   mins   Total Treatment:     32   mins        Jazzy " YENY Pelayo  Physical Therapist  [place] License #: [id number]  [place] License #: [id number]

## 2023-08-25 ENCOUNTER — TELEPHONE (OUTPATIENT)
Dept: ENDOCRINOLOGY | Facility: CLINIC | Age: 58
End: 2023-08-25
Payer: MEDICARE

## 2023-08-25 DIAGNOSIS — Z79.4 TYPE 2 DIABETES MELLITUS WITH HYPERGLYCEMIA, WITH LONG-TERM CURRENT USE OF INSULIN: ICD-10-CM

## 2023-08-25 DIAGNOSIS — E11.65 TYPE 2 DIABETES MELLITUS WITH HYPERGLYCEMIA, WITH LONG-TERM CURRENT USE OF INSULIN: ICD-10-CM

## 2023-08-25 RX ORDER — BLOOD-GLUCOSE METER
EACH MISCELLANEOUS SEE ADMIN INSTRUCTIONS
COMMUNITY
Start: 2023-08-14

## 2023-08-25 RX ORDER — INSULIN LISPRO 100 U/ML
10 INJECTION, SOLUTION SUBCUTANEOUS
Qty: 15 ML | Refills: 0 | Status: SHIPPED | OUTPATIENT
Start: 2023-08-25 | End: 2023-08-28 | Stop reason: SDUPTHER

## 2023-08-25 NOTE — TELEPHONE ENCOUNTER
RX loaded and ready to send.     Per [employer] policy, when refills come in if there are any red check marks or if it was a paper/verbal request it has to go to the provider to approve. Forwarding Script to provider.

## 2023-08-25 NOTE — TELEPHONE ENCOUNTER
Caller: Kuldeep Adhikari    Relationship: Self    Best call back number: [phone]    Requested Prescriptions:      Insulin Lispro (ADMELOG SOLOSTAR) 100 UNIT/ML injection pen     Pharmacy where request should be sent: [place] [facility] [phone] [place] [phone]      Last office visit with prescribing clinician: 8/14/2023      Next office visit with prescribing clinician: 8/28/2023     Additional details provided by patient:  PATIENT IS COMPLETELY OUT OF HIS FAST ACTING INSULIN    Does the patient have less than a 3 day supply:  [x] Yes  [] No    Would you like a call back once the refill request has been completed: [] Yes [x] No    If the office needs to give you a call back, can they leave a voicemail: [] Yes [x] No    Yobani Martinez Rep   08/25/23 14:23 EDT

## 2023-08-28 ENCOUNTER — OFFICE VISIT (OUTPATIENT)
Dept: ENDOCRINOLOGY | Facility: CLINIC | Age: 58
End: 2023-08-28
Payer: MEDICARE

## 2023-08-28 ENCOUNTER — SPECIALTY PHARMACY (OUTPATIENT)
Dept: ENDOCRINOLOGY | Facility: CLINIC | Age: 58
End: 2023-08-28
Payer: MEDICARE

## 2023-08-28 VITALS
HEART RATE: 71 BPM | BODY MASS INDEX: 32.3 KG/M2 | SYSTOLIC BLOOD PRESSURE: 120 MMHG | WEIGHT: 201 LBS | HEIGHT: 66 IN | OXYGEN SATURATION: 96 % | DIASTOLIC BLOOD PRESSURE: 75 MMHG

## 2023-08-28 DIAGNOSIS — E11.65 TYPE 2 DIABETES MELLITUS WITH HYPERGLYCEMIA, WITH LONG-TERM CURRENT USE OF INSULIN: ICD-10-CM

## 2023-08-28 DIAGNOSIS — E66.9 CLASS 1 OBESITY WITH SERIOUS COMORBIDITY AND BODY MASS INDEX (BMI) OF 32.0 TO 32.9 IN ADULT, UNSPECIFIED OBESITY TYPE: ICD-10-CM

## 2023-08-28 DIAGNOSIS — N18.31 STAGE 3A CHRONIC KIDNEY DISEASE: ICD-10-CM

## 2023-08-28 DIAGNOSIS — I10 BENIGN ESSENTIAL HYPERTENSION: ICD-10-CM

## 2023-08-28 DIAGNOSIS — E78.2 MIXED HYPERLIPIDEMIA: ICD-10-CM

## 2023-08-28 DIAGNOSIS — E11.65 UNCONTROLLED TYPE 2 DIABETES MELLITUS WITH HYPERGLYCEMIA: ICD-10-CM

## 2023-08-28 DIAGNOSIS — E03.9 ACQUIRED HYPOTHYROIDISM: ICD-10-CM

## 2023-08-28 DIAGNOSIS — I10 PRIMARY HYPERTENSION: ICD-10-CM

## 2023-08-28 DIAGNOSIS — E11.319 DIABETIC RETINOPATHY ASSOCIATED WITH TYPE 2 DIABETES MELLITUS, MACULAR EDEMA PRESENCE UNSPECIFIED, UNSPECIFIED LATERALITY, UNSPECIFIED RETINOPATHY SEVERITY: ICD-10-CM

## 2023-08-28 DIAGNOSIS — Z79.4 TYPE 2 DIABETES MELLITUS WITH HYPERGLYCEMIA, WITH LONG-TERM CURRENT USE OF INSULIN: ICD-10-CM

## 2023-08-28 DIAGNOSIS — E11.21 DIABETIC NEPHROPATHY ASSOCIATED WITH TYPE 2 DIABETES MELLITUS: ICD-10-CM

## 2023-08-28 DIAGNOSIS — Z79.4 TYPE 2 DIABETES MELLITUS WITH HYPERGLYCEMIA, WITH LONG-TERM CURRENT USE OF INSULIN: Primary | ICD-10-CM

## 2023-08-28 DIAGNOSIS — E11.65 TYPE 2 DIABETES MELLITUS WITH HYPERGLYCEMIA, WITH LONG-TERM CURRENT USE OF INSULIN: Primary | ICD-10-CM

## 2023-08-28 DIAGNOSIS — E11.42 DIABETIC PERIPHERAL NEUROPATHY: ICD-10-CM

## 2023-08-28 PROBLEM — K22.2 ESOPHAGEAL OBSTRUCTION: Status: ACTIVE | Noted: 2020-12-16

## 2023-08-28 PROBLEM — K44.9 DIAPHRAGMATIC HERNIA WITHOUT OBSTRUCTION OR GANGRENE: Status: ACTIVE | Noted: 2020-12-16

## 2023-08-28 PROBLEM — I26.99 OTHER PULMONARY EMBOLISM WITHOUT ACUTE COR PULMONALE: Status: ACTIVE | Noted: 2023-08-28

## 2023-08-28 PROBLEM — K63.5 POLYP OF COLON: Status: ACTIVE | Noted: 2020-12-16

## 2023-08-28 PROBLEM — K59.00 CONSTIPATION: Status: ACTIVE | Noted: 2023-08-28

## 2023-08-28 PROBLEM — K22.89 OTHER SPECIFIED DISEASE OF ESOPHAGUS: Status: ACTIVE | Noted: 2020-12-16

## 2023-08-28 PROBLEM — K20.0 EOSINOPHILIC ESOPHAGITIS: Status: ACTIVE | Noted: 2023-08-28

## 2023-08-28 LAB — GLUCOSE BLDC GLUCOMTR-MCNC: 164 MG/DL (ref 70–105)

## 2023-08-28 PROCEDURE — 82948 REAGENT STRIP/BLOOD GLUCOSE: CPT | Performed by: INTERNAL MEDICINE

## 2023-08-28 RX ORDER — INSULIN LISPRO 100 U/ML
10 INJECTION, SOLUTION SUBCUTANEOUS
Qty: 15 ML | Refills: 11 | Status: SHIPPED | OUTPATIENT
Start: 2023-08-28

## 2023-08-28 RX ORDER — PROCHLORPERAZINE 25 MG/1
1 SUPPOSITORY RECTAL DAILY
Qty: 1 EACH | Refills: 0 | Status: SHIPPED | OUTPATIENT
Start: 2023-08-28 | End: 2023-08-28 | Stop reason: SDUPTHER

## 2023-08-28 RX ORDER — PROCHLORPERAZINE 25 MG/1
1 SUPPOSITORY RECTAL
Qty: 1 EACH | Refills: 3 | Status: SHIPPED | OUTPATIENT
Start: 2023-08-28 | End: 2023-08-28 | Stop reason: SDUPTHER

## 2023-08-28 RX ORDER — INSULIN GLARGINE 100 [IU]/ML
34 INJECTION, SOLUTION SUBCUTANEOUS NIGHTLY
Qty: 15 ML | Refills: 5 | Status: SHIPPED | OUTPATIENT
Start: 2023-08-28 | End: 2023-08-28 | Stop reason: SDUPTHER

## 2023-08-28 RX ORDER — PROCHLORPERAZINE 25 MG/1
1 SUPPOSITORY RECTAL
Qty: 1 EACH | Refills: 3 | Status: SHIPPED | OUTPATIENT
Start: 2023-08-28

## 2023-08-28 RX ORDER — INSULIN LISPRO 100 U/ML
10 INJECTION, SOLUTION SUBCUTANEOUS
Qty: 15 ML | Refills: 11 | Status: SHIPPED | OUTPATIENT
Start: 2023-08-28 | End: 2023-08-28 | Stop reason: SDUPTHER

## 2023-08-28 RX ORDER — GLUCAGON INJECTION, SOLUTION 1 MG/.2ML
1 INJECTION, SOLUTION SUBCUTANEOUS AS NEEDED
Qty: 0.4 ML | Refills: 5 | Status: SHIPPED | OUTPATIENT
Start: 2023-08-28

## 2023-08-28 RX ORDER — PROCHLORPERAZINE 25 MG/1
SUPPOSITORY RECTAL
Qty: 3 EACH | Refills: 11 | Status: SHIPPED | OUTPATIENT
Start: 2023-08-28

## 2023-08-28 RX ORDER — INSULIN GLARGINE 100 [IU]/ML
34 INJECTION, SOLUTION SUBCUTANEOUS NIGHTLY
Qty: 15 ML | Refills: 5 | Status: SHIPPED | OUTPATIENT
Start: 2023-08-28 | End: 2024-05-19

## 2023-08-28 RX ORDER — SEMAGLUTIDE 0.68 MG/ML
0.25 INJECTION, SOLUTION SUBCUTANEOUS WEEKLY
Qty: 3 ML | Refills: 0
Start: 2023-08-28 | End: 2023-08-28 | Stop reason: SDUPTHER

## 2023-08-28 RX ORDER — PROCHLORPERAZINE 25 MG/1
SUPPOSITORY RECTAL
Qty: 3 EACH | Refills: 11 | Status: SHIPPED | OUTPATIENT
Start: 2023-08-28 | End: 2023-08-28 | Stop reason: SDUPTHER

## 2023-08-28 RX ORDER — PROCHLORPERAZINE 25 MG/1
1 SUPPOSITORY RECTAL DAILY
Qty: 1 EACH | Refills: 0 | Status: SHIPPED | OUTPATIENT
Start: 2023-08-28

## 2023-08-28 RX ORDER — SEMAGLUTIDE 0.68 MG/ML
0.25 INJECTION, SOLUTION SUBCUTANEOUS WEEKLY
Qty: 3 ML | Refills: 0 | Status: SHIPPED | OUTPATIENT
Start: 2023-08-28

## 2023-08-28 NOTE — PROGRESS NOTES
Specialty Pharmacy      Kuldeep Adhikari is a 58 y.o. male seen by an Endocrinology provider for Type 2 Diabetes.      Requested Prescriptions     Signed Prescriptions Disp Refills    Semaglutide,0.25 or 0.5MG/DOS, (Ozempic, 0.25 or 0.5 MG/DOSE,) 2 MG/3ML solution pen-injector 3 mL 0     Sig: Inject 0.25 mg under the skin into the appropriate area as directed 1 (One) Time Per Week.    Continuous Blood Gluc  (Dexcom G6 ) device 1 each 0     Si Device Daily. Use to check BS    Continuous Blood Gluc Sensor (Dexcom G6 Sensor) 3 each 11     Sig: Every 10 (Ten) Days. Use to check BS daily    Continuous Blood Gluc Transmit (Dexcom G6 Transmitter) misc 1 each 3     Si each 4 (Four) Times a Day Before Meals & at Bedtime. Change every 3 months       Refill sent in to patient's pharmacy for above medication prescribed by Dr. Michaels.   Last office visit 23.  Next visit scheduled 23.    Provided the following education on ozempic:  Ozempicr (semaglutide)  Medication Expectations   Why am I taking this medication? You are taking Ozempic, along with diet and exercise, to lower blood sugar because you have type 2 diabetes. Diabetes is not curable but with proper medication and treatment, we can keep your blood sugar within your personalized target range.    What should I expect while on this medication? You should expect to see your blood sugar, A1c and possibly weight decrease over time.   How does the medication work? Ozempic is a non-insulin injection that works with your body to release insulin in response to your blood sugar rising.  This medication also slows down food from leaving your stomach, making you feel varghese for longer.   How long will I be on this medication for? The amount of time you will be on this medication will be determined by your doctor based on blood sugar and A1c control. You will most likely be on this medication or another diabetes medication throughout your  lifetime. Do not abruptly stop this medication without talking to your doctor first.    How do I take this medication? Take as directed on your prescription label. Ozempic is injected under the skin (subcutaneously) of your stomach, thigh or upper arm.  Use this medication once weekly, on the same day each week, and it can be given with or without food.    What are some possible side effects? You may notice you don't feel as hungry, especially when you first start using Ozempic.  The most common side effects are nausea, stomach cramping, and constipation. Stop using Ozempic and call your doctor immediately if you have severe pain in your stomach area that will not go away.    What happens if I miss a dose? If you miss a dose, take it as soon as you remember as long as it is within 5 days after your missed dose.  If more than 5 days have passed, skip the missed dose and resume Ozempic on the regularly scheduled day.     Medication Safety   What are things I should warn my doctor immediately about? Do not use Ozempic if you or a family member have ever had medullary thyroid cancer (MTC) or Multiple Endocrine Neoplasia syndrome type 2 (MEN 2).  Tell your doctor if you have or have had problems with your kidneys or pancreas, or if you are pregnant, planning to become pregnant. Stop using Ozempic and get medical help right away if you have severe pain that will not go away, or if you notice any signs/symptoms of an allergic reaction (rash, hives, difficulty breathing, etc.).    What are things that I should be cautious of? Be cautious of any side effects from this medication. Talk to your doctor if any new ones develop or aren't getting better.    What are some medications that can interact with this one? Taking Ozempic with other medications that also lower your blood sugar such as insulin and glipizide/glimepiride/glyburide may increase the risk of low blood sugar. Your doctor may reduce the dose of these medications  when you start Ozempic.  Always tell your doctor or pharmacist immediately if you start taking any new medications, including over-the-counter medications, vitamins, and herbal supplements.       Medication Storage/Handling   How should I handle this medication? Keep this medication out of reach of pets/children and keep the pen capped    How does this medication need to be stored? Store in the refrigerator prior to first use, but do not freeze.  After first use, you may continue to store in the refrigerator or at room temperature for 56 days.  Protect from excessive heat and sunlight.   How should I dispose of this medication? Used Ozempic pens should be thrown away after 56 days, even if medication remains. If your doctor decides to stop this medication, take to your local police station for proper disposal. Some pharmacies also have take-back bins for medication drop-off.      Resources/Support   How can I remind myself to take this medication? You can download reminder apps to help you manage your refills. You may also set an alarm on your phone to remind you.    Is financial support available?  [employer] can provide co-pay cards if you have commercial insurance or patient assistance if you have Medicare or no insurance.    Which vaccines are recommended for me? Talk to your doctor about these vaccines: Flu, Coronavirus (COVID-19), Pneumococcal (pneumonia), Tdap, Hepatitis B, Zoster (shingles)          Angélica Zamora, PharmD  Clinical Specialty Pharmacist, Endocrinology  8/28/2023  16:08 EDT

## 2023-08-28 NOTE — PATIENT INSTRUCTIONS
# Diabetes Management:    Please start insulin therapy as:    - Insulin Glargine (Slow acting insulin) 34 Units in the evening.  - Insulin lispro / aspart (Fast acting / meal time insulin): 10 Units with each meal.    Meal time insulin need to be taken 15 mins before meal. You can bring your insulin with you if you are planning to eat out.    If you plan to miss the meal please miss the meal time insulin as well.    Also take following non-insulin therapies:    > Ozempic Therapy:     START Ozempic 0.25mg weekly x 4 weeks     Then 0.5mg weekly thereafter    Check your blood glucose four times a day: before breakfast, before lunch, before dinner and before bedtime. Maintain blood glucose logs.    - If your blood glucose in the morning / fasting is less than 100 please decrease the dose of Insulin Glargine by 4 units.    - If your blood glucose before meal is less than 100 then decrease the dose of Insulin Lispro / Aspart by 3 units    Low Blood Glucose:    - If you feel sweaty, anxious, shakiness, feel weak, trouble walking, feels like passing out or trouble seeing thing, please check your blood glucose and if its less than 70 or if your feel symptoms of low blood glucose then take one of the following or use Glucagon Injection:    *3 or 4 glucose tablets  *« cup of juice or regular soda (not sugar-free)  *2 tablespoons of raisins  *4 or 5 saltine crackers  *1 tablespoon of sugar  *1 tablespoon of honey or corn syrup  *6 to 8 hard candies    Follow up in mid Nov 2023.     Thank you for your visit today.     If you have any questions or concerns please feel free to reach out of the office.

## 2023-08-28 NOTE — PROGRESS NOTES
-----------------------------------------------------------------  ENDOCRINE CLINIC NOTE  -----------------------------------------------------------------        PATIENT NAME: Kuldeep Adhikari  PATIENT : 1965 AGE: 58 y.o.  MRN NUMBER: [id number]  PRIMARY CARE: Laura Whitaker MD    ==========================================================================    CHIEF COMPLAINT: Type 2 Diabetes Mellitus  DATE OF SERVICE: 2023         ==========================================================================    HPI / SUBJECTIVE    58 y.o. male is seen in the clinic today for optimization of care for type 2 diabetes.  Diagnosed with type 2 diabetes around 30 years ago.  Patient last A1c in 2023 is 11.6%.  Patient current therapy includes:  Insulin Lantus 34 units nightly  Insulin Lispro 10 units with meals  Patient had previously tried Janumet.  Diabetic neuropathy, s/p right BKA, prosthesis in place.    Diabetic neuropathy left lower extremity, complicated with osteomyelitis in .  Patient do have underlying history of diabetic retinopathy as well status post laser treatments.  Following at [facility].  Patient typically take 2 meals a day.  Patient also have an underlying history of CVA.  No history of CAD.  Checking BG intermittently.  Also have hx significant for Hypothyroidism and is currently maintained on Levothyroxine therapy.    ==========================================================================                                                PAST MEDICAL HISTORY    Past Medical History:   Diagnosis Date    Allergic     CKD (chronic kidney disease), stage III     Depression     Depression with suicidal ideation 2021    DJD (degenerative joint disease)     DVT (deep venous thrombosis)     Ganglion     rt wrist    Gangrene of foot 10/09/2015    GERD (gastroesophageal reflux disease)     Headache     Heart murmur     Hiatal hernia     History of esophageal  stricture     s/p dialation 40-50 times last EGD 04/2016    Hyperlipidemia     Hypertension     Hypothyroidism     IBS (irritable bowel syndrome)     Neuropathy     Osteomyelitis of right foot 03/19/2020    Pulmonary embolism 01/19/2017    Rash     rt lower hip    Retinopathy     S/P BKA (below knee amputation), right 03/18/2022    Seasonal allergies     Sepsis due to group B Streptococcus 03/19/2020    Shortness of breath     Sleep apnea     Stroke     Type 2 diabetes mellitus with peripheral vascular disease     Vitamin D deficiency        ==========================================================================    PAST SURGICAL HISTORY    Past Surgical History:   Procedure Laterality Date    AMPUTATION DIGIT Left 4/26/2022    Procedure: AMPUTATION left great toe;  Surgeon: ERWIN Baker DPM;  Location: [facility] MAIN OR;  Service: Podiatry;  Laterality: Left;    AMPUTATION DIGIT  4/26/2022    Procedure: ;  Surgeon: ERWIN Baker DPM;  Location: [facility] MAIN OR;  Service: Podiatry;;    AMPUTATION FOOT / TOE Right     great toe    AMPUTATION REVISION Right 4/8/2021    Procedure: BELOW KNEE AMPUTATION REVISAION;  Surgeon: Eduardo Reynolds MD;  Location: [facility] MAIN OR;  Service: Orthopedics;  Laterality: Right;    AMPUTATION REVISION Right 4/29/2021    Procedure: AMPUTATION REVISION KNEE STUMP;  Surgeon: Eduardo Reynolds MD;  Location: [facility] MAIN OR;  Service: Orthopedics;  Laterality: Right;    BELOW KNEE AMPUTATION Right 7/30/2020    Procedure: AMPUTATION BELOW KNEE;  Surgeon: Eduardo Reynolds MD;  Location: [facility] MAIN OR;  Service: Orthopedics;  Laterality: Right;    CARDIAC CATHETERIZATION  04/2018    [facility]    COLONOSCOPY      ENDOSCOPY      ENDOSCOPY N/A 10/4/2019    Procedure: ESOPHAGOGASTRODUODENOSCOPY with dilitation and biopsy x 1 area;  Surgeon: Declan Iqbal MD;  Location: [facility] ENDOSCOPY;  Service: Gastroenterology    ENDOSCOPY N/A 12/13/2019    Procedure: ESOPHAGOGASTRODUODENOSCOPY WITH  DILATATION (50, 52 BOUGIE);  Surgeon: Declan Iqbal MD;  Location: [facility] ENDOSCOPY;  Service: Gastroenterology    ENDOSCOPY N/A 8/6/2021    Procedure: ESOPHAGOGASTRODUODENOSCOPY with biopsy x1 area and esophageal dilation (56FR Bougie);  Surgeon: Hussein Talavera MD;  Location: [facility] ENDOSCOPY;  Service: Gastroenterology;  Laterality: N/A;  post: hiatal hernia, erosive gastritis    ENDOSCOPY N/A 12/13/2022    Procedure: ESOPHAGOGASTRODUODENOSCOPY WITH DILATATION (BOUGIE #54, #56) AND BIOPSY X1;  Surgeon: David Rodriguez MD;  Location: [facility] ENDOSCOPY;  Service: Gastroenterology;  Laterality: N/A;  POST: dysphagia     EYE SURGERY      GANGLION CYST EXCISION Left     HERNIA REPAIR Bilateral     INCISION AND DRAINAGE OF WOUND Right 6/15/2022    Procedure: INCISION AND DRAINAGE WOUND with debridement;  Surgeon: Fito Forte MD;  Location: [facility] MAIN OR;  Service: Orthopedics;  Laterality: Right;    JOINT REPLACEMENT      Left total hip    RETINOPATHY SURGERY      laser    TOTAL HIP ARTHROPLASTY Left     TOTAL HIP ARTHROPLASTY Left 2018    TRANS METATARSAL AMPUTATION Right 3/17/2020    Procedure: AMPUTATION TRANS METATARSAL right;  Surgeon: ERWIN Baker DPM;  Location: [facility] MAIN OR;  Service: Podiatry;  Laterality: Right;  GANGRENOUS RIGHT FOOT    VASECTOMY         ==========================================================================    FAMILY HISTORY    Family History   Problem Relation Age of Onset    Diabetes Mother         Patient  mom    Heart disease Mother     Arthritis Mother     Hyperlipidemia Mother     Leukemia Father     Sleep apnea Maternal Aunt         GENO    Stroke Maternal Grandmother     Hypertension Other     Hyperlipidemia Other     Cancer Other     Colon cancer Other         uncle       ==========================================================================    SOCIAL HISTORY    Social History     Socioeconomic History    Marital status:     Number of children:  3    Highest education level: High school graduate   Tobacco Use    Smoking status: Never    Smokeless tobacco: Never   Vaping Use    Vaping Use: Never used   Substance and Sexual Activity    Alcohol use: Yes     Comment: OCCASIONAL    Drug use: No    Sexual activity: Not Currently     Partners: Female     Birth control/protection: None     Comment: I have Ed problem at this time       ==========================================================================    MEDICATIONS      Current Outpatient Medications:     Accu-Chek Softclix Lancets lancets, Use as directed to test blood sugar 4 times daily before meals and at bedtime., Disp: 100 each, Rfl: 5    amLODIPine (NORVASC) 5 MG tablet, Take 1 tablet by mouth Daily., Disp: 90 tablet, Rfl: 0    apixaban (Eliquis) 5 MG tablet tablet, Take 1 tablet by mouth 2 (Two) Times a Day., Disp: 180 tablet, Rfl: 1    aspirin 81 MG EC tablet, Take 1 tablet by mouth Daily., Disp: , Rfl:     atorvastatin (LIPITOR) 80 MG tablet, Take 1 tablet by mouth Every Night., Disp: 90 tablet, Rfl: 1    Blood Glucose Monitoring Suppl (Accu-Chek Guide) w/Device kit, See Admin Instructions., Disp: , Rfl:     buPROPion XL (Wellbutrin XL) 150 MG 24 hr tablet, Take 1 tablet by mouth Every Morning., Disp: 90 tablet, Rfl: 0    busPIRone (BUSPAR) 10 MG tablet, Take 1 tablet by mouth Every 12 (Twelve) Hours., Disp: 180 tablet, Rfl: 1    CINNAMON PO, Take  by mouth., Disp: , Rfl:     Continuous Blood Gluc  (Dexcom G6 ) device, 1 Device Daily. Use to check BS, Disp: 1 each, Rfl: 0    Continuous Blood Gluc Sensor (Dexcom G6 Sensor), Every 10 (Ten) Days. Use to check BS daily, Disp: 3 each, Rfl: 11    cyclobenzaprine (FLEXERIL) 10 MG tablet, Take I tab q 8 hrs as needed for tension headaches, Disp: 30 tablet, Rfl: 0    DULoxetine (CYMBALTA) 60 MG capsule, Take 1 capsule by mouth Every Morning., Disp: 90 capsule, Rfl: 1    glucose blood (Accu-Chek Guide) test strip, Use as instructed to test  blood sugar 4 times daily before meals and at bedtime, Disp: 100 each, Rfl: 12    HYDROcodone-acetaminophen (NORCO) 5-325 MG per tablet, Take 1 tablet by mouth Every Morning., Disp: , Rfl:     Insulin Glargine (Lantus SoloStar) 100 UNIT/ML injection pen, Inject 34 Units under the skin into the appropriate area as directed Every Night for 265 days., Disp: 15 mL, Rfl: 5    Insulin Lispro (ADMELOG SOLOSTAR) 100 UNIT/ML injection pen, Inject 10 Units under the skin into the appropriate area as directed 3 (Three) Times a Day With Meals., Disp: 15 mL, Rfl: 11    levothyroxine (Synthroid) 100 MCG tablet, Take 1 tablet by mouth Every Morning., Disp: 90 tablet, Rfl: 1    meclizine (ANTIVERT) 25 MG tablet, Take 1 tablet by mouth 3 (Three) Times a Day As Needed for Dizziness., Disp: 30 tablet, Rfl: 0    metoprolol succinate XL (TOPROL-XL) 100 MG 24 hr tablet, Take 1 tablet by mouth Daily., Disp: 90 tablet, Rfl: 0    multivitamin with minerals tablet tablet, Take 1 tablet by mouth Daily., Disp: , Rfl:     omeprazole (priLOSEC) 40 MG capsule, Take 1 capsule by mouth Daily., Disp: 90 capsule, Rfl: 1    pregabalin (LYRICA) 100 MG capsule, Take 1 capsule by mouth 2 (Two) Times a Day., Disp: 60 capsule, Rfl: 0    Continuous Blood Gluc Transmit (Dexcom G6 Transmitter) misc, 1 each 4 (Four) Times a Day Before Meals & at Bedtime. Change every 3 months, Disp: 1 each, Rfl: 3    Glucagon (Gvoke HypoPen 2-Pack) 1 MG/0.2ML solution auto-injector, Inject 1 mg under the skin into the appropriate area as directed As Needed (Hypoglycemia)., Disp: 0.4 mL, Rfl: 5    Semaglutide,0.25 or 0.5MG/DOS, (OZEMPIC) 2 MG/1.5ML solution pen-injector, Inject 0.5 mg under the skin into the appropriate area as directed 1 (One) Time Per Week., Disp: 3 mL, Rfl: 4    Semaglutide,0.25 or 0.5MG/DOS, (Ozempic, 0.25 or 0.5 MG/DOSE,) 2 MG/3ML solution pen-injector, Inject 0.25 mg under the skin into the appropriate area as directed 1 (One) Time Per Week., Disp: 3 mL,  Rfl: 0    ==========================================================================    ALLERGIES    Allergies   Allergen Reactions    Lisinopril Cough    Cefixime Other (See Comments)       ==========================================================================    OBJECTIVE    Vitals:    08/28/23 1448   BP: 120/75   Pulse: 71   SpO2: 96%     Body mass index is 32.44 kg/mý.     General: Alert, cooperative, no acute distress  Thyroid:  no enlargement/tenderness/palpable nodules  Lungs: Clear to auscultation bilaterally, respirations unlabored  Heart: Regular rate and rhythm, S1 and S2 normal, no murmur, rub or gallop  Abdomen: Soft, NT, ND and Bowel sounds Positive  Extremities:  Extremities normal, atraumatic, no cyanosis or edema    ==========================================================================    LAB EVALUATION    Lab Results   Component Value Date    GLUCOSE 302 (H) 03/08/2023    BUN 25 (H) 03/08/2023    CREATININE 1.40 (H) 03/08/2023    EGFRIFNONA 48 (L) 01/04/2022    BCR 17.9 03/08/2023    K 4.9 03/08/2023    CO2 27.0 03/08/2023    CALCIUM 9.1 03/08/2023    ALBUMIN 4.3 03/07/2023    LABIL2 1.3 04/22/2019    AST 19 03/07/2023    ALT 24 03/07/2023    CHOL 158 03/18/2022    TRIG 205 (H) 03/18/2022    HDL 43 03/18/2022    LDL 81 03/18/2022     Lab Results   Component Value Date    HGBA1C 11.60 (H) 08/04/2023    HGBA1C 9.00 (H) 03/08/2023    HGBA1C 7.5 (H) 06/13/2022     Lab Results   Component Value Date    MICROALBUR <1.2 04/06/2021    CREATININE 1.40 (H) 03/08/2023     Lab Results   Component Value Date    TSH 4.730 (H) 03/07/2023    FREET4 1.09 03/08/2023       ==========================================================================    ASSESSMENT AND PLAN    Assessment:  #Type 2 diabetes complicated with retinopathy, neuropathy and nephropathy  #Hypertension  #Hyperlipidemia  #Hypothyroidism  #Obesity with BMI of 32.44  #CKD IIIa     Plan:  -Discussed with patient about different therapeutic  "options  - Reviewed blood sugars  - We will introduce GLP-1 receptor agonist therapy  - Benefit and side effect of therapy discussed with patient in detail to which he verbalized understanding  - Counseled patient about the contraindication associated with GLP-1 receptor agonist therapy as well to which she verbalized understanding  - Adjusted new therapy is:  Insulin Glargine 34 units QHS  Insulin Lispro 10 units with meal  Ozempic with titration   -Follow-up in my clinic back again in 2 months time  - Patient to continue levothyroxine therapy and repeat TSH and free T4 ordered before next visit    Thank you for courtesy of consultation.    Return to clinic: 2 months    Entire assessment and plan was discussed and counseled the patient in detail to which patient verbalized understanding and agreed with care.  Answered all queries and concerns.    This note was created using voice recognition software and is inherently subject to errors including those of syntax and \"sound-alike\" substitutions which may escape proofreading.  In such instances, original meaning may be extrapolated by contextual derivation.    Note: Portions of this note may have been copied from previous notes but documentation have been reviewed and edited as necessary to support clinical decision making for today's visit.    ==========================================================================    INFORMATION PROVIDED TO PATIENT    Patient Instructions   # Diabetes Management:    Please start insulin therapy as:    - Insulin Glargine (Slow acting insulin) 34 Units in the evening.  - Insulin lispro / aspart (Fast acting / meal time insulin): 10 Units with each meal.    Meal time insulin need to be taken 15 mins before meal. You can bring your insulin with you if you are planning to eat out.    If you plan to miss the meal please miss the meal time insulin as well.    Also take following non-insulin therapies:    > Ozempic Therapy:     START " Ozempic 0.25mg weekly x 4 weeks     Then 0.5mg weekly thereafter    Check your blood glucose four times a day: before breakfast, before lunch, before dinner and before bedtime. Maintain blood glucose logs.    - If your blood glucose in the morning / fasting is less than 100 please decrease the dose of Insulin Glargine by 4 units.    - If your blood glucose before meal is less than 100 then decrease the dose of Insulin Lispro / Aspart by 3 units    Low Blood Glucose:    - If you feel sweaty, anxious, shakiness, feel weak, trouble walking, feels like passing out or trouble seeing thing, please check your blood glucose and if its less than 70 or if your feel symptoms of low blood glucose then take one of the following or use Glucagon Injection:    *3 or 4 glucose tablets  *« cup of juice or regular soda (not sugar-free)  *2 tablespoons of raisins  *4 or 5 saltine crackers  *1 tablespoon of sugar  *1 tablespoon of honey or corn syrup  *6 to 8 hard candies    Follow up in mid Nov 2023.     Thank you for your visit today.     If you have any questions or concerns please feel free to reach out of the office.          ==========================================================================  Dionicio Ramachandran MD  Department of Endocrine, Diabetes and Metabolism  [place], [place]  ==========================================================================

## 2023-08-30 ENCOUNTER — TREATMENT (OUTPATIENT)
Dept: PHYSICAL THERAPY | Facility: CLINIC | Age: 58
End: 2023-08-30
Payer: MEDICARE

## 2023-08-30 ENCOUNTER — TELEPHONE (OUTPATIENT)
Dept: PHYSICAL THERAPY | Facility: CLINIC | Age: 58
End: 2023-08-30

## 2023-08-30 DIAGNOSIS — M54.12 RADICULOPATHY, CERVICAL: Primary | ICD-10-CM

## 2023-08-30 DIAGNOSIS — M54.2 PAIN, NECK: ICD-10-CM

## 2023-08-30 NOTE — PROGRESS NOTES
"Physical Therapy Daily Treatment Note  [address] [place] [address]      Patient: Kuldeep Adhikari  : 1965  Referring Practitioner: No ref. provider found  Date of Initial Visit: Type: THERAPY  Noted: 2023  Today's Date: 2023  Patient seen for: 3 sessions      Visit Diagnoses:     ICD-10-CM ICD-9-CM   1. Radiculopathy, cervical  M54.12 723.4   2. Pain, neck  M54.2 723.1       Subjective   Kuldeep Adhikari reports: he has attempted to perform exercises at home and reports that they have been causing him some increased pain. States that pain is in the middle of his neck and radiates down his R arm. He states that the radicular symptoms down his R arm are the most bothersome to him, voicing \"I would love to cut it off.\"    Pain Level (0-10): 5 at start of session, Increases with exercises, but at end of session is slightly less than 5.     Objective     See Exercise, Manual, and Modality Logs for complete treatment.     Patient Education: continue to monitor response. Cues for performing exs to tolerance and not with excessive force.     Assessment & Plan       Assessment  Assessment details: Initiated session with manual techniques as pt reported this was helpful at last session. Followed with therex as per flow-sheet with some additions and modifications. Although he reports increase in pain/discomfort during exs; he reports reduction of pain at end of session.         Progress per Plan of Care and Progress strengthening /stabilization /functional activity          Timed:         Manual Therapy:    13     mins  40201;     Therapeutic Exercise:    25     mins  12584;     Neuromuscular Terrie:        mins  06269;    Therapeutic Activity:          mins  09628;     Gait Training:           mins  44425;     Ultrasound:          mins  42213;    Ionto                                   mins   65457  Self Care                            mins   37629      Un-Timed:  Electrical " Stimulation:         mins  27539 ( );  Traction          mins 66262    No Charge:   Cryopack:        mins  Hydrocollator MHP:         mins      Timed Treatment:   38   mins   Total Treatment:     38   mins      Ruthy Torres PTA  Physical Therapist Assistant  [place] License: [id number]

## 2023-09-01 ENCOUNTER — TELEPHONE (OUTPATIENT)
Dept: PHYSICAL THERAPY | Facility: CLINIC | Age: 58
End: 2023-09-01

## 2023-09-01 NOTE — TELEPHONE ENCOUNTER
Caller: Kuldeep Adhikari    Relationship: Self         What was the call regarding: BEEN UP ALL NIGHT, DOES NOT FEEL UP TO COMING IN

## 2023-09-05 ENCOUNTER — TREATMENT (OUTPATIENT)
Dept: PHYSICAL THERAPY | Facility: CLINIC | Age: 58
End: 2023-09-05
Payer: MEDICARE

## 2023-09-05 DIAGNOSIS — M54.12 RADICULOPATHY, CERVICAL: Primary | ICD-10-CM

## 2023-09-05 DIAGNOSIS — M54.2 PAIN, NECK: ICD-10-CM

## 2023-09-06 NOTE — PROGRESS NOTES
Physical Therapy Daily Treatment Note  [address] [place] [address]      Patient: Kuldeep Adhikari  : 1965  Referring Practitioner: Bridger Cantu MD  Date of Initial Visit: Type: THERAPY  Noted: 2023  Today's Date: 2023  Patient seen for: 4 sessions      Visit Diagnoses:     ICD-10-CM ICD-9-CM   1. Radiculopathy, cervical  M54.12 723.4   2. Pain, neck  M54.2 723.1       Subjective   Kuldeep Adhikari reports: he does not feel as if PT is beneficial, states that he tries the exercises at home and he does not notice anything different. States he is going to and wants to continue to try while he works on his A1C and other goals to obtain neck surgery.     Pain Level (0-10): Does not rate, L shoulder is more bothersome than neck.     Objective     See Exercise, Manual, and Modality Logs for complete treatment.     Patient Education: Continue current, will work on shoulder strengthening during course of care.     Assessment & Plan       Assessment  Assessment details: Mr Adhikari continues to voice pain/discomfort of L shoulder > Neck. He demonstrates muscle atrophy that is likely associated with cervical pathology over RC pathology. Significant discussion regarding neck v shoulder and neuro v muscular impacts on pain and function. Pt voices understanding and is happy to continue with PT as able.         Progress per Plan of Care and Progress strengthening /stabilization /functional activity          Timed:         Manual Therapy:    20     mins  95167;     Therapeutic Exercise:    15     mins  24895;     Neuromuscular Terrie:        mins  19892;    Therapeutic Activity:          mins  02897;     Gait Training:           mins  59016;     Ultrasound:          mins  88702;    Ionto                                   mins   82738  Self Care                            mins   62474      Un-Timed:  Electrical Stimulation:         mins  84431 ( );  Traction          mins 26490    No  Charge:   Cryopack:        mins  Hydrocollator MHP:         mins      Timed Treatment:   35   mins   Total Treatment:     35   mins      Ruthy Torres PTA  Physical Therapist Assistant  [place] License: [id number]

## 2023-09-07 ENCOUNTER — TREATMENT (OUTPATIENT)
Dept: PHYSICAL THERAPY | Facility: CLINIC | Age: 58
End: 2023-09-07
Payer: MEDICARE

## 2023-09-07 DIAGNOSIS — Z79.4 TYPE 2 DIABETES MELLITUS WITH HYPERGLYCEMIA, WITH LONG-TERM CURRENT USE OF INSULIN: ICD-10-CM

## 2023-09-07 DIAGNOSIS — M54.12 RADICULOPATHY, CERVICAL: Primary | ICD-10-CM

## 2023-09-07 DIAGNOSIS — M54.2 PAIN, NECK: ICD-10-CM

## 2023-09-07 DIAGNOSIS — E11.65 TYPE 2 DIABETES MELLITUS WITH HYPERGLYCEMIA, WITH LONG-TERM CURRENT USE OF INSULIN: ICD-10-CM

## 2023-09-07 NOTE — TELEPHONE ENCOUNTER
Patient states [facility] is saying they did not receive the prescription for Admelog. I advised we will resend that to that pharmacy.    Rx loaded and ready to send.    Per [facility] policy, when refills come in if there are any red check marks or if it was a paper/verbal request it has to go to the provider to approve. Forwarding Script to provider.

## 2023-09-07 NOTE — TELEPHONE ENCOUNTER
Left voicemail for patient to call us back.  Looks like Rx for Dexcom G6 was sent into [facility].  Did that go through or do we need to send to [facility].

## 2023-09-07 NOTE — PROGRESS NOTES
Physical Therapy Daily Treatment Note  [address] [place] [address]      Patient: Kuldeep Adhikari  : 1965  Referring Practitioner: Bridger Cantu MD  Date of Initial Visit: Type: THERAPY  Noted: 2023  Today's Date: 2023  Patient seen for: 5 sessions      Visit Diagnoses:     ICD-10-CM ICD-9-CM   1. Radiculopathy, cervical  M54.12 723.4   2. Pain, neck  M54.2 723.1       Subjective   Kuldeep Adhikari reports his neck pain is improving somewhat, but it persists. He reports his shoulder is most painful, in the front of the arm, with intermittent N/T down to the hand.     Pain Level (0-10): 4 in neck, 8 in shoulder    Objective 9    See Exercise, Manual, and Modality Logs for complete treatment.     Patient Education: nothing to add at this time.     Assessment & Plan       Assessment  Assessment details: Mr Ahdikari continues to voice pain/discomfort of L shoulder > Neck. Pinpoints to the anterior aspect of the humerus, just distal to the AC jt, with some increased banding/bulkiness of the biceps tendon. Therex as per flow-sheet with addition of posterior scapular strengthening and postural correction.     Progress per Plan of Care and Progress strengthening /stabilization /functional activity          Timed:         Manual Therapy:    15     mins  05063;     Therapeutic Exercise:    25     mins  91748;     Neuromuscular Terrie:        mins  37215;    Therapeutic Activity:          mins  65830;     Gait Training:           mins  91329;     Ultrasound:          mins  83822;    Ionto                                   mins   86613  Self Care                            mins   41645      Un-Timed:  Electrical Stimulation:         mins  86934 ( );  Traction          mins 96821    No Charge:   Cryopack:        mins  Hydrocollator MHP:         mins      Timed Treatment:   40   mins   Total Treatment:     40   mins      Ruthy Torres PTA  Physical Therapist Assistant  [place] License:  [id number]

## 2023-09-08 RX ORDER — INSULIN LISPRO 100 U/ML
10 INJECTION, SOLUTION SUBCUTANEOUS
Qty: 27 ML | Refills: 1 | Status: SHIPPED | OUTPATIENT
Start: 2023-09-08

## 2023-09-11 ENCOUNTER — TREATMENT (OUTPATIENT)
Dept: PHYSICAL THERAPY | Facility: CLINIC | Age: 58
End: 2023-09-11
Payer: MEDICARE

## 2023-09-11 DIAGNOSIS — M54.12 RADICULOPATHY, CERVICAL: Primary | ICD-10-CM

## 2023-09-11 DIAGNOSIS — M54.2 PAIN, NECK: ICD-10-CM

## 2023-09-11 NOTE — PROGRESS NOTES
Physical Therapy Daily Treatment Note  [address] [place] [address]      Patient: Kuldeep Adhikari  : 1965  Referring Practitioner: Bridger Cantu MD  Date of Initial Visit: Type: THERAPY  Noted: 2023  Today's Date: 2023  Patient seen for: 6 sessions      Visit Diagnoses:     ICD-10-CM ICD-9-CM   1. Radiculopathy, cervical  M54.12 723.4   2. Pain, neck  M54.2 723.1       Subjective   Kuldeep Adhikari reports his pain persists and has not improved, despite working on HEP.     Pain Level (0-10): 4 in neck, 8 in R shoulder    Objective 9    See Exercise, Manual, and Modality Logs for complete treatment.     Patient Education: nothing to add at this time.     Assessment & Plan       Assessment  Assessment details: Mr Adhikari corrects this therapist today and reports that he experiences N/T down both arms but it is his R that is the most bothersome and not as much down his L. Manual and therex does not seem to have improved pain or mobility in prior sessions, but is continued as it does alleviate some tension in the muscles. Continued with current plan, focused on the R today. No significant improvement. PT remains indicated at this time.     Progress per Plan of Care and Progress strengthening /stabilization /functional activity      ** Pt seen in tandem with another patient in the clinic, thus only billed for 1:1 time**    Timed:         Manual Therapy:    15     mins  19068;     Therapeutic Exercise:    25     mins  44700;  * billed x 1 unit due to concurrent tx with another pt  Neuromuscular Terrie:        mins  63904;    Therapeutic Activity:          mins  36083;     Gait Training:           mins  72875;     Ultrasound:          mins  90338;    Ionto                                   mins   53001  Self Care                            mins   35845      Un-Timed:  Electrical Stimulation:         mins  67116 (MC );  Traction          mins 00466    No Charge:   Cryopack:         mins  Hydrocollator MHP:         mins      Timed Treatment:   30   mins   Total Treatment:     40   mins      Ruthy Torres PTA  Physical Therapist Assistant  [place] License: [id number]

## 2023-09-13 ENCOUNTER — TELEPHONE (OUTPATIENT)
Dept: PHYSICAL THERAPY | Facility: CLINIC | Age: 58
End: 2023-09-13

## 2023-09-13 ENCOUNTER — TELEPHONE (OUTPATIENT)
Dept: ENDOCRINOLOGY | Facility: CLINIC | Age: 58
End: 2023-09-13
Payer: MEDICARE

## 2023-09-13 DIAGNOSIS — Z79.4 TYPE 2 DIABETES MELLITUS WITH HYPERGLYCEMIA, WITH LONG-TERM CURRENT USE OF INSULIN: ICD-10-CM

## 2023-09-13 DIAGNOSIS — E11.65 TYPE 2 DIABETES MELLITUS WITH HYPERGLYCEMIA, WITH LONG-TERM CURRENT USE OF INSULIN: ICD-10-CM

## 2023-09-13 RX ORDER — INSULIN LISPRO 100 U/ML
10 INJECTION, SOLUTION SUBCUTANEOUS
Qty: 27 ML | Refills: 1 | Status: SHIPPED | OUTPATIENT
Start: 2023-09-13

## 2023-09-13 NOTE — TELEPHONE ENCOUNTER
Pt no show to appt, however based on clinic call log, there was a missed call earlier in the AM from his contact but no message. Pt confirms attempt to call and cancel due to unpredictable medication side effects. Scheduled additional visits for next week.

## 2023-09-13 NOTE — TELEPHONE ENCOUNTER
He is having diarrhea from the Ozempic and he is having cramping, throwing up feelings. He took it Monday and the symptoms last night.  Pt Phone  [phone]

## 2023-09-13 NOTE — TELEPHONE ENCOUNTER
UPDATE;    - Called patient and pt reported that she started ozempic on Monday and since this AM is having diarrhea episodes and had no bowel movement on Monday and Tuesday (His regular BM routine is everyday)  - Counseled patient to hold ozempic and also to hold meal time insulin and monitor  - Maintain good hydration and continue to have regular diet  - If condition worsen to go to ER or urgent care  - Will further plan care after he improves    Dionicio Ramachandran MD  16:01 EDT  09/13/23

## 2023-09-15 ENCOUNTER — TELEPHONE (OUTPATIENT)
Dept: FAMILY MEDICINE CLINIC | Facility: CLINIC | Age: 58
End: 2023-09-15
Payer: MEDICARE

## 2023-09-15 ENCOUNTER — OFFICE VISIT (OUTPATIENT)
Dept: FAMILY MEDICINE CLINIC | Facility: CLINIC | Age: 58
End: 2023-09-15
Payer: MEDICARE

## 2023-09-15 ENCOUNTER — LAB (OUTPATIENT)
Dept: FAMILY MEDICINE CLINIC | Facility: CLINIC | Age: 58
End: 2023-09-15
Payer: MEDICARE

## 2023-09-15 VITALS
HEIGHT: 66 IN | HEART RATE: 81 BPM | TEMPERATURE: 98 F | SYSTOLIC BLOOD PRESSURE: 150 MMHG | OXYGEN SATURATION: 98 % | DIASTOLIC BLOOD PRESSURE: 77 MMHG | WEIGHT: 195 LBS | BODY MASS INDEX: 31.34 KG/M2

## 2023-09-15 DIAGNOSIS — K52.9 GASTROENTERITIS: Primary | ICD-10-CM

## 2023-09-15 DIAGNOSIS — K52.9 GASTROENTERITIS: ICD-10-CM

## 2023-09-15 LAB
ANION GAP SERPL CALCULATED.3IONS-SCNC: 16.3 MMOL/L (ref 5–15)
BASOPHILS # BLD AUTO: 0.01 10*3/MM3 (ref 0–0.2)
BASOPHILS NFR BLD AUTO: 0.2 % (ref 0–1.5)
BUN SERPL-MCNC: 26 MG/DL (ref 6–20)
BUN/CREAT SERPL: 13.3 (ref 7–25)
CALCIUM SPEC-SCNC: 9.1 MG/DL (ref 8.6–10.5)
CHLORIDE SERPL-SCNC: 94 MMOL/L (ref 98–107)
CO2 SERPL-SCNC: 23.7 MMOL/L (ref 22–29)
CREAT SERPL-MCNC: 1.96 MG/DL (ref 0.76–1.27)
DEPRECATED RDW RBC AUTO: 40.8 FL (ref 37–54)
EGFRCR SERPLBLD CKD-EPI 2021: 38.7 ML/MIN/1.73
EOSINOPHIL # BLD AUTO: 0.43 10*3/MM3 (ref 0–0.4)
EOSINOPHIL NFR BLD AUTO: 6.5 % (ref 0.3–6.2)
ERYTHROCYTE [DISTWIDTH] IN BLOOD BY AUTOMATED COUNT: 15.2 % (ref 12.3–15.4)
GLUCOSE SERPL-MCNC: 584 MG/DL (ref 65–99)
HCT VFR BLD AUTO: 44.9 % (ref 37.5–51)
HGB BLD-MCNC: 13.1 G/DL (ref 13–17.7)
IMM GRANULOCYTES # BLD AUTO: 0.02 10*3/MM3 (ref 0–0.05)
IMM GRANULOCYTES NFR BLD AUTO: 0.3 % (ref 0–0.5)
LYMPHOCYTES # BLD AUTO: 1.31 10*3/MM3 (ref 0.7–3.1)
LYMPHOCYTES NFR BLD AUTO: 19.7 % (ref 19.6–45.3)
MCH RBC QN AUTO: 22.3 PG (ref 26.6–33)
MCHC RBC AUTO-ENTMCNC: 29.2 G/DL (ref 31.5–35.7)
MCV RBC AUTO: 76.5 FL (ref 79–97)
MONOCYTES # BLD AUTO: 0.69 10*3/MM3 (ref 0.1–0.9)
MONOCYTES NFR BLD AUTO: 10.4 % (ref 5–12)
NEUTROPHILS NFR BLD AUTO: 4.18 10*3/MM3 (ref 1.7–7)
NEUTROPHILS NFR BLD AUTO: 62.9 % (ref 42.7–76)
NRBC BLD AUTO-RTO: 0 /100 WBC (ref 0–0.2)
PLATELET # BLD AUTO: 245 10*3/MM3 (ref 140–450)
PMV BLD AUTO: 10.9 FL (ref 6–12)
POTASSIUM SERPL-SCNC: 4.7 MMOL/L (ref 3.5–5.2)
RBC # BLD AUTO: 5.87 10*6/MM3 (ref 4.14–5.8)
SODIUM SERPL-SCNC: 134 MMOL/L (ref 136–145)
WBC NRBC COR # BLD: 6.64 10*3/MM3 (ref 3.4–10.8)

## 2023-09-15 PROCEDURE — 80048 BASIC METABOLIC PNL TOTAL CA: CPT | Performed by: FAMILY MEDICINE

## 2023-09-15 PROCEDURE — 3046F HEMOGLOBIN A1C LEVEL >9.0%: CPT | Performed by: FAMILY MEDICINE

## 2023-09-15 PROCEDURE — 99213 OFFICE O/P EST LOW 20 MIN: CPT | Performed by: FAMILY MEDICINE

## 2023-09-15 PROCEDURE — 3077F SYST BP >= 140 MM HG: CPT | Performed by: FAMILY MEDICINE

## 2023-09-15 PROCEDURE — 85025 COMPLETE CBC W/AUTO DIFF WBC: CPT | Performed by: FAMILY MEDICINE

## 2023-09-15 PROCEDURE — 36415 COLL VENOUS BLD VENIPUNCTURE: CPT

## 2023-09-15 PROCEDURE — 3078F DIAST BP <80 MM HG: CPT | Performed by: FAMILY MEDICINE

## 2023-09-15 RX ORDER — ONDANSETRON 4 MG/1
4 TABLET, ORALLY DISINTEGRATING ORAL EVERY 8 HOURS PRN
Qty: 30 TABLET | Refills: 0 | Status: SHIPPED | OUTPATIENT
Start: 2023-09-15

## 2023-09-15 NOTE — PROGRESS NOTES
Subjective   Kuldeep Adhikari is a 58 y.o. male.     History of Present Illness  Comes in today with complaints of vomiting and diarrhea     Kuldeep Adhikari date of birth 1965. Presents in clinic with complaints of vomiting and diarrhea that started on 09/13/2023.    He received his 1st Ozempic injection on Monday, 09/11/2022, and the vomiting started on   09/13/2022. He states every time he vomits, it makes him feel better. His stomach is sore, and he has diarrhea. He tested negative for COVID-19 at home. He has always had chills. He does not sleep. The neurosurgeon gave him pregabalin 100 mg, which he took last night, 09/14/2023. His symptoms started before he started the pregabalin. He sees a little bit of blood when he wipes.    The patient reports that his blood glucose has been high. He reprots that there have been approximately 400. He reports that he has been out of his long-acting insulin.     The following portions of the patient's history were reviewed and updated as appropriate: allergies, current medications, past family history, past medical history, past social history, past surgical history, and problem list.  Past Medical History:   Diagnosis Date    Allergic     CKD (chronic kidney disease), stage III     Depression     Depression with suicidal ideation 08/08/2021    DJD (degenerative joint disease)     DVT (deep venous thrombosis)     Ganglion     rt wrist    Gangrene of foot 10/09/2015    GERD (gastroesophageal reflux disease)     Headache     Heart murmur     Hiatal hernia     History of esophageal stricture     s/p dialation 40-50 times last EGD 04/2016    Hyperlipidemia     Hypertension     Hypothyroidism     IBS (irritable bowel syndrome)     Neuropathy     Osteomyelitis of right foot 03/19/2020    Pulmonary embolism 01/19/2017    Rash     rt lower hip    Retinopathy     S/P BKA (below knee amputation), right 03/18/2022    Seasonal allergies     Sepsis due to group B Streptococcus 03/19/2020     Shortness of breath     Sleep apnea     Stroke     Type 2 diabetes mellitus with peripheral vascular disease     Vitamin D deficiency      Past Surgical History:   Procedure Laterality Date    AMPUTATION DIGIT Left 4/26/2022    Procedure: AMPUTATION left great toe;  Surgeon: ERWIN Baker DPM;  Location: [facility] MAIN OR;  Service: Podiatry;  Laterality: Left;    AMPUTATION DIGIT  4/26/2022    Procedure: ;  Surgeon: ERWIN Baker DPM;  Location: [facility] MAIN OR;  Service: Podiatry;;    AMPUTATION FOOT / TOE Right     great toe    AMPUTATION REVISION Right 4/8/2021    Procedure: BELOW KNEE AMPUTATION REVISAION;  Surgeon: Eduardo Reynolds MD;  Location: [facility] MAIN OR;  Service: Orthopedics;  Laterality: Right;    AMPUTATION REVISION Right 4/29/2021    Procedure: AMPUTATION REVISION KNEE STUMP;  Surgeon: Eduardo Reynolds MD;  Location: [facility] MAIN OR;  Service: Orthopedics;  Laterality: Right;    BELOW KNEE AMPUTATION Right 7/30/2020    Procedure: AMPUTATION BELOW KNEE;  Surgeon: Eduardo Reynolds MD;  Location: [facility] MAIN OR;  Service: Orthopedics;  Laterality: Right;    CARDIAC CATHETERIZATION  04/2018    [facility]    COLONOSCOPY      ENDOSCOPY      ENDOSCOPY N/A 10/4/2019    Procedure: ESOPHAGOGASTRODUODENOSCOPY with dilitation and biopsy x 1 area;  Surgeon: Declan Iqbal MD;  Location: [facility] ENDOSCOPY;  Service: Gastroenterology    ENDOSCOPY N/A 12/13/2019    Procedure: ESOPHAGOGASTRODUODENOSCOPY WITH DILATATION (50, 52 BOUGIE);  Surgeon: Declan Iqbal MD;  Location: [facility] ENDOSCOPY;  Service: Gastroenterology    ENDOSCOPY N/A 8/6/2021    Procedure: ESOPHAGOGASTRODUODENOSCOPY with biopsy x1 area and esophageal dilation (56FR Bougie);  Surgeon: Hussein Talavera MD;  Location: [facility] ENDOSCOPY;  Service: Gastroenterology;  Laterality: N/A;  post: hiatal hernia, erosive gastritis    ENDOSCOPY N/A 12/13/2022    Procedure: ESOPHAGOGASTRODUODENOSCOPY WITH DILATATION (BOUGIE #54, #56) AND BIOPSY X1;   Surgeon: David Rodriguez MD;  Location: [facility] ENDOSCOPY;  Service: Gastroenterology;  Laterality: N/A;  POST: dysphagia     EYE SURGERY      GANGLION CYST EXCISION Left     HERNIA REPAIR Bilateral     INCISION AND DRAINAGE OF WOUND Right 6/15/2022    Procedure: INCISION AND DRAINAGE WOUND with debridement;  Surgeon: Fito Forte MD;  Location: [facility] MAIN OR;  Service: Orthopedics;  Laterality: Right;    JOINT REPLACEMENT      Left total hip    RETINOPATHY SURGERY      laser    TOTAL HIP ARTHROPLASTY Left     TOTAL HIP ARTHROPLASTY Left 2018    TRANS METATARSAL AMPUTATION Right 3/17/2020    Procedure: AMPUTATION TRANS METATARSAL right;  Surgeon: ERWIN Baker DPM;  Location: [facility] MAIN OR;  Service: Podiatry;  Laterality: Right;  GANGRENOUS RIGHT FOOT    VASECTOMY       Family History   Problem Relation Age of Onset    Diabetes Mother         Patient  mom    Heart disease Mother     Arthritis Mother     Hyperlipidemia Mother     Leukemia Father     Sleep apnea Maternal Aunt         GENO    Stroke Maternal Grandmother     Hypertension Other     Hyperlipidemia Other     Cancer Other     Colon cancer Other         uncle     Social History     Socioeconomic History    Marital status:     Number of children: 3    Highest education level: High school graduate   Tobacco Use    Smoking status: Never    Smokeless tobacco: Never   Vaping Use    Vaping Use: Never used   Substance and Sexual Activity    Alcohol use: Yes     Comment: OCCASIONAL    Drug use: No    Sexual activity: Not Currently     Partners: Female     Birth control/protection: None     Comment: I have Ed problem at this time         Current Outpatient Medications:     Accu-Chek Softclix Lancets lancets, Use as directed to test blood sugar 4 times daily before meals and at bedtime., Disp: 100 each, Rfl: 5    amLODIPine (NORVASC) 5 MG tablet, Take 1 tablet by mouth Daily., Disp: 90 tablet, Rfl: 0    apixaban (Eliquis) 5 MG tablet tablet, Take 1  tablet by mouth 2 (Two) Times a Day., Disp: 180 tablet, Rfl: 1    aspirin 81 MG EC tablet, Take 1 tablet by mouth Daily., Disp: , Rfl:     atorvastatin (LIPITOR) 80 MG tablet, Take 1 tablet by mouth Every Night., Disp: 90 tablet, Rfl: 1    Blood Glucose Monitoring Suppl (Accu-Chek Guide) w/Device kit, See Admin Instructions., Disp: , Rfl:     buPROPion XL (Wellbutrin XL) 150 MG 24 hr tablet, Take 1 tablet by mouth Every Morning., Disp: 90 tablet, Rfl: 0    busPIRone (BUSPAR) 10 MG tablet, Take 1 tablet by mouth Every 12 (Twelve) Hours., Disp: 180 tablet, Rfl: 1    CINNAMON PO, Take  by mouth., Disp: , Rfl:     Continuous Blood Gluc  (Dexcom G6 ) device, 1 Device Daily. Use to check BS, Disp: 1 each, Rfl: 0    Continuous Blood Gluc Sensor (Dexcom G6 Sensor), Every 10 (Ten) Days. Use to check BS daily, Disp: 3 each, Rfl: 11    Continuous Blood Gluc Transmit (Dexcom G6 Transmitter) misc, 1 each 4 (Four) Times a Day Before Meals & at Bedtime. Change every 3 months, Disp: 1 each, Rfl: 3    cyclobenzaprine (FLEXERIL) 10 MG tablet, Take I tab q 8 hrs as needed for tension headaches, Disp: 30 tablet, Rfl: 0    DULoxetine (CYMBALTA) 60 MG capsule, Take 1 capsule by mouth Every Morning., Disp: 90 capsule, Rfl: 1    Glucagon (Gvoke HypoPen 2-Pack) 1 MG/0.2ML solution auto-injector, Inject 1 mg under the skin into the appropriate area as directed As Needed (Hypoglycemia)., Disp: 0.4 mL, Rfl: 5    glucose blood (Accu-Chek Guide) test strip, Use as instructed to test blood sugar 4 times daily before meals and at bedtime, Disp: 100 each, Rfl: 12    HYDROcodone-acetaminophen (NORCO) 5-325 MG per tablet, Take 1 tablet by mouth Every Morning., Disp: , Rfl:     Insulin Glargine (Lantus SoloStar) 100 UNIT/ML injection pen, Inject 34 Units under the skin into the appropriate area as directed Every Night for 265 days., Disp: 15 mL, Rfl: 5    Insulin Lispro (ADMELOG SOLOSTAR) 100 UNIT/ML injection pen, Inject 10 Units  "under the skin into the appropriate area as directed 3 (Three) Times a Day With Meals., Disp: 27 mL, Rfl: 1    levothyroxine (Synthroid) 100 MCG tablet, Take 1 tablet by mouth Every Morning., Disp: 90 tablet, Rfl: 1    meclizine (ANTIVERT) 25 MG tablet, Take 1 tablet by mouth 3 (Three) Times a Day As Needed for Dizziness., Disp: 30 tablet, Rfl: 0    metoprolol succinate XL (TOPROL-XL) 100 MG 24 hr tablet, Take 1 tablet by mouth Daily., Disp: 90 tablet, Rfl: 0    multivitamin with minerals tablet tablet, Take 1 tablet by mouth Daily., Disp: , Rfl:     omeprazole (priLOSEC) 40 MG capsule, Take 1 capsule by mouth Daily., Disp: 90 capsule, Rfl: 1    pregabalin (LYRICA) 100 MG capsule, Take 1 capsule by mouth 2 (Two) Times a Day., Disp: 60 capsule, Rfl: 0    Semaglutide,0.25 or 0.5MG/DOS, (Ozempic, 0.25 or 0.5 MG/DOSE,) 2 MG/3ML solution pen-injector, Inject 0.25 mg under the skin into the appropriate area as directed 1 (One) Time Per Week., Disp: 3 mL, Rfl: 0    ondansetron ODT (ZOFRAN-ODT) 4 MG disintegrating tablet, Place 1 tablet on the tongue Every 8 (Eight) Hours As Needed for Nausea or Vomiting., Disp: 30 tablet, Rfl: 0    Semaglutide,0.25 or 0.5MG/DOS, (OZEMPIC) 2 MG/1.5ML solution pen-injector, Inject 0.5 mg under the skin into the appropriate area as directed 1 (One) Time Per Week. (Patient not taking: Reported on 9/15/2023), Disp: 3 mL, Rfl: 4    Review of Systems  ROS done and noted in HPI  /77 (BP Location: Left arm, Patient Position: Sitting, Cuff Size: Large Adult)   Pulse 81   Temp 98 °F (36.7 °C) (Temporal)   Ht 167.6 cm (66\")   Wt 88.5 kg (195 lb)   SpO2 98%   BMI 31.47 kg/m²       Objective   Physical Exam  Vitals and nursing note reviewed.   Constitutional:       Appearance: Normal appearance. He is obese.      Comments: Does not appear to feel well     Cardiovascular:      Rate and Rhythm: Normal rate and regular rhythm.      Heart sounds: Normal heart sounds.   Pulmonary:      Effort: " Pulmonary effort is normal.      Breath sounds: Normal breath sounds.   Abdominal:      General: Abdomen is flat. Bowel sounds are normal.      Palpations: Abdomen is soft.      Tenderness: There is no abdominal tenderness. There is no right CVA tenderness or left CVA tenderness.   Musculoskeletal:      Right lower leg: No edema.      Left lower leg: No edema.   Skin:     Coloration: Skin is not jaundiced.   Neurological:      Mental Status: He is alert.         Assessment & Plan   Problems Addressed this Visit    None  Visit Diagnoses       Gastroenteritis    -  Primary    Relevant Orders    Basic Metabolic Panel    CBC & Differential          Diagnoses         Codes Comments    Gastroenteritis    -  Primary ICD-10-CM: K52.9  ICD-9-CM: 558.9              1. Gastroenteritis  - I feel like his nausea, vomiting, and diarrhea is viral in origin. The Ozempic may be aggravating the situation. I gave him a prescription for the Zofran ODT so that it will make it easier for him to keep it down. I encouraged him to push fluids. He was given strict instructions to go to the emergency room if he develops persistent vomiting, fever, or feels like he is going to pass out. I have also ordered a BMP and a CBC.  Transcribed from ambient dictation for Laura Whitaker MD by Kamini Hodge.  09/15/23   15:42 EDT    Patient or patient representative verbalized consent to the visit recording.  I have personally performed the services described in this document as transcribed by the above individual, and it is both accurate and complete.

## 2023-09-15 NOTE — PATIENT INSTRUCTIONS
Go to ER if you start vomiting and cannot stop, start running a fever, or feel like you are going to pass out

## 2023-09-16 ENCOUNTER — TELEPHONE (OUTPATIENT)
Dept: FAMILY MEDICINE CLINIC | Facility: CLINIC | Age: 58
End: 2023-09-16
Payer: MEDICARE

## 2023-09-16 RX ORDER — INSULIN ASPART 100 [IU]/ML
12 INJECTION, SOLUTION INTRAVENOUS; SUBCUTANEOUS
Qty: 15 ML | Refills: 2 | Status: SHIPPED | OUTPATIENT
Start: 2023-09-16

## 2023-09-16 NOTE — TELEPHONE ENCOUNTER
UPDATE:  -Patient called and informed that his insurance refused humalog but agreed with novolog, scripts sent.    Dionicio Ramachandran MD  13:12 EDT  09/16/23

## 2023-09-16 NOTE — TELEPHONE ENCOUNTER
ON CALL MESSAGE:    Tru from  lab contacted the service with a critical glucose of 564.      Pt was contacted and reports he is out of his short acting insulin for the past 3 weeks.  It does appear Endo sent this in for him on 9/13 to [facility].  Pt says he hasn't heard from the pharmacy regarding it being ready but he also has not contacted the pharmacy.    He reports he has been sick with N/V for several days and was seen in office today for this.  He has not yet picked up the medication that was sent in today.   He denies any worsening of symptoms since his office visit; denies any dizziness or feelings of wanting to pass out.    We reviewed warning S/S and when to go to the ER or contact 911.  He verbalized understanding and plans to go to [facility] in the morning to  his prescriptions.    No

## 2023-09-18 ENCOUNTER — TELEPHONE (OUTPATIENT)
Dept: ENDOCRINOLOGY | Facility: CLINIC | Age: 58
End: 2023-09-18

## 2023-09-18 NOTE — TELEPHONE ENCOUNTER
Caller: Kuldeep Adhikari    Relationship to patient: Self    Best call back number: [phone]     Patient is needing: PA NEEDED FOR INSULIN.

## 2023-09-21 ENCOUNTER — TELEPHONE (OUTPATIENT)
Dept: ENDOCRINOLOGY | Facility: CLINIC | Age: 58
End: 2023-09-21
Payer: MEDICARE

## 2023-09-21 ENCOUNTER — TELEPHONE (OUTPATIENT)
Dept: FAMILY MEDICINE CLINIC | Facility: CLINIC | Age: 58
End: 2023-09-21

## 2023-09-21 NOTE — TELEPHONE ENCOUNTER
Pt took Ozempic on Monday and now he has diarrhea and throwing up he can hold anything down. Asking what he should do.

## 2023-09-21 NOTE — TELEPHONE ENCOUNTER
Caller: Kuldeep Adhikari    Relationship: Self    Best call back number: [phone]     What was the call regarding: PATIENT STATED HE TOOK THE DOSE OF THE OZEMPIC ON MONDAY 09.18.23 AND NOW HE IS VOMITING AND HAVING DIARRHEA.     PATIENT STATES HE CAN'T KEEP ANYTHING DOWN.     PLEASE CALL TO DISCUSS AND ADVISE.

## 2023-09-22 ENCOUNTER — APPOINTMENT (OUTPATIENT)
Dept: ULTRASOUND IMAGING | Facility: HOSPITAL | Age: 58
End: 2023-09-22
Payer: MEDICARE

## 2023-09-22 ENCOUNTER — APPOINTMENT (OUTPATIENT)
Dept: CT IMAGING | Facility: HOSPITAL | Age: 58
End: 2023-09-22
Payer: MEDICARE

## 2023-09-22 ENCOUNTER — HOSPITAL ENCOUNTER (OUTPATIENT)
Facility: HOSPITAL | Age: 58
Setting detail: OBSERVATION
Discharge: HOME | End: 2023-09-28
Attending: EMERGENCY MEDICINE | Admitting: EMERGENCY MEDICINE
Payer: MEDICARE

## 2023-09-22 ENCOUNTER — APPOINTMENT (OUTPATIENT)
Dept: CARDIOLOGY | Facility: HOSPITAL | Age: 58
End: 2023-09-22
Payer: MEDICARE

## 2023-09-22 DIAGNOSIS — R11.2 NAUSEA, VOMITING AND DIARRHEA: ICD-10-CM

## 2023-09-22 DIAGNOSIS — R19.7 NAUSEA, VOMITING AND DIARRHEA: ICD-10-CM

## 2023-09-22 DIAGNOSIS — R13.10 DYSPHAGIA, UNSPECIFIED TYPE: ICD-10-CM

## 2023-09-22 DIAGNOSIS — K80.20 CALCULUS OF GALLBLADDER WITHOUT CHOLECYSTITIS WITHOUT OBSTRUCTION: ICD-10-CM

## 2023-09-22 DIAGNOSIS — R10.9 ABDOMINAL PAIN, UNSPECIFIED ABDOMINAL LOCATION: Primary | ICD-10-CM

## 2023-09-22 LAB
ALBUMIN SERPL-MCNC: 4.2 G/DL (ref 3.5–5.2)
ALBUMIN/GLOB SERPL: 1.5 G/DL
ALP SERPL-CCNC: 149 U/L (ref 39–117)
ALT SERPL W P-5'-P-CCNC: 12 U/L (ref 1–41)
ANION GAP SERPL CALCULATED.3IONS-SCNC: 15 MMOL/L (ref 5–15)
AST SERPL-CCNC: 20 U/L (ref 1–40)
BASOPHILS # BLD AUTO: 0.2 10*3/MM3 (ref 0–0.2)
BASOPHILS NFR BLD AUTO: 1.2 % (ref 0–1.5)
BH CV UPPER VENOUS LEFT INTERNAL JUGULAR AUGMENT: NORMAL
BH CV UPPER VENOUS LEFT INTERNAL JUGULAR COMPRESS: NORMAL
BH CV UPPER VENOUS LEFT INTERNAL JUGULAR PHASIC: NORMAL
BH CV UPPER VENOUS LEFT INTERNAL JUGULAR SPONT: NORMAL
BH CV UPPER VENOUS LEFT SUBCLAVIAN AUGMENT: NORMAL
BH CV UPPER VENOUS LEFT SUBCLAVIAN COMPRESS: NORMAL
BH CV UPPER VENOUS LEFT SUBCLAVIAN PHASIC: NORMAL
BH CV UPPER VENOUS LEFT SUBCLAVIAN SPONT: NORMAL
BH CV UPPER VENOUS RIGHT AXILLARY AUGMENT: NORMAL
BH CV UPPER VENOUS RIGHT AXILLARY COMPRESS: NORMAL
BH CV UPPER VENOUS RIGHT AXILLARY PHASIC: NORMAL
BH CV UPPER VENOUS RIGHT AXILLARY SPONT: NORMAL
BH CV UPPER VENOUS RIGHT BASILIC FOREARM COMPRESS: NORMAL
BH CV UPPER VENOUS RIGHT BASILIC UPPER COMPRESS: NORMAL
BH CV UPPER VENOUS RIGHT BRACHIAL COMPRESS: NORMAL
BH CV UPPER VENOUS RIGHT CEPHALIC FOREARM COMPRESS: NORMAL
BH CV UPPER VENOUS RIGHT CEPHALIC UPPER COMPRESS: NORMAL
BH CV UPPER VENOUS RIGHT INTERNAL JUGULAR AUGMENT: NORMAL
BH CV UPPER VENOUS RIGHT INTERNAL JUGULAR COMPRESS: NORMAL
BH CV UPPER VENOUS RIGHT INTERNAL JUGULAR PHASIC: NORMAL
BH CV UPPER VENOUS RIGHT INTERNAL JUGULAR SPONT: NORMAL
BH CV UPPER VENOUS RIGHT RADIAL COMPRESS: NORMAL
BH CV UPPER VENOUS RIGHT SUBCLAVIAN AUGMENT: NORMAL
BH CV UPPER VENOUS RIGHT SUBCLAVIAN COMPRESS: NORMAL
BH CV UPPER VENOUS RIGHT SUBCLAVIAN PHASIC: NORMAL
BH CV UPPER VENOUS RIGHT SUBCLAVIAN SPONT: NORMAL
BH CV UPPER VENOUS RIGHT ULNAR COMPRESS: NORMAL
BH CV VAS PRELIMINARY FINDINGS SCRIPTING: 1
BILIRUB SERPL-MCNC: 1.5 MG/DL (ref 0–1.2)
BILIRUB UR QL STRIP: NEGATIVE
BUN SERPL-MCNC: 22 MG/DL (ref 6–20)
BUN/CREAT SERPL: 14 (ref 7–25)
CALCIUM SPEC-SCNC: 9.5 MG/DL (ref 8.6–10.5)
CHLORIDE SERPL-SCNC: 99 MMOL/L (ref 98–107)
CLARITY UR: CLEAR
CO2 SERPL-SCNC: 24 MMOL/L (ref 22–29)
COLOR UR: YELLOW
CREAT SERPL-MCNC: 1.57 MG/DL (ref 0.76–1.27)
DEPRECATED RDW RBC AUTO: 47.7 FL (ref 37–54)
EGFRCR SERPLBLD CKD-EPI 2021: 50.5 ML/MIN/1.73
EOSINOPHIL # BLD AUTO: 1.3 10*3/MM3 (ref 0–0.4)
EOSINOPHIL NFR BLD AUTO: 10 % (ref 0.3–6.2)
ERYTHROCYTE [DISTWIDTH] IN BLOOD BY AUTOMATED COUNT: 18 % (ref 12.3–15.4)
GLOBULIN UR ELPH-MCNC: 2.8 GM/DL
GLUCOSE BLDC GLUCOMTR-MCNC: 239 MG/DL (ref 70–105)
GLUCOSE BLDC GLUCOMTR-MCNC: 251 MG/DL (ref 70–105)
GLUCOSE SERPL-MCNC: 381 MG/DL (ref 65–99)
GLUCOSE UR STRIP-MCNC: ABNORMAL MG/DL
HCT VFR BLD AUTO: 44.7 % (ref 37.5–51)
HGB BLD-MCNC: 14 G/DL (ref 13–17.7)
HGB UR QL STRIP.AUTO: NEGATIVE
HOLD SPECIMEN: NORMAL
KETONES UR QL STRIP: ABNORMAL
LEUKOCYTE ESTERASE UR QL STRIP.AUTO: NEGATIVE
LIPASE SERPL-CCNC: 16 U/L (ref 13–60)
LYMPHOCYTES # BLD AUTO: 2 10*3/MM3 (ref 0.7–3.1)
LYMPHOCYTES NFR BLD AUTO: 15 % (ref 19.6–45.3)
MCH RBC QN AUTO: 22.7 PG (ref 26.6–33)
MCHC RBC AUTO-ENTMCNC: 31.4 G/DL (ref 31.5–35.7)
MCV RBC AUTO: 72.2 FL (ref 79–97)
MONOCYTES # BLD AUTO: 1.1 10*3/MM3 (ref 0.1–0.9)
MONOCYTES NFR BLD AUTO: 8.4 % (ref 5–12)
NEUTROPHILS NFR BLD AUTO: 65.4 % (ref 42.7–76)
NEUTROPHILS NFR BLD AUTO: 8.6 10*3/MM3 (ref 1.7–7)
NITRITE UR QL STRIP: NEGATIVE
NRBC BLD AUTO-RTO: 0 /100 WBC (ref 0–0.2)
PH UR STRIP.AUTO: <=5 [PH] (ref 5–8)
PLATELET # BLD AUTO: 245 10*3/MM3 (ref 140–450)
PMV BLD AUTO: 8.9 FL (ref 6–12)
POTASSIUM SERPL-SCNC: 4.7 MMOL/L (ref 3.5–5.2)
PROT SERPL-MCNC: 7 G/DL (ref 6–8.5)
PROT UR QL STRIP: NEGATIVE
RBC # BLD AUTO: 6.18 10*6/MM3 (ref 4.14–5.8)
SODIUM SERPL-SCNC: 138 MMOL/L (ref 136–145)
SP GR UR STRIP: 1.03 (ref 1–1.03)
UROBILINOGEN UR QL STRIP: ABNORMAL
WBC NRBC COR # BLD: 13.1 10*3/MM3 (ref 3.4–10.8)
WHOLE BLOOD HOLD COAG: NORMAL

## 2023-09-22 PROCEDURE — 74176 CT ABD & PELVIS W/O CONTRAST: CPT

## 2023-09-22 PROCEDURE — 82948 REAGENT STRIP/BLOOD GLUCOSE: CPT

## 2023-09-22 PROCEDURE — 96361 HYDRATE IV INFUSION ADD-ON: CPT

## 2023-09-22 PROCEDURE — 76705 ECHO EXAM OF ABDOMEN: CPT

## 2023-09-22 PROCEDURE — G0378 HOSPITAL OBSERVATION PER HR: HCPCS

## 2023-09-22 PROCEDURE — 25010000002 MORPHINE PER 10 MG: Performed by: EMERGENCY MEDICINE

## 2023-09-22 PROCEDURE — 93005 ELECTROCARDIOGRAM TRACING: CPT | Performed by: EMERGENCY MEDICINE

## 2023-09-22 PROCEDURE — 81003 URINALYSIS AUTO W/O SCOPE: CPT | Performed by: EMERGENCY MEDICINE

## 2023-09-22 PROCEDURE — 80053 COMPREHEN METABOLIC PANEL: CPT | Performed by: EMERGENCY MEDICINE

## 2023-09-22 PROCEDURE — 85025 COMPLETE CBC W/AUTO DIFF WBC: CPT | Performed by: EMERGENCY MEDICINE

## 2023-09-22 PROCEDURE — 83690 ASSAY OF LIPASE: CPT | Performed by: EMERGENCY MEDICINE

## 2023-09-22 PROCEDURE — 36415 COLL VENOUS BLD VENIPUNCTURE: CPT

## 2023-09-22 PROCEDURE — 96374 THER/PROPH/DIAG INJ IV PUSH: CPT

## 2023-09-22 PROCEDURE — 99284 EMERGENCY DEPT VISIT MOD MDM: CPT

## 2023-09-22 PROCEDURE — 93971 EXTREMITY STUDY: CPT

## 2023-09-22 PROCEDURE — 25010000002 METOCLOPRAMIDE PER 10 MG: Performed by: EMERGENCY MEDICINE

## 2023-09-22 PROCEDURE — C1751 CATH, INF, PER/CENT/MIDLINE: HCPCS

## 2023-09-22 PROCEDURE — 25010000002 ONDANSETRON PER 1 MG: Performed by: EMERGENCY MEDICINE

## 2023-09-22 PROCEDURE — 96375 TX/PRO/DX INJ NEW DRUG ADDON: CPT

## 2023-09-22 RX ORDER — SODIUM CHLORIDE 0.9 % (FLUSH) 0.9 %
10 SYRINGE (ML) INJECTION AS NEEDED
Status: DISCONTINUED | OUTPATIENT
Start: 2023-09-22 | End: 2023-09-28 | Stop reason: HOSPADM

## 2023-09-22 RX ORDER — AMOXICILLIN 250 MG
2 CAPSULE ORAL 2 TIMES DAILY
Status: DISCONTINUED | OUTPATIENT
Start: 2023-09-22 | End: 2023-09-28 | Stop reason: HOSPADM

## 2023-09-22 RX ORDER — DEXTROSE MONOHYDRATE 25 G/50ML
10-50 INJECTION, SOLUTION INTRAVENOUS
Status: DISCONTINUED | OUTPATIENT
Start: 2023-09-22 | End: 2023-09-23

## 2023-09-22 RX ORDER — POLYETHYLENE GLYCOL 3350 17 G/17G
17 POWDER, FOR SOLUTION ORAL DAILY PRN
Status: DISCONTINUED | OUTPATIENT
Start: 2023-09-22 | End: 2023-09-28 | Stop reason: HOSPADM

## 2023-09-22 RX ORDER — ONDANSETRON 2 MG/ML
4 INJECTION INTRAMUSCULAR; INTRAVENOUS ONCE
Status: COMPLETED | OUTPATIENT
Start: 2023-09-22 | End: 2023-09-22

## 2023-09-22 RX ORDER — CHOLECALCIFEROL (VITAMIN D3) 125 MCG
5 CAPSULE ORAL NIGHTLY PRN
Status: DISCONTINUED | OUTPATIENT
Start: 2023-09-22 | End: 2023-09-28 | Stop reason: HOSPADM

## 2023-09-22 RX ORDER — IBUPROFEN 600 MG/1
1 TABLET ORAL
Status: DISCONTINUED | OUTPATIENT
Start: 2023-09-22 | End: 2023-09-28 | Stop reason: HOSPADM

## 2023-09-22 RX ORDER — BISACODYL 10 MG
10 SUPPOSITORY, RECTAL RECTAL DAILY PRN
Status: DISCONTINUED | OUTPATIENT
Start: 2023-09-22 | End: 2023-09-28 | Stop reason: HOSPADM

## 2023-09-22 RX ORDER — SODIUM CHLORIDE 9 MG/ML
40 INJECTION, SOLUTION INTRAVENOUS AS NEEDED
Status: DISCONTINUED | OUTPATIENT
Start: 2023-09-22 | End: 2023-09-28 | Stop reason: HOSPADM

## 2023-09-22 RX ORDER — INSULIN LISPRO 100 [IU]/ML
1-200 INJECTION, SOLUTION INTRAVENOUS; SUBCUTANEOUS AS NEEDED
Status: DISCONTINUED | OUTPATIENT
Start: 2023-09-22 | End: 2023-09-23

## 2023-09-22 RX ORDER — NICOTINE POLACRILEX 4 MG
15 LOZENGE BUCCAL
Status: DISCONTINUED | OUTPATIENT
Start: 2023-09-22 | End: 2023-09-23

## 2023-09-22 RX ORDER — INSULIN LISPRO 100 [IU]/ML
1-200 INJECTION, SOLUTION INTRAVENOUS; SUBCUTANEOUS
Status: DISCONTINUED | OUTPATIENT
Start: 2023-09-22 | End: 2023-09-23

## 2023-09-22 RX ORDER — LEVOFLOXACIN 5 MG/ML
750 INJECTION, SOLUTION INTRAVENOUS ONCE
Status: COMPLETED | OUTPATIENT
Start: 2023-09-22 | End: 2023-09-23

## 2023-09-22 RX ORDER — METRONIDAZOLE 500 MG/100ML
500 INJECTION, SOLUTION INTRAVENOUS ONCE
Status: COMPLETED | OUTPATIENT
Start: 2023-09-22 | End: 2023-09-23

## 2023-09-22 RX ORDER — METOCLOPRAMIDE HYDROCHLORIDE 5 MG/ML
10 INJECTION INTRAMUSCULAR; INTRAVENOUS ONCE
Status: COMPLETED | OUTPATIENT
Start: 2023-09-22 | End: 2023-09-22

## 2023-09-22 RX ORDER — ACETAMINOPHEN 325 MG/1
650 TABLET ORAL EVERY 4 HOURS PRN
Status: DISCONTINUED | OUTPATIENT
Start: 2023-09-22 | End: 2023-09-28 | Stop reason: HOSPADM

## 2023-09-22 RX ORDER — ONDANSETRON 2 MG/ML
4 INJECTION INTRAMUSCULAR; INTRAVENOUS EVERY 6 HOURS PRN
Status: DISCONTINUED | OUTPATIENT
Start: 2023-09-22 | End: 2023-09-28 | Stop reason: HOSPADM

## 2023-09-22 RX ORDER — NITROGLYCERIN 0.4 MG/1
0.4 TABLET SUBLINGUAL
Status: DISCONTINUED | OUTPATIENT
Start: 2023-09-22 | End: 2023-09-28 | Stop reason: HOSPADM

## 2023-09-22 RX ORDER — SODIUM CHLORIDE 450 MG/100ML
100 INJECTION, SOLUTION INTRAVENOUS CONTINUOUS
Status: DISCONTINUED | OUTPATIENT
Start: 2023-09-23 | End: 2023-09-28 | Stop reason: HOSPADM

## 2023-09-22 RX ORDER — BISACODYL 5 MG/1
5 TABLET, DELAYED RELEASE ORAL DAILY PRN
Status: DISCONTINUED | OUTPATIENT
Start: 2023-09-22 | End: 2023-09-28 | Stop reason: HOSPADM

## 2023-09-22 RX ORDER — SODIUM CHLORIDE 0.9 % (FLUSH) 0.9 %
10 SYRINGE (ML) INJECTION EVERY 12 HOURS SCHEDULED
Status: DISCONTINUED | OUTPATIENT
Start: 2023-09-22 | End: 2023-09-28 | Stop reason: HOSPADM

## 2023-09-22 RX ADMIN — ONDANSETRON 4 MG: 2 INJECTION INTRAMUSCULAR; INTRAVENOUS at 17:13

## 2023-09-22 RX ADMIN — Medication 10 ML: at 23:43

## 2023-09-22 RX ADMIN — METOCLOPRAMIDE 10 MG: 5 INJECTION, SOLUTION INTRAMUSCULAR; INTRAVENOUS at 19:46

## 2023-09-22 RX ADMIN — SODIUM CHLORIDE 100 ML/HR: 450 INJECTION, SOLUTION INTRAVENOUS at 23:24

## 2023-09-22 RX ADMIN — MORPHINE SULFATE 4 MG: 4 INJECTION, SOLUTION INTRAMUSCULAR; INTRAVENOUS at 17:13

## 2023-09-22 RX ADMIN — SODIUM CHLORIDE 1000 ML: 9 INJECTION, SOLUTION INTRAVENOUS at 17:12

## 2023-09-22 NOTE — ED PROVIDER NOTES
Subjective   History of Present Illness  Chief complaint: Patient is a 58-year-old who presents to the emergency department with abdominal pain vomiting and diarrhea.  He states that he started Ozempic last week and he had a few days of vomiting and diarrhea.  Took a second dose on Monday and he has been with persistent abdominal pain nausea vomiting diarrhea.  Has vomited somewhat she is just severely nauseated now.  His abdominal pain is generalized.  He was placed on it because he states that his insurance company was not paying for one of his insulin medications.  States that his blood glucose has been running in the 200s.  He has not had fevers.  He has had some chills.  With these persisting symptoms he presented here    Context:    Duration: Since Monday    Timing:    Severity: Severe    Associated Symptoms:        PCP:  LMP:    Review of Systems   Constitutional:  Positive for chills. Negative for fever.   Respiratory: Negative.     Cardiovascular: Negative.    Gastrointestinal:  Positive for abdominal pain, diarrhea, nausea and vomiting.   Genitourinary: Negative.    Musculoskeletal: Negative.      Past Medical History:   Diagnosis Date    Allergic     CKD (chronic kidney disease), stage III     Depression     Depression with suicidal ideation 08/08/2021    DJD (degenerative joint disease)     DVT (deep venous thrombosis)     Ganglion     rt wrist    Gangrene of foot 10/09/2015    GERD (gastroesophageal reflux disease)     Headache     Heart murmur     Hiatal hernia     History of esophageal stricture     s/p dialation 40-50 times last EGD 04/2016    Hyperlipidemia     Hypertension     Hypothyroidism     IBS (irritable bowel syndrome)     Neuropathy     Osteomyelitis of right foot 03/19/2020    Pulmonary embolism 01/19/2017    Rash     rt lower hip    Retinopathy     S/P BKA (below knee amputation), right 03/18/2022    Seasonal allergies     Sepsis due to group B Streptococcus 03/19/2020    Shortness of  breath     Sleep apnea     Stroke     Type 2 diabetes mellitus with peripheral vascular disease     Vitamin D deficiency        Allergies   Allergen Reactions    Lisinopril Cough    Cefixime Other (See Comments)       Past Surgical History:   Procedure Laterality Date    AMPUTATION DIGIT Left 4/26/2022    Procedure: AMPUTATION left great toe;  Surgeon: ERWIN Baker DPM;  Location: [facility] MAIN OR;  Service: Podiatry;  Laterality: Left;    AMPUTATION DIGIT  4/26/2022    Procedure: ;  Surgeon: ERWIN Baker DPM;  Location: [facility] MAIN OR;  Service: Podiatry;;    AMPUTATION FOOT / TOE Right     great toe    AMPUTATION REVISION Right 4/8/2021    Procedure: BELOW KNEE AMPUTATION REVISAION;  Surgeon: Eduardo Reynolds MD;  Location: [facility] MAIN OR;  Service: Orthopedics;  Laterality: Right;    AMPUTATION REVISION Right 4/29/2021    Procedure: AMPUTATION REVISION KNEE STUMP;  Surgeon: Eduardo Reynolds MD;  Location: [facility] MAIN OR;  Service: Orthopedics;  Laterality: Right;    BELOW KNEE AMPUTATION Right 7/30/2020    Procedure: AMPUTATION BELOW KNEE;  Surgeon: Eduardo Reynolds MD;  Location: [facility] MAIN OR;  Service: Orthopedics;  Laterality: Right;    CARDIAC CATHETERIZATION  04/2018    [facility]    COLONOSCOPY      ENDOSCOPY      ENDOSCOPY N/A 10/4/2019    Procedure: ESOPHAGOGASTRODUODENOSCOPY with dilitation and biopsy x 1 area;  Surgeon: Declan Iqbal MD;  Location: [facility] ENDOSCOPY;  Service: Gastroenterology    ENDOSCOPY N/A 12/13/2019    Procedure: ESOPHAGOGASTRODUODENOSCOPY WITH DILATATION (50, 52 BOUGIE);  Surgeon: Declan Iqbal MD;  Location: [facility] ENDOSCOPY;  Service: Gastroenterology    ENDOSCOPY N/A 8/6/2021    Procedure: ESOPHAGOGASTRODUODENOSCOPY with biopsy x1 area and esophageal dilation (56FR Bougie);  Surgeon: Hussein Talavera MD;  Location: [facility] ENDOSCOPY;  Service: Gastroenterology;  Laterality: N/A;  post: hiatal hernia, erosive gastritis    ENDOSCOPY N/A 12/13/2022    Procedure:  ESOPHAGOGASTRODUODENOSCOPY WITH DILATATION (BOUGIE #54, #56) AND BIOPSY X1;  Surgeon: David Rodriguez MD;  Location: [facility] ENDOSCOPY;  Service: Gastroenterology;  Laterality: N/A;  POST: dysphagia     EYE SURGERY      GANGLION CYST EXCISION Left     HERNIA REPAIR Bilateral     INCISION AND DRAINAGE OF WOUND Right 6/15/2022    Procedure: INCISION AND DRAINAGE WOUND with debridement;  Surgeon: Fito Forte MD;  Location: [facility] MAIN OR;  Service: Orthopedics;  Laterality: Right;    JOINT REPLACEMENT      Left total hip    RETINOPATHY SURGERY      laser    TOTAL HIP ARTHROPLASTY Left     TOTAL HIP ARTHROPLASTY Left 2018    TRANS METATARSAL AMPUTATION Right 3/17/2020    Procedure: AMPUTATION TRANS METATARSAL right;  Surgeon: ERWIN Baker DPM;  Location: [facility] MAIN OR;  Service: Podiatry;  Laterality: Right;  GANGRENOUS RIGHT FOOT    VASECTOMY         Family History   Problem Relation Age of Onset    Diabetes Mother         Patient  mom    Heart disease Mother     Arthritis Mother     Hyperlipidemia Mother     Leukemia Father     Sleep apnea Maternal Aunt         GENO    Stroke Maternal Grandmother     Hypertension Other     Hyperlipidemia Other     Cancer Other     Colon cancer Other         uncle       Social History     Socioeconomic History    Marital status:     Number of children: 3    Highest education level: High school graduate   Tobacco Use    Smoking status: Never    Smokeless tobacco: Never   Vaping Use    Vaping Use: Never used   Substance and Sexual Activity    Alcohol use: Yes     Comment: OCCASIONAL    Drug use: No    Sexual activity: Not Currently     Partners: Female     Birth control/protection: None     Comment: I have Ed problem at this time           Objective   Physical Exam  Vitals and nursing note reviewed.   Constitutional:       Appearance: Normal appearance.   HENT:      Head: Normocephalic and atraumatic.   Cardiovascular:      Rate and Rhythm: Regular rhythm. Tachycardia  present.      Pulses: Normal pulses.      Heart sounds: Normal heart sounds.   Abdominal:      General: Abdomen is protuberant.      Palpations: Abdomen is soft.      Tenderness: There is generalized abdominal tenderness. There is no guarding or rebound.   Musculoskeletal:         General: No swelling.      Cervical back: Normal range of motion and neck supple.   Skin:     General: Skin is warm and dry.   Neurological:      General: No focal deficit present.      Mental Status: He is alert and oriented to person, place, and time.   Psychiatric:         Mood and Affect: Mood normal.         Thought Content: Thought content normal.       Procedures           ED Course                Results for orders placed or performed during the hospital encounter of 09/22/23   Comprehensive Metabolic Panel    Specimen: Blood   Result Value Ref Range    Glucose 381 (H) 65 - 99 mg/dL    BUN 22 (H) 6 - 20 mg/dL    Creatinine 1.57 (H) 0.76 - 1.27 mg/dL    Sodium 138 136 - 145 mmol/L    Potassium 4.7 3.5 - 5.2 mmol/L    Chloride 99 98 - 107 mmol/L    CO2 24.0 22.0 - 29.0 mmol/L    Calcium 9.5 8.6 - 10.5 mg/dL    Total Protein 7.0 6.0 - 8.5 g/dL    Albumin 4.2 3.5 - 5.2 g/dL    ALT (SGPT) 12 1 - 41 U/L    AST (SGOT) 20 1 - 40 U/L    Alkaline Phosphatase 149 (H) 39 - 117 U/L    Total Bilirubin 1.5 (H) 0.0 - 1.2 mg/dL    Globulin 2.8 gm/dL    A/G Ratio 1.5 g/dL    BUN/Creatinine Ratio 14.0 7.0 - 25.0    Anion Gap 15.0 5.0 - 15.0 mmol/L    eGFR 50.5 (L) >60.0 mL/min/1.73   Lipase    Specimen: Blood   Result Value Ref Range    Lipase 16 13 - 60 U/L   CBC Auto Differential    Specimen: Blood   Result Value Ref Range    WBC 13.10 (H) 3.40 - 10.80 10*3/mm3    RBC 6.18 (H) 4.14 - 5.80 10*6/mm3    Hemoglobin 14.0 13.0 - 17.7 g/dL    Hematocrit 44.7 37.5 - 51.0 %    MCV 72.2 (L) 79.0 - 97.0 fL    MCH 22.7 (L) 26.6 - 33.0 pg    MCHC 31.4 (L) 31.5 - 35.7 g/dL    RDW 18.0 (H) 12.3 - 15.4 %    RDW-SD 47.7 37.0 - 54.0 fl    MPV 8.9 6.0 - 12.0 fL     Platelets 245 140 - 450 10*3/mm3    Neutrophil % 65.4 42.7 - 76.0 %    Lymphocyte % 15.0 (L) 19.6 - 45.3 %    Monocyte % 8.4 5.0 - 12.0 %    Eosinophil % 10.0 (H) 0.3 - 6.2 %    Basophil % 1.2 0.0 - 1.5 %    Neutrophils, Absolute 8.60 (H) 1.70 - 7.00 10*3/mm3    Lymphocytes, Absolute 2.00 0.70 - 3.10 10*3/mm3    Monocytes, Absolute 1.10 (H) 0.10 - 0.90 10*3/mm3    Eosinophils, Absolute 1.30 (H) 0.00 - 0.40 10*3/mm3    Basophils, Absolute 0.20 0.00 - 0.20 10*3/mm3    nRBC 0.0 0.0 - 0.2 /100 WBC   POC Glucose Once    Specimen: Blood   Result Value Ref Range    Glucose 251 (H) 70 - 105 mg/dL   ECG 12 Lead Tachycardia   Result Value Ref Range    QT Interval 322 ms    QTC Interval 427 ms   Gold Top - SST   Result Value Ref Range    Extra Tube Hold for add-ons.    Light Blue Top   Result Value Ref Range    Extra Tube Hold for add-ons.    Duplex Venous Upper Extremity RIGHT   Result Value Ref Range    Right Internal Jugular Spont Y     Right Internal Jugular Phasic Y     Right Internal Jugular Compress C     Right Internal Jugular Augment Y     Right Subclavian Spont Y     Right Subclavian Phasic Y     Right Subclavian Compress C     Right Subclavian Augment Y     Right Axillary Spont Y     Right Axillary Phasic Y     Right Axillary Compress C     Right Axillary Augment Y     Right Brachial Compress C     Right Radial Compress C     Right Ulnar Compress C     Right Basilic Upper Compress C     Right Basilic Forearm Compress C     Right Cephalic Upper Compress C     Right Cephalic Forearm Compress C     Left Internal Jugular Spont Y     Left Internal Jugular Phasic Y     Left Internal Jugular Compress C     Left Internal Jugular Augment Y     Left Subclavian Spont Y     Left Subclavian Phasic Y     Left Subclavian Compress C     Left Subclavian Augment Y     BH CV VAS PRELIMINARY FINDINGS SCRIPTING 1.0                       CT Abdomen Pelvis Without Contrast    Result Date: 9/22/2023  Impression: No acute abnormality  Cholelithiasis without CT evidence of acute cholecystitis. Electronically Signed: Sunny Humphries DO  9/22/2023 5:40 PM EDT  Workstation ID: [id number]               Medical Decision Making  Patient was seen and evaluated for the above problem    Differential diagnose includes but not limited to Ozempic medication reaction, cholecystitis, bowel perforation, gastroenteritis, mesenteric ischemia    Patient with minimal improvement here in the emergency department.  Still nauseated.  Reglan did help some.  However abdominal pain is persisting.  He does have Colette lithiasis and potentially this could be a gallbladder cause.  However Ozempic is a possibility as well.  I will place the patient in the observation unit and have gastroenterology as well as endocrine consult on the patient.  He is not in DKA.  Patient verbalized understanding is okay with this.  He does have cyst on his arm.  No blood clot present.  Patient is okay with observation stay.  He does have a cystic lesion over his right wrist will have orthopedic consultation on states that has been there for many years.  But worse discomfort this past week    Problems Addressed:  Abdominal pain, unspecified abdominal location: complicated acute illness or injury  Calculus of gallbladder without cholecystitis without obstruction: complicated acute illness or injury  Nausea, vomiting and diarrhea: complicated acute illness or injury    Amount and/or Complexity of Data Reviewed  Labs: ordered. Decision-making details documented in ED Course.     Details: Labs reviewed by myself  Radiology: ordered and independent interpretation performed.     Details: CT scan reviewed by myself.  Cholelithiasis present.  No bowel perforation  ECG/medicine tests: ordered.    Risk  Prescription drug management.  Decision regarding hospitalization.        Final diagnoses:   None     Abdominal pain  Vomiting and diarrhea  Cholelithiasis  Right wrist cyst  ED Disposition  ED Disposition        None            No follow-up provider specified.       Medication List      No changes were made to your prescriptions during this visit.            Ernesto Canada,   09/22/23 2044

## 2023-09-22 NOTE — TELEPHONE ENCOUNTER
Update:    - Called patient patient reports that he took first dose on Monday, 9/18/2023 of Ozempic.  - Since Wednesday, 9/20/2023 patient has been experiencing persistent nausea with vomiting and diarrhea.  - Patient since then have only been able to drink 1 can of ginger ale since he started throwing having diarrhea  - Patient is having dark-colored urine  - Counseled patient to go to the nearest urgent care or ER given underlying significant possibility of dehydration, patient verbalized understanding and will go to the nearest urgent care or emergency for evaluation    Dionicio Ramachandran MD  13:55 EDT  09/22/23

## 2023-09-23 ENCOUNTER — ON CAMPUS - OUTPATIENT (AMBULATORY)
Dept: URBAN - METROPOLITAN AREA HOSPITAL 85 | Facility: HOSPITAL | Age: 58
End: 2023-09-23
Payer: MEDICAID

## 2023-09-23 DIAGNOSIS — R10.9 UNSPECIFIED ABDOMINAL PAIN: ICD-10-CM

## 2023-09-23 DIAGNOSIS — R11.2 NAUSEA WITH VOMITING, UNSPECIFIED: ICD-10-CM

## 2023-09-23 DIAGNOSIS — R93.3 ABNORMAL FINDINGS ON DIAGNOSTIC IMAGING OF OTHER PARTS OF DI: ICD-10-CM

## 2023-09-23 DIAGNOSIS — D50.9 IRON DEFICIENCY ANEMIA, UNSPECIFIED: ICD-10-CM

## 2023-09-23 DIAGNOSIS — R19.7 DIARRHEA, UNSPECIFIED: ICD-10-CM

## 2023-09-23 LAB
ALBUMIN SERPL-MCNC: 3.9 G/DL (ref 3.5–5.2)
ALP SERPL-CCNC: 127 U/L (ref 39–117)
ALT SERPL W P-5'-P-CCNC: 11 U/L (ref 1–41)
ANION GAP SERPL CALCULATED.3IONS-SCNC: 15 MMOL/L (ref 5–15)
AST SERPL-CCNC: 15 U/L (ref 1–40)
BASOPHILS # BLD AUTO: 0 10*3/MM3 (ref 0–0.2)
BASOPHILS NFR BLD AUTO: 0.3 % (ref 0–1.5)
BILIRUB CONJ SERPL-MCNC: 0.2 MG/DL (ref 0–0.3)
BILIRUB INDIRECT SERPL-MCNC: 0.9 MG/DL
BILIRUB SERPL-MCNC: 1.1 MG/DL (ref 0–1.2)
BUN SERPL-MCNC: 18 MG/DL (ref 6–20)
BUN/CREAT SERPL: 13 (ref 7–25)
CALCIUM SPEC-SCNC: 9 MG/DL (ref 8.6–10.5)
CHLORIDE SERPL-SCNC: 102 MMOL/L (ref 98–107)
CO2 SERPL-SCNC: 23 MMOL/L (ref 22–29)
CREAT SERPL-MCNC: 1.38 MG/DL (ref 0.76–1.27)
DEPRECATED RDW RBC AUTO: 45.1 FL (ref 37–54)
EGFRCR SERPLBLD CKD-EPI 2021: 58.9 ML/MIN/1.73
EOSINOPHIL # BLD AUTO: 1.4 10*3/MM3 (ref 0–0.4)
EOSINOPHIL NFR BLD AUTO: 13.1 % (ref 0.3–6.2)
ERYTHROCYTE [DISTWIDTH] IN BLOOD BY AUTOMATED COUNT: 17 % (ref 12.3–15.4)
GLUCOSE BLDC GLUCOMTR-MCNC: 161 MG/DL (ref 70–105)
GLUCOSE BLDC GLUCOMTR-MCNC: 176 MG/DL (ref 70–105)
GLUCOSE BLDC GLUCOMTR-MCNC: 203 MG/DL (ref 70–105)
GLUCOSE BLDC GLUCOMTR-MCNC: 93 MG/DL (ref 70–105)
GLUCOSE SERPL-MCNC: 243 MG/DL (ref 65–99)
HCT VFR BLD AUTO: 37.9 % (ref 37.5–51)
HGB BLD-MCNC: 12.1 G/DL (ref 13–17.7)
LYMPHOCYTES # BLD AUTO: 1.9 10*3/MM3 (ref 0.7–3.1)
LYMPHOCYTES NFR BLD AUTO: 17.6 % (ref 19.6–45.3)
MCH RBC QN AUTO: 22.9 PG (ref 26.6–33)
MCHC RBC AUTO-ENTMCNC: 32 G/DL (ref 31.5–35.7)
MCV RBC AUTO: 71.6 FL (ref 79–97)
MONOCYTES # BLD AUTO: 0.8 10*3/MM3 (ref 0.1–0.9)
MONOCYTES NFR BLD AUTO: 7.8 % (ref 5–12)
NEUTROPHILS NFR BLD AUTO: 6.4 10*3/MM3 (ref 1.7–7)
NEUTROPHILS NFR BLD AUTO: 61.2 % (ref 42.7–76)
NRBC BLD AUTO-RTO: 0.3 /100 WBC (ref 0–0.2)
PLATELET # BLD AUTO: 211 10*3/MM3 (ref 140–450)
PMV BLD AUTO: 8.7 FL (ref 6–12)
POTASSIUM SERPL-SCNC: 3.6 MMOL/L (ref 3.5–5.2)
PROT SERPL-MCNC: 6 G/DL (ref 6–8.5)
QT INTERVAL: 322 MS
QTC INTERVAL: 427 MS
RBC # BLD AUTO: 5.29 10*6/MM3 (ref 4.14–5.8)
SODIUM SERPL-SCNC: 140 MMOL/L (ref 136–145)
WBC NRBC COR # BLD: 10.5 10*3/MM3 (ref 3.4–10.8)

## 2023-09-23 PROCEDURE — 82948 REAGENT STRIP/BLOOD GLUCOSE: CPT

## 2023-09-23 PROCEDURE — 85025 COMPLETE CBC W/AUTO DIFF WBC: CPT | Performed by: EMERGENCY MEDICINE

## 2023-09-23 PROCEDURE — 99222 1ST HOSP IP/OBS MODERATE 55: CPT | Performed by: INTERNAL MEDICINE

## 2023-09-23 PROCEDURE — G0378 HOSPITAL OBSERVATION PER HR: HCPCS

## 2023-09-23 PROCEDURE — 87150 DNA/RNA AMPLIFIED PROBE: CPT | Performed by: EMERGENCY MEDICINE

## 2023-09-23 PROCEDURE — 96376 TX/PRO/DX INJ SAME DRUG ADON: CPT

## 2023-09-23 PROCEDURE — 96361 HYDRATE IV INFUSION ADD-ON: CPT

## 2023-09-23 PROCEDURE — 87147 CULTURE TYPE IMMUNOLOGIC: CPT | Performed by: EMERGENCY MEDICINE

## 2023-09-23 PROCEDURE — 25010000002 CEFTRIAXONE PER 250 MG: Performed by: SURGERY

## 2023-09-23 PROCEDURE — 80048 BASIC METABOLIC PNL TOTAL CA: CPT | Performed by: EMERGENCY MEDICINE

## 2023-09-23 PROCEDURE — 87040 BLOOD CULTURE FOR BACTERIA: CPT | Performed by: EMERGENCY MEDICINE

## 2023-09-23 PROCEDURE — 63710000001 INSULIN GLARGINE PER 5 UNITS: Performed by: PHYSICIAN ASSISTANT

## 2023-09-23 PROCEDURE — 63710000001 INSULIN LISPRO (HUMAN) PER 5 UNITS: Performed by: EMERGENCY MEDICINE

## 2023-09-23 PROCEDURE — 99204 OFFICE O/P NEW MOD 45 MIN: CPT | Performed by: SURGERY

## 2023-09-23 PROCEDURE — 25010000002 METRONIDAZOLE 500 MG/100ML SOLUTION: Performed by: EMERGENCY MEDICINE

## 2023-09-23 PROCEDURE — 25010000002 LEVOFLOXACIN PER 250 MG: Performed by: EMERGENCY MEDICINE

## 2023-09-23 PROCEDURE — 63710000001 INSULIN GLARGINE PER 5 UNITS: Performed by: EMERGENCY MEDICINE

## 2023-09-23 PROCEDURE — 36415 COLL VENOUS BLD VENIPUNCTURE: CPT | Performed by: EMERGENCY MEDICINE

## 2023-09-23 PROCEDURE — 99222 1ST HOSP IP/OBS MODERATE 55: CPT

## 2023-09-23 PROCEDURE — 25010000002 ONDANSETRON PER 1 MG: Performed by: EMERGENCY MEDICINE

## 2023-09-23 PROCEDURE — 96365 THER/PROPH/DIAG IV INF INIT: CPT

## 2023-09-23 PROCEDURE — 80076 HEPATIC FUNCTION PANEL: CPT

## 2023-09-23 PROCEDURE — 96367 TX/PROPH/DG ADDL SEQ IV INF: CPT

## 2023-09-23 RX ORDER — SODIUM CHLORIDE 9 MG/ML
40 INJECTION, SOLUTION INTRAVENOUS AS NEEDED
Status: DISCONTINUED | OUTPATIENT
Start: 2023-09-23 | End: 2023-09-28 | Stop reason: HOSPADM

## 2023-09-23 RX ORDER — METRONIDAZOLE 500 MG/1
500 TABLET ORAL EVERY 8 HOURS SCHEDULED
Status: DISCONTINUED | OUTPATIENT
Start: 2023-09-23 | End: 2023-09-27

## 2023-09-23 RX ORDER — ATORVASTATIN CALCIUM 40 MG/1
80 TABLET, FILM COATED ORAL ONCE
Status: COMPLETED | OUTPATIENT
Start: 2023-09-23 | End: 2023-09-23

## 2023-09-23 RX ORDER — DEXTROSE MONOHYDRATE 25 G/50ML
25 INJECTION, SOLUTION INTRAVENOUS
Status: DISCONTINUED | OUTPATIENT
Start: 2023-09-23 | End: 2023-09-28 | Stop reason: HOSPADM

## 2023-09-23 RX ORDER — LEVOFLOXACIN 750 MG/1
750 TABLET ORAL EVERY 24 HOURS
Status: DISCONTINUED | OUTPATIENT
Start: 2023-09-23 | End: 2023-09-23 | Stop reason: ALTCHOICE

## 2023-09-23 RX ORDER — METOPROLOL SUCCINATE 50 MG/1
100 TABLET, EXTENDED RELEASE ORAL DAILY
Status: DISCONTINUED | OUTPATIENT
Start: 2023-09-23 | End: 2023-09-28 | Stop reason: HOSPADM

## 2023-09-23 RX ORDER — NICOTINE POLACRILEX 4 MG
15 LOZENGE BUCCAL
Status: DISCONTINUED | OUTPATIENT
Start: 2023-09-23 | End: 2023-09-28 | Stop reason: HOSPADM

## 2023-09-23 RX ORDER — LEVOTHYROXINE SODIUM 0.1 MG/1
100 TABLET ORAL
Status: DISCONTINUED | OUTPATIENT
Start: 2023-09-23 | End: 2023-09-28 | Stop reason: HOSPADM

## 2023-09-23 RX ORDER — AMLODIPINE BESYLATE 5 MG/1
5 TABLET ORAL DAILY
Status: DISCONTINUED | OUTPATIENT
Start: 2023-09-24 | End: 2023-09-28 | Stop reason: HOSPADM

## 2023-09-23 RX ORDER — SODIUM CHLORIDE 0.9 % (FLUSH) 0.9 %
10 SYRINGE (ML) INJECTION EVERY 12 HOURS SCHEDULED
Status: DISCONTINUED | OUTPATIENT
Start: 2023-09-23 | End: 2023-09-28 | Stop reason: HOSPADM

## 2023-09-23 RX ORDER — BUSPIRONE HYDROCHLORIDE 5 MG/1
5 TABLET ORAL ONCE
Status: COMPLETED | OUTPATIENT
Start: 2023-09-23 | End: 2023-09-23

## 2023-09-23 RX ORDER — BUPROPION HYDROCHLORIDE 150 MG/1
150 TABLET ORAL DAILY
Status: DISCONTINUED | OUTPATIENT
Start: 2023-09-23 | End: 2023-09-28 | Stop reason: HOSPADM

## 2023-09-23 RX ORDER — METOPROLOL SUCCINATE 50 MG/1
100 TABLET, EXTENDED RELEASE ORAL ONCE
Status: COMPLETED | OUTPATIENT
Start: 2023-09-23 | End: 2023-09-23

## 2023-09-23 RX ORDER — DULOXETIN HYDROCHLORIDE 30 MG/1
60 CAPSULE, DELAYED RELEASE ORAL DAILY
Status: DISCONTINUED | OUTPATIENT
Start: 2023-09-23 | End: 2023-09-28 | Stop reason: HOSPADM

## 2023-09-23 RX ORDER — PREGABALIN 100 MG/1
100 CAPSULE ORAL ONCE
Status: COMPLETED | OUTPATIENT
Start: 2023-09-23 | End: 2023-09-23

## 2023-09-23 RX ORDER — PANTOPRAZOLE SODIUM 40 MG/1
40 TABLET, DELAYED RELEASE ORAL DAILY
Status: DISCONTINUED | OUTPATIENT
Start: 2023-09-23 | End: 2023-09-28 | Stop reason: HOSPADM

## 2023-09-23 RX ORDER — ASPIRIN 81 MG/1
81 TABLET ORAL DAILY
Status: DISCONTINUED | OUTPATIENT
Start: 2023-09-23 | End: 2023-09-28 | Stop reason: HOSPADM

## 2023-09-23 RX ORDER — PREGABALIN 100 MG/1
100 CAPSULE ORAL 2 TIMES DAILY
Status: DISCONTINUED | OUTPATIENT
Start: 2023-09-23 | End: 2023-09-28 | Stop reason: HOSPADM

## 2023-09-23 RX ORDER — CYCLOBENZAPRINE HCL 10 MG
10 TABLET ORAL ONCE
Status: COMPLETED | OUTPATIENT
Start: 2023-09-23 | End: 2023-09-23

## 2023-09-23 RX ORDER — SODIUM CHLORIDE 0.9 % (FLUSH) 0.9 %
10 SYRINGE (ML) INJECTION AS NEEDED
Status: DISCONTINUED | OUTPATIENT
Start: 2023-09-23 | End: 2023-09-28 | Stop reason: HOSPADM

## 2023-09-23 RX ORDER — INSULIN LISPRO 100 [IU]/ML
2-9 INJECTION, SOLUTION INTRAVENOUS; SUBCUTANEOUS EVERY 6 HOURS SCHEDULED
Status: DISCONTINUED | OUTPATIENT
Start: 2023-09-23 | End: 2023-09-24

## 2023-09-23 RX ORDER — ATORVASTATIN CALCIUM 40 MG/1
80 TABLET, FILM COATED ORAL NIGHTLY
Status: DISCONTINUED | OUTPATIENT
Start: 2023-09-23 | End: 2023-09-28 | Stop reason: HOSPADM

## 2023-09-23 RX ORDER — BUSPIRONE HYDROCHLORIDE 5 MG/1
10 TABLET ORAL EVERY 12 HOURS SCHEDULED
Status: DISCONTINUED | OUTPATIENT
Start: 2023-09-23 | End: 2023-09-28 | Stop reason: HOSPADM

## 2023-09-23 RX ADMIN — METRONIDAZOLE 500 MG: 500 TABLET ORAL at 21:08

## 2023-09-23 RX ADMIN — METRONIDAZOLE 500 MG: 500 INJECTION, SOLUTION INTRAVENOUS at 01:15

## 2023-09-23 RX ADMIN — BUSPIRONE HYDROCHLORIDE 5 MG: 5 TABLET ORAL at 01:15

## 2023-09-23 RX ADMIN — LEVOTHYROXINE SODIUM 100 MCG: 0.1 TABLET ORAL at 08:21

## 2023-09-23 RX ADMIN — LEVOFLOXACIN 750 MG: 5 INJECTION, SOLUTION INTRAVENOUS at 03:02

## 2023-09-23 RX ADMIN — PREGABALIN 100 MG: 100 CAPSULE ORAL at 01:15

## 2023-09-23 RX ADMIN — INSULIN LISPRO 2 UNITS: 100 INJECTION, SOLUTION INTRAVENOUS; SUBCUTANEOUS at 00:10

## 2023-09-23 RX ADMIN — ATORVASTATIN CALCIUM 80 MG: 40 TABLET, FILM COATED ORAL at 01:15

## 2023-09-23 RX ADMIN — Medication 10 ML: at 21:08

## 2023-09-23 RX ADMIN — PREGABALIN 100 MG: 100 CAPSULE ORAL at 21:08

## 2023-09-23 RX ADMIN — PANTOPRAZOLE SODIUM 40 MG: 40 TABLET, DELAYED RELEASE ORAL at 08:21

## 2023-09-23 RX ADMIN — Medication 10 ML: at 08:22

## 2023-09-23 RX ADMIN — CYCLOBENZAPRINE 10 MG: 10 TABLET, FILM COATED ORAL at 01:15

## 2023-09-23 RX ADMIN — METOPROLOL SUCCINATE 100 MG: 50 TABLET, EXTENDED RELEASE ORAL at 01:15

## 2023-09-23 RX ADMIN — INSULIN GLARGINE 34 UNITS: 100 INJECTION, SOLUTION SUBCUTANEOUS at 08:21

## 2023-09-23 RX ADMIN — SODIUM CHLORIDE 100 ML/HR: 450 INJECTION, SOLUTION INTRAVENOUS at 12:14

## 2023-09-23 RX ADMIN — ATORVASTATIN CALCIUM 80 MG: 40 TABLET, FILM COATED ORAL at 21:08

## 2023-09-23 RX ADMIN — BUSPIRONE HYDROCHLORIDE 10 MG: 5 TABLET ORAL at 21:08

## 2023-09-23 RX ADMIN — METOPROLOL SUCCINATE 100 MG: 50 TABLET, EXTENDED RELEASE ORAL at 08:21

## 2023-09-23 RX ADMIN — DULOXETINE HYDROCHLORIDE 60 MG: 30 CAPSULE, DELAYED RELEASE ORAL at 08:21

## 2023-09-23 RX ADMIN — INSULIN GLARGINE 21 UNITS: 100 INJECTION, SOLUTION SUBCUTANEOUS at 00:09

## 2023-09-23 RX ADMIN — BUPROPION HYDROCHLORIDE 150 MG: 150 TABLET, EXTENDED RELEASE ORAL at 08:21

## 2023-09-23 RX ADMIN — CEFTRIAXONE 2000 MG: 2 INJECTION, POWDER, FOR SOLUTION INTRAMUSCULAR; INTRAVENOUS at 21:08

## 2023-09-23 RX ADMIN — BUSPIRONE HYDROCHLORIDE 10 MG: 5 TABLET ORAL at 08:21

## 2023-09-23 RX ADMIN — APIXABAN 5 MG: 5 TABLET, FILM COATED ORAL at 01:15

## 2023-09-23 RX ADMIN — PREGABALIN 100 MG: 100 CAPSULE ORAL at 08:21

## 2023-09-23 RX ADMIN — ONDANSETRON 4 MG: 2 INJECTION INTRAMUSCULAR; INTRAVENOUS at 00:35

## 2023-09-23 RX ADMIN — METRONIDAZOLE 500 MG: 500 TABLET ORAL at 15:27

## 2023-09-23 RX ADMIN — ASPIRIN 81 MG: 81 TABLET, COATED ORAL at 08:21

## 2023-09-23 RX ADMIN — ACETAMINOPHEN 650 MG: 325 TABLET, FILM COATED ORAL at 15:27

## 2023-09-23 NOTE — H&P
[facility] Observation Unit H&P    Patient Name: Kuldeep Adhikari  : 1965  MRN: [id number]  Primary Care Physician: Laura Whitaker MD  Date of admission: 2023     Patient Care Team:  Laura Whitaker MD as PCP - General  Tamara Michaels MD as Consulting Physician (Endocrinology)  ERWIN Baker DPM as Consulting Physician (Podiatry)          Subjective   History Present Illness     Chief Complaint:   Chief Complaint   Patient presents with    Diarrhea     Diarrhea and vomiting since taking his second dose of ozempic, chills.       Abdominal pain, n/v    Diarrhea   Associated symptoms include bloating, chills and vomiting. Pertinent negatives include no fever.     58-year-old who presents to the emergency department with abdominal pain vomiting and diarrhea. He states that he started Ozempic last week and he had a few days of vomiting and diarrhea. Took a second dose on Monday and he has been with persistent abdominal pain nausea vomiting diarrhea. Has vomited somewhat she is just severely nauseated now. His abdominal pain is generalized. He was placed on it because he states that his insurance company was not paying for one of his insulin medications. States that his blood glucose has been running in the 200s. He has not had fevers. He has had some chills.     Observation 23  Pt concurs with er hpi. GI and endocrine are consulted. General surgery was consulted.      Review of Systems   Constitutional: Positive for chills. Negative for fever.   Gastrointestinal:  Positive for bloating, nausea and vomiting. Negative for diarrhea.           Personal History     Past Medical History:   Past Medical History:   Diagnosis Date    Allergic     CKD (chronic kidney disease), stage III     Depression     Depression with suicidal ideation 2021    DJD (degenerative joint disease)     DVT (deep venous thrombosis)     Ganglion     rt wrist    Gangrene of foot 10/09/2015    GERD  (gastroesophageal reflux disease)     Headache     Heart murmur     Hiatal hernia     History of esophageal stricture     s/p dialation 40-50 times last EGD 04/2016    Hyperlipidemia     Hypertension     Hypothyroidism     IBS (irritable bowel syndrome)     Neuropathy     Osteomyelitis of right foot 03/19/2020    Pulmonary embolism 01/19/2017    Rash     rt lower hip    Retinopathy     S/P BKA (below knee amputation), right 03/18/2022    Seasonal allergies     Sepsis due to group B Streptococcus 03/19/2020    Shortness of breath     Sleep apnea     Stroke     Type 2 diabetes mellitus with peripheral vascular disease     Vitamin D deficiency        Surgical History:      Past Surgical History:   Procedure Laterality Date    AMPUTATION DIGIT Left 4/26/2022    Procedure: AMPUTATION left great toe;  Surgeon: ERWIN Baker DPM;  Location: [facility] MAIN OR;  Service: Podiatry;  Laterality: Left;    AMPUTATION DIGIT  4/26/2022    Procedure: ;  Surgeon: ERWIN Baker DPM;  Location: [facility] MAIN OR;  Service: Podiatry;;    AMPUTATION FOOT / TOE Right     great toe    AMPUTATION REVISION Right 4/8/2021    Procedure: BELOW KNEE AMPUTATION REVISAION;  Surgeon: Eduardo Reynolds MD;  Location: [facility] MAIN OR;  Service: Orthopedics;  Laterality: Right;    AMPUTATION REVISION Right 4/29/2021    Procedure: AMPUTATION REVISION KNEE STUMP;  Surgeon: Eduardo Reynolds MD;  Location: [facility] MAIN OR;  Service: Orthopedics;  Laterality: Right;    BELOW KNEE AMPUTATION Right 7/30/2020    Procedure: AMPUTATION BELOW KNEE;  Surgeon: Eduardo Reynolds MD;  Location: [facility] MAIN OR;  Service: Orthopedics;  Laterality: Right;    CARDIAC CATHETERIZATION  04/2018    [facility]    COLONOSCOPY      ENDOSCOPY      ENDOSCOPY N/A 10/4/2019    Procedure: ESOPHAGOGASTRODUODENOSCOPY with dilitation and biopsy x 1 area;  Surgeon: Declan Iqbal MD;  Location: [facility] ENDOSCOPY;  Service: Gastroenterology    ENDOSCOPY N/A 12/13/2019    Procedure:  ESOPHAGOGASTRODUODENOSCOPY WITH DILATATION (50, 52 BOUGIE);  Surgeon: Declan Iqbal MD;  Location: [facility] ENDOSCOPY;  Service: Gastroenterology    ENDOSCOPY N/A 8/6/2021    Procedure: ESOPHAGOGASTRODUODENOSCOPY with biopsy x1 area and esophageal dilation (56FR Bougie);  Surgeon: Hussein Talavera MD;  Location: [facility] ENDOSCOPY;  Service: Gastroenterology;  Laterality: N/A;  post: hiatal hernia, erosive gastritis    ENDOSCOPY N/A 12/13/2022    Procedure: ESOPHAGOGASTRODUODENOSCOPY WITH DILATATION (BOUGIE #54, #56) AND BIOPSY X1;  Surgeon: David Rodriguez MD;  Location: [facility] ENDOSCOPY;  Service: Gastroenterology;  Laterality: N/A;  POST: dysphagia     EYE SURGERY      GANGLION CYST EXCISION Left     HERNIA REPAIR Bilateral     INCISION AND DRAINAGE OF WOUND Right 6/15/2022    Procedure: INCISION AND DRAINAGE WOUND with debridement;  Surgeon: Fito Forte MD;  Location: [facility] MAIN OR;  Service: Orthopedics;  Laterality: Right;    JOINT REPLACEMENT      Left total hip    RETINOPATHY SURGERY      laser    TOTAL HIP ARTHROPLASTY Left     TOTAL HIP ARTHROPLASTY Left 2018    TRANS METATARSAL AMPUTATION Right 3/17/2020    Procedure: AMPUTATION TRANS METATARSAL right;  Surgeon: ERWIN Baker DPM;  Location: [facility] MAIN OR;  Service: Podiatry;  Laterality: Right;  GANGRENOUS RIGHT FOOT    VASECTOMY             Family History: family history includes Arthritis in his mother; Cancer in an other family member; Colon cancer in an other family member; Diabetes in his mother; Heart disease in his mother; Hyperlipidemia in his mother and another family member; Hypertension in an other family member; Leukemia in his father; Sleep apnea in his maternal aunt; Stroke in his maternal grandmother. Otherwise pertinent FHx was reviewed and unremarkable.     Social History:  reports that he has never smoked. He has never used smokeless tobacco. He reports current alcohol use. He reports that he does not use  drugs.      Medications:  Prior to Admission medications    Medication Sig Start Date End Date Taking? Authorizing Provider   Accu-Chek Softclix Lancets lancets Use as directed to test blood sugar 4 times daily before meals and at bedtime. 8/14/23 8/13/24 Yes Dionicio Ramachandran MD   amLODIPine (NORVASC) 5 MG tablet Take 1 tablet by mouth Daily. 3/14/23  Yes Laura Whitaker MD   apixaban (Eliquis) 5 MG tablet tablet Take 1 tablet by mouth 2 (Two) Times a Day. 6/21/23  Yes Laura Whitaker MD   aspirin 81 MG EC tablet Take 1 tablet by mouth Daily. 8/9/21  Yes Endy Lazaro DO   atorvastatin (LIPITOR) 80 MG tablet Take 1 tablet by mouth Every Night. 6/21/23  Yes Laura Whitaker MD   Blood Glucose Monitoring Suppl (Accu-Chek Guide) w/Device kit See Admin Instructions. 8/14/23  Yes Padilla Henderson MD   buPROPion XL (Wellbutrin XL) 150 MG 24 hr tablet Take 1 tablet by mouth Every Morning. 3/14/23  Yes Laura Whitaker MD   busPIRone (BUSPAR) 10 MG tablet Take 1 tablet by mouth Every 12 (Twelve) Hours. 6/21/23  Yes Laura Whitaker MD   CINNAMON PO Take  by mouth.   Yes Padilla Henderson MD   Continuous Blood Gluc  (Dexcom G6 ) device 1 Device Daily. Use to check BS 8/28/23  Yes Dionicio Ramachandran MD   cyclobenzaprine (FLEXERIL) 10 MG tablet Take I tab q 8 hrs as needed for tension headaches 3/13/23  Yes Laura Whitaker MD   DULoxetine (CYMBALTA) 60 MG capsule Take 1 capsule by mouth Every Morning. 6/21/23  Yes Laura Whitaker MD   glucose blood (Accu-Chek Guide) test strip Use as instructed to test blood sugar 4 times daily before meals and at bedtime 8/14/23  Yes Dionicio Ramachandran MD   insulin aspart (NovoLOG FlexPen) 100 UNIT/ML solution pen-injector sc pen Inject 12 Units under the skin into the appropriate area as directed 3 (Three) Times a Day With Meals. 9/16/23  Yes Dionicio Ramachandran MD   Insulin Glargine (Lantus SoloStar) 100 UNIT/ML  injection pen Inject 34 Units under the skin into the appropriate area as directed Every Night for 265 days. 8/28/23 5/19/24 Yes Dionicio Ramachandran MD   levothyroxine (Synthroid) 100 MCG tablet Take 1 tablet by mouth Every Morning. 6/21/23  Yes Laura Whitaker MD   meclizine (ANTIVERT) 25 MG tablet Take 1 tablet by mouth 3 (Three) Times a Day As Needed for Dizziness. 3/15/23  Yes Beba Everett APRN   metoprolol succinate XL (TOPROL-XL) 100 MG 24 hr tablet Take 1 tablet by mouth Daily. 6/21/23  Yes Laura Whitaker MD   multivitamin with minerals tablet tablet Take 1 tablet by mouth Daily.   Yes Padilla Henderson MD   omeprazole (priLOSEC) 40 MG capsule Take 1 capsule by mouth Daily. 6/21/23  Yes Laura Whitaker MD   ondansetron ODT (ZOFRAN-ODT) 4 MG disintegrating tablet Place 1 tablet on the tongue Every 8 (Eight) Hours As Needed for Nausea or Vomiting. 9/15/23  Yes Laura Whitaker MD   pregabalin (LYRICA) 100 MG capsule Take 1 capsule by mouth 2 (Two) Times a Day. 8/11/23  Yes Bridger Cantu MD   Semaglutide,0.25 or 0.5MG/DOS, (OZEMPIC) 2 MG/1.5ML solution pen-injector Inject 0.5 mg under the skin into the appropriate area as directed 1 (One) Time Per Week. 8/28/23  Yes Dionicio Ramachandran MD   Semaglutide,0.25 or 0.5MG/DOS, (Ozempic, 0.25 or 0.5 MG/DOSE,) 2 MG/3ML solution pen-injector Inject 0.25 mg under the skin into the appropriate area as directed 1 (One) Time Per Week. 8/28/23  Yes Dionicio Ramachandran MD   Continuous Blood Gluc Sensor (Dexcom G6 Sensor) Every 10 (Ten) Days. Use to check BS daily 8/28/23   Dionicio Ramachandran MD   Continuous Blood Gluc Transmit (Dexcom G6 Transmitter) misc 1 each 4 (Four) Times a Day Before Meals & at Bedtime. Change every 3 months 8/28/23   Dionicio Ramachandran MD   Glucagon (Gvoke HypoPen 2-Pack) 1 MG/0.2ML solution auto-injector Inject 1 mg under the skin into the appropriate area as directed As Needed (Hypoglycemia). 8/28/23   Dionicio Ramachandran MD    HYDROcodone-acetaminophen (NORCO) 5-325 MG per tablet Take 1 tablet by mouth Every Morning. 7/1/22   Provider, MD Padilla   Insulin Lispro (ADMELOG SOLOSTAR) 100 UNIT/ML injection pen Inject 10 Units under the skin into the appropriate area as directed 3 (Three) Times a Day With Meals. 9/13/23   Dionicio Ramachandran MD       Allergies:    Allergies   Allergen Reactions    Lisinopril Cough    Cefixime Other (See Comments)       Objective   Objective     Vital Signs  Temp:  [98.4 °F (36.9 °C)-99 °F (37.2 °C)] 98.4 °F (36.9 °C)  Heart Rate:  [] 74  Resp:  [12-20] 12  BP: ()/(62-92) 118/62  SpO2:  [92 %-99 %] 99 %  on   ;   Device (Oxygen Therapy): room air  Body mass index is 27.42 kg/m².    Physical Exam  Constitutional:       Appearance: Normal appearance.   HENT:      Mouth/Throat:      Mouth: Mucous membranes are moist.   Cardiovascular:      Rate and Rhythm: Normal rate and regular rhythm.      Pulses: Normal pulses.      Heart sounds: Normal heart sounds.   Pulmonary:      Effort: Pulmonary effort is normal.      Breath sounds: Normal breath sounds.   Abdominal:      Tenderness: There is abdominal tenderness.   Skin:     General: Skin is warm and dry.      Coloration: Skin is not jaundiced.   Neurological:      General: No focal deficit present.      Mental Status: He is alert and oriented to person, place, and time.   Psychiatric:         Mood and Affect: Mood normal.         Behavior: Behavior normal.         Results Review:  I have personally reviewed most recent lab results and radiology images and interpretations and agree with findings, most notably: cbc, mcp, lipase, ct abd, us galbladder.    Results from last 7 days   Lab Units 09/23/23  0106   WBC 10*3/mm3 10.50   HEMOGLOBIN g/dL 12.1*   HEMATOCRIT % 37.9   PLATELETS 10*3/mm3 211     Results from last 7 days   Lab Units 09/23/23  0106   SODIUM mmol/L 140   POTASSIUM mmol/L 3.6   CHLORIDE mmol/L 102   CO2 mmol/L 23.0   BUN mg/dL 18   CREATININE  mg/dL 1.38*   GLUCOSE mg/dL 243*   CALCIUM mg/dL 9.0   ALT (SGPT) U/L 11   AST (SGOT) U/L 15     Estimated Creatinine Clearance: 66.7 mL/min (A) (by C-G formula based on SCr of 1.38 mg/dL (H)).  Brief Urine Lab Results  (Last result in the past 365 days)        Color   Clarity   Blood   Leuk Est   Nitrite   Protein   CREAT   Urine HCG        09/22/23 2003 Yellow   Clear   Negative   Negative   Negative   Negative                   Microbiology Results (last 10 days)       ** No results found for the last 240 hours. **            ECG/EMG Results (most recent)       Procedure Component Value Units Date/Time    SCANNED - TELEMETRY   [[id number]] Resulted: 09/22/23     Updated: 09/23/23 0155    ECG 12 Lead Tachycardia [[id number]] Collected: 09/22/23 1657     Updated: 09/23/23 0624     QT Interval 322 ms      QTC Interval 427 ms     Narrative:      HEART RATE= 106  bpm  RR Interval= 568  ms  CT Interval= 169  ms  P Horizontal Axis= 13  deg  P Front Axis= 58  deg  QRSD Interval= 76  ms  QT Interval= 322  ms  QTcB= 427  ms  QRS Axis= 26  deg  T Wave Axis= 145  deg  - ABNORMAL ECG -  Sinus tachycardia  Nonspecific T abnrm, anterolateral leads  When compared with ECG of 07-Mar-2023 18:39:51,  Significant rate increase  Significant repolarization change  Significant axis, voltage or hypertrophy change  Electronically Signed By: Ernesto Canada (FINA) 23-Sep-2023 06:23:48  Date and Time of Study: 2023-09-22 16:57:07            Results for orders placed during the hospital encounter of 09/22/23    Duplex Venous Upper Extremity RIGHT    Interpretation Summary    Normal right upper extremity venous duplex scan.      Results for orders placed during the hospital encounter of 03/18/22    Adult Transthoracic Echo Complete W/ Cont if Necessary Per Protocol (With Agitated Saline)    Interpretation Summary  Normal LV size and contractility EF of 60 to 65%  Normal RV size  Mild left atrial enlargement seen  Aortic valve appears  structurally normal  Mitral valve leaflets are thickened anterior mitral leaflet appears calcified, but has good cusp separation.   No significant MR seen.  Tricuspid valve appears structurally normal, no significant regurgitation seen.  No pericardial effusion seen.  Proximal aorta appears normal in size.      CT Abdomen Pelvis Without Contrast    Result Date: 9/22/2023  Impression: No acute abnormality Cholelithiasis without CT evidence of acute cholecystitis. Electronically Signed: Sunny Kristel,   9/22/2023 5:40 PM EDT  Workstation ID: [id number]    US Abdomen Limited    Result Date: 9/22/2023  Impression: Cholelithiasis with a mildly thickened gallbladder wall and positive sonographic Fleming sign is highly concerning for  cholecystitis. No evidence of biliary ductal dilation. Electronically Signed: Sunny Kristel,   9/22/2023 10:15 PM EDT  Workstation ID: [id number]       Estimated Creatinine Clearance: 66.7 mL/min (A) (by C-G formula based on SCr of 1.38 mg/dL (H)).    Assessment & Plan   Assessment/Plan       Active Hospital Problems    Diagnosis  POA    **Abdominal pain [R10.9]  Unknown    Nausea, vomiting and diarrhea [R11.2, R19.7]  Unknown    Calculus of gallbladder without cholecystitis without obstruction [K80.20]  Unknown      Resolved Hospital Problems   No resolved problems to display.     Abdominal pain  - WBC 13.1 on admission 10.5 today  - cr 1.57 and bun 22 mildly elevated on admission, Cr 1.38 bun 18 today  - lipase and UA are normal  - CT abd reviewed and showing cholelithiasis with out ct evidence of cholecystitis  - US gallbladder reviewed and showing cholelithiasiswith a mildly thickened gallbladder wall and positive murphys sign  - IV levaquin and flagyl given in er  - GI consulted  - General surgery consulted    Diabetes mellitus  -poorly controlled   Lab Results   Component Value Date    GLUCOSE 243 (H) 09/23/2023    GLUCOSE 381 (H) 09/22/2023    GLUCOSE 584 (C) 09/15/2023     GLUCOSE 302 (H) 03/08/2023     -Hold ozempic  -lantus and lispro   -Diabetic diet  -Monitor AC and HS     Hypertension  -well Controlled   BP Readings from Last 1 Encounters:   09/23/23 118/62     - Continue norvasc, metoprolol  - Monitor while admitted     Hx of PE  - pt taking eliquis-will hold this am due to possible sx    Hypothyroidism  - cont home synthroid      VTE Prophylaxis -   Mechanical Order History:        Ordered        09/23/23 0749  Place Sequential Compression Device  Once            09/23/23 0749  Maintain Sequential Compression Device  Continuous            09/22/23 2206  Place Sequential Compression Device  Once            09/22/23 2206  Maintain Sequential Compression Device  Continuous                          Pharmalogical Order History:        Ordered     Dose Route Frequency Stop    09/23/23 0058  apixaban (ELIQUIS) tablet 5 mg         5 mg PO Once 09/23/23 0115    09/22/23 2206  Pharmacy to Dose enoxaparin (LOVENOX)  Status:  Discontinued        Question:  Indication of use  Answer:  Prophylaxis    -- XX Continuous PRN 09/23/23 0101                    CODE STATUS:    Code Status and Medical Interventions:   Ordered at: 09/23/23 0749     Code Status (Patient has no pulse and is not breathing):    CPR (Attempt to Resuscitate)     Medical Interventions (Patient has pulse or is breathing):    Full Support       This patient has been examined wearing personal protective equipment.     I discussed the patient's findings and my recommendations with patient and nursing staff.      Signature:Electronically signed by Liudmila Garcia PA-C, 09/23/23, 11:04 AM EDT.

## 2023-09-23 NOTE — CONSULTS
GI CONSULT  NOTE:    Referring Provider: Dr. Canada    Chief complaint: Abdominal pain/nausea/vomiting    Subjective .     History of present illness: Kuldeep Adhikari is a 58 y.o. male with history of EOE, diabetes, CKD, CVA on Eliquis, PVD, and right BKA who presented with complaints of abdominal pain, n/v, and diarrhea x1 week.  Patient states symptoms started last week after taking his first dose of Ozempic.  He had abdominal pain, nausea, and vomiting which lasted for a couple of days and resolved somewhat.  States he was able to tolerate at least liquids by mouth at this time.  However, he took his second dose of Ozempic this week and had increased abdominal pain, nausea, vomiting and some diarrhea.  States he was unable to keep liquids down which prompted his presentation to the ER.  Symptoms have improved somewhat today.  He is having intermittent RUQ abdominal pain.  Nausea and vomiting have improved.  No hematemesis.  He has had chills, but no fever.  No further diarrhea.  States he typically has a bowel movement every couple of days and takes a stool softener at home.  No hematochezia or melena.  No weight loss.    Endo History:  12/2022 EGD by Dr. Lizarraga-normal esophagus s/p empiric dilation to 54 Bengali  12/2020 EGD/colonoscopy by Dr. Talavera-EOE with esophageal stricture s/p dilation, hiatal hernia, internal hemorrhoids, TA polyp --recall colon 7 years  10/2019 EGD by Dr. Iqbal-esophageal stricture with biopsy positive for EOE s/p dilation to 50 Bengali    Past Medical History:  Past Medical History:   Diagnosis Date    Allergic     CKD (chronic kidney disease), stage III     Depression     Depression with suicidal ideation 08/08/2021    DJD (degenerative joint disease)     DVT (deep venous thrombosis)     Ganglion     rt wrist    Gangrene of foot 10/09/2015    GERD (gastroesophageal reflux disease)     Headache     Heart murmur     Hiatal hernia     History of esophageal stricture     s/p dialation  40-50 times last EGD 04/2016    Hyperlipidemia     Hypertension     Hypothyroidism     IBS (irritable bowel syndrome)     Neuropathy     Osteomyelitis of right foot 03/19/2020    Pulmonary embolism 01/19/2017    Rash     rt lower hip    Retinopathy     S/P BKA (below knee amputation), right 03/18/2022    Seasonal allergies     Sepsis due to group B Streptococcus 03/19/2020    Shortness of breath     Sleep apnea     Stroke     Type 2 diabetes mellitus with peripheral vascular disease     Vitamin D deficiency        Past Surgical History:  Past Surgical History:   Procedure Laterality Date    AMPUTATION DIGIT Left 4/26/2022    Procedure: AMPUTATION left great toe;  Surgeon: ERWIN Baker DPM;  Location: [facility] MAIN OR;  Service: Podiatry;  Laterality: Left;    AMPUTATION DIGIT  4/26/2022    Procedure: ;  Surgeon: REWIN Baker DPM;  Location: [facility] MAIN OR;  Service: Podiatry;;    AMPUTATION FOOT / TOE Right     great toe    AMPUTATION REVISION Right 4/8/2021    Procedure: BELOW KNEE AMPUTATION REVISAION;  Surgeon: Eduardo Reynolds MD;  Location: [facility] MAIN OR;  Service: Orthopedics;  Laterality: Right;    AMPUTATION REVISION Right 4/29/2021    Procedure: AMPUTATION REVISION KNEE STUMP;  Surgeon: Eduardo Reynolds MD;  Location: [facility] MAIN OR;  Service: Orthopedics;  Laterality: Right;    BELOW KNEE AMPUTATION Right 7/30/2020    Procedure: AMPUTATION BELOW KNEE;  Surgeon: Eduardo Reynolds MD;  Location: [facility] MAIN OR;  Service: Orthopedics;  Laterality: Right;    CARDIAC CATHETERIZATION  04/2018    [facility]    COLONOSCOPY      ENDOSCOPY      ENDOSCOPY N/A 10/4/2019    Procedure: ESOPHAGOGASTRODUODENOSCOPY with dilitation and biopsy x 1 area;  Surgeon: Declan Iqbal MD;  Location: [facility] ENDOSCOPY;  Service: Gastroenterology    ENDOSCOPY N/A 12/13/2019    Procedure: ESOPHAGOGASTRODUODENOSCOPY WITH DILATATION (50, 52 BOUGIE);  Surgeon: Declan Iqbal MD;  Location: [facility] ENDOSCOPY;  Service:  Gastroenterology    ENDOSCOPY N/A 8/6/2021    Procedure: ESOPHAGOGASTRODUODENOSCOPY with biopsy x1 area and esophageal dilation (56FR Bougie);  Surgeon: Hussein Talavera MD;  Location: [facility] ENDOSCOPY;  Service: Gastroenterology;  Laterality: N/A;  post: hiatal hernia, erosive gastritis    ENDOSCOPY N/A 12/13/2022    Procedure: ESOPHAGOGASTRODUODENOSCOPY WITH DILATATION (BOUGIE #54, #56) AND BIOPSY X1;  Surgeon: David Rodriguez MD;  Location: [facility] ENDOSCOPY;  Service: Gastroenterology;  Laterality: N/A;  POST: dysphagia     EYE SURGERY      GANGLION CYST EXCISION Left     HERNIA REPAIR Bilateral     INCISION AND DRAINAGE OF WOUND Right 6/15/2022    Procedure: INCISION AND DRAINAGE WOUND with debridement;  Surgeon: Fito Forte MD;  Location: [facility] MAIN OR;  Service: Orthopedics;  Laterality: Right;    JOINT REPLACEMENT      Left total hip    RETINOPATHY SURGERY      laser    TOTAL HIP ARTHROPLASTY Left     TOTAL HIP ARTHROPLASTY Left 2018    TRANS METATARSAL AMPUTATION Right 3/17/2020    Procedure: AMPUTATION TRANS METATARSAL right;  Surgeon: ERWIN Baker DPM;  Location: [facility] MAIN OR;  Service: Podiatry;  Laterality: Right;  GANGRENOUS RIGHT FOOT    VASECTOMY         Social History:  Social History     Tobacco Use    Smoking status: Never    Smokeless tobacco: Never   Vaping Use    Vaping Use: Never used   Substance Use Topics    Alcohol use: Yes     Comment: OCCASIONAL    Drug use: No       Family History:  Family History   Problem Relation Age of Onset    Diabetes Mother         Patient  mom    Heart disease Mother     Arthritis Mother     Hyperlipidemia Mother     Leukemia Father     Sleep apnea Maternal Aunt         GENO    Stroke Maternal Grandmother     Hypertension Other     Hyperlipidemia Other     Cancer Other     Colon cancer Other         uncle       Medications:  Medications Prior to Admission   Medication Sig Dispense Refill Last Dose    Accu-Chek Softclix Lancets lancets Use as directed  to test blood sugar 4 times daily before meals and at bedtime. 100 each 5 9/22/2023 at 0800    amLODIPine (NORVASC) 5 MG tablet Take 1 tablet by mouth Daily. 90 tablet 0 Past Week at 0800    apixaban (Eliquis) 5 MG tablet tablet Take 1 tablet by mouth 2 (Two) Times a Day. 180 tablet 1 Past Week at 0800    aspirin 81 MG EC tablet Take 1 tablet by mouth Daily.   Past Week at 0800    atorvastatin (LIPITOR) 80 MG tablet Take 1 tablet by mouth Every Night. 90 tablet 1 Past Week at 0800    Blood Glucose Monitoring Suppl (Accu-Chek Guide) w/Device kit See Admin Instructions.   9/21/2023 at 0800    buPROPion XL (Wellbutrin XL) 150 MG 24 hr tablet Take 1 tablet by mouth Every Morning. 90 tablet 0 Past Week at 0800    busPIRone (BUSPAR) 10 MG tablet Take 1 tablet by mouth Every 12 (Twelve) Hours. 180 tablet 1 Past Week at 0800    CINNAMON PO Take  by mouth.   Past Week at 0800    Continuous Blood Gluc  (Dexcom G6 ) device 1 Device Daily. Use to check BS 1 each 0 9/21/2023 at 0800    cyclobenzaprine (FLEXERIL) 10 MG tablet Take I tab q 8 hrs as needed for tension headaches 30 tablet 0 Past Week at 0800    DULoxetine (CYMBALTA) 60 MG capsule Take 1 capsule by mouth Every Morning. 90 capsule 1 Past Week at 0800    glucose blood (Accu-Chek Guide) test strip Use as instructed to test blood sugar 4 times daily before meals and at bedtime 100 each 12 9/21/2023 at 0800    insulin aspart (NovoLOG FlexPen) 100 UNIT/ML solution pen-injector sc pen Inject 12 Units under the skin into the appropriate area as directed 3 (Three) Times a Day With Meals. 15 mL 2 9/21/2023 at 0800    Insulin Glargine (Lantus SoloStar) 100 UNIT/ML injection pen Inject 34 Units under the skin into the appropriate area as directed Every Night for 265 days. 15 mL 5 9/21/2023 at 0800    levothyroxine (Synthroid) 100 MCG tablet Take 1 tablet by mouth Every Morning. 90 tablet 1 Past Week at 0800    meclizine (ANTIVERT) 25 MG tablet Take 1 tablet by  mouth 3 (Three) Times a Day As Needed for Dizziness. 30 tablet 0 Past Week    metoprolol succinate XL (TOPROL-XL) 100 MG 24 hr tablet Take 1 tablet by mouth Daily. 90 tablet 0 Past Week at 0800    multivitamin with minerals tablet tablet Take 1 tablet by mouth Daily.   Past Week at 0800    omeprazole (priLOSEC) 40 MG capsule Take 1 capsule by mouth Daily. 90 capsule 1 Past Week at 0800    ondansetron ODT (ZOFRAN-ODT) 4 MG disintegrating tablet Place 1 tablet on the tongue Every 8 (Eight) Hours As Needed for Nausea or Vomiting. 30 tablet 0 9/22/2023    pregabalin (LYRICA) 100 MG capsule Take 1 capsule by mouth 2 (Two) Times a Day. 60 capsule 0 Past Week at 0800    Semaglutide,0.25 or 0.5MG/DOS, (OZEMPIC) 2 MG/1.5ML solution pen-injector Inject 0.5 mg under the skin into the appropriate area as directed 1 (One) Time Per Week. 3 mL 4 Past Week at 0800    Semaglutide,0.25 or 0.5MG/DOS, (Ozempic, 0.25 or 0.5 MG/DOSE,) 2 MG/3ML solution pen-injector Inject 0.25 mg under the skin into the appropriate area as directed 1 (One) Time Per Week. 3 mL 0 Past Week    Continuous Blood Gluc Sensor (Dexcom G6 Sensor) Every 10 (Ten) Days. Use to check BS daily 3 each 11 More than a month at 0800    Continuous Blood Gluc Transmit (Dexcom G6 Transmitter) misc 1 each 4 (Four) Times a Day Before Meals & at Bedtime. Change every 3 months 1 each 3 More than a month at 0800    Glucagon (Gvoke HypoPen 2-Pack) 1 MG/0.2ML solution auto-injector Inject 1 mg under the skin into the appropriate area as directed As Needed (Hypoglycemia). 0.4 mL 5 More than a month at 0800    HYDROcodone-acetaminophen (NORCO) 5-325 MG per tablet Take 1 tablet by mouth Every Morning.       Insulin Lispro (ADMELOG SOLOSTAR) 100 UNIT/ML injection pen Inject 10 Units under the skin into the appropriate area as directed 3 (Three) Times a Day With Meals. 27 mL 1        Scheduled Meds:[START ON 9/24/2023] amLODIPine, 5 mg, Oral, Daily  aspirin, 81 mg, Oral,  "Daily  atorvastatin, 80 mg, Oral, Nightly  buPROPion XL, 150 mg, Oral, Daily  busPIRone, 10 mg, Oral, Q12H  DULoxetine, 60 mg, Oral, Daily  insulin glargine, 34 Units, Subcutaneous, Daily  insulin lispro, 2-9 Units, Subcutaneous, Q6H  levothyroxine, 100 mcg, Oral, Q AM  metoprolol succinate XL, 100 mg, Oral, Daily  pantoprazole, 40 mg, Oral, Daily  pregabalin, 100 mg, Oral, BID  senna-docusate sodium, 2 tablet, Oral, BID  sodium chloride, 1,000 mL, Intravenous, Once  sodium chloride, 10 mL, Intravenous, Q12H  sodium chloride, 10 mL, Intravenous, Q12H      Continuous Infusions:sodium chloride, 100 mL/hr, Last Rate: 100 mL/hr (09/23/23 0631)      PRN Meds:.  acetaminophen    senna-docusate sodium **AND** polyethylene glycol **AND** bisacodyl **AND** bisacodyl    dextrose    dextrose    glucagon (human recombinant)    melatonin    nitroglycerin    ondansetron    [COMPLETED] Insert Peripheral IV **AND** sodium chloride    sodium chloride    sodium chloride    sodium chloride    sodium chloride    ALLERGIES:  Lisinopril and Cefixime    ROS:  The following systems were reviewed and negative;   Constitution:  No fevers, chills, no unintentional weight loss  Skin: no rash, no jaundice  Eyes:  No blurry vision, no eye pain  HENT:  No change in hearing or smell  Resp:  No dyspnea or cough  CV:  No chest pain or palpitations  :  No dysuria, hematuria  Musculoskeletal:  No leg cramps or arthralgias  Neuro:  No tremor, no numbness  Psych:  No depression or confusion    Objective     Vital Signs:   Vitals:    09/22/23 2215 09/23/23 0115 09/23/23 0217 09/23/23 0605   BP: 138/72 141/75 118/64 96/64   BP Location: Left arm  Left arm Left arm   Patient Position: Lying  Lying Lying   Pulse: 107 105 95 81   Resp: 18  15 13   Temp:   98.9 °F (37.2 °C) 98.7 °F (37.1 °C)   TempSrc:   Oral Oral   SpO2: 95%  92% 96%   Weight: 81.8 kg (180 lb 5.4 oz)      Height: 172.7 cm (68\")          Physical Exam:       General Appearance:    Awake and " alert, in no acute distress   Head:    Normocephalic, without obvious abnormality, atraumatic   Throat:   No oral lesions, no thrush, oral mucosa moist   Lungs:     Respirations regular, even and unlabored   Chest Wall:    No abnormalities observed   Abdomen:     Soft, RUQ tenderness, no rebound or guarding, non-distended, no hepatosplenomegaly   Rectal:     Deferred   Extremities:   Moves all extremities, no edema, no cyanosis   Pulses:   Pulses palpable and equal bilaterally   Skin:   No rash, no jaundice, normal palpation   Lymph nodes:   No cervical, supraclavicular or submandibular palpable adenopathy   Neurologic:   Cranial nerves 2 - 12 grossly intact, no asterixis       Results Review:   I reviewed the patient's labs and imaging.  CBC    Results from last 7 days   Lab Units 09/23/23  0106 09/22/23  1630   WBC 10*3/mm3 10.50 13.10*   HEMOGLOBIN g/dL 12.1* 14.0   PLATELETS 10*3/mm3 211 245     CMP   Results from last 7 days   Lab Units 09/23/23  0106 09/22/23  1630   SODIUM mmol/L 140 138   POTASSIUM mmol/L 3.6 4.7   CHLORIDE mmol/L 102 99   CO2 mmol/L 23.0 24.0   BUN mg/dL 18 22*   CREATININE mg/dL 1.38* 1.57*   GLUCOSE mg/dL 243* 381*   ALBUMIN g/dL 3.9 4.2   BILIRUBIN mg/dL 1.1 1.5*   ALK PHOS U/L 127* 149*   AST (SGOT) U/L 15 20   ALT (SGPT) U/L 11 12   LIPASE U/L  --  16     Cr Clearance Estimated Creatinine Clearance: 66.7 mL/min (A) (by C-G formula based on SCr of 1.38 mg/dL (H)).  Coag     HbA1C   Lab Results   Component Value Date    HGBA1C 11.60 (H) 08/04/2023    HGBA1C 9.00 (H) 03/08/2023    HGBA1C 7.5 (H) 06/13/2022     Blood Glucose   Glucose   Date/Time Value Ref Range Status   09/23/2023 0634 176 (H) 70 - 105 mg/dL Final     Comment:     Serial Number: [id number]:  [id number]   09/22/2023 2311 239 (H) 70 - 105 mg/dL Final     Comment:     Serial Number: [id number]:  [id number]   09/22/2023 1942 251 (H) 70 - 105 mg/dL Final     Comment:     Serial Number: [id number]:  [id number]      Infection     UA    Results from last 7 days   Lab Units 09/22/23 2003   NITRITE UA  Negative     Radiology(recent) CT Abdomen Pelvis Without Contrast    Result Date: 9/22/2023  Impression: No acute abnormality Cholelithiasis without CT evidence of acute cholecystitis. Electronically Signed: Sunny Mattakimberleelisbeth,   9/22/2023 5:40 PM EDT  Workstation ID: [id number]    US Abdomen Limited    Result Date: 9/22/2023  Impression: Cholelithiasis with a mildly thickened gallbladder wall and positive sonographic Fleming sign is highly concerning for  cholecystitis. No evidence of biliary ductal dilation. Electronically Signed: Sunny Kristel, DO  9/22/2023 10:15 PM EDT  Workstation ID: [id number]        ASSESSMENT:  58-year-old male with history of EOE, diabetes, CKD, CVA on Eliquis, PVD, and right BKA who presented with complaints of abdominal pain, n/v, and diarrhea x1 week.    -Acute onset abdominal pain/n/v - related to Ozempic vs gastroparesis vs cholelithiasis vs infectious vs other  -Diarrhea - resolved  -Abnormal ultrasound showing cholelithiasis with positive Fleming sign, no biliary dilatation  -Cholelithiasis  -Microcytic anemia-no overt GI bleeding  -History of CVA on Eliquis  -Diabetes  -History of EoE with esophageal stricture s/p dilation      PLAN:  Liver enzymes were mildly elevated upon admission including alk phos of 149 and total bilirubin 1.5.  However, liver enzymes have normalized today.  No evidence of biliary dilatation on ultrasound.  Symptoms have improved some today.  Still has intermittent right upper quadrant abdominal pain.  Nausea and vomiting have improved.    He is tolerating sips of water.   Consider general surgery referral for evaluation cholelithiasis with positive fleming's sign.   Continue PPI and IVF.  Supportive care.  GI will follow.    I discussed the patients findings and my recommendations with the patient.    We appreciate the referral.     Electronically signed by Monica  KEYLA Garcia, 09/23/23, 10:26 AM EDT.

## 2023-09-23 NOTE — CONSULTS
Inpatient Endocrine Consult  Consultation requested by hospitalist team for uncontrolled type 2 diabetes  Patient Care Team:  Laura Whitaker MD as PCP - General  Tamara Michaels MD as Consulting Physician (Endocrinology)  ERWIN Baker DPM as Consulting Physician (Podiatry)    Chief Complaint: Uncontrolled type 2 diabetes    HPI: This is a 58-year-old male with history of type 2 diabetes, hypertension, hyperlipidemia, hypothyroidism, diabetic peripheral neuropathy with a status post right below-knee amputation as well as CKD apparently was recently seen by Dr. Ramachandran in the office and started on Ozempic.  He was on Lantus and Humalog as well.  After he started Ozempic about 2 weeks ago he started having nausea and vomiting and he came to the emergency room for evaluation management.  He is currently NPO.    Past Medical History:   Diagnosis Date    Allergic     CKD (chronic kidney disease), stage III     Depression     Depression with suicidal ideation 08/08/2021    DJD (degenerative joint disease)     DVT (deep venous thrombosis)     Ganglion     rt wrist    Gangrene of foot 10/09/2015    GERD (gastroesophageal reflux disease)     Headache     Heart murmur     Hiatal hernia     History of esophageal stricture     s/p dialation 40-50 times last EGD 04/2016    Hyperlipidemia     Hypertension     Hypothyroidism     IBS (irritable bowel syndrome)     Neuropathy     Osteomyelitis of right foot 03/19/2020    Pulmonary embolism 01/19/2017    Rash     rt lower hip    Retinopathy     S/P BKA (below knee amputation), right 03/18/2022    Seasonal allergies     Sepsis due to group B Streptococcus 03/19/2020    Shortness of breath     Sleep apnea     Stroke     Type 2 diabetes mellitus with peripheral vascular disease     Vitamin D deficiency        Social History     Socioeconomic History    Marital status:     Number of children: 3    Highest education level: High school graduate   Tobacco Use     Smoking status: Never    Smokeless tobacco: Never   Vaping Use    Vaping Use: Never used   Substance and Sexual Activity    Alcohol use: Yes     Comment: OCCASIONAL    Drug use: No    Sexual activity: Not Currently     Partners: Female     Birth control/protection: None     Comment: I have Ed problem at this time       Family History   Problem Relation Age of Onset    Diabetes Mother         Patient  mom    Heart disease Mother     Arthritis Mother     Hyperlipidemia Mother     Leukemia Father     Sleep apnea Maternal Aunt         GENO    Stroke Maternal Grandmother     Hypertension Other     Hyperlipidemia Other     Cancer Other     Colon cancer Other         uncle       Allergies   Allergen Reactions    Lisinopril Cough    Cefixime Other (See Comments)       ROS:   Constitutional: Admit fatigue, tiredness.    Eyes:  Denies change in visual acuity   HENT:  Denies nasal congestion or sore throat   Respiratory: Denies cough, shortness of breath.   Cardiovascular:  Denies chest pain, edema   GI: Admits abdominal pain with nausea and vomiting  :  Denies polyuria and polydipsia  Musculoskeletal:  Denies back pain or joint pain   Integument:  Denies dry skin, rash   Neurologic:  Denies headache, focal weakness or sensory changes   Endocrine:  Denies polyuria or polydipsia   Psychiatric:  Denies depression or anxiety      Vitals:    09/23/23 1038   BP: 118/62   Pulse: 74   Resp: 12   Temp: 98.4 °F (36.9 °C)   SpO2: 99%      Body mass index is 27.42 kg/m².     Physical Exam:  GEN: NAD, conversant  EYES: EOMI, PERRL  NECK: no thyromegaly  CV: RRR  LUNG: CTA  SKIN: no rashes, no acanthosis  MSK: no deformities,   NEURO: no tremors, DTR normal  PSYCH: Awake and coherent      Results Review:     I reviewed the patient's new clinical results.    Lab Results   Component Value Date    GLUCOSE 243 (H) 09/23/2023    BUN 18 09/23/2023    CREATININE 1.38 (H) 09/23/2023    EGFRIFNONA 48 (L) 01/04/2022    BCR 13.0 09/23/2023    K 3.6  09/23/2023    CO2 23.0 09/23/2023    CALCIUM 9.0 09/23/2023    ALBUMIN 3.9 09/23/2023    LABIL2 1.3 04/22/2019    AST 15 09/23/2023    ALT 11 09/23/2023       Lab Results   Component Value Date    HGBA1C 11.60 (H) 08/04/2023    HGBA1C 9.00 (H) 03/08/2023    HGBA1C 7.5 (H) 06/13/2022     Lab Results   Component Value Date    MICROALBUR <1.2 04/06/2021    CREATININE 1.38 (H) 09/23/2023     Results from last 7 days   Lab Units 09/23/23  1117 09/23/23  0634 09/22/23  2311 09/22/23  1942   GLUCOSE mg/dL 161* 176* 239* 251*       Medication Review: Reviewed.       Current Facility-Administered Medications:     acetaminophen (TYLENOL) tablet 650 mg, 650 mg, Oral, Q4H PRN, Ernesto Canada DO    [START ON 9/24/2023] amLODIPine (NORVASC) tablet 5 mg, 5 mg, Oral, Daily, Liudmila Garcia PA-C    aspirin EC tablet 81 mg, 81 mg, Oral, Daily, Liudmila Garcia PA-C, 81 mg at 09/23/23 0821    atorvastatin (LIPITOR) tablet 80 mg, 80 mg, Oral, Nightly, Liudmila Garcia PA-C    sennosides-docusate (PERICOLACE) 8.6-50 MG per tablet 2 tablet, 2 tablet, Oral, BID **AND** polyethylene glycol (MIRALAX) packet 17 g, 17 g, Oral, Daily PRN **AND** bisacodyl (DULCOLAX) EC tablet 5 mg, 5 mg, Oral, Daily PRN **AND** bisacodyl (DULCOLAX) suppository 10 mg, 10 mg, Rectal, Daily PRN, Ernesto Canada DO    buPROPion XL (WELLBUTRIN XL) 24 hr tablet 150 mg, 150 mg, Oral, Daily, Liudmila Garcia PA-C, 150 mg at 09/23/23 0821    busPIRone (BUSPAR) tablet 10 mg, 10 mg, Oral, Q12H, Liudmila Garcia PA-C, 10 mg at 09/23/23 0821    dextrose (D50W) (25 g/50 mL) IV injection 25 g, 25 g, Intravenous, Q15 Min PRN, Liudmila Garcia PA-C    dextrose (GLUTOSE) oral gel 15 g, 15 g, Oral, Q15 Min PRN, Liudmila Garcia PA-C    DULoxetine (CYMBALTA) DR capsule 60 mg, 60 mg, Oral, Daily, Liudmila Garcia PA-C, 60 mg at 09/23/23 0821    Glucagon (GLUCAGEN) injection 1 mg, 1 mg, Intramuscular, Q15 Min PRN, Ernesto Canada DO    insulin glargine  (LANTUS, SEMGLEE) injection 34 Units, 34 Units, Subcutaneous, Daily, Liudmila Garcia PA-C, 34 Units at 09/23/23 0821    insulin lispro (HUMALOG/ADMELOG) injection 2-9 Units, 2-9 Units, Subcutaneous, Q6H, Liudmila Garcia PA-C    levothyroxine (SYNTHROID, LEVOTHROID) tablet 100 mcg, 100 mcg, Oral, Q AM, Liudmila Garcia PA-C, 100 mcg at 09/23/23 0821    melatonin tablet 5 mg, 5 mg, Oral, Nightly PRN, Ernesto Canada DO    metoprolol succinate XL (TOPROL-XL) 24 hr tablet 100 mg, 100 mg, Oral, Daily, Liudmila Garcia PA-C, 100 mg at 09/23/23 0821    nitroglycerin (NITROSTAT) SL tablet 0.4 mg, 0.4 mg, Sublingual, Q5 Min PRN, Ernesto Canada DO    ondansetron (ZOFRAN) injection 4 mg, 4 mg, Intravenous, Q6H PRN, Ernesto Canada DO, 4 mg at 09/23/23 0035    pantoprazole (PROTONIX) EC tablet 40 mg, 40 mg, Oral, Daily, Liudmila Garcia PA-C, 40 mg at 09/23/23 0821    pregabalin (LYRICA) capsule 100 mg, 100 mg, Oral, BID, Liudmila Garcia PA-C, 100 mg at 09/23/23 0821    sodium chloride 0.45 % infusion, 100 mL/hr, Intravenous, Continuous, Ernesto Canada DO, Last Rate: 100 mL/hr at 09/23/23 1214, 100 mL/hr at 09/23/23 1214    sodium chloride 0.9 % bolus 1,000 mL, 1,000 mL, Intravenous, Once, Ernesto Canada DO, Stopped at 09/23/23 1000    [COMPLETED] Insert Peripheral IV, , , Once **AND** sodium chloride 0.9 % flush 10 mL, 10 mL, Intravenous, PRN, Ernesto Canada DO    sodium chloride 0.9 % flush 10 mL, 10 mL, Intravenous, Q12H, Hotkel, Ernesto Vora, DO, 10 mL at 09/23/23 0822    sodium chloride 0.9 % flush 10 mL, 10 mL, Intravenous, PRN, Hottman, Ernesto Vora, DO    sodium chloride 0.9 % flush 10 mL, 10 mL, Intravenous, Q12H, Liudmila Garcia PA-C, 10 mL at 09/23/23 0822    sodium chloride 0.9 % flush 10 mL, 10 mL, Intravenous, PRN, Liudmila Garcia, PA-C    sodium chloride 0.9 % infusion 40 mL, 40 mL, Intravenous, PRN, Hottmchristiana, Ernesto Olivierean, DO    sodium chloride 0.9 % infusion 40  mL, 40 mL, Intravenous, PRN, Liudmila Garcia PA-C          Assessment and plan:  Diabetes mellitus type 2 with hyperglycemia: Uncontrolled, at this time he is on Lantus 34 units subcu daily with lispro sliding scale, I will change lispro to every 6 hours while n.p.o. and then AC meals.  We will follow blood sugars and make further adjustments.  We will definitely avoid Ozempic or any other GLP-1 agonists.    Abdominal pain with nausea and vomiting: Could be due to Ozempic as well as cholecystitis is also being considered as potential diagnosis.  Followed by GI.  Surgery consultation has been requested.    Hypothyroidism: Currently on levothyroxine supplementation    Hypertension/hyperlipidemia: Overall stable.    Thank you very much for the consultation.      Tamara Michaels MD FACE.

## 2023-09-23 NOTE — CONSULTS
GENERAL SURGERY CONSULTATION NOTE    Consult requested by: DO Dread    Patient Care Team:  Laura Whitaker MD as PCP - General  Tamara Michaels MD as Consulting Physician (Endocrinology)  ERWIN Baker DPM as Consulting Physician (Podiatry)    Reason for consult: Acute cholecystitis    Subjective     Patient is a 58 y.o. male presents with right upper quadrant abdominal pain which has been ongoing for the last 2 weeks.  The patient was recently started on Ozempic and reports that he started having right upper quadrant abdominal pain the week following starting Ozempic.  It was mild, and then this week he reports that his pain has gotten progressively worse.  He also reports nausea and vomiting which prompted him to come into the emergency department.  He reports epigastric and right upper quadrant abdominal pain.  In the emergency room the patient was found to have gallstones on the CT scan, and a ultrasound of the gallbladder demonstrated gallbladder wall thickening and a positive Fleming sign concerning for possible cholecystitis.     Review of Systems   Gastrointestinal:  Positive for abdominal pain, nausea and vomiting.      History  Past Medical History:   Diagnosis Date    Allergic     CKD (chronic kidney disease), stage III     Depression     Depression with suicidal ideation 08/08/2021    DJD (degenerative joint disease)     DVT (deep venous thrombosis)     Ganglion     rt wrist    Gangrene of foot 10/09/2015    GERD (gastroesophageal reflux disease)     Headache     Heart murmur     Hiatal hernia     History of esophageal stricture     s/p dialation 40-50 times last EGD 04/2016    Hyperlipidemia     Hypertension     Hypothyroidism     IBS (irritable bowel syndrome)     Neuropathy     Osteomyelitis of right foot 03/19/2020    Pulmonary embolism 01/19/2017    Rash     rt lower hip    Retinopathy     S/P BKA (below knee amputation), right 03/18/2022    Seasonal allergies     Sepsis due to  group B Streptococcus 03/19/2020    Shortness of breath     Sleep apnea     Stroke     Type 2 diabetes mellitus with peripheral vascular disease     Vitamin D deficiency      Past Surgical History:   Procedure Laterality Date    AMPUTATION DIGIT Left 4/26/2022    Procedure: AMPUTATION left great toe;  Surgeon: ERWIN Baker DPM;  Location: [facility] MAIN OR;  Service: Podiatry;  Laterality: Left;    AMPUTATION DIGIT  4/26/2022    Procedure: ;  Surgeon: ERWIN Baker DPM;  Location: [facility] MAIN OR;  Service: Podiatry;;    AMPUTATION FOOT / TOE Right     great toe    AMPUTATION REVISION Right 4/8/2021    Procedure: BELOW KNEE AMPUTATION REVISAION;  Surgeon: Eduardo Reynolds MD;  Location: [facility] MAIN OR;  Service: Orthopedics;  Laterality: Right;    AMPUTATION REVISION Right 4/29/2021    Procedure: AMPUTATION REVISION KNEE STUMP;  Surgeon: Eduardo Reynolds MD;  Location: [facility] MAIN OR;  Service: Orthopedics;  Laterality: Right;    BELOW KNEE AMPUTATION Right 7/30/2020    Procedure: AMPUTATION BELOW KNEE;  Surgeon: Eduardo Reynolds MD;  Location: [facility] MAIN OR;  Service: Orthopedics;  Laterality: Right;    CARDIAC CATHETERIZATION  04/2018    [facility]    COLONOSCOPY      ENDOSCOPY      ENDOSCOPY N/A 10/4/2019    Procedure: ESOPHAGOGASTRODUODENOSCOPY with dilitation and biopsy x 1 area;  Surgeon: Declan Iqbal MD;  Location: [facility] ENDOSCOPY;  Service: Gastroenterology    ENDOSCOPY N/A 12/13/2019    Procedure: ESOPHAGOGASTRODUODENOSCOPY WITH DILATATION (50, 52 BOUGIE);  Surgeon: Declan Iqbal MD;  Location: [facility] ENDOSCOPY;  Service: Gastroenterology    ENDOSCOPY N/A 8/6/2021    Procedure: ESOPHAGOGASTRODUODENOSCOPY with biopsy x1 area and esophageal dilation (56FR Bougie);  Surgeon: Hussein Talavera MD;  Location: [facility] ENDOSCOPY;  Service: Gastroenterology;  Laterality: N/A;  post: hiatal hernia, erosive gastritis    ENDOSCOPY N/A 12/13/2022    Procedure: ESOPHAGOGASTRODUODENOSCOPY WITH DILATATION  (BOUGIE #54, #56) AND BIOPSY X1;  Surgeon: David Rodriguez MD;  Location: [facility] ENDOSCOPY;  Service: Gastroenterology;  Laterality: N/A;  POST: dysphagia     EYE SURGERY      GANGLION CYST EXCISION Left     HERNIA REPAIR Bilateral     INCISION AND DRAINAGE OF WOUND Right 6/15/2022    Procedure: INCISION AND DRAINAGE WOUND with debridement;  Surgeon: Fito Forte MD;  Location: [facility] MAIN OR;  Service: Orthopedics;  Laterality: Right;    JOINT REPLACEMENT      Left total hip    RETINOPATHY SURGERY      laser    TOTAL HIP ARTHROPLASTY Left     TOTAL HIP ARTHROPLASTY Left 2018    TRANS METATARSAL AMPUTATION Right 3/17/2020    Procedure: AMPUTATION TRANS METATARSAL right;  Surgeon: ERWIN Baker DPM;  Location: [facility] MAIN OR;  Service: Podiatry;  Laterality: Right;  GANGRENOUS RIGHT FOOT    VASECTOMY       Family History   Problem Relation Age of Onset    Diabetes Mother         Patient  mom    Heart disease Mother     Arthritis Mother     Hyperlipidemia Mother     Leukemia Father     Sleep apnea Maternal Aunt         GENO    Stroke Maternal Grandmother     Hypertension Other     Hyperlipidemia Other     Cancer Other     Colon cancer Other         uncle     Social History     Tobacco Use    Smoking status: Never    Smokeless tobacco: Never   Vaping Use    Vaping Use: Never used   Substance Use Topics    Alcohol use: Yes     Comment: OCCASIONAL    Drug use: No     Medications Prior to Admission   Medication Sig Dispense Refill Last Dose    Accu-Chek Softclix Lancets lancets Use as directed to test blood sugar 4 times daily before meals and at bedtime. 100 each 5 9/22/2023 at 0800    amLODIPine (NORVASC) 5 MG tablet Take 1 tablet by mouth Daily. 90 tablet 0 Past Week at 0800    apixaban (Eliquis) 5 MG tablet tablet Take 1 tablet by mouth 2 (Two) Times a Day. 180 tablet 1 Past Week at 0800    aspirin 81 MG EC tablet Take 1 tablet by mouth Daily.   Past Week at 0800    atorvastatin (LIPITOR) 80 MG tablet Take 1  tablet by mouth Every Night. 90 tablet 1 Past Week at 0800    Blood Glucose Monitoring Suppl (Accu-Chek Guide) w/Device kit See Admin Instructions.   9/21/2023 at 0800    buPROPion XL (Wellbutrin XL) 150 MG 24 hr tablet Take 1 tablet by mouth Every Morning. 90 tablet 0 Past Week at 0800    busPIRone (BUSPAR) 10 MG tablet Take 1 tablet by mouth Every 12 (Twelve) Hours. 180 tablet 1 Past Week at 0800    CINNAMON PO Take  by mouth.   Past Week at 0800    Continuous Blood Gluc  (Dexcom G6 ) device 1 Device Daily. Use to check BS 1 each 0 9/21/2023 at 0800    cyclobenzaprine (FLEXERIL) 10 MG tablet Take I tab q 8 hrs as needed for tension headaches 30 tablet 0 Past Week at 0800    DULoxetine (CYMBALTA) 60 MG capsule Take 1 capsule by mouth Every Morning. 90 capsule 1 Past Week at 0800    glucose blood (Accu-Chek Guide) test strip Use as instructed to test blood sugar 4 times daily before meals and at bedtime 100 each 12 9/21/2023 at 0800    insulin aspart (NovoLOG FlexPen) 100 UNIT/ML solution pen-injector sc pen Inject 12 Units under the skin into the appropriate area as directed 3 (Three) Times a Day With Meals. 15 mL 2 9/21/2023 at 0800    Insulin Glargine (Lantus SoloStar) 100 UNIT/ML injection pen Inject 34 Units under the skin into the appropriate area as directed Every Night for 265 days. 15 mL 5 9/21/2023 at 0800    levothyroxine (Synthroid) 100 MCG tablet Take 1 tablet by mouth Every Morning. 90 tablet 1 Past Week at 0800    meclizine (ANTIVERT) 25 MG tablet Take 1 tablet by mouth 3 (Three) Times a Day As Needed for Dizziness. 30 tablet 0 Past Week    metoprolol succinate XL (TOPROL-XL) 100 MG 24 hr tablet Take 1 tablet by mouth Daily. 90 tablet 0 Past Week at 0800    multivitamin with minerals tablet tablet Take 1 tablet by mouth Daily.   Past Week at 0800    omeprazole (priLOSEC) 40 MG capsule Take 1 capsule by mouth Daily. 90 capsule 1 Past Week at 0800    ondansetron ODT (ZOFRAN-ODT) 4 MG  "disintegrating tablet Place 1 tablet on the tongue Every 8 (Eight) Hours As Needed for Nausea or Vomiting. 30 tablet 0 9/22/2023    pregabalin (LYRICA) 100 MG capsule Take 1 capsule by mouth 2 (Two) Times a Day. 60 capsule 0 Past Week at 0800    Semaglutide,0.25 or 0.5MG/DOS, (OZEMPIC) 2 MG/1.5ML solution pen-injector Inject 0.5 mg under the skin into the appropriate area as directed 1 (One) Time Per Week. 3 mL 4 Past Week at 0800    Semaglutide,0.25 or 0.5MG/DOS, (Ozempic, 0.25 or 0.5 MG/DOSE,) 2 MG/3ML solution pen-injector Inject 0.25 mg under the skin into the appropriate area as directed 1 (One) Time Per Week. 3 mL 0 Past Week    Continuous Blood Gluc Sensor (Dexcom G6 Sensor) Every 10 (Ten) Days. Use to check BS daily 3 each 11 More than a month at 0800    Continuous Blood Gluc Transmit (Dexcom G6 Transmitter) misc 1 each 4 (Four) Times a Day Before Meals & at Bedtime. Change every 3 months 1 each 3 More than a month at 0800    Glucagon (Gvoke HypoPen 2-Pack) 1 MG/0.2ML solution auto-injector Inject 1 mg under the skin into the appropriate area as directed As Needed (Hypoglycemia). 0.4 mL 5 More than a month at 0800    HYDROcodone-acetaminophen (NORCO) 5-325 MG per tablet Take 1 tablet by mouth Every Morning.       Insulin Lispro (ADMELOG SOLOSTAR) 100 UNIT/ML injection pen Inject 10 Units under the skin into the appropriate area as directed 3 (Three) Times a Day With Meals. 27 mL 1      Allergies:  Lisinopril and Cefixime    Objective     Vital Signs  /62 (BP Location: Left arm, Patient Position: Lying)   Pulse 74   Temp 98.4 °F (36.9 °C) (Oral)   Resp 12   Ht 172.7 cm (68\")   Wt 81.8 kg (180 lb 5.4 oz)   SpO2 99%   BMI 27.42 kg/m²      Physical Exam  Vitals reviewed.   Constitutional:       Appearance: He is well-developed.   HENT:      Head: Normocephalic and atraumatic.   Eyes:      Pupils: Pupils are equal, round, and reactive to light.   Cardiovascular:      Rate and Rhythm: Normal rate and " regular rhythm.   Pulmonary:      Effort: Pulmonary effort is normal.      Breath sounds: Normal breath sounds.   Abdominal:      General: There is no distension.      Palpations: Abdomen is soft.      Tenderness: There is no abdominal tenderness in the right upper quadrant and epigastric area. There is guarding. There is no rebound. Negative signs include Fleming's sign.      Hernia: No hernia is present.   Musculoskeletal:         General: Normal range of motion.      Cervical back: Normal range of motion.   Lymphadenopathy:      Cervical: No cervical adenopathy.   Skin:     General: Skin is warm and dry.      Findings: No rash.   Neurological:      Mental Status: He is alert and oriented to person, place, and time.       Results Review:   Lab Results (last 24 hours)       Procedure Component Value Units Date/Time    POC Glucose Once [[id number]]  (Abnormal) Collected: 09/23/23 1117    Specimen: Blood Updated: 09/23/23 1118     Glucose 161 mg/dL      Comment: Serial Number: [id number]:  [id number]       Hepatic Function Panel [[id number]]  (Abnormal) Collected: 09/23/23 0106    Specimen: Blood Updated: 09/23/23 0839     Total Protein 6.0 g/dL      Albumin 3.9 g/dL      ALT (SGPT) 11 U/L      AST (SGOT) 15 U/L      Alkaline Phosphatase 127 U/L      Total Bilirubin 1.1 mg/dL      Bilirubin, Direct 0.2 mg/dL      Bilirubin, Indirect 0.9 mg/dL     POC Glucose Once [[id number]]  (Abnormal) Collected: 09/23/23 0634    Specimen: Blood Updated: 09/23/23 0635     Glucose 176 mg/dL      Comment: Serial Number: [id number]:  [id number]       Basic Metabolic Panel [[id number]]  (Abnormal) Collected: 09/23/23 0106    Specimen: Blood Updated: 09/23/23 0145     Glucose 243 mg/dL      BUN 18 mg/dL      Creatinine 1.38 mg/dL      Sodium 140 mmol/L      Potassium 3.6 mmol/L      Chloride 102 mmol/L      CO2 23.0 mmol/L      Calcium 9.0 mg/dL      BUN/Creatinine Ratio 13.0     Anion Gap 15.0 mmol/L      eGFR 58.9 mL/min/1.73      Narrative:      GFR Normal >60  Chronic Kidney Disease <60  Kidney Failure <15      CBC Auto Differential [[id number]]  (Abnormal) Collected: 09/23/23 0106    Specimen: Blood Updated: 09/23/23 0135     WBC 10.50 10*3/mm3      RBC 5.29 10*6/mm3      Hemoglobin 12.1 g/dL      Hematocrit 37.9 %      MCV 71.6 fL      MCH 22.9 pg      MCHC 32.0 g/dL      RDW 17.0 %      RDW-SD 45.1 fl      MPV 8.7 fL      Platelets 211 10*3/mm3      Neutrophil % 61.2 %      Lymphocyte % 17.6 %      Monocyte % 7.8 %      Eosinophil % 13.1 %      Basophil % 0.3 %      Neutrophils, Absolute 6.40 10*3/mm3      Lymphocytes, Absolute 1.90 10*3/mm3      Monocytes, Absolute 0.80 10*3/mm3      Eosinophils, Absolute 1.40 10*3/mm3      Basophils, Absolute 0.00 10*3/mm3      nRBC 0.3 /100 WBC     Blood Culture - Blood, Arm, Left [[id number]] Collected: 09/23/23 0106    Specimen: Blood from Arm, Left Updated: 09/23/23 0118    Blood Culture - Blood, Hand, Right [[id number]] Collected: 09/23/23 0106    Specimen: Blood from Hand, Right Updated: 09/23/23 0118    POC Glucose Once [[id number]]  (Abnormal) Collected: 09/22/23 2311    Specimen: Blood Updated: 09/22/23 2313     Glucose 239 mg/dL      Comment: Serial Number: [id number]:  [id number]       Urinalysis With Culture If Indicated - Urine, Clean Catch [[id number]]  (Abnormal) Collected: 09/22/23 2003    Specimen: Urine, Clean Catch Updated: 09/22/23 2030     Color, UA Yellow     Appearance, UA Clear     pH, UA <=5.0     Specific Gravity, UA 1.032     Glucose, UA >=1000 mg/dL (3+)     Ketones, UA 40 mg/dL (2+)     Bilirubin, UA Negative     Blood, UA Negative     Protein, UA Negative     Leuk Esterase, UA Negative     Nitrite, UA Negative     Urobilinogen, UA 1.0 E.U./dL    Narrative:      In absence of clinical symptoms, the presence of pyuria, bacteria, and/or nitrites on the urinalysis result does not correlate with infection.  Urine microscopic not indicated.    POC Glucose Once [[id number]]   (Abnormal) Collected: 09/22/23 1942    Specimen: Blood Updated: 09/22/23 1944     Glucose 251 mg/dL      Comment: Serial Number: [id number]:  [id number]       Extra Tubes [[id number]] Collected: 09/22/23 1630    Specimen: Blood, Venous Line Updated: 09/22/23 1731    Narrative:      The following orders were created for panel order Extra Tubes.  Procedure                               Abnormality         Status                     ---------                               -----------         ------                     Gold Top - SST[[id number]]                                   Final result               Light Blue Top[[id number]]                                   Final result                 Please view results for these tests on the individual orders.    Light Blue Top [[id number]] Collected: 09/22/23 1630    Specimen: Blood Updated: 09/22/23 1731     Extra Tube Hold for add-ons.     Comment: Auto resulted       Gold Top - SST [[id number]] Collected: 09/22/23 1630    Specimen: Blood Updated: 09/22/23 1731     Extra Tube Hold for add-ons.     Comment: Auto resulted.       Comprehensive Metabolic Panel [[id number]]  (Abnormal) Collected: 09/22/23 1630    Specimen: Blood Updated: 09/22/23 1707     Glucose 381 mg/dL      BUN 22 mg/dL      Creatinine 1.57 mg/dL      Sodium 138 mmol/L      Potassium 4.7 mmol/L      Comment: Slight hemolysis detected by analyzer. Results may be affected.        Chloride 99 mmol/L      CO2 24.0 mmol/L      Calcium 9.5 mg/dL      Total Protein 7.0 g/dL      Albumin 4.2 g/dL      ALT (SGPT) 12 U/L      AST (SGOT) 20 U/L      Comment: Slight hemolysis detected by analyzer. Results may be affected.        Alkaline Phosphatase 149 U/L      Total Bilirubin 1.5 mg/dL      Globulin 2.8 gm/dL      A/G Ratio 1.5 g/dL      BUN/Creatinine Ratio 14.0     Anion Gap 15.0 mmol/L      eGFR 50.5 mL/min/1.73     Narrative:      GFR Normal >60  Chronic Kidney Disease <60  Kidney Failure <15      Lipase  [[id number]]  (Normal) Collected: 09/22/23 1630    Specimen: Blood Updated: 09/22/23 1702     Lipase 16 U/L     CBC & Differential [[id number]]  (Abnormal) Collected: 09/22/23 1630    Specimen: Blood Updated: 09/22/23 1645    Narrative:      The following orders were created for panel order CBC & Differential.  Procedure                               Abnormality         Status                     ---------                               -----------         ------                     CBC Auto Differential[[id number]]        Abnormal            Final result               Scan Slide[[id number]]                                                                    Please view results for these tests on the individual orders.    CBC Auto Differential [[id number]]  (Abnormal) Collected: 09/22/23 1630    Specimen: Blood Updated: 09/22/23 1645     WBC 13.10 10*3/mm3      RBC 6.18 10*6/mm3      Hemoglobin 14.0 g/dL      Hematocrit 44.7 %      MCV 72.2 fL      MCH 22.7 pg      MCHC 31.4 g/dL      RDW 18.0 %      RDW-SD 47.7 fl      MPV 8.9 fL      Platelets 245 10*3/mm3      Neutrophil % 65.4 %      Lymphocyte % 15.0 %      Monocyte % 8.4 %      Eosinophil % 10.0 %      Basophil % 1.2 %      Neutrophils, Absolute 8.60 10*3/mm3      Lymphocytes, Absolute 2.00 10*3/mm3      Monocytes, Absolute 1.10 10*3/mm3      Eosinophils, Absolute 1.30 10*3/mm3      Basophils, Absolute 0.20 10*3/mm3      nRBC 0.0 /100 WBC           CT Abdomen Pelvis Without Contrast    Result Date: 9/22/2023  Impression: No acute abnormality Cholelithiasis without CT evidence of acute cholecystitis. Electronically Signed: Sunny Humphries DO  9/22/2023 5:40 PM EDT  Workstation ID: [id number]    US Abdomen Limited    Result Date: 9/22/2023  Impression: Cholelithiasis with a mildly thickened gallbladder wall and positive sonographic Fleming sign is highly concerning for  cholecystitis. No evidence of biliary ductal dilation. Electronically Signed: Sunny Humphries DO   9/22/2023 10:15 PM EDT  Workstation ID: [id number]       I reviewed the patient's new imaging results and agree with the interpretation.  I reviewed the patient's other test results and agree with the interpretation    Assessment & Plan     Principal Problem:    Abdominal pain  Active Problems:    Nausea, vomiting and diarrhea    Calculus of gallbladder without cholecystitis without obstruction    Ultrasound concerning for possible cholecystitis.  Certainly with the acute onset, this could be acute cholecystitis.  Patient's white blood cell count is normal however, and LFTs only remain mildly elevated which could be due to recent Ozempic use.  Unfortunately, the patient received his Eliquis this morning at 1 AM which precludes him from being able to undergo any surgical intervention until the effect of his Eliquis has worn off.  Will obtain HIDA scan to look for patency of the cystic duct  Continue antibiotics  Continue to hold Eliquis in the event that the patient may need surgical intervention acutely  Okay for clear liquid diet from surgical perspective    I discussed the patients findings and my recommendations with the patient.     Eduardo Srinivasan MD  09/23/23  13:32 EDT

## 2023-09-23 NOTE — NURSING NOTE
At this time, antibiotic is delayed due to patient being a difficult stick, blood cultures X2 ordered prior to starting antibiotic therapy, several nurses have tried to obtain blood cultures, phlebotomy has attempted X2, another phlebotomist to attempt to draw blood cultures at this time, pharmacy has been made aware of delay in antibiotics

## 2023-09-23 NOTE — PLAN OF CARE
Goal Outcome Evaluation:      Patient on room air. Patient alert and oriented. Patient states that the nausea and abdominal pain are intermittent. Patient has pain at base of skull and rates the pain 8/10. Patient states the pain radiates to the shoulders.

## 2023-09-24 ENCOUNTER — APPOINTMENT (OUTPATIENT)
Dept: NUCLEAR MEDICINE | Facility: HOSPITAL | Age: 58
End: 2023-09-24
Payer: MEDICARE

## 2023-09-24 LAB
ALBUMIN SERPL-MCNC: 3.6 G/DL (ref 3.5–5.2)
ALBUMIN/GLOB SERPL: 1.5 G/DL
ALP SERPL-CCNC: 115 U/L (ref 39–117)
ALT SERPL W P-5'-P-CCNC: 10 U/L (ref 1–41)
ANION GAP SERPL CALCULATED.3IONS-SCNC: 10 MMOL/L (ref 5–15)
AST SERPL-CCNC: 14 U/L (ref 1–40)
BACTERIA BLD CULT: ABNORMAL
BASOPHILS # BLD AUTO: 0.1 10*3/MM3 (ref 0–0.2)
BASOPHILS NFR BLD AUTO: 0.8 % (ref 0–1.5)
BILIRUB SERPL-MCNC: 0.9 MG/DL (ref 0–1.2)
BOTTLE TYPE: ABNORMAL
BUN SERPL-MCNC: 16 MG/DL (ref 6–20)
BUN/CREAT SERPL: 11 (ref 7–25)
CALCIUM SPEC-SCNC: 9.2 MG/DL (ref 8.6–10.5)
CHLORIDE SERPL-SCNC: 106 MMOL/L (ref 98–107)
CO2 SERPL-SCNC: 26 MMOL/L (ref 22–29)
CREAT SERPL-MCNC: 1.45 MG/DL (ref 0.76–1.27)
DEPRECATED RDW RBC AUTO: 47.7 FL (ref 37–54)
EGFRCR SERPLBLD CKD-EPI 2021: 55.5 ML/MIN/1.73
EOSINOPHIL # BLD AUTO: 1.2 10*3/MM3 (ref 0–0.4)
EOSINOPHIL NFR BLD AUTO: 15.6 % (ref 0.3–6.2)
ERYTHROCYTE [DISTWIDTH] IN BLOOD BY AUTOMATED COUNT: 17.9 % (ref 12.3–15.4)
GLOBULIN UR ELPH-MCNC: 2.4 GM/DL
GLUCOSE BLDC GLUCOMTR-MCNC: 103 MG/DL (ref 70–105)
GLUCOSE BLDC GLUCOMTR-MCNC: 107 MG/DL (ref 70–105)
GLUCOSE BLDC GLUCOMTR-MCNC: 151 MG/DL (ref 70–105)
GLUCOSE BLDC GLUCOMTR-MCNC: 188 MG/DL (ref 70–105)
GLUCOSE BLDC GLUCOMTR-MCNC: 80 MG/DL (ref 70–105)
GLUCOSE BLDC GLUCOMTR-MCNC: 98 MG/DL (ref 70–105)
GLUCOSE SERPL-MCNC: 104 MG/DL (ref 65–99)
HCT VFR BLD AUTO: 38.5 % (ref 37.5–51)
HGB BLD-MCNC: 12.4 G/DL (ref 13–17.7)
LYMPHOCYTES # BLD AUTO: 0.3 10*3/MM3 (ref 0.7–3.1)
LYMPHOCYTES NFR BLD AUTO: 4.2 % (ref 19.6–45.3)
MCH RBC QN AUTO: 23.3 PG (ref 26.6–33)
MCHC RBC AUTO-ENTMCNC: 32.1 G/DL (ref 31.5–35.7)
MCV RBC AUTO: 72.4 FL (ref 79–97)
MONOCYTES # BLD AUTO: 0.4 10*3/MM3 (ref 0.1–0.9)
MONOCYTES NFR BLD AUTO: 4.9 % (ref 5–12)
NEUTROPHILS NFR BLD AUTO: 5.6 10*3/MM3 (ref 1.7–7)
NEUTROPHILS NFR BLD AUTO: 74.5 % (ref 42.7–76)
NRBC BLD AUTO-RTO: 0 /100 WBC (ref 0–0.2)
PLATELET # BLD AUTO: 214 10*3/MM3 (ref 140–450)
PMV BLD AUTO: 8.2 FL (ref 6–12)
POTASSIUM SERPL-SCNC: 4.5 MMOL/L (ref 3.5–5.2)
PROT SERPL-MCNC: 6 G/DL (ref 6–8.5)
RBC # BLD AUTO: 5.31 10*6/MM3 (ref 4.14–5.8)
SODIUM SERPL-SCNC: 142 MMOL/L (ref 136–145)
WBC NRBC COR # BLD: 7.5 10*3/MM3 (ref 3.4–10.8)

## 2023-09-24 PROCEDURE — 63710000001 INSULIN LISPRO (HUMAN) PER 5 UNITS: Performed by: PHYSICIAN ASSISTANT

## 2023-09-24 PROCEDURE — 82948 REAGENT STRIP/BLOOD GLUCOSE: CPT

## 2023-09-24 PROCEDURE — 25010000002 CEFTRIAXONE PER 250 MG: Performed by: SURGERY

## 2023-09-24 PROCEDURE — 85025 COMPLETE CBC W/AUTO DIFF WBC: CPT | Performed by: PHYSICIAN ASSISTANT

## 2023-09-24 PROCEDURE — 96376 TX/PRO/DX INJ SAME DRUG ADON: CPT

## 2023-09-24 PROCEDURE — A9537 TC99M MEBROFENIN: HCPCS | Performed by: EMERGENCY MEDICINE

## 2023-09-24 PROCEDURE — 99232 SBSQ HOSP IP/OBS MODERATE 35: CPT | Performed by: INTERNAL MEDICINE

## 2023-09-24 PROCEDURE — 80053 COMPREHEN METABOLIC PANEL: CPT

## 2023-09-24 PROCEDURE — G0378 HOSPITAL OBSERVATION PER HR: HCPCS

## 2023-09-24 PROCEDURE — 99232 SBSQ HOSP IP/OBS MODERATE 35: CPT

## 2023-09-24 PROCEDURE — 25010000002 ONDANSETRON PER 1 MG: Performed by: EMERGENCY MEDICINE

## 2023-09-24 PROCEDURE — 63710000001 INSULIN GLARGINE PER 5 UNITS: Performed by: PHYSICIAN ASSISTANT

## 2023-09-24 PROCEDURE — 0 TECHNETIUM TC 99M MEBROFENIN KIT: Performed by: EMERGENCY MEDICINE

## 2023-09-24 PROCEDURE — 78226 HEPATOBILIARY SYSTEM IMAGING: CPT

## 2023-09-24 PROCEDURE — 99232 SBSQ HOSP IP/OBS MODERATE 35: CPT | Performed by: SURGERY

## 2023-09-24 RX ORDER — INSULIN LISPRO 100 [IU]/ML
5 INJECTION, SOLUTION INTRAVENOUS; SUBCUTANEOUS
Status: DISCONTINUED | OUTPATIENT
Start: 2023-09-24 | End: 2023-09-28 | Stop reason: HOSPADM

## 2023-09-24 RX ORDER — KIT FOR THE PREPARATION OF TECHNETIUM TC 99M MEBROFENIN 45 MG/10ML
1 INJECTION, POWDER, LYOPHILIZED, FOR SOLUTION INTRAVENOUS
Status: COMPLETED | OUTPATIENT
Start: 2023-09-24 | End: 2023-09-24

## 2023-09-24 RX ORDER — INSULIN LISPRO 100 [IU]/ML
2-9 INJECTION, SOLUTION INTRAVENOUS; SUBCUTANEOUS
Status: DISCONTINUED | OUTPATIENT
Start: 2023-09-24 | End: 2023-09-28 | Stop reason: HOSPADM

## 2023-09-24 RX ADMIN — METRONIDAZOLE 500 MG: 500 TABLET ORAL at 14:30

## 2023-09-24 RX ADMIN — BUSPIRONE HYDROCHLORIDE 10 MG: 5 TABLET ORAL at 21:19

## 2023-09-24 RX ADMIN — LEVOTHYROXINE SODIUM 100 MCG: 0.1 TABLET ORAL at 06:04

## 2023-09-24 RX ADMIN — METRONIDAZOLE 500 MG: 500 TABLET ORAL at 21:18

## 2023-09-24 RX ADMIN — MEBROFENIN 1 DOSE: 45 INJECTION, POWDER, LYOPHILIZED, FOR SOLUTION INTRAVENOUS at 10:07

## 2023-09-24 RX ADMIN — SENNOSIDES AND DOCUSATE SODIUM 2 TABLET: 50; 8.6 TABLET ORAL at 21:18

## 2023-09-24 RX ADMIN — METRONIDAZOLE 500 MG: 500 TABLET ORAL at 06:04

## 2023-09-24 RX ADMIN — ONDANSETRON 4 MG: 2 INJECTION INTRAMUSCULAR; INTRAVENOUS at 10:46

## 2023-09-24 RX ADMIN — AMLODIPINE BESYLATE 5 MG: 5 TABLET ORAL at 10:45

## 2023-09-24 RX ADMIN — BUPROPION HYDROCHLORIDE 150 MG: 150 TABLET, EXTENDED RELEASE ORAL at 10:45

## 2023-09-24 RX ADMIN — INSULIN LISPRO 2 UNITS: 100 INJECTION, SOLUTION INTRAVENOUS; SUBCUTANEOUS at 00:06

## 2023-09-24 RX ADMIN — Medication 10 ML: at 08:46

## 2023-09-24 RX ADMIN — ACETAMINOPHEN 650 MG: 325 TABLET, FILM COATED ORAL at 10:45

## 2023-09-24 RX ADMIN — PREGABALIN 100 MG: 100 CAPSULE ORAL at 10:45

## 2023-09-24 RX ADMIN — BISACODYL 5 MG: 5 TABLET, COATED ORAL at 13:45

## 2023-09-24 RX ADMIN — PANTOPRAZOLE SODIUM 40 MG: 40 TABLET, DELAYED RELEASE ORAL at 10:45

## 2023-09-24 RX ADMIN — SODIUM CHLORIDE 100 ML/HR: 450 INJECTION, SOLUTION INTRAVENOUS at 01:39

## 2023-09-24 RX ADMIN — ASPIRIN 81 MG: 81 TABLET, COATED ORAL at 10:45

## 2023-09-24 RX ADMIN — METOPROLOL SUCCINATE 100 MG: 50 TABLET, EXTENDED RELEASE ORAL at 10:45

## 2023-09-24 RX ADMIN — BUSPIRONE HYDROCHLORIDE 10 MG: 5 TABLET ORAL at 10:45

## 2023-09-24 RX ADMIN — PREGABALIN 100 MG: 100 CAPSULE ORAL at 21:18

## 2023-09-24 RX ADMIN — INSULIN GLARGINE 34 UNITS: 100 INJECTION, SOLUTION SUBCUTANEOUS at 10:46

## 2023-09-24 RX ADMIN — ATORVASTATIN CALCIUM 80 MG: 40 TABLET, FILM COATED ORAL at 21:19

## 2023-09-24 RX ADMIN — DULOXETINE HYDROCHLORIDE 60 MG: 30 CAPSULE, DELAYED RELEASE ORAL at 10:45

## 2023-09-24 RX ADMIN — CEFTRIAXONE 2000 MG: 2 INJECTION, POWDER, FOR SOLUTION INTRAMUSCULAR; INTRAVENOUS at 21:18

## 2023-09-24 NOTE — PROGRESS NOTES
Daily Progress Note    Patient Care Team:  Laura Whitaker MD as PCP - General  Tamara Michaels MD as Consulting Physician (Endocrinology)  ERWIN Baker DPM as Consulting Physician (Podiatry)    Chief Complaint: Follow-up type 2 diabetes    HPI: Patient seen, blood sugar log reviewed.  Blood sugars are doing well.  He is started on diet now.  Continues to have abdominal pain with tenderness on the right side.  Seen by surgery and the plan is possible cholecystectomy in the next week.    ROS:   Constitutional:  Denies fatigue, tiredness.    Respiratory: denies cough, shortness of breath.   Cardiovascular:  denies chest pain, edema   GI: Admits abdominal pain but no nausea or vomiting    Vitals:    09/24/23 1440   BP: 124/57   Pulse: 82   Resp: 12   Temp: 98.5 °F (36.9 °C)   SpO2: 95%     Body mass index is 27.42 kg/m².    Physical Exam:  GEN: NAD, conversant  PSYCH: Awake and coherent      Results Review:     I reviewed the patient's new clinical results.    Glucose   Date Value Ref Range Status   09/24/2023 104 (H) 65 - 99 mg/dL Final     Sodium   Date Value Ref Range Status   09/24/2023 142 136 - 145 mmol/L Final     Potassium   Date Value Ref Range Status   09/24/2023 4.5 3.5 - 5.2 mmol/L Final     CO2   Date Value Ref Range Status   09/24/2023 26.0 22.0 - 29.0 mmol/L Final     Chloride   Date Value Ref Range Status   09/24/2023 106 98 - 107 mmol/L Final     Anion Gap   Date Value Ref Range Status   09/24/2023 10.0 5.0 - 15.0 mmol/L Final     Creatinine   Date Value Ref Range Status   09/24/2023 1.45 (H) 0.76 - 1.27 mg/dL Final     BUN   Date Value Ref Range Status   09/24/2023 16 6 - 20 mg/dL Final     BUN/Creatinine Ratio   Date Value Ref Range Status   09/24/2023 11.0 7.0 - 25.0 Final     Calcium   Date Value Ref Range Status   09/24/2023 9.2 8.6 - 10.5 mg/dL Final     Alkaline Phosphatase   Date Value Ref Range Status   09/24/2023 115 39 - 117 U/L Final     Total Protein   Date Value Ref  Range Status   09/24/2023 6.0 6.0 - 8.5 g/dL Final     ALT (SGPT)   Date Value Ref Range Status   09/24/2023 10 1 - 41 U/L Final     AST (SGOT)   Date Value Ref Range Status   09/24/2023 14 1 - 40 U/L Final     Total Bilirubin   Date Value Ref Range Status   09/24/2023 0.9 0.0 - 1.2 mg/dL Final     Albumin   Date Value Ref Range Status   09/24/2023 3.6 3.5 - 5.2 g/dL Final     Globulin   Date Value Ref Range Status   09/24/2023 2.4 gm/dL Final     Lab Results   Component Value Date    HGBA1C 11.60 (H) 08/04/2023    HGBA1C 9.00 (H) 03/08/2023    HGBA1C 7.5 (H) 06/13/2022     Lab Results   Component Value Date    MICROALBUR <1.2 04/06/2021     Results from last 7 days   Lab Units 09/24/23  1150 09/24/23  0728 09/23/23  2358 09/23/23  2236 09/23/23  1624 09/23/23  1117   GLUCOSE mg/dL 103 107* 188* 203* 93 161*     Medication Review: Reviewed.     amLODIPine, 5 mg, Oral, Daily  aspirin, 81 mg, Oral, Daily  atorvastatin, 80 mg, Oral, Nightly  buPROPion XL, 150 mg, Oral, Daily  busPIRone, 10 mg, Oral, Q12H  cefTRIAXone, 2,000 mg, Intravenous, Q24H  DULoxetine, 60 mg, Oral, Daily  insulin glargine, 34 Units, Subcutaneous, Daily  insulin lispro, 2-9 Units, Subcutaneous, Q6H  levothyroxine, 100 mcg, Oral, Q AM  metoprolol succinate XL, 100 mg, Oral, Daily  metroNIDAZOLE, 500 mg, Oral, Q8H  pantoprazole, 40 mg, Oral, Daily  pregabalin, 100 mg, Oral, BID  senna-docusate sodium, 2 tablet, Oral, BID  sodium chloride, 1,000 mL, Intravenous, Once  sodium chloride, 10 mL, Intravenous, Q12H  sodium chloride, 10 mL, Intravenous, Q12H      Assessment and plan:  Diabetes mellitus type 2 with hyperglycemia: Blood sugars fair, he is eating now.  We will decrease glargine to 30 units subcu daily and add Humalog 5 units with each meal along with Humalog sliding scale before meals only and follow blood sugars and make further adjustments as needed.    Cholelithiasis/cholecystitis: Followed by surgery    Hypothyroidism: Currently on  levothyroxine supplementation    Hypertension/hyperlipidemia: Overall stable.    Tamara Michaels MD. FACE

## 2023-09-24 NOTE — PROGRESS NOTES
GENERAL SURGERY ESTABLISHED PATIENT NOTE    Patient Care Team:  Laura Whitaker MD as PCP - General  Tamara Michaels MD as Consulting Physician (Endocrinology)  ERWIN Baker DPM as Consulting Physician (Podiatry)    Reason for follow-up: cholecystitis    Subjective     Patient is a 58 y.o. male presents with right upper quadrant abdominal pain over the last 2 weeks.  Patient found to have gallstones on CT scan and ultrasound concerning for possible gallbladder wall thickening and possible cholecystitis.  HIDA scan demonstrated no evidence of cholecystitis.   Still having abdominal pain, denies nausea or vomiting    Review of Systems   Gastrointestinal:  Positive for abdominal pain.      History  Past Medical History:   Diagnosis Date    Allergic     CKD (chronic kidney disease), stage III     Depression     Depression with suicidal ideation 08/08/2021    DJD (degenerative joint disease)     DVT (deep venous thrombosis)     Ganglion     rt wrist    Gangrene of foot 10/09/2015    GERD (gastroesophageal reflux disease)     Headache     Heart murmur     Hiatal hernia     History of esophageal stricture     s/p dialation 40-50 times last EGD 04/2016    Hyperlipidemia     Hypertension     Hypothyroidism     IBS (irritable bowel syndrome)     Neuropathy     Osteomyelitis of right foot 03/19/2020    Pulmonary embolism 01/19/2017    Rash     rt lower hip    Retinopathy     S/P BKA (below knee amputation), right 03/18/2022    Seasonal allergies     Sepsis due to group B Streptococcus 03/19/2020    Shortness of breath     Sleep apnea     Stroke     Type 2 diabetes mellitus with peripheral vascular disease     Vitamin D deficiency      Past Surgical History:   Procedure Laterality Date    AMPUTATION DIGIT Left 4/26/2022    Procedure: AMPUTATION left great toe;  Surgeon: ERWIN Baker DPM;  Location: [facility] MAIN OR;  Service: Podiatry;  Laterality: Left;    AMPUTATION DIGIT  4/26/2022    Procedure: ;   Surgeon: ERWIN Baker DPM;  Location: [facility] MAIN OR;  Service: Podiatry;;    AMPUTATION FOOT / TOE Right     great toe    AMPUTATION REVISION Right 4/8/2021    Procedure: BELOW KNEE AMPUTATION REVISAION;  Surgeon: Eduardo Reynolds MD;  Location: [facility] MAIN OR;  Service: Orthopedics;  Laterality: Right;    AMPUTATION REVISION Right 4/29/2021    Procedure: AMPUTATION REVISION KNEE STUMP;  Surgeon: Eduardo Reynolds MD;  Location: [facility] MAIN OR;  Service: Orthopedics;  Laterality: Right;    BELOW KNEE AMPUTATION Right 7/30/2020    Procedure: AMPUTATION BELOW KNEE;  Surgeon: Eduardo Reynolds MD;  Location: [facility] MAIN OR;  Service: Orthopedics;  Laterality: Right;    CARDIAC CATHETERIZATION  04/2018    [facility]    COLONOSCOPY      ENDOSCOPY      ENDOSCOPY N/A 10/4/2019    Procedure: ESOPHAGOGASTRODUODENOSCOPY with dilitation and biopsy x 1 area;  Surgeon: Declan Iqbal MD;  Location: [facility] ENDOSCOPY;  Service: Gastroenterology    ENDOSCOPY N/A 12/13/2019    Procedure: ESOPHAGOGASTRODUODENOSCOPY WITH DILATATION (50, 52 BOUGIE);  Surgeon: Declan Iqbal MD;  Location: [facility] ENDOSCOPY;  Service: Gastroenterology    ENDOSCOPY N/A 8/6/2021    Procedure: ESOPHAGOGASTRODUODENOSCOPY with biopsy x1 area and esophageal dilation (56FR Bougie);  Surgeon: Hussein Talavera MD;  Location: [facility] ENDOSCOPY;  Service: Gastroenterology;  Laterality: N/A;  post: hiatal hernia, erosive gastritis    ENDOSCOPY N/A 12/13/2022    Procedure: ESOPHAGOGASTRODUODENOSCOPY WITH DILATATION (BOUGIE #54, #56) AND BIOPSY X1;  Surgeon: David Rodriguez MD;  Location: [facility] ENDOSCOPY;  Service: Gastroenterology;  Laterality: N/A;  POST: dysphagia     EYE SURGERY      GANGLION CYST EXCISION Left     HERNIA REPAIR Bilateral     INCISION AND DRAINAGE OF WOUND Right 6/15/2022    Procedure: INCISION AND DRAINAGE WOUND with debridement;  Surgeon: Fito Forte MD;  Location: [facility] MAIN OR;  Service: Orthopedics;  Laterality: Right;     JOINT REPLACEMENT      Left total hip    RETINOPATHY SURGERY      laser    TOTAL HIP ARTHROPLASTY Left     TOTAL HIP ARTHROPLASTY Left 2018    TRANS METATARSAL AMPUTATION Right 3/17/2020    Procedure: AMPUTATION TRANS METATARSAL right;  Surgeon: ERWIN aBker DPM;  Location: [facility] MAIN OR;  Service: Podiatry;  Laterality: Right;  GANGRENOUS RIGHT FOOT    VASECTOMY       Family History   Problem Relation Age of Onset    Diabetes Mother         Patient  mom    Heart disease Mother     Arthritis Mother     Hyperlipidemia Mother     Leukemia Father     Sleep apnea Maternal Aunt         GENO    Stroke Maternal Grandmother     Hypertension Other     Hyperlipidemia Other     Cancer Other     Colon cancer Other         uncle     Social History     Tobacco Use    Smoking status: Never    Smokeless tobacco: Never   Vaping Use    Vaping Use: Never used   Substance Use Topics    Alcohol use: Yes     Comment: OCCASIONAL    Drug use: No     Medications Prior to Admission   Medication Sig Dispense Refill Last Dose    Accu-Chek Softclix Lancets lancets Use as directed to test blood sugar 4 times daily before meals and at bedtime. 100 each 5 9/22/2023 at 0800    amLODIPine (NORVASC) 5 MG tablet Take 1 tablet by mouth Daily. 90 tablet 0 Past Week at 0800    apixaban (Eliquis) 5 MG tablet tablet Take 1 tablet by mouth 2 (Two) Times a Day. 180 tablet 1 Past Week at 0800    aspirin 81 MG EC tablet Take 1 tablet by mouth Daily.   Past Week at 0800    atorvastatin (LIPITOR) 80 MG tablet Take 1 tablet by mouth Every Night. 90 tablet 1 Past Week at 0800    Blood Glucose Monitoring Suppl (Accu-Chek Guide) w/Device kit See Admin Instructions.   9/21/2023 at 0800    buPROPion XL (Wellbutrin XL) 150 MG 24 hr tablet Take 1 tablet by mouth Every Morning. 90 tablet 0 Past Week at 0800    busPIRone (BUSPAR) 10 MG tablet Take 1 tablet by mouth Every 12 (Twelve) Hours. 180 tablet 1 Past Week at 0800    CINNAMON PO Take  by mouth.   Past Week at  0800    Continuous Blood Gluc  (Dexcom G6 ) device 1 Device Daily. Use to check BS 1 each 0 9/21/2023 at 0800    cyclobenzaprine (FLEXERIL) 10 MG tablet Take I tab q 8 hrs as needed for tension headaches 30 tablet 0 Past Week at 0800    DULoxetine (CYMBALTA) 60 MG capsule Take 1 capsule by mouth Every Morning. 90 capsule 1 Past Week at 0800    glucose blood (Accu-Chek Guide) test strip Use as instructed to test blood sugar 4 times daily before meals and at bedtime 100 each 12 9/21/2023 at 0800    insulin aspart (NovoLOG FlexPen) 100 UNIT/ML solution pen-injector sc pen Inject 12 Units under the skin into the appropriate area as directed 3 (Three) Times a Day With Meals. 15 mL 2 9/21/2023 at 0800    Insulin Glargine (Lantus SoloStar) 100 UNIT/ML injection pen Inject 34 Units under the skin into the appropriate area as directed Every Night for 265 days. 15 mL 5 9/21/2023 at 0800    levothyroxine (Synthroid) 100 MCG tablet Take 1 tablet by mouth Every Morning. 90 tablet 1 Past Week at 0800    meclizine (ANTIVERT) 25 MG tablet Take 1 tablet by mouth 3 (Three) Times a Day As Needed for Dizziness. 30 tablet 0 Past Week    metoprolol succinate XL (TOPROL-XL) 100 MG 24 hr tablet Take 1 tablet by mouth Daily. 90 tablet 0 Past Week at 0800    multivitamin with minerals tablet tablet Take 1 tablet by mouth Daily.   Past Week at 0800    omeprazole (priLOSEC) 40 MG capsule Take 1 capsule by mouth Daily. 90 capsule 1 Past Week at 0800    ondansetron ODT (ZOFRAN-ODT) 4 MG disintegrating tablet Place 1 tablet on the tongue Every 8 (Eight) Hours As Needed for Nausea or Vomiting. 30 tablet 0 9/22/2023    pregabalin (LYRICA) 100 MG capsule Take 1 capsule by mouth 2 (Two) Times a Day. 60 capsule 0 Past Week at 0800    Semaglutide,0.25 or 0.5MG/DOS, (OZEMPIC) 2 MG/1.5ML solution pen-injector Inject 0.5 mg under the skin into the appropriate area as directed 1 (One) Time Per Week. 3 mL 4 Past Week at 0800     Semaglutide,0.25 or 0.5MG/DOS, (Ozempic, 0.25 or 0.5 MG/DOSE,) 2 MG/3ML solution pen-injector Inject 0.25 mg under the skin into the appropriate area as directed 1 (One) Time Per Week. 3 mL 0 Past Week    Continuous Blood Gluc Sensor (Dexcom G6 Sensor) Every 10 (Ten) Days. Use to check BS daily 3 each 11 More than a month at 0800    Continuous Blood Gluc Transmit (Dexcom G6 Transmitter) misc 1 each 4 (Four) Times a Day Before Meals & at Bedtime. Change every 3 months 1 each 3 More than a month at 0800    Glucagon (Gvoke HypoPen 2-Pack) 1 MG/0.2ML solution auto-injector Inject 1 mg under the skin into the appropriate area as directed As Needed (Hypoglycemia). 0.4 mL 5 More than a month at 0800    HYDROcodone-acetaminophen (NORCO) 5-325 MG per tablet Take 1 tablet by mouth Every Morning.       Insulin Lispro (ADMELOG SOLOSTAR) 100 UNIT/ML injection pen Inject 10 Units under the skin into the appropriate area as directed 3 (Three) Times a Day With Meals. 27 mL 1      Allergies:  Lisinopril and Cefixime    Objective     Vital Signs  Temp:  [97.8 °F (36.6 °C)-98.4 °F (36.9 °C)] 98 °F (36.7 °C)  Heart Rate:  [71-82] 82  Resp:  [14-25] 25  BP: (110-137)/(49-74) 122/71    Physical Exam  Vitals reviewed.   Constitutional:       Appearance: He is well-developed. He is obese.   HENT:      Head: Normocephalic and atraumatic.   Eyes:      Pupils: Pupils are equal, round, and reactive to light.   Cardiovascular:      Rate and Rhythm: Normal rate and regular rhythm.   Pulmonary:      Effort: Pulmonary effort is normal.      Breath sounds: Normal breath sounds.   Abdominal:      General: There is no distension.      Palpations: Abdomen is soft.      Tenderness: There is no abdominal tenderness in the right upper quadrant and epigastric area. There is guarding. There is no rebound. Negative signs include Fleming's sign.      Hernia: No hernia is present.   Musculoskeletal:         General: Normal range of motion.      Cervical back:  Normal range of motion.   Lymphadenopathy:      Cervical: No cervical adenopathy.   Skin:     General: Skin is warm and dry.      Findings: No rash.   Neurological:      Mental Status: He is alert and oriented to person, place, and time.       Results Review:   Lab Results (last 24 hours)       Procedure Component Value Units Date/Time    Comprehensive Metabolic Panel [[id number]]  (Abnormal) Collected: 09/24/23 1212    Specimen: Blood Updated: 09/24/23 1259     Glucose 104 mg/dL      BUN 16 mg/dL      Creatinine 1.45 mg/dL      Sodium 142 mmol/L      Potassium 4.5 mmol/L      Chloride 106 mmol/L      CO2 26.0 mmol/L      Calcium 9.2 mg/dL      Total Protein 6.0 g/dL      Albumin 3.6 g/dL      ALT (SGPT) 10 U/L      AST (SGOT) 14 U/L      Alkaline Phosphatase 115 U/L      Total Bilirubin 0.9 mg/dL      Globulin 2.4 gm/dL      A/G Ratio 1.5 g/dL      BUN/Creatinine Ratio 11.0     Anion Gap 10.0 mmol/L      eGFR 55.5 mL/min/1.73     Narrative:      GFR Normal >60  Chronic Kidney Disease <60  Kidney Failure <15      CBC & Differential [[id number]]  (Abnormal) Collected: 09/24/23 1212    Specimen: Blood Updated: 09/24/23 1239    Narrative:      The following orders were created for panel order CBC & Differential.  Procedure                               Abnormality         Status                     ---------                               -----------         ------                     CBC Auto Differential[[id number]]        Abnormal            Final result               Scan Slide[[id number]]                                                                    Please view results for these tests on the individual orders.    CBC Auto Differential [[id number]]  (Abnormal) Collected: 09/24/23 1212    Specimen: Blood Updated: 09/24/23 1239     WBC 7.50 10*3/mm3      RBC 5.31 10*6/mm3      Hemoglobin 12.4 g/dL      Hematocrit 38.5 %      MCV 72.4 fL      MCH 23.3 pg      MCHC 32.1 g/dL      RDW 17.9 %      RDW-SD 47.7 fl      MPV  8.2 fL      Platelets 214 10*3/mm3      Neutrophil % 74.5 %      Lymphocyte % 4.2 %      Monocyte % 4.9 %      Eosinophil % 15.6 %      Basophil % 0.8 %      Neutrophils, Absolute 5.60 10*3/mm3      Lymphocytes, Absolute 0.30 10*3/mm3      Monocytes, Absolute 0.40 10*3/mm3      Eosinophils, Absolute 1.20 10*3/mm3      Basophils, Absolute 0.10 10*3/mm3      nRBC 0.0 /100 WBC     POC Glucose Once [[id number]]  (Normal) Collected: 09/24/23 1150    Specimen: Blood Updated: 09/24/23 1151     Glucose 103 mg/dL      Comment: Serial Number: [id number]:  [id number]       POC Glucose Once [[id number]]  (Abnormal) Collected: 09/24/23 0728    Specimen: Blood Updated: 09/24/23 0729     Glucose 107 mg/dL      Comment: Serial Number: [id number]:  [id number]       Blood Culture - Blood, Arm, Left [[id number]]  (Normal) Collected: 09/23/23 0106    Specimen: Blood from Arm, Left Updated: 09/24/23 0131     Blood Culture No growth at 24 hours    Narrative:      Less than seven (7) mL's of blood was collected.  Insufficient quantity may yield false negative results.    Blood Culture ID, PCR - Blood, Hand, Right [[id number]]  (Abnormal) Collected: 09/23/23 0106    Specimen: Blood from Hand, Right Updated: 09/24/23 0120     BCID, PCR Staph spp, not aureus or lugdunensis. Identification by BCID2 PCR.     BOTTLE TYPE Aerobic Bottle    Blood Culture - Blood, Hand, Right [[id number]]  (Abnormal) Collected: 09/23/23 0106    Specimen: Blood from Hand, Right Updated: 09/24/23 0119     Blood Culture Abnormal Stain     Gram Stain Aerobic Bottle Gram positive cocci in pairs and clusters    Narrative:      Less than seven (7) mL's of blood was collected.  Insufficient quantity may yield false negative results.    POC Glucose Once [[id number]]  (Abnormal) Collected: 09/23/23 2358    Specimen: Blood Updated: 09/24/23 0000     Glucose 188 mg/dL      Comment: Serial Number: [id number]:  [id number]       POC Glucose Once [[id number]]   (Abnormal) Collected: 09/23/23 2236    Specimen: Blood Updated: 09/23/23 2238     Glucose 203 mg/dL      Comment: Serial Number: [id number]:  [id number]       POC Glucose Once [[id number]]  (Normal) Collected: 09/23/23 1624    Specimen: Blood Updated: 09/23/23 1625     Glucose 93 mg/dL      Comment: Serial Number: [id number]:  [id number]             CT Abdomen Pelvis Without Contrast    Result Date: 9/22/2023  Impression: No acute abnormality Cholelithiasis without CT evidence of acute cholecystitis. Electronically Signed: Sunny Humphries DO  9/22/2023 5:40 PM EDT  Workstation ID: [id number]    NM HIDA SCAN WITHOUT PHARMACOLOGICAL INTERVENTION    Result Date: 9/24/2023  Impression: No evidence of acute cholecystitis. Electronically Signed: Declan Díaz MD  9/24/2023 1:12 PM EDT  Workstation ID: [id number]    US Abdomen Limited    Result Date: 9/22/2023  Impression: Cholelithiasis with a mildly thickened gallbladder wall and positive sonographic Fleming sign is highly concerning for  cholecystitis. No evidence of biliary ductal dilation. Electronically Signed: Sunny Humphries DO  9/22/2023 10:15 PM EDT  Workstation ID: [id number]       I reviewed the patient's new imaging results and agree with the interpretation.  I reviewed the patient's other test results and agree with the interpretation    Assessment & Plan     Principal Problem:    Abdominal pain  Active Problems:    Nausea, vomiting and diarrhea    Calculus of gallbladder without cholecystitis without obstruction    Patient still having right upper quadrant abdominal pain and tenderness.  Would prefer to stay in house until his gallbladder can be removed.  Unfortunately, the patient was given Eliquis at 1 AM on 9/23/2023.  We will need to wait 72 hours prior to proceed to the operating room to decrease his risk of bleeding  Plan for OR for gallbladder removal early next week  Okay for diet as tolerated  Continue antibiotics    I discussed the  patients findings and my recommendations with the patient.     Eduardo Srinivasan MD  09/24/23  14:29 EDT

## 2023-09-24 NOTE — PROGRESS NOTES
LOS: 0 days   Patient Care Team:  Laura Whitaker MD as PCP - General  Tamara Michaels MD as Consulting Physician (Endocrinology)  ERWIN Baker DPM as Consulting Physician (Podiatry)      Subjective     Interval History:     Subjective: Patient continues to have RUQ tenderness.  No nausea or vomiting.  No overt GI bleeding.      ROS:   No chest pain, shortness of breath, or cough.        Medication Review:     Current Facility-Administered Medications:     acetaminophen (TYLENOL) tablet 650 mg, 650 mg, Oral, Q4H PRN, Ernesto Canada DO, 650 mg at 09/23/23 1527    amLODIPine (NORVASC) tablet 5 mg, 5 mg, Oral, Daily, Liudmila Garcia PA-C    aspirin EC tablet 81 mg, 81 mg, Oral, Daily, Liudmila Garcia PA-KOFI, 81 mg at 09/23/23 0821    atorvastatin (LIPITOR) tablet 80 mg, 80 mg, Oral, Nightly, Liudmila Garcia PA-C, 80 mg at 09/23/23 2108    sennosides-docusate (PERICOLACE) 8.6-50 MG per tablet 2 tablet, 2 tablet, Oral, BID **AND** polyethylene glycol (MIRALAX) packet 17 g, 17 g, Oral, Daily PRN **AND** bisacodyl (DULCOLAX) EC tablet 5 mg, 5 mg, Oral, Daily PRN **AND** bisacodyl (DULCOLAX) suppository 10 mg, 10 mg, Rectal, Daily PRN, Ernesto Canada DO    buPROPion XL (WELLBUTRIN XL) 24 hr tablet 150 mg, 150 mg, Oral, Daily, Liudmila Garcia PA-C, 150 mg at 09/23/23 0821    busPIRone (BUSPAR) tablet 10 mg, 10 mg, Oral, Q12H, Liudmila Garcia PA-C, 10 mg at 09/23/23 2108    cefTRIAXone (ROCEPHIN) 2,000 mg in sodium chloride 0.9 % 100 mL IVPB, 2,000 mg, Intravenous, Q24H, Eduardo Srinivasan MD, Stopped at 09/23/23 2359    dextrose (D50W) (25 g/50 mL) IV injection 25 g, 25 g, Intravenous, Q15 Min PRN, Liudmila Garcia PA-C    dextrose (GLUTOSE) oral gel 15 g, 15 g, Oral, Q15 Min PRN, Liudmila Garcia PA-C    DULoxetine (CYMBALTA) DR capsule 60 mg, 60 mg, Oral, Daily, Liudmila Garcia PA-C, 60 mg at 09/23/23 0821    Glucagon (GLUCAGEN) injection 1 mg, 1 mg, Intramuscular, Q15 Min PRN,  Ernesto Canada DO    insulin glargine (LANTUS, SEMGLEE) injection 34 Units, 34 Units, Subcutaneous, Daily, Liudmila Garcia PA-C, 34 Units at 09/23/23 0821    insulin lispro (HUMALOG/ADMELOG) injection 2-9 Units, 2-9 Units, Subcutaneous, Q6H, Liudmila Garcia PA-C, 2 Units at 09/24/23 0006    levothyroxine (SYNTHROID, LEVOTHROID) tablet 100 mcg, 100 mcg, Oral, Q AM, Liudmila Garcia PA-C, 100 mcg at 09/24/23 0604    melatonin tablet 5 mg, 5 mg, Oral, Nightly PRN, Ernesto Canada DO    metoprolol succinate XL (TOPROL-XL) 24 hr tablet 100 mg, 100 mg, Oral, Daily, Liudmila Garcia PA-C, 100 mg at 09/23/23 0821    metroNIDAZOLE (FLAGYL) tablet 500 mg, 500 mg, Oral, Q8H, Eduardo Srinivasan MD, 500 mg at 09/24/23 0604    nitroglycerin (NITROSTAT) SL tablet 0.4 mg, 0.4 mg, Sublingual, Q5 Min PRN, Ernesto Canada DO    ondansetron (ZOFRAN) injection 4 mg, 4 mg, Intravenous, Q6H PRN, Ernesto Canada DO, 4 mg at 09/23/23 0035    pantoprazole (PROTONIX) EC tablet 40 mg, 40 mg, Oral, Daily, Liudmila Garcia PA-C, 40 mg at 09/23/23 0821    pregabalin (LYRICA) capsule 100 mg, 100 mg, Oral, BID, Liudmila Garcia PA-C, 100 mg at 09/23/23 2108    sodium chloride 0.45 % infusion, 100 mL/hr, Intravenous, Continuous, Ernesto Canada DO, Last Rate: 100 mL/hr at 09/24/23 0603, 100 mL/hr at 09/24/23 0603    sodium chloride 0.9 % bolus 1,000 mL, 1,000 mL, Intravenous, Once, Ernesto Canada DO, Stopped at 09/23/23 1000    [COMPLETED] Insert Peripheral IV, , , Once **AND** sodium chloride 0.9 % flush 10 mL, 10 mL, Intravenous, PRN, HotErnesto begumean, DO    sodium chloride 0.9 % flush 10 mL, 10 mL, Intravenous, Q12H, Ernesto Canada, DO, 10 mL at 09/23/23 2108    sodium chloride 0.9 % flush 10 mL, 10 mL, Intravenous, PRN, HottmErnesto villedaean, DO    sodium chloride 0.9 % flush 10 mL, 10 mL, Intravenous, Q12H, Liudmila Garcia PA-C, 10 mL at 09/24/23 0846    sodium chloride 0.9 %  flush 10 mL, 10 mL, Intravenous, PRN, Liudmila Garcia PA-C    sodium chloride 0.9 % infusion 40 mL, 40 mL, Intravenous, PRN, Ernesto Canada DO    sodium chloride 0.9 % infusion 40 mL, 40 mL, Intravenous, PRN, Liudmila Garcia PA-C      Objective     Vital Signs  Vitals:    09/23/23 2126 09/24/23 0311 09/24/23 0500 09/24/23 1036   BP:  117/53 110/49 122/71   BP Location:  Right arm Right arm Right arm   Patient Position:  Lying Lying Lying   Pulse:  78 81 82   Resp: 16 17 17 25   Temp: 98.4 °F (36.9 °C) 97.9 °F (36.6 °C) 97.8 °F (36.6 °C) 98 °F (36.7 °C)   TempSrc: Oral Oral Oral Oral   SpO2:  94% 94% 98%   Weight:       Height:           Physical Exam:     General Appearance:    Awake and alert, in no acute distress   Head:    Normocephalic, without obvious abnormality   Eyes:          Conjunctivae normal, anicteric sclera   Throat:   No oral lesions, no thrush, oral mucosa moist   Neck:   No adenopathy, supple, no JVD   Lungs:     respirations regular, even and unlabored   Abdomen:     Soft, RUQ tenderness, no rebound or guarding, non-distended, no hepatosplenomegaly   Rectal:     Deferred   Extremities:   No edema, no cyanosis   Skin:   No bruising or rash, no jaundice        Results Review:    CBC    Results from last 7 days   Lab Units 09/23/23  0106 09/22/23  1630   WBC 10*3/mm3 10.50 13.10*   HEMOGLOBIN g/dL 12.1* 14.0   PLATELETS 10*3/mm3 211 245     CMP   Results from last 7 days   Lab Units 09/23/23  0106 09/22/23  1630   SODIUM mmol/L 140 138   POTASSIUM mmol/L 3.6 4.7   CHLORIDE mmol/L 102 99   CO2 mmol/L 23.0 24.0   BUN mg/dL 18 22*   CREATININE mg/dL 1.38* 1.57*   GLUCOSE mg/dL 243* 381*   ALBUMIN g/dL 3.9 4.2   BILIRUBIN mg/dL 1.1 1.5*   ALK PHOS U/L 127* 149*   AST (SGOT) U/L 15 20   ALT (SGPT) U/L 11 12   LIPASE U/L  --  16     Cr Clearance Estimated Creatinine Clearance: 66.7 mL/min (A) (by C-G formula based on SCr of 1.38 mg/dL (H)).  Coag     HbA1C   Lab Results   Component Value Date     HGBA1C 11.60 (H) 08/04/2023    HGBA1C 9.00 (H) 03/08/2023    HGBA1C 7.5 (H) 06/13/2022     Blood Glucose   Glucose   Date/Time Value Ref Range Status   09/24/2023 0728 107 (H) 70 - 105 mg/dL Final     Comment:     Serial Number: [id number]:  [id number]   09/23/2023 2358 188 (H) 70 - 105 mg/dL Final     Comment:     Serial Number: [id number]:  [id number]   09/23/2023 2236 203 (H) 70 - 105 mg/dL Final     Comment:     Serial Number: [id number]:  [id number]   09/23/2023 1624 93 70 - 105 mg/dL Final     Comment:     Serial Number: [id number]:  [id number]   09/23/2023 1117 161 (H) 70 - 105 mg/dL Final     Comment:     Serial Number: [id number]:  [id number]   09/23/2023 0634 176 (H) 70 - 105 mg/dL Final     Comment:     Serial Number: [id number]:  [id number]   09/22/2023 2311 239 (H) 70 - 105 mg/dL Final     Comment:     Serial Number: [id number]:  [id number]   09/22/2023 1942 251 (H) 70 - 105 mg/dL Final     Comment:     Serial Number: [id number]:  [id number]     Infection   Results from last 7 days   Lab Units 09/23/23  0106   BLOODCX  No growth at 24 hours  Abnormal Stain*   BCIDPCR  Staph spp, not aureus or lugdunensis. Identification by BCID2 PCR.*     UA    Results from last 7 days   Lab Units 09/22/23  2003   NITRITE UA  Negative     Radiology(recent) CT Abdomen Pelvis Without Contrast    Result Date: 9/22/2023  Impression: No acute abnormality Cholelithiasis without CT evidence of acute cholecystitis. Electronically Signed: Sunny Humphries DO  9/22/2023 5:40 PM EDT  Workstation ID: [id number]    US Abdomen Limited    Result Date: 9/22/2023  Impression: Cholelithiasis with a mildly thickened gallbladder wall and positive sonographic Fleming sign is highly concerning for  cholecystitis. No evidence of biliary ductal dilation. Electronically Signed: Sunny Humphries DO  9/22/2023 10:15 PM EDT  Workstation ID: [id number]        Assessment & Plan      ASSESSMENT:  58-year-old male with history of EOE, diabetes, CKD, CVA on Eliquis, PVD, and right BKA who presented with complaints of abdominal pain, n/v, and diarrhea x1 week.     -Acute onset abdominal pain/n/v - related to Ozempic vs gastroparesis vs cholelithiasis vs infectious vs other  -Diarrhea - resolved  -Abnormal ultrasound showing cholelithiasis with positive Fleming sign, no biliary dilatation  -Cholelithiasis  -Microcytic anemia-no overt GI bleeding  -History of CVA on Eliquis  -Diabetes  -History of EoE with esophageal stricture s/p dilation        PLAN:  Patient had HIDA scan today which did not show acute cystitis. General surgery is following.  Continue PPI and IVF.  Plan to proceed with scheduled outpatient EGD for esophageal dilation.  We will sign off.  Call if needed.    Electronically signed by KEYLA Galeas, 09/24/23, 10:40 AM EDT.

## 2023-09-24 NOTE — PLAN OF CARE
Goal Outcome Evaluation:      Patient alert and oriented. Patient states that he does not have any nausea at the moment. Zofran has helped. Patient did state that he had some slight neck discomfort and stated it could be from sleeping awkwardly. Patient diet adjusted to consistent carb/heart healthy. Diane scan pending at time of documenting care plan.

## 2023-09-24 NOTE — PLAN OF CARE
Goal Outcome Evaluation:                      Pt is A&O x4, on room air, NPO since midnight. NS running per order.

## 2023-09-24 NOTE — PLAN OF CARE
Goal Outcome Evaluation:      Patient is alert and oriented. Patient states that he has some lower neck pain. Patient refused tylenol intervention. Patient states that he has some discomfort in right wrist. Patient to go down soon for MRI procedure.

## 2023-09-24 NOTE — PROGRESS NOTES
[facility] H[facility]P    Patient Name: Kuldeep Adhikari  : 1965  MRN: [id number]  Primary Care Physician: Laura Whitaker MD  Date of admission: 2023     Patient Care Team:  Laura Whitaker MD as PCP - General  Tamara Michaels MD as Consulting Physician (Endocrinology)  ERWIN Baker DPM as Consulting Physician (Podiatry)          Subjective   History Present Illness     Chief Complaint:   Chief Complaint   Patient presents with    Diarrhea     Diarrhea and vomiting since taking his second dose of ozempic, chills.     Abd pain, n/v      History of Present Illness      58-year-old who presents to the emergency department with abdominal pain vomiting and diarrhea. He states that he started Ozempic last week and he had a few days of vomiting and diarrhea. Took a second dose on Monday and he has been with persistent abdominal pain nausea vomiting diarrhea. Has vomited somewhat she is just severely nauseated now. His abdominal pain is generalized. He was placed on it because he states that his insurance company was not paying for one of his insulin medications. States that his blood glucose has been running in the 200s. He has not had fevers. He has had some chills.      Observation 23  Pt concurs with er hpi. GI and endocrine are consulted. General surgery was consulted.    Observation 23  Pt seen by GI, gen surgery and endocrine. Hida scan ordered. Continue abx. Hold eliquis.     ROS    Constitutional: Positive for chills. Negative for fever.   Gastrointestinal:  Positive for bloating, nausea and vomiting. Negative for diarrhea.       Personal History     Past Medical History:   Past Medical History:   Diagnosis Date    Allergic     CKD (chronic kidney disease), stage III     Depression     Depression with suicidal ideation 2021    DJD (degenerative joint disease)     DVT (deep venous thrombosis)     Ganglion     rt wrist    Gangrene of foot 10/09/2015    GERD  (gastroesophageal reflux disease)     Headache     Heart murmur     Hiatal hernia     History of esophageal stricture     s/p dialation 40-50 times last EGD 04/2016    Hyperlipidemia     Hypertension     Hypothyroidism     IBS (irritable bowel syndrome)     Neuropathy     Osteomyelitis of right foot 03/19/2020    Pulmonary embolism 01/19/2017    Rash     rt lower hip    Retinopathy     S/P BKA (below knee amputation), right 03/18/2022    Seasonal allergies     Sepsis due to group B Streptococcus 03/19/2020    Shortness of breath     Sleep apnea     Stroke     Type 2 diabetes mellitus with peripheral vascular disease     Vitamin D deficiency        Surgical History:      Past Surgical History:   Procedure Laterality Date    AMPUTATION DIGIT Left 4/26/2022    Procedure: AMPUTATION left great toe;  Surgeon: ERWIN Baker DPM;  Location: [facility] MAIN OR;  Service: Podiatry;  Laterality: Left;    AMPUTATION DIGIT  4/26/2022    Procedure: ;  Surgeon: ERWIN Baker DPM;  Location: [facility] MAIN OR;  Service: Podiatry;;    AMPUTATION FOOT / TOE Right     great toe    AMPUTATION REVISION Right 4/8/2021    Procedure: BELOW KNEE AMPUTATION REVISAION;  Surgeon: Eduarod Reynolds MD;  Location: [facility] MAIN OR;  Service: Orthopedics;  Laterality: Right;    AMPUTATION REVISION Right 4/29/2021    Procedure: AMPUTATION REVISION KNEE STUMP;  Surgeon: Eduardo Reynolds MD;  Location: [facility] MAIN OR;  Service: Orthopedics;  Laterality: Right;    BELOW KNEE AMPUTATION Right 7/30/2020    Procedure: AMPUTATION BELOW KNEE;  Surgeon: Eduardo Reynolds MD;  Location: [facility] MAIN OR;  Service: Orthopedics;  Laterality: Right;    CARDIAC CATHETERIZATION  04/2018    [facility]    COLONOSCOPY      ENDOSCOPY      ENDOSCOPY N/A 10/4/2019    Procedure: ESOPHAGOGASTRODUODENOSCOPY with dilitation and biopsy x 1 area;  Surgeon: Declan Iqbal MD;  Location: [facility] ENDOSCOPY;  Service: Gastroenterology    ENDOSCOPY N/A 12/13/2019    Procedure:  ESOPHAGOGASTRODUODENOSCOPY WITH DILATATION (50, 52 BOUGIE);  Surgeon: Declan Iqbal MD;  Location: [facility] ENDOSCOPY;  Service: Gastroenterology    ENDOSCOPY N/A 8/6/2021    Procedure: ESOPHAGOGASTRODUODENOSCOPY with biopsy x1 area and esophageal dilation (56FR Bougie);  Surgeon: Hussein Talavera MD;  Location: [facility] ENDOSCOPY;  Service: Gastroenterology;  Laterality: N/A;  post: hiatal hernia, erosive gastritis    ENDOSCOPY N/A 12/13/2022    Procedure: ESOPHAGOGASTRODUODENOSCOPY WITH DILATATION (BOUGIE #54, #56) AND BIOPSY X1;  Surgeon: David Rodriguez MD;  Location: [facility] ENDOSCOPY;  Service: Gastroenterology;  Laterality: N/A;  POST: dysphagia     EYE SURGERY      GANGLION CYST EXCISION Left     HERNIA REPAIR Bilateral     INCISION AND DRAINAGE OF WOUND Right 6/15/2022    Procedure: INCISION AND DRAINAGE WOUND with debridement;  Surgeon: Fito Forte MD;  Location: [facility] MAIN OR;  Service: Orthopedics;  Laterality: Right;    JOINT REPLACEMENT      Left total hip    RETINOPATHY SURGERY      laser    TOTAL HIP ARTHROPLASTY Left     TOTAL HIP ARTHROPLASTY Left 2018    TRANS METATARSAL AMPUTATION Right 3/17/2020    Procedure: AMPUTATION TRANS METATARSAL right;  Surgeon: ERWIN Baker DPM;  Location: [facility] MAIN OR;  Service: Podiatry;  Laterality: Right;  GANGRENOUS RIGHT FOOT    VASECTOMY             Family History: family history includes Arthritis in his mother; Cancer in an other family member; Colon cancer in an other family member; Diabetes in his mother; Heart disease in his mother; Hyperlipidemia in his mother and another family member; Hypertension in an other family member; Leukemia in his father; Sleep apnea in his maternal aunt; Stroke in his maternal grandmother. Otherwise pertinent FHx was reviewed and unremarkable.     Social History:  reports that he has never smoked. He has never used smokeless tobacco. He reports current alcohol use. He reports that he does not use  drugs.      Medications:  Prior to Admission medications    Medication Sig Start Date End Date Taking? Authorizing Provider   Accu-Chek Softclix Lancets lancets Use as directed to test blood sugar 4 times daily before meals and at bedtime. 8/14/23 8/13/24 Yes Dionicio Ramachandran MD   amLODIPine (NORVASC) 5 MG tablet Take 1 tablet by mouth Daily. 3/14/23  Yes Laura Whitaker MD   apixaban (Eliquis) 5 MG tablet tablet Take 1 tablet by mouth 2 (Two) Times a Day. 6/21/23  Yes Laura Whitaker MD   aspirin 81 MG EC tablet Take 1 tablet by mouth Daily. 8/9/21  Yes Endy Lazaro DO   atorvastatin (LIPITOR) 80 MG tablet Take 1 tablet by mouth Every Night. 6/21/23  Yes Laura Whitaker MD   Blood Glucose Monitoring Suppl (Accu-Chek Guide) w/Device kit See Admin Instructions. 8/14/23  Yes Padilla Henderson MD   buPROPion XL (Wellbutrin XL) 150 MG 24 hr tablet Take 1 tablet by mouth Every Morning. 3/14/23  Yes Laura Whitaker MD   busPIRone (BUSPAR) 10 MG tablet Take 1 tablet by mouth Every 12 (Twelve) Hours. 6/21/23  Yes Laura Whitaker MD   CINNAMON PO Take  by mouth.   Yes Padilla Henderson MD   Continuous Blood Gluc  (Dexcom G6 ) device 1 Device Daily. Use to check BS 8/28/23  Yes Dionicio Ramachandran MD   cyclobenzaprine (FLEXERIL) 10 MG tablet Take I tab q 8 hrs as needed for tension headaches 3/13/23  Yes Laura Whitaker MD   DULoxetine (CYMBALTA) 60 MG capsule Take 1 capsule by mouth Every Morning. 6/21/23  Yes Laura Whitaker MD   glucose blood (Accu-Chek Guide) test strip Use as instructed to test blood sugar 4 times daily before meals and at bedtime 8/14/23  Yes Dionicio Ramachandran MD   insulin aspart (NovoLOG FlexPen) 100 UNIT/ML solution pen-injector sc pen Inject 12 Units under the skin into the appropriate area as directed 3 (Three) Times a Day With Meals. 9/16/23  Yes Dionicio Ramachandran MD   Insulin Glargine (Lantus SoloStar) 100 UNIT/ML  injection pen Inject 34 Units under the skin into the appropriate area as directed Every Night for 265 days. 8/28/23 5/19/24 Yes Dionicio Ramachandran MD   levothyroxine (Synthroid) 100 MCG tablet Take 1 tablet by mouth Every Morning. 6/21/23  Yes Laura Whitaker MD   meclizine (ANTIVERT) 25 MG tablet Take 1 tablet by mouth 3 (Three) Times a Day As Needed for Dizziness. 3/15/23  Yes Beba Everett APRN   metoprolol succinate XL (TOPROL-XL) 100 MG 24 hr tablet Take 1 tablet by mouth Daily. 6/21/23  Yes Laura Whitaker MD   multivitamin with minerals tablet tablet Take 1 tablet by mouth Daily.   Yes Padilla Henderson MD   omeprazole (priLOSEC) 40 MG capsule Take 1 capsule by mouth Daily. 6/21/23  Yes Laura Whitaker MD   ondansetron ODT (ZOFRAN-ODT) 4 MG disintegrating tablet Place 1 tablet on the tongue Every 8 (Eight) Hours As Needed for Nausea or Vomiting. 9/15/23  Yes Laura Whitaker MD   pregabalin (LYRICA) 100 MG capsule Take 1 capsule by mouth 2 (Two) Times a Day. 8/11/23  Yes Bridger Cantu MD   Semaglutide,0.25 or 0.5MG/DOS, (OZEMPIC) 2 MG/1.5ML solution pen-injector Inject 0.5 mg under the skin into the appropriate area as directed 1 (One) Time Per Week. 8/28/23  Yes Dionicio Ramachandran MD   Semaglutide,0.25 or 0.5MG/DOS, (Ozempic, 0.25 or 0.5 MG/DOSE,) 2 MG/3ML solution pen-injector Inject 0.25 mg under the skin into the appropriate area as directed 1 (One) Time Per Week. 8/28/23  Yes Dionicio Ramachandran MD   Continuous Blood Gluc Sensor (Dexcom G6 Sensor) Every 10 (Ten) Days. Use to check BS daily 8/28/23   Dionicio Ramachandran MD   Continuous Blood Gluc Transmit (Dexcom G6 Transmitter) misc 1 each 4 (Four) Times a Day Before Meals & at Bedtime. Change every 3 months 8/28/23   Dionicio Ramachandran MD   Glucagon (Gvoke HypoPen 2-Pack) 1 MG/0.2ML solution auto-injector Inject 1 mg under the skin into the appropriate area as directed As Needed (Hypoglycemia). 8/28/23   Dionicio Ramachandran MD    HYDROcodone-acetaminophen (NORCO) 5-325 MG per tablet Take 1 tablet by mouth Every Morning. 7/1/22   Provider, MD Padilla   Insulin Lispro (ADMELOG SOLOSTAR) 100 UNIT/ML injection pen Inject 10 Units under the skin into the appropriate area as directed 3 (Three) Times a Day With Meals. 9/13/23   Dionicio Ramachandran MD       Allergies:    Allergies   Allergen Reactions    Lisinopril Cough    Cefixime Other (See Comments)       Objective   Objective     Vital Signs  Temp:  [97.8 °F (36.6 °C)-98.4 °F (36.9 °C)] 98 °F (36.7 °C)  Heart Rate:  [71-82] 82  Resp:  [14-25] 25  BP: (110-137)/(49-74) 122/71  SpO2:  [94 %-98 %] 98 %  on   ;   Device (Oxygen Therapy): room air  Body mass index is 27.42 kg/m².    Physical Exam    Constitutional:       Appearance: Normal appearance.   HENT:      Mouth/Throat:      Mouth: Mucous membranes are moist.   Cardiovascular:      Rate and Rhythm: Normal rate and regular rhythm.      Pulses: Normal pulses.      Heart sounds: Normal heart sounds.   Pulmonary:      Effort: Pulmonary effort is normal.      Breath sounds: Normal breath sounds.   Abdominal:      Tenderness: There is abdominal tenderness.   Skin:     General: Skin is warm and dry.      Coloration: Skin is not jaundiced.   Neurological:      General: No focal deficit present.      Mental Status: He is alert and oriented to person, place, and time.   Psychiatric:         Mood and Affect: Mood normal.         Behavior: Behavior normal.     Results Review:  I have personally reviewed most recent lab results and radiology images and interpretations and agree with findings, most notably: cbc, cmp, lipase, ct abd, us gallbladder.    Results from last 7 days   Lab Units 09/23/23  0106   WBC 10*3/mm3 10.50   HEMOGLOBIN g/dL 12.1*   HEMATOCRIT % 37.9   PLATELETS 10*3/mm3 211     Results from last 7 days   Lab Units 09/23/23  0106   SODIUM mmol/L 140   POTASSIUM mmol/L 3.6   CHLORIDE mmol/L 102   CO2 mmol/L 23.0   BUN mg/dL 18   CREATININE  mg/dL 1.38*   GLUCOSE mg/dL 243*   CALCIUM mg/dL 9.0   ALT (SGPT) U/L 11   AST (SGOT) U/L 15     Estimated Creatinine Clearance: 66.7 mL/min (A) (by C-G formula based on SCr of 1.38 mg/dL (H)).  Brief Urine Lab Results  (Last result in the past 365 days)        Color   Clarity   Blood   Leuk Est   Nitrite   Protein   CREAT   Urine HCG        09/22/23 2003 Yellow   Clear   Negative   Negative   Negative   Negative                   Microbiology Results (last 10 days)       Procedure Component Value - Date/Time    Blood Culture - Blood, Arm, Left [[id number]]  (Normal) Collected: 09/23/23 0106    Lab Status: Preliminary result Specimen: Blood from Arm, Left Updated: 09/24/23 0131     Blood Culture No growth at 24 hours    Narrative:      Less than seven (7) mL's of blood was collected.  Insufficient quantity may yield false negative results.    Blood Culture - Blood, Hand, Right [[id number]]  (Abnormal) Collected: 09/23/23 0106    Lab Status: Preliminary result Specimen: Blood from Hand, Right Updated: 09/24/23 0119     Blood Culture Abnormal Stain     Gram Stain Aerobic Bottle Gram positive cocci in pairs and clusters    Narrative:      Less than seven (7) mL's of blood was collected.  Insufficient quantity may yield false negative results.    Blood Culture ID, PCR - Blood, Hand, Right [[id number]]  (Abnormal) Collected: 09/23/23 0106    Lab Status: Final result Specimen: Blood from Hand, Right Updated: 09/24/23 0120     BCID, PCR Staph spp, not aureus or lugdunensis. Identification by BCID2 PCR.     BOTTLE TYPE Aerobic Bottle            ECG/EMG Results (most recent)       Procedure Component Value Units Date/Time    SCANNED - TELEMETRY   [[id number]] Resulted: 09/22/23     Updated: 09/23/23 0155    ECG 12 Lead Tachycardia [[id number]] Collected: 09/22/23 1657     Updated: 09/23/23 0624     QT Interval 322 ms      QTC Interval 427 ms     Narrative:      HEART RATE= 106  bpm  RR Interval= 568  ms  AK Interval= 169  ms  P  Horizontal Axis= 13  deg  P Front Axis= 58  deg  QRSD Interval= 76  ms  QT Interval= 322  ms  QTcB= 427  ms  QRS Axis= 26  deg  T Wave Axis= 145  deg  - ABNORMAL ECG -  Sinus tachycardia  Nonspecific T abnrm, anterolateral leads  When compared with ECG of 07-Mar-2023 18:39:51,  Significant rate increase  Significant repolarization change  Significant axis, voltage or hypertrophy change  Electronically Signed By: Ernesto Canada) 23-Sep-2023 06:23:48  Date and Time of Study: 2023-09-22 16:57:07    SCANNED - TELEMETRY   [[id number]] Resulted: 09/22/23     Updated: 09/23/23 1651    SCANNED - TELEMETRY   [[id number]] Resulted: 09/22/23     Updated: 09/23/23 1715    SCANNED - TELEMETRY   [[id number]] Resulted: 09/22/23     Updated: 09/23/23 1740    SCANNED - TELEMETRY   [[id number]] Resulted: 09/22/23     Updated: 09/23/23 1956            Results for orders placed during the hospital encounter of 09/22/23    Duplex Venous Upper Extremity RIGHT    Interpretation Summary    Normal right upper extremity venous duplex scan.      Results for orders placed during the hospital encounter of 03/18/22    Adult Transthoracic Echo Complete W/ Cont if Necessary Per Protocol (With Agitated Saline)    Interpretation Summary  Normal LV size and contractility EF of 60 to 65%  Normal RV size  Mild left atrial enlargement seen  Aortic valve appears structurally normal  Mitral valve leaflets are thickened anterior mitral leaflet appears calcified, but has good cusp separation.   No significant MR seen.  Tricuspid valve appears structurally normal, no significant regurgitation seen.  No pericardial effusion seen.  Proximal aorta appears normal in size.      CT Abdomen Pelvis Without Contrast    Result Date: 9/22/2023  Impression: No acute abnormality Cholelithiasis without CT evidence of acute cholecystitis. Electronically Signed: Sunny Humphries DO  9/22/2023 5:40 PM EDT  Workstation ID: [id number]    US Abdomen Limited    Result Date:  9/22/2023  Impression: Cholelithiasis with a mildly thickened gallbladder wall and positive sonographic Fleming sign is highly concerning for  cholecystitis. No evidence of biliary ductal dilation. Electronically Signed: Sunny Humphries DO  9/22/2023 10:15 PM EDT  Workstation ID: [id number]       Estimated Creatinine Clearance: 66.7 mL/min (A) (by C-G formula based on SCr of 1.38 mg/dL (H)).    Assessment & Plan   Assessment/Plan       Active Hospital Problems    Diagnosis  POA    **Abdominal pain [R10.9]  Unknown    Nausea, vomiting and diarrhea [R11.2, R19.7]  Unknown    Calculus of gallbladder without cholecystitis without obstruction [K80.20]  Unknown      Resolved Hospital Problems   No resolved problems to display.     Abdominal pain  - WBC 13.1 on admission 10.5 today  - cr 1.57 and bun 22 mildly elevated on admission, Cr 1.38 bun 18 today  - lipase and UA are normal  - CT abd reviewed and showing cholelithiasis with out ct evidence of cholecystitis  - US gallbladder reviewed and showing cholelithiasiswith a mildly thickened gallbladder wall and positive murphys sign  - IV rocephin and flagyl   - GI consulted  - General surgery consulted  - hida scan pending     Diabetes mellitus  -poorly controlled         Lab Results   Component Value Date     GLUCOSE 243 (H) 09/23/2023     GLUCOSE 381 (H) 09/22/2023     GLUCOSE 584 (C) 09/15/2023     GLUCOSE 302 (H) 03/08/2023      -Hold ozempic  -lantus and lispro   -Diabetic diet  -Monitor AC and HS      Hypertension  -well Controlled       BP Readings from Last 1 Encounters:   09/23/23 118/62      - Continue norvasc, metoprolol  - Monitor while admitted      Hx of PE  - pt taking eliquis-will hold this am due to possible sx     Hypothyroidism  - cont home synthroid            VTE Prophylaxis -   Mechanical Order History:        Ordered        09/23/23 0749  Place Sequential Compression Device  Once            09/23/23 0749  Maintain Sequential Compression Device   Continuous            09/22/23 2206  Place Sequential Compression Device  Once            09/22/23 2206  Maintain Sequential Compression Device  Continuous                          Pharmalogical Order History:        Ordered     Dose Route Frequency Stop    09/23/23 0058  apixaban (ELIQUIS) tablet 5 mg         5 mg PO Once 09/23/23 0115    09/22/23 2206  Pharmacy to Dose enoxaparin (LOVENOX)  Status:  Discontinued        Question:  Indication of use  Answer:  Prophylaxis    -- XX Continuous PRN 09/23/23 0101                    CODE STATUS:    Code Status and Medical Interventions:   Ordered at: 09/23/23 0749     Code Status (Patient has no pulse and is not breathing):    CPR (Attempt to Resuscitate)     Medical Interventions (Patient has pulse or is breathing):    Full Support       This patient has been examined wearing personal protective equipment.     I discussed the patient's findings and my recommendations with patient and nursing staff.      Signature:Electronically signed by Liudmila Garcia PA-C, 09/24/23, 12:15 PM EDT.

## 2023-09-25 LAB
ANION GAP SERPL CALCULATED.3IONS-SCNC: 8 MMOL/L (ref 5–15)
BASOPHILS # BLD AUTO: 0.1 10*3/MM3 (ref 0–0.2)
BASOPHILS NFR BLD AUTO: 1.4 % (ref 0–1.5)
BUN SERPL-MCNC: 13 MG/DL (ref 6–20)
BUN/CREAT SERPL: 10.7 (ref 7–25)
CALCIUM SPEC-SCNC: 8.3 MG/DL (ref 8.6–10.5)
CHLORIDE SERPL-SCNC: 109 MMOL/L (ref 98–107)
CO2 SERPL-SCNC: 25 MMOL/L (ref 22–29)
CREAT SERPL-MCNC: 1.22 MG/DL (ref 0.76–1.27)
DEPRECATED RDW RBC AUTO: 48.1 FL (ref 37–54)
EGFRCR SERPLBLD CKD-EPI 2021: 68.3 ML/MIN/1.73
EOSINOPHIL # BLD AUTO: 1.2 10*3/MM3 (ref 0–0.4)
EOSINOPHIL NFR BLD AUTO: 22.2 % (ref 0.3–6.2)
ERYTHROCYTE [DISTWIDTH] IN BLOOD BY AUTOMATED COUNT: 17.9 % (ref 12.3–15.4)
GLUCOSE BLDC GLUCOMTR-MCNC: 115 MG/DL (ref 70–105)
GLUCOSE BLDC GLUCOMTR-MCNC: 141 MG/DL (ref 70–105)
GLUCOSE BLDC GLUCOMTR-MCNC: 150 MG/DL (ref 70–105)
GLUCOSE BLDC GLUCOMTR-MCNC: 179 MG/DL (ref 70–105)
GLUCOSE BLDC GLUCOMTR-MCNC: 46 MG/DL (ref 70–105)
GLUCOSE BLDC GLUCOMTR-MCNC: 70 MG/DL (ref 70–105)
GLUCOSE SERPL-MCNC: 115 MG/DL (ref 65–99)
HCT VFR BLD AUTO: 34.9 % (ref 37.5–51)
HGB BLD-MCNC: 11 G/DL (ref 13–17.7)
LYMPHOCYTES # BLD AUTO: 0.7 10*3/MM3 (ref 0.7–3.1)
LYMPHOCYTES NFR BLD AUTO: 13 % (ref 19.6–45.3)
MCH RBC QN AUTO: 22.9 PG (ref 26.6–33)
MCHC RBC AUTO-ENTMCNC: 31.6 G/DL (ref 31.5–35.7)
MCV RBC AUTO: 72.5 FL (ref 79–97)
MONOCYTES # BLD AUTO: 0.4 10*3/MM3 (ref 0.1–0.9)
MONOCYTES NFR BLD AUTO: 7.3 % (ref 5–12)
NEUTROPHILS NFR BLD AUTO: 3.1 10*3/MM3 (ref 1.7–7)
NEUTROPHILS NFR BLD AUTO: 56.1 % (ref 42.7–76)
NRBC BLD AUTO-RTO: 0 /100 WBC (ref 0–0.2)
PLATELET # BLD AUTO: 196 10*3/MM3 (ref 140–450)
PMV BLD AUTO: 8.3 FL (ref 6–12)
POTASSIUM SERPL-SCNC: 3.8 MMOL/L (ref 3.5–5.2)
RBC # BLD AUTO: 4.81 10*6/MM3 (ref 4.14–5.8)
SODIUM SERPL-SCNC: 142 MMOL/L (ref 136–145)
WBC NRBC COR # BLD: 5.6 10*3/MM3 (ref 3.4–10.8)

## 2023-09-25 PROCEDURE — 80048 BASIC METABOLIC PNL TOTAL CA: CPT | Performed by: PHYSICIAN ASSISTANT

## 2023-09-25 PROCEDURE — 25010000002 CEFTRIAXONE PER 250 MG: Performed by: SURGERY

## 2023-09-25 PROCEDURE — 63710000001 INSULIN GLARGINE PER 5 UNITS: Performed by: INTERNAL MEDICINE

## 2023-09-25 PROCEDURE — 85025 COMPLETE CBC W/AUTO DIFF WBC: CPT | Performed by: PHYSICIAN ASSISTANT

## 2023-09-25 PROCEDURE — 25010000002 ENOXAPARIN PER 10 MG: Performed by: HOSPITALIST

## 2023-09-25 PROCEDURE — 82948 REAGENT STRIP/BLOOD GLUCOSE: CPT

## 2023-09-25 PROCEDURE — 96372 THER/PROPH/DIAG INJ SC/IM: CPT

## 2023-09-25 PROCEDURE — 87040 BLOOD CULTURE FOR BACTERIA: CPT | Performed by: NURSE PRACTITIONER

## 2023-09-25 PROCEDURE — 99232 SBSQ HOSP IP/OBS MODERATE 35: CPT | Performed by: SURGERY

## 2023-09-25 PROCEDURE — 63710000001 INSULIN LISPRO (HUMAN) PER 5 UNITS: Performed by: INTERNAL MEDICINE

## 2023-09-25 PROCEDURE — 96375 TX/PRO/DX INJ NEW DRUG ADDON: CPT

## 2023-09-25 PROCEDURE — 99231 SBSQ HOSP IP/OBS SF/LOW 25: CPT | Performed by: INTERNAL MEDICINE

## 2023-09-25 PROCEDURE — G0378 HOSPITAL OBSERVATION PER HR: HCPCS

## 2023-09-25 RX ORDER — CYCLOBENZAPRINE HCL 10 MG
10 TABLET ORAL 3 TIMES DAILY
Status: DISCONTINUED | OUTPATIENT
Start: 2023-09-25 | End: 2023-09-25

## 2023-09-25 RX ORDER — CYCLOBENZAPRINE HCL 10 MG
10 TABLET ORAL 3 TIMES DAILY PRN
Status: DISCONTINUED | OUTPATIENT
Start: 2023-09-25 | End: 2023-09-28 | Stop reason: HOSPADM

## 2023-09-25 RX ORDER — INSULIN ASPART 100 [IU]/ML
12 INJECTION, SOLUTION INTRAVENOUS; SUBCUTANEOUS
Qty: 15 ML | Refills: 2 | Status: SHIPPED | OUTPATIENT
Start: 2023-09-25

## 2023-09-25 RX ORDER — HYDROCODONE BITARTRATE AND ACETAMINOPHEN 5; 325 MG/1; MG/1
1 TABLET ORAL ONCE
Status: COMPLETED | OUTPATIENT
Start: 2023-09-25 | End: 2023-09-25

## 2023-09-25 RX ORDER — ENOXAPARIN SODIUM 100 MG/ML
1 INJECTION SUBCUTANEOUS ONCE
Status: COMPLETED | OUTPATIENT
Start: 2023-09-25 | End: 2023-09-25

## 2023-09-25 RX ADMIN — METOPROLOL SUCCINATE 100 MG: 50 TABLET, EXTENDED RELEASE ORAL at 08:47

## 2023-09-25 RX ADMIN — SODIUM CHLORIDE 100 ML/HR: 450 INJECTION, SOLUTION INTRAVENOUS at 23:05

## 2023-09-25 RX ADMIN — SENNOSIDES AND DOCUSATE SODIUM 2 TABLET: 50; 8.6 TABLET ORAL at 08:47

## 2023-09-25 RX ADMIN — Medication 10 ML: at 08:56

## 2023-09-25 RX ADMIN — METRONIDAZOLE 500 MG: 500 TABLET ORAL at 21:04

## 2023-09-25 RX ADMIN — SODIUM CHLORIDE 100 ML/HR: 450 INJECTION, SOLUTION INTRAVENOUS at 02:21

## 2023-09-25 RX ADMIN — DULOXETINE HYDROCHLORIDE 60 MG: 30 CAPSULE, DELAYED RELEASE ORAL at 08:46

## 2023-09-25 RX ADMIN — INSULIN LISPRO 2 UNITS: 100 INJECTION, SOLUTION INTRAVENOUS; SUBCUTANEOUS at 18:06

## 2023-09-25 RX ADMIN — ATORVASTATIN CALCIUM 80 MG: 40 TABLET, FILM COATED ORAL at 21:04

## 2023-09-25 RX ADMIN — PREGABALIN 100 MG: 100 CAPSULE ORAL at 21:05

## 2023-09-25 RX ADMIN — AMLODIPINE BESYLATE 5 MG: 5 TABLET ORAL at 08:48

## 2023-09-25 RX ADMIN — Medication 10 ML: at 21:05

## 2023-09-25 RX ADMIN — METRONIDAZOLE 500 MG: 500 TABLET ORAL at 05:45

## 2023-09-25 RX ADMIN — CEFTRIAXONE 2000 MG: 2 INJECTION, POWDER, FOR SOLUTION INTRAMUSCULAR; INTRAVENOUS at 21:04

## 2023-09-25 RX ADMIN — BUSPIRONE HYDROCHLORIDE 10 MG: 5 TABLET ORAL at 21:05

## 2023-09-25 RX ADMIN — SENNOSIDES AND DOCUSATE SODIUM 2 TABLET: 50; 8.6 TABLET ORAL at 21:05

## 2023-09-25 RX ADMIN — BUPROPION HYDROCHLORIDE 150 MG: 150 TABLET, EXTENDED RELEASE ORAL at 08:47

## 2023-09-25 RX ADMIN — Medication 5 MG: at 21:04

## 2023-09-25 RX ADMIN — PREGABALIN 100 MG: 100 CAPSULE ORAL at 08:48

## 2023-09-25 RX ADMIN — ASPIRIN 81 MG: 81 TABLET, COATED ORAL at 08:46

## 2023-09-25 RX ADMIN — PANTOPRAZOLE SODIUM 40 MG: 40 TABLET, DELAYED RELEASE ORAL at 08:47

## 2023-09-25 RX ADMIN — DEXTROSE MONOHYDRATE 25 G: 25 INJECTION, SOLUTION INTRAVENOUS at 19:38

## 2023-09-25 RX ADMIN — INSULIN LISPRO 5 UNITS: 100 INJECTION, SOLUTION INTRAVENOUS; SUBCUTANEOUS at 12:32

## 2023-09-25 RX ADMIN — LEVOTHYROXINE SODIUM 100 MCG: 0.1 TABLET ORAL at 05:45

## 2023-09-25 RX ADMIN — BUSPIRONE HYDROCHLORIDE 10 MG: 5 TABLET ORAL at 08:48

## 2023-09-25 RX ADMIN — ENOXAPARIN SODIUM 80 MG: 100 INJECTION SUBCUTANEOUS at 12:32

## 2023-09-25 RX ADMIN — INSULIN LISPRO 5 UNITS: 100 INJECTION, SOLUTION INTRAVENOUS; SUBCUTANEOUS at 18:05

## 2023-09-25 RX ADMIN — CYCLOBENZAPRINE 10 MG: 10 TABLET, FILM COATED ORAL at 21:14

## 2023-09-25 RX ADMIN — HYDROCODONE BITARTRATE AND ACETAMINOPHEN 1 TABLET: 5; 325 TABLET ORAL at 12:32

## 2023-09-25 RX ADMIN — METRONIDAZOLE 500 MG: 500 TABLET ORAL at 14:21

## 2023-09-25 RX ADMIN — INSULIN GLARGINE 30 UNITS: 100 INJECTION, SOLUTION SUBCUTANEOUS at 08:48

## 2023-09-25 NOTE — PROGRESS NOTES
[facility] Progress Note    Patient Name: Kuldeep Adhikari  : 1965  MRN: [id number]  Primary Care Physician: Laura Whitaker MD  Date of admission: 2023     Patient Care Team:  Laura Whitaker MD as PCP - General  Tamara Michaels MD as Consulting Physician (Endocrinology)  ERWIN Baker DPM as Consulting Physician (Podiatry)          Subjective   History Present Illness     Chief Complaint:   Chief Complaint   Patient presents with    Diarrhea     Diarrhea and vomiting since taking his second dose of ozempic, chills.       Diarrhea     Diarrhea          History of Present Illness        58-year-old who presents to the emergency department with abdominal pain vomiting and diarrhea. He states that he started Ozempic last week and he had a few days of vomiting and diarrhea. Took a second dose on Monday and he has been with persistent abdominal pain nausea vomiting diarrhea. Has vomited somewhat she is just severely nauseated now. His abdominal pain is generalized. He was placed on it because he states that his insurance company was not paying for one of his insulin medications. States that his blood glucose has been running in the 200s. He has not had fevers. He has had some chills.      Observation 23  Pt concurs with er hpi. GI and endocrine are consulted. General surgery was consulted.     Observation 23  Pt seen by GI, gen surgery and endocrine. Hida scan ordered. Continue abx. Hold eliquis.      Observation 2023  Patient still complaints of abdominal pain which he rates 10/10. Eating breakfast now. No acute distress noted.     Review of Systems   Gastrointestinal:  Positive for diarrhea.   Constitutional: Positive for chills. Negative for fever.   Gastrointestinal:  Positive for bloating, nausea and vomiting. Negative for diarrhea.          Personal History     Past Medical History:   Past Medical History:   Diagnosis Date    Allergic     CKD (chronic  kidney disease), stage III     Depression     Depression with suicidal ideation 08/08/2021    DJD (degenerative joint disease)     DVT (deep venous thrombosis)     Ganglion     rt wrist    Gangrene of foot 10/09/2015    GERD (gastroesophageal reflux disease)     Headache     Heart murmur     Hiatal hernia     History of esophageal stricture     s/p dialation 40-50 times last EGD 04/2016    Hyperlipidemia     Hypertension     Hypothyroidism     IBS (irritable bowel syndrome)     Neuropathy     Osteomyelitis of right foot 03/19/2020    Pulmonary embolism 01/19/2017    Rash     rt lower hip    Retinopathy     S/P BKA (below knee amputation), right 03/18/2022    Seasonal allergies     Sepsis due to group B Streptococcus 03/19/2020    Shortness of breath     Sleep apnea     Stroke     Type 2 diabetes mellitus with peripheral vascular disease     Vitamin D deficiency        Surgical History:      Past Surgical History:   Procedure Laterality Date    AMPUTATION DIGIT Left 4/26/2022    Procedure: AMPUTATION left great toe;  Surgeon: ERWIN Baker DPM;  Location: [facility] MAIN OR;  Service: Podiatry;  Laterality: Left;    AMPUTATION DIGIT  4/26/2022    Procedure: ;  Surgeon: ERWIN Baker DPM;  Location: [facility] MAIN OR;  Service: Podiatry;;    AMPUTATION FOOT / TOE Right     great toe    AMPUTATION REVISION Right 4/8/2021    Procedure: BELOW KNEE AMPUTATION REVISAION;  Surgeon: Eduardo Reynolds MD;  Location: [facility] MAIN OR;  Service: Orthopedics;  Laterality: Right;    AMPUTATION REVISION Right 4/29/2021    Procedure: AMPUTATION REVISION KNEE STUMP;  Surgeon: Eduardo Reynolds MD;  Location: [facility] MAIN OR;  Service: Orthopedics;  Laterality: Right;    BELOW KNEE AMPUTATION Right 7/30/2020    Procedure: AMPUTATION BELOW KNEE;  Surgeon: Eduardo Reynolds MD;  Location: [facility] MAIN OR;  Service: Orthopedics;  Laterality: Right;    CARDIAC CATHETERIZATION  04/2018    [facility]    COLONOSCOPY      ENDOSCOPY      ENDOSCOPY N/A  10/4/2019    Procedure: ESOPHAGOGASTRODUODENOSCOPY with dilitation and biopsy x 1 area;  Surgeon: Declan Iqbal MD;  Location: [facility] ENDOSCOPY;  Service: Gastroenterology    ENDOSCOPY N/A 12/13/2019    Procedure: ESOPHAGOGASTRODUODENOSCOPY WITH DILATATION (50, 52 BOUGIE);  Surgeon: Declan Iqbal MD;  Location: [facility] ENDOSCOPY;  Service: Gastroenterology    ENDOSCOPY N/A 8/6/2021    Procedure: ESOPHAGOGASTRODUODENOSCOPY with biopsy x1 area and esophageal dilation (56FR Bougie);  Surgeon: Hussein Talavera MD;  Location: [facility] ENDOSCOPY;  Service: Gastroenterology;  Laterality: N/A;  post: hiatal hernia, erosive gastritis    ENDOSCOPY N/A 12/13/2022    Procedure: ESOPHAGOGASTRODUODENOSCOPY WITH DILATATION (BOUGIE #54, #56) AND BIOPSY X1;  Surgeon: David Rodriguez MD;  Location: [facility] ENDOSCOPY;  Service: Gastroenterology;  Laterality: N/A;  POST: dysphagia     EYE SURGERY      GANGLION CYST EXCISION Left     HERNIA REPAIR Bilateral     INCISION AND DRAINAGE OF WOUND Right 6/15/2022    Procedure: INCISION AND DRAINAGE WOUND with debridement;  Surgeon: Fito Forte MD;  Location: [facility] MAIN OR;  Service: Orthopedics;  Laterality: Right;    JOINT REPLACEMENT      Left total hip    RETINOPATHY SURGERY      laser    TOTAL HIP ARTHROPLASTY Left     TOTAL HIP ARTHROPLASTY Left 2018    TRANS METATARSAL AMPUTATION Right 3/17/2020    Procedure: AMPUTATION TRANS METATARSAL right;  Surgeon: ERWIN Baker DPM;  Location: [facility] MAIN OR;  Service: Podiatry;  Laterality: Right;  GANGRENOUS RIGHT FOOT    VASECTOMY             Family History: family history includes Arthritis in his mother; Cancer in an other family member; Colon cancer in an other family member; Diabetes in his mother; Heart disease in his mother; Hyperlipidemia in his mother and another family member; Hypertension in an other family member; Leukemia in his father; Sleep apnea in his maternal aunt; Stroke in his maternal grandmother.  Otherwise pertinent FHx was reviewed and unremarkable.     Social History:  reports that he has never smoked. He has never used smokeless tobacco. He reports current alcohol use. He reports that he does not use drugs.      Medications:  Prior to Admission medications    Medication Sig Start Date End Date Taking? Authorizing Provider   Accu-Chek Softclix Lancets lancets Use as directed to test blood sugar 4 times daily before meals and at bedtime. 8/14/23 8/13/24 Yes Dionicio Ramachandran MD   amLODIPine (NORVASC) 5 MG tablet Take 1 tablet by mouth Daily. 3/14/23  Yes Laura Whitaker MD   apixaban (Eliquis) 5 MG tablet tablet Take 1 tablet by mouth 2 (Two) Times a Day. 6/21/23  Yes Laura Whitaker MD   aspirin 81 MG EC tablet Take 1 tablet by mouth Daily. 8/9/21  Yes Endy Lazaro,    atorvastatin (LIPITOR) 80 MG tablet Take 1 tablet by mouth Every Night. 6/21/23  Yes Laura Whitaker MD   Blood Glucose Monitoring Suppl (Accu-Chek Guide) w/Device kit See Admin Instructions. 8/14/23  Yes Padilla Henderson MD   buPROPion XL (Wellbutrin XL) 150 MG 24 hr tablet Take 1 tablet by mouth Every Morning. 3/14/23  Yes Laura Whitaker MD   busPIRone (BUSPAR) 10 MG tablet Take 1 tablet by mouth Every 12 (Twelve) Hours. 6/21/23  Yes Laura Whitaker MD   CINNAMON PO Take  by mouth.   Yes Padilla Henderson MD   Continuous Blood Gluc  (Dexcom G6 ) device 1 Device Daily. Use to check BS 8/28/23  Yes Dionicio Ramachandran MD   cyclobenzaprine (FLEXERIL) 10 MG tablet Take I tab q 8 hrs as needed for tension headaches 3/13/23  Yes Laura Whitaker MD   DULoxetine (CYMBALTA) 60 MG capsule Take 1 capsule by mouth Every Morning. 6/21/23  Yes Laura Whitaker MD   glucose blood (Accu-Chek Guide) test strip Use as instructed to test blood sugar 4 times daily before meals and at bedtime 8/14/23  Yes Dionicio Ramachandran MD   insulin aspart (NovoLOG FlexPen) 100 UNIT/ML  solution pen-injector sc pen Inject 12 Units under the skin into the appropriate area as directed 3 (Three) Times a Day With Meals. 9/16/23  Yes Dionicio Ramachandran MD   Insulin Glargine (Lantus SoloStar) 100 UNIT/ML injection pen Inject 34 Units under the skin into the appropriate area as directed Every Night for 265 days. 8/28/23 5/19/24 Yes Dionicio Ramachandran MD   levothyroxine (Synthroid) 100 MCG tablet Take 1 tablet by mouth Every Morning. 6/21/23  Yes Laura Whitaker MD   meclizine (ANTIVERT) 25 MG tablet Take 1 tablet by mouth 3 (Three) Times a Day As Needed for Dizziness. 3/15/23  Yes Beba Everett APRN   metoprolol succinate XL (TOPROL-XL) 100 MG 24 hr tablet Take 1 tablet by mouth Daily. 6/21/23  Yes Laura Whitaker MD   multivitamin with minerals tablet tablet Take 1 tablet by mouth Daily.   Yes Padilla Henderson MD   omeprazole (priLOSEC) 40 MG capsule Take 1 capsule by mouth Daily. 6/21/23  Yes Laura Whitaker MD   ondansetron ODT (ZOFRAN-ODT) 4 MG disintegrating tablet Place 1 tablet on the tongue Every 8 (Eight) Hours As Needed for Nausea or Vomiting. 9/15/23  Yes Laura Whitaker MD   pregabalin (LYRICA) 100 MG capsule Take 1 capsule by mouth 2 (Two) Times a Day. 8/11/23  Yes Bridger Cantu MD   Semaglutide,0.25 or 0.5MG/DOS, (OZEMPIC) 2 MG/1.5ML solution pen-injector Inject 0.5 mg under the skin into the appropriate area as directed 1 (One) Time Per Week. 8/28/23  Yes Dionicio Ramachandran MD   Semaglutide,0.25 or 0.5MG/DOS, (Ozempic, 0.25 or 0.5 MG/DOSE,) 2 MG/3ML solution pen-injector Inject 0.25 mg under the skin into the appropriate area as directed 1 (One) Time Per Week. 8/28/23  Yes Dionicio Ramachandran MD   Continuous Blood Gluc Sensor (Dexcom G6 Sensor) Every 10 (Ten) Days. Use to check BS daily 8/28/23   Dionicio Ramachandran MD   Continuous Blood Gluc Transmit (Dexcom G6 Transmitter) misc 1 each 4 (Four) Times a Day Before Meals & at Bedtime. Change every 3 months  8/28/23   Dionicio Raamchandran MD   Glucagon (Gvoke HypoPen 2-Pack) 1 MG/0.2ML solution auto-injector Inject 1 mg under the skin into the appropriate area as directed As Needed (Hypoglycemia). 8/28/23   Dionicio Ramachandran MD   HYDROcodone-acetaminophen (NORCO) 5-325 MG per tablet Take 1 tablet by mouth Every Morning. 7/1/22   Provider, MD Padilla   Insulin Lispro (ADMELOG SOLOSTAR) 100 UNIT/ML injection pen Inject 10 Units under the skin into the appropriate area as directed 3 (Three) Times a Day With Meals. 9/13/23   Dionicio Ramachandran MD       Allergies:    Allergies   Allergen Reactions    Lisinopril Cough    Cefixime Other (See Comments)       Objective   Objective     Vital Signs  Temp:  [98 °F (36.7 °C)-98.6 °F (37 °C)] 98.2 °F (36.8 °C)  Heart Rate:  [72-82] 73  Resp:  [12-25] 13  BP: (106-142)/(48-71) 142/68  SpO2:  [93 %-99 %] 95 %  on   ;   Device (Oxygen Therapy): room air  Body mass index is 27.42 kg/m².    Physical Exam  Vitals and nursing note reviewed.   Constitutional:       Appearance: Normal appearance.   HENT:      Head: Normocephalic and atraumatic.      Right Ear: External ear normal.      Left Ear: External ear normal.      Nose: Nose normal.      Mouth/Throat:      Mouth: Mucous membranes are moist.   Eyes:      Extraocular Movements: Extraocular movements intact.   Cardiovascular:      Rate and Rhythm: Normal rate and regular rhythm.      Pulses: Normal pulses.      Heart sounds: Normal heart sounds.   Pulmonary:      Effort: Pulmonary effort is normal.      Breath sounds: Normal breath sounds.   Abdominal:      General: Abdomen is flat. Bowel sounds are normal.      Palpations: Abdomen is soft.      Tenderness: There is abdominal tenderness.   Musculoskeletal:         General: Normal range of motion.      Cervical back: Normal range of motion.   Skin:     General: Skin is warm.   Neurological:      Mental Status: He is alert and oriented to person, place, and time.   Psychiatric:         Behavior: Behavior  normal.         Thought Content: Thought content normal.         Judgment: Judgment normal.       Results Review:  I have personally reviewed most recent lab results and radiology images and interpretations and agree with findings, most notably:cbc, cmp, lipase, ct abd, us gallbladder and hida scan.    Results from last 7 days   Lab Units 09/25/23  0440   WBC 10*3/mm3 5.60   HEMOGLOBIN g/dL 11.0*   HEMATOCRIT % 34.9*   PLATELETS 10*3/mm3 196     Results from last 7 days   Lab Units 09/25/23  0440 09/24/23  1212   SODIUM mmol/L 142 142   POTASSIUM mmol/L 3.8 4.5   CHLORIDE mmol/L 109* 106   CO2 mmol/L 25.0 26.0   BUN mg/dL 13 16   CREATININE mg/dL 1.22 1.45*   GLUCOSE mg/dL 115* 104*   CALCIUM mg/dL 8.3* 9.2   ALT (SGPT) U/L  --  10   AST (SGOT) U/L  --  14     Estimated Creatinine Clearance: 75.4 mL/min (by C-G formula based on SCr of 1.22 mg/dL).  Brief Urine Lab Results  (Last result in the past 365 days)        Color   Clarity   Blood   Leuk Est   Nitrite   Protein   CREAT   Urine HCG        09/22/23 2003 Yellow   Clear   Negative   Negative   Negative   Negative                   Microbiology Results (last 10 days)       Procedure Component Value - Date/Time    Blood Culture - Blood, Arm, Left [[id number]]  (Abnormal) Collected: 09/23/23 0106    Lab Status: Preliminary result Specimen: Blood from Arm, Left Updated: 09/24/23 2249     Blood Culture Abnormal Stain     Gram Stain Anaerobic Bottle Gram positive cocci in clusters    Narrative:      Less than seven (7) mL's of blood was collected.  Insufficient quantity may yield false negative results.    Blood Culture - Blood, Hand, Right [[id number]]  (Abnormal) Collected: 09/23/23 0106    Lab Status: Preliminary result Specimen: Blood from Hand, Right Updated: 09/25/23 0833     Blood Culture Staphylococcus, coagulase negative     Isolated from Aerobic Bottle     Gram Stain Aerobic Bottle Gram positive cocci in pairs and clusters    Narrative:      Less than seven (7)  mL's of blood was collected.  Insufficient quantity may yield false negative results.    Blood Culture ID, PCR - Blood, Hand, Right [[id number]]  (Abnormal) Collected: 09/23/23 0106    Lab Status: Final result Specimen: Blood from Hand, Right Updated: 09/24/23 0120     BCID, PCR Staph spp, not aureus or lugdunensis. Identification by BCID2 PCR.     BOTTLE TYPE Aerobic Bottle            ECG/EMG Results (most recent)       Procedure Component Value Units Date/Time    SCANNED - TELEMETRY   [[id number]] Resulted: 09/22/23     Updated: 09/23/23 0155    ECG 12 Lead Tachycardia [[id number]] Collected: 09/22/23 1657     Updated: 09/23/23 0624     QT Interval 322 ms      QTC Interval 427 ms     Narrative:      HEART RATE= 106  bpm  RR Interval= 568  ms  WA Interval= 169  ms  P Horizontal Axis= 13  deg  P Front Axis= 58  deg  QRSD Interval= 76  ms  QT Interval= 322  ms  QTcB= 427  ms  QRS Axis= 26  deg  T Wave Axis= 145  deg  - ABNORMAL ECG -  Sinus tachycardia  Nonspecific T abnrm, anterolateral leads  When compared with ECG of 07-Mar-2023 18:39:51,  Significant rate increase  Significant repolarization change  Significant axis, voltage or hypertrophy change  Electronically Signed By: Ernesto Canada (FINA) 23-Sep-2023 06:23:48  Date and Time of Study: 2023-09-22 16:57:07    SCANNED - TELEMETRY   [[id number]] Resulted: 09/22/23     Updated: 09/23/23 1651    SCANNED - TELEMETRY   [[id number]] Resulted: 09/22/23     Updated: 09/23/23 1715    SCANNED - TELEMETRY   [[id number]] Resulted: 09/22/23     Updated: 09/23/23 1740    SCANNED - TELEMETRY   [[id number]] Resulted: 09/22/23     Updated: 09/23/23 1956    SCANNED - TELEMETRY   [[id number]] Resulted: 09/22/23     Updated: 09/25/23 0648    SCANNED - TELEMETRY   [[id number]] Resulted: 09/22/23     Updated: 09/25/23 0702    SCANNED - TELEMETRY   [[id number]] Resulted: 09/22/23     Updated: 09/25/23 0722    SCANNED - TELEMETRY   [[id number]] Resulted: 09/22/23     Updated: 09/25/23 0747     SCANNED - TELEMETRY   [[id number]] Resulted: 09/22/23     Updated: 09/25/23 0756    SCANNED - TELEMETRY   [[id number]] Resulted: 09/22/23     Updated: 09/25/23 0817            Results for orders placed during the hospital encounter of 09/22/23    Duplex Venous Upper Extremity RIGHT    Interpretation Summary    Normal right upper extremity venous duplex scan.      Results for orders placed during the hospital encounter of 03/18/22    Adult Transthoracic Echo Complete W/ Cont if Necessary Per Protocol (With Agitated Saline)    Interpretation Summary  Normal LV size and contractility EF of 60 to 65%  Normal RV size  Mild left atrial enlargement seen  Aortic valve appears structurally normal  Mitral valve leaflets are thickened anterior mitral leaflet appears calcified, but has good cusp separation.   No significant MR seen.  Tricuspid valve appears structurally normal, no significant regurgitation seen.  No pericardial effusion seen.  Proximal aorta appears normal in size.      CT Abdomen Pelvis Without Contrast    Result Date: 9/22/2023  Impression: No acute abnormality Cholelithiasis without CT evidence of acute cholecystitis. Electronically Signed: Sunny Humphries DO  9/22/2023 5:40 PM EDT  Workstation ID: [id number]    NM HIDA SCAN WITHOUT PHARMACOLOGICAL INTERVENTION    Result Date: 9/24/2023  Impression: No evidence of acute cholecystitis. Electronically Signed: Declan Díaz MD  9/24/2023 1:12 PM EDT  Workstation ID: [id number]    US Abdomen Limited    Result Date: 9/22/2023  Impression: Cholelithiasis with a mildly thickened gallbladder wall and positive sonographic Fleming sign is highly concerning for  cholecystitis. No evidence of biliary ductal dilation. Electronically Signed: Sunny Humphries DO  9/22/2023 10:15 PM EDT  Workstation ID: [id number]       Estimated Creatinine Clearance: 75.4 mL/min (by C-G formula based on SCr of 1.22 mg/dL).    Assessment & Plan   Assessment/Plan       Active Hospital  Problems    Diagnosis  POA    **Abdominal pain [R10.9]  Unknown    Nausea, vomiting and diarrhea [R11.2, R19.7]  Unknown    Calculus of gallbladder without cholecystitis without obstruction [K80.20]  Unknown      Resolved Hospital Problems   No resolved problems to display.     Abdominal pain  - WBC 13.1 on admission 10.5 today  - cr 1.57 and bun 22 mildly elevated on admission, Cr 1.38 bun 18 today  - lipase and UA are normal  - CT abd reviewed and showing cholelithiasis with out ct evidence of cholecystitis  - US gallbladder reviewed and showing cholelithiasiswith a mildly thickened gallbladder wall and positive murphys sign  - hida scan showed no evidence of acute cholecystitis   - IV rocephin and flagyl   - GI consulted  - General surgery consulted and recommends gallbladder removal.  Per notes, patient had Eliquis on 9/23 and will need to be 72 hours off of Eliquis prior to surgery.  -Blood culture positive for staph and 1/2 bottle.  Possible contamination.  Patient already on IV Rocephin and flagyl. Will repeat blood cultures and monitor patient for now.  -Consult hospitalist    Diabetes mellitus  -poorly controlled   Lab Results   Component Value Date    GLUCOSE 115 (H) 09/25/2023    GLUCOSE 104 (H) 09/24/2023    GLUCOSE 243 (H) 09/23/2023    GLUCOSE 381 (H) 09/22/2023   -Hold ozempic  -lantus and lispro   -Diabetic diet  -Monitor AC and HS      Hypertension  -well Controlled   BP Readings from Last 1 Encounters:   09/25/23 142/68   - Continue norvasc, metoprolol  - Monitor while admitted      Hx of PE  -Eliquis on hold for gallbladder removal  -Last dose 9/23 at 1 AM     Hypothyroidism  - cont home synthroid               VTE Prophylaxis -   Mechanical Order History:        Ordered        09/23/23 0749  Place Sequential Compression Device  Once            09/23/23 0749  Maintain Sequential Compression Device  Continuous            09/22/23 2206  Place Sequential Compression Device  Once            09/22/23  2206  Maintain Sequential Compression Device  Continuous                          Pharmalogical Order History:        Ordered     Dose Route Frequency Stop    09/23/23 0058  apixaban (ELIQUIS) tablet 5 mg         5 mg PO Once 09/23/23 0115    09/22/23 2206  Pharmacy to Dose enoxaparin (LOVENOX)  Status:  Discontinued        Question:  Indication of use  Answer:  Prophylaxis    -- XX Continuous PRN 09/23/23 0101                    CODE STATUS:    Code Status and Medical Interventions:   Ordered at: 09/23/23 0749     Code Status (Patient has no pulse and is not breathing):    CPR (Attempt to Resuscitate)     Medical Interventions (Patient has pulse or is breathing):    Full Support       This patient has been examined wearing personal protective equipment.     I discussed the patient's findings and my recommendations with patient and nursing staff.      Signature:Electronically signed by KEYLA Diaz, 09/25/23, 9:22 AM EDT.

## 2023-09-25 NOTE — PROGRESS NOTES
GENERAL SURGERY ESTABLISHED PATIENT NOTE    Patient Care Team:  Laura Whitaker MD as PCP - General  Tamara Michaels MD as Consulting Physician (Endocrinology)  ERWIN Baker DPM as Consulting Physician (Podiatry)    Reason for follow-up: cholecystitis    Subjective     Patient is a 58 y.o. male presents with right upper quadrant abdominal pain over the last 2 weeks.  Patient found to have gallstones on CT scan and ultrasound concerning for possible gallbladder wall thickening and possible cholecystitis.  HIDA scan demonstrated no evidence of cholecystitis.   Still having abdominal pain, denies nausea or vomiting    Review of Systems   Gastrointestinal:  Positive for abdominal pain.      History  Past Medical History:   Diagnosis Date    Allergic     CKD (chronic kidney disease), stage III     Depression     Depression with suicidal ideation 08/08/2021    DJD (degenerative joint disease)     DVT (deep venous thrombosis)     Ganglion     rt wrist    Gangrene of foot 10/09/2015    GERD (gastroesophageal reflux disease)     Headache     Heart murmur     Hiatal hernia     History of esophageal stricture     s/p dialation 40-50 times last EGD 04/2016    Hyperlipidemia     Hypertension     Hypothyroidism     IBS (irritable bowel syndrome)     Neuropathy     Osteomyelitis of right foot 03/19/2020    Pulmonary embolism 01/19/2017    Rash     rt lower hip    Retinopathy     S/P BKA (below knee amputation), right 03/18/2022    Seasonal allergies     Sepsis due to group B Streptococcus 03/19/2020    Shortness of breath     Sleep apnea     Stroke     Type 2 diabetes mellitus with peripheral vascular disease     Vitamin D deficiency      Past Surgical History:   Procedure Laterality Date    AMPUTATION DIGIT Left 4/26/2022    Procedure: AMPUTATION left great toe;  Surgeon: ERWIN Baker DPM;  Location: [facility] MAIN OR;  Service: Podiatry;  Laterality: Left;    AMPUTATION DIGIT  4/26/2022    Procedure: ;   Surgeon: ERWIN Baker DPM;  Location: [facility] MAIN OR;  Service: Podiatry;;    AMPUTATION FOOT / TOE Right     great toe    AMPUTATION REVISION Right 4/8/2021    Procedure: BELOW KNEE AMPUTATION REVISAION;  Surgeon: Eduardo Reynolds MD;  Location: [facility] MAIN OR;  Service: Orthopedics;  Laterality: Right;    AMPUTATION REVISION Right 4/29/2021    Procedure: AMPUTATION REVISION KNEE STUMP;  Surgeon: Eduardo Reynolds MD;  Location: [facility] MAIN OR;  Service: Orthopedics;  Laterality: Right;    BELOW KNEE AMPUTATION Right 7/30/2020    Procedure: AMPUTATION BELOW KNEE;  Surgeon: Eduardo Reynolds MD;  Location: [facility] MAIN OR;  Service: Orthopedics;  Laterality: Right;    CARDIAC CATHETERIZATION  04/2018    [facility]    COLONOSCOPY      ENDOSCOPY      ENDOSCOPY N/A 10/4/2019    Procedure: ESOPHAGOGASTRODUODENOSCOPY with dilitation and biopsy x 1 area;  Surgeon: Declan Iqbal MD;  Location: [facility] ENDOSCOPY;  Service: Gastroenterology    ENDOSCOPY N/A 12/13/2019    Procedure: ESOPHAGOGASTRODUODENOSCOPY WITH DILATATION (50, 52 BOUGIE);  Surgeon: Declan Iqbal MD;  Location: [facility] ENDOSCOPY;  Service: Gastroenterology    ENDOSCOPY N/A 8/6/2021    Procedure: ESOPHAGOGASTRODUODENOSCOPY with biopsy x1 area and esophageal dilation (56FR Bougie);  Surgeon: Hussein Talavera MD;  Location: [facility] ENDOSCOPY;  Service: Gastroenterology;  Laterality: N/A;  post: hiatal hernia, erosive gastritis    ENDOSCOPY N/A 12/13/2022    Procedure: ESOPHAGOGASTRODUODENOSCOPY WITH DILATATION (BOUGIE #54, #56) AND BIOPSY X1;  Surgeon: David Rodriguez MD;  Location: [facility] ENDOSCOPY;  Service: Gastroenterology;  Laterality: N/A;  POST: dysphagia     EYE SURGERY      GANGLION CYST EXCISION Left     HERNIA REPAIR Bilateral     INCISION AND DRAINAGE OF WOUND Right 6/15/2022    Procedure: INCISION AND DRAINAGE WOUND with debridement;  Surgeon: Fito Forte MD;  Location: [facility] MAIN OR;  Service: Orthopedics;  Laterality: Right;     JOINT REPLACEMENT      Left total hip    RETINOPATHY SURGERY      laser    TOTAL HIP ARTHROPLASTY Left     TOTAL HIP ARTHROPLASTY Left 2018    TRANS METATARSAL AMPUTATION Right 3/17/2020    Procedure: AMPUTATION TRANS METATARSAL right;  Surgeon: ERWIN Baker DPM;  Location: [facility] MAIN OR;  Service: Podiatry;  Laterality: Right;  GANGRENOUS RIGHT FOOT    VASECTOMY       Family History   Problem Relation Age of Onset    Diabetes Mother         Patient  mom    Heart disease Mother     Arthritis Mother     Hyperlipidemia Mother     Leukemia Father     Sleep apnea Maternal Aunt         GENO    Stroke Maternal Grandmother     Hypertension Other     Hyperlipidemia Other     Cancer Other     Colon cancer Other         uncle     Social History     Tobacco Use    Smoking status: Never    Smokeless tobacco: Never   Vaping Use    Vaping Use: Never used   Substance Use Topics    Alcohol use: Yes     Comment: OCCASIONAL    Drug use: No     Medications Prior to Admission   Medication Sig Dispense Refill Last Dose    Accu-Chek Softclix Lancets lancets Use as directed to test blood sugar 4 times daily before meals and at bedtime. 100 each 5 9/22/2023 at 0800    amLODIPine (NORVASC) 5 MG tablet Take 1 tablet by mouth Daily. 90 tablet 0 Past Week at 0800    apixaban (Eliquis) 5 MG tablet tablet Take 1 tablet by mouth 2 (Two) Times a Day. 180 tablet 1 Past Week at 0800    aspirin 81 MG EC tablet Take 1 tablet by mouth Daily.   Past Week at 0800    atorvastatin (LIPITOR) 80 MG tablet Take 1 tablet by mouth Every Night. 90 tablet 1 Past Week at 0800    Blood Glucose Monitoring Suppl (Accu-Chek Guide) w/Device kit See Admin Instructions.   9/21/2023 at 0800    buPROPion XL (Wellbutrin XL) 150 MG 24 hr tablet Take 1 tablet by mouth Every Morning. 90 tablet 0 Past Week at 0800    busPIRone (BUSPAR) 10 MG tablet Take 1 tablet by mouth Every 12 (Twelve) Hours. 180 tablet 1 Past Week at 0800    CINNAMON PO Take  by mouth.   Past Week at  0800    Continuous Blood Gluc  (Dexcom G6 ) device 1 Device Daily. Use to check BS 1 each 0 9/21/2023 at 0800    cyclobenzaprine (FLEXERIL) 10 MG tablet Take I tab q 8 hrs as needed for tension headaches 30 tablet 0 Past Week at 0800    DULoxetine (CYMBALTA) 60 MG capsule Take 1 capsule by mouth Every Morning. 90 capsule 1 Past Week at 0800    glucose blood (Accu-Chek Guide) test strip Use as instructed to test blood sugar 4 times daily before meals and at bedtime 100 each 12 9/21/2023 at 0800    insulin aspart (NovoLOG FlexPen) 100 UNIT/ML solution pen-injector sc pen Inject 12 Units under the skin into the appropriate area as directed 3 (Three) Times a Day With Meals. 15 mL 2 9/21/2023 at 0800    Insulin Glargine (Lantus SoloStar) 100 UNIT/ML injection pen Inject 34 Units under the skin into the appropriate area as directed Every Night for 265 days. 15 mL 5 9/21/2023 at 0800    levothyroxine (Synthroid) 100 MCG tablet Take 1 tablet by mouth Every Morning. 90 tablet 1 Past Week at 0800    meclizine (ANTIVERT) 25 MG tablet Take 1 tablet by mouth 3 (Three) Times a Day As Needed for Dizziness. 30 tablet 0 Past Week    metoprolol succinate XL (TOPROL-XL) 100 MG 24 hr tablet Take 1 tablet by mouth Daily. 90 tablet 0 Past Week at 0800    multivitamin with minerals tablet tablet Take 1 tablet by mouth Daily.   Past Week at 0800    omeprazole (priLOSEC) 40 MG capsule Take 1 capsule by mouth Daily. 90 capsule 1 Past Week at 0800    ondansetron ODT (ZOFRAN-ODT) 4 MG disintegrating tablet Place 1 tablet on the tongue Every 8 (Eight) Hours As Needed for Nausea or Vomiting. 30 tablet 0 9/22/2023    pregabalin (LYRICA) 100 MG capsule Take 1 capsule by mouth 2 (Two) Times a Day. 60 capsule 0 Past Week at 0800    Semaglutide,0.25 or 0.5MG/DOS, (OZEMPIC) 2 MG/1.5ML solution pen-injector Inject 0.5 mg under the skin into the appropriate area as directed 1 (One) Time Per Week. 3 mL 4 Past Week at 0800     Semaglutide,0.25 or 0.5MG/DOS, (Ozempic, 0.25 or 0.5 MG/DOSE,) 2 MG/3ML solution pen-injector Inject 0.25 mg under the skin into the appropriate area as directed 1 (One) Time Per Week. 3 mL 0 Past Week    Continuous Blood Gluc Sensor (Dexcom G6 Sensor) Every 10 (Ten) Days. Use to check BS daily 3 each 11 More than a month at 0800    Continuous Blood Gluc Transmit (Dexcom G6 Transmitter) misc 1 each 4 (Four) Times a Day Before Meals & at Bedtime. Change every 3 months 1 each 3 More than a month at 0800    Glucagon (Gvoke HypoPen 2-Pack) 1 MG/0.2ML solution auto-injector Inject 1 mg under the skin into the appropriate area as directed As Needed (Hypoglycemia). 0.4 mL 5 More than a month at 0800    HYDROcodone-acetaminophen (NORCO) 5-325 MG per tablet Take 1 tablet by mouth Every Morning.       Insulin Lispro (ADMELOG SOLOSTAR) 100 UNIT/ML injection pen Inject 10 Units under the skin into the appropriate area as directed 3 (Three) Times a Day With Meals. 27 mL 1      Allergies:  Lisinopril and Cefixime    Objective     Vital Signs  Temp:  [97.6 °F (36.4 °C)-98.6 °F (37 °C)] 97.6 °F (36.4 °C)  Heart Rate:  [72-82] 77  Resp:  [13-21] 18  BP: (106-142)/(48-68) 135/67    Physical Exam  Vitals reviewed.   Constitutional:       Appearance: He is well-developed. He is obese.   HENT:      Head: Normocephalic and atraumatic.   Eyes:      Pupils: Pupils are equal, round, and reactive to light.   Cardiovascular:      Rate and Rhythm: Normal rate and regular rhythm.   Pulmonary:      Effort: Pulmonary effort is normal.      Breath sounds: Normal breath sounds.   Abdominal:      General: There is no distension.      Palpations: Abdomen is soft.      Tenderness: There is no abdominal tenderness in the right upper quadrant and epigastric area. There is guarding. There is no rebound. Negative signs include Fleming's sign.      Hernia: No hernia is present.   Musculoskeletal:         General: Normal range of motion.      Cervical back:  Normal range of motion.   Lymphadenopathy:      Cervical: No cervical adenopathy.   Skin:     General: Skin is warm and dry.      Findings: No rash.   Neurological:      Mental Status: He is alert and oriented to person, place, and time.       Results Review:   Lab Results (last 24 hours)       Procedure Component Value Units Date/Time    POC Glucose Once [[id number]]  (Abnormal) Collected: 09/25/23 1653    Specimen: Blood Updated: 09/25/23 1654     Glucose 150 mg/dL      Comment: Serial Number: [id number]:  [id number]       POC Glucose Once [[id number]]  (Abnormal) Collected: 09/25/23 1143    Specimen: Blood Updated: 09/25/23 1145     Glucose 141 mg/dL      Comment: Serial Number: [id number]:  [id number]       Blood Culture - Blood, Hand, Right [[id number]]  (Abnormal) Collected: 09/23/23 0106    Specimen: Blood from Hand, Right Updated: 09/25/23 0833     Blood Culture Staphylococcus, coagulase negative     Isolated from Aerobic Bottle     Gram Stain Aerobic Bottle Gram positive cocci in pairs and clusters    Narrative:      Less than seven (7) mL's of blood was collected.  Insufficient quantity may yield false negative results.    POC Glucose Once [[id number]]  (Normal) Collected: 09/25/23 0725    Specimen: Blood Updated: 09/25/23 0726     Glucose 70 mg/dL      Comment: Serial Number: [id number]:  [id number]       Basic Metabolic Panel [[id number]]  (Abnormal) Collected: 09/25/23 0440    Specimen: Blood Updated: 09/25/23 0539     Glucose 115 mg/dL      BUN 13 mg/dL      Creatinine 1.22 mg/dL      Sodium 142 mmol/L      Potassium 3.8 mmol/L      Comment: Slight hemolysis detected by analyzer. Results may be affected.        Chloride 109 mmol/L      CO2 25.0 mmol/L      Calcium 8.3 mg/dL      BUN/Creatinine Ratio 10.7     Anion Gap 8.0 mmol/L      eGFR 68.3 mL/min/1.73     Narrative:      GFR Normal >60  Chronic Kidney Disease <60  Kidney Failure <15      CBC & Differential [[id number]]  (Abnormal)  Collected: 09/25/23 0440    Specimen: Blood Updated: 09/25/23 0522    Narrative:      The following orders were created for panel order CBC & Differential.  Procedure                               Abnormality         Status                     ---------                               -----------         ------                     CBC Auto Differential[[id number]]        Abnormal            Final result               Scan Slide[[id number]]                                                                    Please view results for these tests on the individual orders.    CBC Auto Differential [[id number]]  (Abnormal) Collected: 09/25/23 0440    Specimen: Blood Updated: 09/25/23 0522     WBC 5.60 10*3/mm3      RBC 4.81 10*6/mm3      Hemoglobin 11.0 g/dL      Hematocrit 34.9 %      MCV 72.5 fL      MCH 22.9 pg      MCHC 31.6 g/dL      RDW 17.9 %      RDW-SD 48.1 fl      MPV 8.3 fL      Platelets 196 10*3/mm3      Neutrophil % 56.1 %      Lymphocyte % 13.0 %      Monocyte % 7.3 %      Eosinophil % 22.2 %      Basophil % 1.4 %      Neutrophils, Absolute 3.10 10*3/mm3      Lymphocytes, Absolute 0.70 10*3/mm3      Monocytes, Absolute 0.40 10*3/mm3      Eosinophils, Absolute 1.20 10*3/mm3      Basophils, Absolute 0.10 10*3/mm3      nRBC 0.0 /100 WBC     Blood Culture - Blood, Arm, Left [[id number]]  (Abnormal) Collected: 09/23/23 0106    Specimen: Blood from Arm, Left Updated: 09/24/23 2249     Blood Culture Abnormal Stain     Gram Stain Anaerobic Bottle Gram positive cocci in clusters    Narrative:      Less than seven (7) mL's of blood was collected.  Insufficient quantity may yield false negative results.    POC Glucose Once [[id number]]  (Abnormal) Collected: 09/24/23 2124    Specimen: Blood Updated: 09/24/23 2125     Glucose 151 mg/dL      Comment: Serial Number: [id number]:  [id number]       POC Glucose Once [[id number]]  (Normal) Collected: 09/24/23 1734    Specimen: Blood Updated: 09/24/23 1735     Glucose 80 mg/dL       Comment: Serial Number: [id number]:  [id number]             NM HIDA SCAN WITHOUT PHARMACOLOGICAL INTERVENTION    Result Date: 9/24/2023  Impression: No evidence of acute cholecystitis. Electronically Signed: Declan Díaz MD  9/24/2023 1:12 PM EDT  Workstation ID: [id number]       I reviewed the patient's new imaging results and agree with the interpretation.  I reviewed the patient's other test results and agree with the interpretation    Assessment & Plan     Principal Problem:    Abdominal pain  Active Problems:    Nausea, vomiting and diarrhea    Calculus of gallbladder without cholecystitis without obstruction    N.p.o. after midnight  I discussed with the patient the findings of gallstones, and reviewed the natural history of gallstones as well as management options which include both operative and nonoperative strategies.  We discussed the risk, benefits, and alternatives to laparoscopic cholecystectomy which include but are not limited to: bleeding, infection, damage to surrounding structures including bowel and common bile duct, cystic stump leak, and possible need to convert to open.  The patient understands, and agrees to proceed with surgery.     I discussed the patients findings and my recommendations with the patient.     Eduardo Srinivasan MD  09/25/23  17:26 EDT

## 2023-09-25 NOTE — PLAN OF CARE
Goal Outcome Evaluation:                         History  Chief Complaint   Patient presents with    Weakness - Generalized     Started thursday with increased muscle weakness, was seen herer thurs and told to have electrolyte imbalances  Now developed Left flank pain      Patient was seen here 4 days ago for weakness  Found to have a borderline low potassium a borderline low sodium  Not any better  Complains of a headache today  Complains of neck pain mainly on the right  Complains of new left flank pain today  No dysuria frequency  No abdominal pain  No chest pain  No shortness of breath  No recent cough or cold symptoms  Had Chun in January  Has not been vaccinated  No shortness of breath  Took Tylenol yesterday for the neck pain with minimal relief  The neck pain gets worse with movement  History provided by:  Patient   used: No    Fatigue  Severity:  Mild  Onset quality:  Gradual  Duration:  1 week  Timing:  Constant  Progression:  Unchanged  Chronicity:  New  Context: not allergies, not increased activity and not stress    Relieved by:  Nothing  Worsened by:  Nothing  Ineffective treatments:  None tried  Associated symptoms: arthralgias and headaches    Associated symptoms: no abdominal pain, no anorexia, no ataxia, no chest pain, no cough, no diarrhea, no dizziness, no dysuria, no fever, no frequency, no nausea, no seizures, no shortness of breath, no urgency, no vision change and no vomiting        Prior to Admission Medications   Prescriptions Last Dose Informant Patient Reported? Taking?    amLODIPine (NORVASC) 10 mg tablet 2021 at Unknown time Self Yes Yes   Si mg    aspirin 81 mg chewable tablet 2021 at Unknown time  Yes Yes   Sig: Chew 81 mg daily   atorvastatin (LIPITOR) 10 mg tablet 2021 at Unknown time  Yes Yes   hydrochlorothiazide (HYDRODIURIL) 25 mg tablet 2021 at Unknown time  Yes Yes   losartan (COZAAR) 50 mg tablet 2021 at Unknown time  Yes Yes   meloxicam (MOBIC) 15 mg tablet Not Taking at Unknown time  Yes No   Patient not taking: Reported on 7/5/2021   methocarbamol (ROBAXIN) 750 mg tablet   No No   Sig: Take 1 tablet (750 mg total) by mouth every 6 (six) hours as needed for muscle spasms for up to 5 days   propranolol (INDERAL LA) 60 mg 24 hr capsule 7/5/2021 at Unknown time  Yes Yes      Facility-Administered Medications: None       Past Medical History:   Diagnosis Date    Ankle injury     Arthritis     High cholesterol     Hypertension        Past Surgical History:   Procedure Laterality Date    ADENOIDECTOMY      CARPAL TUNNEL RELEASE Bilateral     JOINT REPLACEMENT      MENISCECTOMY Bilateral     REPLACEMENT TOTAL KNEE Left     TONSILLECTOMY         Family History   Problem Relation Age of Onset    Heart disease Mother     Arthritis Mother     Heart disease Father      I have reviewed and agree with the history as documented  E-Cigarette/Vaping    E-Cigarette Use Never User      E-Cigarette/Vaping Substances    Nicotine No     THC No     CBD No     Flavoring No     Other No     Unknown No      Social History     Tobacco Use    Smoking status: Never Smoker    Smokeless tobacco: Current User     Types: Chew   Vaping Use    Vaping Use: Never used   Substance Use Topics    Alcohol use: Never    Drug use: Never       Review of Systems   Constitutional: Positive for fatigue  Negative for chills and fever  HENT: Negative for ear pain, hearing loss, sore throat, trouble swallowing and voice change  Eyes: Negative for pain and discharge  Respiratory: Negative for cough, shortness of breath and wheezing  Cardiovascular: Negative for chest pain and palpitations  Gastrointestinal: Negative for abdominal pain, anorexia, blood in stool, constipation, diarrhea, nausea and vomiting  Genitourinary: Negative for dysuria, flank pain, frequency, hematuria and urgency  Musculoskeletal: Positive for arthralgias and back pain   Negative for joint swelling, neck pain and neck stiffness  Skin: Negative for rash and wound  Neurological: Positive for weakness and headaches  Negative for dizziness, seizures, syncope and facial asymmetry  Psychiatric/Behavioral: Negative for hallucinations, self-injury and suicidal ideas  All other systems reviewed and are negative  Physical Exam  Physical Exam  Vitals and nursing note reviewed  Constitutional:       General: He is not in acute distress  Appearance: He is well-developed  HENT:      Head: Normocephalic and atraumatic  Right Ear: External ear normal       Left Ear: External ear normal    Eyes:      General: No scleral icterus  Right eye: No discharge  Left eye: No discharge  Extraocular Movements: Extraocular movements intact  Conjunctiva/sclera: Conjunctivae normal    Neck:      Comments: Tender right paracervical region  Reproducible symptoms with turning the head to the right shoulder  Cardiovascular:      Rate and Rhythm: Normal rate and regular rhythm  Heart sounds: Normal heart sounds  No murmur heard  Pulmonary:      Effort: Pulmonary effort is normal       Breath sounds: Normal breath sounds  No wheezing or rales  Abdominal:      General: Bowel sounds are normal  There is no distension  Palpations: Abdomen is soft  Tenderness: There is no abdominal tenderness  There is no guarding or rebound  Musculoskeletal:         General: No deformity  Normal range of motion  Cervical back: Normal range of motion and neck supple  Skin:     General: Skin is warm and dry  Findings: No rash  Neurological:      General: No focal deficit present  Mental Status: He is alert and oriented to person, place, and time  Cranial Nerves: No cranial nerve deficit  Psychiatric:         Mood and Affect: Mood normal          Behavior: Behavior normal          Thought Content:  Thought content normal          Judgment: Judgment normal          Vital Signs  ED Triage Vitals [07/05/21 0913]   Temperature Pulse Respirations Blood Pressure SpO2   (!) 97 3 °F (36 3 °C) 62 18 160/69 100 %      Temp Source Heart Rate Source Patient Position - Orthostatic VS BP Location FiO2 (%)   Temporal Monitor Lying Right arm --      Pain Score       No Pain           Vitals:    07/05/21 0913 07/05/21 1015   BP: 160/69 130/61   Pulse: 62 (!) 48   Patient Position - Orthostatic VS: Lying Lying         Visual Acuity      ED Medications  Medications   sodium chloride 0 9 % bolus 1,000 mL (1,000 mL Intravenous New Bag 7/5/21 0920)   ketorolac (TORADOL) injection 15 mg (15 mg Intravenous Given 7/5/21 0920)       Diagnostic Studies  Results Reviewed     Procedure Component Value Units Date/Time    Novel Coronavirus (Covid-19),PCR [facility] - 2 Hour Stat [[id number]]  (Normal) Collected: 07/05/21 0926    Lab Status: Final result Specimen: Nares from Nose Updated: 07/05/21 1028     SARS-CoV-2 Negative    Narrative: The specimen collection materials, transport medium, and/or testing methodology utilized in the production of these test results have been proven to be reliable in a limited validation with an abbreviated program under the Emergency Utilization Authorization provided by the FDA  Testing reported as "Presumptive positive" will be confirmed with secondary testing to ensure result accuracy  Clinical caution and judgement should be used with the interpretation of these results with consideration of the clinical impression and other laboratory testing  Testing reported as "Positive" or "Negative" has been proven to be accurate according to standard laboratory validation requirements  All testing is performed with control materials showing appropriate reactivity at standard intervals        Lipase [[id number]]  (Normal) Collected: 07/05/21 0921    Lab Status: Final result Specimen: Blood from Arm, Left Updated: 07/05/21 0951     Lipase 83 u/L     TSH [[id number]]  (Normal) Collected: 07/05/21 0921    Lab Status: Final result Specimen: Blood from Arm, Left Updated: 07/05/21 0951     TSH 3RD GENERATON 1 247 uIU/mL     Narrative:      Patients undergoing fluorescein dye angiography may retain small amounts of fluorescein in the body for 48-72 hours post procedure  Samples containing fluorescein can produce falsely depressed TSH values  If the patient had this procedure,a specimen should be resubmitted post fluorescein clearance        Comprehensive metabolic panel [[id number]]  (Abnormal) Collected: 07/05/21 0921    Lab Status: Final result Specimen: Blood from Arm, Left Updated: 07/05/21 0951     Sodium 136 mmol/L      Potassium 3 7 mmol/L      Chloride 99 mmol/L      CO2 30 mmol/L      ANION GAP 7 mmol/L      BUN 15 mg/dL      Creatinine 1 05 mg/dL      Glucose 184 mg/dL      Calcium 9 6 mg/dL      Corrected Calcium 10 4 mg/dL      AST 17 U/L      ALT 35 U/L      Alkaline Phosphatase 59 U/L      Total Protein 7 4 g/dL      Albumin 3 0 g/dL      Total Bilirubin 0 75 mg/dL      eGFR 74 ml/min/1 73sq m     Narrative:      [place] guidelines for Chronic Kidney Disease (CKD):     Stage 1 with normal or high GFR (GFR > 90 mL/min/1 73 square meters)    Stage 2 Mild CKD (GFR = 60-89 mL/min/1 73 square meters)    Stage 3A Moderate CKD (GFR = 45-59 mL/min/1 73 square meters)    Stage 3B Moderate CKD (GFR = 30-44 mL/min/1 73 square meters)    Stage 4 Severe CKD (GFR = 15-29 mL/min/1 73 square meters)    Stage 5 End Stage CKD (GFR <15 mL/min/1 73 square meters)  Note: GFR calculation is accurate only with a steady state creatinine    CK Total with Reflex CKMB [[id number]]  (Normal) Collected: 07/05/21 0921    Lab Status: Final result Specimen: Blood from Arm, Left Updated: 07/05/21 0951     Total  U/L     Lactic acid [[id number]]  (Normal) Collected: 07/05/21 0921    Lab Status: Final result Specimen: Blood from Arm, Left Updated: 07/05/21 0948     LACTIC ACID 1 6 mmol/L Narrative:      Result may be elevated if tourniquet was used during collection  Troponin I [[id number]]  (Normal) Collected: 07/05/21 0921    Lab Status: Final result Specimen: Blood from Arm, Left Updated: 07/05/21 0947     Troponin I <0 02 ng/mL     UA w Reflex to Microscopic w Reflex to Culture [[id number]]  (Abnormal) Collected: 07/05/21 0926    Lab Status: Final result Specimen: Urine, Clean Catch Updated: 07/05/21 0939     Color, UA Yellow     Clarity, UA Clear     Specific Gravity, UA 1 025     pH, UA 5 5     Leukocytes, UA Negative     Nitrite, UA Negative     Protein, UA Negative mg/dl      Glucose, UA Negative mg/dl      Ketones, UA Negative mg/dl      Urobilinogen, UA 1 0 E U /dl      Bilirubin, UA Small     Blood, UA Negative    CBC and differential [[id number]]  (Abnormal) Collected: 07/05/21 0921    Lab Status: Final result Specimen: Blood from Arm, Left Updated: 07/05/21 0927     WBC 10 42 Thousand/uL      RBC 4 70 Million/uL      Hemoglobin 14 1 g/dL      Hematocrit 42 6 %      MCV 91 fL      MCH 30 0 pg      MCHC 33 1 g/dL      RDW 13 7 %      MPV 9 6 fL      Platelets 774 Thousands/uL      nRBC 0 /100 WBCs      Neutrophils Relative 81 %      Immat GRANS % 1 %      Lymphocytes Relative 8 %      Monocytes Relative 7 %      Eosinophils Relative 2 %      Basophils Relative 1 %      Neutrophils Absolute 8 43 Thousands/µL      Immature Grans Absolute 0 06 Thousand/uL      Lymphocytes Absolute 0 88 Thousands/µL      Monocytes Absolute 0 75 Thousand/µL      Eosinophils Absolute 0 21 Thousand/µL      Basophils Absolute 0 09 Thousands/µL                  CT head without contrast   Final Result by Marsha Lim MD (07/05 1025)      No acute intracranial abnormality  Workstation performed: [id number]         CT abdomen pelvis wo contrast   Final Result by Marsha Lim MD (07/05 1037)   Exam is limited without IV and oral contrast particularly for soft tissue and bowel evaluation     1  No acute findings in the abdomen and pelvis  2  Variable fat infiltration in the wall of the entire colon which is mildly thickened  There is nonspecific but may represent sequela of chronic infectious or inflammatory bowel disease  This does not appear acutely inflamed  Correlate with clinical    history  3   3 mm pleural-based nodule in the right lower lobe posteriorly  Based on current Fleischner Society 2017 Guidelines on incidental pulmonary nodule, no routine follow-up is needed if the patient is considered low risk for lung cancer  If the patient    is considered high risk for lung cancer, 12 month follow-up non-contrast chest CT is recommended  4   1 3 cm indeterminate right adrenal nodule  In patients with no cancer history and new/enlarging adrenal nodule, recommend adrenal mass protocol CT to characterize, and biochemical evaluation and depending on rate of growth, surgical resection    (without biopsy) to treat possible adrenal cortical carcinoma may be warranted  The study was marked in EPIC for significant notification  Workstation performed: [id number]                    Procedures  ECG 12 Lead Documentation Only    Date/Time: 7/5/2021 9:20 AM  Performed by: Farideh Buckley MD  Authorized by: Farideh Buckley MD     ECG reviewed by me, the ED Provider: yes    Patient location:  ED  Previous ECG:     Previous ECG:  Compared to current    Similarity:  No change  Rate:     ECG rate:  60  Rhythm:     Rhythm: sinus rhythm    Ectopy:     Ectopy: none    QRS:     QRS axis:  Normal             ED Course  ED Course as of Jul 05 1050   Mon Jul 05, 2021   1048 Discussed with patient about his CT scan resolved  Made aware the fatty infiltration by his intestines and need for follow-up for this  Patient is nontoxic-appearing is afebrile  Electrolytes are normal   No definite etiology for his weakness  Patient made aware of his slightly elevated glucose level    States he drinks a lot of energy drinks  Made aware that he needs to follow up with his family doctor for possible fasting blood sugar  SBIRT 20yo+      Most Recent Value   SBIRT (24 yo +)   In order to provide better care to our patients, we are screening all of our patients for alcohol and drug use  Would it be okay to ask you these screening questions? Yes Filed at: 07/05/2021 5575   Initial Alcohol Screen: US AUDIT-C    1  How often do you have a drink containing alcohol?  0 Filed at: 07/05/2021 0916   2  How many drinks containing alcohol do you have on a typical day you are drinking? 0 Filed at: 07/05/2021 0916   3a  Male UNDER 65: How often do you have five or more drinks on one occasion? 0 Filed at: 07/05/2021 0916   3b  FEMALE Any Age, or MALE 65+: How often do you have 4 or more drinks on one occassion? 0 Filed at: 07/05/2021 0916   Audit-C Score  0 Filed at: 07/05/2021 2594   HELENE: How many times in the past year have you    Used an illegal drug or used a prescription medication for non-medical reasons? Never Filed at: 07/05/2021 2074                    MDM  Number of Diagnoses or Management Options     Amount and/or Complexity of Data Reviewed  Clinical lab tests: reviewed  Review and summarize past medical records: yes        Disposition  Final diagnoses:   Weakness     Time reflects when diagnosis was documented in both MDM as applicable and the Disposition within this note     Time User Action Codes Description Comment    7/5/2021 10:49 AM Sebastián Jaramillo Add [R53 1] Weakness       ED Disposition     ED Disposition Condition Date/Time Comment    Discharge Stable Mon Jul 5, 2021 10:49 AM Macarena Carpio discharge to home/self care              Follow-up Information     Follow up With Specialties Details Why Contact Info    Jessica Bonner DO Family Medicine Call in 2 days  [address] [facility] [address] [phone]            Patient's Medications   Discharge Prescriptions    No medications on file     No discharge procedures on file      PDMP Review     None          ED Provider  Electronically Signed by           Venita Pisano MD  07/05/21 [address] Bertrand Hartley MD  07/05/21 1186

## 2023-09-25 NOTE — CASE MANAGEMENT/SOCIAL WORK
Discharge Planning Assessment  [facility]     Patient Name: Kuldeep Adhikari  MRN: [id number]  Today's Date: 9/25/2023    Admit Date: 9/22/2023    Plan: Return home with brother and his family. Meds to Bed   Discharge Needs Assessment       Row Name 09/25/23 1534       Living Environment    People in Home sibling(s)    Name(s) of People in Home Yeison Trent and his family    Current Living Arrangements home    Potentially Unsafe Housing Conditions none    Primary Care Provided by self    Provides Primary Care For no one, unable/limited ability to care for self    Family Caregiver if Needed sibling(s)    Family Caregiver Names Yeison trent    Able to Return to Prior Arrangements yes       Resource/Environmental Concerns    Transportation Concerns none       Transition Planning    Patient/Family Anticipates Transition to home with family    Patient/Family Anticipated Services at Transition none    Transportation Anticipated family or friend will provide       Discharge Needs Assessment    Readmission Within the Last 30 Days no previous admission in last 30 days    Equipment Currently Used at Home glucometer;bp cuff;pulse ox;grab bar    Concerns to be Addressed no discharge needs identified    Anticipated Changes Related to Illness none    Equipment Needed After Discharge none                   Discharge Plan       Row Name 09/25/23 6665       Plan    Plan Return home with brother and his family. Meds to Bed    Patient/Family in Agreement with Plan yes    Plan Comments Barriers: Abdominal pain, Positive BC.  IV antibiotics. Plan for CHOLECYSTECTOMY LAPAROSCOPIC 9/26. CM met with Mr. Adhikari who stated he lives with his brother and his family and is independent. He uses a prothesis but no other mobilty equipment. Verified PCP and Pharmacy ( Meds to Bed ). CM will continue to follow as needed for discharge planning                Demographic Summary       Row Name 09/25/23 3267       General Information    Admission Type  observation    Arrived From emergency department    Referral Source admission list    Reason for Consult discharge planning    Preferred Language English       Contact Information    Permission Granted to Share Info With                    Functional Status       Row Name 09/25/23 1533       Functional Status    Usual Activity Tolerance moderate    Current Activity Tolerance fair       Functional Status, IADL    Medications independent    Meal Preparation assistive person    Housekeeping assistive person    Laundry assistive person    Shopping assistive person    IADL Comments independent                    Maintained distance greater than six feet and spent less than 15 minutes in the room.     Ann PATRICK,RN Case Manager  [facility]  Phone: desk- [phone] cell- [phone]

## 2023-09-25 NOTE — CONSULTS
[facility] Medicine Services   Consult Note    Patient Name: Kuldeep Adhikari  : 1965  MRN: [id number]  Primary Care Physician:  Laura Whitaker MD  Referring Physician: Laura Valentine*  Date of admission: 2023  Date and Time of Care: 23 at 10:53am    Consults    Subjective    HPI:  Pt is a 58 yr old male with hx of Dm2 ckd2, HX of PEpresents to the emergency department with abdominal pain vomiting and diarrhea. He states that he started Ozempic last week and he had a few days of vomiting and diarrhea. Took a second dose on Monday and he has been with persistent abdominal pain nausea vomiting diarrhea. Patient's symptoms could be related to gallbladder with significant tenderness right upper quadrant area imaging finding and sudden onset of the symptoms. Other contributing factor could be Ozempic with gastroparesis. Await surgery input will likely benefit from eleanor forde at some point. Hospitalist consulted for med mgt.    Personal History     Past Medical History:   Diagnosis Date    Allergic     CKD (chronic kidney disease), stage III     Depression     Depression with suicidal ideation 2021    DJD (degenerative joint disease)     DVT (deep venous thrombosis)     Ganglion     rt wrist    Gangrene of foot 10/09/2015    GERD (gastroesophageal reflux disease)     Headache     Heart murmur     Hiatal hernia     History of esophageal stricture     s/p dialation 40-50 times last EGD 2016    Hyperlipidemia     Hypertension     Hypothyroidism     IBS (irritable bowel syndrome)     Neuropathy     Osteomyelitis of right foot 2020    Pulmonary embolism 2017    Rash     rt lower hip    Retinopathy     S/P BKA (below knee amputation), right 2022    Seasonal allergies     Sepsis due to group B Streptococcus 2020    Shortness of breath     Sleep apnea     Stroke     Type 2 diabetes mellitus with peripheral vascular disease     Vitamin D deficiency         Past Surgical History:   Procedure Laterality Date    AMPUTATION DIGIT Left 4/26/2022    Procedure: AMPUTATION left great toe;  Surgeon: ERWIN Baker DPM;  Location: [facility] MAIN OR;  Service: Podiatry;  Laterality: Left;    AMPUTATION DIGIT  4/26/2022    Procedure: ;  Surgeon: ERWIN Baker DPM;  Location: [facility] MAIN OR;  Service: Podiatry;;    AMPUTATION FOOT / TOE Right     great toe    AMPUTATION REVISION Right 4/8/2021    Procedure: BELOW KNEE AMPUTATION REVISAION;  Surgeon: Eduardo Reynolds MD;  Location: [facility] MAIN OR;  Service: Orthopedics;  Laterality: Right;    AMPUTATION REVISION Right 4/29/2021    Procedure: AMPUTATION REVISION KNEE STUMP;  Surgeon: Eduardo Reynolds MD;  Location: [facility] MAIN OR;  Service: Orthopedics;  Laterality: Right;    BELOW KNEE AMPUTATION Right 7/30/2020    Procedure: AMPUTATION BELOW KNEE;  Surgeon: Eduardo Reynolds MD;  Location: [facility] MAIN OR;  Service: Orthopedics;  Laterality: Right;    CARDIAC CATHETERIZATION  04/2018    [facility]    COLONOSCOPY      ENDOSCOPY      ENDOSCOPY N/A 10/4/2019    Procedure: ESOPHAGOGASTRODUODENOSCOPY with dilitation and biopsy x 1 area;  Surgeon: Declan Iqbal MD;  Location: [facility] ENDOSCOPY;  Service: Gastroenterology    ENDOSCOPY N/A 12/13/2019    Procedure: ESOPHAGOGASTRODUODENOSCOPY WITH DILATATION (50, 52 BOUGIE);  Surgeon: Declan Iqbal MD;  Location: [facility] ENDOSCOPY;  Service: Gastroenterology    ENDOSCOPY N/A 8/6/2021    Procedure: ESOPHAGOGASTRODUODENOSCOPY with biopsy x1 area and esophageal dilation (56FR Bougie);  Surgeon: Hussein Talavera MD;  Location: [facility] ENDOSCOPY;  Service: Gastroenterology;  Laterality: N/A;  post: hiatal hernia, erosive gastritis    ENDOSCOPY N/A 12/13/2022    Procedure: ESOPHAGOGASTRODUODENOSCOPY WITH DILATATION (BOUGIE #54, #56) AND BIOPSY X1;  Surgeon: David Rodriguez MD;  Location: [facility] ENDOSCOPY;  Service: Gastroenterology;  Laterality: N/A;  POST: dysphagia     EYE  SURGERY      GANGLION CYST EXCISION Left     HERNIA REPAIR Bilateral     INCISION AND DRAINAGE OF WOUND Right 6/15/2022    Procedure: INCISION AND DRAINAGE WOUND with debridement;  Surgeon: Fito Forte MD;  Location: [facility] MAIN OR;  Service: Orthopedics;  Laterality: Right;    JOINT REPLACEMENT      Left total hip    RETINOPATHY SURGERY      laser    TOTAL HIP ARTHROPLASTY Left     TOTAL HIP ARTHROPLASTY Left 2018    TRANS METATARSAL AMPUTATION Right 3/17/2020    Procedure: AMPUTATION TRANS METATARSAL right;  Surgeon: ERWIN Baker DPM;  Location: [facility] MAIN OR;  Service: Podiatry;  Laterality: Right;  GANGRENOUS RIGHT FOOT    VASECTOMY         Family History: family history includes Arthritis in his mother; Cancer in an other family member; Colon cancer in an other family member; Diabetes in his mother; Heart disease in his mother; Hyperlipidemia in his mother and another family member; Hypertension in an other family member; Leukemia in his father; Sleep apnea in his maternal aunt; Stroke in his maternal grandmother. Otherwise pertinent FHx was reviewed and not pertinent to current issue.    Social History:  reports that he has never smoked. He has never used smokeless tobacco. He reports current alcohol use. He reports that he does not use drugs.    Home Medications:   Accu-Chek Guide, Accu-Chek Softclix Lancets, Cinnamon, DULoxetine, Dexcom G6 , Dexcom G6 Sensor, Dexcom G6 Transmitter, Glucagon, HYDROcodone-acetaminophen, Insulin Glargine, Insulin Lispro, Semaglutide(0.25 or 0.5MG/DOS), amLODIPine, apixaban, aspirin, atorvastatin, buPROPion XL, busPIRone, cyclobenzaprine, glucose blood, insulin aspart, levothyroxine, meclizine, metoprolol succinate XL, multivitamin with minerals, omeprazole, ondansetron ODT, and pregabalin    Allergies:  Allergies   Allergen Reactions    Lisinopril Cough    Cefixime Other (See Comments)         Objective      Vitals:  Temp:  [97.6 °F (36.4 °C)-98.6 °F (37 °C)]  97.6 °F (36.4 °C)  Heart Rate:  [72-82] 77  Resp:  [12-21] 18  BP: (106-142)/(48-68) 135/67    Physical Exam  GENERAL: The patient is well developed and nontoxic.  HEENT: Nonicteric sclerae, PERRLA, EOMI. Oropharynx clear. Moist mucous membranes. Conjunctivae appear well perfused.  CHEST: Chest wall is nontender.  HEART: Regular rate and rhythm without murmurs. No MRG  LUNGS: Clear to auscultation bilaterally. No WRR  ABDOMEN: Soft, positive bowel sounds, nontender, no organomegaly.  RECTAL: Deferred.  SKIN: No rash, no excessive bruising, petechiae, or purpura.  NEUROLOGIC: Cranial nerves II-XII intact without motor/sensory deficit      Result Review    Result Review:  I have personally reviewed the results from the time of this admission to 9/25/2023 10:36 EDT and agree with these findings:  [x]  Laboratory  []  Microbiology  [x]  Radiology  []  EKG/Telemetry   []  Cardiology/Vascular   []  Pathology  []  Old records  []  Other:      Assessment & Plan        Active Hospital Problems:  Active Hospital Problems    Diagnosis     **Abdominal pain     Nausea, vomiting and diarrhea     Calculus of gallbladder without cholecystitis without obstruction      Plan:   Abdominal pain  - CT abd reviewed and showing cholelithiasis with out ct evidence of cholecystitis  - US gallbladder reviewed and showing cholelithiasis with a mildly thickened gallbladder wall and positive murphys sign  - IV levaquin and flagyl   - GI consulted  - General surgery consulted    DM2  SSI  -endo consulted    Hx of PE  - pt taking eliquis-will hold this am    HTN    Dvt ppx: scdz (will  give one dose of lovenox1/g/kg x1)    Signature: Electronically signed by Ishmael Nugent MD, 09/25/23, 10:36 EDT.  [facility] Hospitalist Team

## 2023-09-25 NOTE — CONSULTS
"Diabetes Education  Assessment/Teaching    Patient Name:  Kuldeep Adhikari  YOB: 1965  MRN: [id number]  Admit Date:  9/22/2023      Assessment Date:  9/25/2023  Flowsheet Row Most Recent Value   General Information     Referral From: A1c, Blood glucose, MD order  [MD consult for blood glucose >200, admission blood sugar 381, and on 8/4/2023 A1c was 11.6%.]   Height 172.7 cm (68\")   Height Method Stated   Weight 81.8 kg (180 lb 5.4 oz)   Weight Method Bed scale   Pregnancy Assessment    Diabetes History    What type of diabetes do you have? Type 2   Length of Diabetes Diagnosis 10 + years  [Diagnosed 1999]   Current DM knowledge fair   Have you had diabetes education/teaching in the past? yes   When and where was your diabetes education? Inpatient hospitalizations at [facility] with last one being 3/8/2023   Do you test your blood sugar at home? yes   Frequency of checks 3-4X a day   Meter type Accu-chek Guide Me new   Who performs the test? patient   Have you had high blood sugar? (>140mg/dl) yes   How often do you have high blood sugar? unknown   When was your last high blood sugar? Admission blood sugar 381   Education Preferences    What areas of diabetes would you like to learn about? avoiding high blood sugar, diabetes complications, medications for diabetes   Nutrition Information    Assessment Topics    Taking Medication - Assessment Needs education   Problem Solving - Assessment Needs education   Reducing Risk - Assessment Needs education   DM Goals    Taking Medication - Goal Today   Problem Solving - Goal Today   Reducing Risk - Goal Today            Flowsheet Row Most Recent Value   DM Education Needs    Meter Has own   Meter Type Accuchek   Frequency of Testing 3 times a day   Medication Insulin, Other injectables, Pen  [At home patient stated he was taking Lantus 34 units at bedtime and Ozempic 0.25 mg weekly.]   Problem Solving Hyperglycemia, Signs, Symptoms, Treatment   Reducing Risks A1C " testing  [On 8/4/2023 A1c was 11.6%.]   Discharge Plan Home   Motivation Moderate   Teaching Method Discussion, Handouts   Patient Response Verbalized understanding              Other Comments:  A1c info sheet given with discussion on A1c target and healthy blood sugar range. Patient stated he was on the Dexcom G6 but is in the middle of changing insurance and is waiting until he gets on his new policy before he pursues getting more sensors. Patient stated he has been out of his Humalog about 5 months because insurance wasn't covering it. Patient stated his Ozempic is being d'cali due to the reaction he had and being hospitalized. Patient stated he is willing to do meds to beds so he can get his meal time insulin.  Prescriptions started in discharge orders for Novolog and pen needles. Patient has no further questions or concerns related to diabetes at this time.      Electronically signed by:  Ann Hyman RN  09/25/23 17:26 EDT

## 2023-09-26 ENCOUNTER — ANESTHESIA (OUTPATIENT)
Dept: PERIOP | Facility: HOSPITAL | Age: 58
End: 2023-09-26
Payer: MEDICARE

## 2023-09-26 ENCOUNTER — ANESTHESIA EVENT (OUTPATIENT)
Dept: PERIOP | Facility: HOSPITAL | Age: 58
End: 2023-09-26
Payer: MEDICARE

## 2023-09-26 PROBLEM — K80.20 CALCULUS OF GALLBLADDER WITHOUT CHOLECYSTITIS WITHOUT OBSTRUCTION: Status: RESOLVED | Noted: 2023-09-22 | Resolved: 2023-09-26

## 2023-09-26 LAB
ANION GAP SERPL CALCULATED.3IONS-SCNC: 6 MMOL/L (ref 5–15)
BASOPHILS # BLD AUTO: 0.1 10*3/MM3 (ref 0–0.2)
BASOPHILS NFR BLD AUTO: 1.4 % (ref 0–1.5)
BUN SERPL-MCNC: 11 MG/DL (ref 6–20)
BUN/CREAT SERPL: 10.4 (ref 7–25)
CALCIUM SPEC-SCNC: 8.2 MG/DL (ref 8.6–10.5)
CHLORIDE SERPL-SCNC: 109 MMOL/L (ref 98–107)
CO2 SERPL-SCNC: 25 MMOL/L (ref 22–29)
CREAT SERPL-MCNC: 1.06 MG/DL (ref 0.76–1.27)
DEPRECATED RDW RBC AUTO: 47.7 FL (ref 37–54)
EGFRCR SERPLBLD CKD-EPI 2021: 80.8 ML/MIN/1.73
EOSINOPHIL # BLD AUTO: 1.2 10*3/MM3 (ref 0–0.4)
EOSINOPHIL NFR BLD AUTO: 20.8 % (ref 0.3–6.2)
ERYTHROCYTE [DISTWIDTH] IN BLOOD BY AUTOMATED COUNT: 17.8 % (ref 12.3–15.4)
GLUCOSE BLDC GLUCOMTR-MCNC: 107 MG/DL (ref 70–105)
GLUCOSE BLDC GLUCOMTR-MCNC: 133 MG/DL (ref 70–105)
GLUCOSE BLDC GLUCOMTR-MCNC: 148 MG/DL (ref 70–105)
GLUCOSE BLDC GLUCOMTR-MCNC: 83 MG/DL (ref 70–105)
GLUCOSE SERPL-MCNC: 196 MG/DL (ref 65–99)
HCT VFR BLD AUTO: 32.3 % (ref 37.5–51)
HGB BLD-MCNC: 10.3 G/DL (ref 13–17.7)
LYMPHOCYTES # BLD AUTO: 0.9 10*3/MM3 (ref 0.7–3.1)
LYMPHOCYTES NFR BLD AUTO: 15.8 % (ref 19.6–45.3)
MCH RBC QN AUTO: 23.1 PG (ref 26.6–33)
MCHC RBC AUTO-ENTMCNC: 32.1 G/DL (ref 31.5–35.7)
MCV RBC AUTO: 72.2 FL (ref 79–97)
MONOCYTES # BLD AUTO: 0.4 10*3/MM3 (ref 0.1–0.9)
MONOCYTES NFR BLD AUTO: 7.4 % (ref 5–12)
NEUTROPHILS NFR BLD AUTO: 3 10*3/MM3 (ref 1.7–7)
NEUTROPHILS NFR BLD AUTO: 54.6 % (ref 42.7–76)
NRBC BLD AUTO-RTO: 0.1 /100 WBC (ref 0–0.2)
PLATELET # BLD AUTO: 205 10*3/MM3 (ref 140–450)
PMV BLD AUTO: 8.1 FL (ref 6–12)
POTASSIUM SERPL-SCNC: 3.9 MMOL/L (ref 3.5–5.2)
RBC # BLD AUTO: 4.47 10*6/MM3 (ref 4.14–5.8)
SODIUM SERPL-SCNC: 140 MMOL/L (ref 136–145)
WBC NRBC COR # BLD: 5.5 10*3/MM3 (ref 3.4–10.8)

## 2023-09-26 PROCEDURE — 63710000001 PREGABALIN 100 MG CAPSULE: Performed by: SURGERY

## 2023-09-26 PROCEDURE — 25010000002 KETOROLAC TROMETHAMINE PER 15 MG: Performed by: NURSE ANESTHETIST, CERTIFIED REGISTERED

## 2023-09-26 PROCEDURE — 63710000001 SENNOSIDES-DOCUSATE 8.6-50 MG TABLET: Performed by: SURGERY

## 2023-09-26 PROCEDURE — A9270 NON-COVERED ITEM OR SERVICE: HCPCS | Performed by: SURGERY

## 2023-09-26 PROCEDURE — 63710000001 HYDROCODONE-ACETAMINOPHEN 5-325 MG TABLET: Performed by: SURGERY

## 2023-09-26 PROCEDURE — 25010000002 ONDANSETRON PER 1 MG: Performed by: NURSE ANESTHETIST, CERTIFIED REGISTERED

## 2023-09-26 PROCEDURE — 25810000003 SODIUM CHLORIDE 0.9 % SOLUTION: Performed by: SURGERY

## 2023-09-26 PROCEDURE — 25010000002 FENTANYL CITRATE (PF) 100 MCG/2ML SOLUTION: Performed by: NURSE ANESTHETIST, CERTIFIED REGISTERED

## 2023-09-26 PROCEDURE — 25010000002 PROPOFOL 200 MG/20ML EMULSION: Performed by: NURSE ANESTHETIST, CERTIFIED REGISTERED

## 2023-09-26 PROCEDURE — 25010000002 CEFTRIAXONE PER 250 MG: Performed by: SURGERY

## 2023-09-26 PROCEDURE — 63710000001 BUSPIRONE 5 MG TABLET: Performed by: SURGERY

## 2023-09-26 PROCEDURE — 25010000002 SUGAMMADEX 200 MG/2ML SOLUTION: Performed by: NURSE ANESTHETIST, CERTIFIED REGISTERED

## 2023-09-26 PROCEDURE — 63710000001 INSULIN GLARGINE PER 5 UNITS: Performed by: INTERNAL MEDICINE

## 2023-09-26 PROCEDURE — 80048 BASIC METABOLIC PNL TOTAL CA: CPT | Performed by: PHYSICIAN ASSISTANT

## 2023-09-26 PROCEDURE — 63710000001 ATORVASTATIN 40 MG TABLET: Performed by: SURGERY

## 2023-09-26 PROCEDURE — 99231 SBSQ HOSP IP/OBS SF/LOW 25: CPT | Performed by: INTERNAL MEDICINE

## 2023-09-26 PROCEDURE — G0378 HOSPITAL OBSERVATION PER HR: HCPCS

## 2023-09-26 PROCEDURE — 25010000002 BUPIVACAINE 0.25 % SOLUTION: Performed by: SURGERY

## 2023-09-26 PROCEDURE — 63710000001 METRONIDAZOLE 500 MG TABLET: Performed by: SURGERY

## 2023-09-26 PROCEDURE — 47562 LAPAROSCOPIC CHOLECYSTECTOMY: CPT | Performed by: SURGERY

## 2023-09-26 PROCEDURE — 25010000002 ONDANSETRON PER 1 MG: Performed by: EMERGENCY MEDICINE

## 2023-09-26 PROCEDURE — 85025 COMPLETE CBC W/AUTO DIFF WBC: CPT | Performed by: PHYSICIAN ASSISTANT

## 2023-09-26 PROCEDURE — 25010000002 ONDANSETRON PER 1 MG: Performed by: SURGERY

## 2023-09-26 PROCEDURE — 25010000002 SUCCINYLCHOLINE PER 20 MG: Performed by: NURSE ANESTHETIST, CERTIFIED REGISTERED

## 2023-09-26 PROCEDURE — 63710000001 CYCLOBENZAPRINE 10 MG TABLET: Performed by: SURGERY

## 2023-09-26 PROCEDURE — A9270 NON-COVERED ITEM OR SERVICE: HCPCS | Performed by: INTERNAL MEDICINE

## 2023-09-26 PROCEDURE — 88304 TISSUE EXAM BY PATHOLOGIST: CPT | Performed by: SURGERY

## 2023-09-26 PROCEDURE — 63710000001 MELATONIN 5 MG TABLET: Performed by: SURGERY

## 2023-09-26 PROCEDURE — 82948 REAGENT STRIP/BLOOD GLUCOSE: CPT

## 2023-09-26 DEVICE — LIGAMAX 5 MM ENDOSCOPIC MULTIPLE CLIP APPLIER
Type: IMPLANTABLE DEVICE | Site: ABDOMEN | Status: FUNCTIONAL
Brand: LIGAMAX

## 2023-09-26 DEVICE — HEMOST ABS SURGICEL PWDR 3GM: Type: IMPLANTABLE DEVICE | Site: ABDOMEN | Status: FUNCTIONAL

## 2023-09-26 RX ORDER — ACETAMINOPHEN 325 MG/1
650 TABLET ORAL ONCE AS NEEDED
Status: DISCONTINUED | OUTPATIENT
Start: 2023-09-26 | End: 2023-09-26 | Stop reason: HOSPADM

## 2023-09-26 RX ORDER — ALBUTEROL SULFATE 2.5 MG/3ML
2.5 SOLUTION RESPIRATORY (INHALATION) ONCE AS NEEDED
Status: DISCONTINUED | OUTPATIENT
Start: 2023-09-26 | End: 2023-09-26 | Stop reason: HOSPADM

## 2023-09-26 RX ORDER — LIDOCAINE HYDROCHLORIDE 20 MG/ML
INJECTION, SOLUTION EPIDURAL; INFILTRATION; INTRACAUDAL; PERINEURAL AS NEEDED
Status: DISCONTINUED | OUTPATIENT
Start: 2023-09-26 | End: 2023-09-26 | Stop reason: SURG

## 2023-09-26 RX ORDER — PROPOFOL 10 MG/ML
INJECTION, EMULSION INTRAVENOUS AS NEEDED
Status: DISCONTINUED | OUTPATIENT
Start: 2023-09-26 | End: 2023-09-26 | Stop reason: SURG

## 2023-09-26 RX ORDER — DROPERIDOL 2.5 MG/ML
0.62 INJECTION, SOLUTION INTRAMUSCULAR; INTRAVENOUS ONCE AS NEEDED
Status: DISCONTINUED | OUTPATIENT
Start: 2023-09-26 | End: 2023-09-26 | Stop reason: HOSPADM

## 2023-09-26 RX ORDER — LABETALOL HYDROCHLORIDE 5 MG/ML
5 INJECTION, SOLUTION INTRAVENOUS
Status: DISCONTINUED | OUTPATIENT
Start: 2023-09-26 | End: 2023-09-26 | Stop reason: HOSPADM

## 2023-09-26 RX ORDER — BUPIVACAINE HYDROCHLORIDE 2.5 MG/ML
INJECTION, SOLUTION INFILTRATION; PERINEURAL AS NEEDED
Status: DISCONTINUED | OUTPATIENT
Start: 2023-09-26 | End: 2023-09-26 | Stop reason: HOSPADM

## 2023-09-26 RX ORDER — DIPHENHYDRAMINE HYDROCHLORIDE 50 MG/ML
12.5 INJECTION INTRAMUSCULAR; INTRAVENOUS ONCE AS NEEDED
Status: DISCONTINUED | OUTPATIENT
Start: 2023-09-26 | End: 2023-09-26 | Stop reason: HOSPADM

## 2023-09-26 RX ORDER — NALOXONE HCL 0.4 MG/ML
0.4 VIAL (ML) INJECTION AS NEEDED
Status: DISCONTINUED | OUTPATIENT
Start: 2023-09-26 | End: 2023-09-26 | Stop reason: HOSPADM

## 2023-09-26 RX ORDER — ONDANSETRON 2 MG/ML
INJECTION INTRAMUSCULAR; INTRAVENOUS AS NEEDED
Status: DISCONTINUED | OUTPATIENT
Start: 2023-09-26 | End: 2023-09-26 | Stop reason: SURG

## 2023-09-26 RX ORDER — SODIUM CHLORIDE 9 MG/ML
INJECTION, SOLUTION INTRAVENOUS CONTINUOUS PRN
Status: DISCONTINUED | OUTPATIENT
Start: 2023-09-26 | End: 2023-09-26 | Stop reason: SURG

## 2023-09-26 RX ORDER — HYDROCODONE BITARTRATE AND ACETAMINOPHEN 5; 325 MG/1; MG/1
1 TABLET ORAL ONCE AS NEEDED
Status: DISCONTINUED | OUTPATIENT
Start: 2023-09-26 | End: 2023-09-26 | Stop reason: HOSPADM

## 2023-09-26 RX ORDER — HYDROCODONE BITARTRATE AND ACETAMINOPHEN 7.5; 325 MG/1; MG/1
1 TABLET ORAL EVERY 4 HOURS PRN
Status: DISCONTINUED | OUTPATIENT
Start: 2023-09-26 | End: 2023-09-26 | Stop reason: HOSPADM

## 2023-09-26 RX ORDER — FENTANYL CITRATE 50 UG/ML
INJECTION, SOLUTION INTRAMUSCULAR; INTRAVENOUS AS NEEDED
Status: DISCONTINUED | OUTPATIENT
Start: 2023-09-26 | End: 2023-09-26 | Stop reason: SURG

## 2023-09-26 RX ORDER — PROMETHAZINE HYDROCHLORIDE 25 MG/1
25 TABLET ORAL ONCE AS NEEDED
Status: DISCONTINUED | OUTPATIENT
Start: 2023-09-26 | End: 2023-09-26 | Stop reason: HOSPADM

## 2023-09-26 RX ORDER — FENTANYL CITRATE 50 UG/ML
50 INJECTION, SOLUTION INTRAMUSCULAR; INTRAVENOUS
Status: DISCONTINUED | OUTPATIENT
Start: 2023-09-26 | End: 2023-09-26 | Stop reason: HOSPADM

## 2023-09-26 RX ORDER — ROCURONIUM BROMIDE 10 MG/ML
INJECTION, SOLUTION INTRAVENOUS AS NEEDED
Status: DISCONTINUED | OUTPATIENT
Start: 2023-09-26 | End: 2023-09-26 | Stop reason: SURG

## 2023-09-26 RX ORDER — HYDROCODONE BITARTRATE AND ACETAMINOPHEN 5; 325 MG/1; MG/1
1 TABLET ORAL EVERY 4 HOURS PRN
Status: DISCONTINUED | OUTPATIENT
Start: 2023-09-26 | End: 2023-09-28 | Stop reason: HOSPADM

## 2023-09-26 RX ORDER — HYDROCODONE BITARTRATE AND ACETAMINOPHEN 5; 325 MG/1; MG/1
2 TABLET ORAL EVERY 4 HOURS PRN
Status: DISCONTINUED | OUTPATIENT
Start: 2023-09-26 | End: 2023-09-28 | Stop reason: HOSPADM

## 2023-09-26 RX ORDER — SUCCINYLCHOLINE CHLORIDE 20 MG/ML
INJECTION INTRAMUSCULAR; INTRAVENOUS AS NEEDED
Status: DISCONTINUED | OUTPATIENT
Start: 2023-09-26 | End: 2023-09-26 | Stop reason: SURG

## 2023-09-26 RX ORDER — SODIUM CHLORIDE 9 MG/ML
25 INJECTION, SOLUTION INTRAVENOUS CONTINUOUS
Status: DISCONTINUED | OUTPATIENT
Start: 2023-09-26 | End: 2023-09-28 | Stop reason: HOSPADM

## 2023-09-26 RX ORDER — HYDRALAZINE HYDROCHLORIDE 20 MG/ML
5 INJECTION INTRAMUSCULAR; INTRAVENOUS
Status: DISCONTINUED | OUTPATIENT
Start: 2023-09-26 | End: 2023-09-26 | Stop reason: HOSPADM

## 2023-09-26 RX ORDER — KETOROLAC TROMETHAMINE 30 MG/ML
INJECTION, SOLUTION INTRAMUSCULAR; INTRAVENOUS AS NEEDED
Status: DISCONTINUED | OUTPATIENT
Start: 2023-09-26 | End: 2023-09-26 | Stop reason: SURG

## 2023-09-26 RX ORDER — DIPHENHYDRAMINE HYDROCHLORIDE 50 MG/ML
12.5 INJECTION INTRAMUSCULAR; INTRAVENOUS
Status: DISCONTINUED | OUTPATIENT
Start: 2023-09-26 | End: 2023-09-26 | Stop reason: HOSPADM

## 2023-09-26 RX ORDER — PROMETHAZINE HYDROCHLORIDE 25 MG/1
25 SUPPOSITORY RECTAL ONCE AS NEEDED
Status: DISCONTINUED | OUTPATIENT
Start: 2023-09-26 | End: 2023-09-26 | Stop reason: HOSPADM

## 2023-09-26 RX ORDER — ACETAMINOPHEN 650 MG/1
650 SUPPOSITORY RECTAL EVERY 4 HOURS PRN
Status: DISCONTINUED | OUTPATIENT
Start: 2023-09-26 | End: 2023-09-26 | Stop reason: HOSPADM

## 2023-09-26 RX ORDER — ONDANSETRON 2 MG/ML
4 INJECTION INTRAMUSCULAR; INTRAVENOUS ONCE AS NEEDED
Status: DISCONTINUED | OUTPATIENT
Start: 2023-09-26 | End: 2023-09-26 | Stop reason: HOSPADM

## 2023-09-26 RX ADMIN — LIDOCAINE HYDROCHLORIDE 100 MG: 20 INJECTION, SOLUTION EPIDURAL; INFILTRATION; INTRACAUDAL; PERINEURAL at 09:14

## 2023-09-26 RX ADMIN — INSULIN GLARGINE 20 UNITS: 100 INJECTION, SOLUTION SUBCUTANEOUS at 22:07

## 2023-09-26 RX ADMIN — CYCLOBENZAPRINE 10 MG: 10 TABLET, FILM COATED ORAL at 22:08

## 2023-09-26 RX ADMIN — ONDANSETRON 4 MG: 2 INJECTION INTRAMUSCULAR; INTRAVENOUS at 18:50

## 2023-09-26 RX ADMIN — METRONIDAZOLE 500 MG: 500 TABLET ORAL at 22:08

## 2023-09-26 RX ADMIN — Medication 5 MG: at 22:08

## 2023-09-26 RX ADMIN — SENNOSIDES AND DOCUSATE SODIUM 2 TABLET: 50; 8.6 TABLET ORAL at 22:08

## 2023-09-26 RX ADMIN — PREGABALIN 100 MG: 100 CAPSULE ORAL at 22:08

## 2023-09-26 RX ADMIN — HYDROCODONE BITARTRATE AND ACETAMINOPHEN 1 TABLET: 5; 325 TABLET ORAL at 18:50

## 2023-09-26 RX ADMIN — FENTANYL CITRATE 100 MCG: 50 INJECTION, SOLUTION INTRAMUSCULAR; INTRAVENOUS at 09:12

## 2023-09-26 RX ADMIN — ROCURONIUM BROMIDE 10 MG: 10 INJECTION, SOLUTION INTRAVENOUS at 09:14

## 2023-09-26 RX ADMIN — KETOROLAC TROMETHAMINE 30 MG: 30 INJECTION, SOLUTION INTRAMUSCULAR; INTRAVENOUS at 10:03

## 2023-09-26 RX ADMIN — SUGAMMADEX 300 MG: 100 INJECTION, SOLUTION INTRAVENOUS at 10:03

## 2023-09-26 RX ADMIN — SODIUM CHLORIDE: 9 INJECTION, SOLUTION INTRAVENOUS at 09:10

## 2023-09-26 RX ADMIN — SUCCINYLCHOLINE CHLORIDE 140 MG: 20 INJECTION, SOLUTION INTRAMUSCULAR; INTRAVENOUS at 09:14

## 2023-09-26 RX ADMIN — ROCURONIUM BROMIDE 30 MG: 10 INJECTION, SOLUTION INTRAVENOUS at 09:24

## 2023-09-26 RX ADMIN — Medication 10 ML: at 22:08

## 2023-09-26 RX ADMIN — ONDANSETRON 4 MG: 2 INJECTION INTRAMUSCULAR; INTRAVENOUS at 11:28

## 2023-09-26 RX ADMIN — CEFTRIAXONE 2000 MG: 2 INJECTION, POWDER, FOR SOLUTION INTRAMUSCULAR; INTRAVENOUS at 22:07

## 2023-09-26 RX ADMIN — PROPOFOL 200 MG: 10 INJECTION, EMULSION INTRAVENOUS at 09:14

## 2023-09-26 RX ADMIN — METRONIDAZOLE 500 MG: 500 TABLET ORAL at 05:46

## 2023-09-26 RX ADMIN — LEVOTHYROXINE SODIUM 100 MCG: 0.1 TABLET ORAL at 05:46

## 2023-09-26 RX ADMIN — BUSPIRONE HYDROCHLORIDE 10 MG: 5 TABLET ORAL at 22:08

## 2023-09-26 RX ADMIN — SODIUM CHLORIDE 100 ML/HR: 450 INJECTION, SOLUTION INTRAVENOUS at 22:10

## 2023-09-26 RX ADMIN — Medication 10 ML: at 22:09

## 2023-09-26 RX ADMIN — SODIUM CHLORIDE 25 ML/HR: 9 INJECTION, SOLUTION INTRAVENOUS at 08:11

## 2023-09-26 RX ADMIN — ONDANSETRON 4 MG: 2 INJECTION INTRAMUSCULAR; INTRAVENOUS at 10:03

## 2023-09-26 RX ADMIN — ATORVASTATIN CALCIUM 80 MG: 40 TABLET, FILM COATED ORAL at 22:08

## 2023-09-26 NOTE — ANESTHESIA POSTPROCEDURE EVALUATION
Patient: Kuldeep Adhikari    Procedure Summary       Date: 09/26/23 Room / Location: [facility] OR  / [facility] MAIN OR    Anesthesia Start: 0909 Anesthesia Stop: 1021    Procedure: CHOLECYSTECTOMY LAPAROSCOPIC (Abdomen) Diagnosis:       Calculus of gallbladder without cholecystitis without obstruction      (Calculus of gallbladder without cholecystitis without obstruction [K80.20])    Surgeons: Eduardo Srinivasan MD Provider: Barney Johnson MD    Anesthesia Type: general ASA Status: 3            Anesthesia Type: general    Vitals  Vitals Value Taken Time   /63 09/26/23 1115   Temp 98.4 °F (36.9 °C) 09/26/23 1115   Pulse 70 09/26/23 1117   Resp 14 09/26/23 1115   SpO2 92 % 09/26/23 1117   Vitals shown include unvalidated device data.        Post Anesthesia Care and Evaluation    Patient location during evaluation: PACU  Patient participation: complete - patient participated  Level of consciousness: awake  Pain scale: See nurse's notes for pain score.  Pain management: adequate    Airway patency: patent  Anesthetic complications: No anesthetic complications  PONV Status: none  Cardiovascular status: acceptable  Respiratory status: acceptable and spontaneous ventilation  Hydration status: acceptable    Comments: Patient seen and examined postoperatively; vital signs stable; SpO2 greater than or equal to 90%; cardiopulmonary status stable; nausea/vomiting adequately controlled; pain adequately controlled; no apparent anesthesia complications; patient discharged from anesthesia care when discharge criteria were met

## 2023-09-26 NOTE — PLAN OF CARE
Goal Outcome Evaluation:               Pt slept throughout the night, plans to have laparoscopic cholecystectomy in the morning

## 2023-09-26 NOTE — CASE MANAGEMENT/SOCIAL WORK
Continued Stay Note  [facility]     Patient Name: Kuldeep Adhikari  MRN: [id number]  Today's Date: 9/26/2023    Admit Date: 9/22/2023    Plan: Return home with brother and his family. Meds to Bed   Discharge Plan       Row Name 09/26/23 0919       Plan    Plan Comments Barriers: CHOLECYSTECTOMY LAPAROSCOPIC today. At discharge Mr. Adhikari will return home with his family                        Phone communication or documentation only - no physical contact with patient or family.   Ann PATRICK,RN Case Manager  [facility]  Phone: Desk- [phone] cell- [phone]

## 2023-09-26 NOTE — OP NOTE
CHOLECYSTECTOMY LAPAROSCOPIC  Operative Note    Patient Name:  Kuldeep Adhikari  YOB: 1965    Date of Surgery:  9/26/2023     Indications: The patient is a 58 y.o. male who presents with gallstones. The risks, benefits, and alternatives to laparoscopic cholecystectomy were discussed in detail and the patient agreed to proceed to the OR for laparoscopic removal of the gallbladder.    Pre-op Diagnosis:   Calculus of gallbladder without cholecystitis without obstruction [K80.20]    Post-op Diagnosis:  Post-Op Diagnosis Codes:     * Calculus of gallbladder without cholecystitis without obstruction [K80.20]    Procedure/CPT® Codes:      Procedure(s):  CHOLECYSTECTOMY LAPAROSCOPIC    Staff:  Surgeon(s):  Eduardo Srinivasan MD    Assistant: Zena Harvey was responsible for performing the following activities: Retraction, Suction, Closing, and Held/Positioned Camera and their skilled assistance was necessary for the success of this case.      Anesthesia: General    Estimated Blood Loss: minimal    Implants:    Implant Name Type Inv. Item Serial No.  Lot No. LRB No. Used Action   CLIPAPPLR M/ ENDO LIGAMAX5 5MM 33CM MD/LG - [id number] Implant CLIPAPPLR M/ ENDO LIGAMAX5 5MM 33CM MD/LG  [employer]  DIV OF J AND J [id number] N/A 1 Implanted   HEMOST ABS SURGICEL PWDR 3GM - [id number] Implant HEMOST ABS SURGICEL PWDR 3GM  [employer]  DIV OF J AND J [id number] N/A 1 Implanted       Specimen:          Specimens       ID Source Type Tests Collected By Collected At Frozen?    A Gallbladder Tissue TISSUE PATHOLOGY EXAM   Eduardo Srinivasan MD 9/26/23 0935     Description: Gallbladder            Findings: gallbladder containing gallstones    Complications: none, immediately    Description of Procedure: After obtaining informed consent in the pre-op holding area, the patient was brought to the operating room and placed in the supine position. SCDs were applied, and pre-operative antibiotics were  administered. The patient then underwent uncomplicated induction of general endotracheal anesthesia and the abdomen was prepped and draped in the usual sterile fashion. After a brief timeout, the procedure began.  We began by infiltrating local anesthesia above the umbilicus, and made a skin incision. The umbilical stalk was grasped and used to elevate the fascia which was incised. The abdomen was entered bluntly. On sweep of the abdomen, there was no bowel in the vicinity of entry. 0 vicryl sutures were placed on either side of the fascia and a Grossman trochar was introduced into the abdomen. Pneumoperitoneum was raised to 15 mmHg. Additional working trochars were placed in the epigastrium and right upper quadrant. The patient was then placed in a head-up and right side elevated position. The gallbladder was noted to be distended and containing gallstones. The fundus of the gallbladder was then grasped and retracted over the liver. The infundibulum was grasped and retracted towards the right lower quadrant. This maneuver exposed the triangle of Calot. The peritoneum overlying this triangle was incised and using blunt dissection, the cystic duct and cystic artery were circumferentially cleared of the surrounding tissue. This gave the critical view of safety, in which two and only two structures could be seen arising from below and inserting onto the gallbladder, and between them could be seen a small portion of the cystic plate. The cystic duct and cystic artery were then doubly clipped and divided. The gallbladder was then dissected free from the cystic plate using electrocautery to maintain hemostasis. With the gallbladder now completely free, it was placed in an Endo-catch bag and removed through the umbilicus. We returned to the abdomen where hemostasis was visualized. We irrigated the cystic plate with copious amounts of warm normal saline until clear. Given the fact that the patient is on anticoagulation, I  elected to place Surgicel powder into the gallbladder fossa to aid with hemostasis. The additional working trochars were removed under direct visualization to ensure no on-going bleeding. Pneumoperitoneum was released. The fascia of the umbilical trochar was then closed using the previously placed 0 vicryl suture. The skin was closed using 4-0 Vicryl. The skin was dressed with skin glue. The patient was then extubated and taken to PACU in satisfactory condition.    Eduardo Srinivasan MD     Date: 9/26/2023  Time: 10:04 EDT

## 2023-09-26 NOTE — ANESTHESIA PROCEDURE NOTES
Airway  Urgency: elective    Date/Time: 9/26/2023 9:17 AM  Airway not difficult    General Information and Staff    Patient location during procedure: OR  CRNA/CAA: Miriam Hall CRNA    Indications and Patient Condition  Indications for airway management: airway protection    Preoxygenated: yes  MILS not maintained throughout  Mask difficulty assessment: 0 - not attempted    Final Airway Details  Final airway type: endotracheal airway      Successful airway: ETT  Cuffed: yes   Successful intubation technique: direct laryngoscopy  Facilitating devices/methods: intubating stylet  Endotracheal tube insertion site: oral  Blade: Brent  Blade size: 4  ETT size (mm): 7.5  Cormack-Lehane Classification: grade IIa - partial view of glottis  Placement verified by: chest auscultation and capnometry   Cuff volume (mL): 8  Measured from: lips  ETT/EBT  to lips (cm): 22  Number of attempts at approach: 1  Assessment: lips, teeth, and gum same as pre-op and atraumatic intubation    Additional Comments  Pt preoxygenated prior to induction, easy mask airway, atraumatic intubation,+ ETCO2, + bs bilat,  ETT secured and connected to ventilator.

## 2023-09-26 NOTE — ANESTHESIA PREPROCEDURE EVALUATION
Anesthesia Evaluation     Patient summary reviewed and Nursing notes reviewed   NPO Solid Status: > 8 hours  NPO Liquid Status: > 8 hours           Airway   Mallampati: II  TM distance: >3 FB  Neck ROM: full  No difficulty expected  Dental    (+) poor dentition        Pulmonary    (+) pulmonary embolism,sleep apnea  Cardiovascular     (+) hypertension, CAD, PVD, DVT, hyperlipidemia      Neuro/Psych  (+) CVA, numbness, psychiatric history  GI/Hepatic/Renal/Endo    (+) obesity, hiatal hernia, GERD, renal disease CRI, diabetes mellitus, thyroid problem     Musculoskeletal     (+) neck pain  Abdominal    Substance History      OB/GYN          Other   arthritis,     ROS/Med Hx Other: Normal LV size and contractility EF of 60 to 65%  Normal RV size  Mild left atrial enlargement seen  Aortic valve appears structurally normal  Mitral valve leaflets are thickened anterior mitral leaflet appears calcified, but has good cusp separation.   No significant MR seen.  Tricuspid valve appears structurally normal, no significant regurgitation seen.  No pericardial effusion seen.  Proximal aorta appears normal in size.                  Anesthesia Plan    ASA 3     general     intravenous induction     Anesthetic plan, risks, benefits, and alternatives have been provided, discussed and informed consent has been obtained with: patient.    Plan discussed with CRNA.    CODE STATUS:    Code Status (Patient has no pulse and is not breathing): CPR (Attempt to Resuscitate)  Medical Interventions (Patient has pulse or is breathing): Full Support

## 2023-09-26 NOTE — CONSULTS
Nutrition Services    Patient Name: Kuldeep Adhikari  YOB: 1965  MRN: [id number]  Admission date: 9/22/2023    *See MSA at bottom of note    Severe acute illness-related malnutrition R/t severe reaction to medication resulting in N/V and inability to tolerate PO; as evidenced by weight loss greater than 10% in one week, with intakes less than 50% for greater than 5 days, and moderate muscle/fat loss.     Diet advancement -- defer to surgeon's orders.    PPE Documentation        PPE Worn By Provider gloves   PPE Worn By Patient  None     NUTRITION SCREENING      Trending Narrative: 9/25: Pt evaluated due to concern for recent weight loss (MST).  Pt uses CGM for glucose management at home and uses insulin; recently started on Ozempic and developed severe nausea, vomiting, and abd pain resulting in this admission. Per review of surgeon's note, pt with acute cholecystitis, likely R/t Ozempic. Pt has not been able to tolerate much PO during this timeframe (2 weeks leading up to admission) and has lost greater than 10% body weight. See MSA.       PO Diet: Diet: Cardiac Diets, Diabetic Diets; Healthy Heart (2-3 Na+); Consistent Carbohydrate; Texture: Regular Texture (IDDSI 7); Fluid Consistency: Thin (IDDSI 0)  NPO Diet NPO Type: Strict NPO -- plan for NPO at midnight   PO Supplements: None ordered presently    Trending PO Intake:  Variable intake       Nutritionally-Pertinent Medications RDN Reviewed, C/W clinical course         Labs (reviewed below): reviewed     Results from last 7 days   Lab Units 09/25/23  0440 09/24/23  1212 09/23/23  0106 09/22/23  1630   SODIUM mmol/L 142 142 140 138   POTASSIUM mmol/L 3.8 4.5 3.6 4.7   CHLORIDE mmol/L 109* 106 102 99   CO2 mmol/L 25.0 26.0 23.0 24.0   BUN mg/dL 13 16 18 22*   CREATININE mg/dL 1.22 1.45* 1.38* 1.57*   CALCIUM mg/dL 8.3* 9.2 9.0 9.5   BILIRUBIN mg/dL  --  0.9 1.1 1.5*   ALK PHOS U/L  --  115 127* 149*   ALT (SGPT) U/L  --  10 11 12   AST (SGOT) U/L  --   "14 15 20   GLUCOSE mg/dL 115* 104* 243* 381*     Results from last 7 days   Lab Units 09/25/23  0440   HEMOGLOBIN g/dL 11.0*   HEMATOCRIT % 34.9*     Lab Results   Component Value Date    HGBA1C 11.60 (H) 08/04/2023          GI Function:  BM 9/21       Skin: No breakdown documented        Weight Review: Estimated body mass index is 27.42 kg/m² as calculated from the following:    Height as of this encounter: 172.7 cm (68\").    Weight as of this encounter: 81.8 kg (180 lb 5.4 oz).    Comment:   9/25: scale weight 81.8 kg -- 10.4% weight loss in 1 month    Wt Readings from Last 30 Encounters:   09/22/23 2215 81.8 kg (180 lb 5.4 oz)   09/22/23 1430 90.7 kg (200 lb)   09/15/23 1429 88.5 kg (195 lb)   08/28/23 1448 91.2 kg (201 lb)   08/14/23 1531 91.2 kg (201 lb)   08/11/23 1050 90.7 kg (200 lb)   08/04/23 1312 89.8 kg (198 lb)   07/03/23 1421 92 kg (202 lb 12.8 oz)   03/15/23 0950 92.2 kg (203 lb 3.2 oz)   03/08/23 1043 86.7 kg (191 lb 3.2 oz)   03/07/23 2340 91.4 kg (201 lb 9.6 oz)   03/07/23 1832 90.5 kg (199 lb 8.3 oz)   02/28/23 0830 90.7 kg (200 lb)   01/26/23 1248 90.7 kg (200 lb)   12/13/22 1035 89.3 kg (196 lb 13.9 oz)   12/06/22 1228 86.2 kg (190 lb)   07/19/22 1524 84.5 kg (186 lb 3.7 oz)   07/06/22 1006 84.4 kg (186 lb)   06/18/22 1300 84.5 kg (186 lb 3.2 oz)   06/16/22 0416 91.4 kg (201 lb 8 oz)   06/15/22 0409 89.8 kg (198 lb)   06/12/22 2218 83.3 kg (183 lb 9.6 oz)   06/12/22 2139 83.3 kg (183 lb 9.6 oz)   06/12/22 1752 86.2 kg (190 lb)   06/09/22 1517 86.6 kg (191 lb)   05/25/22 0921 84.4 kg (186 lb)   05/12/22 1305 84.4 kg (186 lb)   05/11/22 0823 83 kg (183 lb)   04/28/22 0402 83.1 kg (183 lb 3.2 oz)   04/27/22 0502 83.4 kg (183 lb 13.8 oz)   04/26/22 0447 83.3 kg (183 lb 10.3 oz)   04/25/22 0500 83.8 kg (184 lb 11.2 oz)   04/24/22 0853 76.6 kg (168 lb 12.8 oz)   04/23/22 2239 72.6 kg (160 lb)   04/19/22 0936 90.7 kg (200 lb)   03/29/22 0947 90.7 kg (200 lb)   03/20/22 0612 87.8 kg (193 lb 9 oz) "   03/19/22 1307 89.4 kg (197 lb)   03/19/22 0543 89.6 kg (197 lb 8.5 oz)   03/18/22 1833 90.8 kg (200 lb 2.8 oz)   02/22/22 1019 89.8 kg (198 lb)   01/25/22 0839 89.8 kg (198 lb)   01/12/22 0921 89.8 kg (198 lb)   01/06/22 1340 89.8 kg (198 lb)   01/04/22 1847 89.9 kg (198 lb 3.2 oz)   11/08/21 1357 92.1 kg (203 lb)   08/16/21 1032 88.5 kg (195 lb)          --       Nutrition Problem Statement: Severe acute illness-related malnutrition R/t severe reaction to medication resulting in N/V and inability to tolerate PO; as evidenced by weight loss greater than 10% in one week, with intakes less than 50% for greater than 5 days, and moderate muscle/fat loss.         Nutrition Intervention: Will defer diet advancement to surgeon.          Monitoring/Evaluation I&O, PO intake, Pertinent labs, Weight, Skin status, GI status, POC/GOC        Addendum: MSA inadvertently left off on 9/25. Added below.  Malnutrition Severity Assessment      Patient meets criteria for : Severe Malnutrition  Malnutrition Type (last 8 hours)       Malnutrition Severity Assessment       Row Name 09/26/23 1946       Malnutrition Severity Assessment    Malnutrition Type Acute Disease or Injury - Related Malnutrition      Row Name 09/26/23 1946       Insufficient Energy Intake     Insufficient Energy Intake Findings Severe    Insufficient Energy Intake  < or equal to 50% of est. energy requirement for > or equal to 5d)      Row Name 09/26/23 1946       Unintentional Weight Loss     Unintentional Weight Loss Findings Severe    Unintentional Weight Loss  Weight loss greater than 5% in one month      Row Name 09/26/23 1946       Muscle Loss    Loss of Muscle Mass Findings Moderate    Restoration Region Moderate - slight depression      Row Name 09/26/23 1946       Fat Loss    Subcutaneous Fat Loss Findings Moderate    Orbital Region  Moderate -  somewhat hollowness, slightly dark circles      Row Name 09/26/23 1946       Criteria Met (Must meet criteria for  severity in at least 2 of these categories: M Wasting, Fat Loss, Fluid, Secondary Signs, Wt. Status, Intake)    Patient meets criteria for  Severe Malnutrition                       RD to follow up per protocol.    Electronically signed by:  Leticia Arias RD  09/25/23 20:15 EDT

## 2023-09-26 NOTE — PROGRESS NOTES
"Subjective   Kuldeep Adhikari is a 58 y.o. male.   Seen for diabetes f/u.  For lap maurisio in am.      Objective     /76   Pulse 77   Temp 97.8 °F (36.6 °C)   Resp 18   Ht 172.7 cm (68\")   Wt 81.8 kg (180 lb 5.4 oz)   SpO2 98%   BMI 27.42 kg/m²   Blood sugar  70 this am,  141 @ lunch, 150 @ supper. 46/179 @ HS    ASSESSMENT  Diabetes type 2 with hyperglycemia  Patient is stable    PLAN    Will decrease Lantus dose.         Meli Stone MD  9/25/2023  20:32 EDT    "

## 2023-09-26 NOTE — PROGRESS NOTES
[facility] Medicine Services   Daily Progress Note    Patient Name: Kuldeep Adhikari  : 1965  MRN: [id number]  Primary Care Physician:  Laura Whitaker MD  Date of admission: 2023  Date and Time of Service: 23 at 8:50am      Subjective    S:pt seen and examined, no new events overnight.    Objective      Vitals:   Temp:  [97.6 °F (36.4 °C)-97.9 °F (36.6 °C)] 97.6 °F (36.4 °C)  Heart Rate:  [71-80] 71  Resp:  [-] 12  BP: (109-146)/(54-76) 135/72    Physical Exam   GENERAL: The patient is well developed and nontoxic.  HEENT: Nonicteric sclerae, PERRLA, EOMI. Oropharynx clear. Moist mucous membranes. Conjunctivae appear well perfused.  CHEST: Chest wall is nontender.  HEART: Regular rate and rhythm without murmurs. No MRG  LUNGS: Clear to auscultation bilaterally. No WRR  ABDOMEN: Soft, positive bowel sounds, nontender, no organomegaly.  RECTAL: Deferred.  SKIN: No rash, no excessive bruising, petechiae, or purpura.  NEUROLOGIC: Cranial nerves II-XII intact without motor/sensory deficit        Result Review    Result Review:  I have personally reviewed the results from the time of this admission to 2023 08:50 EDT and agree with these findings:  [x]  Laboratory  []  Microbiology  [x]  Radiology  []  EKG/Telemetry   []  Cardiology/Vascular   []  Pathology  []  Old records  []  Other:      Wounds (last 24 hours)       LDA Wound       Row Name 23             [REMOVED] Wound 23 Right posterior wrist Other (comment)    Wound - Properties Group Placement Date: 23  -CM Placement Time: 2210  -CM Side: Right  -CM Orientation: posterior  -CM Location: wrist  -CM Primary Wound Type: Other  -CM, cyst  Removal Date: 23  - Wound Outcome: Unknown  -LJ    Closure Open to air  -PB      Retired Wound - Properties Group Placement Date: 23  -CM Placement Time: 2210  -CM Side: Right  -CM Orientation: posterior  -CM Location: wrist  -CM Primary Wound  Type: Other  -CM, cyst  Removal Date: 09/26/23 -LJ Wound Outcome: Unknown  -    Retired Wound - Properties Group Date first assessed: 09/22/23  -CM Time first assessed: 2210  -CM Side: Right  -CM Location: wrist  -CM Primary Wound Type: Other  -CM, cyst  Resolution Date: 09/26/23 -LJ Wound Outcome: Unknown  -       [REMOVED] Wound 06/15/22 1218 Right proximal other (see comments) Incision    Wound - Properties Group Placement Date: 06/15/22  -SP Placement Time: 1218  -SP Side: Right  -SP Orientation: proximal  -SP Location: other (see comments)  -SP, RLE amputation stump  Primary Wound Type: Incision  -SP Removal Date: 09/26/23 -LJ Wound Outcome: Unknown  -LJ    Retired Wound - Properties Group Placement Date: 06/15/22  -SP Placement Time: 1218  -SP Side: Right  -SP Orientation: proximal  -SP Location: other (see comments)  -SP, RLE amputation stump  Primary Wound Type: Incision  -SP Removal Date: 09/26/23 -LJ Wound Outcome: Unknown  -    Retired Wound - Properties Group Date first assessed: 06/15/22  -SP Time first assessed: 1218  -SP Side: Right  -SP Location: other (see comments)  -SP, RLE amputation stump  Primary Wound Type: Incision  -SP Resolution Date: 09/26/23 -LJ Wound Outcome: Unknown  -              User Key  (r) = Recorded By, (t) = Taken By, (c) = Cosigned By      Initials Name Provider Type    Jeanne Flynn, RN Registered Nurse    Jeanne Flynn, RN Registered Nurse    Eliza Bassett, RN Registered Nurse    Bienvenido Mueller, RN Registered Nurse    Ariana Gallardo, RN Registered Nurse                      Assessment & Plan      Brief Patient Summary:  Kuldeep Adhikari is a 58 y.o. male who       amLODIPine, 5 mg, Oral, Daily  [MAR Hold] aspirin, 81 mg, Oral, Daily  [MAR Hold] atorvastatin, 80 mg, Oral, Nightly  [MAR Hold] buPROPion XL, 150 mg, Oral, Daily  [MAR Hold] busPIRone, 10 mg, Oral, Q12H  cefTRIAXone, 2,000 mg, Intravenous, Q24H  [MAR Hold] DULoxetine, 60 mg,  Oral, Daily  [MAR Hold] insulin glargine, 20 Units, Subcutaneous, Daily With Breakfast  [MAR Hold] insulin lispro, 2-9 Units, Subcutaneous, TID With Meals  [MAR Hold] insulin lispro, 5 Units, Subcutaneous, TID With Meals  [MAR Hold] levothyroxine, 100 mcg, Oral, Q AM  metoprolol succinate XL, 100 mg, Oral, Daily  metroNIDAZOLE, 500 mg, Oral, Q8H  [MAR Hold] pantoprazole, 40 mg, Oral, Daily  pregabalin, 100 mg, Oral, BID  [MAR Hold] senna-docusate sodium, 2 tablet, Oral, BID  [MAR Hold] sodium chloride, 1,000 mL, Intravenous, Once  [MAR Hold] sodium chloride, 10 mL, Intravenous, Q12H  [MAR Hold] sodium chloride, 10 mL, Intravenous, Q12H       sodium chloride, 100 mL/hr, Last Rate: 100 mL/hr (09/25/23 2305)  sodium chloride, 25 mL/hr, Last Rate: 25 mL/hr (09/26/23 0811)         Active Hospital Problems:  Active Hospital Problems    Diagnosis     **Abdominal pain     Nausea, vomiting and diarrhea     Calculus of gallbladder without cholecystitis without obstruction      Plan:   Pod#0  Abdominal pain  - CT abd reviewed and showing cholelithiasis with out ct evidence of cholecystitis  - US gallbladder reviewed and showing cholelithiasis with a mildly thickened gallbladder wall and positive murphys sign  - IV levaquin and flagyl   - GI consulted  - General surgery on board     DM2  SSI  -endo consulted     Hx of PE  - pt taking eliquis-will hold this am     HTN     DVT prophylaxis:  Mechanical DVT prophylaxis orders are present.    CODE STATUS:    Code Status (Patient has no pulse and is not breathing): CPR (Attempt to Resuscitate)  Medical Interventions (Patient has pulse or is breathing): Full Support    Disposition:  I expect patient to be discharged to home - 1 day    Electronically signed by Ishmael Nugent MD, 09/26/23, 08:50 EDT.  [facility], [place], operated by [facility] Hospitalist Team

## 2023-09-27 ENCOUNTER — ON CAMPUS - OUTPATIENT (AMBULATORY)
Dept: URBAN - METROPOLITAN AREA HOSPITAL 85 | Facility: HOSPITAL | Age: 58
End: 2023-09-27
Payer: MEDICAID

## 2023-09-27 ENCOUNTER — APPOINTMENT (OUTPATIENT)
Dept: GENERAL RADIOLOGY | Facility: HOSPITAL | Age: 58
End: 2023-09-27
Payer: MEDICARE

## 2023-09-27 ENCOUNTER — ANESTHESIA EVENT (OUTPATIENT)
Dept: GASTROENTEROLOGY | Facility: HOSPITAL | Age: 58
End: 2023-09-27
Payer: MEDICARE

## 2023-09-27 DIAGNOSIS — K80.20 CALCULUS OF GALLBLADDER WITHOUT CHOLECYSTITIS WITHOUT OBSTRU: ICD-10-CM

## 2023-09-27 DIAGNOSIS — R13.10 DYSPHAGIA, UNSPECIFIED: ICD-10-CM

## 2023-09-27 DIAGNOSIS — D64.9 ANEMIA, UNSPECIFIED: ICD-10-CM

## 2023-09-27 DIAGNOSIS — K59.00 CONSTIPATION, UNSPECIFIED: ICD-10-CM

## 2023-09-27 DIAGNOSIS — R11.2 NAUSEA WITH VOMITING, UNSPECIFIED: ICD-10-CM

## 2023-09-27 DIAGNOSIS — R10.9 UNSPECIFIED ABDOMINAL PAIN: ICD-10-CM

## 2023-09-27 LAB
BACTERIA SPEC AEROBE CULT: ABNORMAL
BACTERIA SPEC AEROBE CULT: ABNORMAL
GLUCOSE BLDC GLUCOMTR-MCNC: 104 MG/DL (ref 70–105)
GLUCOSE BLDC GLUCOMTR-MCNC: 126 MG/DL (ref 70–105)
GLUCOSE BLDC GLUCOMTR-MCNC: 134 MG/DL (ref 70–105)
GLUCOSE BLDC GLUCOMTR-MCNC: 170 MG/DL (ref 70–105)
GLUCOSE BLDC GLUCOMTR-MCNC: 95 MG/DL (ref 70–105)
GRAM STN SPEC: ABNORMAL
GRAM STN SPEC: ABNORMAL
ISOLATED FROM: ABNORMAL
ISOLATED FROM: ABNORMAL
LAB AP CASE REPORT: NORMAL
PATH REPORT.FINAL DX SPEC: NORMAL
PATH REPORT.GROSS SPEC: NORMAL

## 2023-09-27 PROCEDURE — 63710000001 SENNOSIDES-DOCUSATE 8.6-50 MG TABLET: Performed by: SURGERY

## 2023-09-27 PROCEDURE — 63710000001 METRONIDAZOLE 500 MG TABLET: Performed by: SURGERY

## 2023-09-27 PROCEDURE — 99213 OFFICE O/P EST LOW 20 MIN: CPT | Performed by: NURSE PRACTITIONER

## 2023-09-27 PROCEDURE — A9270 NON-COVERED ITEM OR SERVICE: HCPCS | Performed by: SURGERY

## 2023-09-27 PROCEDURE — 63710000001 INSULIN LISPRO (HUMAN) PER 5 UNITS: Performed by: SURGERY

## 2023-09-27 PROCEDURE — A9270 NON-COVERED ITEM OR SERVICE: HCPCS | Performed by: NURSE PRACTITIONER

## 2023-09-27 PROCEDURE — 63710000001 SORBITOL 70 % SOLUTION: Performed by: NURSE PRACTITIONER

## 2023-09-27 PROCEDURE — 63710000001 PANTOPRAZOLE 40 MG TABLET DELAYED-RELEASE: Performed by: SURGERY

## 2023-09-27 PROCEDURE — 82948 REAGENT STRIP/BLOOD GLUCOSE: CPT

## 2023-09-27 PROCEDURE — 63710000001 BUPROPION XL 150 MG TABLET SUSTAINED-RELEASE 24 HOUR: Performed by: SURGERY

## 2023-09-27 PROCEDURE — G0378 HOSPITAL OBSERVATION PER HR: HCPCS

## 2023-09-27 PROCEDURE — 99024 POSTOP FOLLOW-UP VISIT: CPT | Performed by: SURGERY

## 2023-09-27 PROCEDURE — 63710000001 HYDROCODONE-ACETAMINOPHEN 5-325 MG TABLET: Performed by: SURGERY

## 2023-09-27 PROCEDURE — 25010000002 ONDANSETRON PER 1 MG: Performed by: SURGERY

## 2023-09-27 PROCEDURE — 63710000001 ACETAMINOPHEN 325 MG TABLET: Performed by: SURGERY

## 2023-09-27 PROCEDURE — 99231 SBSQ HOSP IP/OBS SF/LOW 25: CPT | Performed by: INTERNAL MEDICINE

## 2023-09-27 PROCEDURE — 73100 X-RAY EXAM OF WRIST: CPT

## 2023-09-27 PROCEDURE — 99233 SBSQ HOSP IP/OBS HIGH 50: CPT | Performed by: NURSE PRACTITIONER

## 2023-09-27 PROCEDURE — 63710000001 INSULIN GLARGINE PER 5 UNITS: Performed by: SURGERY

## 2023-09-27 PROCEDURE — 63710000001 LEVOTHYROXINE 100 MCG TABLET: Performed by: SURGERY

## 2023-09-27 PROCEDURE — 63710000001 AMLODIPINE 5 MG TABLET: Performed by: SURGERY

## 2023-09-27 PROCEDURE — 63710000001 DULOXETINE 30 MG CAPSULE DELAYED-RELEASE PARTICLES: Performed by: SURGERY

## 2023-09-27 PROCEDURE — 63710000001 BUSPIRONE 5 MG TABLET: Performed by: SURGERY

## 2023-09-27 PROCEDURE — 63710000001 ATORVASTATIN 40 MG TABLET: Performed by: SURGERY

## 2023-09-27 PROCEDURE — 63710000001 MELATONIN 5 MG TABLET: Performed by: SURGERY

## 2023-09-27 PROCEDURE — 63710000001 ASPIRIN 81 MG TABLET DELAYED-RELEASE: Performed by: SURGERY

## 2023-09-27 PROCEDURE — 63710000001 PREGABALIN 100 MG CAPSULE: Performed by: SURGERY

## 2023-09-27 PROCEDURE — 63710000001 POLYETHYLENE GLYCOL 17 G PACK: Performed by: NURSE PRACTITIONER

## 2023-09-27 PROCEDURE — 63710000001 METOPROLOL SUCCINATE XL 50 MG TABLET SUSTAINED-RELEASE 24 HOUR: Performed by: SURGERY

## 2023-09-27 RX ORDER — SORBITOL SOLUTION 70 %
30 SOLUTION, ORAL MISCELLANEOUS ONCE
Status: COMPLETED | OUTPATIENT
Start: 2023-09-27 | End: 2023-09-27

## 2023-09-27 RX ORDER — FERROUS SULFATE TAB EC 324 MG (65 MG FE EQUIVALENT) 324 (65 FE) MG
324 TABLET DELAYED RESPONSE ORAL
Status: DISCONTINUED | OUTPATIENT
Start: 2023-09-28 | End: 2023-09-28 | Stop reason: HOSPADM

## 2023-09-27 RX ORDER — POLYETHYLENE GLYCOL 3350 17 G/17G
17 POWDER, FOR SOLUTION ORAL DAILY
Status: DISCONTINUED | OUTPATIENT
Start: 2023-09-27 | End: 2023-09-28 | Stop reason: HOSPADM

## 2023-09-27 RX ADMIN — ATORVASTATIN CALCIUM 80 MG: 40 TABLET, FILM COATED ORAL at 21:04

## 2023-09-27 RX ADMIN — ONDANSETRON 4 MG: 2 INJECTION INTRAMUSCULAR; INTRAVENOUS at 17:04

## 2023-09-27 RX ADMIN — PREGABALIN 100 MG: 100 CAPSULE ORAL at 21:04

## 2023-09-27 RX ADMIN — BUPROPION HYDROCHLORIDE 150 MG: 150 TABLET, EXTENDED RELEASE ORAL at 09:36

## 2023-09-27 RX ADMIN — INSULIN GLARGINE 20 UNITS: 100 INJECTION, SOLUTION SUBCUTANEOUS at 09:35

## 2023-09-27 RX ADMIN — Medication 5 MG: at 23:43

## 2023-09-27 RX ADMIN — LEVOTHYROXINE SODIUM 100 MCG: 0.1 TABLET ORAL at 05:59

## 2023-09-27 RX ADMIN — ASPIRIN 81 MG: 81 TABLET, COATED ORAL at 09:36

## 2023-09-27 RX ADMIN — DULOXETINE HYDROCHLORIDE 60 MG: 30 CAPSULE, DELAYED RELEASE ORAL at 09:41

## 2023-09-27 RX ADMIN — Medication 10 ML: at 21:06

## 2023-09-27 RX ADMIN — PANTOPRAZOLE SODIUM 40 MG: 40 TABLET, DELAYED RELEASE ORAL at 09:36

## 2023-09-27 RX ADMIN — INSULIN LISPRO 2 UNITS: 100 INJECTION, SOLUTION INTRAVENOUS; SUBCUTANEOUS at 14:21

## 2023-09-27 RX ADMIN — POLYETHYLENE GLYCOL 3350 17 G: 17 POWDER, FOR SOLUTION ORAL at 14:21

## 2023-09-27 RX ADMIN — METOPROLOL SUCCINATE 100 MG: 50 TABLET, EXTENDED RELEASE ORAL at 09:35

## 2023-09-27 RX ADMIN — AMLODIPINE BESYLATE 5 MG: 5 TABLET ORAL at 09:36

## 2023-09-27 RX ADMIN — INSULIN LISPRO 5 UNITS: 100 INJECTION, SOLUTION INTRAVENOUS; SUBCUTANEOUS at 14:21

## 2023-09-27 RX ADMIN — BUSPIRONE HYDROCHLORIDE 10 MG: 5 TABLET ORAL at 21:04

## 2023-09-27 RX ADMIN — HYDROCODONE BITARTRATE AND ACETAMINOPHEN 2 TABLET: 5; 325 TABLET ORAL at 17:03

## 2023-09-27 RX ADMIN — PREGABALIN 100 MG: 100 CAPSULE ORAL at 09:36

## 2023-09-27 RX ADMIN — ACETAMINOPHEN 650 MG: 325 TABLET, FILM COATED ORAL at 23:43

## 2023-09-27 RX ADMIN — BUSPIRONE HYDROCHLORIDE 10 MG: 5 TABLET ORAL at 09:36

## 2023-09-27 RX ADMIN — HYDROCODONE BITARTRATE AND ACETAMINOPHEN 2 TABLET: 5; 325 TABLET ORAL at 09:41

## 2023-09-27 RX ADMIN — METRONIDAZOLE 500 MG: 500 TABLET ORAL at 05:59

## 2023-09-27 RX ADMIN — SENNOSIDES AND DOCUSATE SODIUM 2 TABLET: 50; 8.6 TABLET ORAL at 09:36

## 2023-09-27 RX ADMIN — Medication 10 ML: at 09:37

## 2023-09-27 RX ADMIN — SORBITOL SOLUTION (BULK) 30 ML: 70 SOLUTION at 14:21

## 2023-09-27 NOTE — PROGRESS NOTES
Hospitalist Progress Note   Kuldeep Adhikari : 1965 MRN:[id number] LOS:0     Principal Problem: Abdominal pain     Reason for follow up: All the medical problems listed below    Summary     58-year-old who presents to the emergency department with abdominal pain vomiting and diarrhea. He states that he started Ozempic last week and he had a few days of vomiting and diarrhea. Took a second dose on Monday and he has been with persistent abdominal pain nausea vomiting diarrhea. Has vomited somewhat she is just severely nauseated now. His abdominal pain is generalized. He was placed on it because he states that his insurance company was not paying for one of his insulin medications. States that his blood glucose has been running in the 200s. He has not had fevers. He has had some chills.      Assessment / Plan     #Acute abdominal pain secondary to cholelithiasis, s/p laparoscopic cholecystectomy on 2023  -Patient presented with abdominal pain vomiting and diarrhea.   -CT abd reviewed and showing cholelithiasis with out ct evidence of cholecystitis  -US gallbladder reviewed and showing cholelithiasis with a mildly thickened gallbladder wall and positive murphys sign  -General surgery on board and proceeded to laparoscopic cholecystectomy on   - surgery clear to dc     #Esophageal stricture, plan for EGD guided dilatation on 2023  -Patient has esophageal stricture underlying and needed esophageal dilatation every 6 mm.  Next appointment is 2023.  Patient requested for GI team to see him as he has some discount for when he is lying down and eating.    -GI is consulted and plan for EGD dilatation on 2023.    #Painful lump on the right wrist  -It is painful on palpation.  Ortho is on board. Right wrist x-ray is ordered and there is no acute fracture.    #DM2  -On Ozempic started last week   -on SSI and glargine   -endo on board      #Hx of PE  -pt  taking eliquis and resume after EGD marlo      #HTN  -monitor Blood pressure and manage accordingly.     Disposition: home    Subjective / Review of systems     Review of Systems   -Abdominal pain from surgical site.  But it is manageable.  No nausea vomiting headache chest pain shortness of breath.     Objective / Physical Exam   Vital signs:  Temp: 98.3 °F (36.8 °C)  BP: 136/73  Heart Rate: 71  Resp: 18  SpO2: 96 %  Weight: 81.8 kg (180 lb 5.4 oz)    Admission Weight: Weight: 90.7 kg (200 lb)  Current Weight: Weight: 81.8 kg (180 lb 5.4 oz)    Input/Output in last 24 hours:    Intake/Output Summary (Last 24 hours) at 9/27/2023 1514  Last data filed at 9/27/2023 1026  Gross per 24 hour   Intake 328 ml   Output 500 ml   Net -172 ml      Physical Exam   Physical Exam  HENT:      Head: Normocephalic and atraumatic.      Nose: Nose normal.   Eyes:      Extraocular Movements: Extraocular movements intact.      Conjunctiva/sclera: Conjunctivae normal.      Pupils: Pupils are equal, round, and reactive to light.   Cardiovascular:      Rate and Rhythm: normal       Pulses: Normal pulses.      Heart sounds: Normal heart sounds.   Pulmonary:      Effort: normal      Breath sounds: normal   Abdominal:      Surgical site is clean.  Musculoskeletal:         Right BKA  There is lump in the right wrist.  Skin:     General: Skin is dry.   Neurological:      General: No focal deficit present.      Mental Status: alert.   Psychiatric:         Mood and Affect: Mood normal.        Radiology and Labs     Results from last 7 days   Lab Units 09/26/23  0410 09/25/23  0440 09/24/23  1212 09/23/23  0106 09/22/23  1630   WBC 10*3/mm3 5.50 5.60 7.50 10.50 13.10*   HEMATOCRIT % 32.3* 34.9* 38.5 37.9 44.7   PLATELETS 10*3/mm3 205 196 214 211 245      Results from last 7 days   Lab Units 09/26/23  0410 09/25/23  0440 09/24/23  1212 09/23/23  0106 09/22/23  1630   SODIUM mmol/L 140 142 142 140 138   POTASSIUM mmol/L 3.9 3.8 4.5 3.6 4.7   CHLORIDE  mmol/L 109* 109* 106 102 99   CO2 mmol/L 25.0 25.0 26.0 23.0 24.0   BUN mg/dL 11 13 16 18 22*   CREATININE mg/dL 1.06 1.22 1.45* 1.38* 1.57*      Current medications   Scheduled Meds: amLODIPine, 5 mg, Oral, Daily  aspirin, 81 mg, Oral, Daily  atorvastatin, 80 mg, Oral, Nightly  buPROPion XL, 150 mg, Oral, Daily  busPIRone, 10 mg, Oral, Q12H  DULoxetine, 60 mg, Oral, Daily  [START ON 9/28/2023] ferrous sulfate, 324 mg, Oral, Daily With Breakfast  insulin glargine, 20 Units, Subcutaneous, Daily With Breakfast  insulin lispro, 2-9 Units, Subcutaneous, TID With Meals  insulin lispro, 5 Units, Subcutaneous, TID With Meals  levothyroxine, 100 mcg, Oral, Q AM  metoprolol succinate XL, 100 mg, Oral, Daily  pantoprazole, 40 mg, Oral, Daily  polyethylene glycol, 17 g, Oral, Daily  pregabalin, 100 mg, Oral, BID  senna-docusate sodium, 2 tablet, Oral, BID  sodium chloride, 1,000 mL, Intravenous, Once  sodium chloride, 10 mL, Intravenous, Q12H  sodium chloride, 10 mL, Intravenous, Q12H      Continuous Infusions: sodium chloride, 100 mL/hr, Last Rate: 100 mL/hr (09/26/23 2210)  sodium chloride, 25 mL/hr, Last Rate: 25 mL/hr (09/26/23 0811)        Reviewed all other data in the last 24 hours, including but not limited to vitals, labs, microbiology, imaging and pertinent notes from other providers.     Ayan Garzon MD   [facility] Medicine  09/27/23   15:14 EDT

## 2023-09-27 NOTE — PROGRESS NOTES
"Subjective   Kuldeep Adhikari is a 58 y.o. male.   Seen for diabetes f/u.  Had lap maurisio this morning. Had gall stones, no cholecystitis or obstruction.  Feeling better. Will eat a bedtime snack.  Did not receive Lantus today. Surgeon still has MAR on hold.      Objective     /65   Pulse 71   Temp 97.5 °F (36.4 °C)   Resp 16   Ht 172.7 cm (68\")   Wt 81.8 kg (180 lb 5.4 oz)   SpO2 98%   BMI 27.42 kg/m²   Blood sugar  133 this am,  148 @ lunch, 107 @ supper    ASSESSMENT  Diabetes type 2 w hyperglycemia  Patient is Improving    PLAN    Order for one time dose of Lantus placed.         Meli Stone MD  9/26/2023  21:20 EDT    "

## 2023-09-27 NOTE — CONSULTS
[facility]   Consult Note    Patient Name: Kuldeep Adhikari  : 1965  MRN: [id number]  Primary Care Physician:  Laura Whitaker MD  Referring Physician: Laura Valentine*  Date of admission: 2023    Subjective   Subjective     Reason for Consult/ Chief Complaint: right wrist pain    HPI:  Kuldeep Adhikari is a 58 y.o. male currently admitted to [facility] with acute abdominal pain secondary to cholelithiasis, s/p laparoscopic maurisio  and esophageal stricture.     While admitted, he complained of pain to the right wrist with a lump on exam. He states that this has been present for several years. He recently hit his wrist on the hospital bed causing onset of pain. He has a h/o a ganglion cyst on the left wrist that was removed several years ago.     He has some swelling in the right hand. He has mildly limited ROM of the right wrist. Pain is present on moderated to deep palpation of the area. No wrist edema noted. No warmth, erythema, bruising noted.     Xray of the right wrist revealed no acute process.         Personal History     Past Medical History:   Diagnosis Date    Allergic     CKD (chronic kidney disease), stage III     Depression     Depression with suicidal ideation 2021    DJD (degenerative joint disease)     DVT (deep venous thrombosis)     Ganglion     rt wrist    Gangrene of foot 10/09/2015    GERD (gastroesophageal reflux disease)     Headache     Heart murmur     Hiatal hernia     History of esophageal stricture     s/p dialation 40-50 times last EGD 2016    Hyperlipidemia     Hypertension     Hypothyroidism     IBS (irritable bowel syndrome)     Neuropathy     Osteomyelitis of right foot 2020    Pulmonary embolism 2017    Rash     rt lower hip    Retinopathy     S/P BKA (below knee amputation), right 2022    Seasonal allergies     Sepsis due to group B Streptococcus 2020    Shortness of breath     Sleep apnea     Stroke     Type 2  diabetes mellitus with peripheral vascular disease     Vitamin D deficiency        Past Surgical History:   Procedure Laterality Date    AMPUTATION DIGIT Left 4/26/2022    Procedure: AMPUTATION left great toe;  Surgeon: ERWIN Baker DPM;  Location: [facility] MAIN OR;  Service: Podiatry;  Laterality: Left;    AMPUTATION DIGIT  4/26/2022    Procedure: ;  Surgeon: ERWIN Baker DPM;  Location: [facility] MAIN OR;  Service: Podiatry;;    AMPUTATION FOOT / TOE Right     great toe    AMPUTATION REVISION Right 4/8/2021    Procedure: BELOW KNEE AMPUTATION REVISAION;  Surgeon: Eduardo Reynolds MD;  Location: [facility] MAIN OR;  Service: Orthopedics;  Laterality: Right;    AMPUTATION REVISION Right 4/29/2021    Procedure: AMPUTATION REVISION KNEE STUMP;  Surgeon: Eduardo Reynolds MD;  Location: [facility] MAIN OR;  Service: Orthopedics;  Laterality: Right;    BELOW KNEE AMPUTATION Right 7/30/2020    Procedure: AMPUTATION BELOW KNEE;  Surgeon: Eduardo Reynolds MD;  Location: [facility] MAIN OR;  Service: Orthopedics;  Laterality: Right;    CARDIAC CATHETERIZATION  04/2018    [facility]    COLONOSCOPY      ENDOSCOPY      ENDOSCOPY N/A 10/4/2019    Procedure: ESOPHAGOGASTRODUODENOSCOPY with dilitation and biopsy x 1 area;  Surgeon: Declan Iqbal MD;  Location: [facility] ENDOSCOPY;  Service: Gastroenterology    ENDOSCOPY N/A 12/13/2019    Procedure: ESOPHAGOGASTRODUODENOSCOPY WITH DILATATION (50, 52 BOUGIE);  Surgeon: Declan Iqbal MD;  Location: [facility] ENDOSCOPY;  Service: Gastroenterology    ENDOSCOPY N/A 8/6/2021    Procedure: ESOPHAGOGASTRODUODENOSCOPY with biopsy x1 area and esophageal dilation (56FR Bougie);  Surgeon: Hussein Talavera MD;  Location: [facility] ENDOSCOPY;  Service: Gastroenterology;  Laterality: N/A;  post: hiatal hernia, erosive gastritis    ENDOSCOPY N/A 12/13/2022    Procedure: ESOPHAGOGASTRODUODENOSCOPY WITH DILATATION (BOUGIE #54, #56) AND BIOPSY X1;  Surgeon: David Rodriguez MD;  Location: [facility] ENDOSCOPY;   Service: Gastroenterology;  Laterality: N/A;  POST: dysphagia     EYE SURGERY      GANGLION CYST EXCISION Left     HERNIA REPAIR Bilateral     INCISION AND DRAINAGE OF WOUND Right 6/15/2022    Procedure: INCISION AND DRAINAGE WOUND with debridement;  Surgeon: Fito Forte MD;  Location: [facility] MAIN OR;  Service: Orthopedics;  Laterality: Right;    JOINT REPLACEMENT      Left total hip    RETINOPATHY SURGERY      laser    TOTAL HIP ARTHROPLASTY Left     TOTAL HIP ARTHROPLASTY Left 2018    TRANS METATARSAL AMPUTATION Right 3/17/2020    Procedure: AMPUTATION TRANS METATARSAL right;  Surgeon: ERWIN Baker DPM;  Location: [facility] MAIN OR;  Service: Podiatry;  Laterality: Right;  GANGRENOUS RIGHT FOOT    VASECTOMY         Family History: family history includes Arthritis in his mother; Cancer in an other family member; Colon cancer in an other family member; Diabetes in his mother; Heart disease in his mother; Hyperlipidemia in his mother and another family member; Hypertension in an other family member; Leukemia in his father; Sleep apnea in his maternal aunt; Stroke in his maternal grandmother. Otherwise pertinent FHx was reviewed and not pertinent to current issue.    Social History:  reports that he has never smoked. He has never used smokeless tobacco. He reports current alcohol use. He reports that he does not use drugs.    Home Medications:  Accu-Chek Guide, Accu-Chek Softclix Lancets, Cinnamon, DULoxetine, Dexcom G6 , Dexcom G6 Sensor, Dexcom G6 Transmitter, Glucagon, HYDROcodone-acetaminophen, Insulin Glargine, Insulin Lispro, Semaglutide(0.25 or 0.5MG/DOS), amLODIPine, apixaban, aspirin, atorvastatin, buPROPion XL, busPIRone, cyclobenzaprine, glucose blood, insulin aspart, levothyroxine, meclizine, metoprolol succinate XL, multivitamin with minerals, omeprazole, ondansetron ODT, and pregabalin    Allergies:  Allergies   Allergen Reactions    Lisinopril Cough    Cefixime Other (See Comments)        Objective    Objective     Vitals:   Temp:  [97.5 °F (36.4 °C)-98.6 °F (37 °C)] 98.3 °F (36.8 °C)  Heart Rate:  [71-83] 71  Resp:  [13-18] 18  BP: (128-145)/(56-73) 136/73    Physical Exam:   Constitutional: Awake, alert   Eyes: PERRLA, sclerae anicteric, no conjunctival injection   HENT: NCAT, mucous membranes moist   Respiratory: Clear to auscultation bilaterally, nonlabored respirations    Cardiovascular: RRR, no murmurs   Gastrointestinal: soft, nontender, nondistended   Psychiatric: Appropriate affect, cooperative   Neurologic: Oriented x 3, strength symmetric in all extremities, Cranial Nerves grossly intact to confrontation, speech clear       Right Hand Exam     Range of Motion   Wrist   Extension:  normal   Flexion:  normal   Pronation:  normal   Supination:  normal     Muscle Strength   The patient has normal right wrist strength.    Other   Erythema: absent  Sensation: normal  Pulse: present                               Assessment & Plan   Assessment / Plan   Assessment  Ganglion cyst of the right wrist    Plan:   Imaging reviewed with the patient  No acute concerns  Rec observation only - bracing PRN for comfort   Patient may f/u with ortho in the outpatient setting       KEYLA Jarquin

## 2023-09-27 NOTE — CONSULTS
GI CONSULT  NOTE:    Referring Provider:  Dr. Garzon    Chief complaint: Dysphagia    Subjective .     History of present illness: Kuldeep Adhikari is a 58 y.o. male with history of CKD, depression, DVT, hypertension, hyperlipidemia, right BKA, GENO, CVA, and DMII who presented on 9/22 with complaints of diarrhea and nausea/vomiting.  The patient reports that his symptoms began after taking his second dose of Ozempic.  However, he was found to have gallstones and a positive Fleming sign on work-up and ultimately underwent laparoscopic cholecystectomy on 9/26/2023.  GI has been asked to consult for dysphagia.  The patient reports that he has a longstanding history of dysphagia and previous esophageal dilations have helped to resolve symptoms.  He states that he has been having difficulty swallowing for the past several months, but this has worsened recently.  States that he has difficulty with solids and occasionally with cold liquids.  Heartburn is typically controlled with Nexium daily.  He has had recent nausea/vomiting, but no emesis since his cholecystectomy.  Denies hematemesis or coffee-ground emesis.  Complains of postoperative soreness to his abdomen.  Also reports no bowel movement since surgery.  Normally he moves his bowels regularly at home.  No bright red blood per rectum or melena.  No recent fever.      Endo History:  12/2022 EGD (Dr. Rodriguez) -normal EGD, empiric dilation to 56 Romanian  8/2021 EGD by Dr. Talavera - subtle concentric rings s/p dilation, small hiatal hernia, erosive gastritis with biopsies H. pylori negative  12/2020 EGD and colonoscopy by Dr. Talavera - eosinophilic esophagitis, esophageal stricture s/p dilation, hiatal hernia, adenomatous polyp, internal hemorrhoids    Past Medical History:  Past Medical History:   Diagnosis Date    Allergic     CKD (chronic kidney disease), stage III     Depression     Depression with suicidal ideation 08/08/2021    DJD (degenerative joint disease)     DVT  (deep venous thrombosis)     Ganglion     rt wrist    Gangrene of foot 10/09/2015    GERD (gastroesophageal reflux disease)     Headache     Heart murmur     Hiatal hernia     History of esophageal stricture     s/p dialation 40-50 times last EGD 04/2016    Hyperlipidemia     Hypertension     Hypothyroidism     IBS (irritable bowel syndrome)     Neuropathy     Osteomyelitis of right foot 03/19/2020    Pulmonary embolism 01/19/2017    Rash     rt lower hip    Retinopathy     S/P BKA (below knee amputation), right 03/18/2022    Seasonal allergies     Sepsis due to group B Streptococcus 03/19/2020    Shortness of breath     Sleep apnea     Stroke     Type 2 diabetes mellitus with peripheral vascular disease     Vitamin D deficiency        Past Surgical History:  Past Surgical History:   Procedure Laterality Date    AMPUTATION DIGIT Left 4/26/2022    Procedure: AMPUTATION left great toe;  Surgeon: ERWIN Baker DPM;  Location: [facility] MAIN OR;  Service: Podiatry;  Laterality: Left;    AMPUTATION DIGIT  4/26/2022    Procedure: ;  Surgeon: ERWIN Baker DPM;  Location: [facility] MAIN OR;  Service: Podiatry;;    AMPUTATION FOOT / TOE Right     great toe    AMPUTATION REVISION Right 4/8/2021    Procedure: BELOW KNEE AMPUTATION REVISAION;  Surgeon: Eduardo Reynolds MD;  Location: [facility] MAIN OR;  Service: Orthopedics;  Laterality: Right;    AMPUTATION REVISION Right 4/29/2021    Procedure: AMPUTATION REVISION KNEE STUMP;  Surgeon: Eduardo Reynolds MD;  Location: [facility] MAIN OR;  Service: Orthopedics;  Laterality: Right;    BELOW KNEE AMPUTATION Right 7/30/2020    Procedure: AMPUTATION BELOW KNEE;  Surgeon: Eduardo Reynolds MD;  Location: [facility] MAIN OR;  Service: Orthopedics;  Laterality: Right;    CARDIAC CATHETERIZATION  04/2018    [facility]    COLONOSCOPY      ENDOSCOPY      ENDOSCOPY N/A 10/4/2019    Procedure: ESOPHAGOGASTRODUODENOSCOPY with dilitation and biopsy x 1 area;  Surgeon: Declan Iqbal MD;  Location:   [facility] ENDOSCOPY;  Service: Gastroenterology    ENDOSCOPY N/A 12/13/2019    Procedure: ESOPHAGOGASTRODUODENOSCOPY WITH DILATATION (50, 52 BOUGIE);  Surgeon: Declan Iqbal MD;  Location: [facility] ENDOSCOPY;  Service: Gastroenterology    ENDOSCOPY N/A 8/6/2021    Procedure: ESOPHAGOGASTRODUODENOSCOPY with biopsy x1 area and esophageal dilation (56FR Bougie);  Surgeon: Hussein Talavera MD;  Location: [facility] ENDOSCOPY;  Service: Gastroenterology;  Laterality: N/A;  post: hiatal hernia, erosive gastritis    ENDOSCOPY N/A 12/13/2022    Procedure: ESOPHAGOGASTRODUODENOSCOPY WITH DILATATION (BOUGIE #54, #56) AND BIOPSY X1;  Surgeon: David Rodriguez MD;  Location: [facility] ENDOSCOPY;  Service: Gastroenterology;  Laterality: N/A;  POST: dysphagia     EYE SURGERY      GANGLION CYST EXCISION Left     HERNIA REPAIR Bilateral     INCISION AND DRAINAGE OF WOUND Right 6/15/2022    Procedure: INCISION AND DRAINAGE WOUND with debridement;  Surgeon: Fito Forte MD;  Location: [facility] MAIN OR;  Service: Orthopedics;  Laterality: Right;    JOINT REPLACEMENT      Left total hip    RETINOPATHY SURGERY      laser    TOTAL HIP ARTHROPLASTY Left     TOTAL HIP ARTHROPLASTY Left 2018    TRANS METATARSAL AMPUTATION Right 3/17/2020    Procedure: AMPUTATION TRANS METATARSAL right;  Surgeon: ERWIN Baker DPM;  Location: [facility] MAIN OR;  Service: Podiatry;  Laterality: Right;  GANGRENOUS RIGHT FOOT    VASECTOMY         Social History:  Social History     Tobacco Use    Smoking status: Never    Smokeless tobacco: Never   Vaping Use    Vaping Use: Never used   Substance Use Topics    Alcohol use: Yes     Comment: OCCASIONAL    Drug use: No       Family History:  Family History   Problem Relation Age of Onset    Diabetes Mother         Patient  mom    Heart disease Mother     Arthritis Mother     Hyperlipidemia Mother     Leukemia Father     Sleep apnea Maternal Aunt         GENO    Stroke Maternal Grandmother     Hypertension Other      Hyperlipidemia Other     Cancer Other     Colon cancer Other         uncle       Medications:  Medications Prior to Admission   Medication Sig Dispense Refill Last Dose    Accu-Chek Softclix Lancets lancets Use as directed to test blood sugar 4 times daily before meals and at bedtime. 100 each 5 9/22/2023 at 0800    amLODIPine (NORVASC) 5 MG tablet Take 1 tablet by mouth Daily. 90 tablet 0 Past Week at 0800    apixaban (Eliquis) 5 MG tablet tablet Take 1 tablet by mouth 2 (Two) Times a Day. 180 tablet 1 Past Week at 0800    aspirin 81 MG EC tablet Take 1 tablet by mouth Daily.   Past Week at 0800    atorvastatin (LIPITOR) 80 MG tablet Take 1 tablet by mouth Every Night. 90 tablet 1 Past Week at 0800    Blood Glucose Monitoring Suppl (Accu-Chek Guide) w/Device kit See Admin Instructions.   9/21/2023 at 0800    buPROPion XL (Wellbutrin XL) 150 MG 24 hr tablet Take 1 tablet by mouth Every Morning. 90 tablet 0 Past Week at 0800    busPIRone (BUSPAR) 10 MG tablet Take 1 tablet by mouth Every 12 (Twelve) Hours. 180 tablet 1 Past Week at 0800    CINNAMON PO Take  by mouth.   Past Week at 0800    Continuous Blood Gluc  (Dexcom G6 ) device 1 Device Daily. Use to check BS 1 each 0 9/21/2023 at 0800    cyclobenzaprine (FLEXERIL) 10 MG tablet Take I tab q 8 hrs as needed for tension headaches 30 tablet 0 Past Week at 0800    DULoxetine (CYMBALTA) 60 MG capsule Take 1 capsule by mouth Every Morning. 90 capsule 1 Past Week at 0800    glucose blood (Accu-Chek Guide) test strip Use as instructed to test blood sugar 4 times daily before meals and at bedtime 100 each 12 9/21/2023 at 0800    Insulin Glargine (Lantus SoloStar) 100 UNIT/ML injection pen Inject 34 Units under the skin into the appropriate area as directed Every Night for 265 days. 15 mL 5 9/21/2023 at 0800    levothyroxine (Synthroid) 100 MCG tablet Take 1 tablet by mouth Every Morning. 90 tablet 1 Past Week at 0800    meclizine (ANTIVERT) 25 MG tablet  Take 1 tablet by mouth 3 (Three) Times a Day As Needed for Dizziness. 30 tablet 0 Past Week    metoprolol succinate XL (TOPROL-XL) 100 MG 24 hr tablet Take 1 tablet by mouth Daily. 90 tablet 0 Past Week at 0800    multivitamin with minerals tablet tablet Take 1 tablet by mouth Daily.   Past Week at 0800    omeprazole (priLOSEC) 40 MG capsule Take 1 capsule by mouth Daily. 90 capsule 1 Past Week at 0800    ondansetron ODT (ZOFRAN-ODT) 4 MG disintegrating tablet Place 1 tablet on the tongue Every 8 (Eight) Hours As Needed for Nausea or Vomiting. 30 tablet 0 9/22/2023    pregabalin (LYRICA) 100 MG capsule Take 1 capsule by mouth 2 (Two) Times a Day. 60 capsule 0 Past Week at 0800    Semaglutide,0.25 or 0.5MG/DOS, (OZEMPIC) 2 MG/1.5ML solution pen-injector Inject 0.5 mg under the skin into the appropriate area as directed 1 (One) Time Per Week. 3 mL 4 Past Week at 0800    Semaglutide,0.25 or 0.5MG/DOS, (Ozempic, 0.25 or 0.5 MG/DOSE,) 2 MG/3ML solution pen-injector Inject 0.25 mg under the skin into the appropriate area as directed 1 (One) Time Per Week. 3 mL 0 Past Week    [DISCONTINUED] insulin aspart (NovoLOG FlexPen) 100 UNIT/ML solution pen-injector sc pen Inject 12 Units under the skin into the appropriate area as directed 3 (Three) Times a Day With Meals. 15 mL 2 9/21/2023 at 0800    Continuous Blood Gluc Sensor (Dexcom G6 Sensor) Every 10 (Ten) Days. Use to check BS daily 3 each 11 More than a month at 0800    Continuous Blood Gluc Transmit (Dexcom G6 Transmitter) misc 1 each 4 (Four) Times a Day Before Meals & at Bedtime. Change every 3 months 1 each 3 More than a month at 0800    Glucagon (Gvoke HypoPen 2-Pack) 1 MG/0.2ML solution auto-injector Inject 1 mg under the skin into the appropriate area as directed As Needed (Hypoglycemia). 0.4 mL 5 More than a month at 0800    HYDROcodone-acetaminophen (NORCO) 5-325 MG per tablet Take 1 tablet by mouth Every Morning.       Insulin Lispro (ADMELOG SOLOSTAR) 100  UNIT/ML injection pen Inject 10 Units under the skin into the appropriate area as directed 3 (Three) Times a Day With Meals. 27 mL 1        Scheduled Meds:amLODIPine, 5 mg, Oral, Daily  aspirin, 81 mg, Oral, Daily  atorvastatin, 80 mg, Oral, Nightly  buPROPion XL, 150 mg, Oral, Daily  busPIRone, 10 mg, Oral, Q12H  DULoxetine, 60 mg, Oral, Daily  insulin glargine, 20 Units, Subcutaneous, Daily With Breakfast  insulin lispro, 2-9 Units, Subcutaneous, TID With Meals  insulin lispro, 5 Units, Subcutaneous, TID With Meals  levothyroxine, 100 mcg, Oral, Q AM  metoprolol succinate XL, 100 mg, Oral, Daily  pantoprazole, 40 mg, Oral, Daily  pregabalin, 100 mg, Oral, BID  senna-docusate sodium, 2 tablet, Oral, BID  sodium chloride, 1,000 mL, Intravenous, Once  sodium chloride, 10 mL, Intravenous, Q12H  sodium chloride, 10 mL, Intravenous, Q12H      Continuous Infusions:sodium chloride, 100 mL/hr, Last Rate: 100 mL/hr (09/26/23 2210)  sodium chloride, 25 mL/hr, Last Rate: 25 mL/hr (09/26/23 0811)      PRN Meds:.  acetaminophen    senna-docusate sodium **AND** polyethylene glycol **AND** bisacodyl **AND** bisacodyl    cyclobenzaprine    dextrose    dextrose    glucagon (human recombinant)    HYDROcodone-acetaminophen **OR** HYDROcodone-acetaminophen    melatonin    nitroglycerin    ondansetron    [COMPLETED] Insert Peripheral IV **AND** sodium chloride    sodium chloride    sodium chloride    sodium chloride    sodium chloride    ALLERGIES:  Lisinopril and Cefixime    ROS:  The following systems were reviewed and negative;   Constitution:  No fevers, chills, no unintentional weight loss  Skin: no rash, no jaundice  Eyes:  No blurry vision, no eye pain  HENT:  No change in hearing or smell  Resp:  No dyspnea or cough  CV:  No chest pain or palpitations  :  No dysuria, hematuria  Musculoskeletal:  No leg cramps or arthralgias  Neuro:  No tremor, no numbness  Psych:  No depression or confusion    Objective     Vital Signs:    Vitals:    09/26/23 2221 09/27/23 0255 09/27/23 0540 09/27/23 1026   BP: 145/63 131/59 142/56 129/57   BP Location: Left arm Left arm Left arm Left arm   Patient Position: Lying Lying Lying Lying   Pulse: 74 78 79 83   Resp: 17 18 18 13   Temp: 97.8 °F (36.6 °C) 98.5 °F (36.9 °C) 98.6 °F (37 °C) 98.4 °F (36.9 °C)   TempSrc: Axillary Oral Oral Oral   SpO2: 93% 96% 94% 93%   Weight:       Height:           Physical Exam:       General Appearance:    Awake and alert, in no acute distress   Head:    Normocephalic, without obvious abnormality, atraumatic   Throat:   No oral lesions, no thrush, oral mucosa moist   Lungs:     Respirations regular, even and unlabored   Chest Wall:    No abnormalities observed   Abdomen:     Soft, mild tenderness at postoperative incision sites, no rebound or guarding, non-distended   Rectal:     Deferred   Extremities:   Moves all extremities, no edema, no cyanosis   Pulses:   Pulses palpable and equal bilaterally   Skin:   No rash, no jaundice, normal palpation   Lymph nodes:   No cervical, supraclavicular or submandibular palpable adenopathy   Neurologic:   Cranial nerves 2 - 12 grossly intact, no asterixis       Results Review:   I reviewed the patient's labs and imaging.  CBC    Results from last 7 days   Lab Units 09/26/23  0410 09/25/23 0440 09/24/23  1212 09/23/23  0106 09/22/23  1630   WBC 10*3/mm3 5.50 5.60 7.50 10.50 13.10*   HEMOGLOBIN g/dL 10.3* 11.0* 12.4* 12.1* 14.0   PLATELETS 10*3/mm3 205 196 214 211 245     CMP   Results from last 7 days   Lab Units 09/26/23  0410 09/25/23  0440 09/24/23  1212 09/23/23  0106 09/22/23  1630   SODIUM mmol/L 140 142 142 140 138   POTASSIUM mmol/L 3.9 3.8 4.5 3.6 4.7   CHLORIDE mmol/L 109* 109* 106 102 99   CO2 mmol/L 25.0 25.0 26.0 23.0 24.0   BUN mg/dL 11 13 16 18 22*   CREATININE mg/dL 1.06 1.22 1.45* 1.38* 1.57*   GLUCOSE mg/dL 196* 115* 104* 243* 381*   ALBUMIN g/dL  --   --  3.6 3.9 4.2   BILIRUBIN mg/dL  --   --  0.9 1.1 1.5*   ALK  PHOS U/L  --   --  115 127* 149*   AST (SGOT) U/L  --   --  14 15 20   ALT (SGPT) U/L  --   --  10 11 12   LIPASE U/L  --   --   --   --  16     Cr Clearance Estimated Creatinine Clearance: 86.8 mL/min (by C-G formula based on SCr of 1.06 mg/dL).  Coag     HbA1C   Lab Results   Component Value Date    HGBA1C 11.60 (H) 08/04/2023    HGBA1C 9.00 (H) 03/08/2023    HGBA1C 7.5 (H) 06/13/2022     Blood Glucose   Glucose   Date/Time Value Ref Range Status   09/27/2023 1114 170 (H) 70 - 105 mg/dL Final     Comment:     Serial Number: [id number]:  [id number]   09/27/2023 0742 95 70 - 105 mg/dL Final     Comment:     Serial Number: [id number]:  [id number]   09/27/2023 0602 104 70 - 105 mg/dL Final     Comment:     Serial Number: [id number]:  [id number]   09/26/2023 2059 83 70 - 105 mg/dL Final     Comment:     Serial Number: [id number]:  [id number]   09/26/2023 1709 107 (H) 70 - 105 mg/dL Final     Comment:     Serial Number: [id number]:  [id number]   09/26/2023 1224 148 (H) 70 - 105 mg/dL Final     Comment:     Serial Number: [id number]:  [id number]   09/26/2023 0730 133 (H) 70 - 105 mg/dL Final     Comment:     Serial Number: [id number]:  [id number]   09/25/2023 2114 115 (H) 70 - 105 mg/dL Final     Comment:     Serial Number: [id number]:  [id number]     Infection   Results from last 7 days   Lab Units 09/25/23 2124 09/25/23 2123 09/23/23  0106   BLOODCX  No growth at 24 hours No growth at 24 hours Staphylococcus hominis ssp hominis*  Staphylococcus capitis*   BCIDPCR   --   --  Staph spp, not aureus or lugdunensis. Identification by BCID2 PCR.*     UA    Results from last 7 days   Lab Units 09/22/23 2003   NITRITE UA  Negative     Radiology(recent) No radiology results for the last day       ASSESSMENT:  -Dysphagia  -Nausea/vomiting  -Abdominal pain  -Constipation  -Normocytic anemia  -Cholelithiasis s/p laparoscopic cholecystectomy on  9/26/2023  -CKD  -Depression  -History of DVT  -Hypertension  -Hyperlipidemia  -History of right BKA  -GENO  -History of CVA  -DMII    PLAN:  Patient is a 58-year-old male with history of CKD, right BKA, and CVA who presented on 9/22 with complaints of nausea/vomiting and diarrhea.  He was found to have symptomatic cholelithiasis and underwent laparoscopic cholecystectomy on 9/26/2023.  GI has been asked to consult due to complaints of dysphagia.    The patient ate breakfast this morning.  We will proceed with EGD tomorrow.  N.p.o. after midnight.  Continue PPI daily.  Antiemetics as needed.  Hemoglobin 10.3 with MCV of 72.2.  Start iron supplementation.  Patient is up-to-date on screening colonoscopy.  We will start daily bowel regimen.  Supportive care.      I discussed the patients findings and my recommendations with the patient.  I will discuss case with Dr. Talavera and change the plan accordingly.    We appreciate the referral.    Electronically signed by KEYLA Denise, 09/27/23, 12:44 PM EDT.

## 2023-09-27 NOTE — PLAN OF CARE
Problem: Fall Injury Risk  Goal: Absence of Fall and Fall-Related Injury  Outcome: Ongoing, Progressing  Intervention: Identify and Manage Contributors  Recent Flowsheet Documentation  Taken 9/27/2023 1400 by Viktoria Lopez RN  Medication Review/Management: medications reviewed  Taken 9/27/2023 1200 by Viktoria Lopez RN  Medication Review/Management: medications reviewed  Intervention: Promote Injury-Free Environment  Recent Flowsheet Documentation  Taken 9/27/2023 1600 by Viktoria Lopez RN  Safety Promotion/Fall Prevention: safety round/check completed  Taken 9/27/2023 1400 by Viktoria Lopez RN  Safety Promotion/Fall Prevention: safety round/check completed  Taken 9/27/2023 1200 by Viktoria Lopez RN  Safety Promotion/Fall Prevention: safety round/check completed     Problem: Skin Injury Risk Increased  Goal: Skin Health and Integrity  Outcome: Ongoing, Progressing     Problem: Nausea and Vomiting  Goal: Fluid and Electrolyte Balance  Outcome: Ongoing, Progressing     Problem: Pain Acute  Goal: Acceptable Pain Control and Functional Ability  Outcome: Ongoing, Progressing  Intervention: Prevent or Manage Pain  Recent Flowsheet Documentation  Taken 9/27/2023 1400 by Viktoria Lopez RN  Medication Review/Management: medications reviewed  Taken 9/27/2023 1200 by Viktoria Lopez RN  Bowel Elimination Promotion:   adequate fluid intake promoted   privacy promoted  Medication Review/Management: medications reviewed  Intervention: Develop Pain Management Plan  Recent Flowsheet Documentation  Taken 9/27/2023 1200 by Viktoria Lopez RN  Pain Management Interventions:   quiet environment facilitated   pain management plan reviewed with patient/caregiver  Intervention: Optimize Psychosocial Wellbeing  Recent Flowsheet Documentation  Taken 9/27/2023 1200 by Viktoria Lopez RN  Supportive Measures: active listening utilized  Diversional Activities:   smartphone   television     Problem:  Diabetes Comorbidity  Goal: Blood Glucose Level Within Targeted Range  Outcome: Ongoing, Progressing     Problem: Hypertension Comorbidity  Goal: Blood Pressure in Desired Range  Outcome: Ongoing, Progressing  Intervention: Maintain Blood Pressure Management  Recent Flowsheet Documentation  Taken 9/27/2023 1400 by Viktoria Lopez RN  Medication Review/Management: medications reviewed  Taken 9/27/2023 1200 by Viktoria Lopez RN  Medication Review/Management: medications reviewed     Problem: Adjustment to Illness (Sepsis/Septic Shock)  Goal: Optimal Coping  Outcome: Ongoing, Progressing  Intervention: Optimize Psychosocial Adjustment to Illness  Recent Flowsheet Documentation  Taken 9/27/2023 1200 by Viktoria Lopez RN  Supportive Measures: active listening utilized     Problem: Bleeding (Sepsis/Septic Shock)  Goal: Absence of Bleeding  Outcome: Ongoing, Progressing     Problem: Glycemic Control Impaired (Sepsis/Septic Shock)  Goal: Blood Glucose Level Within Desired Range  Outcome: Ongoing, Progressing     Problem: Infection Progression (Sepsis/Septic Shock)  Goal: Absence of Infection Signs and Symptoms  Outcome: Ongoing, Progressing  Intervention: Initiate Sepsis Management  Recent Flowsheet Documentation  Taken 9/27/2023 1200 by Viktoria Lopez RN  Infection Prevention:   environmental surveillance performed   single patient room provided  Intervention: Promote Recovery  Recent Flowsheet Documentation  Taken 9/27/2023 1200 by Viktoria Lopez RN  Activity Management: activity encouraged     Problem: Nutrition Impaired (Sepsis/Septic Shock)  Goal: Optimal Nutrition Intake  Outcome: Ongoing, Progressing     Problem: Malnutrition  Goal: Improved Nutritional Intake  Outcome: Ongoing, Progressing   Goal Outcome Evaluation:

## 2023-09-27 NOTE — PROGRESS NOTES
"Post-op Note  CHOLECYSTECTOMY LAPAROSCOPIC  9/26/2023    Subjective   Kuldeep Adhikari is a 58 y.o. male postop day 1 status post laparoscopic cholecystectomy performed for symptomatic cholelithiasis.  Overall, the patient is doing okay.  He still endorses symptoms of dysphagia and an EGD is planned tomorrow with gastroenterology.  He reports that his abdominal pain is improved.      Objective   /73 (BP Location: Left arm, Patient Position: Lying)   Pulse 71   Temp 98.3 °F (36.8 °C) (Oral)   Resp 18   Ht 172.7 cm (68\")   Wt 81.8 kg (180 lb 5.4 oz)   SpO2 96%   BMI 27.42 kg/m²   Incisions are well-healed without any surrounding erythema, ration, or drainage to suggest infection.  No evidence of hernia at the umbilicus.      Assessment & Plan   Patient is a 58-year-old gentleman postop day 1 status post laparoscopic cholecystectomy performed for symptomatic cholelithiasis.    Pathology reviewed with patient which demonstrates chronic calculus cholecystitis  Ok for discharge  Okay to resume anticoagulation after EGD from surgical perspective.  May need to hold depending on what is performed during his EGD  Follow-up with me (Dr. Srinivasan) in 2 weeks  Regular diet as tolerated  Ok to shower starting tomorrow. No tub baths, pools, lakes, or streams for 1 week.  Ok to apply ice to incisions  No heavy lifting (greater than 20 lbs) for 6 weeks after surgery  Call my office or present to the ED with: fevers greater than 101.5, redness around the incisions, drainage from the incisions, intractable nausea/vomiting, or pain that is getting worse instead of better      Eduardo Srinivasan MD  9/27/2023  14:48 EDT    "

## 2023-09-27 NOTE — CASE MANAGEMENT/SOCIAL WORK
Continued Stay Note   [facility]     Patient Name: Kuldeep Adhikari  MRN: [id number]  Today's Date: 9/27/2023    Admit Date: 9/22/2023    Plan: Return home with brother and his family. Meds to Bed   Discharge Plan       Row Name 09/27/23 1352       Plan    Plan Return home with brother and his family. Meds to Bed    Plan Comments Barriers: GI consult for c/o dysphagia and EGD planned for tomorrow. At discharge Mr. Adhikari will return home with his family                        Phone communication or documentation only - no physical contact with patient or family.   Ann PATRICK,RN Case Manager  [facility]  Phone: Desk- [phone] cell- [phone]

## 2023-09-28 ENCOUNTER — ANESTHESIA (OUTPATIENT)
Dept: GASTROENTEROLOGY | Facility: HOSPITAL | Age: 58
End: 2023-09-28
Payer: MEDICARE

## 2023-09-28 ENCOUNTER — ON CAMPUS - OUTPATIENT (AMBULATORY)
Dept: URBAN - METROPOLITAN AREA HOSPITAL 85 | Facility: HOSPITAL | Age: 58
End: 2023-09-28
Payer: MEDICAID

## 2023-09-28 ENCOUNTER — READMISSION MANAGEMENT (OUTPATIENT)
Dept: CALL CENTER | Facility: HOSPITAL | Age: 58
End: 2023-09-28
Payer: MEDICARE

## 2023-09-28 VITALS
SYSTOLIC BLOOD PRESSURE: 126 MMHG | OXYGEN SATURATION: 97 % | BODY MASS INDEX: 27.33 KG/M2 | DIASTOLIC BLOOD PRESSURE: 62 MMHG | HEART RATE: 68 BPM | TEMPERATURE: 97.9 F | HEIGHT: 68 IN | WEIGHT: 180.34 LBS | RESPIRATION RATE: 15 BRPM

## 2023-09-28 DIAGNOSIS — R13.10 DYSPHAGIA, UNSPECIFIED: ICD-10-CM

## 2023-09-28 PROBLEM — E43 SEVERE MALNUTRITION: Status: ACTIVE | Noted: 2023-09-28

## 2023-09-28 LAB
ALBUMIN SERPL-MCNC: 2.7 G/DL (ref 3.5–5.2)
ALBUMIN/GLOB SERPL: 1.3 G/DL
ALP SERPL-CCNC: 113 U/L (ref 39–117)
ALT SERPL W P-5'-P-CCNC: 42 U/L (ref 1–41)
ANION GAP SERPL CALCULATED.3IONS-SCNC: 7 MMOL/L (ref 5–15)
AST SERPL-CCNC: 73 U/L (ref 1–40)
BASOPHILS # BLD AUTO: 0.1 10*3/MM3 (ref 0–0.2)
BASOPHILS NFR BLD AUTO: 0.8 % (ref 0–1.5)
BILIRUB SERPL-MCNC: 0.4 MG/DL (ref 0–1.2)
BUN SERPL-MCNC: 10 MG/DL (ref 6–20)
BUN/CREAT SERPL: 7.5 (ref 7–25)
CALCIUM SPEC-SCNC: 8.1 MG/DL (ref 8.6–10.5)
CHLORIDE SERPL-SCNC: 110 MMOL/L (ref 98–107)
CO2 SERPL-SCNC: 25 MMOL/L (ref 22–29)
CREAT SERPL-MCNC: 1.34 MG/DL (ref 0.76–1.27)
DEPRECATED RDW RBC AUTO: 49.9 FL (ref 37–54)
EGFRCR SERPLBLD CKD-EPI 2021: 61 ML/MIN/1.73
EOSINOPHIL # BLD AUTO: 0.9 10*3/MM3 (ref 0–0.4)
EOSINOPHIL NFR BLD AUTO: 12.1 % (ref 0.3–6.2)
ERYTHROCYTE [DISTWIDTH] IN BLOOD BY AUTOMATED COUNT: 18.7 % (ref 12.3–15.4)
GLOBULIN UR ELPH-MCNC: 2.1 GM/DL
GLUCOSE BLDC GLUCOMTR-MCNC: 123 MG/DL (ref 70–105)
GLUCOSE BLDC GLUCOMTR-MCNC: 139 MG/DL (ref 70–105)
GLUCOSE SERPL-MCNC: 198 MG/DL (ref 65–99)
HCT VFR BLD AUTO: 35.1 % (ref 37.5–51)
HGB BLD-MCNC: 10.9 G/DL (ref 13–17.7)
LYMPHOCYTES # BLD AUTO: 1.3 10*3/MM3 (ref 0.7–3.1)
LYMPHOCYTES NFR BLD AUTO: 18.5 % (ref 19.6–45.3)
MCH RBC QN AUTO: 22.6 PG (ref 26.6–33)
MCHC RBC AUTO-ENTMCNC: 31.1 G/DL (ref 31.5–35.7)
MCV RBC AUTO: 72.7 FL (ref 79–97)
MONOCYTES # BLD AUTO: 0.7 10*3/MM3 (ref 0.1–0.9)
MONOCYTES NFR BLD AUTO: 10.5 % (ref 5–12)
NEUTROPHILS NFR BLD AUTO: 4.1 10*3/MM3 (ref 1.7–7)
NEUTROPHILS NFR BLD AUTO: 58.1 % (ref 42.7–76)
NRBC BLD AUTO-RTO: 0.2 /100 WBC (ref 0–0.2)
PLATELET # BLD AUTO: 235 10*3/MM3 (ref 140–450)
PMV BLD AUTO: 7.8 FL (ref 6–12)
POTASSIUM SERPL-SCNC: 4.2 MMOL/L (ref 3.5–5.2)
PROT SERPL-MCNC: 4.8 G/DL (ref 6–8.5)
RBC # BLD AUTO: 4.83 10*6/MM3 (ref 4.14–5.8)
SODIUM SERPL-SCNC: 142 MMOL/L (ref 136–145)
WBC NRBC COR # BLD: 7.1 10*3/MM3 (ref 3.4–10.8)

## 2023-09-28 PROCEDURE — A9270 NON-COVERED ITEM OR SERVICE: HCPCS | Performed by: SURGERY

## 2023-09-28 PROCEDURE — 82948 REAGENT STRIP/BLOOD GLUCOSE: CPT

## 2023-09-28 PROCEDURE — 43450 DILATE ESOPHAGUS 1/MULT PASS: CPT | Performed by: INTERNAL MEDICINE

## 2023-09-28 PROCEDURE — 25010000002 ONDANSETRON PER 1 MG: Performed by: NURSE ANESTHETIST, CERTIFIED REGISTERED

## 2023-09-28 PROCEDURE — 80053 COMPREHEN METABOLIC PANEL: CPT | Performed by: NURSE PRACTITIONER

## 2023-09-28 PROCEDURE — 63710000001 AMLODIPINE 5 MG TABLET: Performed by: SURGERY

## 2023-09-28 PROCEDURE — 63710000001 INSULIN GLARGINE PER 5 UNITS: Performed by: SURGERY

## 2023-09-28 PROCEDURE — 63710000001 PREGABALIN 100 MG CAPSULE: Performed by: SURGERY

## 2023-09-28 PROCEDURE — 25010000002 PROPOFOL 200 MG/20ML EMULSION: Performed by: NURSE ANESTHETIST, CERTIFIED REGISTERED

## 2023-09-28 PROCEDURE — A9270 NON-COVERED ITEM OR SERVICE: HCPCS | Performed by: NURSE PRACTITIONER

## 2023-09-28 PROCEDURE — 63710000001 METOPROLOL SUCCINATE XL 50 MG TABLET SUSTAINED-RELEASE 24 HOUR: Performed by: SURGERY

## 2023-09-28 PROCEDURE — 85025 COMPLETE CBC W/AUTO DIFF WBC: CPT | Performed by: NURSE PRACTITIONER

## 2023-09-28 PROCEDURE — 63710000001 PANTOPRAZOLE 40 MG TABLET DELAYED-RELEASE: Performed by: SURGERY

## 2023-09-28 PROCEDURE — 63710000001 BUSPIRONE 5 MG TABLET: Performed by: SURGERY

## 2023-09-28 PROCEDURE — 63710000001 FERROUS SULFATE 324 (65 FE) MG TABLET DELAYED-RELEASE: Performed by: NURSE PRACTITIONER

## 2023-09-28 PROCEDURE — 63710000001 LEVOTHYROXINE 100 MCG TABLET: Performed by: SURGERY

## 2023-09-28 PROCEDURE — G0378 HOSPITAL OBSERVATION PER HR: HCPCS

## 2023-09-28 PROCEDURE — 63710000001 DULOXETINE 30 MG CAPSULE DELAYED-RELEASE PARTICLES: Performed by: SURGERY

## 2023-09-28 PROCEDURE — 25810000003 SODIUM CHLORIDE 0.9 % SOLUTION: Performed by: SURGERY

## 2023-09-28 PROCEDURE — 63710000001 BUPROPION XL 150 MG TABLET SUSTAINED-RELEASE 24 HOUR: Performed by: SURGERY

## 2023-09-28 PROCEDURE — 43235 EGD DIAGNOSTIC BRUSH WASH: CPT | Performed by: INTERNAL MEDICINE

## 2023-09-28 PROCEDURE — 63710000001 HYDROCODONE-ACETAMINOPHEN 5-325 MG TABLET: Performed by: SURGERY

## 2023-09-28 RX ORDER — PROPOFOL 10 MG/ML
INJECTION, EMULSION INTRAVENOUS AS NEEDED
Status: DISCONTINUED | OUTPATIENT
Start: 2023-09-28 | End: 2023-09-28 | Stop reason: SURG

## 2023-09-28 RX ORDER — FERROUS SULFATE TAB EC 324 MG (65 MG FE EQUIVALENT) 324 (65 FE) MG
324 TABLET DELAYED RESPONSE ORAL
Qty: 30 TABLET | Refills: 0 | Status: SHIPPED | OUTPATIENT
Start: 2023-09-28 | End: 2023-10-28

## 2023-09-28 RX ORDER — ONDANSETRON 2 MG/ML
INJECTION INTRAMUSCULAR; INTRAVENOUS AS NEEDED
Status: DISCONTINUED | OUTPATIENT
Start: 2023-09-28 | End: 2023-09-28 | Stop reason: SURG

## 2023-09-28 RX ORDER — ONDANSETRON 4 MG/1
4 TABLET, FILM COATED ORAL EVERY 6 HOURS PRN
Status: CANCELLED | OUTPATIENT
Start: 2023-09-28

## 2023-09-28 RX ORDER — ONDANSETRON 2 MG/ML
4 INJECTION INTRAMUSCULAR; INTRAVENOUS EVERY 6 HOURS PRN
Status: CANCELLED | OUTPATIENT
Start: 2023-09-28

## 2023-09-28 RX ORDER — ACETAMINOPHEN 325 MG/1
650 TABLET ORAL EVERY 4 HOURS PRN
Qty: 30 TABLET | Refills: 0 | Status: SHIPPED | OUTPATIENT
Start: 2023-09-28 | End: 2023-10-05

## 2023-09-28 RX ORDER — LIDOCAINE HYDROCHLORIDE 20 MG/ML
INJECTION, SOLUTION EPIDURAL; INFILTRATION; INTRACAUDAL; PERINEURAL AS NEEDED
Status: DISCONTINUED | OUTPATIENT
Start: 2023-09-28 | End: 2023-09-28 | Stop reason: SURG

## 2023-09-28 RX ORDER — SODIUM CHLORIDE 9 MG/ML
INJECTION, SOLUTION INTRAVENOUS CONTINUOUS PRN
Status: DISCONTINUED | OUTPATIENT
Start: 2023-09-28 | End: 2023-09-28 | Stop reason: SURG

## 2023-09-28 RX ADMIN — Medication 10 ML: at 09:40

## 2023-09-28 RX ADMIN — Medication 10 ML: at 09:35

## 2023-09-28 RX ADMIN — BUSPIRONE HYDROCHLORIDE 10 MG: 5 TABLET ORAL at 09:35

## 2023-09-28 RX ADMIN — PREGABALIN 100 MG: 100 CAPSULE ORAL at 09:35

## 2023-09-28 RX ADMIN — LEVOTHYROXINE SODIUM 100 MCG: 0.1 TABLET ORAL at 05:56

## 2023-09-28 RX ADMIN — INSULIN GLARGINE 20 UNITS: 100 INJECTION, SOLUTION SUBCUTANEOUS at 09:35

## 2023-09-28 RX ADMIN — METOPROLOL SUCCINATE 100 MG: 50 TABLET, EXTENDED RELEASE ORAL at 09:34

## 2023-09-28 RX ADMIN — BUPROPION HYDROCHLORIDE 150 MG: 150 TABLET, EXTENDED RELEASE ORAL at 09:35

## 2023-09-28 RX ADMIN — SODIUM CHLORIDE 25 ML/HR: 9 INJECTION, SOLUTION INTRAVENOUS at 11:15

## 2023-09-28 RX ADMIN — PROPOFOL 20 MG: 10 INJECTION, EMULSION INTRAVENOUS at 11:37

## 2023-09-28 RX ADMIN — LIDOCAINE HYDROCHLORIDE 30 MG: 20 INJECTION, SOLUTION EPIDURAL; INFILTRATION; INTRACAUDAL; PERINEURAL at 11:33

## 2023-09-28 RX ADMIN — PROPOFOL 150 MG: 10 INJECTION, EMULSION INTRAVENOUS at 11:33

## 2023-09-28 RX ADMIN — HYDROCODONE BITARTRATE AND ACETAMINOPHEN 2 TABLET: 5; 325 TABLET ORAL at 09:46

## 2023-09-28 RX ADMIN — SODIUM CHLORIDE: 9 INJECTION, SOLUTION INTRAVENOUS at 11:27

## 2023-09-28 RX ADMIN — AMLODIPINE BESYLATE 5 MG: 5 TABLET ORAL at 09:35

## 2023-09-28 RX ADMIN — DULOXETINE HYDROCHLORIDE 60 MG: 30 CAPSULE, DELAYED RELEASE ORAL at 09:35

## 2023-09-28 RX ADMIN — ONDANSETRON 4 MG: 2 INJECTION INTRAMUSCULAR; INTRAVENOUS at 11:31

## 2023-09-28 RX ADMIN — FERROUS SULFATE TAB EC 324 MG (65 MG FE EQUIVALENT) 324 MG: 324 (65 FE) TABLET DELAYED RESPONSE at 14:34

## 2023-09-28 RX ADMIN — PANTOPRAZOLE SODIUM 40 MG: 40 TABLET, DELAYED RELEASE ORAL at 09:35

## 2023-09-28 NOTE — DISCHARGE SUMMARY
[facility] Medicine Services  Discharge Summary    Date of Service: 2023  Patient Name: Kuldeep Adhikari  : 1965  MRN: [id number]    Date of Admission: 2023  Discharge Diagnosis: Cholelithiasis  Date of Discharge: 2023  Primary Care Physician: Laura Whitaker MD      Presenting Problem:   Abdominal pain [R10.9]  Nausea, vomiting and diarrhea [R11.2, R19.7]  Calculus of gallbladder without cholecystitis without obstruction [K80.20]  Abdominal pain, unspecified abdominal location [R10.9]    Active and Resolved Hospital Problems:  Active Hospital Problems    Diagnosis POA    **Abdominal pain [R10.9] Yes    Severe malnutrition [E43] Yes    Nausea, vomiting and diarrhea [R11.2, R19.7] Yes    Dysphagia [R13.10] Unknown      Resolved Hospital Problems    Diagnosis POA    Calculus of gallbladder without cholecystitis without obstruction [K80.20] Yes         Hospital Course     Hospital Course:  58-year-old who presents to the emergency department with abdominal pain vomiting and diarrhea. He states that he started Ozempic last week and he had a few days of vomiting and diarrhea. Took a second dose on Monday and he has been with persistent abdominal pain nausea vomiting diarrhea.  CT abdomen showing cholelithiasis without evidence of cholecystitis.  Ultrasound gallbladder showing cholelithiasis with mildly thickened gallbladder wall and positive Fleming sign.  General surgeon is consulted and proceeded to laparoscopic cholecystectomy on 2023.  And has underlying esophageal stricture and he feels discomfort when he is lying down and trying to eat.  GI is consulted and he went through EGD guided esophageal dilatation on 2023.  Patient also have painful lab on the right wrist and Ortho surgeon is consulted.  Patient will need to follow-up with orthopedic upon discharge.  Surgeon cleared the patient to discharge and he will need  to follow-up as well.      DISCHARGE Follow Up Recommendations for labs and diagnostics:     Follow-up with Ortho surgeon  Follow-up with general surgeon  Follow-up with GI team  Follow-up with primary care physician and endocrinologist for diabetes management  Ozempic on hold for now.      Reasons For Change In Medications and Indications for New Medications:      Day of Discharge     Vital Signs:  Temp:  [97.9 °F (36.6 °C)-98.4 °F (36.9 °C)] 97.9 °F (36.6 °C)  Heart Rate:  [64-87] 68  Resp:  [12-18] 15  BP: (106-149)/(61-79) 126/62  Flow (L/min):  [6] 6    Physical Exam:  Physical Exam   Physical Exam  HENT:      Head: Normocephalic and atraumatic.      Nose: Nose normal.   Eyes:      Extraocular Movements: Extraocular movements intact.      Conjunctiva/sclera: Conjunctivae normal.      Pupils: Pupils are equal, round, and reactive to light.   Cardiovascular:      Rate and Rhythm: normal       Pulses: Normal pulses.      Heart sounds: Normal heart sounds.   Pulmonary:      Effort: normal      Breath sounds: normal   Abdominal:   Surgical site seems to be clean.  Mild tenderness upon palpation.  Musculoskeletal:         General: Normal range of motion.      Cervical back: Normal range of motion and neck supple.   Skin:     General: Skin is dry.   Neurological:      General: No focal deficit present.      Mental Status: alert.   Psychiatric:         Mood and Affect: Mood normal.            Pertinent  and/or Most Recent Results     LAB RESULTS:      Lab 09/28/23  0348 09/26/23  0410 09/25/23  0440 09/24/23  1212 09/23/23  0106   WBC 7.10 5.50 5.60 7.50 10.50   HEMOGLOBIN 10.9* 10.3* 11.0* 12.4* 12.1*   HEMATOCRIT 35.1* 32.3* 34.9* 38.5 37.9   PLATELETS 235 205 196 214 211   NEUTROS ABS 4.10 3.00 3.10 5.60 6.40   LYMPHS ABS 1.30 0.90 0.70 0.30* 1.90   MONOS ABS 0.70 0.40 0.40 0.40 0.80   EOS ABS 0.90* 1.20* 1.20* 1.20* 1.40*   MCV 72.7* 72.2* 72.5* 72.4* 71.6*         Lab 09/28/23  0348 09/26/23  0410 09/25/23  0440  09/24/23  1212 09/23/23  0106   SODIUM 142 140 142 142 140   POTASSIUM 4.2 3.9 3.8 4.5 3.6   CHLORIDE 110* 109* 109* 106 102   CO2 25.0 25.0 25.0 26.0 23.0   ANION GAP 7.0 6.0 8.0 10.0 15.0   BUN 10 11 13 16 18   CREATININE 1.34* 1.06 1.22 1.45* 1.38*   EGFR 61.0 80.8 68.3 55.5* 58.9*   GLUCOSE 198* 196* 115* 104* 243*   CALCIUM 8.1* 8.2* 8.3* 9.2 9.0         Lab 09/28/23  0348 09/24/23  1212 09/23/23  0106 09/22/23  1630   TOTAL PROTEIN 4.8* 6.0 6.0 7.0   ALBUMIN 2.7* 3.6 3.9 4.2   GLOBULIN 2.1 2.4  --  2.8   ALT (SGPT) 42* 10 11 12   AST (SGOT) 73* 14 15 20   BILIRUBIN 0.4 0.9 1.1 1.5*   INDIRECT BILIRUBIN  --   --  0.9  --    BILIRUBIN DIRECT  --   --  0.2  --    ALK PHOS 113 115 127* 149*   LIPASE  --   --   --  16                     Brief Urine Lab Results  (Last result in the past 365 days)        Color   Clarity   Blood   Leuk Est   Nitrite   Protein   CREAT   Urine HCG        09/22/23 2003 Yellow   Clear   Negative   Negative   Negative   Negative                 Microbiology Results (last 10 days)       Procedure Component Value - Date/Time    Blood Culture - Blood, Hand, Right [[id number]]  (Normal) Collected: 09/25/23 2124    Lab Status: Preliminary result Specimen: Blood from Hand, Right Updated: 09/27/23 2145     Blood Culture No growth at 2 days    Blood Culture - Blood, Arm, Left [[id number]]  (Normal) Collected: 09/25/23 2123    Lab Status: Preliminary result Specimen: Blood from Arm, Left Updated: 09/27/23 2145     Blood Culture No growth at 2 days    Blood Culture - Blood, Arm, Left [[id number]]  (Abnormal) Collected: 09/23/23 0106    Lab Status: Final result Specimen: Blood from Arm, Left Updated: 09/27/23 0709     Blood Culture Staphylococcus capitis     Isolated from Anaerobic Bottle     Gram Stain Anaerobic Bottle Gram positive cocci in clusters    Narrative:      Less than seven (7) mL's of blood was collected.  Insufficient quantity may yield false negative results.    Probable contaminant  requires clinical correlation, susceptibility not performed unless requested by physician.      Blood Culture - Blood, Hand, Right [[id number]]  (Abnormal) Collected: 09/23/23 0106    Lab Status: Final result Specimen: Blood from Hand, Right Updated: 09/27/23 0709     Blood Culture Staphylococcus hominis ssp hominis     Isolated from Aerobic Bottle     Gram Stain Aerobic Bottle Gram positive cocci in pairs and clusters    Narrative:      Less than seven (7) mL's of blood was collected.  Insufficient quantity may yield false negative results.    Probable contaminant requires clinical correlation, susceptibility not performed unless requested by physician.      Blood Culture ID, PCR - Blood, Hand, Right [[id number]]  (Abnormal) Collected: 09/23/23 0106    Lab Status: Final result Specimen: Blood from Hand, Right Updated: 09/24/23 0120     BCID, PCR Staph spp, not aureus or lugdunensis. Identification by BCID2 PCR.     BOTTLE TYPE Aerobic Bottle            CT Abdomen Pelvis Without Contrast    Result Date: 9/22/2023  Impression: Impression: No acute abnormality Cholelithiasis without CT evidence of acute cholecystitis. Electronically Signed: Sunny Humphries DO  9/22/2023 5:40 PM EDT  Workstation ID: [id number]    XR Wrist 2 View Right    Result Date: 9/27/2023  Impression: Impression: No acute right wrist findings. Electronically Signed: Sully Quintanilla MD  9/27/2023 2:21 PM EDT  Workstation ID: [id number]    NM HIDA SCAN WITHOUT PHARMACOLOGICAL INTERVENTION    Result Date: 9/24/2023  Impression: Impression: No evidence of acute cholecystitis. Electronically Signed: Declan Díaz MD  9/24/2023 1:12 PM EDT  Workstation ID: [id number]    US Abdomen Limited    Result Date: 9/22/2023  Impression: Impression: Cholelithiasis with a mildly thickened gallbladder wall and positive sonographic Fleming sign is highly concerning for  cholecystitis. No evidence of biliary ductal dilation. Electronically Signed: Sunny Humphries DO   9/22/2023 10:15 PM EDT  Workstation ID: [id number]     Results for orders placed during the hospital encounter of 09/22/23    Duplex Venous Upper Extremity RIGHT    Interpretation Summary    Normal right upper extremity venous duplex scan.      Results for orders placed during the hospital encounter of 09/22/23    Duplex Venous Upper Extremity RIGHT    Interpretation Summary    Normal right upper extremity venous duplex scan.      Results for orders placed during the hospital encounter of 03/18/22    Adult Transthoracic Echo Complete W/ Cont if Necessary Per Protocol (With Agitated Saline)    Interpretation Summary  Normal LV size and contractility EF of 60 to 65%  Normal RV size  Mild left atrial enlargement seen  Aortic valve appears structurally normal  Mitral valve leaflets are thickened anterior mitral leaflet appears calcified, but has good cusp separation.   No significant MR seen.  Tricuspid valve appears structurally normal, no significant regurgitation seen.  No pericardial effusion seen.  Proximal aorta appears normal in size.      Labs Pending at Discharge:  Pending Labs       Order Current Status    Blood Culture - Blood, Arm, Left Preliminary result    Blood Culture - Blood, Hand, Right Preliminary result            Procedures Performed  Procedure(s):  ESOPHAGOGASTRODUODENOSCOPY with dilation (58 non guided bougie)  09/28 1125 UPPER GI ENDOSCOPY      Consults:   Consults       Date and Time Order Name Status Description    9/27/2023 11:31 AM Inpatient Orthopedic Surgery Consult Completed     9/27/2023 11:31 AM Inpatient Gastroenterology Consult Completed     9/25/2023  9:32 AM Inpatient Hospitalist Consult      9/24/2023  7:11 AM Inpatient General Surgery Consult      9/23/2023 11:07 AM Inpatient General Surgery Consult Completed     9/22/2023 10:06 PM Inpatient Endocrinology Consult      9/22/2023 10:06 PM Inpatient Gastroenterology Consult Completed               Discharge Details        Discharge  Medications        New Medications        Instructions Start Date   acetaminophen 325 MG tablet  Commonly known as: TYLENOL   650 mg, Oral, Every 4 Hours PRN      ferrous sulfate 324 (65 Fe) MG tablet delayed-release EC tablet   324 mg, Oral, Daily With Breakfast             Changes to Medications        Instructions Start Date   NovoLOG FlexPen 100 UNIT/ML solution pen-injector sc pen  Generic drug: insulin aspart  What changed: additional instructions   12 Units, Subcutaneous, 3 Times Daily With Meals, Dx code: E11.65             Continue These Medications        Instructions Start Date   Accu-Chek Guide test strip  Generic drug: glucose blood   Use as instructed to test blood sugar 4 times daily before meals and at bedtime      Accu-Chek Guide w/Device kit   See Admin Instructions      Accu-Chek Softclix Lancets lancets   Use as directed to test blood sugar 4 times daily before meals and at bedtime.      amLODIPine 5 MG tablet  Commonly known as: NORVASC   5 mg, Oral, Daily      apixaban 5 MG tablet tablet  Commonly known as: Eliquis   5 mg, Oral, 2 Times Daily      aspirin 81 MG EC tablet   81 mg, Oral, Daily      atorvastatin 80 MG tablet  Commonly known as: LIPITOR   80 mg, Oral, Nightly      buPROPion  MG 24 hr tablet  Commonly known as: Wellbutrin XL   150 mg, Oral, Every Morning      busPIRone 10 MG tablet  Commonly known as: BUSPAR   10 mg, Oral, Every 12 Hours Scheduled      CINNAMON PO   Oral      cyclobenzaprine 10 MG tablet  Commonly known as: FLEXERIL   Take I tab q 8 hrs as needed for tension headaches      Dexcom G6  device   1 Device, Does not apply, Daily, Use to check BS      Dexcom G6 Sensor   Does not apply, Every 10 Days, Use to check BS daily      Dexcom G6 Transmitter misc   1 each, Does not apply, 4 Times Daily Before Meals & Nightly, Change every 3 months      DULoxetine 60 MG capsule  Commonly known as: CYMBALTA   60 mg, Oral, Every Morning      Gvoke HypoPen 2-Pack 1  MG/0.2ML solution auto-injector  Generic drug: Glucagon   1 mg, Subcutaneous, As Needed      HYDROcodone-acetaminophen 5-325 MG per tablet  Commonly known as: NORCO   1 tablet, Oral, Every Morning      Lantus SoloStar 100 UNIT/ML injection pen  Generic drug: Insulin Glargine   34 Units, Subcutaneous, Nightly      levothyroxine 100 MCG tablet  Commonly known as: Synthroid   100 mcg, Oral, Every Early Morning      meclizine 25 MG tablet  Commonly known as: ANTIVERT   25 mg, Oral, 3 Times Daily PRN      metoprolol succinate  MG 24 hr tablet  Commonly known as: TOPROL-XL   100 mg, Oral, Daily      multivitamin with minerals tablet tablet   1 tablet, Oral, Daily      omeprazole 40 MG capsule  Commonly known as: priLOSEC   40 mg, Oral, Daily      ondansetron ODT 4 MG disintegrating tablet  Commonly known as: ZOFRAN-ODT   4 mg, Translingual, Every 8 Hours PRN      pregabalin 100 MG capsule  Commonly known as: LYRICA   100 mg, Oral, 2 Times Daily             Stop These Medications      Insulin Lispro 100 UNIT/ML injection pen  Commonly known as: ADMELOG SOLOSTAR     Ozempic (0.25 or 0.5 MG/DOSE) 2 MG/3ML solution pen-injector  Generic drug: Semaglutide(0.25 or 0.5MG/DOS)     Semaglutide(0.25 or 0.5MG/DOS) 2 MG/1.5ML solution pen-injector  Commonly known as: OZEMPIC              Allergies   Allergen Reactions    Lisinopril Cough    Cefixime Other (See Comments)         Discharge Disposition:   Home or Self Care    Diet:  Hospital:  Diet Order   Procedures    Diet: Cardiac Diets, Diabetic Diets; Healthy Heart (2-3 Na+); Consistent Carbohydrate; Texture: Regular Texture (IDDSI 7); Fluid Consistency: Thin (IDDSI 0)         Discharge Activity:         CODE STATUS:  Code Status and Medical Interventions:   Ordered at: 09/23/23 0749     Code Status (Patient has no pulse and is not breathing):    CPR (Attempt to Resuscitate)     Medical Interventions (Patient has pulse or is breathing):    Full Support         Future  Appointments   Date Time Provider Department Center   9/29/2023 10:30 AM [facility] EMG MACHINE 1  [facility] None   11/13/2023 11:00 AM Bridger Cantu MD [facility] NEURSURG [facility]   11/27/2023  2:00 PM Dionicio Ramachandran MD [facility] END NA [facility]   2/28/2024  9:15 AM Seipel, Joseph F, MD [facility] NEURO NA [facility]           Time spent on Discharge including face to face service:  35  minutes      Signature: Electronically signed by Ayan Garzon MD, 09/28/23, 15:36 EDT.  [facility] Hospitalist Team

## 2023-09-28 NOTE — OP NOTE
ESOPHAGOGASTRODUODENOSCOPY Procedure Report    Patient Name:  Kuldeep Adhikari  YOB: 1965    Date of Surgery:  9/28/2023     Pre-Op Diagnosis:  Dysphagia, unspecified type [R13.10]       Post-Op Diagnosis Codes:     * Dysphagia, unspecified type [R13.10]  Normal esophagus dilated 58 French Blair without trauma  Otherwise normal EGD    Procedure/CPT® Codes:      Procedure(s):  ESOPHAGOGASTRODUODENOSCOPY with dilation (58 non guided bougie)    Staff:  Surgeon(s):  Hussein Talavera MD      Anesthesia: Monitored Anesthesia Care    Description of Procedure:  A description of the procedure as well as risks, benefits and alternative methods were explained to the patient who voiced understanding and signed the corresponding consent form. A physical exam was performed and vital signs were monitored throughout the procedure.    An upper GI endoscope was placed into the mouth and proceeded through the esophagus, stomach and second portion of the duodenum without difficulty. The scope was then retroflexed and the fundus was visualized. The procedure was not difficult and there were no immediate complications.    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

## 2023-09-28 NOTE — ANESTHESIA POSTPROCEDURE EVALUATION
Patient: Kuldeep Adhikari    Procedure Summary       Date: 09/28/23 Room / Location: [facility] ENDOSCOPY 1 / [facility] ENDOSCOPY    Anesthesia Start: 1127 Anesthesia Stop: 1148    Procedure: ESOPHAGOGASTRODUODENOSCOPY with dilation (58 non guided bougie) Diagnosis:       Dysphagia, unspecified type      (Dysphagia, unspecified type [R13.10])    Surgeons: Hussein Talavera MD Provider: Bear Pike MD    Anesthesia Type: general ASA Status: 3            Anesthesia Type: general    Vitals  Vitals Value Taken Time   /72 09/28/23 1212   Temp     Pulse 70 09/28/23 1213   Resp 18 09/28/23 1205   SpO2 96 % 09/28/23 1213   Vitals shown include unvalidated device data.        Post Anesthesia Care and Evaluation    Patient location during evaluation: PACU  Patient participation: complete - patient participated  Level of consciousness: awake  Pain scale: See nurse's notes for pain score.  Pain management: adequate    Airway patency: patent  Anesthetic complications: No anesthetic complications  PONV Status: none  Cardiovascular status: acceptable  Respiratory status: acceptable and spontaneous ventilation  Hydration status: acceptable    Comments: Patient seen and examined postoperatively; vital signs stable; SpO2 greater than or equal to 90%; cardiopulmonary status stable; nausea/vomiting adequately controlled; pain adequately controlled; no apparent anesthesia complications; patient discharged from anesthesia care when discharge criteria were met

## 2023-09-28 NOTE — OUTREACH NOTE
Prep Survey      Flowsheet Row Responses   [facility] patient discharged [facility]? [facility]   Is LACE score < 7 ? No   Eligibility [facility]   Date of Admission 09/22/23   Date of Discharge 09/28/23   Discharge Disposition Home or Self Care   Discharge diagnosis CHOLECYSTECTOMY   Does the patient have one of the following disease processes/diagnoses(primary or secondary)? General Surgery   Does the patient have Home health ordered? No   Is there a DME ordered? No   Prep survey completed? Yes            FAY CHIANG - Registered Nurse

## 2023-09-28 NOTE — ANESTHESIA PREPROCEDURE EVALUATION
Anesthesia Evaluation     NPO Solid Status: > 8 hours  NPO Liquid Status: > 8 hours           Airway   Dental      Pulmonary    (+) pulmonary embolism,shortness of breath, sleep apnea  Cardiovascular     (+) hypertension, valvular problems/murmurs, CAD, PVD, DVT, hyperlipidemia    ROS comment: Interpretation Summary       Normal LV size and contractility EF of 60 to 65%  Normal RV size  Mild left atrial enlargement seen  Aortic valve appears structurally normal  Mitral valve leaflets are thickened anterior mitral leaflet appears calcified, but has good cusp separation.   No significant MR seen.  Tricuspid valve appears structurally normal, no significant regurgitation seen.  No pericardial effusion seen.  Proximal aorta appears normal in size.      Neuro/Psych  (+) CVA, headaches, dizziness/light headedness, numbness, psychiatric history  GI/Hepatic/Renal/Endo    (+) obesity, morbid obesity, hiatal hernia, GERD, renal disease, diabetes mellitus, thyroid problem hypothyroidism    Musculoskeletal     (+) neck pain  Abdominal    Substance History      OB/GYN          Other   arthritis,       Other Comment: Medical History  Current as of 09/27/23 2047  History Comments History Comments  Type 2 diabetes mellitus with peripheral vascular disease  Hypertension   Hyperlipidemia  GERD (gastroesophageal reflux disease)   CKD (chronic kidney disease), stage III  Vitamin D deficiency   Depression  DJD (degenerative joint disease)   Hypothyroidism  History of esophageal stricture s/p dialation 40-50 times last EGD 04/2016  Sleep apnea  DVT (deep venous thrombosis)   Neuropathy  Rash rt lower hip  Ganglion rt wrist IBS (irritable bowel syndrome)   Hiatal hernia  Retinopathy   Seasonal allergies  Headache   Heart murmur  Pulmonary embolism   Sepsis due to group B Streptococcus  Osteomyelitis of right foot   Gangrene of foot  S/P BKA (below knee amputation), right   Depression with suicidal ideation  Stroke   Allergic  Shortness  of breath     Surgical History  Current as of 09/27/23 2047    TOTAL HIP ARTHROPLASTY TOTAL HIP ARTHROPLASTY  ENDOSCOPY ENDOSCOPY  HERNIA REPAIR CARDIAC CATHETERIZATION  AMPUTATION FOOT / TOE GANGLION CYST EXCISION  RETINOPATHY SURGERY ENDOSCOPY  TRANS METATARSAL AMPUTATION BELOW KNEE AMPUTATION  AMPUTATION REVISION AMPUTATION REVISION  ENDOSCOPY EYE SURGERY  JOINT REPLACEMENT VASECTOMY  COLONOSCOPY AMPUTATION DIGIT  AMPUTATION DIGIT INCISION AND DRAINAGE OF WOUND  ENDOSCOPY       ROS/Med Hx Other: OZEMPIC WEEKLY. LAST DOSE?                Anesthesia Plan    ASA 3     general   total IV anesthesia  intravenous induction     Anesthetic plan, risks, benefits, and alternatives have been provided, discussed and informed consent has been obtained with: patient.  Pre-procedure education provided  Plan discussed with CRNA.    CODE STATUS:    Code Status (Patient has no pulse and is not breathing): CPR (Attempt to Resuscitate)  Medical Interventions (Patient has pulse or is breathing): Full Support

## 2023-09-28 NOTE — CASE MANAGEMENT/SOCIAL WORK
Continued Stay Note   [facility]     Patient Name: Kuldeep Adhikari  MRN: [id number]  Today's Date: 9/28/2023    Admit Date: 9/22/2023    Plan: Return home with brother and his family. Meds to Bed   Discharge Plan       Row Name 09/28/23 0934       Plan    Plan Comments Barriers: NPO for  09/28/23 ESOPHAGOGASTRODUODENOSCOPY. AT discharge Mr. Adhikari will return  home with his family                        Phone communication or documentation only - no physical contact with patient or family.   Ann PATRICK,RN Case Manager  [facility]  Phone: Desk- [phone] cell- [phone]

## 2023-09-28 NOTE — PLAN OF CARE
Goal Outcome Evaluation:         Slept throughout the night, started to destat into the 80s so we put on 2l of o2 via nasal cannula

## 2023-09-28 NOTE — PROGRESS NOTES
"Subjective   Kuldeep Adhikari is a 58 y.o. male.   Seen for diabetes f/u.  For EGD in am.      Objective     /69 (BP Location: Left arm, Patient Position: Lying)   Pulse 72   Temp 98.4 °F (36.9 °C) (Oral)   Resp 17   Ht 172.7 cm (68\")   Wt 81.8 kg (180 lb 5.4 oz)   SpO2 99%   BMI 27.42 kg/m²   Blood sugar  95 this am,  170 @ lunch, 126 @ supper    ASSESSMENT  Diabetes type 2, with hyperglycemia  Patient is stable    PLAN    Continue same insulin regimen.         Meli Stone MD  9/27/2023  22:17 EDT    "

## 2023-09-29 ENCOUNTER — TRANSITIONAL CARE MANAGEMENT TELEPHONE ENCOUNTER (OUTPATIENT)
Dept: CALL CENTER | Facility: HOSPITAL | Age: 58
End: 2023-09-29
Payer: MEDICARE

## 2023-09-29 ENCOUNTER — HOSPITAL ENCOUNTER (OUTPATIENT)
Dept: NEUROLOGY | Facility: HOSPITAL | Age: 58
Discharge: HOME | End: 2023-09-29
Payer: MEDICARE

## 2023-09-29 ENCOUNTER — TELEPHONE (OUTPATIENT)
Dept: DIABETES SERVICES | Facility: HOSPITAL | Age: 58
End: 2023-09-29
Payer: MEDICARE

## 2023-09-29 DIAGNOSIS — M54.12 CERVICAL RADICULOPATHY: ICD-10-CM

## 2023-09-29 PROCEDURE — 95908 NRV CNDJ TST 3-4 STUDIES: CPT

## 2023-09-29 PROCEDURE — 95886 MUSC TEST DONE W/N TEST COMP: CPT

## 2023-09-29 NOTE — OUTREACH NOTE
Call Center TCM Note      Flowsheet Row Responses   [facility] patient discharged from? [facility]   Does the patient have one of the following disease processes/diagnoses(primary or secondary)? General Surgery   TCM attempt successful? Yes   Call start time 1627   Call end time 1632   Discharge diagnosis CHOLECYSTECTOMY   Meds reviewed with patient/caregiver? Yes   Is the patient having any side effects they believe may be caused by any medication additions or changes? No   Does the patient have all medications related to this admission filled (includes all antibiotics, pain medications, etc.) Yes   Is the patient taking all medications as directed (includes completed medication regime)? Yes   Comments [facility] d/c folow up 10/4/23@1230   Does the patient have an appointment with their PCP within 7-14 days of discharge? Yes   Has home health visited the patient within 72 hours of discharge? N/A   Psychosocial issues? No   Did the patient receive a copy of their discharge instructions? Yes   Nursing interventions Reviewed instructions with patient   What is the patient's perception of their health status since discharge? Improving   Nursing interventions Nurse provided patient education   Is the patient /caregiver able to teach back basic post-op care? Practice 'cough and deep breath', Take showers only when approved by MD-sponge bathe until then, No tub bath, swimming, or hot tub until instructed by MD, Keep incision areas clean,dry and protected   Is the patient/caregiver able to teach back signs and symptoms of incisional infection? Increased redness, swelling or pain at the incisonal site, Increased drainage or bleeding, Incisional warmth, Pus or odor from incision, Fever   Is the patient/caregiver able to teach back steps to recovery at home? Set small, achievable goals for return to baseline health, Rest and rebuild strength, gradually increase activity, Eat a well-balance diet   TCM call completed? Yes    Wrap up additional comments Patient doing well, denies any concerns today.   Call end time 1632   Would this patient benefit from a Referral to [facility] Social Work? No   Is the patient interested in additional calls from an ambulatory ? No            Светлана Garcia RN    9/29/2023, 16:32 EDT

## 2023-09-29 NOTE — OUTREACH NOTE
Call Center TCM Note      Flowsheet Row Responses   [facility] facility patient discharged from? [facility]   Does the patient have one of the following disease processes/diagnoses(primary or secondary)? General Surgery   TCM attempt successful? No   Unsuccessful attempts Attempt 1            Светлана Garcia RN    9/29/2023, 15:36 EDT

## 2023-09-30 LAB
BACTERIA SPEC AEROBE CULT: NORMAL
BACTERIA SPEC AEROBE CULT: NORMAL

## 2023-10-02 ENCOUNTER — TELEPHONE (OUTPATIENT)
Dept: NEUROLOGY | Facility: CLINIC | Age: 58
End: 2023-10-02
Payer: MEDICARE

## 2023-10-02 ENCOUNTER — TELEPHONE (OUTPATIENT)
Dept: DIABETES SERVICES | Facility: HOSPITAL | Age: 58
End: 2023-10-02
Payer: MEDICARE

## 2023-10-02 DIAGNOSIS — E11.65 UNCONTROLLED TYPE 2 DIABETES MELLITUS WITH HYPERGLYCEMIA: ICD-10-CM

## 2023-10-02 DIAGNOSIS — E11.65 TYPE 2 DIABETES MELLITUS WITH HYPERGLYCEMIA, WITH LONG-TERM CURRENT USE OF INSULIN: ICD-10-CM

## 2023-10-02 DIAGNOSIS — Z79.4 TYPE 2 DIABETES MELLITUS WITH HYPERGLYCEMIA, WITH LONG-TERM CURRENT USE OF INSULIN: ICD-10-CM

## 2023-10-02 RX ORDER — PROCHLORPERAZINE 25 MG/1
1 SUPPOSITORY RECTAL
Qty: 1 EACH | Refills: 3 | Status: SHIPPED | OUTPATIENT
Start: 2023-10-02

## 2023-10-02 RX ORDER — PROCHLORPERAZINE 25 MG/1
SUPPOSITORY RECTAL
Qty: 3 EACH | Refills: 11 | Status: SHIPPED | OUTPATIENT
Start: 2023-10-02

## 2023-10-02 RX ORDER — PROCHLORPERAZINE 25 MG/1
1 SUPPOSITORY RECTAL DAILY
Qty: 1 EACH | Refills: 0 | Status: SHIPPED | OUTPATIENT
Start: 2023-10-02

## 2023-10-02 NOTE — TELEPHONE ENCOUNTER
Caller: Kuldeep Adhikari    Relationship: Self    Date of last appointment: 2/28/23    Issues/Supplies requested: NEW CPAP MACHINE    Type of mask (fill or nasal): FULL    Does equipment use a modem or chip: UNKNOWN    Ordering physician's name: DR. SEIPEL    Patient contact information: [phone]    Fax number where the order should be sent: [facility]

## 2023-10-02 NOTE — TELEPHONE ENCOUNTER
Patient states he need rx for Dexcom sent to [facility].  He changed insurance.  He states they said to start with [facility] to see if covered there. Rx for  transmitter and sensors sent.

## 2023-10-02 NOTE — TELEPHONE ENCOUNTER
Pt states she did rx Novolog when discharged from hospital. Pt understands to take 12 units ac. Pt with no additional questions at this time.

## 2023-10-03 ENCOUNTER — TELEPHONE (OUTPATIENT)
Dept: ENDOCRINOLOGY | Facility: CLINIC | Age: 58
End: 2023-10-03

## 2023-10-03 DIAGNOSIS — G47.33 OBSTRUCTIVE SLEEP APNEA: Primary | ICD-10-CM

## 2023-10-03 NOTE — TELEPHONE ENCOUNTER
Caller: Kuldeep Adhikari    Relationship to patient: Self    Best call back number: [phone]    Patient is needing: PT SAID HE COULDN'T  G6 DEVICE, POSSIBLY A PRIOR AUTH, [facility] IS NEEDING A CALL

## 2023-10-03 NOTE — TELEPHONE ENCOUNTER
Spoke with Anna and she said send over a new order and they will run it through but should be okay if not she will contact us to do a follow up visit.    Thanks!

## 2023-10-05 ENCOUNTER — TELEPHONE (OUTPATIENT)
Dept: ENDOCRINOLOGY | Facility: CLINIC | Age: 58
End: 2023-10-05

## 2023-10-05 NOTE — TELEPHONE ENCOUNTER
Caller: [facility] - [place], [place] - [address] - [phone]  - [phone]    Relationship to patient: Pharmacy    Best call back number: [phone]    Patient is needing: NEEDS ALL HIS DEXCOM SUPPLIES SENT TO TO [facility]    [phone]

## 2023-10-09 ENCOUNTER — TELEPHONE (OUTPATIENT)
Dept: NEUROSURGERY | Facility: CLINIC | Age: 58
End: 2023-10-09
Payer: MEDICARE

## 2023-10-09 ENCOUNTER — DOCUMENTATION (OUTPATIENT)
Dept: PHYSICAL THERAPY | Facility: CLINIC | Age: 58
End: 2023-10-09
Payer: MEDICARE

## 2023-10-09 DIAGNOSIS — E11.65 UNCONTROLLED TYPE 2 DIABETES MELLITUS WITH HYPERGLYCEMIA: ICD-10-CM

## 2023-10-09 RX ORDER — INSULIN LISPRO 100 [IU]/ML
INJECTION, SOLUTION INTRAVENOUS; SUBCUTANEOUS
Qty: 30 ML | Refills: 3 | Status: SHIPPED | OUTPATIENT
Start: 2023-10-09

## 2023-10-09 NOTE — TELEPHONE ENCOUNTER
Caller: Kuldeep Adhikari    Relationship to patient: Self    Best call back number: [phone]    Patient is needing: PATIENT CALLED, PATIENT IS REQUESTING A C/B TO DISCUSS HIS EMG RESULTS. PLEASE CALL PATIENT TO ADVISE.    THANK YOU

## 2023-10-09 NOTE — PROGRESS NOTES
Discharge Summary  Discharge Summary from Physical Therapy Report      Dates  PT visit: 8/21/23-9/11/23  Number of Visits: 6     Discharge Status of Patient: Patient did not return for ongoing care so their chart will be discharged at this time.    Goals: Partially Met    Discharge Plan: Patient to return to referring/providing physician    Comments Patient was hospitalized and had abdominal surgery.  Will discharge this episode of PT at this time.    Date of Discharge 10/9/23        Adela Peña PT  Physical Therapist

## 2023-10-10 NOTE — TELEPHONE ENCOUNTER
Called patient and let him know that I can send him the report or if he would like to come in he can to discuss results. Patient would like to come in. I moved patient's follow up to October 19th at 8:30 am

## 2023-10-11 ENCOUNTER — LAB (OUTPATIENT)
Dept: FAMILY MEDICINE CLINIC | Facility: CLINIC | Age: 58
End: 2023-10-11
Payer: MEDICARE

## 2023-10-11 ENCOUNTER — OFFICE VISIT (OUTPATIENT)
Dept: FAMILY MEDICINE CLINIC | Facility: CLINIC | Age: 58
End: 2023-10-11
Payer: MEDICARE

## 2023-10-11 VITALS
HEART RATE: 65 BPM | SYSTOLIC BLOOD PRESSURE: 135 MMHG | HEIGHT: 66 IN | BODY MASS INDEX: 28.93 KG/M2 | DIASTOLIC BLOOD PRESSURE: 86 MMHG | WEIGHT: 180 LBS | OXYGEN SATURATION: 100 % | TEMPERATURE: 97.3 F

## 2023-10-11 DIAGNOSIS — R19.7 DIARRHEA, UNSPECIFIED TYPE: Primary | ICD-10-CM

## 2023-10-11 DIAGNOSIS — Z79.4 TYPE 2 DIABETES MELLITUS WITH HYPERGLYCEMIA, WITH LONG-TERM CURRENT USE OF INSULIN: ICD-10-CM

## 2023-10-11 DIAGNOSIS — K80.20 CALCULUS OF GALLBLADDER WITHOUT CHOLECYSTITIS WITHOUT OBSTRUCTION: ICD-10-CM

## 2023-10-11 DIAGNOSIS — R19.7 DIARRHEA, UNSPECIFIED TYPE: ICD-10-CM

## 2023-10-11 DIAGNOSIS — E11.65 TYPE 2 DIABETES MELLITUS WITH HYPERGLYCEMIA, WITH LONG-TERM CURRENT USE OF INSULIN: ICD-10-CM

## 2023-10-11 PROBLEM — F32.A DEPRESSION: Status: ACTIVE | Noted: 2023-10-11

## 2023-10-11 PROBLEM — I63.9 CEREBROVASCULAR ACCIDENT: Status: ACTIVE | Noted: 2023-10-11

## 2023-10-11 LAB
ANION GAP SERPL CALCULATED.3IONS-SCNC: 11.5 MMOL/L (ref 5–15)
BUN SERPL-MCNC: 14 MG/DL (ref 6–20)
BUN/CREAT SERPL: 12.4 (ref 7–25)
CALCIUM SPEC-SCNC: 9.6 MG/DL (ref 8.6–10.5)
CHLORIDE SERPL-SCNC: 100 MMOL/L (ref 98–107)
CO2 SERPL-SCNC: 26.5 MMOL/L (ref 22–29)
CREAT SERPL-MCNC: 1.13 MG/DL (ref 0.76–1.27)
EGFRCR SERPLBLD CKD-EPI 2021: 74.9 ML/MIN/1.73
GLUCOSE SERPL-MCNC: 301 MG/DL (ref 65–99)
POTASSIUM SERPL-SCNC: 4.5 MMOL/L (ref 3.5–5.2)
SODIUM SERPL-SCNC: 138 MMOL/L (ref 136–145)

## 2023-10-11 PROCEDURE — 36415 COLL VENOUS BLD VENIPUNCTURE: CPT

## 2023-10-11 PROCEDURE — 80048 BASIC METABOLIC PNL TOTAL CA: CPT | Performed by: FAMILY MEDICINE

## 2023-10-11 NOTE — PROGRESS NOTES
Transitional Care Follow Up Visit  Subjective     Kuldeep Adhikari is a 58 y.o. male who presents for a transitional care management visit.    Within 48 business hours after discharge our office contacted him via telephone to coordinate his care and needs.      I reviewed and discussed the details of that call along with the discharge summary, hospital problems, inpatient lab results, inpatient diagnostic studies, and consultation reports with Kuldeep.     Current outpatient and discharge medications have been reconciled for the patient.  Reviewed by: Laura Whitaker MD          9/28/2023     6:34 PM   Date of TCM Phone Call   [facility]   Date of Admission 9/22/2023   Date of Discharge 9/28/2023   Discharge Disposition Home or Self Care     Risk for Readmission (LACE) Score: 10 (9/28/2023  6:00 AM)      History of Present Illness  Here for follow up after recent admission to [facility] for cholecystectomy sec to cholelithiasis  Having diarrhea 3 times a day  Was told while he was in the hosp to stop the ozempic  BS are running in 300's  He takes fast acting insulin but takes too much and then BS will drop below 60  Has lost weight       Course During Hospital Stay:  presents to the emergency department with abdominal pain vomiting and diarrhea. He states that he started Ozempic last week and he had a few days of vomiting and diarrhea. Took a second dose on Monday and he has been with persistent abdominal pain nausea vomiting diarrhea.  CT abdomen showing cholelithiasis without evidence of cholecystitis.  Ultrasound gallbladder showing cholelithiasis with mildly thickened gallbladder wall and positive Felming sign.  General surgeon is consulted and proceeded to laparoscopic cholecystectomy on September 23, 2023.  And has underlying esophageal stricture and he feels discomfort when he is lying down and trying to eat.  GI is consulted and he went through EGD guided esophageal dilatation on September 28, 2023.       The following portions of the patient's history were reviewed and updated as appropriate: allergies, current medications, past family history, past medical history, past social history, past surgical history, and problem list.    Review of Systems   Constitutional:  Negative for fever.   Respiratory: Negative.     Cardiovascular: Negative.    Gastrointestinal:  Positive for diarrhea. Negative for abdominal distention, abdominal pain, blood in stool, nausea and vomiting.   Allergic/Immunologic: Negative.        Objective   Physical Exam  Vitals and nursing note reviewed.   Constitutional:       Appearance: Normal appearance. He is well-developed, well-groomed and overweight.   Cardiovascular:      Rate and Rhythm: Normal rate and regular rhythm.      Heart sounds: Normal heart sounds.   Pulmonary:      Effort: Pulmonary effort is normal.      Breath sounds: Normal breath sounds.   Abdominal:      General: Abdomen is flat. Bowel sounds are normal.      Palpations: Abdomen is soft.      Tenderness: There is no abdominal tenderness.   Musculoskeletal:      Right lower leg: No edema.      Left lower leg: No edema.   Skin:     Coloration: Skin is not pale.   Neurological:      Mental Status: He is alert.   Psychiatric:         Behavior: Behavior is cooperative.         Assessment & Plan   Problems Addressed this Visit          Endocrine and Metabolic    Type 2 diabetes mellitus with hyperglycemia, with long-term current use of insulin     Other Visit Diagnoses       Diarrhea, unspecified type    -  Primary    Relevant Orders    Basic Metabolic Panel    Calculus of gallbladder without cholecystitis without obstruction              Diagnoses         Codes Comments    Diarrhea, unspecified type    -  Primary ICD-10-CM: R19.7  ICD-9-CM: 787.91     Type 2 diabetes mellitus with hyperglycemia, with long-term current use of insulin     ICD-10-CM: E11.65, Z79.4  ICD-9-CM: 250.00, 790.29, V58.67     Calculus of gallbladder  without cholecystitis without obstruction     ICD-10-CM: K80.20  ICD-9-CM: 574.20           S/o cholecystectomy  Overall doing well  Ordered  Bmp sec to diarrhea  Encouraged to push fluids yordan water  No fever or abd pain  He will keep follow up with surgeon and endocrinologist

## 2023-10-11 NOTE — PROGRESS NOTES
Neurosurgical Consultation      Kuldeep Adhikari is a 58 y.o. male is here today for follow-up for neck pain. In the office today patient reports his neck pain has increased.    Chief Complaint   Patient presents with    Neck Pain     Follow up         Previous treatment: EMG 9/29, Lyrica, 6 PT visits,     HPI: This is a 58-year-old gentleman with chronic kidney disease, insulin-dependent diabetes status post right-sided below the knee amputation, severe diabetic neuropathy as well as vascular disease on Eliquis and aspirin who was last evaluated by me on August 11, 2023.  He was being worked up for possible cervical radiculopathy.  He does have radiographic appearance of cervical pathology that could correlate with cervical radiculopathy however his presentation was more consistent with ulnar neuropathy at the elbow.  I recommended a EMG/nerve conduction study.  This was completed.  He has been taking gabapentin and then Lyrica with neither providing profound relief.  He did complete approximately 6 sessions of physical therapy directed at cervical radiculopathy that did not provide profound relief.  He did discontinue the physical therapy as he was recently admitted to the hospital and underwent a cholecystectomy and esophagus dilation.  He has not had a recent hemoglobin A1c with the last one occurring approximately 2 months ago and this resulted in a value of 11.6%.    Past Medical History:   Diagnosis Date    Allergic     CKD (chronic kidney disease), stage III     Depression     Depression with suicidal ideation 08/08/2021    DJD (degenerative joint disease)     DVT (deep venous thrombosis)     Ganglion     rt wrist    Gangrene of foot 10/09/2015    GERD (gastroesophageal reflux disease)     Headache     Heart murmur     Hiatal hernia     History of esophageal stricture     s/p dialation 40-50 times last EGD 04/2016    Hyperlipidemia     Hypertension     Hypothyroidism     IBS (irritable bowel syndrome)      Neuropathy     Osteomyelitis of right foot 03/19/2020    Pulmonary embolism 01/19/2017    Rash     rt lower hip    Retinopathy     S/P BKA (below knee amputation), right 03/18/2022    Seasonal allergies     Sepsis due to group B Streptococcus 03/19/2020    Shortness of breath     Sleep apnea     Stroke     Type 2 diabetes mellitus with peripheral vascular disease     Vitamin D deficiency         Past Surgical History:   Procedure Laterality Date    AMPUTATION DIGIT Left 4/26/2022    Procedure: AMPUTATION left great toe;  Surgeon: ERWIN Baker DPM;  Location: [facility] MAIN OR;  Service: Podiatry;  Laterality: Left;    AMPUTATION DIGIT  4/26/2022    Procedure: ;  Surgeon: ERWIN Baker DPM;  Location: [facility] MAIN OR;  Service: Podiatry;;    AMPUTATION FOOT / TOE Right     great toe    AMPUTATION REVISION Right 4/8/2021    Procedure: BELOW KNEE AMPUTATION REVISAION;  Surgeon: Eduardo Reynolds MD;  Location: [facility] MAIN OR;  Service: Orthopedics;  Laterality: Right;    AMPUTATION REVISION Right 4/29/2021    Procedure: AMPUTATION REVISION KNEE STUMP;  Surgeon: Eduardo Reynolds MD;  Location: [facility] MAIN OR;  Service: Orthopedics;  Laterality: Right;    BELOW KNEE AMPUTATION Right 7/30/2020    Procedure: AMPUTATION BELOW KNEE;  Surgeon: Eduardo Reynolds MD;  Location: [facility] MAIN OR;  Service: Orthopedics;  Laterality: Right;    CARDIAC CATHETERIZATION  04/2018    [facility]    CHOLECYSTECTOMY N/A 9/26/2023    Procedure: CHOLECYSTECTOMY LAPAROSCOPIC;  Surgeon: Eduardo Srinivasan MD;  Location: [facility] MAIN OR;  Service: General;  Laterality: N/A;    COLONOSCOPY      ENDOSCOPY      ENDOSCOPY N/A 10/4/2019    Procedure: ESOPHAGOGASTRODUODENOSCOPY with dilitation and biopsy x 1 area;  Surgeon: Declan Iqbal MD;  Location: [facility] ENDOSCOPY;  Service: Gastroenterology    ENDOSCOPY N/A 12/13/2019    Procedure: ESOPHAGOGASTRODUODENOSCOPY WITH DILATATION (50, 52 BOUGIE);  Surgeon: Declan Iqbal MD;   Location: [facility] ENDOSCOPY;  Service: Gastroenterology    ENDOSCOPY N/A 8/6/2021    Procedure: ESOPHAGOGASTRODUODENOSCOPY with biopsy x1 area and esophageal dilation (56FR Bougie);  Surgeon: Hussein Talavera MD;  Location: [facility] ENDOSCOPY;  Service: Gastroenterology;  Laterality: N/A;  post: hiatal hernia, erosive gastritis    ENDOSCOPY N/A 12/13/2022    Procedure: ESOPHAGOGASTRODUODENOSCOPY WITH DILATATION (BOUGIE #54, #56) AND BIOPSY X1;  Surgeon: David Rodriguez MD;  Location: [facility] ENDOSCOPY;  Service: Gastroenterology;  Laterality: N/A;  POST: dysphagia     ENDOSCOPY N/A 9/28/2023    Procedure: ESOPHAGOGASTRODUODENOSCOPY with dilation (58 non guided bougie);  Surgeon: Hussein Talavera MD;  Location: [facility] ENDOSCOPY;  Service: Gastroenterology;  Laterality: N/A;  post op: dysphagia    EYE SURGERY      GANGLION CYST EXCISION Left     HERNIA REPAIR Bilateral     INCISION AND DRAINAGE OF WOUND Right 6/15/2022    Procedure: INCISION AND DRAINAGE WOUND with debridement;  Surgeon: Fito Forte MD;  Location: [facility] MAIN OR;  Service: Orthopedics;  Laterality: Right;    JOINT REPLACEMENT      Left total hip    RETINOPATHY SURGERY      laser    TOTAL HIP ARTHROPLASTY Left     TOTAL HIP ARTHROPLASTY Left 2018    TRANS METATARSAL AMPUTATION Right 3/17/2020    Procedure: AMPUTATION TRANS METATARSAL right;  Surgeon: ERWIN Baker DPM;  Location: [facility] MAIN OR;  Service: Podiatry;  Laterality: Right;  GANGRENOUS RIGHT FOOT    VASECTOMY          Current Outpatient Medications on File Prior to Visit   Medication Sig Dispense Refill    Accu-Chek Softclix Lancets lancets Use as directed to test blood sugar 4 times daily before meals and at bedtime. 100 each 5    amLODIPine (NORVASC) 5 MG tablet Take 1 tablet by mouth Daily. 90 tablet 0    apixaban (Eliquis) 5 MG tablet tablet Take 1 tablet by mouth 2 (Two) Times a Day. 180 tablet 1    aspirin 81 MG EC tablet Take 1 tablet by mouth Daily.      atorvastatin (LIPITOR)  80 MG tablet Take 1 tablet by mouth Every Night. 90 tablet 1    Blood Glucose Monitoring Suppl (Accu-Chek Guide) w/Device kit See Admin Instructions.      buPROPion XL (Wellbutrin XL) 150 MG 24 hr tablet Take 1 tablet by mouth Every Morning. 90 tablet 0    busPIRone (BUSPAR) 10 MG tablet Take 1 tablet by mouth Every 12 (Twelve) Hours. 180 tablet 1    CINNAMON PO Take  by mouth.      Continuous Blood Gluc  (Dexcom G6 ) device Use 1 Device Daily. Use to check BS 1 each 0    Continuous Blood Gluc Sensor (Dexcom G6 Sensor) Use Every 10 (Ten) Days. Use to check BS daily 3 each 11    Continuous Blood Gluc Transmit (Dexcom G6 Transmitter) misc Use 1 each 4 (Four) Times a Day Before Meals & at Bedtime. Change every 3 months 1 each 3    cyclobenzaprine (FLEXERIL) 10 MG tablet Take I tab q 8 hrs as needed for tension headaches 30 tablet 0    DULoxetine (CYMBALTA) 60 MG capsule Take 1 capsule by mouth Every Morning. 90 capsule 1    ferrous sulfate 324 (65 Fe) MG tablet delayed-release EC tablet Take 1 tablet by mouth Daily With Breakfast for 30 days. 30 tablet 0    Glucagon (Gvoke HypoPen 2-Pack) 1 MG/0.2ML solution auto-injector Inject 1 mg under the skin into the appropriate area as directed As Needed (Hypoglycemia). 0.4 mL 5    glucose blood (Accu-Chek Guide) test strip Use as instructed to test blood sugar 4 times daily before meals and at bedtime 100 each 12    HumaLOG KwikPen 100 UNIT/ML solution pen-injector INJECT 10 UNITS            SUBCUTANEOUSLY INTO THE    APPROPRIATE AREA 3 TIMES A DAY WITH MEALS AS DIRECTED 30 mL 3    insulin aspart (NovoLOG FlexPen) 100 UNIT/ML solution pen-injector sc pen Inject 12 Units under the skin into the appropriate area as directed 3 (Three) Times a Day With Meals. Dx code: E11.65 15 mL 2    Insulin Glargine (Lantus SoloStar) 100 UNIT/ML injection pen Inject 34 Units under the skin into the appropriate area as directed Every Night for 265 days. 15 mL 5    levothyroxine  (Synthroid) 100 MCG tablet Take 1 tablet by mouth Every Morning. 90 tablet 1    meclizine (ANTIVERT) 25 MG tablet Take 1 tablet by mouth 3 (Three) Times a Day As Needed for Dizziness. 30 tablet 0    metoprolol succinate XL (TOPROL-XL) 100 MG 24 hr tablet Take 1 tablet by mouth Daily. 90 tablet 0    multivitamin with minerals tablet tablet Take 1 tablet by mouth Daily.      omeprazole (priLOSEC) 40 MG capsule Take 1 capsule by mouth Daily. 90 capsule 1    ondansetron ODT (ZOFRAN-ODT) 4 MG disintegrating tablet Place 1 tablet on the tongue Every 8 (Eight) Hours As Needed for Nausea or Vomiting. 30 tablet 0    pregabalin (LYRICA) 100 MG capsule Take 1 capsule by mouth 2 (Two) Times a Day. 60 capsule 0    [DISCONTINUED] HYDROcodone-acetaminophen (NORCO) 5-325 MG per tablet Take 1 tablet by mouth Every Morning.       No current facility-administered medications on file prior to visit.        Allergies   Allergen Reactions    Lisinopril Cough    Cefixime Other (See Comments)        Social History     Socioeconomic History    Marital status:     Number of children: 3    Highest education level: High school graduate   Tobacco Use    Smoking status: Never    Smokeless tobacco: Never   Vaping Use    Vaping Use: Never used   Substance and Sexual Activity    Alcohol use: Yes     Comment: OCCASIONAL    Drug use: No    Sexual activity: Not Currently     Partners: Female     Birth control/protection: None     Comment: I have Ed problem at this time          Review of Systems   Constitutional:  Positive for activity change.   HENT: Negative.     Eyes: Negative.    Respiratory: Negative.     Cardiovascular: Negative.    Gastrointestinal: Negative.    Endocrine: Negative.    Genitourinary: Negative.    Musculoskeletal:  Positive for arthralgias, myalgias and neck pain.   Skin: Negative.    Neurological:  Positive for weakness, numbness (tingling/right arm) and headache.   Psychiatric/Behavioral:  Positive for sleep  "disturbance.         Physical Examination:     Vitals:    10/19/23 0834   BP: 139/73   Pulse: 65   Weight: 89.5 kg (197 lb 6.4 oz)   Height: 167.6 cm (66\")   PainSc:   9        Physical Exam     Neurological Exam   Neurological examination appears stable compared to my last evaluation.  He has a relatively classic ulnar nerve distribution numbness and tingling.  He does also have neck pain.    Result Review  The following data was reviewed by: Bridger Cantu MD on 10/19/2023:    Data reviewed : EMG/nerve conduction study shows right-sided ulnar neuropathy at the elbow without any clear indication of electrophysiologic radiculopathy.          Assessment/plan:  This is a 58-year-old gentleman with significant comorbidities including severe poorly controlled diabetes.  He has neck pain as well as indication of right-sided ulnar neuropathy at the elbow.  He is a poor surgical candidate at this juncture in particular with his hemoglobin A1c being 11.6%.  I recommend nighttime elbow brace for his ulnar nerve.  I recommend physical therapy directed at nerve glides to minimize the irritation to his ulnar nerve at the elbow.  I recommend returning to see me in approximately 3 months with a repeat hemoglobin A1c.  I encouraged him to call with any questions or concerns.    Diagnoses and all orders for this visit:    1. Ulnar neuropathy at elbow of right upper extremity (Primary)  -     Ambulatory Referral to Physical Therapy Evaluate and treat         Return in about 3 months (around 1/19/2024).            Bridger Cantu MD  "

## 2023-10-19 ENCOUNTER — OFFICE VISIT (OUTPATIENT)
Dept: NEUROSURGERY | Facility: CLINIC | Age: 58
End: 2023-10-19
Payer: MEDICARE

## 2023-10-19 VITALS
HEIGHT: 66 IN | HEART RATE: 65 BPM | WEIGHT: 197.4 LBS | DIASTOLIC BLOOD PRESSURE: 73 MMHG | BODY MASS INDEX: 31.72 KG/M2 | SYSTOLIC BLOOD PRESSURE: 139 MMHG

## 2023-10-19 DIAGNOSIS — G56.21 ULNAR NEUROPATHY AT ELBOW OF RIGHT UPPER EXTREMITY: Primary | ICD-10-CM

## 2023-10-25 ENCOUNTER — TELEPHONE (OUTPATIENT)
Dept: FAMILY MEDICINE CLINIC | Facility: CLINIC | Age: 58
End: 2023-10-25

## 2023-10-25 NOTE — TELEPHONE ENCOUNTER
Caller: MARGARITA    Relationship: Other    Best call back number: [phone]    MARGARITA CALLED WITH [employer] TO REPORT PATIENTS BLOOD PRESSURE LATELY.    IT WAS READING 180/70 AS OF TWO WEEKS AGO BUT NORMALLY RANGES: 208/110      MARGARITA STATES THAT A NURSE IS GOING TO CHECK IN WITH THE PATIENT THIS EVENING TO DOUBLE CHECK ON HIM.    THEY WANTED TO INFORM DR HERNAN BEDOLLA IN CASE CHANGES NEED TO BE MADE TO MEDICATION.    PLEASE ADVISE PATIENT.

## 2023-11-03 ENCOUNTER — OFFICE VISIT (OUTPATIENT)
Dept: FAMILY MEDICINE CLINIC | Facility: CLINIC | Age: 58
End: 2023-11-03
Payer: MEDICARE

## 2023-11-03 VITALS
BODY MASS INDEX: 32.3 KG/M2 | HEART RATE: 74 BPM | HEIGHT: 66 IN | OXYGEN SATURATION: 99 % | WEIGHT: 201 LBS | SYSTOLIC BLOOD PRESSURE: 170 MMHG | DIASTOLIC BLOOD PRESSURE: 75 MMHG

## 2023-11-03 DIAGNOSIS — I10 PRIMARY HYPERTENSION: Primary | ICD-10-CM

## 2023-11-03 RX ORDER — AMLODIPINE BESYLATE 10 MG/1
10 TABLET ORAL DAILY
Qty: 90 TABLET | Refills: 1 | Status: SHIPPED | OUTPATIENT
Start: 2023-11-03

## 2023-11-03 NOTE — PROGRESS NOTES
Subjective   Kuldeep Adhikari is a 58 y.o. male.       HPI   Pt is here today for follow up on HTN.  He has noted some higher readings recently at home.   Currently on amlodipine 5 mg daily; metoprolol  mg daily.  BP at home as high at 180/80 last week.  /75 today.  Says he limits salt and caffeine.  Denies any CP; palpitations; dizziness; headache; trouble with vision.      The following portions of the patient's history were reviewed and updated as appropriate: allergies, current medications, past family history, past medical history, past social history, past surgical history, and problem list.    Review of Systems   Constitutional:  Negative for chills, fatigue and fever.   Respiratory:  Negative for cough and shortness of breath.    Cardiovascular:  Negative for chest pain and palpitations.   Gastrointestinal:  Negative for diarrhea, nausea and vomiting.   Genitourinary:  Negative for dysuria, frequency and urgency.   Neurological:  Negative for dizziness and headache.   Psychiatric/Behavioral:  Negative for depressed mood. The patient is not nervous/anxious.        Objective   Physical Exam  Vitals reviewed.   Constitutional:       General: He is not in acute distress.     Appearance: Normal appearance. He is obese.   Cardiovascular:      Rate and Rhythm: Normal rate and regular rhythm.      Pulses: Normal pulses.      Heart sounds: Normal heart sounds. No murmur heard.  Pulmonary:      Effort: Pulmonary effort is normal. No respiratory distress.      Breath sounds: Normal breath sounds. No wheezing, rhonchi or rales.   Chest:      Chest wall: No tenderness.   Abdominal:      Tenderness: There is no right CVA tenderness or left CVA tenderness.   Skin:     General: Skin is warm and dry.      Findings: No erythema.   Neurological:      General: No focal deficit present.      Mental Status: He is alert and oriented to person, place, and time.   Psychiatric:         Mood and Affect: Mood normal.                   Assessment & Plan   Diagnoses and all orders for this visit:    1. Primary hypertension (Primary)  Comments:  Increasing amlodipine to 10 mg daily.   Cont. metoprolol  mg daily.   Monitor BP at home; call in readings next week for review.  Orders:  -     amLODIPine (NORVASC) 10 MG tablet; Take 1 tablet by mouth Daily.  Dispense: 90 tablet; Refill: 1

## 2023-11-07 ENCOUNTER — TREATMENT (OUTPATIENT)
Dept: PHYSICAL THERAPY | Facility: CLINIC | Age: 58
End: 2023-11-07
Payer: MEDICARE

## 2023-11-07 DIAGNOSIS — M54.2 PAIN, NECK: ICD-10-CM

## 2023-11-07 DIAGNOSIS — Z91.81 HX OF FALLING: ICD-10-CM

## 2023-11-07 DIAGNOSIS — M43.6 NECK STIFFNESS: ICD-10-CM

## 2023-11-07 DIAGNOSIS — R42 VERTIGO: ICD-10-CM

## 2023-11-07 DIAGNOSIS — R26.89 BALANCE PROBLEM: ICD-10-CM

## 2023-11-07 DIAGNOSIS — G56.21 ULNAR NEUROPATHY AT ELBOW OF RIGHT UPPER EXTREMITY: Primary | ICD-10-CM

## 2023-11-07 PROCEDURE — 97163 PT EVAL HIGH COMPLEX 45 MIN: CPT | Performed by: PHYSICAL THERAPIST

## 2023-11-07 PROCEDURE — 97110 THERAPEUTIC EXERCISES: CPT | Performed by: PHYSICAL THERAPIST

## 2023-11-07 NOTE — PROGRESS NOTES
Physical Therapy Initial Evaluation and Plan of Care  [address] [place] [address]    Patient: Kuldeep Adhikari   : 1965  Diagnosis/ICD-10 Code:  Ulnar neuropathy at elbow of right upper extremity [G56.21]  Referring practitioner: Bridger Cantu MD  Date of Initial Visit: 2023  Today's Date: 2023  Patient seen for 1 sessions           Visit Diagnoses:     ICD-10-CM ICD-9-CM   1. Ulnar neuropathy at elbow of right upper extremity  G56.21 354.2   2. Pain, neck  M54.2 723.1   3. Neck stiffness  M43.6 723.5   4. Vertigo  R42 780.4   5. Balance problem  R26.89 781.99   6. Hx of falling  Z91.81 V15.88       Subjective Questionnaire: QuickDASH: 34 = 52.27% limited    Subjective Evaluation    History of Present Illness  Mechanism of injury: Pt has been seeing Dr. Cantu and being worked up for possible cerv radiculopathy with reports of years of symptoms. MD notes he does have radiographic appearance of cerv pathology that could correlate with cerv radiculopathy, but his presentation appeared more consistent with ulnar neuropathy at the R elbow. An EMG/nerve conduction study was completed showing R sided ulnar neuropathy without any clear indication of electrophysiologic radiculopathy (per MD note 10/19/23).     Pt tried Gabapentin, then Lyrica, and 6 PT sessions for cerv radiculopathy with no significant relief. Pt then was admitted to the hospital and had cholecystectomy and esophagus dilation. MD notes pt is a poor surgical candidate due to A1C being elevated 11.6%. MD ordered OPPT and recommended a night time elbow brace for ulnar nerve (per MD note 10/19/23). Pt notes he got the brace, but it kept his arm at an awkward angle and made where it didn't hit numb. Pt tried putting a pillow under it or a pillow and a towel and nothing helped, so he doesn't wear it any more.     Pt reports stabbing pain cerv down the R UE. Pt states n/t as well at the R ulnar forearm/wrist &  digits 4 & 5. Pt states 3 vertebra in the neck are going in opposite directions and putting pressure on the nerves as well. Pt reports he had manual cerv tx at [facility] in the past which helped some, but he started to get vertigo with sitting up afterwards and has had the issue since. Pt notes fall hx likely due to neuropathy, amputation and/or dizziness with LOB.     Pt has had injex in the neck which helped for about 1-2 days and tried CBD gummies. The gummies helped him relax and sleep, but he notes they're too expensive and no significant change in pain.     Pt needs to use his CPAP, but notes he can't afford the copay right now until his insurance covers it. Follow up with Dr. Cantu is scheduled for January 2024.     PMH: allergic (Lisinopril & Cefixime), CKD stage III, depression, depression with suicidal ideation (2021), DJD, DVT, ganglion R wrist, gangrene of foot 2015, GERD, headache, heart murmur, hiatal hernia, hx esophageal stricture, hyperlipidemia, HTN, hypothyroidism, IBS, nausea/vomiting/diarrhea 2023, neuropathy, osteomyelitis of R foot 2020, PE 2017, rash R lower hip, retinopathy, s/p R BKA, seasonal allergies, sepsis due to group Streptococcus 2020, SOB, sleep apnea, stroke, DMII with PVD, vit D deficiency    PSH: amputation B great toe, BKA then revision, cholecystectomy 9/26/23, colonoscopy, endoscopy x5, eye surg, L ganglion cyst excision, B hernia repair, R incision and drainage of wound, L MARLA, retinopathy surgery, R transmetatarsal amputation, vasectomy    Denies hx/current: pacemaker, DM, CVA, CA, seizures, latex allergies    Pain: 5-6/10 current, 4/10 at best, 10/10 at worst    Aggravating/functional factors: reaching, lifting, carrying, stretching back, grasping/holding items, pushing/pulling, pain/numbness with playing games on phone, washing back with R UE, sleeping, laying on that side (has to sit up in a chair in the bedroom often), neck movements, standing/walking (loses balance),  supine to sit & rolling (dizziness)    PLOF: pt has had difficulty with the above before this issue began for years    Relieving factors: none    Social Hx:  and lives with his brother & his family; 3 children; 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; worked at [employer] when he had his amputation, then had a stroke; pt used to work at [employer] until he needed MARLA; not currently working; likes to go camping/walking, but due to prior PE with part of lung 'killed off', he can't tolerate much of these activities      Quality of life: fair    Pain  Quality: dull ache (dull headache, pins/needles into arm/shoulder with tingling)  Progression: no change    Hand dominance: right    Treatments  Previous treatment: physical therapy and injection treatment  Patient Goals  Patient goals for therapy: decreased pain, increased strength, independence with ADLs/IADLs, increased motion, return to work and return to sport/leisure activities  Patient goal: Return to PLOF, have surgery on neck to stop the pain         Objective          Active Range of Motion   Cervical/Thoracic Spine   Cervical    Flexion: 54 degrees with pain  Extension: 22 degrees with pain  Left lateral flexion: 25 degrees with pain  Right lateral flexion: 30 degrees with pain  Left rotation: 55 degrees with pain  Right rotation: 40 degrees with pain    Right Shoulder   Flexion: 60 degrees with pain  Extension: 35 degrees with pain  Abduction: 65 degrees with pain  External rotation 0°: 35 degrees with pain    Right Wrist   Wrist flexion: 55 degrees   Wrist extension: 45 degrees   Radial deviation: 33 degrees   Ulnar deviation: 15 degrees     Additional Active Range of Motion Details  R shld ER BTH to base of skull with pain  R shld IR BTB to dist/lat buttock with pain  R shld horiz add to ant clavicular region; L WNL  L shoulder WFL    R elbow 0-7-135 degrees  L elbow WFL  L wrist WFL    Strength/Myotome Testing     Right Shoulder     Planes  of Motion   Flexion: 2+   Abduction: 2+   External rotation at 0°: 2+ (tolerates mod resistance in available range at 0 abd)   Internal rotation at 0°: 2+ (tolerates mod resistance in available range at 0 abd)     Right Elbow   Flexion: 4+  Extension: 4+    Left Wrist/Hand      (2nd hand position)     Trial 1: 34.2 lbs    Trial 2: 41.4 lbs    Trial 3: 37.2 lbs    Average: 37.6 lbs    Thumb Strength  Key/Lateral Pinch     Trial 1: 11 lbs    Trial 2: 10 lbs    Trial 3: 11 lbs    Average: 10.67 lbs    Right Wrist/Hand   Wrist extension: 4+  Wrist flexion: 4-  Radial deviation: 5  Ulnar deviation: 4+     (2nd hand position)     Trial 1: 49.2 lbs    Trial 2: 37.2 lbs    Trial 3: 41.6 lbs    Average: 42.67 lbs    Thumb Strength   Key/Lateral Pinch     Trial 1: 10 lbs    Trial 2: 9 lbs    Trial 3: 9 lbs    Average: 9.33 lbs    Additional Strength Details  L UE grossly 4+ to 5 (seated mytomes)    Ambulation     Comments   VESTIBULAR:  Vertigo / hypofunction    VOMS:      Baseline symptoms- 7           Smooth pursuit - 7           Saccades vertical - 7           Saccades horizontal - 7            Convergence (near point) abnormal >10 cm, 7            VOR - horizontal - 7           VOR - vertical - 7           Visual Motion Sensitivity Test : Defer    Boo SOP:     Condition 1 -  Romberg open stance/ eyes open - S     Condition 2 - Romberg open open stance/ eyes closed - F     Condition 3 - Tandem Romberg with eyes open - S     Condition 4 - Tandem Romberg with eyes closed - F     Condition 5 - Standing on foam/ eyes open - S     Condition 6 - Standing on foam/ eyes closed - F     Condition 7  - Stepping Fukuda (march in place) eyes closed - rot R > 30 degrees noted       Note: some instability noted with standing stretches when trying to perform with one leg forward and in standing when looked up to look at the clock; pt was able to recover I    Vertebral AA test: (-)    Deferred: S/L singleton reginee, horizontal roll test,  DHI        Palpation: TTP @ cerv/UT R; hypertonus UT R > L    Sensation: intact/equal to LT B UEs    Posture: head fwd/rounded shoulders, slouched sitting posture      Assessment & Plan       Assessment  Impairments: abnormal coordination, abnormal gait, abnormal muscle firing, abnormal muscle tone, abnormal or restricted ROM, activity intolerance, impaired balance, impaired physical strength, lacks appropriate home exercise program, pain with function and safety issue   Assessment details: The patient is a 58 y.o. male who presents to physical therapy today for R ulnar neuropathy at elbow of R UE. Pt also with c/o neck pain, vertigo/dizziness, fall hx and limited ROM/strength of full R UE.  Upon initial evaluation, the patient demonstrates the following impairments: pain, reduced posture, dizziness (reports of room spins),  decreased ROM/flexibility neck/RUE, strength, balance and function. Due to these impairments, the patient is unable to/limited with: reaching, lifting, carrying, stretching back, grasping/holding items, pushing/pulling, pain/numbness with playing games on phone, washing back with R UE, sleeping, laying on that side (has to sit up in a chair in the bedroom often), neck movements, standing/walking (loses balance), supine to sit & rolling (dizziness). The patient would benefit from skilled PT services to address functional limitations and impairments and to improve patient quality of life.      Barriers to therapy: fall hx/reduced balance, CKD, DJD, hx DVT/PE, headache, GERD, neuropathy, BKA, stroke, DMII with PVD could affect PT Rx/progress/outcomes if exacerbated/unregulated which could affect tolerance or compliance with PT/HEP; DMII could delay healing, as could further injury if hurts self falling  Prognosis: good    Goals  Plan Goals: STGs in 4 weeks:  Decrease pain to 6/10 on average   Increase cerv/RUE AROM 5-10 degrees where limited as much  Increase R shld flex/abd strength to  3/5  Assess/treat for vertigo/BPPV     LTGs by discharge  Increase cerv/UE ROM to WFL/WNL with min/no pain for ADLs  Increase UE strength to 4+/5 or better  Pt will be able to sit/ride/drive 30-60 mins without difficulty or pain   Pt will be able to wash/dress/groom without difficulty or pain using B UEs  Pt will be able to grasp/hold/reach/lift items into/out of an overhead cabinet without difficulty or pain  Pt will be able to grasp/hold/lift/carry laundry baskets, garbage/grocery bags, and/or pots/pans without difficulty, dropping, or pain  Pt will be able to sleep without waking from pain most nights      Plan  Therapy options: will be seen for skilled therapy services  Planned modality interventions: cryotherapy, electrical stimulation/Russian stimulation, thermotherapy (hydrocollator packs), ultrasound, dry needling, TENS and traction  Other planned modality interventions: *electric modalities if pt PCP/cardiologist agrees due to heart murmur  Planned therapy interventions: transfer training, therapeutic activities, stretching, strengthening, spinal/joint mobilization, soft tissue mobilization, postural training, neuromuscular re-education, manual therapy, home exercise program, functional ROM exercises, flexibility, body mechanics training, ADL retraining, joint mobilization and fine motor coordination training  Other planned therapy interventions: canlith repositioning, reiki/massage  Frequency: 2x week  Duration in weeks: 13  Treatment plan discussed with: patient        History # of Personal Factors and/or Comorbidities: HIGH (3+)  Examination of Body System(s): # of elements: HIGH (4+)  Clinical Presentation: UNSTABLE  dizziness, fall hx, chronicity of issues, multi comorbidity  Clinical Decision Making: HIGH      Timed:         Manual Therapy:         mins  81432;     Therapeutic Exercise:   10      mins  86800;     Neuromuscular Terrie:        mins  49658;    Therapeutic Activity:     8     mins  58576;      Gait Training:           mins  94063;     Ultrasound:          mins  35532;    Ionto                                   mins   95471  Self Care                            mins   58177  Canalith Repos         mins 58908      Un-Timed:  Electrical Stimulation:         mins  99657 ( );  Dry Needling          mins self-pay  Traction          mins 04361  Low Eval          Mins  08128  Mod Eval          Mins  00002  High Eval                      52      Mins  02007  Re-Eval                               mins  38772        Timed Treatment:   18   mins   Total Treatment:    70    mins            PT SIGNATURE: Larissa Price, PT   [place] PT Lic# [id number]  DATE TREATMENT INITIATED: 11/7/2023    Medicare Initial Certification  Certification Period: 11/7/2023 through 2/4/2024  I certify that the therapy services are furnished while this patient is under my care.  The services outlined above are required by this patient, and will be reviewed every 90 days.         Physician Signature: _________________________  PHYSICIAN: Bridger Cantu MD   NPI: [id number]                                             DATE: _____________________________________    Please sign and return via fax to [phone]. Thank you, [facility].

## 2023-11-09 ENCOUNTER — TREATMENT (OUTPATIENT)
Dept: PHYSICAL THERAPY | Facility: CLINIC | Age: 58
End: 2023-11-09
Payer: MEDICARE

## 2023-11-09 DIAGNOSIS — M43.6 NECK STIFFNESS: ICD-10-CM

## 2023-11-09 DIAGNOSIS — M54.2 PAIN, NECK: ICD-10-CM

## 2023-11-09 DIAGNOSIS — G56.21 ULNAR NEUROPATHY AT ELBOW OF RIGHT UPPER EXTREMITY: Primary | ICD-10-CM

## 2023-11-09 DIAGNOSIS — Z91.81 HX OF FALLING: ICD-10-CM

## 2023-11-09 DIAGNOSIS — R42 VERTIGO: ICD-10-CM

## 2023-11-09 DIAGNOSIS — R26.89 BALANCE PROBLEM: ICD-10-CM

## 2023-11-09 NOTE — PROGRESS NOTES
Physical Therapy Treatment Note  [address] [place] [address]      VISIT#: 2    Subjective   Kuldeep Adhikari reports: he's about the same.       Objective     S/L hallpike (+) B appears with horizontal nystagmus  Horiz Roll test (+) B for horizontal nystagmus    See Exercise, Manual, and Modality Logs for complete treatment.     Patient Education: cues for therex    Assessment/Plan Began with therex f/b assessment for BPPV and CRM. Pt with nystagmus all tests above, but only dizzy with S/L hallpike L. Performed maneuvers at end of session with fair/good tolerance. Some dizziness with changes of position, but felt some better after prescribed rest. Pt did not require modalities at end of session.       Progress per Plan of Care and Progress strengthening /stabilization /functional activity            Timed:         Manual Therapy:         mins  04614;     Therapeutic Exercise:   10   mins  03713;     Neuromuscular Terrie:  15      mins  09630;    Therapeutic Activity:          mins  36517;     Gait Training:           mins  49484;     Ultrasound:          mins  61427;    Ionto                                   mins   02465  Self Care                            mins   25264    Un-Timed:  Electrical Stimulation:         mins  07357 ( );  Traction          mins 03199  Canalith Repos             14      mins  54518  Dry Needle 1-2 ms      ___  mins 11601  Dry Needle  3+ ms              mins 01136  Low Eval          mins  38263  Mod Eval          Mins  03140  High Eval                            Mins  81404  Re-Eval                               mins  69234    Timed Treatment:  25    mins   Total Treatment:      39  mins          Larissa Price, PT    Physical Therapist

## 2023-11-15 ENCOUNTER — TREATMENT (OUTPATIENT)
Dept: PHYSICAL THERAPY | Facility: CLINIC | Age: 58
End: 2023-11-15
Payer: MEDICARE

## 2023-11-15 DIAGNOSIS — R42 VERTIGO: ICD-10-CM

## 2023-11-15 DIAGNOSIS — G56.21 ULNAR NEUROPATHY AT ELBOW OF RIGHT UPPER EXTREMITY: Primary | ICD-10-CM

## 2023-11-15 DIAGNOSIS — M43.6 NECK STIFFNESS: ICD-10-CM

## 2023-11-15 DIAGNOSIS — M54.2 PAIN, NECK: ICD-10-CM

## 2023-11-15 DIAGNOSIS — R26.89 BALANCE PROBLEM: ICD-10-CM

## 2023-11-15 DIAGNOSIS — Z91.81 HX OF FALLING: ICD-10-CM

## 2023-11-15 NOTE — PROGRESS NOTES
Physical Therapy Treatment Note  [address] [place] [address]      VISIT#: 3    Subjective   Kuldeep Adhikari reports: no dizziness since last session and he even tried to reproduce the symptoms. Pt's pain levels are coming down.  Pt states he's still sore at the pec region distal to clavicle with 5/10.       Objective   R shld prot/IR > L with hypertonus noted through     See Exercise, Manual, and Modality Logs for complete treatment.     Patient Education: cues for therex; discussed shld/scap positioning with tight pecs and how poor positioning could lead to impingement at the shoulder    Assessment/Plan Pt tolerated manual fairly well and reported some reduced pain/tenderness afterwards. Progressed per flowsheet. Pt wanted to try lifting a 5# dumbbell and was able to do so into flex, but was shaking and with poor posture and shoulder/scap positioning. Discussed avoiding that at this time.     Pt did get dizzy with room motion with rolling to get up after supine manual and exs. Performed BBQ roll L at end of session, but pt picked up his head and rolled forward due to discomfort in R scap region. Pt appeared less dizzy with sitting up at end of session. Ended with MH to R cerv/pec/shld region and pt noted that it helped.     Continue to monitor symptoms and progress accordingly.       Progress per Plan of Care and Progress strengthening /stabilization /functional activity            Timed:         Manual Therapy:   17      mins  56323;     Therapeutic Exercise:   20      mins  26805;     Neuromuscular Terrie:        mins  65870;    Therapeutic Activity:          mins  81122;     Gait Training:           mins  66300;     Ultrasound:          mins  93650;    Ionto                                   mins   55691  Self Care                            mins   69755    Un-Timed:  Electrical Stimulation:         mins  37095 ( );  Traction          mins 58268  Canalith Repos             8       mins  78936  Dry Needle 1-2 ms      ___  mins 12566  Dry Needle  3+ ms              mins 12009  Low Eval          mins  89748  Mod Eval          Mins  87964  High Eval                            Mins  66608  Re-Eval                               mins  09390    Timed Treatment:   37   mins   Total Treatment:      45  mins          Larissa Price, PT    Physical Therapist

## 2023-11-20 ENCOUNTER — TREATMENT (OUTPATIENT)
Dept: PHYSICAL THERAPY | Facility: CLINIC | Age: 58
End: 2023-11-20
Payer: MEDICARE

## 2023-11-20 DIAGNOSIS — Z91.81 HX OF FALLING: ICD-10-CM

## 2023-11-20 DIAGNOSIS — M54.2 PAIN, NECK: ICD-10-CM

## 2023-11-20 DIAGNOSIS — R42 VERTIGO: ICD-10-CM

## 2023-11-20 DIAGNOSIS — G56.21 ULNAR NEUROPATHY AT ELBOW OF RIGHT UPPER EXTREMITY: Primary | ICD-10-CM

## 2023-11-20 DIAGNOSIS — M43.6 NECK STIFFNESS: ICD-10-CM

## 2023-11-20 DIAGNOSIS — R26.89 BALANCE PROBLEM: ICD-10-CM

## 2023-11-20 NOTE — PROGRESS NOTES
Physical Therapy Treatment Note  [address] [address]      VISIT#: 4    Subjective   Kuldeep Adhikari reports: pain is 4/10. Pt is sore at the neck and into the hand with n/t in the ulnar distribution of the forearm/hand. Pt notes the dizziness is not as bad as it was.        Objective     See Exercise, Manual, and Modality Logs for complete treatment.     Patient Education: cues for therex    Assessment/Plan Pt with good tolerance to manual therapy. Pt could not tolerate pec stretches at wall and has difficulty maintaining wrist ext with ulnar nerve flossing. Pt will perform supine pec stretches later today at home if tolerated. Improved technique with BBQ roll technique without any significant mal positioning of the head/neck. Progress balance activities as dizziness is reduced and pt tolerates.       Progress per Plan of Care and Progress strengthening /stabilization /functional activity            Timed:         Manual Therapy:    17     mins  85398;     Therapeutic Exercise:     23    mins  42977;     Neuromuscular Terrie:        mins  39360;    Therapeutic Activity:          mins  95020;     Gait Training:           mins  86734;     Ultrasound:          mins  07485;    Ionto                                   mins   17167  Self Care                            mins   16317    Un-Timed:  Electrical Stimulation:         mins  53487 ( );  Traction          mins 05493  Canalith Repos             6       mins  20329  Dry Needle 1-2 ms      ___  mins 16544  Dry Needle  3+ ms              mins 26479  Low Eval          mins  16124  Mod Eval          Mins  57267  High Eval                            Mins  87888  Re-Eval                               mins  63552    Timed Treatment:   40   mins   Total Treatment:     46    mins          Larissa Price, PT    Physical Therapist

## 2023-11-22 ENCOUNTER — TREATMENT (OUTPATIENT)
Dept: PHYSICAL THERAPY | Facility: CLINIC | Age: 58
End: 2023-11-22
Payer: MEDICARE

## 2023-11-22 DIAGNOSIS — Z91.81 HX OF FALLING: ICD-10-CM

## 2023-11-22 DIAGNOSIS — M54.2 PAIN, NECK: ICD-10-CM

## 2023-11-22 DIAGNOSIS — R26.89 BALANCE PROBLEM: ICD-10-CM

## 2023-11-22 DIAGNOSIS — R42 VERTIGO: ICD-10-CM

## 2023-11-22 DIAGNOSIS — M43.6 NECK STIFFNESS: ICD-10-CM

## 2023-11-22 DIAGNOSIS — G56.21 ULNAR NEUROPATHY AT ELBOW OF RIGHT UPPER EXTREMITY: Primary | ICD-10-CM

## 2023-11-22 NOTE — PROGRESS NOTES
Physical Therapy Treatment Note  [address] [facility] [place] [address]      VISIT#: 5    Subjective   Kuldeep Adhikari reports: dizziness is better since CRM. Pain is still ~4/10 at the neck, R shld & UE.       Objective     Palpation: tender at pec major/minor with feeling of 'rolling over' short head of biceps      See Exercise, Manual, and Modality Logs for complete treatment.     Patient Education: cues for therex; issued Tband for home and demo how to close in doorway    Assessment/Plan Deferred CRM due to no dizziness. Added standing Tband for balance, but pt having difficulty relaxing UT so discontinued for now and had pt sit in chair for lat PD & shld ext with improved relaxation of UTs. Progressed NMR per flow sheet. Pt may benefit from home TENS/IFC unit for pain control due to chronic pain issues.       Progress per Plan of Care and Progress strengthening /stabilization /functional activity            Timed:         Manual Therapy:  18     mins  74986;     Therapeutic Exercise:   9      mins  14892;     Neuromuscular Terrie:   15     mins  50253;    Therapeutic Activity:          mins  77712;     Gait Training:           mins  14806;     Ultrasound:          mins  33891;    Ionto                                   mins   49176  Self Care                            mins   18402    Un-Timed:  Electrical Stimulation:         mins  01655 ( );  Traction          mins 29725  Canalith Repos                   mins  23886  Dry Needle 1-2 ms      ___  mins 32104  Dry Needle  3+ ms              mins 33408  Low Eval          mins  89110  Mod Eval          Mins  16848  High Eval                            Mins  01107  Re-Eval                               mins  90432    Timed Treatment:   42   mins   Total Treatment:      42  mins          Larissa Price, PT    Physical Therapist

## 2023-11-27 ENCOUNTER — TREATMENT (OUTPATIENT)
Dept: PHYSICAL THERAPY | Facility: CLINIC | Age: 58
End: 2023-11-27
Payer: MEDICARE

## 2023-11-27 ENCOUNTER — OFFICE VISIT (OUTPATIENT)
Dept: ENDOCRINOLOGY | Facility: CLINIC | Age: 58
End: 2023-11-27
Payer: MEDICARE

## 2023-11-27 VITALS
OXYGEN SATURATION: 98 % | HEIGHT: 66 IN | SYSTOLIC BLOOD PRESSURE: 138 MMHG | BODY MASS INDEX: 33.43 KG/M2 | DIASTOLIC BLOOD PRESSURE: 64 MMHG | HEART RATE: 68 BPM | WEIGHT: 208 LBS

## 2023-11-27 DIAGNOSIS — G56.21 ULNAR NEUROPATHY AT ELBOW OF RIGHT UPPER EXTREMITY: Primary | ICD-10-CM

## 2023-11-27 DIAGNOSIS — Z91.81 HX OF FALLING: ICD-10-CM

## 2023-11-27 DIAGNOSIS — E66.9 CLASS 1 OBESITY WITH SERIOUS COMORBIDITY AND BODY MASS INDEX (BMI) OF 32.0 TO 32.9 IN ADULT, UNSPECIFIED OBESITY TYPE: ICD-10-CM

## 2023-11-27 DIAGNOSIS — R26.89 BALANCE PROBLEM: ICD-10-CM

## 2023-11-27 DIAGNOSIS — E11.42 DIABETIC PERIPHERAL NEUROPATHY: ICD-10-CM

## 2023-11-27 DIAGNOSIS — E11.65 TYPE 2 DIABETES MELLITUS WITH HYPERGLYCEMIA, WITH LONG-TERM CURRENT USE OF INSULIN: ICD-10-CM

## 2023-11-27 DIAGNOSIS — E03.9 HYPOTHYROIDISM, UNSPECIFIED TYPE: ICD-10-CM

## 2023-11-27 DIAGNOSIS — N18.31 STAGE 3A CHRONIC KIDNEY DISEASE: ICD-10-CM

## 2023-11-27 DIAGNOSIS — E11.319 DIABETIC RETINOPATHY ASSOCIATED WITH TYPE 2 DIABETES MELLITUS, MACULAR EDEMA PRESENCE UNSPECIFIED, UNSPECIFIED LATERALITY, UNSPECIFIED RETINOPATHY SEVERITY: ICD-10-CM

## 2023-11-27 DIAGNOSIS — M43.6 NECK STIFFNESS: ICD-10-CM

## 2023-11-27 DIAGNOSIS — Z79.4 TYPE 2 DIABETES MELLITUS WITH HYPERGLYCEMIA, WITH LONG-TERM CURRENT USE OF INSULIN: ICD-10-CM

## 2023-11-27 DIAGNOSIS — I10 PRIMARY HYPERTENSION: Primary | ICD-10-CM

## 2023-11-27 DIAGNOSIS — M54.2 PAIN, NECK: ICD-10-CM

## 2023-11-27 DIAGNOSIS — E78.2 MIXED HYPERLIPIDEMIA: ICD-10-CM

## 2023-11-27 DIAGNOSIS — E11.21 DIABETIC NEPHROPATHY ASSOCIATED WITH TYPE 2 DIABETES MELLITUS: ICD-10-CM

## 2023-11-27 DIAGNOSIS — R42 VERTIGO: ICD-10-CM

## 2023-11-27 PROCEDURE — 95251 CONT GLUC MNTR ANALYSIS I&R: CPT | Performed by: INTERNAL MEDICINE

## 2023-11-27 PROCEDURE — 3075F SYST BP GE 130 - 139MM HG: CPT | Performed by: INTERNAL MEDICINE

## 2023-11-27 PROCEDURE — 99214 OFFICE O/P EST MOD 30 MIN: CPT | Performed by: INTERNAL MEDICINE

## 2023-11-27 PROCEDURE — 97112 NEUROMUSCULAR REEDUCATION: CPT | Performed by: PHYSICAL THERAPIST

## 2023-11-27 PROCEDURE — 97110 THERAPEUTIC EXERCISES: CPT | Performed by: PHYSICAL THERAPIST

## 2023-11-27 PROCEDURE — 3078F DIAST BP <80 MM HG: CPT | Performed by: INTERNAL MEDICINE

## 2023-11-27 PROCEDURE — 1160F RVW MEDS BY RX/DR IN RCRD: CPT | Performed by: INTERNAL MEDICINE

## 2023-11-27 PROCEDURE — 1159F MED LIST DOCD IN RCRD: CPT | Performed by: INTERNAL MEDICINE

## 2023-11-27 PROCEDURE — 3046F HEMOGLOBIN A1C LEVEL >9.0%: CPT | Performed by: INTERNAL MEDICINE

## 2023-11-27 PROCEDURE — 97140 MANUAL THERAPY 1/> REGIONS: CPT | Performed by: PHYSICAL THERAPIST

## 2023-11-27 RX ORDER — INSULIN GLARGINE 100 [IU]/ML
45 INJECTION, SOLUTION SUBCUTANEOUS NIGHTLY
Qty: 15 ML | Refills: 5 | Status: SHIPPED | OUTPATIENT
Start: 2023-11-27 | End: 2024-08-18

## 2023-11-27 RX ORDER — PEN NEEDLE, DIABETIC 30 GX3/16"
1 NEEDLE, DISPOSABLE MISCELLANEOUS
Qty: 200 EACH | Refills: 5 | Status: SHIPPED | OUTPATIENT
Start: 2023-11-27

## 2023-11-27 RX ORDER — INSULIN ASPART 100 [IU]/ML
20 INJECTION, SOLUTION INTRAVENOUS; SUBCUTANEOUS
Qty: 30 ML | Refills: 5 | Status: SHIPPED | OUTPATIENT
Start: 2023-11-27

## 2023-11-27 NOTE — PATIENT INSTRUCTIONS
# Diabetes Management:    Please start insulin therapy as:    - Insulin Glargine (Slow acting insulin) 45 Units in the evening.  - Insulin lispro / aspart (Fast acting / meal time insulin): 15-20 Units with each meal.    Meal time insulin need to be taken 15 mins before meal. You can bring your insulin with you if you are planning to eat out.    If you plan to miss the meal please miss the meal time insulin as well.    Check your blood glucose through dexcom.    Low Blood Glucose:    - If you feel sweaty, anxious, shakiness, feel weak, trouble walking, feels like passing out or trouble seeing thing, please check your blood glucose and if its less than 70 or if your feel symptoms of low blood glucose then take one of the following or use Glucagon Injection:    *3 or 4 glucose tablets  *½ cup of juice or regular soda (not sugar-free)  *2 tablespoons of raisins  *4 or 5 saltine crackers  *1 tablespoon of sugar  *1 tablespoon of honey or corn syrup  *6 to 8 hard candies    Follow up in 6 weeks with repeat blood work.     Thank you for your visit today.     If you have any questions or concerns please feel free to reach out of the office.

## 2023-11-27 NOTE — PROGRESS NOTES
Physical Therapy Treatment Note  [address] [facility] [place] [address]      VISIT#: 6    Subjective   Kuldeep Adhikari reports: pain is ~3 today and mostly at the R shoulder. Pt states he's still unable to do the corner pec stretch due to increased pain. No dizziness recently.       Objective   PROM (degrees):   R shld flex 148, scapt 160, ER 50, IR 55 (IR/ER at 45 -60 degs abd)  AAROM R shld flex 120 degrees    See Exercise, Manual, and Modality Logs for complete treatment.     Patient Education: cues for therex    Assessment/Plan Pt tolerated manual fairly well, but does require cues to relax head as he has a tendency to hold it up while PT is working on the neck. Pt with restrictions and pain with R shld PROM as noted above. Added wt shifting at end of session. Pt did not require modalities at end of session.       Progress per Plan of Care and Progress strengthening /stabilization /functional activity            Timed:         Manual Therapy:   18      mins  45054;     Therapeutic Exercise:    18   mins  76184;     Neuromuscular Terrie:   23     mins  35855;    Therapeutic Activity:          mins  56739;     Gait Training:           mins  76397;     Ultrasound:          mins  63550;    Ionto                                   mins   88641  Self Care                            mins   10051    Un-Timed:  Electrical Stimulation:         mins  65375 ( );  Traction          mins 69193  Canalith Repos                   mins  19528  Dry Needle 1-2 ms      ___  mins 06315  Dry Needle  3+ ms              mins 64046  Low Eval          mins  69206  Mod Eval          Mins  18782  High Eval                            Mins  67026  Re-Eval                               mins  77657    Timed Treatment:  59   mins   Total Treatment:     59  mins          Larissa Price, PT    Physical Therapist

## 2023-11-27 NOTE — PROGRESS NOTES
-----------------------------------------------------------------  ENDOCRINE CLINIC NOTE  -----------------------------------------------------------------        PATIENT NAME: Kuldeep Adhikari  PATIENT : 1965 AGE: 58 y.o.  MRN NUMBER: [id number]  PRIMARY CARE: Laura Whitaker MD    ==========================================================================    CHIEF COMPLAINT: Type 2 Diabetes Mellitus  DATE OF SERVICE: 23    ==========================================================================    HPI / SUBJECTIVE    58 y.o. male is seen in the clinic today for valuation management of type 2 diabetes.  Last visit on 2023.  Current therapy includes:  Insulin Lantus 34 units nightly  Insulin Lispro 15-20 units with meals  Patient had previously tried to be on Ozempic therapy but was causing significant abdominal pain.  Previously tried Janumet.  Underlying hx of diabetic neuropathy, s/p right BKA, prosthesis in place.    Diabetic neuropathy left lower extremity, complicated with osteomyelitis in .  Underlying history of diabetic retinopathy as well status post laser treatments.  Following at [facility].  Patient typically take 2 meals a day.  Underlying history of CVA.  No history of CAD.  Checking BG through Dexcom G6.  Also have hx significant for Hypothyroidism and is currently maintained on Levothyroxine therapy.    ==========================================================================                                                PAST MEDICAL HISTORY    Past Medical History:   Diagnosis Date    Allergic     CKD (chronic kidney disease), stage III     Depression     Depression with suicidal ideation 2021    DJD (degenerative joint disease)     DVT (deep venous thrombosis)     Ganglion     rt wrist    Gangrene of foot 10/09/2015    GERD (gastroesophageal reflux disease)     Headache     Heart murmur     Hiatal hernia     History of esophageal stricture     s/p  dialation 40-50 times last EGD 04/2016    Hyperlipidemia     Hypertension     Hypothyroidism     IBS (irritable bowel syndrome)     Nausea, vomiting and diarrhea 09/22/2023    Neuropathy     Osteomyelitis of right foot 03/19/2020    Pulmonary embolism 01/19/2017    Rash     rt lower hip    Retinopathy     S/P BKA (below knee amputation), right 03/18/2022    Seasonal allergies     Sepsis due to group B Streptococcus 03/19/2020    Shortness of breath     Sleep apnea     Stroke     Type 2 diabetes mellitus with peripheral vascular disease     Vitamin D deficiency        ==========================================================================    PAST SURGICAL HISTORY    Past Surgical History:   Procedure Laterality Date    AMPUTATION DIGIT Left 4/26/2022    Procedure: AMPUTATION left great toe;  Surgeon: ERWIN Baker DPM;  Location: [facility] MAIN OR;  Service: Podiatry;  Laterality: Left;    AMPUTATION DIGIT  4/26/2022    Procedure: ;  Surgeon: ERWIN Baker DPM;  Location: [facility] MAIN OR;  Service: Podiatry;;    AMPUTATION FOOT / TOE Right     great toe    AMPUTATION REVISION Right 4/8/2021    Procedure: BELOW KNEE AMPUTATION REVISAION;  Surgeon: Eduardo Reynolds MD;  Location: [facility] MAIN OR;  Service: Orthopedics;  Laterality: Right;    AMPUTATION REVISION Right 4/29/2021    Procedure: AMPUTATION REVISION KNEE STUMP;  Surgeon: Eduardo Reynolds MD;  Location: [facility] MAIN OR;  Service: Orthopedics;  Laterality: Right;    BELOW KNEE AMPUTATION Right 7/30/2020    Procedure: AMPUTATION BELOW KNEE;  Surgeon: Eduardo Reynolds MD;  Location: [facility] MAIN OR;  Service: Orthopedics;  Laterality: Right;    CARDIAC CATHETERIZATION  04/2018    bhf    CHOLECYSTECTOMY N/A 9/26/2023    Procedure: CHOLECYSTECTOMY LAPAROSCOPIC;  Surgeon: Eduardo Srinivasan MD;  Location: [facility] MAIN OR;  Service: General;  Laterality: N/A;    COLONOSCOPY      ENDOSCOPY      ENDOSCOPY N/A 10/4/2019    Procedure: ESOPHAGOGASTRODUODENOSCOPY  with dilitation and biopsy x 1 area;  Surgeon: Declan Iqbal MD;  Location: [facility] ENDOSCOPY;  Service: Gastroenterology    ENDOSCOPY N/A 12/13/2019    Procedure: ESOPHAGOGASTRODUODENOSCOPY WITH DILATATION (50, 52 BOUGIE);  Surgeon: Declan Iqbal MD;  Location: [facility] ENDOSCOPY;  Service: Gastroenterology    ENDOSCOPY N/A 8/6/2021    Procedure: ESOPHAGOGASTRODUODENOSCOPY with biopsy x1 area and esophageal dilation (56FR Bougie);  Surgeon: Hussein Talavera MD;  Location: [facility] ENDOSCOPY;  Service: Gastroenterology;  Laterality: N/A;  post: hiatal hernia, erosive gastritis    ENDOSCOPY N/A 12/13/2022    Procedure: ESOPHAGOGASTRODUODENOSCOPY WITH DILATATION (BOUGIE #54, #56) AND BIOPSY X1;  Surgeon: David Rodriguez MD;  Location: [facility] ENDOSCOPY;  Service: Gastroenterology;  Laterality: N/A;  POST: dysphagia     ENDOSCOPY N/A 9/28/2023    Procedure: ESOPHAGOGASTRODUODENOSCOPY with dilation (58 non guided bougie);  Surgeon: Hussein Talavera MD;  Location: [facility] ENDOSCOPY;  Service: Gastroenterology;  Laterality: N/A;  post op: dysphagia    EYE SURGERY      GANGLION CYST EXCISION Left     HERNIA REPAIR Bilateral     INCISION AND DRAINAGE OF WOUND Right 6/15/2022    Procedure: INCISION AND DRAINAGE WOUND with debridement;  Surgeon: Fito Forte MD;  Location: [facility] MAIN OR;  Service: Orthopedics;  Laterality: Right;    JOINT REPLACEMENT      Left total hip    RETINOPATHY SURGERY      laser    TOTAL HIP ARTHROPLASTY Left     TOTAL HIP ARTHROPLASTY Left 2018    TRANS METATARSAL AMPUTATION Right 3/17/2020    Procedure: AMPUTATION TRANS METATARSAL right;  Surgeon: ERWIN Baker DPM;  Location: [facility] MAIN OR;  Service: Podiatry;  Laterality: Right;  GANGRENOUS RIGHT FOOT    VASECTOMY         ==========================================================================    FAMILY HISTORY    Family History   Problem Relation Age of Onset    Diabetes Mother         Patient  mom    Heart disease Mother      Arthritis Mother     Hyperlipidemia Mother     Leukemia Father     Sleep apnea Maternal Aunt         GENO    Stroke Maternal Grandmother     Hypertension Other     Hyperlipidemia Other     Cancer Other     Colon cancer Other         uncle       ==========================================================================    SOCIAL HISTORY    Social History     Socioeconomic History    Marital status:     Number of children: 3    Highest education level: High school graduate   Tobacco Use    Smoking status: Never    Smokeless tobacco: Never   Vaping Use    Vaping Use: Never used   Substance and Sexual Activity    Alcohol use: Yes     Comment: OCCASIONAL    Drug use: No    Sexual activity: Not Currently     Partners: Female     Birth control/protection: None     Comment: I have Ed problem at this time       ==========================================================================    MEDICATIONS      Current Outpatient Medications:     Accu-Chek Softclix Lancets lancets, Use as directed to test blood sugar 4 times daily before meals and at bedtime., Disp: 100 each, Rfl: 5    amLODIPine (NORVASC) 10 MG tablet, Take 1 tablet by mouth Daily., Disp: 90 tablet, Rfl: 1    apixaban (Eliquis) 5 MG tablet tablet, Take 1 tablet by mouth 2 (Two) Times a Day., Disp: 180 tablet, Rfl: 1    aspirin 81 MG EC tablet, Take 1 tablet by mouth Daily., Disp: , Rfl:     atorvastatin (LIPITOR) 80 MG tablet, Take 1 tablet by mouth Every Night., Disp: 90 tablet, Rfl: 1    Blood Glucose Monitoring Suppl (Accu-Chek Guide) w/Device kit, See Admin Instructions., Disp: , Rfl:     buPROPion XL (Wellbutrin XL) 150 MG 24 hr tablet, Take 1 tablet by mouth Every Morning., Disp: 90 tablet, Rfl: 0    busPIRone (BUSPAR) 10 MG tablet, Take 1 tablet by mouth Every 12 (Twelve) Hours., Disp: 180 tablet, Rfl: 1    CINNAMON PO, Take  by mouth., Disp: , Rfl:     Continuous Blood Gluc  (Dexcom G6 ) device, Use 1 Device Daily. Use to check  BS, Disp: 1 each, Rfl: 0    Continuous Blood Gluc Sensor (Dexcom G6 Sensor), Use Every 10 (Ten) Days. Use to check BS daily, Disp: 3 each, Rfl: 11    Continuous Blood Gluc Transmit (Dexcom G6 Transmitter) misc, Use 1 each 4 (Four) Times a Day Before Meals & at Bedtime. Change every 3 months, Disp: 1 each, Rfl: 3    cyclobenzaprine (FLEXERIL) 10 MG tablet, Take I tab q 8 hrs as needed for tension headaches, Disp: 30 tablet, Rfl: 0    DULoxetine (CYMBALTA) 60 MG capsule, Take 1 capsule by mouth Every Morning., Disp: 90 capsule, Rfl: 1    Glucagon (Gvoke HypoPen 2-Pack) 1 MG/0.2ML solution auto-injector, Inject 1 mg under the skin into the appropriate area as directed As Needed (Hypoglycemia)., Disp: 0.4 mL, Rfl: 5    glucose blood (Accu-Chek Guide) test strip, Use as instructed to test blood sugar 4 times daily before meals and at bedtime, Disp: 100 each, Rfl: 12    HumaLOG KwikPen 100 UNIT/ML solution pen-injector, INJECT 10 UNITS            SUBCUTANEOUSLY INTO THE    APPROPRIATE AREA 3 TIMES A DAY WITH MEALS AS DIRECTED, Disp: 30 mL, Rfl: 3    insulin aspart (NovoLOG FlexPen) 100 UNIT/ML solution pen-injector sc pen, Inject 20 Units under the skin into the appropriate area as directed 3 (Three) Times a Day With Meals. Dx code: E11.65, Disp: 30 mL, Rfl: 5    Insulin Glargine (Lantus SoloStar) 100 UNIT/ML injection pen, Inject 45 Units under the skin into the appropriate area as directed Every Night for 265 days., Disp: 15 mL, Rfl: 5    levothyroxine (Synthroid) 100 MCG tablet, Take 1 tablet by mouth Every Morning., Disp: 90 tablet, Rfl: 1    meclizine (ANTIVERT) 25 MG tablet, Take 1 tablet by mouth 3 (Three) Times a Day As Needed for Dizziness., Disp: 30 tablet, Rfl: 0    metoprolol succinate XL (TOPROL-XL) 100 MG 24 hr tablet, Take 1 tablet by mouth Daily., Disp: 90 tablet, Rfl: 0    multivitamin with minerals tablet tablet, Take 1 tablet by mouth Daily., Disp: , Rfl:     omeprazole (priLOSEC) 40 MG capsule, Take 1  capsule by mouth Daily., Disp: 90 capsule, Rfl: 1    ondansetron ODT (ZOFRAN-ODT) 4 MG disintegrating tablet, Place 1 tablet on the tongue Every 8 (Eight) Hours As Needed for Nausea or Vomiting., Disp: 30 tablet, Rfl: 0    pregabalin (LYRICA) 100 MG capsule, Take 1 capsule by mouth 2 (Two) Times a Day., Disp: 60 capsule, Rfl: 0    Insulin Pen Needle (Pen Needles) 32G X 4 MM misc, Use 1 each 4 (Four) Times a Day Before Meals & at Bedtime., Disp: 200 each, Rfl: 5    ==========================================================================    ALLERGIES    Allergies   Allergen Reactions    Lisinopril Cough    Cefixime Other (See Comments)       ==========================================================================    OBJECTIVE    Vitals:    11/27/23 1424   BP: 138/64   Pulse: 68   SpO2: 98%     Body mass index is 33.57 kg/m².     General: Alert, cooperative, no acute distress  Thyroid:  no enlargement/tenderness/palpable nodules  Lungs: Clear to auscultation bilaterally, respirations unlabored  Heart: Regular rate and rhythm, S1 and S2 normal, no murmur, rub or gallop  Abdomen: Soft, NT, ND and Bowel sounds Positive  Extremities:  Extremities normal, atraumatic, no cyanosis or edema    ==========================================================================    LAB EVALUATION    Lab Results   Component Value Date    GLUCOSE 301 (H) 10/11/2023    BUN 14 10/11/2023    CREATININE 1.13 10/11/2023    EGFRIFNONA 48 (L) 01/04/2022    BCR 12.4 10/11/2023    K 4.5 10/11/2023    CO2 26.5 10/11/2023    CALCIUM 9.6 10/11/2023    ALBUMIN 2.7 (L) 09/28/2023    LABIL2 1.3 04/22/2019    AST 73 (H) 09/28/2023    ALT 42 (H) 09/28/2023    CHOL 158 03/18/2022    TRIG 205 (H) 03/18/2022    HDL 43 03/18/2022    LDL 81 03/18/2022     Lab Results   Component Value Date    HGBA1C 11.60 (H) 08/04/2023    HGBA1C 9.00 (H) 03/08/2023    HGBA1C 7.5 (H) 06/13/2022     Lab Results   Component Value Date    MICROALBUR <1.2 04/06/2021     CREATININE 1.13 10/11/2023     Lab Results   Component Value Date    TSH 4.730 (H) 03/07/2023    FREET4 1.09 03/08/2023     ==========================================================================    CGM Data:    Dates: November 14 till November 27, 2023    Very High > 250: 50%  High 180 - 250: 32%  Target: 70 - 180: 17%  Low 55 - 70:  <1%  Very Low < 55: 0%    ==========================================================================    ASSESSMENT AND PLAN    Assessment:  #Type 2 diabetes complicated with retinopathy, neuropathy and nephropathy  #Hypertension  #Hyperlipidemia  #Hypothyroidism  #Obesity with BMI of 33.57  #CKD IIIa     Plan:  -Reviewed CGM data and patient have a GMI of 9.4%  - On review of CGM data as patient seems to have significantly elevated blood sugar levels with no significant spikes with meal and patient is currently using insulin lispro 15 to 20 units depending upon the meal size  - Patient will benefit from uptitrating the dose of basal insulin  - On the CGM there is evidence of some control blood sugar and drip down and blood sugars around 6 to 9 AM, this is because of the correction patient is doing to bring the have blood sugar in more acceptable ranges  - Patient was started on GLP-1 receptor agonist therapy during the last visit but was admitted to hospital because of gallstones, patient is currently getting bowel rest as well will avoid restarting GLP for now but will benefit from GLP-1 receptor agonist therapy down in the future  - New adjusted therapy:  Insulin glargine 45 units nightly  Insulin lispro 15 to 20 units with each meal depending upon the portion size  - Patient to follow-up in my clinic back again in 6 weeks time to review blood glucose and aggressively change insulin therapy  - Repeat thyroid function before next visit as well    Thank you for courtesy of consultation.    Return to clinic: 6 weeks    Entire assessment and plan was discussed and counseled the  "patient in detail to which patient verbalized understanding and agreed with care.  Answered all queries and concerns.    This note was created using voice recognition software and is inherently subject to errors including those of syntax and \"sound-alike\" substitutions which may escape proofreading.  In such instances, original meaning may be extrapolated by contextual derivation.    Note: Portions of this note may have been copied from previous notes but documentation have been reviewed and edited as necessary to support clinical decision making for today's visit.    ==========================================================================    INFORMATION PROVIDED TO PATIENT    Patient Instructions   # Diabetes Management:    Please start insulin therapy as:    - Insulin Glargine (Slow acting insulin) 45 Units in the evening.  - Insulin lispro / aspart (Fast acting / meal time insulin): 15-20 Units with each meal.    Meal time insulin need to be taken 15 mins before meal. You can bring your insulin with you if you are planning to eat out.    If you plan to miss the meal please miss the meal time insulin as well.    Check your blood glucose through dexcom.    Low Blood Glucose:    - If you feel sweaty, anxious, shakiness, feel weak, trouble walking, feels like passing out or trouble seeing thing, please check your blood glucose and if its less than 70 or if your feel symptoms of low blood glucose then take one of the following or use Glucagon Injection:    *3 or 4 glucose tablets  *½ cup of juice or regular soda (not sugar-free)  *2 tablespoons of raisins  *4 or 5 saltine crackers  *1 tablespoon of sugar  *1 tablespoon of honey or corn syrup  *6 to 8 hard candies    Follow up in 6 weeks with repeat blood work.     Thank you for your visit today.     If you have any questions or concerns please feel free to reach out of the office.    "       ==========================================================================  Dionicio Ramachandran MD  Department of Endocrine, Diabetes and Metabolism  [facility]  [place], [place]  ==========================================================================

## 2023-12-05 ENCOUNTER — TREATMENT (OUTPATIENT)
Dept: PHYSICAL THERAPY | Facility: CLINIC | Age: 58
End: 2023-12-05
Payer: MEDICARE

## 2023-12-05 DIAGNOSIS — M43.6 NECK STIFFNESS: ICD-10-CM

## 2023-12-05 DIAGNOSIS — M54.2 PAIN, NECK: ICD-10-CM

## 2023-12-05 DIAGNOSIS — G56.21 ULNAR NEUROPATHY AT ELBOW OF RIGHT UPPER EXTREMITY: Primary | ICD-10-CM

## 2023-12-05 DIAGNOSIS — Z91.81 HX OF FALLING: ICD-10-CM

## 2023-12-05 DIAGNOSIS — R42 VERTIGO: ICD-10-CM

## 2023-12-05 DIAGNOSIS — R26.89 BALANCE PROBLEM: ICD-10-CM

## 2023-12-05 NOTE — PROGRESS NOTES
Physical Therapy Treatment Note  [address] [address]      VISIT#: 7    Subjective   Kuldeep Adhikari reports: no dizziness and the neck is feeling better overall. No real change in the shld/arm per pt. Pt did finally get his CPAP and has used it for a couple of nights with some adjustment time getting used to wearing it and sleeping.       Objective   AAROM: R shld ER 47 degrees, flex 122 degrees  See Exercise, Manual, and Modality Logs for complete treatment.     Patient Education: cues for therex/NMR form, relaxing UTs    Assessment/Plan  Pt tolerated manual fairly well with reduced tension with radial n flossing noted. Pt still restricted with median and ulnar n flossing. Added head away for tensioner on radial n. Still unable to perform median n flossing with shoulder near 90 degrees abduction, but able to raise the arm more normally to that position now. Pt continues to require cues to relax UTs during session and able to correct with cues. Pt did not require modalities at end of session.     Progress per Plan of Care and Progress strengthening /stabilization /functional activity            Timed:         Manual Therapy:  23       mins  90608;     Therapeutic Exercise:   24      mins  12608;     Neuromuscular Terrie:        mins  69883;    Therapeutic Activity:     8     mins  45243;     Gait Training:           mins  52500;     Ultrasound:          mins  73444;    Ionto                                   mins   18604  Self Care                            mins   57218    Un-Timed:  Electrical Stimulation:         mins  14165 ( );  Traction          mins 30945  Canalith Repos                   mins  94703  Dry Needle 1-2 ms      ___  mins 78288  Dry Needle  3+ ms              mins 09837  Low Eval          mins  41104  Mod Eval          Mins  63275  High Eval                            Mins  94305  Re-Eval                               mins  28267    Timed Treatment:   55    mins   Total Treatment:     55   mins          Larissa Price, PT    Physical Therapist

## 2023-12-07 ENCOUNTER — TREATMENT (OUTPATIENT)
Dept: PHYSICAL THERAPY | Facility: CLINIC | Age: 58
End: 2023-12-07
Payer: MEDICARE

## 2023-12-07 DIAGNOSIS — M54.2 PAIN, NECK: ICD-10-CM

## 2023-12-07 DIAGNOSIS — Z91.81 HX OF FALLING: ICD-10-CM

## 2023-12-07 DIAGNOSIS — R42 VERTIGO: ICD-10-CM

## 2023-12-07 DIAGNOSIS — G56.21 ULNAR NEUROPATHY AT ELBOW OF RIGHT UPPER EXTREMITY: Primary | ICD-10-CM

## 2023-12-07 DIAGNOSIS — M43.6 NECK STIFFNESS: ICD-10-CM

## 2023-12-07 DIAGNOSIS — R26.89 BALANCE PROBLEM: ICD-10-CM

## 2023-12-07 NOTE — PROGRESS NOTES
Physical Therapy Treatment Note/Progress Note  [address] [place] [address]      Patient: Kuldeep Adhikari   : 1965  Diagnosis/ICD-10 Code:  Ulnar neuropathy at elbow of right upper extremity [G56.21]  Referring practitioner: Bridger Cantu MD  Date of Initial Visit: 2023  Today's Date: 2023  Patient seen for 8 sessions           Visit Diagnoses:     ICD-10-CM ICD-9-CM   1. Ulnar neuropathy at elbow of right upper extremity  G56.21 354.2   2. Pain, neck  M54.2 723.1   3. Neck stiffness  M43.6 723.5   4. Vertigo  R42 780.4   5. Balance problem  R26.89 781.99   6. Hx of falling  Z91.81 V15.88       Subjective questionnaires: DHI 16 = 16% limited; Quick DASH 37 = 59.09% limited    Subjective Kuldeep Adhikari reports: no issues with dizziness since the last treatment, though DHI shows dizzy with rolling in bed. Pt continues to c/o difficulty lifting/carrying with the R UE, grasping activities like trying to open jars, reaching overhead, stretching/reaching behind back, pushing/pulling, playing games on the phone, washing back with R UE, sleeping, laying on the R side, quick neck movements, standing/walking with balance especially if he gets up too quickly, rolling in bed to the L and getting out of bed.     Pt states he knows what bone-on-bone feels like and thinks his shoulder is bone-on-bone. Pt feels that things will not improve unless they do a surgery. Pt states the pain is mostly in the shoulder now. Pt states the neck and elbow are doing better.     Pain: 5/10 current, 3 at best, 8 at worst  Dizziness: 0 current/best    Objective     Active Range of Motion   Cervical/Thoracic Spine   Cervical     Flexion: 54 degrees   Extension: 35 degrees   Left lateral flexion: 30 degrees   Right lateral flexion: 30 degrees   Left rotation: 60 degrees   Right rotation: 55 degrees      Right Shoulder   Flexion: 95 degrees with pain and some compensation  Extension: 45 degrees with  pain  Abduction: 70 degrees with pain  External rotation 0°: 40 degrees with pain     Right Wrist   Wrist flexion: 65 degrees   Wrist extension: 40 degrees limited by cyst on wrist  Radial deviation: 20 degrees   Ulnar deviation: 40 degrees      Additional Active Range of Motion Details  R shld ER BTH to base of skull with pain  R shld IR BTB to dist/lat buttock with pain  R shld horiz add to ant/lat shld    Passive Range of Motion  Right Shoulder   Flexion: 145 degrees with pain (A/A 140 degrees)  Scaption: 160 degrees with pain  External rotation 60°: 60 degrees with pain (A/A 55 degrees)     R elbow 0-4-140 degrees  L elbow WFL  L wrist WFL     Strength/Myotome Testing      Right Shoulder      Planes of Motion   Flexion: 4  Abduction: 2+   External rotation at 0°: 2+ (tolerates mod resistance in available range at 0 abd)   Internal rotation at 0°: 2+ (tolerates mod resistance in available range at 0 abd)      Right Elbow   Flexion: 4+  Extension: 4+    Special tests:  Tinel's ulnar n at elbow: tender, but no reports of increased numbness     Roll test (+) B for slight nystagmus (no room spins or dizziness)  S/L mani gilman appeared with slight horizontal nystagmus in both positions (no room spins or dizziness)    See Exercise, Manual, and Modality Logs for complete treatment.     Patient Education: cues for therex    Assessment/Plan Pt is demonstrating some improved mobility, reduced neural tension and increased strength with some reduced pain levels overall. Pt feels his neck, dizziness and elbow are some better, but thinks his R shoulder is bone-on-bone and feels like the only thing that will fix that is having jt replacement surgery. Pt feels that will help the other areas as well.     Though dizziness is better, with assessment of DHI, pt is still having some dizziness especially with rolling in bed to the L. Other functional deficits are: difficulty lifting/carrying with the R UE, grasping activities like  trying to open jars, reaching overhead, stretching/reaching behind back, pushing/pulling, playing games on the phone, washing back with R UE, sleeping, laying on the R side, quick neck movements, standing/walking with balance especially if he gets up too quickly, and getting out of bed.     Pt has met 2 STGs and partially met 2 STGs. Pt is progressing with 2 LTGs, but functional goals relatively unchanged per pt. Pt would like to try I HEP and to follow up with MD to see about possible surgery. Pt will attempt self-BBQ roll at home 1x/day for 1-2 weeks and will see if this fully relieves his symptoms. If no better, pt will follow with PCP for this. Pt has 1 authorized visit left on his insurance in case he needs to return for BPPV or if he needs adjustment to HEP. Goals and POC continue to be appropriate for this pt so further visits are justified to return to PLOF, goal attainment and for progression of HEP.     Goals  Plan Goals: STGs in 4 weeks:  Decrease pain to 6/10 on average Met 12/7  Increase cerv/RUE AROM 5-10 degrees where limited as much Partially Met 12/7  Increase R shld flex/abd strength to 3/5 Partially Met 12/7  Assess/treat for vertigo/BPPV Met 12/7     LTGs by discharge  Increase cerv/UE ROM to WFL/WNL with min/no pain for ADLs Progressing 12/7  Increase UE strength to 4+/5 or better Progressing 12/7  Pt will be able to sit/ride/drive 30-60 mins without difficulty or pain   Pt will be able to wash/dress/groom without difficulty or pain using B UEs   Pt will be able to grasp/hold/reach/lift items into/out of an overhead cabinet without difficulty or pain  Pt will be able to grasp/hold/lift/carry laundry baskets, garbage/grocery bags, and/or pots/pans without difficulty, dropping, or pain   Pt will be able to sleep without waking from pain most nights         Progress per Plan of Care and Progress strengthening /stabilization /functional activity upon return to PT.             Timed:         Manual  Therapy:    13     mins  54492;     Therapeutic Exercise:    20     mins  38016;     Neuromuscular Terrie:        mins  89888;    Therapeutic Activity:     15     mins  25141;     Gait Training:           mins  90463;     Ultrasound:          mins  40721;    Ionto                                   mins   84652  Self Care                            mins   67196    Un-Timed:  Electrical Stimulation:         mins  64510 ( );  Traction          mins 23642  Canalith Repos              15      mins  09739  Dry Needle 1-2 ms      ___  mins 25899  Dry Needle  3+ ms              mins 60094  Low Eval          mins  92280  Mod Eval          Mins  11008  High Eval                            Mins  41092  Re-Eval                               mins  28444    Timed Treatment:   48   mins   Total Treatment:     63   mins          Larissa Price, PT    Physical Therapist

## 2023-12-26 DIAGNOSIS — E11.65 TYPE 2 DIABETES MELLITUS WITH HYPERGLYCEMIA, WITH LONG-TERM CURRENT USE OF INSULIN: ICD-10-CM

## 2023-12-26 DIAGNOSIS — E11.65 UNCONTROLLED TYPE 2 DIABETES MELLITUS WITH HYPERGLYCEMIA: ICD-10-CM

## 2023-12-26 DIAGNOSIS — Z79.4 TYPE 2 DIABETES MELLITUS WITH HYPERGLYCEMIA, WITH LONG-TERM CURRENT USE OF INSULIN: ICD-10-CM

## 2023-12-26 RX ORDER — PROCHLORPERAZINE 25 MG/1
SUPPOSITORY RECTAL
Qty: 1 EACH | Refills: 10 | Status: SHIPPED | OUTPATIENT
Start: 2023-12-26

## 2023-12-27 DIAGNOSIS — Z79.4 TYPE 2 DIABETES MELLITUS WITH HYPERGLYCEMIA, WITH LONG-TERM CURRENT USE OF INSULIN: ICD-10-CM

## 2023-12-27 DIAGNOSIS — E11.65 TYPE 2 DIABETES MELLITUS WITH HYPERGLYCEMIA, WITH LONG-TERM CURRENT USE OF INSULIN: ICD-10-CM

## 2023-12-27 RX ORDER — INSULIN GLARGINE 100 [IU]/ML
45 INJECTION, SOLUTION SUBCUTANEOUS NIGHTLY
Qty: 45 ML | Refills: 2 | Status: SHIPPED | OUTPATIENT
Start: 2023-12-27 | End: 2024-09-17

## 2023-12-27 RX ORDER — PEN NEEDLE, DIABETIC 30 GX3/16"
1 NEEDLE, DISPOSABLE MISCELLANEOUS
Qty: 400 EACH | Refills: 3 | Status: SHIPPED | OUTPATIENT
Start: 2023-12-27

## 2023-12-27 NOTE — TELEPHONE ENCOUNTER
[facility] called pt needing refill for Insulin Glargine and Pen Needles. Changed rx to reflect 90 day. Sent to provider for review and signature.

## 2023-12-29 RX ORDER — ONDANSETRON 4 MG/1
TABLET, ORALLY DISINTEGRATING ORAL
Qty: 30 TABLET | Refills: 0 | Status: SHIPPED | OUTPATIENT
Start: 2023-12-29

## 2024-01-05 ENCOUNTER — TELEPHONE (OUTPATIENT)
Dept: ENDOCRINOLOGY | Facility: CLINIC | Age: 59
End: 2024-01-05
Payer: MEDICARE

## 2024-01-05 NOTE — TELEPHONE ENCOUNTER
Caller: Kuldeep Adhikari EMIR    Relationship: Self    Best call back number: [phone]     Requested Prescriptions:     Continuous Blood Gluc  (Dexcom G6 ) device  1 Device, Daily      Continuous Blood Gluc Transmit (Dexcom G6 Transmitter) [facility]           Pharmacy where request should be sent:  [facility] [address] [place] [address] - [phone]  - [phone]  [phone]     Last office visit with prescribing clinician: 11/27/2023   Last telemedicine visit with prescribing clinician: Visit date not found   Next office visit with prescribing clinician: 1/15/2024     Additional details provided by patient: THE PT NEEDS A NEW PRESCRIPTION FOR THE TWO LISTED. HE WAS INFORMED BY HIS INSURANCE THAT THEY WILL NOT PAY FOR THE HUMALOG BUT WILL COVER THE NOVALOG    Does the patient have less than a 3 day supply:  [x] Yes  [] No    Would you like a call back once the refill request has been completed: [] Yes [] No    If the office needs to give you a call back, can they leave a voicemail: [] Yes [] No    Yobani Dahl Rep   01/05/24 10:23 EST

## 2024-01-09 DIAGNOSIS — Z79.4 TYPE 2 DIABETES MELLITUS WITH HYPERGLYCEMIA, WITH LONG-TERM CURRENT USE OF INSULIN: ICD-10-CM

## 2024-01-09 DIAGNOSIS — E11.65 UNCONTROLLED TYPE 2 DIABETES MELLITUS WITH HYPERGLYCEMIA: ICD-10-CM

## 2024-01-09 DIAGNOSIS — E11.65 TYPE 2 DIABETES MELLITUS WITH HYPERGLYCEMIA, WITH LONG-TERM CURRENT USE OF INSULIN: ICD-10-CM

## 2024-01-09 RX ORDER — PROCHLORPERAZINE 25 MG/1
1 SUPPOSITORY RECTAL
Qty: 1 EACH | Refills: 10 | Status: SHIPPED | OUTPATIENT
Start: 2024-01-09

## 2024-01-09 RX ORDER — PROCHLORPERAZINE 25 MG/1
1 SUPPOSITORY RECTAL DAILY
Qty: 1 EACH | Refills: 0 | Status: SHIPPED | OUTPATIENT
Start: 2024-01-09

## 2024-01-09 NOTE — TELEPHONE ENCOUNTER
RX for Dexcom transmitter and  sent to provider and will be sent into [facility] pharmacy once signed. Pt stated insurance will cover Novolog and not Humalog. Novolog already in medication list.

## 2024-01-10 ENCOUNTER — TELEPHONE (OUTPATIENT)
Dept: ENDOCRINOLOGY | Facility: CLINIC | Age: 59
End: 2024-01-10
Payer: MEDICARE

## 2024-01-10 NOTE — TELEPHONE ENCOUNTER
PA started for Accu Check Guide Strips  (Key: [id number]) PA Case: [id number], Status: Approved, Coverage Starts on: 1/1/2024 12:00:00 AM, Coverage Ends on: 1/10/2025 12:00:00 AM.        PA started for Gvoke HypoPen (Key: [id number]) Approved today  PA Case: [id number], Status: Approved, Coverage Starts on: 1/1/2024 12:00:00 AM, Coverage Ends on: 1/9/2025 12:00:00 AM.

## 2024-01-12 ENCOUNTER — LAB (OUTPATIENT)
Dept: LAB | Facility: HOSPITAL | Age: 59
End: 2024-01-12
Payer: MEDICARE

## 2024-01-12 DIAGNOSIS — Z79.4 TYPE 2 DIABETES MELLITUS WITH HYPERGLYCEMIA, WITH LONG-TERM CURRENT USE OF INSULIN: ICD-10-CM

## 2024-01-12 DIAGNOSIS — E11.65 TYPE 2 DIABETES MELLITUS WITH HYPERGLYCEMIA, WITH LONG-TERM CURRENT USE OF INSULIN: ICD-10-CM

## 2024-01-12 DIAGNOSIS — E03.9 ACQUIRED HYPOTHYROIDISM: ICD-10-CM

## 2024-01-12 DIAGNOSIS — E03.9 HYPOTHYROIDISM, UNSPECIFIED TYPE: ICD-10-CM

## 2024-01-12 DIAGNOSIS — E11.65 UNCONTROLLED TYPE 2 DIABETES MELLITUS WITH HYPERGLYCEMIA: ICD-10-CM

## 2024-01-12 DIAGNOSIS — E78.2 MIXED HYPERLIPIDEMIA: ICD-10-CM

## 2024-01-12 LAB
ALBUMIN SERPL-MCNC: 4.6 G/DL (ref 3.5–5.2)
ALBUMIN UR-MCNC: 3.2 MG/DL
ALBUMIN/GLOB SERPL: 1.9 G/DL
ALP SERPL-CCNC: 143 U/L (ref 39–117)
ALT SERPL W P-5'-P-CCNC: 38 U/L (ref 1–41)
ANION GAP SERPL CALCULATED.3IONS-SCNC: 12 MMOL/L (ref 5–15)
AST SERPL-CCNC: 25 U/L (ref 1–40)
BILIRUB SERPL-MCNC: 1.2 MG/DL (ref 0–1.2)
BUN SERPL-MCNC: 19 MG/DL (ref 6–20)
BUN/CREAT SERPL: 13.4 (ref 7–25)
CALCIUM SPEC-SCNC: 9.9 MG/DL (ref 8.6–10.5)
CHLORIDE SERPL-SCNC: 104 MMOL/L (ref 98–107)
CHOLEST SERPL-MCNC: 196 MG/DL (ref 0–200)
CO2 SERPL-SCNC: 26 MMOL/L (ref 22–29)
CREAT SERPL-MCNC: 1.42 MG/DL (ref 0.76–1.27)
CREAT UR-MCNC: 166.6 MG/DL
EGFRCR SERPLBLD CKD-EPI 2021: 56.9 ML/MIN/1.73
GLOBULIN UR ELPH-MCNC: 2.4 GM/DL
GLUCOSE SERPL-MCNC: 165 MG/DL (ref 65–99)
HBA1C MFR BLD: 8.9 % (ref 4.8–5.6)
HDLC SERPL-MCNC: 48 MG/DL (ref 40–60)
LDLC SERPL CALC-MCNC: 122 MG/DL (ref 0–100)
LDLC/HDLC SERPL: 2.47 {RATIO}
MICROALBUMIN/CREAT UR: 19.2 MG/G (ref 0–29)
POTASSIUM SERPL-SCNC: 4.2 MMOL/L (ref 3.5–5.2)
PROT SERPL-MCNC: 7 G/DL (ref 6–8.5)
SODIUM SERPL-SCNC: 142 MMOL/L (ref 136–145)
T4 FREE SERPL-MCNC: 1.28 NG/DL (ref 0.93–1.7)
TRIGL SERPL-MCNC: 148 MG/DL (ref 0–150)
TSH SERPL DL<=0.05 MIU/L-ACNC: 6.13 UIU/ML (ref 0.27–4.2)
VLDLC SERPL-MCNC: 26 MG/DL (ref 5–40)

## 2024-01-12 PROCEDURE — 80061 LIPID PANEL: CPT

## 2024-01-12 PROCEDURE — 84443 ASSAY THYROID STIM HORMONE: CPT

## 2024-01-12 PROCEDURE — 36415 COLL VENOUS BLD VENIPUNCTURE: CPT

## 2024-01-12 PROCEDURE — 84439 ASSAY OF FREE THYROXINE: CPT

## 2024-01-12 PROCEDURE — 83036 HEMOGLOBIN GLYCOSYLATED A1C: CPT

## 2024-01-12 PROCEDURE — 82043 UR ALBUMIN QUANTITATIVE: CPT

## 2024-01-12 PROCEDURE — 82570 ASSAY OF URINE CREATININE: CPT

## 2024-01-12 PROCEDURE — 80053 COMPREHEN METABOLIC PANEL: CPT

## 2024-01-15 ENCOUNTER — OFFICE VISIT (OUTPATIENT)
Dept: ENDOCRINOLOGY | Facility: CLINIC | Age: 59
End: 2024-01-15
Payer: MEDICARE

## 2024-01-15 VITALS
WEIGHT: 211 LBS | DIASTOLIC BLOOD PRESSURE: 70 MMHG | BODY MASS INDEX: 33.91 KG/M2 | SYSTOLIC BLOOD PRESSURE: 142 MMHG | OXYGEN SATURATION: 98 % | HEIGHT: 66 IN | HEART RATE: 63 BPM

## 2024-01-15 DIAGNOSIS — I10 PRIMARY HYPERTENSION: ICD-10-CM

## 2024-01-15 DIAGNOSIS — E03.9 HYPOTHYROIDISM, UNSPECIFIED TYPE: ICD-10-CM

## 2024-01-15 DIAGNOSIS — N17.9 AKI (ACUTE KIDNEY INJURY): ICD-10-CM

## 2024-01-15 DIAGNOSIS — E78.2 MIXED HYPERLIPIDEMIA: ICD-10-CM

## 2024-01-15 DIAGNOSIS — E66.9 CLASS 1 OBESITY WITH SERIOUS COMORBIDITY AND BODY MASS INDEX (BMI) OF 32.0 TO 32.9 IN ADULT, UNSPECIFIED OBESITY TYPE: ICD-10-CM

## 2024-01-15 DIAGNOSIS — E11.65 TYPE 2 DIABETES MELLITUS WITH HYPERGLYCEMIA, WITH LONG-TERM CURRENT USE OF INSULIN: Primary | ICD-10-CM

## 2024-01-15 DIAGNOSIS — E11.319 DIABETIC RETINOPATHY ASSOCIATED WITH TYPE 2 DIABETES MELLITUS, MACULAR EDEMA PRESENCE UNSPECIFIED, UNSPECIFIED LATERALITY, UNSPECIFIED RETINOPATHY SEVERITY: ICD-10-CM

## 2024-01-15 DIAGNOSIS — E11.42 DIABETIC PERIPHERAL NEUROPATHY: ICD-10-CM

## 2024-01-15 DIAGNOSIS — Z79.4 TYPE 2 DIABETES MELLITUS WITH HYPERGLYCEMIA, WITH LONG-TERM CURRENT USE OF INSULIN: Primary | ICD-10-CM

## 2024-01-15 DIAGNOSIS — E11.21 DIABETIC NEPHROPATHY ASSOCIATED WITH TYPE 2 DIABETES MELLITUS: ICD-10-CM

## 2024-01-15 DIAGNOSIS — N18.31 STAGE 3A CHRONIC KIDNEY DISEASE: ICD-10-CM

## 2024-01-15 PROCEDURE — 3077F SYST BP >= 140 MM HG: CPT | Performed by: INTERNAL MEDICINE

## 2024-01-15 PROCEDURE — 99214 OFFICE O/P EST MOD 30 MIN: CPT | Performed by: INTERNAL MEDICINE

## 2024-01-15 PROCEDURE — 3078F DIAST BP <80 MM HG: CPT | Performed by: INTERNAL MEDICINE

## 2024-01-15 PROCEDURE — 3052F HG A1C>EQUAL 8.0%<EQUAL 9.0%: CPT | Performed by: INTERNAL MEDICINE

## 2024-01-15 PROCEDURE — 1160F RVW MEDS BY RX/DR IN RCRD: CPT | Performed by: INTERNAL MEDICINE

## 2024-01-15 PROCEDURE — 1159F MED LIST DOCD IN RCRD: CPT | Performed by: INTERNAL MEDICINE

## 2024-01-15 RX ORDER — SEMAGLUTIDE 0.68 MG/ML
0.25 INJECTION, SOLUTION SUBCUTANEOUS WEEKLY
Qty: 3 ML | Refills: 2 | Status: SHIPPED | OUTPATIENT
Start: 2024-01-15

## 2024-01-15 NOTE — PROGRESS NOTES
-----------------------------------------------------------------  ENDOCRINE CLINIC NOTE  -----------------------------------------------------------------        PATIENT NAME: Kuldeep Adhikari  PATIENT : 1965 AGE: 58 y.o.  MRN NUMBER: [id number]  PRIMARY CARE: Laura Whitaker MD    ==========================================================================    CHIEF COMPLAINT: Type 2 Diabetes Mellitus  DATE OF SERVICE: 01/15/24    ==========================================================================    HPI / SUBJECTIVE    58 y.o. male is seen in the clinic today for follow up of type 2 diabetes.  Last A1c of 8.9% on 2024.  Current therapy includes:  Insulin glargine 45 units nightly  Insulin lispro 15 to 20 units with each meal depending upon the portion size  Patient had previously tried to be on Ozempic therapy but was causing significant abdominal pain and was found to have gall bladder stone.  Previously tried Janumet.  Underlying hx of diabetic neuropathy, s/p right BKA, prosthesis in place.    Diabetic neuropathy left lower extremity, complicated with osteomyelitis in .  Underlying history of diabetic retinopathy as well status post laser treatments.  Following at [facility].  Patient typically take 2 meals a day.  Underlying history of CVA.  No history of CAD.  Checking BG through Dexcom G6 but is waiting for supplies due to starting new year.  Also have hx significant for Hypothyroidism and is currently maintained on Levothyroxine therapy.    ==========================================================================                                                PAST MEDICAL HISTORY    Past Medical History:   Diagnosis Date    Allergic     CKD (chronic kidney disease), stage III     Depression     Depression with suicidal ideation 2021    DJD (degenerative joint disease)     DVT (deep venous thrombosis)     Ganglion     rt wrist    Gangrene of foot 10/09/2015     GERD (gastroesophageal reflux disease)     Headache     Heart murmur     Hiatal hernia     History of esophageal stricture     s/p dialation 40-50 times last EGD 04/2016    Hyperlipidemia     Hypertension     Hypothyroidism     IBS (irritable bowel syndrome)     Nausea, vomiting and diarrhea 09/22/2023    Neuropathy     Osteomyelitis of right foot 03/19/2020    Pulmonary embolism 01/19/2017    Rash     rt lower hip    Retinopathy     S/P BKA (below knee amputation), right 03/18/2022    Seasonal allergies     Sepsis due to group B Streptococcus 03/19/2020    Shortness of breath     Sleep apnea     Stroke     Type 2 diabetes mellitus with peripheral vascular disease     Vitamin D deficiency        ==========================================================================    PAST SURGICAL HISTORY    Past Surgical History:   Procedure Laterality Date    AMPUTATION DIGIT Left 04/26/2022    Procedure: AMPUTATION left great toe;  Surgeon: ERWIN Baker DPM;  Location: [facility] MAIN OR;  Service: Podiatry;  Laterality: Left;    AMPUTATION DIGIT  04/26/2022    Procedure: ;  Surgeon: ERWIN Baker DPM;  Location: [facility] MAIN OR;  Service: Podiatry;;    AMPUTATION FOOT / TOE Right     great toe    AMPUTATION REVISION Right 04/08/2021    Procedure: BELOW KNEE AMPUTATION REVISAION;  Surgeon: Eduardo Reynolds MD;  Location: [facility] MAIN OR;  Service: Orthopedics;  Laterality: Right;    AMPUTATION REVISION Right 04/29/2021    Procedure: AMPUTATION REVISION KNEE STUMP;  Surgeon: Eduardo Reynolds MD;  Location: [facility] MAIN OR;  Service: Orthopedics;  Laterality: Right;    BELOW KNEE AMPUTATION Right 07/30/2020    Procedure: AMPUTATION BELOW KNEE;  Surgeon: Eduardo Reynolds MD;  Location: [facility] MAIN OR;  Service: Orthopedics;  Laterality: Right;    CARDIAC CATHETERIZATION  04/2018    [facility]    CHOLECYSTECTOMY N/A 09/26/2023    Procedure: CHOLECYSTECTOMY LAPAROSCOPIC;  Surgeon: Eduardo Srinivasan MD;  Location: [facility] MAIN OR;   Service: General;  Laterality: N/A;    COLONOSCOPY      ENDOSCOPY      ENDOSCOPY N/A 10/04/2019    Procedure: ESOPHAGOGASTRODUODENOSCOPY with dilitation and biopsy x 1 area;  Surgeon: Declan Iqbal MD;  Location: [facility] ENDOSCOPY;  Service: Gastroenterology    ENDOSCOPY N/A 12/13/2019    Procedure: ESOPHAGOGASTRODUODENOSCOPY WITH DILATATION (50, 52 BOUGIE);  Surgeon: Declan Iqbal MD;  Location: [facility] ENDOSCOPY;  Service: Gastroenterology    ENDOSCOPY N/A 08/06/2021    Procedure: ESOPHAGOGASTRODUODENOSCOPY with biopsy x1 area and esophageal dilation (56FR Bougie);  Surgeon: Hussein Talavera MD;  Location: [facility] ENDOSCOPY;  Service: Gastroenterology;  Laterality: N/A;  post: hiatal hernia, erosive gastritis    ENDOSCOPY N/A 12/13/2022    Procedure: ESOPHAGOGASTRODUODENOSCOPY WITH DILATATION (BOUGIE #54, #56) AND BIOPSY X1;  Surgeon: David Rodriguez MD;  Location: [facility] ENDOSCOPY;  Service: Gastroenterology;  Laterality: N/A;  POST: dysphagia     ENDOSCOPY N/A 09/28/2023    Procedure: ESOPHAGOGASTRODUODENOSCOPY with dilation (58 non guided bougie);  Surgeon: Hussein Talavera MD;  Location: [facility] ENDOSCOPY;  Service: Gastroenterology;  Laterality: N/A;  post op: dysphagia    EYE SURGERY      GANGLION CYST EXCISION Left     HERNIA REPAIR Bilateral     INCISION AND DRAINAGE OF WOUND Right 06/15/2022    Procedure: INCISION AND DRAINAGE WOUND with debridement;  Surgeon: Fito Forte MD;  Location: [facility] MAIN OR;  Service: Orthopedics;  Laterality: Right;    JOINT REPLACEMENT      Left total hip    RETINOPATHY SURGERY      laser    TOTAL HIP ARTHROPLASTY Left     TOTAL HIP ARTHROPLASTY Left 2018    TRANS METATARSAL AMPUTATION Right 03/17/2020    Procedure: AMPUTATION TRANS METATARSAL right;  Surgeon: ERWIN Baker DPM;  Location: [facility] MAIN OR;  Service: Podiatry;  Laterality: Right;  GANGRENOUS RIGHT FOOT    VASECTOMY          ==========================================================================    FAMILY HISTORY    Family History   Problem Relation Age of Onset    Diabetes Mother         Patient  mom    Heart disease Mother     Arthritis Mother     Hyperlipidemia Mother     Leukemia Father     Sleep apnea Maternal Aunt         GENO    Stroke Maternal Grandmother     Hypertension Other     Hyperlipidemia Other     Cancer Other     Colon cancer Other         uncle       ==========================================================================    SOCIAL HISTORY    Social History     Socioeconomic History    Marital status:     Number of children: 3    Highest education level: High school graduate   Tobacco Use    Smoking status: Never    Smokeless tobacco: Never   Vaping Use    Vaping Use: Never used   Substance and Sexual Activity    Alcohol use: Yes     Comment: OCCASIONAL    Drug use: No    Sexual activity: Not Currently     Partners: Female     Birth control/protection: None     Comment: I have Ed problem at this time       ==========================================================================    MEDICATIONS      Current Outpatient Medications:     Accu-Chek Softclix Lancets lancets, Use as directed to test blood sugar 4 times daily before meals and at bedtime., Disp: 100 each, Rfl: 5    amLODIPine (NORVASC) 10 MG tablet, Take 1 tablet by mouth Daily., Disp: 90 tablet, Rfl: 1    apixaban (Eliquis) 5 MG tablet tablet, Take 1 tablet by mouth 2 (Two) Times a Day., Disp: 180 tablet, Rfl: 1    aspirin 81 MG EC tablet, Take 1 tablet by mouth Daily., Disp: , Rfl:     atorvastatin (LIPITOR) 80 MG tablet, Take 1 tablet by mouth Every Night., Disp: 90 tablet, Rfl: 1    Blood Glucose Monitoring Suppl (Accu-Chek Guide) w/Device kit, See Admin Instructions., Disp: , Rfl:     buPROPion XL (Wellbutrin XL) 150 MG 24 hr tablet, Take 1 tablet by mouth Every Morning., Disp: 90 tablet, Rfl: 0    busPIRone (BUSPAR) 10 MG tablet, Take  1 tablet by mouth Every 12 (Twelve) Hours., Disp: 180 tablet, Rfl: 1    CINNAMON PO, Take  by mouth., Disp: , Rfl:     Continuous Blood Gluc  (Dexcom G6 ) device, Use 1 Device Daily. Use to check BS, Disp: 1 each, Rfl: 0    Continuous Blood Gluc Sensor (Dexcom G6 Sensor), Use Every 10 (Ten) Days. Use to check BS daily, Disp: 3 each, Rfl: 11    Continuous Blood Gluc Transmit (Dexcom G6 Transmitter) misc, Use 1 each Every 3 (Three) Months. USE 1 TRANSMITTER EVERY 3 MONTHS, Disp: 1 each, Rfl: 10    cyclobenzaprine (FLEXERIL) 10 MG tablet, Take I tab q 8 hrs as needed for tension headaches, Disp: 30 tablet, Rfl: 0    DULoxetine (CYMBALTA) 60 MG capsule, Take 1 capsule by mouth Every Morning., Disp: 90 capsule, Rfl: 1    Glucagon (Gvoke HypoPen 2-Pack) 1 MG/0.2ML solution auto-injector, Inject 1 mg under the skin into the appropriate area as directed As Needed (Hypoglycemia)., Disp: 0.4 mL, Rfl: 5    glucose blood (Accu-Chek Guide) test strip, Use as instructed to test blood sugar 4 times daily before meals and at bedtime, Disp: 100 each, Rfl: 12    HumaLOG KwikPen 100 UNIT/ML solution pen-injector, INJECT 10 UNITS            SUBCUTANEOUSLY INTO THE    APPROPRIATE AREA 3 TIMES A DAY WITH MEALS AS DIRECTED, Disp: 30 mL, Rfl: 3    insulin aspart (NovoLOG FlexPen) 100 UNIT/ML solution pen-injector sc pen, Inject 20 Units under the skin into the appropriate area as directed 3 (Three) Times a Day With Meals. Dx code: E11.65, Disp: 30 mL, Rfl: 5    Insulin Glargine (Lantus SoloStar) 100 UNIT/ML injection pen, Inject 45 Units under the skin into the appropriate area as directed Every Night for 265 days., Disp: 45 mL, Rfl: 2    Insulin Pen Needle (Pen Needles) 32G X 4 MM misc, Use 1 each 4 (Four) Times a Day Before Meals & at Bedtime., Disp: 400 each, Rfl: 3    levothyroxine (Synthroid) 100 MCG tablet, Take 1 tablet by mouth Every Morning., Disp: 90 tablet, Rfl: 1    meclizine (ANTIVERT) 25 MG tablet, Take 1  tablet by mouth 3 (Three) Times a Day As Needed for Dizziness., Disp: 30 tablet, Rfl: 0    metoprolol succinate XL (TOPROL-XL) 100 MG 24 hr tablet, Take 1 tablet by mouth Daily., Disp: 90 tablet, Rfl: 0    multivitamin with minerals tablet tablet, Take 1 tablet by mouth Daily., Disp: , Rfl:     omeprazole (priLOSEC) 40 MG capsule, Take 1 capsule by mouth Daily., Disp: 90 capsule, Rfl: 1    ondansetron ODT (ZOFRAN-ODT) 4 MG disintegrating tablet, DISSOLVE 1 TABLET BY MOUTH EVERY 8 HOURS AS NEEDED FOR NAUSEA, FOR VOMITING, Disp: 30 tablet, Rfl: 0    pregabalin (LYRICA) 100 MG capsule, Take 1 capsule by mouth 2 (Two) Times a Day., Disp: 60 capsule, Rfl: 0    Semaglutide,0.25 or 0.5MG/DOS, (Ozempic, 0.25 or 0.5 MG/DOSE,) 2 MG/3ML solution pen-injector, Inject 0.25 mg under the skin into the appropriate area as directed 1 (One) Time Per Week., Disp: 3 mL, Rfl: 2    ==========================================================================    ALLERGIES    Allergies   Allergen Reactions    Lisinopril Cough    Cefixime Other (See Comments)       ==========================================================================    OBJECTIVE    Vitals:    01/15/24 1519   BP: 142/70   Pulse: 63   SpO2: 98%     Body mass index is 34.06 kg/m².     General: Alert, cooperative, no acute distress  Thyroid:  no enlargement/tenderness/palpable nodules  Lungs: Clear to auscultation bilaterally, respirations unlabored  Heart: Regular rate and rhythm, S1 and S2 normal, no murmur, rub or gallop  Abdomen: Soft, NT, ND and Bowel sounds Positive  Extremities:  Extremities normal, atraumatic, no cyanosis or edema    ==========================================================================    LAB EVALUATION    Lab Results   Component Value Date    GLUCOSE 165 (H) 01/12/2024    BUN 19 01/12/2024    CREATININE 1.42 (H) 01/12/2024    EGFRIFNONA 48 (L) 01/04/2022    BCR 13.4 01/12/2024    K 4.2 01/12/2024    CO2 26.0 01/12/2024    CALCIUM 9.9 01/12/2024     ALBUMIN 4.6 01/12/2024    LABIL2 1.3 04/22/2019    AST 25 01/12/2024    ALT 38 01/12/2024    CHOL 196 01/12/2024    TRIG 148 01/12/2024    HDL 48 01/12/2024     (H) 01/12/2024     Lab Results   Component Value Date    HGBA1C 8.90 (H) 01/12/2024    HGBA1C 11.60 (H) 08/04/2023    HGBA1C 9.00 (H) 03/08/2023     Lab Results   Component Value Date    MICROALBUR 3.2 01/12/2024    CREATININE 1.42 (H) 01/12/2024     Lab Results   Component Value Date    TSH 6.130 (H) 01/12/2024    FREET4 1.28 01/12/2024     ==========================================================================    ASSESSMENT AND PLAN    Assessment:  #Type 2 diabetes complicated with retinopathy, neuropathy and nephropathy  #Hypertension  #Hyperlipidemia, currently maintained on statin therapy, plan to introduce Zetia therapy but will evaluate possibly during next visit given patient is currently being started on Ozempic therapy, will avoid any confusion if patient is allergic to any medication  #Hypothyroidism, mildly elevated TSH with free T4 within acceptable limit, continue same dosing and repeat thyroid function back again before next visit  #Obesity with BMI of 34.06  #CKD IIIa with evidence of DAISY, counseled to maintain good hydration, repeat blood work before next visit     Plan:  - Unfortunately I was not able to review any CGM data to fingerstick data as patient forgot the meter is at home  - Patient for last 2 weeks have not use any CGM because he is pending supplies given there is a start of new year  - There is still evidence of persistent hyperglycemia given patient A1c of 8.9%  - Patient currently maintained on basal bolus therapy  - Will reintroduce Ozempic therapy but will limit at 0.25 mg once a week for now  - Patient to follow-up in my clinic back again in 6 weeks  Insulin glargine 45 units nightly  Insulin lispro 15 to 20 units with each meal depending upon the portion size  Ozempic 0.25 mg once a week  - Continue  "levothyroxine therapy without any significant changes    Thank you for courtesy of consultation.    Return to clinic: 6 weeks    Entire assessment and plan was discussed and counseled the patient in detail to which patient verbalized understanding and agreed with care.  Answered all queries and concerns.    This note was created using voice recognition software and is inherently subject to errors including those of syntax and \"sound-alike\" substitutions which may escape proofreading.  In such instances, original meaning may be extrapolated by contextual derivation.    Note: Portions of this note may have been copied from previous notes but documentation have been reviewed and edited as necessary to support clinical decision making for today's visit.    ==========================================================================    INFORMATION PROVIDED TO PATIENT    Patient Instructions   # Diabetes Management:    Please start insulin therapy as:    - Insulin Glargine (Slow acting insulin) 45 Units in the evening.  - Insulin lispro / aspart (Fast acting / meal time insulin): 15-20 Units with each meal.    Meal time insulin need to be taken 15 mins before meal. You can bring your insulin with you if you are planning to eat out.    If you plan to miss the meal please miss the meal time insulin as well.    Check your blood glucose through dexcom.    Try to maintain water intake around 6 to 8 glasses of water every day.    Start Ozempic 0.25 mg once a week.    Low Blood Glucose:    - If you feel sweaty, anxious, shakiness, feel weak, trouble walking, feels like passing out or trouble seeing thing, please check your blood glucose and if its less than 70 or if your feel symptoms of low blood glucose then take one of the following or use Glucagon Injection:    *3 or 4 glucose tablets  *½ cup of juice or regular soda (not sugar-free)  *2 tablespoons of raisins  *4 or 5 saltine crackers  *1 tablespoon of sugar  *1 tablespoon of " honey or corn syrup  *6 to 8 hard candies    Follow up in 6 weeks with repeat blood work.     Thank you for your visit today.     If you have any questions or concerns please feel free to reach out of the office.          ==========================================================================  Dionicio Ramachandran MD  Department of Endocrine, Diabetes and Metabolism  [place], [place]  ==========================================================================

## 2024-01-15 NOTE — PATIENT INSTRUCTIONS
# Diabetes Management:    Please start insulin therapy as:    - Insulin Glargine (Slow acting insulin) 45 Units in the evening.  - Insulin lispro / aspart (Fast acting / meal time insulin): 15-20 Units with each meal.    Meal time insulin need to be taken 15 mins before meal. You can bring your insulin with you if you are planning to eat out.    If you plan to miss the meal please miss the meal time insulin as well.    Check your blood glucose through dexcom.    Try to maintain water intake around 6 to 8 glasses of water every day.    Start Ozempic 0.25 mg once a week.    Low Blood Glucose:    - If you feel sweaty, anxious, shakiness, feel weak, trouble walking, feels like passing out or trouble seeing thing, please check your blood glucose and if its less than 70 or if your feel symptoms of low blood glucose then take one of the following or use Glucagon Injection:    *3 or 4 glucose tablets  *½ cup of juice or regular soda (not sugar-free)  *2 tablespoons of raisins  *4 or 5 saltine crackers  *1 tablespoon of sugar  *1 tablespoon of honey or corn syrup  *6 to 8 hard candies    Follow up in 6 weeks with repeat blood work.     Thank you for your visit today.     If you have any questions or concerns please feel free to reach out of the office.

## 2024-01-16 RX ORDER — OMEPRAZOLE 40 MG/1
40 CAPSULE, DELAYED RELEASE ORAL DAILY
Qty: 90 CAPSULE | Refills: 1 | Status: SHIPPED | OUTPATIENT
Start: 2024-01-16

## 2024-01-16 NOTE — TELEPHONE ENCOUNTER
Caller: [facility] - [address] - [phone] [facility] [phone] FX    Relationship: Pharmacy    Best call back number: [phone]     Requested Prescriptions:   Requested Prescriptions     Pending Prescriptions Disp Refills    omeprazole (priLOSEC) 40 MG capsule 90 capsule 1     Sig: Take 1 capsule by mouth Daily.        Pharmacy where request should be sent: [facility], [place] - [address] [phone] [facility] [phone] FX     Last office visit with prescribing clinician: 10/11/2023   Last telemedicine visit with prescribing clinician: Visit date not found   Next office visit with prescribing clinician: Visit date not found       Yobani Frias Rep   01/16/24 14:03 EST

## 2024-01-18 ENCOUNTER — DOCUMENTATION (OUTPATIENT)
Dept: PHYSICAL THERAPY | Facility: CLINIC | Age: 59
End: 2024-01-18
Payer: MEDICARE

## 2024-01-18 NOTE — PROGRESS NOTES
Physical Therapy Discharge Summary  [id number] [place] [address]    Patient: Kuldeep Adhikari   : 1965  Diagnosis/ICD-10 Code:  Ulnar neuropathy at elbow of right upper extremity [G56.21]  Referring practitioner: Bridger Cantu MD  Date of Initial Visit: 2023  Last Visit Date: 2023  Patient seen for 8 sessions    Discharge Status of Patient: pt wants to try I HEP at this time. Kept chart open, but POC has now .      Goals  Plan Goals: STGs in 4 weeks:  Decrease pain to 6/10 on average Met   Increase cerv/RUE AROM 5-10 degrees where limited as much Partially Met   Increase R shld flex/abd strength to 3/5 Partially Met   Assess/treat for vertigo/BPPV Met      LTGs by discharge  Increase cerv/UE ROM to WFL/WNL with min/no pain for ADLs Progressing   Increase UE strength to 4+/5 or better Progressing   Pt will be able to sit/ride/drive 30-60 mins without difficulty or pain   Pt will be able to wash/dress/groom without difficulty or pain using B UEs   Pt will be able to grasp/hold/reach/lift items into/out of an overhead cabinet without difficulty or pain  Pt will be able to grasp/hold/lift/carry laundry baskets, garbage/grocery bags, and/or pots/pans without difficulty, dropping, or pain   Pt will be able to sleep without waking from pain most nights        Discharge Plan: Continue with current home exercise program as instructed     Comments: pt was given HEP during sessions. Pt wanted to try I HEP and follow up with surgeon as he feels he will not improve without surgery.      Date of Discharge: 24            Larissa Price, PT  Physical Therapist  [place] License: [id number]

## 2024-01-26 RX ORDER — ONDANSETRON 4 MG/1
TABLET, ORALLY DISINTEGRATING ORAL
Qty: 30 TABLET | Refills: 0 | Status: SHIPPED | OUTPATIENT
Start: 2024-01-26

## 2024-02-02 NOTE — PROGRESS NOTES
Neurosurgical Consultation      Kuldeep Adhikari is a 58 y.o. male is here today for follow-up on neck and elbow pain. Today patient reports neck pain into his right shoulder.     Chief Complaint   Patient presents with    Neck Pain     Follow up         Previous treatment: Lyrica, Cymbalta,Flexeril,Physical Therapy/ 8 visits     HPI: This is a 58-year-old gentleman with chronic kidney disease, insulin-dependent diabetes status post right-sided below the knee amputation, severe diabetic neuropathy as well as vascular disease on Eliquis and aspirin.  I last evaluated him on October 19, 2023.  He is being worked up for cervical radiculopathy and does have radiographic appearance of cervical pathology that could correlate with cervical radiculopathy.  However upon my evaluations I felt as though he may also have some component of cubital tunnel syndrome.  He did undergo an EMG/nerve conduction study and this was consistent with right sided ulnar neuropathy at the elbow without any clear indication of electrophysiological radiculopathy.  At my last evaluation I did not recommend any surgical intervention and recommended physical therapy.  He has been engaging with physical therapy.  He does not believe he has had profound improvement.  He continues with neck pain.  He continues with right sided ulnar neuropathy.  Of note he has brought his hemoglobin A1c down from 11.6% to 8.9%.  He does have a right-sided eccentric C6-C7 disc bulge with mild to moderate central canal stenosis as well as moderate lateral recess stenosis.  He does not have dynamic spondylolisthesis on flexion and extension.  He has had prior cervical injections without profound improvement.    Past Medical History:   Diagnosis Date    Allergic     CKD (chronic kidney disease), stage III     Depression     Depression with suicidal ideation 08/08/2021    DJD (degenerative joint disease)     DVT (deep venous thrombosis)     Ganglion     rt wrist    Gangrene  of foot 10/09/2015    GERD (gastroesophageal reflux disease)     Headache     Heart murmur     Hiatal hernia     History of esophageal stricture     s/p dialation 40-50 times last EGD 04/2016    Hyperlipidemia     Hypertension     Hypothyroidism     IBS (irritable bowel syndrome)     Nausea, vomiting and diarrhea 09/22/2023    Neuropathy     Osteomyelitis of right foot 03/19/2020    Pulmonary embolism 01/19/2017    Rash     rt lower hip    Retinopathy     S/P BKA (below knee amputation), right 03/18/2022    Seasonal allergies     Sepsis due to group B Streptococcus 03/19/2020    Shortness of breath     Sleep apnea     Stroke     Type 2 diabetes mellitus with peripheral vascular disease     Vitamin D deficiency         Past Surgical History:   Procedure Laterality Date    AMPUTATION DIGIT Left 04/26/2022    Procedure: AMPUTATION left great toe;  Surgeon: ERWIN Baker DPM;  Location: [facility] MAIN OR;  Service: Podiatry;  Laterality: Left;    AMPUTATION DIGIT  04/26/2022    Procedure: ;  Surgeon: ERWIN Baker DPM;  Location: [facility] MAIN OR;  Service: Podiatry;;    AMPUTATION FOOT / TOE Right     great toe    AMPUTATION REVISION Right 04/08/2021    Procedure: BELOW KNEE AMPUTATION REVISAION;  Surgeon: Eduardo Reynolds MD;  Location: [facility] MAIN OR;  Service: Orthopedics;  Laterality: Right;    AMPUTATION REVISION Right 04/29/2021    Procedure: AMPUTATION REVISION KNEE STUMP;  Surgeon: Eduardo Reynolds MD;  Location: [facility] MAIN OR;  Service: Orthopedics;  Laterality: Right;    BELOW KNEE AMPUTATION Right 07/30/2020    Procedure: AMPUTATION BELOW KNEE;  Surgeon: Eduardo Reynolds MD;  Location: [facility] MAIN OR;  Service: Orthopedics;  Laterality: Right;    CARDIAC CATHETERIZATION  04/2018    [facility]    CHOLECYSTECTOMY N/A 09/26/2023    Procedure: CHOLECYSTECTOMY LAPAROSCOPIC;  Surgeon: Eduardo Srinivasan MD;  Location: [facility] MAIN OR;  Service: General;  Laterality: N/A;    COLONOSCOPY      ENDOSCOPY      ENDOSCOPY  N/A 10/04/2019    Procedure: ESOPHAGOGASTRODUODENOSCOPY with dilitation and biopsy x 1 area;  Surgeon: Declan Iqbal MD;  Location: [facility] ENDOSCOPY;  Service: Gastroenterology    ENDOSCOPY N/A 12/13/2019    Procedure: ESOPHAGOGASTRODUODENOSCOPY WITH DILATATION (50, 52 BOUGIE);  Surgeon: Declan Iqbal MD;  Location: [facility] ENDOSCOPY;  Service: Gastroenterology    ENDOSCOPY N/A 08/06/2021    Procedure: ESOPHAGOGASTRODUODENOSCOPY with biopsy x1 area and esophageal dilation (56FR Bougie);  Surgeon: Hussein Talavera MD;  Location: [facility] ENDOSCOPY;  Service: Gastroenterology;  Laterality: N/A;  post: hiatal hernia, erosive gastritis    ENDOSCOPY N/A 12/13/2022    Procedure: ESOPHAGOGASTRODUODENOSCOPY WITH DILATATION (BOUGIE #54, #56) AND BIOPSY X1;  Surgeon: David Rodriguez MD;  Location: [facility] ENDOSCOPY;  Service: Gastroenterology;  Laterality: N/A;  POST: dysphagia     ENDOSCOPY N/A 09/28/2023    Procedure: ESOPHAGOGASTRODUODENOSCOPY with dilation (58 non guided bougie);  Surgeon: Hussein Talavera MD;  Location: [facility] ENDOSCOPY;  Service: Gastroenterology;  Laterality: N/A;  post op: dysphagia    EYE SURGERY      GANGLION CYST EXCISION Left     HERNIA REPAIR Bilateral     INCISION AND DRAINAGE OF WOUND Right 06/15/2022    Procedure: INCISION AND DRAINAGE WOUND with debridement;  Surgeon: Fito Forte MD;  Location: [facility] MAIN OR;  Service: Orthopedics;  Laterality: Right;    JOINT REPLACEMENT      Left total hip    RETINOPATHY SURGERY      laser    TOTAL HIP ARTHROPLASTY Left     TOTAL HIP ARTHROPLASTY Left 2018    TRANS METATARSAL AMPUTATION Right 03/17/2020    Procedure: AMPUTATION TRANS METATARSAL right;  Surgeon: ERWIN Baker DPM;  Location: [facility] MAIN OR;  Service: Podiatry;  Laterality: Right;  GANGRENOUS RIGHT FOOT    VASECTOMY          Current Outpatient Medications on File Prior to Visit   Medication Sig Dispense Refill    Accu-Chek Softclix Lancets lancets Use as directed to  test blood sugar 4 times daily before meals and at bedtime. 100 each 5    amLODIPine (NORVASC) 10 MG tablet Take 1 tablet by mouth Daily. 90 tablet 1    apixaban (Eliquis) 5 MG tablet tablet Take 1 tablet by mouth 2 (Two) Times a Day. 180 tablet 1    aspirin 81 MG EC tablet Take 1 tablet by mouth Daily.      atorvastatin (LIPITOR) 80 MG tablet Take 1 tablet by mouth Every Night. 90 tablet 1    Blood Glucose Monitoring Suppl (Accu-Chek Guide) w/Device kit See Admin Instructions.      buPROPion XL (Wellbutrin XL) 150 MG 24 hr tablet Take 1 tablet by mouth Every Morning. 90 tablet 0    busPIRone (BUSPAR) 10 MG tablet Take 1 tablet by mouth Every 12 (Twelve) Hours. 180 tablet 1    CINNAMON PO Take  by mouth.      Continuous Blood Gluc  (Dexcom G6 ) device Use 1 Device Daily. Use to check BS 1 each 0    Continuous Blood Gluc Sensor (Dexcom G6 Sensor) Use Every 10 (Ten) Days. Use to check BS daily 3 each 11    Continuous Blood Gluc Transmit (Dexcom G6 Transmitter) misc Use 1 each Every 3 (Three) Months. USE 1 TRANSMITTER EVERY 3 MONTHS 1 each 10    cyclobenzaprine (FLEXERIL) 10 MG tablet Take I tab q 8 hrs as needed for tension headaches 30 tablet 0    DULoxetine (CYMBALTA) 60 MG capsule Take 1 capsule by mouth Every Morning. 90 capsule 1    Glucagon (Gvoke HypoPen 2-Pack) 1 MG/0.2ML solution auto-injector Inject 1 mg under the skin into the appropriate area as directed As Needed (Hypoglycemia). 0.4 mL 5    glucose blood (Accu-Chek Guide) test strip Use as instructed to test blood sugar 4 times daily before meals and at bedtime 100 each 12    HumaLOG KwikPen 100 UNIT/ML solution pen-injector INJECT 10 UNITS            SUBCUTANEOUSLY INTO THE    APPROPRIATE AREA 3 TIMES A DAY WITH MEALS AS DIRECTED 30 mL 3    insulin aspart (NovoLOG FlexPen) 100 UNIT/ML solution pen-injector sc pen Inject 20 Units under the skin into the appropriate area as directed 3 (Three) Times a Day With Meals. Dx code: E11.65 30 mL 5     Insulin Glargine (Lantus SoloStar) 100 UNIT/ML injection pen Inject 45 Units under the skin into the appropriate area as directed Every Night for 265 days. 45 mL 2    Insulin Pen Needle (Pen Needles) 32G X 4 MM misc Use 1 each 4 (Four) Times a Day Before Meals & at Bedtime. 400 each 3    levothyroxine (Synthroid) 100 MCG tablet Take 1 tablet by mouth Every Morning. 90 tablet 1    meclizine (ANTIVERT) 25 MG tablet Take 1 tablet by mouth 3 (Three) Times a Day As Needed for Dizziness. 30 tablet 0    metoprolol succinate XL (TOPROL-XL) 100 MG 24 hr tablet Take 1 tablet by mouth Daily. 90 tablet 0    multivitamin with minerals tablet tablet Take 1 tablet by mouth Daily.      omeprazole (priLOSEC) 40 MG capsule Take 1 capsule by mouth Daily. 90 capsule 1    ondansetron ODT (ZOFRAN-ODT) 4 MG disintegrating tablet PLACE 1 TABLET ON TONGUE AND ALLOW TO DISSOLVE EVERY 8 HOURS AS NEEDED FOR NAUSEA & VOMITING 30 tablet 0    pregabalin (LYRICA) 100 MG capsule Take 1 capsule by mouth 2 (Two) Times a Day. 60 capsule 0    Semaglutide,0.25 or 0.5MG/DOS, (Ozempic, 0.25 or 0.5 MG/DOSE,) 2 MG/3ML solution pen-injector Inject 0.25 mg under the skin into the appropriate area as directed 1 (One) Time Per Week. 3 mL 2     No current facility-administered medications on file prior to visit.        Allergies   Allergen Reactions    Lisinopril Cough    Cefixime Other (See Comments)        Social History     Socioeconomic History    Marital status:     Number of children: 3    Highest education level: High school graduate   Tobacco Use    Smoking status: Never    Smokeless tobacco: Never   Vaping Use    Vaping Use: Never used   Substance and Sexual Activity    Alcohol use: Yes     Comment: OCCASIONAL    Drug use: No    Sexual activity: Not Currently     Partners: Female     Birth control/protection: None     Comment: I have Ed problem at this time          Review of Systems   Constitutional:  Positive for activity change.   HENT:  "Negative.     Eyes: Negative.    Respiratory: Negative.     Cardiovascular: Negative.    Gastrointestinal: Negative.    Endocrine: Negative.    Genitourinary: Negative.    Musculoskeletal:  Positive for arthralgias, myalgias, neck pain and neck stiffness.   Skin: Negative.    Allergic/Immunologic: Negative.    Neurological:  Positive for weakness and numbness (right hand/fingers).   Hematological: Negative.    Psychiatric/Behavioral:  Positive for sleep disturbance.         Physical Examination:     Vitals:    02/06/24 1503   BP: 161/85   Pulse: 80   Weight: 92.8 kg (204 lb 9.6 oz)   Height: 167.6 cm (66\")   PainSc:   9        Physical Exam     Neurological Exam   Neurological examination is stable compared to my last evaluation.  I do not appreciate any new red flag signs.    Result Review  The following data was reviewed by: Bridger Cantu MD on 02/06/2024:    Data reviewed : Radiologic studies MRI cervical spine shows a right-sided eccentric C6-C7 disc bulge.  There is also some spondylosis at C2-C3 with disc bulging.  There is no dynamic spondylolisthesis at C6/C7 or other levels evident on flexion and extension.         Assessment/plan:  This is a 58-year-old gentleman with neck pain as well as right sided ulnar neuropathy at the elbow.  I do not believe a major component of his arm pain is from radiculopathy as it does not follow the proper distribution and EMG/nerve conduction study was concerning for cubital tunnel syndrome.  I recommend pain management for possible exploration of interventions beyond epidural steroid injections.  I will trial him on some gabapentin and if this provides significant improvement he can be continued with pain management.  As his diabetic control is improved it is still relatively poor at this juncture and I do not believe he is a great candidate for a cubital tunnel release at this juncture.  He will return to see me in 3 months for clinical reevaluation.  I have encouraged " him to call with any questions or concerns.    Diagnoses and all orders for this visit:    1. Ulnar neuropathy at elbow of right upper extremity (Primary)    2. Neck pain  -     Ambulatory Referral to Pain Management Clinic    Other orders  -     gabapentin (NEURONTIN) 300 MG capsule; Take 1 capsule by mouth 3 (Three) Times a Day. Take 1 tablet at night for 1 week; if tolerating take 1 tablet twice a day for 1 week; if tolerating advance to full dosage  Dispense: 45 capsule; Refill: 0         Return in about 3 months (around 5/6/2024).            Bridger Cantu MD

## 2024-02-06 ENCOUNTER — OFFICE VISIT (OUTPATIENT)
Dept: NEUROSURGERY | Facility: CLINIC | Age: 59
End: 2024-02-06
Payer: MEDICARE

## 2024-02-06 VITALS
WEIGHT: 204.6 LBS | BODY MASS INDEX: 32.88 KG/M2 | SYSTOLIC BLOOD PRESSURE: 161 MMHG | HEART RATE: 80 BPM | DIASTOLIC BLOOD PRESSURE: 85 MMHG | HEIGHT: 66 IN

## 2024-02-06 DIAGNOSIS — G56.21 ULNAR NEUROPATHY AT ELBOW OF RIGHT UPPER EXTREMITY: Primary | ICD-10-CM

## 2024-02-06 DIAGNOSIS — M54.2 NECK PAIN: ICD-10-CM

## 2024-02-06 PROCEDURE — 3077F SYST BP >= 140 MM HG: CPT | Performed by: NEUROLOGICAL SURGERY

## 2024-02-06 PROCEDURE — 1159F MED LIST DOCD IN RCRD: CPT | Performed by: NEUROLOGICAL SURGERY

## 2024-02-06 PROCEDURE — 99214 OFFICE O/P EST MOD 30 MIN: CPT | Performed by: NEUROLOGICAL SURGERY

## 2024-02-06 PROCEDURE — 1160F RVW MEDS BY RX/DR IN RCRD: CPT | Performed by: NEUROLOGICAL SURGERY

## 2024-02-06 PROCEDURE — 3079F DIAST BP 80-89 MM HG: CPT | Performed by: NEUROLOGICAL SURGERY

## 2024-02-06 RX ORDER — GABAPENTIN 300 MG/1
300 CAPSULE ORAL 3 TIMES DAILY
Qty: 45 CAPSULE | Refills: 0 | Status: SHIPPED | OUTPATIENT
Start: 2024-02-06

## 2024-02-14 ENCOUNTER — TELEPHONE (OUTPATIENT)
Dept: FAMILY MEDICINE CLINIC | Facility: CLINIC | Age: 59
End: 2024-02-14
Payer: MEDICARE

## 2024-02-14 DIAGNOSIS — F32.9 REACTIVE DEPRESSION: ICD-10-CM

## 2024-02-14 DIAGNOSIS — E03.9 HYPOTHYROIDISM, UNSPECIFIED TYPE: ICD-10-CM

## 2024-02-14 DIAGNOSIS — G44.221 CHRONIC TENSION-TYPE HEADACHE, INTRACTABLE: ICD-10-CM

## 2024-02-14 DIAGNOSIS — R42 DIZZINESS: ICD-10-CM

## 2024-02-14 DIAGNOSIS — I10 PRIMARY HYPERTENSION: ICD-10-CM

## 2024-02-14 RX ORDER — ATORVASTATIN CALCIUM 80 MG/1
80 TABLET, FILM COATED ORAL NIGHTLY
Qty: 90 TABLET | Refills: 1 | Status: SHIPPED | OUTPATIENT
Start: 2024-02-14

## 2024-02-14 RX ORDER — DULOXETIN HYDROCHLORIDE 60 MG/1
60 CAPSULE, DELAYED RELEASE ORAL EVERY MORNING
Qty: 90 CAPSULE | Refills: 1 | Status: SHIPPED | OUTPATIENT
Start: 2024-02-14

## 2024-02-14 RX ORDER — AMLODIPINE BESYLATE 10 MG/1
10 TABLET ORAL DAILY
Qty: 90 TABLET | Refills: 1 | Status: SHIPPED | OUTPATIENT
Start: 2024-02-14

## 2024-02-14 RX ORDER — LEVOTHYROXINE SODIUM 0.1 MG/1
100 TABLET ORAL
Qty: 90 TABLET | Refills: 1 | Status: SHIPPED | OUTPATIENT
Start: 2024-02-14

## 2024-02-14 RX ORDER — CYCLOBENZAPRINE HCL 10 MG
TABLET ORAL
Qty: 30 TABLET | Refills: 0 | Status: SHIPPED | OUTPATIENT
Start: 2024-02-14

## 2024-02-14 RX ORDER — MECLIZINE HYDROCHLORIDE 25 MG/1
25 TABLET ORAL 3 TIMES DAILY PRN
Qty: 30 TABLET | Refills: 0 | Status: SHIPPED | OUTPATIENT
Start: 2024-02-14

## 2024-02-14 RX ORDER — BUSPIRONE HYDROCHLORIDE 10 MG/1
10 TABLET ORAL EVERY 12 HOURS SCHEDULED
Qty: 180 TABLET | Refills: 1 | Status: SHIPPED | OUTPATIENT
Start: 2024-02-14

## 2024-02-14 RX ORDER — BUPROPION HYDROCHLORIDE 150 MG/1
150 TABLET ORAL EVERY MORNING
Qty: 90 TABLET | Refills: 1 | Status: SHIPPED | OUTPATIENT
Start: 2024-02-14

## 2024-02-16 RX ORDER — ONDANSETRON 4 MG/1
4 TABLET, ORALLY DISINTEGRATING ORAL EVERY 8 HOURS PRN
Qty: 30 TABLET | Refills: 0 | Status: SHIPPED | OUTPATIENT
Start: 2024-02-16

## 2024-02-26 ENCOUNTER — OFFICE VISIT (OUTPATIENT)
Dept: ENDOCRINOLOGY | Facility: CLINIC | Age: 59
End: 2024-02-26
Payer: MEDICARE

## 2024-02-26 VITALS
HEIGHT: 66 IN | WEIGHT: 200 LBS | BODY MASS INDEX: 32.14 KG/M2 | HEART RATE: 70 BPM | OXYGEN SATURATION: 97 % | SYSTOLIC BLOOD PRESSURE: 136 MMHG | DIASTOLIC BLOOD PRESSURE: 70 MMHG

## 2024-02-26 DIAGNOSIS — E78.2 MIXED HYPERLIPIDEMIA: ICD-10-CM

## 2024-02-26 DIAGNOSIS — E11.65 TYPE 2 DIABETES MELLITUS WITH HYPERGLYCEMIA, WITH LONG-TERM CURRENT USE OF INSULIN: Primary | ICD-10-CM

## 2024-02-26 DIAGNOSIS — E11.319 DIABETIC RETINOPATHY ASSOCIATED WITH TYPE 2 DIABETES MELLITUS, MACULAR EDEMA PRESENCE UNSPECIFIED, UNSPECIFIED LATERALITY, UNSPECIFIED RETINOPATHY SEVERITY: ICD-10-CM

## 2024-02-26 DIAGNOSIS — E11.42 DIABETIC PERIPHERAL NEUROPATHY: ICD-10-CM

## 2024-02-26 DIAGNOSIS — E03.9 HYPOTHYROIDISM, UNSPECIFIED TYPE: ICD-10-CM

## 2024-02-26 DIAGNOSIS — E11.21 DIABETIC NEPHROPATHY ASSOCIATED WITH TYPE 2 DIABETES MELLITUS: ICD-10-CM

## 2024-02-26 DIAGNOSIS — I10 PRIMARY HYPERTENSION: ICD-10-CM

## 2024-02-26 DIAGNOSIS — Z79.4 TYPE 2 DIABETES MELLITUS WITH HYPERGLYCEMIA, WITH LONG-TERM CURRENT USE OF INSULIN: Primary | ICD-10-CM

## 2024-02-26 PROCEDURE — 1159F MED LIST DOCD IN RCRD: CPT | Performed by: INTERNAL MEDICINE

## 2024-02-26 PROCEDURE — 3078F DIAST BP <80 MM HG: CPT | Performed by: INTERNAL MEDICINE

## 2024-02-26 PROCEDURE — 99214 OFFICE O/P EST MOD 30 MIN: CPT | Performed by: INTERNAL MEDICINE

## 2024-02-26 PROCEDURE — 3075F SYST BP GE 130 - 139MM HG: CPT | Performed by: INTERNAL MEDICINE

## 2024-02-26 PROCEDURE — 1160F RVW MEDS BY RX/DR IN RCRD: CPT | Performed by: INTERNAL MEDICINE

## 2024-02-26 PROCEDURE — 3052F HG A1C>EQUAL 8.0%<EQUAL 9.0%: CPT | Performed by: INTERNAL MEDICINE

## 2024-02-26 RX ORDER — SEMAGLUTIDE 0.68 MG/ML
0.5 INJECTION, SOLUTION SUBCUTANEOUS WEEKLY
Qty: 3 ML | Refills: 3 | Status: SHIPPED | OUTPATIENT
Start: 2024-02-26

## 2024-02-26 NOTE — PATIENT INSTRUCTIONS
# Diabetes Management:    Please start insulin therapy as:    - Insulin Glargine (Slow acting insulin) 45 Units in the evening.  - Insulin lispro / aspart (Fast acting / meal time insulin): 15-20 Units with each meal.    Meal time insulin need to be taken 15 mins before meal. You can bring your insulin with you if you are planning to eat out.    If you plan to miss the meal please miss the meal time insulin as well.    Check your blood glucose through dexcom.    Try to maintain water intake around 6 to 8 glasses of water every day.    Increase Ozempic to 0.5 mgs once a week.    Low Blood Glucose:    - If you feel sweaty, anxious, shakiness, feel weak, trouble walking, feels like passing out or trouble seeing thing, please check your blood glucose and if its less than 70 or if your feel symptoms of low blood glucose then take one of the following or use Glucagon Injection:    *3 or 4 glucose tablets  *½ cup of juice or regular soda (not sugar-free)  *2 tablespoons of raisins  *4 or 5 saltine crackers  *1 tablespoon of sugar  *1 tablespoon of honey or corn syrup  *6 to 8 hard candies    Follow up in 2 months with repeat blood work.     Thank you for your visit today.     If you have any questions or concerns please feel free to reach out of the office.

## 2024-02-26 NOTE — PROGRESS NOTES
-----------------------------------------------------------------  ENDOCRINE CLINIC NOTE  -----------------------------------------------------------------        PATIENT NAME: Kuldeep Adhikari  PATIENT : 1965 AGE: 58 y.o.  MRN NUMBER: [id number]  PRIMARY CARE: Laura Whitaker MD    ==========================================================================    CHIEF COMPLAINT: Type 2 Diabetes Mellitus  DATE OF SERVICE: 24    ==========================================================================    HPI / SUBJECTIVE    58 y.o. male is seen in the clinic today for follow up of type 2 diabetes.  Current therapy includes:  Insulin glargine 45 units nightly  Insulin lispro 15 to 20 units with each meal depending upon the portion size  Ozempic 0.25 mg once a week  Patient had also previously tried Ozempic therapy but that was causing significant abdominal pain but at the same time patient was found to have a gallstone as well.  Otherwise had previously tried Janumet therapy.  Diabetic neuropathy, s/p right BKA, prosthesis in place.    Diabetic neuropathy left lower extremity, complicated with osteomyelitis in .  Diabetic retinopathy, s/p laser treatment, following Denise  Patient typically take 2 meals a day.  Underlying history of CVA.  No history of CAD.  Checking BG through Dexcom G6 but is waiting for supplies due to starting new year.  Also have hx significant for Hypothyroidism and is currently maintained on Levothyroxine therapy.    ==========================================================================                                                PAST MEDICAL HISTORY    Past Medical History:   Diagnosis Date    Allergic     CKD (chronic kidney disease), stage III     Depression     Depression with suicidal ideation 2021    DJD (degenerative joint disease)     DVT (deep venous thrombosis)     Ganglion     rt wrist    Gangrene of foot 10/09/2015    GERD  (gastroesophageal reflux disease)     Headache     Heart murmur     Hiatal hernia     History of esophageal stricture     s/p dialation 40-50 times last EGD 04/2016    Hyperlipidemia     Hypertension     Hypothyroidism     IBS (irritable bowel syndrome)     Nausea, vomiting and diarrhea 09/22/2023    Neuropathy     Osteomyelitis of right foot 03/19/2020    Pulmonary embolism 01/19/2017    Rash     rt lower hip    Retinopathy     S/P BKA (below knee amputation), right 03/18/2022    Seasonal allergies     Sepsis due to group B Streptococcus 03/19/2020    Shortness of breath     Sleep apnea     Stroke     Type 2 diabetes mellitus with peripheral vascular disease     Vitamin D deficiency        ==========================================================================    PAST SURGICAL HISTORY    Past Surgical History:   Procedure Laterality Date    AMPUTATION DIGIT Left 04/26/2022    Procedure: AMPUTATION left great toe;  Surgeon: ERWIN Baker DPM;  Location: [facility] MAIN OR;  Service: Podiatry;  Laterality: Left;    AMPUTATION DIGIT  04/26/2022    Procedure: ;  Surgeon: ERWIN Baker DPM;  Location: [facility] MAIN OR;  Service: Podiatry;;    AMPUTATION FOOT / TOE Right     great toe    AMPUTATION REVISION Right 04/08/2021    Procedure: BELOW KNEE AMPUTATION REVISAION;  Surgeon: Eduardo Reynolds MD;  Location: [facility] MAIN OR;  Service: Orthopedics;  Laterality: Right;    AMPUTATION REVISION Right 04/29/2021    Procedure: AMPUTATION REVISION KNEE STUMP;  Surgeon: Eduardo Reynolds MD;  Location: [facility] MAIN OR;  Service: Orthopedics;  Laterality: Right;    BELOW KNEE AMPUTATION Right 07/30/2020    Procedure: AMPUTATION BELOW KNEE;  Surgeon: Eduardo Reynolds MD;  Location: [facility] MAIN OR;  Service: Orthopedics;  Laterality: Right;    CARDIAC CATHETERIZATION  04/2018    [facility]    CHOLECYSTECTOMY N/A 09/26/2023    Procedure: CHOLECYSTECTOMY LAPAROSCOPIC;  Surgeon: Eduardo Srinivasan MD;  Location: [facility] MAIN OR;   Service: General;  Laterality: N/A;    COLONOSCOPY      ENDOSCOPY      ENDOSCOPY N/A 10/04/2019    Procedure: ESOPHAGOGASTRODUODENOSCOPY with dilitation and biopsy x 1 area;  Surgeon: Declan Iqbal MD;  Location: [facility] ENDOSCOPY;  Service: Gastroenterology    ENDOSCOPY N/A 12/13/2019    Procedure: ESOPHAGOGASTRODUODENOSCOPY WITH DILATATION (50, 52 BOUGIE);  Surgeon: Declan Iqbal MD;  Location: [facility] ENDOSCOPY;  Service: Gastroenterology    ENDOSCOPY N/A 08/06/2021    Procedure: ESOPHAGOGASTRODUODENOSCOPY with biopsy x1 area and esophageal dilation (56FR Bougie);  Surgeon: Hussein Talavera MD;  Location: [facility] ENDOSCOPY;  Service: Gastroenterology;  Laterality: N/A;  post: hiatal hernia, erosive gastritis    ENDOSCOPY N/A 12/13/2022    Procedure: ESOPHAGOGASTRODUODENOSCOPY WITH DILATATION (BOUGIE #54, #56) AND BIOPSY X1;  Surgeon: David Rodriguez MD;  Location: [facility] ENDOSCOPY;  Service: Gastroenterology;  Laterality: N/A;  POST: dysphagia     ENDOSCOPY N/A 09/28/2023    Procedure: ESOPHAGOGASTRODUODENOSCOPY with dilation (58 non guided bougie);  Surgeon: Hussein Talavera MD;  Location: [facility] ENDOSCOPY;  Service: Gastroenterology;  Laterality: N/A;  post op: dysphagia    EYE SURGERY      GANGLION CYST EXCISION Left     HERNIA REPAIR Bilateral     INCISION AND DRAINAGE OF WOUND Right 06/15/2022    Procedure: INCISION AND DRAINAGE WOUND with debridement;  Surgeon: Fito Forte MD;  Location: [facility] MAIN OR;  Service: Orthopedics;  Laterality: Right;    JOINT REPLACEMENT      Left total hip    RETINOPATHY SURGERY      laser    TOTAL HIP ARTHROPLASTY Left     TOTAL HIP ARTHROPLASTY Left 2018    TRANS METATARSAL AMPUTATION Right 03/17/2020    Procedure: AMPUTATION TRANS METATARSAL right;  Surgeon: ERWIN Baker DPM;  Location: [facility] MAIN OR;  Service: Podiatry;  Laterality: Right;  GANGRENOUS RIGHT FOOT    VASECTOMY          ==========================================================================    FAMILY HISTORY    Family History   Problem Relation Age of Onset    Diabetes Mother         Patient  mom    Heart disease Mother     Arthritis Mother     Hyperlipidemia Mother     Leukemia Father     Sleep apnea Maternal Aunt         GENO    Stroke Maternal Grandmother     Hypertension Other     Hyperlipidemia Other     Cancer Other     Colon cancer Other         uncle       ==========================================================================    SOCIAL HISTORY    Social History     Socioeconomic History    Marital status:     Number of children: 3    Highest education level: High school graduate   Tobacco Use    Smoking status: Never    Smokeless tobacco: Never   Vaping Use    Vaping Use: Never used   Substance and Sexual Activity    Alcohol use: Yes     Comment: OCCASIONAL    Drug use: No    Sexual activity: Not Currently     Partners: Female     Birth control/protection: None     Comment: I have Ed problem at this time       ==========================================================================    MEDICATIONS      Current Outpatient Medications:     Accu-Chek Softclix Lancets lancets, Use as directed to test blood sugar 4 times daily before meals and at bedtime., Disp: 100 each, Rfl: 5    amLODIPine (NORVASC) 10 MG tablet, Take 1 tablet by mouth Daily., Disp: 90 tablet, Rfl: 1    apixaban (Eliquis) 5 MG tablet tablet, Take 1 tablet by mouth 2 (Two) Times a Day., Disp: 180 tablet, Rfl: 1    aspirin 81 MG EC tablet, Take 1 tablet by mouth Daily., Disp: , Rfl:     atorvastatin (LIPITOR) 80 MG tablet, Take 1 tablet by mouth Every Night., Disp: 90 tablet, Rfl: 1    Blood Glucose Monitoring Suppl (Accu-Chek Guide) w/Device kit, See Admin Instructions., Disp: , Rfl:     buPROPion XL (Wellbutrin XL) 150 MG 24 hr tablet, Take 1 tablet by mouth Every Morning., Disp: 90 tablet, Rfl: 1    busPIRone (BUSPAR) 10 MG tablet, Take  1 tablet by mouth Every 12 (Twelve) Hours., Disp: 180 tablet, Rfl: 1    CINNAMON PO, Take  by mouth., Disp: , Rfl:     Continuous Blood Gluc  (Dexcom G6 ) device, Use 1 Device Daily. Use to check BS, Disp: 1 each, Rfl: 0    Continuous Blood Gluc Sensor (Dexcom G6 Sensor), Use Every 10 (Ten) Days. Use to check BS daily, Disp: 3 each, Rfl: 11    Continuous Blood Gluc Transmit (Dexcom G6 Transmitter) misc, Use 1 each Every 3 (Three) Months. USE 1 TRANSMITTER EVERY 3 MONTHS, Disp: 1 each, Rfl: 10    cyclobenzaprine (FLEXERIL) 10 MG tablet, Take I tab q 8 hrs as needed for tension headaches, Disp: 30 tablet, Rfl: 0    DULoxetine (CYMBALTA) 60 MG capsule, Take 1 capsule by mouth Every Morning., Disp: 90 capsule, Rfl: 1    gabapentin (NEURONTIN) 300 MG capsule, Take 1 capsule by mouth 3 (Three) Times a Day. Take 1 tablet at night for 1 week; if tolerating take 1 tablet twice a day for 1 week; if tolerating advance to full dosage, Disp: 45 capsule, Rfl: 0    Glucagon (Gvoke HypoPen 2-Pack) 1 MG/0.2ML solution auto-injector, Inject 1 mg under the skin into the appropriate area as directed As Needed (Hypoglycemia)., Disp: 0.4 mL, Rfl: 5    glucose blood (Accu-Chek Guide) test strip, Use as instructed to test blood sugar 4 times daily before meals and at bedtime, Disp: 100 each, Rfl: 12    HumaLOG KwikPen 100 UNIT/ML solution pen-injector, INJECT 10 UNITS            SUBCUTANEOUSLY INTO THE    APPROPRIATE AREA 3 TIMES A DAY WITH MEALS AS DIRECTED, Disp: 30 mL, Rfl: 3    insulin aspart (NovoLOG FlexPen) 100 UNIT/ML solution pen-injector sc pen, Inject 20 Units under the skin into the appropriate area as directed 3 (Three) Times a Day With Meals. Dx code: E11.65, Disp: 30 mL, Rfl: 5    Insulin Glargine (Lantus SoloStar) 100 UNIT/ML injection pen, Inject 45 Units under the skin into the appropriate area as directed Every Night for 265 days., Disp: 45 mL, Rfl: 2    Insulin Pen Needle (Pen Needles) 32G X 4 MM misc,  Use 1 each 4 (Four) Times a Day Before Meals & at Bedtime., Disp: 400 each, Rfl: 3    levothyroxine (Synthroid) 100 MCG tablet, Take 1 tablet by mouth Every Morning., Disp: 90 tablet, Rfl: 1    meclizine (ANTIVERT) 25 MG tablet, Take 1 tablet by mouth 3 (Three) Times a Day As Needed for Dizziness., Disp: 30 tablet, Rfl: 0    metoprolol succinate XL (TOPROL-XL) 100 MG 24 hr tablet, Take 1 tablet by mouth Daily., Disp: 90 tablet, Rfl: 0    multivitamin with minerals tablet tablet, Take 1 tablet by mouth Daily., Disp: , Rfl:     omeprazole (priLOSEC) 40 MG capsule, Take 1 capsule by mouth Daily., Disp: 90 capsule, Rfl: 1    ondansetron ODT (ZOFRAN-ODT) 4 MG disintegrating tablet, Place 1 tablet on the tongue Every 8 (Eight) Hours As Needed for Nausea or Vomiting., Disp: 30 tablet, Rfl: 0    pregabalin (LYRICA) 100 MG capsule, Take 1 capsule by mouth 2 (Two) Times a Day., Disp: 60 capsule, Rfl: 0    Semaglutide,0.25 or 0.5MG/DOS, (Ozempic, 0.25 or 0.5 MG/DOSE,) 2 MG/3ML solution pen-injector, Inject 0.5 mg under the skin into the appropriate area as directed 1 (One) Time Per Week., Disp: 3 mL, Rfl: 3    ==========================================================================    ALLERGIES    Allergies   Allergen Reactions    Lisinopril Cough    Cefixime Other (See Comments)       ==========================================================================    OBJECTIVE    Vitals:    02/26/24 1453   BP: 136/70   Pulse: 70   SpO2: 97%     Body mass index is 32.28 kg/m².     General: Alert, cooperative, no acute distress  Thyroid:  no enlargement/tenderness/palpable nodules  Lungs: Clear to auscultation bilaterally, respirations unlabored  Heart: Regular rate and rhythm, S1 and S2 normal, no murmur, rub or gallop  Abdomen: Soft, NT, ND and Bowel sounds Positive  Extremities:  Extremities normal, atraumatic, no cyanosis or edema    ==========================================================================    LAB  EVALUATION    Lab Results   Component Value Date    GLUCOSE 165 (H) 01/12/2024    BUN 19 01/12/2024    CREATININE 1.42 (H) 01/12/2024    EGFRIFNONA 48 (L) 01/04/2022    BCR 13.4 01/12/2024    K 4.2 01/12/2024    CO2 26.0 01/12/2024    CALCIUM 9.9 01/12/2024    ALBUMIN 4.6 01/12/2024    LABIL2 1.3 04/22/2019    AST 25 01/12/2024    ALT 38 01/12/2024    CHOL 196 01/12/2024    TRIG 148 01/12/2024    HDL 48 01/12/2024     (H) 01/12/2024     Lab Results   Component Value Date    HGBA1C 8.90 (H) 01/12/2024    HGBA1C 11.60 (H) 08/04/2023    HGBA1C 9.00 (H) 03/08/2023     Lab Results   Component Value Date    MICROALBUR 3.2 01/12/2024    CREATININE 1.42 (H) 01/12/2024     Lab Results   Component Value Date    TSH 6.130 (H) 01/12/2024    FREET4 1.28 01/12/2024     ==========================================================================    ASSESSMENT AND PLAN    #Type 2 diabetes complicated with retinopathy, neuropathy and nephropathy  - Reviewed fingerstick data for the glucose and there is no hypoglycemia seen  - Patient was previously doing CGM but currently off continuous glucose monitoring system due to patient have not met his deductible yet  - Patient is tolerating Ozempic therapy at 0.25 mg once a week and is agreeable to uptitrate the dose to 0.5 mg once a week  - Adjusted therapy:  Insulin glargine 45 units nightly  Insulin lispro 15 to 20 units with each meal depending upon the portion size  Ozempic 0.5 mg once a week  - Patient to follow-up in clinic back again in 2 months time    #Hypertension, care as per primary team    #Hyperlipidemia, currently on statin therapy, plan to introduce Zetia in future    # Hypothyroidism, on levothyroxine replacement therapy, free T4 last was normal with mildly elevated TSH, repeat thyroid function before next visit, continue levothyroxine 100 mcg p.o. daily    # Obesity with BMI of 32.28    Return to clinic: 2 months    Entire assessment and plan was discussed and  "counseled the patient in detail to which patient verbalized understanding and agreed with care.  Answered all queries and concerns.    This note was created using voice recognition software and is inherently subject to errors including those of syntax and \"sound-alike\" substitutions which may escape proofreading.  In such instances, original meaning may be extrapolated by contextual derivation.    Note: Portions of this note may have been copied from previous notes but documentation have been reviewed and edited as necessary to support clinical decision making for today's visit.    ==========================================================================    INFORMATION PROVIDED TO PATIENT    Patient Instructions   # Diabetes Management:    Please start insulin therapy as:    - Insulin Glargine (Slow acting insulin) 45 Units in the evening.  - Insulin lispro / aspart (Fast acting / meal time insulin): 15-20 Units with each meal.    Meal time insulin need to be taken 15 mins before meal. You can bring your insulin with you if you are planning to eat out.    If you plan to miss the meal please miss the meal time insulin as well.    Check your blood glucose through dexcom.    Try to maintain water intake around 6 to 8 glasses of water every day.    Increase Ozempic to 0.5 mgs once a week.    Low Blood Glucose:    - If you feel sweaty, anxious, shakiness, feel weak, trouble walking, feels like passing out or trouble seeing thing, please check your blood glucose and if its less than 70 or if your feel symptoms of low blood glucose then take one of the following or use Glucagon Injection:    *3 or 4 glucose tablets  *½ cup of juice or regular soda (not sugar-free)  *2 tablespoons of raisins  *4 or 5 saltine crackers  *1 tablespoon of sugar  *1 tablespoon of honey or corn syrup  *6 to 8 hard candies    Follow up in 2 months with repeat blood work.     Thank you for your visit today.     If you have any questions or " concerns please feel free to reach out of the office.          ==========================================================================  Dionicio Ramachandran MD  Department of Endocrine, Diabetes and Metabolism  [facility], [place]  ==========================================================================

## 2024-02-27 ENCOUNTER — OFFICE VISIT (OUTPATIENT)
Dept: PAIN MEDICINE | Facility: CLINIC | Age: 59
End: 2024-02-27
Payer: MEDICARE

## 2024-02-27 VITALS
SYSTOLIC BLOOD PRESSURE: 149 MMHG | OXYGEN SATURATION: 97 % | RESPIRATION RATE: 16 BRPM | BODY MASS INDEX: 32.28 KG/M2 | WEIGHT: 200 LBS | HEART RATE: 73 BPM | DIASTOLIC BLOOD PRESSURE: 85 MMHG

## 2024-02-27 DIAGNOSIS — G89.4 CHRONIC PAIN SYNDROME: Primary | ICD-10-CM

## 2024-02-27 DIAGNOSIS — M79.18 MYOFASCIAL PAIN SYNDROME: ICD-10-CM

## 2024-02-27 DIAGNOSIS — M47.812 CERVICAL SPONDYLOSIS WITHOUT MYELOPATHY: ICD-10-CM

## 2024-02-27 PROCEDURE — 99204 OFFICE O/P NEW MOD 45 MIN: CPT | Performed by: ANESTHESIOLOGY

## 2024-02-27 PROCEDURE — 3077F SYST BP >= 140 MM HG: CPT | Performed by: ANESTHESIOLOGY

## 2024-02-27 PROCEDURE — 3079F DIAST BP 80-89 MM HG: CPT | Performed by: ANESTHESIOLOGY

## 2024-02-27 PROCEDURE — 1125F AMNT PAIN NOTED PAIN PRSNT: CPT | Performed by: ANESTHESIOLOGY

## 2024-02-27 PROCEDURE — 1159F MED LIST DOCD IN RCRD: CPT | Performed by: ANESTHESIOLOGY

## 2024-02-27 PROCEDURE — 1160F RVW MEDS BY RX/DR IN RCRD: CPT | Performed by: ANESTHESIOLOGY

## 2024-02-27 NOTE — PROGRESS NOTES
"Chief Complaint  Sleep Apnea    Subjective          Kuldeep Adhikari presents to [facility] NEUROLOGY  History of Present Illness  GENO f/u patient states he is benefiting from pap therapy he states it feels like he is not getting enough air and his nose will clog up some,he uses [facility] and goes through [facility] for supplies.      Sleep testing history:    On NPSG at [facility] , 12/7/21 patient had Mild obstructive sleep apnea syndrome with apnea-hypopnea index of 9.2 per sleep hour, minimum SpO2 of 79% but severe supine at 30 per hour     PAP download:  The patient is on CPAP therapy at 6-16 cm/H2O.   Data indicates Excellent compliance. With 100% usage for more than 4 hours with an average usage of 9 hours 12 minutes. AHI down to 1 .  Average pressures 7.1.     The patient's hypersomnia has resolved       [place] Sleepiness Scale:  Sitting and reading 1 WatchingTV 1  Sitting, inactive, in a public place 0  As a passenger in a car for 1 hour w/o a break  1  Lying down to rest in the afternoon  2  Sitting and talking to someone  0  Sitting quietly after a lunch  1  In a car, while stopped for traffic or a light  0  Total 6      Review of Systems   Constitutional:  Positive for activity change and fatigue.   HENT:  Positive for congestion, ear discharge and sinus pressure.    Respiratory:  Positive for apnea.    Genitourinary:  Positive for frequency. Negative for urgency.   Musculoskeletal:  Positive for neck pain.   All other systems reviewed and are negative.      Objective   Vital Signs:   /81   Pulse 70   Resp 16   Ht 167.6 cm (66\")   Wt 91.2 kg (201 lb)   BMI 32.44 kg/m²     Physical Exam  Vitals reviewed.   Pulmonary:      Effort: Pulmonary effort is normal. No respiratory distress.   Neurological:      General: No focal deficit present.      Mental Status: He is alert and oriented to person, place, and time.   Psychiatric:         Mood and Affect: Mood normal.        Result Review :            "      Assessment and Plan    Diagnoses and all orders for this visit:    1. Obstructive sleep apnea syndrome (Primary)        Continue CPAP change pressure to , min 7 max 10  The patient is compliant with and benefiting from PAP therapy.      Follow Up   Return in about 1 year (around 2/28/2025).    Patient was given instructions and counseling regarding his condition or for health maintenance advice. Please see specific information pulled into the AVS if appropriate.       This document has been electronically signed by Joseph Seipel, MD on February 28, 2024 09:32 EST

## 2024-02-27 NOTE — PROGRESS NOTES
Subjective   CC neck pain, right shoulder right arm pain  Kuldeep Adhikari is a 58 y.o. male with history of DM, right BKA hx of PE, on Eliquis, chronic neck pain here for initial evaluation.  Referred by neurosurgery.  Complains of several months of worsening axial neck pain mostly right-sided and occasionally radiating to right shoulder occasionally right arm and to last digits, constant but worse with neck movement or activity.  Denies balance issues, bladder bowel continence  Tried physical therapy without relief.  Tried anti-inflammatory gabapentin without relief  EMG/NCS showed cubital tunnel syndromes ulnar neuropathy, no evidence of cervical radiculopathy.  Seen neurosurgery, no surgical intervention recommended, referred to try injections  He has tried cervical SHARI x 2 about 2 years ago with marginal relief.    Imaging reviewed  C-spine MRI : C6-C7: There is a right paramedian disc protrusion superimposed upon a mild broad-based disc bulge, and there is mild bilateral uncovertebral and facet arthropathy. There is mild bilateral neural foraminal narrowing, and there is mild spinal canal narrowing.Visualized marrow signal is unremarkable. Vertebral body heights are normal. Cervical discs are somewhat desiccated. No cervical cord signal     Pain Assessment   Location of Pain:  neck pain, right arm joint  Description of Pain: Dull/Aching, Throbbing, Stabbing  Previous Pain Rating :8  Current Pain Ratin  Aggravating Factors: Activity  Alleviating Factors: Rest, Medication  Pain onset over 12 weeks ago  Interferes with ADL's.   [place] back pain disability scale scanned in chart    PEG Assessment   What number best describes your pain on average in the past week?8  What number best describes how, during the past week, pain has interfered with your enjoyment of life?8  What number best describes how, during the past week, pain has interfered with your general activity?  10    The following portions of the  patient's history were reviewed and updated as appropriate: allergies, current medications, past family history, past medical history, past social history, past surgical history and problem list.     has a past medical history of Allergic, CKD (chronic kidney disease), stage III, Depression, Depression with suicidal ideation (08/08/2021), DJD (degenerative joint disease), DVT (deep venous thrombosis), Ganglion, Gangrene of foot (10/09/2015), GERD (gastroesophageal reflux disease), Headache, Heart murmur, Hiatal hernia, History of esophageal stricture, Hyperlipidemia, Hypertension, Hypothyroidism, IBS (irritable bowel syndrome), Nausea, vomiting and diarrhea (09/22/2023), Neuropathy, Osteomyelitis of right foot (03/19/2020), Pulmonary embolism (01/19/2017), Rash, Retinopathy, S/P BKA (below knee amputation), right (03/18/2022), Seasonal allergies, Sepsis due to group B Streptococcus (03/19/2020), Shortness of breath, Sleep apnea, Stroke, Type 2 diabetes mellitus with peripheral vascular disease, and Vitamin D deficiency.   has a past surgical history that includes Total hip arthroplasty (Left); Total hip arthroplasty (Left, 2018); Esophagogastroduodenoscopy; Esophagogastroduodenoscopy (N/A, 10/04/2019); Hernia repair (Bilateral); Cardiac catheterization (04/2018); Amputation foot / toe (Right); Ganglion cyst excision (Left); Retinopathy surgery; Esophagogastroduodenoscopy (N/A, 12/13/2019); Foot amputation through metatarsal (Right, 03/17/2020); Leg amputation, lower tibia/fibula (Right, 07/30/2020); Amputation Revision (Right, 04/08/2021); Amputation Revision (Right, 04/29/2021); Esophagogastroduodenoscopy (N/A, 08/06/2021); Eye surgery; Joint replacement; Vasectomy; Colonoscopy; Finger amputation (Left, 04/26/2022); Finger amputation (04/26/2022); Incision and drainage of wound (Right, 06/15/2022); Esophagogastroduodenoscopy (N/A, 12/13/2022); Cholecystectomy (N/A, 09/26/2023); and Esophagogastroduodenoscopy (N/A,  09/28/2023).  family history includes Arthritis in his mother; Cancer in an other family member; Colon cancer in an other family member; Diabetes in his mother; Heart disease in his mother; Hyperlipidemia in his mother and another family member; Hypertension in an other family member; Leukemia in his father; Sleep apnea in his maternal aunt; Stroke in his maternal grandmother.  Social History     Tobacco Use    Smoking status: Never    Smokeless tobacco: Never   Substance Use Topics    Alcohol use: Yes     Comment: OCCASIONAL       Review of Systems   Musculoskeletal:  Positive for myalgias and neck pain.   All other systems reviewed and are negative.      Objective   Physical Exam  Vitals reviewed.   Constitutional:       General: He is not in acute distress.  Pulmonary:      Effort: Pulmonary effort is normal.   Musculoskeletal:      Cervical back: Spasms and tenderness present. Decreased range of motion.      Lumbar back: Tenderness present. Decreased range of motion.       /85   Pulse 73   Resp 16   Wt 90.7 kg (200 lb)   SpO2 97%   BMI 32.28 kg/m²     PHQ 9 on chart  Opioid risk tool low risk      Assessment & Plan   Diagnoses and all orders for this visit:    1. Chronic pain syndrome (Primary)    2. Cervical spondylosis without myelopathy  -     Facet  -     Facet    3. Myofascial pain syndrome      Summary  Kuldeep Adhikari is a 58 y.o. male with history of DM, right BKA, hx of PE, on Eliquis chronic neck pain here for initial evaluation.  Referred by neurosurgery.  Complains of several months of worsening axial neck pain mostly right-sided and occasional radiating to right shoulder occasionally right arm and to last digits, constant but worse with neck movement or activity.  Denies balance issues, bladder bowel continence  Tried physical therapy without relief.  Tried anti-inflammatory gabapentin without relief  EMG/NCS showed cubital tunnel syndromes ulnar neuropathy, no evidence of cervical  radiculopathy.  Seen neurosurgery, no surgical intervention recommended, referred to try injections  He has tried cervical SHARI x 2 about 2 years ago with marginal relief.    Worsening neck pain mostly right-sided.  Majority of pain is axial neck pain.  Has tried cervical epidural with marginal relief.  Pain is significantly impairing ADL.  Seen neurosurgery and no surgical invention recommended.  Referred to try injections  We will schedule for right cervical medial branch block at C3/4, C4/5 x2.  If effective will plan RFA    RTC for procedure        Note is dictated utilizing voice recognition software. Unfortunately this leads to occasional typographical errors. I apologize in advance if the situation occurs. If questions occur please do not hesitate to call our office.

## 2024-02-28 ENCOUNTER — OFFICE VISIT (OUTPATIENT)
Dept: NEUROLOGY | Facility: CLINIC | Age: 59
End: 2024-02-28
Payer: MEDICARE

## 2024-02-28 VITALS
SYSTOLIC BLOOD PRESSURE: 154 MMHG | RESPIRATION RATE: 16 BRPM | HEIGHT: 66 IN | DIASTOLIC BLOOD PRESSURE: 81 MMHG | BODY MASS INDEX: 32.3 KG/M2 | WEIGHT: 201 LBS | HEART RATE: 70 BPM

## 2024-02-28 DIAGNOSIS — G47.33 OBSTRUCTIVE SLEEP APNEA SYNDROME: Primary | Chronic | ICD-10-CM

## 2024-02-28 PROCEDURE — 99213 OFFICE O/P EST LOW 20 MIN: CPT | Performed by: PSYCHIATRY & NEUROLOGY

## 2024-02-28 PROCEDURE — 1160F RVW MEDS BY RX/DR IN RCRD: CPT | Performed by: PSYCHIATRY & NEUROLOGY

## 2024-02-28 PROCEDURE — 3079F DIAST BP 80-89 MM HG: CPT | Performed by: PSYCHIATRY & NEUROLOGY

## 2024-02-28 PROCEDURE — 1159F MED LIST DOCD IN RCRD: CPT | Performed by: PSYCHIATRY & NEUROLOGY

## 2024-02-28 PROCEDURE — 3077F SYST BP >= 140 MM HG: CPT | Performed by: PSYCHIATRY & NEUROLOGY

## 2024-02-29 RX ORDER — ONDANSETRON 4 MG/1
TABLET, ORALLY DISINTEGRATING ORAL
Qty: 30 TABLET | Refills: 10 | OUTPATIENT
Start: 2024-02-29

## 2024-03-05 ENCOUNTER — TELEPHONE (OUTPATIENT)
Dept: FAMILY MEDICINE CLINIC | Facility: CLINIC | Age: 59
End: 2024-03-05
Payer: MEDICARE

## 2024-03-05 RX ORDER — BLOOD-GLUCOSE METER
1 KIT MISCELLANEOUS DAILY
Qty: 1 KIT | Refills: 0 | Status: SHIPPED | OUTPATIENT
Start: 2024-03-05

## 2024-03-05 RX ORDER — BLOOD-GLUCOSE METER
KIT MISCELLANEOUS
Qty: 100 EACH | Refills: 1 | Status: SHIPPED | OUTPATIENT
Start: 2024-03-05

## 2024-03-05 NOTE — TELEPHONE ENCOUNTER
Elvie called from [employer] on behalf of the patient. He uses the Dexcom device kit now. But he have a $800 deductible that he have to meet before he can get another refill on the Dexcom product. Until he met his deductible he will need to change back to the FreeStyle Mark Lite device. Please send a Rx from the FreeStyle Mark Lite equipment kit. Please send to [facility] in [place].

## 2024-03-06 ENCOUNTER — TELEPHONE (OUTPATIENT)
Dept: FAMILY MEDICINE CLINIC | Facility: CLINIC | Age: 59
End: 2024-03-06
Payer: MEDICARE

## 2024-03-06 NOTE — TELEPHONE ENCOUNTER
Prior Authorization for FreeStyle Lite Test strips approved.     Approved today  PA Case: [id number], Status: Approved, Coverage Starts on: 2/1/2024 12:00:00 AM, Coverage Ends on: 3/6/2025 12:00:00 AM.  Authorization Expiration Date: 3/5/2025          Faxed approval to pharmacy.

## 2024-03-06 NOTE — TELEPHONE ENCOUNTER
Prior Auth completed for FreeStyle Lite Test strips via covermymeds. Pending determination.  (Key: [id number])  Rx #: [id number]  PA Case ID #: [id number]    Form  [employer] Electronic PA Form (2017 NCP[place])

## 2024-03-08 ENCOUNTER — HOSPITAL ENCOUNTER (OUTPATIENT)
Dept: PAIN MEDICINE | Facility: HOSPITAL | Age: 59
Discharge: HOME | End: 2024-03-08
Payer: MEDICARE

## 2024-03-08 ENCOUNTER — TELEPHONE (OUTPATIENT)
Dept: FAMILY MEDICINE CLINIC | Facility: CLINIC | Age: 59
End: 2024-03-08
Payer: MEDICARE

## 2024-03-08 VITALS
SYSTOLIC BLOOD PRESSURE: 159 MMHG | BODY MASS INDEX: 32.3 KG/M2 | HEART RATE: 73 BPM | DIASTOLIC BLOOD PRESSURE: 92 MMHG | HEIGHT: 66 IN | TEMPERATURE: 97.1 F | RESPIRATION RATE: 16 BRPM | WEIGHT: 201 LBS | OXYGEN SATURATION: 99 %

## 2024-03-08 DIAGNOSIS — E11.65 UNCONTROLLED TYPE 2 DIABETES MELLITUS WITH HYPERGLYCEMIA: ICD-10-CM

## 2024-03-08 DIAGNOSIS — E11.65 TYPE 2 DIABETES MELLITUS WITH HYPERGLYCEMIA, WITH LONG-TERM CURRENT USE OF INSULIN: ICD-10-CM

## 2024-03-08 DIAGNOSIS — R52 PAIN: ICD-10-CM

## 2024-03-08 DIAGNOSIS — Z79.4 TYPE 2 DIABETES MELLITUS WITH HYPERGLYCEMIA, WITH LONG-TERM CURRENT USE OF INSULIN: ICD-10-CM

## 2024-03-08 DIAGNOSIS — M47.812 CERVICAL SPONDYLOSIS WITHOUT MYELOPATHY: Primary | ICD-10-CM

## 2024-03-08 PROCEDURE — 25010000002 BUPIVACAINE (PF) 0.25 % SOLUTION: Performed by: ANESTHESIOLOGY

## 2024-03-08 PROCEDURE — 77003 FLUOROGUIDE FOR SPINE INJECT: CPT

## 2024-03-08 PROCEDURE — 25510000001 IOPAMIDOL 41 % SOLUTION: Performed by: ANESTHESIOLOGY

## 2024-03-08 RX ORDER — PROCHLORPERAZINE 25 MG/1
SUPPOSITORY RECTAL
Qty: 9 EACH | Refills: 1 | Status: SHIPPED | OUTPATIENT
Start: 2024-03-08

## 2024-03-08 RX ORDER — PROCHLORPERAZINE 25 MG/1
1 SUPPOSITORY RECTAL DAILY
Qty: 1 EACH | Refills: 0 | Status: SHIPPED | OUTPATIENT
Start: 2024-03-08

## 2024-03-08 RX ORDER — PROCHLORPERAZINE 25 MG/1
1 SUPPOSITORY RECTAL
Qty: 3 EACH | Refills: 1 | Status: SHIPPED | OUTPATIENT
Start: 2024-03-08

## 2024-03-08 RX ORDER — BUPIVACAINE HYDROCHLORIDE 2.5 MG/ML
10 INJECTION, SOLUTION EPIDURAL; INFILTRATION; INTRACAUDAL ONCE
Status: COMPLETED | OUTPATIENT
Start: 2024-03-08 | End: 2024-03-08

## 2024-03-08 RX ORDER — IOPAMIDOL 408 MG/ML
3 INJECTION, SOLUTION INTRATHECAL
Status: COMPLETED | OUTPATIENT
Start: 2024-03-08 | End: 2024-03-08

## 2024-03-08 RX ADMIN — BUPIVACAINE HYDROCHLORIDE 10 ML: 2.5 INJECTION, SOLUTION EPIDURAL; INFILTRATION; INTRACAUDAL; PERINEURAL at 10:29

## 2024-03-08 RX ADMIN — IOPAMIDOL 3 ML: 408 INJECTION, SOLUTION INTRATHECAL at 10:28

## 2024-03-08 NOTE — PROCEDURES
"Subjective   CC neck pain  Kuldeep Adhikari is a 58 y.o. male with cervical spondylosis here for right C3/4, C4/5 medial branch block..  Off Eliquis    Pain Assessment   Location of Pain:  neck pain, joint  Description of Pain: Dull/Aching, Throbbing, Stabbing  Previous Pain Rating :7  Current Pain Ratin  Aggravating Factors: Activity  Alleviating Factors: Rest, Medication  Pain onset over 12 weeks ago  Pain interferes with ADL's  [place] back pain disability scale scanned in chart     The following portions of the patient's history were reviewed and updated as appropriate: allergies, current medications, past family history, past medical history, past social history, past surgical history and problem list.      Review of Systems  As in HPI  Objective   Physical Exam  Vitals reviewed.   Constitutional:       General: He is not in acute distress.  Pulmonary:      Effort: Pulmonary effort is normal.       /92   Pulse 73   Temp 97.1 °F (36.2 °C) (Skin)   Resp 16   Ht 167.6 cm (66\")   Wt 91.2 kg (201 lb)   SpO2 99%   BMI 32.44 kg/m²       Assessment & Plan    Underwent right C3/4, C4/5 medial branch block.   Reported 90% relief 15 minutes after the procedure.    RTC 4-6 weeks or as needed for repeat    DATE OF PROCEDURE: 3/8/2024    PREOPERATIVE DIAGNOSIS: Cervical spondylosis    POSTOPERATIVE DIAGNOSIS: same    PROCEDURE PERFORMED:  Right C3/4 C4/5 Medial Branch Block    The patient presents with a history of cervical spondylosis on the at level C3/4, C4/5.   The patient presents today for a cervical medial branch block. The patient understands the risks and benefits of the procedure and wishes to proceed.  The patient was seen in the preoperative area.  Patient's consent was obtained and updated.  Vitals were taken.  Patient was then brought to the procedure suite and placed in a prone position.  The appropriate anatomic area was widely prepped with  Chloraprep and draped in a sterile fashion.  " Noninvasive monitoring per routine anesthesia protocol was placed.  Under fluoroscopic guidance an AP view with caudad cephaled tilt was obtained. A 22 guage curved tip spinal needle was passed through skin anesthesized with 1% Lidocaine without epinephrine. The needle tip was guided to the waist of the articular pillar at C3, C4, C5.  Next 0.25 mL of omnipaque 240 preservative free contrast were injected.   At this point  0.25 mL of  0.25% bupivacaine was injected.  A sterile dressing was placed over the puncture site.  The patient tolerated the procedure with no complications. They were then brought to the post procedure area where they recovered nicely.    Reported 90% relief 15 to 20 minutes after the procedure.

## 2024-03-08 NOTE — TELEPHONE ENCOUNTER
Caller: Kuldeep Adhikari    Relationship: Self    Best call back number: [phone]     Which medication are you concerned about: Continuous Blood Gluc  (Dexcom G6 ) device       Continuous Blood Gluc Transmit (Dexcom G6 Transmitter) misc       Continuous Blood Gluc Sensor (Dexcom G6 Sensor)       Who prescribed you this medication: DR BEDOLLA        What are your concerns: PATIENT IS CALLING TO STATE THE PHARMACY DID NOT RECEIVE THE ABOVE PRESCRIPTIONS.  HE IS WANTING TO KNOW IF DR BEDOLLA  WOULD SEND AGAIN.        [address] [phone] [facility] [phone]  [phone]       PLEASE ADVISE.

## 2024-03-11 ENCOUNTER — TELEPHONE (OUTPATIENT)
Dept: PAIN MEDICINE | Facility: HOSPITAL | Age: 59
End: 2024-03-11
Payer: MEDICARE

## 2024-03-21 ENCOUNTER — TELEPHONE (OUTPATIENT)
Dept: ENDOCRINOLOGY | Facility: CLINIC | Age: 59
End: 2024-03-21
Payer: MEDICARE

## 2024-03-21 NOTE — TELEPHONE ENCOUNTER
PA submitted through covermymeds for Novolog Key: [id number]    PA Case: [id number], Status: Approved, Coverage Starts on: 2/1/2024 12:00:00 AM, Coverage Ends on: 3/21/2025 12:00:00 AM.

## 2024-03-28 RX ORDER — ONDANSETRON 4 MG/1
TABLET, ORALLY DISINTEGRATING ORAL
Qty: 30 TABLET | Refills: 0 | Status: SHIPPED | OUTPATIENT
Start: 2024-03-28

## 2024-04-12 ENCOUNTER — LAB (OUTPATIENT)
Dept: LAB | Facility: HOSPITAL | Age: 59
End: 2024-04-12
Payer: MEDICARE

## 2024-04-12 ENCOUNTER — HOSPITAL ENCOUNTER (OUTPATIENT)
Dept: PAIN MEDICINE | Facility: HOSPITAL | Age: 59
Discharge: HOME | End: 2024-04-12
Payer: MEDICARE

## 2024-04-12 VITALS
BODY MASS INDEX: 39.27 KG/M2 | HEIGHT: 60 IN | OXYGEN SATURATION: 97 % | WEIGHT: 200 LBS | TEMPERATURE: 98.6 F | SYSTOLIC BLOOD PRESSURE: 154 MMHG | HEART RATE: 77 BPM | DIASTOLIC BLOOD PRESSURE: 92 MMHG | RESPIRATION RATE: 12 BRPM

## 2024-04-12 DIAGNOSIS — N17.9 AKI (ACUTE KIDNEY INJURY): ICD-10-CM

## 2024-04-12 DIAGNOSIS — E03.9 HYPOTHYROIDISM, UNSPECIFIED TYPE: ICD-10-CM

## 2024-04-12 DIAGNOSIS — M47.812 CERVICAL SPONDYLOSIS WITHOUT MYELOPATHY: Primary | ICD-10-CM

## 2024-04-12 DIAGNOSIS — Z79.4 TYPE 2 DIABETES MELLITUS WITH HYPERGLYCEMIA, WITH LONG-TERM CURRENT USE OF INSULIN: ICD-10-CM

## 2024-04-12 DIAGNOSIS — N18.31 STAGE 3A CHRONIC KIDNEY DISEASE: ICD-10-CM

## 2024-04-12 DIAGNOSIS — E11.65 TYPE 2 DIABETES MELLITUS WITH HYPERGLYCEMIA, WITH LONG-TERM CURRENT USE OF INSULIN: ICD-10-CM

## 2024-04-12 DIAGNOSIS — R52 PAIN: ICD-10-CM

## 2024-04-12 LAB
ALBUMIN SERPL-MCNC: 4.3 G/DL (ref 3.5–5.2)
ALBUMIN/GLOB SERPL: 1.8 G/DL
ALP SERPL-CCNC: 132 U/L (ref 39–117)
ALT SERPL W P-5'-P-CCNC: 36 U/L (ref 1–41)
ANION GAP SERPL CALCULATED.3IONS-SCNC: 11 MMOL/L (ref 5–15)
AST SERPL-CCNC: 26 U/L (ref 1–40)
BILIRUB SERPL-MCNC: 1.3 MG/DL (ref 0–1.2)
BUN SERPL-MCNC: 15 MG/DL (ref 8–23)
BUN/CREAT SERPL: 11.2 (ref 7–25)
CALCIUM SPEC-SCNC: 9.6 MG/DL (ref 8.6–10.5)
CHLORIDE SERPL-SCNC: 104 MMOL/L (ref 98–107)
CO2 SERPL-SCNC: 27 MMOL/L (ref 22–29)
CREAT SERPL-MCNC: 1.34 MG/DL (ref 0.76–1.27)
EGFRCR SERPLBLD CKD-EPI 2021: 60.6 ML/MIN/1.73
GLOBULIN UR ELPH-MCNC: 2.4 GM/DL
GLUCOSE SERPL-MCNC: 122 MG/DL (ref 65–99)
HBA1C MFR BLD: 7.4 % (ref 4.8–5.6)
POTASSIUM SERPL-SCNC: 4.5 MMOL/L (ref 3.5–5.2)
PROT SERPL-MCNC: 6.7 G/DL (ref 6–8.5)
SODIUM SERPL-SCNC: 142 MMOL/L (ref 136–145)
T4 FREE SERPL-MCNC: 1.4 NG/DL (ref 0.93–1.7)
TSH SERPL DL<=0.05 MIU/L-ACNC: 3.58 UIU/ML (ref 0.27–4.2)

## 2024-04-12 PROCEDURE — 83036 HEMOGLOBIN GLYCOSYLATED A1C: CPT

## 2024-04-12 PROCEDURE — 36415 COLL VENOUS BLD VENIPUNCTURE: CPT

## 2024-04-12 PROCEDURE — 25010000002 BUPIVACAINE (PF) 0.25 % SOLUTION: Performed by: ANESTHESIOLOGY

## 2024-04-12 PROCEDURE — 84439 ASSAY OF FREE THYROXINE: CPT

## 2024-04-12 PROCEDURE — 77003 FLUOROGUIDE FOR SPINE INJECT: CPT

## 2024-04-12 PROCEDURE — 80053 COMPREHEN METABOLIC PANEL: CPT

## 2024-04-12 PROCEDURE — 84443 ASSAY THYROID STIM HORMONE: CPT

## 2024-04-12 PROCEDURE — 25510000001 IOPAMIDOL 41 % SOLUTION: Performed by: ANESTHESIOLOGY

## 2024-04-12 RX ORDER — BUPIVACAINE HYDROCHLORIDE 2.5 MG/ML
10 INJECTION, SOLUTION EPIDURAL; INFILTRATION; INTRACAUDAL ONCE
Status: COMPLETED | OUTPATIENT
Start: 2024-04-12 | End: 2024-04-12

## 2024-04-12 RX ORDER — IOPAMIDOL 408 MG/ML
3 INJECTION, SOLUTION INTRATHECAL
Status: COMPLETED | OUTPATIENT
Start: 2024-04-12 | End: 2024-04-12

## 2024-04-12 RX ADMIN — BUPIVACAINE HYDROCHLORIDE 10 ML: 2.5 INJECTION, SOLUTION EPIDURAL; INFILTRATION; INTRACAUDAL; PERINEURAL at 11:05

## 2024-04-12 RX ADMIN — IOPAMIDOL 3 ML: 408 INJECTION, SOLUTION INTRATHECAL at 11:05

## 2024-04-12 NOTE — PROCEDURES
"Subjective   CC neck pain  Kuldeep Adhikari is a 59 y.o. male with cervical spondylosis here for repeat right C3/4, C4/5 medial branch block..  Off Eliquis    Pain Assessment   Location of Pain:  neck pain, joint  Description of Pain: Dull/Aching, Throbbing, Stabbing  Previous Pain Rating :7  Current Pain Ratin  Aggravating Factors: Activity  Alleviating Factors: Rest, Medication  Pain onset over 12 weeks ago  Pain interferes with ADL's  [place] back pain disability scale scanned in chart     The following portions of the patient's history were reviewed and updated as appropriate: allergies, current medications, past family history, past medical history, past social history, past surgical history and problem list.      Review of Systems  As in HPI  Objective   Physical Exam  Vitals reviewed.   Constitutional:       General: He is not in acute distress.  Pulmonary:      Effort: Pulmonary effort is normal.       /92 (BP Location: Right arm, Patient Position: Sitting)   Pulse 77   Temp 98.6 °F (37 °C)   Resp 12   Ht 152.4 cm (60\")   Wt 90.7 kg (200 lb)   SpO2 97%   BMI 39.06 kg/m²       Assessment & Plan    Underwent repeat right C3/4, C4/5 medial branch block.   Reported 80% relief 15 minutes after the procedure.    Also reported 90% relief from the first cervical medial branch blocks lasted 3-4 days.    Will schedule for right C3-C4 and C4-C5 cervical RFA  w/wo sedation.    RTC 4-6 weeks or as needed for repeat    DATE OF PROCEDURE: 2024    PREOPERATIVE DIAGNOSIS: Cervical spondylosis    POSTOPERATIVE DIAGNOSIS: same    PROCEDURE PERFORMED:  Right C3/4 C4/5 Medial Branch Block    The patient presents with a history of cervical spondylosis on the at level C3/4, C4/5.   The patient presents today for a cervical medial branch block. The patient understands the risks and benefits of the procedure and wishes to proceed.  The patient was seen in the preoperative area.  Patient's consent was obtained and " Anticoagulation Clinic Progress Note    Anticoagulation Summary  As of 2019    INR goal:   2.0-3.0   TTR:   63.4 % (1.1 y)   INR used for dosin.10 (2019)   Warfarin maintenance plan:   7.5 mg every Sun; 5 mg all other days   Weekly warfarin total:   37.5 mg   Plan last modified:   Meghna Horan RPH (2019)   Next INR check:   2020   Priority:   Maintenance   Target end date:   Indefinite    Indications    Permanent atrial fibrillation [I48.21]             Anticoagulation Episode Summary     INR check location:       Preferred lab:       [facility] IN[facility] reminders [facility]:    [facility] [place]    Comments:         Anticoagulation Care Providers     Provider Role Specialty Phone number    Kurt Henry MD Referring Cardiology [phone]          INR History:  Anticoagulation Monitoring 2019   INR 3.70 1.40 2.10   INR Date 2019   INR Goal 2.0-3.0 2.0-3.0 2.0-3.0   Trend Down Same Same   Last Week Total 40 mg 37.5 mg 40 mg   Next Week Total 22.5 mg 40 mg 37.5 mg   Sun 7.5 mg 7.5 mg 7.5 mg   Mon Hold (); Otherwise 5 mg 5 mg 5 mg   Tue Hold (12/3); Otherwise 5 mg 5 mg 5 mg   Wed Hold (, ) 5 mg 5 mg   Thu 5 mg 7.5 mg () 5 mg   Fri 5 mg 5 mg 5 mg   Sat 5 mg 5 mg 5 mg   Visit Report - - -   Some recent data might be hidden       Plan:  1. INR is Therapeutic today- see above in Anticoagulation Summary.   Faxed instructions to [facility] to Continue their warfarin regimen- see above in Anticoagulation Summary.  2. Follow up in 2 weeks in clinic.  3. Spoke with pt today and he will call back to make clinic appointment in 2 wks.      Meghna Horan RPH   updated.  Vitals were taken.  Patient was then brought to the procedure suite and placed in a prone position.  The appropriate anatomic area was widely prepped with  Chloraprep and draped in a sterile fashion.  Noninvasive monitoring per routine anesthesia protocol was placed.  Under fluoroscopic guidance an AP view with caudad cephaled tilt was obtained. A 22 guage curved tip spinal needle was passed through skin anesthesized with 1% Lidocaine without epinephrine. The needle tip was guided to the waist of the articular pillar at C3, C4, C5.  Next 0.25 mL of omnipaque 240 preservative free contrast were injected.   At this point  0.25 mL of  0.25% bupivacaine was injected.  A sterile dressing was placed over the puncture site.  The patient tolerated the procedure with no complications. They were then brought to the post procedure area where they recovered nicely.    Reported 90% relief 15 to 20 minutes after the procedure.

## 2024-04-12 NOTE — ADDENDUM NOTE
Encounter addended by: Eliza Lebron MD on: 4/12/2024 12:52 PM   Actions taken: Order list changed, Diagnosis association updated

## 2024-04-15 ENCOUNTER — TELEPHONE (OUTPATIENT)
Dept: PAIN MEDICINE | Facility: HOSPITAL | Age: 59
End: 2024-04-15
Payer: MEDICARE

## 2024-04-15 NOTE — TELEPHONE ENCOUNTER
Post procedure phone call completed.  Pt states they are doing good and denies questions or concerns.  Patient reports pain level a #4 with 90% pain relief.

## 2024-04-23 DIAGNOSIS — E11.65 TYPE 2 DIABETES MELLITUS WITH HYPERGLYCEMIA, WITH LONG-TERM CURRENT USE OF INSULIN: ICD-10-CM

## 2024-04-23 DIAGNOSIS — Z79.4 TYPE 2 DIABETES MELLITUS WITH HYPERGLYCEMIA, WITH LONG-TERM CURRENT USE OF INSULIN: ICD-10-CM

## 2024-04-24 RX ORDER — SEMAGLUTIDE 0.68 MG/ML
INJECTION, SOLUTION SUBCUTANEOUS
Qty: 3 ML | Refills: 4 | Status: SHIPPED | OUTPATIENT
Start: 2024-04-24 | End: 2024-04-26 | Stop reason: SDUPTHER

## 2024-04-25 RX ORDER — ONDANSETRON 4 MG/1
TABLET, ORALLY DISINTEGRATING ORAL
Qty: 30 TABLET | Refills: 0 | Status: SHIPPED | OUTPATIENT
Start: 2024-04-25

## 2024-04-26 ENCOUNTER — OFFICE VISIT (OUTPATIENT)
Dept: ENDOCRINOLOGY | Facility: CLINIC | Age: 59
End: 2024-04-26
Payer: MEDICARE

## 2024-04-26 VITALS
OXYGEN SATURATION: 98 % | SYSTOLIC BLOOD PRESSURE: 120 MMHG | WEIGHT: 193 LBS | HEIGHT: 60 IN | BODY MASS INDEX: 37.89 KG/M2 | DIASTOLIC BLOOD PRESSURE: 72 MMHG | HEART RATE: 70 BPM

## 2024-04-26 DIAGNOSIS — E29.1 HYPOGONADISM IN MALE: ICD-10-CM

## 2024-04-26 DIAGNOSIS — E11.42 DIABETIC PERIPHERAL NEUROPATHY: ICD-10-CM

## 2024-04-26 DIAGNOSIS — E66.9 CLASS 1 OBESITY WITH SERIOUS COMORBIDITY AND BODY MASS INDEX (BMI) OF 32.0 TO 32.9 IN ADULT, UNSPECIFIED OBESITY TYPE: ICD-10-CM

## 2024-04-26 DIAGNOSIS — E03.9 HYPOTHYROIDISM, UNSPECIFIED TYPE: ICD-10-CM

## 2024-04-26 DIAGNOSIS — I10 PRIMARY HYPERTENSION: ICD-10-CM

## 2024-04-26 DIAGNOSIS — E78.2 MIXED HYPERLIPIDEMIA: ICD-10-CM

## 2024-04-26 DIAGNOSIS — Z79.4 TYPE 2 DIABETES MELLITUS WITH HYPERGLYCEMIA, WITH LONG-TERM CURRENT USE OF INSULIN: Primary | ICD-10-CM

## 2024-04-26 DIAGNOSIS — E11.319 DIABETIC RETINOPATHY ASSOCIATED WITH TYPE 2 DIABETES MELLITUS, MACULAR EDEMA PRESENCE UNSPECIFIED, UNSPECIFIED LATERALITY, UNSPECIFIED RETINOPATHY SEVERITY: ICD-10-CM

## 2024-04-26 DIAGNOSIS — E11.21 DIABETIC NEPHROPATHY ASSOCIATED WITH TYPE 2 DIABETES MELLITUS: ICD-10-CM

## 2024-04-26 DIAGNOSIS — E11.65 TYPE 2 DIABETES MELLITUS WITH HYPERGLYCEMIA, WITH LONG-TERM CURRENT USE OF INSULIN: Primary | ICD-10-CM

## 2024-04-26 DIAGNOSIS — N18.31 STAGE 3A CHRONIC KIDNEY DISEASE: ICD-10-CM

## 2024-04-26 RX ORDER — SEMAGLUTIDE 0.68 MG/ML
0.5 INJECTION, SOLUTION SUBCUTANEOUS WEEKLY
Qty: 9 ML | Refills: 3 | Status: SHIPPED | OUTPATIENT
Start: 2024-04-26

## 2024-04-26 NOTE — PROGRESS NOTES
-----------------------------------------------------------------  ENDOCRINE CLINIC NOTE  -----------------------------------------------------------------        PATIENT NAME: Kuldeep Adhikari  PATIENT : 1965 AGE: 59 y.o.  MRN NUMBER: [id number]  PRIMARY CARE: Laura Whitaker MD    ==========================================================================    CHIEF COMPLAINT: Type 2 Diabetes Mellitus  DATE OF SERVICE: 24    ==========================================================================    HPI / SUBJECTIVE    59 y.o. male is seen in the clinic today for follow up of type 2 diabetes.  Current therapy includes:  Insulin glargine 45 units nightly  Insulin lispro 15 to 20 units with each meal depending upon the portion size  Ozempic 0.5 mg once a week  Previous therapy:  Janumet therapy.  Diabetic neuropathy, s/p right BKA, prosthesis in place.    Diabetic neuropathy left lower extremity, complicated with osteomyelitis in .  Diabetic retinopathy, s/p laser treatment, following Denise  Patient typically take 2 meals a day.  Underlying history of CVA.  No history of CAD.  Checking BG through Dexcom G6.  Also have hx significant for Hypothyroidism and is currently maintained on Levothyroxine therapy.    ==========================================================================                                                PAST MEDICAL HISTORY    Past Medical History:   Diagnosis Date    Allergic     CKD (chronic kidney disease), stage III     Depression     Depression with suicidal ideation 2021    DJD (degenerative joint disease)     DVT (deep venous thrombosis)     Ganglion     rt wrist    Gangrene of foot 10/09/2015    GERD (gastroesophageal reflux disease)     Headache     Heart murmur     Hiatal hernia     History of esophageal stricture     s/p dialation 40-50 times last EGD 2016    Hyperlipidemia     Hypertension     Hypothyroidism     IBS (irritable  bowel syndrome)     Nausea, vomiting and diarrhea 09/22/2023    Neuropathy     Osteomyelitis of right foot 03/19/2020    Pulmonary embolism 01/19/2017    Rash     rt lower hip    Retinopathy     S/P BKA (below knee amputation), right 03/18/2022    Seasonal allergies     Sepsis due to group B Streptococcus 03/19/2020    Shortness of breath     Sleep apnea     Stroke     Type 2 diabetes mellitus with peripheral vascular disease     Vitamin D deficiency        ==========================================================================    PAST SURGICAL HISTORY    Past Surgical History:   Procedure Laterality Date    AMPUTATION DIGIT Left 04/26/2022    Procedure: AMPUTATION left great toe;  Surgeon: ERWIN Baker DPM;  Location: [facility] MAIN OR;  Service: Podiatry;  Laterality: Left;    AMPUTATION DIGIT  04/26/2022    Procedure: ;  Surgeon: ERWIN Baker DPM;  Location: [facility] MAIN OR;  Service: Podiatry;;    AMPUTATION FOOT / TOE Right     great toe    AMPUTATION REVISION Right 04/08/2021    Procedure: BELOW KNEE AMPUTATION REVISAION;  Surgeon: Eduardo Reynolds MD;  Location: [facility] MAIN OR;  Service: Orthopedics;  Laterality: Right;    AMPUTATION REVISION Right 04/29/2021    Procedure: AMPUTATION REVISION KNEE STUMP;  Surgeon: Eduardo Reynolds MD;  Location: [facility] MAIN OR;  Service: Orthopedics;  Laterality: Right;    BELOW KNEE AMPUTATION Right 07/30/2020    Procedure: AMPUTATION BELOW KNEE;  Surgeon: Eduardo Reynolds MD;  Location: [facility] MAIN OR;  Service: Orthopedics;  Laterality: Right;    CARDIAC CATHETERIZATION  04/2018    [facility]    CHOLECYSTECTOMY N/A 09/26/2023    Procedure: CHOLECYSTECTOMY LAPAROSCOPIC;  Surgeon: Eduardo Srinivasan MD;  Location: [facility] MAIN OR;  Service: General;  Laterality: N/A;    COLONOSCOPY      ENDOSCOPY      ENDOSCOPY N/A 10/04/2019    Procedure: ESOPHAGOGASTRODUODENOSCOPY with dilitation and biopsy x 1 area;  Surgeon: Declan Iqbal MD;  Location: [facility] ENDOSCOPY;   Service: Gastroenterology    ENDOSCOPY N/A 12/13/2019    Procedure: ESOPHAGOGASTRODUODENOSCOPY WITH DILATATION (50, 52 BOUGIE);  Surgeon: Declan Iqbal MD;  [facility];  Service: Gastroenterology    ENDOSCOPY N/A 08/06/2021    Procedure: ESOPHAGOGASTRODUODENOSCOPY with biopsy x1 area and esophageal dilation (56FR Bougie);  Surgeon: Hussein Talavera MD;  [facility];  Service: Gastroenterology;  Laterality: N/A;  post: hiatal hernia, erosive gastritis    ENDOSCOPY N/A 12/13/2022    Procedure: ESOPHAGOGASTRODUODENOSCOPY WITH DILATATION (BOUGIE #54, #56) AND BIOPSY X1;  Surgeon: David Rodriguez MD;  [facility];  Service: Gastroenterology;  Laterality: N/A;  POST: dysphagia     ENDOSCOPY N/A 09/28/2023    Procedure: ESOPHAGOGASTRODUODENOSCOPY with dilation (58 non guided bougie);  Surgeon: Hussein Talavera MD;  [facility];  Service: Gastroenterology;  Laterality: N/A;  post op: dysphagia    EYE SURGERY      GANGLION CYST EXCISION Left     HERNIA REPAIR Bilateral     INCISION AND DRAINAGE OF WOUND Right 06/15/2022    Procedure: INCISION AND DRAINAGE WOUND with debridement;  Surgeon: Fito Forte MD;  [facility];  Service: Orthopedics;  Laterality: Right;    JOINT REPLACEMENT      Left total hip    RETINOPATHY SURGERY      laser    TOTAL HIP ARTHROPLASTY Left     TOTAL HIP ARTHROPLASTY Left 2018    TRANS METATARSAL AMPUTATION Right 03/17/2020    Procedure: AMPUTATION TRANS METATARSAL right;  Surgeon: ERWIN Baker DPM;  [facility];  Service: Podiatry;  Laterality: Right;  GANGRENOUS RIGHT FOOT    VASECTOMY         ==========================================================================    FAMILY HISTORY    Family History   Problem Relation Age of Onset    Diabetes Mother         Patient  mom    Heart disease Mother     Arthritis Mother     Hyperlipidemia Mother     Leukemia Father     Sleep apnea Maternal Aunt          GENO    Stroke Maternal Grandmother     Hypertension Other     Hyperlipidemia Other     Cancer Other     Colon cancer Other         uncle       ==========================================================================    SOCIAL HISTORY    Social History     Socioeconomic History    Marital status:     Number of children: 3    Highest education level: High school graduate   Tobacco Use    Smoking status: Never    Smokeless tobacco: Never   Vaping Use    Vaping status: Never Used   Substance and Sexual Activity    Alcohol use: Yes     Comment: OCCASIONAL    Drug use: No    Sexual activity: Not Currently     Partners: Female     Birth control/protection: None     Comment: I have Ed problem at this time       ==========================================================================    MEDICATIONS      Current Outpatient Medications:     Accu-Chek Softclix Lancets lancets, Use as directed to test blood sugar 4 times daily before meals and at bedtime., Disp: 100 each, Rfl: 5    amLODIPine (NORVASC) 10 MG tablet, Take 1 tablet by mouth Daily., Disp: 90 tablet, Rfl: 1    apixaban (Eliquis) 5 MG tablet tablet, Take 1 tablet by mouth 2 (Two) Times a Day., Disp: 180 tablet, Rfl: 1    atorvastatin (LIPITOR) 80 MG tablet, Take 1 tablet by mouth Every Night., Disp: 90 tablet, Rfl: 1    Blood Glucose Monitoring Suppl (Accu-Chek Guide) w/Device kit, See Admin Instructions., Disp: , Rfl:     Blood Glucose Monitoring Suppl (FreeStyle Lite) w/Device kit, Use 1 kit Daily. E11.65, Disp: 1 kit, Rfl: 0    buPROPion XL (Wellbutrin XL) 150 MG 24 hr tablet, Take 1 tablet by mouth Every Morning., Disp: 90 tablet, Rfl: 1    busPIRone (BUSPAR) 10 MG tablet, Take 1 tablet by mouth Every 12 (Twelve) Hours., Disp: 180 tablet, Rfl: 1    CINNAMON PO, Take  by mouth., Disp: , Rfl:     Continuous Blood Gluc  (Dexcom G6 ) device, Use 1 Device Daily. Use to check BS, Disp: 1 each, Rfl: 0    Continuous Blood Gluc Sensor  (Dexcom G6 Sensor), Use Every 10 (Ten) Days. Use to check BS daily, Disp: 9 each, Rfl: 1    Continuous Blood Gluc Transmit (Dexcom G6 Transmitter) misc, Use 1 each Every 3 (Three) Months. USE 1 TRANSMITTER EVERY 3 MONTHS, Disp: 3 each, Rfl: 1    cyclobenzaprine (FLEXERIL) 10 MG tablet, Take I tab q 8 hrs as needed for tension headaches, Disp: 30 tablet, Rfl: 0    DULoxetine (CYMBALTA) 60 MG capsule, Take 1 capsule by mouth Every Morning., Disp: 90 capsule, Rfl: 1    Glucagon (Gvoke HypoPen 2-Pack) 1 MG/0.2ML solution auto-injector, Inject 1 mg under the skin into the appropriate area as directed As Needed (Hypoglycemia)., Disp: 0.4 mL, Rfl: 5    glucose blood (Accu-Chek Guide) test strip, Use as instructed to test blood sugar 4 times daily before meals and at bedtime, Disp: 100 each, Rfl: 12    glucose blood (FREESTYLE LITE) test strip, Test bs daily E11.65, Disp: 100 each, Rfl: 1    HumaLOG KwikPen 100 UNIT/ML solution pen-injector, INJECT 10 UNITS            SUBCUTANEOUSLY INTO THE    APPROPRIATE AREA 3 TIMES A DAY WITH MEALS AS DIRECTED, Disp: 30 mL, Rfl: 3    insulin aspart (NovoLOG FlexPen) 100 UNIT/ML solution pen-injector sc pen, Inject 20 Units under the skin into the appropriate area as directed 3 (Three) Times a Day With Meals. Dx code: E11.65, Disp: 30 mL, Rfl: 5    Insulin Glargine (Lantus SoloStar) 100 UNIT/ML injection pen, Inject 45 Units under the skin into the appropriate area as directed Every Night for 265 days., Disp: 45 mL, Rfl: 2    Insulin Pen Needle (Pen Needles) 32G X 4 MM misc, Use 1 each 4 (Four) Times a Day Before Meals & at Bedtime., Disp: 400 each, Rfl: 3    levothyroxine (Synthroid) 100 MCG tablet, Take 1 tablet by mouth Every Morning., Disp: 90 tablet, Rfl: 1    meclizine (ANTIVERT) 25 MG tablet, Take 1 tablet by mouth 3 (Three) Times a Day As Needed for Dizziness., Disp: 30 tablet, Rfl: 0    metoprolol succinate XL (TOPROL-XL) 100 MG 24 hr tablet, Take 1 tablet by mouth Daily., Disp:  90 tablet, Rfl: 0    multivitamin with minerals tablet tablet, Take 1 tablet by mouth Daily., Disp: , Rfl:     omeprazole (priLOSEC) 40 MG capsule, Take 1 capsule by mouth Daily., Disp: 90 capsule, Rfl: 1    ondansetron ODT (ZOFRAN-ODT) 4 MG disintegrating tablet, DISSOLVE 1 TABLET ON TONGUE EVERY 8 HOURS AS NEEDED FOR NAUSEA AND VOMITING, Disp: 30 tablet, Rfl: 0    pregabalin (LYRICA) 100 MG capsule, Take 1 capsule by mouth 2 (Two) Times a Day., Disp: 60 capsule, Rfl: 0    Semaglutide,0.25 or 0.5MG/DOS, (Ozempic, 0.25 or 0.5 MG/DOSE,) 2 MG/3ML solution pen-injector, Inject 0.5 mg under the skin into the appropriate area as directed 1 (One) Time Per Week., Disp: 9 mL, Rfl: 3    empagliflozin (Jardiance) 10 MG tablet tablet, Take 1 tablet by mouth Daily., Disp: 30 tablet, Rfl: 5    ==========================================================================    ALLERGIES    Allergies   Allergen Reactions    Lisinopril Cough    Cefixime Other (See Comments)       ==========================================================================    OBJECTIVE    Vitals:    04/26/24 0932   BP: 120/72   Pulse: 70   SpO2: 98%     Body mass index is 37.69 kg/m².     General: Alert, cooperative, no acute distress  Thyroid:  no enlargement/tenderness/palpable nodules  Lungs: Clear to auscultation bilaterally, respirations unlabored  Heart: Regular rate and rhythm, S1 and S2 normal, no murmur, rub or gallop  Abdomen: Soft, NT, ND and Bowel sounds Positive  Extremities:  Extremities normal, atraumatic, no cyanosis or edema    ==========================================================================    LAB EVALUATION    Lab Results   Component Value Date    GLUCOSE 122 (H) 04/12/2024    BUN 15 04/12/2024    CREATININE 1.34 (H) 04/12/2024    EGFRIFNONA 48 (L) 01/04/2022    BCR 11.2 04/12/2024    K 4.5 04/12/2024    CO2 27.0 04/12/2024    CALCIUM 9.6 04/12/2024    ALBUMIN 4.3 04/12/2024    LABIL2 1.3 04/22/2019    AST 26 04/12/2024    ALT  36 04/12/2024    CHOL 196 01/12/2024    TRIG 148 01/12/2024    HDL 48 01/12/2024     (H) 01/12/2024     Lab Results   Component Value Date    HGBA1C 7.40 (H) 04/12/2024    HGBA1C 8.90 (H) 01/12/2024    HGBA1C 11.60 (H) 08/04/2023     Lab Results   Component Value Date    MICROALBUR 3.2 01/12/2024    CREATININE 1.34 (H) 04/12/2024     Lab Results   Component Value Date    TSH 3.580 04/12/2024    FREET4 1.40 04/12/2024     ==========================================================================    CGM Data:    Dates: April 6 till April 19, 2024    Very High > 250: 11%  High 180 - 250: 27%  Target: 70 - 180: 60%  Low 55 - 70:  <1%  Very Low < 55: <1%    ==========================================================================    ASSESSMENT AND PLAN    # Type 2 diabetes complicated with retinopathy, neuropathy and nephropathy  - Reviewed CGM data  - Tolerating Ozempic therapy at 0.5 mg once a week  - Patient will benefit from introducing SGLT2 inhibitor therapy, there is no contraindication  - Benefit and side effect of SGLT2 inhibitor therapy discussed with patient in detail to which she verbalized understanding  - Adjusted therapy:  Insulin glargine 45 units nightly  Insulin lispro 15 to 20 units with each meal depending upon the portion size  Ozempic 0.5 mg once a week  Jardiance 10 mg 1 pill by mouth daily  - Patient to follow-up in clinic back again in 3 months time'  - Counseled patient that if he start having low blood sugars after addition of Jardiance therapy to decrease the dose of insulin glargine by 5 units and also decrease the dose of insulin lispro by 5 units to which she verbalized understanding    # Hyperlipidemia, currently on statin therapy, plan to introduce Zetia in future    # Hypothyroidism, on levothyroxine replacement therapy 100 mcg daily    # Hypertension, care as per primary team    # Obesity with BMI of 32.5  - Patient is current concerned about hypogonadism, serum testosterone  "ordered to be completed before next visit, blood work needs to be taken no later than 8:30 in the morning in a fasting state which was counseled to the patient    Return to clinic: 2 months    Entire assessment and plan was discussed and counseled the patient in detail to which patient verbalized understanding and agreed with care.  Answered all queries and concerns.    This note was created using voice recognition software and is inherently subject to errors including those of syntax and \"sound-alike\" substitutions which may escape proofreading.  In such instances, original meaning may be extrapolated by contextual derivation.    Note: Portions of this note may have been copied from previous notes but documentation have been reviewed and edited as necessary to support clinical decision making for today's visit.    ==========================================================================    INFORMATION PROVIDED TO PATIENT    Patient Instructions   # Diabetes Management:    Please start insulin therapy as:    - Insulin Glargine (Slow acting insulin) 45 Units in the evening.  - Insulin lispro / aspart (Fast acting / meal time insulin): 15-20 Units with each meal.    Meal time insulin need to be taken 15 mins before meal. You can bring your insulin with you if you are planning to eat out.    If you plan to miss the meal please miss the meal time insulin as well.    Check your blood glucose through dexcom.    Try to maintain water intake around 6 to 8 glasses of water every day.    Continue Ozempic to 0.5 mgs once a week.  Start Jardiance 10 mg 1 pill by mouth daily.    If your blood sugar is less than 70 in the morning then decrease your insulin glargine by 5 units.  If your blood sugar before meal is less than 70 then decrease your mealtime insulin by 5 units.    Low Blood Glucose:    - If you feel sweaty, anxious, shakiness, feel weak, trouble walking, feels like passing out or trouble seeing thing, please check " your blood glucose and if its less than 70 or if your feel symptoms of low blood glucose then take one of the following or use Glucagon Injection:    *3 or 4 glucose tablets  *½ cup of juice or regular soda (not sugar-free)  *2 tablespoons of raisins  *4 or 5 saltine crackers  *1 tablespoon of sugar  *1 tablespoon of honey or corn syrup  *6 to 8 hard candies    Follow-up in 3 months time with repeat blood work.  Blood work needs to be collected no later than 8:30 in the morning in a fasting state.  Your fasting start past midnight.     Thank you for your visit today.     If you have any questions or concerns please feel free to reach out of the office.          ==========================================================================  Dionicio Ramachandran MD  Department of Endocrine, Diabetes and Metabolism  [place], [place]  ==========================================================================

## 2024-04-26 NOTE — PATIENT INSTRUCTIONS
# Diabetes Management:    Please start insulin therapy as:    - Insulin Glargine (Slow acting insulin) 45 Units in the evening.  - Insulin lispro / aspart (Fast acting / meal time insulin): 15-20 Units with each meal.    Meal time insulin need to be taken 15 mins before meal. You can bring your insulin with you if you are planning to eat out.    If you plan to miss the meal please miss the meal time insulin as well.    Check your blood glucose through dexcom.    Try to maintain water intake around 6 to 8 glasses of water every day.    Continue Ozempic to 0.5 mgs once a week.  Start Jardiance 10 mg 1 pill by mouth daily.    If your blood sugar is less than 70 in the morning then decrease your insulin glargine by 5 units.  If your blood sugar before meal is less than 70 then decrease your mealtime insulin by 5 units.    Low Blood Glucose:    - If you feel sweaty, anxious, shakiness, feel weak, trouble walking, feels like passing out or trouble seeing thing, please check your blood glucose and if its less than 70 or if your feel symptoms of low blood glucose then take one of the following or use Glucagon Injection:    *3 or 4 glucose tablets  *½ cup of juice or regular soda (not sugar-free)  *2 tablespoons of raisins  *4 or 5 saltine crackers  *1 tablespoon of sugar  *1 tablespoon of honey or corn syrup  *6 to 8 hard candies    Follow-up in 3 months time with repeat blood work.  Blood work needs to be collected no later than 8:30 in the morning in a fasting state.  Your fasting start past midnight.     Thank you for your visit today.     If you have any questions or concerns please feel free to reach out of the office.

## 2024-05-02 ENCOUNTER — TELEPHONE (OUTPATIENT)
Dept: PAIN MEDICINE | Facility: HOSPITAL | Age: 59
End: 2024-05-02
Payer: MEDICARE

## 2024-05-02 NOTE — TELEPHONE ENCOUNTER
Pre procedure call made, spoke with patient and he is aware to arrive by 0900 and that a  is required. NPO status also reviewed.

## 2024-05-06 ENCOUNTER — HOSPITAL ENCOUNTER (OUTPATIENT)
Dept: PAIN MEDICINE | Facility: HOSPITAL | Age: 59
Discharge: HOME | End: 2024-05-06
Payer: MEDICARE

## 2024-05-06 VITALS
WEIGHT: 193 LBS | SYSTOLIC BLOOD PRESSURE: 131 MMHG | BODY MASS INDEX: 37.89 KG/M2 | TEMPERATURE: 97.1 F | HEART RATE: 71 BPM | HEIGHT: 60 IN | DIASTOLIC BLOOD PRESSURE: 81 MMHG | RESPIRATION RATE: 13 BRPM | OXYGEN SATURATION: 96 %

## 2024-05-06 DIAGNOSIS — M47.812 CERVICAL SPONDYLOSIS WITHOUT MYELOPATHY: Primary | ICD-10-CM

## 2024-05-06 DIAGNOSIS — R52 PAIN: ICD-10-CM

## 2024-05-06 PROCEDURE — 25010000002 BUPIVACAINE (PF) 0.25 % SOLUTION: Performed by: ANESTHESIOLOGY

## 2024-05-06 PROCEDURE — 25810000003 SODIUM CHLORIDE 0.9 % SOLUTION: Performed by: ANESTHESIOLOGY

## 2024-05-06 PROCEDURE — 64633 DESTROY CERV/THOR FACET JNT: CPT | Performed by: ANESTHESIOLOGY

## 2024-05-06 PROCEDURE — 77003 FLUOROGUIDE FOR SPINE INJECT: CPT

## 2024-05-06 PROCEDURE — 25010000002 DEXAMETHASONE SODIUM PHOSPHATE 10 MG/ML SOLUTION: Performed by: ANESTHESIOLOGY

## 2024-05-06 PROCEDURE — 25010000002 MIDAZOLAM PER 1 MG

## 2024-05-06 PROCEDURE — 99152 MOD SED SAME PHYS/QHP 5/>YRS: CPT | Performed by: ANESTHESIOLOGY

## 2024-05-06 PROCEDURE — 25010000002 FENTANYL CITRATE (PF) 50 MCG/ML SOLUTION

## 2024-05-06 PROCEDURE — 64634 DESTROY C/TH FACET JNT ADDL: CPT | Performed by: ANESTHESIOLOGY

## 2024-05-06 RX ORDER — LIDOCAINE HYDROCHLORIDE 10 MG/ML
5 INJECTION, SOLUTION EPIDURAL; INFILTRATION; INTRACAUDAL; PERINEURAL ONCE
Status: COMPLETED | OUTPATIENT
Start: 2024-05-06 | End: 2024-05-06

## 2024-05-06 RX ORDER — SODIUM CHLORIDE 9 MG/ML
50 INJECTION, SOLUTION INTRAVENOUS CONTINUOUS
Status: DISCONTINUED | OUTPATIENT
Start: 2024-05-06 | End: 2024-05-07 | Stop reason: HOSPADM

## 2024-05-06 RX ORDER — DEXAMETHASONE SODIUM PHOSPHATE 10 MG/ML
10 INJECTION, SOLUTION INTRAMUSCULAR; INTRAVENOUS ONCE
Status: COMPLETED | OUTPATIENT
Start: 2024-05-06 | End: 2024-05-06

## 2024-05-06 RX ORDER — FENTANYL CITRATE 50 UG/ML
100 INJECTION, SOLUTION INTRAMUSCULAR; INTRAVENOUS
Status: DISCONTINUED | OUTPATIENT
Start: 2024-05-06 | End: 2024-05-07 | Stop reason: HOSPADM

## 2024-05-06 RX ORDER — MIDAZOLAM HYDROCHLORIDE 1 MG/ML
INJECTION INTRAMUSCULAR; INTRAVENOUS
Status: COMPLETED
Start: 2024-05-06 | End: 2024-05-06

## 2024-05-06 RX ORDER — FENTANYL CITRATE 50 UG/ML
INJECTION, SOLUTION INTRAMUSCULAR; INTRAVENOUS
Status: COMPLETED
Start: 2024-05-06 | End: 2024-05-06

## 2024-05-06 RX ORDER — BUPIVACAINE HYDROCHLORIDE 2.5 MG/ML
10 INJECTION, SOLUTION EPIDURAL; INFILTRATION; INTRACAUDAL ONCE
Status: COMPLETED | OUTPATIENT
Start: 2024-05-06 | End: 2024-05-06

## 2024-05-06 RX ORDER — MIDAZOLAM HYDROCHLORIDE 1 MG/ML
2 INJECTION INTRAMUSCULAR; INTRAVENOUS ONCE
Status: COMPLETED | OUTPATIENT
Start: 2024-05-06 | End: 2024-05-06

## 2024-05-06 RX ADMIN — LIDOCAINE HYDROCHLORIDE 5 ML: 10 INJECTION, SOLUTION EPIDURAL; INFILTRATION; INTRACAUDAL; PERINEURAL at 09:45

## 2024-05-06 RX ADMIN — BUPIVACAINE HYDROCHLORIDE 10 ML: 2.5 INJECTION, SOLUTION EPIDURAL; INFILTRATION; INTRACAUDAL; PERINEURAL at 09:50

## 2024-05-06 RX ADMIN — MIDAZOLAM HYDROCHLORIDE 1 MG: 1 INJECTION INTRAMUSCULAR; INTRAVENOUS at 09:39

## 2024-05-06 RX ADMIN — SODIUM CHLORIDE 50 ML/HR: 9 INJECTION, SOLUTION INTRAVENOUS at 09:35

## 2024-05-06 RX ADMIN — FENTANYL CITRATE 50 MCG: 50 INJECTION, SOLUTION INTRAMUSCULAR; INTRAVENOUS at 09:38

## 2024-05-06 RX ADMIN — DEXAMETHASONE SODIUM PHOSPHATE 10 MG: 10 INJECTION, SOLUTION INTRAMUSCULAR; INTRAVENOUS at 09:51

## 2024-05-06 RX ADMIN — MIDAZOLAM 1 MG: 1 INJECTION INTRAMUSCULAR; INTRAVENOUS at 09:39

## 2024-05-07 ENCOUNTER — OFFICE VISIT (OUTPATIENT)
Dept: NEUROSURGERY | Facility: CLINIC | Age: 59
End: 2024-05-07
Payer: MEDICARE

## 2024-05-07 ENCOUNTER — TELEPHONE (OUTPATIENT)
Dept: PAIN MEDICINE | Facility: HOSPITAL | Age: 59
End: 2024-05-07
Payer: MEDICARE

## 2024-05-07 VITALS
BODY MASS INDEX: 37.69 KG/M2 | DIASTOLIC BLOOD PRESSURE: 89 MMHG | WEIGHT: 192 LBS | HEART RATE: 85 BPM | SYSTOLIC BLOOD PRESSURE: 158 MMHG | HEIGHT: 60 IN

## 2024-05-07 DIAGNOSIS — G56.21 ULNAR NEUROPATHY AT ELBOW OF RIGHT UPPER EXTREMITY: Primary | ICD-10-CM

## 2024-05-07 PROCEDURE — 99214 OFFICE O/P EST MOD 30 MIN: CPT | Performed by: NEUROLOGICAL SURGERY

## 2024-05-07 PROCEDURE — 1159F MED LIST DOCD IN RCRD: CPT | Performed by: NEUROLOGICAL SURGERY

## 2024-05-07 PROCEDURE — 3079F DIAST BP 80-89 MM HG: CPT | Performed by: NEUROLOGICAL SURGERY

## 2024-05-07 PROCEDURE — 3077F SYST BP >= 140 MM HG: CPT | Performed by: NEUROLOGICAL SURGERY

## 2024-05-07 PROCEDURE — 1160F RVW MEDS BY RX/DR IN RCRD: CPT | Performed by: NEUROLOGICAL SURGERY

## 2024-05-20 DIAGNOSIS — E11.65 TYPE 2 DIABETES MELLITUS WITH HYPERGLYCEMIA, WITH LONG-TERM CURRENT USE OF INSULIN: ICD-10-CM

## 2024-05-20 DIAGNOSIS — Z79.4 TYPE 2 DIABETES MELLITUS WITH HYPERGLYCEMIA, WITH LONG-TERM CURRENT USE OF INSULIN: ICD-10-CM

## 2024-05-24 RX ORDER — ONDANSETRON 4 MG/1
TABLET, ORALLY DISINTEGRATING ORAL
Qty: 30 TABLET | Refills: 10 | Status: SHIPPED | OUTPATIENT
Start: 2024-05-24

## 2024-05-24 RX ORDER — GLUCAGON INJECTION, SOLUTION 1 MG/.2ML
INJECTION, SOLUTION SUBCUTANEOUS
Qty: 0.4 ML | Refills: 10 | Status: SHIPPED | OUTPATIENT
Start: 2024-05-24

## 2024-06-14 ENCOUNTER — TELEPHONE (OUTPATIENT)
Dept: FAMILY MEDICINE CLINIC | Facility: CLINIC | Age: 59
End: 2024-06-14
Payer: MEDICARE

## 2024-06-14 NOTE — TELEPHONE ENCOUNTER
Received a form for medical clearance from [facility] physicians :Nikki Reno.   Needs to be seen here in the office.    LMTCB to get scheduled with Dr. Gross or someone here in the office.     HUB TO RELAY

## 2024-06-17 ENCOUNTER — OFFICE VISIT (OUTPATIENT)
Dept: FAMILY MEDICINE CLINIC | Facility: CLINIC | Age: 59
End: 2024-06-17
Payer: MEDICARE

## 2024-06-17 VITALS
HEIGHT: 65 IN | DIASTOLIC BLOOD PRESSURE: 78 MMHG | SYSTOLIC BLOOD PRESSURE: 129 MMHG | WEIGHT: 191 LBS | HEART RATE: 69 BPM | OXYGEN SATURATION: 97 % | TEMPERATURE: 98.4 F | BODY MASS INDEX: 31.82 KG/M2

## 2024-06-17 DIAGNOSIS — Z01.818 PRE-OPERATIVE CLEARANCE: Primary | ICD-10-CM

## 2024-06-17 PROCEDURE — 1159F MED LIST DOCD IN RCRD: CPT | Performed by: NURSE PRACTITIONER

## 2024-06-17 PROCEDURE — 99214 OFFICE O/P EST MOD 30 MIN: CPT | Performed by: NURSE PRACTITIONER

## 2024-06-17 PROCEDURE — 1125F AMNT PAIN NOTED PAIN PRSNT: CPT | Performed by: NURSE PRACTITIONER

## 2024-06-17 PROCEDURE — 1160F RVW MEDS BY RX/DR IN RCRD: CPT | Performed by: NURSE PRACTITIONER

## 2024-06-17 PROCEDURE — 3051F HG A1C>EQUAL 7.0%<8.0%: CPT | Performed by: NURSE PRACTITIONER

## 2024-06-17 PROCEDURE — 3074F SYST BP LT 130 MM HG: CPT | Performed by: NURSE PRACTITIONER

## 2024-06-17 PROCEDURE — 3078F DIAST BP <80 MM HG: CPT | Performed by: NURSE PRACTITIONER

## 2024-06-17 NOTE — PROGRESS NOTES
"Subjective     Kuldeep Adhikari is a 59 y.o. male.     History of Present Illness  Patient is here today seeking medical clearance to have a right cubital tunnel release anterior nerve transposition and AIN transfer to ulnar nerve procedure on July 2 with Dr. Zamarripa at [facility].  He states he has fatigue but feels good otherwise.  He does not smoke  Drinks alcohol on occasion    DM- sees mauro. Last A1C was stable at 7.4. he is currently on jardiance, lantus 45 units nightly, humalog 10-20 units TID, and ozempic.    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 He states he had a PE and stroke. Last stroke was 3 yrs.          The following portions of the patient's history were reviewed and updated as appropriate: allergies, current medications, past family history, past medical history, past social history, past surgical history, and problem list.    Review of Systems   Constitutional:  Positive for fatigue. Negative for chills and fever.   Respiratory:  Negative for chest tightness and shortness of breath.    Cardiovascular:  Negative for chest pain and palpitations.   Gastrointestinal:  Negative for abdominal pain, nausea and vomiting.   Genitourinary:  Negative for dysuria and frequency.   Neurological:  Positive for dizziness. Negative for headache.       Objective     /78   Pulse 69   Temp 98.4 °F (36.9 °C) (Tympanic)   Ht 163.8 cm (64.5\")   Wt 86.6 kg (191 lb)   SpO2 97%   BMI 32.28 kg/m²     Current Outpatient Medications on File Prior to Visit   Medication Sig Dispense Refill    Accu-Chek Softclix Lancets lancets Use as directed to test blood sugar 4 times daily before meals and at bedtime. 100 each 5    amLODIPine (NORVASC) 10 MG tablet Take 1 tablet by mouth Daily. 90 tablet 1    apixaban (Eliquis) 5 MG tablet tablet Take 1 tablet by mouth 2 (Two) Times a Day. 180 tablet 1 "    atorvastatin (LIPITOR) 80 MG tablet Take 1 tablet by mouth Every Night. 90 tablet 1    Blood Glucose Monitoring Suppl (Accu-Chek Guide) w/Device kit See Admin Instructions.      Blood Glucose Monitoring Suppl (FreeStyle Lite) w/Device kit Use 1 kit Daily. E11.65 1 kit 0    buPROPion XL (Wellbutrin XL) 150 MG 24 hr tablet Take 1 tablet by mouth Every Morning. 90 tablet 1    busPIRone (BUSPAR) 10 MG tablet Take 1 tablet by mouth Every 12 (Twelve) Hours. 180 tablet 1    CINNAMON PO Take  by mouth.      Continuous Blood Gluc  (Dexcom G6 ) device Use 1 Device Daily. Use to check BS 1 each 0    Continuous Blood Gluc Sensor (Dexcom G6 Sensor) Use Every 10 (Ten) Days. Use to check BS daily 9 each 1    Continuous Blood Gluc Transmit (Dexcom G6 Transmitter) misc Use 1 each Every 3 (Three) Months. USE 1 TRANSMITTER EVERY 3 MONTHS 3 each 1    cyclobenzaprine (FLEXERIL) 10 MG tablet Take I tab q 8 hrs as needed for tension headaches 30 tablet 0    DULoxetine (CYMBALTA) 60 MG capsule Take 1 capsule by mouth Every Morning. 90 capsule 1    empagliflozin (Jardiance) 10 MG tablet tablet Take 1 tablet by mouth Daily. 30 tablet 5    glucose blood (Accu-Chek Guide) test strip Use as instructed to test blood sugar 4 times daily before meals and at bedtime 100 each 12    glucose blood (FREESTYLE LITE) test strip Test bs daily E11.65 100 each 1    Gvoke HypoPen 1-Pack 1 MG/0.2ML solution auto-injector INJECT 1MG SUBCUTANEOUSLY INTO THE APPROPRIATE AREA AS DIRECTED AS NEEDED FOR HYPOGLYCEMIA 0.4 mL 10    HumaLOG KwikPen 100 UNIT/ML solution pen-injector INJECT 10 UNITS            SUBCUTANEOUSLY INTO THE    APPROPRIATE AREA 3 TIMES A DAY WITH MEALS AS DIRECTED 30 mL 3    insulin aspart (NovoLOG FlexPen) 100 UNIT/ML solution pen-injector sc pen Inject 20 Units under the skin into the appropriate area as directed 3 (Three) Times a Day With Meals. Dx code: E11.65 30 mL 5    Insulin Glargine (Lantus SoloStar) 100 UNIT/ML  injection pen Inject 45 Units under the skin into the appropriate area as directed Every Night for 265 days. 45 mL 2    Insulin Pen Needle (Pen Needles) 32G X 4 MM misc Use 1 each 4 (Four) Times a Day Before Meals & at Bedtime. 400 each 3    levothyroxine (Synthroid) 100 MCG tablet Take 1 tablet by mouth Every Morning. 90 tablet 1    meclizine (ANTIVERT) 25 MG tablet Take 1 tablet by mouth 3 (Three) Times a Day As Needed for Dizziness. 30 tablet 0    metoprolol succinate XL (TOPROL-XL) 100 MG 24 hr tablet Take 1 tablet by mouth Daily. 90 tablet 0    multivitamin with minerals tablet tablet Take 1 tablet by mouth Daily.      omeprazole (priLOSEC) 40 MG capsule Take 1 capsule by mouth Daily. 90 capsule 1    ondansetron ODT (ZOFRAN-ODT) 4 MG disintegrating tablet PLACE 1 TABLET ON TONGUE AND ALLOW TO DISSOLVE EVERY 8 HOURS AS NEEDED FOR NAUSEA AND VOMITING 30 tablet 10    pregabalin (LYRICA) 100 MG capsule Take 1 capsule by mouth 2 (Two) Times a Day. 60 capsule 0    Semaglutide,0.25 or 0.5MG/DOS, (Ozempic, 0.25 or 0.5 MG/DOSE,) 2 MG/3ML solution pen-injector Inject 0.5 mg under the skin into the appropriate area as directed 1 (One) Time Per Week. 9 mL 3     No current facility-administered medications on file prior to visit.                 Physical Exam  Constitutional:       Appearance: Normal appearance. He is not ill-appearing.   HENT:      Head: Normocephalic and atraumatic.   Cardiovascular:      Rate and Rhythm: Normal rate and regular rhythm.      Heart sounds: No murmur heard.  Pulmonary:      Effort: Pulmonary effort is normal.      Breath sounds: Normal breath sounds.   Neurological:      General: No focal deficit present.      Mental Status: He is alert and oriented to person, place, and time.   Psychiatric:         Mood and Affect: Mood normal.         Behavior: Behavior normal.         Thought Content: Thought content normal.         Judgment: Judgment normal.           Assessment & Plan     Diagnoses and  all orders for this visit:    1. Pre-operative clearance (Primary)  Comments:  will refer to cardiology for pre-op clearance  recent labs stable  get CXR  discuss holding of eliquis with PCP when she is in office  normal exam today  Orders:  -     Cancel: ECG 12 Lead  -     XR Chest PA & Lateral; Future  -     Ambulatory Referral to Cardiology

## 2024-06-24 RX ORDER — OMEPRAZOLE 40 MG/1
40 CAPSULE, DELAYED RELEASE ORAL DAILY
Qty: 90 CAPSULE | Refills: 1 | Status: SHIPPED | OUTPATIENT
Start: 2024-06-24

## 2024-06-26 ENCOUNTER — OFFICE VISIT (OUTPATIENT)
Dept: PAIN MEDICINE | Facility: CLINIC | Age: 59
End: 2024-06-26
Payer: MEDICARE

## 2024-06-26 VITALS
SYSTOLIC BLOOD PRESSURE: 135 MMHG | WEIGHT: 191 LBS | RESPIRATION RATE: 16 BRPM | OXYGEN SATURATION: 97 % | BODY MASS INDEX: 32.28 KG/M2 | DIASTOLIC BLOOD PRESSURE: 79 MMHG | HEART RATE: 74 BPM

## 2024-06-26 DIAGNOSIS — M54.12 CERVICAL RADICULITIS: ICD-10-CM

## 2024-06-26 DIAGNOSIS — M47.812 CERVICAL SPONDYLOSIS WITHOUT MYELOPATHY: ICD-10-CM

## 2024-06-26 DIAGNOSIS — G89.4 CHRONIC PAIN SYNDROME: Primary | ICD-10-CM

## 2024-06-26 PROCEDURE — 3075F SYST BP GE 130 - 139MM HG: CPT | Performed by: ANESTHESIOLOGY

## 2024-06-26 PROCEDURE — 1159F MED LIST DOCD IN RCRD: CPT | Performed by: ANESTHESIOLOGY

## 2024-06-26 PROCEDURE — 3078F DIAST BP <80 MM HG: CPT | Performed by: ANESTHESIOLOGY

## 2024-06-26 PROCEDURE — 1160F RVW MEDS BY RX/DR IN RCRD: CPT | Performed by: ANESTHESIOLOGY

## 2024-06-26 PROCEDURE — 1125F AMNT PAIN NOTED PAIN PRSNT: CPT | Performed by: ANESTHESIOLOGY

## 2024-06-26 PROCEDURE — 99214 OFFICE O/P EST MOD 30 MIN: CPT | Performed by: ANESTHESIOLOGY

## 2024-06-26 NOTE — PROGRESS NOTES
Subjective   CC neck pain, right shoulder right arm pain  Kuldeep Adhikari is a 59 y.o. male with history of DM, right BKA hx of PE, on Eliquis, chronic neck pain here for f/u.  Right cervical RFA last visit reports 50% relief of axial neck pain.  However continues to have right upper extremity radicular pain and right-sided cervicogenic headaches.  This is interfering with ADL and sleep.  Chronic axial neck pain mostly right-sided and occasionally radiating to right shoulder occasionally right arm and to last digits, constant but worse with neck movement or activity.  Denies balance issues, bladder bowel continence  Tried physical therapy without relief.  Tried anti-inflammatory gabapentin without relief  EMG/NCS showed cubital tunnel syndromes ulnar neuropathy, no evidence of cervical radiculopathy.  Seen neurosurgery, no surgical intervention recommended, referred to try injections    Imaging reviewed  C-spine MRI : C6-C7: There is a right paramedian disc protrusion superimposed upon a mild broad-based disc bulge, and there is mild bilateral uncovertebral and facet arthropathy. There is mild bilateral neural foraminal narrowing, and there is mild spinal canal narrowing.Visualized marrow signal is unremarkable. Vertebral body heights are normal. Cervical discs are somewhat desiccated. No cervical cord signal     Pain Assessment   Location of Pain:  neck pain, right arm joint  Description of Pain: Dull/Aching, Throbbing, Stabbing  Previous Pain Rating :8  Current Pain Ratin  Aggravating Factors: Activity  Alleviating Factors: Rest, Medication  Pain onset over 12 weeks ago  Interferes with ADL's.   [place] back pain disability scale scanned in chart    PEG Assessment   What number best describes your pain on average in the past week?8  What number best describes how, during the past week, pain has interfered with your enjoyment of life?5  What number best describes how, during the past week, pain has interfered  with your general activity?  6    The following portions of the patient's history were reviewed and updated as appropriate: allergies, current medications, past family history, past medical history, past social history, past surgical history and problem list.    Review of Systems   Musculoskeletal:  Positive for myalgias and neck pain.   All other systems reviewed and are negative.      Objective   Physical Exam  Vitals reviewed.   Constitutional:       General: He is not in acute distress.  Pulmonary:      Effort: Pulmonary effort is normal.   Musculoskeletal:      Cervical back: Spasms and tenderness present. Decreased range of motion.      Lumbar back: Tenderness present. Decreased range of motion.       /79 (BP Location: Left arm, Patient Position: Sitting, Cuff Size: Adult)   Pulse 74   Resp 16   Wt 86.6 kg (191 lb)   SpO2 97%   BMI 32.28 kg/m²     PHQ 9 on chart  Opioid risk tool low risk      Assessment & Plan   Diagnoses and all orders for this visit:    1. Chronic pain syndrome (Primary)    2. Cervical spondylosis without myelopathy    3. Cervical radiculitis  -     Epidural Block      Summary  Kuldeep Adhikari is a 58 y.o. male with history of DM, right BKA, hx of PE, on Eliquis chronic neck pain here for initial evaluation.  Referred by neurosurgery.  Chronic neck pain from DDD spondylosis with radicular pain.  Tried physical therapy without relief.  Tried anti-inflammatory gabapentin without relief  EMG/NCS showed cubital tunnel syndromes ulnar neuropathy, no evidence of cervical radiculopathy.  Seen neurosurgery, no surgical intervention recommended, referred to try injections    Right cervical RFA last visit reports 50% relief of axial neck pain.  However continues to have right upper extremity radicular pain and right-sided cervicogenic headaches.  This is interfering with ADL and sleep.  Continued cervical radicular pain right-sided.  Interfering with ADL.  C-spine MRI with right paracentral  disc protrusion at C6-C7.  Will schedule for cervical SHARI.  Risk and benefits discussed.    RTC for procedure        Note is dictated utilizing voice recognition software. Unfortunately this leads to occasional typographical errors. I apologize in advance if the situation occurs. If questions occur please do not hesitate to call our office.

## 2024-06-28 ENCOUNTER — OFFICE VISIT (OUTPATIENT)
Dept: CARDIOLOGY | Facility: CLINIC | Age: 59
End: 2024-06-28
Payer: MEDICARE

## 2024-06-28 VITALS
HEIGHT: 66 IN | HEART RATE: 75 BPM | DIASTOLIC BLOOD PRESSURE: 80 MMHG | BODY MASS INDEX: 30.53 KG/M2 | WEIGHT: 190 LBS | SYSTOLIC BLOOD PRESSURE: 132 MMHG | OXYGEN SATURATION: 97 % | RESPIRATION RATE: 18 BRPM

## 2024-06-28 DIAGNOSIS — Z01.818 PREOPERATIVE EXAMINATION: Primary | ICD-10-CM

## 2024-06-28 DIAGNOSIS — R01.1 HEART MURMUR: ICD-10-CM

## 2024-06-28 DIAGNOSIS — R06.09 DOE (DYSPNEA ON EXERTION): ICD-10-CM

## 2024-06-28 NOTE — PROGRESS NOTES
"Cardiology Clinic Note  Porfirio Lopez MD, PhD    Subjective:     Encounter Date:06/28/2024      Patient ID: Kuldeep Adhikari is a 59 y.o. male.    Chief Complaint:  No chief complaint on file.      HPI:    I the pleasure to see this 59-year-old gentleman today as a new patient with history of stroke, hypertension hyperlipidemia diabetes, brother with MI and bypass in the past with mother were also within normal in the past.  He is a non-smoker.  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  He is pending noncardiovascular surgery on his elbow and shoulder.  Poorly exertional with right lower extremity amputation with significant risk factors also with dyspnea on exertion at times.  EKG sinus rhythm with nonspecific ST-T wave abnormality.  He had a heart cath remotely which demonstrated small vessel disease and some small vessel occlusions he says but large arteries were okay but this was greater than 8 to 10 years ago.  He has been on high intensity statin therapy without aspirin given that he is on anticoagulation.  He is on beta-blocker    Review of systems otherwise negative x 14 point review of systems except as mentioned above    Historical data copied forward from previous encounters in EMR including the history, exam, and assessment/plan has been reviewed and is unchanged unless noted otherwise.    Cardiac medicines reviewed with risk, benefits, and necessity of each discussed.    Risk and benefit of cardiac testing reviewed including death heart attack stroke pain bleeding infection need for vascular /cardiovascular surgery were discussed and the patient     Objective:         /80 (BP Location: Right arm, Patient Position: Sitting)   Pulse 75   Resp 18   Ht 167.6 cm (66\")   Wt 86.2 kg (190 lb)   SpO2 97%   BMI 30.67 kg/m²     Physical Exam  Regular rate and rhythm no rubs gallops heave or lift, 1 out of 6 systolic ejection " murmur low sternal border  No carotid bruits or JVD  Right lower extremity amputation  Radial pulses 2+ equal bilaterally  Skin is warm and dry  Intact grossly  Assessment:       Manage medical conditions today independently  Perioperative ischemic evaluation  Essential hypertension  Hyperlipidemia  Risk factors for coronary disease  Family history coronary disease  Diabetes is comorbidity affecting all aspects of care  Coronary artery disease by patient history  History of stroke  History of DVT PE  Chronic use of anticoagulants    Diagnoses and all orders for this visit:    1. Preoperative examination (Primary)  -     Stress Test With Myocardial Perfusion (1 Day); Future  -     Adult Transthoracic Echo Complete W/ Color, Spectral and Contrast if Necessary Per Protocol; Future    2. Heart murmur  -     Stress Test With Myocardial Perfusion (1 Day); Future  -     Adult Transthoracic Echo Complete W/ Color, Spectral and Contrast if Necessary Per Protocol; Future    3. MCKEON (dyspnea on exertion)  -     Stress Test With Myocardial Perfusion (1 Day); Future  -     Adult Transthoracic Echo Complete W/ Color, Spectral and Contrast if Necessary Per Protocol; Future    EKG today without ischemic findings  Perioperative evaluation with stress testing is appropriate  Blood pressure well-controlled  Continue statin therapy, recommend fasting lipid panel to ensure LDL less than 70 optimally less than 55  Goal blood pressure less than 130 systolic with history of stroke and diabetes  Last labs reviewed with elevated creatinine 1.3-1.4 likely with diabetes and hypertensive nephropathy, last A1c 7.4  Diabetes has been difficult to control A1c of nearly 9 earlier this year  TSH was okay  Last LDL was 122  Will address PCSK9 inhibitors if LDL not less than 70 on high intensity statin on follow-up    Further recommendation follow findings and clinical course    Porfirio Lopez MD, PhD            The pleasure to be involved in this  patient's cardiovascular care.  Please call with any questions or concerns  Porfirio Lopez MD, PhD    Most recent EKG as reviewed and interpreted by me:    ECG 12 Lead    Date/Time: 6/28/2024 11:06 AM  Performed by: Porfirio Lopez MD    Authorized by: Porfirio Lopez MD  Comparison: not compared with previous ECG   Previous ECG: no previous ECG available  Rhythm: sinus rhythm  Rate: normal  Conduction: conduction normal  QRS axis: normal  Other findings: non-specific ST-T wave changes    Clinical impression: non-specific ECG           Most recent echo as reviewed and interpreted by me:  Results for orders placed during the hospital encounter of 03/18/22    Adult Transthoracic Echo Complete W/ Cont if Necessary Per Protocol (With Agitated Saline)    Interpretation Summary  Normal LV size and contractility EF of 60 to 65%  Normal RV size  Mild left atrial enlargement seen  Aortic valve appears structurally normal  Mitral valve leaflets are thickened anterior mitral leaflet appears calcified, but has good cusp separation.   No significant MR seen.  Tricuspid valve appears structurally normal, no significant regurgitation seen.  No pericardial effusion seen.  Proximal aorta appears normal in size.      Most recent stress test as reviewed and interpreted by me:      Most recent cardiac catheterization as reviewed interpreted by me:  No results found for this or any previous visit.    The following portions of the patient's history were reviewed and updated as appropriate: allergies, current medications, past family history, past medical history, past social history, past surgical history, and problem list.      ROS:  14 point review of systems negative except as mentioned above    Current Outpatient Medications:     amLODIPine (NORVASC) 10 MG tablet, Take 1 tablet by mouth Daily., Disp: 90 tablet, Rfl: 1    apixaban (Eliquis) 5 MG tablet tablet, Take 1 tablet by mouth 2 (Two) Times a Day., Disp: 180  tablet, Rfl: 1    atorvastatin (LIPITOR) 80 MG tablet, Take 1 tablet by mouth Every Night., Disp: 90 tablet, Rfl: 1    buPROPion XL (Wellbutrin XL) 150 MG 24 hr tablet, Take 1 tablet by mouth Every Morning., Disp: 90 tablet, Rfl: 1    busPIRone (BUSPAR) 10 MG tablet, Take 1 tablet by mouth Every 12 (Twelve) Hours., Disp: 180 tablet, Rfl: 1    cyclobenzaprine (FLEXERIL) 10 MG tablet, Take I tab q 8 hrs as needed for tension headaches, Disp: 30 tablet, Rfl: 0    DULoxetine (CYMBALTA) 60 MG capsule, Take 1 capsule by mouth Every Morning., Disp: 90 capsule, Rfl: 1    empagliflozin (Jardiance) 10 MG tablet tablet, Take 1 tablet by mouth Daily., Disp: 30 tablet, Rfl: 5    Gvoke HypoPen 1-Pack 1 MG/0.2ML solution auto-injector, INJECT 1MG SUBCUTANEOUSLY INTO THE APPROPRIATE AREA AS DIRECTED AS NEEDED FOR HYPOGLYCEMIA, Disp: 0.4 mL, Rfl: 10    HumaLOG KwikPen 100 UNIT/ML solution pen-injector, INJECT 10 UNITS            SUBCUTANEOUSLY INTO THE    APPROPRIATE AREA 3 TIMES A DAY WITH MEALS AS DIRECTED, Disp: 30 mL, Rfl: 3    insulin aspart (NovoLOG FlexPen) 100 UNIT/ML solution pen-injector sc pen, Inject 20 Units under the skin into the appropriate area as directed 3 (Three) Times a Day With Meals. Dx code: E11.65, Disp: 30 mL, Rfl: 5    Insulin Glargine (Lantus SoloStar) 100 UNIT/ML injection pen, Inject 45 Units under the skin into the appropriate area as directed Every Night for 265 days., Disp: 45 mL, Rfl: 2    levothyroxine (Synthroid) 100 MCG tablet, Take 1 tablet by mouth Every Morning., Disp: 90 tablet, Rfl: 1    meclizine (ANTIVERT) 25 MG tablet, Take 1 tablet by mouth 3 (Three) Times a Day As Needed for Dizziness., Disp: 30 tablet, Rfl: 0    metoprolol succinate XL (TOPROL-XL) 100 MG 24 hr tablet, Take 1 tablet by mouth Daily., Disp: 90 tablet, Rfl: 0    omeprazole (priLOSEC) 40 MG capsule, TAKE 1 CAPSULE BY MOUTH DAILY., Disp: 90 capsule, Rfl: 1    ondansetron ODT (ZOFRAN-ODT) 4 MG disintegrating tablet, PLACE 1  TABLET ON TONGUE AND ALLOW TO DISSOLVE EVERY 8 HOURS AS NEEDED FOR NAUSEA AND VOMITING, Disp: 30 tablet, Rfl: 10    pregabalin (LYRICA) 100 MG capsule, Take 1 capsule by mouth 2 (Two) Times a Day., Disp: 60 capsule, Rfl: 0    Semaglutide,0.25 or 0.5MG/DOS, (Ozempic, 0.25 or 0.5 MG/DOSE,) 2 MG/3ML solution pen-injector, Inject 0.5 mg under the skin into the appropriate area as directed 1 (One) Time Per Week., Disp: 9 mL, Rfl: 3    Accu-Chek Softclix Lancets lancets, Use as directed to test blood sugar 4 times daily before meals and at bedtime., Disp: 100 each, Rfl: 5    Blood Glucose Monitoring Suppl (Accu-Chek Guide) w/Device kit, See Admin Instructions., Disp: , Rfl:     Blood Glucose Monitoring Suppl (FreeStyle Lite) w/Device kit, Use 1 kit Daily. E11.65, Disp: 1 kit, Rfl: 0    Continuous Blood Gluc  (Dexcom G6 ) device, Use 1 Device Daily. Use to check BS, Disp: 1 each, Rfl: 0    Continuous Blood Gluc Sensor (Dexcom G6 Sensor), Use Every 10 (Ten) Days. Use to check BS daily, Disp: 9 each, Rfl: 1    Continuous Blood Gluc Transmit (Dexcom G6 Transmitter) misc, Use 1 each Every 3 (Three) Months. USE 1 TRANSMITTER EVERY 3 MONTHS, Disp: 3 each, Rfl: 1    glucose blood (Accu-Chek Guide) test strip, Use as instructed to test blood sugar 4 times daily before meals and at bedtime, Disp: 100 each, Rfl: 12    glucose blood (FREESTYLE LITE) test strip, Test bs daily E11.65, Disp: 100 each, Rfl: 1    Insulin Pen Needle (Pen Needles) 32G X 4 MM misc, Use 1 each 4 (Four) Times a Day Before Meals & at Bedtime., Disp: 400 each, Rfl: 3    Problem List:  Patient Active Problem List   Diagnosis    Coronary artery disease involving native coronary artery of native heart without angina pectoris    Chronic renal insufficiency, stage III (moderate)    Degenerative joint disease    Major depressive disorder, recurrent episode, severe    Diabetic peripheral neuropathy    Type 2 diabetes mellitus with hyperglycemia, with  long-term current use of insulin    Encounter for annual wellness exam in Medicare patient    Fatigue    Foot pain, right    Ganglion cyst    GERD without esophagitis    History of amputation of hallux    History of pulmonary embolism    Mixed hyperlipidemia    Acquired hypothyroidism    Obstructive sleep apnea syndrome    Palpitations    Peripheral vascular disease    Subacromial bursitis of right shoulder joint    Vitamin D deficiency    Obesity (BMI 30-39.9)    Encounter for other orthopedic aftercare    Other ossification of muscle, unspecified site    History of CVA (cerebrovascular accident)    Type 2 diabetes mellitus with peripheral vascular disease    Depression with suicidal ideation    Acute encephalopathy    Acute tension-type headache    S/P BKA (below knee amputation), right    Primary hypertension    Cellulitis of great toe of left foot    Anemia of chronic disease    Cellulitis of left lower extremity    Cellulitis of right lower extremity    Cervical radiculopathy    Lumbar spondylosis    Neck pain    Dizziness    Constipation    Diaphragmatic hernia without obstruction or gangrene    Eosinophilic esophagitis    Other pulmonary embolism without acute cor pulmonale    Esophageal obstruction    Other specified disease of esophagus    Polyp of colon    Abdominal pain    Nausea, vomiting and diarrhea    Dysphagia    Severe malnutrition    Cerebrovascular accident    Depression     Past Medical History:  Past Medical History:   Diagnosis Date    Allergic     CKD (chronic kidney disease), stage III     Depression     Depression with suicidal ideation 08/08/2021    DJD (degenerative joint disease)     DVT (deep venous thrombosis)     Ganglion     rt wrist    Gangrene of foot 10/09/2015    GERD (gastroesophageal reflux disease)     Headache     Headache, tension-type     Heart murmur     Hiatal hernia     History of esophageal stricture     s/p dialation 40-50 times last EGD 04/2016    Hyperlipidemia      Hypertension     Hypothyroidism     IBS (irritable bowel syndrome)     Nausea, vomiting and diarrhea 09/22/2023    Neuropathy     Neuropathy in diabetes     Osteomyelitis of right foot 03/19/2020    Pulmonary embolism 01/19/2017    Rash     rt lower hip    Retinopathy     S/P BKA (below knee amputation), right 03/18/2022    Seasonal allergies     Sepsis due to group B Streptococcus 03/19/2020    Shortness of breath     Sleep apnea     Stroke     Type 2 diabetes mellitus with peripheral vascular disease     Vitamin D deficiency      Past Surgical History:  Past Surgical History:   Procedure Laterality Date    AMPUTATION DIGIT Left 04/26/2022    Procedure: AMPUTATION left great toe;  Surgeon: ERWIN Baker DPM;  Location: [facility] MAIN OR;  Service: Podiatry;  Laterality: Left;    AMPUTATION DIGIT  04/26/2022    Procedure: ;  Surgeon: ERWIN Baker DPM;  Location: [facility] MAIN OR;  Service: Podiatry;;    AMPUTATION FOOT / TOE Right     great toe    AMPUTATION REVISION Right 04/08/2021    Procedure: BELOW KNEE AMPUTATION REVISAION;  Surgeon: Eduardo Reynolds MD;  Location: [facility] MAIN OR;  Service: Orthopedics;  Laterality: Right;    AMPUTATION REVISION Right 04/29/2021    Procedure: AMPUTATION REVISION KNEE STUMP;  Surgeon: Eduardo Reynolds MD;  Location: [facility] MAIN OR;  Service: Orthopedics;  Laterality: Right;    BELOW KNEE AMPUTATION Right 07/30/2020    Procedure: AMPUTATION BELOW KNEE;  Surgeon: Eduardo Reynolds MD;  Location: [facility] MAIN OR;  Service: Orthopedics;  Laterality: Right;    CARDIAC CATHETERIZATION  04/2018    [facility]    CHOLECYSTECTOMY N/A 09/26/2023    Procedure: CHOLECYSTECTOMY LAPAROSCOPIC;  Surgeon: Eduardo Srinivasan MD;  Location: [facility] MAIN OR;  Service: General;  Laterality: N/A;    COLONOSCOPY      ENDOSCOPY      ENDOSCOPY N/A 10/04/2019    Procedure: ESOPHAGOGASTRODUODENOSCOPY with dilitation and biopsy x 1 area;  Surgeon: Declan Iqbal MD;  Location: [facility] ENDOSCOPY;   Service: Gastroenterology    ENDOSCOPY N/A 12/13/2019    Procedure: ESOPHAGOGASTRODUODENOSCOPY WITH DILATATION (50, 52 BOUGIE);  Surgeon: Declan Iqbal MD;  Location: [facility] ENDOSCOPY;  Service: Gastroenterology    ENDOSCOPY N/A 08/06/2021    Procedure: ESOPHAGOGASTRODUODENOSCOPY with biopsy x1 area and esophageal dilation (56FR Bougie);  Surgeon: Hussein Talavera MD;  Location: [facility] ENDOSCOPY;  Service: Gastroenterology;  Laterality: N/A;  post: hiatal hernia, erosive gastritis    ENDOSCOPY N/A 12/13/2022    Procedure: ESOPHAGOGASTRODUODENOSCOPY WITH DILATATION (BOUGIE #54, #56) AND BIOPSY X1;  Surgeon: David Rodriguez MD;  Location: [facility] ENDOSCOPY;  Service: Gastroenterology;  Laterality: N/A;  POST: dysphagia     ENDOSCOPY N/A 09/28/2023    Procedure: ESOPHAGOGASTRODUODENOSCOPY with dilation (58 non guided bougie);  Surgeon: Hussein Talavera MD;  Location: [facility] ENDOSCOPY;  Service: Gastroenterology;  Laterality: N/A;  post op: dysphagia    EYE SURGERY      GANGLION CYST EXCISION Left     HERNIA REPAIR Bilateral     INCISION AND DRAINAGE OF WOUND Right 06/15/2022    Procedure: INCISION AND DRAINAGE WOUND with debridement;  Surgeon: Fito Forte MD;  Location: [facility] MAIN OR;  Service: Orthopedics;  Laterality: Right;    JOINT REPLACEMENT      Left total hip    RETINOPATHY SURGERY      laser    TOTAL HIP ARTHROPLASTY Left     TOTAL HIP ARTHROPLASTY Left 2018    TRANS METATARSAL AMPUTATION Right 03/17/2020    Procedure: AMPUTATION TRANS METATARSAL right;  Surgeon: ERWIN Baker DPM;  Location: [facility] MAIN OR;  Service: Podiatry;  Laterality: Right;  GANGRENOUS RIGHT FOOT    VASECTOMY       Social History:  Social History     Socioeconomic History    Marital status:     Number of children: 3    Highest education level: High school graduate   Tobacco Use    Smoking status: Never    Smokeless tobacco: Never   Vaping Use    Vaping status: Never Used   Substance and Sexual Activity     Alcohol use: Yes     Comment: OCCASIONAL    Drug use: No    Sexual activity: Not Currently     Partners: Female     Birth control/protection: None     Comment: I have Ed problem at this time     Allergies:  Allergies   Allergen Reactions    Lisinopril Cough    Cefixime Other (See Comments)     Immunizations:  Immunization History   Administered Date(s) Administered    COVID-19 F23 (PFIZER) 12YRS+ (COMIRNATY) 12/07/2023    Flu Vaccine Quad PF 6-35MO 10/15/2016, 08/17/2017, 09/21/2018, 09/03/2019    Fluzone (or Fluarix & Flulaval for VFC) >6mos 09/03/2019, 10/28/2020    Fluzone High Dose =>65 Years ([employer] ONLY) 09/03/2019    Fluzone High-Dose 65+yrs 10/02/2022    Fluzone Quad >6mos (Multi-dose) 12/07/2023    Hepatitis A 05/18/2018, 09/03/2019    Influenza, Unspecified 08/06/2019    Pneumococcal Polysaccharide (PPSV23) 01/19/2017    Shingrix 09/03/2019, 12/07/2023    Td (TDVAX) 07/21/2018            In-Office Procedure(s):  No orders to display        ASCVD RIsk Score::  The ASCVD Risk score (Rogelio DK, et al., 2019) failed to calculate.    Imaging:    Results for orders placed during the hospital encounter of 09/22/23    XR Wrist 2 View Right    Narrative  XR WRIST 2 VW RIGHT    Date of Exam: 9/27/2023 2:19 PM EDT    Indication: Cyst    Comparison: None.    FINDINGS: No acute right wrist fracture is seen. No joint malalignment is evident. No definite joint effusion. No osteolytic or osteoblastic lesions are identified. Small bony exostosis is thought to project from the dorsal margin of the distal radial  metaphysis.    Impression  Impression: No acute right wrist findings.    Electronically Signed: Sully Quintanilla MD  9/27/2023 2:21 PM EDT  Workstation ID: [id number]       Results for orders placed during the hospital encounter of 09/22/23    US Abdomen Limited    Narrative  US ABDOMEN LIMITED    Date of Exam: 9/22/2023 9:36 PM EDT    Indication: abdominal pain.    Comparison: CT abdomen and pelvis  9/22/2023    Technique: Grayscale and color Doppler ultrasound evaluation of the right upper quadrant was performed.      Findings:  The liver appears normal in echogenicity. No focal lesions identified. Normal flow is present within the hepatic vasculature.    The pancreas is not clearly visible on this study.    Multiple layering stones are identified within the gallbladder lumen. There is mild wall thickening, most significantly at the fundus. Positive sonographic Fleming sign. No evidence of pericholecystic fluid.    Common bile duct is normal in caliber.    The right kidney appears normal in size with no focal lesions. No evidence of nephrolithiasis or hydronephrosis.    Impression  Impression:  Cholelithiasis with a mildly thickened gallbladder wall and positive sonographic Fleming sign is highly concerning for  cholecystitis.  No evidence of biliary ductal dilation.        Electronically Signed: Sunny Humphries DO  9/22/2023 10:15 PM EDT  Workstation ID: [id number]      Results for orders placed during the hospital encounter of 09/22/23    CT Abdomen Pelvis Without Contrast    Narrative  CT ABDOMEN PELVIS WO CONTRAST    Date of Exam: 9/22/2023 5:15 PM EDT    Indication: Abdominal pain.    Comparison: 10/12/2016    Technique: Axial CT images were obtained of the abdomen and pelvis without the administration of contrast. Sagittal and coronal reconstructions were performed.  Automated exposure control and iterative reconstruction methods were used.      Findings:  Visualized Chest: Streaky linear atelectasis/scarring in the bilateral lung bases, right greater than left. Otherwise unremarkable    Liver: Liver is normal in size and CT density. No focal lesions.    Gallbladder: Layering stones. Otherwise unremarkable.    Bile Ducts: No billiary dcutal dilation.    Spleen: Spleen is normal in size and CT density.    Pancreas: Pancreas is normal. There is no evidence of pancreatic mass or peripancreatic  fluid.    Adrenals: Adrenal glands are unremarkable.    Kidneys: Kidneys are normal in size. There are no stones or hydronephrosis.    Gastrointestinal: Unremarkable.    Bladder: The bladder is normal.    Pelvis:  No suspecious mass.    Peritoneum/Mesentery: No fluid collection, ascities, or free air.    Lymph Nodes: No lymphadenopathy.    Vasculature: Unremarkable    Abdominal Wall: Unremarkable    Bony Structures: No acute osseous abnormality status post left hip arthroplasty.    Impression  Impression:  No acute abnormality    Cholelithiasis without CT evidence of acute cholecystitis.            Electronically Signed: Sunny Humphries DO  9/22/2023 5:40 PM EDT  Workstation ID: [id number]      Lab Review:   Lab on 04/12/2024   Component Date Value    Free T4 04/12/2024 1.40     TSH 04/12/2024 3.580     Glucose 04/12/2024 122 (H)     BUN 04/12/2024 15     Creatinine 04/12/2024 1.34 (H)     Sodium 04/12/2024 142     Potassium 04/12/2024 4.5     Chloride 04/12/2024 104     CO2 04/12/2024 27.0     Calcium 04/12/2024 9.6     Total Protein 04/12/2024 6.7     Albumin 04/12/2024 4.3     ALT (SGPT) 04/12/2024 36     AST (SGOT) 04/12/2024 26     Alkaline Phosphatase 04/12/2024 132 (H)     Total Bilirubin 04/12/2024 1.3 (H)     Globulin 04/12/2024 2.4     A/G Ratio 04/12/2024 1.8     BUN/Creatinine Ratio 04/12/2024 11.2     Anion Gap 04/12/2024 11.0     eGFR 04/12/2024 60.6     Hemoglobin A1C 04/12/2024 7.40 (H)    Lab on 01/12/2024   Component Date Value    TSH 01/12/2024 6.130 (H)     Free T4 01/12/2024 1.28     Hemoglobin A1C 01/12/2024 8.90 (H)     Glucose 01/12/2024 165 (H)     BUN 01/12/2024 19     Creatinine 01/12/2024 1.42 (H)     Sodium 01/12/2024 142     Potassium 01/12/2024 4.2     Chloride 01/12/2024 104     CO2 01/12/2024 26.0     Calcium 01/12/2024 9.9     Total Protein 01/12/2024 7.0     Albumin 01/12/2024 4.6     ALT (SGPT) 01/12/2024 38     AST (SGOT) 01/12/2024 25     Alkaline Phosphatase 01/12/2024 143 (H)      Total Bilirubin 01/12/2024 1.2     Globulin 01/12/2024 2.4     A/G Ratio 01/12/2024 1.9     BUN/Creatinine Ratio 01/12/2024 13.4     Anion Gap 01/12/2024 12.0     eGFR 01/12/2024 56.9 (L)     Microalbumin/Creatinine * 01/12/2024 19.2     Creatinine, Urine 01/12/2024 166.6     Microalbumin, Urine 01/12/2024 3.2     Total Cholesterol 01/12/2024 196     Triglycerides 01/12/2024 148     HDL Cholesterol 01/12/2024 48     LDL Cholesterol  01/12/2024 122 (H)     VLDL Cholesterol 01/12/2024 26     LDL/HDL Ratio 01/12/2024 2.47      Recent labs reviewed and interpreted for clinical significance and application            Level of Care:           Porfirio Lopez MD  06/28/24  .

## 2024-07-01 ENCOUNTER — HOSPITAL ENCOUNTER (OUTPATIENT)
Dept: NUCLEAR MEDICINE | Facility: HOSPITAL | Age: 59
Discharge: HOME | End: 2024-07-01
Payer: MEDICARE

## 2024-07-01 ENCOUNTER — HOSPITAL ENCOUNTER (OUTPATIENT)
Dept: CARDIOLOGY | Facility: HOSPITAL | Age: 59
Discharge: HOME | End: 2024-07-01
Payer: MEDICARE

## 2024-07-01 DIAGNOSIS — Z01.818 PREOPERATIVE EXAMINATION: ICD-10-CM

## 2024-07-01 DIAGNOSIS — R06.09 DOE (DYSPNEA ON EXERTION): ICD-10-CM

## 2024-07-01 DIAGNOSIS — R01.1 HEART MURMUR: ICD-10-CM

## 2024-07-01 LAB
AORTIC DIMENSIONLESS INDEX: 0.91 (DI)
BH CV ECHO LEFT VENTRICLE GLOBAL LONGITUDINAL STRAIN: -12.6 %
BH CV ECHO MEAS - ACS: 1.5 CM
BH CV ECHO MEAS - AO MAX PG: 4.7 MMHG
BH CV ECHO MEAS - AO MEAN PG: 3 MMHG
BH CV ECHO MEAS - AO V2 MAX: 108 CM/SEC
BH CV ECHO MEAS - AO V2 VTI: 23.4 CM
BH CV ECHO MEAS - AVA(I,D): 3.2 CM2
BH CV ECHO MEAS - EDV(CUBED): 54.9 ML
BH CV ECHO MEAS - EDV(MOD-SP4): 35.9 ML
BH CV ECHO MEAS - EF(MOD-SP4): 56 %
BH CV ECHO MEAS - EF_3D-VOL: 63 %
BH CV ECHO MEAS - ESV(CUBED): 19.7 ML
BH CV ECHO MEAS - ESV(MOD-SP4): 15.8 ML
BH CV ECHO MEAS - FS: 28.9 %
BH CV ECHO MEAS - IVS/LVPW: 1.11 CM
BH CV ECHO MEAS - IVSD: 1 CM
BH CV ECHO MEAS - LA DIMENSION: 3.1 CM
BH CV ECHO MEAS - LAT PEAK E' VEL: 6.4 CM/SEC
BH CV ECHO MEAS - LV MASS(C)D: 109 GRAMS
BH CV ECHO MEAS - LV MAX PG: 3.9 MMHG
BH CV ECHO MEAS - LV MEAN PG: 2 MMHG
BH CV ECHO MEAS - LV V1 MAX: 98.4 CM/SEC
BH CV ECHO MEAS - LV V1 VTI: 23.8 CM
BH CV ECHO MEAS - LVIDD: 3.8 CM
BH CV ECHO MEAS - LVIDS: 2.7 CM
BH CV ECHO MEAS - LVOT AREA: 3.1 CM2
BH CV ECHO MEAS - LVOT DIAM: 2 CM
BH CV ECHO MEAS - LVPWD: 0.9 CM
BH CV ECHO MEAS - MED PEAK E' VEL: 8.3 CM/SEC
BH CV ECHO MEAS - MV A MAX VEL: 111 CM/SEC
BH CV ECHO MEAS - MV DEC SLOPE: 381 CM/SEC2
BH CV ECHO MEAS - MV DEC TIME: 0.24 SEC
BH CV ECHO MEAS - MV E MAX VEL: 106 CM/SEC
BH CV ECHO MEAS - MV E/A: 0.95
BH CV ECHO MEAS - MV MAX PG: 3.7 MMHG
BH CV ECHO MEAS - MV MEAN PG: 2 MMHG
BH CV ECHO MEAS - MV P1/2T: 72.6 MSEC
BH CV ECHO MEAS - MV V2 VTI: 24.7 CM
BH CV ECHO MEAS - MVA(P1/2T): 3 CM2
BH CV ECHO MEAS - MVA(VTI): 3 CM2
BH CV ECHO MEAS - PA V2 MAX: 94 CM/SEC
BH CV ECHO MEAS - PULM A REVS DUR: 0.11 SEC
BH CV ECHO MEAS - PULM A REVS VEL: 23.5 CM/SEC
BH CV ECHO MEAS - PULM DIAS VEL: 33.8 CM/SEC
BH CV ECHO MEAS - PULM S/D: 1
BH CV ECHO MEAS - PULM SYS VEL: 33.8 CM/SEC
BH CV ECHO MEAS - QP/QS: 0.37
BH CV ECHO MEAS - RV MAX PG: 1.56 MMHG
BH CV ECHO MEAS - RV V1 MAX: 62.3 CM/SEC
BH CV ECHO MEAS - RV V1 VTI: 13.9 CM
BH CV ECHO MEAS - RVDD: 2.9 CM
BH CV ECHO MEAS - RVOT DIAM: 1.6 CM
BH CV ECHO MEAS - SV(LVOT): 74.8 ML
BH CV ECHO MEAS - SV(MOD-SP4): 20.1 ML
BH CV ECHO MEAS - SV(RVOT): 27.8 ML
BH CV ECHO MEAS - TAPSE (>1.6): 1.56 CM
BH CV ECHO MEASUREMENTS AVERAGE E/E' RATIO: 14.42
BH CV NUCLEAR PRIOR STUDY: 3
BH CV REST NUCLEAR ISOTOPE DOSE: 10.6 MCI
BH CV STRESS BP STAGE 1: NORMAL
BH CV STRESS BP STAGE 2: NORMAL
BH CV STRESS BP STAGE 3: NORMAL
BH CV STRESS COMMENTS STAGE 1: NORMAL
BH CV STRESS COMMENTS STAGE 2: NORMAL
BH CV STRESS DOSE REGADENOSON STAGE 1: 0.4
BH CV STRESS DURATION MIN STAGE 1: 0
BH CV STRESS DURATION MIN STAGE 2: 4
BH CV STRESS DURATION SEC STAGE 1: 10
BH CV STRESS DURATION SEC STAGE 2: 0
BH CV STRESS HR STAGE 1: 78
BH CV STRESS HR STAGE 2: 83
BH CV STRESS HR STAGE 3: 83
BH CV STRESS NUCLEAR ISOTOPE DOSE: 30 MCI
BH CV STRESS PROTOCOL 1: NORMAL
BH CV STRESS RECOVERY BP: NORMAL MMHG
BH CV STRESS RECOVERY HR: 85 BPM
BH CV STRESS STAGE 1: 1
BH CV STRESS STAGE 2: 2
BH CV STRESS STAGE 3: 3
BH CV XLRA - TDI S': 13.7 CM/SEC
LV EF NUC BP: 77 %
MAXIMAL PREDICTED HEART RATE: 160 BPM
PERCENT MAX PREDICTED HR: 53.13 %
SINUS: 2.9 CM
STRESS BASELINE BP: NORMAL MMHG
STRESS BASELINE HR: 72 BPM
STRESS PERCENT HR: 63 %
STRESS POST PEAK BP: NORMAL MMHG
STRESS POST PEAK HR: 85 BPM
STRESS TARGET HR: 136 BPM

## 2024-07-01 PROCEDURE — 0 TECHNETIUM TETROFOSMIN KIT: Performed by: INTERNAL MEDICINE

## 2024-07-01 PROCEDURE — A9502 TC99M TETROFOSMIN: HCPCS | Performed by: INTERNAL MEDICINE

## 2024-07-01 PROCEDURE — 93017 CV STRESS TEST TRACING ONLY: CPT

## 2024-07-01 PROCEDURE — 93306 TTE W/DOPPLER COMPLETE: CPT

## 2024-07-01 PROCEDURE — 78452 HT MUSCLE IMAGE SPECT MULT: CPT | Performed by: INTERNAL MEDICINE

## 2024-07-01 PROCEDURE — 93356 MYOCRD STRAIN IMG SPCKL TRCK: CPT | Performed by: INTERNAL MEDICINE

## 2024-07-01 PROCEDURE — 93356 MYOCRD STRAIN IMG SPCKL TRCK: CPT

## 2024-07-01 PROCEDURE — 93018 CV STRESS TEST I&R ONLY: CPT | Performed by: INTERNAL MEDICINE

## 2024-07-01 PROCEDURE — 93016 CV STRESS TEST SUPVJ ONLY: CPT | Performed by: INTERNAL MEDICINE

## 2024-07-01 PROCEDURE — 93306 TTE W/DOPPLER COMPLETE: CPT | Performed by: INTERNAL MEDICINE

## 2024-07-01 PROCEDURE — 78452 HT MUSCLE IMAGE SPECT MULT: CPT

## 2024-07-01 PROCEDURE — 25010000002 REGADENOSON 0.4 MG/5ML SOLUTION: Performed by: INTERNAL MEDICINE

## 2024-07-01 RX ORDER — REGADENOSON 0.08 MG/ML
0.4 INJECTION, SOLUTION INTRAVENOUS
Status: COMPLETED | OUTPATIENT
Start: 2024-07-01 | End: 2024-07-01

## 2024-07-01 RX ADMIN — TETROFOSMIN 1 DOSE: 1.38 INJECTION, POWDER, LYOPHILIZED, FOR SOLUTION INTRAVENOUS at 07:10

## 2024-07-01 RX ADMIN — REGADENOSON 0.4 MG: 0.08 INJECTION, SOLUTION INTRAVENOUS at 07:58

## 2024-07-01 RX ADMIN — TETROFOSMIN 1 DOSE: 1.38 INJECTION, POWDER, LYOPHILIZED, FOR SOLUTION INTRAVENOUS at 07:58

## 2024-07-18 ENCOUNTER — TELEPHONE (OUTPATIENT)
Dept: PAIN MEDICINE | Facility: CLINIC | Age: 59
End: 2024-07-18
Payer: MEDICARE

## 2024-07-18 NOTE — TELEPHONE ENCOUNTER
As per Mr. Adhikari's medical records, he underwent a SVETA C7-T1 procedure on 4/12/2023 with a different provider outside our clinic. He reported receiving 60% relief; however, the relief only lasted for about two weeks. Unfortunately, the previous provider did not choose to continue with any future injections.    Mr. Adhikari started seeing us in February 2024, and our provider initiated treatment with medial branch blocks. Subsequently, our provider performed an RFA (Radiofrequency Ablation) on the C spine at levels C3-C4 and C4-C5. Mr. Adhikari reported about 50% relief. Following this, we conducted a C-spine MRI, which showed a right paracentral disc protrusion at C6-C7.    In light of the MRI results, our physician, Dr. Lebron, would like to try another SVETA with fluoroscopic guidance in hopes of reducing Mr. Adhikari's pain even further

## 2024-07-22 ENCOUNTER — LAB (OUTPATIENT)
Dept: LAB | Facility: HOSPITAL | Age: 59
End: 2024-07-22
Payer: MEDICARE

## 2024-07-22 DIAGNOSIS — E11.65 TYPE 2 DIABETES MELLITUS WITH HYPERGLYCEMIA, WITH LONG-TERM CURRENT USE OF INSULIN: ICD-10-CM

## 2024-07-22 DIAGNOSIS — Z79.4 TYPE 2 DIABETES MELLITUS WITH HYPERGLYCEMIA, WITH LONG-TERM CURRENT USE OF INSULIN: ICD-10-CM

## 2024-07-22 DIAGNOSIS — E29.1 HYPOGONADISM IN MALE: ICD-10-CM

## 2024-07-22 LAB
ALBUMIN SERPL-MCNC: 4.2 G/DL (ref 3.5–5.2)
ALBUMIN/GLOB SERPL: 1.7 G/DL
ALP SERPL-CCNC: 130 U/L (ref 39–117)
ALT SERPL W P-5'-P-CCNC: 39 U/L (ref 1–41)
ANION GAP SERPL CALCULATED.3IONS-SCNC: 13.8 MMOL/L (ref 5–15)
AST SERPL-CCNC: 31 U/L (ref 1–40)
BILIRUB SERPL-MCNC: 1.2 MG/DL (ref 0–1.2)
BUN SERPL-MCNC: 16 MG/DL (ref 8–23)
BUN/CREAT SERPL: 9.9 (ref 7–25)
CALCIUM SPEC-SCNC: 9.7 MG/DL (ref 8.6–10.5)
CHLORIDE SERPL-SCNC: 103 MMOL/L (ref 98–107)
CHOLEST SERPL-MCNC: 165 MG/DL (ref 0–200)
CO2 SERPL-SCNC: 26.2 MMOL/L (ref 22–29)
CREAT SERPL-MCNC: 1.61 MG/DL (ref 0.76–1.27)
EGFRCR SERPLBLD CKD-EPI 2021: 48.7 ML/MIN/1.73
GLOBULIN UR ELPH-MCNC: 2.5 GM/DL
GLUCOSE SERPL-MCNC: 84 MG/DL (ref 65–99)
HBA1C MFR BLD: 7.38 % (ref 4.8–5.6)
HDLC SERPL-MCNC: 45 MG/DL (ref 40–60)
LDLC SERPL CALC-MCNC: 100 MG/DL (ref 0–100)
LDLC/HDLC SERPL: 2.16 {RATIO}
POTASSIUM SERPL-SCNC: 4.1 MMOL/L (ref 3.5–5.2)
PROT SERPL-MCNC: 6.7 G/DL (ref 6–8.5)
SODIUM SERPL-SCNC: 143 MMOL/L (ref 136–145)
TRIGL SERPL-MCNC: 113 MG/DL (ref 0–150)
VLDLC SERPL-MCNC: 20 MG/DL (ref 5–40)

## 2024-07-22 PROCEDURE — 80053 COMPREHEN METABOLIC PANEL: CPT

## 2024-07-22 PROCEDURE — 36415 COLL VENOUS BLD VENIPUNCTURE: CPT

## 2024-07-22 PROCEDURE — 83036 HEMOGLOBIN GLYCOSYLATED A1C: CPT

## 2024-07-22 PROCEDURE — 80061 LIPID PANEL: CPT

## 2024-07-22 PROCEDURE — 84403 ASSAY OF TOTAL TESTOSTERONE: CPT

## 2024-07-22 PROCEDURE — 84402 ASSAY OF FREE TESTOSTERONE: CPT

## 2024-07-23 LAB
TESTOST FREE SERPL-MCNC: 8.1 PG/ML (ref 6.6–18.1)
TESTOST SERPL-MCNC: 572 NG/DL (ref 264–916)

## 2024-07-29 ENCOUNTER — HOSPITAL ENCOUNTER (OUTPATIENT)
Dept: PAIN MEDICINE | Facility: HOSPITAL | Age: 59
Discharge: HOME | End: 2024-07-29
Payer: MEDICARE

## 2024-07-29 ENCOUNTER — OFFICE VISIT (OUTPATIENT)
Dept: ENDOCRINOLOGY | Facility: CLINIC | Age: 59
End: 2024-07-29
Payer: MEDICARE

## 2024-07-29 VITALS
HEART RATE: 67 BPM | TEMPERATURE: 97.1 F | RESPIRATION RATE: 16 BRPM | HEIGHT: 68 IN | SYSTOLIC BLOOD PRESSURE: 135 MMHG | BODY MASS INDEX: 28.34 KG/M2 | OXYGEN SATURATION: 98 % | WEIGHT: 187 LBS | DIASTOLIC BLOOD PRESSURE: 78 MMHG

## 2024-07-29 VITALS
BODY MASS INDEX: 30.05 KG/M2 | WEIGHT: 187 LBS | SYSTOLIC BLOOD PRESSURE: 138 MMHG | HEIGHT: 66 IN | DIASTOLIC BLOOD PRESSURE: 68 MMHG | HEART RATE: 68 BPM | OXYGEN SATURATION: 98 %

## 2024-07-29 DIAGNOSIS — E78.2 MIXED HYPERLIPIDEMIA: ICD-10-CM

## 2024-07-29 DIAGNOSIS — M54.12 CERVICAL RADICULITIS: Primary | ICD-10-CM

## 2024-07-29 DIAGNOSIS — I10 PRIMARY HYPERTENSION: ICD-10-CM

## 2024-07-29 DIAGNOSIS — E03.9 HYPOTHYROIDISM, UNSPECIFIED TYPE: ICD-10-CM

## 2024-07-29 DIAGNOSIS — Z79.4 TYPE 2 DIABETES MELLITUS WITH HYPERGLYCEMIA, WITH LONG-TERM CURRENT USE OF INSULIN: Primary | ICD-10-CM

## 2024-07-29 DIAGNOSIS — E11.65 UNCONTROLLED TYPE 2 DIABETES MELLITUS WITH HYPERGLYCEMIA: ICD-10-CM

## 2024-07-29 DIAGNOSIS — E11.21 DIABETIC NEPHROPATHY ASSOCIATED WITH TYPE 2 DIABETES MELLITUS: ICD-10-CM

## 2024-07-29 DIAGNOSIS — E11.65 TYPE 2 DIABETES MELLITUS WITH HYPERGLYCEMIA, WITH LONG-TERM CURRENT USE OF INSULIN: Primary | ICD-10-CM

## 2024-07-29 DIAGNOSIS — E66.9 OBESITY (BMI 30-39.9): ICD-10-CM

## 2024-07-29 DIAGNOSIS — E11.42 DIABETIC PERIPHERAL NEUROPATHY: ICD-10-CM

## 2024-07-29 DIAGNOSIS — E11.319 DIABETIC RETINOPATHY ASSOCIATED WITH TYPE 2 DIABETES MELLITUS, MACULAR EDEMA PRESENCE UNSPECIFIED, UNSPECIFIED LATERALITY, UNSPECIFIED RETINOPATHY SEVERITY: ICD-10-CM

## 2024-07-29 DIAGNOSIS — R52 PAIN: ICD-10-CM

## 2024-07-29 PROCEDURE — 25510000001 IOPAMIDOL 41 % SOLUTION: Performed by: ANESTHESIOLOGY

## 2024-07-29 PROCEDURE — 62321 NJX INTERLAMINAR CRV/THRC: CPT | Performed by: ANESTHESIOLOGY

## 2024-07-29 PROCEDURE — 25010000002 METHYLPREDNISOLONE PER 40 MG: Performed by: ANESTHESIOLOGY

## 2024-07-29 PROCEDURE — 77003 FLUOROGUIDE FOR SPINE INJECT: CPT

## 2024-07-29 RX ORDER — INSULIN ASPART 100 [IU]/ML
20 INJECTION, SOLUTION INTRAVENOUS; SUBCUTANEOUS
Qty: 30 ML | Refills: 5 | Status: SHIPPED | OUTPATIENT
Start: 2024-07-29 | End: 2024-08-02 | Stop reason: SDUPTHER

## 2024-07-29 RX ORDER — INSULIN GLARGINE 100 [IU]/ML
40 INJECTION, SOLUTION SUBCUTANEOUS NIGHTLY
Qty: 45 ML | Refills: 2 | Status: SHIPPED | OUTPATIENT
Start: 2024-07-29 | End: 2025-07-02

## 2024-07-29 RX ORDER — PROCHLORPERAZINE 25 MG/1
1 SUPPOSITORY RECTAL
Qty: 3 EACH | Refills: 1 | Status: SHIPPED | OUTPATIENT
Start: 2024-07-29

## 2024-07-29 RX ORDER — ATORVASTATIN CALCIUM 80 MG/1
80 TABLET, FILM COATED ORAL NIGHTLY
Qty: 90 TABLET | Refills: 1 | Status: SHIPPED | OUTPATIENT
Start: 2024-07-29

## 2024-07-29 RX ORDER — PROCHLORPERAZINE 25 MG/1
SUPPOSITORY RECTAL
Qty: 9 EACH | Refills: 1 | Status: SHIPPED | OUTPATIENT
Start: 2024-07-29

## 2024-07-29 RX ORDER — LEVOTHYROXINE SODIUM 0.1 MG/1
100 TABLET ORAL
Qty: 90 TABLET | Refills: 1 | Status: SHIPPED | OUTPATIENT
Start: 2024-07-29

## 2024-07-29 RX ORDER — METHYLPREDNISOLONE ACETATE 40 MG/ML
40 INJECTION, SUSPENSION INTRA-ARTICULAR; INTRALESIONAL; INTRAMUSCULAR; SOFT TISSUE ONCE
Status: COMPLETED | OUTPATIENT
Start: 2024-07-29 | End: 2024-07-29

## 2024-07-29 RX ORDER — DOCUSATE SODIUM 100 MG/1
100 CAPSULE, LIQUID FILLED ORAL 2 TIMES DAILY
Qty: 60 CAPSULE | Refills: 5 | Status: SHIPPED | OUTPATIENT
Start: 2024-07-29 | End: 2025-01-25

## 2024-07-29 RX ORDER — HYDROCODONE BITARTRATE AND ACETAMINOPHEN 5; 325 MG/1; MG/1
TABLET ORAL
COMMUNITY
Start: 2024-07-02

## 2024-07-29 RX ORDER — IOPAMIDOL 408 MG/ML
3 INJECTION, SOLUTION INTRATHECAL
Status: COMPLETED | OUTPATIENT
Start: 2024-07-29 | End: 2024-07-29

## 2024-07-29 RX ORDER — SEMAGLUTIDE 1.34 MG/ML
1 INJECTION, SOLUTION SUBCUTANEOUS WEEKLY
Qty: 3 ML | Refills: 5 | Status: SHIPPED | OUTPATIENT
Start: 2024-07-29

## 2024-07-29 RX ADMIN — IOPAMIDOL 3 ML: 408 INJECTION, SOLUTION INTRATHECAL at 09:46

## 2024-07-29 RX ADMIN — METHYLPREDNISOLONE ACETATE 40 MG: 40 INJECTION, SUSPENSION INTRA-ARTICULAR; INTRALESIONAL; INTRAMUSCULAR; INTRASYNOVIAL; SOFT TISSUE at 09:46

## 2024-07-29 NOTE — DISCHARGE INSTRUCTIONS

## 2024-07-29 NOTE — PATIENT INSTRUCTIONS
# Diabetes Management:    Please adjust insulin therapy as:    - Insulin Glargine (Slow acting insulin) 35 Units in the evening.  - Insulin lispro / aspart (Fast acting / meal time insulin): 10-15 Units with each meal.  10 units with small to moderate portion   15 unit with big portion    Meal time insulin need to be taken 15 mins before meal. You can bring your insulin with you if you are planning to eat out.    If you plan to miss the meal please miss the meal time insulin as well.    Check your blood glucose through dexcom.    Try to maintain water intake around 6 to 8 glasses of water every day.    Increase Ozempic to 1 mgs once a week.  Continue Jardiance 10 mg 1 pill by mouth daily.    If your blood sugar is less than 70 in the morning then decrease your insulin glargine by 5 units.  If your blood sugar before meal is less than 70 then decrease your mealtime insulin by 5 units.    Low Blood Glucose:    - If you feel sweaty, anxious, shakiness, feel weak, trouble walking, feels like passing out or trouble seeing thing, please check your blood glucose and if its less than 70 or if your feel symptoms of low blood glucose then take one of the following or use Glucagon Injection:    *3 or 4 glucose tablets  *½ cup of juice or regular soda (not sugar-free)  *2 tablespoons of raisins  *4 or 5 saltine crackers  *1 tablespoon of sugar  *1 tablespoon of honey or corn syrup  *6 to 8 hard candies    Plan for fasting blood work before next visit.    Plan to make appointment to see Kidney Specialist / Nephrologist.     Thank you for your visit today.     If you have any questions or concerns please feel free to reach out of the office.

## 2024-07-29 NOTE — PROGRESS NOTES
-----------------------------------------------------------------  ENDOCRINE CLINIC NOTE  -----------------------------------------------------------------        PATIENT NAME: Kuldeep Adhikari  PATIENT : 1965 AGE: 59 y.o.  MRN NUMBER: [id number]  PRIMARY CARE: Laura Whitaker MD    ==========================================================================    CHIEF COMPLAINT: Type 2 Diabetes Mellitus  DATE OF SERVICE: 24    ==========================================================================    HPI / SUBJECTIVE    59 y.o. male is seen in the clinic today for follow up of type 2 diabetes.  Current therapy includes:  Insulin glargine 40 units nightly  Insulin lispro 15 units with each meal  Ozempic 0.5 mg once a week  Jardiance 10 mg 1 pill by mouth daily  Previous therapy:  Janumet therapy  Hypoglycemia noted at night.  Also patient is using insulin lispro after meals to avoid hypoglycemia.  Diabetic neuropathy, s/p right BKA, prosthesis in place.    Diabetic neuropathy left lower extremity, complicated with osteomyelitis in .  Diabetic retinopathy, s/p laser treatment, following Denise  Patient typically take 2 meals a day.  Underlying history of CVA.  No history of CAD.  Hx of CKD.  Checking BG through Dexcom G6.  Also have hx significant for Hypothyroidism and is currently maintained on Levothyroxine therapy.    ==========================================================================                                                PAST MEDICAL HISTORY    Past Medical History:   Diagnosis Date    Allergic     CKD (chronic kidney disease), stage III     Depression     Depression with suicidal ideation 2021    DJD (degenerative joint disease)     DVT (deep venous thrombosis)     Ganglion     rt wrist    Gangrene of foot 10/09/2015    GERD (gastroesophageal reflux disease)     Headache     Headache, tension-type     Heart murmur     Hiatal hernia     History of  esophageal stricture     s/p dialation 40-50 times last EGD 04/2016    Hyperlipidemia     Hypertension     Hypothyroidism     IBS (irritable bowel syndrome)     Nausea, vomiting and diarrhea 09/22/2023    Neuropathy     Neuropathy in diabetes     Osteomyelitis of right foot 03/19/2020    Pulmonary embolism 01/19/2017    Rash     rt lower hip    Retinopathy     S/P BKA (below knee amputation), right 03/18/2022    Seasonal allergies     Sepsis due to group B Streptococcus 03/19/2020    Shortness of breath     Sleep apnea     Stroke     Type 2 diabetes mellitus with peripheral vascular disease     Vitamin D deficiency        ==========================================================================    PAST SURGICAL HISTORY    Past Surgical History:   Procedure Laterality Date    AMPUTATION DIGIT Left 04/26/2022    Procedure: AMPUTATION left great toe;  Surgeon: ERWIN Baker DPM;  Location: [facility] MAIN OR;  Service: Podiatry;  Laterality: Left;    AMPUTATION DIGIT  04/26/2022    Procedure: ;  Surgeon: ERWIN Baker DPM;  Location: [facility] MAIN OR;  Service: Podiatry;;    AMPUTATION FOOT / TOE Right     great toe    AMPUTATION REVISION Right 04/08/2021    Procedure: BELOW KNEE AMPUTATION REVISAION;  Surgeon: Eduardo Reynolds MD;  Location: [facility] MAIN OR;  Service: Orthopedics;  Laterality: Right;    AMPUTATION REVISION Right 04/29/2021    Procedure: AMPUTATION REVISION KNEE STUMP;  Surgeon: Eduardo Reynolds MD;  Location: [facility] MAIN OR;  Service: Orthopedics;  Laterality: Right;    BELOW KNEE AMPUTATION Right 07/30/2020    Procedure: AMPUTATION BELOW KNEE;  Surgeon: Eduardo Reynolds MD;  Location: [facility] MAIN OR;  Service: Orthopedics;  Laterality: Right;    CARDIAC CATHETERIZATION  04/2018    [facility]    CHOLECYSTECTOMY N/A 09/26/2023    Procedure: CHOLECYSTECTOMY LAPAROSCOPIC;  Surgeon: Eduardo Srinivasan MD;  Location: [facility] MAIN OR;  Service: General;  Laterality: N/A;    COLONOSCOPY      ENDOSCOPY       ENDOSCOPY N/A 10/04/2019    Procedure: ESOPHAGOGASTRODUODENOSCOPY with dilitation and biopsy x 1 area;  Surgeon: Declan Iqbal MD;  Location: [facility] ENDOSCOPY;  Service: Gastroenterology    ENDOSCOPY N/A 12/13/2019    Procedure: ESOPHAGOGASTRODUODENOSCOPY WITH DILATATION (50, 52 BOUGIE);  Surgeon: Declan Iqbal MD;  Location: [facility] ENDOSCOPY;  Service: Gastroenterology    ENDOSCOPY N/A 08/06/2021    Procedure: ESOPHAGOGASTRODUODENOSCOPY with biopsy x1 area and esophageal dilation (56FR Bougie);  Surgeon: Hussein Talavera MD;  Location: [facility] ENDOSCOPY;  Service: Gastroenterology;  Laterality: N/A;  post: hiatal hernia, erosive gastritis    ENDOSCOPY N/A 12/13/2022    Procedure: ESOPHAGOGASTRODUODENOSCOPY WITH DILATATION (BOUGIE #54, #56) AND BIOPSY X1;  Surgeon: David Rodriguez MD;  Location: [facility] ENDOSCOPY;  Service: Gastroenterology;  Laterality: N/A;  POST: dysphagia     ENDOSCOPY N/A 09/28/2023    Procedure: ESOPHAGOGASTRODUODENOSCOPY with dilation (58 non guided bougie);  Surgeon: Hussein Talavera MD;  Location: [facility] ENDOSCOPY;  Service: Gastroenterology;  Laterality: N/A;  post op: dysphagia    EYE SURGERY      GANGLION CYST EXCISION Left     HERNIA REPAIR Bilateral     INCISION AND DRAINAGE OF WOUND Right 06/15/2022    Procedure: INCISION AND DRAINAGE WOUND with debridement;  Surgeon: Fito Forte MD;  Location: [facility] MAIN OR;  Service: Orthopedics;  Laterality: Right;    JOINT REPLACEMENT      Left total hip    RETINOPATHY SURGERY      laser    TOTAL HIP ARTHROPLASTY Left     TOTAL HIP ARTHROPLASTY Left 2018    TRANS METATARSAL AMPUTATION Right 03/17/2020    Procedure: AMPUTATION TRANS METATARSAL right;  Surgeon: ERWIN Baker DPM;  Location: [facility] MAIN OR;  Service: Podiatry;  Laterality: Right;  GANGRENOUS RIGHT FOOT    VASECTOMY         ==========================================================================    FAMILY HISTORY    Family History   Problem Relation Age  of Onset    Diabetes Mother         Patient  mom    Heart disease Mother     Arthritis Mother     Hyperlipidemia Mother     Leukemia Father     Sleep apnea Maternal Aunt         GENO    Stroke Maternal Grandmother     Hypertension Other     Hyperlipidemia Other     Cancer Other     Colon cancer Other         uncle       ==========================================================================    SOCIAL HISTORY    Social History     Socioeconomic History    Marital status:     Number of children: 3    Highest education level: High school graduate   Tobacco Use    Smoking status: Never    Smokeless tobacco: Never   Vaping Use    Vaping status: Never Used   Substance and Sexual Activity    Alcohol use: Yes     Comment: OCCASIONAL    Drug use: No    Sexual activity: Not Currently     Partners: Female     Birth control/protection: None     Comment: I have Ed problem at this time       ==========================================================================    MEDICATIONS      Current Outpatient Medications:     Accu-Chek Softclix Lancets lancets, Use as directed to test blood sugar 4 times daily before meals and at bedtime., Disp: 100 each, Rfl: 5    amLODIPine (NORVASC) 10 MG tablet, Take 1 tablet by mouth Daily., Disp: 90 tablet, Rfl: 1    apixaban (Eliquis) 5 MG tablet tablet, Take 1 tablet by mouth 2 (Two) Times a Day., Disp: 180 tablet, Rfl: 1    atorvastatin (LIPITOR) 80 MG tablet, Take 1 tablet by mouth Every Night., Disp: 90 tablet, Rfl: 1    Blood Glucose Monitoring Suppl (Accu-Chek Guide) w/Device kit, See Admin Instructions., Disp: , Rfl:     Blood Glucose Monitoring Suppl (FreeStyle Lite) w/Device kit, Use 1 kit Daily. E11.65, Disp: 1 kit, Rfl: 0    buPROPion XL (Wellbutrin XL) 150 MG 24 hr tablet, Take 1 tablet by mouth Every Morning., Disp: 90 tablet, Rfl: 1    busPIRone (BUSPAR) 10 MG tablet, Take 1 tablet by mouth Every 12 (Twelve) Hours., Disp: 180 tablet, Rfl: 1    Continuous Blood Gluc   (Dexcom G6 ) device, Use 1 Device Daily. Use to check BS, Disp: 1 each, Rfl: 0    Continuous Blood Gluc Sensor (Dexcom G6 Sensor), Use Every 10 (Ten) Days. Use to check BS daily, Disp: 9 each, Rfl: 1    Continuous Blood Gluc Transmit (Dexcom G6 Transmitter) misc, Use 1 each Every 3 (Three) Months. USE 1 TRANSMITTER EVERY 3 MONTHS, Disp: 3 each, Rfl: 1    cyclobenzaprine (FLEXERIL) 10 MG tablet, Take I tab q 8 hrs as needed for tension headaches, Disp: 30 tablet, Rfl: 0    DULoxetine (CYMBALTA) 60 MG capsule, Take 1 capsule by mouth Every Morning., Disp: 90 capsule, Rfl: 1    empagliflozin (Jardiance) 10 MG tablet tablet, Take 1 tablet by mouth Daily., Disp: 30 tablet, Rfl: 5    glucose blood (Accu-Chek Guide) test strip, Use as instructed to test blood sugar 4 times daily before meals and at bedtime, Disp: 100 each, Rfl: 12    glucose blood (FREESTYLE LITE) test strip, Test bs daily E11.65, Disp: 100 each, Rfl: 1    Gvoke HypoPen 1-Pack 1 MG/0.2ML solution auto-injector, INJECT 1MG SUBCUTANEOUSLY INTO THE APPROPRIATE AREA AS DIRECTED AS NEEDED FOR HYPOGLYCEMIA, Disp: 0.4 mL, Rfl: 10    HYDROcodone-acetaminophen (NORCO) 5-325 MG per tablet, , Disp: , Rfl:     insulin aspart (NovoLOG FlexPen) 100 UNIT/ML solution pen-injector sc pen, Inject 20 Units under the skin into the appropriate area as directed 3 (Three) Times a Day With Meals. Dx code: E11.65, Disp: 30 mL, Rfl: 5    Insulin Glargine (Lantus SoloStar) 100 UNIT/ML injection pen, Inject 45 Units under the skin into the appropriate area as directed Every Night for 265 days., Disp: 45 mL, Rfl: 2    Insulin Pen Needle (Pen Needles) 32G X 4 MM misc, Use 1 each 4 (Four) Times a Day Before Meals & at Bedtime., Disp: 400 each, Rfl: 3    levothyroxine (Synthroid) 100 MCG tablet, Take 1 tablet by mouth Every Morning., Disp: 90 tablet, Rfl: 1    meclizine (ANTIVERT) 25 MG tablet, Take 1 tablet by mouth 3 (Three) Times a Day As Needed for Dizziness., Disp:  30 tablet, Rfl: 0    metoprolol succinate XL (TOPROL-XL) 100 MG 24 hr tablet, Take 1 tablet by mouth Daily., Disp: 90 tablet, Rfl: 0    omeprazole (priLOSEC) 40 MG capsule, TAKE 1 CAPSULE BY MOUTH DAILY., Disp: 90 capsule, Rfl: 1    ondansetron ODT (ZOFRAN-ODT) 4 MG disintegrating tablet, PLACE 1 TABLET ON TONGUE AND ALLOW TO DISSOLVE EVERY 8 HOURS AS NEEDED FOR NAUSEA AND VOMITING, Disp: 30 tablet, Rfl: 10    pregabalin (LYRICA) 100 MG capsule, Take 1 capsule by mouth 2 (Two) Times a Day., Disp: 60 capsule, Rfl: 0    ==========================================================================    ALLERGIES    Allergies   Allergen Reactions    Lisinopril Cough    Cefixime Other (See Comments)       ==========================================================================    OBJECTIVE    Vitals:    07/29/24 0814   BP: 138/68   Pulse: 68   SpO2: 98%     Body mass index is 30.18 kg/m².     General: Alert, cooperative, no acute distress  Lungs: Clear to auscultation bilaterally  Heart: Regular rate and rhythm, S1 and S2 normal  Abdomen: Soft, NT, ND and Bowel sounds Positive    ==========================================================================    LAB EVALUATION    Lab Results   Component Value Date    GLUCOSE 84 07/22/2024    BUN 16 07/22/2024    CREATININE 1.61 (H) 07/22/2024    EGFRIFNONA 48 (L) 01/04/2022    BCR 9.9 07/22/2024    K 4.1 07/22/2024    CO2 26.2 07/22/2024    CALCIUM 9.7 07/22/2024    ALBUMIN 4.2 07/22/2024    LABIL2 1.3 04/22/2019    AST 31 07/22/2024    ALT 39 07/22/2024    CHOL 165 07/22/2024    TRIG 113 07/22/2024    HDL 45 07/22/2024     07/22/2024     Lab Results   Component Value Date    HGBA1C 7.38 (H) 07/22/2024    HGBA1C 7.40 (H) 04/12/2024    HGBA1C 8.90 (H) 01/12/2024     Lab Results   Component Value Date    MICROALBUR 3.2 01/12/2024    CREATININE 1.61 (H) 07/22/2024     Lab Results   Component Value Date    TSH 3.580 04/12/2024    FREET4 1.40 04/12/2024  "    ==========================================================================    CGM Data:    Dates: July 16 till July 29, 2024    Very High > 250: 10%  High 180 - 250: 32%  Target: 70 - 180: 57%  Low 55 - 70:  <1%  Very Low < 55: 0%    ==========================================================================    ASSESSMENT AND PLAN    # Type 2 diabetes complicated with retinopathy, neuropathy and nephropathy  - Reviewed CGM data and patient is currently 57% in range Target is to achieve 70% without any hypoglycemia  - Patient is tolerating Ozempic therapy 0.5 mg once a week without any side effect, discussed with patient and will uptitrate the dose  - Also currently taking Jardiance therapy along with insulin both basal and bolus  - There is evidence of hypoglycemia therefore we will de-escalate insulin glargine further to 25 units at night while making insulin lispro 10 to 15 units depending upon the portion size  - New adjusted insulin therapy:  Insulin glargine 35 units nightly  Insulin lispro 10 to 15 units with each meal depending upon the portion size  Ozempic 1 mg once a week  Jardiance 10 mg 1 pill by mouth daily  - Patient to follow-up in clinic back again in 3 months time'    # Hyperlipidemia, currently on statin therapy    # Hypothyroidism, on levothyroxine replacement therapy 100 mcg daily, last thyroid labs in April 2024 within acceptable limits    # Hypertension, care as per primary team    # Obesity with BMI of 30.18    # CKD will refer to nephrology team    Return to clinic: 3 months    Entire assessment and plan was discussed and counseled the patient in detail to which patient verbalized understanding and agreed with care.  Answered all queries and concerns.    This note was created using voice recognition software and is inherently subject to errors including those of syntax and \"sound-alike\" substitutions which may escape proofreading.  In such instances, original meaning may be extrapolated by " contextual derivation.    Note: Portions of this note may have been copied from previous notes but documentation have been reviewed and edited as necessary to support clinical decision making for today's visit.    ==========================================================================    INFORMATION PROVIDED TO PATIENT    Patient Instructions   # Diabetes Management:    Please adjust insulin therapy as:    - Insulin Glargine (Slow acting insulin) 35 Units in the evening.  - Insulin lispro / aspart (Fast acting / meal time insulin): 10-15 Units with each meal.  10 units with small to moderate portion   15 unit with big portion    Meal time insulin need to be taken 15 mins before meal. You can bring your insulin with you if you are planning to eat out.    If you plan to miss the meal please miss the meal time insulin as well.    Check your blood glucose through dexcom.    Try to maintain water intake around 6 to 8 glasses of water every day.    Increase Ozempic to 1 mgs once a week.  Continue Jardiance 10 mg 1 pill by mouth daily.    If your blood sugar is less than 70 in the morning then decrease your insulin glargine by 5 units.  If your blood sugar before meal is less than 70 then decrease your mealtime insulin by 5 units.    Low Blood Glucose:    - If you feel sweaty, anxious, shakiness, feel weak, trouble walking, feels like passing out or trouble seeing thing, please check your blood glucose and if its less than 70 or if your feel symptoms of low blood glucose then take one of the following or use Glucagon Injection:    *3 or 4 glucose tablets  *½ cup of juice or regular soda (not sugar-free)  *2 tablespoons of raisins  *4 or 5 saltine crackers  *1 tablespoon of sugar  *1 tablespoon of honey or corn syrup  *6 to 8 hard candies    Plan for fasting blood work before next visit.    Plan to make appointment to see Kidney Specialist / Nephrologist.     Thank you for your visit today.     If you have any questions  or concerns please feel free to reach out of the office.          ==========================================================================  Dionicio Ramachandran MD  Department of Endocrine, Diabetes and Metabolism  [facility], [place]  ==========================================================================

## 2024-07-29 NOTE — PROCEDURES
"Subjective   CC neck pain  Kuldeep Adhikari is a 59 y.o. male with cervical radiculitis here for cervical SHARI.  Off Eliquis 3 days    Pain Assessment   Location of Pain:  neck pain, joint  Description of Pain: Dull/Aching, Throbbing, Stabbing  Previous Pain Rating :7  Current Pain Ratin  Aggravating Factors: Activity  Alleviating Factors: Rest, Medication  Pain onset over 12 weeks ago  Pain interferes with ADL's  [place] back pain disability scale scanned in chart     The following portions of the patient's history were reviewed and updated as appropriate: allergies, current medications, past family history, past medical history, past social history, past surgical history and problem list.      Review of Systems  As in HPI  Objective   Physical Exam  Vitals reviewed.   Constitutional:       General: He is not in acute distress.  Pulmonary:      Effort: Pulmonary effort is normal.       /78   Pulse 67   Temp 97.1 °F (36.2 °C) (Skin)   Resp 16   Ht 172.7 cm (68\")   Wt 84.8 kg (187 lb)   SpO2 98%   BMI 28.43 kg/m²       Assessment & Plan    Underwent cervical SHARI  RTC 4-6 weeks or as needed for repeat    DATE OF PROCEDURE:  2024    PREOPERATIVE DIAGNOSIS:Cervical radiculitis    POSTOPERATIVE DIAGNOSIS: same    PROCEDURE PERFORMED:  cervical Epidural Steroid Injection    The patient presents with a history of   cervical degenerative disc disease  with  radiculitis. The patient presents today for a  cervical  epidural steroid injection at level C6/7. The patient understands the risks and benefits of the procedure and wishes to proceed.  The patient was seen in the preoperative area.  Patient's consent was obtained and updated.  Vitals were taken.  Patient was then brought to the procedure suite and placed in a prone position. The appropriate anatomic area was widely prepped with Chloraprep and draped in a sterile fashion. Noninvasive monitoring per routine anesthesia protocol was placed.  Under " fluoroscopic guidance using  AP view a 20 gauge styleted tuohy needle was passed through skin anesthetized with 1% Lidocaine without epinephrine.  The needle was advanced using the continuous loss of resistance to saline technique into the C6 epidural space. Needle tip placement in the epidural space was confirmed by loss of resistance and injection of 1 mL of  preservative free contrast.  Following this 7 mL of a solution containing  1 mL of 40 mg Depo-Medrol and 7 mL of preservative-free saline was carefully administered in the epidural space. Epidurogram following injection demonstrated dye spread as high as C4 and as low as T1. A sterile dressing was placed over the puncture site.    The patient tolerated the procedure with no complications . They were then brought to the post procedure area where they recovered nicely.

## 2024-07-30 ENCOUNTER — TELEPHONE (OUTPATIENT)
Dept: PAIN MEDICINE | Facility: HOSPITAL | Age: 59
End: 2024-07-30
Payer: MEDICARE

## 2024-07-30 NOTE — TELEPHONE ENCOUNTER
Post procedure phone call completed.  Pt states they are doing good and denies questions or concerns. Pt denies having any pain this morning.

## 2024-08-02 DIAGNOSIS — E11.65 TYPE 2 DIABETES MELLITUS WITH HYPERGLYCEMIA, WITH LONG-TERM CURRENT USE OF INSULIN: ICD-10-CM

## 2024-08-02 DIAGNOSIS — Z79.4 TYPE 2 DIABETES MELLITUS WITH HYPERGLYCEMIA, WITH LONG-TERM CURRENT USE OF INSULIN: ICD-10-CM

## 2024-08-02 RX ORDER — INSULIN ASPART 100 [IU]/ML
20 INJECTION, SOLUTION INTRAVENOUS; SUBCUTANEOUS
Qty: 30 ML | Refills: 5 | Status: SHIPPED | OUTPATIENT
Start: 2024-08-02

## 2024-08-16 DIAGNOSIS — E11.65 TYPE 2 DIABETES MELLITUS WITH HYPERGLYCEMIA, WITH LONG-TERM CURRENT USE OF INSULIN: ICD-10-CM

## 2024-08-16 DIAGNOSIS — Z79.4 TYPE 2 DIABETES MELLITUS WITH HYPERGLYCEMIA, WITH LONG-TERM CURRENT USE OF INSULIN: ICD-10-CM

## 2024-08-16 RX ORDER — BLOOD SUGAR DIAGNOSTIC
STRIP MISCELLANEOUS
Qty: 100 EACH | Refills: 10 | Status: SHIPPED | OUTPATIENT
Start: 2024-08-16

## 2024-08-17 RX ORDER — INSULIN ASPART 100 [IU]/ML
20 INJECTION, SOLUTION INTRAVENOUS; SUBCUTANEOUS
Qty: 60 ML | Refills: 3 | Status: SHIPPED | OUTPATIENT
Start: 2024-08-17

## 2024-08-19 DIAGNOSIS — E11.65 UNCONTROLLED TYPE 2 DIABETES MELLITUS WITH HYPERGLYCEMIA: ICD-10-CM

## 2024-08-19 DIAGNOSIS — E11.65 TYPE 2 DIABETES MELLITUS WITH HYPERGLYCEMIA, WITH LONG-TERM CURRENT USE OF INSULIN: ICD-10-CM

## 2024-08-19 DIAGNOSIS — Z79.4 TYPE 2 DIABETES MELLITUS WITH HYPERGLYCEMIA, WITH LONG-TERM CURRENT USE OF INSULIN: ICD-10-CM

## 2024-08-19 RX ORDER — PROCHLORPERAZINE 25 MG/1
1 SUPPOSITORY RECTAL
Qty: 1 EACH | Refills: 0 | Status: SHIPPED | OUTPATIENT
Start: 2024-08-19

## 2024-08-19 NOTE — TELEPHONE ENCOUNTER
Spoke with patient and he needs 1 month refill for transmitter for Dexcom G6 sent to [facility]. Next fill pt will call back, he will want rx sent to [facility].

## 2024-08-22 ENCOUNTER — TELEPHONE (OUTPATIENT)
Dept: ENDOCRINOLOGY | Facility: CLINIC | Age: 59
End: 2024-08-22
Payer: MEDICARE

## 2024-08-22 DIAGNOSIS — I10 ESSENTIAL HYPERTENSION: ICD-10-CM

## 2024-08-22 DIAGNOSIS — E03.9 HYPOTHYROIDISM, UNSPECIFIED TYPE: ICD-10-CM

## 2024-08-22 DIAGNOSIS — F32.9 REACTIVE DEPRESSION: ICD-10-CM

## 2024-08-22 DIAGNOSIS — I10 PRIMARY HYPERTENSION: ICD-10-CM

## 2024-08-22 RX ORDER — METOPROLOL SUCCINATE 100 MG/1
100 TABLET, EXTENDED RELEASE ORAL DAILY
Qty: 30 TABLET | Refills: 10 | Status: SHIPPED | OUTPATIENT
Start: 2024-08-22

## 2024-08-22 RX ORDER — BUPROPION HYDROCHLORIDE 150 MG/1
150 TABLET ORAL DAILY
Qty: 30 TABLET | Refills: 10 | Status: SHIPPED | OUTPATIENT
Start: 2024-08-22

## 2024-08-22 RX ORDER — BUSPIRONE HYDROCHLORIDE 10 MG/1
10 TABLET ORAL 2 TIMES DAILY
Qty: 60 TABLET | Refills: 10 | Status: SHIPPED | OUTPATIENT
Start: 2024-08-22

## 2024-08-22 RX ORDER — LEVOTHYROXINE SODIUM 0.1 MG/1
100 TABLET ORAL DAILY
Qty: 30 TABLET | Refills: 10 | OUTPATIENT
Start: 2024-08-22

## 2024-08-22 RX ORDER — DULOXETIN HYDROCHLORIDE 60 MG/1
60 CAPSULE, DELAYED RELEASE ORAL DAILY
Qty: 30 CAPSULE | Refills: 10 | Status: SHIPPED | OUTPATIENT
Start: 2024-08-22

## 2024-08-22 RX ORDER — AMLODIPINE BESYLATE 10 MG/1
10 TABLET ORAL DAILY
Qty: 30 TABLET | Refills: 10 | Status: SHIPPED | OUTPATIENT
Start: 2024-08-22

## 2024-08-22 RX ORDER — APIXABAN 5 MG/1
5 TABLET, FILM COATED ORAL 2 TIMES DAILY
Qty: 60 TABLET | Refills: 10 | Status: SHIPPED | OUTPATIENT
Start: 2024-08-22

## 2024-08-22 RX ORDER — ATORVASTATIN CALCIUM 80 MG/1
80 TABLET, FILM COATED ORAL DAILY
Qty: 30 TABLET | Refills: 10 | OUTPATIENT
Start: 2024-08-22

## 2024-08-27 DIAGNOSIS — E11.65 TYPE 2 DIABETES MELLITUS WITH HYPERGLYCEMIA, WITH LONG-TERM CURRENT USE OF INSULIN: Primary | ICD-10-CM

## 2024-08-27 DIAGNOSIS — Z79.4 TYPE 2 DIABETES MELLITUS WITH HYPERGLYCEMIA, WITH LONG-TERM CURRENT USE OF INSULIN: Primary | ICD-10-CM

## 2024-08-27 RX ORDER — SEMAGLUTIDE 1.34 MG/ML
1 INJECTION, SOLUTION SUBCUTANEOUS WEEKLY
Qty: 3 ML | Refills: 5 | Status: SHIPPED | OUTPATIENT
Start: 2024-08-27

## 2024-09-06 ENCOUNTER — OFFICE VISIT (OUTPATIENT)
Dept: NEUROSURGERY | Facility: CLINIC | Age: 59
End: 2024-09-06
Payer: MEDICARE

## 2024-09-06 VITALS
SYSTOLIC BLOOD PRESSURE: 136 MMHG | HEIGHT: 68 IN | BODY MASS INDEX: 29.07 KG/M2 | HEART RATE: 67 BPM | OXYGEN SATURATION: 98 % | WEIGHT: 191.8 LBS | DIASTOLIC BLOOD PRESSURE: 92 MMHG

## 2024-09-06 DIAGNOSIS — M47.812 CERVICAL SPONDYLOSIS WITHOUT MYELOPATHY: ICD-10-CM

## 2024-09-06 DIAGNOSIS — G56.01 CARPAL TUNNEL SYNDROME OF RIGHT WRIST: ICD-10-CM

## 2024-09-06 DIAGNOSIS — G56.21 CUBITAL TUNNEL SYNDROME ON RIGHT: ICD-10-CM

## 2024-09-06 DIAGNOSIS — M54.2 NECK PAIN: Primary | ICD-10-CM

## 2024-09-06 PROCEDURE — 1160F RVW MEDS BY RX/DR IN RCRD: CPT | Performed by: NURSE PRACTITIONER

## 2024-09-06 PROCEDURE — 99214 OFFICE O/P EST MOD 30 MIN: CPT | Performed by: NURSE PRACTITIONER

## 2024-09-06 PROCEDURE — 3080F DIAST BP >= 90 MM HG: CPT | Performed by: NURSE PRACTITIONER

## 2024-09-06 PROCEDURE — 1159F MED LIST DOCD IN RCRD: CPT | Performed by: NURSE PRACTITIONER

## 2024-09-06 PROCEDURE — 3075F SYST BP GE 130 - 139MM HG: CPT | Performed by: NURSE PRACTITIONER

## 2024-09-06 NOTE — PROGRESS NOTES
Subjective   History of Present Illness: Kuldeep Adhikari is a 59 y.o. male is here today for continued neck and shoulder pain.  Patient previously seen and worked up with Dr. Cantu for neck pain and probable component of ulnar neuropathy.  He is being seen by myself for the first time.  His last follow-up with Dr. Cantu was on 5/7/2024 for initial radiographic evidence for cervical radiculopathy but was felt to have more of a component of cubital tunnel syndrome based on his clinical evaluation.  He does have spondylitic changes throughout with several levels multilevel neuroforaminal narrowing and mild canal narrowing he has since undergone ulnar release surgery in July of this year.  He has had prior cervical injections without significant improvement.  His last epidural steroid injection was on 7/29/2024 at the level of C6-C7.  Today patient reports continued axial neck pain.  He did undergo cubital tunnel and carpal tunnel release surgery with profound betterment of his right arm symptoms.  He still has some reported soreness and fifth finger digit numbness.  He reports no specific radicular pain just pain that extends from his neck into his trapezius musculature.  He has undergone numerous epidural steroid injections and facet injections with 1 ablation.  His epidural steroid injections last about 4 days and he does not notice any significant benefit with the facet injection or the ablation.  He is reporting no acute neurologic symptoms.  He does have some right shoulder pain as well but has some underlying shoulder pathology and has seen Dr. Forte for this.     History of Present Illness    The following portions of the patient's history were reviewed and updated as appropriate: allergies, current medications, past family history, past medical history, past social history, past surgical history, and problem list.    Review of Systems   Constitutional:  Positive for activity change.   HENT: Negative.     Eyes:  "Negative.    Cardiovascular: Negative.    Gastrointestinal: Negative.    Endocrine: Negative.    Genitourinary: Negative.    Musculoskeletal:  Positive for arthralgias, myalgias, neck pain (right shoulder/arm) and neck stiffness.   Skin: Negative.    Allergic/Immunologic: Negative.    Neurological: Negative.    Psychiatric/Behavioral:  Positive for sleep disturbance.    All other systems reviewed and are negative.      Objective     ./92   Pulse 67   Ht 172.7 cm (68\")   Wt 87 kg (191 lb 12.8 oz)   SpO2 98%   BMI 29.16 kg/m²    Body mass index is 29.16 kg/m².    Vitals:    09/06/24 0941   PainSc:   7          Physical Exam  Neurologic Exam    Upper extremity motor strength 5/5  Negative Cher  Healing surgical scars along elbow and lower forearm    Assessment & Plan   Independent Review of Radiographic Studies:      I personally reviewed the images from the following studies.    Cervical x-rays with flexion-extension 7/7/2023    Moderate degenerative changes mainly noted at C2-C3 with no evidence of any static or dynamic spondylolisthesis.    Medical Decision Making:      Kuldeep Mata a 59 y.o. 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  I do feel the patient has a degree of spondylitic neck pain with no high-grade narrowing with no evidence of any static or dynamic spondylolisthesis.  He has undergone extensive injections with mainly more benefit from the epidural steroid injections versus the medial branch block/ablation.  Unfortunately its only lasting a short while.  I will consult in with Dr. Siu to see if there is anything surgical he would like to offer patient but my inclination is for patient to continue with medical management at this time.  If so we will schedule him an appointment with Dr. Siu " for surgical discussion.  Patient does agree to plan of care.  I encouraged him call the office any questions or concerns.    Diagnoses and all orders for this visit:    1. Neck pain (Primary)    2. Cervical spondylosis without myelopathy    3. Cubital tunnel syndrome on right    4. Carpal tunnel syndrome of right wrist      Return if symptoms worsen or fail to improve, for f/u with Dr. Siu.    This patient was examined wearing appropriate personal protective equipment.     Kuldeep EMIR Adhikari  reports that he has never smoked. He has never used smokeless tobacco.      Body mass index is 29.16 kg/m².         Patient's blood pressure was reviewed.  Recommendations for  a low-salt diet and exercise to maintain/improve BP in addition to taking any presribed medications.             Mary Jackson DNP, APRN  09/06/24  10:03 EDT

## 2024-09-12 ENCOUNTER — TELEPHONE (OUTPATIENT)
Dept: NEUROSURGERY | Facility: CLINIC | Age: 59
End: 2024-09-12
Payer: MEDICARE

## 2024-09-12 NOTE — TELEPHONE ENCOUNTER
Patient called the office to see if Mary was able to speak with Dr. Siu about his case yet? I explained to him that he has been off this week and returns on Monday 9/16/2024 we will call once she is able to speak with him.

## 2024-09-13 DIAGNOSIS — E03.9 HYPOTHYROIDISM, UNSPECIFIED TYPE: ICD-10-CM

## 2024-09-13 DIAGNOSIS — M54.12 CERVICAL RADICULOPATHY: ICD-10-CM

## 2024-09-13 RX ORDER — PREGABALIN 100 MG/1
100 CAPSULE ORAL 2 TIMES DAILY
Qty: 60 CAPSULE | Refills: 0 | Status: SHIPPED | OUTPATIENT
Start: 2024-09-13

## 2024-09-13 RX ORDER — LEVOTHYROXINE SODIUM 100 UG/1
100 TABLET ORAL
Qty: 90 TABLET | Refills: 1 | Status: SHIPPED | OUTPATIENT
Start: 2024-09-13

## 2024-09-13 NOTE — TELEPHONE ENCOUNTER
Caller: Kuldeep Adhikari    Relationship: Self    Best call back number: [phone]     Requested Prescriptions:   Requested Prescriptions     Pending Prescriptions Disp Refills    levothyroxine (Synthroid) 100 MCG tablet 90 tablet 1     Sig: Take 1 tablet by mouth Every Morning.    pregabalin (LYRICA) 100 MG capsule 60 capsule 0     Sig: Take 1 capsule by mouth 2 (Two) Times a Day.        Pharmacy where request should be sent: [address] [phone] [facility] [phone]      Last office visit with prescribing clinician: 10/11/2023   Last telemedicine visit with prescribing clinician: Visit date not found   Next office visit with prescribing clinician: Visit date not found     Does the patient have less than a 3 day supply:  [] Yes  [x] No    Would you like a call back once the refill request has been completed: [] Yes [] No    If the office needs to give you a call back, can they leave a voicemail: [] Yes [] No    Yobani Frias Rep   09/13/24 12:52 EDT

## 2024-09-18 ENCOUNTER — OFFICE VISIT (OUTPATIENT)
Dept: PAIN MEDICINE | Facility: CLINIC | Age: 59
End: 2024-09-18
Payer: MEDICARE

## 2024-09-18 VITALS
WEIGHT: 189 LBS | BODY MASS INDEX: 28.74 KG/M2 | DIASTOLIC BLOOD PRESSURE: 88 MMHG | HEART RATE: 75 BPM | SYSTOLIC BLOOD PRESSURE: 135 MMHG | OXYGEN SATURATION: 97 % | RESPIRATION RATE: 16 BRPM

## 2024-09-18 DIAGNOSIS — M79.18 MYOFASCIAL PAIN SYNDROME: ICD-10-CM

## 2024-09-18 DIAGNOSIS — M54.12 CERVICAL RADICULOPATHY: ICD-10-CM

## 2024-09-18 DIAGNOSIS — M47.812 CERVICAL SPONDYLOSIS WITHOUT MYELOPATHY: ICD-10-CM

## 2024-09-18 DIAGNOSIS — G89.4 CHRONIC PAIN SYNDROME: Primary | ICD-10-CM

## 2024-09-18 RX ORDER — PREGABALIN 100 MG/1
CAPSULE ORAL
Qty: 60 CAPSULE | Refills: 0 | OUTPATIENT
Start: 2024-09-18

## 2024-09-19 DIAGNOSIS — E78.2 MIXED HYPERLIPIDEMIA: Primary | Chronic | ICD-10-CM

## 2024-09-19 RX ORDER — ATORVASTATIN CALCIUM 80 MG/1
80 TABLET, FILM COATED ORAL DAILY
Qty: 90 TABLET | Refills: 1 | Status: SHIPPED | OUTPATIENT
Start: 2024-09-19

## 2024-09-21 DIAGNOSIS — M54.12 CERVICAL RADICULOPATHY: ICD-10-CM

## 2024-09-21 RX ORDER — PREGABALIN 100 MG/1
CAPSULE ORAL
Qty: 60 CAPSULE | Refills: 0 | OUTPATIENT
Start: 2024-09-21

## 2024-09-21 RX ORDER — PREGABALIN 100 MG/1
100 CAPSULE ORAL 2 TIMES DAILY
Qty: 60 CAPSULE | Refills: 0 | OUTPATIENT
Start: 2024-09-21

## 2024-09-23 DIAGNOSIS — I10 PRIMARY HYPERTENSION: ICD-10-CM

## 2024-09-23 DIAGNOSIS — M54.12 CERVICAL RADICULOPATHY: ICD-10-CM

## 2024-09-23 DIAGNOSIS — E11.65 TYPE 2 DIABETES MELLITUS WITH HYPERGLYCEMIA, WITH LONG-TERM CURRENT USE OF INSULIN: ICD-10-CM

## 2024-09-23 DIAGNOSIS — Z79.4 TYPE 2 DIABETES MELLITUS WITH HYPERGLYCEMIA, WITH LONG-TERM CURRENT USE OF INSULIN: ICD-10-CM

## 2024-09-23 RX ORDER — PREGABALIN 100 MG/1
100 CAPSULE ORAL 2 TIMES DAILY
Qty: 60 CAPSULE | Refills: 0 | Status: SHIPPED | OUTPATIENT
Start: 2024-09-23

## 2024-09-23 RX ORDER — AMLODIPINE BESYLATE 10 MG/1
10 TABLET ORAL DAILY
Qty: 30 TABLET | Refills: 10 | Status: SHIPPED | OUTPATIENT
Start: 2024-09-23

## 2024-09-24 RX ORDER — EMPAGLIFLOZIN 10 MG/1
10 TABLET, FILM COATED ORAL DAILY
Qty: 90 TABLET | Refills: 3 | Status: SHIPPED | OUTPATIENT
Start: 2024-09-24

## 2024-10-07 ENCOUNTER — TELEPHONE (OUTPATIENT)
Dept: ENDOCRINOLOGY | Facility: CLINIC | Age: 59
End: 2024-10-07
Payer: MEDICARE

## 2024-10-07 NOTE — TELEPHONE ENCOUNTER
Pt called he is having issues with the Ozempic it is making him sick at his stomach and having bowl issues. Please advise.

## 2024-10-08 RX ORDER — SEMAGLUTIDE 0.68 MG/ML
0.5 INJECTION, SOLUTION SUBCUTANEOUS WEEKLY
Qty: 9 ML | Refills: 1 | Status: SHIPPED | OUTPATIENT
Start: 2024-10-08

## 2024-10-08 NOTE — TELEPHONE ENCOUNTER
Patient is complaining of constipation with higher dose of ozempic was tolerating 0.5 mgs without complaint. Will decrease dose of 0.5 mgs ocne a week on Monday. Patient to use OTC colace and Miralax for constipation.  Called patient and care discussed over the phone.    Dionicio Ramachandran MD  18:02 EDT  10/08/24

## 2024-10-14 ENCOUNTER — TELEPHONE (OUTPATIENT)
Dept: FAMILY MEDICINE CLINIC | Facility: CLINIC | Age: 59
End: 2024-10-14
Payer: MEDICARE

## 2024-10-14 ENCOUNTER — TELEPHONE (OUTPATIENT)
Dept: FAMILY MEDICINE CLINIC | Facility: CLINIC | Age: 59
End: 2024-10-14

## 2024-10-14 DIAGNOSIS — Z87.19 HISTORY OF ESOPHAGEAL STRICTURE: ICD-10-CM

## 2024-10-14 DIAGNOSIS — R13.10 DYSPHAGIA, UNSPECIFIED TYPE: Primary | ICD-10-CM

## 2024-10-14 NOTE — TELEPHONE ENCOUNTER
He has an established relationship with [facility] and just had EGD in Sept 2023  He may be able to call them directly  If not, I need to know exactly why he needs this procedure  I have to have a diagnosis to go with the order

## 2024-10-14 NOTE — TELEPHONE ENCOUNTER
This is a common problem with these meds  Dr. Ramachandran is part of [facility] and any changes he makes should already be in the chart  He needs to increase physical activity, drink more water, eat more fiber or take fiber supplements

## 2024-10-14 NOTE — TELEPHONE ENCOUNTER
Caller: Kuldeep Adhikari    Relationship to patient: Self    Best call back number: [phone]    Patient is needing:     STATES THAT DR. ORTIZ DROPPED HIS OZEMPIC TO .5MG HOWEVER HE IS STILL CONSTIPATED.     PLEASE CALL TO ADVISE OR PUT ON MYCHART

## 2024-10-14 NOTE — TELEPHONE ENCOUNTER
Caller: Kuldeep Adhikari    Relationship: Self    Best call back number: [phone]    What orders are you requesting (i.e. lab or imaging):   UPPER GI ENDOSCOPY    In what timeframe would the patient need to come in: ASAP    Where will you receive your lab/imaging services:   [facility]    FAX: [phone]

## 2024-10-15 ENCOUNTER — TELEPHONE (OUTPATIENT)
Dept: FAMILY MEDICINE CLINIC | Facility: CLINIC | Age: 59
End: 2024-10-15
Payer: MEDICARE

## 2024-10-15 DIAGNOSIS — M54.12 CERVICAL RADICULOPATHY: ICD-10-CM

## 2024-10-15 NOTE — TELEPHONE ENCOUNTER
Dr. Ramachandran referred him to Dr. Rodriguez in July  Did he ever go to him?  Did he know he had the referral?  If not, he may want to call them back and check on the status

## 2024-10-15 NOTE — TELEPHONE ENCOUNTER
Caller: Kuldeep Adhikari    Relationship: Self    Best call back number: [phone]     What is the medical concern/diagnosis:      What specialty or service is being requested: KIDNEY SPECIALIST    What is the provider, practice or medical service name:      What is the office location:      What is the office phone number:      Any additional details:  PATIENT CALLED AND STATED HE NEEDED A REFERRAL TO A KIDNEY SPECIALIST.

## 2024-10-15 NOTE — TELEPHONE ENCOUNTER
Per pt he called GI and they need referred due to HMO insurance requirement. He is seen for a stricture that comes back every few months.

## 2024-10-16 ENCOUNTER — TELEPHONE (OUTPATIENT)
Dept: ENDOCRINOLOGY | Facility: CLINIC | Age: 59
End: 2024-10-16
Payer: MEDICARE

## 2024-10-16 RX ORDER — PREGABALIN 100 MG/1
CAPSULE ORAL
Qty: 180 CAPSULE | Refills: 0 | Status: SHIPPED | OUTPATIENT
Start: 2024-10-16

## 2024-10-16 NOTE — TELEPHONE ENCOUNTER
Caller: Kuldeep Adhikari    Relationship to patient: Self    Best call back number: [phone]     Patient is needing: PT REFERRED TO NEPHROLOGY BY [facility], RECEIVING OFFICE IS REQUESTING NEW REFERRAL. PLEASE NOTIFY PT WHEN NEW REFERRAL SUBMITTED.

## 2024-10-22 ENCOUNTER — LAB (OUTPATIENT)
Dept: LAB | Facility: HOSPITAL | Age: 59
End: 2024-10-22
Payer: MEDICARE

## 2024-10-22 ENCOUNTER — OFFICE (AMBULATORY)
Age: 59
End: 2024-10-22
Payer: MEDICAID

## 2024-10-22 ENCOUNTER — OFFICE (AMBULATORY)
Dept: URBAN - METROPOLITAN AREA CLINIC 64 | Facility: CLINIC | Age: 59
End: 2024-10-22
Payer: MEDICAID

## 2024-10-22 VITALS
SYSTOLIC BLOOD PRESSURE: 137 MMHG | WEIGHT: 189 LBS | DIASTOLIC BLOOD PRESSURE: 81 MMHG | HEIGHT: 69 IN | WEIGHT: 189 LBS | SYSTOLIC BLOOD PRESSURE: 137 MMHG | WEIGHT: 189 LBS | HEIGHT: 69 IN | HEIGHT: 69 IN | SYSTOLIC BLOOD PRESSURE: 137 MMHG | SYSTOLIC BLOOD PRESSURE: 137 MMHG | DIASTOLIC BLOOD PRESSURE: 81 MMHG | DIASTOLIC BLOOD PRESSURE: 81 MMHG | HEART RATE: 73 BPM | WEIGHT: 189 LBS | SYSTOLIC BLOOD PRESSURE: 137 MMHG | DIASTOLIC BLOOD PRESSURE: 81 MMHG | HEIGHT: 69 IN | HEIGHT: 69 IN | HEART RATE: 73 BPM | WEIGHT: 189 LBS | SYSTOLIC BLOOD PRESSURE: 137 MMHG | WEIGHT: 189 LBS | HEIGHT: 69 IN | DIASTOLIC BLOOD PRESSURE: 81 MMHG | WEIGHT: 189 LBS | HEART RATE: 73 BPM | SYSTOLIC BLOOD PRESSURE: 137 MMHG | HEART RATE: 73 BPM | HEART RATE: 73 BPM | HEART RATE: 73 BPM | HEART RATE: 73 BPM | DIASTOLIC BLOOD PRESSURE: 81 MMHG | HEIGHT: 69 IN | DIASTOLIC BLOOD PRESSURE: 81 MMHG

## 2024-10-22 DIAGNOSIS — K21.9 GASTRO-ESOPHAGEAL REFLUX DISEASE WITHOUT ESOPHAGITIS: ICD-10-CM

## 2024-10-22 DIAGNOSIS — Z86.73 PERSONAL HISTORY OF TRANSIENT ISCHEMIC ATTACK (TIA), AND CER: ICD-10-CM

## 2024-10-22 DIAGNOSIS — Z79.01 LONG TERM (CURRENT) USE OF ANTICOAGULANTS: ICD-10-CM

## 2024-10-22 DIAGNOSIS — R13.10 DYSPHAGIA, UNSPECIFIED: ICD-10-CM

## 2024-10-22 DIAGNOSIS — Z86.718 PERSONAL HISTORY OF OTHER VENOUS THROMBOSIS AND EMBOLISM: ICD-10-CM

## 2024-10-22 DIAGNOSIS — Z86.711 PERSONAL HISTORY OF PULMONARY EMBOLISM: ICD-10-CM

## 2024-10-22 DIAGNOSIS — E03.9 HYPOTHYROIDISM, UNSPECIFIED TYPE: ICD-10-CM

## 2024-10-22 DIAGNOSIS — E11.65 TYPE 2 DIABETES MELLITUS WITH HYPERGLYCEMIA, WITH LONG-TERM CURRENT USE OF INSULIN: ICD-10-CM

## 2024-10-22 DIAGNOSIS — Z79.4 TYPE 2 DIABETES MELLITUS WITH HYPERGLYCEMIA, WITH LONG-TERM CURRENT USE OF INSULIN: ICD-10-CM

## 2024-10-22 LAB
ALBUMIN SERPL-MCNC: 4.3 G/DL (ref 3.5–5.2)
ALBUMIN UR-MCNC: 2.3 MG/DL
ALBUMIN/GLOB SERPL: 1.5 G/DL
ALP SERPL-CCNC: 134 U/L (ref 39–117)
ALT SERPL W P-5'-P-CCNC: 26 U/L (ref 1–41)
ANION GAP SERPL CALCULATED.3IONS-SCNC: 10.4 MMOL/L (ref 5–15)
AST SERPL-CCNC: 33 U/L (ref 1–40)
BILIRUB SERPL-MCNC: 1.2 MG/DL (ref 0–1.2)
BUN SERPL-MCNC: 17 MG/DL (ref 8–23)
BUN/CREAT SERPL: 11.4 (ref 7–25)
CALCIUM SPEC-SCNC: 9.8 MG/DL (ref 8.6–10.5)
CHLORIDE SERPL-SCNC: 104 MMOL/L (ref 98–107)
CHOLEST SERPL-MCNC: 157 MG/DL (ref 0–200)
CO2 SERPL-SCNC: 26.6 MMOL/L (ref 22–29)
CREAT SERPL-MCNC: 1.49 MG/DL (ref 0.76–1.27)
CREAT UR-MCNC: 139.3 MG/DL
EGFRCR SERPLBLD CKD-EPI 2021: 53.4 ML/MIN/1.73
GLOBULIN UR ELPH-MCNC: 2.8 GM/DL
GLUCOSE SERPL-MCNC: 87 MG/DL (ref 65–99)
HBA1C MFR BLD: 7.98 % (ref 4.8–5.6)
HDLC SERPL-MCNC: 47 MG/DL (ref 40–60)
LDLC SERPL CALC-MCNC: 90 MG/DL (ref 0–100)
LDLC/HDLC SERPL: 1.88 {RATIO}
MICROALBUMIN/CREAT UR: 16.5 MG/G (ref 0–29)
POTASSIUM SERPL-SCNC: 4.5 MMOL/L (ref 3.5–5.2)
PROT SERPL-MCNC: 7.1 G/DL (ref 6–8.5)
SODIUM SERPL-SCNC: 141 MMOL/L (ref 136–145)
T4 FREE SERPL-MCNC: 1.5 NG/DL (ref 0.92–1.68)
TRIGL SERPL-MCNC: 109 MG/DL (ref 0–150)
TSH SERPL DL<=0.05 MIU/L-ACNC: 1.36 UIU/ML (ref 0.27–4.2)
VLDLC SERPL-MCNC: 20 MG/DL (ref 5–40)

## 2024-10-22 PROCEDURE — 80053 COMPREHEN METABOLIC PANEL: CPT

## 2024-10-22 PROCEDURE — 84443 ASSAY THYROID STIM HORMONE: CPT

## 2024-10-22 PROCEDURE — 99213 OFFICE O/P EST LOW 20 MIN: CPT

## 2024-10-22 PROCEDURE — 82043 UR ALBUMIN QUANTITATIVE: CPT

## 2024-10-22 PROCEDURE — 36415 COLL VENOUS BLD VENIPUNCTURE: CPT

## 2024-10-22 PROCEDURE — 80061 LIPID PANEL: CPT

## 2024-10-22 PROCEDURE — 83036 HEMOGLOBIN GLYCOSYLATED A1C: CPT

## 2024-10-22 PROCEDURE — 84439 ASSAY OF FREE THYROXINE: CPT

## 2024-10-22 PROCEDURE — 82570 ASSAY OF URINE CREATININE: CPT

## 2024-10-29 ENCOUNTER — OFFICE VISIT (OUTPATIENT)
Dept: ENDOCRINOLOGY | Facility: CLINIC | Age: 59
End: 2024-10-29
Payer: MEDICARE

## 2024-10-29 VITALS
SYSTOLIC BLOOD PRESSURE: 120 MMHG | HEIGHT: 68 IN | BODY MASS INDEX: 28.19 KG/M2 | DIASTOLIC BLOOD PRESSURE: 66 MMHG | HEART RATE: 68 BPM | OXYGEN SATURATION: 99 % | WEIGHT: 186 LBS

## 2024-10-29 DIAGNOSIS — E78.2 MIXED HYPERLIPIDEMIA: ICD-10-CM

## 2024-10-29 DIAGNOSIS — E11.65 TYPE 2 DIABETES MELLITUS WITH HYPERGLYCEMIA, WITH LONG-TERM CURRENT USE OF INSULIN: Primary | ICD-10-CM

## 2024-10-29 DIAGNOSIS — I10 PRIMARY HYPERTENSION: ICD-10-CM

## 2024-10-29 DIAGNOSIS — E11.21 DIABETIC NEPHROPATHY ASSOCIATED WITH TYPE 2 DIABETES MELLITUS: ICD-10-CM

## 2024-10-29 DIAGNOSIS — Z79.4 TYPE 2 DIABETES MELLITUS WITH HYPERGLYCEMIA, WITH LONG-TERM CURRENT USE OF INSULIN: Primary | ICD-10-CM

## 2024-10-29 DIAGNOSIS — E11.42 DIABETIC PERIPHERAL NEUROPATHY: ICD-10-CM

## 2024-10-29 DIAGNOSIS — E66.3 OVERWEIGHT: ICD-10-CM

## 2024-10-29 DIAGNOSIS — N18.31 STAGE 3A CHRONIC KIDNEY DISEASE: ICD-10-CM

## 2024-10-29 DIAGNOSIS — E11.319 DIABETIC RETINOPATHY ASSOCIATED WITH TYPE 2 DIABETES MELLITUS, MACULAR EDEMA PRESENCE UNSPECIFIED, UNSPECIFIED LATERALITY, UNSPECIFIED RETINOPATHY SEVERITY: ICD-10-CM

## 2024-10-29 DIAGNOSIS — E03.9 HYPOTHYROIDISM, UNSPECIFIED TYPE: ICD-10-CM

## 2024-10-29 NOTE — PROGRESS NOTES
-----------------------------------------------------------------  ENDOCRINE CLINIC NOTE  -----------------------------------------------------------------        PATIENT NAME: Kuldeep Adhikari  PATIENT : 1965 AGE: 59 y.o.  MRN NUMBER: [id number]  PRIMARY CARE: Laura Whitaker MD    ==========================================================================    CHIEF COMPLAINT: Type 2 Diabetes Mellitus  DATE OF SERVICE: 10/29/24    ==========================================================================    HPI / SUBJECTIVE    59 y.o. male is seen in the clinic today for follow up of type 2 diabetes.  Current therapy includes:  Insulin glargine 35 units nightly  Insulin lispro 10 to 15 units with each meal depending upon the portion size  Ozempic 0.5 mg once a week (tried 1 mgs but was not able to tolerate)  Jardiance 10 mg 1 pill by mouth daily  Previous therapy:  Janumet therapy  Checking BG through Dexcom G7.  Diabetic neuropathy, s/p right BKA, prosthesis in place.    Diabetic neuropathy left lower extremity, complicated with osteomyelitis in .  Diabetic retinopathy, s/p laser treatment, following Denise  Patient typically take 2 meals a day.  Underlying history of CVA.  No history of CAD.  Hx of CKD bu microalbumin -ve in Oct 2024.  Checking BG through Dexcom G6.  Also have Hypothyroidism and is currently maintained on Levothyroxine therapy.    ==========================================================================                                                PAST MEDICAL HISTORY    Past Medical History:   Diagnosis Date    Allergic     Carpal tunnel syndrome of right wrist 2024    CKD (chronic kidney disease), stage III     Depression     Depression with suicidal ideation 2021    DJD (degenerative joint disease)     DVT (deep venous thrombosis)     Ganglion     rt wrist    Gangrene of foot 10/09/2015    GERD (gastroesophageal reflux disease)     Headache      Headache, tension-type     Heart murmur     Hiatal hernia     History of esophageal stricture     s/p dialation 40-50 times last EGD 04/2016    Hyperlipidemia     Hypertension     Hypothyroidism     IBS (irritable bowel syndrome)     Nausea, vomiting and diarrhea 09/22/2023    Neuropathy     Neuropathy in diabetes     Osteomyelitis of right foot 03/19/2020    Pulmonary embolism 01/19/2017    Rash     rt lower hip    Reflex sympathetic dystrophy     Retinopathy     S/P BKA (below knee amputation), right 03/18/2022    Seasonal allergies     Sepsis due to group B Streptococcus 03/19/2020    Shortness of breath     Sleep apnea     Stroke     Type 2 diabetes mellitus with peripheral vascular disease     Vitamin D deficiency        ==========================================================================    PAST SURGICAL HISTORY    Past Surgical History:   Procedure Laterality Date    AMPUTATION DIGIT Left 04/26/2022    Procedure: AMPUTATION left great toe;  Surgeon: ERWIN Baker DPM;  Location: [facility] MAIN OR;  Service: Podiatry;  Laterality: Left;    AMPUTATION DIGIT  04/26/2022    Procedure: ;  Surgeon: ERWIN Baker DPM;  Location: [facility] MAIN OR;  Service: Podiatry;;    AMPUTATION FOOT / TOE Right     great toe    AMPUTATION REVISION Right 04/08/2021    Procedure: BELOW KNEE AMPUTATION REVISAION;  Surgeon: Eduardo Reynolds MD;  Location: [facility] MAIN OR;  Service: Orthopedics;  Laterality: Right;    AMPUTATION REVISION Right 04/29/2021    Procedure: AMPUTATION REVISION KNEE STUMP;  Surgeon: Eduardo Reynolds MD;  Location: [facility] MAIN OR;  Service: Orthopedics;  Laterality: Right;    BELOW KNEE AMPUTATION Right 07/30/2020    Procedure: AMPUTATION BELOW KNEE;  Surgeon: Eduardo Reynolds MD;  Location: [facility] MAIN OR;  Service: Orthopedics;  Laterality: Right;    CARDIAC CATHETERIZATION  04/2018    [facility]    CHOLECYSTECTOMY N/A 09/26/2023    Procedure: CHOLECYSTECTOMY LAPAROSCOPIC;  Surgeon: Eduardo Srinivasan,  MD;  Location: [facility] MAIN OR;  Service: General;  Laterality: N/A;    COLONOSCOPY      ENDOSCOPY      ENDOSCOPY N/A 10/04/2019    Procedure: ESOPHAGOGASTRODUODENOSCOPY with dilitation and biopsy x 1 area;  Surgeon: Declan Iqbal MD;  Location: [facility] ENDOSCOPY;  Service: Gastroenterology    ENDOSCOPY N/A 12/13/2019    Procedure: ESOPHAGOGASTRODUODENOSCOPY WITH DILATATION (50, 52 BOUGIE);  Surgeon: Declan Iqbal MD;  Location: [facility] ENDOSCOPY;  Service: Gastroenterology    ENDOSCOPY N/A 08/06/2021    Procedure: ESOPHAGOGASTRODUODENOSCOPY with biopsy x1 area and esophageal dilation (56FR Bougie);  Surgeon: Hussein Talavera MD;  Location: [facility] ENDOSCOPY;  Service: Gastroenterology;  Laterality: N/A;  post: hiatal hernia, erosive gastritis    ENDOSCOPY N/A 12/13/2022    Procedure: ESOPHAGOGASTRODUODENOSCOPY WITH DILATATION (BOUGIE #54, #56) AND BIOPSY X1;  Surgeon: David Rodriguez MD;  Location: [facility] ENDOSCOPY;  Service: Gastroenterology;  Laterality: N/A;  POST: dysphagia     ENDOSCOPY N/A 09/28/2023    Procedure: ESOPHAGOGASTRODUODENOSCOPY with dilation (58 non guided bougie);  Surgeon: Hussein Talavera MD;  Location: [facility] ENDOSCOPY;  Service: Gastroenterology;  Laterality: N/A;  post op: dysphagia    EYE SURGERY      GANGLION CYST EXCISION Left     HERNIA REPAIR Bilateral     INCISION AND DRAINAGE OF WOUND Right 06/15/2022    Procedure: INCISION AND DRAINAGE WOUND with debridement;  Surgeon: Fito Forte MD;  Location: [facility] MAIN OR;  Service: Orthopedics;  Laterality: Right;    JOINT REPLACEMENT      Left total hip    RETINOPATHY SURGERY      laser    TOTAL HIP ARTHROPLASTY Left     TOTAL HIP ARTHROPLASTY Left 2018    TRANS METATARSAL AMPUTATION Right 03/17/2020    Procedure: AMPUTATION TRANS METATARSAL right;  Surgeon: ERWIN Baker DPM;  Location: [facility] MAIN OR;  Service: Podiatry;  Laterality: Right;  GANGRENOUS RIGHT FOOT    VASECTOMY          ==========================================================================    FAMILY HISTORY    Family History   Problem Relation Age of Onset    Diabetes Mother         Patient  mom    Heart disease Mother     Arthritis Mother     Hyperlipidemia Mother     Leukemia Father     Sleep apnea Maternal Aunt         GENO    Stroke Maternal Grandmother     Hypertension Other     Hyperlipidemia Other     Cancer Other     Colon cancer Other         uncle       ==========================================================================    SOCIAL HISTORY    Social History     Socioeconomic History    Marital status:     Number of children: 3    Highest education level: High school graduate   Tobacco Use    Smoking status: Never    Smokeless tobacco: Never   Vaping Use    Vaping status: Never Used   Substance and Sexual Activity    Alcohol use: Yes     Comment: OCCASIONAL    Drug use: No    Sexual activity: Not Currently     Partners: Female     Birth control/protection: None     Comment: I have Ed problem at this time       ==========================================================================    MEDICATIONS      Current Outpatient Medications:     amLODIPine (NORVASC) 10 MG tablet, TAKE 1 TABLET BY MOUTH ONCE DAILY, Disp: 30 tablet, Rfl: 10    atorvastatin (LIPITOR) 80 MG tablet, TAKE 1 TABLET BY MOUTH ONCE DAILY, Disp: 90 tablet, Rfl: 1    Blood Glucose Monitoring Suppl (Accu-Chek Guide) w/Device kit, See Admin Instructions., Disp: , Rfl:     Blood Glucose Monitoring Suppl (FreeStyle Lite) w/Device kit, Use 1 kit Daily. E11.65, Disp: 1 kit, Rfl: 0    buPROPion XL (WELLBUTRIN XL) 150 MG 24 hr tablet, TAKE 1 TABLET BY MOUTH ONCE DAILY, Disp: 30 tablet, Rfl: 10    busPIRone (BUSPAR) 10 MG tablet, TAKE 1 TABLET BY MOUTH TWICE DAILY, Disp: 60 tablet, Rfl: 10    Continuous Blood Gluc  (Dexcom G6 ) device, Use 1 Device Daily. Use to check BS, Disp: 1 each, Rfl: 0    Continuous Glucose Sensor (Dexcom  G6 Sensor), Use Every 10 (Ten) Days. Use to check BS daily, Disp: 9 each, Rfl: 1    Continuous Glucose Transmitter (Dexcom G6 Transmitter) misc, Use 1 each Every 3 (Three) Months. USE 1 TRANSMITTER EVERY 3 MONTHS, Disp: 1 each, Rfl: 0    cyclobenzaprine (FLEXERIL) 10 MG tablet, Take I tab q 8 hrs as needed for tension headaches, Disp: 30 tablet, Rfl: 0    docusate sodium (Colace) 100 MG capsule, Take 1 capsule by mouth 2 (Two) Times a Day for 180 days., Disp: 60 capsule, Rfl: 5    DULoxetine (CYMBALTA) 60 MG capsule, TAKE 1 CAPSULE BY MOUTH ONCE DAILY, Disp: 30 capsule, Rfl: 10    Eliquis 5 MG tablet tablet, TAKE 1 TABLET BY MOUTH TWICE DAILY, Disp: 60 tablet, Rfl: 10    empagliflozin (Jardiance) 10 MG tablet tablet, TAKE 1 TABLET BY MOUTH ONCE DAILY, Disp: 90 tablet, Rfl: 3    glucose blood (Accu-Chek Guide) test strip, USE TO TEST BLOOD SUGAR 4 TIMES DAILY AT MEALS AND AT BEDTIME, Disp: 100 each, Rfl: 10    glucose blood (FREESTYLE LITE) test strip, Test bs daily E11.65, Disp: 100 each, Rfl: 1    Gvoke HypoPen 1-Pack 1 MG/0.2ML solution auto-injector, INJECT 1MG SUBCUTANEOUSLY INTO THE APPROPRIATE AREA AS DIRECTED AS NEEDED FOR HYPOGLYCEMIA, Disp: 0.4 mL, Rfl: 10    HYDROcodone-acetaminophen (NORCO) 5-325 MG per tablet, , Disp: , Rfl:     insulin aspart (NovoLOG FlexPen) 100 UNIT/ML solution pen-injector sc pen, Inject 20 Units under the skin into the appropriate area as directed 3 (Three) Times a Day With Meals. Dx code: E11.65, Disp: 60 mL, Rfl: 3    Insulin Glargine (Lantus SoloStar) 100 UNIT/ML injection pen, Inject 40 Units under the skin into the appropriate area as directed Every Night for 338 days., Disp: 45 mL, Rfl: 2    Insulin Pen Needle (Pen Needles) 32G X 4 MM misc, Use 1 each 4 (Four) Times a Day Before Meals & at Bedtime., Disp: 400 each, Rfl: 3    levothyroxine (Synthroid) 100 MCG tablet, Take 1 tablet by mouth Every Morning., Disp: 90 tablet, Rfl: 1    meclizine (ANTIVERT) 25 MG tablet, Take 1  tablet by mouth 3 (Three) Times a Day As Needed for Dizziness., Disp: 30 tablet, Rfl: 0    metoprolol succinate XL (TOPROL-XL) 100 MG 24 hr tablet, TAKE 1 TABLET BY MOUTH ONCE DAILY, Disp: 30 tablet, Rfl: 10    omeprazole (priLOSEC) 40 MG capsule, TAKE 1 CAPSULE BY MOUTH DAILY., Disp: 90 capsule, Rfl: 1    ondansetron ODT (ZOFRAN-ODT) 4 MG disintegrating tablet, PLACE 1 TABLET ON TONGUE AND ALLOW TO DISSOLVE EVERY 8 HOURS AS NEEDED FOR NAUSEA AND VOMITING, Disp: 30 tablet, Rfl: 10    pregabalin (LYRICA) 100 MG capsule, TAKE ONE (1) CAPSULE BY MOUTH TWICE DAILY, Disp: 180 capsule, Rfl: 0    Semaglutide,0.25 or 0.5MG/DOS, (Ozempic, 0.25 or 0.5 MG/DOSE,) 2 MG/3ML solution pen-injector, Inject 0.5 mg under the skin into the appropriate area as directed 1 (One) Time Per Week., Disp: 9 mL, Rfl: 1    ==========================================================================    ALLERGIES    Allergies   Allergen Reactions    Lisinopril Cough    Cefixime Other (See Comments)     Suprax       ==========================================================================    OBJECTIVE    Vitals:    10/29/24 0901   BP: 120/66   Pulse: 68   SpO2: 99%     Body mass index is 28.28 kg/m².     General: Alert, cooperative, no acute distress  Lungs: Clear to auscultation bilaterally  Heart: Regular rate and rhythm, S1 and S2 normal  Abdomen: Soft, NT, ND and Bowel sounds Positive    ==========================================================================    LAB EVALUATION    Lab Results   Component Value Date    GLUCOSE 87 10/22/2024    BUN 17 10/22/2024    CREATININE 1.49 (H) 10/22/2024    EGFRIFNONA 48 (L) 01/04/2022    BCR 11.4 10/22/2024    K 4.5 10/22/2024    CO2 26.6 10/22/2024    CALCIUM 9.8 10/22/2024    ALBUMIN 4.3 10/22/2024    LABIL2 1.3 04/22/2019    AST 33 10/22/2024    ALT 26 10/22/2024    CHOL 157 10/22/2024    TRIG 109 10/22/2024    HDL 47 10/22/2024    LDL 90 10/22/2024     Lab Results   Component Value Date    HGBA1C  7.98 (H) 10/22/2024    HGBA1C 7.38 (H) 07/22/2024    HGBA1C 7.40 (H) 04/12/2024     Lab Results   Component Value Date    MICROALBUR 2.3 10/22/2024    CREATININE 1.49 (H) 10/22/2024     Lab Results   Component Value Date    TSH 1.360 10/22/2024    FREET4 1.50 10/22/2024     ==========================================================================    CGM Data:    Dates: Oct 16 till Oct 29, 2024    Very High > 250: 14%  High 180 - 250: 24%  Target: 70 - 180: 62%  Low 55 - 70:  0%  Very Low < 55: <1%    ==========================================================================    ASSESSMENT AND PLAN    # Type 2 diabetes complicated with retinopathy, neuropathy and nephropathy  - Reviewed CGM data and patient possibly is having fasting hypoglycemia and postprandial hyperglycemia  - Will de-escalate insulin Lantus to 30 units while increasing insulin lispro to 17 units with each meal and patient to take smaller dose around 14 units if moderate size portion and 12 units if small portion  - Continue glucose monitoring with CGM  - Continue current therapy otherwise without any changes  - New adjusted regimen:  Insulin glargine 30 units nightly  Insulin lispro:  12 Units with small portion  14 Units with moderate portion  17 Units with big or carb rich portion  Ozempic 0.5 mg once a week  Jardiance 10 mg 1 pill by mouth daily  - Patient to follow-up in clinic back again in 3 months time'    # Hyperlipidemia, currently on statin therapy, last lipid panel stable in 10/2024    # Hypothyroidism, on levothyroxine replacement therapy 100 mcg daily, last thyroid labs stable in 10/2024    # Hypertension, care as per primary team    # Overweight with BMI of 28.28    # CKD IIIa    Return to clinic: 3 months    Entire assessment and plan was discussed and counseled the patient in detail to which patient verbalized understanding and agreed with care.  Answered all queries and concerns.    This note was created using voice recognition  "software and is inherently subject to errors including those of syntax and \"sound-alike\" substitutions which may escape proofreading.  In such instances, original meaning may be extrapolated by contextual derivation.    Note: Portions of this note may have been copied from previous notes but documentation have been reviewed and edited as necessary to support clinical decision making for today's visit.    ==========================================================================    INFORMATION PROVIDED TO PATIENT    Patient Instructions   # Diabetes Management:    Please adjust insulin therapy as:    - Insulin Glargine (Slow acting insulin) 30 Units in the evening.  - Insulin lispro / aspart (Fast acting / meal time insulin): 12-17 Units with each meal.  12 units with small portion  14 units with moderate portion   17 unit with big portion or carb rich diet    Meal time insulin need to be taken 15 mins before meal. You can bring your insulin with you if you are planning to eat out.    If you plan to miss the meal please miss the meal time insulin as well.    Check your blood glucose through dexcom.    Try to maintain water intake around 6 to 8 glasses of water every day.    Continue Ozempic 0.5 mgs once a week.  Continue Jardiance 10 mg 1 pill by mouth daily.    If your blood sugar is less than 70 in the morning then decrease your insulin glargine by 5 units.  If your blood sugar before meal is less than 70 then decrease your mealtime insulin by 5 units.    Low Blood Glucose:    - If you feel sweaty, anxious, shakiness, feel weak, trouble walking, feels like passing out or trouble seeing thing, please check your blood glucose and if its less than 70 or if your feel symptoms of low blood glucose then take one of the following or use Glucagon Injection:    *3 or 4 glucose tablets  *½ cup of juice or regular soda (not sugar-free)  *2 tablespoons of raisins  *4 or 5 saltine crackers  *1 tablespoon of sugar  *1 tablespoon " of honey or corn syrup  *6 to 8 hard candies    Plan for non-fasting blood work before next visit.     Thank you for your visit today.     If you have any questions or concerns please feel free to reach out of the office.          ==========================================================================  Dionicio Ramachandran MD  Department of Endocrine, Diabetes and Metabolism  [place], [place]  ==========================================================================

## 2024-10-29 NOTE — PATIENT INSTRUCTIONS
# Diabetes Management:    Please adjust insulin therapy as:    - Insulin Glargine (Slow acting insulin) 30 Units in the evening.  - Insulin lispro / aspart (Fast acting / meal time insulin): 12-17 Units with each meal.  12 units with small portion  14 units with moderate portion   17 unit with big portion or carb rich diet    Meal time insulin need to be taken 15 mins before meal. You can bring your insulin with you if you are planning to eat out.    If you plan to miss the meal please miss the meal time insulin as well.    Check your blood glucose through dexcom.    Try to maintain water intake around 6 to 8 glasses of water every day.    Continue Ozempic 0.5 mgs once a week.  Continue Jardiance 10 mg 1 pill by mouth daily.    If your blood sugar is less than 70 in the morning then decrease your insulin glargine by 5 units.  If your blood sugar before meal is less than 70 then decrease your mealtime insulin by 5 units.    Low Blood Glucose:    - If you feel sweaty, anxious, shakiness, feel weak, trouble walking, feels like passing out or trouble seeing thing, please check your blood glucose and if its less than 70 or if your feel symptoms of low blood glucose then take one of the following or use Glucagon Injection:    *3 or 4 glucose tablets  *½ cup of juice or regular soda (not sugar-free)  *2 tablespoons of raisins  *4 or 5 saltine crackers  *1 tablespoon of sugar  *1 tablespoon of honey or corn syrup  *6 to 8 hard candies    Plan for non-fasting blood work before next visit.     Thank you for your visit today.     If you have any questions or concerns please feel free to reach out of the office.

## 2024-11-21 ENCOUNTER — TELEPHONE (OUTPATIENT)
Dept: FAMILY MEDICINE CLINIC | Facility: CLINIC | Age: 59
End: 2024-11-21

## 2024-11-21 NOTE — TELEPHONE ENCOUNTER
Caller: Kuldeep Adhikari    Relationship: Self    Best call back number: [phone]    What form or medical record are you requesting:   LETTER TO EXCUSE HIM FROM [employer]    Who is requesting this form or medical record from you: SELF    How would you like to receive the form or medical records (pick-up, mail, fax): MY CHART PDF FILE    Timeframe paperwork needed:   ASAP    Additional notes:   PATIENT STATES THE  HE  HAS  NOT  DROVE FOR 2 YEARS DUE TO A RIGHT LEG AMPUTATIONS.

## 2024-11-21 NOTE — TELEPHONE ENCOUNTER
Caller: Kuldeep Adhikari    Relationship to patient: Self    Best call back number: [phone]    Patient is needing:  SCHEDULE ANNUAL WELLNESS VISIT WITH ANOTHER PROVIDER DUE TO PCP BEING SCHEDULED OUT SO FAR.

## 2024-11-27 DIAGNOSIS — E11.65 UNCONTROLLED TYPE 2 DIABETES MELLITUS WITH HYPERGLYCEMIA: ICD-10-CM

## 2024-11-27 DIAGNOSIS — Z79.4 TYPE 2 DIABETES MELLITUS WITH HYPERGLYCEMIA, WITH LONG-TERM CURRENT USE OF INSULIN: ICD-10-CM

## 2024-11-27 DIAGNOSIS — E11.65 TYPE 2 DIABETES MELLITUS WITH HYPERGLYCEMIA, WITH LONG-TERM CURRENT USE OF INSULIN: ICD-10-CM

## 2024-11-29 RX ORDER — PROCHLORPERAZINE 25 MG/1
SUPPOSITORY RECTAL
Qty: 9 EACH | Refills: 1 | Status: SHIPPED | OUTPATIENT
Start: 2024-11-29

## 2024-12-02 ENCOUNTER — ON CAMPUS - OUTPATIENT (AMBULATORY)
Age: 59
End: 2024-12-02
Payer: MEDICARE

## 2024-12-02 ENCOUNTER — ON CAMPUS - OUTPATIENT (AMBULATORY)
Dept: URBAN - METROPOLITAN AREA HOSPITAL 2 | Facility: HOSPITAL | Age: 59
End: 2024-12-02
Payer: MEDICARE

## 2024-12-02 VITALS
RESPIRATION RATE: 18 BRPM | HEART RATE: 70 BPM | SYSTOLIC BLOOD PRESSURE: 120 MMHG | WEIGHT: 193 LBS | SYSTOLIC BLOOD PRESSURE: 112 MMHG | RESPIRATION RATE: 16 BRPM | HEIGHT: 69 IN | TEMPERATURE: 97.1 F | HEART RATE: 66 BPM | SYSTOLIC BLOOD PRESSURE: 159 MMHG | DIASTOLIC BLOOD PRESSURE: 86 MMHG | RESPIRATION RATE: 18 BRPM | HEART RATE: 72 BPM | OXYGEN SATURATION: 99 % | HEART RATE: 68 BPM | SYSTOLIC BLOOD PRESSURE: 159 MMHG | RESPIRATION RATE: 14 BRPM | DIASTOLIC BLOOD PRESSURE: 91 MMHG | TEMPERATURE: 97.1 F | SYSTOLIC BLOOD PRESSURE: 131 MMHG | WEIGHT: 193 LBS | OXYGEN SATURATION: 98 % | DIASTOLIC BLOOD PRESSURE: 77 MMHG | DIASTOLIC BLOOD PRESSURE: 86 MMHG | HEART RATE: 70 BPM | SYSTOLIC BLOOD PRESSURE: 120 MMHG | OXYGEN SATURATION: 97 % | OXYGEN SATURATION: 95 % | HEART RATE: 66 BPM | SYSTOLIC BLOOD PRESSURE: 120 MMHG | DIASTOLIC BLOOD PRESSURE: 68 MMHG | OXYGEN SATURATION: 100 % | DIASTOLIC BLOOD PRESSURE: 70 MMHG | SYSTOLIC BLOOD PRESSURE: 120 MMHG | OXYGEN SATURATION: 95 % | OXYGEN SATURATION: 99 % | OXYGEN SATURATION: 100 % | RESPIRATION RATE: 17 BRPM | RESPIRATION RATE: 14 BRPM | RESPIRATION RATE: 18 BRPM | HEART RATE: 72 BPM | TEMPERATURE: 97.1 F | HEART RATE: 68 BPM | OXYGEN SATURATION: 100 % | SYSTOLIC BLOOD PRESSURE: 176 MMHG | HEART RATE: 72 BPM | RESPIRATION RATE: 16 BRPM | OXYGEN SATURATION: 95 % | OXYGEN SATURATION: 99 % | HEART RATE: 72 BPM | SYSTOLIC BLOOD PRESSURE: 159 MMHG | HEIGHT: 69 IN | DIASTOLIC BLOOD PRESSURE: 70 MMHG | OXYGEN SATURATION: 97 % | OXYGEN SATURATION: 99 % | HEIGHT: 69 IN | DIASTOLIC BLOOD PRESSURE: 86 MMHG | HEART RATE: 72 BPM | DIASTOLIC BLOOD PRESSURE: 91 MMHG | OXYGEN SATURATION: 98 % | OXYGEN SATURATION: 95 % | RESPIRATION RATE: 16 BRPM | DIASTOLIC BLOOD PRESSURE: 70 MMHG | DIASTOLIC BLOOD PRESSURE: 77 MMHG | WEIGHT: 193 LBS | OXYGEN SATURATION: 98 % | SYSTOLIC BLOOD PRESSURE: 131 MMHG | RESPIRATION RATE: 18 BRPM | OXYGEN SATURATION: 95 % | DIASTOLIC BLOOD PRESSURE: 68 MMHG | SYSTOLIC BLOOD PRESSURE: 131 MMHG | HEART RATE: 68 BPM | RESPIRATION RATE: 17 BRPM | SYSTOLIC BLOOD PRESSURE: 176 MMHG | OXYGEN SATURATION: 97 % | RESPIRATION RATE: 16 BRPM | HEART RATE: 70 BPM | OXYGEN SATURATION: 100 % | HEART RATE: 72 BPM | DIASTOLIC BLOOD PRESSURE: 86 MMHG | OXYGEN SATURATION: 100 % | RESPIRATION RATE: 14 BRPM | RESPIRATION RATE: 16 BRPM | HEIGHT: 69 IN | SYSTOLIC BLOOD PRESSURE: 112 MMHG | WEIGHT: 193 LBS | SYSTOLIC BLOOD PRESSURE: 120 MMHG | TEMPERATURE: 97.1 F | RESPIRATION RATE: 16 BRPM | SYSTOLIC BLOOD PRESSURE: 176 MMHG | DIASTOLIC BLOOD PRESSURE: 68 MMHG | HEART RATE: 68 BPM | RESPIRATION RATE: 16 BRPM | DIASTOLIC BLOOD PRESSURE: 86 MMHG | SYSTOLIC BLOOD PRESSURE: 120 MMHG | HEIGHT: 69 IN | OXYGEN SATURATION: 98 % | DIASTOLIC BLOOD PRESSURE: 77 MMHG | DIASTOLIC BLOOD PRESSURE: 70 MMHG | DIASTOLIC BLOOD PRESSURE: 70 MMHG | DIASTOLIC BLOOD PRESSURE: 91 MMHG | RESPIRATION RATE: 14 BRPM | DIASTOLIC BLOOD PRESSURE: 70 MMHG | OXYGEN SATURATION: 97 % | SYSTOLIC BLOOD PRESSURE: 120 MMHG | DIASTOLIC BLOOD PRESSURE: 86 MMHG | SYSTOLIC BLOOD PRESSURE: 112 MMHG | DIASTOLIC BLOOD PRESSURE: 68 MMHG | SYSTOLIC BLOOD PRESSURE: 112 MMHG | SYSTOLIC BLOOD PRESSURE: 176 MMHG | WEIGHT: 193 LBS | HEART RATE: 66 BPM | OXYGEN SATURATION: 97 % | HEIGHT: 69 IN | OXYGEN SATURATION: 95 % | TEMPERATURE: 97.1 F | SYSTOLIC BLOOD PRESSURE: 131 MMHG | SYSTOLIC BLOOD PRESSURE: 159 MMHG | HEART RATE: 68 BPM | DIASTOLIC BLOOD PRESSURE: 68 MMHG | OXYGEN SATURATION: 98 % | DIASTOLIC BLOOD PRESSURE: 91 MMHG | SYSTOLIC BLOOD PRESSURE: 176 MMHG | DIASTOLIC BLOOD PRESSURE: 68 MMHG | HEART RATE: 70 BPM | OXYGEN SATURATION: 98 % | SYSTOLIC BLOOD PRESSURE: 112 MMHG | OXYGEN SATURATION: 99 % | RESPIRATION RATE: 18 BRPM | DIASTOLIC BLOOD PRESSURE: 77 MMHG | HEART RATE: 68 BPM | DIASTOLIC BLOOD PRESSURE: 86 MMHG | RESPIRATION RATE: 14 BRPM | RESPIRATION RATE: 18 BRPM | RESPIRATION RATE: 17 BRPM | HEART RATE: 66 BPM | HEART RATE: 70 BPM | SYSTOLIC BLOOD PRESSURE: 131 MMHG | HEIGHT: 69 IN | RESPIRATION RATE: 14 BRPM | TEMPERATURE: 97.1 F | HEART RATE: 66 BPM | RESPIRATION RATE: 17 BRPM | RESPIRATION RATE: 17 BRPM | OXYGEN SATURATION: 99 % | RESPIRATION RATE: 18 BRPM | SYSTOLIC BLOOD PRESSURE: 112 MMHG | DIASTOLIC BLOOD PRESSURE: 91 MMHG | SYSTOLIC BLOOD PRESSURE: 131 MMHG | OXYGEN SATURATION: 97 % | SYSTOLIC BLOOD PRESSURE: 176 MMHG | DIASTOLIC BLOOD PRESSURE: 70 MMHG | OXYGEN SATURATION: 100 % | SYSTOLIC BLOOD PRESSURE: 159 MMHG | HEART RATE: 68 BPM | OXYGEN SATURATION: 95 % | SYSTOLIC BLOOD PRESSURE: 112 MMHG | DIASTOLIC BLOOD PRESSURE: 91 MMHG | RESPIRATION RATE: 17 BRPM | SYSTOLIC BLOOD PRESSURE: 159 MMHG | HEART RATE: 70 BPM | OXYGEN SATURATION: 100 % | RESPIRATION RATE: 17 BRPM | OXYGEN SATURATION: 98 % | SYSTOLIC BLOOD PRESSURE: 176 MMHG | RESPIRATION RATE: 14 BRPM | HEART RATE: 66 BPM | WEIGHT: 193 LBS | HEART RATE: 66 BPM | SYSTOLIC BLOOD PRESSURE: 131 MMHG | DIASTOLIC BLOOD PRESSURE: 68 MMHG | TEMPERATURE: 97.1 F | OXYGEN SATURATION: 99 % | SYSTOLIC BLOOD PRESSURE: 159 MMHG | HEART RATE: 70 BPM | HEART RATE: 72 BPM | DIASTOLIC BLOOD PRESSURE: 91 MMHG | DIASTOLIC BLOOD PRESSURE: 77 MMHG | DIASTOLIC BLOOD PRESSURE: 77 MMHG | DIASTOLIC BLOOD PRESSURE: 77 MMHG | OXYGEN SATURATION: 97 % | WEIGHT: 193 LBS

## 2024-12-02 DIAGNOSIS — R13.10 DYSPHAGIA, UNSPECIFIED: ICD-10-CM

## 2024-12-02 DIAGNOSIS — K22.2 ESOPHAGEAL OBSTRUCTION: ICD-10-CM

## 2024-12-02 PROCEDURE — 43450 DILATE ESOPHAGUS 1/MULT PASS: CPT | Performed by: INTERNAL MEDICINE

## 2024-12-02 PROCEDURE — 43235 EGD DIAGNOSTIC BRUSH WASH: CPT | Performed by: INTERNAL MEDICINE

## 2024-12-02 NOTE — SERVICENOTES
"  Assessment & Plan     Uncontrolled hypertension  Likely due to out of meds for a few days now.  Reviewed meds with patient.  Restart amlodipine to optimize management.  Reinforced sodium restriction.  Exercise recommendations reviewed with patient.  Recheck BP in 2 weeks.   - amLODIPine (NORVASC) 5 MG tablet  Dispense: 90 tablet; Refill: 3    Cold sore  Rare breakouts. Continue episodic treatment.  - valACYclovir (VALTREX) 1000 mg tablet  Dispense: 4 tablet; Refill: 1    Screen for colon cancer  - Colonoscopy Screening  Referral        BMI  Estimated body mass index is 30.88 kg/m  as calculated from the following:    Height as of this encounter: 1.759 m (5' 9.25\").    Weight as of this encounter: 95.5 kg (210 lb 9.6 oz).   Weight management plan: Discussed healthy diet and exercise guidelines      Patient Instructions   Restart amlodipine 5 mg daily and be sure to take it everyday.  Be more consistent with low salt diet. Read the literature in this visit summary.    Be consistent with low salt, low trans fat and low saturated fat diet.  Eat food rich in omega-3-fatty acids as you tolerate. (salmon, olive oil)  Eat 5 cups of vegetables, fruits and whole grains per day.  Limit starchy food (white rice, white bread, white pasta, white potatoes) to less than a cup per meal.  Minimize sweets, junk food and fastfood. Limit soda beverages to one serving per day; best to avoid it altogether though.    Exercise: moderate intensity sustained for at least 30 mins per episode, goal of 150 mins per week at least  Combine cardiovascular and resistance exercises.  These exercise recommendations are in addition to your daily activity at work or home.  Work on losing weight.      Reconsider completing your immunizations to protect yourself from severe illness, and help prevent spread of infectious diseases.  You are due for: updated covid19 and yearly flu shots.  Plan to get hepatitis B immunization series if you have not " pictures were not captured "received it or unsure if you have completed it before.    You may get the Shingrix vaccine for shingles if you desire, and after you verify with insurance how they cover the vaccine.      Pacheco Toribio is a 51 year old, presenting for the following health issues:  Hypertension        2/12/2024    10:50 AM   Additional Questions   Roomed by Lindsay farley ma   Accompanied by self         2/12/2024    10:50 AM   Patient Reported Additional Medications   Patient reports taking the following new medications none     History of Present Illness       Hypertension: He presents for follow up of hypertension.  He does check blood pressure  regularly outside of the clinic. Outpatient blood pressures have not been over 140/90. He does not follow a low salt diet.     He eats 2-3 servings of fruits and vegetables daily.He consumes 2 sweetened beverage(s) daily.He exercises with enough effort to increase his heart rate 30 to 60 minutes per day.  He exercises with enough effort to increase his heart rate 6 days per week. He is missing 1 dose(s) of medications per week.  He is not taking prescribed medications regularly due to remembering to take.         Medication Followup of valacyclovir  Taking Medication as prescribed: yes  Side Effects:  None  Medication Helping Symptoms:  yes  Gets about 2 breakouts of cold sore per year.          Review of Systems  Constitutional, neuro, ENT, endocrine, pulmonary, cardiac, gastrointestinal, genitourinary, musculoskeletal, integument and psychiatric systems are negative, except as otherwise noted.      Objective    BP (!) 150/110 (BP Location: Right arm, Patient Position: Sitting, Cuff Size: Adult Regular)   Pulse 68   Temp 97.2  F (36.2  C) (Tympanic)   Resp 16   Ht 1.759 m (5' 9.25\")   Wt 95.5 kg (210 lb 9.6 oz)   SpO2 96%   BMI 30.88 kg/m    Body mass index is 30.88 kg/m .  Physical Exam   GENERAL:  alert and no distress, ambulatory w/o assist  NECK: no tenderness, no adenopathy,  " Thyroid not enlarged  RESP: lungs clear to auscultation - no rales, no rhonchi, no wheezes  CV: regular rates and rhythm, no murmur  MS: no edema  SKIN: no suspicious lesions, no rashes  NEURO: strength and tone- normal, sensory exam- grossly normal, mentation- intact, speech- normal, reflexes- symmetric  ABD:  nontender     No results found for any visits on 02/12/24.        Signed Electronically by: Jonathan Koenig MD

## 2024-12-05 DIAGNOSIS — E11.65 TYPE 2 DIABETES MELLITUS WITH HYPERGLYCEMIA, WITH LONG-TERM CURRENT USE OF INSULIN: ICD-10-CM

## 2024-12-05 DIAGNOSIS — E11.65 UNCONTROLLED TYPE 2 DIABETES MELLITUS WITH HYPERGLYCEMIA: ICD-10-CM

## 2024-12-05 DIAGNOSIS — Z79.4 TYPE 2 DIABETES MELLITUS WITH HYPERGLYCEMIA, WITH LONG-TERM CURRENT USE OF INSULIN: ICD-10-CM

## 2024-12-07 RX ORDER — PROCHLORPERAZINE 25 MG/1
SUPPOSITORY RECTAL
Qty: 9 EACH | Refills: 1 | Status: SHIPPED | OUTPATIENT
Start: 2024-12-07

## 2024-12-08 RX ORDER — OMEPRAZOLE 40 MG/1
40 CAPSULE, DELAYED RELEASE ORAL DAILY
Qty: 90 CAPSULE | Refills: 1 | Status: SHIPPED | OUTPATIENT
Start: 2024-12-08

## 2024-12-29 DIAGNOSIS — M54.12 CERVICAL RADICULOPATHY: ICD-10-CM

## 2024-12-29 RX ORDER — PREGABALIN 100 MG/1
CAPSULE ORAL
Qty: 60 CAPSULE | Refills: 0 | Status: SHIPPED | OUTPATIENT
Start: 2024-12-29

## 2024-12-30 DIAGNOSIS — M54.12 CERVICAL RADICULOPATHY: ICD-10-CM

## 2024-12-30 RX ORDER — PREGABALIN 100 MG/1
CAPSULE ORAL
Qty: 60 CAPSULE | Refills: 0 | OUTPATIENT
Start: 2024-12-30

## 2025-01-14 ENCOUNTER — OFFICE VISIT (OUTPATIENT)
Dept: SURGERY | Facility: CLINIC | Age: 60
End: 2025-01-14
Payer: MEDICARE

## 2025-01-14 VITALS
SYSTOLIC BLOOD PRESSURE: 130 MMHG | OXYGEN SATURATION: 98 % | WEIGHT: 187.2 LBS | RESPIRATION RATE: 16 BRPM | DIASTOLIC BLOOD PRESSURE: 81 MMHG | BODY MASS INDEX: 28.46 KG/M2 | HEART RATE: 71 BPM

## 2025-01-14 DIAGNOSIS — G54.0 TOS (THORACIC OUTLET SYNDROME): Primary | ICD-10-CM

## 2025-01-14 RX ORDER — ASPIRIN 81 MG/1
1 TABLET ORAL DAILY
COMMUNITY

## 2025-01-14 RX ORDER — LOSARTAN POTASSIUM 100 MG/1
1 TABLET ORAL DAILY
COMMUNITY

## 2025-01-14 RX ORDER — FLUTICASONE PROPIONATE 50 MCG
2 SPRAY, SUSPENSION (ML) NASAL DAILY
COMMUNITY

## 2025-01-14 RX ORDER — SULFAMETHOXAZOLE AND TRIMETHOPRIM 800; 160 MG/1; MG/1
1 TABLET ORAL
COMMUNITY
Start: 2024-09-12

## 2025-01-14 RX ORDER — PANTOPRAZOLE SODIUM 40 MG/1
1 TABLET, DELAYED RELEASE ORAL 2 TIMES DAILY
COMMUNITY

## 2025-01-15 ENCOUNTER — PATIENT ROUNDING (BHMG ONLY) (OUTPATIENT)
Dept: SURGERY | Facility: CLINIC | Age: 60
End: 2025-01-15
Payer: MEDICARE

## 2025-01-15 NOTE — PROGRESS NOTES
My name is Sharonda Lynch's MA      I am with [facility]     I want to officially welcome you to the practice and ask about your recent visit.         If you could tell me about your visit with us. What things went well?          We're always looking for ways to make our patients' experiences even better. Do you have recommendations on ways we may improve?       Overall were you satisfied with your first visit to our practice?         I appreciate you taking the time to answer these few questions today. If there is anything else our office can do for you, please let us know.        Thank you and have a great day.    Sent as a [employer] message 1/15/25

## 2025-01-20 ENCOUNTER — TELEPHONE (OUTPATIENT)
Dept: SURGERY | Facility: CLINIC | Age: 60
End: 2025-01-20
Payer: MEDICARE

## 2025-01-20 DIAGNOSIS — F41.9 ANXIETY: Primary | ICD-10-CM

## 2025-01-20 NOTE — TELEPHONE ENCOUNTER
Caller: Kuldeep Adhikari    Relationship: Self    Best call back number: [phone]     What is the best time to reach you: ANY     Who are you requesting to speak with (clinical staff, provider,  specific staff member): ANY     What was the call regarding: PATIENT STATES THAT HE SCHEDULED FOR AN MRI 2/6/25 AND WOULD LIKE TO BE PRESCRIBED MEDICATION DUE TO BEING CLAUSTROPHOBIC.      Is it okay if the provider responds through MyChart: PATIENT WOULD LIKE A CALL BACK TO CONFIRM.

## 2025-01-21 RX ORDER — DIAZEPAM 5 MG/1
5 TABLET ORAL ONCE AS NEEDED
Qty: 1 TABLET | Refills: 0 | Status: SHIPPED | OUTPATIENT
Start: 2025-01-21

## 2025-01-22 NOTE — TELEPHONE ENCOUNTER
Called and spoke with patient that this was sent to [facility]     Caller: Kuldeep Adhikari    Relationship: Self    Best call back number: [phone]     What is the best time to reach you: ANY     Who are you requesting to speak with (clinical staff, provider,  specific staff member): ANY     What was the call regarding: PATIENT STATES THAT HE SCHEDULED FOR AN MRI 2/6/25 AND WOULD LIKE TO BE PRESCRIBED MEDICATION DUE TO BEING CLAUSTROPHOBIC.      Is it okay if the provider responds through MyChart: PATIENT WOULD LIKE A CALL BACK TO CONFIRM.

## 2025-02-02 NOTE — PROGRESS NOTES
THORACIC SURGERY CLINIC CONSULT NOTE    REASON FOR CONSULT: Evaluate for neurogenic right thoracic outlet syndrome.    Subjective   HISTORY OF PRESENTING ILLNESS:   Kuldeep Adhikari is a 59 y.o. male who has significant medical problems as mentioned in the medical chart.     He reported having right arm and neck pain.  He takes gabapentin and had minimal relief.  He also had Cervidil in the past with minimal relief.  He has worked with physical therapist in the past with out any relief.  He had carpal tunnel release in the past with no significant relief.  He was seen by spine surgery for right C6-7 herniated disc.  The spinal surgeon did not believe that the symptoms corresponded to the C6-7 herniated disc.  Thoracic outlet syndrome was considered as an etiology and he was referred to thoracic surgery for further evaluation.    Past Medical History:   Diagnosis Date    Allergic     Carpal tunnel syndrome of right wrist 09/06/2024    CKD (chronic kidney disease), stage III     Depression     Depression with suicidal ideation 08/08/2021    DJD (degenerative joint disease)     DVT (deep venous thrombosis)     Ganglion     rt wrist    Gangrene of foot 10/09/2015    GERD (gastroesophageal reflux disease)     Headache     Headache, tension-type     Heart murmur     Hiatal hernia     History of esophageal stricture     s/p dialation 40-50 times last EGD 04/2016    Hyperlipidemia     Hypertension     Hypothyroidism     IBS (irritable bowel syndrome)     Nausea, vomiting and diarrhea 09/22/2023    Neuropathy     Neuropathy in diabetes     Osteomyelitis of right foot 03/19/2020    Pulmonary embolism 01/19/2017    Rash     rt lower hip    Reflex sympathetic dystrophy     Retinopathy     S/P BKA (below knee amputation), right 03/18/2022    Seasonal allergies     Sepsis due to group B Streptococcus 03/19/2020    Shortness of breath     Sleep apnea     Stroke     Type 2 diabetes mellitus with peripheral vascular disease      Vitamin D deficiency        Past Surgical History:   Procedure Laterality Date    AMPUTATION DIGIT Left 04/26/2022    Procedure: AMPUTATION left great toe;  Surgeon: ERWIN Baker DPM;  Location: [facility] MAIN OR;  Service: Podiatry;  Laterality: Left;    AMPUTATION DIGIT  04/26/2022    Procedure: ;  Surgeon: ERWIN Baker DPM;  Location: [facility] MAIN OR;  Service: Podiatry;;    AMPUTATION FOOT / TOE Right     great toe    AMPUTATION REVISION Right 04/08/2021    Procedure: BELOW KNEE AMPUTATION REVISAION;  Surgeon: Eduardo Reynolds MD;  Location: [facility] MAIN OR;  Service: Orthopedics;  Laterality: Right;    AMPUTATION REVISION Right 04/29/2021    Procedure: AMPUTATION REVISION KNEE STUMP;  Surgeon: Eduardo Reynolds MD;  Location: [facility] MAIN OR;  Service: Orthopedics;  Laterality: Right;    BELOW KNEE AMPUTATION Right 07/30/2020    Procedure: AMPUTATION BELOW KNEE;  Surgeon: Eduardo Reynolds MD;  Location: [facility] MAIN OR;  Service: Orthopedics;  Laterality: Right;    CARDIAC CATHETERIZATION  04/2018    [facility]    CHOLECYSTECTOMY N/A 09/26/2023    Procedure: CHOLECYSTECTOMY LAPAROSCOPIC;  Surgeon: Eduardo Srinivasan MD;  Location: [facility] MAIN OR;  Service: General;  Laterality: N/A;    COLONOSCOPY      ENDOSCOPY      ENDOSCOPY N/A 10/04/2019    Procedure: ESOPHAGOGASTRODUODENOSCOPY with dilitation and biopsy x 1 area;  Surgeon: Declan Iqbal MD;  Location: [facility] ENDOSCOPY;  Service: Gastroenterology    ENDOSCOPY N/A 12/13/2019    Procedure: ESOPHAGOGASTRODUODENOSCOPY WITH DILATATION (50, 52 BOUGIE);  Surgeon: Declan Iqbal MD;  Location: [facility] ENDOSCOPY;  Service: Gastroenterology    ENDOSCOPY N/A 08/06/2021    Procedure: ESOPHAGOGASTRODUODENOSCOPY with biopsy x1 area and esophageal dilation (56FR Bougie);  Surgeon: Hussein Talavera MD;  Location: [facility] ENDOSCOPY;  Service: Gastroenterology;  Laterality: N/A;  post: hiatal hernia, erosive gastritis    ENDOSCOPY N/A 12/13/2022    Procedure:  ESOPHAGOGASTRODUODENOSCOPY WITH DILATATION (BOUGIE #54, #56) AND BIOPSY X1;  Surgeon: David Rodriguez MD;  Location: [facility] ENDOSCOPY;  Service: Gastroenterology;  Laterality: N/A;  POST: dysphagia     ENDOSCOPY N/A 09/28/2023    Procedure: ESOPHAGOGASTRODUODENOSCOPY with dilation (58 non guided bougie);  Surgeon: Hussein Talavera MD;  Location: [facility] ENDOSCOPY;  Service: Gastroenterology;  Laterality: N/A;  post op: dysphagia    EYE SURGERY      GANGLION CYST EXCISION Left     HERNIA REPAIR Bilateral     INCISION AND DRAINAGE OF WOUND Right 06/15/2022    Procedure: INCISION AND DRAINAGE WOUND with debridement;  Surgeon: Fito Forte MD;  Location: [facility] MAIN OR;  Service: Orthopedics;  Laterality: Right;    JOINT REPLACEMENT      Left total hip    RETINOPATHY SURGERY      laser    TOTAL HIP ARTHROPLASTY Left     TOTAL HIP ARTHROPLASTY Left 2018    TRANS METATARSAL AMPUTATION Right 03/17/2020    Procedure: AMPUTATION TRANS METATARSAL right;  Surgeon: ERWIN Baker DPM;  Location: [facility] MAIN OR;  Service: Podiatry;  Laterality: Right;  GANGRENOUS RIGHT FOOT    VASECTOMY         Family History   Problem Relation Age of Onset    Diabetes Mother         Patient  mom    Heart disease Mother     Arthritis Mother     Hyperlipidemia Mother     Leukemia Father     Sleep apnea Maternal Aunt         GENO    Stroke Maternal Grandmother     Hypertension Other     Hyperlipidemia Other     Cancer Other     Colon cancer Other         uncle       Social History     Socioeconomic History    Marital status:     Number of children: 3    Highest education level: High school graduate   Tobacco Use    Smoking status: Never    Smokeless tobacco: Never   Vaping Use    Vaping status: Never Used   Substance and Sexual Activity    Alcohol use: Yes     Comment: OCCASIONAL    Drug use: No    Sexual activity: Not Currently     Partners: Female     Birth control/protection: None     Comment: I have Ed problem at this time          Current Outpatient Medications:     amLODIPine (NORVASC) 10 MG tablet, TAKE 1 TABLET BY MOUTH ONCE DAILY, Disp: 30 tablet, Rfl: 10    atorvastatin (LIPITOR) 80 MG tablet, TAKE 1 TABLET BY MOUTH ONCE DAILY, Disp: 90 tablet, Rfl: 1    Blood Glucose Monitoring Suppl (Accu-Chek Guide) w/Device kit, See Admin Instructions., Disp: , Rfl:     Blood Glucose Monitoring Suppl (FreeStyle Lite) w/Device kit, Use 1 kit Daily. E11.65, Disp: 1 kit, Rfl: 0    buPROPion XL (WELLBUTRIN XL) 150 MG 24 hr tablet, TAKE 1 TABLET BY MOUTH ONCE DAILY, Disp: 30 tablet, Rfl: 10    busPIRone (BUSPAR) 10 MG tablet, TAKE 1 TABLET BY MOUTH TWICE DAILY, Disp: 60 tablet, Rfl: 10    Continuous Blood Gluc  (Dexcom G6 ) device, Use 1 Device Daily. Use to check BS, Disp: 1 each, Rfl: 0    Continuous Glucose Sensor (Dexcom G6 Sensor), Use Every 10 (Ten) Days. Use to check BS daily.Dx:E11.65, Disp: 9 each, Rfl: 1    Continuous Glucose Transmitter (Dexcom G6 Transmitter) misc, Use 1 each Every 3 (Three) Months. USE 1 TRANSMITTER EVERY 3 MONTHS, Disp: 1 each, Rfl: 0    cyclobenzaprine (FLEXERIL) 10 MG tablet, Take I tab q 8 hrs as needed for tension headaches, Disp: 30 tablet, Rfl: 0    DULoxetine (CYMBALTA) 60 MG capsule, TAKE 1 CAPSULE BY MOUTH ONCE DAILY, Disp: 30 capsule, Rfl: 10    Eliquis 5 MG tablet tablet, TAKE 1 TABLET BY MOUTH TWICE DAILY, Disp: 60 tablet, Rfl: 10    empagliflozin (Jardiance) 10 MG tablet tablet, TAKE 1 TABLET BY MOUTH ONCE DAILY, Disp: 90 tablet, Rfl: 3    Gvoke HypoPen 1-Pack 1 MG/0.2ML solution auto-injector, INJECT 1MG SUBCUTANEOUSLY INTO THE APPROPRIATE AREA AS DIRECTED AS NEEDED FOR HYPOGLYCEMIA, Disp: 0.4 mL, Rfl: 10    HYDROcodone-acetaminophen (NORCO) 5-325 MG per tablet, , Disp: , Rfl:     insulin aspart (NovoLOG FlexPen) 100 UNIT/ML solution pen-injector sc pen, Inject 20 Units under the skin into the appropriate area as directed 3 (Three) Times a Day With Meals. Dx code: E11.65, Disp: 60 mL,  Rfl: 3    Insulin Glargine (Lantus SoloStar) 100 UNIT/ML injection pen, Inject 40 Units under the skin into the appropriate area as directed Every Night for 338 days., Disp: 45 mL, Rfl: 2    Insulin Pen Needle (Pen Needles) 32G X 4 MM misc, Use 1 each 4 (Four) Times a Day Before Meals & at Bedtime., Disp: 400 each, Rfl: 3    levothyroxine (Synthroid) 100 MCG tablet, Take 1 tablet by mouth Every Morning., Disp: 90 tablet, Rfl: 1    losartan (COZAAR) 100 MG tablet, Take 1 tablet by mouth Daily., Disp: , Rfl:     meclizine (ANTIVERT) 25 MG tablet, Take 1 tablet by mouth 3 (Three) Times a Day As Needed for Dizziness., Disp: 30 tablet, Rfl: 0    metoprolol succinate XL (TOPROL-XL) 100 MG 24 hr tablet, TAKE 1 TABLET BY MOUTH ONCE DAILY, Disp: 30 tablet, Rfl: 10    omeprazole (priLOSEC) 40 MG capsule, TAKE 1 CAPSULE BY MOUTH DAILY., Disp: 90 capsule, Rfl: 1    ondansetron ODT (ZOFRAN-ODT) 4 MG disintegrating tablet, PLACE 1 TABLET ON TONGUE AND ALLOW TO DISSOLVE EVERY 8 HOURS AS NEEDED FOR NAUSEA AND VOMITING, Disp: 30 tablet, Rfl: 10    pantoprazole (PROTONIX) 40 MG EC tablet, Take 1 tablet by mouth 2 (Two) Times a Day., Disp: , Rfl:     pregabalin (LYRICA) 100 MG capsule, TAKE ONE (1) CAPSULE BY MOUTH TWICE DAILY, Disp: 60 capsule, Rfl: 0    Semaglutide,0.25 or 0.5MG/DOS, (Ozempic, 0.25 or 0.5 MG/DOSE,) 2 MG/3ML solution pen-injector, Inject 0.5 mg under the skin into the appropriate area as directed 1 (One) Time Per Week., Disp: 9 mL, Rfl: 1    aspirin 81 MG EC tablet, Take 1 tablet by mouth Daily. (Patient not taking: Reported on 1/14/2025), Disp: , Rfl:     diazePAM (Valium) 5 MG tablet, Take 1 tablet by mouth 1 (One) Time As Needed for Anxiety for up to 1 dose. TAKE 1H PRIOR TO MRI. DO NOT DRIVE YOURSELF TO THE MRI, Disp: 1 tablet, Rfl: 0    fluticasone (FLONASE) 50 MCG/ACT nasal spray, Administer 2 sprays into the nostril(s) as directed by provider Daily. (Patient not taking: Reported on 1/14/2025), Disp: , Rfl:      glucose blood (Accu-Chek Guide) test strip, USE TO TEST BLOOD SUGAR 4 TIMES DAILY AT MEALS AND AT BEDTIME (Patient not taking: Reported on 1/14/2025), Disp: 100 each, Rfl: 10    glucose blood (FREESTYLE LITE) test strip, Test bs daily E11.65 (Patient not taking: Reported on 1/14/2025), Disp: 100 each, Rfl: 1    sulfamethoxazole-trimethoprim (BACTRIM DS,SEPTRA DS) 800-160 MG per tablet, Take 1 tablet by mouth. (Patient not taking: Reported on 1/14/2025), Disp: , Rfl:      Allergies   Allergen Reactions    Lisinopril Cough    Cefixime Other (See Comments)     Suprax             Objective    OBJECTIVE:     VITAL SIGNS:  /81 (BP Location: Left arm, Patient Position: Sitting, Cuff Size: Adult)   Pulse 71   Resp 16   Wt 84.9 kg (187 lb 3.2 oz)   SpO2 98%   BMI 28.46 kg/m²     PHYSICAL EXAM:  Normal appearance.   Head is normocephalic.   Nose appears normal.   No obvious deformity of the mouth and throat.  Conjunctivae normal.   Heart rate and rhythm is normal.  Pulmonary effort is normal.   Moving all 4 extremities.  Extremities warm.  No focal deficit present.   Alert and oriented to person, place, and time.     LAB RESULTS:  I have reviewed all the available laboratory results in the chart.    RESULTS REVIEW:  I have reviewed the patient's all relevant laboratory and imaging findings.          ASSESSMENT & PLAN:  Kuldeep Adhikari is a 59 y.o. male with significant medical conditions as mentioned above presented to my clinic.    Neurogenic right thoracic outlet syndrome  The diagnosis of neurogenic TOS is supported by The Society of Vascular Surgeons diagnostic criteria (J Vasc Surg 2016; 64:e23-35), which requires 3 of the 4 criteria shown below:     1. Local Findings: a history of symptoms consistent with irritation/inflammation at the scalene triangle, along with symptoms due to referred pain in th areas near the thoracic outlet (pain at the chest wall, axilla, upper back, shoulder, trapezius, neck, or head);  exam findings including pain on palpation of the scalene muscle.  2. Peripheral Findings: a history of arm or hand symptoms consistent with central nerve compression (numbness, pain, paresthesias, vasomotor changes, weakness, muscle wasting); exam in which these peripheral symptoms are reproduced by palpation of the scalene muscles or by provocative maneuvers.  3. Absence of other reasonably likely diagnoses including cervical disk disease, shoulder disease, carpal tunnel disease, chronic regional pain syndrome.  4. Response to a scalene muscle block.    I recommended evaluation of thoracic outlet syndrome by obtaining MRI of the brachial plexus, pain consultation for scalene muscle block and follow-up afterwards.    I discussed the patients findings and my recommendations with the patient. The patient was given adequate time to ask questions and all questions were answered to patient satisfaction. Thank you for this consult and allowing us to participate in the care of your patient.      Dwight Burger MD  Thoracic Surgeon  [facility]        Dictated utilizing Dragon dictation    I spent 60 minutes caring for Kuldeep on this date of service. This time includes time spent by me in the following activities:preparing for the visit, reviewing tests, obtaining and/or reviewing a separately obtained history, performing a medically appropriate examination and/or evaluation , counseling and educating the patient/family/caregiver, ordering medications, tests, or procedures, referring and communicating with other health care professionals , documenting information in the medical record, independently interpreting results and communicating that information with the patient/family/caregiver, and care coordination and more than half the time was spent in direct face to face evaluation and decision making.

## 2025-02-04 ENCOUNTER — OFFICE VISIT (OUTPATIENT)
Dept: FAMILY MEDICINE CLINIC | Facility: CLINIC | Age: 60
End: 2025-02-04
Payer: MEDICARE

## 2025-02-04 VITALS
HEIGHT: 68 IN | HEART RATE: 74 BPM | WEIGHT: 188.4 LBS | BODY MASS INDEX: 28.55 KG/M2 | DIASTOLIC BLOOD PRESSURE: 77 MMHG | TEMPERATURE: 98.7 F | SYSTOLIC BLOOD PRESSURE: 145 MMHG | OXYGEN SATURATION: 96 % | RESPIRATION RATE: 18 BRPM

## 2025-02-04 DIAGNOSIS — Z00.00 MEDICARE ANNUAL WELLNESS VISIT, SUBSEQUENT: Primary | ICD-10-CM

## 2025-02-04 DIAGNOSIS — Z89.511 S/P BKA (BELOW KNEE AMPUTATION), RIGHT: Chronic | ICD-10-CM

## 2025-02-04 DIAGNOSIS — F33.0 MILD EPISODE OF RECURRENT MAJOR DEPRESSIVE DISORDER: ICD-10-CM

## 2025-02-04 DIAGNOSIS — M54.12 CERVICAL RADICULOPATHY: ICD-10-CM

## 2025-02-04 PROCEDURE — G0439 PPPS, SUBSEQ VISIT: HCPCS | Performed by: PHYSICIAN ASSISTANT

## 2025-02-04 PROCEDURE — 3077F SYST BP >= 140 MM HG: CPT | Performed by: PHYSICIAN ASSISTANT

## 2025-02-04 PROCEDURE — 1170F FXNL STATUS ASSESSED: CPT | Performed by: PHYSICIAN ASSISTANT

## 2025-02-04 PROCEDURE — 1159F MED LIST DOCD IN RCRD: CPT | Performed by: PHYSICIAN ASSISTANT

## 2025-02-04 PROCEDURE — 1160F RVW MEDS BY RX/DR IN RCRD: CPT | Performed by: PHYSICIAN ASSISTANT

## 2025-02-04 PROCEDURE — 1125F AMNT PAIN NOTED PAIN PRSNT: CPT | Performed by: PHYSICIAN ASSISTANT

## 2025-02-04 PROCEDURE — 3078F DIAST BP <80 MM HG: CPT | Performed by: PHYSICIAN ASSISTANT

## 2025-02-04 NOTE — PROGRESS NOTES
Subjective   The ABCs of the Annual Wellness Visit  Medicare Wellness Visit      Kuldeep Adhikari is a 59 y.o. patient who presents for a Medicare Wellness Visit.    The following portions of the patient's history were reviewed and   updated as appropriate: allergies, current medications, past family history, past medical history, past social history, past surgical history, and problem list.    Compared to one year ago, the patient's physical   health is better.  Compared to one year ago, the patient's mental   health is better.    Recent Hospitalizations:  He was not admitted to the hospital during the last year.     Current Medical Providers:  Patient Care Team:  Larua Whitaker MD as PCP - General  ERWIN Baker DPM as Consulting Physician (Podiatry)  Dwight Burger MD as Surgeon (Thoracic Surgery)  Brooks Moran MD as Consulting Physician (Hand Surgery)  Dionicio Ramachandran MD as Consulting Physician (Endocrinology)    Outpatient Medications Prior to Visit   Medication Sig Dispense Refill    amLODIPine (NORVASC) 10 MG tablet TAKE 1 TABLET BY MOUTH ONCE DAILY 30 tablet 10    atorvastatin (LIPITOR) 80 MG tablet TAKE 1 TABLET BY MOUTH ONCE DAILY 90 tablet 1    Blood Glucose Monitoring Suppl (FreeStyle Lite) w/Device kit Use 1 kit Daily. E11.65 1 kit 0    buPROPion XL (WELLBUTRIN XL) 150 MG 24 hr tablet TAKE 1 TABLET BY MOUTH ONCE DAILY 30 tablet 10    busPIRone (BUSPAR) 10 MG tablet TAKE 1 TABLET BY MOUTH TWICE DAILY 60 tablet 10    Continuous Glucose Sensor (Dexcom G6 Sensor) Use Every 10 (Ten) Days. Use to check BS daily.Dx:E11.65 9 each 1    cyclobenzaprine (FLEXERIL) 10 MG tablet Take I tab q 8 hrs as needed for tension headaches 30 tablet 0    diazePAM (Valium) 5 MG tablet Take 1 tablet by mouth 1 (One) Time As Needed for Anxiety for up to 1 dose. TAKE 1H PRIOR TO MRI. DO NOT DRIVE YOURSELF TO THE MRI 1 tablet 0    DULoxetine (CYMBALTA) 60 MG capsule TAKE 1 CAPSULE BY MOUTH ONCE DAILY 30 capsule  10    Eliquis 5 MG tablet tablet TAKE 1 TABLET BY MOUTH TWICE DAILY 60 tablet 10    empagliflozin (Jardiance) 10 MG tablet tablet TAKE 1 TABLET BY MOUTH ONCE DAILY 90 tablet 3    glucose blood (FREESTYLE LITE) test strip Test bs daily E11.65 100 each 1    Gvoke HypoPen 1-Pack 1 MG/0.2ML solution auto-injector INJECT 1MG SUBCUTANEOUSLY INTO THE APPROPRIATE AREA AS DIRECTED AS NEEDED FOR HYPOGLYCEMIA 0.4 mL 10    HYDROcodone-acetaminophen (NORCO) 5-325 MG per tablet       insulin aspart (NovoLOG FlexPen) 100 UNIT/ML solution pen-injector sc pen Inject 20 Units under the skin into the appropriate area as directed 3 (Three) Times a Day With Meals. Dx code: E11.65 60 mL 3    Insulin Glargine (Lantus SoloStar) 100 UNIT/ML injection pen Inject 40 Units under the skin into the appropriate area as directed Every Night for 338 days. 45 mL 2    Insulin Pen Needle (Pen Needles) 32G X 4 MM misc Use 1 each 4 (Four) Times a Day Before Meals & at Bedtime. 400 each 3    levothyroxine (Synthroid) 100 MCG tablet Take 1 tablet by mouth Every Morning. 90 tablet 1    losartan (COZAAR) 100 MG tablet Take 1 tablet by mouth Daily.      meclizine (ANTIVERT) 25 MG tablet Take 1 tablet by mouth 3 (Three) Times a Day As Needed for Dizziness. 30 tablet 0    metoprolol succinate XL (TOPROL-XL) 100 MG 24 hr tablet TAKE 1 TABLET BY MOUTH ONCE DAILY 30 tablet 10    omeprazole (priLOSEC) 40 MG capsule TAKE 1 CAPSULE BY MOUTH DAILY. 90 capsule 1    ondansetron ODT (ZOFRAN-ODT) 4 MG disintegrating tablet PLACE 1 TABLET ON TONGUE AND ALLOW TO DISSOLVE EVERY 8 HOURS AS NEEDED FOR NAUSEA AND VOMITING 30 tablet 10    pregabalin (LYRICA) 100 MG capsule TAKE ONE (1) CAPSULE BY MOUTH TWICE DAILY 60 capsule 0    Semaglutide,0.25 or 0.5MG/DOS, (Ozempic, 0.25 or 0.5 MG/DOSE,) 2 MG/3ML solution pen-injector Inject 0.5 mg under the skin into the appropriate area as directed 1 (One) Time Per Week. 9 mL 1    aspirin 81 MG EC tablet Take 1 tablet by mouth Daily.  (Patient not taking: Reported on 2/4/2025)      Blood Glucose Monitoring Suppl (Accu-Chek Guide) w/Device kit See Admin Instructions. (Patient not taking: Reported on 2/4/2025)      Continuous Blood Gluc  (Dexcom G6 ) device Use 1 Device Daily. Use to check BS (Patient not taking: Reported on 2/4/2025) 1 each 0    Continuous Glucose Transmitter (Dexcom G6 Transmitter) misc Use 1 each Every 3 (Three) Months. USE 1 TRANSMITTER EVERY 3 MONTHS (Patient not taking: Reported on 2/4/2025) 1 each 0    fluticasone (FLONASE) 50 MCG/ACT nasal spray Administer 2 sprays into the nostril(s) as directed by provider Daily. (Patient not taking: Reported on 2/4/2025)      glucose blood (Accu-Chek Guide) test strip USE TO TEST BLOOD SUGAR 4 TIMES DAILY AT MEALS AND AT BEDTIME (Patient not taking: Reported on 2/4/2025) 100 each 10    pantoprazole (PROTONIX) 40 MG EC tablet Take 1 tablet by mouth 2 (Two) Times a Day. (Patient not taking: Reported on 2/4/2025)      sulfamethoxazole-trimethoprim (BACTRIM DS,SEPTRA DS) 800-160 MG per tablet Take 1 tablet by mouth. (Patient not taking: Reported on 2/4/2025)       No facility-administered medications prior to visit.     Opioid medication/s are on active medication list.  and I have evaluated his active treatment plan and pain score trends (see table).  Vitals:    02/04/25 1035   PainSc:   6   PainLoc: Shoulder     I have reviewed the chart for potential of high risk medication and harmful drug interactions in the elderly.        Aspirin is on active medication list. Aspirin use is indicated based on review of current medical condition/s. Pros and cons of this therapy have been discussed today. Benefits of this medication outweigh potential harm.  Patient has been encouraged to continue taking this medication.  .      Patient Active Problem List   Diagnosis    Coronary artery disease involving native coronary artery of native heart without angina pectoris    Stage 3a chronic  kidney disease    Degenerative joint disease    Major depressive disorder, recurrent episode, severe    Diabetic peripheral neuropathy    Type 2 diabetes mellitus with hyperglycemia, with long-term current use of insulin    Encounter for annual wellness exam in Medicare patient    Fatigue    Foot pain, right    Ganglion cyst    GERD without esophagitis    History of amputation of hallux    History of pulmonary embolism    Mixed hyperlipidemia    Hypothyroidism    Obstructive sleep apnea syndrome    Palpitations    Peripheral vascular disease    Subacromial bursitis of right shoulder joint    Vitamin D deficiency    Obesity (BMI 30-39.9)    Encounter for other orthopedic aftercare    Other ossification of muscle, unspecified site    History of CVA (cerebrovascular accident)    Type 2 diabetes mellitus with peripheral vascular disease    Depression with suicidal ideation    Acute encephalopathy    Acute tension-type headache    S/P BKA (below knee amputation), right    Primary hypertension    Cellulitis of great toe of left foot    Anemia of chronic disease    Cellulitis of left lower extremity    Cellulitis of right lower extremity    Cervical radiculopathy    Lumbar spondylosis    Neck pain    Dizziness    Constipation    Diaphragmatic hernia without obstruction or gangrene    Eosinophilic esophagitis    Other pulmonary embolism without acute cor pulmonale    Esophageal obstruction    Other specified disease of esophagus    Polyp of colon    Abdominal pain    Nausea, vomiting and diarrhea    Dysphagia    Severe malnutrition    Cerebrovascular accident    Depression    Cervical spondylosis without myelopathy    Cubital tunnel syndrome on right    Carpal tunnel syndrome of right wrist    Diabetic nephropathy associated with type 2 diabetes mellitus    Diabetic retinopathy associated with type 2 diabetes mellitus    Overweight     Advance Care Planning Advance Directive is not on file.  ACP discussion was held with the  "patient during this visit. Patient does not have an advance directive, information provided.            Objective   Vitals:    25 1035   BP: 145/77   BP Location: Right arm   Patient Position: Sitting   Cuff Size: Adult   Pulse: 74   Resp: 18   Temp: 98.7 °F (37.1 °C)   TempSrc: Temporal   SpO2: 96%   Weight: 85.5 kg (188 lb 6.4 oz)   Height: 172.7 cm (67.99\")   PainSc:   6   PainLoc: Shoulder       Estimated body mass index is 28.65 kg/m² as calculated from the following:    Height as of this encounter: 172.7 cm (67.99\").    Weight as of this encounter: 85.5 kg (188 lb 6.4 oz).                Does the patient have evidence of cognitive impairment? No                                                                                               Health  Risk Assessment    Smoking Status:  Social History     Tobacco Use   Smoking Status Never   Smokeless Tobacco Never     Alcohol Consumption:  Social History     Substance and Sexual Activity   Alcohol Use Yes    Comment: OCCASIONAL       Fall Risk Screen  STEADI Fall Risk Assessment was completed, and patient is at LOW risk for falls.Assessment completed on:2025    Depression Screening   Little interest or pleasure in doing things? Not at all   Feeling down, depressed, or hopeless? Several days   PHQ-2 Total Score 1      Health Habits and Functional and Cognitive Screenin/4/2025    10:50 AM   Functional & Cognitive Status   Who do you live with? Other   Have you been bothered in the last four weeks by sexual problems? No   Do you have difficulty concentrating, remembering or making decisions? Yes           Age-appropriate Screening Schedule:  Refer to the list below for future screening recommendations based on patient's age, sex and/or medical conditions. Orders for these recommended tests are listed in the plan section. The patient has been provided with a written plan.    Health Maintenance List  Health Maintenance   Topic Date Due    Pneumococcal " Vaccine 0-64 (2 of 2 - PCV) 01/19/2018    HEPATITIS C SCREENING  Never done    DIABETIC EYE EXAM  01/01/2024    HEMOGLOBIN A1C  04/22/2025    BMI FOLLOWUP  10/08/2025    LIPID PANEL  10/22/2025    ANNUAL WELLNESS VISIT  02/04/2026    TDAP/TD VACCINES (2 - Tdap) 07/21/2028    COLORECTAL CANCER SCREENING  12/16/2030    COVID-19 Vaccine  Completed    INFLUENZA VACCINE  Completed    ZOSTER VACCINE  Completed    URINE MICROALBUMIN  Discontinued                                                                                                                                                CMS Preventative Services Quick Reference  Risk Factors Identified During Encounter  Chronic Pain:  Continue seeing specialists  Vision Screening Recommended    The above risks/problems have been discussed with the patient.  Pertinent information has been shared with the patient in the After Visit Summary.  An After Visit Summary and PPPS were made available to the patient.    Follow Up:   Next Medicare Wellness visit to be scheduled in 1 year.     Assessment & Plan  Medicare annual wellness visit, subsequent         Mild episode of recurrent major depressive disorder           Cervical radiculopathy         S/P BKA (below knee amputation), right         Diagnoses and all orders for this visit:    1. Medicare annual wellness visit, subsequent (Primary)  Comments:  Work on staying active with regular exercise at least 150 minutes per week and healthy diet.    2. Mild episode of recurrent major depressive disorder  Comments:  Try stopping wellbutrin since unsure if helping or making symptoms worse. Take dose every other day for 1 week and then may stop.  Assessment & Plan:               3. Cervical radiculopathy  Comments:  Continue to follow with specialists.    4. S/P BKA (below knee amputation), right  Comments:  Still having pain despite lyrica but only taking 1 at night due to it being too sedating during the day.  Will try taking 2 at  betime.    Other orders  -     COGNITIVE ASSESSMENT SCAN       Diagnoses and all orders for this visit:    1. Medicare annual wellness visit, subsequent (Primary)  Comments:  Work on staying active with regular exercise at least 150 minutes per week and healthy diet.    2. Mild episode of recurrent major depressive disorder  Comments:  Try stopping wellbutrin since unsure if helping or making symptoms worse. Take dose every other day for 1 week and then may stop.  Assessment & Plan:               3. Cervical radiculopathy  Comments:  Continue to follow with specialists.    4. S/P BKA (below knee amputation), right  Comments:  Still having pain despite lyrica but only taking 1 at night due to it being too sedating during the day.  Will try taking 2 at betime.    Other orders  -     COGNITIVE ASSESSMENT SCAN          Follow Up:   No follow-ups on file.

## 2025-02-11 ENCOUNTER — LAB (OUTPATIENT)
Dept: LAB | Facility: HOSPITAL | Age: 60
End: 2025-02-11
Payer: MEDICARE

## 2025-02-11 DIAGNOSIS — E11.65 TYPE 2 DIABETES MELLITUS WITH HYPERGLYCEMIA, WITH LONG-TERM CURRENT USE OF INSULIN: ICD-10-CM

## 2025-02-11 DIAGNOSIS — Z79.4 TYPE 2 DIABETES MELLITUS WITH HYPERGLYCEMIA, WITH LONG-TERM CURRENT USE OF INSULIN: ICD-10-CM

## 2025-02-11 LAB
ALBUMIN SERPL-MCNC: 4.3 G/DL (ref 3.5–5.2)
ALBUMIN/GLOB SERPL: 1.6 G/DL
ALP SERPL-CCNC: 167 U/L (ref 39–117)
ALT SERPL W P-5'-P-CCNC: 23 U/L (ref 1–41)
ANION GAP SERPL CALCULATED.3IONS-SCNC: 11.2 MMOL/L (ref 5–15)
AST SERPL-CCNC: 25 U/L (ref 1–40)
BILIRUB SERPL-MCNC: 1.2 MG/DL (ref 0–1.2)
BUN SERPL-MCNC: 15 MG/DL (ref 8–23)
BUN/CREAT SERPL: 11.7 (ref 7–25)
CALCIUM SPEC-SCNC: 9.7 MG/DL (ref 8.6–10.5)
CHLORIDE SERPL-SCNC: 102 MMOL/L (ref 98–107)
CO2 SERPL-SCNC: 27.8 MMOL/L (ref 22–29)
CREAT SERPL-MCNC: 1.28 MG/DL (ref 0.76–1.27)
EGFRCR SERPLBLD CKD-EPI 2021: 64.1 ML/MIN/1.73
GLOBULIN UR ELPH-MCNC: 2.7 GM/DL
GLUCOSE SERPL-MCNC: 240 MG/DL (ref 65–99)
HBA1C MFR BLD: 9.07 % (ref 4.8–5.6)
POTASSIUM SERPL-SCNC: 4 MMOL/L (ref 3.5–5.2)
PROT SERPL-MCNC: 7 G/DL (ref 6–8.5)
SODIUM SERPL-SCNC: 141 MMOL/L (ref 136–145)

## 2025-02-11 PROCEDURE — 80053 COMPREHEN METABOLIC PANEL: CPT

## 2025-02-11 PROCEDURE — 83036 HEMOGLOBIN GLYCOSYLATED A1C: CPT

## 2025-02-11 PROCEDURE — 36415 COLL VENOUS BLD VENIPUNCTURE: CPT

## 2025-02-18 RX ORDER — SEMAGLUTIDE 0.68 MG/ML
INJECTION, SOLUTION SUBCUTANEOUS
Qty: 3 ML | Refills: 3 | Status: SHIPPED | OUTPATIENT
Start: 2025-02-18

## 2025-02-25 DIAGNOSIS — E78.2 MIXED HYPERLIPIDEMIA: Chronic | ICD-10-CM

## 2025-02-25 DIAGNOSIS — E03.9 HYPOTHYROIDISM, UNSPECIFIED TYPE: ICD-10-CM

## 2025-02-25 DIAGNOSIS — M54.12 CERVICAL RADICULOPATHY: ICD-10-CM

## 2025-02-25 RX ORDER — LEVOTHYROXINE SODIUM 100 UG/1
100 TABLET ORAL
Qty: 30 TABLET | Refills: 0 | Status: SHIPPED | OUTPATIENT
Start: 2025-02-25

## 2025-02-25 RX ORDER — ATORVASTATIN CALCIUM 80 MG/1
80 TABLET, FILM COATED ORAL DAILY
Qty: 30 TABLET | Refills: 0 | Status: SHIPPED | OUTPATIENT
Start: 2025-02-25

## 2025-02-25 RX ORDER — PREGABALIN 100 MG/1
CAPSULE ORAL
Qty: 60 CAPSULE | Refills: 0 | Status: SHIPPED | OUTPATIENT
Start: 2025-02-25

## 2025-02-27 ENCOUNTER — HOSPITAL ENCOUNTER (OUTPATIENT)
Dept: CARDIOLOGY | Facility: HOSPITAL | Age: 60
Discharge: HOME | End: 2025-02-27
Admitting: SURGERY
Payer: MEDICARE

## 2025-02-27 DIAGNOSIS — G54.0 TOS (THORACIC OUTLET SYNDROME): ICD-10-CM

## 2025-02-27 LAB
BH CV UPPER ARTERIAL LEFT 2ND DIGIT SYS MAX: 126
BH CV UPPER ARTERIAL LEFT WBI RATIO: 1.01
BH CV UPPER ARTERIAL RIGHT 2ND DIGIT SYS MAX: 124
BH CV UPPER ARTERIAL RIGHT WBI RATIO: 1.01
UPPER ARTERIAL LEFT ARM BRACHIAL SYS MAX: 134
UPPER ARTERIAL LEFT ARM RADIAL SYS MAX: 135
UPPER ARTERIAL LEFT ARM ULNAR SYS MAX: 132
UPPER ARTERIAL RIGHT ARM BRACHIAL SYS MAX: 128
UPPER ARTERIAL RIGHT ARM RADIAL SYS MAX: 136
UPPER ARTERIAL RIGHT ARM ULNAR SYS MAX: 135

## 2025-02-27 PROCEDURE — 93923 UPR/LXTR ART STDY 3+ LVLS: CPT

## 2025-03-05 ENCOUNTER — TELEPHONE (OUTPATIENT)
Dept: ENDOCRINOLOGY | Facility: CLINIC | Age: 60
End: 2025-03-05
Payer: MEDICARE

## 2025-03-05 NOTE — TELEPHONE ENCOUNTER
Pt is asking if you want labs drawn before June 25th appt and if so we can add to chart. Please advise.

## 2025-03-10 DIAGNOSIS — E11.65 TYPE 2 DIABETES MELLITUS WITH HYPERGLYCEMIA, WITH LONG-TERM CURRENT USE OF INSULIN: Primary | ICD-10-CM

## 2025-03-10 DIAGNOSIS — E11.65 UNCONTROLLED TYPE 2 DIABETES MELLITUS WITH HYPERGLYCEMIA: ICD-10-CM

## 2025-03-10 DIAGNOSIS — Z79.4 TYPE 2 DIABETES MELLITUS WITH HYPERGLYCEMIA, WITH LONG-TERM CURRENT USE OF INSULIN: ICD-10-CM

## 2025-03-10 DIAGNOSIS — Z79.4 TYPE 2 DIABETES MELLITUS WITH HYPERGLYCEMIA, WITH LONG-TERM CURRENT USE OF INSULIN: Primary | ICD-10-CM

## 2025-03-10 DIAGNOSIS — E11.65 TYPE 2 DIABETES MELLITUS WITH HYPERGLYCEMIA, WITH LONG-TERM CURRENT USE OF INSULIN: ICD-10-CM

## 2025-03-10 RX ORDER — INSULIN GLARGINE 100 [IU]/ML
40 INJECTION, SOLUTION SUBCUTANEOUS NIGHTLY
Qty: 45 ML | Refills: 2 | Status: SHIPPED | OUTPATIENT
Start: 2025-03-10 | End: 2026-02-11

## 2025-03-10 RX ORDER — PROCHLORPERAZINE 25 MG/1
1 SUPPOSITORY RECTAL
Qty: 1 EACH | Refills: 2 | Status: SHIPPED | OUTPATIENT
Start: 2025-03-10

## 2025-03-10 RX ORDER — PROCHLORPERAZINE 25 MG/1
SUPPOSITORY RECTAL
Qty: 9 EACH | Refills: 1 | Status: SHIPPED | OUTPATIENT
Start: 2025-03-10

## 2025-03-10 NOTE — PROGRESS NOTES
"Chief Complaint  Sleep Apnea    Subjective          Kuldeep Adhikari presents to [facility] NEUROLOGY  History of Present Illness  GENO f/u patient states he is benefiting from pap therapy, he uses a FFM and goes through [employer] for supplies.        Sleep testing history:    On NPSG at [facility] , 12/7/21 patient had Mild obstructive sleep apnea syndrome with apnea-hypopnea index of 9.2 per sleep hour, minimum SpO2 of 79% but severe supine at 30 per hour     PAP download:  The patient is on CPAP therapy at 7-10 cm/H2O.  average usage of 6 hours 18 minutes. AHI down to 0.9 .  Average pressures 7.5.  95% leak 42.     The patient's hypersomnia has resolved       [place] Sleepiness Scale:  Sitting and reading JS [place] Sleepiness: 2 WatchingTV JS [place] Sleepiness: 2  Sitting, inactive, in a public place JS [place] Sleepiness: 1  As a passenger in a car for 1 hour w/o a break  JS [place] Sleepiness: 3   Lying down to rest in the afternoon  JS [place] Sleepiness: 0  Sitting and talking to someone  JS [place] Sleepiness: 0  Sitting quietly after a lunch  JS [place] Sleepiness: 0  In a car, while stopped for traffic or a light  JS [place] Sleepiness: 0  Total 6    Review of Systems   Constitutional:  Negative for fatigue.   Eyes:  Positive for visual disturbance.   Respiratory:  Negative for apnea and shortness of breath.    Neurological:  Negative for seizures.   Psychiatric/Behavioral:  Negative for sleep disturbance.    All other systems reviewed and are negative.        Objective   Vital Signs:   /75   Pulse 76   Ht 172.7 cm (67.99\")   Wt 86.2 kg (190 lb)   BMI 28.90 kg/m²     Physical Exam  Vitals reviewed.   Constitutional:       Appearance: Normal appearance.   HENT:      Nose: Nose normal.   Pulmonary:      Effort: Pulmonary effort is normal. No respiratory distress.   Neurological:      Mental Status: He is alert and oriented to person, place, and time.   Psychiatric:         Mood and Affect: " Mood normal.      Result Review :                 Assessment and Plan    Diagnoses and all orders for this visit:    1. Obstructive sleep apnea syndrome (Primary)      Continue CPAP at 7-10  The patient is compliant with and benefiting from PAP therapy.      Follow Up   Return in about 1 year (around 3/11/2026).    Patient was given instructions and counseling regarding his condition or for health maintenance advice. Please see specific information pulled into the AVS if appropriate.       This document has been electronically signed by Joseph Seipel, MD on March 11, 2025 09:37 EDT

## 2025-03-11 ENCOUNTER — OFFICE VISIT (OUTPATIENT)
Dept: NEUROLOGY | Facility: CLINIC | Age: 60
End: 2025-03-11
Payer: MEDICARE

## 2025-03-11 VITALS
DIASTOLIC BLOOD PRESSURE: 75 MMHG | SYSTOLIC BLOOD PRESSURE: 128 MMHG | BODY MASS INDEX: 28.79 KG/M2 | HEART RATE: 76 BPM | WEIGHT: 190 LBS | HEIGHT: 68 IN

## 2025-03-11 DIAGNOSIS — G47.33 OBSTRUCTIVE SLEEP APNEA SYNDROME: Primary | Chronic | ICD-10-CM

## 2025-03-11 PROCEDURE — 3078F DIAST BP <80 MM HG: CPT | Performed by: PSYCHIATRY & NEUROLOGY

## 2025-03-11 PROCEDURE — 99213 OFFICE O/P EST LOW 20 MIN: CPT | Performed by: PSYCHIATRY & NEUROLOGY

## 2025-03-11 PROCEDURE — 1160F RVW MEDS BY RX/DR IN RCRD: CPT | Performed by: PSYCHIATRY & NEUROLOGY

## 2025-03-11 PROCEDURE — 3074F SYST BP LT 130 MM HG: CPT | Performed by: PSYCHIATRY & NEUROLOGY

## 2025-03-11 PROCEDURE — 1159F MED LIST DOCD IN RCRD: CPT | Performed by: PSYCHIATRY & NEUROLOGY

## 2025-03-11 RX ORDER — MOXIFLOXACIN 5 MG/ML
SOLUTION/ DROPS OPHTHALMIC
COMMUNITY
Start: 2025-03-05

## 2025-03-11 RX ORDER — PREDNISOLONE ACETATE 10 MG/ML
SUSPENSION/ DROPS OPHTHALMIC
COMMUNITY
Start: 2025-03-05

## 2025-03-11 RX ORDER — ATROPINE SULFATE 10 MG/ML
SOLUTION/ DROPS OPHTHALMIC
COMMUNITY
Start: 2025-03-07

## 2025-03-11 RX ORDER — NEOMYCIN SULFATE, POLYMYXIN B SULFATE AND BACITRACIN ZINC 3.5; 10000; 4 MG/G; [USP'U]/G; [USP'U]/G
OINTMENT OPHTHALMIC
COMMUNITY
Start: 2025-03-07

## 2025-03-17 RX ORDER — SEMAGLUTIDE 0.68 MG/ML
INJECTION, SOLUTION SUBCUTANEOUS
Qty: 3 ML | Refills: 10 | Status: SHIPPED | OUTPATIENT
Start: 2025-03-17

## 2025-03-26 DIAGNOSIS — E03.9 HYPOTHYROIDISM, UNSPECIFIED TYPE: ICD-10-CM

## 2025-03-26 DIAGNOSIS — E78.2 MIXED HYPERLIPIDEMIA: Chronic | ICD-10-CM

## 2025-03-26 DIAGNOSIS — M54.12 CERVICAL RADICULOPATHY: ICD-10-CM

## 2025-03-26 RX ORDER — ATORVASTATIN CALCIUM 80 MG/1
80 TABLET, FILM COATED ORAL DAILY
Qty: 30 TABLET | Refills: 10 | Status: SHIPPED | OUTPATIENT
Start: 2025-03-26

## 2025-03-26 RX ORDER — LEVOTHYROXINE SODIUM 100 UG/1
100 TABLET ORAL
Qty: 30 TABLET | Refills: 10 | Status: SHIPPED | OUTPATIENT
Start: 2025-03-26

## 2025-03-26 RX ORDER — PREGABALIN 100 MG/1
100 CAPSULE ORAL EVERY 12 HOURS SCHEDULED
Qty: 60 CAPSULE | Refills: 0 | Status: SHIPPED | OUTPATIENT
Start: 2025-03-26

## 2025-04-14 ENCOUNTER — OFFICE VISIT (OUTPATIENT)
Dept: PAIN MEDICINE | Facility: CLINIC | Age: 60
End: 2025-04-14
Payer: MEDICARE

## 2025-04-14 VITALS
OXYGEN SATURATION: 94 % | RESPIRATION RATE: 16 BRPM | DIASTOLIC BLOOD PRESSURE: 87 MMHG | BODY MASS INDEX: 29.35 KG/M2 | WEIGHT: 193 LBS | HEART RATE: 73 BPM | SYSTOLIC BLOOD PRESSURE: 141 MMHG

## 2025-04-14 DIAGNOSIS — M47.812 CERVICAL SPONDYLOSIS WITHOUT MYELOPATHY: ICD-10-CM

## 2025-04-14 DIAGNOSIS — G54.0 THORACIC OUTLET SYNDROME: Primary | ICD-10-CM

## 2025-04-14 DIAGNOSIS — M79.18 MYOFASCIAL PAIN SYNDROME: ICD-10-CM

## 2025-04-14 PROCEDURE — 3079F DIAST BP 80-89 MM HG: CPT | Performed by: ANESTHESIOLOGY

## 2025-04-14 PROCEDURE — 99214 OFFICE O/P EST MOD 30 MIN: CPT | Performed by: ANESTHESIOLOGY

## 2025-04-14 PROCEDURE — 1160F RVW MEDS BY RX/DR IN RCRD: CPT | Performed by: ANESTHESIOLOGY

## 2025-04-14 PROCEDURE — 3077F SYST BP >= 140 MM HG: CPT | Performed by: ANESTHESIOLOGY

## 2025-04-14 PROCEDURE — 1159F MED LIST DOCD IN RCRD: CPT | Performed by: ANESTHESIOLOGY

## 2025-04-14 PROCEDURE — G2211 COMPLEX E/M VISIT ADD ON: HCPCS | Performed by: ANESTHESIOLOGY

## 2025-04-14 PROCEDURE — 1125F AMNT PAIN NOTED PAIN PRSNT: CPT | Performed by: ANESTHESIOLOGY

## 2025-04-14 NOTE — PROGRESS NOTES
Subjective   CC neck pain, right shoulder right arm pain  Kuldeep Adhikari is a 60 y.o. male with history of DM, right BKA hx of PE, on Eliquis, chronic neck pain here for f/u.    Last seen several months ago.  Had marginal relief with cervical SHARI or cervical RFA.  Had follow-up with neurosurgery, no surgical invention was recommended.  Suspected thoracic outlet syndrome, was referred to thoracic surgery.  He is now referred back for scalene muscle block for diagnosis evaluation of thoracic outlet syndrome.  In the interim also had right carpal tunnel surgery with improvement in right hand neuropathy.  Continues to have right-sided neck pain radiating to shoulder right arm constant worse with activity.  Chronic bilateral occipital/cervicogenic headaches.    Cervical SHARI last visit reports marginal relief.  Also had marginal relief with cervical RFA.  Continues to have neck pain mostly axial but associated with cervicogenic headaches and bilateral shoulder pain.  Pain interfering with ADL.    Chronic axial neck pain mostly right-sided and occasionally radiating to right shoulder occasionally right arm and to last digits, constant but worse with neck movement or activity.  Denies balance issues, bladder bowel continence  Tried physical therapy without relief.  Tried anti-inflammatory gabapentin without relief  EMG/NCS showed cubital tunnel syndromes ulnar neuropathy, no evidence of cervical radiculopathy.  Seen neurosurgery, no surgical intervention recommended, referred to try injections    Imaging reviewed  C-spine MRI 2023: C6-C7: There is a right paramedian disc protrusion superimposed upon a mild broad-based disc bulge, and there is mild bilateral uncovertebral and facet arthropathy. There is mild bilateral neural foraminal narrowing, and there is mild spinal canal narrowing.Visualized marrow signal is unremarkable. Vertebral body heights are normal. Cervical discs are somewhat desiccated. No cervical cord signal      Pain Assessment   Location of Pain:  neck pain, right arm joint  Description of Pain: Dull/Aching, Throbbing, Stabbing  Previous Pain Rating :5  Current Pain Ratin  Aggravating Factors: Activity  Alleviating Factors: Rest, Medication  Pain onset over 12 weeks ago  Interferes with ADL's.   [place] back pain disability scale scanned in chart    PEG Assessment   What number best describes your pain on average in the past week?8  What number best describes how, during the past week, pain has interfered with your enjoyment of life?5  What number best describes how, during the past week, pain has interfered with your general activity?  6    The following portions of the patient's history were reviewed and updated as appropriate: allergies, current medications, past family history, past medical history, past social history, past surgical history and problem list.    Review of Systems   Musculoskeletal:  Positive for myalgias and neck pain.   All other systems reviewed and are negative.      Objective   Physical Exam  Vitals reviewed.   Constitutional:       General: He is not in acute distress.  Pulmonary:      Effort: Pulmonary effort is normal.   Musculoskeletal:      Cervical back: Spasms and tenderness present. Decreased range of motion.      Lumbar back: Tenderness present. Decreased range of motion.       /87   Pulse 73   Resp 16   Wt 87.5 kg (193 lb)   SpO2 94%   BMI 29.35 kg/m²     PHQ 9 on chart  Opioid risk tool low risk      Assessment & Plan   Diagnoses and all orders for this visit:    1. Thoracic outlet syndrome (Primary)  -     Nerve Root Block    2. Cervical spondylosis without myelopathy    3. Myofascial pain syndrome      Summary  Kuldeep Adhikari is a 60 y.o. male with history of DM, right BKA, hx of PE, on Eliquis chronic neck pain here for initial evaluation.  Referred by neurosurgery.  Chronic neck pain from DDD spondylosis with radicular pain.  Tried physical therapy without relief.   Tried anti-inflammatory gabapentin without relief  EMG/NCS showed cubital tunnel syndromes ulnar neuropathy, no evidence of cervical radiculopathy.  Seen neurosurgery, no surgical intervention recommended, referred to try injections    Last seen several months ago.  Had marginal relief with cervical SHARI or cervical RFA.  Had follow-up with neurosurgery, no surgical invention was recommended.  Suspected thoracic outlet syndrome, was referred to thoracic surgery.  He is now referred back for scalene muscle block for diagnosis evaluation of thoracic outlet syndrome.  Will schedule for right scalene muscle block with ultrasound guidance.  Risks and benefits discussed.  Needs to be off Eliquis 3 days    Will obtain MRI/MRA right upper extremity/brachial plexus results from [facility].    In the interim also had right carpal tunnel surgery with improvement in right hand neuropathy.    Continues to have right-sided neck pain radiating to shoulder right arm constant worse with activity.  Chronic bilateral occipital/cervicogenic headaches.      RTC as needed or for procedure        Note is dictated utilizing voice recognition software. Unfortunately this leads to occasional typographical errors. I apologize in advance if the situation occurs. If questions occur please do not hesitate to call our office.

## 2025-04-22 ENCOUNTER — OFFICE (AMBULATORY)
Dept: URBAN - METROPOLITAN AREA CLINIC 64 | Facility: CLINIC | Age: 60
End: 2025-04-22
Payer: MEDICARE

## 2025-04-22 VITALS
HEART RATE: 70 BPM | DIASTOLIC BLOOD PRESSURE: 81 MMHG | HEIGHT: 69 IN | SYSTOLIC BLOOD PRESSURE: 132 MMHG | WEIGHT: 195 LBS

## 2025-04-22 DIAGNOSIS — R13.10 DYSPHAGIA, UNSPECIFIED: ICD-10-CM

## 2025-04-22 DIAGNOSIS — K21.00 GASTRO-ESOPHAGEAL REFLUX DISEASE WITH ESOPHAGITIS, WITHOUT B: ICD-10-CM

## 2025-04-22 DIAGNOSIS — K59.00 CONSTIPATION, UNSPECIFIED: ICD-10-CM

## 2025-04-22 DIAGNOSIS — K22.2 ESOPHAGEAL OBSTRUCTION: ICD-10-CM

## 2025-04-22 PROCEDURE — 99213 OFFICE O/P EST LOW 20 MIN: CPT | Performed by: NURSE PRACTITIONER

## 2025-04-24 DIAGNOSIS — M54.12 CERVICAL RADICULOPATHY: ICD-10-CM

## 2025-04-24 RX ORDER — ONDANSETRON 4 MG/1
TABLET, ORALLY DISINTEGRATING ORAL
Qty: 30 TABLET | Refills: 0 | Status: SHIPPED | OUTPATIENT
Start: 2025-04-24

## 2025-04-24 RX ORDER — PREGABALIN 100 MG/1
100 CAPSULE ORAL EVERY 12 HOURS SCHEDULED
Qty: 60 CAPSULE | Refills: 1 | Status: SHIPPED | OUTPATIENT
Start: 2025-04-24

## 2025-04-25 RX ORDER — GLUCAGON INJECTION, SOLUTION 1 MG/.2ML
INJECTION, SOLUTION SUBCUTANEOUS
Qty: 0.4 ML | Refills: 11 | Status: SHIPPED | OUTPATIENT
Start: 2025-04-25

## 2025-04-28 ENCOUNTER — HOSPITAL ENCOUNTER (OUTPATIENT)
Dept: PAIN MEDICINE | Facility: HOSPITAL | Age: 60
Discharge: HOME | End: 2025-04-28
Payer: MEDICARE

## 2025-04-28 VITALS
OXYGEN SATURATION: 97 % | WEIGHT: 193 LBS | BODY MASS INDEX: 29.25 KG/M2 | HEIGHT: 68 IN | DIASTOLIC BLOOD PRESSURE: 94 MMHG | TEMPERATURE: 96.8 F | HEART RATE: 65 BPM | RESPIRATION RATE: 16 BRPM | SYSTOLIC BLOOD PRESSURE: 135 MMHG

## 2025-04-28 DIAGNOSIS — G54.0 THORACIC OUTLET SYNDROME: Primary | ICD-10-CM

## 2025-04-28 DIAGNOSIS — R52 PAIN: ICD-10-CM

## 2025-04-28 PROCEDURE — 64415 NJX AA&/STRD BRCH PLXS IMG: CPT | Performed by: ANESTHESIOLOGY

## 2025-04-28 PROCEDURE — 25010000002 METHYLPREDNISOLONE PER 40 MG: Performed by: ANESTHESIOLOGY

## 2025-04-28 PROCEDURE — 25010000002 BUPIVACAINE (PF) 0.25 % SOLUTION: Performed by: ANESTHESIOLOGY

## 2025-04-28 PROCEDURE — 76942 ECHO GUIDE FOR BIOPSY: CPT

## 2025-04-28 RX ORDER — BUPIVACAINE HYDROCHLORIDE 2.5 MG/ML
10 INJECTION, SOLUTION EPIDURAL; INFILTRATION; INTRACAUDAL; PERINEURAL ONCE
Status: COMPLETED | OUTPATIENT
Start: 2025-04-28 | End: 2025-04-28

## 2025-04-28 RX ORDER — METHYLPREDNISOLONE ACETATE 40 MG/ML
40 INJECTION, SUSPENSION INTRA-ARTICULAR; INTRALESIONAL; INTRAMUSCULAR; SOFT TISSUE ONCE
Status: COMPLETED | OUTPATIENT
Start: 2025-04-28 | End: 2025-04-28

## 2025-04-28 RX ADMIN — BUPIVACAINE HYDROCHLORIDE 10 ML: 2.5 INJECTION, SOLUTION EPIDURAL; INFILTRATION; INTRACAUDAL; PERINEURAL at 09:49

## 2025-04-28 RX ADMIN — METHYLPREDNISOLONE ACETATE 40 MG: 40 INJECTION, SUSPENSION INTRA-ARTICULAR; INTRALESIONAL; INTRAMUSCULAR; INTRASYNOVIAL; SOFT TISSUE at 09:49

## 2025-04-28 NOTE — DISCHARGE INSTRUCTIONS
SCALENE MUSCLE INJECTION, Care After  This sheet gives you information about how to care for yourself after your procedure. Your health care provider may also give you more specific instructions. If you have problems or questions, contact your health care provider.  What can I expect after the procedure?  After the procedure, it is common to have:  Neck soreness or tenderness.  Numbness in your shoulder, upper arm, and some fingers up to 12 hours.  Weakness in your shoulder and arm muscles up to 12 hours.  Possible feeling short of air up to 12 hours.  Feeling and strength in your shoulder, arm, and fingers should return to normal within hours after your procedure.  Follow these instructions at home:  Medicines  Take over-the-counter and prescription medicines only as told by your health care provider.  If you have a sling:  Wear it as told by your health care provider. Remove it only as told by your health care provider.  Loosen it if your fingers tingle, become numb, or turn cold and blue.  Make sure that your entire arm, including your wrist, is supported. Do not let your wrist dangle over the end of the sling.  Keep it clean.  If the sling is not waterproof:  Do not let it get wet.  Cover it with a watertight covering when you take a bath or shower.  Bathing  Do not take baths, swim, shower or use a hot tub for 24 hours.     Injection site care    Keep your bandaid dry for 24 hours then you may remove it and leave it off.  Check your injection site every day for signs of infection. Check for:  Redness, swelling, or pain.  Fluid or blood.  Warmth.  Pus or a bad smell.  Activity      Do not lift anything that is heavier than 10 lb (4.5 kg), or the limit that you are told, until your health care provider says that it is safe.  Do not drive or use machinery until you have fully recovered. Ask your health care provider when it is safe to drive.  Rest as told by your health care provider. This will help you recover  faster.  Be very careful until you have regained strength and feeling.  Return to your normal activities when full sensation has returned.  General instructions  Have a responsible adult stay with you until the feeling in your shoulder, arm, and fingers returns to normal.  Do not expose your arm or shoulder to very cold or very hot temperatures until you have full feeling back.  Keep all follow-up visits. This is important.  Contact a health care provider if:  You have a fever or chills.  You have any of these signs of infection:  Redness, swelling, or pain around your injection site.  Fluid or blood coming from your injection site.  Warmth coming from your injection site.  Pus or a bad smell coming from your injection site.  You have pain that is poorly controlled with the block or with pain medicine.  You have numbness, tingling, or weakness in your shoulder or arm that lasts for more than 1 week.  Get help right away if you:  Have severe pain.  Lose or do not regain strength and feeling in your arm even after the nerve block medicine has stopped.  Have trouble breathing.  These symptoms may represent a serious problem that is an emergency. Do not wait to see if the symptoms will go away. Get medical help right away. Call your local emergency services (911 in the [place] Do not drive yourself to the hospital.  Summary  After the procedure, it is common to have neck soreness, weakness in the shoulder and arm muscles, and numbness in the upper arm, shoulder, and some fingers.  Follow instructions from your health care provider about how to take care of your nerve block injection site.  Keep all follow-up visits.  This information is not intended to replace advice given to you by your health care provider. Make sure you discuss any questions you have with your health care provider.  Document Revised: 06/22/2021 Document Reviewed: 06/22/2021  Elsevier Patient Education © 2022 [employer]

## 2025-04-29 ENCOUNTER — TELEPHONE (OUTPATIENT)
Dept: PAIN MEDICINE | Facility: HOSPITAL | Age: 60
End: 2025-04-29
Payer: MEDICARE

## 2025-04-29 NOTE — TELEPHONE ENCOUNTER
Post procedure phone call completed.  Pt states they are doing good and denies questions or concerns. Denies pain.

## 2025-05-07 NOTE — PROCEDURES
"Subjective   CC neck pain  Kuldeep Adhikari is a 60 y.o. male with myofascial pain/possible thoracic outlet syndrome here for right scalenes muscle trigger point injection with ultrasound guidance..  On Eliquis.    Pain Assessment   Location of Pain:  neck pain, joint  Description of Pain: Dull/Aching, Throbbing, Stabbing  Previous Pain Rating :7  Current Pain Ratin  Aggravating Factors: Activity  Alleviating Factors: Rest, Medication  Pain onset over 12 weeks ago  Pain interferes with ADL's  [place] back pain disability scale scanned in chart     The following portions of the patient's history were reviewed and updated as appropriate: allergies, current medications, past family history, past medical history, past social history, past surgical history and problem list.      Review of Systems  As in HPI  Objective   Physical Exam  Vitals reviewed.   Constitutional:       General: He is not in acute distress.  Pulmonary:      Effort: Pulmonary effort is normal.       /94   Pulse 65   Temp 96.8 °F (36 °C) (Skin)   Resp 16   Ht 172.7 cm (68\")   Wt 87.5 kg (193 lb)   SpO2 97%   BMI 29.35 kg/m²       Assessment & Plan    Underwent right scalenes muscle trigger point for ultrasound guidance.  RTC 4-6 weeks or as needed for repeat    DATE OF PROCEDURE: 2025        PREOPERATIVE DIAGNOSIS:  myofascial pain     POSTOPERATIVE DIAGNOSIS: same     PROCEDURE PERFORMED: Right scalenes muscle Trigger Point Injection with ultrasound guidance     PLAN: I have discussed with the patient regarding treatment options, risks, potential benefits and possible follow up treatment plan, we will proceed with a Trigger Point Injection.     Trigger point injections were performed  in right scalene muscle. was performed using ultrasound guidance .  A total of 10 mL of a solution containing 40 mg of Depo-Medrol with 0.25% Bupivicaine  (1 ml per site) was injected in the muscles.  The region was prepped  and  cleaned with " chlorhexidine and draped.  patient tolerated procedure well.  No complication.

## 2025-05-13 RX ORDER — ONDANSETRON 4 MG/1
TABLET, ORALLY DISINTEGRATING ORAL
Qty: 30 TABLET | Refills: 0 | Status: SHIPPED | OUTPATIENT
Start: 2025-05-13

## 2025-05-13 NOTE — TELEPHONE ENCOUNTER
Caller: Kuldeep Adhikari    Relationship: Self    Best call back number: [phone]    Requested Prescriptions:   Requested Prescriptions     Pending Prescriptions Disp Refills    ondansetron ODT (ZOFRAN-ODT) 4 MG disintegrating tablet 30 tablet 0        Pharmacy where request should be sent: [place] [id number] [facility] [phone] [facility] [phone]      Last office visit with prescribing clinician: 10/11/2023   Last telemedicine visit with prescribing clinician: Visit date not found   Next office visit with prescribing clinician: Visit date not found    Does the patient have less than a 3 day supply:  [x] Yes  [] No    Would you like a call back once the refill request has been completed: [] Yes [x] No    If the office needs to give you a call back, can they leave a voicemail: [] Yes [x] No    Yobani Foss Rep   05/13/25 14:13 EDT

## 2025-05-25 RX ORDER — OMEPRAZOLE 40 MG/1
40 CAPSULE, DELAYED RELEASE ORAL DAILY
Qty: 90 CAPSULE | Refills: 1 | Status: SHIPPED | OUTPATIENT
Start: 2025-05-25

## 2025-05-27 RX ORDER — SEMAGLUTIDE 0.68 MG/ML
INJECTION, SOLUTION SUBCUTANEOUS
Qty: 3 ML | Refills: 1 | Status: SHIPPED | OUTPATIENT
Start: 2025-05-27

## 2025-06-18 ENCOUNTER — LAB (OUTPATIENT)
Dept: LAB | Facility: HOSPITAL | Age: 60
End: 2025-06-18
Payer: MEDICARE

## 2025-06-18 PROCEDURE — 80053 COMPREHEN METABOLIC PANEL: CPT | Performed by: INTERNAL MEDICINE

## 2025-06-18 PROCEDURE — 83036 HEMOGLOBIN GLYCOSYLATED A1C: CPT | Performed by: INTERNAL MEDICINE

## 2025-06-24 DIAGNOSIS — M54.12 CERVICAL RADICULOPATHY: ICD-10-CM

## 2025-06-25 ENCOUNTER — OFFICE VISIT (OUTPATIENT)
Dept: ENDOCRINOLOGY | Facility: CLINIC | Age: 60
End: 2025-06-25
Payer: MEDICARE

## 2025-06-25 VITALS
DIASTOLIC BLOOD PRESSURE: 70 MMHG | SYSTOLIC BLOOD PRESSURE: 130 MMHG | WEIGHT: 193 LBS | OXYGEN SATURATION: 96 % | HEIGHT: 68 IN | BODY MASS INDEX: 29.25 KG/M2 | HEART RATE: 76 BPM

## 2025-06-25 DIAGNOSIS — Z79.4 TYPE 2 DIABETES MELLITUS WITH HYPERGLYCEMIA, WITH LONG-TERM CURRENT USE OF INSULIN: Primary | ICD-10-CM

## 2025-06-25 DIAGNOSIS — E11.42 DIABETIC PERIPHERAL NEUROPATHY: ICD-10-CM

## 2025-06-25 DIAGNOSIS — E11.65 TYPE 2 DIABETES MELLITUS WITH HYPERGLYCEMIA, WITH LONG-TERM CURRENT USE OF INSULIN: Primary | ICD-10-CM

## 2025-06-25 DIAGNOSIS — E03.9 HYPOTHYROIDISM, UNSPECIFIED TYPE: ICD-10-CM

## 2025-06-25 DIAGNOSIS — N18.31 STAGE 3A CHRONIC KIDNEY DISEASE: ICD-10-CM

## 2025-06-25 DIAGNOSIS — I10 PRIMARY HYPERTENSION: ICD-10-CM

## 2025-06-25 DIAGNOSIS — E78.2 MIXED HYPERLIPIDEMIA: ICD-10-CM

## 2025-06-25 DIAGNOSIS — E66.3 OVERWEIGHT: ICD-10-CM

## 2025-06-25 RX ORDER — ONDANSETRON 4 MG/1
TABLET, ORALLY DISINTEGRATING ORAL
Qty: 30 TABLET | Refills: 0 | Status: SHIPPED | OUTPATIENT
Start: 2025-06-25

## 2025-06-25 RX ORDER — PREGABALIN 100 MG/1
100 CAPSULE ORAL EVERY 12 HOURS SCHEDULED
Qty: 60 CAPSULE | Refills: 2 | Status: SHIPPED | OUTPATIENT
Start: 2025-06-25

## 2025-06-25 NOTE — PROGRESS NOTES
-----------------------------------------------------------------  ENDOCRINE CLINIC NOTE  -----------------------------------------------------------------        PATIENT NAME: Kuldeep Adhikari  PATIENT : 1965 AGE: 60 y.o.  MRN NUMBER: [id number]  PRIMARY CARE: Laura Whitaker MD    ==========================================================================    CHIEF COMPLAINT: Type 2 Diabetes Mellitus  DATE OF SERVICE: 25    ==========================================================================    HPI / SUBJECTIVE    60 y.o. male is seen in the clinic today for follow up of type 2 diabetes.  Current therapy includes:  Insulin glargine 30 units nightly  Insulin lispro:  12 Units with small portion  14 Units with moderate portion  15 Units with big or carb rich portion  Ozempic 0.5 mg once a week (tried 1 mgs but was not able to tolerate)  Jardiance 10 mg 1 pill by mouth daily  Previous therapy:  Janumet therapy  Checking BG through Dexcom G7.  Diabetic neuropathy, s/p right BKA, prosthesis in place.    Diabetic neuropathy left lower extremity, complicated with osteomyelitis in .  Diabetic retinopathy, s/p laser treatment, following Denise  Patient typically take 2 meals a day.  Underlying history of CVA.  No history of CAD.  Hx of CKD but microalbumin -ve in Oct 2024.  Also have Hypothyroidism and is currently maintained on Levothyroxine therapy.    ==========================================================================                                                PAST MEDICAL HISTORY    Past Medical History:   Diagnosis Date    Allergic     Arthritis of back ?    Always here never  gone  away    Carpal tunnel syndrome of right wrist 2024    CKD (chronic kidney disease), stage III     Depression     Depression with suicidal ideation 2021    DJD (degenerative joint disease)     DVT (deep venous thrombosis)     Ganglion     rt wrist    Gangrene of foot  10/09/2015    GERD (gastroesophageal reflux disease)     Headache     Headache, tension-type     Heart murmur     Hiatal hernia     Hip arthrosis 9/9/97    Left total hip &revised  fracture    History of esophageal stricture     s/p dialation 40-50 times last EGD 04/2016    Hyperlipidemia     Hypertension     Hypothyroidism     IBS (irritable bowel syndrome)     Low back strain Late 80s    Memory loss     Nausea, vomiting and diarrhea 09/22/2023    Neuropathy     Neuropathy in diabetes     Osteomyelitis of right foot 03/19/2020    Peripheral neuropathy     Pulmonary embolism 01/19/2017    Rash     rt lower hip    Reflex sympathetic dystrophy     Retinopathy     Rotator cuff syndrome ?    While  working at [employer] in the mid 80s    S/P BKA (below knee amputation), right 03/18/2022    Seasonal allergies     Sepsis due to group B Streptococcus 03/19/2020    Shortness of breath     Sleep apnea     Stroke     Type 2 diabetes mellitus with peripheral vascular disease     Vitamin D deficiency        ==========================================================================    PAST SURGICAL HISTORY    Past Surgical History:   Procedure Laterality Date    AMPUTATION DIGIT Left 04/26/2022    Procedure: AMPUTATION left great toe;  Surgeon: ERWIN Baker DPM;  Location: [facility] MAIN OR;  Service: Podiatry;  Laterality: Left;    AMPUTATION DIGIT  04/26/2022    Procedure: ;  Surgeon: ERWIN Baker DPM;  Location: [facility] MAIN OR;  Service: Podiatry;;    AMPUTATION FOOT / TOE Right     great toe    AMPUTATION REVISION Right 04/08/2021    Procedure: BELOW KNEE AMPUTATION REVISAION;  Surgeon: Eduardo Reynolds MD;  Location: [facility] MAIN OR;  Service: Orthopedics;  Laterality: Right;    AMPUTATION REVISION Right 04/29/2021    Procedure: AMPUTATION REVISION KNEE STUMP;  Surgeon: Eduardo Reynolds MD;  Location: [facility] MAIN OR;  Service: Orthopedics;  Laterality: Right;    BELOW KNEE AMPUTATION Right 07/30/2020    Procedure:  AMPUTATION BELOW KNEE;  Surgeon: Eduardo Reynolds MD;  Location: [facility] MAIN OR;  Service: Orthopedics;  Laterality: Right;    CARDIAC CATHETERIZATION  04/2018    [facility]    CHOLECYSTECTOMY N/A 09/26/2023    Procedure: CHOLECYSTECTOMY LAPAROSCOPIC;  Surgeon: Eduardo Srinivasan MD;  Location: [facility] MAIN OR;  Service: General;  Laterality: N/A;    COLONOSCOPY      ENDOSCOPY      ENDOSCOPY N/A 10/04/2019    Procedure: ESOPHAGOGASTRODUODENOSCOPY with dilitation and biopsy x 1 area;  Surgeon: Declan Iqbal MD;  Location: [facility] ENDOSCOPY;  Service: Gastroenterology    ENDOSCOPY N/A 12/13/2019    Procedure: ESOPHAGOGASTRODUODENOSCOPY WITH DILATATION (50, 52 BOUGIE);  Surgeon: Declan Iqbal MD;  Location: [facility] ENDOSCOPY;  Service: Gastroenterology    ENDOSCOPY N/A 08/06/2021    Procedure: ESOPHAGOGASTRODUODENOSCOPY with biopsy x1 area and esophageal dilation (56FR Bougie);  Surgeon: Hussein Talavera MD;  Location: [facility] ENDOSCOPY;  Service: Gastroenterology;  Laterality: N/A;  post: hiatal hernia, erosive gastritis    ENDOSCOPY N/A 12/13/2022    Procedure: ESOPHAGOGASTRODUODENOSCOPY WITH DILATATION (BOUGIE #54, #56) AND BIOPSY X1;  Surgeon: David Rodriguez MD;  Location: [facility] ENDOSCOPY;  Service: Gastroenterology;  Laterality: N/A;  POST: dysphagia     ENDOSCOPY N/A 09/28/2023    Procedure: ESOPHAGOGASTRODUODENOSCOPY with dilation (58 non guided bougie);  Surgeon: Hussein Talavera MD;  Location: [facility] ENDOSCOPY;  Service: Gastroenterology;  Laterality: N/A;  post op: dysphagia    EYE SURGERY      EYE SURGERY Bilateral 04/02/2025    EYE SURGERY Right 04/01/2025    GANGLION CYST EXCISION Left     HERNIA REPAIR Bilateral     INCISION AND DRAINAGE OF WOUND Right 06/15/2022    Procedure: INCISION AND DRAINAGE WOUND with debridement;  Surgeon: Fito Forte MD;  Location: [facility] MAIN OR;  Service: Orthopedics;  Laterality: Right;    JOINT REPLACEMENT      Left total hip    RETINOPATHY SURGERY       laser    TOTAL HIP ARTHROPLASTY Left     TOTAL HIP ARTHROPLASTY Left 2018    TRANS METATARSAL AMPUTATION Right 03/17/2020    Procedure: AMPUTATION TRANS METATARSAL right;  Surgeon: ERWIN Baker DPM;  Location: [facility] MAIN OR;  Service: Podiatry;  Laterality: Right;  GANGRENOUS RIGHT FOOT    VASECTOMY         ==========================================================================    FAMILY HISTORY    Family History   Problem Relation Age of Onset    Diabetes Mother         Patient  mom    Heart disease Mother     Arthritis Mother     Hyperlipidemia Mother     Leukemia Father     Parkinsonism Father     Sleep apnea Maternal Aunt         GENO    Stroke Maternal Grandmother     Hypertension Other     Hyperlipidemia Other     Cancer Other     Colon cancer Other         uncle       ==========================================================================    SOCIAL HISTORY    Social History     Socioeconomic History    Marital status:     Number of children: 3    Highest education level: High school graduate   Tobacco Use    Smoking status: Never    Smokeless tobacco: Never   Vaping Use    Vaping status: Never Used   Substance and Sexual Activity    Alcohol use: Yes     Comment: OCCASIONAL    Drug use: No    Sexual activity: Not Currently     Partners: Female     Birth control/protection: None     Comment: I have Ed problem at this time       ==========================================================================    MEDICATIONS      Current Outpatient Medications:     amLODIPine (NORVASC) 10 MG tablet, TAKE 1 TABLET BY MOUTH ONCE DAILY, Disp: 30 tablet, Rfl: 10    atorvastatin (LIPITOR) 80 MG tablet, TAKE 1 TABLET BY MOUTH ONCE DAILY, Disp: 30 tablet, Rfl: 10    atropine 1 % ophthalmic solution, , Disp: , Rfl:     Blood Glucose Monitoring Suppl (Accu-Chek Guide) w/Device kit, See Admin Instructions., Disp: , Rfl:     Blood Glucose Monitoring Suppl (FreeStyle Lite) w/Device kit, Use 1 kit Daily. E11.65,  Disp: 1 kit, Rfl: 0    buPROPion XL (WELLBUTRIN XL) 150 MG 24 hr tablet, TAKE 1 TABLET BY MOUTH ONCE DAILY, Disp: 30 tablet, Rfl: 10    busPIRone (BUSPAR) 10 MG tablet, TAKE 1 TABLET BY MOUTH TWICE DAILY, Disp: 60 tablet, Rfl: 10    Continuous Blood Gluc  (Dexcom G6 ) device, Use 1 Device Daily. Use to check BS, Disp: 1 each, Rfl: 0    Continuous Glucose Sensor (Dexcom G6 Sensor), Use Every 10 (Ten) Days. Use to check BS daily.Dx:E11.65, Disp: 9 each, Rfl: 1    Continuous Glucose Transmitter (Dexcom G6 Transmitter) misc, Use 1 each Every 3 (Three) Months. USE 1 TRANSMITTER EVERY 3 MONTHS, Disp: 1 each, Rfl: 2    cyclobenzaprine (FLEXERIL) 10 MG tablet, Take I tab q 8 hrs as needed for tension headaches, Disp: 30 tablet, Rfl: 0    diazePAM (Valium) 5 MG tablet, Take 1 tablet by mouth 1 (One) Time As Needed for Anxiety for up to 1 dose. TAKE 1H PRIOR TO MRI. DO NOT DRIVE YOURSELF TO THE MRI, Disp: 1 tablet, Rfl: 0    DULoxetine (CYMBALTA) 60 MG capsule, TAKE 1 CAPSULE BY MOUTH ONCE DAILY, Disp: 30 capsule, Rfl: 10    Eliquis 5 MG tablet tablet, TAKE 1 TABLET BY MOUTH TWICE DAILY, Disp: 60 tablet, Rfl: 10    empagliflozin (Jardiance) 10 MG tablet tablet, TAKE 1 TABLET BY MOUTH ONCE DAILY, Disp: 90 tablet, Rfl: 3    glucose blood (Accu-Chek Guide) test strip, USE TO TEST BLOOD SUGAR 4 TIMES DAILY AT MEALS AND AT BEDTIME, Disp: 100 each, Rfl: 10    glucose blood (FREESTYLE LITE) test strip, Test bs daily E11.65, Disp: 100 each, Rfl: 1    Gvoke HypoPen 1-Pack 1 MG/0.2ML solution auto-injector, INJECT 1MG SUBCUTANEOUSLY INTO THE APPROPRIATE AREA AS DIRECTED AS NEEDED FOR HYPOGLYCEMIA, Disp: 0.4 mL, Rfl: 11    HYDROcodone-acetaminophen (NORCO) 5-325 MG per tablet, , Disp: , Rfl:     insulin aspart (NovoLOG FlexPen) 100 UNIT/ML solution pen-injector sc pen, Inject 20 Units under the skin into the appropriate area as directed 3 (Three) Times a Day With Meals. Dx code: E11.65, Disp: 60 mL, Rfl: 3    Insulin  Glargine (Lantus SoloStar) 100 UNIT/ML injection pen, Inject 40 Units under the skin into the appropriate area as directed Every Night for 338 days., Disp: 45 mL, Rfl: 2    Insulin Pen Needle (Pen Needles) 32G X 4 MM misc, Use 1 each 4 (Four) Times a Day Before Meals & at Bedtime., Disp: 400 each, Rfl: 3    levothyroxine (SYNTHROID, LEVOTHROID) 100 MCG tablet, TAKE 1 TABLET BY MOUTH EVERY MORNING, Disp: 30 tablet, Rfl: 10    losartan (COZAAR) 100 MG tablet, Take 1 tablet by mouth Daily., Disp: , Rfl:     meclizine (ANTIVERT) 25 MG tablet, Take 1 tablet by mouth 3 (Three) Times a Day As Needed for Dizziness., Disp: 30 tablet, Rfl: 0    metoprolol succinate XL (TOPROL-XL) 100 MG 24 hr tablet, TAKE 1 TABLET BY MOUTH ONCE DAILY, Disp: 30 tablet, Rfl: 10    moxifloxacin (VIGAMOX) 0.5 % ophthalmic solution, , Disp: , Rfl:     neomycin-bacitracin-polymyxin (NEOSPORIN) [phone] ophthalmic ointment, , Disp: , Rfl:     omeprazole (priLOSEC) 40 MG capsule, TAKE 1 CAPSULE BY MOUTH DAILY, Disp: 90 capsule, Rfl: 1    ondansetron ODT (ZOFRAN-ODT) 4 MG disintegrating tablet, PLACE 1 TABLET ON TONGUE AND ALLOW TO DISSOLVE EVERY 8 HOURS AS NEEDED FOR NAUSEA & VOMITING, Disp: 30 tablet, Rfl: 0    Ozempic, 0.25 or 0.5 MG/DOSE, 2 MG/3ML solution pen-injector, INJECT 0.5MG SUBCUTANEOUSLY INTO THE APPROPRIATE AREA AS DIRECTED ONCE WEEKLY, Disp: 3 mL, Rfl: 1    prednisoLONE acetate (PRED FORTE) 1 % ophthalmic suspension, , Disp: , Rfl:     pregabalin (LYRICA) 100 MG capsule, TAKE ONE (1) CAPSULE BY MOUTH TWICE DAILY, Disp: 60 capsule, Rfl: 2    sulfamethoxazole-trimethoprim (BACTRIM DS,SEPTRA DS) 800-160 MG per tablet, Take 1 tablet by mouth., Disp: , Rfl:     ==========================================================================    ALLERGIES    Allergies   Allergen Reactions    Lisinopril Cough    Cefixime Other (See Comments)     Suprax        ==========================================================================    OBJECTIVE    Vitals:    06/25/25 1257   BP: 130/70   Pulse: 76   SpO2: 96%     Body mass index is 29.35 kg/m².     General: Alert, cooperative, no acute distress  Lungs: Clear to auscultation bilaterally  Heart: Regular rate and rhythm, S1 and S2 normal  Abdomen: Soft, NT, ND and Bowel sounds Positive    ==========================================================================    LAB EVALUATION    Lab Results   Component Value Date    GLUCOSE 128 (H) 06/18/2025    BUN 19.0 06/18/2025    CREATININE 1.58 (H) 06/18/2025    EGFRIFNONA 48 (L) 01/04/2022    BCR 12.0 06/18/2025    K 4.0 06/18/2025    CO2 27.8 06/18/2025    CALCIUM 9.4 06/18/2025    ALBUMIN 4.2 06/18/2025    AST 23 06/18/2025    ALT 26 06/18/2025    CHOL 157 10/22/2024    TRIG 109 10/22/2024    HDL 47 10/22/2024    LDL 90 10/22/2024     Lab Results   Component Value Date    HGBA1C 8.60 (H) 06/18/2025    HGBA1C 9.07 (H) 02/11/2025    HGBA1C 7.98 (H) 10/22/2024     Lab Results   Component Value Date    MICROALBUR 2.3 10/22/2024    CREATININE 1.58 (H) 06/18/2025     Lab Results   Component Value Date    TSH 1.360 10/22/2024    FREET4 1.50 10/22/2024     ==========================================================================    CGM Data:    Dates: Jun 12 till Jun 25, 2025    Very High > 250: 33%  High 180 - 250: 29%  Target: 70 - 180: 37%  Low 55 - 70:  1%  Very Low < 55: <1%    ==========================================================================    ASSESSMENT AND PLAN    # Type 2 diabetes complicated with retinopathy, neuropathy and nephropathy  - Reviewed CGM data and patient have significant postprandial hyperglycemia and fasting hyperglycemia as well  - Will uptitrate glargine therapy to 35 units  - Additionally patient have significant postprandial hyperglycemia  - Currently is on Ozempic therapy but cannot take more than 0.5 mg because patient gets a lot of  "significant GI side effect of constipation  - We can try Mounjaro which has a less side effect for constipation and more chances for diarrhea  - Discussed with patient we will try 2.5 mg of Mounjaro through samples and if patient is tolerating then we can prescribe Mounjaro and discontinue Ozempic  - Also counseled patient to maintain high-protein low carbohydrate 3 meals a day  - Continue CGM monitoring  - New adjusted regimen:  Insulin glargine 30 units nightly  Insulin lispro:  12 Units with small portion  14 Units with moderate portion  15 Units with big or carb rich portion  Mounjaro 2.5 mgs once a week  Jardiance 10 mg 1 pill by mouth daily  - Patient to follow-up in clinic back again in 3 months time'    # Hyperlipidemia, currently on statin therapy, last lipid panel stable in 10/2024    # Hypothyroidism, on levothyroxine replacement therapy 100 mcg daily, repeat blood work in 3 months    # Hypertension, care as per primary team    # Overweight with BMI of 29.35    # CKD IIIa    Return to clinic: 3 months    Entire assessment and plan was discussed and counseled the patient in detail to which patient verbalized understanding and agreed with care.  Answered all queries and concerns.    This note was created using voice recognition software and is inherently subject to errors including those of syntax and \"sound-alike\" substitutions which may escape proofreading.  In such instances, original meaning may be extrapolated by contextual derivation.    Note: Portions of this note may have been copied from previous notes but documentation have been reviewed and edited as necessary to support clinical decision making for today's visit.    ==========================================================================    INFORMATION PROVIDED TO PATIENT    Patient Instructions   # Diabetes Management:    Please adjust insulin therapy as:    - Insulin Glargine (Slow acting insulin) 35 Units in the evening.  - Insulin lispro / " aspart (Fast acting / meal time insulin): 12-17 Units with each meal.  12 units with small portion  14 units with moderate portion   15 to 17 unit with big portion or carb rich diet    Meal time insulin need to be taken 15 mins before meal. You can bring your insulin with you if you are planning to eat out.    If you plan to miss the meal please miss the meal time insulin as well.    Check your blood glucose through dexcom.    Try to maintain water intake around 6 to 8 glasses of water every day.    Continue Jardiance 10 mg 1 pill by mouth daily.  Hold Ozempic therapy and try Ozempic 2.5 mg with the samples provided today, if able to tolerate with no side effects please call the office and I will send the prescription for Mounjaro and discontinue Ozempic.    If your blood sugar is less than 70 in the morning then decrease your insulin glargine by 5 units.  If your blood sugar before meal is less than 70 then decrease your mealtime insulin by 5 units.    Low Blood Glucose:    - If you feel sweaty, anxious, shakiness, feel weak, trouble walking, feels like passing out or trouble seeing thing, please check your blood glucose and if its less than 70 or if your feel symptoms of low blood glucose then take one of the following:    *3 or 4 glucose tablets  *½ cup of juice or regular soda (not sugar-free)  *2 tablespoons of raisins  *4 or 5 saltine crackers  *1 tablespoon of sugar  *1 tablespoon of honey or corn syrup  *6 to 8 hard candies    Plan for fasting blood work before next visit.     Thank you for your visit today.     If you have any questions or concerns please feel free to reach out of the office.          ==========================================================================  Dionicio Ramachandran MD  Department of Endocrine, Diabetes and Metabolism  [facility], [place]  ==========================================================================

## 2025-06-25 NOTE — PATIENT INSTRUCTIONS
# Diabetes Management:    Please adjust insulin therapy as:    - Insulin Glargine (Slow acting insulin) 35 Units in the evening.  - Insulin lispro / aspart (Fast acting / meal time insulin): 12-17 Units with each meal.  12 units with small portion  14 units with moderate portion   15 to 17 unit with big portion or carb rich diet    Meal time insulin need to be taken 15 mins before meal. You can bring your insulin with you if you are planning to eat out.    If you plan to miss the meal please miss the meal time insulin as well.    Check your blood glucose through dexcom.    Try to maintain water intake around 6 to 8 glasses of water every day.    Continue Jardiance 10 mg 1 pill by mouth daily.  Hold Ozempic therapy and try Ozempic 2.5 mg with the samples provided today, if able to tolerate with no side effects please call the office and I will send the prescription for Mounjaro and discontinue Ozempic.    If your blood sugar is less than 70 in the morning then decrease your insulin glargine by 5 units.  If your blood sugar before meal is less than 70 then decrease your mealtime insulin by 5 units.    Low Blood Glucose:    - If you feel sweaty, anxious, shakiness, feel weak, trouble walking, feels like passing out or trouble seeing thing, please check your blood glucose and if its less than 70 or if your feel symptoms of low blood glucose then take one of the following:    *3 or 4 glucose tablets  *½ cup of juice or regular soda (not sugar-free)  *2 tablespoons of raisins  *4 or 5 saltine crackers  *1 tablespoon of sugar  *1 tablespoon of honey or corn syrup  *6 to 8 hard candies    Plan for fasting blood work before next visit.     Thank you for your visit today.     If you have any questions or concerns please feel free to reach out of the office.

## 2025-06-30 ENCOUNTER — TELEPHONE (OUTPATIENT)
Dept: ENDOCRINOLOGY | Facility: CLINIC | Age: 60
End: 2025-06-30

## 2025-06-30 NOTE — TELEPHONE ENCOUNTER
Pt is asking what is the dose of the Mounjaro is, the chart says 2.5 or did you want 0.5 please advise.

## 2025-06-30 NOTE — Clinical Note
Worker's Compensation Follow Up Visit    Employer: [employer] ( [employer])  DOI: 6/22/2025     Date of Visit: 6/30/2025    CC:   Chief Complaint   Patient presents with    Worker's Compensation    Office Visit     FU RT ANKLE JOSHUA ARANGO         HPI:    Nelson Jimenez is a 54 year old year old male, who presents for follow up of an injury to his right ankle that occurred at work on 6/22/2025      He reports that symptoms have improved.  The patient's previous office visit notes were reviewed.  The patient comes in today doing well.  He currently rates his pain as a 2/10.  The patient states that he has noticed swelling and bruising reduced since his last office visit.  He states that following his office visit, he did note significant bruising all around the ankle including the base of the foot as well as between the toes.  The patient states that this has subsided.  He still notes some mild discomfort if he is twisting the ankle or walks on an uneven surface but notes that overall this is improved.  The patient states that wearing the brace has helped alleviate discomforts as well as to provide stability to the ankle.  He denies any radiating pain.  He denies any subjective numbing and tingling.  He does feel that the ankle is somewhat weak but also feels that this is improving.  Overall patient estimates he is about 85% back to his baseline.  He does feel he would be able to resume his regular job duties but notes that he is a little apprehensive secondary to the requirement to walk on uneven terrain and surfaces due to the nature of his job.  Functional status: He has been working with restrictions  Patient is taking the following medications: None currently    MEDICATIONS and ALLERGIES are reviewed in the electronic record.    Review of patient's social economics indicates:                    Comment: Time with company: 25 years      TOBACCO HX:  The patient  reports that he quit smoking  right C3-C4 and C4-C5 cervical RFA  w/wo sedation. Please ask patient whether or not he wants to do sedation or no sedation. about 25 years ago. His smoking use included cigarettes. He has never used smokeless tobacco.    PE:       Vitals:    Vitals:    06/30/25 0822   BP: 130/80   Pulse: 70   Resp: 14       PHYSICAL EXAM:     General:  The patient is a well developed male, who appears in no acute distress.   Vitals:  Vital signs reviewed, see nurse's notes.  Neck:  No jugular venous distention or lymphadenopathy.  Extremities: The patient has full range of motion on flexion and extension of the right knee.  Full range of motion on dorsiflexion and plantarflexion of the right ankle.  Patient has slight decreased inversion and eversion of the right ankle secondary to discomfort.  Patient notes discomfort at endpoints of inversion and eversion.  No obvious erythema or edema noted to the right ankle or foot.  Evidence of healing ecchymoses on the base of the lateral right foot as well as between the toes.  No tenderness with palpation over the ecchymotic areas.  Slight tenderness with palpation over the ankle mortise, mild tenderness with palpation over the lateral malleolus of the right ankle.  No tenderness to palpation of the medial malleolus or the right Achilles tendon. No tenderness to palpation over the base of the fifth metatarsal on the right foot.  Patient is able to flex and extend all IP joints on the right foot. Full strength with resisted dorsiflexion and plantarflexion of the right ankle.  Sensation to light touch appears intact throughout without deficit in the right foot and ankle.  Cap refill less than 2 seconds.  Distal pulses 2+.  The patient ambulates with a slow and steady gait.  He is wearing the lace up ASO and tennis shoes on today's exam but able to remove for evaluation.  Neurologic:  Nonfocal and normal sensation.     ASSESSMENT:  1. Sprain of right ankle, unspecified ligament, subsequent encounter-IMPROVING    2. Fall on same level from slipping, tripping or stumbling, subsequent encounter        Anticipated  maximum medical improvement: 1 week    PLAN:  STATUS: This injury is determined to be WORK RELATED.     RETURN TO WORK: Employee may return to work without restrictions.  Return Date: 6/30/2025             RESTRICTIONS:  No Restrictions     TREATMENT PLAN:   Medications for this injury/condition:   Ibuprofen every 6 hours as needed.  Ok to supplement with Tylenol in between NSAID doses for greater pain control.  Patient should remain under 3000 mg of Tylenol in a 24 hour period.  Referral/Consult: None  Diagnostic Testing: None       Instructions: The patient may continue to practice RICE (rest, ice, compress, elevate) therapy as needed to help remove inflammation.  Ice can be utilized in 20-minute increments.  Okay to perform gentle range of motion stretching exercises to preserve and promote mobility.  Alphabet exercises demonstrated and discussed in the office.  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  If he is able to perform all regular job duties with minimal discomfort, we may consider discharging him at the next office visit.  If your symptoms worsen or you develop additional concerns please call the office.     NEXT RETURN VISIT:  ON 07/08/25 AT 8:15AM  Thank you for the privilege of providing medical care for this injury/condition.  If there are any questions, please call the occupational health clinic at Dept: [phone].     Electronically signed on 6/30/2025 at 8:40 AM by:   Loretta Carr PA-C   [facility]     The physician below agrees with the plan and restrictions placed on the patient by the provider above.  DO JAYCE Parker spent a total of 26 minutes on the day of the visit.  This includes chart review-3 min  performing a medically approproate examination/evaluation-8 min  counseling and education patient/family/caregiver-5 min  documenting clinical information in the electronic/health record-10 min

## 2025-07-01 ENCOUNTER — TELEPHONE (OUTPATIENT)
Dept: CASE MANAGEMENT | Facility: CLINIC | Age: 60
End: 2025-07-01
Payer: MEDICARE

## 2025-07-01 NOTE — TELEPHONE ENCOUNTER
ACM attempted proactive Outreach call. No answer left voicemail for patient to return call at [phone]

## 2025-07-02 ENCOUNTER — PATIENT OUTREACH (OUTPATIENT)
Dept: CASE MANAGEMENT | Facility: CLINIC | Age: 60
End: 2025-07-02
Payer: MEDICARE

## 2025-07-02 ENCOUNTER — TELEPHONE (OUTPATIENT)
Dept: CASE MANAGEMENT | Facility: CLINIC | Age: 60
End: 2025-07-02
Payer: MEDICARE

## 2025-07-02 DIAGNOSIS — E11.21 DIABETIC NEPHROPATHY ASSOCIATED WITH TYPE 2 DIABETES MELLITUS: Primary | ICD-10-CM

## 2025-07-07 ENCOUNTER — PATIENT OUTREACH (OUTPATIENT)
Dept: CASE MANAGEMENT | Facility: CLINIC | Age: 60
End: 2025-07-07
Payer: MEDICARE

## 2025-07-07 DIAGNOSIS — E11.21 DIABETIC NEPHROPATHY ASSOCIATED WITH TYPE 2 DIABETES MELLITUS: Primary | ICD-10-CM

## 2025-07-07 NOTE — OUTREACH NOTE
AMBULATORY CASE MANAGEMENT NOTE    Names and Relationships of Patient/Support Persons:  -     Patient Outreach    Patient outreach Via phone and My chart message has ACM contact information if interested in CCM in future. Declined at this time.     Ching BRUNO  Ambulatory Case Management    7/7/2025, 10:00 EDT

## 2025-07-08 NOTE — TELEPHONE ENCOUNTER
Hub staff attempted to follow warm transfer process and was unsuccessful     Caller: LANDEN RANGEL    Relationship to patient: SELF    Best call back number: [phone]    Patient is needing: PATIENT IS RETURNING CALL ON THE BELOW NOTE REQUEST. HE HAS NOT HAD A CALL BACK REGARDING IT. PLEASE ADVISE

## 2025-07-08 NOTE — TELEPHONE ENCOUNTER
Called and care discussed with patient over the phone.  Mounjaro does not have 0.5 mg dosing  The sample boxes that he have it says 2.5 mg per 0.5 mL  Patient to start samples of Mounjaro and if able to tolerate then will send prescription of Mounjaro and discontinue Sanchez Ramachandran MD  16:42 EDT  07/08/25

## 2025-07-22 NOTE — PROGRESS NOTES
SCHEDULED   Xolair Pregnancy And Lactation Text: This medication is Pregnancy Category B and is considered safe during pregnancy. This medication is excreted in breast milk. Ozempic Counseling: I reviewed the possible side effects including: thyroid tumors, kidney disease, gallbladder disease, abdominal pain, constipation, diarrhea, nausea, vomiting and pancreatitis. Do not take this medication if you have a history or family history of multiple endocrine neoplasia syndrome type 2. Side effects reviewed, pt to contact office should one occur. Arava Pregnancy And Lactation Text: This medication is Pregnancy Category X and is absolutely contraindicated during pregnancy. It is unknown if it is excreted in breast milk. Albendazole Pregnancy And Lactation Text: This medication is Pregnancy Category C and it isn't known if it is safe during pregnancy. It is also excreted in breast milk. Calcipotriene Pregnancy And Lactation Text: The use of this medication during pregnancy or lactation is not recommended as there is insufficient data. Tazorac Pregnancy And Lactation Text: This medication is not safe during pregnancy. It is unknown if this medication is excreted in breast milk. Bactrim Pregnancy And Lactation Text: This medication is Pregnancy Category D and is known to cause fetal risk.  It is also excreted in breast milk. Mirvaso Pregnancy And Lactation Text: This medication has not been assigned a Pregnancy Risk Category. It is unknown if the medication is excreted in breast milk. Cosentyx Counseling:  I discussed with the patient the risks of Cosentyx including but not limited to worsening of Crohn's disease, immunosuppression, allergic reactions and infections.  The patient understands that monitoring is required including a PPD at baseline and must alert us or the primary physician if symptoms of infection or other concerning signs are noted. Rituxan Pregnancy And Lactation Text: This medication is Pregnancy Category C and it isn't know if it is safe during pregnancy. It is unknown if this medication is excreted in breast milk but similar antibodies are known to be excreted. Ivermectin Counseling:  Patient instructed to take medication on an empty stomach with a full glass of water.  Patient informed of potential adverse effects including but not limited to nausea, diarrhea, dizziness, itching, and swelling of the extremities or lymph nodes.  The patient verbalized understanding of the proper use and possible adverse effects of ivermectin.  All of the patient's questions and concerns were addressed. Tetracycline Counseling: Patient counseled regarding possible photosensitivity and increased risk for sunburn.  Patient instructed to avoid sunlight, if possible.  When exposed to sunlight, patients should wear protective clothing, sunglasses, and sunscreen.  The patient was instructed to call the office immediately if the following severe adverse effects occur:  hearing changes, easy bruising/bleeding, severe headache, or vision changes.  The patient verbalized understanding of the proper use and possible adverse effects of tetracycline.  All of the patient's questions and concerns were addressed. Patient understands to avoid pregnancy while on therapy due to potential birth defects. Cantharidin Counseling:  I discussed with the patient the risks of Cantharidin including but not limited to pain, redness, burning, itching, and blistering. Use Enhanced Medication Counseling?: No Topical Clindamycin Counseling: Patient counseled that this medication may cause skin irritation or allergic reactions.  In the event of skin irritation, the patient was advised to reduce the amount of the drug applied or use it less frequently.   The patient verbalized understanding of the proper use and possible adverse effects of clindamycin.  All of the patient's questions and concerns were addressed. Zyclara Counseling:  I discussed with the patient the risks of imiquimod including but not limited to erythema, scaling, itching, weeping, crusting, and pain.  Patient understands that the inflammatory response to imiquimod is variable from person to person and was educated regarded proper titration schedule.  If flu-like symptoms develop, patient knows to discontinue the medication and contact us. Olanzapine Counseling- I discussed with the patient the common side effects of olanzapine including but are not limited to: lack of energy, dry mouth, increased appetite, sleepiness, tremor, constipation, dizziness, changes in behavior, or restlessness.  Explained that teenagers are more likely to experience headaches, abdominal pain, pain in the arms or legs, tiredness, and sleepiness.  Serious side effects include but are not limited: increased risk of death in elderly patients who are confused, have memory loss, or dementia-related psychosis; hyperglycemia; increased cholesterol and triglycerides; and weight gain. Detail Level: Simple Cosentyx Pregnancy And Lactation Text: This medication is Pregnancy Category B and is considered safe during pregnancy. It is unknown if this medication is excreted in breast milk. Oxempic Pregnancy And Lactation Text: The fetal risk of this medication is unknown and taking while pregnant is not recommended. It is unknown if this medication is present in breast milk. Tetracycline Pregnancy And Lactation Text: This medication is Pregnancy Category D and not consider safe during pregnancy. It is also excreted in breast milk. Clofazimine Counseling:  I discussed with the patient the risks of clofazimine including but not limited to skin and eye pigmentation, liver damage, nausea/vomiting, gastrointestinal bleeding and allergy. Acitretin Counseling:  I discussed with the patient the risks of acitretin including but not limited to hair loss, dry lips/skin/eyes, liver damage, hyperlipidemia, depression/suicidal ideation, photosensitivity.  Serious rare side effects can include but are not limited to pancreatitis, pseudotumor cerebri, bony changes, clot formation/stroke/heart attack.  Patient understands that alcohol is contraindicated since it can result in liver toxicity and significantly prolong the elimination of the drug by many years. Cephalexin Counseling: I counseled the patient regarding use of cephalexin as an antibiotic for prophylactic and/or therapeutic purposes. Cephalexin (commonly prescribed under brand name Keflex) is a cephalosporin antibiotic which is active against numerous classes of bacteria, including most skin bacteria. Side effects may include nausea, diarrhea, gastrointestinal upset, rash, hives, yeast infections, and in rare cases, hepatitis, kidney disease, seizures, fever, confusion, neurologic symptoms, and others. Patients with severe allergies to penicillin medications are cautioned that there is about a 10% incidence of cross-reactivity with cephalosporins. When possible, patients with penicillin allergies should use alternatives to cephalosporins for antibiotic therapy. Opzelura Counseling:  I discussed with the patient the risks of Opzelura including but not limited to nasopharngitis, bronchitis, ear infection, eosinophila, hives, diarrhea, folliculitis, tonsillitis, and rhinorrhea.  Taken orally, this medication has been linked to serious infections; higher rate of mortality; malignancy and lymphoproliferative disorders; major adverse cardiovascular events; thrombosis; thrombocytopenia, anemia, and neutropenia; and lipid elevations. Dutasteride Male Counseling: Dustasteride Counseling:  I discussed with the patient the risks of use of dutasteride including but not limited to decreased libido, decreased ejaculate volume, and gynecomastia. Women who can become pregnant should not handle medication.  All of the patient's questions and concerns were addressed. Topical Clindamycin Pregnancy And Lactation Text: This medication is Pregnancy Category B and is considered safe during pregnancy. It is unknown if it is excreted in breast milk. Cimetidine Counseling:  I discussed with the patient the risks of Cimetidine including but not limited to gynecomastia, headache, diarrhea, nausea, drowsiness, arrhythmias, pancreatitis, skin rashes, psychosis, bone marrow suppression and kidney toxicity. Acitretin Pregnancy And Lactation Text: This medication is Pregnancy Category X and should not be given to women who are pregnant or may become pregnant in the future. This medication is excreted in breast milk. Cephalexin Pregnancy And Lactation Text: This medication is Pregnancy Category B and considered safe during pregnancy.  It is also excreted in breast milk but can be used safely for shorter doses. Siliq Counseling:  I discussed with the patient the risks of Siliq including but not limited to new or worsening depression, suicidal thoughts and behavior, immunosuppression, malignancy, posterior leukoencephalopathy syndrome, and serious infections.  The patient understands that monitoring is required including a PPD at baseline and must alert us or the primary physician if symptoms of infection or other concerning signs are noted. There is also a special program designed to monitor depression which is required with Siliq. Olanzapine Pregnancy And Lactation Text: This medication is pregnancy category C.   There are no adequate and well controlled trials with olanzapine in pregnant females.  Olanzapine should be used during pregnancy only if the potential benefit justifies the potential risk to the fetus.   In a study in lactating healthy women, olanzapine was excreted in breast milk.  It is recommended that women taking olanzapine should not breast feed. 5-Fu Counseling: 5-Fluorouracil Counseling:  I discussed with the patient the risks of 5-fluorouracil including but not limited to erythema, scaling, itching, weeping, crusting, and pain. Clofazimine Pregnancy And Lactation Text: This medication is Pregnancy Category C and isn't considered safe during pregnancy. It is excreted in breast milk. Zyclara Pregnancy And Lactation Text: This medication is Pregnancy Category C. It is unknown if this medication is excreted in breast milk. Opzelura Pregnancy And Lactation Text: There is insufficient data to evaluate drug-associated risk for major birth defects, miscarriage, or other adverse maternal or fetal outcomes.  There is a pregnancy registry that monitors pregnancy outcomes in pregnant persons exposed to the medication during pregnancy.  It is unknown if this medication is excreted in breast milk.  Do not breastfeed during treatment and for about 4 weeks after the last dose. Include Pregnancy/Lactation Warning?: Add Automatically Based on Childbearing Potential and Patient Age Dutasteride Female Counseling: Dutasteride Counseling:  I discussed with the patient the risks of use of dutasteride including but not limited to decreased libido and sexual dysfunction. Explained the teratogenic nature of the medication and stressed the importance of not getting pregnant during treatment. All of the patient's questions and concerns were addressed. Dupixent Counseling: I discussed with the patient the risks of dupilumab including but not limited to eye infection and irritation, cold sores, injection site reactions, worsening of asthma, allergic reactions and increased risk of parasitic infection.  Live vaccines should be avoided while taking dupilumab. Dupilumab will also interact with certain medications such as warfarin and cyclosporine. The patient understands that monitoring is required and they must alert us or the primary physician if symptoms of infection or other concerning signs are noted. Aklief counseling:  Patient advised to apply a pea-sized amount only at bedtime and wait 30 minutes after washing their face before applying.  If too drying, patient may add a non-comedogenic moisturizer.  The most commonly reported side effects including irritation, redness, scaling, dryness, stinging, burning, itching, and increased risk of sunburn.  The patient verbalized understanding of the proper use and possible adverse effects of retinoids.  All of the patient's questions and concerns were addressed. Azathioprine Counseling:  I discussed with the patient the risks of azathioprine including but not limited to myelosuppression, immunosuppression, hepatotoxicity, lymphoma, and infections.  The patient understands that monitoring is required including baseline LFTs, Creatinine, possible TPMP genotyping and weekly CBCs for the first month and then every 2 weeks thereafter.  The patient verbalized understanding of the proper use and possible adverse effects of azathioprine.  All of the patient's questions and concerns were addressed. Saxenda Counseling: I reviewed the possible side effects including: thyroid tumors, kidney disease, gallbladder disease, abdominal pain, constipation, diarrhea, nausea, vomiting and pancreatitis. Do not take this medication if you have a history or family history of multiple endocrine neoplasia syndrome type 2. Side effects reviewed, pt to contact office should one occur. Clindamycin Counseling: I counseled the patient regarding use of clindamycin as an antibiotic for prophylactic and/or therapeutic purposes. Clindamycin is active against numerous classes of bacteria, including skin bacteria. Side effects may include nausea, diarrhea, gastrointestinal upset, rash, hives, yeast infections, and in rare cases, colitis. 5-Fu Pregnancy And Lactation Text: This medication is Pregnancy Category X and contraindicated in pregnancy and in women who may become pregnant. It is unknown if this medication is excreted in breast milk. Colchicine Counseling:  Patient counseled regarding adverse effects including but not limited to stomach upset (nausea, vomiting, stomach pain, or diarrhea).  Patient instructed to limit alcohol consumption while taking this medication.  Colchicine may reduce blood counts especially with prolonged use.  The patient understands that monitoring of kidney function and blood counts may be required, especially at baseline. The patient verbalized understanding of the proper use and possible adverse effects of colchicine.  All of the patient's questions and concerns were addressed. Cimetidine Pregnancy And Lactation Text: This medication is Pregnancy Category B and is considered safe during pregnancy. It is also excreted in breast milk and breast feeding isn't recommended. Siliq Pregnancy And Lactation Text: The risk during pregnancy and breastfeeding is uncertain with this medication. Topical Ketoconazole Counseling: Patient counseled that this medication may cause skin irritation or allergic reactions.  In the event of skin irritation, the patient was advised to reduce the amount of the drug applied or use it less frequently.   The patient verbalized understanding of the proper use and possible adverse effects of ketoconazole.  All of the patient's questions and concerns were addressed. Dutasteride Pregnancy And Lactation Text: This medication is absolutely contraindicated in women, especially during pregnancy and breast feeding. Feminization of male fetuses is possible if taking while pregnant. Oral Minoxidil Counseling- I discussed with the patient the risks of oral minoxidil including but not limited to shortness of breath, swelling of the feet or ankles, dizziness, lightheadedness, unwanted hair growth and allergic reaction.  The patient verbalized understanding of the proper use and possible adverse effects of oral minoxidil.  All of the patient's questions and concerns were addressed. Bexarotene Counseling:  I discussed with the patient the risks of bexarotene including but not limited to hair loss, dry lips/skin/eyes, liver abnormalities, hyperlipidemia, pancreatitis, depression/suicidal ideation, photosensitivity, drug rash/allergic reactions, hypothyroidism, anemia, leukopenia, infection, cataracts, and teratogenicity.  Patient understands that they will need regular blood tests to check lipid profile, liver function tests, white blood cell count, thyroid function tests and pregnancy test if applicable. Azathioprine Pregnancy And Lactation Text: This medication is Pregnancy Category D and isn't considered safe during pregnancy. It is unknown if this medication is excreted in breast milk. Dupixent Pregnancy And Lactation Text: This medication likely crosses the placenta but the risk for the fetus is uncertain. This medication is excreted in breast milk. Picato Counseling:  I discussed with the patient the risks of Picato including but not limited to erythema, scaling, itching, weeping, crusting, and pain. Oral Minoxidil Pregnancy And Lactation Text: This medication should only be used when clearly needed if you are pregnant, attempting to become pregnant or breast feeding. Simlandi Counseling:  I discussed with the patient the risks of adalimumab including but not limited to myelosuppression, immunosuppression, autoimmune hepatitis, demyelinating diseases, lymphoma, and serious infections.  The patient understands that monitoring is required including a PPD at baseline and must alert us or the primary physician if symptoms of infection or other concerning signs are noted. Doxepin Counseling:  Patient advised that the medication is sedating and not to drive a car after taking this medication. Patient informed of potential adverse effects including but not limited to dry mouth, urinary retention, and blurry vision.  The patient verbalized understanding of the proper use and possible adverse effects of doxepin.  All of the patient's questions and concerns were addressed. Aklief Pregnancy And Lactation Text: It is unknown if this medication is safe to use during pregnancy.  It is unknown if this medication is excreted in breast milk.  Breastfeeding women should use the topical cream on the smallest area of the skin for the shortest time needed while breastfeeding.  Do not apply to nipple and areola. Semaglutide Counseling: I reviewed the possible side effects including: thyroid tumors, kidney disease, gallbladder disease, abdominal pain, constipation, diarrhea, nausea, vomiting and pancreatitis. Do not take this medication if you have a history or family history of multiple endocrine neoplasia syndrome type 2. Side effects reviewed, pt to contact office should one occur. Drysol Counseling:  I discussed with the patient the risks of drysol/aluminum chloride including but not limited to skin rash, itching, irritation, burning. Finasteride Male Counseling: Finasteride Counseling:  I discussed with the patient the risks of use of finasteride including but not limited to decreased libido, decreased ejaculate volume, gynecomastia, and depression. Women should not handle medication.  All of the patient's questions and concerns were addressed. Bexarotene Pregnancy And Lactation Text: This medication is Pregnancy Category X and should not be given to women who are pregnant or may become pregnant. This medication should not be used if you are breast feeding. Clindamycin Pregnancy And Lactation Text: This medication can be used in pregnancy if certain situations. Clindamycin is also present in breast milk. Ebglyss Counseling: I discussed with the patient the risks of lebrikizumab including but not limited to eye inflammation and irritation, cold sores, injection site reactions, allergic reactions and increased risk of parasitic infection. The patient understands that monitoring is required and they must alert us or the primary physician if symptoms of infection or other concerning signs are noted. Quinolones Pregnancy And Lactation Text: This medication is Pregnancy Category C and it isn't know if it is safe during pregnancy. It is also excreted in breast milk. Klisyri Counseling:  I discussed with the patient the risks of Klisyri including but not limited to erythema, scaling, itching, weeping, crusting, and pain. Griseofulvin Counseling:  I discussed with the patient the risks of griseofulvin including but not limited to photosensitivity, cytopenia, liver damage, nausea/vomiting and severe allergy.  The patient understands that this medication is best absorbed when taken with a fatty meal (e.g., ice cream or french fries). Adbry Pregnancy And Lactation Text: It is unknown if this medication will adversely affect pregnancy or breast feeding. Low Dose Naltrexone Pregnancy And Lactation Text: Naltrexone is pregnancy category C.  There have been no adequate and well-controlled studies in pregnant women.  It should be used in pregnancy only if the potential benefit justifies the potential risk to the fetus.   Limited data indicates that naltrexone is minimally excreted into breastmilk. Taltz Counseling: I discussed with the patient the risks of ixekizumab including but not limited to immunosuppression, serious infections, worsening of inflammatory bowel disease and drug reactions.  The patient understands that monitoring is required including a PPD at baseline and must alert us or the primary physician if symptoms of infection or other concerning signs are noted. Winlevi Pregnancy And Lactation Text: This medication is considered safe during pregnancy and breastfeeding. Infliximab Counseling:  I discussed with the patient the risks of infliximab including but not limited to myelosuppression, immunosuppression, autoimmune hepatitis, demyelinating diseases, lymphoma, and serious infections.  The patient understands that monitoring is required including a PPD at baseline and must alert us or the primary physician if symptoms of infection or other concerning signs are noted. Prednisone Pregnancy And Lactation Text: This medication is Pregnancy Category C and it isn't know if it is safe during pregnancy. This medication is excreted in breast milk. Soolantra Pregnancy And Lactation Text: This medication is Pregnancy Category C. This medication is considered safe during breast feeding. Benzoyl Peroxide Pregnancy And Lactation Text: This medication is Pregnancy Category C. It is unknown if benzoyl peroxide is excreted in breast milk. Xeljanz Counseling: I discussed with the patient the risks of Xeljanz therapy including increased risk of infection, liver issues, headache, diarrhea, or cold symptoms. Live vaccines should be avoided. They were instructed to call if they have any problems. Bimzelx Counseling:  I discussed with the patient the risks of Bimzelx including but not limited to depression, immunosuppression, allergic reactions and infections.  The patient understands that monitoring is required including a PPD at baseline and must alert us or the primary physician if symptoms of infection or other concerning signs are noted. Griseofulvin Pregnancy And Lactation Text: This medication is Pregnancy Category X and is known to cause serious birth defects. It is unknown if this medication is excreted in breast milk but breast feeding should be avoided. Xeljustoz Pregnancy And Lactation Text: This medication is Pregnancy Category D and is not considered safe during pregnancy.  The risk during breast feeding is also uncertain. Klisyri Pregnancy And Lactation Text: It is unknown if this medication can harm a developing fetus or if it is excreted in breast milk. Azithromycin Counseling:  I discussed with the patient the risks of azithromycin including but not limited to GI upset, allergic reaction, drug rash, diarrhea, and yeast infections. Rifampin Counseling: I discussed with the patient the risks of rifampin including but not limited to liver damage, kidney damage, red-orange body fluids, nausea/vomiting and severe allergy. Topical Retinoid counseling:  Patient advised to apply a pea-sized amount only at bedtime and wait 30 minutes after washing their face before applying.  If too drying, patient may add a non-comedogenic moisturizer. The patient verbalized understanding of the proper use and possible adverse effects of retinoids.  All of the patient's questions and concerns were addressed. Tranexamic Acid Counseling:  Patient advised of the small risk of bleeding problems with tranexamic acid. They were also instructed to call if they developed any nausea, vomiting or diarrhea. All of the patient's questions and concerns were addressed. Niacinamide Counseling: I recommended taking niacin or niacinamide, also know as vitamin B3, twice daily. Recent evidence suggests that taking vitamin B3 (500 mg twice daily) can reduce the risk of actinic keratoses and non-melanoma skin cancers. Side effects of vitamin B3 include flushing and headache. VTAMA Counseling: I discussed with the patient that VTAMA is not for use in the eyes, mouth or mouth. They should call the office if they develop any signs of allergic reactions to VTAMA. The patient verbalized understanding of the proper use and possible adverse effects of VTAMA.  All of the patient's questions and concerns were addressed. Tremfya Counseling: I discussed with the patient the risks of guselkumab including but not limited to immunosuppression, serious infections, worsening of inflammatory bowel disease and drug reactions.  The patient understands that monitoring is required including a PPD at baseline and must alert us or the primary physician if symptoms of infection or other concerning signs are noted. Bimzelx Pregnancy And Lactation Text: This medication crosses the placenta and the safety is uncertain during pregnancy. It is unknown if this medication is present in breast milk. Carac Counseling:  I discussed with the patient the risks of Carac including but not limited to erythema, scaling, itching, weeping, crusting, and pain. Rifampin Pregnancy And Lactation Text: This medication is Pregnancy Category C and it isn't know if it is safe during pregnancy. It is also excreted in breast milk and should not be used if you are breast feeding. Minoxidil Counseling: Minoxidil is a topical medication which can increase blood flow where it is applied. It is uncertain how this medication increases hair growth. Side effects are uncommon and include stinging and allergic reactions. Nemluvio Counseling: I discussed with the patient the risks of nemolizumab including but not limited to headache, gastrointestinal complaints, nasopharyngitis, musculoskeletal complaints, injection site reactions, and allergic reactions. The patient understands that monitoring is required and they must alert us or the primary physician if any side effects are noted. Vtama Pregnancy And Lactation Text: It is unknown if this medication can cause problems during pregnancy and breastfeeding. Azithromycin Pregnancy And Lactation Text: This medication is considered safe during pregnancy and is also secreted in breast milk. Niacinamide Pregnancy And Lactation Text: These medications are considered safe during pregnancy. Tranexamic Acid Pregnancy And Lactation Text: It is unknown if this medication is safe during pregnancy or breast feeding. Itraconazole Counseling:  I discussed with the patient the risks of itraconazole including but not limited to liver damage, nausea/vomiting, neuropathy, and severe allergy.  The patient understands that this medication is best absorbed when taken with acidic beverages such as non-diet cola or ginger ale.  The patient understands that monitoring is required including baseline LFTs and repeat LFTs at intervals.  The patient understands that they are to contact us or the primary physician if concerning signs are noted. Minoxidil Pregnancy And Lactation Text: This medication has not been assigned a Pregnancy Risk Category but animal studies failed to show danger with the topical medication. It is unknown if the medication is excreted in breast milk. Valtrex Counseling: I discussed with the patient the risks of valacyclovir including but not limited to kidney damage, nausea, vomiting and severe allergy.  The patient understands that if the infection seems to be worsening or is not improving, they are to call. Cimzia Counseling:  I discussed with the patient the risks of Cimzia including but not limited to immunosuppression, allergic reactions and infections.  The patient understands that monitoring is required including a PPD at baseline and must alert us or the primary physician if symptoms of infection or other concerning signs are noted. Sarecycline Counseling: Patient advised regarding possible photosensitivity and discoloration of the teeth, skin, lips, tongue and gums.  Patient instructed to avoid sunlight, if possible.  When exposed to sunlight, patients should wear protective clothing, sunglasses, and sunscreen.  The patient was instructed to call the office immediately if the following severe adverse effects occur:  hearing changes, easy bruising/bleeding, severe headache, or vision changes.  The patient verbalized understanding of the proper use and possible adverse effects of sarecycline.  All of the patient's questions and concerns were addressed. Calcipotriene Counseling:  I discussed with the patient the risks of calcipotriene including but not limited to erythema, scaling, itching, and irritation. Zoryve Counseling:  I discussed with the patient that Zoryve is not for use in the eyes, mouth or vagina. The most commonly reported side effects include diarrhea, headache, insomnia, application site pain, upper respiratory tract infections, and urinary tract infections.  All of the patient's questions and concerns were addressed. Nemluvio Pregnancy And Lactation Text: It is not known if Nemluvio causes fetal harm or is present in breast milk. Please proceed with caution if patients who are pregnant or breastfeeding. Tazorac Counseling:  Patient advised that medication is irritating and drying.  Patient may need to apply sparingly and wash off after an hour before eventually leaving it on overnight.  The patient verbalized understanding of the proper use and possible adverse effects of tazorac.  All of the patient's questions and concerns were addressed. Nsaids Counseling: NSAID Counseling: I discussed with the patient that NSAIDs should be taken with food. Prolonged use of NSAIDs can result in the development of stomach ulcers.  Patient advised to stop taking NSAIDs if abdominal pain occurs.  The patient verbalized understanding of the proper use and possible adverse effects of NSAIDs.  All of the patient's questions and concerns were addressed. Xolair Counseling:  Patient informed of potential adverse effects including but not limited to fever, muscle aches, rash and allergic reactions.  The patient verbalized understanding of the proper use and possible adverse effects of Xolair.  All of the patient's questions and concerns were addressed. Arava Counseling:  Patient counseled regarding adverse effects of Arava including but not limited to nausea, vomiting, abnormalities in liver function tests. Patients may develop mouth sores, rash, diarrhea, and abnormalities in blood counts. The patient understands that monitoring is required including LFTs and blood counts.  There is a rare possibility of scarring of the liver and lung problems that can occur when taking methotrexate. Persistent nausea, loss of appetite, pale stools, dark urine, cough, and shortness of breath should be reported immediately. Patient advised to discontinue Arava treatment and consult with a physician prior to attempting conception. The patient will have to undergo a treatment to eliminate Arava from the body prior to conception. Albendazole Counseling:  I discussed with the patient the risks of albendazole including but not limited to cytopenia, kidney damage, nausea/vomiting and severe allergy.  The patient understands that this medication is being used in an off-label manner. Bactrim Counseling:  I discussed with the patient the risks of sulfa antibiotics including but not limited to GI upset, allergic reaction, drug rash, diarrhea, dizziness, photosensitivity, and yeast infections.  Rarely, more serious reactions can occur including but not limited to aplastic anemia, agranulocytosis, methemoglobinemia, blood dyscrasias, liver or kidney failure, lung infiltrates or desquamative/blistering drug rashes. Valtrex Pregnancy And Lactation Text: this medication is Pregnancy Category B and is considered safe during pregnancy. This medication is not directly found in breast milk but it's metabolite acyclovir is present. Cimzia Pregnancy And Lactation Text: This medication crosses the placenta but can be considered safe in certain situations. Cimzia may be excreted in breast milk. Nsaids Pregnancy And Lactation Text: These medications are considered safe up to 30 weeks gestation. It is excreted in breast milk. Mirvaso Counseling: Mirvaso is a topical medication which can decrease superficial blood flow where applied. Side effects are uncommon and include stinging, redness and allergic reactions. Rituxan Counseling:  I discussed with the patient the risks of Rituxan infusions. Side effects can include infusion reactions, severe drug rashes including mucocutaneous reactions, reactivation of latent hepatitis and other infections and rarely progressive multifocal leukoencephalopathy.  All of the patient's questions and concerns were addressed. Rhofade Counseling: Rhofade is a topical medication which can decrease superficial blood flow where applied. Side effects are uncommon and include stinging, redness and allergic reactions. Spironolactone Counseling: Patient advised regarding risks of diarrhea, abdominal pain, hyperkalemia, birth defects (for female patients), liver toxicity and renal toxicity. The patient may need blood work to monitor liver and kidney function and potassium levels while on therapy. The patient verbalized understanding of the proper use and possible adverse effects of spironolactone.  All of the patient's questions and concerns were addressed. Glycopyrrolate Counseling:  I discussed with the patient the risks of glycopyrrolate including but not limited to skin rash, drowsiness, dry mouth, difficulty urinating, and blurred vision. Litfulo Pregnancy And Lactation Text: Based on animal studies, Lifulo may cause embryo-fetal harm when administered to pregnant women.  The medication should not be used in pregnancy.  Breastfeeding is not recommended during treatment. Topical Sulfur Applications Pregnancy And Lactation Text: This medication is Pregnancy Category C and has an unknown safety profile during pregnancy. It is unknown if this topical medication is excreted in breast milk. Propranolol Pregnancy And Lactation Text: This medication is Pregnancy Category C and it isn't known if it is safe during pregnancy. It is excreted in breast milk. Hydroquinone Counseling:  Patient advised that medication may result in skin irritation, lightening (hypopigmentation), dryness, and burning.  In the event of skin irritation, the patient was advised to reduce the amount of the drug applied or use it less frequently.  Rarely, spots that are treated with hydroquinone can become darker (pseudoochronosis).  Should this occur, patient instructed to stop medication and call the office. The patient verbalized understanding of the proper use and possible adverse effects of hydroquinone.  All of the patient's questions and concerns were addressed. Olumiant Counseling: I discussed with the patient the risks of Olumiant therapy including but not limited to upper respiratory tract infections, shingles, cold sores, and nausea. Live vaccines should be avoided.  This medication has been linked to serious infections; higher rate of mortality; malignancy and lymphoproliferative disorders; major adverse cardiovascular events; thrombosis; gastrointestinal perforations; neutropenia; lymphopenia; anemia; liver enzyme elevations; and lipid elevations. Spevigo Counseling: I discussed with the patient the risks of Spevigo including but not limited to fatigue, nasuea, vomiting, headache, pruritus, urinary tract infection, an infusion related reactions.  The patient understands that monitoring is required including screening for tuberculosis at baseline and yearly screening thereafter while continuing Spevigo therapy. They should contact us if symptoms of infection or other concerning signs are noted. Glycopyrrolate Pregnancy And Lactation Text: This medication is Pregnancy Category B and is considered safe during pregnancy. It is unknown if it is excreted breast milk. Metronidazole Pregnancy And Lactation Text: This medication is Pregnancy Category B and considered safe during pregnancy.  It is also excreted in breast milk. Spironolactone Pregnancy And Lactation Text: This medication can cause feminization of the male fetus and should be avoided during pregnancy. The active metabolite is also found in breast milk. Hyrimoz Counseling:  I discussed with the patient the risks of adalimumab including but not limited to myelosuppression, immunosuppression, autoimmune hepatitis, demyelinating diseases, lymphoma, and serious infections.  The patient understands that monitoring is required including a PPD at baseline and must alert us or the primary physician if symptoms of infection or other concerning signs are noted. Methotrexate Counseling:  Patient counseled regarding adverse effects of methotrexate including but not limited to nausea, vomiting, abnormalities in liver function tests. Patients may develop mouth sores, rash, diarrhea, and abnormalities in blood counts. The patient understands that monitoring is required including LFT's and blood counts.  There is a rare possibility of scarring of the liver and lung problems that can occur when taking methotrexate. Persistent nausea, loss of appetite, pale stools, dark urine, cough, and shortness of breath should be reported immediately. Patient advised to discontinue methotrexate treatment at least three months before attempting to become pregnant.  I discussed the need for folate supplements while taking methotrexate.  These supplements can decrease side effects during methotrexate treatment. The patient verbalized understanding of the proper use and possible adverse effects of methotrexate.  All of the patient's questions and concerns were addressed. Spevigo Pregnancy And Lactation Text: The risk during pregnancy and breastfeeding is uncertain with this medication. This medication does cross the placenta. It is unknown if this medication is found in breast milk. Cantharidin Pregnancy And Lactation Text: This medication has not been proven safe during pregnancy. It is unknown if this medication is excreted in breast milk. Hydroxychloroquine Counseling:  I discussed with the patient that a baseline ophthalmologic exam is needed at the start of therapy and every year thereafter while on therapy. A CBC may also be warranted for monitoring.  The side effects of this medication were discussed with the patient, including but not limited to agranulocytosis, aplastic anemia, seizures, rashes, retinopathy, and liver toxicity. Patient instructed to call the office should any adverse effect occur.  The patient verbalized understanding of the proper use and possible adverse effects of Plaquenil.  All the patient's questions and concerns were addressed. SSKI Counseling:  I discussed with the patient the risks of SSKI including but not limited to thyroid abnormalities, metallic taste, GI upset, fever, headache, acne, arthralgias, paraesthesias, lymphadenopathy, easy bleeding, arrhythmias, and allergic reaction. Erivedge Counseling- I discussed with the patient the risks of Erivedge including but not limited to nausea, vomiting, diarrhea, constipation, weight loss, changes in the sense of taste, decreased appetite, muscle spasms, and hair loss.  The patient verbalized understanding of the proper use and possible adverse effects of Erivedge.  All of the patient's questions and concerns were addressed. Olumiant Pregnancy And Lactation Text: Based on animal studies, Olumiant may cause embryo-fetal harm when administered to pregnant women.  The medication should not be used in pregnancy.  Breastfeeding is not recommended during treatment. Solaraze Counseling:  I discussed with the patient the risks of Solaraze including but not limited to erythema, scaling, itching, weeping, crusting, and pain. Minocycline Counseling: Patient advised regarding possible photosensitivity and discoloration of the teeth, skin, lips, tongue and gums.  Patient instructed to avoid sunlight, if possible.  When exposed to sunlight, patients should wear protective clothing, sunglasses, and sunscreen.  The patient was instructed to call the office immediately if the following severe adverse effects occur:  hearing changes, easy bruising/bleeding, severe headache, or vision changes.  The patient verbalized understanding of the proper use and possible adverse effects of minocycline.  All of the patient's questions and concerns were addressed. Wartpeel Counseling:  I discussed with the patient the risks of Wartpeel including but not limited to erythema, scaling, itching, weeping, crusting, and pain. Stelara Counseling:  I discussed with the patient the risks of ustekinumab including but not limited to immunosuppression, malignancy, posterior leukoencephalopathy syndrome, and serious infections.  The patient understands that monitoring is required including a PPD at baseline and must alert us or the primary physician if symptoms of infection or other concerning signs are noted. Imiquimod Counseling:  I discussed with the patient the risks of imiquimod including but not limited to erythema, scaling, itching, weeping, crusting, and pain.  Patient understands that the inflammatory response to imiquimod is variable from person to person and was educated regarded proper titration schedule.  If flu-like symptoms develop, patient knows to discontinue the medication and contact us. Methotrexate Pregnancy And Lactation Text: This medication is Pregnancy Category X and is known to cause fetal harm. This medication is excreted in breast milk. Solaraze Pregnancy And Lactation Text: This medication is Pregnancy Category B and is considered safe. There is some data to suggest avoiding during the third trimester. It is unknown if this medication is excreted in breast milk. Ilumya Counseling: I discussed with the patient the risks of tildrakizumab including but not limited to immunosuppression, malignancy, posterior leukoencephalopathy syndrome, and serious infections.  The patient understands that monitoring is required including a PPD at baseline and must alert us or the primary physician if symptoms of infection or other concerning signs are noted. Hydroxychloroquine Pregnancy And Lactation Text: This medication has been shown to cause fetal harm but it isn't assigned a Pregnancy Risk Category. There are small amounts excreted in breast milk. Sski Pregnancy And Lactation Text: This medication is Pregnancy Category D and isn't considered safe during pregnancy. It is excreted in breast milk. Fluconazole Counseling:  Patient counseled regarding adverse effects of fluconazole including but not limited to headache, diarrhea, nausea, upset stomach, liver function test abnormalities, taste disturbance, and stomach pain.  There is a rare possibility of liver failure that can occur when taking fluconazole.  The patient understands that monitoring of LFTs and kidney function test may be required, especially at baseline. The patient verbalized understanding of the proper use and possible adverse effects of fluconazole.  All of the patient's questions and concerns were addressed. Rinvoq Counseling: I discussed with the patient the risks of Rinvoq therapy including but not limited to upper respiratory tract infections, shingles, cold sores, bronchitis, nausea, cough, fever, acne, and headache. Live vaccines should be avoided.  This medication has been linked to serious infections; higher rate of mortality; malignancy and lymphoproliferative disorders; major adverse cardiovascular events; thrombosis; thrombocytopenia, anemia, and neutropenia; lipid elevations; liver enzyme elevations; and gastrointestinal perforations. Adbry Counseling: I discussed with the patient the risks of tralokinumab including but not limited to eye infection and irritation, cold sores, injection site reactions, worsening of asthma, allergic reactions and increased risk of parasitic infection.  Live vaccines should be avoided while taking tralokinumab. The patient understands that monitoring is required and they must alert us or the primary physician if symptoms of infection or other concerning signs are noted. Quinolones Counseling:  I discussed with the patient the risks of fluoroquinolones including but not limited to GI upset, allergic reaction, drug rash, diarrhea, dizziness, photosensitivity, yeast infections, liver function test abnormalities, tendonitis/tendon rupture. Opioid Counseling: I discussed with the patient the potential side effects of opioids including but not limited to addiction, altered mental status, and depression. I stressed avoiding alcohol, benzodiazepines, muscle relaxants and sleep aids unless specifically okayed by a physician. The patient verbalized understanding of the proper use and possible adverse effects of opioids. All of the patient's questions and concerns were addressed. They were instructed to flush the remaining pills down the toilet if they did not need them for pain. Thalidomide Counseling: I discussed with the patient the risks of thalidomide including but not limited to birth defects, anxiety, weakness, chest pain, dizziness, cough and severe allergy. Prednisone Counseling:  I discussed with the patient the risks of prolonged use of prednisone including but not limited to weight gain, insomnia, osteoporosis, mood changes, diabetes, susceptibility to infection, glaucoma and high blood pressure.  In cases where prednisone use is prolonged, patients should be monitored with blood pressure checks, serum glucose levels and an eye exam.  Additionally, the patient may need to be placed on GI prophylaxis, PCP prophylaxis, and calcium and vitamin D supplementation and/or a bisphosphonate.  The patient verbalized understanding of the proper use and the possible adverse effects of prednisone.  All of the patient's questions and concerns were addressed. Winlevi Counseling:  I discussed with the patient the risks of topical clascoterone including but not limited to erythema, scaling, itching, and stinging. Patient voiced their understanding. Libtayo Counseling- I discussed with the patient the risks of Libtayo including but not limited to nausea, vomiting, diarrhea, and bone or muscle pain.  The patient verbalized understanding of the proper use and possible adverse effects of Libtayo.  All of the patient's questions and concerns were addressed. Soolantra Counseling: I discussed with the patients the risks of topial Soolantra. This is a medicine which decreases the number of mites and inflammation in the skin. You experience burning, stinging, eye irritation or allergic reactions.  Please call our office if you develop any problems from using this medication. Low Dose Naltrexone Counseling- I discussed with the patient the potential risks and side effects of low dose naltrexone including but not limited to: more vivid dreams, headaches, nausea, vomiting, abdominal pain, fatigue, dizziness, and anxiety. Rinvoq Pregnancy And Lactation Text: Based on animal studies, Rinvoq may cause embryo-fetal harm when administered to pregnant women.  The medication should not be used in pregnancy.  Breastfeeding is not recommended during treatment and for 6 days after the last dose. Isotretinoin Counseling: Patient should get monthly blood tests, not donate blood, not drive at night if vision affected, not share medication, and not undergo elective surgery for 6 months after tx completed. Side effects reviewed, pt to contact office should one occur. Doxycycline Counseling:  Patient counseled regarding possible photosensitivity and increased risk for sunburn.  Patient instructed to avoid sunlight, if possible.  When exposed to sunlight, patients should wear protective clothing, sunglasses, and sunscreen.  The patient was instructed to call the office immediately if the following severe adverse effects occur:  hearing changes, easy bruising/bleeding, severe headache, or vision changes.  The patient verbalized understanding of the proper use and possible adverse effects of doxycycline.  All of the patient's questions and concerns were addressed. Otezla Counseling: The side effects of Otezla were discussed with the patient, including but not limited to worsening or new depression, weight loss, diarrhea, nausea, upper respiratory tract infection, and headache. Patient instructed to call the office should any adverse effect occur.  The patient verbalized understanding of the proper use and possible adverse effects of Otezla.  All the patient's questions and concerns were addressed. Cellcept Counseling:  I discussed with the patient the risks of mycophenolate mofetil including but not limited to infection/immunosuppression, GI upset, hypokalemia, hypercholesterolemia, bone marrow suppression, lymphoproliferative disorders, malignancy, GI ulceration/bleed/perforation, colitis, interstitial lung disease, kidney failure, progressive multifocal leukoencephalopathy, and birth defects.  The patient understands that monitoring is required including a baseline creatinine and regular CBC testing. In addition, patient must alert us immediately if symptoms of infection or other concerning signs are noted. Topical Metronidazole Counseling: Metronidazole is a topical antibiotic medication. You may experience burning, stinging, redness, or allergic reactions.  Please call our office if you develop any problems from using this medication. Drysol Pregnancy And Lactation Text: This medication is considered safe during pregnancy and breast feeding. Azelaic Acid Counseling: Patient counseled that medicine may cause skin irritation and to avoid applying near the eyes.  In the event of skin irritation, the patient was advised to reduce the amount of the drug applied or use it less frequently.   The patient verbalized understanding of the proper use and possible adverse effects of azelaic acid.  All of the patient's questions and concerns were addressed. Doxepin Pregnancy And Lactation Text: This medication is Pregnancy Category C and it isn't known if it is safe during pregnancy. It is also excreted in breast milk and breast feeding isn't recommended. Ebglyss Pregnancy And Lactation Text: This medication likely crosses the placenta but the risk for the fetus is uncertain. It is unknown if this medication is excreted in breast milk. Protopic Counseling: Patient may experience a mild burning sensation during topical application. Protopic is not approved in children less than 2 years of age. There have been case reports of hematologic and skin malignancies in patients using topical calcineurin inhibitors although causality is questionable. Dapsone Counseling: I discussed with the patient the risks of dapsone including but not limited to hemolytic anemia, agranulocytosis, rashes, methemoglobinemia, kidney failure, peripheral neuropathy, headaches, GI upset, and liver toxicity.  Patients who start dapsone require monitoring including baseline LFTs and weekly CBCs for the first month, then every month thereafter.  The patient verbalized understanding of the proper use and possible adverse effects of dapsone.  All of the patient's questions and concerns were addressed. Finasteride Female Counseling: Finasteride Counseling:  I discussed with the patient the risks of use of finasteride including but not limited to decreased libido and sexual dysfunction. Explained the teratogenic nature of the medication and stressed the importance of not getting pregnant during treatment. All of the patient's questions and concerns were addressed. Otezla Pregnancy And Lactation Text: This medication is Pregnancy Category C and it isn't known if it is safe during pregnancy. It is unknown if it is excreted in breast milk. Topical Metronidazole Pregnancy And Lactation Text: This medication is Pregnancy Category B and considered safe during pregnancy.  It is also considered safe to use while breastfeeding. Elidel Counseling: Patient may experience a mild burning sensation during topical application. Elidel is not approved in children less than 2 years of age. There have been case reports of hematologic and skin malignancies in patients using topical calcineurin inhibitors although causality is questionable. Cibinqo Counseling: I discussed with the patient the risks of Cibinqo therapy including but not limited to common cold, nausea, headache, cold sores, increased blood CPK levels, dizziness, UTIs, fatigue, acne, and vomitting. Live vaccines should be avoided.  This medication has been linked to serious infections; higher rate of mortality; malignancy and lymphoproliferative disorders; major adverse cardiovascular events; thrombosis; thrombocytopenia and lymphopenia; lipid elevations; and retinal detachment. Simponi Counseling:  I discussed with the patient the risks of golimumab including but not limited to myelosuppression, immunosuppression, autoimmune hepatitis, demyelinating diseases, lymphoma, and serious infections.  The patient understands that monitoring is required including a PPD at baseline and must alert us or the primary physician if symptoms of infection or other concerning signs are noted. Isotretinoin Pregnancy And Lactation Text: This medication is Pregnancy Category X and is considered extremely dangerous during pregnancy. It is unknown if it is excreted in breast milk. Wegovy Counseling: I reviewed the possible side effects including: thyroid tumors, kidney disease, gallbladder disease, abdominal pain, constipation, diarrhea, nausea, vomiting and pancreatitis. Do not take this medication if you have a history or family history of multiple endocrine neoplasia syndrome type 2. Side effects reviewed, pt to contact office should one occur. Dapsone Pregnancy And Lactation Text: This medication is Pregnancy Category C and is not considered safe during pregnancy or breast feeding. Hydroxyzine Counseling: Patient advised that the medication is sedating and not to drive a car after taking this medication.  Patient informed of potential adverse effects including but not limited to dry mouth, urinary retention, and blurry vision.  The patient verbalized understanding of the proper use and possible adverse effects of hydroxyzine.  All of the patient's questions and concerns were addressed. Doxycycline Pregnancy And Lactation Text: This medication is Pregnancy Category D and not consider safe during pregnancy. It is also excreted in breast milk but is considered safe for shorter treatment courses. Finasteride Pregnancy And Lactation Text: This medication is absolutely contraindicated during pregnancy. It is unknown if it is excreted in breast milk. Enbrel Counseling:  I discussed with the patient the risks of etanercept including but not limited to myelosuppression, immunosuppression, autoimmune hepatitis, demyelinating diseases, lymphoma, and infections.  The patient understands that monitoring is required including a PPD at baseline and must alert us or the primary physician if symptoms of infection or other concerning signs are noted. Protopic Pregnancy And Lactation Text: This medication is Pregnancy Category C. It is unknown if this medication is excreted in breast milk when applied topically. Opioid Pregnancy And Lactation Text: These medications can lead to premature delivery and should be avoided during pregnancy. These medications are also present in breast milk in small amounts. Cyclophosphamide Counseling:  I discussed with the patient the risks of cyclophosphamide including but not limited to hair loss, hormonal abnormalities, decreased fertility, abdominal pain, diarrhea, nausea and vomiting, bone marrow suppression and infection. The patient understands that monitoring is required while taking this medication. Benzoyl Peroxide Counseling: Patient counseled that medicine may cause skin irritation and bleach clothing.  In the event of skin irritation, the patient was advised to reduce the amount of the drug applied or use it less frequently.   The patient verbalized understanding of the proper use and possible adverse effects of benzoyl peroxide.  All of the patient's questions and concerns were addressed. Ketoconazole Counseling:   Patient counseled regarding improving absorption with orange juice.  Adverse effects include but are not limited to breast enlargement, headache, diarrhea, nausea, upset stomach, liver function test abnormalities, taste disturbance, and stomach pain.  There is a rare possibility of liver failure that can occur when taking ketoconazole. The patient understands that monitoring of LFTs may be required, especially at baseline. The patient verbalized understanding of the proper use and possible adverse effects of ketoconazole.  All of the patient's questions and concerns were addressed. Erythromycin Counseling:  I discussed with the patient the risks of erythromycin including but not limited to GI upset, allergic reaction, drug rash, diarrhea, increase in liver enzymes, and yeast infections. Libtayo Pregnancy And Lactation Text: This medication is contraindicated in pregnancy and when breast feeding. Gabapentin Counseling: I discussed with the patient the risks of gabapentin including but not limited to dizziness, somnolence, fatigue and ataxia. Hydroxyzine Pregnancy And Lactation Text: This medication is not safe during pregnancy and should not be taken. It is also excreted in breast milk and breast feeding isn't recommended. Topical Steroids Counseling: I discussed with the patient that prolonged use of topical steroids can result in the increased appearance of superficial blood vessels (telangiectasias), lightening (hypopigmentation) and thinning of the skin (atrophy).  Patient understands to avoid using high potency steroids in skin folds, the groin or the face.  The patient verbalized understanding of the proper use and possible adverse effects of topical steroids.  All of the patient's questions and concerns were addressed. Birth Control Pills Counseling: Birth Control Pill Counseling: I discussed with the patient the potential side effects of OCPs including but not limited to increased risk of stroke, heart attack, thrombophlebitis, deep venous thrombosis, hepatic adenomas, breast changes, GI upset, headaches, and depression.  The patient verbalized understanding of the proper use and possible adverse effects of OCPs. All of the patient's questions and concerns were addressed. Oxybutynin Counseling:  I discussed with the patient the risks of oxybutynin including but not limited to skin rash, drowsiness, dry mouth, difficulty urinating, and blurred vision. Cibinqo Pregnancy And Lactation Text: It is unknown if this medication will adversely affect pregnancy or breast feeding.  You should not take this medication if you are currently pregnant or planning a pregnancy or while breastfeeding. High Dose Vitamin A Counseling: Side effects reviewed, pt to contact office should one occur. Sotyktu Counseling:  I discussed the most common side effects of Sotyktu including: common cold, sore throat, sinus infections, cold sores, canker sores, folliculitis, and acne.? I also discussed more serious side effects of Sotyktu including but not limited to: serious allergic reactions; increased risk for infections such as TB; cancers such as lymphomas; rhabdomyolysis and elevated CPK; and elevated triglycerides and liver enzymes.? Ketoconazole Pregnancy And Lactation Text: This medication is Pregnancy Category C and it isn't know if it is safe during pregnancy. It is also excreted in breast milk and breast feeding isn't recommended. Qbrexza Counseling:  I discussed with the patient the risks of Qbrexza including but not limited to headache, mydriasis, blurred vision, dry eyes, nasal dryness, dry mouth, dry throat, dry skin, urinary hesitation, and constipation.  Local skin reactions including erythema, burning, stinging, and itching can also occur. Skyrizi Counseling: I discussed with the patient the risks of risankizumab-rzaa including but not limited to immunosuppression, and serious infections.  The patient understands that monitoring is required including a PPD at baseline and must alert us or the primary physician if symptoms of infection or other concerning signs are noted. Cyclophosphamide Pregnancy And Lactation Text: This medication is Pregnancy Category D and it isn't considered safe during pregnancy. This medication is excreted in breast milk. Humira Counseling:  I discussed with the patient the risks of adalimumab including but not limited to myelosuppression, immunosuppression, autoimmune hepatitis, demyelinating diseases, lymphoma, and serious infections.  The patient understands that monitoring is required including a PPD at baseline and must alert us or the primary physician if symptoms of infection or other concerning signs are noted. Zepbound Counseling: I reviewed the possible side effects including: thyroid tumors, kidney disease, gallbladder disease, abdominal pain, constipation, diarrhea, nausea, vomiting and pancreatitis. Do not take this medication if you have a history or family history of multiple endocrine neoplasia syndrome type 2. Side effects reviewed, pt to contact office should one occur. Eucrisa Counseling: Patient may experience a mild burning sensation during topical application. Eucrisa is not approved in children less than 2 years of age. Topical Steroids Applications Pregnancy And Lactation Text: Most topical steroids are considered safe to use during pregnancy and lactation.  Any topical steroid applied to the breast or nipple should be washed off before breastfeeding. Odomzo Counseling- I discussed with the patient the risks of Odomzo including but not limited to nausea, vomiting, diarrhea, constipation, weight loss, changes in the sense of taste, decreased appetite, muscle spasms, and hair loss.  The patient verbalized understanding of the proper use and possible adverse effects of Odomzo.  All of the patient's questions and concerns were addressed. Qbrexza Pregnancy And Lactation Text: There is no available data on Qbrexza use in pregnant women.  There is no available data on Qbrexza use in lactation. Erythromycin Pregnancy And Lactation Text: This medication is Pregnancy Category B and is considered safe during pregnancy. It is also excreted in breast milk. High Dose Vitamin A Pregnancy And Lactation Text: High dose vitamin A therapy is contraindicated during pregnancy and breast feeding. Litfulo Counseling: I discussed with the patient the risks of Litfulo therapy including but not limited to upper respiratory tract infections, shingles, cold sores, and nausea. Live vaccines should be avoided.  This medication has been linked to serious infections; higher rate of mortality; malignancy and lymphoproliferative disorders; major adverse cardiovascular events; thrombosis; gastrointestinal perforations; neutropenia; lymphopenia; anemia; liver enzyme elevations; and lipid elevations. Birth Control Pills Pregnancy And Lactation Text: This medication should be avoided if pregnant and for the first 30 days post-partum. Terbinafine Counseling: Patient counseling regarding adverse effects of terbinafine including but not limited to headache, diarrhea, rash, upset stomach, liver function test abnormalities, itching, taste/smell disturbance, nausea, abdominal pain, and flatulence.  There is a rare possibility of liver failure that can occur when taking terbinafine.  The patient understands that a baseline LFT and kidney function test may be required. The patient verbalized understanding of the proper use and possible adverse effects of terbinafine.  All of the patient's questions and concerns were addressed. Metronidazole Counseling:  I discussed with the patient the risks of metronidazole including but not limited to seizures, nausea/vomiting, a metallic taste in the mouth, nausea/vomiting and severe allergy. Propranolol Counseling:  I discussed with the patient the risks of propranolol including but not limited to low heart rate, low blood pressure, low blood sugar, restlessness and increased cold sensitivity. They should call the office if they experience any of these side effects. Cyclosporine Counseling:  I discussed with the patient the risks of cyclosporine including but not limited to hypertension, gingival hyperplasia,myelosuppression, immunosuppression, liver damage, kidney damage, neurotoxicity, lymphoma, and serious infections. The patient understands that monitoring is required including baseline blood pressure, CBC, CMP, lipid panel and uric acid, and then 1-2 times monthly CMP and blood pressure. Topical Sulfur Applications Counseling: Topical Sulfur Counseling: Patient counseled that this medication may cause skin irritation or allergic reactions.  In the event of skin irritation, the patient was advised to reduce the amount of the drug applied or use it less frequently.   The patient verbalized understanding of the proper use and possible adverse effects of topical sulfur application.  All of the patient's questions and concerns were addressed. Sotyktu Pregnancy And Lactation Text: There is insufficient data to evaluate whether or not Sotyktu is safe to use during pregnancy.? ?It is not known if Sotyktu passes into breast milk and whether or not it is safe to use when breastfeeding.?? Amzeeq Counseling: Amzeeq is a topical antibiotic foam which contains minocycline.  The most commonly reported side effect of Amzeeq is headache.  The medication is flammable and should not be applied near a fire, flame, or while smoking.  Sun precautions is recommended to prevent sunburn. Amzeeq Pregnancy And Lactation Text: It is not recommended to use the product if you are pregnant. Latisse Pregnancy And Lactation Text: It is not recommended to use Latisse if you are pregnant or trying to become pregnant. Over the Counter Salicylic Acid Counseling: Over the counter salicylic acid preparations can be used effectively to treat warts at home. There are two types of application: liquid and plaster. Liquid preparations are applied like nail polish and the plaster applications are applied like a bandage (you may need to apply duct tape over the plaster to keep it in place). Dead and macerated skin should be removed regularly with a nail file or nail clippers for best results. Over The Counter Salicylic Acid Pregnancy And Lactation Text: It is not recommended to use high dose OTC salicylic acid while pregnant. Lower dose topical preparations are considered safe. Latisse Counseling: Lattise Counseling: I reviewed the possible side-effects of Latisse including itching, eye irritation, discoloration and exacerbating glaucoma. I also discussed application methods.

## 2025-07-25 DIAGNOSIS — I10 ESSENTIAL HYPERTENSION: ICD-10-CM

## 2025-07-25 DIAGNOSIS — F32.9 REACTIVE DEPRESSION: ICD-10-CM

## 2025-07-25 RX ORDER — SEMAGLUTIDE 0.68 MG/ML
INJECTION, SOLUTION SUBCUTANEOUS
Qty: 3 ML | Refills: 5 | Status: SHIPPED | OUTPATIENT
Start: 2025-07-25

## 2025-07-25 RX ORDER — BUPROPION HYDROCHLORIDE 150 MG/1
150 TABLET ORAL DAILY
Qty: 30 TABLET | Refills: 3 | Status: SHIPPED | OUTPATIENT
Start: 2025-07-25

## 2025-07-25 RX ORDER — BUSPIRONE HYDROCHLORIDE 10 MG/1
10 TABLET ORAL EVERY 12 HOURS SCHEDULED
Qty: 60 TABLET | Refills: 3 | Status: SHIPPED | OUTPATIENT
Start: 2025-07-25

## 2025-07-25 RX ORDER — ONDANSETRON 4 MG/1
TABLET, ORALLY DISINTEGRATING ORAL
Qty: 30 TABLET | Refills: 3 | Status: SHIPPED | OUTPATIENT
Start: 2025-07-25

## 2025-07-25 RX ORDER — DULOXETIN HYDROCHLORIDE 60 MG/1
60 CAPSULE, DELAYED RELEASE ORAL DAILY
Qty: 30 CAPSULE | Refills: 3 | Status: SHIPPED | OUTPATIENT
Start: 2025-07-25

## 2025-07-25 RX ORDER — BLOOD SUGAR DIAGNOSTIC
STRIP MISCELLANEOUS
Qty: 100 EACH | Refills: 5 | Status: SHIPPED | OUTPATIENT
Start: 2025-07-25

## 2025-07-25 RX ORDER — METOPROLOL SUCCINATE 100 MG/1
100 TABLET, EXTENDED RELEASE ORAL DAILY
Qty: 30 TABLET | Refills: 3 | Status: SHIPPED | OUTPATIENT
Start: 2025-07-25

## 2025-07-28 RX ORDER — APIXABAN 5 MG/1
5 TABLET, FILM COATED ORAL EVERY 12 HOURS SCHEDULED
Qty: 60 TABLET | Refills: 11 | Status: SHIPPED | OUTPATIENT
Start: 2025-07-28

## 2025-07-31 DIAGNOSIS — E11.65 TYPE 2 DIABETES MELLITUS WITH HYPERGLYCEMIA, WITH LONG-TERM CURRENT USE OF INSULIN: ICD-10-CM

## 2025-07-31 DIAGNOSIS — Z79.4 TYPE 2 DIABETES MELLITUS WITH HYPERGLYCEMIA, WITH LONG-TERM CURRENT USE OF INSULIN: ICD-10-CM

## 2025-07-31 RX ORDER — INSULIN ASPART 100 [IU]/ML
INJECTION, SOLUTION INTRAVENOUS; SUBCUTANEOUS
Qty: 60 ML | Refills: 1 | Status: SHIPPED | OUTPATIENT
Start: 2025-07-31

## 2025-08-06 ENCOUNTER — OFFICE VISIT (OUTPATIENT)
Dept: FAMILY MEDICINE CLINIC | Facility: CLINIC | Age: 60
End: 2025-08-06
Payer: MEDICARE

## 2025-08-06 VITALS
TEMPERATURE: 98.2 F | SYSTOLIC BLOOD PRESSURE: 130 MMHG | WEIGHT: 199 LBS | OXYGEN SATURATION: 97 % | BODY MASS INDEX: 30.16 KG/M2 | HEIGHT: 68 IN | DIASTOLIC BLOOD PRESSURE: 78 MMHG | HEART RATE: 67 BPM

## 2025-08-06 DIAGNOSIS — K59.00 CONSTIPATION, UNSPECIFIED CONSTIPATION TYPE: ICD-10-CM

## 2025-08-06 DIAGNOSIS — Z82.0 FAMILY HISTORY OF PARKINSON DISEASE: ICD-10-CM

## 2025-08-06 DIAGNOSIS — L98.9 SKIN LESION OF FACE: ICD-10-CM

## 2025-08-06 DIAGNOSIS — R25.1 OCCASIONAL TREMORS: Primary | ICD-10-CM

## 2025-08-06 PROCEDURE — 3075F SYST BP GE 130 - 139MM HG: CPT | Performed by: FAMILY MEDICINE

## 2025-08-06 PROCEDURE — 3078F DIAST BP <80 MM HG: CPT | Performed by: FAMILY MEDICINE

## 2025-08-06 PROCEDURE — 3052F HG A1C>EQUAL 8.0%<EQUAL 9.0%: CPT | Performed by: FAMILY MEDICINE

## 2025-08-06 PROCEDURE — 99213 OFFICE O/P EST LOW 20 MIN: CPT | Performed by: FAMILY MEDICINE

## 2025-08-06 PROCEDURE — 1125F AMNT PAIN NOTED PAIN PRSNT: CPT | Performed by: FAMILY MEDICINE

## 2025-08-22 DIAGNOSIS — I10 PRIMARY HYPERTENSION: ICD-10-CM

## 2025-08-22 RX ORDER — AMLODIPINE BESYLATE 10 MG/1
10 TABLET ORAL DAILY
Qty: 30 TABLET | Refills: 11 | Status: SHIPPED | OUTPATIENT
Start: 2025-08-22

## (undated) DEVICE — BNDG ESMARK 4IN 12FT LF STRL BLU

## (undated) DEVICE — PK ENDO GI 50

## (undated) DEVICE — TBG PENCL TELESCP MEGADYNE SMOKE EVAC 15FT

## (undated) DEVICE — OCCLUSIVE GAUZE STRIP OVERWRAP,3% BISMUTH TRIBROMOPHENATE IN PETROLATUM BLEND: Brand: XEROFORM

## (undated) DEVICE — 3M™ STERI-DRAPE™ U-DRAPE 1015: Brand: STERI-DRAPE™

## (undated) DEVICE — BNDG ELAS ELITE V/CLOSE 4IN 5YD LF STRL

## (undated) DEVICE — SOL IRRIG NACL 1000ML

## (undated) DEVICE — SOLUTION,WATER,IRRIGATION,1000ML,STERILE: Brand: MEDLINE

## (undated) DEVICE — APPL CHLORAPREP HI/LITE TINTED 10.5ML ORNG

## (undated) DEVICE — GOWN,REINFORCE,POLY,SIRUS,BREATH SLV,XLG: Brand: MEDLINE

## (undated) DEVICE — GLV SURG SIGNATURE ESSENTIAL PF LTX SZ8.5

## (undated) DEVICE — SOL IRRIG H2O 1000ML STRL

## (undated) DEVICE — ADHS SKIN PREMIERPRO EXOFIN TOPICAL HI/VISC .5ML

## (undated) DEVICE — SPNG GZ AVANT 6PLY 4X4IN STRL PK/2

## (undated) DEVICE — SUT ETHLN 3/0 FSLX 30IN 1673H

## (undated) DEVICE — GLV SURG SENSICARE PI ORTHO SZ8 LF STRL

## (undated) DEVICE — APPL HEMO ENDO SURGICEL 2IN1 1P/U STRL

## (undated) DEVICE — STPLR SKIN COUNT W/35STPL SS WHT DISP STRL

## (undated) DEVICE — GENERAL LAPAROSCOPY CDS: Brand: MEDLINE INDUSTRIES, INC.

## (undated) DEVICE — ENDOPATH 5MM CURVED SCISSORS WITH MONOPOLAR CAUTERY: Brand: ENDOPATH

## (undated) DEVICE — FOAM BUMP, LARGE: Brand: MEDLINE INDUSTRIES, INC.

## (undated) DEVICE — INSUFFLATION TUBING SET, ENDOFLATOR 50: Brand: N.A.

## (undated) DEVICE — BITEBLOCK ENDO W/STRAP 60F A/ LF DISP

## (undated) DEVICE — DRAPE,U/ SHT,SPLIT,PLAS,STERIL: Brand: MEDLINE

## (undated) DEVICE — KT DRSNG VAC SIMPLACE MD 5PK

## (undated) DEVICE — SPNG LAP PREWSH SFTPK 18X18IN STRL PK/5

## (undated) DEVICE — PK PROC TURNOVER

## (undated) DEVICE — STAPLER, SKIN, 35W, A: Brand: MEDLINE INDUSTRIES, INC.

## (undated) DEVICE — SOL IRR NACL 0.9PCT ARTHROMATIC 3000ML

## (undated) DEVICE — SUT VIC 2/0 CT2 27IN J269H

## (undated) DEVICE — PAPR PRNT PK SONY W RIBN UPC55

## (undated) DEVICE — CUFF TOURNI 1BLADDER 1PRT 30IN STRL

## (undated) DEVICE — KT SURG TURNOVER 050

## (undated) DEVICE — ESWAB LQ AMIES  REG. NYLON FLOCKED  APPLICATOR: Brand: ESWAB™

## (undated) DEVICE — C-ARM: Brand: UNBRANDED

## (undated) DEVICE — PK EXTREM 50

## (undated) DEVICE — GLV SURG BIOGEL M LTX PF 7 1/2

## (undated) DEVICE — PAD UNDERCAST WYTEX 4IN 4YD LF STRL

## (undated) DEVICE — 3M™ IOBAN™ 2 ANTIMICROBIAL INCISE DRAPE 6650EZ: Brand: IOBAN™ 2

## (undated) DEVICE — PENCL EVAC ULTRAVAC SMOKE W/BLD

## (undated) DEVICE — ENDOPATH XCEL BLUNT TIP TROCARS WITH SMOOTH SLEEVES: Brand: ENDOPATH XCEL

## (undated) DEVICE — INTENDED FOR TISSUE SEPARATION, AND OTHER PROCEDURES THAT REQUIRE A SHARP SURGICAL BLADE TO PUNCTURE OR CUT.: Brand: BARD-PARKER ® CARBON RIB-BACK BLADES

## (undated) DEVICE — 2108 SERIES SAGITTAL BLADE (24.8 X 1.24 X 73.8MM)

## (undated) DEVICE — SUT VIC 0 UR6 27IN VCP603H

## (undated) DEVICE — SOL IRRIG NACL 9PCT 1000ML BTL

## (undated) DEVICE — SUT VIC 1 CTX 36IN J977H

## (undated) DEVICE — UNDRGLV SURG BIOGEL PIMICROINDICATOR SYNTH SZ8 LF STRL

## (undated) DEVICE — MICRO SAGITTAL BLADE (9.5 X 0.4 X 25.5MM)

## (undated) DEVICE — GLV SURG SENSICARE PI LF PF 8 GRN STRL

## (undated) DEVICE — GLV SURG SIGNATURE ESSENTIAL PF LTX SZ8

## (undated) DEVICE — DECANTER: Brand: UNBRANDED

## (undated) DEVICE — PENCL HND ROCKRSWTCH HOLSTR EZ CLEAN TP CRD 10FT

## (undated) DEVICE — SUT VIC 3/0 PS2 18IN J497G BX/12

## (undated) DEVICE — BLANKT WARM UPPR/BDY ARM/OUT 57X196CM

## (undated) DEVICE — GAUZE,SPONGE,FLUFF,6"X6.75",STRL,5/TRAY: Brand: MEDLINE

## (undated) DEVICE — DRSNG GZ CURAD XEROFORM NONADHR OVERWRAP 5X9IN

## (undated) DEVICE — SLV SCD CALF HEMOFORCE DVT THERP REPROC MD

## (undated) DEVICE — BG RETRV TISS SUPERBAG INTRO RIP/STOP NLY 10MM 240ML MD

## (undated) DEVICE — BANDAGE,GAUZE,BULKEE II,4.5"X4.1YD,STRL: Brand: MEDLINE

## (undated) DEVICE — GLV SURG DERMASSURE GRN LF PF 8.5

## (undated) DEVICE — FOAM BUMP ROUND LARGE: Brand: MEDLINE INDUSTRIES, INC.

## (undated) DEVICE — SINGLE-USE BIOPSY FORCEPS: Brand: RADIAL JAW 4

## (undated) DEVICE — NDL HYPO PRECISIONGLIDE/REG 18G 1IN PNK

## (undated) DEVICE — SUT VIC COAT 1 CP-1 27IN

## (undated) DEVICE — ADHS LIQ MASTISOL 2/3ML

## (undated) DEVICE — ENDOPATH XCEL WITH OPTIVIEW TECHNOLOGY UNIVERSAL TROCAR STABILITY SLEEVES: Brand: ENDOPATH XCEL OPTIVIEW

## (undated) DEVICE — SUT VIC 2/0 CT1 36IN

## (undated) DEVICE — BNDG ELAS CO-FLEX SLF ADHR 4IN5YD LF STRL

## (undated) DEVICE — 9165 UNIVERSAL PATIENT PLATE: Brand: 3M™

## (undated) DEVICE — SUT SILK 2/0 SH CR8 18IN CR8 C012D

## (undated) DEVICE — ENDOPATH XCEL WITH OPTIVIEW TECHNOLOGY BLADELESS TROCARS WITH STABILITY SLEEVES: Brand: ENDOPATH XCEL OPTIVIEW

## (undated) DEVICE — UNDERGLV SURG BIOGEL/PI PF SYNTH SURG SZ8.5 BLU 50/BX

## (undated) DEVICE — PAD,ABDOMINAL,5"X9",STERILE,LF,1/PK: Brand: MEDLINE INDUSTRIES, INC.

## (undated) DEVICE — CVR HNDL LT SURG ACCSSRY BLU STRL

## (undated) DEVICE — UNDERGLV SURG BIOGEL INDICAT PI SZ8 BLU

## (undated) DEVICE — CONTAINER,SPECIMEN,OR STERILE,4OZ: Brand: MEDLINE

## (undated) DEVICE — 2, DISPOSABLE SUCTION/IRRIGATOR WITH DISPOSABLE TIP: Brand: STRYKEFLOW

## (undated) DEVICE — GOWN,REINFRCE,POLY,SIRUS,BREATH SLV,XXLG: Brand: MEDLINE

## (undated) DEVICE — ANTIBACTERIAL UNDYED BRAIDED (POLYGLACTIN 910), SYNTHETIC ABSORBABLE SUTURE: Brand: COATED VICRYL